# Patient Record
Sex: FEMALE | Race: WHITE | NOT HISPANIC OR LATINO | Employment: OTHER | URBAN - METROPOLITAN AREA
[De-identification: names, ages, dates, MRNs, and addresses within clinical notes are randomized per-mention and may not be internally consistent; named-entity substitution may affect disease eponyms.]

---

## 2017-01-20 ENCOUNTER — TELEPHONE (OUTPATIENT)
Dept: INTERNAL MEDICINE | Facility: CLINIC | Age: 57
End: 2017-01-20

## 2017-01-20 ENCOUNTER — OFFICE VISIT (OUTPATIENT)
Dept: INTERNAL MEDICINE | Facility: CLINIC | Age: 57
End: 2017-01-20
Payer: MEDICARE

## 2017-01-20 VITALS
DIASTOLIC BLOOD PRESSURE: 78 MMHG | OXYGEN SATURATION: 97 % | SYSTOLIC BLOOD PRESSURE: 130 MMHG | WEIGHT: 178.6 LBS | BODY MASS INDEX: 33.72 KG/M2 | HEART RATE: 57 BPM | HEIGHT: 61 IN | TEMPERATURE: 98.2 F

## 2017-01-20 DIAGNOSIS — N18.30 CKD (CHRONIC KIDNEY DISEASE) STAGE 3, GFR 30-59 ML/MIN (H): ICD-10-CM

## 2017-01-20 DIAGNOSIS — E87.5 HYPERKALEMIA: Primary | ICD-10-CM

## 2017-01-20 DIAGNOSIS — Z71.89 ADVANCED DIRECTIVES, COUNSELING/DISCUSSION: ICD-10-CM

## 2017-01-20 DIAGNOSIS — E11.21 TYPE 2 DIABETES MELLITUS WITH DIABETIC NEPHROPATHY, WITH LONG-TERM CURRENT USE OF INSULIN (H): Primary | ICD-10-CM

## 2017-01-20 DIAGNOSIS — Z79.4 TYPE 2 DIABETES MELLITUS WITH DIABETIC NEPHROPATHY, WITH LONG-TERM CURRENT USE OF INSULIN (H): Primary | ICD-10-CM

## 2017-01-20 LAB
ANION GAP SERPL CALCULATED.3IONS-SCNC: 12 MMOL/L (ref 3–14)
BUN SERPL-MCNC: 40 MG/DL (ref 7–30)
CALCIUM SERPL-MCNC: 8.6 MG/DL (ref 8.5–10.1)
CHLORIDE SERPL-SCNC: 116 MMOL/L (ref 94–109)
CO2 SERPL-SCNC: 18 MMOL/L (ref 20–32)
CREAT SERPL-MCNC: 1.68 MG/DL (ref 0.52–1.04)
CREAT UR-MCNC: 116 MG/DL
GFR SERPL CREATININE-BSD FRML MDRD: 32 ML/MIN/1.7M2
GLUCOSE SERPL-MCNC: 228 MG/DL (ref 70–99)
MICROALBUMIN UR-MCNC: 4750 MG/L
MICROALBUMIN/CREAT UR: 4094.83 MG/G CR (ref 0–25)
POTASSIUM SERPL-SCNC: 6.6 MMOL/L (ref 3.4–5.3)
SODIUM SERPL-SCNC: 146 MMOL/L (ref 133–144)
TSH SERPL DL<=0.005 MIU/L-ACNC: 3.33 MU/L (ref 0.4–4)

## 2017-01-20 PROCEDURE — 36415 COLL VENOUS BLD VENIPUNCTURE: CPT | Performed by: INTERNAL MEDICINE

## 2017-01-20 PROCEDURE — 84443 ASSAY THYROID STIM HORMONE: CPT | Performed by: INTERNAL MEDICINE

## 2017-01-20 PROCEDURE — 82043 UR ALBUMIN QUANTITATIVE: CPT | Performed by: INTERNAL MEDICINE

## 2017-01-20 PROCEDURE — 99214 OFFICE O/P EST MOD 30 MIN: CPT | Performed by: INTERNAL MEDICINE

## 2017-01-20 PROCEDURE — 80048 BASIC METABOLIC PNL TOTAL CA: CPT | Performed by: INTERNAL MEDICINE

## 2017-01-20 PROCEDURE — 83036 HEMOGLOBIN GLYCOSYLATED A1C: CPT | Performed by: INTERNAL MEDICINE

## 2017-01-20 RX ORDER — SODIUM BICARBONATE 650 MG/1
650 TABLET ORAL DAILY
Qty: 30 TABLET | Refills: 7 | Status: CANCELLED | OUTPATIENT
Start: 2017-01-20

## 2017-01-20 RX ORDER — SODIUM POLYSTYRENE SULFONATE 15 G/60ML
15 SUSPENSION ORAL; RECTAL ONCE
Qty: 60 ML | Refills: 0 | Status: SHIPPED | OUTPATIENT
Start: 2017-01-20 | End: 2017-08-11

## 2017-01-20 NOTE — NURSING NOTE
"Chief Complaint   Patient presents with     Diabetes       Initial /80 mmHg  Pulse 57  Temp(Src) 98.2  F (36.8  C) (Oral)  Ht 5' 1\" (1.549 m)  Wt 178 lb 9.6 oz (81.012 kg)  BMI 33.76 kg/m2  SpO2 97% Estimated body mass index is 33.76 kg/(m^2) as calculated from the following:    Height as of this encounter: 5' 1\" (1.549 m).    Weight as of this encounter: 178 lb 9.6 oz (81.012 kg).  BP completed using cuff size: regular    "

## 2017-01-20 NOTE — TELEPHONE ENCOUNTER
Attempted to contact patient tonight to discuss her severe hyperkalemia, which has been an intermittent problem in this patient in the past.  She has CKD, is not on ACEi, etc.  Has responded to Kayexalate in the past.  Sent prescription for single dose to her pharmacy, and instructed her to return to clinic tomorrow to have this re-checked.

## 2017-01-20 NOTE — PROGRESS NOTES
"  SUBJECTIVE:                                                    Yissel Wetzel is a 56 year old female who presents to clinic today for the following health issues:      Diabetes Follow-up    Patient is checking blood sugars: 6 times daily.  Results are as follows:         am - 100-115         lunchtime - unsure         suppertime - unsure         bedtime - 200    Diabetic concerns: None     Symptoms of hypoglycemia (low blood sugar): shaky, dizzy, weak, lethargy, confused     Paresthesias (numbness or burning in feet) or sores: No     Date of last diabetic eye exam: about a year ago       Amount of exercise or physical activity: 2-3 days/week for an average of 15-20 mins    Problems taking medications regularly: No    Medication side effects: none    Diet: limits sweeteners, only drinks lactose free milk         Problem list and histories reviewed & adjusted, as indicated.  Additional history:     Patient here for follow-up insulin-dependent type 2 diabetes.  She has made some dramatic changes in her diet in the last few months and has lost 15-20 pounds in the last 3 months.  She continues on her usual dose of Levemir insulin nightly (65 units), but now finds that she does not need the scheduled NovoLog prior to each meal, and only doses this based on sliding scale.  Most blood sugars are still consistently under 200, some a.m. fasting blood sugars are under 100.  Due for glycosylated hemoglobin today.    ROS:  Constitutional, HEENT, cardiovascular, pulmonary, gi and gu systems are negative, except as otherwise noted.    OBJECTIVE:                                                    /80 mmHg  Pulse 57  Temp(Src) 98.2  F (36.8  C) (Oral)  Ht 5' 1\" (1.549 m)  Wt 178 lb 9.6 oz (81.012 kg)  BMI 33.76 kg/m2  SpO2 97%  Body mass index is 33.76 kg/(m^2).  GENERAL: healthy, alert and no distress  NECK: no adenopathy, no asymmetry, masses, or scars and thyroid normal to palpation  RESP: lungs clear to " auscultation - no rales, rhonchi or wheezes  CV: regular rate and rhythm, normal S1 S2, no S3 or S4, no murmur, click or rub, no peripheral edema and peripheral pulses strong  ABDOMEN: soft, nontender, no hepatosplenomegaly, no masses and bowel sounds normal  MS: no gross musculoskeletal defects noted, no edema         ASSESSMENT/PLAN:                                                      1. Type 2 diabetes mellitus with diabetic nephropathy, with long-term current use of insulin (H)  - Hemoglobin A1c  - Microalbumin quantitative random urine  - Basic metabolic panel  - TSH with free T4 reflex    2. CKD (chronic kidney disease) stage 3, GFR 30-59 ml/min    - blood glucose monitoring (ACCU-CHEK CHING) test strip; Use to test blood sugars 4 times daily or as directed.  Dispense: 200 each; Refill: 2  - insulin pen needle 31G X 5 MM; Use 4 times daily  Dispense: 100 each; Refill: 11    3. Advanced directives, counseling/discussion            Gigi Martin MD  [facility]

## 2017-01-21 DIAGNOSIS — E87.5 HYPERKALEMIA: ICD-10-CM

## 2017-01-21 LAB
ANION GAP SERPL CALCULATED.3IONS-SCNC: 9 MMOL/L (ref 3–14)
BUN SERPL-MCNC: 43 MG/DL (ref 7–30)
CALCIUM SERPL-MCNC: 8.5 MG/DL (ref 8.5–10.1)
CHLORIDE SERPL-SCNC: 114 MMOL/L (ref 94–109)
CO2 SERPL-SCNC: 21 MMOL/L (ref 20–32)
CREAT SERPL-MCNC: 1.95 MG/DL (ref 0.52–1.04)
GFR SERPL CREATININE-BSD FRML MDRD: 27 ML/MIN/1.7M2
GLUCOSE SERPL-MCNC: 189 MG/DL (ref 70–99)
HBA1C MFR BLD: 7.7 % (ref 4.3–6)
POTASSIUM SERPL-SCNC: 5 MMOL/L (ref 3.4–5.3)
SODIUM SERPL-SCNC: 144 MMOL/L (ref 133–144)

## 2017-01-21 PROCEDURE — 36415 COLL VENOUS BLD VENIPUNCTURE: CPT | Performed by: INTERNAL MEDICINE

## 2017-01-21 PROCEDURE — 80048 BASIC METABOLIC PNL TOTAL CA: CPT | Performed by: INTERNAL MEDICINE

## 2017-02-16 ENCOUNTER — TRANSFERRED RECORDS (OUTPATIENT)
Dept: HEALTH INFORMATION MANAGEMENT | Facility: CLINIC | Age: 57
End: 2017-02-16

## 2017-02-23 DIAGNOSIS — N18.30 CKD (CHRONIC KIDNEY DISEASE) STAGE 3, GFR 30-59 ML/MIN (H): ICD-10-CM

## 2017-02-23 NOTE — TELEPHONE ENCOUNTER
test strip    PATIENT STATES SHE TEST 5-6X/DAY needs new script  Last Written Prescription Date: 01/20/17  Last Fill Quantity: 200,  # refills: 2   Last Office Visit with [facility], [facility] or [facility] prescribing provider: 01/20/17

## 2017-02-23 NOTE — TELEPHONE ENCOUNTER
sodium bicarbonate 650 MG       Last Written Prescription Date: 05/31/16  Last Fill Quantity: 30, # refills: 7  Last Office Visit with [facility], P or [facility] prescribing provider: 01/20/17        BP Readings from Last 3 Encounters:   01/20/17 130/78   10/14/16 170/80   07/22/16 130/72     Lab Results   Component Value Date    AST 50 07/22/2016     Lab Results   Component Value Date    ALT 57 07/22/2016     Creatinine   Date Value Ref Range Status   01/21/2017 1.95 (H) 0.52 - 1.04 mg/dL Final

## 2017-02-27 RX ORDER — SODIUM BICARBONATE 650 MG/1
650 TABLET ORAL DAILY
Qty: 30 TABLET | Refills: 7 | Status: SHIPPED | OUTPATIENT
Start: 2017-02-27 | End: 2017-12-28

## 2017-03-03 ENCOUNTER — MEDICAL CORRESPONDENCE (OUTPATIENT)
Dept: HEALTH INFORMATION MANAGEMENT | Facility: CLINIC | Age: 57
End: 2017-03-03

## 2017-03-23 DIAGNOSIS — E11.21 TYPE 2 DIABETES MELLITUS WITH DIABETIC NEPHROPATHY (H): ICD-10-CM

## 2017-03-23 DIAGNOSIS — K20.90 ESOPHAGITIS, UNSPECIFIED: ICD-10-CM

## 2017-03-23 DIAGNOSIS — N18.30 CKD (CHRONIC KIDNEY DISEASE) STAGE 3, GFR 30-59 ML/MIN (H): ICD-10-CM

## 2017-03-23 RX ORDER — INSULIN DETEMIR 100 [IU]/ML
66 INJECTION, SOLUTION SUBCUTANEOUS AT BEDTIME
Qty: 21 ML | Refills: 3 | Status: SHIPPED | OUTPATIENT
Start: 2017-03-23 | End: 2017-07-28

## 2017-03-23 NOTE — TELEPHONE ENCOUNTER
Reason for Call:  Medication or medication refill:    Do you use a [facility]?  Name of the pharmacy and phone number for the current request:  [facility] [address] [facility] [phone]    Name of the medication requested: omeprazole, test stripts and levemit    Other request: Pt requesting 2 boxes of test strips at a time.  She tests her blood 6 times a day.  With one box, she runs out.  Levemir--one box isn't enough.  She uses 66 units at night.  She runs out.    Can we leave a detailed message on this number? YES    Phone number patient can be reached at: Cell number on file:    Telephone Information:   Mobile [phone]       Best Time: anytime, but pt works until 7pm    Call taken on 3/23/2017 at 10:56 AM by ELIAS LOPEZ

## 2017-03-23 NOTE — TELEPHONE ENCOUNTER
Medications refilled.  Test strips require change in script.  Chart says 4 times a day, she is wanting it to say 6 times a day.  Please change if appropriate.

## 2017-04-03 ENCOUNTER — MEDICAL CORRESPONDENCE (OUTPATIENT)
Dept: HEALTH INFORMATION MANAGEMENT | Facility: CLINIC | Age: 57
End: 2017-04-03

## 2017-04-06 DIAGNOSIS — J45.30 MILD PERSISTENT ASTHMA, UNCOMPLICATED: ICD-10-CM

## 2017-04-06 DIAGNOSIS — N18.30 CKD (CHRONIC KIDNEY DISEASE) STAGE 3, GFR 30-59 ML/MIN (H): ICD-10-CM

## 2017-04-06 DIAGNOSIS — J45.20 MILD INTERMITTENT ASTHMA, UNCOMPLICATED: ICD-10-CM

## 2017-04-06 RX ORDER — LEVALBUTEROL TARTRATE 45 UG/1
2 AEROSOL, METERED ORAL EVERY 6 HOURS PRN
Qty: 1 INHALER | Refills: 2 | Status: SHIPPED | OUTPATIENT
Start: 2017-04-06 | End: 2017-06-15

## 2017-04-06 NOTE — TELEPHONE ENCOUNTER
fluticasone-salmeterol (ADVAIR) 250-50 MCG/DOSE diskus inhaler       Last Written Prescription Date: 10/14/2015  Last Fill Quantity: 1 Inhaler, # refills: 11    Last Office Visit with [facility], [facility] or [facility] prescribing provider:  1/20/2017   Future Office Visit:       Date of Last Asthma Action Plan Letter:   Asthma Action Plan Q1 Year    Topic Date Due     Asthma Action Plan - yearly  07/22/2017      Asthma Control Test:   ACT Total Scores 7/22/2016   ACT TOTAL SCORE -   ASTHMA ER VISITS -   ASTHMA HOSPITALIZATIONS -   ACT TOTAL SCORE (Goal Greater than or Equal to 20) 24   In the past 12 months, how many times did you visit the emergency room for your asthma without being admitted to the hospital? 0   In the past 12 months, how many times were you hospitalized overnight because of your asthma? 0       Date of Last Spirometry Test:   No results found for this or any previous visit.        levalbuterol (XOPENEX HFA) 45 MCG/ACT inhaler       Last Written Prescription Date: 10/06/2016  Last Fill Quantity: 1 Inhaler, # refills: 2    Last Office Visit with [facility], [facility] or [facility] prescribing provider:  1/20/2017   Future Office Visit:       Date of Last Asthma Action Plan Letter:   Asthma Action Plan Q1 Year    Topic Date Due     Asthma Action Plan - yearly  07/22/2017      Asthma Control Test:   ACT Total Scores 7/22/2016   ACT TOTAL SCORE -   ASTHMA ER VISITS -   ASTHMA HOSPITALIZATIONS -   ACT TOTAL SCORE (Goal Greater than or Equal to 20) 24   In the past 12 months, how many times did you visit the emergency room for your asthma without being admitted to the hospital? 0   In the past 12 months, how many times were you hospitalized overnight because of your asthma? 0       Date of Last Spirometry Test:   No results found for this or any previous visit.

## 2017-04-06 NOTE — TELEPHONE ENCOUNTER
Reason for Call:  Medication or medication refill:    Do you use a [facility]?  Name of the pharmacy and phone number for the current request:  [id number] [facility] [phone] Fax [phone]    Name of the medication requested: Advair  250/50    Other request: refill  but cannot breathe well with allergies. Call her to let her know if you will refill. Check xopenex to see if she needs refills.    Can we leave a detailed message on this number? YES    Phone number patient can be reached at: Home number on file [phone]    Best Time: anytime today    Call taken on 2017 at 8:11 AM by Porsha Lou

## 2017-04-07 ENCOUNTER — OFFICE VISIT (OUTPATIENT)
Dept: URGENT CARE | Facility: URGENT CARE | Age: 57
End: 2017-04-07
Payer: MEDICARE

## 2017-04-07 VITALS
OXYGEN SATURATION: 95 % | TEMPERATURE: 97.7 F | HEIGHT: 61 IN | DIASTOLIC BLOOD PRESSURE: 86 MMHG | HEART RATE: 64 BPM | SYSTOLIC BLOOD PRESSURE: 120 MMHG

## 2017-04-07 DIAGNOSIS — J20.9 ACUTE BRONCHITIS WITH SYMPTOMS > 10 DAYS: Primary | ICD-10-CM

## 2017-04-07 PROCEDURE — 99213 OFFICE O/P EST LOW 20 MIN: CPT | Performed by: PHYSICIAN ASSISTANT

## 2017-04-07 RX ORDER — AZITHROMYCIN 250 MG/1
TABLET, FILM COATED ORAL
Qty: 6 TABLET | Refills: 0 | Status: SHIPPED | OUTPATIENT
Start: 2017-04-07 | End: 2017-04-19

## 2017-04-07 NOTE — NURSING NOTE
"Chief Complaint   Patient presents with     URI     chest tightness 3x days        Initial /86  Pulse 64  Temp 97.7  F (36.5  C) (Oral)  Ht 5' 1\" (1.549 m)  SpO2 95% Estimated body mass index is 33.75 kg/(m^2) as calculated from the following:    Height as of 1/20/17: 5' 1\" (1.549 m).    Weight as of 1/20/17: 178 lb 9.6 oz (81 kg).  Medication Reconciliation: complete    "

## 2017-04-07 NOTE — MR AVS SNAPSHOT
"              After Visit Summary   4/7/2017    Yissel Wetzel    MRN: [id number]           Patient Information     Date Of Birth          1960        Visit Information        Provider Department      4/7/2017 5:45 PM Tameka Levy PA-C [facility]        Today's Diagnoses     Acute bronchitis with symptoms > 10 days    -  1       Follow-ups after your visit        Follow-up notes from your care team     Return if symptoms worsen or fail to improve.      Who to contact     If you have questions or need follow up information about today's clinic visit or your schedule please contact [facility] directly at [phone].  Normal or non-critical lab and imaging results will be communicated to you by MyChart, letter or phone within 4 business days after the clinic has received the results. If you do not hear from us within 7 days, please contact the clinic through [employer] or phone. If you have a critical or abnormal lab result, we will notify you by phone as soon as possible.  Submit refill requests through [employer] or call your pharmacy and they will forward the refill request to us. Please allow 3 business days for your refill to be completed.          Additional Information About Your Visit        MyChart Information     [employer] gives you secure access to your electronic health record. If you see a primary care provider, you can also send messages to your care team and make appointments. If you have questions, please call your primary care clinic.  If you do not have a primary care provider, please call [phone] and they will assist you.        Care EveryWhere ID     This is your Care EveryWhere ID. This could be used by other organizations to access your [facility] medical records  [id number]        Your Vitals Were     Pulse Temperature Height Pulse Oximetry          64 97.7  F (36.5  C) (Oral) 5' 1\" (1.549 m) 95%         Blood Pressure from Last 3 " Encounters:   04/07/17 120/86   01/20/17 130/78   10/14/16 170/80    Weight from Last 3 Encounters:   01/20/17 178 lb 9.6 oz (81 kg)   07/22/16 193 lb (87.5 kg)   03/16/16 195 lb (88.5 kg)              Today, you had the following     No orders found for display         Today's Medication Changes          These changes are accurate as of: 4/7/17 11:59 PM.  If you have any questions, ask your nurse or doctor.               Start taking these medicines.        Dose/Directions    azithromycin 250 MG tablet   Commonly known as:  ZITHROMAX   Used for:  Acute bronchitis with symptoms > 10 days   Started by:  Tameka Levy PA-C        Two tablets first day, then one tablet daily for four days.   Quantity:  6 tablet   Refills:  0            Where to get your medicines      These medications were sent to [place] [address]     Phone:  [phone]     azithromycin 250 MG tablet                Primary Care Provider Office Phone # Fax #    Gigi Martin [phone]       [facility] [address] [address]        Thank you!     Thank you for choosing [facility]  for your care. Our goal is always to provide you with excellent care. Hearing back from our patients is one way we can continue to improve our services. Please take a few minutes to complete the written survey that you may receive in the mail after your visit with us. Thank you!             Your Updated Medication List - Protect others around you: Learn how to safely use, store and throw away your medicines at www.disposemymeds.org.          This list is accurate as of: 4/7/17 11:59 PM.  Always use your most recent med list.                   Brand Name Dispense Instructions for use    * ACE NOT PRESCRIBED (INTENTIONAL)     0 each    1 each continuous prn ACE Inhibitor not prescribed due to Intolerance        azithromycin 250 MG tablet    ZITHROMAX    6 tablet    Two tablets first day, then one tablet daily for four days.       blood glucose monitoring meter device kit     1 kit    Use to test blood sugars 5-7 times daily or as directed.       blood glucose monitoring test strip    ACCU-CHEK CHING    200 each    Use to test blood sugars 4 times daily or as directed.       ferrous sulfate 325 (65 FE) MG tablet    IRON    30 tablet    Take 1 tablet (325 mg) by mouth daily with food       fluticasone 50 MCG/ACT spray    FLONASE    16 g    Spray 2 sprays into both nostrils daily       fluticasone-salmeterol 250-50 MCG/DOSE diskus inhaler    ADVAIR    1 Inhaler    Inhale 1 puff into the lungs 2 times daily       * insulin pen needle 31G X 8 MM     100 each    Use 4 pen needles daily or as directed.       * insulin pen needle 31G X 5 MM     100 each    Use 4 times daily       labetalol 200 MG tablet    NORMODYNE    180 tablet    Take 1 tablet (200 mg) by mouth 2 times daily       levalbuterol 45 MCG/ACT Inhaler    XOPENEX HFA    1 Inhaler    Inhale 2 puffs into the lungs every 6 hours as needed for shortness of breath / dyspnea       LEVEMIR FLEXPEN/FLEXTOUCH 100 UNIT/ML injection   Generic drug:  insulin detemir     21 mL    Inject 66 Units Subcutaneous At Bedtime 66 units at bedtime       methylPREDNISolone 4 MG tablet    MEDROL DOSEPAK    21 tablet    Follow package instructions       * NovoLOG FLEXpen 100 UNITS/ML injection   Generic drug:  insulin aspart     1 Month    Inject  Subcutaneous 3 times daily (before meals). 9-12u units before breakfast, 9-12u  before lunch, 9-12 units before dinner+ correction scale 2u for every 50 over 150.       * insulin aspart 100 UNIT/ML injection    NovoLOG PEN    1 Month    9-12 units before breakfast,lunch and dinner + a corrective scale of 2 units for every 50 over 150       omeprazole 20 MG CR capsule    priLOSEC    30 capsule    Take 1 capsule (20 mg) by mouth every morning  (before breakfast) take 30'-60' before 1st meal daily.       order for DME     1 Device    Equipment being ordered: right flat sole shoe       sodium bicarbonate 650 MG tablet     30 tablet    Take 1 tablet (650 mg) by mouth daily       * STATIN NOT PRESCRIBED (INTENTIONAL)     0 each    continuous prn. Statin not prescribed intentionally due to Other:       * Notice:  This list has 6 medication(s) that are the same as other medications prescribed for you. Read the directions carefully, and ask your doctor or other care provider to review them with you.

## 2017-04-07 NOTE — PROGRESS NOTES
SUBJECTIVE:   Yissel Wetzel is a 56 year old female presenting with a chief complaint of rhinorrhea, cough  and chest congestion.  Onset of symptoms was 2 week(s) ago.  Course of illness is same.    Severity moderate  Current and Associated symptoms: rhinorrhea and cough   Treatment measures tried include None tried.  Predisposing factors include recent illness and seasonal allergies.    Past Medical History:   Diagnosis Date     Allergic rhinitis, cause unspecified      Anemia of chronic disease      CKD (chronic kidney disease) stage 3, GFR 30-59 ml/min      Esophagitis, unspecified      HEMORRHAGIC GASTROPATHY      Iron deficiency anemia, unspecified      Mild persistent asthma      Other and unspecified hyperlipidemia      Pneumonia      Pulmonary hypertension (H)     per 12/2012 echo mod.to severe pulmonary hypertension     Tobacco use disorder dc ed 1999     Type II or unspecified type diabetes mellitus without mention of complication, not stated as uncontrolled      Unspecified essential hypertension      Current Outpatient Prescriptions   Medication Sig Dispense Refill     levalbuterol (XOPENEX HFA) 45 MCG/ACT Inhaler Inhale 2 puffs into the lungs every 6 hours as needed for shortness of breath / dyspnea 1 Inhaler 2     fluticasone-salmeterol (ADVAIR) 250-50 MCG/DOSE diskus inhaler Inhale 1 puff into the lungs 2 times daily 1 Inhaler 2     blood glucose monitoring (ACCU-CHEK CHING) test strip Use to test blood sugars 4 times daily or as directed. 200 each 2     LEVEMIR FLEXPEN/FLEXTOUCH 100 UNIT/ML soln Inject 66 Units Subcutaneous At Bedtime 66 units at bedtime 21 mL 3     omeprazole (PRILOSEC) 20 MG CR capsule Take 1 capsule (20 mg) by mouth every morning (before breakfast) take 30'-60' before 1st meal daily. 30 capsule 9     sodium bicarbonate 650 MG tablet Take 1 tablet (650 mg) by mouth daily 30 tablet 7     insulin pen needle 31G X 5 MM Use 4 times daily 100 each 11     methylPREDNISolone (MEDROL  DOSEPAK) 4 MG tablet Follow package instructions 21 tablet 0     blood glucose monitoring (ACCU-CHEK CHING PLUS) meter device kit Use to test blood sugars 5-7 times daily or as directed. 1 kit 0     ferrous sulfate (IRON) 325 (65 FE) MG tablet Take 1 tablet (325 mg) by mouth daily with food 30 tablet 6     labetalol (NORMODYNE) 200 MG tablet Take 1 tablet (200 mg) by mouth 2 times daily 180 tablet 2     insulin aspart (NOVOLOG PEN) 100 UNIT/ML soln 9-12 units before breakfast,lunch and dinner + a corrective scale of 2 units for every 50 over 150 1 Month 11     insulin pen needle 31G X 8 MM Use 4 pen needles daily or as directed. 100 each 3     fluticasone (FLONASE) 50 MCG/ACT nasal spray Spray 2 sprays into both nostrils daily 16 g 11     order for DME Equipment being ordered: right flat sole shoe 1 Device 0     NOVOLOG FLEXPEN SOLN 100 UNIT/ML Inject  Subcutaneous 3 times daily (before meals). 9-12u units before breakfast, 9-12u  before lunch, 9-12 units before dinner+ correction scale 2u for every 50 over 150. 1 Month 3     ACE NOT PRESCRIBED, INTENTIONAL, 1 each continuous prn ACE Inhibitor not prescribed due to Intolerance 0 each 0     STATIN NOT PRESCRIBED, INTENTIONAL, continuous prn. Statin not prescribed intentionally due to Other: 0 each 0     Social History   Substance Use Topics     Smoking status: Former Smoker     Packs/day: 1.00     Years: 22.00     Types: Cigarettes     Quit date: 10/12/1999     Smokeless tobacco: Never Used     Alcohol use No       ROS:  CONSTITUTIONAL:NEGATIVE for fever, chills, change in weight  INTEGUMENTARY/SKIN: NEGATIVE for worrisome rashes, moles or lesions  EYES: NEGATIVE for vision changes or irritation  ENT/MOUTH: NEGATIVE for ear, mouth and throat problems  RESP:POSITIVE for cough-productive, Hx asthma, SOB/dyspnea and chest tightness  CV: NEGATIVE for chest pain, palpitations or peripheral edema    OBJECTIVE  :/86  Pulse 64  Temp 97.7  F (36.5  C) (Oral)  Ht 5'  "1\" (1.549 m)  SpO2 95%  GENERAL APPEARANCE: healthy, alert and no distress  EYES: EOMI,  PERRL, conjunctiva clear  HENT: ear canals and TM's normal.  Nose and mouth without ulcers, erythema or lesions  NECK: supple, nontender, no lymphadenopathy  RESP: rhonchi bilateral and E to A changes heard in bilateral lower lobes  CV: regular rates and rhythm, normal S1 S2, no murmur noted  ABDOMEN:  soft, nontender, no HSM or masses and bowel sounds normal  NEURO: Normal strength and tone, sensory exam grossly normal,  normal speech and mentation  SKIN: no suspicious lesions or rashes    ASSESSMENT:  Bronchitis    PLAN:  OTC cough suppressant/expectorant, OTC decongestant/antihistamine and Rx azithromycin.  All questions and concerns were addressed today.  Patient is aware of and agrees with the plan.    Tameka Levy PA-C    "

## 2017-04-14 ENCOUNTER — TELEPHONE (OUTPATIENT)
Dept: NURSING | Facility: CLINIC | Age: 57
End: 2017-04-14

## 2017-04-14 NOTE — TELEPHONE ENCOUNTER
"Pt was seen in  on 4/7/17 and dx with bronchitis.  Finished zpak but doesn't feel like it was strong enough.  Still having sob, some wheezing.  Some cough with production.  Feels \"pressure on lungs\".  Using both inhalers.  Willing to come in on Monday if you want. Or wondering if you would prescribe different abx.   "

## 2017-04-19 ENCOUNTER — OFFICE VISIT (OUTPATIENT)
Dept: INTERNAL MEDICINE | Facility: CLINIC | Age: 57
End: 2017-04-19
Payer: MEDICARE

## 2017-04-19 VITALS
BODY MASS INDEX: 33.63 KG/M2 | TEMPERATURE: 97.5 F | DIASTOLIC BLOOD PRESSURE: 70 MMHG | WEIGHT: 178 LBS | OXYGEN SATURATION: 98 % | HEART RATE: 59 BPM | SYSTOLIC BLOOD PRESSURE: 142 MMHG

## 2017-04-19 DIAGNOSIS — J45.30 MILD PERSISTENT ASTHMA WITHOUT COMPLICATION: ICD-10-CM

## 2017-04-19 DIAGNOSIS — I27.20 PULMONARY HYPERTENSION (H): ICD-10-CM

## 2017-04-19 DIAGNOSIS — J06.9 UPPER RESPIRATORY TRACT INFECTION, UNSPECIFIED TYPE: Primary | ICD-10-CM

## 2017-04-19 PROCEDURE — 99214 OFFICE O/P EST MOD 30 MIN: CPT | Performed by: INTERNAL MEDICINE

## 2017-04-19 RX ORDER — LEVOFLOXACIN 500 MG/1
500 TABLET, FILM COATED ORAL DAILY
Qty: 7 TABLET | Refills: 0 | Status: SHIPPED | OUTPATIENT
Start: 2017-04-19 | End: 2017-05-04

## 2017-04-19 NOTE — MR AVS SNAPSHOT
After Visit Summary   4/19/2017    Yissel Wetzel    MRN: [id number]           Patient Information     Date Of Birth          1960        Visit Information        Provider Department      4/19/2017 3:00 PM Gigi Martin MD [facility]        Today's Diagnoses     Upper respiratory tract infection, unspecified type    -  1    Mild persistent asthma without complication        Pulmonary hypertension (H)           Follow-ups after your visit        Who to contact     If you have questions or need follow up information about today's clinic visit or your schedule please contact [facility] directly at [phone].  Normal or non-critical lab and imaging results will be communicated to you by MyChart, letter or phone within 4 business days after the clinic has received the results. If you do not hear from us within 7 days, please contact the clinic through Wibkihart or phone. If you have a critical or abnormal lab result, we will notify you by phone as soon as possible.  Submit refill requests through Cardiola or call your pharmacy and they will forward the refill request to us. Please allow 3 business days for your refill to be completed.          Additional Information About Your Visit        MyChart Information     Cardiola gives you secure access to your electronic health record. If you see a primary care provider, you can also send messages to your care team and make appointments. If you have questions, please call your primary care clinic.  If you do not have a primary care provider, please call [phone] and they will assist you.        Care EveryWhere ID     This is your Care EveryWhere ID. This could be used by other organizations to access your [place] medical records  [id number]        Your Vitals Were     Pulse Temperature Pulse Oximetry BMI (Body Mass Index)          59 97.5  F (36.4  C) (Oral) 98% 33.63 kg/m2         Blood Pressure  from Last 3 Encounters:   04/19/17 142/70   04/07/17 120/86   01/20/17 130/78    Weight from Last 3 Encounters:   04/19/17 178 lb (80.7 kg)   01/20/17 178 lb 9.6 oz (81 kg)   07/22/16 193 lb (87.5 kg)              Today, you had the following     No orders found for display         Today's Medication Changes          These changes are accurate as of: 4/19/17  3:05 PM.  If you have any questions, ask your nurse or doctor.               Start taking these medicines.        Dose/Directions    levofloxacin 500 MG tablet   Commonly known as:  LEVAQUIN   Used for:  Upper respiratory tract infection, unspecified type   Started by:  Gigi Martin MD        Dose:  500 mg   Take 1 tablet (500 mg) by mouth daily   Quantity:  7 tablet   Refills:  0            Where to get your medicines      These medications were sent to [id number] - [place], [place] - [address]  [address] [facility] [address] [address]     Phone:  [phone]     levofloxacin 500 MG tablet                Primary Care Provider Office Phone # Fax #    Gigi Martin [phone]       [facility] [place] [address] [place] [address]        Thank you!     Thank you for choosing [facility]  for your care. Our goal is always to provide you with excellent care. Hearing back from our patients is one way we can continue to improve our services. Please take a few minutes to complete the written survey that you may receive in the mail after your visit with us. Thank you!             Your Updated Medication List - Protect others around you: Learn how to safely use, store and throw away your medicines at www.disposemymeds.org.          This list is accurate as of: 4/19/17  3:05 PM.  Always use your most recent med list.                   Brand Name Dispense Instructions for use    * ACE NOT PRESCRIBED (INTENTIONAL)     0 each    1 each continuous prn ACE Inhibitor not prescribed  due to Intolerance       blood glucose monitoring meter device kit     1 kit    Use to test blood sugars 5-7 times daily or as directed.       blood glucose monitoring test strip    ACCU-CHEK CHING    200 each    Use to test blood sugars 4 times daily or as directed.       ferrous sulfate 325 (65 FE) MG tablet    IRON    30 tablet    Take 1 tablet (325 mg) by mouth daily with food       fluticasone 50 MCG/ACT spray    FLONASE    16 g    Spray 2 sprays into both nostrils daily       fluticasone-salmeterol 250-50 MCG/DOSE diskus inhaler    ADVAIR    1 Inhaler    Inhale 1 puff into the lungs 2 times daily       * insulin pen needle 31G X 8 MM     100 each    Use 4 pen needles daily or as directed.       * insulin pen needle 31G X 5 MM     100 each    Use 4 times daily       labetalol 200 MG tablet    NORMODYNE    180 tablet    Take 1 tablet (200 mg) by mouth 2 times daily       levalbuterol 45 MCG/ACT Inhaler    XOPENEX HFA    1 Inhaler    Inhale 2 puffs into the lungs every 6 hours as needed for shortness of breath / dyspnea       LEVEMIR FLEXPEN/FLEXTOUCH 100 UNIT/ML injection   Generic drug:  insulin detemir     21 mL    Inject 66 Units Subcutaneous At Bedtime 66 units at bedtime       levofloxacin 500 MG tablet    LEVAQUIN    7 tablet    Take 1 tablet (500 mg) by mouth daily       methylPREDNISolone 4 MG tablet    MEDROL DOSEPAK    21 tablet    Follow package instructions       * NovoLOG FLEXpen 100 UNITS/ML injection   Generic drug:  insulin aspart     1 Month    Inject  Subcutaneous 3 times daily (before meals). 9-12u units before breakfast, 9-12u  before lunch, 9-12 units before dinner+ correction scale 2u for every 50 over 150.       * insulin aspart 100 UNIT/ML injection    NovoLOG PEN    1 Month    9-12 units before breakfast,lunch and dinner + a corrective scale of 2 units for every 50 over 150       omeprazole 20 MG CR capsule    priLOSEC    30 capsule    Take 1 capsule (20 mg) by mouth every morning  (before breakfast) take 30'-60' before 1st meal daily.       order for DME     1 Device    Equipment being ordered: right flat sole shoe       sodium bicarbonate 650 MG tablet     30 tablet    Take 1 tablet (650 mg) by mouth daily       * STATIN NOT PRESCRIBED (INTENTIONAL)     0 each    continuous prn. Statin not prescribed intentionally due to Other:       * Notice:  This list has 6 medication(s) that are the same as other medications prescribed for you. Read the directions carefully, and ask your doctor or other care provider to review them with you.

## 2017-04-19 NOTE — NURSING NOTE
"Chief Complaint   Patient presents with     Respiratory Distress       Initial /70 (BP Location: Left arm, Patient Position: Chair, Cuff Size: Adult Large)  Pulse 59  Temp 97.5  F (36.4  C) (Oral)  Wt 178 lb (80.7 kg)  SpO2 98%  BMI 33.63 kg/m2 Estimated body mass index is 33.63 kg/(m^2) as calculated from the following:    Height as of 4/7/17: 5' 1\" (1.549 m).    Weight as of this encounter: 178 lb (80.7 kg).  Medication Reconciliation: complete    "

## 2017-04-19 NOTE — PROGRESS NOTES
"  SUBJECTIVE:                                                    Yissel Wetzel is a 56 year old female who presents to clinic today for the following health issues:      Patient here for some respiratory concerns. Patient states she is experiencing some shortness of breath which began over 2 weeks ago. Patient was seen in urgent care and was treated with Antibiotics for a bronchitis. But patient is still having difficulty breathing         Problem list and histories reviewed & adjusted, as indicated.  Additional history: as documented        Reviewed and updated as needed this visit by clinical staff  Tobacco  Allergies  Med Hx  Surg Hx  Fam Hx  Soc Hx      Reviewed and updated as needed this visit by Provider         ROS:  Constitutional, HEENT, cardiovascular, pulmonary, gi and gu systems are negative, except as otherwise noted.    OBJECTIVE:                                                    /70 (BP Location: Left arm, Patient Position: Chair, Cuff Size: Adult Large)  Pulse 59  Temp 97.5  F (36.4  C) (Oral)  Wt 178 lb (80.7 kg)  SpO2 98%  BMI 33.63 kg/m2  Body mass index is 33.63 kg/(m^2).  GENERAL: healthy, alert and no distress  NECK: no adenopathy, no asymmetry, masses, or scars and thyroid normal to palpation  RESP: Scant wheezing at the periphery, no significant rhonchi  CV: regular rate and rhythm, normal S1 S2, no S3 or S4, no murmur, click or rub, no peripheral edema and peripheral pulses strong  ABDOMEN: soft, nontender, no hepatosplenomegaly, no masses and bowel sounds normal  MS: no gross musculoskeletal defects noted, no edema         ASSESSMENT/PLAN:                                                      1. Upper respiratory tract infection, unspecified type  Patient refuses to consider steroid therapy, as she \"does not like the way they make me feel.\"  Trial of Levaquin given duration of symptoms.  Follow-up as needed  - levofloxacin (LEVAQUIN) 500 MG tablet; Take 1 tablet (500 mg) " by mouth daily  Dispense: 7 tablet; Refill: 0    2. Mild persistent asthma without complication  Continue usually inhaler therapy as previously.    3. Pulmonary hypertension (H)  Stable oxygenation          Gigi Martin MD  [facility]

## 2017-04-21 ENCOUNTER — TELEPHONE (OUTPATIENT)
Dept: INTERNAL MEDICINE | Facility: CLINIC | Age: 57
End: 2017-04-21

## 2017-04-21 NOTE — TELEPHONE ENCOUNTER
Reason for Call:  Other     Detailed comments: Patient requesting a call back from nurse regarding a letter for work. Would not tell me the reason.    Phone Number Patient can be reached at: Home number on file [phone] (home)    Best Time: Anytime    Can we leave a detailed message on this number? YES    Call taken on 4/21/2017 at 8:31 AM by SADE JACKSON

## 2017-04-21 NOTE — TELEPHONE ENCOUNTER
Patient stayed home today due to her breathing issues. And she would like a note stating she wasn't able to work today due to breathing issues. Please advise    Fax completed letter to: [phone]   Attention: Rose

## 2017-05-04 ENCOUNTER — OFFICE VISIT (OUTPATIENT)
Dept: INTERNAL MEDICINE | Facility: CLINIC | Age: 57
End: 2017-05-04
Payer: MEDICARE

## 2017-05-04 VITALS
RESPIRATION RATE: 20 BRPM | SYSTOLIC BLOOD PRESSURE: 166 MMHG | TEMPERATURE: 97.8 F | DIASTOLIC BLOOD PRESSURE: 102 MMHG | WEIGHT: 178 LBS | OXYGEN SATURATION: 98 % | BODY MASS INDEX: 33.63 KG/M2 | HEART RATE: 59 BPM

## 2017-05-04 DIAGNOSIS — J06.9 UPPER RESPIRATORY TRACT INFECTION, UNSPECIFIED TYPE: ICD-10-CM

## 2017-05-04 PROCEDURE — 99214 OFFICE O/P EST MOD 30 MIN: CPT | Performed by: INTERNAL MEDICINE

## 2017-05-04 RX ORDER — LEVOFLOXACIN 500 MG/1
500 TABLET, FILM COATED ORAL DAILY
Qty: 7 TABLET | Refills: 0 | Status: SHIPPED | OUTPATIENT
Start: 2017-05-04 | End: 2017-06-15

## 2017-05-04 RX ORDER — PREDNISONE 10 MG/1
10 TABLET ORAL DAILY
Qty: 5 TABLET | Refills: 0 | Status: SHIPPED | OUTPATIENT
Start: 2017-05-04 | End: 2017-06-15

## 2017-05-04 NOTE — MR AVS SNAPSHOT
After Visit Summary   5/4/2017    Yissel Wetzel    MRN: [id number]           Patient Information     Date Of Birth          1960        Visit Information        Provider Department      5/4/2017 4:30 PM Gigi Martin MD [facility]        Today's Diagnoses     Upper respiratory tract infection, unspecified type           Follow-ups after your visit        Who to contact     If you have questions or need follow up information about today's clinic visit or your schedule please contact [facility] directly at [phone].  Normal or non-critical lab and imaging results will be communicated to you by Visualnesthart, letter or phone within 4 business days after the clinic has received the results. If you do not hear from us within 7 days, please contact the clinic through Visualnesthart or phone. If you have a critical or abnormal lab result, we will notify you by phone as soon as possible.  Submit refill requests through NanoVelos or call your pharmacy and they will forward the refill request to us. Please allow 3 business days for your refill to be completed.          Additional Information About Your Visit        MyChart Information     NanoVelos gives you secure access to your electronic health record. If you see a primary care provider, you can also send messages to your care team and make appointments. If you have questions, please call your primary care clinic.  If you do not have a primary care provider, please call [phone] and they will assist you.        Care EveryWhere ID     This is your Care EveryWhere ID. This could be used by other organizations to access your [place] medical records  [id number]        Your Vitals Were     Pulse Temperature Respirations Pulse Oximetry BMI (Body Mass Index)       59 97.8  F (36.6  C) (Oral) 20 98% 33.63 kg/m2        Blood Pressure from Last 3 Encounters:   05/04/17 (!) 166/102   04/19/17 142/70   04/07/17  120/86    Weight from Last 3 Encounters:   05/04/17 178 lb (80.7 kg)   04/19/17 178 lb (80.7 kg)   01/20/17 178 lb 9.6 oz (81 kg)              Today, you had the following     No orders found for display         Today's Medication Changes          These changes are accurate as of: 5/4/17 11:59 PM.  If you have any questions, ask your nurse or doctor.               Start taking these medicines.        Dose/Directions    predniSONE 10 MG tablet   Commonly known as:  DELTASONE   Used for:  Upper respiratory tract infection, unspecified type   Started by:  Gigi Martin MD        Dose:  10 mg   Take 1 tablet (10 mg) by mouth daily for 5 days   Quantity:  5 tablet   Refills:  0            Where to get your medicines      These medications were sent to [facility] [place] [address]     Phone:  [phone]     levofloxacin 500 MG tablet    predniSONE 10 MG tablet                Primary Care Provider Office Phone # Fax #    Gigi Martin [phone]       [facility] [address] [address]        Thank you!     Thank you for choosing [facility]  for your care. Our goal is always to provide you with excellent care. Hearing back from our patients is one way we can continue to improve our services. Please take a few minutes to complete the written survey that you may receive in the mail after your visit with us. Thank you!             Your Updated Medication List - Protect others around you: Learn how to safely use, store and throw away your medicines at www.disposemymeds.org.          This list is accurate as of: 5/4/17 11:59 PM.  Always use your most recent med list.                   Brand Name Dispense Instructions for use    * ACE NOT PRESCRIBED (INTENTIONAL)     0 each    1 each continuous prn ACE Inhibitor not prescribed due to Intolerance       blood glucose  monitoring meter device kit     1 kit    Use to test blood sugars 5-7 times daily or as directed.       blood glucose monitoring test strip    ACCU-CHEK CHING    200 each    Use to test blood sugars 4 times daily or as directed.       ferrous sulfate 325 (65 FE) MG tablet    IRON    30 tablet    Take 1 tablet (325 mg) by mouth daily with food       fluticasone 50 MCG/ACT spray    FLONASE    16 g    Spray 2 sprays into both nostrils daily       fluticasone-salmeterol 250-50 MCG/DOSE diskus inhaler    ADVAIR    1 Inhaler    Inhale 1 puff into the lungs 2 times daily       * insulin pen needle 31G X 8 MM     100 each    Use 4 pen needles daily or as directed.       * insulin pen needle 31G X 5 MM     100 each    Use 4 times daily       labetalol 200 MG tablet    NORMODYNE    180 tablet    Take 1 tablet (200 mg) by mouth 2 times daily       levalbuterol 45 MCG/ACT Inhaler    XOPENEX HFA    1 Inhaler    Inhale 2 puffs into the lungs every 6 hours as needed for shortness of breath / dyspnea       LEVEMIR FLEXPEN/FLEXTOUCH 100 UNIT/ML injection   Generic drug:  insulin detemir     21 mL    Inject 66 Units Subcutaneous At Bedtime 66 units at bedtime       levofloxacin 500 MG tablet    LEVAQUIN    7 tablet    Take 1 tablet (500 mg) by mouth daily       methylPREDNISolone 4 MG tablet    MEDROL DOSEPAK    21 tablet    Follow package instructions       * NovoLOG FLEXpen 100 UNITS/ML injection   Generic drug:  insulin aspart     1 Month    Inject  Subcutaneous 3 times daily (before meals). 9-12u units before breakfast, 9-12u  before lunch, 9-12 units before dinner+ correction scale 2u for every 50 over 150.       * insulin aspart 100 UNIT/ML injection    NovoLOG PEN    1 Month    9-12 units before breakfast,lunch and dinner + a corrective scale of 2 units for every 50 over 150       omeprazole 20 MG CR capsule    priLOSEC    30 capsule    Take 1 capsule (20 mg) by mouth every morning (before breakfast) take 30'-60' before 1st  meal daily.       order for DME     1 Device    Equipment being ordered: right flat sole shoe       predniSONE 10 MG tablet    DELTASONE    5 tablet    Take 1 tablet (10 mg) by mouth daily for 5 days       sodium bicarbonate 650 MG tablet     30 tablet    Take 1 tablet (650 mg) by mouth daily       * STATIN NOT PRESCRIBED (INTENTIONAL)     0 each    continuous prn. Statin not prescribed intentionally due to Other:       * Notice:  This list has 6 medication(s) that are the same as other medications prescribed for you. Read the directions carefully, and ask your doctor or other care provider to review them with you.

## 2017-05-04 NOTE — PROGRESS NOTES
SUBJECTIVE:                                                    Yissel Wetzel is a 56 year old female who presents to clinic today for the following health issues:      RESPIRATORY SYMPTOMS      Duration: a couple of weeks    Description  cough and wheezing    Severity: severe    Accompanying signs and symptoms: Asthma    History (predisposing factors):  none    Precipitating or alleviating factors: None    Therapies tried and outcome:  rest and fluids       Patient reports the cough keeps getting worse over time.She is now wheezing and having trouble taking in deep breath as well    Problem list and histories reviewed & adjusted, as indicated.  Additional history:         Reviewed and updated as needed this visit by clinical staff       Reviewed and updated as needed this visit by Provider         ROS:  Constitutional, HEENT, cardiovascular, pulmonary, gi and gu systems are negative, except as otherwise noted.    OBJECTIVE:                                                    BP (!) 166/102 (BP Location: Left arm, Patient Position: Chair, Cuff Size: Adult Regular)  Pulse 59  Temp 97.8  F (36.6  C) (Oral)  Resp 20  Wt 178 lb (80.7 kg)  SpO2 98%  BMI 33.63 kg/m2  Body mass index is 33.63 kg/(m^2).  GENERAL: healthy, alert and no distress  NECK: no adenopathy, no asymmetry, masses, or scars and thyroid normal to palpation  RESP: lungs clear to auscultation - no rales, rhonchi or wheezes  CV: regular rate and rhythm, normal S1 S2, no S3 or S4, no murmur, click or rub, no peripheral edema and peripheral pulses strong  ABDOMEN: soft, nontender, no hepatosplenomegaly, no masses and bowel sounds normal  MS: no gross musculoskeletal defects noted, no edema    Diagnostic Test Results:  none      ASSESSMENT/PLAN:                                                      1. Upper respiratory tract infection, unspecified type    - levofloxacin (LEVAQUIN) 500 MG tablet; Take 1 tablet (500 mg) by mouth daily  Dispense: 7  tablet; Refill: 0  - predniSONE (DELTASONE) 10 MG tablet; Take 1 tablet (10 mg) by mouth daily for 5 days  Dispense: 5 tablet; Refill: 0      Gigi Martin MD  [facility]

## 2017-05-04 NOTE — NURSING NOTE
"Chief Complaint   Patient presents with     Cough       Initial BP (!) 166/102 (BP Location: Left arm, Patient Position: Chair, Cuff Size: Adult Regular)  Pulse 59  Temp 97.8  F (36.6  C) (Oral)  Resp 20  Wt 178 lb (80.7 kg)  SpO2 98%  BMI 33.63 kg/m2 Estimated body mass index is 33.63 kg/(m^2) as calculated from the following:    Height as of 4/7/17: 5' 1\" (1.549 m).    Weight as of this encounter: 178 lb (80.7 kg).  Medication Reconciliation: complete    "

## 2017-05-04 NOTE — LETTER
[facility]  [address] [address]  Tel. [phone]  Fax [phone]      May 4, 2017    To whom it may concern:    I am writing this letter on behalf of my patient, Yissel Wetzel (: 1960).  She was seen in my clinic today for evaluation of an upper respiratory infection.  She should be excused from work until 5/10.    Please contact my office if you have questions or concerns.    Sincerely,        CARYN Martin  Department of Internal Medicine  [facility]

## 2017-05-05 ASSESSMENT — ASTHMA QUESTIONNAIRES: ACT_TOTALSCORE: 7

## 2017-06-03 ENCOUNTER — HEALTH MAINTENANCE LETTER (OUTPATIENT)
Age: 57
End: 2017-06-03

## 2017-06-15 ENCOUNTER — OFFICE VISIT (OUTPATIENT)
Dept: INTERNAL MEDICINE | Facility: CLINIC | Age: 57
End: 2017-06-15
Payer: MEDICARE

## 2017-06-15 ENCOUNTER — TELEPHONE (OUTPATIENT)
Dept: INTERNAL MEDICINE | Facility: CLINIC | Age: 57
End: 2017-06-15

## 2017-06-15 VITALS
WEIGHT: 178 LBS | TEMPERATURE: 98.3 F | BODY MASS INDEX: 33.63 KG/M2 | OXYGEN SATURATION: 94 % | SYSTOLIC BLOOD PRESSURE: 146 MMHG | DIASTOLIC BLOOD PRESSURE: 72 MMHG | HEART RATE: 65 BPM

## 2017-06-15 DIAGNOSIS — N18.30 CKD (CHRONIC KIDNEY DISEASE) STAGE 3, GFR 30-59 ML/MIN (H): ICD-10-CM

## 2017-06-15 DIAGNOSIS — J45.31 MILD PERSISTENT ASTHMA WITH ACUTE EXACERBATION: Primary | ICD-10-CM

## 2017-06-15 LAB
ANION GAP SERPL CALCULATED.3IONS-SCNC: 9 MMOL/L (ref 3–14)
BUN SERPL-MCNC: 54 MG/DL (ref 7–30)
CALCIUM SERPL-MCNC: 8.3 MG/DL (ref 8.5–10.1)
CHLORIDE SERPL-SCNC: 117 MMOL/L (ref 94–109)
CO2 SERPL-SCNC: 18 MMOL/L (ref 20–32)
CREAT SERPL-MCNC: 2.28 MG/DL (ref 0.52–1.04)
GFR SERPL CREATININE-BSD FRML MDRD: 22 ML/MIN/1.7M2
GLUCOSE SERPL-MCNC: 159 MG/DL (ref 70–99)
POTASSIUM SERPL-SCNC: 5.9 MMOL/L (ref 3.4–5.3)
SODIUM SERPL-SCNC: 144 MMOL/L (ref 133–144)

## 2017-06-15 PROCEDURE — 36415 COLL VENOUS BLD VENIPUNCTURE: CPT | Performed by: INTERNAL MEDICINE

## 2017-06-15 PROCEDURE — 80048 BASIC METABOLIC PNL TOTAL CA: CPT | Performed by: INTERNAL MEDICINE

## 2017-06-15 PROCEDURE — 99214 OFFICE O/P EST MOD 30 MIN: CPT | Performed by: INTERNAL MEDICINE

## 2017-06-15 RX ORDER — PREDNISONE 10 MG/1
10 TABLET ORAL DAILY
Qty: 5 TABLET | Refills: 0 | Status: SHIPPED | OUTPATIENT
Start: 2017-06-15 | End: 2018-06-07

## 2017-06-15 RX ORDER — MONTELUKAST SODIUM 10 MG/1
10 TABLET ORAL AT BEDTIME
Qty: 30 TABLET | Refills: 3 | Status: SHIPPED | OUTPATIENT
Start: 2017-06-15 | End: 2017-09-15

## 2017-06-15 NOTE — MR AVS SNAPSHOT
After Visit Summary   6/15/2017    Yissel Wetzel    MRN: [id number]           Patient Information     Date Of Birth          1960        Visit Information        Provider Department      6/15/2017 3:15 PM Gigi Martin MD [facility]        Today's Diagnoses     Mild persistent asthma with acute exacerbation    -  1      Care Instructions    - Take the Prednisone as prescribed.  (Prescription has been sent to your pharmacy)     - Start taking Singulair once daily.  (Prescription has been sent to your pharmacy)           Follow-ups after your visit        Who to contact     If you have questions or need follow up information about today's clinic visit or your schedule please contact [facility] directly at [phone].  Normal or non-critical lab and imaging results will be communicated to you by CoachLogixhart, letter or phone within 4 business days after the clinic has received the results. If you do not hear from us within 7 days, please contact the clinic through [employer] or phone. If you have a critical or abnormal lab result, we will notify you by phone as soon as possible.  Submit refill requests through Limk or call your pharmacy and they will forward the refill request to us. Please allow 3 business days for your refill to be completed.          Additional Information About Your Visit        MyChart Information     Limk gives you secure access to your electronic health record. If you see a primary care provider, you can also send messages to your care team and make appointments. If you have questions, please call your primary care clinic.  If you do not have a primary care provider, please call [phone] and they will assist you.        Care EveryWhere ID     This is your Care EveryWhere ID. This could be used by other organizations to access your [place] medical records  [id number]        Your Vitals Were     Pulse Temperature  Pulse Oximetry BMI (Body Mass Index)          65 98.3  F (36.8  C) (Oral) 94% 33.63 kg/m2         Blood Pressure from Last 3 Encounters:   06/15/17 146/72   05/04/17 (!) 166/102   04/19/17 142/70    Weight from Last 3 Encounters:   06/15/17 178 lb (80.7 kg)   05/04/17 178 lb (80.7 kg)   04/19/17 178 lb (80.7 kg)              Today, you had the following     No orders found for display         Today's Medication Changes          These changes are accurate as of: 6/15/17  3:57 PM.  If you have any questions, ask your nurse or doctor.               Start taking these medicines.        Dose/Directions    montelukast 10 MG tablet   Commonly known as:  SINGULAIR   Used for:  Mild persistent asthma with acute exacerbation   Started by:  Gigi Martin MD        Dose:  10 mg   Take 1 tablet (10 mg) by mouth At Bedtime   Quantity:  30 tablet   Refills:  3       predniSONE 10 MG tablet   Commonly known as:  DELTASONE   Used for:  Mild persistent asthma with acute exacerbation   Started by:  Gigi Martin MD        Dose:  10 mg   Take 1 tablet (10 mg) by mouth daily   Quantity:  5 tablet   Refills:  0         Stop taking these medicines if you haven't already. Please contact your care team if you have questions.     levalbuterol 45 MCG/ACT Inhaler   Commonly known as:  XOPENEX HFA   Stopped by:  Gigi Martin MD           levofloxacin 500 MG tablet   Commonly known as:  LEVAQUIN   Stopped by:  Gigi Martin MD                Where to get your medicines      These medications were sent to [id number] - [place], [place] - [address]  [address] [address]     Phone:  [phone]     montelukast 10 MG tablet    predniSONE 10 MG tablet                Primary Care Provider Office Phone # Fax #    Gigi Martin [phone]       [facility] [address]  [facility] [address]        Thank you!     Thank you for choosing  [facility]  for your care. Our goal is always to provide you with excellent care. Hearing back from our patients is one way we can continue to improve our services. Please take a few minutes to complete the written survey that you may receive in the mail after your visit with us. Thank you!             Your Updated Medication List - Protect others around you: Learn how to safely use, store and throw away your medicines at www.disposemymeds.org.          This list is accurate as of: 6/15/17  3:57 PM.  Always use your most recent med list.                   Brand Name Dispense Instructions for use    * ACE NOT PRESCRIBED (INTENTIONAL)     0 each    1 each continuous prn ACE Inhibitor not prescribed due to Intolerance       blood glucose monitoring meter device kit     1 kit    Use to test blood sugars 5-7 times daily or as directed.       blood glucose monitoring test strip    ACCU-CHEK CHING    200 each    Use to test blood sugars 4 times daily or as directed.       ferrous sulfate 325 (65 FE) MG tablet    IRON    30 tablet    Take 1 tablet (325 mg) by mouth daily with food       fluticasone 50 MCG/ACT spray    FLONASE    16 g    Spray 2 sprays into both nostrils daily       fluticasone-salmeterol 250-50 MCG/DOSE diskus inhaler    ADVAIR    1 Inhaler    Inhale 1 puff into the lungs 2 times daily       * insulin pen needle 31G X 8 MM     100 each    Use 4 pen needles daily or as directed.       * insulin pen needle 31G X 5 MM     100 each    Use 4 times daily       labetalol 200 MG tablet    NORMODYNE    180 tablet    Take 1 tablet (200 mg) by mouth 2 times daily       LEVEMIR FLEXPEN/FLEXTOUCH 100 UNIT/ML injection   Generic drug:  insulin detemir     21 mL    Inject 66 Units Subcutaneous At Bedtime 66 units at bedtime       methylPREDNISolone 4 MG tablet    MEDROL DOSEPAK    21 tablet    Follow package instructions       montelukast 10 MG tablet    SINGULAIR    30 tablet    Take 1 tablet (10  mg) by mouth At Bedtime       * NovoLOG FLEXpen 100 UNITS/ML injection   Generic drug:  insulin aspart     1 Month    Inject  Subcutaneous 3 times daily (before meals). 9-12u units before breakfast, 9-12u  before lunch, 9-12 units before dinner+ correction scale 2u for every 50 over 150.       * insulin aspart 100 UNIT/ML injection    NovoLOG PEN    1 Month    9-12 units before breakfast,lunch and dinner + a corrective scale of 2 units for every 50 over 150       omeprazole 20 MG CR capsule    priLOSEC    30 capsule    Take 1 capsule (20 mg) by mouth every morning (before breakfast) take 30'-60' before 1st meal daily.       order for DME     1 Device    Equipment being ordered: right flat sole shoe       predniSONE 10 MG tablet    DELTASONE    5 tablet    Take 1 tablet (10 mg) by mouth daily       sodium bicarbonate 650 MG tablet     30 tablet    Take 1 tablet (650 mg) by mouth daily       * STATIN NOT PRESCRIBED (INTENTIONAL)     0 each    continuous prn. Statin not prescribed intentionally due to Other:       * Notice:  This list has 6 medication(s) that are the same as other medications prescribed for you. Read the directions carefully, and ask your doctor or other care provider to review them with you.

## 2017-06-15 NOTE — NURSING NOTE
"Chief Complaint   Patient presents with     URI       Initial /72 (BP Location: Left arm, Patient Position: Chair, Cuff Size: Adult Regular)  Pulse 65  Temp 98.3  F (36.8  C) (Oral)  Wt 178 lb (80.7 kg)  SpO2 94%  BMI 33.63 kg/m2 Estimated body mass index is 33.63 kg/(m^2) as calculated from the following:    Height as of 4/7/17: 5' 1\" (1.549 m).    Weight as of this encounter: 178 lb (80.7 kg).  Medication Reconciliation: complete    "

## 2017-06-15 NOTE — TELEPHONE ENCOUNTER
Patient does not have her phone with her so if her blood test comes back and you need to reach her, call her at   [phone], lv detailed message ok, anytime today,6/15  After today she will be at her regular numbers.

## 2017-06-15 NOTE — PROGRESS NOTES
SUBJECTIVE:                                                    Yissel Wetzel is a 57 year old female who presents to clinic today for the following health issues:      RESPIRATORY SYMPTOMS      Duration: a couple of weeks    Description  Wheezing, weird sensation in between shoulder blades when walking. Also has burning sensation in the upper middle abdomen while walking. SOB    Severity:moderate    Accompanying signs and symptoms: None    History (predisposing factors):  asthma    Precipitating or alleviating factors: None    Therapies tried and outcome:  rest and fluids asthma medications           Problem list and histories reviewed & adjusted, as indicated.  Additional history:         Reviewed and updated as needed this visit by clinical staff  Tobacco  Allergies  Med Hx  Surg Hx  Fam Hx  Soc Hx      Reviewed and updated as needed this visit by Provider         ROS:  Constitutional, HEENT, cardiovascular, pulmonary, gi and gu systems are negative, except as otherwise noted.    OBJECTIVE:     /72 (BP Location: Left arm, Patient Position: Chair, Cuff Size: Adult Regular)  Pulse 65  Temp 98.3  F (36.8  C) (Oral)  Wt 178 lb (80.7 kg)  SpO2 94%  BMI 33.63 kg/m2  Body mass index is 33.63 kg/(m^2).  GENERAL: healthy, alert and no distress  NECK: no adenopathy, no asymmetry, masses, or scars and thyroid normal to palpation  RESP: lungs clear to auscultation - no rales, rhonchi or wheezes  CV: regular rate and rhythm, normal S1 S2, no S3 or S4, no murmur, click or rub, no peripheral edema and peripheral pulses strong  ABDOMEN: soft, nontender, no hepatosplenomegaly, no masses and bowel sounds normal  MS: no gross musculoskeletal defects noted, no edema    Diagnostic Test Results:  none     ASSESSMENT/PLAN:     1. Mild persistent asthma with acute exacerbation  Another burst of prednisone.  No need for Abx therapy this time.  STart SIngulair  - predniSONE (DELTASONE) 10 MG tablet; Take 1 tablet (10  mg) by mouth daily  Dispense: 5 tablet; Refill: 0  - montelukast (SINGULAIR) 10 MG tablet; Take 1 tablet (10 mg) by mouth At Bedtime  Dispense: 30 tablet; Refill: 3    2. CKD (chronic kidney disease) stage 3, GFR 30-59 ml/min    - Basic metabolic panel  (Ca, Cl, CO2, Creat, Gluc, K, Na, BUN)        Gigi Martin MD  [facility]

## 2017-06-15 NOTE — PATIENT INSTRUCTIONS
- Take the Prednisone as prescribed.  (Prescription has been sent to your pharmacy)     - Start taking Singulair once daily.  (Prescription has been sent to your pharmacy)

## 2017-07-07 DIAGNOSIS — I10 ESSENTIAL HYPERTENSION: ICD-10-CM

## 2017-07-07 RX ORDER — LABETALOL 200 MG/1
TABLET, FILM COATED ORAL
Qty: 180 TABLET | Refills: 3 | Status: SHIPPED | OUTPATIENT
Start: 2017-07-07 | End: 2018-06-07

## 2017-07-28 DIAGNOSIS — E11.21 TYPE 2 DIABETES MELLITUS WITH DIABETIC NEPHROPATHY (H): ICD-10-CM

## 2017-07-28 RX ORDER — INSULIN DETEMIR 100 [IU]/ML
INJECTION, SOLUTION SUBCUTANEOUS
Qty: 21 ML | Refills: 0 | Status: SHIPPED | OUTPATIENT
Start: 2017-07-28 | End: 2017-08-24

## 2017-07-28 NOTE — TELEPHONE ENCOUNTER
Levemir         Last Written Prescription Date: 3/23/17  Last Fill Quantity: 21, # refills: 3  Last Office Visit with G, P or [facility] prescribing provider:  1/20/17        BP Readings from Last 3 Encounters:   06/15/17 146/72   05/04/17 (!) 166/102   04/19/17 142/70     Lab Results   Component Value Date    MICROL 4750 01/20/2017     Lab Results   Component Value Date    UMALCR 4094.83 01/20/2017     Creatinine   Date Value Ref Range Status   06/15/2017 2.28 (H) 0.52 - 1.04 mg/dL Final   ]  GFR Estimate   Date Value Ref Range Status   06/15/2017 22 (L) >60 mL/min/1.7m2 Final     Comment:     Non  GFR Calc   01/21/2017 27 (L) >60 mL/min/1.7m2 Final     Comment:     Non  GFR Calc   01/20/2017 32 (L) >60 mL/min/1.7m2 Final     Comment:     Non  GFR Calc     GFR Estimate If Black   Date Value Ref Range Status   06/15/2017 27 (L) >60 mL/min/1.7m2 Final     Comment:      GFR Calc   01/21/2017 32 (L) >60 mL/min/1.7m2 Final     Comment:      GFR Calc   01/20/2017 38 (L) >60 mL/min/1.7m2 Final     Comment:      GFR Calc     Lab Results   Component Value Date    CHOL 211 07/22/2016     Lab Results   Component Value Date    HDL 49 07/22/2016     Lab Results   Component Value Date     07/22/2016     Lab Results   Component Value Date    TRIG 193 07/22/2016     Lab Results   Component Value Date    CHOLHDLRATIO 3.6 10/28/2015     Lab Results   Component Value Date    AST 50 07/22/2016     Lab Results   Component Value Date    ALT 57 07/22/2016     Lab Results   Component Value Date    A1C 7.7 01/20/2017    A1C 7.5 07/22/2016    A1C 7.1 10/28/2015    A1C 7.6 03/16/2015    A1C 8.3 09/10/2014     Potassium   Date Value Ref Range Status   06/15/2017 5.9 (H) 3.4 - 5.3 mmol/L Final

## 2017-08-09 ENCOUNTER — HOSPITAL ENCOUNTER (EMERGENCY)
Facility: CLINIC | Age: 57
Discharge: HOME | End: 2017-08-09
Attending: EMERGENCY MEDICINE | Admitting: EMERGENCY MEDICINE
Payer: MEDICARE

## 2017-08-09 ENCOUNTER — APPOINTMENT (OUTPATIENT)
Dept: NUCLEAR MEDICINE | Facility: CLINIC | Age: 57
End: 2017-08-09
Attending: EMERGENCY MEDICINE
Payer: MEDICARE

## 2017-08-09 ENCOUNTER — OFFICE VISIT (OUTPATIENT)
Dept: URGENT CARE | Facility: URGENT CARE | Age: 57
End: 2017-08-09
Payer: MEDICARE

## 2017-08-09 ENCOUNTER — RADIANT APPOINTMENT (OUTPATIENT)
Dept: GENERAL RADIOLOGY | Facility: CLINIC | Age: 57
End: 2017-08-09
Attending: FAMILY MEDICINE
Payer: MEDICARE

## 2017-08-09 VITALS
TEMPERATURE: 98.3 F | DIASTOLIC BLOOD PRESSURE: 54 MMHG | HEIGHT: 60 IN | SYSTOLIC BLOOD PRESSURE: 155 MMHG | WEIGHT: 201.8 LBS | OXYGEN SATURATION: 92 % | BODY MASS INDEX: 39.62 KG/M2

## 2017-08-09 VITALS
WEIGHT: 202 LBS | RESPIRATION RATE: 20 BRPM | DIASTOLIC BLOOD PRESSURE: 76 MMHG | HEART RATE: 54 BPM | TEMPERATURE: 97.9 F | SYSTOLIC BLOOD PRESSURE: 185 MMHG | BODY MASS INDEX: 38.17 KG/M2 | OXYGEN SATURATION: 94 %

## 2017-08-09 DIAGNOSIS — E87.5 HYPERKALEMIA: ICD-10-CM

## 2017-08-09 DIAGNOSIS — M54.9 UPPER BACK PAIN ON LEFT SIDE: ICD-10-CM

## 2017-08-09 DIAGNOSIS — R60.0 BILATERAL LOWER EXTREMITY EDEMA: ICD-10-CM

## 2017-08-09 DIAGNOSIS — D64.9 LOW HEMOGLOBIN: ICD-10-CM

## 2017-08-09 DIAGNOSIS — R06.02 SOB (SHORTNESS OF BREATH): ICD-10-CM

## 2017-08-09 DIAGNOSIS — R06.02 SOB (SHORTNESS OF BREATH): Primary | ICD-10-CM

## 2017-08-09 DIAGNOSIS — R14.0 BLOATED ABDOMEN: ICD-10-CM

## 2017-08-09 DIAGNOSIS — N18.30 CKD (CHRONIC KIDNEY DISEASE) STAGE 3, GFR 30-59 ML/MIN (H): ICD-10-CM

## 2017-08-09 DIAGNOSIS — M54.9 UPPER BACK PAIN ON RIGHT SIDE: ICD-10-CM

## 2017-08-09 DIAGNOSIS — J90 PLEURAL EFFUSION: ICD-10-CM

## 2017-08-09 LAB
ALBUMIN SERPL-MCNC: 3.1 G/DL (ref 3.4–5)
ALBUMIN UR-MCNC: >=300 MG/DL
ALP SERPL-CCNC: 80 U/L (ref 40–150)
ALT SERPL W P-5'-P-CCNC: 21 U/L (ref 0–50)
ANION GAP SERPL CALCULATED.3IONS-SCNC: 6 MMOL/L (ref 3–14)
APPEARANCE UR: CLEAR
AST SERPL W P-5'-P-CCNC: 25 U/L (ref 0–45)
BASOPHILS # BLD AUTO: 0 10E9/L (ref 0–0.2)
BASOPHILS NFR BLD AUTO: 0.5 %
BILIRUB SERPL-MCNC: 0.5 MG/DL (ref 0.2–1.3)
BILIRUB UR QL STRIP: NEGATIVE
BUN SERPL-MCNC: 44 MG/DL (ref 7–30)
CALCIUM SERPL-MCNC: 8.8 MG/DL (ref 8.5–10.1)
CHLORIDE SERPL-SCNC: 118 MMOL/L (ref 94–109)
CO2 SERPL-SCNC: 21 MMOL/L (ref 20–32)
COLOR UR AUTO: YELLOW
CREAT SERPL-MCNC: 2.26 MG/DL (ref 0.52–1.04)
D DIMER PPP FEU-MCNC: 2.3 UG/ML FEU (ref 0–0.5)
DIFFERENTIAL METHOD BLD: ABNORMAL
EOSINOPHIL # BLD AUTO: 0.2 10E9/L (ref 0–0.7)
EOSINOPHIL NFR BLD AUTO: 3.6 %
ERYTHROCYTE [DISTWIDTH] IN BLOOD BY AUTOMATED COUNT: 14.9 % (ref 10–15)
GFR SERPL CREATININE-BSD FRML MDRD: 22 ML/MIN/1.7M2
GLUCOSE SERPL-MCNC: 103 MG/DL (ref 70–99)
GLUCOSE UR STRIP-MCNC: NEGATIVE MG/DL
HCT VFR BLD AUTO: 28.7 % (ref 35–47)
HGB BLD-MCNC: 8.9 G/DL (ref 11.7–15.7)
HGB UR QL STRIP: ABNORMAL
INTERPRETATION ECG - MUSE: NORMAL
KETONES UR STRIP-MCNC: NEGATIVE MG/DL
LEUKOCYTE ESTERASE UR QL STRIP: NEGATIVE
LYMPHOCYTES # BLD AUTO: 1.2 10E9/L (ref 0.8–5.3)
LYMPHOCYTES NFR BLD AUTO: 18.7 %
MCH RBC QN AUTO: 30.4 PG (ref 26.5–33)
MCHC RBC AUTO-ENTMCNC: 31 G/DL (ref 31.5–36.5)
MCV RBC AUTO: 98 FL (ref 78–100)
MONOCYTES # BLD AUTO: 0.6 10E9/L (ref 0–1.3)
MONOCYTES NFR BLD AUTO: 9.8 %
NEUTROPHILS # BLD AUTO: 4.3 10E9/L (ref 1.6–8.3)
NEUTROPHILS NFR BLD AUTO: 67.4 %
NITRATE UR QL: NEGATIVE
NT-PROBNP SERPL-MCNC: 5862 PG/ML (ref 0–900)
PH UR STRIP: 6 PH (ref 5–7)
PLATELET # BLD AUTO: 99 10E9/L (ref 150–450)
POTASSIUM SERPL-SCNC: 5.7 MMOL/L (ref 3.4–5.3)
POTASSIUM SERPL-SCNC: 5.9 MMOL/L (ref 3.4–5.3)
PROT SERPL-MCNC: 6 G/DL (ref 6.8–8.8)
RBC # BLD AUTO: 2.93 10E12/L (ref 3.8–5.2)
RBC #/AREA URNS AUTO: ABNORMAL /HPF (ref 0–2)
SODIUM SERPL-SCNC: 145 MMOL/L (ref 133–144)
SP GR UR STRIP: 1.02 (ref 1–1.03)
TROPONIN I SERPL-MCNC: NORMAL UG/L (ref 0–0.04)
URN SPEC COLLECT METH UR: ABNORMAL
UROBILINOGEN UR STRIP-ACNC: 0.2 EU/DL (ref 0.2–1)
WBC # BLD AUTO: 6.4 10E9/L (ref 4–11)
WBC #/AREA URNS AUTO: ABNORMAL /HPF (ref 0–2)

## 2017-08-09 PROCEDURE — 80053 COMPREHEN METABOLIC PANEL: CPT | Performed by: FAMILY MEDICINE

## 2017-08-09 PROCEDURE — 27210210 NM LUNG SCAN VENTILATION AND PERFUSION

## 2017-08-09 PROCEDURE — A9567 TECHNETIUM TC-99M AEROSOL: HCPCS | Performed by: EMERGENCY MEDICINE

## 2017-08-09 PROCEDURE — 85379 FIBRIN DEGRADATION QUANT: CPT | Performed by: FAMILY MEDICINE

## 2017-08-09 PROCEDURE — 81001 URINALYSIS AUTO W/SCOPE: CPT | Performed by: FAMILY MEDICINE

## 2017-08-09 PROCEDURE — 34300033 ZZH RX 343: Performed by: EMERGENCY MEDICINE

## 2017-08-09 PROCEDURE — 99214 OFFICE O/P EST MOD 30 MIN: CPT | Performed by: FAMILY MEDICINE

## 2017-08-09 PROCEDURE — 93005 ELECTROCARDIOGRAM TRACING: CPT

## 2017-08-09 PROCEDURE — A9540 TC99M MAA: HCPCS | Performed by: EMERGENCY MEDICINE

## 2017-08-09 PROCEDURE — 85025 COMPLETE CBC W/AUTO DIFF WBC: CPT | Performed by: FAMILY MEDICINE

## 2017-08-09 PROCEDURE — 83880 ASSAY OF NATRIURETIC PEPTIDE: CPT | Performed by: EMERGENCY MEDICINE

## 2017-08-09 PROCEDURE — 84132 ASSAY OF SERUM POTASSIUM: CPT | Performed by: EMERGENCY MEDICINE

## 2017-08-09 PROCEDURE — 36415 COLL VENOUS BLD VENIPUNCTURE: CPT | Performed by: FAMILY MEDICINE

## 2017-08-09 PROCEDURE — 71020 XR CHEST 2 VW: CPT

## 2017-08-09 PROCEDURE — 84484 ASSAY OF TROPONIN QUANT: CPT | Performed by: EMERGENCY MEDICINE

## 2017-08-09 PROCEDURE — 99285 EMERGENCY DEPT VISIT HI MDM: CPT | Mod: 25

## 2017-08-09 RX ORDER — FUROSEMIDE 20 MG
20 TABLET ORAL DAILY
Qty: 30 TABLET | Refills: 0 | Status: SHIPPED | OUTPATIENT
Start: 2017-08-09 | End: 2017-11-24

## 2017-08-09 RX ADMIN — KIT FOR THE PREPARATION OF TECHNETIUM TC 99M PENTETATE 78.4 MILLICURIE: 20 INJECTION, POWDER, LYOPHILIZED, FOR SOLUTION INTRAVENOUS; RESPIRATORY (INHALATION) at 21:15

## 2017-08-09 RX ADMIN — Medication 3.4 MILLICURIE: at 20:40

## 2017-08-09 NOTE — ED AVS SNAPSHOT
Emergency Department    [address] [facility] [address]    Phone:  [phone]    Fax:  [phone]                                       Yissel Wetzel   MRN: [id number]    Department:   Emergency Department   Date of Visit:  8/9/2017           Patient Information     Date Of Birth          1960        Your diagnoses for this visit were:     SOB (shortness of breath)     Hyperkalemia     Pleural effusion     Bilateral lower extremity edema        You were seen by Rene Alvarez DO.      Follow-up Information     Follow up with Gigi Martin MD In 2 days.    Specialty:  Internal Medicine    Why:  Follow-up ED visit; recheck potassium and kidney function    Contact information:    [address] [place] [address]  [phone]          Follow up with Ene Burroughs DO In 5 days.    Specialty:  Cardiology    Contact information:    [address] [facility]  [facility] [address]  [phone]          Follow up with  Emergency Department.    Specialty:  EMERGENCY MEDICINE    Why:  If symptoms worsen    Contact information:    [address] [facility] [id number] [phone]        Discharge Instructions         Hyperkalemia  Hyperkalemia is too much potassium in the blood. This most often occurs in people who take certain medicines or people with kidney disease.  This condition often has no symptoms until levels of potassium become high. If symptoms do occur, they include muscle weakness and changes in the heartbeat. A blood test is done to diagnose the problem. An ECG (electrocardiogram) may also be done to test the heartbeat.  If hyperkalemia is caused by a medicine, the healthcare provider may lower the dose or switch to a different medicine. A low-potassium diet may also be prescribed.   Home care    Be sure your healthcare provider knows about all of the medicines you take. Follow your healthcare provider's advice about making  changes to your medicines.    If a low-potassium diet has been prescribed, follow this closely. If you need help, ask to be referred to a dietitian for advice on how to follow this diet.  Follow-up care  Follow up with your healthcare provider. You may need a repeat blood test within the next 7 days. Schedule this as advised.  When to seek medical advice  Call your healthcare provider if any of the following occur:    Weakness, dizziness, or lightheadedness    Nausea, vomiting, or diarrhea    Urinating only in small amounts or not urinating    Symptoms don't go away or get worse  Call 911  Call 911 or emergency services right away if any of the following occur:    Fast or irregular heartbeat    Fainting    Severe shortness of breath    Chest, arm, shoulder, neck or upper back pain    Trouble controlling your muscles  Date Last Reviewed: 6/26/2015 2000-2017 [employer]. [address] [place] [address]. All rights reserved. This information is not intended as a substitute for professional medical care. Always follow your healthcare professional's instructions.    Take Lasix as prescribed.      Discharge References/Attachments     SHORTNESS OF BREATH (DYSPNEA) (ENGLISH)      24 Hour Appointment Hotline       To make an appointment at any [place] clinic, call [phone] ([phone]). If you don't have a family doctor or clinic, we will help you find one. [place] clinics are conveniently located to serve the needs of you and your family.          ED Discharge Orders     Echocardiogram       Administration of IV contrast will be tailored to this examination per the appropriate written protocol listed in the Echocardiography department Protocol Book, or by the supervising Cardiologist. This may result in an order change.    Use of contrast is at the discretion of the supervising Cardiologist.                     Review of your medicines      START taking        Dose / Directions Last  dose taken    furosemide 20 MG tablet   Commonly known as:  LASIX   Dose:  20 mg   Quantity:  30 tablet        Take 1 tablet (20 mg) by mouth daily   Refills:  0          Our records show that you are taking the medicines listed below. If these are incorrect, please call your family doctor or clinic.        Dose / Directions Last dose taken    * ACE NOT PRESCRIBED (INTENTIONAL)   Dose:  1 each   Quantity:  0 each        1 each continuous prn ACE Inhibitor not prescribed due to Intolerance   Refills:  0        blood glucose monitoring meter device kit   Quantity:  1 kit        Use to test blood sugars 5-7 times daily or as directed.   Refills:  0        blood glucose monitoring test strip   Commonly known as:  ACCU-CHEK CHING   Quantity:  200 each        Use to test blood sugars 4 times daily or as directed.   Refills:  2        ferrous sulfate 325 (65 FE) MG tablet   Commonly known as:  IRON   Dose:  1 tablet   Quantity:  30 tablet        Take 1 tablet (325 mg) by mouth daily with food   Refills:  6        fluticasone 50 MCG/ACT spray   Commonly known as:  FLONASE   Dose:  2 spray   Quantity:  16 g        Spray 2 sprays into both nostrils daily   Refills:  11        fluticasone-salmeterol 250-50 MCG/DOSE diskus inhaler   Commonly known as:  ADVAIR   Dose:  1 puff   Quantity:  1 Inhaler        Inhale 1 puff into the lungs 2 times daily   Refills:  2        * insulin pen needle 31G X 8 MM   Quantity:  100 each        Use 4 pen needles daily or as directed.   Refills:  3        * insulin pen needle 31G X 5 MM   Quantity:  100 each        Use 4 times daily   Refills:  11        labetalol 200 MG tablet   Commonly known as:  NORMODYNE   Quantity:  180 tablet        TAKE ONE TABLET BY MOUTH TWICE DAILY   Refills:  3        LEVEMIR FLEXTOUCH 100 UNIT/ML injection   Quantity:  21 mL   Generic drug:  insulin detemir        INJECT 66 UNITS SUBCUTANEOUSLY AT BEDTIME   Refills:  0        methylPREDNISolone 4 MG tablet   Commonly  known as:  MEDROL DOSEPAK   Quantity:  21 tablet        Follow package instructions   Refills:  0        montelukast 10 MG tablet   Commonly known as:  SINGULAIR   Dose:  10 mg   Quantity:  30 tablet        Take 1 tablet (10 mg) by mouth At Bedtime   Refills:  3        * NovoLOG FLEXpen 100 UNITS/ML injection   Quantity:  1 Month   Generic drug:  insulin aspart        Inject  Subcutaneous 3 times daily (before meals). 9-12u units before breakfast, 9-12u  before lunch, 9-12 units before dinner+ correction scale 2u for every 50 over 150.   Refills:  3        * insulin aspart 100 UNIT/ML injection   Commonly known as:  NovoLOG PEN   Quantity:  1 Month        9-12 units before breakfast,lunch and dinner + a corrective scale of 2 units for every 50 over 150   Refills:  11        omeprazole 20 MG CR capsule   Commonly known as:  priLOSEC   Dose:  20 mg   Quantity:  30 capsule        Take 1 capsule (20 mg) by mouth every morning (before breakfast) take 30'-60' before 1st meal daily.   Refills:  9        order for DME   Quantity:  1 Device        Equipment being ordered: right flat sole shoe   Refills:  0        predniSONE 10 MG tablet   Commonly known as:  DELTASONE   Dose:  10 mg   Quantity:  5 tablet        Take 1 tablet (10 mg) by mouth daily   Refills:  0        sodium bicarbonate 650 MG tablet   Dose:  650 mg   Quantity:  30 tablet        Take 1 tablet (650 mg) by mouth daily   Refills:  7        * STATIN NOT PRESCRIBED (INTENTIONAL)   Quantity:  0 each        continuous prn. Statin not prescribed intentionally due to Other:   Refills:  0        * Notice:  This list has 6 medication(s) that are the same as other medications prescribed for you. Read the directions carefully, and ask your doctor or other care provider to review them with you.            Prescriptions were sent or printed at these locations (1 Prescription)                   Other Prescriptions                Printed at Department/Unit printer (1 of 1)          furosemide (LASIX) 20 MG tablet                Procedures and tests performed during your visit     BNP    EKG 12 lead    Lung vent and perf (NM)    Potassium    Troponin I      Orders Needing Specimen Collection     None      Pending Results     No orders found from 8/7/2017 to 8/10/2017.            Pending Culture Results     No orders found from 8/7/2017 to 8/10/2017.            Pending Results Instructions     If you had any lab results that were not finalized at the time of your Discharge, you can call the ED Lab Result RN at [phone]. You will be contacted by this team for any positive Lab results or changes in treatment. The nurses are available 7 days a week from 10A to 6:30P.  You can leave a message 24 hours per day and they will return your call.        Test Results From Your Hospital Stay        8/9/2017 10:43 PM      Narrative     NUCLEAR MEDICINE LUNG SCAN VENTILATION AND PERFUSION   8/9/2017 9:44  PM     TECHNIQUE:  3.4 mCi Tc99m MAA via IV at 2040; 78.4 mCi Tc99m DTPA  inhaled at 2115    HISTORY: Elevated dimer; shortness of breath, rule out pulmonary  embolus.      COMPARISON:   Nuclear Study: None.  Other Relevant Studies: Chest x-ray 8/9/2017.    FINDINGS:  No unmatched perfusion defects are identified. There is a  matched defect along the anterior aspect of the left lung on both the  ventilation and perfusion images.        Impression     IMPRESSION: Low probability for pulmonary embolism.         ADDISON CASTILLO MD         8/9/2017  9:02 PM      Component Results     Component Value Ref Range & Units Status    N-Terminal Pro BNP Inpatient 5862 (H) 0 - 900 pg/mL Final    Reference range shown and results flagged as abnormal are suggested inpatient   cut points for confirming diagnosis if CHF in an acute setting. Establishing   a   baseline value for each individual patient is useful for follow-up. An   inpatient or emergency department NT-proPBNP <300 pg/mL effectively rules out   acute  CHF, with 99% negative predictive value.  The outpatient non-acute reference range for ruling out CHF is:   0-125 pg/mL (age 18 to less than 75)   0-450 pg/mL (age 75 yrs and older)           8/9/2017  9:02 PM      Component Results     Component Value Ref Range & Units Status    Troponin I ES  0.000 - 0.045 ug/L Final    <0.015  The 99th percentile for upper reference range is 0.045 ug/L.  Troponin values in   the range of 0.045 - 0.120 ug/L may be associated with risks of adverse   clinical events.           8/9/2017  9:02 PM      Component Results     Component Value Ref Range & Units Status    Potassium 5.7 (H) 3.4 - 5.3 mmol/L Final                Clinical Quality Measure: Blood Pressure Screening     Your blood pressure was checked while you were in the emergency department today. The last reading we obtained was  BP: 155/54 . Please read the guidelines below about what these numbers mean and what you should do about them.  If your systolic blood pressure (the top number) is less than 120 and your diastolic blood pressure (the bottom number) is less than 80, then your blood pressure is normal. There is nothing more that you need to do about it.  If your systolic blood pressure (the top number) is 120-139 or your diastolic blood pressure (the bottom number) is 80-89, your blood pressure may be higher than it should be. You should have your blood pressure rechecked within a year by a primary care provider.  If your systolic blood pressure (the top number) is 140 or greater or your diastolic blood pressure (the bottom number) is 90 or greater, you may have high blood pressure. High blood pressure is treatable, but if left untreated over time it can put you at risk for heart attack, stroke, or kidney failure. You should have your blood pressure rechecked by a primary care provider within the next 4 weeks.  If your provider in the emergency department today gave you specific instructions to follow-up with your  doctor or provider even sooner than that, you should follow that instruction and not wait for up to 4 weeks for your follow-up visit.        Thank you for choosing [facility]       Thank you for choosing [facility] for your care. Our goal is always to provide you with excellent care. Hearing back from our patients is one way we can continue to improve our services. Please take a few minutes to complete the written survey that you may receive in the mail after you visit with us. Thank you!        [employer] Information     [employer] gives you secure access to your electronic health record. If you see a primary care provider, you can also send messages to your care team and make appointments. If you have questions, please call your primary care clinic.  If you do not have a primary care provider, please call [phone] and they will assist you.        Care EveryWhere ID     This is your Care EveryWhere ID. This could be used by other organizations to access your [facility] medical records  [id number]        Equal Access to Services     VANE ANNE : Gabriella Givens, nino myers, mary beth travis, william lewis . So [facility] [phone].    ATENCIÓN: Si habla español, tiene a kidd disposición servicios gratuitos de asistencia lingüística. Llame al [phone].    We comply with applicable federal civil rights laws and [place] laws. We do not discriminate on the basis of race, color, national origin, age, disability sex, sexual orientation or gender identity.            After Visit Summary       This is your record. Keep this with you and show to your community pharmacist(s) and doctor(s) at your next visit.

## 2017-08-09 NOTE — MR AVS SNAPSHOT
After Visit Summary   8/9/2017    Yissel Wetzel    MRN: [id number]           Patient Information     Date Of Birth          1960        Visit Information        Provider Department      8/9/2017 3:35 PM Marcia Fowler MD [facility]        Today's Diagnoses     SOB (shortness of breath)    -  1    Upper back pain on left side        Upper back pain on right side        Bloated abdomen        CKD (chronic kidney disease) stage 3, GFR 30-59 ml/min        Low hemoglobin        Hyperkalemia           Follow-ups after your visit        Future tests that were ordered for you today     Open Future Orders        Priority Expected Expires Ordered    Basic metabolic panel Routine  11/8/2017 8/10/2017    Echocardiogram Routine  8/9/2018 8/9/2017            Who to contact     If you have questions or need follow up information about today's clinic visit or your schedule please contact [facility] directly at [phone].  Normal or non-critical lab and imaging results will be communicated to you by [employer], letter or phone within 4 business days after the clinic has received the results. If you do not hear from us within 7 days, please contact the clinic through [employer] or phone. If you have a critical or abnormal lab result, we will notify you by phone as soon as possible.  Submit refill requests through [employer] or call your pharmacy and they will forward the refill request to us. Please allow 3 business days for your refill to be completed.          Additional Information About Your Visit        MyChart Information     [employer] gives you secure access to your electronic health record. If you see a primary care provider, you can also send messages to your care team and make appointments. If you have questions, please call your primary care clinic.  If you do not have a primary care provider, please call [phone] and they will assist you.         Care EveryWhere ID     This is your Care EveryWhere ID. This could be used by other organizations to access your [facility] medical records  [id number]        Your Vitals Were     Pulse Temperature Respirations Pulse Oximetry BMI (Body Mass Index)       54 97.9  F (36.6  C) (Oral) 20 94% 38.17 kg/m2        Blood Pressure from Last 3 Encounters:   08/09/17 155/54   08/09/17 185/76   06/15/17 146/72    Weight from Last 3 Encounters:   08/09/17 201 lb 12.8 oz (91.5 kg)   08/09/17 202 lb (91.6 kg)   06/15/17 178 lb (80.7 kg)              We Performed the Following     CBC with platelets differential     Comprehensive metabolic panel     D dimer, quantitative     UA with Microscopic     XR Chest 2 Views        Primary Care Provider Office Phone # Fax #    Gigi Martin [phone]       [address] [address]        Equal Access to Services     VANE ANNE : Hadii aad ku hadasho Soomaali, waaxda luqadaha, qaybta kaalmada adeegyada, waxay olgain haymckenzien tico lewis . So [facility] [phone].    ATENCIÓN: Si habla español, tiene a kidd disposición servicios gratuitos de asistencia lingüística. Llame al [phone].    We comply with applicable federal civil rights laws and [place] laws. We do not discriminate on the basis of race, color, national origin, age, disability sex, sexual orientation or gender identity.            Thank you!     Thank you for choosing [facility]  for your care. Our goal is always to provide you with excellent care. Hearing back from our patients is one way we can continue to improve our services. Please take a few minutes to complete the written survey that you may receive in the mail after your visit with us. Thank you!             Your Updated Medication List - Protect others around you: Learn how to safely use, store and throw away your medicines at www.disposemymeds.org.          This list is accurate as of: 8/9/17  11:59 PM.  Always use your most recent med list.                   Brand Name Dispense Instructions for use Diagnosis    * ACE NOT PRESCRIBED (INTENTIONAL)     0 each    1 each continuous prn ACE Inhibitor not prescribed due to Intolerance        blood glucose monitoring meter device kit     1 kit    Use to test blood sugars 5-7 times daily or as directed.    Type 2 diabetes mellitus with diabetic nephropathy (H)       blood glucose monitoring test strip    ACCU-CHEK CHING    200 each    Use to test blood sugars 4 times daily or as directed.    CKD (chronic kidney disease) stage 3, GFR 30-59 ml/min       ferrous sulfate 325 (65 FE) MG tablet    IRON    30 tablet    Take 1 tablet (325 mg) by mouth daily with food    Iron deficiency anemia, unspecified       fluticasone 50 MCG/ACT spray    FLONASE    16 g    Spray 2 sprays into both nostrils daily    Chronic rhinitis       fluticasone-salmeterol 250-50 MCG/DOSE diskus inhaler    ADVAIR    1 Inhaler    Inhale 1 puff into the lungs 2 times daily    Mild persistent asthma, uncomplicated       furosemide 20 MG tablet    LASIX    30 tablet    Take 1 tablet (20 mg) by mouth daily        * insulin pen needle 31G X 8 MM     100 each    Use 4 pen needles daily or as directed.    Type 2 diabetes mellitus with diabetic nephropathy (H)       * insulin pen needle 31G X 5 MM     100 each    Use 4 times daily    CKD (chronic kidney disease) stage 3, GFR 30-59 ml/min       labetalol 200 MG tablet    NORMODYNE    180 tablet    TAKE ONE TABLET BY MOUTH TWICE DAILY    Essential hypertension       LEVEMIR FLEXTOUCH 100 UNIT/ML injection   Generic drug:  insulin detemir     21 mL    INJECT 66 UNITS SUBCUTANEOUSLY AT BEDTIME    Type 2 diabetes mellitus with diabetic nephropathy (H)       methylPREDNISolone 4 MG tablet    MEDROL DOSEPAK    21 tablet    Follow package instructions    Acute bronchitis, unspecified organism       montelukast 10 MG tablet    SINGULAIR    30 tablet    Take 1  tablet (10 mg) by mouth At Bedtime    Mild persistent asthma with acute exacerbation       * NovoLOG FLEXpen 100 UNITS/ML injection   Generic drug:  insulin aspart     1 Month    Inject  Subcutaneous 3 times daily (before meals). 9-12u units before breakfast, 9-12u  before lunch, 9-12 units before dinner+ correction scale 2u for every 50 over 150.    Type 2 diabetes, HbA1c goal < 7% (H)       * insulin aspart 100 UNIT/ML injection    NovoLOG PEN    1 Month    9-12 units before breakfast,lunch and dinner + a corrective scale of 2 units for every 50 over 150    Type 2 diabetes mellitus with diabetic nephropathy (H)       omeprazole 20 MG CR capsule    priLOSEC    30 capsule    Take 1 capsule (20 mg) by mouth every morning (before breakfast) take 30'-60' before 1st meal daily.    Esophagitis, unspecified       order for DME     1 Device    Equipment being ordered: right flat sole shoe    Injury of toe, left, initial encounter, Injury of toe on left foot       predniSONE 10 MG tablet    DELTASONE    5 tablet    Take 1 tablet (10 mg) by mouth daily    Mild persistent asthma with acute exacerbation       sodium bicarbonate 650 MG tablet     30 tablet    Take 1 tablet (650 mg) by mouth daily    CKD (chronic kidney disease) stage 3, GFR 30-59 ml/min       * STATIN NOT PRESCRIBED (INTENTIONAL)     0 each    continuous prn. Statin not prescribed intentionally due to Other:    Type 2 diabetes, HbA1c goal < 7% (H)       * Notice:  This list has 6 medication(s) that are the same as other medications prescribed for you. Read the directions carefully, and ask your doctor or other care provider to review them with you.

## 2017-08-09 NOTE — LETTER
To Whom it may concern:      Yissel Wetzel was seen in our Emergency Department today, 08/09/17.  I expect her condition to improve over the next 2 days.  She may return to work/school when improved.    Sincerely,  Rene Alvarez, DO

## 2017-08-09 NOTE — NURSING NOTE
"Chief Complaint   Patient presents with     Cough     cough,shortness of breath,pain in back,past few wks,feels bloated     Breathing Problem       Initial BP (!) 220/82 (BP Location: Left arm, Patient Position: Chair, Cuff Size: Adult Regular)  Pulse 60  Temp 97.9  F (36.6  C) (Oral)  Resp 20  Wt 202 lb (91.6 kg)  SpO2 94%  BMI 38.17 kg/m2 Estimated body mass index is 38.17 kg/(m^2) as calculated from the following:    Height as of 4/7/17: 5' 1\" (1.549 m).    Weight as of this encounter: 202 lb (91.6 kg).  Medication Reconciliation: complete   Palomo KRISHNAMURTHY    "

## 2017-08-09 NOTE — ED AVS SNAPSHOT
Emergency Department    [address] [facility] [address]    Phone:  [phone]    Fax:  [phone]                                       Yissel Wetzel   MRN: [id number]    Department:   Emergency Department   Date of Visit:  8/9/2017           After Visit Summary Signature Page     I have received my discharge instructions, and my questions have been answered. I have discussed any challenges I see with this plan with the nurse or doctor.    ..........................................................................................................................................  Patient/Patient Representative Signature      ..........................................................................................................................................  Patient Representative Print Name and Relationship to Patient    ..................................................               ................................................  Date                                            Time    ..........................................................................................................................................  Reviewed by Signature/Title    ...................................................              ..............................................  Date                                                            Time

## 2017-08-09 NOTE — PROGRESS NOTES
SUBJECTIVE:  Yissel Wetzel, a 57 year old female with h/o asthma , CKD , anemia , pulm HTN scheduled an appointment to discuss the following issues:     SOB (shortness of breath)  Upper back pain on left side  Upper back pain on right side  Bloated abdomen  CKD (chronic kidney disease) stage 3, GFR 30-59 ml/min  Low hemoglobin     She presents with SOB and wheezing for last 2 days along with upper back pain bilaterally for the same duration  She also has been noticing coughing too and had some chills for a day too  Also has been noticing bloating abdomen but denies any diarrhea or nausea or vomiting   She has no chest pain and denies any chest heaviness   Denies any h/o recent travel Her BP has been noted to be very high   She also has bilateral edema which extends to the lower mid  Leg area.       Past Medical History:   Diagnosis Date     Allergic rhinitis, cause unspecified      Anemia of chronic disease      CKD (chronic kidney disease) stage 3, GFR 30-59 ml/min      Esophagitis, unspecified      HEMORRHAGIC GASTROPATHY      Iron deficiency anemia, unspecified      Mild persistent asthma      Other and unspecified hyperlipidemia      Pneumonia      Pulmonary hypertension (H)     per 12/2012 echo mod.to severe pulmonary hypertension     Tobacco use disorder dc ed 1999     Type II or unspecified type diabetes mellitus without mention of complication, not stated as uncontrolled      Unspecified essential hypertension       Past Surgical History:   Procedure Laterality Date     C NONSPECIFIC PROCEDURE      c-sections x 2     C NONSPECIFIC PROCEDURE      laser rx ou several times     HYSTERECTOMY, PAP NO LONGER INDICATED  approx 1983    hyst/ooph/bleeding        Social History     Social History     Marital status: Single     Spouse name: N/A     Number of children: N/A     Years of education: N/A     Occupational History     Not on file.     Social History Main Topics     Smoking status: Former Smoker      Packs/day: 1.00     Years: 22.00     Types: Cigarettes     Quit date: 10/12/1999     Smokeless tobacco: Never Used     Alcohol use No     Drug use: No     Sexual activity: No     Other Topics Concern     Blood Transfusions Yes     Caffeine Concern No     Sleep Concern No     Stress Concern No     Weight Concern Yes     would like to lose weight     Special Diet No     Exercise Yes     walks daily 2 blocks     Social History Narrative        Current Outpatient Prescriptions   Medication Sig Dispense Refill     LEVEMIR FLEXTOUCH 100 UNIT/ML injection INJECT 66 UNITS SUBCUTANEOUSLY AT BEDTIME 21 mL 0     labetalol (NORMODYNE) 200 MG tablet TAKE ONE TABLET BY MOUTH TWICE DAILY  180 tablet 3     montelukast (SINGULAIR) 10 MG tablet Take 1 tablet (10 mg) by mouth At Bedtime 30 tablet 3     fluticasone-salmeterol (ADVAIR) 250-50 MCG/DOSE diskus inhaler Inhale 1 puff into the lungs 2 times daily 1 Inhaler 2     omeprazole (PRILOSEC) 20 MG CR capsule Take 1 capsule (20 mg) by mouth every morning (before breakfast) take 30'-60' before 1st meal daily. 30 capsule 9     sodium bicarbonate 650 MG tablet Take 1 tablet (650 mg) by mouth daily 30 tablet 7     ferrous sulfate (IRON) 325 (65 FE) MG tablet Take 1 tablet (325 mg) by mouth daily with food 30 tablet 6     insulin aspart (NOVOLOG PEN) 100 UNIT/ML soln 9-12 units before breakfast,lunch and dinner + a corrective scale of 2 units for every 50 over 150 1 Month 11     fluticasone (FLONASE) 50 MCG/ACT nasal spray Spray 2 sprays into both nostrils daily 16 g 11     NOVOLOG FLEXPEN SOLN 100 UNIT/ML Inject  Subcutaneous 3 times daily (before meals). 9-12u units before breakfast, 9-12u  before lunch, 9-12 units before dinner+ correction scale 2u for every 50 over 150. 1 Month 3     predniSONE (DELTASONE) 10 MG tablet Take 1 tablet (10 mg) by mouth daily (Patient not taking: Reported on 8/9/2017) 5 tablet 0     blood glucose monitoring (ACCU-CHEK CHING) test strip Use to test blood  sugars 4 times daily or as directed. 200 each 2     insulin pen needle 31G X 5 MM Use 4 times daily 100 each 11     methylPREDNISolone (MEDROL DOSEPAK) 4 MG tablet Follow package instructions (Patient not taking: Reported on 8/9/2017) 21 tablet 0     blood glucose monitoring (ACCU-CHEK CHING PLUS) meter device kit Use to test blood sugars 5-7 times daily or as directed. 1 kit 0     insulin pen needle 31G X 8 MM Use 4 pen needles daily or as directed. 100 each 3     order for DME Equipment being ordered: right flat sole shoe 1 Device 0     ACE NOT PRESCRIBED, INTENTIONAL, 1 each continuous prn ACE Inhibitor not prescribed due to Intolerance 0 each 0     STATIN NOT PRESCRIBED, INTENTIONAL, continuous prn. Statin not prescribed intentionally due to Other: 0 each 0       Health Maintenance   Topic Date Due     FOOT EXAM Q1 YEAR  [birth year]/1962     PAP SCREENING Q3 YR (SYSTEM ASSIGNED)  04/06/2001     COLONOSCOPY Q10 YR  09/06/2016     EYE EXAM Q1 YEAR  11/13/2016     A1C Q6 MO  07/20/2017     ALT Q1 YR  07/22/2017     LIPID MONITORING Q1 YEAR  07/22/2017     ASTHMA ACTION PLAN Q1 YR  07/22/2017     INFLUENZA VACCINE (SYSTEM ASSIGNED)  09/01/2017     ASTHMA CONTROL TEST Q6 MOS  11/04/2017     MICROALBUMIN Q1 YEAR  01/20/2018     CREATININE Q1 YEAR  06/15/2018     MAMMO SCREEN Q2 YR (SYSTEM ASSIGNED)  09/13/2018     TSH W/ FREE T4 REFLEX Q2 YEAR  01/20/2019     ADVANCE DIRECTIVE PLANNING Q5 YRS  01/20/2022     TETANUS Q10 YR  10/16/2022     PNEUMOVAX 1X HI RISK PATIENT < 65 (NO IB MSG)  Completed     HEPATITIS C SCREENING  Completed        ROS:  CONSTITUTIONAL:no fever, no chills or sweats, no excessive fatigue, no significant change in weight  CV: neg   RESP -sob , coughing   GI:  Neg   NEURO: neg   MSK - neg   Skin - neg   Pyschiatry-neg     OBJECTIVE:  /76  Pulse 54  Temp 97.9  F (36.6  C) (Oral)  Resp 20  Wt 202 lb (91.6 kg)  SpO2 94%  BMI 38.17 kg/m2      EXAM:  GENERAL APPEARANCE: healthy, alert and mild   distress  EYES: EOMI,  PERRL  HENT: ear canals and TM's normal and nose and mouth without ulcers or lesions  RESP: lungs reduced breath sounds - no rales, rhonchi positive for  wheezes  CV: regular rates and rhythm, normal S1 S2, no S3 or S4 and no murmur, click or rub -  ABDOMEN: bloated ,non tender, no HSM or masses and bowel sounds normal  PSYCH: mentation appears normal and affect normal/bright  LYMPHATICS: anterior cervical: no adenopathy  Ext- bilateral pedal edema along extending to the mid leg area   No calf tenderness but calf tightness noted.    Results for orders placed or performed in visit on 08/09/17   XR Chest 2 Views    Narrative    CHEST TWO VIEWS  8/9/2017 4:51 PM      HISTORY: Shortness of breath.    COMPARISON: 9/30/2015.    FINDINGS: The heart is enlarged without pulmonary edema. There is mild  bibasilar probable atelectasis. Curvilinear band of scar in the right  midlung. There is a right pleural effusion. No left pleural effusion.  No pneumothorax.      Impression    IMPRESSION: Right pleural effusion and mild bibasilar atelectasis.   CBC with platelets differential   Result Value Ref Range    WBC 6.4 4.0 - 11.0 10e9/L    RBC Count 2.93 (L) 3.8 - 5.2 10e12/L    Hemoglobin 8.9 (L) 11.7 - 15.7 g/dL    Hematocrit 28.7 (L) 35.0 - 47.0 %    MCV 98 78 - 100 fl    MCH 30.4 26.5 - 33.0 pg    MCHC 31.0 (L) 31.5 - 36.5 g/dL    RDW 14.9 10.0 - 15.0 %    Platelet Count 99 (L) 150 - 450 10e9/L    Diff Method Automated Method     % Neutrophils 67.4 %    % Lymphocytes 18.7 %    % Monocytes 9.8 %    % Eosinophils 3.6 %    % Basophils 0.5 %    Absolute Neutrophil 4.3 1.6 - 8.3 10e9/L    Absolute Lymphocytes 1.2 0.8 - 5.3 10e9/L    Absolute Monocytes 0.6 0.0 - 1.3 10e9/L    Absolute Eosinophils 0.2 0.0 - 0.7 10e9/L    Absolute Basophils 0.0 0.0 - 0.2 10e9/L   Comprehensive metabolic panel   Result Value Ref Range    Sodium 145 (H) 133 - 144 mmol/L    Potassium 5.9 (H) 3.4 - 5.3 mmol/L    Chloride 118 (H) 94 -  109 mmol/L    Carbon Dioxide 21 20 - 32 mmol/L    Anion Gap 6 3 - 14 mmol/L    Glucose 103 (H) 70 - 99 mg/dL    Urea Nitrogen 44 (H) 7 - 30 mg/dL    Creatinine 2.26 (H) 0.52 - 1.04 mg/dL    GFR Estimate 22 (L) >60 mL/min/1.7m2    GFR Estimate If Black 27 (L) >60 mL/min/1.7m2    Calcium 8.8 8.5 - 10.1 mg/dL    Bilirubin Total 0.5 0.2 - 1.3 mg/dL    Albumin 3.1 (L) 3.4 - 5.0 g/dL    Protein Total 6.0 (L) 6.8 - 8.8 g/dL    Alkaline Phosphatase 80 40 - 150 U/L    ALT 21 0 - 50 U/L    AST 25 0 - 45 U/L   D dimer, quantitative   Result Value Ref Range    D Dimer 2.3 (H) 0.0 - 0.50 ug/ml FEU   UA with Microscopic   Result Value Ref Range    Color Urine Yellow     Appearance Urine Clear     Glucose Urine Negative NEG mg/dL    Bilirubin Urine Negative NEG    Ketones Urine Negative NEG mg/dL    Specific Gravity Urine 1.020 1.003 - 1.035    pH Urine 6.0 5.0 - 7.0 pH    Protein Albumin Urine >=300 (A) NEG mg/dL    Urobilinogen Urine 0.2 0.2 - 1.0 EU/dL    Nitrite Urine Negative NEG    Blood Urine Small (A) NEG    Leukocyte Esterase Urine Negative NEG    Source Midstream Urine     WBC Urine O - 2 0 - 2 /HPF    RBC Urine O - 2 0 - 2 /HPF     Chest xray did show pleural effusion along with fluid in the fissure in the right lung field     ASSESSMENT/PLAN:  Yissel was seen today for cough and breathing problem.    Diagnoses and all orders for this visit:    SOB (shortness of breath)  -     XR Chest 2 Views  -     CBC with platelets differential  -     Comprehensive metabolic panel  -     D dimer, quantitative    Upper back pain on left side    Upper back pain on right side    Bloated abdomen  -     UA with Microscopic    CKD (chronic kidney disease) stage 3, GFR 30-59 ml/min    Low hemoglobin        Discussed with pt to follow up in ER   SOB could be related to Pulm embolism or from pleural effusion or anemia   PT REFUSED PARAMEDICS  She would be going to ER  in private vehicle   Called ER and notified about the pt     Follow up if   symptoms fail to improve or worsens   Pt understood and agreed with plan           Marcia Fowler MD

## 2017-08-10 ENCOUNTER — TELEPHONE (OUTPATIENT)
Dept: INTERNAL MEDICINE | Facility: CLINIC | Age: 57
End: 2017-08-10

## 2017-08-10 DIAGNOSIS — E87.5 HYPERKALEMIA: Primary | ICD-10-CM

## 2017-08-10 ASSESSMENT — ENCOUNTER SYMPTOMS
FEVER: 0
COUGH: 0
BLOOD IN STOOL: 0
SHORTNESS OF BREATH: 1
ABDOMINAL PAIN: 0

## 2017-08-10 NOTE — TELEPHONE ENCOUNTER
Reason for Call  Same Day Appointment, Requested Provider:      PCP: Gigi Martin    Reason for visit hospital follow up     Duration of symptoms 2 days    Have you been treated for this in the past? No    Additional comments needs be seen on Friday 8/11/17 where can we fit her in same day please call patient asap about this    Can we leave a detailed message on this number? YES    Phone number patient can be reached at: Cell number on file:    Telephone Information:   Mobile [phone]       Best Time anytime asap please     Call taken on 8/10/2017 at 8:14 AM by Sivan Silva

## 2017-08-10 NOTE — TELEPHONE ENCOUNTER
Can use one of the same-days tomorrow.  Should come in 1-2 hours before the appointment with me to have her follow-up non-fasting labs done, future order placed.

## 2017-08-10 NOTE — ED PROVIDER NOTES
History     Chief Complaint:  shortness of breath     HPI   Yissel Wetzel is a diabetic 57 year old female, with a history of asthma, pulmonary HTN, and Type 2 Diabetes, who presents with shortness of breath. The patient states that she has had increased shortness of breath for the past several weeks, for which she has followed with her primary care provider. For the past few days, she has had increased shortness of breath the past 2 days which prompted her to visit her clinic where they had performed labs and was notable for an elevated D-Dimer and hyperkalemia; was referred to the ED for further evaluation. Also had chest x-ray performed, see below. Here, she endorses bilateral leg swelling, slight wheezing, and chronic renal failure, but denies history of CHF, abdominal pain, and chest pain. SOB described as mild to moderate; worse with exertion.    Cardiac/PE/DVT Risk Factors:  The patient has no history of hypertension, hyperlipidemia, and smoking. The patient denies any personal or familial history of PE, DVT, or clotting disorder. The patient reports no recent travel, surgery, or other immobilizations.      CHEST TWO VIEWS  8/9/2017 4:51 PM    FINDINGS: The heart is enlarged without pulmonary edema. There is mild  bibasilar probable atelectasis. Curvilinear band of scar in the right  midlung. There is a right pleural effusion. No left pleural effusion.  No pneumothorax.         Allergies:  Albuterol  Aspirin  Byetta [Exenatide]  Clonidine  Codeine  Ezetimibe  Hydralazine  Lisinopril  Metformin Hydrochloride  Pravachol [Pravastatin Sodium]  Simvastatin  Troglitazone     Medications:    Levemir  Normodyne  Deltasone  Singular  Advair  Prilosec  Novolog  Flonase     Past Medical History:    Anemia  CKD  Esophagitis  Hemorrhagic gastropathy  Asthma  Pneumonia  Hypertension  Type 2 Diabetes    Past Surgical History:    C-Sections x2  Hysterectomy    Family History:    Arrhythmia  Hypertension  Pancreatic  cancer  Diabetes  Asthma    Social History:  Presents with her boyfriend.  Former smoker - quit 1999  Negative for alcohol use.   Marital Status:  Single [1]     Review of Systems   Constitutional: Negative for fever.   Respiratory: Positive for shortness of breath. Negative for cough.    Cardiovascular: Negative for chest pain.   Gastrointestinal: Negative for abdominal pain and blood in stool.   All other systems reviewed and are negative.      Physical Exam   First Vitals:  BP: 155/54  Heart Rate: 59  Temp: 98.3  F (36.8  C)  Height: 152.4 cm (5')  Weight: 91.5 kg (201 lb 12.8 oz)  SpO2: 92 %      Physical Exam  General: Alert and cooperative with exam. Patient in mild distress. Normal mentation.  Head:  Scalp is NC/AT  Eyes:  No scleral icterus, PERRL  ENT:  The external nose and ears are normal. The oropharynx is normal and without erythema; mucus membranes are somewhat dry. Uvula midline, no evidence of deep space infection. Poor dentition  Neck:  Normal range of motion without rigidity.  CV:  Regular rate and rhythm  Resp:  Breath sounds are clear bilaterally though diminished in right lung base    Non-labored, no retractions or accessory muscle use  GI:  Abdomen is soft, no distension, no tenderness. No peritoneal signs  MS:  +1 bilateral lower extremity pitting edema  Skin:  Warm and dry, No rash or lesions noted.  Neuro: Oriented x 3. No gross motor deficits.    Emergency Department Course   ECG:  Indication: shortness of breath   Time: 2009  Vent. Rate 57 bpm. OK interval 160. QRS duration 90. QT/QTc 464/451. P-R-T axis 79 51 -16. Sinus bradycardia. Read time: 2030     Imaging:  NUCLEAR MEDICINE LUNG SCAN VENTILATION AND PERFUSION   8/9/2017 9:44  PM      IMPRESSION: Low probability for pulmonary embolism. Per radiology    Laboratory:  Labs Ordered and Resulted from Time of ED Arrival Up to the Time of Departure from the ED   NT PROBNP INPATIENT - Abnormal; Notable for the following:        Result Value     N-Terminal Pro BNP Inpatient 5862 (*)     All other components within normal limits   POTASSIUM - Abnormal; Notable for the following:     Potassium 5.7 (*)     All other components within normal limits   TROPONIN I     Interventions:  Medications   technetium pertechnetate with albumin (Tc99m MAA) radioisotope injection 3 millicurie (3.4 millicuries Intravenous Given 8/9/17 2040)   technetium pentetate Tc99m (DTPA) inhaled radioisotope 75 millicurie (78.4 millicuries Inhalation Given 8/9/17 2115)        Emergency Department Course:  Nursing notes and vitals reviewed. I performed an exam of the patient as documented above.     IV inserted. Medicine administered as documented above. Blood drawn. This was sent to the lab for further testing, results above.    The patient was sent for a VQ scan while in the emergency department, findings above.     The patient expresses understanding and agreement with the plan as detailed below.  Discharged to home with written discharge instructions and education relating to the diagnosis.  Education was provided as to possible warning signs related to their condition/diagnosis, return precautions were discussed, and they were informed that they may return to the emergency room at any time should symptoms persist or worsen.    Impression & Plan      Medical Decision Making:  Patient is a 57-year-old female who presents with several week history of shortness of breath and noted elevated d-dimer and potassium on labs obtained earlier today. Patient's medical history and records were reviewed. Initial consideration for, but not limited to, PE, asthma exacerbation, CHF, symptoms secondary to pulmonary hypertension, anemia, infectious process, arrhythmia, atypical ACS/MI, among others. Labs, EKG, and imaging was obtained. Labs earlier today demonstrated a hemoglobin of 8.9; patient denies evidence of GI bleed; anemia likely contributing factor to SOB. EKG demonstrates no evidence of  significant arrhythmia, ischemia, or infarction. No evidence of significant EKG changes secondary to hyperkalemia. Labs notable for continued/improved hyperkalemia (potassium 5.7), normal troponin, and elevated BNP. Due to her chronic renal insufficiency, VQ scan of the chest was obtained and demonstrated low probability of PE. Informal bedside ultrasound demonstrates no evidence of pericardial effusion or RV dilatation. Patient was ambulated in the ED and noted to maintain normal oxygen saturations. Patient has not had a recent echocardiogram and given lower extremity pitting edema, pleural effusion, and cardiomegaly on chest x-ray, concern for possible developing CHF v worsening pulmonary hypertension; outpatient echo order placed. Patient will be started on low-dose of Lasix (20 mg q.d.) for treatment of chronic hyperkalemia as well as hypervolemia. Recommended close follow-up with patient's cardiologist and PCP for recheck of labs and follow-up ED visit. Return precautions were discussed. Patient discharged home.    Diagnosis:     ICD-10-CM    1. SOB (shortness of breath) R06.02 Echocardiogram   2. Hyperkalemia E87.5    3. Pleural effusion J90    4. Bilateral lower extremity edema R60.0        Disposition:  discharged to home    Discharge Medications:  Discharge Medication List as of 8/9/2017 10:44 PM      START taking these medications    Details   furosemide (LASIX) 20 MG tablet Take 1 tablet (20 mg) by mouth daily, Disp-30 tablet, R-0, Local Print           I, Rosalva Cristobal, jamie serving as a scribe on 8/9/2017 at 7:23 PM to personally document services performed by Rene Alvarez DO based on my observations and the provider's statements to me.      Rosalva Cristobal  8/9/2017    EMERGENCY DEPARTMENT       Rene Alvarez DO  08/10/17 [id number]

## 2017-08-10 NOTE — DISCHARGE INSTRUCTIONS
Hyperkalemia  Hyperkalemia is too much potassium in the blood. This most often occurs in people who take certain medicines or people with kidney disease.  This condition often has no symptoms until levels of potassium become high. If symptoms do occur, they include muscle weakness and changes in the heartbeat. A blood test is done to diagnose the problem. An ECG (electrocardiogram) may also be done to test the heartbeat.  If hyperkalemia is caused by a medicine, the healthcare provider may lower the dose or switch to a different medicine. A low-potassium diet may also be prescribed.   Home care    Be sure your healthcare provider knows about all of the medicines you take. Follow your healthcare provider's advice about making changes to your medicines.    If a low-potassium diet has been prescribed, follow this closely. If you need help, ask to be referred to a dietitian for advice on how to follow this diet.  Follow-up care  Follow up with your healthcare provider. You may need a repeat blood test within the next 7 days. Schedule this as advised.  When to seek medical advice  Call your healthcare provider if any of the following occur:    Weakness, dizziness, or lightheadedness    Nausea, vomiting, or diarrhea    Urinating only in small amounts or not urinating    Symptoms don't go away or get worse  Call 911  Call 911 or emergency services right away if any of the following occur:    Fast or irregular heartbeat    Fainting    Severe shortness of breath    Chest, arm, shoulder, neck or upper back pain    Trouble controlling your muscles  Date Last Reviewed: 6/26/2015 2000-2017 [employer]. [address] [place] [address], [place] [address]. All rights reserved. This information is not intended as a substitute for professional medical care. Always follow your healthcare professional's instructions.    Take Lasix as prescribed.

## 2017-08-10 NOTE — ED NOTES
Walked PT. Per RN. With O2 monitor.   PT. Started at 96 %,  half way down the hallway was 94 %,  and dropped down  to 91% by the time we got back to the bed. PT. Now back in bed for approx. 3 min and is now up to 94%

## 2017-08-11 ENCOUNTER — OFFICE VISIT (OUTPATIENT)
Dept: INTERNAL MEDICINE | Facility: CLINIC | Age: 57
End: 2017-08-11
Payer: MEDICARE

## 2017-08-11 VITALS
DIASTOLIC BLOOD PRESSURE: 58 MMHG | BODY MASS INDEX: 38.51 KG/M2 | TEMPERATURE: 97.8 F | SYSTOLIC BLOOD PRESSURE: 136 MMHG | OXYGEN SATURATION: 96 % | WEIGHT: 197.2 LBS | HEART RATE: 59 BPM

## 2017-08-11 DIAGNOSIS — I27.20 PULMONARY HYPERTENSION (H): ICD-10-CM

## 2017-08-11 DIAGNOSIS — I10 ESSENTIAL HYPERTENSION: Primary | ICD-10-CM

## 2017-08-11 DIAGNOSIS — Z79.4 TYPE 2 DIABETES MELLITUS WITH DIABETIC NEPHROPATHY, WITH LONG-TERM CURRENT USE OF INSULIN (H): ICD-10-CM

## 2017-08-11 DIAGNOSIS — E87.5 HYPERKALEMIA: ICD-10-CM

## 2017-08-11 DIAGNOSIS — N18.30 CKD (CHRONIC KIDNEY DISEASE) STAGE 3, GFR 30-59 ML/MIN (H): ICD-10-CM

## 2017-08-11 DIAGNOSIS — E11.21 TYPE 2 DIABETES MELLITUS WITH DIABETIC NEPHROPATHY (H): ICD-10-CM

## 2017-08-11 DIAGNOSIS — E11.21 TYPE 2 DIABETES MELLITUS WITH DIABETIC NEPHROPATHY, WITH LONG-TERM CURRENT USE OF INSULIN (H): ICD-10-CM

## 2017-08-11 LAB
ANION GAP SERPL CALCULATED.3IONS-SCNC: 7 MMOL/L (ref 3–14)
BUN SERPL-MCNC: 48 MG/DL (ref 7–30)
CALCIUM SERPL-MCNC: 8.6 MG/DL (ref 8.5–10.1)
CHLORIDE SERPL-SCNC: 117 MMOL/L (ref 94–109)
CO2 SERPL-SCNC: 21 MMOL/L (ref 20–32)
CREAT SERPL-MCNC: 2.27 MG/DL (ref 0.52–1.04)
GFR SERPL CREATININE-BSD FRML MDRD: 22 ML/MIN/1.7M2
GLUCOSE SERPL-MCNC: 144 MG/DL (ref 70–99)
HBA1C MFR BLD: 6.7 % (ref 4.3–6)
POTASSIUM SERPL-SCNC: 6.2 MMOL/L (ref 3.4–5.3)
SODIUM SERPL-SCNC: 145 MMOL/L (ref 133–144)

## 2017-08-11 PROCEDURE — 36415 COLL VENOUS BLD VENIPUNCTURE: CPT | Performed by: INTERNAL MEDICINE

## 2017-08-11 PROCEDURE — 80048 BASIC METABOLIC PNL TOTAL CA: CPT | Performed by: INTERNAL MEDICINE

## 2017-08-11 PROCEDURE — 83036 HEMOGLOBIN GLYCOSYLATED A1C: CPT | Performed by: INTERNAL MEDICINE

## 2017-08-11 PROCEDURE — 99212 OFFICE O/P EST SF 10 MIN: CPT | Performed by: INTERNAL MEDICINE

## 2017-08-11 RX ORDER — SODIUM POLYSTYRENE SULFONATE 15 G/60ML
15 SUSPENSION ORAL; RECTAL ONCE
Qty: 60 ML | Refills: 0 | Status: SHIPPED | OUTPATIENT
Start: 2017-08-11 | End: 2017-08-11

## 2017-08-11 RX ORDER — SODIUM POLYSTYRENE SULFONATE 15 G/60ML
15 SUSPENSION ORAL; RECTAL ONCE
Qty: 120 ML | Refills: 0 | Status: SHIPPED | OUTPATIENT
Start: 2017-08-11 | End: 2017-08-11

## 2017-08-11 NOTE — LETTER
Yissel Wetzel  [address]   [place] [id number]        August 11, 2017           To Whom It May Concern:    Yissel Wetzel was seen in our clinic. She may return to workv without restrictions as of 8/11/2017.      Sincerely,        Gigi Martin MD

## 2017-08-11 NOTE — NURSING NOTE
Chief Complaint   Patient presents with     Hospital F/U       Initial /58 (BP Location: Left arm, Patient Position: Chair, Cuff Size: Adult Regular)  Pulse 59  Temp 97.8  F (36.6  C) (Oral)  Wt 197 lb 3.2 oz (89.4 kg)  SpO2 96%  BMI 38.51 kg/m2 Estimated body mass index is 38.51 kg/(m^2) as calculated from the following:    Height as of 8/9/17: 5' (1.524 m).    Weight as of this encounter: 197 lb 3.2 oz (89.4 kg).  Medication Reconciliation: complete

## 2017-08-11 NOTE — MR AVS SNAPSHOT
After Visit Summary   8/11/2017    Yissel Wetzel    MRN: [id number]           Patient Information     Date Of Birth          1960        Visit Information        Provider Department      8/11/2017 3:00 PM Gigi Martin MD [facility]        Today's Diagnoses     Essential hypertension    -  1    Type 2 diabetes mellitus with diabetic nephropathy, with long-term current use of insulin (H)        Pulmonary hypertension (H)        CKD (chronic kidney disease) stage 3, GFR 30-59 ml/min        Hyperkalemia           Follow-ups after your visit        Your next 10 appointments already scheduled     Sep 15, 2017  3:45 PM CDT   SHORT with Gigi Martin MD   [facility] ([facility])    [address] [place] [id number] [phone]              Who to contact     If you have questions or need follow up information about today's clinic visit or your schedule please contact [facility] directly at [phone].  Normal or non-critical lab and imaging results will be communicated to you by MyChart, letter or phone within 4 business days after the clinic has received the results. If you do not hear from us within 7 days, please contact the clinic through MyChart or phone. If you have a critical or abnormal lab result, we will notify you by phone as soon as possible.  Submit refill requests through [employer] or call your pharmacy and they will forward the refill request to us. Please allow 3 business days for your refill to be completed.          Additional Information About Your Visit        MyChart Information     [employer] gives you secure access to your electronic health record. If you see a primary care provider, you can also send messages to your care team and make appointments. If you have questions, please call your primary care clinic.  If you do not have a primary care provider,  please call [phone] and they will assist you.        Care EveryWhere ID     This is your Care EveryWhere ID. This could be used by other organizations to access your [place] medical records  [id number]        Your Vitals Were     Pulse Temperature Pulse Oximetry BMI (Body Mass Index)          59 97.8  F (36.6  C) (Oral) 96% 38.51 kg/m2         Blood Pressure from Last 3 Encounters:   08/31/17 170/82   08/11/17 136/58   08/09/17 155/54    Weight from Last 3 Encounters:   08/31/17 195 lb 12.8 oz (88.8 kg)   08/11/17 197 lb 3.2 oz (89.4 kg)   08/09/17 201 lb 12.8 oz (91.5 kg)                 Today's Medication Changes          These changes are accurate as of: 8/11/17 11:59 PM.  If you have any questions, ask your nurse or doctor.               Start taking these medicines.        Dose/Directions    sodium polystyrene 15 GM/60ML suspension   Commonly known as:  KAYEXALATE   Used for:  Hyperkalemia   Started by:  Gigi Martin MD        Dose:  15 g   Take 60 mLs (15 g) by mouth once for 1 dose Today, and repeat 1 dose tomorrow   Quantity:  120 mL   Refills:  0            Where to get your medicines      These medications were sent to [id number] - [facility] [address] [address]     Phone:  [phone]     sodium polystyrene 15 GM/60ML suspension                Primary Care Provider Office Phone # Fax #    Gigi Martin [phone] [address] [address]        Equal Access to Services     [facility] : Hadii letty rico Soleroy, waaxda luqadaha, qaybta kaalmada william travis. So [facility] [phone].    ATENCIÓN: Si habla español, tiene a kidd disposición servicios gratuitos de asistencia lingüística. Llame al [phone].    We comply with applicable federal civil rights laws and [place] laws. We do not discriminate on the basis of race, color, national  origin, age, disability sex, sexual orientation or gender identity.            Thank you!     Thank you for choosing [facility]  for your care. Our goal is always to provide you with excellent care. Hearing back from our patients is one way we can continue to improve our services. Please take a few minutes to complete the written survey that you may receive in the mail after your visit with us. Thank you!             Your Updated Medication List - Protect others around you: Learn how to safely use, store and throw away your medicines at www.disposemymeds.org.          This list is accurate as of: 8/11/17 11:59 PM.  Always use your most recent med list.                   Brand Name Dispense Instructions for use Diagnosis    * ACE NOT PRESCRIBED (INTENTIONAL)     0 each    1 each continuous prn ACE Inhibitor not prescribed due to Intolerance        blood glucose monitoring meter device kit     1 kit    Use to test blood sugars 5-7 times daily or as directed.    Type 2 diabetes mellitus with diabetic nephropathy (H)       blood glucose monitoring test strip    ACCU-CHEK CHING    200 each    Use to test blood sugars 4 times daily or as directed.    CKD (chronic kidney disease) stage 3, GFR 30-59 ml/min       ferrous sulfate 325 (65 FE) MG tablet    IRON    30 tablet    Take 1 tablet (325 mg) by mouth daily with food    Iron deficiency anemia, unspecified       fluticasone 50 MCG/ACT spray    FLONASE    16 g    Spray 2 sprays into both nostrils daily    Chronic rhinitis       fluticasone-salmeterol 250-50 MCG/DOSE diskus inhaler    ADVAIR    1 Inhaler    Inhale 1 puff into the lungs 2 times daily    Mild persistent asthma, uncomplicated       furosemide 20 MG tablet    LASIX    30 tablet    Take 1 tablet (20 mg) by mouth daily        * insulin pen needle 31G X 8 MM     100 each    Use 4 pen needles daily or as directed.    Type 2 diabetes mellitus with diabetic nephropathy (H)       * insulin pen  needle 31G X 5 MM     100 each    Use 4 times daily    CKD (chronic kidney disease) stage 3, GFR 30-59 ml/min       labetalol 200 MG tablet    NORMODYNE    180 tablet    TAKE ONE TABLET BY MOUTH TWICE DAILY    Essential hypertension       methylPREDNISolone 4 MG tablet    MEDROL DOSEPAK    21 tablet    Follow package instructions    Acute bronchitis, unspecified organism       montelukast 10 MG tablet    SINGULAIR    30 tablet    Take 1 tablet (10 mg) by mouth At Bedtime    Mild persistent asthma with acute exacerbation       * NovoLOG FLEXpen 100 UNITS/ML injection   Generic drug:  insulin aspart     1 Month    Inject  Subcutaneous 3 times daily (before meals). 9-12u units before breakfast, 9-12u  before lunch, 9-12 units before dinner+ correction scale 2u for every 50 over 150.    Type 2 diabetes, HbA1c goal < 7% (H)       * insulin aspart 100 UNIT/ML injection    NovoLOG PEN    1 Month    9-12 units before breakfast,lunch and dinner + a corrective scale of 2 units for every 50 over 150    Type 2 diabetes mellitus with diabetic nephropathy (H)       omeprazole 20 MG CR capsule    priLOSEC    30 capsule    Take 1 capsule (20 mg) by mouth every morning (before breakfast) take 30'-60' before 1st meal daily.    Esophagitis, unspecified       order for DME     1 Device    Equipment being ordered: right flat sole shoe    Injury of toe, left, initial encounter, Injury of toe on left foot       predniSONE 10 MG tablet    DELTASONE    5 tablet    Take 1 tablet (10 mg) by mouth daily    Mild persistent asthma with acute exacerbation       sodium bicarbonate 650 MG tablet     30 tablet    Take 1 tablet (650 mg) by mouth daily    CKD (chronic kidney disease) stage 3, GFR 30-59 ml/min       sodium polystyrene 15 GM/60ML suspension    KAYEXALATE    120 mL    Take 60 mLs (15 g) by mouth once for 1 dose Today, and repeat 1 dose tomorrow    Hyperkalemia       * STATIN NOT PRESCRIBED (INTENTIONAL)     0 each    continuous prn.  Statin not prescribed intentionally due to Other:    Type 2 diabetes, HbA1c goal < 7% (H)       * Notice:  This list has 6 medication(s) that are the same as other medications prescribed for you. Read the directions carefully, and ask your doctor or other care provider to review them with you.

## 2017-08-11 NOTE — PROGRESS NOTES
SUBJECTIVE:                                                    Yissel Wetzel is a 57 year old female who presents to clinic today for the following health issues:      ED/UC Followup:    Facility:  [facility]  Date of visit: 08/09/2017  Reason for visit: SOB  Current Status: still having SOB with exertion, but better than when she went into the ER             Problem list and histories reviewed & adjusted, as indicated.  Additional history: as documented    Patient continues on newly-started diuretic at this point, she is somewhat concerned about her Cr.    Breathing is easier, and her weight has gone down.    Reviewed and updated as needed this visit by clinical staff  Tobacco  Allergies  Med Hx  Surg Hx  Fam Hx  Soc Hx      Reviewed and updated as needed this visit by Provider         ROS:  Constitutional, HEENT, cardiovascular, pulmonary, gi and gu systems are negative, except as otherwise noted.      OBJECTIVE:   /58 (BP Location: Left arm, Patient Position: Chair, Cuff Size: Adult Regular)  Pulse 59  Temp 97.8  F (36.6  C) (Oral)  Wt 197 lb 3.2 oz (89.4 kg)  SpO2 96%  BMI 38.51 kg/m2  Body mass index is 38.51 kg/(m^2).  GENERAL: healthy, alert and no distress  NECK: no adenopathy, no asymmetry, masses, or scars and thyroid normal to palpation  RESP: lungs clear to auscultation - no rales, rhonchi or wheezes  CV: regular rate and rhythm, normal S1 S2, no S3 or S4, no murmur, click or rub, no peripheral edema and peripheral pulses strong  ABDOMEN: soft, nontender, no hepatosplenomegaly, no masses and bowel sounds normal  MS: chronic-appearing bilateral lower extremity edema.        ASSESSMENT/PLAN:     1. Essential hypertension  Needs follow-up ECHO  - Echocardiogram Complete; Future    2. Type 2 diabetes mellitus with diabetic nephropathy, with long-term current use of insulin (H)      3. Pulmonary hypertension (H)      4. CKD (chronic kidney disease) stage 3, GFR 30-59 ml/min            Gigi  Kaleb Martin MD  [facility]

## 2017-08-14 DIAGNOSIS — E87.5 HYPERKALEMIA: ICD-10-CM

## 2017-08-14 LAB
ANION GAP SERPL CALCULATED.3IONS-SCNC: 9 MMOL/L (ref 3–14)
BUN SERPL-MCNC: 46 MG/DL (ref 7–30)
CALCIUM SERPL-MCNC: 8.3 MG/DL (ref 8.5–10.1)
CHLORIDE SERPL-SCNC: 116 MMOL/L (ref 94–109)
CO2 SERPL-SCNC: 20 MMOL/L (ref 20–32)
CREAT SERPL-MCNC: 2.21 MG/DL (ref 0.52–1.04)
GFR SERPL CREATININE-BSD FRML MDRD: 23 ML/MIN/1.7M2
GLUCOSE SERPL-MCNC: 124 MG/DL (ref 70–99)
POTASSIUM SERPL-SCNC: 5.2 MMOL/L (ref 3.4–5.3)
SODIUM SERPL-SCNC: 145 MMOL/L (ref 133–144)

## 2017-08-14 PROCEDURE — 80048 BASIC METABOLIC PNL TOTAL CA: CPT | Performed by: INTERNAL MEDICINE

## 2017-08-14 PROCEDURE — 36415 COLL VENOUS BLD VENIPUNCTURE: CPT | Performed by: INTERNAL MEDICINE

## 2017-08-23 ENCOUNTER — TELEPHONE (OUTPATIENT)
Dept: INTERNAL MEDICINE | Facility: CLINIC | Age: 57
End: 2017-08-23

## 2017-08-23 ENCOUNTER — RADIANT APPOINTMENT (OUTPATIENT)
Dept: CARDIOLOGY | Facility: CLINIC | Age: 57
End: 2017-08-23
Attending: INTERNAL MEDICINE
Payer: MEDICARE

## 2017-08-23 DIAGNOSIS — E87.5 HYPERKALEMIA: ICD-10-CM

## 2017-08-23 DIAGNOSIS — I10 ESSENTIAL HYPERTENSION: ICD-10-CM

## 2017-08-23 DIAGNOSIS — E87.5 HYPERKALEMIA: Primary | ICD-10-CM

## 2017-08-23 LAB
ANION GAP SERPL CALCULATED.3IONS-SCNC: 6 MMOL/L (ref 3–14)
BUN SERPL-MCNC: 48 MG/DL (ref 7–30)
CALCIUM SERPL-MCNC: 8.4 MG/DL (ref 8.5–10.1)
CHLORIDE SERPL-SCNC: 116 MMOL/L (ref 94–109)
CO2 SERPL-SCNC: 22 MMOL/L (ref 20–32)
CREAT SERPL-MCNC: 2.53 MG/DL (ref 0.52–1.04)
GFR SERPL CREATININE-BSD FRML MDRD: 20 ML/MIN/1.7M2
GLUCOSE SERPL-MCNC: 143 MG/DL (ref 70–99)
POTASSIUM SERPL-SCNC: 5.8 MMOL/L (ref 3.4–5.3)
SODIUM SERPL-SCNC: 144 MMOL/L (ref 133–144)

## 2017-08-23 PROCEDURE — 80048 BASIC METABOLIC PNL TOTAL CA: CPT | Performed by: INTERNAL MEDICINE

## 2017-08-23 PROCEDURE — 93306 TTE W/DOPPLER COMPLETE: CPT | Mod: TC

## 2017-08-23 PROCEDURE — 93306 TTE W/DOPPLER COMPLETE: CPT | Mod: 26 | Performed by: INTERNAL MEDICINE

## 2017-08-23 PROCEDURE — 36415 COLL VENOUS BLD VENIPUNCTURE: CPT | Performed by: INTERNAL MEDICINE

## 2017-08-24 DIAGNOSIS — E11.21 TYPE 2 DIABETES MELLITUS WITH DIABETIC NEPHROPATHY (H): ICD-10-CM

## 2017-08-25 NOTE — TELEPHONE ENCOUNTER
LEVEMIR FLEXTOUCH 100 UNIT/ML injection         Last Written Prescription Date: 7/28/2017  Last Fill Quantity: 21mL, # refills: 0  Last Office Visit with G, P or [facility] prescribing provider:  8/11/2017        BP Readings from Last 3 Encounters:   08/11/17 136/58   08/09/17 155/54   08/09/17 185/76     Lab Results   Component Value Date    MICROL 4750 01/20/2017     Lab Results   Component Value Date    UMALCR 4094.83 01/20/2017     Creatinine   Date Value Ref Range Status   08/23/2017 2.53 (H) 0.52 - 1.04 mg/dL Final   ]  GFR Estimate   Date Value Ref Range Status   08/23/2017 20 (L) >60 mL/min/1.7m2 Final     Comment:     Non  GFR Calc   08/14/2017 23 (L) >60 mL/min/1.7m2 Final     Comment:     Non  GFR Calc   08/11/2017 22 (L) >60 mL/min/1.7m2 Final     Comment:     Non  GFR Calc     GFR Estimate If Black   Date Value Ref Range Status   08/23/2017 24 (L) >60 mL/min/1.7m2 Final     Comment:      GFR Calc   08/14/2017 28 (L) >60 mL/min/1.7m2 Final     Comment:      GFR Calc   08/11/2017 27 (L) >60 mL/min/1.7m2 Final     Comment:      GFR Calc     Lab Results   Component Value Date    CHOL 211 07/22/2016     Lab Results   Component Value Date    HDL 49 07/22/2016     Lab Results   Component Value Date     07/22/2016     Lab Results   Component Value Date    TRIG 193 07/22/2016     Lab Results   Component Value Date    CHOLHDLRATIO 3.6 10/28/2015     Lab Results   Component Value Date    AST 25 08/09/2017     Lab Results   Component Value Date    ALT 21 08/09/2017     Lab Results   Component Value Date    A1C 6.7 08/11/2017    A1C 7.7 01/20/2017    A1C 7.5 07/22/2016    A1C 7.1 10/28/2015    A1C 7.6 03/16/2015     Potassium   Date Value Ref Range Status   08/23/2017 5.8 (H) 3.4 - 5.3 mmol/L Final

## 2017-08-28 RX ORDER — INSULIN DETEMIR 100 [IU]/ML
INJECTION, SOLUTION SUBCUTANEOUS
Qty: 21 ML | Refills: 5 | Status: SHIPPED | OUTPATIENT
Start: 2017-08-28 | End: 2018-04-03

## 2017-08-31 ENCOUNTER — OFFICE VISIT (OUTPATIENT)
Dept: URGENT CARE | Facility: URGENT CARE | Age: 57
End: 2017-08-31
Payer: MEDICARE

## 2017-08-31 ENCOUNTER — RADIANT APPOINTMENT (OUTPATIENT)
Dept: GENERAL RADIOLOGY | Facility: CLINIC | Age: 57
End: 2017-08-31
Attending: INTERNAL MEDICINE
Payer: MEDICARE

## 2017-08-31 VITALS
RESPIRATION RATE: 20 BRPM | DIASTOLIC BLOOD PRESSURE: 82 MMHG | BODY MASS INDEX: 38.24 KG/M2 | TEMPERATURE: 97.4 F | OXYGEN SATURATION: 95 % | WEIGHT: 195.8 LBS | HEART RATE: 55 BPM | SYSTOLIC BLOOD PRESSURE: 170 MMHG

## 2017-08-31 DIAGNOSIS — R06.02 SHORTNESS OF BREATH: ICD-10-CM

## 2017-08-31 DIAGNOSIS — R60.1 GENERALIZED EDEMA: ICD-10-CM

## 2017-08-31 DIAGNOSIS — I50.32 CHRONIC DIASTOLIC CONGESTIVE HEART FAILURE (H): Primary | ICD-10-CM

## 2017-08-31 DIAGNOSIS — N18.4 CKD (CHRONIC KIDNEY DISEASE) STAGE 4, GFR 15-29 ML/MIN (H): ICD-10-CM

## 2017-08-31 LAB
ALBUMIN SERPL-MCNC: 3 G/DL (ref 3.4–5)
ALP SERPL-CCNC: 79 U/L (ref 40–150)
ALT SERPL W P-5'-P-CCNC: 19 U/L (ref 0–50)
ANION GAP SERPL CALCULATED.3IONS-SCNC: 7 MMOL/L (ref 3–14)
AST SERPL W P-5'-P-CCNC: 24 U/L (ref 0–45)
BILIRUB SERPL-MCNC: 0.5 MG/DL (ref 0.2–1.3)
BUN SERPL-MCNC: 52 MG/DL (ref 7–30)
CALCIUM SERPL-MCNC: 8.6 MG/DL (ref 8.5–10.1)
CHLORIDE SERPL-SCNC: 119 MMOL/L (ref 94–109)
CO2 SERPL-SCNC: 20 MMOL/L (ref 20–32)
CREAT SERPL-MCNC: 2.48 MG/DL (ref 0.52–1.04)
ERYTHROCYTE [DISTWIDTH] IN BLOOD BY AUTOMATED COUNT: 15.3 % (ref 10–15)
GFR SERPL CREATININE-BSD FRML MDRD: 20 ML/MIN/1.7M2
GLUCOSE SERPL-MCNC: 79 MG/DL (ref 70–99)
HCT VFR BLD AUTO: 28.2 % (ref 35–47)
HGB BLD-MCNC: 8.8 G/DL (ref 11.7–15.7)
MCH RBC QN AUTO: 30.7 PG (ref 26.5–33)
MCHC RBC AUTO-ENTMCNC: 31.2 G/DL (ref 31.5–36.5)
MCV RBC AUTO: 98 FL (ref 78–100)
PLATELET # BLD AUTO: 112 10E9/L (ref 150–450)
POTASSIUM SERPL-SCNC: 5.8 MMOL/L (ref 3.4–5.3)
PROT SERPL-MCNC: 6 G/DL (ref 6.8–8.8)
RBC # BLD AUTO: 2.87 10E12/L (ref 3.8–5.2)
SODIUM SERPL-SCNC: 146 MMOL/L (ref 133–144)
WBC # BLD AUTO: 6.4 10E9/L (ref 4–11)

## 2017-08-31 PROCEDURE — 85027 COMPLETE CBC AUTOMATED: CPT | Performed by: INTERNAL MEDICINE

## 2017-08-31 PROCEDURE — 36415 COLL VENOUS BLD VENIPUNCTURE: CPT | Performed by: INTERNAL MEDICINE

## 2017-08-31 PROCEDURE — 80053 COMPREHEN METABOLIC PANEL: CPT | Performed by: INTERNAL MEDICINE

## 2017-08-31 PROCEDURE — 93000 ELECTROCARDIOGRAM COMPLETE: CPT | Performed by: INTERNAL MEDICINE

## 2017-08-31 PROCEDURE — 99214 OFFICE O/P EST MOD 30 MIN: CPT | Performed by: INTERNAL MEDICINE

## 2017-08-31 PROCEDURE — 71020 XR CHEST 2 VW: CPT

## 2017-08-31 NOTE — MR AVS SNAPSHOT
After Visit Summary   8/31/2017    Yissel Wetzel    MRN: [id number]           Patient Information     Date Of Birth          1960        Visit Information        Provider Department      8/31/2017 7:20 PM Jasen Yoon MD [facility]        Today's Diagnoses     Generalized edema    -  1    Shortness of breath          Care Instructions    1. The issue with your breathing is that you have pulmonary edema (water on your lungs) that is related to your heart being under stress.  You heart and kidneys have to work together to unload the water.    2. Take furosemide 40 mg twice daily (2 tablets) for the next several days until you follow-up with Dr. Martin.    3. Weigh yourself daily and keep track of the weight.  We want your weight to go down and your belly to become softer and smaller.  4. See Dr. Martin early this coming week.          Follow-ups after your visit        Who to contact     If you have questions or need follow up information about today's clinic visit or your schedule please contact [facility] directly at [phone].  Normal or non-critical lab and imaging results will be communicated to you by [email or web address], letter or phone within 4 business days after the clinic has received the results. If you do not hear from us within 7 days, please contact the clinic through [employer] or phone. If you have a critical or abnormal lab result, we will notify you by phone as soon as possible.  Submit refill requests through [employer] or call your pharmacy and they will forward the refill request to us. Please allow 3 business days for your refill to be completed.          Additional Information About Your Visit        [email or web address] Information     [employer] gives you secure access to your electronic health record. If you see a primary care provider, you can also send messages to your care team and make appointments. If you have questions, please call  your primary care clinic.  If you do not have a primary care provider, please call [phone] and they will assist you.        Care EveryWhere ID     This is your Care EveryWhere ID. This could be used by other organizations to access your [place] medical records  [id number]        Your Vitals Were     Pulse Temperature Respirations Pulse Oximetry BMI (Body Mass Index)       55 97.4  F (36.3  C) (Oral) 20 95% 38.24 kg/m2        Blood Pressure from Last 3 Encounters:   08/31/17 170/82   08/11/17 136/58   08/09/17 155/54    Weight from Last 3 Encounters:   08/31/17 195 lb 12.8 oz (88.8 kg)   08/11/17 197 lb 3.2 oz (89.4 kg)   08/09/17 201 lb 12.8 oz (91.5 kg)              We Performed the Following     CBC with platelets     Comprehensive metabolic panel (BMP + Alb, Alk Phos, ALT, AST, Total. Bili, TP)     EKG 12-lead complete w/read - Clinics        Primary Care Provider Office Phone # Fax #    Gigi Martin [phone]       [address] [place] [address]        Equal Access to Services     [employer] ANNE : Hadii letty ku hadasho Soomaali, waaxda luqadaha, qaybta kaalmada adeegyada, william braxton. So [facility] [phone].    ATENCIÓN: Si habla español, tiene a kidd disposición servicios gratuitos de asistencia lingüística. Llame al [phone].    We comply with applicable federal civil rights laws and [place] laws. We do not discriminate on the basis of race, color, national origin, age, disability sex, sexual orientation or gender identity.            Thank you!     Thank you for choosing [facility]  for your care. Our goal is always to provide you with excellent care. Hearing back from our patients is one way we can continue to improve our services. Please take a few minutes to complete the written survey that you may receive in the mail after your visit with us. Thank you!             Your Updated Medication List - Protect  others around you: Learn how to safely use, store and throw away your medicines at www.disposemymeds.org.          This list is accurate as of: 8/31/17  9:46 PM.  Always use your most recent med list.                   Brand Name Dispense Instructions for use Diagnosis    * ACE NOT PRESCRIBED (INTENTIONAL)     0 each    1 each continuous prn ACE Inhibitor not prescribed due to Intolerance        blood glucose monitoring meter device kit     1 kit    Use to test blood sugars 5-7 times daily or as directed.    Type 2 diabetes mellitus with diabetic nephropathy (H)       blood glucose monitoring test strip    ACCU-CHEK CHING    200 each    Use to test blood sugars 4 times daily or as directed.    CKD (chronic kidney disease) stage 3, GFR 30-59 ml/min       ferrous sulfate 325 (65 FE) MG tablet    IRON    30 tablet    Take 1 tablet (325 mg) by mouth daily with food    Iron deficiency anemia, unspecified       fluticasone 50 MCG/ACT spray    FLONASE    16 g    Spray 2 sprays into both nostrils daily    Chronic rhinitis       fluticasone-salmeterol 250-50 MCG/DOSE diskus inhaler    ADVAIR    1 Inhaler    Inhale 1 puff into the lungs 2 times daily    Mild persistent asthma, uncomplicated       furosemide 20 MG tablet    LASIX    30 tablet    Take 1 tablet (20 mg) by mouth daily        * insulin pen needle 31G X 8 MM     100 each    Use 4 pen needles daily or as directed.    Type 2 diabetes mellitus with diabetic nephropathy (H)       * insulin pen needle 31G X 5 MM     100 each    Use 4 times daily    CKD (chronic kidney disease) stage 3, GFR 30-59 ml/min       labetalol 200 MG tablet    NORMODYNE    180 tablet    TAKE ONE TABLET BY MOUTH TWICE DAILY    Essential hypertension       LEVEMIR FLEXTOUCH 100 UNIT/ML injection   Generic drug:  insulin detemir     21 mL    INJECT 66 UNITS SUBCUTANEOUSLY AT BEDTIME    Type 2 diabetes mellitus with diabetic nephropathy (H)       methylPREDNISolone 4 MG tablet    MEDROL DOSEPAK     21 tablet    Follow package instructions    Acute bronchitis, unspecified organism       montelukast 10 MG tablet    SINGULAIR    30 tablet    Take 1 tablet (10 mg) by mouth At Bedtime    Mild persistent asthma with acute exacerbation       * NovoLOG FLEXpen 100 UNITS/ML injection   Generic drug:  insulin aspart     1 Month    Inject  Subcutaneous 3 times daily (before meals). 9-12u units before breakfast, 9-12u  before lunch, 9-12 units before dinner+ correction scale 2u for every 50 over 150.    Type 2 diabetes, HbA1c goal < 7% (H)       * insulin aspart 100 UNIT/ML injection    NovoLOG PEN    1 Month    9-12 units before breakfast,lunch and dinner + a corrective scale of 2 units for every 50 over 150    Type 2 diabetes mellitus with diabetic nephropathy (H)       omeprazole 20 MG CR capsule    priLOSEC    30 capsule    Take 1 capsule (20 mg) by mouth every morning (before breakfast) take 30'-60' before 1st meal daily.    Esophagitis, unspecified       order for DME     1 Device    Equipment being ordered: right flat sole shoe    Injury of toe, left, initial encounter, Injury of toe on left foot       predniSONE 10 MG tablet    DELTASONE    5 tablet    Take 1 tablet (10 mg) by mouth daily    Mild persistent asthma with acute exacerbation       sodium bicarbonate 650 MG tablet     30 tablet    Take 1 tablet (650 mg) by mouth daily    CKD (chronic kidney disease) stage 3, GFR 30-59 ml/min       * STATIN NOT PRESCRIBED (INTENTIONAL)     0 each    continuous prn. Statin not prescribed intentionally due to Other:    Type 2 diabetes, HbA1c goal < 7% (H)       * Notice:  This list has 6 medication(s) that are the same as other medications prescribed for you. Read the directions carefully, and ask your doctor or other care provider to review them with you.

## 2017-08-31 NOTE — Clinical Note
Close follow-up required to balance Ms. Wetzel's diuresis.  Thank you!  Please call if any questions - [phone]

## 2017-09-01 NOTE — PATIENT INSTRUCTIONS
1. The issue with your breathing is that you have pulmonary edema (water on your lungs) that is related to your heart being under stress.  You heart and kidneys have to work together to unload the water.    2. Take furosemide 40 mg twice daily (2 tablets) for the next several days until you follow-up with Dr. Martin.    3. Weigh yourself daily and keep track of the weight.  We want your weight to go down and your belly to become softer and smaller.  4. See Dr. Martin early this coming week.

## 2017-09-01 NOTE — PROGRESS NOTES
"SUBJECTIVE:  Yissel Wetzel, a 57 year old female, presents for evaluation of ongoing problems with dyspnea.  This has been going on for the past several months.  Not necessarily acutely worse today; just not getting better.  At the beginning of the month, she was seen for dyspnea and noted to be up 11 kg in weight from mid June.  At that time, CXR demonstrated cardiomegaly, pulmonary vascular congestion and a right pleural effusion.  She reports increasing abdominal girth and leg edema.  Perhaps some orthopnea.  May be getting some exertional chest heaviness though the symptom is vague.  Her PMH is complicated by DM, HTN, asthma and iron deficiency.  Had an echocardiogram on 8/23 with normal LV systolic function and grade 2 diastolic dysfunction. She sees nephrology but hasn't seen them \"in quite a while.\"     ROS:  The following systems have been completely reviewed and are negative except as noted in the HPI: CONSTITUTIONAL, HEAD AND NECK, CARDIOVASCULAR, PULMONARY, GASTROINTESTINAL, RENAL and NEUROLOGIC    PMH: notable for DM, CKD, HTN and diastolic dysfunction. Her last A1C was < 7%.  Her creatinine has been running 2.2-2.5 with GFR in the 20-25 range and she has a tendency to hyperkalemia.  Old records are extensively reviewed in the EMR.     Wt Readings from Last 7 Encounters:   08/31/17 195 lb 12.8 oz (88.8 kg)   08/11/17 197 lb 3.2 oz (89.4 kg)   08/09/17 201 lb 12.8 oz (91.5 kg)   08/09/17 202 lb (91.6 kg)   06/15/17 178 lb (80.7 kg)   05/04/17 178 lb (80.7 kg)   04/19/17 178 lb (80.7 kg)     MEDS: was put on furosemide 20 mg daily by ER physician then this was discontinued earlier this month.  Not on an ACE inhibitor or ARB.  On sodium bicarbonate from nephrology.     OBJECTIVE:  /82  Pulse 55  Temp 97.4  F (36.3  C) (Oral)  Resp 20  Wt 195 lb 12.8 oz (88.8 kg)  SpO2 95%  BMI 38.24 kg/m2  GENERAL: obese female, chronically ill  NECK: JVD is present with JVP estimated at 8-10 cm H2O  RESP: " basilar crackles and diminished breath sounds bilaterally  CV: irregular rhythm with ectopic beats, normal S1/s2, no murmur or rub  ABDOMEN: firm, distended, shifting dullness and bulging flanks, and without tenderness.  There is no hepatosplenomegaly or masses and normal bowel sounds.  EXT: 2+ pitting edema of both lower extremities; absent DP/PT pulses bilaterally; shiny and hyperpigmented skin consistent with peripheral arterial disease  SKIN: atrophic and hyperpigmented skin of the feet bilaterally consistent with arterial insufficiency    EKG, which I have personally reviewed and interpreted, shows sinus rhythm with PACs and sinus arrhythmia.    CXR, which I have personally reviewed and interpreted, shows a moderate right pleural effusion, pulmonary vascular congestion, LVH and enlarged cardiac silhouette.  The fluid in the major fissure on the right is cleared.    LAB:   Recent Labs   Lab Test  08/31/17 2012 08/23/17   1046   NA  146*  144   POTASSIUM  5.8*  5.8*   CHLORIDE  119*  116*   CO2  20  22   BUN  52*  48*   CR  2.48*  2.53*   GLC  79  143*     Recent Labs   Lab Test  08/31/17 2012   AST  24   ALT  19   ALKPHOS  79   ALBUMIN  3.0*     Recent Labs   Lab Test  08/31/17 2012 08/09/17   1623   WBC  6.4  6.4   RBC  2.87*  2.93*   HGB  8.8*  8.9*   HCT  28.2*  28.7*   MCV  98  98   MCH  30.7  30.4   MCHC  31.2*  31.0*   RDW  15.3*  14.9   PLT  112*  99*     ASSESSMENT/PLAN:    ICD-10-CM    1. Chronic diastolic congestive heart failure (H) I50.32 Challenging combination of heart failure related to diastolic dysfunction and significant renal insufficiency.  She is clinically hypervolemic based upon JVD, ascites (likely cardiac ascites), and CXR findings.  Preload likely is pushing her beyond acceptable LVEDP and preload reduction should improve cardiac output.  Will need careful monitoring.  Will use higher dose furosemide given her renal insufficiency to deliver adequate drug to the tubules.  Expect  that will need to titrate back relatively quickly as her weight comes down.  Patient instructed to monitor urine output and weight.  Close f/u with Dr. Martin.  Potassium wasting effect should protect her from excess hyperkalemia as long as urine output remains reasonable.   2. CKD (chronic kidney disease) stage 4, GFR 15-29 ml/min (H) N18.4    3. Generalized edema R60.1 XR Chest 2 Views     Comprehensive metabolic panel (BMP + Alb, Alk Phos, ALT, AST, Total. Bili, TP)     CBC with platelets     EKG 12-lead complete w/read - Clinics   4. Shortness of breath R06.02 XR Chest 2 Views     Comprehensive metabolic panel (BMP + Alb, Alk Phos, ALT, AST, Total. Bili, TP)     CBC with platelets     EKG 12-lead complete w/read - Clinics       Jasen Yoon MD

## 2017-09-01 NOTE — NURSING NOTE
Chief Complaint   Patient presents with     Breathing Problem     SOB when moving around        Initial /82  Pulse 55  Temp 97.4  F (36.3  C) (Oral)  Resp 20  SpO2 95% Estimated body mass index is 38.51 kg/(m^2) as calculated from the following:    Height as of 8/9/17: 5' (1.524 m).    Weight as of 8/11/17: 197 lb 3.2 oz (89.4 kg).  Medication Reconciliation: complete

## 2017-09-08 DIAGNOSIS — E11.21 TYPE 2 DIABETES MELLITUS WITH DIABETIC NEPHROPATHY (H): ICD-10-CM

## 2017-09-11 RX ORDER — INSULIN DETEMIR 100 [IU]/ML
INJECTION, SOLUTION SUBCUTANEOUS
Qty: 21 ML | Refills: 0 | OUTPATIENT
Start: 2017-09-11

## 2017-09-15 ENCOUNTER — OFFICE VISIT (OUTPATIENT)
Dept: INTERNAL MEDICINE | Facility: CLINIC | Age: 57
End: 2017-09-15
Payer: MEDICARE

## 2017-09-15 VITALS
OXYGEN SATURATION: 97 % | HEART RATE: 60 BPM | DIASTOLIC BLOOD PRESSURE: 70 MMHG | BODY MASS INDEX: 36.91 KG/M2 | TEMPERATURE: 98.1 F | SYSTOLIC BLOOD PRESSURE: 134 MMHG | WEIGHT: 189 LBS

## 2017-09-15 DIAGNOSIS — I51.89 DIASTOLIC DYSFUNCTION: ICD-10-CM

## 2017-09-15 DIAGNOSIS — I27.20 PULMONARY HYPERTENSION (H): Primary | ICD-10-CM

## 2017-09-15 DIAGNOSIS — N18.30 CKD (CHRONIC KIDNEY DISEASE) STAGE 3, GFR 30-59 ML/MIN (H): ICD-10-CM

## 2017-09-15 DIAGNOSIS — I10 ESSENTIAL HYPERTENSION: ICD-10-CM

## 2017-09-15 LAB
ANION GAP SERPL CALCULATED.3IONS-SCNC: 7 MMOL/L (ref 3–14)
BUN SERPL-MCNC: 44 MG/DL (ref 7–30)
CALCIUM SERPL-MCNC: 8.6 MG/DL (ref 8.5–10.1)
CHLORIDE SERPL-SCNC: 118 MMOL/L (ref 94–109)
CO2 SERPL-SCNC: 20 MMOL/L (ref 20–32)
CREAT SERPL-MCNC: 2.36 MG/DL (ref 0.52–1.04)
GFR SERPL CREATININE-BSD FRML MDRD: 21 ML/MIN/1.7M2
GLUCOSE SERPL-MCNC: 174 MG/DL (ref 70–99)
POTASSIUM SERPL-SCNC: 5.6 MMOL/L (ref 3.4–5.3)
SODIUM SERPL-SCNC: 145 MMOL/L (ref 133–144)

## 2017-09-15 PROCEDURE — 36415 COLL VENOUS BLD VENIPUNCTURE: CPT | Performed by: INTERNAL MEDICINE

## 2017-09-15 PROCEDURE — 80048 BASIC METABOLIC PNL TOTAL CA: CPT | Performed by: INTERNAL MEDICINE

## 2017-09-15 PROCEDURE — 99214 OFFICE O/P EST MOD 30 MIN: CPT | Performed by: INTERNAL MEDICINE

## 2017-09-15 NOTE — MR AVS SNAPSHOT
After Visit Summary   9/15/2017    Yissel Wetzel    MRN: [id number]           Patient Information     Date Of Birth          1960        Visit Information        Provider Department      9/15/2017 3:45 PM Gigi Martin MD [facility]        Today's Diagnoses     Pulmonary hypertension    -  1    CKD (chronic kidney disease) stage 3, GFR 30-59 ml/min        Essential hypertension        Diastolic dysfunction           Follow-ups after your visit        Who to contact     If you have questions or need follow up information about today's clinic visit or your schedule please contact [facility] directly at [phone].  Normal or non-critical lab and imaging results will be communicated to you by MyChart, letter or phone within 4 business days after the clinic has received the results. If you do not hear from us within 7 days, please contact the clinic through You[employer]olio[employer] or phone. If you have a critical or abnormal lab result, we will notify you by phone as soon as possible.  Submit refill requests through [employer] or call your pharmacy and they will forward the refill request to us. Please allow 3 business days for your refill to be completed.          Additional Information About Your Visit        MyChart Information     [employer] gives you secure access to your electronic health record. If you see a primary care provider, you can also send messages to your care team and make appointments. If you have questions, please call your primary care clinic.  If you do not have a primary care provider, please call [phone] and they will assist you.        Care EveryWhere ID     This is your Care EveryWhere ID. This could be used by other organizations to access your [place] medical records  [id number]        Your Vitals Were     Pulse Temperature Pulse Oximetry BMI (Body Mass Index)          60 98.1  F (36.7  C) (Oral) 97% 36.91 kg/m2         Blood  Pressure from Last 3 Encounters:   09/15/17 134/70   08/31/17 170/82   08/11/17 136/58    Weight from Last 3 Encounters:   09/15/17 189 lb (85.7 kg)   08/31/17 195 lb 12.8 oz (88.8 kg)   08/11/17 197 lb 3.2 oz (89.4 kg)              We Performed the Following     Basic metabolic panel  (Ca, Cl, CO2, Creat, Gluc, K, Na, BUN)          Today's Medication Changes          These changes are accurate as of: 9/15/17 11:59 PM.  If you have any questions, ask your nurse or doctor.               Stop taking these medicines if you haven't already. Please contact your care team if you have questions.     methylPREDNISolone 4 MG tablet   Commonly known as:  MEDROL DOSEPAK   Stopped by:  Gigi Martin MD           montelukast 10 MG tablet   Commonly known as:  SINGULAIR   Stopped by:  Gigi Martin MD                    Primary Care Provider Office Phone # Fax #    Gigi Martin [phone] [address] [address]        Equal Access to Services     [facility]: Hadii aad ku hadasho Soomaali, waaxda luqadaha, qaybta kaalmada adeegyada, william butler haybelen lewis . So [facility] [phone].    ATENCIÓN: Si habla español, tiene a kidd disposición servicios gratuitos de asistencia lingüística. Llame al [phone].    We comply with applicable federal civil rights laws and [place] laws. We do not discriminate on the basis of race, color, national origin, age, disability, sex, sexual orientation, or gender identity.            Thank you!     Thank you for choosing [facility]  for your care. Our goal is always to provide you with excellent care. Hearing back from our patients is one way we can continue to improve our services. Please take a few minutes to complete the written survey that you may receive in the mail after your visit with us. Thank you!             Your Updated Medication List - Protect others around you: Learn how to safely  use, store and throw away your medicines at www.disposemymeds.org.          This list is accurate as of: 9/15/17 11:59 PM.  Always use your most recent med list.                   Brand Name Dispense Instructions for use Diagnosis    * ACE NOT PRESCRIBED (INTENTIONAL)     0 each    1 each continuous prn ACE Inhibitor not prescribed due to Intolerance        blood glucose monitoring meter device kit     1 kit    Use to test blood sugars 5-7 times daily or as directed.    Type 2 diabetes mellitus with diabetic nephropathy (H)       blood glucose monitoring test strip    ACCU-CHEK CHING    200 each    Use to test blood sugars 4 times daily or as directed.    CKD (chronic kidney disease) stage 3, GFR 30-59 ml/min       ferrous sulfate 325 (65 FE) MG tablet    IRON    30 tablet    Take 1 tablet (325 mg) by mouth daily with food    Iron deficiency anemia, unspecified       fluticasone 50 MCG/ACT spray    FLONASE    16 g    Spray 2 sprays into both nostrils daily    Chronic rhinitis       fluticasone-salmeterol 250-50 MCG/DOSE diskus inhaler    ADVAIR    1 Inhaler    Inhale 1 puff into the lungs 2 times daily    Mild persistent asthma, uncomplicated       furosemide 20 MG tablet    LASIX    30 tablet    Take 1 tablet (20 mg) by mouth daily        * insulin pen needle 31G X 8 MM     100 each    Use 4 pen needles daily or as directed.    Type 2 diabetes mellitus with diabetic nephropathy (H)       * insulin pen needle 31G X 5 MM     100 each    Use 4 times daily    CKD (chronic kidney disease) stage 3, GFR 30-59 ml/min       labetalol 200 MG tablet    NORMODYNE    180 tablet    TAKE ONE TABLET BY MOUTH TWICE DAILY    Essential hypertension       LEVEMIR FLEXTOUCH 100 UNIT/ML injection   Generic drug:  insulin detemir     21 mL    INJECT 66 UNITS SUBCUTANEOUSLY AT BEDTIME    Type 2 diabetes mellitus with diabetic nephropathy (H)       * NovoLOG FLEXpen 100 UNITS/ML injection   Generic drug:  insulin aspart     1 Month     Inject  Subcutaneous 3 times daily (before meals). 9-12u units before breakfast, 9-12u  before lunch, 9-12 units before dinner+ correction scale 2u for every 50 over 150.    Type 2 diabetes, HbA1c goal < 7% (H)       * insulin aspart 100 UNIT/ML injection    NovoLOG PEN    1 Month    9-12 units before breakfast,lunch and dinner + a corrective scale of 2 units for every 50 over 150    Type 2 diabetes mellitus with diabetic nephropathy (H)       omeprazole 20 MG CR capsule    priLOSEC    30 capsule    Take 1 capsule (20 mg) by mouth every morning (before breakfast) take 30'-60' before 1st meal daily.    Esophagitis, unspecified       order for DME     1 Device    Equipment being ordered: right flat sole shoe    Injury of toe, left, initial encounter, Injury of toe on left foot       predniSONE 10 MG tablet    DELTASONE    5 tablet    Take 1 tablet (10 mg) by mouth daily    Mild persistent asthma with acute exacerbation       sodium bicarbonate 650 MG tablet     30 tablet    Take 1 tablet (650 mg) by mouth daily    CKD (chronic kidney disease) stage 3, GFR 30-59 ml/min       * STATIN NOT PRESCRIBED (INTENTIONAL)     0 each    continuous prn. Statin not prescribed intentionally due to Other:    Type 2 diabetes, HbA1c goal < 7% (H)       * Notice:  This list has 6 medication(s) that are the same as other medications prescribed for you. Read the directions carefully, and ask your doctor or other care provider to review them with you.

## 2017-09-15 NOTE — PROGRESS NOTES
SUBJECTIVE:   Yissel Wetzel is a 57 year old female who presents to clinic today for the following health issues:      ED/UC Followup:    Facility:  [facility]   Date of visit: 08/31/2017  Reason for visit: Edema  Current Status: Stable     Chief Complaint   Patient presents with     F/U Urgent Care               Problem list and histories reviewed & adjusted, as indicated.  Additional history:     Continues to demonstrate persistent hyperkalemia in context of CKD.  She is off all chronic nephrotoxins.    Reviewed and updated as needed this visit by clinical staff  Tobacco       Reviewed and updated as needed this visit by Provider         ROS:  Constitutional, HEENT, cardiovascular, pulmonary, GI, , musculoskeletal, neuro, skin, endocrine and psych systems are negative, except as otherwise noted.      OBJECTIVE:   /70  Pulse 60  Temp 98.1  F (36.7  C) (Oral)  Wt 189 lb (85.7 kg)  SpO2 97%  BMI 36.91 kg/m2  Body mass index is 36.91 kg/(m^2).  GENERAL: healthy, alert and no distress  NECK: no adenopathy, no asymmetry, masses, or scars and thyroid normal to palpation  RESP: lungs clear to auscultation - no rales, rhonchi or wheezes  CV: regular rate and rhythm, normal S1 S2, no S3 or S4, no murmur, click or rub, no peripheral edema and peripheral pulses strong  ABDOMEN: soft, nontender, no hepatosplenomegaly, no masses and bowel sounds normal  MS: no gross musculoskeletal defects noted, no edema        ASSESSMENT/PLAN:     1. Pulmonary hypertension  Symptoms stable - continue maintenance diuretic    2. CKD (chronic kidney disease) stage 3, GFR 30-59 ml/min  Needs to re-establish with Nephrology.  Appears chronically acidotic secondary to uremia.  - Basic metabolic panel  (Ca, Cl, CO2, Creat, Gluc, K, Na, BUN)    3. Essential hypertension      4. Diastolic dysfunction  COntinue diuretic as above.          Gigi Martin MD  [facility]

## 2017-09-15 NOTE — NURSING NOTE
Chief Complaint   Patient presents with     F/U Urgent Care       Initial /70  Pulse 60  Temp 98.1  F (36.7  C) (Oral)  Wt 189 lb (85.7 kg)  SpO2 97%  BMI 36.91 kg/m2 Estimated body mass index is 36.91 kg/(m^2) as calculated from the following:    Height as of 8/9/17: 5' (1.524 m).    Weight as of this encounter: 189 lb (85.7 kg).  Medication Reconciliation: complete

## 2017-09-23 DIAGNOSIS — J45.20 MILD INTERMITTENT ASTHMA, UNCOMPLICATED: ICD-10-CM

## 2017-09-24 NOTE — TELEPHONE ENCOUNTER
XOPENEX HFA 45 MCG/ACT Inhaler [Pharmacy Med Name: Xopenex HFA Inhalation Aerosol 45 MCG/ACT]       Last Written Prescription Date: 04/06/17  Last Fill Quantity: 1 inhaler, # refills: 2    Last Office Visit with [facility], [facility] or [facility] prescribing provider:  09/15/17   Future Office Visit:       Date of Last Asthma Action Plan Letter:   Asthma Action Plan Q1 Year    Topic Date Due     Asthma Action Plan - yearly  07/22/2017      Asthma Control Test:   ACT Total Scores 5/4/2017   ACT TOTAL SCORE -   ASTHMA ER VISITS -   ASTHMA HOSPITALIZATIONS -   ACT TOTAL SCORE (Goal Greater than or Equal to 20) 7   In the past 12 months, how many times did you visit the emergency room for your asthma without being admitted to the hospital? 0   In the past 12 months, how many times were you hospitalized overnight because of your asthma? 0       Date of Last Spirometry Test:   No results found for this or any previous visit.

## 2017-09-28 RX ORDER — LEVALBUTEROL TARTRATE 45 UG/1
AEROSOL, METERED ORAL
Qty: 15 G | Refills: 1 | Status: SHIPPED | OUTPATIENT
Start: 2017-09-28 | End: 2018-10-03

## 2017-10-08 DIAGNOSIS — N18.30 CKD (CHRONIC KIDNEY DISEASE) STAGE 3, GFR 30-59 ML/MIN (H): ICD-10-CM

## 2017-10-10 RX ORDER — BLOOD SUGAR DIAGNOSTIC
STRIP MISCELLANEOUS
Qty: 200 EACH | Refills: 1 | Status: SHIPPED | OUTPATIENT
Start: 2017-10-10 | End: 2018-07-09

## 2017-10-10 NOTE — TELEPHONE ENCOUNTER
blood glucose monitoring (ACCU-CHEK CHING) test strip      Last Written Prescription Date: 4/06/2017  Last Fill Quantity: 200,  # refills: 2   Last Office Visit with [facility], [facility] or [facility] prescribing provider: 9/15/2017

## 2017-11-21 DIAGNOSIS — J45.30 MILD PERSISTENT ASTHMA, UNCOMPLICATED: ICD-10-CM

## 2017-11-24 ENCOUNTER — RADIANT APPOINTMENT (OUTPATIENT)
Dept: GENERAL RADIOLOGY | Facility: CLINIC | Age: 57
End: 2017-11-24
Attending: PHYSICIAN ASSISTANT
Payer: MEDICARE

## 2017-11-24 ENCOUNTER — OFFICE VISIT (OUTPATIENT)
Dept: URGENT CARE | Facility: URGENT CARE | Age: 57
End: 2017-11-24
Payer: MEDICARE

## 2017-11-24 ENCOUNTER — HOSPITAL ENCOUNTER (EMERGENCY)
Facility: CLINIC | Age: 57
Discharge: HOME | End: 2017-11-24
Attending: EMERGENCY MEDICINE | Admitting: EMERGENCY MEDICINE
Payer: MEDICARE

## 2017-11-24 VITALS
DIASTOLIC BLOOD PRESSURE: 77 MMHG | TEMPERATURE: 97.6 F | SYSTOLIC BLOOD PRESSURE: 200 MMHG | RESPIRATION RATE: 26 BRPM | BODY MASS INDEX: 35.68 KG/M2 | WEIGHT: 189 LBS | OXYGEN SATURATION: 90 % | HEIGHT: 61 IN

## 2017-11-24 VITALS
RESPIRATION RATE: 20 BRPM | HEART RATE: 60 BPM | DIASTOLIC BLOOD PRESSURE: 50 MMHG | TEMPERATURE: 97 F | SYSTOLIC BLOOD PRESSURE: 138 MMHG | OXYGEN SATURATION: 90 %

## 2017-11-24 DIAGNOSIS — R09.89 CHEST CONGESTION: ICD-10-CM

## 2017-11-24 DIAGNOSIS — I50.32 CHRONIC DIASTOLIC CONGESTIVE HEART FAILURE (H): ICD-10-CM

## 2017-11-24 DIAGNOSIS — I50.9 ACUTE ON CHRONIC CONGESTIVE HEART FAILURE, UNSPECIFIED CONGESTIVE HEART FAILURE TYPE: Primary | ICD-10-CM

## 2017-11-24 DIAGNOSIS — R07.89 ATYPICAL CHEST PAIN: ICD-10-CM

## 2017-11-24 DIAGNOSIS — K31.9 FUNCTIONAL DISORDER OF STOMACH: ICD-10-CM

## 2017-11-24 DIAGNOSIS — E11.21 TYPE 2 DIABETES MELLITUS WITH DIABETIC NEPHROPATHY, WITH LONG-TERM CURRENT USE OF INSULIN (H): ICD-10-CM

## 2017-11-24 DIAGNOSIS — E87.5 HYPERKALEMIA: ICD-10-CM

## 2017-11-24 DIAGNOSIS — R09.02 HYPOXIA: ICD-10-CM

## 2017-11-24 DIAGNOSIS — Z79.4 TYPE 2 DIABETES MELLITUS WITH DIABETIC NEPHROPATHY, WITH LONG-TERM CURRENT USE OF INSULIN (H): ICD-10-CM

## 2017-11-24 DIAGNOSIS — N18.30 CKD (CHRONIC KIDNEY DISEASE) STAGE 3, GFR 30-59 ML/MIN (H): ICD-10-CM

## 2017-11-24 LAB
ALBUMIN SERPL-MCNC: 2.9 G/DL (ref 3.4–5)
ALP SERPL-CCNC: 94 U/L (ref 40–150)
ALT SERPL W P-5'-P-CCNC: 16 U/L (ref 0–50)
ANION GAP SERPL CALCULATED.3IONS-SCNC: 9 MMOL/L (ref 3–14)
AST SERPL W P-5'-P-CCNC: 17 U/L (ref 0–45)
BASOPHILS # BLD AUTO: 0 10E9/L (ref 0–0.2)
BASOPHILS NFR BLD AUTO: 0.5 %
BILIRUB SERPL-MCNC: 0.6 MG/DL (ref 0.2–1.3)
BUN SERPL-MCNC: 50 MG/DL (ref 7–30)
CALCIUM SERPL-MCNC: 8.5 MG/DL (ref 8.5–10.1)
CHLORIDE SERPL-SCNC: 115 MMOL/L (ref 94–109)
CO2 SERPL-SCNC: 20 MMOL/L (ref 20–32)
CREAT SERPL-MCNC: 2.49 MG/DL (ref 0.52–1.04)
DIFFERENTIAL METHOD BLD: ABNORMAL
EOSINOPHIL # BLD AUTO: 0.2 10E9/L (ref 0–0.7)
EOSINOPHIL NFR BLD AUTO: 3.9 %
ERYTHROCYTE [DISTWIDTH] IN BLOOD BY AUTOMATED COUNT: 16 % (ref 10–15)
GFR SERPL CREATININE-BSD FRML MDRD: 20 ML/MIN/1.7M2
GLUCOSE SERPL-MCNC: 171 MG/DL (ref 70–99)
HCT VFR BLD AUTO: 27.3 % (ref 35–47)
HGB BLD-MCNC: 8.4 G/DL (ref 11.7–15.7)
LYMPHOCYTES # BLD AUTO: 1.1 10E9/L (ref 0.8–5.3)
LYMPHOCYTES NFR BLD AUTO: 17.4 %
MCH RBC QN AUTO: 29.7 PG (ref 26.5–33)
MCHC RBC AUTO-ENTMCNC: 30.8 G/DL (ref 31.5–36.5)
MCV RBC AUTO: 97 FL (ref 78–100)
MONOCYTES # BLD AUTO: 0.5 10E9/L (ref 0–1.3)
MONOCYTES NFR BLD AUTO: 8.2 %
NEUTROPHILS # BLD AUTO: 4.3 10E9/L (ref 1.6–8.3)
NEUTROPHILS NFR BLD AUTO: 70 %
PLATELET # BLD AUTO: 119 10E9/L (ref 150–450)
POTASSIUM SERPL-SCNC: 5.8 MMOL/L (ref 3.4–5.3)
PROT SERPL-MCNC: 6 G/DL (ref 6.8–8.8)
RBC # BLD AUTO: 2.83 10E12/L (ref 3.8–5.2)
SODIUM SERPL-SCNC: 144 MMOL/L (ref 133–144)
TROPONIN I SERPL-MCNC: <0.015 UG/L (ref 0–0.04)
WBC # BLD AUTO: 6.1 10E9/L (ref 4–11)

## 2017-11-24 PROCEDURE — 99283 EMERGENCY DEPT VISIT LOW MDM: CPT

## 2017-11-24 PROCEDURE — 80053 COMPREHEN METABOLIC PANEL: CPT | Performed by: PHYSICIAN ASSISTANT

## 2017-11-24 PROCEDURE — 85025 COMPLETE CBC W/AUTO DIFF WBC: CPT | Performed by: PHYSICIAN ASSISTANT

## 2017-11-24 PROCEDURE — 36415 COLL VENOUS BLD VENIPUNCTURE: CPT | Performed by: PHYSICIAN ASSISTANT

## 2017-11-24 PROCEDURE — 93005 ELECTROCARDIOGRAM TRACING: CPT

## 2017-11-24 PROCEDURE — 71020 XR CHEST 2 VW: CPT

## 2017-11-24 PROCEDURE — 84484 ASSAY OF TROPONIN QUANT: CPT | Performed by: PHYSICIAN ASSISTANT

## 2017-11-24 PROCEDURE — 99215 OFFICE O/P EST HI 40 MIN: CPT | Performed by: PHYSICIAN ASSISTANT

## 2017-11-24 RX ORDER — FUROSEMIDE 20 MG
20 TABLET ORAL DAILY
Qty: 30 TABLET | Refills: 0 | Status: SHIPPED | OUTPATIENT
Start: 2017-11-24 | End: 2018-06-07

## 2017-11-24 ASSESSMENT — ENCOUNTER SYMPTOMS
SHORTNESS OF BREATH: 1
COUGH: 1
FEVER: 0

## 2017-11-24 NOTE — ED AVS SNAPSHOT
Emergency Department    [address] [facility] [address]    Phone:  [phone]    Fax:  [phone]                                       Yissel Wetzel   MRN: [id number]    Department:   Emergency Department   Date of Visit:  11/24/2017           After Visit Summary Signature Page     I have received my discharge instructions, and my questions have been answered. I have discussed any challenges I see with this plan with the nurse or doctor.    ..........................................................................................................................................  Patient/Patient Representative Signature      ..........................................................................................................................................  Patient Representative Print Name and Relationship to Patient    ..................................................               ................................................  Date                                            Time    ..........................................................................................................................................  Reviewed by Signature/Title    ...................................................              ..............................................  Date                                                            Time

## 2017-11-24 NOTE — ED AVS SNAPSHOT
Emergency Department    [address] [facility] [address]    Phone:  [phone]    Fax:  [phone]                                       Yissel Wetzel   MRN: [id number]    Department:   Emergency Department   Date of Visit:  11/24/2017           Patient Information     Date Of Birth          1960        Your diagnoses for this visit were:     Chronic diastolic congestive heart failure (H)        You were seen by Tameka Ibarra MD.      Follow-up Information     Follow up with Gigi Martin MD.    Specialty:  Internal Medicine    Why:  Monday    Contact information:    [address] [place] [address]  [phone]          Discharge Instructions       Weigh yourself daily.   Call Zoey our care coordinator if you are unable to coordinate care through your clinic by noon on Monday.  Return if you are worse.       Discharge References/Attachments     HEART FAILURE, CONGESTIVE (CHF) (ENGLISH)      24 Hour Appointment Hotline       To make an appointment at any [facility], call [phone] ([phone]). If you don't have a family doctor or clinic, we will help you find one. [facility] clinics are conveniently located to serve the needs of you and your family.             Review of your medicines      Our records show that you are taking the medicines listed below. If these are incorrect, please call your family doctor or clinic.        Dose / Directions Last dose taken    ACCU-CHEK CHING PLUS test strip   Quantity:  200 each   Generic drug:  blood glucose monitoring        USE TO TEST BLOOD SUGAR 4 TIMES DAILY   Refills:  1        * ACE NOT PRESCRIBED (INTENTIONAL)   Dose:  1 each   Quantity:  0 each        1 each continuous prn ACE Inhibitor not prescribed due to Intolerance   Refills:  0        ADVAIR DISKUS 250-50 MCG/DOSE diskus inhaler   Quantity:  1 Inhaler   Generic drug:  fluticasone-salmeterol        INHALE ONE PUFF BY MOUTH TWICE DAILY   Refills:   11        blood glucose monitoring meter device kit   Quantity:  1 kit        Use to test blood sugars 5-7 times daily or as directed.   Refills:  0        ferrous sulfate 325 (65 FE) MG tablet   Commonly known as:  IRON   Dose:  1 tablet   Quantity:  30 tablet        Take 1 tablet (325 mg) by mouth daily with food   Refills:  6        fluticasone 50 MCG/ACT spray   Commonly known as:  FLONASE   Dose:  2 spray   Quantity:  16 g        Spray 2 sprays into both nostrils daily   Refills:  11        furosemide 20 MG tablet   Commonly known as:  LASIX   Dose:  20 mg   Quantity:  30 tablet        Take 1 tablet (20 mg) by mouth daily   Refills:  0        * insulin pen needle 31G X 8 MM   Quantity:  100 each        Use 4 pen needles daily or as directed.   Refills:  3        * insulin pen needle 31G X 5 MM   Quantity:  100 each        Use 4 times daily   Refills:  11        labetalol 200 MG tablet   Commonly known as:  NORMODYNE   Quantity:  180 tablet        TAKE ONE TABLET BY MOUTH TWICE DAILY   Refills:  3        LEVEMIR FLEXTOUCH 100 UNIT/ML injection   Quantity:  21 mL   Generic drug:  insulin detemir        INJECT 66 UNITS SUBCUTANEOUSLY AT BEDTIME   Refills:  5        * NovoLOG FLEXpen 100 UNITS/ML injection   Quantity:  1 Month   Generic drug:  insulin aspart        Inject  Subcutaneous 3 times daily (before meals). 9-12u units before breakfast, 9-12u  before lunch, 9-12 units before dinner+ correction scale 2u for every 50 over 150.   Refills:  3        * insulin aspart 100 UNIT/ML injection   Commonly known as:  NovoLOG PEN   Quantity:  1 Month        9-12 units before breakfast,lunch and dinner + a corrective scale of 2 units for every 50 over 150   Refills:  11        omeprazole 20 MG CR capsule   Commonly known as:  priLOSEC   Dose:  20 mg   Quantity:  30 capsule        Take 1 capsule (20 mg) by mouth every morning (before breakfast) take 30'-60' before 1st meal daily.   Refills:  9        order for DME    Quantity:  1 Device        Equipment being ordered: right flat sole shoe   Refills:  0        predniSONE 10 MG tablet   Commonly known as:  DELTASONE   Dose:  10 mg   Quantity:  5 tablet        Take 1 tablet (10 mg) by mouth daily   Refills:  0        sodium bicarbonate 650 MG tablet   Dose:  650 mg   Quantity:  30 tablet        Take 1 tablet (650 mg) by mouth daily   Refills:  7        * STATIN NOT PRESCRIBED (INTENTIONAL)   Quantity:  0 each        continuous prn. Statin not prescribed intentionally due to Other:   Refills:  0        XOPENEX HFA 45 MCG/ACT Inhaler   Quantity:  15 g   Generic drug:  levalbuterol        INHALE TWO PUFFS BY MOUTH EVERY SIX HOURS AS NEEDED FOR SHORTNESS OF BREATH   Refills:  1        * Notice:  This list has 6 medication(s) that are the same as other medications prescribed for you. Read the directions carefully, and ask your doctor or other care provider to review them with you.            Prescriptions were sent or printed at these locations (1 Prescription)                   Other Prescriptions                Printed at Department/Unit printer (1 of 1)         furosemide (LASIX) 20 MG tablet                Procedures and tests performed during your visit     EKG 12-lead, tracing only      Orders Needing Specimen Collection     None      Pending Results     No orders found from 11/22/2017 to 11/25/2017.            Pending Culture Results     No orders found from 11/22/2017 to 11/25/2017.            Pending Results Instructions     If you had any lab results that were not finalized at the time of your Discharge, you can call the ED Lab Result RN at [phone]. You will be contacted by this team for any positive Lab results or changes in treatment. The nurses are available 7 days a week from 10A to 6:30P.  You can leave a message 24 hours per day and they will return your call.        Test Results From Your Hospital Stay               Clinical Quality Measure: Blood Pressure  Screening     Your blood pressure was checked while you were in the emergency department today. The last reading we obtained was  BP: 200/77 . Please read the guidelines below about what these numbers mean and what you should do about them.  If your systolic blood pressure (the top number) is less than 120 and your diastolic blood pressure (the bottom number) is less than 80, then your blood pressure is normal. There is nothing more that you need to do about it.  If your systolic blood pressure (the top number) is 120-139 or your diastolic blood pressure (the bottom number) is 80-89, your blood pressure may be higher than it should be. You should have your blood pressure rechecked within a year by a primary care provider.  If your systolic blood pressure (the top number) is 140 or greater or your diastolic blood pressure (the bottom number) is 90 or greater, you may have high blood pressure. High blood pressure is treatable, but if left untreated over time it can put you at risk for heart attack, stroke, or kidney failure. You should have your blood pressure rechecked by a primary care provider within the next 4 weeks.  If your provider in the emergency department today gave you specific instructions to follow-up with your doctor or provider even sooner than that, you should follow that instruction and not wait for up to 4 weeks for your follow-up visit.        Thank you for choosing [facility]       Thank you for choosing [facility] for your care. Our goal is always to provide you with excellent care. Hearing back from our patients is one way we can continue to improve our services. Please take a few minutes to complete the written survey that you may receive in the mail after you visit with us. Thank you!        [employer] Information     [employer] gives you secure access to your electronic health record. If you see a primary care provider, you can also send messages to your care team and make appointments. If you have  questions, please call your primary care clinic.  If you do not have a primary care provider, please call [phone] and they will assist you.        Care EveryWhere ID     This is your Care EveryWhere ID. This could be used by other organizations to access your [facility] medical records  [id number]        Equal Access to Services     VANE ANNE : Gabriella Givens, walynn myers, mary beth alexandrealgerry travis, william braxton. So [facility] [phone].    ATENCIÓN: Si habla español, tiene a kidd disposición servicios gratuitos de asistencia lingüística. Llame al [phone].    We comply with applicable federal civil rights laws and [place] laws. We do not discriminate on the basis of race, color, national origin, age, disability, sex, sexual orientation, or gender identity.            After Visit Summary       This is your record. Keep this with you and show to your community pharmacist(s) and doctor(s) at your next visit.

## 2017-11-24 NOTE — MR AVS SNAPSHOT
After Visit Summary   11/24/2017    Yissel Wetzel    MRN: [id number]           Patient Information     Date Of Birth          1960        Visit Information        Provider Department      11/24/2017 5:55 PM Lior Torrez PA-C [facility]        Today's Diagnoses     Acute on chronic congestive heart failure, unspecified congestive heart failure type (H)    -  1    Hypoxia        Hyperkalemia        Atypical chest pain        Chest congestion        Type 2 diabetes mellitus with diabetic nephropathy, with long-term current use of insulin (H)        CKD (chronic kidney disease) stage 3, GFR 30-59 ml/min        Functional disorder of stomach           Follow-ups after your visit        Who to contact     If you have questions or need follow up information about today's clinic visit or your schedule please contact [facility] directly at [phone].  Normal or non-critical lab and imaging results will be communicated to you by [employer], letter or phone within 4 business days after the clinic has received the results. If you do not hear from us within 7 days, please contact the clinic through [employer] or phone. If you have a critical or abnormal lab result, we will notify you by phone as soon as possible.  Submit refill requests through [employer] or call your pharmacy and they will forward the refill request to us. Please allow 3 business days for your refill to be completed.          Additional Information About Your Visit        MyChart Information     [employer] gives you secure access to your electronic health record. If you see a primary care provider, you can also send messages to your care team and make appointments. If you have questions, please call your primary care clinic.  If you do not have a primary care provider, please call [phone] and they will assist you.        Care EveryWhere ID     This is your Care EveryWhere ID. This  could be used by other organizations to access your [facility] medical records  [id number]        Your Vitals Were     Pulse Temperature Respirations Pulse Oximetry          60 97  F (36.1  C) (Oral) 20 90%         Blood Pressure from Last 3 Encounters:   11/24/17 138/50   09/15/17 134/70   08/31/17 170/82    Weight from Last 3 Encounters:   09/15/17 189 lb (85.7 kg)   08/31/17 195 lb 12.8 oz (88.8 kg)   08/11/17 197 lb 3.2 oz (89.4 kg)              We Performed the Following     CBC with platelets differential     Comprehensive metabolic panel     Troponin I        Primary Care Provider Office Phone # Fax #    Gigi Martin [phone]       [address] [place] [phone]        Equal Access to Services     VANE ANNE : Hadii aad ku hadasho Soomaali, waaxda luqadaha, qaybta kaalmada adeegyada, waxjazz butler haymckenzien tico lewis . So [facility] [phone].    ATENCIÓN: Si habla español, tiene a kidd disposición servicios gratuitos de asistencia lingüística. Kalee al [phone].    We comply with applicable federal civil rights laws and [place] laws. We do not discriminate on the basis of race, color, national origin, age, disability, sex, sexual orientation, or gender identity.            Thank you!     Thank you for choosing [facility]  for your care. Our goal is always to provide you with excellent care. Hearing back from our patients is one way we can continue to improve our services. Please take a few minutes to complete the written survey that you may receive in the mail after your visit with us. Thank you!             Your Updated Medication List - Protect others around you: Learn how to safely use, store and throw away your medicines at www.disposemymeds.org.          This list is accurate as of: 11/24/17  7:15 PM.  Always use your most recent med list.                   Brand Name Dispense Instructions for use Diagnosis    ACCU-CHEK CHING  PLUS test strip   Generic drug:  blood glucose monitoring     200 each    USE TO TEST BLOOD SUGAR 4 TIMES DAILY    CKD (chronic kidney disease) stage 3, GFR 30-59 ml/min       * ACE NOT PRESCRIBED (INTENTIONAL)     0 each    1 each continuous prn ACE Inhibitor not prescribed due to Intolerance        ADVAIR DISKUS 250-50 MCG/DOSE diskus inhaler   Generic drug:  fluticasone-salmeterol     1 Inhaler    INHALE ONE PUFF BY MOUTH TWICE DAILY    Mild persistent asthma, uncomplicated       blood glucose monitoring meter device kit     1 kit    Use to test blood sugars 5-7 times daily or as directed.    Type 2 diabetes mellitus with diabetic nephropathy (H)       ferrous sulfate 325 (65 FE) MG tablet    IRON    30 tablet    Take 1 tablet (325 mg) by mouth daily with food    Iron deficiency anemia, unspecified       fluticasone 50 MCG/ACT spray    FLONASE    16 g    Spray 2 sprays into both nostrils daily    Chronic rhinitis       furosemide 20 MG tablet    LASIX    30 tablet    Take 1 tablet (20 mg) by mouth daily        * insulin pen needle 31G X 8 MM     100 each    Use 4 pen needles daily or as directed.    Type 2 diabetes mellitus with diabetic nephropathy (H)       * insulin pen needle 31G X 5 MM     100 each    Use 4 times daily    CKD (chronic kidney disease) stage 3, GFR 30-59 ml/min       labetalol 200 MG tablet    NORMODYNE    180 tablet    TAKE ONE TABLET BY MOUTH TWICE DAILY    Essential hypertension       LEVEMIR FLEXTOUCH 100 UNIT/ML injection   Generic drug:  insulin detemir     21 mL    INJECT 66 UNITS SUBCUTANEOUSLY AT BEDTIME    Type 2 diabetes mellitus with diabetic nephropathy (H)       * NovoLOG FLEXpen 100 UNITS/ML injection   Generic drug:  insulin aspart     1 Month    Inject  Subcutaneous 3 times daily (before meals). 9-12u units before breakfast, 9-12u  before lunch, 9-12 units before dinner+ correction scale 2u for every 50 over 150.    Type 2 diabetes, HbA1c goal < 7% (H)       * insulin aspart  100 UNIT/ML injection    NovoLOG PEN    1 Month    9-12 units before breakfast,lunch and dinner + a corrective scale of 2 units for every 50 over 150    Type 2 diabetes mellitus with diabetic nephropathy (H)       omeprazole 20 MG CR capsule    priLOSEC    30 capsule    Take 1 capsule (20 mg) by mouth every morning (before breakfast) take 30'-60' before 1st meal daily.    Esophagitis, unspecified       order for DME     1 Device    Equipment being ordered: right flat sole shoe    Injury of toe, left, initial encounter, Injury of toe on left foot       predniSONE 10 MG tablet    DELTASONE    5 tablet    Take 1 tablet (10 mg) by mouth daily    Mild persistent asthma with acute exacerbation       sodium bicarbonate 650 MG tablet     30 tablet    Take 1 tablet (650 mg) by mouth daily    CKD (chronic kidney disease) stage 3, GFR 30-59 ml/min       * STATIN NOT PRESCRIBED (INTENTIONAL)     0 each    continuous prn. Statin not prescribed intentionally due to Other:    Type 2 diabetes, HbA1c goal < 7% (H)       XOPENEX HFA 45 MCG/ACT Inhaler   Generic drug:  levalbuterol     15 g    INHALE TWO PUFFS BY MOUTH EVERY SIX HOURS AS NEEDED FOR SHORTNESS OF BREATH    Mild intermittent asthma, uncomplicated       * Notice:  This list has 6 medication(s) that are the same as other medications prescribed for you. Read the directions carefully, and ask your doctor or other care provider to review them with you.

## 2017-11-25 NOTE — NURSING NOTE
Chief Complaint   Patient presents with     Urgent Care     URI     Pt states URI sxs        Initial /50 (BP Location: Left arm, Patient Position: Chair, Cuff Size: Adult Regular)  Pulse 60  Temp 97  F (36.1  C) (Oral)  Resp 20  SpO2 90% Estimated body mass index is 36.91 kg/(m^2) as calculated from the following:    Height as of 8/9/17: 5' (1.524 m).    Weight as of 9/15/17: 189 lb (85.7 kg).  Medication Reconciliation: complete

## 2017-11-25 NOTE — PROGRESS NOTES
SUBJECTIVE:   Yissel Wetzel is a 57 year old female presenting with a chief complaint of coughing, feeling sob, congestion in lungs and fatigue.  Onset of symptoms was this week .  Course of illness is worsening.    Severity moderate to severe  Current and Associated symptoms: coughing, feeling SOB at times, fatigue, discomfort in chest  Treatment measures tried include none.  Predisposing factors include hx of CHF, CKD, ASTHMA, Diabetes, Chronic anemia.    Past Medical History:   Diagnosis Date     Allergic rhinitis, cause unspecified      Anemia of chronic disease      CKD (chronic kidney disease) stage 3, GFR 30-59 ml/min      Esophagitis, unspecified      HEMORRHAGIC GASTROPATHY      Iron deficiency anemia, unspecified      Mild persistent asthma      Other and unspecified hyperlipidemia      Pneumonia      Pulmonary hypertension     per 12/2012 echo mod.to severe pulmonary hypertension     Tobacco use disorder dc ed 1999     Type II or unspecified type diabetes mellitus without mention of complication, not stated as uncontrolled      Unspecified essential hypertension         Allergies   Allergen Reactions     Albuterol      shakiness     Aspirin      gi     Byetta [Exenatide]      gi     Clonidine      constipation     Codeine      vomiting     Ezetimibe      muscle symptoms     Hydralazine      headaches     Lisinopril      hyperkalemia     Metformin Hydrochloride      vomiting     Pravachol [Pravastatin Sodium]      muscle pains     Simvastatin      myalgias     Troglitazone          Social History   Substance Use Topics     Smoking status: Former Smoker     Packs/day: 1.00     Years: 22.00     Types: Cigarettes     Quit date: 10/12/1999     Smokeless tobacco: Never Used     Alcohol use No       ROS:  CONSTITUTIONAL:POSITIVE  for fatigue  INTEGUMENTARY/SKIN: NEGATIVE for worrisome rashes, moles or lesions  ENT/MOUTH: Negative for nasal congestion, sore throat  RESP:POSITIVE for SOB and chest congestion,  problems breathing  CV: NEGATIVE for chest pain, palpitations or peripheral edema  GI: NEGATIVE for nausea, abdominal pain, heartburn, or change in bowel habits  MUSCULOSKELETAL: NEGATIVE for significant arthralgias or myalgia  NEURO: NEGATIVE for weakness, dizziness or paresthesias    OBJECTIVE  :/50 (BP Location: Left arm, Patient Position: Chair, Cuff Size: Adult Regular)  Pulse 60  Temp 97  F (36.1  C) (Oral)  Resp 20  SpO2 90%  GENERAL APPEARANCE: healthy, alert and no distress  EYES: EOMI,  PERRL, conjunctiva clear  HENT: ear canals and TM's normal.  Nose and mouth without ulcers, erythema or lesions  NECK: supple, nontender, no lymphadenopathy  RESP: Positive for decreased breath sounds   CV: regular rates and rhythm, normal S1 S2, no murmur noted  ABDOMEN:  soft, nontender, no HSM or masses and bowel sounds normal  Extremities: no peripheral edema or tenderness, peripheral pulses normal  MS: extremities normal- no gross deformities noted, no erythema, FROM noted in all extremities  NEURO: Normal strength and tone, sensory exam grossly normal,  normal speech and mentation  SKIN: no suspicious lesions or rashes      CHEST TWO VIEWS   11/24/2017 6:49 PM     HISTORY: Chest congestion.     COMPARISON:  8/31/2017         IMPRESSION: Cardiomegaly. Pulmonary vasculature is engorged and  indistinct, predominantly at the lung bases. Small right pleural  effusion with associated edema, atelectasis or infiltrate. Findings  suggest congestive heart failure.    Results for orders placed or performed in visit on 11/24/17   Troponin I   Result Value Ref Range    Troponin I ES <0.015 0.000 - 0.045 ug/L   Comprehensive metabolic panel   Result Value Ref Range    Sodium 144 133 - 144 mmol/L    Potassium 5.8 (H) 3.4 - 5.3 mmol/L    Chloride 115 (H) 94 - 109 mmol/L    Carbon Dioxide 20 20 - 32 mmol/L    Anion Gap 9 3 - 14 mmol/L    Glucose 171 (H) 70 - 99 mg/dL    Urea Nitrogen 50 (H) 7 - 30 mg/dL    Creatinine 2.49  (H) 0.52 - 1.04 mg/dL    GFR Estimate 20 (L) >60 mL/min/1.7m2    GFR Estimate If Black 24 (L) >60 mL/min/1.7m2    Calcium 8.5 8.5 - 10.1 mg/dL    Bilirubin Total 0.6 0.2 - 1.3 mg/dL    Albumin 2.9 (L) 3.4 - 5.0 g/dL    Protein Total 6.0 (L) 6.8 - 8.8 g/dL    Alkaline Phosphatase 94 40 - 150 U/L    ALT 16 0 - 50 U/L    AST 17 0 - 45 U/L   CBC with platelets differential   Result Value Ref Range    WBC 6.1 4.0 - 11.0 10e9/L    RBC Count 2.83 (L) 3.8 - 5.2 10e12/L    Hemoglobin 8.4 (L) 11.7 - 15.7 g/dL    Hematocrit 27.3 (L) 35.0 - 47.0 %    MCV 97 78 - 100 fl    MCH 29.7 26.5 - 33.0 pg    MCHC 30.8 (L) 31.5 - 36.5 g/dL    RDW 16.0 (H) 10.0 - 15.0 %    Platelet Count 119 (L) 150 - 450 10e9/L    Diff Method Automated Method     % Neutrophils 70.0 %    % Lymphocytes 17.4 %    % Monocytes 8.2 %    % Eosinophils 3.9 %    % Basophils 0.5 %    Absolute Neutrophil 4.3 1.6 - 8.3 10e9/L    Absolute Lymphocytes 1.1 0.8 - 5.3 10e9/L    Absolute Monocytes 0.5 0.0 - 1.3 10e9/L    Absolute Eosinophils 0.2 0.0 - 0.7 10e9/L    Absolute Basophils 0.0 0.0 - 0.2 10e9/L       ASSESSMENT/PLAN:      ICD-10-CM    1. Acute on chronic congestive heart failure, unspecified congestive heart failure type (H) I50.9 XR Chest 2 Views   2. Hypoxia R09.02 90% RA    3. Hyperkalemia E87.5 Comprehensive metabolic panel   4. Atypical chest pain R07.89 Troponin I   5. Chest congestion R09.89 XR Chest 2 Views   6. Type 2 diabetes mellitus with diabetic nephropathy, with long-term current use of insulin (H) E11.21 Comprehensive metabolic panel    Z79.4 CBC with platelets differential   7. CKD (chronic kidney disease) stage 3, GFR 30-59 ml/min N18.3 Comprehensive metabolic panel   8. Functional disorder of stomach K31.9 Comprehensive metabolic panel       CMP to look for electrolyte imbalances and to check kidney function testing, anemia  Chest xray shows engorged vessels and effusion in lung  Patient had hyperkalemia and CKD  She is sent to the ED for IV  lasix for CHF exacerbation and electrolyte correction  Unable to manage CHF and CKD and hyperkalemia in urgent care setting  Patient given discharge info and will be seen in the ED

## 2017-11-25 NOTE — DISCHARGE INSTRUCTIONS
Weigh yourself daily.   Call Zoey our care coordinator if you are unable to coordinate care through your clinic by noon on Monday.  Return if you are worse.

## 2017-11-25 NOTE — ED NOTES
Patient was seen at Urgent care, discharged just prior to 6pm. Patient is having exertional dyspnea, states she is short of breath if she walks a long ways.  Patient has clear breath sounds when standing upright with diminished in right base. Patient states she has had some swelling to her lower extremities.

## 2017-11-25 NOTE — ED NOTES
Walked PT. Per MD.  PT, was able to walk as she usually does, and reports no loss of balance or lightheadedness  O2 stats after walk were 87%

## 2017-11-25 NOTE — ED PROVIDER NOTES
History     Chief Complaint:  Shortness of breath     HPI   Yissel Wetzel is a 57 year old female with a history of renal insufficiency, diastolic congestive heart failure, and pulmonary hypertension who presents with shortness of breath. The patient has a history of shortness of breath and was seen in the ED  in August where she was put on Lasix and an echocardiogram was obtained (as noted below). She has been on and off of this prescription since then and has still felt shortness of breath during that time; she is not currently taking the medication. The patient notes some increased shortness of breath on exertion. She presented to urgent care (workup as noted below) for concerns for an upper respiratory infection; she states that she has had a productive cough for a few days with no associated fever or chest pain. Of note, the patient has not followed up with her nephrologist or cardiologist recently for ongoing care.She has not been sure the name of her nephrologist and hasn't been sure what she should be doing with her ongoing care. She does not check her weight. She does not feel much different that most days in the last few months, but came because  told her to.     Urgent Care visit on 11/24  Vitals:  :/50 (BP Location: Left arm, Patient Position: Chair, Cuff Size: Adult Regular)  Pulse 60  Temp 97  F (36.1  C) (Oral)  Resp 20  SpO2 90%    Laboratory:  Troponin: <0.015  CMP:  Sodium 144    Potassium 5.8 (H)   Chloride 115 (H)   Carbon Dioxide 20    Anion Gap 9    Glucose 171 (H)   Urea Nitrogen 50 (H)   Creatinine 2.49 (H)   GFR Estimate 20 (L)   CBC:   WBC 6.1    RBC Count 2.83 (L)   Hemoglobin 8.4 (L)   Hematocrit 27.3 (L)   MCV 97    MCH 29.7    MCHC 30.8 (L)   RDW 16.0 (H)   Platelet Count 119 (L)     Imaging:  X-ray Chest, 2 views:  Cardiomegaly. Pulmonary vasculature is engorged and  indistinct, predominantly at the lung bases. Small right pleural  effusion with associated edema,  atelectasis or infiltrate. Findings  suggest congestive heart failure.    Echo 08/2017:  Interpretation Summary     1. The left ventricle is normal in structure, function and size. There is mild  to moderate concentric left ventricular hypertrophy. The visual ejection  fraction is estimated at 60%.  2. Grade II or moderate diastolic dysfunction.  3. The right ventricle is normal in structure, function and size. Mild  flattending of septum, consistent with RV volume/pressure overload.  4. Pulmonary hypertension The right ventricular systolic pressure is  approximated at 60mmHg plus the right atrial pressure.    Allergies:  Albuterol  Aspirin  Byetta [Exenatide]  Clonidine  Codeine  Ezetimibe  Hydralazine  Lisinopril  Metformin Hydrochloride  Pravachol [Pravastatin Sodium]  Simvastatin  Troglitazone      Medications:    Advair  Lasix  Labetalol  Prednisone  Prilosec  Insulin  Iron supplement     Past Medical History:    Anemia  CKD  Esophagitis  Hemorrhagic gastropathy  Iron deficiency  Asthma  Hyperlipidemia  Hypertension  Diabetes    Past Surgical History:    C sections  Hysterectomy    Family History:    Arrhythmia - mother  Hypertension - mother  Cancer - father  Diabetes - brother  Asthma - sister  Lung disease - sister    Social History:  The patient lives alone and is able to drive and live independently. She is a  at [employer] and works part time.   Smoking status: quit 1999  Alcohol use: no   PCP: Gigi Martin   Patient presents with significant other.   Nephrologist: unsure but possibly [facility], only saw them one time 3 years ago  Cardiologist: [facility], last seen a few years ago  Marital Status:  Single      Review of Systems   Constitutional: Negative for fever.   Respiratory: Positive for cough and shortness of breath.    Cardiovascular: Negative for chest pain.   All other systems reviewed and are negative.      Physical Exam     Patient Vitals for the past 24 hrs:   BP Temp Temp src  "Heart Rate Resp SpO2 Height Weight   11/24/17 2130 - - - 55 27 94 % - -   11/24/17 2115 - - - 56 19 93 % - -   11/24/17 2013 (!) 211/79 97.6  F (36.4  C) Oral 60 18 92 % 1.549 m (5' 1\") 85.7 kg (189 lb)      Physical Exam  Constitutional:  Oriented to person, place, and time.      Appears well-developed and well-nourished.   HENT:   Head:    Normocephalic and atraumatic.   Right Ear:   Tympanic membrane and external ear normal.   Left Ear:   Tympanic membrane and external ear normal.   Mouth/Throat:   Oropharynx is clear and moist.      Mucous membranes are normal.   Eyes:    Conjunctivae normal and EOM are normal.      Pupils are equal, round, and reactive to light.   Neck:    Normal range of motion. Neck supple. Positive JVD  Cardiovascular:  Normal rate, regular rhythm, S1 normal and S2 normal.      No gallop and no friction rub. No murmur heard.  Pulmonary/Chest:  Dullness to percussion to right base. No respiratory distress.      End expiratory wheezes. No rhonchi. No rales.   Abdominal:   Soft. No hepatosplenomegaly. No tenderness.      No rebound and no CVA tenderness.   Musculoskeletal:  Normal range of motion. 2+ edema to extremities lower.  Neurological:   Alert and oriented to person, place, and time. Normal strength.      GCS eye subscore is 4. GCS verbal subscore is 5.      GCS motor subscore is 6.   Skin:    Skin is warm and dry.   Psychiatric:   Normal mood and affect.      Speech is normal and behavior is normal.      Judgment and thought content normal.      Cognition and memory are normal.     Emergency Department Course   ECG (20:26):  Rate 57 bpm. WV interval 156. QT/QTc 4478/465.   Sinus bradycardia. Otherwise normal ECG.  Interpreted by Tameka Ibarra MD.      Emergency Department Course:  Past medical records, nursing notes, and vitals reviewed.  2040: I performed an exam of the patient and obtained history, as documented above. GCS 15.   2107: Satting 87% after ambulation.Says she feels " ok however,   2120: I spoke with the patient and discussed disposition options.  2309: I rechecked the patient. Findings and plan explained to the Patient. Patient discharged home with instructions regarding supportive care, medications, and reasons to return. The importance of close follow-up was reviewed.      Impression & Plan      Medical Decision Making:  This patient was referred over from urgent care due to concern of CHF. The patient is very complex with renal insufficiency, diastolic congestive heart failure, and pulmonary hypertension. She was seen earlier this year for CHF and was started on Lasix. She says that she's been taken off that put on it and take it off again since then; however, her last note from Dr. Martin says to continue the diuretic which she is not currently taking. The patient seems to have difficulty understanding how to coordinate her care and access the right physicians. She has not seen a nephrologist in quite some time and has not seen cardiology in probably 2 years. She has multiple questions and really seems quite confused about how to manage her care going forward. Having said that, she says she feels the same as she does every day. She walked while here in the ED and had a sat 87% but said it's how she usually feels and does not feel is anything different for which she needs to be in the hospital. I did try to contact [facility] physician on-call but did not get a call back. I have talked to the patient tonight as she does not wish to stay later. I put her on Lasix and have her follow up on Monday with Dr. Martin. I've asked her to call Zoey her care coordinator if she has trouble accessing them.  She will need help with understanding whom she needs to call for nephrology and cardiology and getting appointments with them and having an overall clear plan of her care moving forward.    Assessment #1 CHF chronic but worsening #2 renal insufficiency #3 hyperkalemia which she's had  previously-starting lasix should be helpful.     Disposition: Home. Discharge instructions as above    Ana Lilia Dasilva  11/24/2017    EMERGENCY DEPARTMENT  I, Ana Lilia Dasilva, am serving as a scribe at 8:40 PM on 11/24/2017 to document services personally performed by Tameka Ibarra MD based on my observations and the provider's statements to me.        Tameka Ibarra MD  11/25/17 0824

## 2017-11-25 NOTE — ED NOTES
Discharge information was reviewed, patient was given care coordinator number and instructions to follow up with her and with primary care. Patient had no questions and was assisted to exit with wheelchair.

## 2017-11-26 LAB — INTERPRETATION ECG - MUSE: NORMAL

## 2017-11-27 ENCOUNTER — CARE COORDINATION (OUTPATIENT)
Dept: CARE COORDINATION | Facility: CLINIC | Age: 57
End: 2017-11-27

## 2017-11-27 NOTE — LETTER
[facility] CARE COORDINATION    December 4, 2017      Yissel Wetzel  [address]   [place] [address]    Dear Yissel,  I am the Clinic Care Coordinator that works with your primary care provider's clinic. I have been trying to reach you recently to introduce Clinic Care Coordination and to see if there was anything I could assist you with.  Below is a description of what Clinic Care Coordination is and how I can further assist you.     The Clinic Care Coordinator role is a Registered Nurse  who understands the health care system. The goal of Clinic Care Coordination is to help you manage your health and improve access to the [facility] system in the most efficient manner.  The Registered Nurse can assist you in meeting your health care goals by providing education, coordinating services, and strengthening the communication among your providers.   Please feel free to keep this letter and contact information to contact me at [phone] with any further questions or concerns that may arise. We at [facility] are focused on providing you with the highest-quality healthcare experience possible and that all starts with you.       Sincerely,     Flores Ruiz    Enclosed: I have enclosed a copy of a 24 Hour Access Plan. This has helpful phone numbers for you to call when needed. Please keep this in an easy to access place to use as needed.

## 2017-11-27 NOTE — LETTER
Health Care Home - Access Care Plan    About Me  Patient Name:  Yissel Chacon    YOB: 1960  Age:                            57 year old   Dangelo MRN:         [id number] Telephone Information:     Home Phone [phone]   Mobile [phone]       Address:    [address]   [facility] [address] Email address:  [email or web address]      Emergency Contact(s)  Name Relationship Lgl Grd Work Phone Home Phone Mobile Phone   1. MARIN CHACON Daughter   [phone]   2. JILL CHACON Son   [phone]             Health Maintenance: Routine Health maintenance Reviewed: Not assessed    My Access Plan  Medical Emergency 911   Questions or concerns during clinic hours Primary Clinic Line, I will call the clinic directly: Primary Clinic: [facility] [phone]   24 Hour Appointment Line [phone] or  [phone])  (toll free)   24 Hour Nurse Line [phone] (toll free)   Questions or concerns outside clinic hours 24 Hour Appointment Line, I will call the after-hours on-call line:   [facility] [phone] or [phone]) (toll-free)   Preferred Urgent Care Preferred Urgent Care: [facility], [phone]   Preferred Hospital Preferred Hospital: [facility]  [phone]   Preferred Pharmacy [id number] - [place], [place] - [address] [facility]     Behavioral Health Crisis Line The National Suicide Prevention Lifeline at 1-905.311.5177 or 919     My Care Team Members  Patient Care Team       Relationship Specialty Notifications Start End    Gigi Martin MD PCP - General Internal Medicine  5/2/13     Phone: [phone] Pager: [phone] Fax: [phone] [address] [address]        My Medical and Care Information  Problem List   Patient Active Problem List   Diagnosis     Allergic rhinitis     Iron  deficiency anemia     Esophagitis     Functional disorder of stomach     Essential hypertension     Type 2 diabetes mellitus with diabetic nephropathy (H)     Hyperlipidemia LDL goal <100     CKD (chronic kidney disease) stage 3, GFR 30-59 ml/min     Closed fracture of metatarsal bone     Pulmonary hypertension     Mild persistent asthma     Advanced directives, counseling/discussion      Current Medications and Allergies:  See printed Medication Report

## 2017-11-27 NOTE — TREATMENT PLAN
I was asked by the ER provider to follow up with this patient to get her into a PCP and assist with arranging her care.  I noted that the [facility] has already attempted to reach out to this patient to get her into the clinic.  I called this  Patients cell phone and left a VM supporting her to call the clinic back so she can stay healthy and make sure she is recovering from her ER visit for SOB. I left me call back number for her to call me back as needed.

## 2017-11-27 NOTE — PROGRESS NOTES
Clinic Care Coordination Contact  [facility]/Voicemail    Referral Source: ED Follow-Up  Clinical Data: Care Coordinator Outreach  Outreach attempted x 2.  First call the patient said hello and then hung up the phone   Second call CC Left message on voicemail with call back information and requested return call. CC left a message to make an appointment with Dr Martin this week     Plan:  Care Coordinator will try to reach patient again in 1-2 business days.    Flores Ruiz RN / Clinical Care Coordinator     [address] [address]  [email or web address]  Office :  [phone] / Fax :  [phone]

## 2017-11-28 NOTE — PROGRESS NOTES
Clinic Care Coordination Contact  [facility]/Voicemail    Referral Source: ED Follow-Up---11/24/2017  CHF   Clinical Data: Care Coordinator Outreach  Outreach attempted x 2.  Left message on voicemail with call back information and requested return call.  CC left a message stressing the importance of making a follow up appointment with Dr Martin this week    Plan:  Care Coordinator will await a return call     Flores Ruiz RN / Clinical Care Coordinator     [address] [address]  [email or web address]  Office :  [phone] / Fax :  [phone]

## 2017-12-04 NOTE — PROGRESS NOTES
No return call.  [facility] letter mailed today.    Dr Martin informed     Flores Ruiz, RN / Clinical Care Coordinator     [address] [address]  [email or web address]  Office :  [phone] / Fax :  [phone]

## 2017-12-28 DIAGNOSIS — N18.30 CKD (CHRONIC KIDNEY DISEASE) STAGE 3, GFR 30-59 ML/MIN (H): ICD-10-CM

## 2017-12-29 RX ORDER — SODIUM BICARBONATE 650 MG/1
TABLET ORAL
Qty: 30 TABLET | Refills: 6 | Status: SHIPPED | OUTPATIENT
Start: 2017-12-29 | End: 2018-08-10

## 2018-02-28 DIAGNOSIS — Z79.4 TYPE 2 DIABETES MELLITUS WITH DIABETIC NEPHROPATHY, WITH LONG-TERM CURRENT USE OF INSULIN (H): ICD-10-CM

## 2018-02-28 DIAGNOSIS — K20.90 ESOPHAGITIS: ICD-10-CM

## 2018-02-28 DIAGNOSIS — E11.21 TYPE 2 DIABETES MELLITUS WITH DIABETIC NEPHROPATHY, WITH LONG-TERM CURRENT USE OF INSULIN (H): ICD-10-CM

## 2018-02-28 NOTE — TELEPHONE ENCOUNTER
"Requested Prescriptions   Pending Prescriptions Disp Refills     NOVOLOG FLEXPEN 100 UNIT/ML soln [Pharmacy Med Name: NovoLOG FlexPen Subcutaneous Solution Pen-injector 100 UNIT/ML]  Last Written Prescription Date:  4/30/2014  Last Fill Quantity: 1,  # refills: 3   Last office visit: 9/15/2017 with prescribing provider:  Gigi Martin Office Visit:     15 mL 10     Sig: INJECT 9-12 UNITS BEFORE BREAKFAST, LUNCH, AND DINNER + A CORRECTIVE SCALE OF 2 UNITS FOR EVERY 50 OVER 150    Short Acting Insulin Protocol Failed    2/28/2018 12:30 PM       Failed - Blood pressure less than 140/90 in past 6 months    BP Readings from Last 3 Encounters:   11/24/17 200/77   11/24/17 138/50   09/15/17 134/70                Failed - LDL on file in past 12 months    Recent Labs   Lab Test  07/22/16   1056   LDL  123*            Failed - Microalbumin on file in past 12 months    Recent Labs   Lab Test  01/20/17   1526   MICROL  4750   UMALCR  4094.83*            Failed - HgbA1C in past 3 or 6 months    Recent Labs   Lab Test  08/11/17   1335   A1C  6.7*            Passed - Serum creatinine on file in past 12 months    Recent Labs   Lab Test  11/24/17   1816   CR  2.49*            Passed - Patient is age 18 or older       Passed - Recent visit with authorizing provider's specialty in past 6 months    Patient had office visit in the last 6 months or has a visit in the next 30 days with authorizing provider.  See \"Patient Info\" tab in inbasket, or \"Choose Columns\" in Meds & Orders section of the refill encounter.            LEVEMIR FLEXTOUCH 100 UNIT/ML injection [Pharmacy Med Name: Levemir FlexTouch Subcutaneous Solution Pen-injector 100 UNIT/ML]  Last Written Prescription Date:  8/28/2017  Last Fill Quantity: 21ml,  # refills: 5   Last office visit: 9/15/2017 with prescribing provider:  Gigi Martin Office Visit:     15 mL 2     Sig: INJECT 66 UNITS SUBCUTANEOUSLY AT BEDTIME    Long Acting Insulin Protocol Failed    " "2/28/2018 12:30 PM       Failed - Blood pressure less than 140/90 in past 6 months    BP Readings from Last 3 Encounters:   11/24/17 200/77   11/24/17 138/50   09/15/17 134/70                Failed - LDL on file in past 12 months    Recent Labs   Lab Test  07/22/16   1056   LDL  123*            Failed - Microalbumin on file in past 12 months    Recent Labs   Lab Test  01/20/17   1526   MICROL  4750   UMALCR  4094.83*            Failed - HgbA1C in past 3 or 6 months    Recent Labs   Lab Test  08/11/17   1335   A1C  6.7*            Passed - Serum creatinine on file in past 12 months    Recent Labs   Lab Test  11/24/17   1816   CR  2.49*            Passed - Patient is age 18 or older       Passed - Recent visit with authorizing provider's specialty in past 6 months    Patient had office visit in the last 6 months or has a visit in the next 30 days with authorizing provider.  See \"Patient Info\" tab in in[facility], or \"Choose Columns\" in Meds & Orders section of the refill encounter.            omeprazole (PRILOSEC) 20 MG CR capsule [Pharmacy Med Name: Omeprazole Oral Capsule Delayed Release 20 MG]  Last Written Prescription Date:  3/23/2017  Last Fill Quantity: 30,  # refills: 9   Last office visit: 9/15/2017 with prescribing provider:  Gigi Martin   Future Office Visit:     30 capsule 8     Sig: TAKE 1 CAPSULE BY MOUTH EVERY MORNING (BEFORE BREAKFAST) TAKE 30 - 60 MINUTES BEFORE 1ST MEAL DAILY    PPI Protocol Passed    2/28/2018 12:30 PM       Passed - Not on Clopidogrel (unless Pantoprazole ordered)       Passed - No diagnosis of osteoporosis on record       Passed - Recent or future visit with authorizing provider's specialty    Patient had office visit in the last year or has a visit in the next 30 days with authorizing provider.  See \"Patient Info\" tab in in[employer] [facility], or \"Choose Columns\" in Meds & Orders section of the refill encounter.            Passed - Patient is age 18 or older       Passed - No active pregnacy on " record       Passed - No positive pregnancy test in past 12 months

## 2018-02-28 NOTE — LETTER
[facility]  [address] [id number] [phone]            Yissel Wetzel  [address]   [facility] [address]        March 1, 2018    Dear Cassandra,    While refilling your prescription today, we noticed that you are due for an appointment with your provider.  We will refill your prescription for 30 days, but a follow-up appointment must be made before any additional refills can be approved.     Taking care of your health is important to us and we look forward to seeing you in the near future.  Please call us at [phone] or [phone] (or use [employer]) to schedule an appointment.     Please disregard this notice if you have already made an appointment.    Sincerely,        [facility][1]

## 2018-02-28 NOTE — TELEPHONE ENCOUNTER
Routing refill request to provider for review/approval because:   out of range:  Blood pressure  Labs not current:  Microalbumin, lipids, a1c

## 2018-03-01 RX ORDER — INSULIN ASPART 100 [IU]/ML
INJECTION, SOLUTION INTRAVENOUS; SUBCUTANEOUS
Qty: 15 ML | Refills: 0 | Status: ON HOLD | OUTPATIENT
Start: 2018-03-01 | End: 2019-03-19

## 2018-03-01 RX ORDER — INSULIN DETEMIR 100 [IU]/ML
INJECTION, SOLUTION SUBCUTANEOUS
Qty: 15 ML | Refills: 0 | Status: SHIPPED | OUTPATIENT
Start: 2018-03-01 | End: 2018-06-07

## 2018-04-03 DIAGNOSIS — K20.90 ESOPHAGITIS: ICD-10-CM

## 2018-04-03 DIAGNOSIS — Z79.4 TYPE 2 DIABETES MELLITUS WITH DIABETIC NEPHROPATHY, WITH LONG-TERM CURRENT USE OF INSULIN (H): ICD-10-CM

## 2018-04-03 DIAGNOSIS — E11.21 TYPE 2 DIABETES MELLITUS WITH DIABETIC NEPHROPATHY, WITH LONG-TERM CURRENT USE OF INSULIN (H): ICD-10-CM

## 2018-04-03 NOTE — TELEPHONE ENCOUNTER
"Requested Prescriptions   Pending Prescriptions Disp Refills     omeprazole (PRILOSEC) 20 MG CR capsule [Pharmacy Med Name: Omeprazole Oral Capsule Delayed Release 20 MG]  Last Written Prescription Date:  3/01/2018  Last Fill Quantity: 30,  # refills: 0   Last office visit: 9/15/2017 with prescribing provider:  9/15/2017   Future Office Visit:     30 capsule 0     Sig: TAKE 1 CAPSULE BY MOUTH EVERY MORNING** (BEFORE BREAKFAST) TAKE 30 - 60 MINUTES BEFORE 1ST MEAL DAILY**    PPI Protocol Passed    4/3/2018  8:57 AM       Passed - Not on Clopidogrel (unless Pantoprazole ordered)       Passed - No diagnosis of osteoporosis on record       Passed - Recent (12 mo) or future (30 days) visit within the authorizing provider's specialty    Patient had office visit in the last 12 months or has a visit in the next 30 days with authorizing provider or within the authorizing provider's specialty.  See \"Patient Info\" tab in inbasket, or \"Choose Columns\" in Meds & Orders section of the refill encounter.           Passed - Patient is age 18 or older       Passed - No active pregnacy on record       Passed - No positive pregnancy test in past 12 months        LEVEMIR FLEXTOUCH 100 UNIT/ML injection [Pharmacy Med Name: Levemir FlexTouch Subcutaneous Solution Pen-injector 100 UNIT/ML]  Last Written Prescription Date:  3/01/2018  Last Fill Quantity: 15 mL,  # refills: 0   Last office visit: 9/15/2017 with prescribing provider:  9/15/2017   Future Office Visit:     21 mL 4     Sig: inject 66 units subcutaneously at bedtime    Long Acting Insulin Protocol Failed    4/3/2018  8:57 AM       Failed - Blood pressure less than 140/90 in past 6 months    BP Readings from Last 3 Encounters:   11/24/17 200/77   11/24/17 138/50   09/15/17 134/70                Failed - LDL on file in past 12 months    Recent Labs   Lab Test  07/22/16   1056   LDL  123*            Failed - Microalbumin on file in past 12 months    Recent Labs   Lab Test  " "01/20/17   1526   MICROL  4750   UMALCR  4094.83*            Failed - HgbA1C in past 3 or 6 months    Recent Labs   Lab Test  08/11/17   1335   A1C  6.7*            Passed - Serum creatinine on file in past 12 months    Recent Labs   Lab Test  11/24/17   1816   CR  2.49*            Passed - Patient is age 18 or older       Passed - Recent (6 mo) or future (30 days) visit within the authorizing provider's specialty    Patient had office visit in the last 6 months or has a visit in the next 30 days with authorizing provider or within the authorizing provider's specialty.  See \"Patient Info\" tab in inbasket, or \"Choose Columns\" in Meds & Orders section of the refill encounter.              "

## 2018-04-04 RX ORDER — INSULIN DETEMIR 100 [IU]/ML
INJECTION, SOLUTION SUBCUTANEOUS
Qty: 21 ML | Refills: 4 | Status: SHIPPED | OUTPATIENT
Start: 2018-04-04 | End: 2018-06-07

## 2018-04-04 NOTE — TELEPHONE ENCOUNTER
Routing refill request to provider for review/approval because:  Labs out of range:  BP, microalbumin, creatinine

## 2018-04-06 ENCOUNTER — TRANSFERRED RECORDS (OUTPATIENT)
Dept: HEALTH INFORMATION MANAGEMENT | Facility: CLINIC | Age: 58
End: 2018-04-06

## 2018-05-12 ENCOUNTER — OFFICE VISIT (OUTPATIENT)
Dept: URGENT CARE | Facility: URGENT CARE | Age: 58
End: 2018-05-12
Payer: MEDICARE

## 2018-05-12 VITALS
WEIGHT: 180.19 LBS | DIASTOLIC BLOOD PRESSURE: 88 MMHG | SYSTOLIC BLOOD PRESSURE: 224 MMHG | BODY MASS INDEX: 34.05 KG/M2 | OXYGEN SATURATION: 97 % | HEART RATE: 62 BPM | TEMPERATURE: 98.7 F

## 2018-05-12 DIAGNOSIS — H92.02 OTALGIA, LEFT: Primary | ICD-10-CM

## 2018-05-12 PROCEDURE — 99213 OFFICE O/P EST LOW 20 MIN: CPT | Performed by: FAMILY MEDICINE

## 2018-05-12 RX ORDER — FLUTICASONE PROPIONATE 50 MCG
1-2 SPRAY, SUSPENSION (ML) NASAL DAILY
Qty: 1 BOTTLE | Refills: 0 | Status: SHIPPED | OUTPATIENT
Start: 2018-05-12 | End: 2018-06-07

## 2018-05-12 RX ORDER — CETIRIZINE HYDROCHLORIDE 10 MG/1
10 TABLET ORAL EVERY EVENING
Qty: 7 TABLET | Refills: 0 | Status: SHIPPED | OUTPATIENT
Start: 2018-05-12 | End: 2018-05-19

## 2018-05-12 NOTE — MR AVS SNAPSHOT
After Visit Summary   5/12/2018    Yissel Wetzel    MRN: [id number]           Patient Information     Date Of Birth          1960        Visit Information        Provider Department      5/12/2018 6:15 PM Roderick Rebolledo, DO [facility]        Today's Diagnoses     Otalgia, left    -  1       Follow-ups after your visit        Who to contact     If you have questions or need follow up information about today's clinic visit or your schedule please contact [facility] [place] [facility] directly at [phone].  Normal or non-critical lab and imaging results will be communicated to you by OOHLALA Mobilehart, letter or phone within 4 business days after the clinic has received the results. If you do not hear from us within 7 days, please contact the clinic through Xsilont or phone. If you have a critical or abnormal lab result, we will notify you by phone as soon as possible.  Submit refill requests through TraveDoc or call your pharmacy and they will forward the refill request to us. Please allow 3 business days for your refill to be completed.          Additional Information About Your Visit        MyChart Information     TraveDoc gives you secure access to your electronic health record. If you see a primary care provider, you can also send messages to your care team and make appointments. If you have questions, please call your primary care clinic.  If you do not have a primary care provider, please call [phone] and they will assist you.        Care EveryWhere ID     This is your Care EveryWhere ID. This could be used by other organizations to access your [place] medical records  [id number]        Your Vitals Were     Pulse Temperature Pulse Oximetry BMI (Body Mass Index)          62 98.7  F (37.1  C) (Tympanic) 97% 34.05 kg/m2         Blood Pressure from Last 3 Encounters:   05/12/18 (!) 224/88   11/24/17 200/77   11/24/17 138/50    Weight from Last 3  Encounters:   05/12/18 180 lb 3 oz (81.7 kg)   11/24/17 189 lb (85.7 kg)   09/15/17 189 lb (85.7 kg)              Today, you had the following     No orders found for display         Today's Medication Changes          These changes are accurate as of 5/12/18  6:48 PM.  If you have any questions, ask your nurse or doctor.               Start taking these medicines.        Dose/Directions    cetirizine 10 MG tablet   Commonly known as:  zyrTEC   Used for:  Otalgia, left   Started by:  Roderick Rebolledo DO        Dose:  10 mg   Take 1 tablet (10 mg) by mouth every evening for 7 days   Quantity:  7 tablet   Refills:  0         These medicines have changed or have updated prescriptions.        Dose/Directions    * fluticasone 50 MCG/ACT spray   Commonly known as:  FLONASE   This may have changed:  Another medication with the same name was added. Make sure you understand how and when to take each.   Used for:  Chronic rhinitis   Changed by:  Roderick Rebolledo DO        Dose:  2 spray   Spray 2 sprays into both nostrils daily   Quantity:  16 g   Refills:  11       * fluticasone 50 MCG/ACT spray   Commonly known as:  FLONASE   This may have changed:  You were already taking a medication with the same name, and this prescription was added. Make sure you understand how and when to take each.   Used for:  Otalgia, left   Changed by:  Roderick Rebolledo DO        Dose:  1-2 spray   Spray 1-2 sprays into both nostrils daily   Quantity:  1 Bottle   Refills:  0       * Notice:  This list has 2 medication(s) that are the same as other medications prescribed for you. Read the directions carefully, and ask your doctor or other care provider to review them with you.      Stop taking these medicines if you haven't already. Please contact your care team if you have questions.     order for DME   Stopped by:  Roderick Rebolledo DO                Where to get your medicines      These medications were sent to [id number] -  [facility] [address] [facility]  [address] [facility] [place] [address]     Phone:  [phone]     cetirizine 10 MG tablet    fluticasone 50 MCG/ACT spray                Primary Care Provider Office Phone # Fax #    Gigi Martin [phone]       [address] [place] [address]        Equal Access to Services     VANE ANNE : Hadii aad ku hadasho Soomaali, waaxda luqadaha, qaybta kaalmada adeegyada, waxay idiin hayaan adeeg kharash la'belen . So [facility] [phone].    ATENCIÓN: Si habla español, tiene a kidd disposición servicios gratuitos de asistencia lingüística. Llame al [phone].    We comply with applicable federal civil rights laws and [place] laws. We do not discriminate on the basis of race, color, national origin, age, disability, sex, sexual orientation, or gender identity.            Thank you!     Thank you for choosing [facility]  for your care. Our goal is always to provide you with excellent care. Hearing back from our patients is one way we can continue to improve our services. Please take a few minutes to complete the written survey that you may receive in the mail after your visit with us. Thank you!             Your Updated Medication List - Protect others around you: Learn how to safely use, store and throw away your medicines at www.disposemymeds.org.          This list is accurate as of 5/12/18  6:48 PM.  Always use your most recent med list.                   Brand Name Dispense Instructions for use Diagnosis    ACCU-CHEK CHING PLUS test strip   Generic drug:  blood glucose monitoring     200 each    USE TO TEST BLOOD SUGAR 4 TIMES DAILY    CKD (chronic kidney disease) stage 3, GFR 30-59 ml/min       * ACE NOT PRESCRIBED (INTENTIONAL)     0 each    1 each continuous prn ACE Inhibitor not prescribed due to Intolerance        ADVAIR DISKUS 250-50 MCG/DOSE diskus inhaler   Generic drug:  fluticasone-salmeterol      1 Inhaler    INHALE ONE PUFF BY MOUTH TWICE DAILY    Mild persistent asthma, uncomplicated       blood glucose monitoring meter device kit     1 kit    Use to test blood sugars 5-7 times daily or as directed.    Type 2 diabetes mellitus with diabetic nephropathy (H)       cetirizine 10 MG tablet    zyrTEC    7 tablet    Take 1 tablet (10 mg) by mouth every evening for 7 days    Otalgia, left       ferrous sulfate 325 (65 Fe) MG tablet    IRON    30 tablet    Take 1 tablet (325 mg) by mouth daily with food    Iron deficiency anemia, unspecified       * fluticasone 50 MCG/ACT spray    FLONASE    16 g    Spray 2 sprays into both nostrils daily    Chronic rhinitis       * fluticasone 50 MCG/ACT spray    FLONASE    1 Bottle    Spray 1-2 sprays into both nostrils daily    Otalgia, left       furosemide 20 MG tablet    LASIX    30 tablet    Take 1 tablet (20 mg) by mouth daily        * insulin pen needle 31G X 8 MM     100 each    Use 4 pen needles daily or as directed.    Type 2 diabetes mellitus with diabetic nephropathy (H)       * insulin pen needle 31G X 5 MM     100 each    Use 4 times daily    CKD (chronic kidney disease) stage 3, GFR 30-59 ml/min       labetalol 200 MG tablet    NORMODYNE    180 tablet    TAKE ONE TABLET BY MOUTH TWICE DAILY    Essential hypertension       * LEVEMIR FLEXTOUCH 100 UNIT/ML injection   Generic drug:  insulin detemir     15 mL    INJECT 66 UNITS SUBCUTANEOUSLY AT BEDTIME    Type 2 diabetes mellitus with diabetic nephropathy, with long-term current use of insulin (H)       * LEVEMIR FLEXTOUCH 100 UNIT/ML injection   Generic drug:  insulin detemir     21 mL    inject 66 units subcutaneously at bedtime    Type 2 diabetes mellitus with diabetic nephropathy, with long-term current use of insulin (H)       * NovoLOG FLEXpen 100 UNITS/ML injection   Generic drug:  insulin aspart     1 Month    Inject  Subcutaneous 3 times daily (before meals). 9-12u units before breakfast, 9-12u  before  lunch, 9-12 units before dinner+ correction scale 2u for every 50 over 150.    Type 2 diabetes, HbA1c goal < 7% (H)       * NovoLOG FLEXPEN 100 UNIT/ML injection   Generic drug:  insulin aspart     15 mL    INJECT 9-12 UNITS BEFORE BREAKFAST, LUNCH, AND DINNER + A CORRECTIVE SCALE OF 2 UNITS FOR EVERY 50 OVER 150    Type 2 diabetes mellitus with diabetic nephropathy, with long-term current use of insulin (H)       omeprazole 20 MG CR capsule    priLOSEC    30 capsule    TAKE 1 CAPSULE BY MOUTH EVERY MORNING** (BEFORE BREAKFAST) TAKE 30 - 60 MINUTES BEFORE 1ST MEAL DAILY**    Esophagitis       predniSONE 10 MG tablet    DELTASONE    5 tablet    Take 1 tablet (10 mg) by mouth daily    Mild persistent asthma with acute exacerbation       sodium bicarbonate 650 MG tablet     30 tablet    TAKE ONE TABLET BY MOUTH ONE TIME DAILY    CKD (chronic kidney disease) stage 3, GFR 30-59 ml/min       * STATIN NOT PRESCRIBED (INTENTIONAL)     0 each    continuous prn. Statin not prescribed intentionally due to Other:    Type 2 diabetes, HbA1c goal < 7% (H)       TYLENOL PO      Take by mouth as needed for mild pain or fever        XOPENEX HFA 45 MCG/ACT Inhaler   Generic drug:  levalbuterol     15 g    INHALE TWO PUFFS BY MOUTH EVERY SIX HOURS AS NEEDED FOR SHORTNESS OF BREATH    Mild intermittent asthma, uncomplicated       * Notice:  This list has 10 medication(s) that are the same as other medications prescribed for you. Read the directions carefully, and ask your doctor or other care provider to review them with you.

## 2018-05-12 NOTE — PROGRESS NOTES
Declines BP medication in clinic    SUBJECTIVE:Yissel Wetzel is a 58 year old female who presents with left ear painfor day(s).   Severity: mild and moderate   Timing:still present  Additional symptoms include none.    History of recurrent otitis: no    Past Medical History:   Diagnosis Date     Allergic rhinitis, cause unspecified      Anemia of chronic disease      CKD (chronic kidney disease) stage 3, GFR 30-59 ml/min      Esophagitis, unspecified      HEMORRHAGIC GASTROPATHY      Iron deficiency anemia, unspecified      Mild persistent asthma      Other and unspecified hyperlipidemia      Pneumonia      Pulmonary hypertension     per 12/2012 echo mod.to severe pulmonary hypertension     Tobacco use disorder dc ed 1999     Type II or unspecified type diabetes mellitus without mention of complication, not stated as uncontrolled      Unspecified essential hypertension      Allergies   Allergen Reactions     Albuterol      shakiness     Aspirin      gi     Byetta [Exenatide]      gi     Clonidine      constipation     Codeine      vomiting     Ezetimibe      muscle symptoms     Hydralazine      headaches     Lisinopril      hyperkalemia     Metformin Hydrochloride      vomiting     Pravachol [Pravastatin Sodium]      muscle pains     Simvastatin      myalgias     Troglitazone      Social History   Substance Use Topics     Smoking status: Former Smoker     Packs/day: 1.00     Years: 22.00     Types: Cigarettes     Quit date: 10/12/1999     Smokeless tobacco: Never Used     Alcohol use No       ROS: CONSTITUTIONAL:NEGATIVE for fever, chills, change in weight    OBJECTIVE:  BP (!) 224/88  Pulse 62  Temp 98.7  F (37.1  C) (Tympanic)  Wt 180 lb 3 oz (81.7 kg)  SpO2 97%  BMI 34.05 kg/m2   The right TM is normal: no effusions, no erythema, and normal landmarks     The right auditory canal is normal and without drainage, edema or erythema  The left TM is normal: no effusions, no erythema, and normal landmarks  The  left auditory canal is normal and without drainage, edema or erythema  Oropharynx exam is normal: no lesions, erythema, adenopathy or exudate.GENERAL: no acute distress  EYES: EOMI,  PERRL, conjunctiva clear  NECK: supple, non-tender to palpation, no adenopathy noted  SKIN: no suspicious lesions or rashes       ICD-10-CM    1. Otalgia, left H92.02 cetirizine (ZYRTEC) 10 MG tablet     fluticasone (FLONASE) 50 MCG/ACT spray     OTC meds  F/U PCP/IM/FP/UC if worse, not any better

## 2018-06-07 ENCOUNTER — OFFICE VISIT (OUTPATIENT)
Dept: INTERNAL MEDICINE | Facility: CLINIC | Age: 58
End: 2018-06-07
Payer: MEDICARE

## 2018-06-07 VITALS
DIASTOLIC BLOOD PRESSURE: 80 MMHG | HEIGHT: 61 IN | OXYGEN SATURATION: 98 % | SYSTOLIC BLOOD PRESSURE: 180 MMHG | BODY MASS INDEX: 32.65 KG/M2 | HEART RATE: 61 BPM | WEIGHT: 172.9 LBS | RESPIRATION RATE: 16 BRPM | TEMPERATURE: 97.9 F

## 2018-06-07 DIAGNOSIS — N18.30 CKD (CHRONIC KIDNEY DISEASE) STAGE 3, GFR 30-59 ML/MIN (H): ICD-10-CM

## 2018-06-07 DIAGNOSIS — E11.21 TYPE 2 DIABETES MELLITUS WITH DIABETIC NEPHROPATHY, WITH LONG-TERM CURRENT USE OF INSULIN (H): ICD-10-CM

## 2018-06-07 DIAGNOSIS — I10 ESSENTIAL HYPERTENSION: Primary | ICD-10-CM

## 2018-06-07 DIAGNOSIS — Z13.89 SCREENING FOR DIABETIC PERIPHERAL NEUROPATHY: ICD-10-CM

## 2018-06-07 DIAGNOSIS — Z79.4 TYPE 2 DIABETES MELLITUS WITH DIABETIC NEPHROPATHY, WITH LONG-TERM CURRENT USE OF INSULIN (H): ICD-10-CM

## 2018-06-07 LAB
ALBUMIN UR-MCNC: >=300 MG/DL
APPEARANCE UR: CLEAR
BACTERIA #/AREA URNS HPF: ABNORMAL /HPF
BILIRUB UR QL STRIP: NEGATIVE
CASTS #/AREA URNS LPF: ABNORMAL /LPF (ref 0–2)
COLOR UR AUTO: YELLOW
CREAT SERPL-MCNC: 4.44 MG/DL (ref 0.52–1.04)
CREAT UR-MCNC: 67 MG/DL
GFR SERPL CREATININE-BSD FRML MDRD: 10 ML/MIN/1.7M2
GLUCOSE UR STRIP-MCNC: 250 MG/DL
HBA1C MFR BLD: 7.3 % (ref 0–5.6)
HGB UR QL STRIP: ABNORMAL
KETONES UR STRIP-MCNC: NEGATIVE MG/DL
LEUKOCYTE ESTERASE UR QL STRIP: NEGATIVE
MICROALBUMIN UR-MCNC: 4520 MG/L
MICROALBUMIN/CREAT UR: 6716.2 MG/G CR (ref 0–25)
NITRATE UR QL: NEGATIVE
NON-SQ EPI CELLS #/AREA URNS LPF: ABNORMAL /LPF
PH UR STRIP: 6 PH (ref 5–7)
RBC #/AREA URNS AUTO: ABNORMAL /HPF
SOURCE: ABNORMAL
SP GR UR STRIP: 1.01 (ref 1–1.03)
UROBILINOGEN UR STRIP-ACNC: 0.2 EU/DL (ref 0.2–1)
WBC #/AREA URNS AUTO: ABNORMAL /HPF

## 2018-06-07 PROCEDURE — 82565 ASSAY OF CREATININE: CPT | Performed by: INTERNAL MEDICINE

## 2018-06-07 PROCEDURE — 81001 URINALYSIS AUTO W/SCOPE: CPT | Performed by: INTERNAL MEDICINE

## 2018-06-07 PROCEDURE — 83036 HEMOGLOBIN GLYCOSYLATED A1C: CPT | Performed by: INTERNAL MEDICINE

## 2018-06-07 PROCEDURE — 99214 OFFICE O/P EST MOD 30 MIN: CPT | Performed by: INTERNAL MEDICINE

## 2018-06-07 PROCEDURE — 82043 UR ALBUMIN QUANTITATIVE: CPT | Performed by: INTERNAL MEDICINE

## 2018-06-07 PROCEDURE — 36415 COLL VENOUS BLD VENIPUNCTURE: CPT | Performed by: INTERNAL MEDICINE

## 2018-06-07 PROCEDURE — 99207 C FOOT EXAM  NO CHARGE: CPT | Mod: 25 | Performed by: INTERNAL MEDICINE

## 2018-06-07 RX ORDER — INSULIN DETEMIR 100 [IU]/ML
48 INJECTION, SOLUTION SUBCUTANEOUS AT BEDTIME
Qty: 21 ML | Refills: 4 | Status: ON HOLD | COMMUNITY
Start: 2018-06-07 | End: 2019-03-15

## 2018-06-07 RX ORDER — LABETALOL 200 MG/1
400 TABLET, FILM COATED ORAL 2 TIMES DAILY
Qty: 120 TABLET | Refills: 11 | Status: ON HOLD | OUTPATIENT
Start: 2018-06-07 | End: 2019-03-15

## 2018-06-07 NOTE — MR AVS SNAPSHOT
After Visit Summary   6/7/2018    Yissel Wetzel    MRN: [id number]           Patient Information     Date Of Birth          1960        Visit Information        Provider Department      6/7/2018 1:30 PM Gigi Martin MD [facility]        Today's Diagnoses     Essential hypertension    -  1    Screening for diabetic peripheral neuropathy        Type 2 diabetes mellitus with diabetic nephropathy, with long-term current use of insulin (H)        CKD (chronic kidney disease) stage 3, GFR 30-59 ml/min          Care Instructions      Low-Salt Choices  Eating salt (sodium) can make your body retain too much water. Excess water makes your heart work harder. Canned, packaged, and frozen foods are easy to prepare. But they are often high in sodium. Here are some ideas for low-salt foods you can easily make yourself.    For breakfast    Fruit or 100% fruit juice    Whole-wheat bread or an English muffin. Look for sodium content on Nutrition Facts labels.    Low-fat milk or yogurt    Unsalted eggs    Shredded wheat    Corn tortillas    Unsalted steamed rice    Regular (not instant) hot cereal, made without salt  Stay away from:    Sausage, wilder, and ham    Flour tortillas    Packaged muffins, pancakes, and biscuits    Instant hot cereals    Cottage cheese  For lunch and dinner    Fresh fish, chicken, turkey, or meat--baked, broiled, or roasted without salt    Dry beans, cooked without salt    Tofu, stir-fried without salt    Unsalted fresh fruit and vegetables, or frozen or canned fruit and vegetables with no added salt  Stay away from:    Lunch or deli meat that is cured or smoked    Cheese    Tomato juice and ketchup    Canned vegetables, soups, and fish not labeled as no-salt-added or reduced sodium    Packaged gravies and sauces    Olives, pickles, and relish    Bottled salad dressings  For snacks and desserts    Yogurt    Unsalted, air-popped popcorn    Unsalted nuts or  seeds  Stay away from:    Pies and cakes    Packaged dessert mixes    Pizza    Canned and packaged puddings    Pretzels, chips, crackers, and nuts--unless the label says unsalted  Date Last Reviewed: 6/1/2017 2000-2017 [employer]. [address] [place] [address]. All rights reserved. This information is not intended as a substitute for professional medical care. Always follow your healthcare professional's instructions.                Follow-ups after your visit        Your next 10 appointments already scheduled     Jun 21, 2018  1:45 PM CDT   New Visit with Ene Burroughs DO   [facility] ([facility]    [id number]  [facility] [address] [phone] OPT 2              Who to contact     If you have questions or need follow up information about today's clinic visit or your schedule please contact [facility] directly at [phone].  Normal or non-critical lab and imaging results will be communicated to you by Squabblerhart, letter or phone within 4 business days after the clinic has received the results. If you do not hear from us within 7 days, please contact the clinic through Brainloopt or phone. If you have a critical or abnormal lab result, we will notify you by phone as soon as possible.  Submit refill requests through Wirama or call your pharmacy and they will forward the refill request to us. Please allow 3 business days for your refill to be completed.          Additional Information About Your Visit        Squabbler[employer] Information     Wirama gives you secure access to your electronic health record. If you see a primary care provider, you can also send messages to your care team and make appointments. If you have questions, please call your primary care clinic.  If you do not have a primary care provider, please call [phone] and they will assist you.        Care EveryWhere ID      "This is your Care EveryWhere ID. This could be used by other organizations to access your [place] medical records  [id number]        Your Vitals Were     Pulse Temperature Respirations Height Pulse Oximetry BMI (Body Mass Index)    61 97.9  F (36.6  C) (Oral) 16 5' 1\" (1.549 m) 98% 32.67 kg/m2       Blood Pressure from Last 3 Encounters:   05/12/18 (!) 224/88   11/24/17 200/77   11/24/17 138/50    Weight from Last 3 Encounters:   06/07/18 172 lb 14.4 oz (78.4 kg)   05/12/18 180 lb 3 oz (81.7 kg)   11/24/17 189 lb (85.7 kg)              We Performed the Following     Albumin Random Urine Quantitative with Creat Ratio     Creatinine     FOOT EXAM  NO CHARGE [[id number]]     HEMOGLOBIN A1C     Lipid panel reflex to direct LDL Fasting     UA with Microscopic reflex to Culture          Today's Medication Changes          These changes are accurate as of 6/7/18  2:06 PM.  If you have any questions, ask your nurse or doctor.               These medicines have changed or have updated prescriptions.        Dose/Directions    fluticasone 50 MCG/ACT spray   Commonly known as:  FLONASE   This may have changed:  Another medication with the same name was removed. Continue taking this medication, and follow the directions you see here.   Used for:  Chronic rhinitis   Changed by:  Gigi Martin MD        Dose:  2 spray   Spray 2 sprays into both nostrils daily   Quantity:  16 g   Refills:  11       insulin pen needle 31G X 5 MM   This may have changed:  Another medication with the same name was removed. Continue taking this medication, and follow the directions you see here.   Used for:  CKD (chronic kidney disease) stage 3, GFR 30-59 ml/min   Changed by:  Gigi Martin MD        Use 4 times daily   Quantity:  100 each   Refills:  11       labetalol 200 MG tablet   Commonly known as:  NORMODYNE   This may have changed:  See the new instructions.   Used for:  Essential hypertension   Changed by:  Gigi Martin, " MD        Dose:  400 mg   Take 2 tablets (400 mg) by mouth 2 times daily   Quantity:  120 tablet   Refills:  11       LEVEMIR FLEXTOUCH 100 UNIT/ML injection   This may have changed:    - See the new instructions.  - Another medication with the same name was removed. Continue taking this medication, and follow the directions you see here.   Used for:  Type 2 diabetes mellitus with diabetic nephropathy, with long-term current use of insulin (H)   Generic drug:  insulin detemir   Changed by:  Gigi Martin MD        Dose:  48 Units   Inject 48 Units Subcutaneous At Bedtime   Quantity:  21 mL   Refills:  4       NovoLOG FLEXPEN 100 UNIT/ML injection   This may have changed:  Another medication with the same name was removed. Continue taking this medication, and follow the directions you see here.   Used for:  Type 2 diabetes mellitus with diabetic nephropathy, with long-term current use of insulin (H)   Generic drug:  insulin aspart   Changed by:  Gigi Martin MD        INJECT 9-12 UNITS BEFORE BREAKFAST, LUNCH, AND DINNER + A CORRECTIVE SCALE OF 2 UNITS FOR EVERY 50 OVER 150   Quantity:  15 mL   Refills:  0         Stop taking these medicines if you haven't already. Please contact your care team if you have questions.     furosemide 20 MG tablet   Commonly known as:  LASIX   Stopped by:  Gigi Martin MD           predniSONE 10 MG tablet   Commonly known as:  DELTASONE   Stopped by:  Gigi Martin MD                Where to get your medicines      These medications were sent to [id number] - [place], [place] - [address] [facility]  [address] [place] [address]     Phone:  [phone]     labetalol 200 MG tablet                Primary Care Provider Office Phone # Fax #    Gigi Martin [phone]       [address] [place] [address]        Equal Access to Services     VANE ANNE : nino Weir,  mary beth montes catewilliam strickland olgafrench braxton. So [facility] [phone].    ATENCIÓN: Si jair rosas, tiene a kidd disposición servicios gratuitos de asistencia lingüística. Kalee al [phone].    We comply with applicable federal civil rights laws and [place] laws. We do not discriminate on the basis of race, color, national origin, age, disability, sex, sexual orientation, or gender identity.            Thank you!     Thank you for choosing [facility]  for your care. Our goal is always to provide you with excellent care. Hearing back from our patients is one way we can continue to improve our services. Please take a few minutes to complete the written survey that you may receive in the mail after your visit with us. Thank you!             Your Updated Medication List - Protect others around you: Learn how to safely use, store and throw away your medicines at www.disposemymeds.org.          This list is accurate as of 6/7/18  2:06 PM.  Always use your most recent med list.                   Brand Name Dispense Instructions for use Diagnosis    ACCU-CHEK CHING PLUS test strip   Generic drug:  blood glucose monitoring     200 each    USE TO TEST BLOOD SUGAR 4 TIMES DAILY    CKD (chronic kidney disease) stage 3, GFR 30-59 ml/min       * ACE NOT PRESCRIBED (INTENTIONAL)     0 each    1 each continuous prn ACE Inhibitor not prescribed due to Intolerance        ADVAIR DISKUS 250-50 MCG/DOSE diskus inhaler   Generic drug:  fluticasone-salmeterol     1 Inhaler    INHALE ONE PUFF BY MOUTH TWICE DAILY    Mild persistent asthma, uncomplicated       blood glucose monitoring meter device kit     1 kit    Use to test blood sugars 5-7 times daily or as directed.    Type 2 diabetes mellitus with diabetic nephropathy (H)       ferrous sulfate 325 (65 Fe) MG tablet    IRON    30 tablet    Take 1 tablet (325 mg) by mouth daily with food    Iron deficiency anemia, unspecified        fluticasone 50 MCG/ACT spray    FLONASE    16 g    Spray 2 sprays into both nostrils daily    Chronic rhinitis       insulin pen needle 31G X 5 MM     100 each    Use 4 times daily    CKD (chronic kidney disease) stage 3, GFR 30-59 ml/min       labetalol 200 MG tablet    NORMODYNE    120 tablet    Take 2 tablets (400 mg) by mouth 2 times daily    Essential hypertension       LEVEMIR FLEXTOUCH 100 UNIT/ML injection   Generic drug:  insulin detemir     21 mL    Inject 48 Units Subcutaneous At Bedtime    Type 2 diabetes mellitus with diabetic nephropathy, with long-term current use of insulin (H)       NovoLOG FLEXPEN 100 UNIT/ML injection   Generic drug:  insulin aspart     15 mL    INJECT 9-12 UNITS BEFORE BREAKFAST, LUNCH, AND DINNER + A CORRECTIVE SCALE OF 2 UNITS FOR EVERY 50 OVER 150    Type 2 diabetes mellitus with diabetic nephropathy, with long-term current use of insulin (H)       omeprazole 20 MG CR capsule    priLOSEC    30 capsule    TAKE 1 CAPSULE BY MOUTH EVERY MORNING** (BEFORE BREAKFAST) TAKE 30 - 60 MINUTES BEFORE 1ST MEAL DAILY**    Esophagitis       sodium bicarbonate 650 MG tablet     30 tablet    TAKE ONE TABLET BY MOUTH ONE TIME DAILY    CKD (chronic kidney disease) stage 3, GFR 30-59 ml/min       * STATIN NOT PRESCRIBED (INTENTIONAL)     0 each    continuous prn. Statin not prescribed intentionally due to Other:    Type 2 diabetes, HbA1c goal < 7% (H)       TYLENOL PO      Take by mouth as needed for mild pain or fever        XOPENEX HFA 45 MCG/ACT Inhaler   Generic drug:  levalbuterol     15 g    INHALE TWO PUFFS BY MOUTH EVERY SIX HOURS AS NEEDED FOR SHORTNESS OF BREATH    Mild intermittent asthma, uncomplicated       * Notice:  This list has 2 medication(s) that are the same as other medications prescribed for you. Read the directions carefully, and ask your doctor or other care provider to review them with you.

## 2018-06-07 NOTE — PROGRESS NOTES
"  SUBJECTIVE:   Yissel Wetzel is a 58 year old female who presents to clinic today for the following health issues:      Diabetes Follow-up    Patient is checking blood sugars: four times daily.    Blood sugar testing frequency justification: Patient modifying lifestyle changes (diet, exercise) with blood sugars  Results are as follows:         am - 110         lunchtime -          suppertime - 150-160         bedtime - above 160    Diabetic concerns: None     Symptoms of hypoglycemia (low blood sugar): none     Paresthesias (numbness or burning in feet) or sores: No     Date of last diabetic eye exam: 2017    BP Readings from Last 2 Encounters:   06/07/18 180/80   05/12/18 (!) 224/88     Hemoglobin A1C (%)   Date Value   08/11/2017 6.7 (H)   01/20/2017 7.7 (H)     LDL Cholesterol Calculated (mg/dL)   Date Value   07/22/2016 123 (H)   10/28/2015 64     Hypertension Follow-up      Outpatient blood pressures are being checked at home.  Results are (pt wanted to discuss with you.    Low Salt Diet: low salt      Amount of exercise or physical activity: 6-7 days/week for an average of 15-30 minutes    Problems taking medications regularly: No    Medication side effects: none    Diet: regular (no restrictions)            Problem list and histories reviewed & adjusted, as indicated.  Additional history:         Reviewed and updated as needed this visit by clinical staff  Tobacco  Allergies  Meds  Problems       Reviewed and updated as needed this visit by Provider  Allergies  Meds  Problems         ROS:  Constitutional, HEENT, cardiovascular, pulmonary, gi and gu systems are negative, except as otherwise noted.    OBJECTIVE:     /80  Pulse 61  Temp 97.9  F (36.6  C) (Oral)  Resp 16  Ht 5' 1\" (1.549 m)  Wt 172 lb 14.4 oz (78.4 kg)  SpO2 98%  BMI 32.67 kg/m2  Body mass index is 32.67 kg/(m^2).  GENERAL: healthy, alert and no distress  NECK: no adenopathy, no asymmetry, masses, or scars and thyroid " normal to palpation  RESP: lungs clear to auscultation - no rales, rhonchi or wheezes  CV: regular rate and rhythm, normal S1 S2, no S3 or S4, no murmur, click or rub, no peripheral edema and peripheral pulses strong  ABDOMEN: soft, nontender, no hepatosplenomegaly, no masses and bowel sounds normal  MS: no gross musculoskeletal defects noted, no edema  FOOT EXAM:  No ulcers/sores.  DP pulses 2+ bilaterally.  Vibratory sensation, and sensation to monofilament intact.       ASSESSMENT/PLAN:     1. Screening for diabetic peripheral neuropathy    - FOOT EXAM  NO CHARGE [31651.567]    2. Essential hypertension  Controlled, increase labetalol to 400 mg twice daily  - labetalol (NORMODYNE) 200 MG tablet; Take 2 tablets (400 mg) by mouth 2 times daily  Dispense: 120 tablet; Refill: 11    3. Type 2 diabetes mellitus with diabetic nephropathy, with long-term current use of insulin (H)  Recheck surveillance labs today  - HEMOGLOBIN A1C  - Lipid panel reflex to direct LDL Fasting  - Albumin Random Urine Quantitative with Creat Ratio  - Creatinine  - LEVEMIR FLEXTOUCH 100 UNIT/ML injection; Inject 48 Units Subcutaneous At Bedtime  Dispense: 21 mL; Refill: 4    4. CKD (chronic kidney disease) stage 3, GFR 30-59 ml/min    - UA with Microscopic reflex to Culture        Gigi Martin MD  [facility]

## 2018-06-07 NOTE — LETTER
My Asthma Action Plan  Name: Yissel Wetzel   YOB: 1960  Date: 6/7/2018   My doctor: Gigi Martin MD   My clinic: [facility]        My Control Medicine: Advair Diskus  My Rescue Medicine: Advair Diskus 250-50mcg   My Asthma Severity: mild persistent  Avoid your asthma triggers: humidity and cold        The medication may be given at school or day care?: Yes and N/A (Adult Patient)  Child can carry and use inhaler at school with approval of school nurse?: Yes and N/A (Adult Patient)       GREEN ZONE   Good Control    I feel good    No cough or wheeze    Can work, sleep and play without asthma symptoms       Take your asthma control medicine every day.     1. If exercise triggers your asthma, take your rescue medication    15 minutes before exercise or sports, and    During exercise if you have asthma symptoms  2. Spacer to use with inhaler: If you have a spacer, make sure to use it with your inhaler             YELLOW ZONE Getting Worse  I have ANY of these:    I do not feel good    Cough or wheeze    Chest feels tight    Wake up at night   1. Keep taking your Green Zone medications  2. Start taking your rescue medicine:    every 20 minutes for up to 1 hour. Then every 4 hours for 24-48 hours.  3. If you stay in the Yellow Zone for more than 12-24 hours, contact your doctor.  4. If you do not return to the Green Zone in 12-24 hours or you get worse, start taking your oral steroid medicine if prescribed by your provider.           RED ZONE Medical Alert - Get Help  I have ANY of these:    I feel awful    Medicine is not helping    Breathing getting harder    Trouble walking or talking    Nose opens wide to breathe       1. Take your rescue medicine NOW  2. If your provider has prescribed an oral steroid medicine, start taking it NOW  3. Call your doctor NOW  4. If you are still in the Red Zone after 20 minutes and you have not reached your doctor:    Take your rescue  medicine again and    Call 911 or go to the emergency room right away    See your regular doctor within 2 weeks of an Emergency Room or Urgent Care visit for follow-up treatment.          Annual Reminders:  Meet with Asthma Educator,  Flu Shot in the Fall, consider Pneumonia Vaccination for patients with asthma (aged 19 and older).    Pharmacy:    [id number] - [place], [place] - [address] [facility] [place], [place] - [address]                      Asthma Triggers  How To Control Things That Make Your Asthma Worse    Triggers are things that make your asthma worse.  Look at the list below to help you find your triggers and what you can do about them.  You can help prevent asthma flare-ups by staying away from your triggers.      Trigger                                                          What you can do   Cigarette Smoke  Tobacco smoke can make asthma worse. Do not allow smoking in your home, car or around you.  Be sure no one smokes at a child s day care or school.  If you smoke, ask your health care provider for ways to help you quit.  Ask family members to quit too.  Ask your health care provider for a referral to Quit Plan to help you quit smoking, or call 2-919-974-PLAN.     Colds, Flu, Bronchitis  These are common triggers of asthma. Wash your hands often.  Don t touch your eyes, nose or mouth.  Get a flu shot every year.     Dust Mites  These are tiny bugs that live in cloth or carpet. They are too small to see. Wash sheets and blankets in hot water every week.   Encase pillows and mattress in dust mite proof covers.  Avoid having carpet if you can. If you have carpet, vacuum weekly.   Use a dust mask and HEPA vacuum.   Pollen and Outdoor Mold  Some people are allergic to trees, grass, or weed pollen, or molds. Try to keep your windows closed.  Limit time out doors when pollen count is high.   Ask you health care provider about taking medicine during allergy  season.     Animal Dander  Some people are allergic to skin flakes, urine or saliva from pets with fur or feathers. Keep pets with fur or feathers out of your home.    If you can t keep the pet outdoors, then keep the pet out of your bedroom.  Keep the bedroom door closed.  Keep pets off cloth furniture and away from stuffed toys.     Mice, Rats, and Cockroaches  Some people are allergic to the waste from these pests.   Cover food and garbage.  Clean up spills and food crumbs.  Store grease in the refrigerator.   Keep food out of the bedroom.   Indoor Mold  This can be a trigger if your home has high moisture. Fix leaking faucets, pipes, or other sources of water.   Clean moldy surfaces.  Dehumidify basement if it is damp and smelly.   Smoke, Strong Odors, and Sprays  These can reduce air quality. Stay away from strong odors and sprays, such as perfume, powder, hair spray, paints, smoke incense, paint, cleaning products, candles and new carpet.   Exercise or Sports  Some people with asthma have this trigger. Be active!  Ask your doctor about taking medicine before sports or exercise to prevent symptoms.    Warm up for 5-10 minutes before and after sports or exercise.     Other Triggers of Asthma  Cold air:  Cover your nose and mouth with a scarf.  Sometimes laughing or crying can be a trigger.  Some medicines and food can trigger asthma.

## 2018-06-07 NOTE — PATIENT INSTRUCTIONS
Low-Salt Choices  Eating salt (sodium) can make your body retain too much water. Excess water makes your heart work harder. Canned, packaged, and frozen foods are easy to prepare. But they are often high in sodium. Here are some ideas for low-salt foods you can easily make yourself.    For breakfast    Fruit or 100% fruit juice    Whole-wheat bread or an English muffin. Look for sodium content on Nutrition Facts labels.    Low-fat milk or yogurt    Unsalted eggs    Shredded wheat    Corn tortillas    Unsalted steamed rice    Regular (not instant) hot cereal, made without salt  Stay away from:    Sausage, wilder, and ham    Flour tortillas    Packaged muffins, pancakes, and biscuits    Instant hot cereals    Cottage cheese  For lunch and dinner    Fresh fish, chicken, turkey, or meat--baked, broiled, or roasted without salt    Dry beans, cooked without salt    Tofu, stir-fried without salt    Unsalted fresh fruit and vegetables, or frozen or canned fruit and vegetables with no added salt  Stay away from:    Lunch or deli meat that is cured or smoked    Cheese    Tomato juice and ketchup    Canned vegetables, soups, and fish not labeled as no-salt-added or reduced sodium    Packaged gravies and sauces    Olives, pickles, and relish    Bottled salad dressings  For snacks and desserts    Yogurt    Unsalted, air-popped popcorn    Unsalted nuts or seeds  Stay away from:    Pies and cakes    Packaged dessert mixes    Pizza    Canned and packaged puddings    Pretzels, chips, crackers, and nuts--unless the label says unsalted  Date Last Reviewed: 6/1/2017 2000-2017 [employer]. [address] [place] [address], [place] [address]. All rights reserved. This information is not intended as a substitute for professional medical care. Always follow your healthcare professional's instructions.

## 2018-06-07 NOTE — LETTER
My Asthma Action Plan  Name: Yissel Wetzel   YOB: 1960  Date: 6/7/2018   My doctor: Gigi Martin MD   My clinic: [facility]        My Control Medicine: Advair Diskus 250-50mcg  My Rescue Medicine: Advair Diskus 250-50mcg   My Asthma Severity: mild persistent  Avoid your asthma triggers: { :627431}        {Is patient a child or adult?:143371}       GREEN ZONE   Good Control    I feel good    No cough or wheeze    Can work, sleep and play without asthma symptoms       Take your asthma control medicine every day.     1. If exercise triggers your asthma, take your rescue medication    15 minutes before exercise or sports, and    During exercise if you have asthma symptoms  2. Spacer to use with inhaler: If you have a spacer, make sure to use it with your inhaler             YELLOW ZONE Getting Worse  I have ANY of these:    I do not feel good    Cough or wheeze    Chest feels tight    Wake up at night   1. Keep taking your Green Zone medications  2. Start taking your rescue medicine:    every 20 minutes for up to 1 hour. Then every 4 hours for 24-48 hours.  3. If you stay in the Yellow Zone for more than 12-24 hours, contact your doctor.  4. If you do not return to the Green Zone in 12-24 hours or you get worse, start taking your oral steroid medicine if prescribed by your provider.           RED ZONE Medical Alert - Get Help  I have ANY of these:    I feel awful    Medicine is not helping    Breathing getting harder    Trouble walking or talking    Nose opens wide to breathe       1. Take your rescue medicine NOW  2. If your provider has prescribed an oral steroid medicine, start taking it NOW  3. Call your doctor NOW  4. If you are still in the Red Zone after 20 minutes and you have not reached your doctor:    Take your rescue medicine again and    Call 911 or go to the emergency room right away    See your regular doctor within 2 weeks of an Emergency Room or Urgent  Care visit for follow-up treatment.          Annual Reminders:  Meet with Asthma Educator,  Flu Shot in the Fall, consider Pneumonia Vaccination for patients with asthma (aged 19 and older).    Pharmacy:    [id number] [place], [place] - [address] [facility], [place] - [address]                      Asthma Triggers  How To Control Things That Make Your Asthma Worse    Triggers are things that make your asthma worse.  Look at the list below to help you find your triggers and what you can do about them.  You can help prevent asthma flare-ups by staying away from your triggers.      Trigger                                                          What you can do   Cigarette Smoke  Tobacco smoke can make asthma worse. Do not allow smoking in your home, car or around you.  Be sure no one smokes at a child s day care or school.  If you smoke, ask your health care provider for ways to help you quit.  Ask family members to quit too.  Ask your health care provider for a referral to Quit Plan to help you quit smoking, or call 6-049-637-PLAN.     Colds, Flu, Bronchitis  These are common triggers of asthma. Wash your hands often.  Don t touch your eyes, nose or mouth.  Get a flu shot every year.     Dust Mites  These are tiny bugs that live in cloth or carpet. They are too small to see. Wash sheets and blankets in hot water every week.   Encase pillows and mattress in dust mite proof covers.  Avoid having carpet if you can. If you have carpet, vacuum weekly.   Use a dust mask and HEPA vacuum.   Pollen and Outdoor Mold  Some people are allergic to trees, grass, or weed pollen, or molds. Try to keep your windows closed.  Limit time out doors when pollen count is high.   Ask you health care provider about taking medicine during allergy season.     Animal Dander  Some people are allergic to skin flakes, urine or saliva from pets with fur or feathers. Keep pets with fur or  feathers out of your home.    If you can t keep the pet outdoors, then keep the pet out of your bedroom.  Keep the bedroom door closed.  Keep pets off cloth furniture and away from stuffed toys.     Mice, Rats, and Cockroaches  Some people are allergic to the waste from these pests.   Cover food and garbage.  Clean up spills and food crumbs.  Store grease in the refrigerator.   Keep food out of the bedroom.   Indoor Mold  This can be a trigger if your home has high moisture. Fix leaking faucets, pipes, or other sources of water.   Clean moldy surfaces.  Dehumidify basement if it is damp and smelly.   Smoke, Strong Odors, and Sprays  These can reduce air quality. Stay away from strong odors and sprays, such as perfume, powder, hair spray, paints, smoke incense, paint, cleaning products, candles and new carpet.   Exercise or Sports  Some people with asthma have this trigger. Be active!  Ask your doctor about taking medicine before sports or exercise to prevent symptoms.    Warm up for 5-10 minutes before and after sports or exercise.     Other Triggers of Asthma  Cold air:  Cover your nose and mouth with a scarf.  Sometimes laughing or crying can be a trigger.  Some medicines and food can trigger asthma.

## 2018-06-12 ENCOUNTER — TELEPHONE (OUTPATIENT)
Dept: INTERNAL MEDICINE | Facility: CLINIC | Age: 58
End: 2018-06-12

## 2018-06-12 NOTE — TELEPHONE ENCOUNTER
Can pt cut labetalol down in the morning states she is feeling dizziness since doubling the dose . current  Blood pressure is 116/70. Please advise .Dunia Osullivan RN

## 2018-06-12 NOTE — TELEPHONE ENCOUNTER
Pt will cut down to 1 tab in am and 2 tabs in pm of 200 mg Labetalol . Left pt detailed message .Dunia Osullivan RN

## 2018-06-28 ENCOUNTER — TELEPHONE (OUTPATIENT)
Dept: INTERNAL MEDICINE | Facility: CLINIC | Age: 58
End: 2018-06-28

## 2018-07-01 NOTE — TELEPHONE ENCOUNTER
My portion of form complete, [place].  She can , complete the rest of her portion, and turn in.

## 2018-07-02 ENCOUNTER — TELEPHONE (OUTPATIENT)
Dept: INTERNAL MEDICINE | Facility: CLINIC | Age: 58
End: 2018-07-02

## 2018-07-02 NOTE — TELEPHONE ENCOUNTER
[place] [employer] application for disability parking certificate [place] portion completed; pt needs to fill out her portion, copy made for chart, orig for at  2nd flr for

## 2018-07-09 DIAGNOSIS — N18.30 CKD (CHRONIC KIDNEY DISEASE) STAGE 3, GFR 30-59 ML/MIN (H): ICD-10-CM

## 2018-07-10 RX ORDER — BLOOD SUGAR DIAGNOSTIC
STRIP MISCELLANEOUS
Qty: 200 EACH | Refills: 1 | Status: SHIPPED | OUTPATIENT
Start: 2018-07-10 | End: 2019-03-29

## 2018-07-10 NOTE — TELEPHONE ENCOUNTER
"Requested Prescriptions   Pending Prescriptions Disp Refills     ACCU-CHEK CHING PLUS test strip [Pharmacy Med Name: Accu-Chek Ching Plus In Vitro Strip]  Last Written Prescription Date:  10/10/2017  Last Fill Quantity: 200,  # refills: 1   Last office visit: 6/7/2018 with prescribing provider:     Future Office Visit:     200 each 0     Sig: USE TO TEST BLOOD SUGAR 4 TIMES A DAY    Diabetic Supplies Protocol Passed    7/9/2018  8:56 PM       Passed - Patient is 18 years of age or older       Passed - Recent (6 mo) or future (30 days) visit within the authorizing provider's specialty    Patient had office visit in the last 6 months or has a visit in the next 30 days with authorizing provider.  See \"Patient Info\" tab in inbasket, or \"Choose Columns\" in Meds & Orders section of the refill encounter.                "

## 2018-07-13 ENCOUNTER — OFFICE VISIT (OUTPATIENT)
Dept: URGENT CARE | Facility: URGENT CARE | Age: 58
End: 2018-07-13
Payer: MEDICARE

## 2018-07-13 VITALS
HEART RATE: 54 BPM | OXYGEN SATURATION: 95 % | SYSTOLIC BLOOD PRESSURE: 152 MMHG | BODY MASS INDEX: 32.12 KG/M2 | RESPIRATION RATE: 16 BRPM | WEIGHT: 170 LBS | TEMPERATURE: 97.6 F | DIASTOLIC BLOOD PRESSURE: 72 MMHG

## 2018-07-13 DIAGNOSIS — H60.391 INFECTIVE OTITIS EXTERNA, RIGHT: Primary | ICD-10-CM

## 2018-07-13 DIAGNOSIS — B02.22 TRIGEMINAL HERPES ZOSTER: ICD-10-CM

## 2018-07-13 PROCEDURE — 99213 OFFICE O/P EST LOW 20 MIN: CPT | Performed by: FAMILY MEDICINE

## 2018-07-13 RX ORDER — NEOMYCIN SULFATE, POLYMYXIN B SULFATE AND HYDROCORTISONE 10; 3.5; 1 MG/ML; MG/ML; [USP'U]/ML
4 SUSPENSION/ DROPS AURICULAR (OTIC) 4 TIMES DAILY
Qty: 10 ML | Refills: 0 | Status: ON HOLD | OUTPATIENT
Start: 2018-07-13 | End: 2019-03-15

## 2018-07-13 RX ORDER — VALACYCLOVIR HYDROCHLORIDE 1 G/1
1000 TABLET, FILM COATED ORAL DAILY
Qty: 10 TABLET | Refills: 1 | Status: ON HOLD | OUTPATIENT
Start: 2018-07-13 | End: 2019-03-15

## 2018-07-13 NOTE — PATIENT INSTRUCTIONS
External Ear Infection (Adult)    External otitis (also called  swimmer s ear ) is an infection in the ear canal. It is often caused by bacteria or fungus. It can occur a few days after water gets trapped in the ear canal (from swimming or bathing). It can also occur after cleaning too deeply in the ear canal with a cotton swab or other object. Sometimes, hair care products get into the ear canal and cause this problem.  Symptoms can include pain, fever, itching, redness, drainage, or swelling of the ear canal. Temporary hearing loss may also occur.  Home care    Do not try to clean the ear canal. This can push pus and bacteria deeper into the canal.    Use prescribed ear drops as directed. These help reduce swelling and fight the infection. If an ear wick was placed in the ear canal, apply drops right onto the end of the wick. The wick will draw the medicine into the ear canal even if it is swollen closed.    A cotton ball may be loosely placed in the outer ear to absorb any drainage.    You may use acetaminophen or ibuprofen to control pain, unless another medicine was prescribed. Note: If you have chronic liver or kidney disease or ever had a stomach ulcer or GI bleeding, talk to your healthcare provider before taking any of these medicines.    Do not allow water to get into your ear when bathing. Also, don't swim until the infection has cleared.  Prevention    Keep your ears dry. This helps lower the risk of infection. Dry your ears with a towel or hair dryer after getting wet. Also, use ear plugs when swimming.    Do not stick any objects in the ear to remove wax.    If you feel water trapped in your ear, use ear drops right away. You can get these drops over the counter at most drugstores. They work by removing water from the ear canal.  Follow-up care  Follow up with your healthcare provider in 1 week, or as advised.  When to seek medical advice  Call your healthcare provider right away if any of these  occur:    Ear pain becomes worse or doesn t improve after 3 days of treatment    Redness or swelling of the outer ear occurs or gets worse    Headache    Painful or stiff neck    Drowsiness or confusion    Fever of 100.4 F (38 C) or higher, or as directed by your healthcare provider    Seizure  Date Last Reviewed: 10/1/2017    2493-1459 [employer]. [address] [place] [address]. All rights reserved. This information is not intended as a substitute for professional medical care. Always follow your healthcare professional's instructions.        Shingles  Shingles is a viral infection caused by the same virus as chicken pox. Anyone who has had chicken pox may get shingles later in life. The virus stays in the body, but remains dormant (asleep). Shingles often occurs in older persons or persons with lowered immunity. But it can affect anyone at any age.  Shingles starts as a tingling patch of skin on one side of the body. Small, painful blisters may then appear. The rash does not spread to the rest of the body.  Exposure to shingles cannot cause shingles. However, it can cause chicken pox in anyone who has not had chicken pox or has not been vaccinated. The contagious period ends when all blisters have crusted over (generally about 2 weeks after the illness begins).  After the blisters heal, the affected skin may be sensitive or painful for months (neuralgia). This often gradually goes away.  A shingles vaccine is available. This can help prevent shingles or make it less painful. It is generally recommended for adults over the age of 60 who have had chicken pox in the past, but who have never had shingles. Adults over 60 who have had neither chicken pox nor shingles can prevent both diseases with the chicken pox vaccine. Ask your healthcare provider about these vaccines.  Home care    Medicines may be prescribed to help relieve pain. Take these medicines as directed. Ask your healthcare  provider or pharmacist before using over-the-counter medicines for helping treat pain and itching.    In certain cases, antiviral medicines may be prescribed to reduce pain, shorten the illness, and prevent neuralgia. Take these medicines as directed.    Compresses made from a solution of cool water mixed with cornstarch or baking soda may help relieve pain and itching.     Gently wash skin daily with soap and water to help prevent infection.  Be certain to rinse off all of the soap, which can be irritating.    Trim fingernails and try not to scratch. Scratching the sores may leave scars.    Stay home from work or school until all blisters have formed a crust and you are no longer contagious.  Follow-up care  Follow up with your healthcare provider or as directed by our staff.  When to seek medical advice    Fever of 100.4 F (38 C) or higher, or as directed by your healthcare provider    Affected skin is on the face or neck and any of the following occur:  ? Headache  ? Eye pain  ? Changes in vision  ? Sores near the eye  ? Weakness of facial muscles    Pain, redness, or swelling of a joint    Signs of skin infection: colored drainage from the sores, warmth, increasing redness, or increasing pain  Date Last Reviewed: 9/25/2015 2000-2017 [employer]. [address] [place] [address], [place] [address]. All rights reserved. This information is not intended as a substitute for professional medical care. Always follow your healthcare professional's instructions.

## 2018-07-13 NOTE — MR AVS SNAPSHOT
After Visit Summary   7/13/2018    Yissel Wetzel    MRN: [id number]           Patient Information     Date Of Birth          1960        Visit Information        Provider Department      7/13/2018 2:00 PM Rosalva Oreilly MD [facility]        Today's Diagnoses     Infective otitis externa, right    -  1    Trigeminal herpes zoster          Care Instructions      External Ear Infection (Adult)    External otitis (also called  swimmer s ear ) is an infection in the ear canal. It is often caused by bacteria or fungus. It can occur a few days after water gets trapped in the ear canal (from swimming or bathing). It can also occur after cleaning too deeply in the ear canal with a cotton swab or other object. Sometimes, hair care products get into the ear canal and cause this problem.  Symptoms can include pain, fever, itching, redness, drainage, or swelling of the ear canal. Temporary hearing loss may also occur.  Home care    Do not try to clean the ear canal. This can push pus and bacteria deeper into the canal.    Use prescribed ear drops as directed. These help reduce swelling and fight the infection. If an ear wick was placed in the ear canal, apply drops right onto the end of the wick. The wick will draw the medicine into the ear canal even if it is swollen closed.    A cotton ball may be loosely placed in the outer ear to absorb any drainage.    You may use acetaminophen or ibuprofen to control pain, unless another medicine was prescribed. Note: If you have chronic liver or kidney disease or ever had a stomach ulcer or GI bleeding, talk to your healthcare provider before taking any of these medicines.    Do not allow water to get into your ear when bathing. Also, don't swim until the infection has cleared.  Prevention    Keep your ears dry. This helps lower the risk of infection. Dry your ears with a towel or hair dryer after getting wet. Also, use ear plugs when  swimming.    Do not stick any objects in the ear to remove wax.    If you feel water trapped in your ear, use ear drops right away. You can get these drops over the counter at most drugstores. They work by removing water from the ear canal.  Follow-up care  Follow up with your healthcare provider in 1 week, or as advised.  When to seek medical advice  Call your healthcare provider right away if any of these occur:    Ear pain becomes worse or doesn t improve after 3 days of treatment    Redness or swelling of the outer ear occurs or gets worse    Headache    Painful or stiff neck    Drowsiness or confusion    Fever of 100.4 F (38 C) or higher, or as directed by your healthcare provider    Seizure  Date Last Reviewed: 10/1/2017    9404-2015 [employer]. [address] [place] [address], [place] [address]. All rights reserved. This information is not intended as a substitute for professional medical care. Always follow your healthcare professional's instructions.        Shingles  Shingles is a viral infection caused by the same virus as chicken pox. Anyone who has had chicken pox may get shingles later in life. The virus stays in the body, but remains dormant (asleep). Shingles often occurs in older persons or persons with lowered immunity. But it can affect anyone at any age.  Shingles starts as a tingling patch of skin on one side of the body. Small, painful blisters may then appear. The rash does not spread to the rest of the body.  Exposure to shingles cannot cause shingles. However, it can cause chicken pox in anyone who has not had chicken pox or has not been vaccinated. The contagious period ends when all blisters have crusted over (generally about 2 weeks after the illness begins).  After the blisters heal, the affected skin may be sensitive or painful for months (neuralgia). This often gradually goes away.  A shingles vaccine is available. This can help prevent shingles or make it less painful. It  is generally recommended for adults over the age of 60 who have had chicken pox in the past, but who have never had shingles. Adults over 60 who have had neither chicken pox nor shingles can prevent both diseases with the chicken pox vaccine. Ask your healthcare provider about these vaccines.  Home care    Medicines may be prescribed to help relieve pain. Take these medicines as directed. Ask your healthcare provider or pharmacist before using over-the-counter medicines for helping treat pain and itching.    In certain cases, antiviral medicines may be prescribed to reduce pain, shorten the illness, and prevent neuralgia. Take these medicines as directed.    Compresses made from a solution of cool water mixed with cornstarch or baking soda may help relieve pain and itching.     Gently wash skin daily with soap and water to help prevent infection.  Be certain to rinse off all of the soap, which can be irritating.    Trim fingernails and try not to scratch. Scratching the sores may leave scars.    Stay home from work or school until all blisters have formed a crust and you are no longer contagious.  Follow-up care  Follow up with your healthcare provider or as directed by our staff.  When to seek medical advice    Fever of 100.4 F (38 C) or higher, or as directed by your healthcare provider    Affected skin is on the face or neck and any of the following occur:  ? Headache  ? Eye pain  ? Changes in vision  ? Sores near the eye  ? Weakness of facial muscles    Pain, redness, or swelling of a joint    Signs of skin infection: colored drainage from the sores, warmth, increasing redness, or increasing pain  Date Last Reviewed: 9/25/2015 2000-2017 [employer]. [address] [place] [address], [place] [address]. All rights reserved. This information is not intended as a substitute for professional medical care. Always follow your healthcare professional's instructions.                Follow-ups after your visit         Who to contact     If you have questions or need follow up information about today's clinic visit or your schedule please contact [facility] directly at [phone].  Normal or non-critical lab and imaging results will be communicated to you by [employer], letter or phone within 4 business days after the clinic has received the results. If you do not hear from us within 7 days, please contact the clinic through [employer] or phone. If you have a critical or abnormal lab result, we will notify you by phone as soon as possible.  Submit refill requests through [employer] or call your pharmacy and they will forward the refill request to us. Please allow 3 business days for your refill to be completed.          Additional Information About Your Visit        [employer] Information     [employer] gives you secure access to your electronic health record. If you see a primary care provider, you can also send messages to your care team and make appointments. If you have questions, please call your primary care clinic.  If you do not have a primary care provider, please call [phone] and they will assist you.        Care EveryWhere ID     This is your Care EveryWhere ID. This could be used by other organizations to access your [facility] medical records  [id number]        Your Vitals Were     Pulse Temperature Respirations Pulse Oximetry BMI (Body Mass Index)       54 97.6  F (36.4  C) (Oral) 16 95% 32.12 kg/m2        Blood Pressure from Last 3 Encounters:   07/13/18 152/72   06/07/18 180/80   05/12/18 (!) 224/88    Weight from Last 3 Encounters:   07/13/18 170 lb (77.1 kg)   06/07/18 172 lb 14.4 oz (78.4 kg)   05/12/18 180 lb 3 oz (81.7 kg)              Today, you had the following     No orders found for display         Today's Medication Changes          These changes are accurate as of 7/13/18  3:15 PM.  If you have any questions, ask your nurse or doctor.               Start taking these medicines.         Dose/Directions    neomycin-polymyxin-hydrocortisone 3.5-45024-4 otic suspension   Commonly known as:  CORTISPORIN   Used for:  Infective otitis externa, right   Started by:  Rosalva Oreilly MD        Dose:  4 drop   Place 4 drops into the right ear 4 times daily   Quantity:  10 mL   Refills:  0       valACYclovir 1000 mg tablet   Commonly known as:  VALTREX   Used for:  Trigeminal herpes zoster   Started by:  Rosalva Oreilly MD        Dose:  1000 mg   Take 1 tablet (1,000 mg) by mouth daily for 10 days   Quantity:  10 tablet   Refills:  1            Where to get your medicines      These medications were sent to [id number] - [place] [address]  [address] [facility] [address]     Phone:  [phone]     neomycin-polymyxin-hydrocortisone 3.5-67340-6 otic suspension         Some of these will need a paper prescription and others can be bought over the counter.  Ask your nurse if you have questions.     Bring a paper prescription for each of these medications     valACYclovir 1000 mg tablet                Primary Care Provider Office Phone # Fax #    Gigi Martin [phone]       [address] [address]        Equal Access to Services     VANE ANNE AH: Hadii letty suarez hadasho Soomaali, waaxda luqadaha, qaybta kaalmada adeegyada, william braxton. So [facility] [phone].    ATENCIÓN: Si habla español, tiene a kidd disposición servicios gratuitos de asistencia lingüística. Llame al [phone].    We comply with applicable federal civil rights laws and [place] laws. We do not discriminate on the basis of race, color, national origin, age, disability, sex, sexual orientation, or gender identity.            Thank you!     Thank you for choosing [facility]  for your care. Our goal is always to provide you with excellent care. Hearing back from our patients is one way we can  continue to improve our services. Please take a few minutes to complete the written survey that you may receive in the mail after your visit with us. Thank you!             Your Updated Medication List - Protect others around you: Learn how to safely use, store and throw away your medicines at www.disposemymeds.org.          This list is accurate as of 7/13/18  3:15 PM.  Always use your most recent med list.                   Brand Name Dispense Instructions for use Diagnosis    ACCU-CHEK CHING PLUS test strip   Generic drug:  blood glucose monitoring     200 each    USE TO TEST BLOOD SUGAR 4 TIMES A DAY    CKD (chronic kidney disease) stage 3, GFR 30-59 ml/min       * ACE NOT PRESCRIBED (INTENTIONAL)     0 each    1 each continuous prn ACE Inhibitor not prescribed due to Intolerance        ADVAIR DISKUS 250-50 MCG/DOSE diskus inhaler   Generic drug:  fluticasone-salmeterol     1 Inhaler    INHALE ONE PUFF BY MOUTH TWICE DAILY    Mild persistent asthma, uncomplicated       blood glucose monitoring meter device kit     1 kit    Use to test blood sugars 5-7 times daily or as directed.    Type 2 diabetes mellitus with diabetic nephropathy (H)       ferrous sulfate 325 (65 Fe) MG tablet    IRON    30 tablet    Take 1 tablet (325 mg) by mouth daily with food    Iron deficiency anemia, unspecified       fluticasone 50 MCG/ACT spray    FLONASE    16 g    Spray 2 sprays into both nostrils daily    Chronic rhinitis       insulin pen needle 31G X 5 MM     100 each    Use 4 times daily    CKD (chronic kidney disease) stage 3, GFR 30-59 ml/min       labetalol 200 MG tablet    NORMODYNE    120 tablet    Take 2 tablets (400 mg) by mouth 2 times daily    Essential hypertension       LEVEMIR FLEXTOUCH 100 UNIT/ML injection   Generic drug:  insulin detemir     21 mL    Inject 48 Units Subcutaneous At Bedtime    Type 2 diabetes mellitus with diabetic nephropathy, with long-term current use of insulin (H)        neomycin-polymyxin-hydrocortisone 3.5-83147-9 otic suspension    CORTISPORIN    10 mL    Place 4 drops into the right ear 4 times daily    Infective otitis externa, right       NovoLOG FLEXPEN 100 UNIT/ML injection   Generic drug:  insulin aspart     15 mL    INJECT 9-12 UNITS BEFORE BREAKFAST, LUNCH, AND DINNER + A CORRECTIVE SCALE OF 2 UNITS FOR EVERY 50 OVER 150    Type 2 diabetes mellitus with diabetic nephropathy, with long-term current use of insulin (H)       omeprazole 20 MG CR capsule    priLOSEC    30 capsule    TAKE 1 CAPSULE BY MOUTH EVERY MORNING** (BEFORE BREAKFAST) TAKE 30 - 60 MINUTES BEFORE 1ST MEAL DAILY**    Esophagitis       sodium bicarbonate 650 MG tablet     30 tablet    TAKE ONE TABLET BY MOUTH ONE TIME DAILY    CKD (chronic kidney disease) stage 3, GFR 30-59 ml/min       * STATIN NOT PRESCRIBED (INTENTIONAL)     0 each    continuous prn. Statin not prescribed intentionally due to Other:    Type 2 diabetes, HbA1c goal < 7% (H)       TYLENOL PO      Take by mouth as needed for mild pain or fever        valACYclovir 1000 mg tablet    VALTREX    10 tablet    Take 1 tablet (1,000 mg) by mouth daily for 10 days    Trigeminal herpes zoster       XOPENEX HFA 45 MCG/ACT Inhaler   Generic drug:  levalbuterol     15 g    INHALE TWO PUFFS BY MOUTH EVERY SIX HOURS AS NEEDED FOR SHORTNESS OF BREATH    Mild intermittent asthma, uncomplicated       * Notice:  This list has 2 medication(s) that are the same as other medications prescribed for you. Read the directions carefully, and ask your doctor or other care provider to review them with you.

## 2018-07-14 NOTE — PROGRESS NOTES
SUBJECTIVE:  Chief Complaint   Patient presents with     Urgent Care     Pt c/o right ear pain     Yissel Wetzel is a 58 year old female  who presents to the clinic today for a  painful area of skin without rash.  Onset   was 2 day(s) ago and  is gradual onset, still present and worsening.    Location  : right ear, face and scalp.  Quality/symptoms : burning and painful   Symptoms are severe and the affected skin seems to be not changing over the course of time.  Previous history of a similar symptoms? No  Recent exposure history: none known -  Has felt pain in the right ear and has tried to itch in aching canal    Associated symptoms include: nothing.    Past Medical History:   Diagnosis Date     Allergic rhinitis, cause unspecified      Anemia of chronic disease      CKD (chronic kidney disease) stage 3, GFR 30-59 ml/min      Esophagitis, unspecified      HEMORRHAGIC GASTROPATHY      Iron deficiency anemia, unspecified      Mild persistent asthma      Other and unspecified hyperlipidemia      Pneumonia      Pulmonary hypertension     per 12/2012 echo mod.to severe pulmonary hypertension     Tobacco use disorder dc ed 1999     Type II or unspecified type diabetes mellitus without mention of complication, not stated as uncontrolled      Unspecified essential hypertension      Patient Active Problem List   Diagnosis     Allergic rhinitis     Iron deficiency anemia     Esophagitis     Functional disorder of stomach     Essential hypertension     Type 2 diabetes mellitus with diabetic nephropathy (H)     Hyperlipidemia LDL goal <100     CKD (chronic kidney disease) stage 3, GFR 30-59 ml/min     Closed fracture of metatarsal bone     Pulmonary hypertension     Mild persistent asthma     Advanced directives, counseling/discussion       ALLERGIES:  Albuterol; Aspirin; Byetta [exenatide]; Clonidine; Codeine; Ezetimibe; Hydralazine; Lisinopril; Metformin hydrochloride; Pravachol [pravastatin sodium]; Simvastatin; and  Troglitazone      Current Outpatient Prescriptions on File Prior to Visit:  ACCU-CHEK CHING PLUS test strip USE TO TEST BLOOD SUGAR 4 TIMES A DAY   ACE NOT PRESCRIBED, INTENTIONAL, 1 each continuous prn ACE Inhibitor not prescribed due to Intolerance   Acetaminophen (TYLENOL PO) Take by mouth as needed for mild pain or fever   ADVAIR DISKUS 250-50 MCG/DOSE diskus inhaler INHALE ONE PUFF BY MOUTH TWICE DAILY    blood glucose monitoring (ACCU-CHEK CHING PLUS) meter device kit Use to test blood sugars 5-7 times daily or as directed.   ferrous sulfate (IRON) 325 (65 FE) MG tablet Take 1 tablet (325 mg) by mouth daily with food   fluticasone (FLONASE) 50 MCG/ACT nasal spray Spray 2 sprays into both nostrils daily   insulin pen needle 31G X 5 MM Use 4 times daily   labetalol (NORMODYNE) 200 MG tablet Take 2 tablets (400 mg) by mouth 2 times daily   LEVEMIR FLEXTOUCH 100 UNIT/ML injection Inject 48 Units Subcutaneous At Bedtime   NOVOLOG FLEXPEN 100 UNIT/ML soln INJECT 9-12 UNITS BEFORE BREAKFAST, LUNCH, AND DINNER + A CORRECTIVE SCALE OF 2 UNITS FOR EVERY 50 OVER 150   omeprazole (PRILOSEC) 20 MG CR capsule TAKE 1 CAPSULE BY MOUTH EVERY MORNING** (BEFORE BREAKFAST) TAKE 30 - 60 MINUTES BEFORE 1ST MEAL DAILY**   sodium bicarbonate 650 MG tablet TAKE ONE TABLET BY MOUTH ONE TIME DAILY    STATIN NOT PRESCRIBED, INTENTIONAL, continuous prn. Statin not prescribed intentionally due to Other:   XOPENEX HFA 45 MCG/ACT Inhaler INHALE TWO PUFFS BY MOUTH EVERY SIX HOURS AS NEEDED FOR SHORTNESS OF BREATH      No current facility-administered medications on file prior to visit.     Social History   Substance Use Topics     Smoking status: Former Smoker     Packs/day: 1.00     Years: 22.00     Types: Cigarettes     Quit date: 10/12/1999     Smokeless tobacco: Never Used     Alcohol use No       Family History   Problem Relation Age of Onset     Arrhythmia Mother      Hypertension Mother      Pancreatic Cancer Father      Diabetes  Brother      Asthma Sister      LUNG DISEASE Sister          ROS:  CONSTITUTIONAL:NEGATIVE for fever, chills,   INTEGUMENTARY/SKIN: NEGATIVE for worrisome rashes,    EYES: NEGATIVE for vision changes or irritation  RESP:NEGATIVE for significant cough or SOB  GI: NEGATIVE for nausea, abdominal pain, s    EXAM:   /72  Pulse 54  Temp 97.6  F (36.4  C) (Oral)  Resp 16  Wt 170 lb (77.1 kg)  SpO2 95%  BMI 32.12 kg/m2  GENERAL: alert, mild distress.  SKIN: Rash description:  No rash- skin appears normal color, no rash   Right ear with erythema and swelling in the canal, left canal normal,  TM's normal bilateral    EYES: EOMI,  PERRL, conjunctiva clear  NECK: supple, non-tender to palpation, no adenopathy noted  ,RESP: lungs clear to auscultation - no rales, rhonchi or wheezes  CV: regular rates and rhythm, normal S1 S2, no murmur noted  MS:extremities normal- no gross deformities noted,  FROM noted in all extremities  NEURO: Normal strength and tone, sensory exam grossly normal,  normal speech and mentation    ASSESSMENT:  Infective otitis externa, right      - neomycin-polymyxin-hydrocortisone (CORTISPORIN) 3.5-78290-5 otic suspension; Place 4 drops into the right ear 4 times daily    Trigeminal herpes zoster     - valACYclovir (VALTREX) 1000 mg tablet; Take 1 tablet (1,000 mg) by mouth daily for 10 days    Discussed she does not have obvious shingles rash, but the pattern of pain is typical for shingles      We discussed that the antiviral medications can decrease the severity of the zoster attack and reduce the risk of post -herpetic neuralgia when started promptly at the start of zoster symptoms, and that a delay in starting treatment produces less favorable results.  .    She was given printed Rx- she will discuss with primary care- for now she does  Not want to start antiviral empirically-  She will monitor for rash .      Follow-up with primary clinic if not improving     May take acetaminophen /  ibuprofen as an alternative for pain

## 2018-08-10 DIAGNOSIS — N18.30 CKD (CHRONIC KIDNEY DISEASE) STAGE 3, GFR 30-59 ML/MIN (H): ICD-10-CM

## 2018-08-10 RX ORDER — SODIUM BICARBONATE 650 MG/1
TABLET ORAL
Qty: 30 TABLET | Refills: 5 | Status: ON HOLD | OUTPATIENT
Start: 2018-08-10 | End: 2019-03-19

## 2018-08-10 NOTE — TELEPHONE ENCOUNTER
Requested Prescriptions   Pending Prescriptions Disp Refills     sodium bicarbonate 650 MG tablet [Pharmacy Med Name: Sodium Bicarbonate Oral Tablet 650 MG] 30 tablet 5     Sig: TAKE ONE TABLET BY MOUTH ONE TIME DAILY    There is no refill protocol information for this order        Last Written Prescription Date:  12/29/2017  Last Fill Quantity: 30,  # refills: 6   Last office visit: 6/7/2018 with prescribing provider:  6/7/2018   Future Office Visit:

## 2018-08-27 ENCOUNTER — TRANSFERRED RECORDS (OUTPATIENT)
Dept: HEALTH INFORMATION MANAGEMENT | Facility: CLINIC | Age: 58
End: 2018-08-27

## 2018-08-28 ENCOUNTER — MEDICAL CORRESPONDENCE (OUTPATIENT)
Dept: HEALTH INFORMATION MANAGEMENT | Facility: CLINIC | Age: 58
End: 2018-08-28

## 2018-08-28 DIAGNOSIS — N18.9 ANEMIA OF CHRONIC RENAL FAILURE: ICD-10-CM

## 2018-08-28 DIAGNOSIS — D63.1 ANEMIA OF CHRONIC RENAL FAILURE: ICD-10-CM

## 2018-08-28 DIAGNOSIS — E87.5 HYPERKALEMIA: Primary | ICD-10-CM

## 2018-08-28 DIAGNOSIS — N18.5 CHRONIC KIDNEY DISEASE, STAGE V (H): ICD-10-CM

## 2018-08-30 DIAGNOSIS — N18.5 CHRONIC KIDNEY DISEASE, STAGE V (H): ICD-10-CM

## 2018-08-30 DIAGNOSIS — N18.9 ANEMIA OF CHRONIC RENAL FAILURE: ICD-10-CM

## 2018-08-30 DIAGNOSIS — E87.5 HYPERKALEMIA: ICD-10-CM

## 2018-08-30 DIAGNOSIS — D63.1 ANEMIA OF CHRONIC RENAL FAILURE: ICD-10-CM

## 2018-08-30 LAB
ANION GAP SERPL CALCULATED.3IONS-SCNC: 11 MMOL/L (ref 3–14)
BUN SERPL-MCNC: 60 MG/DL (ref 7–30)
CALCIUM SERPL-MCNC: 7.9 MG/DL (ref 8.5–10.1)
CHLORIDE SERPL-SCNC: 115 MMOL/L (ref 94–109)
CO2 SERPL-SCNC: 19 MMOL/L (ref 20–32)
CREAT SERPL-MCNC: 4.28 MG/DL (ref 0.52–1.04)
FERRITIN SERPL-MCNC: 407 NG/ML (ref 8–252)
GFR SERPL CREATININE-BSD FRML MDRD: 11 ML/MIN/1.7M2
GLUCOSE SERPL-MCNC: 165 MG/DL (ref 70–99)
IRON SATN MFR SERPL: 28 % (ref 15–46)
IRON SERPL-MCNC: 54 UG/DL (ref 35–180)
POTASSIUM SERPL-SCNC: 5.5 MMOL/L (ref 3.4–5.3)
SODIUM SERPL-SCNC: 145 MMOL/L (ref 133–144)
TIBC SERPL-MCNC: 196 UG/DL (ref 240–430)

## 2018-08-30 PROCEDURE — 36415 COLL VENOUS BLD VENIPUNCTURE: CPT | Performed by: INTERNAL MEDICINE

## 2018-08-30 PROCEDURE — 82728 ASSAY OF FERRITIN: CPT | Performed by: INTERNAL MEDICINE

## 2018-08-30 PROCEDURE — 83540 ASSAY OF IRON: CPT | Performed by: INTERNAL MEDICINE

## 2018-08-30 PROCEDURE — 83550 IRON BINDING TEST: CPT | Performed by: INTERNAL MEDICINE

## 2018-08-30 PROCEDURE — 80048 BASIC METABOLIC PNL TOTAL CA: CPT | Performed by: INTERNAL MEDICINE

## 2018-09-27 ENCOUNTER — TRANSFERRED RECORDS (OUTPATIENT)
Dept: HEALTH INFORMATION MANAGEMENT | Facility: CLINIC | Age: 58
End: 2018-09-27

## 2018-10-03 DIAGNOSIS — J45.20 MILD INTERMITTENT ASTHMA, UNCOMPLICATED: ICD-10-CM

## 2018-10-03 RX ORDER — LEVALBUTEROL TARTRATE 45 UG/1
AEROSOL, METERED ORAL
Qty: 15 G | Refills: 0 | Status: ON HOLD | OUTPATIENT
Start: 2018-10-03 | End: 2019-03-15

## 2018-10-03 NOTE — TELEPHONE ENCOUNTER
"   Requested Prescriptions   Pending Prescriptions Disp Refills     XOPENEX HFA 45 MCG/ACT Inhaler [Pharmacy Med Name: Xopenex HFA Inhalation Aerosol 45 MCG/ACT] 15 g 0     Sig: INHALE TWO PUFFS BY MOUTH EVERY SIX HOURS AS NEEDED FOR SHORTNESS OF BREATH    Short-Acting Beta Agonist Inhalers Protocol  Failed    10/3/2018  1:46 PM       Failed - Asthma control assessment score within normal limits in last 6 months    Please review ACT score.          Passed - Patient is age 12 or older       Passed - Recent (6 mo) or future (30 days) visit within the authorizing provider's specialty    Patient had office visit in the last 6 months or has a visit in the next 30 days with authorizing provider or within the authorizing provider's specialty.  See \"Patient Info\" tab in inbasket, or \"Choose Columns\" in Meds & Orders section of the refill encounter.            ACT Total Scores 11/25/2015 7/22/2016 5/4/2017   ACT TOTAL SCORE - - -   ASTHMA ER VISITS - - -   ASTHMA HOSPITALIZATIONS - - -   ACT TOTAL SCORE (Goal Greater than or Equal to 20) 10 24 7   In the past 12 months, how many times did you visit the emergency room for your asthma without being admitted to the hospital? 0 0 0   In the past 12 months, how many times were you hospitalized overnight because of your asthma? 0 0 0     "

## 2018-10-03 NOTE — TELEPHONE ENCOUNTER
Last Written Prescription Date:  9/28/2017  Last Fill Quantity: 15,  # refills: 1   Last office visit: 6/7/2018 with prescribing provider:  Gigi Martin     Future Office Visit:      Medication is being filled for 1 time refill only due to:  Patient needs to be seen because Due for asthma follow up and new ACT score.     Letter sent.     Prescription approved per [facility] Refill Protocol.    Amber HERNANDEZ RN, BSN, PHN

## 2018-10-03 NOTE — LETTER
[facility]  [address] [address]  [phone]            Yissel Wetzel  [address]   [facility] [address]        October 3, 2018    Dear Cassandra,    While refilling your prescription today, we noticed that you are due for an appointment with your provider for an Asthma follow up.  We will refill your prescription for 30 days, but a follow-up appointment must be made before any additional refills can be approved.     Taking care of your health is important to us and we look forward to seeing you in the near future.  Please call us at [phone] or [phone] (or use [employer]) to schedule an appointment.     Please disregard this notice if you have already made an appointment.    Sincerely,        [facility]

## 2018-10-18 ENCOUNTER — TRANSFERRED RECORDS (OUTPATIENT)
Dept: HEALTH INFORMATION MANAGEMENT | Facility: CLINIC | Age: 58
End: 2018-10-18

## 2018-10-19 ENCOUNTER — TELEPHONE (OUTPATIENT)
Dept: OTHER | Facility: CLINIC | Age: 58
End: 2018-10-19

## 2018-10-19 DIAGNOSIS — N18.5 CHRONIC KIDNEY DISEASE, STAGE 5 (H): Primary | ICD-10-CM

## 2018-10-19 DIAGNOSIS — E87.5 HYPERPOTASSEMIA: ICD-10-CM

## 2018-10-19 DIAGNOSIS — N18.5 UNSPECIFIED HYPERTENSIVE KIDNEY DISEASE WITH CHRONIC KIDNEY DISEASE STAGE V OR END STAGE RENAL DISEASE(403.91) (H): Primary | ICD-10-CM

## 2018-10-19 DIAGNOSIS — Z01.818 ENCOUNTER FOR OTHER PREPROCEDURAL EXAMINATION: ICD-10-CM

## 2018-10-19 DIAGNOSIS — I12.0 UNSPECIFIED HYPERTENSIVE KIDNEY DISEASE WITH CHRONIC KIDNEY DISEASE STAGE V OR END STAGE RENAL DISEASE(403.91) (H): Primary | ICD-10-CM

## 2018-10-19 NOTE — TELEPHONE ENCOUNTER
Left message on home number for patient to call back to schedule vein mapping and consult appointment with Vascular Surgery.

## 2018-10-19 NOTE — TELEPHONE ENCOUNTER
Pt referred to [facility] by Vero Coronado NP for AVF creation.   Hx of CKD stage 5.     Pt needs to be scheduled for bilateral upper extremity venous mapping and consult with vascular surgery.  Will route to scheduling to coordinate an appointment at next available.    MARY WeaverN, RN

## 2018-10-22 DIAGNOSIS — N18.5 UNSPECIFIED HYPERTENSIVE KIDNEY DISEASE WITH CHRONIC KIDNEY DISEASE STAGE V OR END STAGE RENAL DISEASE(403.91) (H): ICD-10-CM

## 2018-10-22 DIAGNOSIS — E87.5 HYPERPOTASSEMIA: ICD-10-CM

## 2018-10-22 DIAGNOSIS — I12.0 UNSPECIFIED HYPERTENSIVE KIDNEY DISEASE WITH CHRONIC KIDNEY DISEASE STAGE V OR END STAGE RENAL DISEASE(403.91) (H): ICD-10-CM

## 2018-10-22 LAB
ANION GAP SERPL CALCULATED.3IONS-SCNC: 8 MMOL/L (ref 3–14)
BUN SERPL-MCNC: 56 MG/DL (ref 7–30)
CALCIUM SERPL-MCNC: 8.5 MG/DL (ref 8.5–10.1)
CHLORIDE SERPL-SCNC: 114 MMOL/L (ref 94–109)
CO2 SERPL-SCNC: 22 MMOL/L (ref 20–32)
CREAT SERPL-MCNC: 4.88 MG/DL (ref 0.52–1.04)
GFR SERPL CREATININE-BSD FRML MDRD: 9 ML/MIN/1.7M2
GLUCOSE SERPL-MCNC: 185 MG/DL (ref 70–99)
POTASSIUM SERPL-SCNC: 5.5 MMOL/L (ref 3.4–5.3)
SODIUM SERPL-SCNC: 144 MMOL/L (ref 133–144)

## 2018-10-22 PROCEDURE — 80048 BASIC METABOLIC PNL TOTAL CA: CPT | Performed by: NURSE PRACTITIONER

## 2018-10-22 PROCEDURE — 36415 COLL VENOUS BLD VENIPUNCTURE: CPT | Performed by: NURSE PRACTITIONER

## 2018-10-22 NOTE — TELEPHONE ENCOUNTER
Patient said she will call us back when she has her schedule in front of her. She has a lot of appointments at this time.     Dawna Méndez MA

## 2018-10-25 DIAGNOSIS — E87.5 HYPERPOTASSEMIA: ICD-10-CM

## 2018-10-25 DIAGNOSIS — N18.5 UNSPECIFIED HYPERTENSIVE KIDNEY DISEASE WITH CHRONIC KIDNEY DISEASE STAGE V OR END STAGE RENAL DISEASE(403.91) (H): Primary | ICD-10-CM

## 2018-10-25 DIAGNOSIS — I12.0 UNSPECIFIED HYPERTENSIVE KIDNEY DISEASE WITH CHRONIC KIDNEY DISEASE STAGE V OR END STAGE RENAL DISEASE(403.91) (H): Primary | ICD-10-CM

## 2018-10-31 DIAGNOSIS — N18.5 UNSPECIFIED HYPERTENSIVE KIDNEY DISEASE WITH CHRONIC KIDNEY DISEASE STAGE V OR END STAGE RENAL DISEASE(403.91) (H): ICD-10-CM

## 2018-10-31 DIAGNOSIS — I12.0 UNSPECIFIED HYPERTENSIVE KIDNEY DISEASE WITH CHRONIC KIDNEY DISEASE STAGE V OR END STAGE RENAL DISEASE(403.91) (H): ICD-10-CM

## 2018-10-31 DIAGNOSIS — E87.5 HYPERPOTASSEMIA: ICD-10-CM

## 2018-10-31 LAB
ANION GAP SERPL CALCULATED.3IONS-SCNC: 12 MMOL/L (ref 3–14)
BUN SERPL-MCNC: 62 MG/DL (ref 7–30)
CALCIUM SERPL-MCNC: 9.1 MG/DL (ref 8.5–10.1)
CHLORIDE SERPL-SCNC: 113 MMOL/L (ref 94–109)
CO2 SERPL-SCNC: 20 MMOL/L (ref 20–32)
CREAT SERPL-MCNC: 4.58 MG/DL (ref 0.52–1.04)
GFR SERPL CREATININE-BSD FRML MDRD: 10 ML/MIN/1.7M2
GLUCOSE SERPL-MCNC: 141 MG/DL (ref 70–99)
POTASSIUM SERPL-SCNC: 5.3 MMOL/L (ref 3.4–5.3)
SODIUM SERPL-SCNC: 145 MMOL/L (ref 133–144)

## 2018-10-31 PROCEDURE — 36415 COLL VENOUS BLD VENIPUNCTURE: CPT | Performed by: INTERNAL MEDICINE

## 2018-10-31 PROCEDURE — 80048 BASIC METABOLIC PNL TOTAL CA: CPT | Performed by: INTERNAL MEDICINE

## 2018-11-14 ENCOUNTER — OFFICE VISIT (OUTPATIENT)
Dept: OTHER | Facility: CLINIC | Age: 58
End: 2018-11-14
Attending: SURGERY
Payer: MEDICARE

## 2018-11-14 ENCOUNTER — HOSPITAL ENCOUNTER (OUTPATIENT)
Dept: ULTRASOUND IMAGING | Facility: CLINIC | Age: 58
Discharge: HOME | End: 2018-11-14
Attending: SURGERY | Admitting: SURGERY
Payer: MEDICARE

## 2018-11-14 VITALS — DIASTOLIC BLOOD PRESSURE: 71 MMHG | HEART RATE: 58 BPM | SYSTOLIC BLOOD PRESSURE: 224 MMHG

## 2018-11-14 DIAGNOSIS — Z01.818 ENCOUNTER FOR OTHER PREPROCEDURAL EXAMINATION: ICD-10-CM

## 2018-11-14 DIAGNOSIS — N18.5 CHRONIC KIDNEY DISEASE, STAGE 5 (H): ICD-10-CM

## 2018-11-14 DIAGNOSIS — N18.30 CKD (CHRONIC KIDNEY DISEASE) STAGE 3, GFR 30-59 ML/MIN (H): ICD-10-CM

## 2018-11-14 DIAGNOSIS — N18.6 END STAGE RENAL DISEASE (H): Primary | ICD-10-CM

## 2018-11-14 PROCEDURE — 93970 EXTREMITY STUDY: CPT

## 2018-11-14 PROCEDURE — 99204 OFFICE O/P NEW MOD 45 MIN: CPT | Mod: ZP | Performed by: SURGERY

## 2018-11-14 PROCEDURE — G0463 HOSPITAL OUTPT CLINIC VISIT: HCPCS | Mod: 25

## 2018-11-14 NOTE — MR AVS SNAPSHOT
After Visit Summary   11/14/2018    Yissel Wetzel    MRN: [id number]           Patient Information     Date Of Birth          1960        Visit Information        Provider Department      11/14/2018 3:30 PM Terry Vizcaino MD [facility]      Today's Diagnoses     End stage renal disease (H)    -  1    CKD (chronic kidney disease) stage 3, GFR 30-59 ml/min (H)          Care Instructions    Patient to follow up with Primary Care provider regarding elevated blood pressure.            Follow-ups after your visit        Who to contact     If you have questions or need follow up information about today's clinic visit or your schedule please contact [facility] directly at [phone].  Normal or non-critical lab and imaging results will be communicated to you by MyChart, letter or phone within 4 business days after the clinic has received the results. If you do not hear from us within 7 days, please contact the clinic through [employer] or phone. If you have a critical or abnormal lab result, we will notify you by phone as soon as possible.  Submit refill requests through [employer] or call your pharmacy and they will forward the refill request to us. Please allow 3 business days for your refill to be completed.          Additional Information About Your Visit        MyChart Information     [employer] gives you secure access to your electronic health record. If you see a primary care provider, you can also send messages to your care team and make appointments. If you have questions, please call your primary care clinic.  If you do not have a primary care provider, please call [phone] and they will assist you.        Care EveryWhere ID     This is your Care EveryWhere ID. This could be used by other organizations to access your [facility] medical records  [id number]        Your Vitals Were     Pulse  Breastfeeding?                58 No           Blood Pressure from Last 3 Encounters:   11/14/18 (!) 224/71   07/13/18 152/72   06/07/18 180/80    Weight from Last 3 Encounters:   07/13/18 170 lb (77.1 kg)   06/07/18 172 lb 14.4 oz (78.4 kg)   05/12/18 180 lb 3 oz (81.7 kg)              Today, you had the following     No orders found for display       Primary Care Provider Office Phone # Fax #    Gigi Martin [phone] [address] [address]        Equal Access to Services     [facility] : Hadii letty Givens, waaxda lupatriciaadaha, qaybta kaalmada thaddeus, william lewis . So [facility] [phone].    ATENCIÓN: Si habla español, tiene a kidd disposición servicios gratuitos de asistencia lingüística. [facility] [phone].    We comply with applicable federal civil rights laws and [place] laws. We do not discriminate on the basis of race, color, national origin, age, disability, sex, sexual orientation, or gender identity.            Thank you!     Thank you for choosing [facility] [place]  for your care. Our goal is always to provide you with excellent care. Hearing back from our patients is one way we can continue to improve our services. Please take a few minutes to complete the written survey that you may receive in the mail after your visit with us. Thank you!             Your Updated Medication List - Protect others around you: Learn how to safely use, store and throw away your medicines at www.disposemymeds.org.          This list is accurate as of 11/14/18  3:36 PM.  Always use your most recent med list.                   Brand Name Dispense Instructions for use Diagnosis    ACCU-CHEK CHING PLUS test strip   Generic drug:  blood glucose monitoring     200 each    USE TO TEST BLOOD SUGAR 4 TIMES A DAY    CKD (chronic kidney disease) stage 3, GFR 30-59 ml/min (H)       * ACE NOT PRESCRIBED (INTENTIONAL)     0 each     1 each continuous prn ACE Inhibitor not prescribed due to Intolerance        ADVAIR DISKUS 250-50 MCG/DOSE diskus inhaler   Generic drug:  fluticasone-salmeterol     1 Inhaler    INHALE ONE PUFF BY MOUTH TWICE DAILY    Mild persistent asthma, uncomplicated       blood glucose monitoring meter device kit     1 kit    Use to test blood sugars 5-7 times daily or as directed.    Type 2 diabetes mellitus with diabetic nephropathy (H)       ferrous sulfate 325 (65 Fe) MG tablet    IRON    30 tablet    Take 1 tablet (325 mg) by mouth daily with food    Iron deficiency anemia, unspecified       fluticasone 50 MCG/ACT spray    FLONASE    16 g    Spray 2 sprays into both nostrils daily    Chronic rhinitis       insulin pen needle 31G X 5 MM     100 each    Use 4 times daily    CKD (chronic kidney disease) stage 3, GFR 30-59 ml/min (H)       labetalol 200 MG tablet    NORMODYNE    120 tablet    Take 2 tablets (400 mg) by mouth 2 times daily    Essential hypertension       LEVEMIR FLEXTOUCH 100 UNIT/ML injection   Generic drug:  insulin detemir     21 mL    Inject 48 Units Subcutaneous At Bedtime    Type 2 diabetes mellitus with diabetic nephropathy, with long-term current use of insulin (H)       neomycin-polymyxin-hydrocortisone 3.5-19010-1 otic suspension    CORTISPORIN    10 mL    Place 4 drops into the right ear 4 times daily    Infective otitis externa, right       NovoLOG FLEXPEN 100 UNIT/ML injection   Generic drug:  insulin aspart     15 mL    INJECT 9-12 UNITS BEFORE BREAKFAST, LUNCH, AND DINNER + A CORRECTIVE SCALE OF 2 UNITS FOR EVERY 50 OVER 150    Type 2 diabetes mellitus with diabetic nephropathy, with long-term current use of insulin (H)       omeprazole 20 MG CR capsule    priLOSEC    30 capsule    TAKE 1 CAPSULE BY MOUTH EVERY MORNING** (BEFORE BREAKFAST) TAKE 30 - 60 MINUTES BEFORE 1ST MEAL DAILY**    Esophagitis       sodium bicarbonate 650 MG tablet     30 tablet    TAKE ONE TABLET BY MOUTH ONE TIME DAILY     CKD (chronic kidney disease) stage 3, GFR 30-59 ml/min (H)       * STATIN NOT PRESCRIBED (INTENTIONAL)     0 each    continuous prn. Statin not prescribed intentionally due to Other:    Type 2 diabetes, HbA1c goal < 7% (H)       TYLENOL PO      Take by mouth as needed for mild pain or fever        valACYclovir 1000 mg tablet    VALTREX    10 tablet    Take 1 tablet (1,000 mg) by mouth daily for 10 days    Trigeminal herpes zoster       XOPENEX HFA 45 MCG/ACT Inhaler   Generic drug:  levalbuterol     15 g    INHALE TWO PUFFS BY MOUTH EVERY SIX HOURS AS NEEDED FOR SHORTNESS OF BREATH    Mild intermittent asthma, uncomplicated       * Notice:  This list has 2 medication(s) that are the same as other medications prescribed for you. Read the directions carefully, and ask your doctor or other care provider to review them with you.

## 2018-11-14 NOTE — NURSING NOTE
"Yissel Wetzel is a 58 year old female who presents for:  Chief Complaint   Patient presents with     Consult     Isma UE Vein mapping (2:30 VHC; 3:30 HLS) Eval for possible AVF creation; new pt to establish with vascular surgery        Vitals:    Vitals:    11/14/18 1451   BP: 132/60   BP Location: Left arm   Patient Position: Sitting   Cuff Size: Adult Large   Pulse: 77       BMI:  Estimated body mass index is 32.12 kg/(m^2) as calculated from the following:    Height as of 6/7/18: 5' 1\" (1.549 m).    Weight as of 7/13/18: 170 lb (77.1 kg).    Pain Score:  Data Unavailable        Dawna Méndez"

## 2018-11-14 NOTE — LETTER
[facility]  [address]  [place] [address]  Phone: [phone]  Fax: [phone]      2018    RE: Yissel Wetzel, : 1960      57 y/o female with ESRD here for evaluation for  AVF.  Good radial  Brachial  pulses bilaterlly.  Vein mapping shows an adequate right  Basilic the remainder of her veins both upper extremities are marginal.  Best option  Right  brachial  Basilic AVF.  Discussed all in detail  With patient and family.  She will  Call  back to schedule.    Sincerely,    Terry Vizcaino MD          [facility]  [address]  [place] [address]  Phone: [phone]  Fax: [phone]

## 2018-11-14 NOTE — PROGRESS NOTES
59 y/o female with ESRD here for evaluation for  AVF.  Good radial  Brachial  pulses bilaterlly.  Vein mapping shows an adequate right  Basilic the remainder of her veins both upper extremities are marginal.  Best option  Right  brachial  Basilic AVF.  Discussed all in detail  With patient and family.  She will  Call  back to schedule. Face  To face time 45 minutes greater than 50% in consultation.

## 2018-11-20 ENCOUNTER — TELEPHONE (OUTPATIENT)
Dept: OTHER | Facility: CLINIC | Age: 58
End: 2018-11-20

## 2018-11-20 NOTE — TELEPHONE ENCOUNTER
Pt left message on 11/16/18 late in the afternoon.  I LM for patient 11/19/18 asking that she call me back to schedule.  I asked that if she receives my voicemail when she calls back, to let me know if she does dialysis and if so which days and the timeframe she is looking to schedule.  Debrene LM today without any of the information.  I called her back and LM again asking the above information. Gi Hernandez,

## 2018-11-23 DIAGNOSIS — Z79.4 TYPE 2 DIABETES MELLITUS WITH DIABETIC NEPHROPATHY, WITH LONG-TERM CURRENT USE OF INSULIN (H): ICD-10-CM

## 2018-11-23 DIAGNOSIS — E11.21 TYPE 2 DIABETES MELLITUS WITH DIABETIC NEPHROPATHY, WITH LONG-TERM CURRENT USE OF INSULIN (H): ICD-10-CM

## 2018-11-23 RX ORDER — INSULIN DETEMIR 100 [IU]/ML
INJECTION, SOLUTION SUBCUTANEOUS
Qty: 21 ML | Refills: 3 | Status: ON HOLD | OUTPATIENT
Start: 2018-11-23 | End: 2019-03-19

## 2018-11-23 NOTE — TELEPHONE ENCOUNTER
Routing refill request to provider for review/approval because:  Labs not current:  LDL  BP not at goal    Amber HERNANDEZ RN, BSN, PHN

## 2018-11-23 NOTE — TELEPHONE ENCOUNTER
"Requested Prescriptions   Pending Prescriptions Disp Refills     LEVEMIR FLEXTOUCH 100 UNIT/ML injection [Pharmacy Med Name: Levemir FlexTouch Subcutaneous Solution Pen-injector 100 UNIT/ML] 21 mL 3     Sig: inject 66 units subcutaneously at bedtime    Long Acting Insulin Protocol Failed    11/23/2018  9:45 AM       Failed - Blood pressure less than 140/90 in past 6 months    BP Readings from Last 3 Encounters:   11/14/18 (!) 224/71   07/13/18 152/72   06/07/18 180/80                Failed - LDL on file in past 12 months    Recent Labs   Lab Test  07/22/16   1056   LDL  123*            Passed - Microalbumin on file in past 12 months    Recent Labs   Lab Test  06/07/18   1455   MICROL  4520   UMALCR  6716.20*            Passed - Serum creatinine on file in past 12 months    Recent Labs   Lab Test  10/31/18   1328   CR  4.58*            Passed - HgbA1C in past 3 or 6 months    If HgbA1C is 8 or greater, it needs to be on file within the past 3 months.  If less than 8, must be on file within the past 6 months.     Recent Labs   Lab Test  06/07/18   1444   A1C  7.3*            Passed - Patient is age 18 or older       Passed - Recent (6 mo) or future (30 days) visit within the authorizing provider's specialty    Patient had office visit in the last 6 months or has a visit in the next 30 days with authorizing provider or within the authorizing provider's specialty.  See \"Patient Info\" tab in inbasket, or \"Choose Columns\" in Meds & Orders section of the refill encounter.          Last Written Prescription Date:  6/7/2018  Last Fill Quantity: 21ml,  # refills: 4   Last office visit: 6/7/2018 with prescribing provider:  6/7/18   Future Office Visit:          "

## 2018-11-29 ENCOUNTER — TRANSFERRED RECORDS (OUTPATIENT)
Dept: HEALTH INFORMATION MANAGEMENT | Facility: CLINIC | Age: 58
End: 2018-11-29

## 2018-11-30 NOTE — TELEPHONE ENCOUNTER
Yissel was scheduled for 12/26/18.  She was hoping she could have surgery sooner so today I told her something opened up and I could get her scheduled for 12/07/18.  Yissel said she could not come that day and to leave her surgery on for 12/26/18. Gi Hernandez,

## 2018-12-10 NOTE — TELEPHONE ENCOUNTER
Type of surgery: RIGHT BRACHIAL BASILIC ARTERIAL VENOUS FISTULA  Location of surgery: [facility] OR  Date and time of surgery: 12/26/18 @ 9:00AM  Surgeon: DR. KIERA BUITRAGO  Pre-Op Appt Date: PT TO SCHEDULE AT [facility] OR WITH DR. FUENTES AT [facility]  Post-Op Appt Date: PT TO SCHEDULE   Packet sent out: MAILED 11/30/18  Pre-cert/Authorization completed:  Yes  Date: 12/10/18

## 2018-12-26 ENCOUNTER — ANESTHESIA (OUTPATIENT)
Dept: SURGERY | Facility: CLINIC | Age: 58
End: 2018-12-26
Payer: MEDICARE

## 2018-12-26 ENCOUNTER — ANESTHESIA EVENT (OUTPATIENT)
Dept: SURGERY | Facility: CLINIC | Age: 58
End: 2018-12-26
Payer: MEDICARE

## 2018-12-26 ENCOUNTER — HOSPITAL ENCOUNTER (OUTPATIENT)
Facility: CLINIC | Age: 58
Discharge: HOME | End: 2018-12-26
Attending: SURGERY | Admitting: SURGERY
Payer: MEDICARE

## 2018-12-26 ENCOUNTER — OFFICE VISIT (OUTPATIENT)
Dept: SURGERY | Facility: PHYSICIAN GROUP | Age: 58
End: 2018-12-26
Payer: MEDICARE

## 2018-12-26 VITALS
OXYGEN SATURATION: 95 % | DIASTOLIC BLOOD PRESSURE: 69 MMHG | HEART RATE: 54 BPM | SYSTOLIC BLOOD PRESSURE: 178 MMHG | TEMPERATURE: 96.9 F | HEIGHT: 61 IN | BODY MASS INDEX: 33.08 KG/M2 | WEIGHT: 175.2 LBS | RESPIRATION RATE: 16 BRPM

## 2018-12-26 DIAGNOSIS — N18.30 CKD (CHRONIC KIDNEY DISEASE) STAGE 3, GFR 30-59 ML/MIN (H): Primary | ICD-10-CM

## 2018-12-26 LAB
ANION GAP SERPL CALCULATED.3IONS-SCNC: 9 MMOL/L (ref 3–14)
BUN SERPL-MCNC: 60 MG/DL (ref 7–30)
CALCIUM SERPL-MCNC: 7.9 MG/DL (ref 8.5–10.1)
CHLORIDE SERPL-SCNC: 111 MMOL/L (ref 94–109)
CO2 SERPL-SCNC: 25 MMOL/L (ref 20–32)
CREAT SERPL-MCNC: 5.27 MG/DL (ref 0.52–1.04)
GFR SERPL CREATININE-BSD FRML MDRD: 8 ML/MIN/{1.73_M2}
GLUCOSE BLDC GLUCOMTR-MCNC: 157 MG/DL (ref 70–99)
GLUCOSE BLDC GLUCOMTR-MCNC: 179 MG/DL (ref 70–99)
GLUCOSE SERPL-MCNC: 128 MG/DL (ref 70–99)
HBA1C MFR BLD: 8 % (ref 0–5.6)
POTASSIUM SERPL-SCNC: 4.3 MMOL/L (ref 3.4–5.3)
SODIUM SERPL-SCNC: 145 MMOL/L (ref 133–144)

## 2018-12-26 PROCEDURE — 37000009 ZZH ANESTHESIA TECHNICAL FEE, EACH ADDTL 15 MIN: Performed by: SURGERY

## 2018-12-26 PROCEDURE — 71000027 ZZH RECOVERY PHASE 2 EACH 15 MINS: Performed by: SURGERY

## 2018-12-26 PROCEDURE — 25000125 ZZHC RX 250: Performed by: SURGERY

## 2018-12-26 PROCEDURE — 36415 COLL VENOUS BLD VENIPUNCTURE: CPT | Performed by: SURGERY

## 2018-12-26 PROCEDURE — 40000065 ZZH STATISTIC EKG NON-CHARGEABLE

## 2018-12-26 PROCEDURE — 25000128 H RX IP 250 OP 636: Performed by: SURGERY

## 2018-12-26 PROCEDURE — 83036 HEMOGLOBIN GLYCOSYLATED A1C: CPT | Performed by: SURGERY

## 2018-12-26 PROCEDURE — 25000125 ZZHC RX 250: Performed by: NURSE ANESTHETIST, CERTIFIED REGISTERED

## 2018-12-26 PROCEDURE — 93005 ELECTROCARDIOGRAM TRACING: CPT

## 2018-12-26 PROCEDURE — 36819 AV FUSE UPPR ARM BASILIC: CPT | Mod: RT | Performed by: SURGERY

## 2018-12-26 PROCEDURE — 25000128 H RX IP 250 OP 636: Performed by: ANESTHESIOLOGY

## 2018-12-26 PROCEDURE — 71000012 ZZH RECOVERY PHASE 1 LEVEL 1 FIRST HR: Performed by: SURGERY

## 2018-12-26 PROCEDURE — 25000128 H RX IP 250 OP 636: Performed by: NURSE ANESTHETIST, CERTIFIED REGISTERED

## 2018-12-26 PROCEDURE — 40000171 ZZH STATISTIC PRE-PROCEDURE ASSESSMENT III: Performed by: SURGERY

## 2018-12-26 PROCEDURE — 80048 BASIC METABOLIC PNL TOTAL CA: CPT | Performed by: SURGERY

## 2018-12-26 PROCEDURE — 27210794 ZZH OR GENERAL SUPPLY STERILE: Performed by: SURGERY

## 2018-12-26 PROCEDURE — 37000008 ZZH ANESTHESIA TECHNICAL FEE, 1ST 30 MIN: Performed by: SURGERY

## 2018-12-26 PROCEDURE — 82962 GLUCOSE BLOOD TEST: CPT | Mod: 91

## 2018-12-26 PROCEDURE — 36000058 ZZH SURGERY LEVEL 3 EA 15 ADDTL MIN: Performed by: SURGERY

## 2018-12-26 PROCEDURE — 93010 ELECTROCARDIOGRAM REPORT: CPT | Performed by: INTERNAL MEDICINE

## 2018-12-26 PROCEDURE — 36000056 ZZH SURGERY LEVEL 3 1ST 30 MIN: Performed by: SURGERY

## 2018-12-26 PROCEDURE — 25000125 ZZHC RX 250: Performed by: ANESTHESIOLOGY

## 2018-12-26 RX ORDER — HEPARIN SODIUM 1000 [USP'U]/ML
INJECTION, SOLUTION INTRAVENOUS; SUBCUTANEOUS
Status: DISCONTINUED
Start: 2018-12-26 | End: 2018-12-26 | Stop reason: HOSPADM

## 2018-12-26 RX ORDER — SODIUM CHLORIDE 9 MG/ML
INJECTION, SOLUTION INTRAVENOUS CONTINUOUS PRN
Status: DISCONTINUED | OUTPATIENT
Start: 2018-12-26 | End: 2018-12-26

## 2018-12-26 RX ORDER — LIDOCAINE HYDROCHLORIDE 10 MG/ML
INJECTION, SOLUTION INFILTRATION; PERINEURAL
Status: DISCONTINUED
Start: 2018-12-26 | End: 2018-12-26 | Stop reason: HOSPADM

## 2018-12-26 RX ORDER — SODIUM CHLORIDE, SODIUM LACTATE, POTASSIUM CHLORIDE, CALCIUM CHLORIDE 600; 310; 30; 20 MG/100ML; MG/100ML; MG/100ML; MG/100ML
INJECTION, SOLUTION INTRAVENOUS CONTINUOUS
Status: DISCONTINUED | OUTPATIENT
Start: 2018-12-26 | End: 2018-12-26 | Stop reason: HOSPADM

## 2018-12-26 RX ORDER — ALBUTEROL SULFATE 0.83 MG/ML
2.5 SOLUTION RESPIRATORY (INHALATION)
Status: DISCONTINUED | OUTPATIENT
Start: 2018-12-26 | End: 2018-12-26 | Stop reason: HOSPADM

## 2018-12-26 RX ORDER — PROPOFOL 10 MG/ML
INJECTION, EMULSION INTRAVENOUS CONTINUOUS PRN
Status: DISCONTINUED | OUTPATIENT
Start: 2018-12-26 | End: 2018-12-26

## 2018-12-26 RX ORDER — DEXAMETHASONE SODIUM PHOSPHATE 4 MG/ML
4 INJECTION, SOLUTION INTRA-ARTICULAR; INTRALESIONAL; INTRAMUSCULAR; INTRAVENOUS; SOFT TISSUE EVERY 10 MIN PRN
Status: DISCONTINUED | OUTPATIENT
Start: 2018-12-26 | End: 2018-12-26 | Stop reason: HOSPADM

## 2018-12-26 RX ORDER — FENTANYL CITRATE 50 UG/ML
25-50 INJECTION, SOLUTION INTRAMUSCULAR; INTRAVENOUS EVERY 5 MIN PRN
Status: DISCONTINUED | OUTPATIENT
Start: 2018-12-26 | End: 2018-12-26 | Stop reason: HOSPADM

## 2018-12-26 RX ORDER — MAGNESIUM HYDROXIDE 1200 MG/15ML
LIQUID ORAL PRN
Status: DISCONTINUED | OUTPATIENT
Start: 2018-12-26 | End: 2018-12-26 | Stop reason: HOSPADM

## 2018-12-26 RX ORDER — ONDANSETRON 4 MG/1
4 TABLET, ORALLY DISINTEGRATING ORAL EVERY 30 MIN PRN
Status: DISCONTINUED | OUTPATIENT
Start: 2018-12-26 | End: 2018-12-26 | Stop reason: HOSPADM

## 2018-12-26 RX ORDER — NALOXONE HYDROCHLORIDE 1 MG/ML
INJECTION INTRAMUSCULAR; INTRAVENOUS; SUBCUTANEOUS PRN
Status: DISCONTINUED | OUTPATIENT
Start: 2018-12-26 | End: 2018-12-26

## 2018-12-26 RX ORDER — ONDANSETRON 2 MG/ML
4 INJECTION INTRAMUSCULAR; INTRAVENOUS EVERY 30 MIN PRN
Status: DISCONTINUED | OUTPATIENT
Start: 2018-12-26 | End: 2018-12-26 | Stop reason: HOSPADM

## 2018-12-26 RX ORDER — FENTANYL CITRATE 50 UG/ML
25 INJECTION, SOLUTION INTRAMUSCULAR; INTRAVENOUS
Status: DISCONTINUED | OUTPATIENT
Start: 2018-12-26 | End: 2018-12-26 | Stop reason: HOSPADM

## 2018-12-26 RX ORDER — NALOXONE HYDROCHLORIDE 0.4 MG/ML
.1-.4 INJECTION, SOLUTION INTRAMUSCULAR; INTRAVENOUS; SUBCUTANEOUS
Status: DISCONTINUED | OUTPATIENT
Start: 2018-12-26 | End: 2018-12-26 | Stop reason: HOSPADM

## 2018-12-26 RX ORDER — GINSENG 100 MG
CAPSULE ORAL
Status: DISCONTINUED
Start: 2018-12-26 | End: 2018-12-26 | Stop reason: WASHOUT

## 2018-12-26 RX ORDER — OXYCODONE HYDROCHLORIDE 5 MG/1
5 TABLET ORAL EVERY 6 HOURS PRN
Qty: 12 TABLET | Refills: 0 | Status: ON HOLD | OUTPATIENT
Start: 2018-12-26 | End: 2019-03-15

## 2018-12-26 RX ORDER — ONDANSETRON 2 MG/ML
INJECTION INTRAMUSCULAR; INTRAVENOUS PRN
Status: DISCONTINUED | OUTPATIENT
Start: 2018-12-26 | End: 2018-12-26

## 2018-12-26 RX ORDER — CEFAZOLIN SODIUM 2 G/100ML
2 INJECTION, SOLUTION INTRAVENOUS
Status: COMPLETED | OUTPATIENT
Start: 2018-12-26 | End: 2018-12-26

## 2018-12-26 RX ORDER — LIDOCAINE 40 MG/G
CREAM TOPICAL
Status: DISCONTINUED | OUTPATIENT
Start: 2018-12-26 | End: 2018-12-26 | Stop reason: HOSPADM

## 2018-12-26 RX ADMIN — PROPOFOL 50 MCG/KG/MIN: 10 INJECTION, EMULSION INTRAVENOUS at 09:11

## 2018-12-26 RX ADMIN — DEXMEDETOMIDINE HYDROCHLORIDE 8 MCG: 100 INJECTION, SOLUTION INTRAVENOUS at 09:30

## 2018-12-26 RX ADMIN — SODIUM CHLORIDE: 9 INJECTION, SOLUTION INTRAVENOUS at 08:07

## 2018-12-26 RX ADMIN — DEXMEDETOMIDINE HYDROCHLORIDE 4 MCG: 100 INJECTION, SOLUTION INTRAVENOUS at 11:36

## 2018-12-26 RX ADMIN — CEFAZOLIN SODIUM 2 G: 2 INJECTION, SOLUTION INTRAVENOUS at 09:20

## 2018-12-26 RX ADMIN — BUPIVACAINE HYDROCHLORIDE 15 ML GIVEN: 5 INJECTION, SOLUTION EPIDURAL; INTRACAUDAL; PERINEURAL at 08:39

## 2018-12-26 RX ADMIN — DEXMEDETOMIDINE HYDROCHLORIDE 8 MCG: 100 INJECTION, SOLUTION INTRAVENOUS at 11:15

## 2018-12-26 RX ADMIN — FENTANYL CITRATE 25 MCG: 50 INJECTION, SOLUTION INTRAMUSCULAR; INTRAVENOUS at 08:32

## 2018-12-26 RX ADMIN — ONDANSETRON 4 MG: 2 INJECTION INTRAMUSCULAR; INTRAVENOUS at 08:55

## 2018-12-26 RX ADMIN — NALOXONE HYDROCHLORIDE 0.04 MG: 1 INJECTION PARENTERAL at 09:05

## 2018-12-26 ASSESSMENT — ENCOUNTER SYMPTOMS
DYSRHYTHMIAS: 0
SEIZURES: 0

## 2018-12-26 ASSESSMENT — LIFESTYLE VARIABLES: TOBACCO_USE: 1

## 2018-12-26 ASSESSMENT — MIFFLIN-ST. JEOR: SCORE: 1317.08

## 2018-12-26 ASSESSMENT — COPD QUESTIONNAIRES: COPD: 0

## 2018-12-26 NOTE — ANESTHESIA PROCEDURE NOTES
Peripheral nerve/Neuraxial procedure note : Supraclavicular  Pre-Procedure  Performed by Rahul Haynes MD  Location: pre-op      Pre-Anesthestic Checklist: patient identified, IV checked, site marked, risks and benefits discussed, informed consent, pre-op evaluation, at physician/surgeon's request and post-op pain management    Timeout  Correct Patient: Yes   Correct Procedure: Yes   Correct Site: Yes   Correct Laterality: Yes   Correct Position: Yes   Site Marked: Yes   .   Procedure Documentation    .    Procedure:  right  Supraclavicular.  Local skin infiltrated with mL of 1% lidocaine.     Ultrasound used to identify targeted nerve, plexus, or vascular marker and placed a needle adjacent to it., Ultrasound was used to visualize the spread of the anesthetic in close proximity to the above stated nerve. A permanent image is entered into the patient's record.  Patient Prep;chlorhexidine gluconate and isopropyl alcohol.  .  Needle: insulated Needle Gauge: 21.    Needle Length (Inches) 2  Insertion Method: Single Shot.       Assessment/Narrative  Paresthesias: No.  .  The placement was negative for: blood aspirated.  Bolus given via needle..   Secured via.   Complications: none. Comments:  15 ml 0.5% bupivacaine incrementally injected.  Aspiration every 5 ml negative for heme.  Patient reported no paresthesiae during procedure.  Patient tolerated procedure well.

## 2018-12-26 NOTE — ANESTHESIA POSTPROCEDURE EVALUATION
Patient: Yissel Wetzel    Procedure(s):  RIGHT BRACHIAL TO BASILIC ARTERIOVENOUS FISTULA CREATION    Diagnosis:endstage renal disease   Diagnosis Additional Information: No value filed.    Anesthesia Type:  Peripheral Nerve Block    Note:  Anesthesia Post Evaluation    Patient location during evaluation: Bedside  Patient participation: Able to participate in evaluation but full recovery from regional anesthesia has not yet ocurrred but is anticipated to occur within 48 hours  Level of consciousness: awake and alert  Pain management: adequate  Airway patency: patent  Cardiovascular status: acceptable  Respiratory status: acceptable  Hydration status: acceptable  PONV: none             Last vitals:  Vitals:    12/26/18 1300 12/26/18 1315 12/26/18 1355   BP: 160/69  178/69   Pulse: 54     Resp: 18 17 16   Temp:      SpO2: 91% 92% 95%         Electronically Signed By: Rahul Haynes MD  December 26, 2018  2:49 PM

## 2018-12-26 NOTE — ANESTHESIA CARE TRANSFER NOTE
Patient: Yissel Wetzel    Procedure(s):  RIGHT BRACHIAL TO BASILIC ARTERIOVENOUS FISTULA CREATION    Diagnosis: endstage renal disease   Diagnosis Additional Information: No value filed.    Anesthesia Type:   Peripheral Nerve Block     Note:  Airway :Room Air  Patient transferred to:PACU  Comments: At end of procedure, spontaneous respirations, patient alert to voice, able to follow commands. Patient breathing room air at room air to PACU. Oxygen tubing connected to wall O2 in PACU, SpO2, NiBP, and EKG monitors and alarms on and functioning, Alexys Hugger warmer connected to patient gown, report on patient's clinical status given to PACU RN, RN questions answered.Handoff Report: Identifed the Patient, Identified the Reponsible Provider, Reviewed the pertinent medical history, Discussed the surgical course, Reviewed Intra-OP anesthesia mangement and issues during anesthesia, Set expectations for post-procedure period and Allowed opportunity for questions and acknowledgement of understanding      Vitals: (Last set prior to Anesthesia Care Transfer)    CRNA VITALS  12/26/2018 1150 - 12/26/2018 1224      12/26/2018             Resp Rate (set):  10                Electronically Signed By: DAVID Holloway CRNA  December 26, 2018  12:24 PM

## 2018-12-26 NOTE — ANESTHESIA PREPROCEDURE EVALUATION
Anesthesia Pre-Procedure Evaluation    Patient: Yissel Wetzel   MRN: [id number] : 1960          Preoperative Diagnosis: endstage renal disease     Procedure(s):  RIGHT BRACHIAL TO BASILIC ARTERIOVENOUS FISTULA CREATION    Past Medical History:   Diagnosis Date     Allergic rhinitis, cause unspecified      Anemia of chronic disease      CKD (chronic kidney disease) stage 3, GFR 30-59 ml/min (H)      Esophagitis, unspecified      HEMORRHAGIC GASTROPATHY      Iron deficiency anemia, unspecified      Mild persistent asthma      Other and unspecified hyperlipidemia      Pneumonia      Pulmonary hypertension (H)     per 2012 echo mod.to severe pulmonary hypertension     Tobacco use disorder dc ed      Type II or unspecified type diabetes mellitus without mention of complication, not stated as uncontrolled      Unspecified essential hypertension      Past Surgical History:   Procedure Laterality Date     C NONSPECIFIC PROCEDURE      c-sections x 2     C NONSPECIFIC PROCEDURE      laser rx ou several times     HYSTERECTOMY, PAP NO LONGER INDICATED  approx     hyst/ooph/bleeding       Anesthesia Evaluation     . Pt has had prior anesthetic. Type: General    History of anesthetic complications   - PONV  Slow to wake      ROS/MED HX    ENT/Pulmonary:     (+)allergic rhinitis, tobacco use, Past use Mild Persistent asthma , . .   (-) COPD   Neurologic:      (-) seizures, CVA and TIA   Cardiovascular:     (+) hypertension----. : . CHF . . :. . pulmonary hypertension, Previous cardiac testing Echodate:2017results:Interpretation Summary     1. The left ventricle is normal in structure, function and size. There is mild  to moderate concentric left ventricular hypertrophy. The visual ejection  fraction is estimated at 60%.  2. Grade II or moderate diastolic dysfunction.  3. The right ventricle is normal in structure, function and size. Mild  flattending of septum, consistent with RV volume/pressure overload.  4.  Pulmonary hypertension The right ventricular systolic pressure is  approximated at 60mmHg plus the right atrial pressure.     Echo from 12/2012 showed EF 60%, mild TR, RVSP 50mmHg; overall grossly no  changes.  _____________________________________________________________________________  __        Left Ventricle  The left ventricle is normal in structure, function and size. There is mild to  moderate concentric left ventricular hypertrophy. The visual ejection fraction  is estimated at 60%. Grade II or moderate diastolic dysfunction. Normal left  ventricular wall motion.     Right Ventricle  The right ventricle is normal in structure, function and size. Mild  flattending of septum, consistent with RV volume/pressure overload.     Atria  The left atrium is moderately dilated. The right atrium is mild to moderately  dilated. There is no atrial shunt seen.     Mitral Valve  There is mild to moderate (1-2+) mitral regurgitation.        Tricuspid Valve  There is mild (1+) tricuspid regurgitation. Pulmonary hypertension. The right  ventricular systolic pressure is approximated at 60mmHg plus the right atrial  pressure.     Aortic Valve  There is mild trileaflet aortic sclerosis. There is mild (1+) aortic  regurgitation. No aortic stenosis is present.     Pulmonic Valve  The pulmonic valve is normal in structure and function.     Vessels  Normal ascending, transverse (arch), and descending aorta.     Pericardium  There is no pericardial effusion.date: results:ECG reviewed date:today results:SB, PACs date: results:         (-) arrhythmias   METS/Exercise Tolerance:     Hematologic:     (+) Anemia, -      Musculoskeletal:         GI/Hepatic:     (+) GERD Asymptomatic on medication,      (-) liver disease   Renal/Genitourinary:     (+) chronic renal disease, type: ESRD, Pt requires dialysis, type: Hemodialysis,       Endo:     (+) type II DM Using insulin Diabetic complications: nephropathy, .      Psychiatric:        "  Infectious Disease:         Malignancy:         Other:                          Physical Exam      Airway   Mallampati: II  TM distance: >3 FB  Neck ROM: full    Dental   (+) missing and loose  Comment: Poor dentition, lower tooth very loose    Cardiovascular   Rhythm and rate: regular      Pulmonary    breath sounds clear to auscultation            Lab Results   Component Value Date    WBC 6.1 11/24/2017    HGB 8.4 (L) 11/24/2017    HCT 27.3 (L) 11/24/2017     (L) 11/24/2017    SED 44 (H) 08/29/2006     (H) 10/31/2018    POTASSIUM 5.3 10/31/2018    CHLORIDE 113 (H) 10/31/2018    CO2 20 10/31/2018    BUN 62 (H) 10/31/2018    CR 4.58 (H) 10/31/2018     (H) 10/31/2018    KANWAL 9.1 10/31/2018    PHOS 5.2 (H) 07/25/2012    MAG 2.5 (H) 11/29/2012    ALBUMIN 2.9 (L) 11/24/2017    PROTTOTAL 6.0 (L) 11/24/2017    ALT 16 11/24/2017    AST 17 11/24/2017    ALKPHOS 94 11/24/2017    BILITOTAL 0.6 11/24/2017    PTT 30 08/29/2006    INR 1.16 (H) 08/29/2006    TSH 3.33 01/20/2017       Preop Vitals  BP Readings from Last 3 Encounters:   11/14/18 (!) 224/71   07/13/18 152/72   06/07/18 180/80    Pulse Readings from Last 3 Encounters:   11/14/18 58   07/13/18 54   06/07/18 61      Resp Readings from Last 3 Encounters:   07/13/18 16   06/07/18 16   11/24/17 26    SpO2 Readings from Last 3 Encounters:   07/13/18 95%   06/07/18 98%   05/12/18 97%      Temp Readings from Last 1 Encounters:   07/13/18 36.4  C (97.6  F) (Oral)    Ht Readings from Last 1 Encounters:   06/07/18 1.549 m (5' 1\")      Wt Readings from Last 1 Encounters:   07/13/18 77.1 kg (170 lb)    Estimated body mass index is 32.12 kg/m  as calculated from the following:    Height as of 6/7/18: 1.549 m (5' 1\").    Weight as of 7/13/18: 77.1 kg (170 lb).       Anesthesia Plan      History & Physical Review  History and physical reviewed and following examination; no interval change.    ASA Status:  4 .    NPO Status:  > 8 hours    Plan for Peripheral " Nerve Block   PONV prophylaxis:  Ondansetron (or other 5HT-3)  MAC + block      Postoperative Care  Postoperative pain management:  Peripheral nerve block (Single Shot).      Consents  Anesthetic plan, risks, benefits and alternatives discussed with:  Patient..                 Rahul Haynes MD

## 2018-12-26 NOTE — OR NURSING
Dr Haynes aware of elevated b/p. Okay for discharge if pt goes home and takes missed anti hypertensive.

## 2018-12-26 NOTE — DISCHARGE INSTRUCTIONS
Same Day Surgery Discharge Instructions for  Sedation and General Anesthesia       It's not unusual to feel dizzy, light-headed or faint for up to 24 hours after surgery or while taking pain medication.  If you have these symptoms: sit for a few minutes before standing and have someone assist you when you get up to walk or use the bathroom.      You should rest and relax for the next 24 hours. We recommend you make arrangements to have an adult stay with you for at least 24 hours after your discharge.  Avoid hazardous and strenuous activity.      DO NOT DRIVE any vehicle or operate mechanical equipment for 24 hours following the end of your surgery.  Even though you may feel normal, your reactions may be affected by the medication you have received.      Do not drink alcoholic beverages for 24 hours following surgery.       Slowly progress to your regular diet as you feel able. It's not unusual to feel nauseated and/or vomit after receiving anesthesia.  If you develop these symptoms, drink clear liquids (apple juice, ginger ale, broth, 7-up, etc. ) until you feel better.  If your nausea and vomiting persists for 24 hours, please notify your surgeon.        All narcotic pain medications, along with inactivity and anesthesia, can cause constipation. Drinking plenty of liquids and increasing fiber intake will help.      For any questions of a medical nature, call your surgeon.      Do not make important decisions for 24 hours.      If you had general anesthesia, you may have a sore throat for a couple of days related to the breathing tube used during surgery.  You may use Cepacol lozenges to help with this discomfort.  If it worsens or if you develop a fever, contact your surgeon.       If you feel your pain is not well managed with the pain medications prescribed by your surgeon, please contact your surgeon's office to let them know so they can address your concerns.           [facility]      DISCHARGE  "INSTRUCTIONS FOLLOWING   REGIONAL BLOCK ANESTHESIA      Numbness or lack of feeling in the arm that was operated may last up to 24 hours.  The average time is usually 10-15 hours.  You may not be able to lift or move the arm  where the operation was by itself during that time.  Long-acting local anesthetic medicines were used to give you long-lasting pain relief.    Wear a sling until your arm is completely \"awake\"    Avoid bumping your arm while it is numb    Avoid extremes of hot or cold while it is numb    Remain quiet and restful the day of surgery.  Resume normal activities gradually over the next day or so as advise by your surgeon.    Do not drive or operate  Any machinery until your extremity is full  \"awake\"        You will have a tingling and prickly sensation in your arm as the feeling begins to return; you can also expect some discomfort. The amount of discomfort is unpredictable, but if you have more pain that can be controlled with the pain medication you received, you should contact your surgeon.  Start to take your pain pills as soon as you start to feel any discomfort or pain.  We strongly recommend starting your pain medication at bedtime if you haven't already done so.  This is in the event that the block wears off while you are asleep.                      ARTERIAL VENOUS FISTULA  Discharge Instructions       You may be able to feel the blood flowing through your fistula, it feels similar to a purring cat. This is called a \"thrill\"  Remove outer dressing in 48 hours, leave steri strips (small white pieces of tape) on.  Let them fall off on their own or gently remove them in 7 days.  Okay to shower once outer dressing is removed.  No soaking for 4 weeks.    Call your Surgeon If You Have Any of the Following:  Fever of 100.4 F or higher  Signs of infection at the incision site, such as increased redness or swelling, warmth, worsening pain, bleeding, or foul-smelling drainage  Lack of a \"thrill\" " (you can t feel it)  Pain or numbness in your fingers, hand, or arm  Bleeding, redness, or warmth around your fistula  Sudden bulging of the fistula (more than usual; a slight bulge is normal)   Follow-Up  The doctor will check your fistula within 1 to 2 weeks after the procedure.     It will likely take about 6 to 8 weeks for the fistula to enlarge enough to start dialysis. After that, make sure the fistula is checked each time you have dialysis.           **If you have questions or concerns about your procedure,   call Dr. Vizcaino at [phone]**

## 2018-12-26 NOTE — OR NURSING
Patient has large, hard hematoma on left LE ( attempted IV site).  Dr Haynes updated.  Pressure dressing applied.  Patient tolerated block. Family at bed side.

## 2018-12-27 NOTE — OP NOTE
Procedure Date: 12/26/2018      PREOPERATIVE DIAGNOSIS:  End-stage renal disease.      POSTOPERATIVE DIAGNOSIS:  End-stage renal disease.      PROCEDURE:  Right brachial basilic arteriovenous fistula with translocation.      :  Hank Meraz MD       DESCRIPTION OF PROCEDURE:  With the patient awake and in the supine position, the right upper extremity was prepped and draped in routine sterile fashion.  The patient was given IV sedation and had an axillary nerve block of the right upper extremity.  After performing appropriate timeout, a short transverse incision was made just above the antecubital space.  Dissection sharply carried down to the brachial artery and the basilic vein.  The brachial artery was dissected out over the course of approximately 4 cm, encircled proximally and distally with vessel loops.  The basilic vein was then freed up from this level up to the axilla through serial longitudinal incisions directly over the vein.  Venous side branches were clamped, divided and ligated with interrupted ties of 3-0 silk.  Once the vein was freed up, it was ligated distally with interrupted ties of 2-0 silk and transected.  The vein was then mobilized up through the excision at the level of her axilla.  A new tunnel was fashioned anterior and more superficial to this so that the vein will be translocated and could be used for dialysis.  The vein was then passed through this newly formed tunnel down to the brachial artery.  The vein was then flushed with dilute heparinized saline solution.  The distal end of the vein was dilated up to a 3 DeBakey dilator.  The brachial artery was then occluded proximally and distally and opened in a longitudinal arteriotomy.  The distal end of the vein was spatulated and anastomosed to the arteriotomy in an end-to-side orientation of running suture of 7-0 Prolene.  Flow was then allowed to pass from the artery into the basilic vein.  There was a venous side  branch that needed to be ligated just distal to the anastomosis with a single suture ligature of 7-0 Prolene.  There was another side branch near the axilla which was oversewn with interrupted suture of 7-0 Prolene.  After assuring good hemostasis, the incisions were closed in layers with a running suture of 3-0 Vicryl in subcutaneous tissue and 4-0 subcuticular Vicryl stitches in the skin.  Sponge and needle counts were correct at the termination of the procedure.  The patient had a good thrill in the vein distal to our arteriovenous anastomosis and had a palpable right radial pulse present.  The patient tolerated the procedure well and was taken to the post-anesthetic recovery room in stable condition.         KADEEM BUITRAGO III, MD             D: 2018   T: 2018   MT: GRZEGORZ      Name:     ELIECER CHACON   MRN:      [id number]        Account:        [id number]   :      1960           Procedure Date: 2018      Document: [id number]

## 2018-12-28 LAB — INTERPRETATION ECG - MUSE: NORMAL

## 2018-12-31 ENCOUNTER — TELEPHONE (OUTPATIENT)
Dept: OTHER | Facility: CLINIC | Age: 58
End: 2018-12-31

## 2018-12-31 NOTE — TELEPHONE ENCOUNTER
"Yissel underwent RIGHT BRACHIAL TO BASILIC ARTERIOVENOUS FISTULA CREATION on 12/26/18.  She called today reporting right arm swelling and numbness to her right pointer and middle finger. Denies bleeding, fever, drainage, redness.  When asked if she could feel a thrill Yissel responded \"I think I can I feel something, like blood flow.\"   I advised Yissel to elevate her arm and apply ice throughout the day and to rest not using that arm.  I encouraged Yissel to call back later today with further concerns or if swelling does not go down.  I advised her to present to ER if tonight or tomorrow (due to New Years Day holiday) if she starts experiencing new or worsening symptoms and she verbalized understanding.    Jansi HERNANDEZN, RN        "

## 2019-01-02 ENCOUNTER — OFFICE VISIT (OUTPATIENT)
Dept: OTHER | Facility: CLINIC | Age: 59
End: 2019-01-02
Attending: SURGERY
Payer: MEDICARE

## 2019-01-02 DIAGNOSIS — Z09 FOLLOW-UP EXAMINATION FOLLOWING SURGERY: Primary | ICD-10-CM

## 2019-01-02 PROCEDURE — 99024 POSTOP FOLLOW-UP VISIT: CPT | Mod: ZP | Performed by: SURGERY

## 2019-01-02 PROCEDURE — G0463 HOSPITAL OUTPT CLINIC VISIT: HCPCS

## 2019-01-02 NOTE — PROGRESS NOTES
Wounds healing nicely, some numbness right  Hand and decreased  strength after axillary block--should resolve over time,  Right  Radial  Pulse 4+ good thrill in Brachial basilic AVF.  Can not work at  This point will reevaluate in  Two  Weeks.

## 2019-01-02 NOTE — LETTER
January 2, 2019      Yissel Wetzel  [address]   [facility] [place] [address]        To Whom It May Concern:    Yissel Wetzel has been under my professional care for an arteriovenous fistula creation.  Yissel has the inability of grasp firmly with her hand and will be out of work for two weeks.  She will be re-evaluated by myself in 2 weeks.      Sincerely,        Terry Vizcaino MD

## 2019-01-16 ENCOUNTER — HOSPITAL ENCOUNTER (OUTPATIENT)
Dept: LAB | Facility: CLINIC | Age: 59
Discharge: HOME | End: 2019-01-16
Attending: SURGERY | Admitting: SURGERY
Payer: MEDICARE

## 2019-01-16 ENCOUNTER — OFFICE VISIT (OUTPATIENT)
Dept: OTHER | Facility: CLINIC | Age: 59
End: 2019-01-16
Attending: SURGERY
Payer: MEDICARE

## 2019-01-16 DIAGNOSIS — T82.898A OTHER SPECIFIED COMPLICATION OF VASCULAR PROSTHETIC DEVICES, IMPLANTS AND GRAFTS, INITIAL ENCOUNTER (H): ICD-10-CM

## 2019-01-16 DIAGNOSIS — N18.6 END STAGE RENAL DISEASE (H): Primary | ICD-10-CM

## 2019-01-16 DIAGNOSIS — N18.5 CKD (CHRONIC KIDNEY DISEASE), STAGE V (H): ICD-10-CM

## 2019-01-16 DIAGNOSIS — I77.0 AVF (ARTERIOVENOUS FISTULA) (H): ICD-10-CM

## 2019-01-16 DIAGNOSIS — Z09 FOLLOW-UP EXAMINATION FOLLOWING SURGERY: ICD-10-CM

## 2019-01-16 DIAGNOSIS — D63.1 ANEMIA IN CKD (CHRONIC KIDNEY DISEASE): ICD-10-CM

## 2019-01-16 DIAGNOSIS — N18.9 ANEMIA IN CKD (CHRONIC KIDNEY DISEASE): ICD-10-CM

## 2019-01-16 LAB
ALBUMIN SERPL-MCNC: 2.4 G/DL (ref 3.4–5)
ANION GAP SERPL CALCULATED.3IONS-SCNC: 8 MMOL/L (ref 3–14)
BUN SERPL-MCNC: 47 MG/DL (ref 7–30)
CALCIUM SERPL-MCNC: 8.3 MG/DL (ref 8.5–10.1)
CHLORIDE SERPL-SCNC: 114 MMOL/L (ref 94–109)
CO2 SERPL-SCNC: 25 MMOL/L (ref 20–32)
CREAT SERPL-MCNC: 4.91 MG/DL (ref 0.52–1.04)
GFR SERPL CREATININE-BSD FRML MDRD: 9 ML/MIN/{1.73_M2}
GLUCOSE SERPL-MCNC: 130 MG/DL (ref 70–99)
HGB BLD-MCNC: 7.9 G/DL (ref 11.7–15.7)
PHOSPHATE SERPL-MCNC: 4.7 MG/DL (ref 2.5–4.5)
POTASSIUM SERPL-SCNC: 4.9 MMOL/L (ref 3.4–5.3)
SODIUM SERPL-SCNC: 147 MMOL/L (ref 133–144)

## 2019-01-16 PROCEDURE — 36415 COLL VENOUS BLD VENIPUNCTURE: CPT | Performed by: INTERNAL MEDICINE

## 2019-01-16 PROCEDURE — 85018 HEMOGLOBIN: CPT | Performed by: INTERNAL MEDICINE

## 2019-01-16 PROCEDURE — G0463 HOSPITAL OUTPT CLINIC VISIT: HCPCS

## 2019-01-16 PROCEDURE — 99024 POSTOP FOLLOW-UP VISIT: CPT | Mod: ZP | Performed by: SURGERY

## 2019-01-16 PROCEDURE — 80069 RENAL FUNCTION PANEL: CPT | Performed by: INTERNAL MEDICINE

## 2019-01-16 NOTE — LETTER
[facility]  [address]  [place] [address]  Phone: [phone]  Fax: [phone]      2019    RE: Yissel Wetzel, : 1960      Right AVF patent with good thrill RUE swelling much  Improved.  Neurologic function right hand almost back to normal--should continue to  Improve with  Time.   Wounds-OK.  Check F/U ultrasound of AVF 3-4 weeks seems to be maturing well at present    Sincerely,     Terry Vizcaino MD            [facility]  [address]  [place] [address]  Phone: [phone]  Fax: [phone]

## 2019-01-16 NOTE — LETTER
January 16, 2019      Yissel Wetzel  [address]   [facility] [address]        To Whom It May Concern:    Yissel Wetzel was seen in our clinic. She may return to work without restrictions.      Sincerely,        Terry Vizcaino MD

## 2019-01-16 NOTE — PROGRESS NOTES
Right AVF patent with good thrill RUE swelling much  Improved.  Neurologic function right hand almost back to normal--should continue to  Improve with  Time.   Wounds-OK.  Check F/U ultrasound of AVF 3-4 weeks seems to be maturing well at present.

## 2019-01-16 NOTE — NURSING NOTE
"Yissel Wetzel is a 58 year old female who presents for:  Chief Complaint   Patient presents with     RECHECK     2 week f/u 1/2/19 appt; s/p right brachial to basilic AVF creation on 12/26/18        Vitals:    Vitals:       BMI:  Estimated body mass index is 33.1 kg/m  as calculated from the following:    Height as of 12/26/18: 5' 1\" (1.549 m).    Weight as of 12/26/18: 175 lb 3.2 oz (79.5 kg).    Pain Score:  Data Unavailable        Yen Hong MA    "

## 2019-01-17 ENCOUNTER — TELEPHONE (OUTPATIENT)
Dept: OTHER | Facility: CLINIC | Age: 59
End: 2019-01-17

## 2019-01-17 NOTE — TELEPHONE ENCOUNTER
Left message on home number for patient to call back to schedule US & 3 week follow up appointment with Dr. Vizcaino.

## 2019-01-24 DIAGNOSIS — J45.20 MILD INTERMITTENT ASTHMA, UNCOMPLICATED: ICD-10-CM

## 2019-01-24 DIAGNOSIS — J45.30 MILD PERSISTENT ASTHMA, UNCOMPLICATED: ICD-10-CM

## 2019-01-24 RX ORDER — LEVALBUTEROL TARTRATE 45 UG/1
AEROSOL, METERED ORAL
Qty: 15 G | Refills: 0 | Status: SHIPPED | OUTPATIENT
Start: 2019-01-24 | End: 2019-11-01

## 2019-01-24 NOTE — LETTER
[facility]  [address] [id number] [phone]            Bentonkrishna BESSY Wetzel  [address]   [facility] [address]        January 24, 2019    Dear Cassandra,    While refilling your prescription today, we noticed that you are due for an appointment with your provider.  We will refill your prescription for 30 days, but a follow-up appointment must be made before any additional refills can be approved.     Taking care of your health is important to us and we look forward to seeing you in the near future.  Please call us at [phone] or [phone] (or use [employer]) to schedule an appointment.     Please disregard this notice if you have already made an appointment.    Sincerely,        [facility]

## 2019-01-24 NOTE — TELEPHONE ENCOUNTER
"Requested Prescriptions   Pending Prescriptions Disp Refills     ADVAIR DISKUS 250-50 MCG/DOSE inhaler [Pharmacy Med Name: Advair Diskus Inhalation Aerosol Powder Breath Activated 250-50 MCG/DOSE]  10     Sig: INHALE ONE PUFF BY MOUTH TWICE DAILY    Inhaled Steroids Protocol Failed - 1/24/2019 10:30 AM       Failed - Asthma control assessment score within normal limits in last 6 months    Please review ACT score.          Failed - Recent (6 mo) or future (30 days) visit within the authorizing provider's specialty    Patient had office visit in the last 6 months or has a visit in the next 30 days with authorizing provider or within the authorizing provider's specialty.  See \"Patient Info\" tab in inbasket, or \"Choose Columns\" in Meds & Orders section of the refill encounter.           Passed - Patient is age 12 or older       Passed - Medication is active on med list        XOPENEX HFA 45 MCG/ACT inhaler [Pharmacy Med Name: Xopenex HFA Inhalation Aerosol 45 MCG/ACT] 15 g 0    Requested Prescriptions   Pending Prescriptions Disp Refills     ADVAIR DISKUS 250-50 MCG/DOSE inhaler [Pharmacy Med Name: Advair Diskus Inhalation Aerosol Powder Breath Activated 250-50 MCG/DOSE]  10     Sig: INHALE ONE PUFF BY MOUTH TWICE DAILY    Inhaled Steroids Protocol Failed - 1/24/2019 10:30 AM       Failed - Asthma control assessment score within normal limits in last 6 months    Please review ACT score.          Failed - Recent (6 mo) or future (30 days) visit within the authorizing provider's specialty    Patient had office visit in the last 6 months or has a visit in the next 30 days with authorizing provider or within the authorizing provider's specialty.  See \"Patient Info\" tab in inbasket, or \"Choose Columns\" in Meds & Orders section of the refill encounter.           Passed - Patient is age 12 or older       Passed - Medication is active on med list        XOPENEX HFA 45 MCG/ACT inhaler [Pharmacy Med Name: Xopenex HFA Inhalation " "Aerosol 45 MCG/ACT] 15 g 0     Sig: INHALE TWO PUFFS BY MOUTH EVERY SIX HOURS AS NEEDED FOR SHORTNESS OF BREATH    Short-Acting Beta Agonist Inhalers Protocol  Failed - 1/24/2019 10:30 AM       Failed - Asthma control assessment score within normal limits in last 6 months    Please review ACT score.          Failed - Recent (6 mo) or future (30 days) visit within the authorizing provider's specialty    Patient had office visit in the last 6 months or has a visit in the next 30 days with authorizing provider or within the authorizing provider's specialty.  See \"Patient Info\" tab in inbasket, or \"Choose Columns\" in Meds & Orders section of the refill encounter.           Passed - Patient is age 12 or older       Passed - Medication is active on med list        Last Written Prescription Date:  10/3/18  Last Fill Quantity: 15,  # refills: 0   Last office visit: 6/7/2018 with prescribing provider:  6/7/18   Future Office Visit:     Sig: INHALE TWO PUFFS BY MOUTH EVERY SIX HOURS AS NEEDED FOR SHORTNESS OF BREATH    Short-Acting Beta Agonist Inhalers Protocol  Failed - 1/24/2019 10:30 AM       Failed - Asthma control assessment score within normal limits in last 6 months    Please review ACT score.          Failed - Recent (6 mo) or future (30 days) visit within the authorizing provider's specialty    Patient had office visit in the last 6 months or has a visit in the next 30 days with authorizing provider or within the authorizing provider's specialty.  See \"Patient Info\" tab in inbasket, or \"Choose Columns\" in Meds & Orders section of the refill encounter.           Passed - Patient is age 12 or older       Passed - Medication is active on med list        Last Written Prescription Date:  11/22/17  Last Fill Quantity: 1,  # refills: 11   Last office visit: 6/7/2018 with prescribing provider:  6/7/18   Future Office Visit:      "

## 2019-01-24 NOTE — TELEPHONE ENCOUNTER
Medication is being filled for 1 time refill only due to:  due for an appointment.    Letter sent

## 2019-01-25 ENCOUNTER — TELEPHONE (OUTPATIENT)
Dept: INTERNAL MEDICINE | Facility: CLINIC | Age: 59
End: 2019-01-25

## 2019-01-25 NOTE — LETTER
[facility]  [address] [address]  [phone]  January 25, 2019    Yissel Wetzel  [address]   [facility] [address]    Dear Cassandra,    We care about your health and based on a review of your medical records, recommend the the following, to better manage your health:      You are in particular need of attention regarding:  -Breast Cancer Screening  -Colon Cancer Screening    I am recommending that you:     -schedule a MAMMOGRAM which is due.    1 in 8 women will develop invasive breast cancer during her lifetime and it is the most common non-skin cancer in American women.  EARLY detection, new treatments, and a better understanding of the disease have increased survival rates - the 5 year survival rate in the 1960s was 63% and today it is close to 90%.    If you are under/uninsured, we recommend you contact the [facility]. They offer mammograms at no charge or on a sliding fee charge. You can schedule with them at [phone]. Please have them send us the results.      Please disregard this reminder if you have had this exam elsewhere within the last year.  It would be helpful for us to have a copy of your mammogram report in your file so that we can best coordinate your care - please contact us with when your test was done so we can update your record.             -schedule a COLONOSCOPY to look for colon cancer (due every 10 years or 5 years in higher risk situations.)        Colon cancer is now the second leading cause of cancer-related deaths in the [place] for both men and women and there are over 130,000 new cases and 50,000 deaths per year from colon cancer.  Colonoscopies can prevent 90-95% of these deaths.  Problem lesions can be removed before they ever become cancer.  This test is not only looking for cancer, but also getting rid of precancerious lesions.    If you are under/uninsured, we recommend you  contact the [employer] program. [employer] is a free colorectal cancer screening program that provides colonoscopies for eligible under/uninsured [place] men and women. If you are interested in receiving a free colonoscopy, please call [employer] at [phone] (mention code [employer]) to see if you re eligible.      If you do not wish to do a colonoscopy or cannot afford to do one, at this time, there is another option. It is called a FIT test or Fecal Immunochemical Occult Blood Test (take home stool sample kit).  It does not replace the colonoscopy for colorectal cancer screening, but it can detect hidden bleeding in the lower colon.  It does need to be repeated every year and if a positive result is obtained, you would be referred for a colonoscopy.          If you have completed either one of these tests at another facility, please call with the details of when and where the tests were done and if they were normal or not. Or have the records sent to our clinic so that we can best coordinate your care.      Here is a list of Health Maintenance topics that are due now or due soon:  Health Maintenance Due   Topic Date Due     Heart Failure Action Plan Reviewed Every 3 Years  1960     HIV SCREEN (SYSTEM ASSIGNED)  05/17/1979     Pap Smear - every 3 years  04/06/2001     Zoster (Chicken Pox) Vaccine (1 of 2) 05/17/2011     Colonoscopy - ever 10 years  09/06/2016     Cholesterol Lab - yearly  07/22/2017     Asthma Control Test - every 6 months  11/04/2017     Flu Vaccine (1) 09/01/2018     Mammogram - every 2 years  09/13/2018     Liver Monitoring Lab - yearly  11/24/2018     Complete Blood Count Every Year  11/24/2018     Thyroid Function Lab (TSH) - every 2 years  01/20/2019       Please call us at [phone] or [phone] (or use [employer]) to address the above recommendations.     Thank you for trusting [facility].  We appreciate the opportunity to serve you and look forward to supporting  your healthcare needs in the future.    If you have (or plan to have) any of these tests done at a facility other than a [facility] or a [facility], please have the results from these tests sent to your primary physician at [facility].    Healthy Regards,    Kaleb Martin MD/Simi King CMA

## 2019-01-25 NOTE — TELEPHONE ENCOUNTER
Panel Management Review  [facility]      Patient Active Problem List   Diagnosis     Allergic rhinitis     Iron deficiency anemia     Esophagitis     Functional disorder of stomach     Essential hypertension     Type 2 diabetes mellitus with diabetic nephropathy (H)     Hyperlipidemia LDL goal <100     CKD (chronic kidney disease) stage 3, GFR 30-59 ml/min (H)     Closed fracture of metatarsal bone     Pulmonary hypertension (H)     Mild persistent asthma     Advanced directives, counseling/discussion       Patient is due/failing the following:   PREVENTION AND SCREENING       MAMMOGRAM       COLONOSCOPY  CARDIOVASCULAR and RENAL  DIABETES  DEPRESSION  ASTHMA  MIGRAINE  COPD  HEART FAILURE  CKD    Action needed:   Please call to schedule a Mammogram and Colonoscopy.    Type of outreach:    Sent letter.    Simi King

## 2019-02-28 ENCOUNTER — TRANSFERRED RECORDS (OUTPATIENT)
Dept: HEALTH INFORMATION MANAGEMENT | Facility: CLINIC | Age: 59
End: 2019-02-28

## 2019-03-13 ENCOUNTER — OFFICE VISIT (OUTPATIENT)
Dept: OTHER | Facility: CLINIC | Age: 59
DRG: 682 | End: 2019-03-13
Attending: SURGERY
Payer: MEDICARE

## 2019-03-13 ENCOUNTER — HOSPITAL ENCOUNTER (OUTPATIENT)
Dept: GENERAL RADIOLOGY | Facility: CLINIC | Age: 59
DRG: 682 | End: 2019-03-13
Attending: INTERNAL MEDICINE
Payer: MEDICARE

## 2019-03-13 ENCOUNTER — HOSPITAL ENCOUNTER (OUTPATIENT)
Dept: ULTRASOUND IMAGING | Facility: CLINIC | Age: 59
Discharge: HOME | DRG: 682 | End: 2019-03-13
Attending: SURGERY | Admitting: SURGERY
Payer: MEDICARE

## 2019-03-13 VITALS — SYSTOLIC BLOOD PRESSURE: 175 MMHG | DIASTOLIC BLOOD PRESSURE: 60 MMHG | HEART RATE: 60 BPM

## 2019-03-13 DIAGNOSIS — T82.898A OTHER SPECIFIED COMPLICATION OF VASCULAR PROSTHETIC DEVICES, IMPLANTS AND GRAFTS, INITIAL ENCOUNTER (H): ICD-10-CM

## 2019-03-13 DIAGNOSIS — Z09 FOLLOW-UP EXAMINATION FOLLOWING SURGERY: Primary | ICD-10-CM

## 2019-03-13 DIAGNOSIS — N18.6 END STAGE RENAL DISEASE (H): ICD-10-CM

## 2019-03-13 DIAGNOSIS — Z11.1 SCREENING FOR TUBERCULOSIS: ICD-10-CM

## 2019-03-13 DIAGNOSIS — I77.0 AVF (ARTERIOVENOUS FISTULA) (H): ICD-10-CM

## 2019-03-13 PROCEDURE — G0463 HOSPITAL OUTPT CLINIC VISIT: HCPCS | Mod: 25

## 2019-03-13 PROCEDURE — 71046 X-RAY EXAM CHEST 2 VIEWS: CPT

## 2019-03-13 PROCEDURE — 93990 DOPPLER FLOW TESTING: CPT

## 2019-03-13 NOTE — PROGRESS NOTES
Wounds right arm healing nicely.  Right hand is still  Numb but much improved since last check.  Good  pulse  And thrill  In venous limb ov AVF.  On ultrasound there appears to  Be a stenosis in  The vein cephalad to the A-V anastomosis.  Venous limb  Feels accessible for   Dialysis.  Would  Try  A run--if any  Problems proceed with  Fistulagram  And angioplasty of short stenotic segment.  Discussed  In detail.

## 2019-03-13 NOTE — NURSING NOTE
"Yissel Wetzel is a 58 year old female who presents for:  Chief Complaint   Patient presents with     RECHECK     AVF (1:00 VHC; 2:00 HLS) History of right brachial to basilic AVF creation on 12/26/18; 3 week follow up to 1/16/19 appointment with Dr. Vizcaino        Vitals:    Vitals:    03/13/19 1336 03/13/19 1337   BP: 144/57 175/60   BP Location: Left arm Right arm   Patient Position: Chair Chair   Cuff Size: Adult Regular Adult Regular   Pulse: 71 60       BMI:  Estimated body mass index is 33.1 kg/m  as calculated from the following:    Height as of 12/26/18: 5' 1\" (1.549 m).    Weight as of 12/26/18: 175 lb 3.2 oz (79.5 kg).    Pain Score:  Data Unavailable        Yen Hong MA    "

## 2019-03-15 ENCOUNTER — APPOINTMENT (OUTPATIENT)
Dept: ULTRASOUND IMAGING | Facility: CLINIC | Age: 59
DRG: 682 | End: 2019-03-15
Attending: INTERNAL MEDICINE
Payer: MEDICARE

## 2019-03-15 ENCOUNTER — HOSPITAL ENCOUNTER (INPATIENT)
Facility: CLINIC | Age: 59
LOS: 4 days | Discharge: HOME | DRG: 682 | End: 2019-03-19
Attending: INTERNAL MEDICINE | Admitting: INTERNAL MEDICINE
Payer: MEDICARE

## 2019-03-15 ENCOUNTER — DOCUMENTATION ONLY (OUTPATIENT)
Dept: OTHER | Facility: CLINIC | Age: 59
End: 2019-03-15

## 2019-03-15 ENCOUNTER — APPOINTMENT (OUTPATIENT)
Dept: GENERAL RADIOLOGY | Facility: CLINIC | Age: 59
DRG: 682 | End: 2019-03-15
Attending: RADIOLOGY
Payer: MEDICARE

## 2019-03-15 DIAGNOSIS — Z79.4 TYPE 2 DIABETES MELLITUS WITH DIABETIC NEPHROPATHY, WITH LONG-TERM CURRENT USE OF INSULIN (H): ICD-10-CM

## 2019-03-15 DIAGNOSIS — E11.21 TYPE 2 DIABETES MELLITUS WITH DIABETIC NEPHROPATHY, WITH LONG-TERM CURRENT USE OF INSULIN (H): ICD-10-CM

## 2019-03-15 PROBLEM — N18.6 ESRD (END STAGE RENAL DISEASE) (H): Status: ACTIVE | Noted: 2019-03-15

## 2019-03-15 LAB
ANION GAP SERPL CALCULATED.3IONS-SCNC: 8 MMOL/L (ref 3–14)
APPEARANCE FLD: NORMAL
BASOPHILS # BLD AUTO: 0 10E9/L (ref 0–0.2)
BASOPHILS NFR BLD AUTO: 0.5 %
BUN SERPL-MCNC: 79 MG/DL (ref 7–30)
CALCIUM SERPL-MCNC: 8.3 MG/DL (ref 8.5–10.1)
CHLORIDE SERPL-SCNC: 115 MMOL/L (ref 94–109)
CO2 SERPL-SCNC: 22 MMOL/L (ref 20–32)
COLOR FLD: YELLOW
CREAT SERPL-MCNC: 4.93 MG/DL (ref 0.52–1.04)
DIFFERENTIAL METHOD BLD: ABNORMAL
EOSINOPHIL # BLD AUTO: 0.2 10E9/L (ref 0–0.7)
EOSINOPHIL NFR BLD AUTO: 3.8 %
EOSINOPHIL NFR FLD MANUAL: 4 %
ERYTHROCYTE [DISTWIDTH] IN BLOOD BY AUTOMATED COUNT: 16 % (ref 10–15)
GFR SERPL CREATININE-BSD FRML MDRD: 9 ML/MIN/{1.73_M2}
GLUCOSE BLDC GLUCOMTR-MCNC: 158 MG/DL (ref 70–99)
GLUCOSE BLDC GLUCOMTR-MCNC: 187 MG/DL (ref 70–99)
GLUCOSE BLDC GLUCOMTR-MCNC: 249 MG/DL (ref 70–99)
GLUCOSE SERPL-MCNC: 203 MG/DL (ref 70–99)
GRAM STN SPEC: NORMAL
HCT VFR BLD AUTO: 24.5 % (ref 35–47)
HGB BLD-MCNC: 8 G/DL (ref 11.7–15.7)
IMM GRANULOCYTES # BLD: 0 10E9/L (ref 0–0.4)
IMM GRANULOCYTES NFR BLD: 0.3 %
INR PPP: 1.4 (ref 0.86–1.14)
LDH FLD L TO P-CCNC: 84 U/L
LDH SERPL L TO P-CCNC: 227 U/L (ref 81–234)
LYMPHOCYTES # BLD AUTO: 0.8 10E9/L (ref 0.8–5.3)
LYMPHOCYTES NFR BLD AUTO: 13.7 %
LYMPHOCYTES NFR FLD MANUAL: 31 %
MCH RBC QN AUTO: 29.4 PG (ref 26.5–33)
MCHC RBC AUTO-ENTMCNC: 32.7 G/DL (ref 31.5–36.5)
MCV RBC AUTO: 90 FL (ref 78–100)
MONOCYTES # BLD AUTO: 0.5 10E9/L (ref 0–1.3)
MONOCYTES NFR BLD AUTO: 8.4 %
MONOS+MACROS NFR FLD MANUAL: 42 %
NEUTROPHILS # BLD AUTO: 4.4 10E9/L (ref 1.6–8.3)
NEUTROPHILS NFR BLD AUTO: 73.3 %
NEUTS BAND NFR FLD MANUAL: 19 %
NRBC # BLD AUTO: 0 10*3/UL
NRBC BLD AUTO-RTO: 0 /100
OTHER CELLS FLD MANUAL: 4 %
PLATELET # BLD AUTO: 133 10E9/L (ref 150–450)
POTASSIUM SERPL-SCNC: 5.1 MMOL/L (ref 3.4–5.3)
PROT FLD-MCNC: 2 G/DL
PROT SERPL-MCNC: 5.7 G/DL (ref 6.8–8.8)
RBC # BLD AUTO: 2.72 10E12/L (ref 3.8–5.2)
SODIUM SERPL-SCNC: 145 MMOL/L (ref 133–144)
SPECIMEN SOURCE FLD: NORMAL
SPECIMEN SOURCE: NORMAL
WBC # BLD AUTO: 6.1 10E9/L (ref 4–11)
WBC # FLD AUTO: 57 /UL

## 2019-03-15 PROCEDURE — 84155 ASSAY OF PROTEIN SERUM: CPT | Performed by: INTERNAL MEDICINE

## 2019-03-15 PROCEDURE — 85610 PROTHROMBIN TIME: CPT | Performed by: INTERNAL MEDICINE

## 2019-03-15 PROCEDURE — 00000146 ZZHCL STATISTIC GLUCOSE BY METER IP

## 2019-03-15 PROCEDURE — 40000986 XR CHEST 1 VW

## 2019-03-15 PROCEDURE — 40000275 ZZH STATISTIC RCP TIME EA 10 MIN

## 2019-03-15 PROCEDURE — 87205 SMEAR GRAM STAIN: CPT | Performed by: INTERNAL MEDICINE

## 2019-03-15 PROCEDURE — 25000125 ZZHC RX 250: Performed by: INTERNAL MEDICINE

## 2019-03-15 PROCEDURE — A9270 NON-COVERED ITEM OR SERVICE: HCPCS | Mod: GY | Performed by: INTERNAL MEDICINE

## 2019-03-15 PROCEDURE — 25000132 ZZH RX MED GY IP 250 OP 250 PS 637: Mod: GY | Performed by: INTERNAL MEDICINE

## 2019-03-15 PROCEDURE — 84157 ASSAY OF PROTEIN OTHER: CPT | Performed by: INTERNAL MEDICINE

## 2019-03-15 PROCEDURE — 87206 SMEAR FLUORESCENT/ACID STAI: CPT | Performed by: INTERNAL MEDICINE

## 2019-03-15 PROCEDURE — 25000128 H RX IP 250 OP 636: Performed by: INTERNAL MEDICINE

## 2019-03-15 PROCEDURE — G0499 HEPB SCREEN HIGH RISK INDIV: HCPCS | Performed by: INTERNAL MEDICINE

## 2019-03-15 PROCEDURE — 0W993ZZ DRAINAGE OF RIGHT PLEURAL CAVITY, PERCUTANEOUS APPROACH: ICD-10-PCS | Performed by: RADIOLOGY

## 2019-03-15 PROCEDURE — 99223 1ST HOSP IP/OBS HIGH 75: CPT | Mod: AI | Performed by: INTERNAL MEDICINE

## 2019-03-15 PROCEDURE — 12000000 ZZH R&B MED SURG/OB

## 2019-03-15 PROCEDURE — 94640 AIRWAY INHALATION TREATMENT: CPT

## 2019-03-15 PROCEDURE — 83615 LACTATE (LD) (LDH) ENZYME: CPT | Performed by: INTERNAL MEDICINE

## 2019-03-15 PROCEDURE — 86706 HEP B SURFACE ANTIBODY: CPT | Performed by: INTERNAL MEDICINE

## 2019-03-15 PROCEDURE — 25000131 ZZH RX MED GY IP 250 OP 636 PS 637: Mod: GY | Performed by: INTERNAL MEDICINE

## 2019-03-15 PROCEDURE — 80048 BASIC METABOLIC PNL TOTAL CA: CPT | Performed by: INTERNAL MEDICINE

## 2019-03-15 PROCEDURE — 89051 BODY FLUID CELL COUNT: CPT | Performed by: INTERNAL MEDICINE

## 2019-03-15 PROCEDURE — 32555 ASPIRATE PLEURA W/ IMAGING: CPT

## 2019-03-15 PROCEDURE — 90937 HEMODIALYSIS REPEATED EVAL: CPT

## 2019-03-15 PROCEDURE — 87015 SPECIMEN INFECT AGNT CONCNTJ: CPT | Performed by: INTERNAL MEDICINE

## 2019-03-15 PROCEDURE — 87070 CULTURE OTHR SPECIMN AEROBIC: CPT | Performed by: INTERNAL MEDICINE

## 2019-03-15 PROCEDURE — 40000863 ZZH STATISTIC RADIOLOGY XRAY, US, CT, MAR, NM

## 2019-03-15 PROCEDURE — 87116 MYCOBACTERIA CULTURE: CPT | Performed by: INTERNAL MEDICINE

## 2019-03-15 PROCEDURE — 36415 COLL VENOUS BLD VENIPUNCTURE: CPT | Performed by: INTERNAL MEDICINE

## 2019-03-15 PROCEDURE — 85025 COMPLETE CBC W/AUTO DIFF WBC: CPT | Performed by: INTERNAL MEDICINE

## 2019-03-15 RX ORDER — LEVALBUTEROL 1.25 MG/.5ML
1.25 SOLUTION, CONCENTRATE RESPIRATORY (INHALATION) EVERY 8 HOURS PRN
Status: DISCONTINUED | OUTPATIENT
Start: 2019-03-15 | End: 2019-03-19 | Stop reason: HOSPADM

## 2019-03-15 RX ORDER — POLYETHYLENE GLYCOL 3350 17 G/17G
17 POWDER, FOR SOLUTION ORAL DAILY PRN
Status: DISCONTINUED | OUTPATIENT
Start: 2019-03-15 | End: 2019-03-19 | Stop reason: HOSPADM

## 2019-03-15 RX ORDER — ONDANSETRON 2 MG/ML
4 INJECTION INTRAMUSCULAR; INTRAVENOUS EVERY 6 HOURS PRN
Status: DISCONTINUED | OUTPATIENT
Start: 2019-03-15 | End: 2019-03-19 | Stop reason: HOSPADM

## 2019-03-15 RX ORDER — ACETAMINOPHEN 500 MG
500-1000 TABLET ORAL EVERY 6 HOURS PRN
Status: ON HOLD | COMMUNITY

## 2019-03-15 RX ORDER — ONDANSETRON 4 MG/1
4 TABLET, ORALLY DISINTEGRATING ORAL EVERY 6 HOURS PRN
Status: DISCONTINUED | OUTPATIENT
Start: 2019-03-15 | End: 2019-03-19 | Stop reason: HOSPADM

## 2019-03-15 RX ORDER — LABETALOL 20 MG/4 ML (5 MG/ML) INTRAVENOUS SYRINGE
10 EVERY 6 HOURS PRN
Status: DISCONTINUED | OUTPATIENT
Start: 2019-03-15 | End: 2019-03-19 | Stop reason: HOSPADM

## 2019-03-15 RX ORDER — NALOXONE HYDROCHLORIDE 0.4 MG/ML
.1-.4 INJECTION, SOLUTION INTRAMUSCULAR; INTRAVENOUS; SUBCUTANEOUS
Status: DISCONTINUED | OUTPATIENT
Start: 2019-03-15 | End: 2019-03-19 | Stop reason: HOSPADM

## 2019-03-15 RX ORDER — LABETALOL 300 MG/1
300 TABLET, FILM COATED ORAL 2 TIMES DAILY
Status: ON HOLD | COMMUNITY
End: 2019-12-31

## 2019-03-15 RX ORDER — NICOTINE POLACRILEX 4 MG
15-30 LOZENGE BUCCAL
Status: DISCONTINUED | OUTPATIENT
Start: 2019-03-15 | End: 2019-03-19 | Stop reason: HOSPADM

## 2019-03-15 RX ORDER — FUROSEMIDE 20 MG
20 TABLET ORAL 2 TIMES DAILY
COMMUNITY
End: 2019-11-01

## 2019-03-15 RX ORDER — FUROSEMIDE 20 MG
20 TABLET ORAL 2 TIMES DAILY
Status: DISCONTINUED | OUTPATIENT
Start: 2019-03-15 | End: 2019-03-19 | Stop reason: HOSPADM

## 2019-03-15 RX ORDER — PROCHLORPERAZINE 25 MG
25 SUPPOSITORY, RECTAL RECTAL EVERY 12 HOURS PRN
Status: DISCONTINUED | OUTPATIENT
Start: 2019-03-15 | End: 2019-03-19 | Stop reason: HOSPADM

## 2019-03-15 RX ORDER — NICOTINE POLACRILEX 4 MG
15-30 LOZENGE BUCCAL
Status: CANCELLED | OUTPATIENT
Start: 2019-03-15

## 2019-03-15 RX ORDER — FUROSEMIDE 40 MG
40 TABLET ORAL ONCE
Status: COMPLETED | OUTPATIENT
Start: 2019-03-15 | End: 2019-03-15

## 2019-03-15 RX ORDER — LORAZEPAM 2 MG/ML
0.25 INJECTION INTRAMUSCULAR EVERY 8 HOURS PRN
Status: DISCONTINUED | OUTPATIENT
Start: 2019-03-15 | End: 2019-03-19 | Stop reason: HOSPADM

## 2019-03-15 RX ORDER — LIDOCAINE HYDROCHLORIDE 10 MG/ML
10 INJECTION, SOLUTION EPIDURAL; INFILTRATION; INTRACAUDAL; PERINEURAL ONCE
Status: COMPLETED | OUTPATIENT
Start: 2019-03-15 | End: 2019-03-15

## 2019-03-15 RX ORDER — DEXTROSE MONOHYDRATE 25 G/50ML
25-50 INJECTION, SOLUTION INTRAVENOUS
Status: CANCELLED | OUTPATIENT
Start: 2019-03-15

## 2019-03-15 RX ORDER — ACETAMINOPHEN 325 MG/1
650 TABLET ORAL EVERY 4 HOURS PRN
Status: DISCONTINUED | OUTPATIENT
Start: 2019-03-15 | End: 2019-03-19 | Stop reason: HOSPADM

## 2019-03-15 RX ORDER — DEXTROSE MONOHYDRATE 25 G/50ML
25-50 INJECTION, SOLUTION INTRAVENOUS
Status: DISCONTINUED | OUTPATIENT
Start: 2019-03-15 | End: 2019-03-19 | Stop reason: HOSPADM

## 2019-03-15 RX ORDER — PROCHLORPERAZINE MALEATE 5 MG
10 TABLET ORAL EVERY 6 HOURS PRN
Status: DISCONTINUED | OUTPATIENT
Start: 2019-03-15 | End: 2019-03-19 | Stop reason: HOSPADM

## 2019-03-15 RX ORDER — LABETALOL 100 MG/1
300 TABLET, FILM COATED ORAL 2 TIMES DAILY
Status: DISCONTINUED | OUTPATIENT
Start: 2019-03-15 | End: 2019-03-19 | Stop reason: HOSPADM

## 2019-03-15 RX ADMIN — ACETAMINOPHEN 650 MG: 325 TABLET, FILM COATED ORAL at 17:03

## 2019-03-15 RX ADMIN — Medication 1 CAPSULE: at 21:07

## 2019-03-15 RX ADMIN — FUROSEMIDE 20 MG: 20 TABLET ORAL at 21:07

## 2019-03-15 RX ADMIN — FUROSEMIDE 40 MG: 40 TABLET ORAL at 15:07

## 2019-03-15 RX ADMIN — LIDOCAINE HYDROCHLORIDE 10 ML: 10 INJECTION, SOLUTION EPIDURAL; INFILTRATION; INTRACAUDAL; PERINEURAL at 15:53

## 2019-03-15 RX ADMIN — SODIUM CHLORIDE 250 ML: 9 INJECTION, SOLUTION INTRAVENOUS at 19:02

## 2019-03-15 RX ADMIN — LABETALOL HYDROCHLORIDE 300 MG: 100 TABLET, FILM COATED ORAL at 21:15

## 2019-03-15 RX ADMIN — INSULIN ASPART 1 UNITS: 100 INJECTION, SOLUTION INTRAVENOUS; SUBCUTANEOUS at 21:08

## 2019-03-15 RX ADMIN — SODIUM CHLORIDE 300 ML: 9 INJECTION, SOLUTION INTRAVENOUS at 19:03

## 2019-03-15 RX ADMIN — LEVALBUTEROL HYDROCHLORIDE 1.25 MG: 1.25 SOLUTION, CONCENTRATE RESPIRATORY (INHALATION) at 15:08

## 2019-03-15 ASSESSMENT — ACTIVITIES OF DAILY LIVING (ADL)
DRESS: 0-->INDEPENDENT
RETIRED_EATING: 0-->INDEPENDENT
AMBULATION: 0-->INDEPENDENT
ADLS_ACUITY_SCORE: 13
RETIRED_EATING: 0-->INDEPENDENT
TRANSFERRING: 0-->INDEPENDENT
BATHING: 0-->INDEPENDENT
COGNITION: 0 - NO COGNITION ISSUES REPORTED
TOILETING: 0-->INDEPENDENT
FALL_HISTORY_WITHIN_LAST_SIX_MONTHS: NO
DRESS: 0-->INDEPENDENT
RETIRED_COMMUNICATION: 0-->UNDERSTANDS/COMMUNICATES WITHOUT DIFFICULTY
TRANSFERRING: 0-->INDEPENDENT
SWALLOWING: 0-->SWALLOWS FOODS/LIQUIDS WITHOUT DIFFICULTY
COGNITION: 0 - NO COGNITION ISSUES REPORTED
TOILETING: 0-->INDEPENDENT
BATHING: 2-->ASSISTIVE PERSON
AMBULATION: 0-->INDEPENDENT
RETIRED_COMMUNICATION: 0-->UNDERSTANDS/COMMUNICATES WITHOUT DIFFICULTY
SWALLOWING: 0-->SWALLOWS FOODS/LIQUIDS WITHOUT DIFFICULTY
FALL_HISTORY_WITHIN_LAST_SIX_MONTHS: NO

## 2019-03-15 ASSESSMENT — MIFFLIN-ST. JEOR: SCORE: 1301.5

## 2019-03-15 NOTE — H&P
"INTERNAL MEDICINE HISTORY AND PHYSICAL  [facility] Hospitalist Service      Yissel Wetzel [MR#: [id number]  : 1960  58 year old female]  Date of Admission:  3/15/2019  Primary Care Provider:  Gigi Martin      Chief Complaint:   End stage renal disease, right pleural effusion    History of Present Illness:   Ms. Yissel Wetzel is a 57 yo female with history including DM2, HTN, asthma, pHTN and advanced CKD, who is being directly admitted to initiate hemodialysis. She had an AV fistula placed on the right in late 2018. However, inpatient initiation of HD (rather than outpatient) recommended given uncertainty regarding the readiness of the AV fistula as there was a stenosis noted on recent ultrasound. Pt also had a recent chest x-ray 3/13 which showed a moderate to large right pleural effusion which may need to be tapped. I discussed the case with Dr. Nguyen, pt's primary Nephrologist on 3/14 who contacted me about direct admission of Ms. Wetzel for today 3/15.    I saw Ms. Wetzel on the unit.  She notes feeling more short of breath since a few months ago; this has been associated with inability to lie flat and worsened dyspnea with certain lying positions; she notes that these symptoms have been relatively stable, ie not acutely worsened, since they started. Denies chest pain. Notes edema in abdomen and in lower extremities. No fevers/chills. No N/V.     Also of note, pt says she stopped taking long acting insulin (Levemir) a few months ago given low glucose levels in the morning. Still using correction scale with meals. Notes glucose levels have been \"good\" in the morning but are higher in the evenings up to 180's to low 200's at times.    Past Medical History:   1. DM2. Hgb A1C 8.0 2018.  2. HTN.  3. Asthma.  4. HLD.  5. CKD stage V. Right upper extremity AV fistula placed 2018. Follows with Dr. Nguyen.  6. Pulmonary hypertension. Noted by prior echocardiograms.  7. Diastolic dysfunction. Echo " 2017 showed grade II diastolic dysfunction.  8. Chronic anemia. Previous history noted of iron deficiency anemia. Iron studies in 2018 suggested anemia of chronic disease.   9. GERD.  10. Allergic rhinitis.  11. H/o esophagitis and hemorrhagic gastropathy.  12. H/o retinal detachment and vitreal detachment. Has had multiple laser eye procedures.    Past Medical History:   Diagnosis Date     Allergic rhinitis, cause unspecified      Anemia of chronic disease      CKD (chronic kidney disease) stage 3, GFR 30-59 ml/min (H)      Esophagitis, unspecified      HEMORRHAGIC GASTROPATHY      Iron deficiency anemia, unspecified      Mild persistent asthma      Other and unspecified hyperlipidemia      Pneumonia      Pulmonary hypertension (H)     per 2012 echo mod.to severe pulmonary hypertension     Tobacco use disorder dc ed      Type II or unspecified type diabetes mellitus without mention of complication, not stated as uncontrolled      Unspecified essential hypertension        Past Surgical History:   1. S/p  x 2.  2. S/p hysterectomy and oopherectomy.  3. S/p laser eye surgeries several times.  4. S/p right AV fistula placement 2018.    Past Surgical History:   Procedure Laterality Date     C NONSPECIFIC PROCEDURE      c-sections x 2     C NONSPECIFIC PROCEDURE      laser rx ou several times     CREATE FISTULA ARTERIOVENOUS UPPER EXTREMITY Right 2018    Procedure: RIGHT BRACHIAL TO BASILIC ARTERIOVENOUS FISTULA CREATION;  Surgeon: Terry Vizcaino MD;  Location:  [facility]     HYSTERECTOMY, PAP NO LONGER INDICATED  approx     hyst/ooph/bleeding        Allergies:     Allergies   Allergen Reactions     Albuterol      shakiness     Aspirin      gi     Byetta [Exenatide]      gi     Clonidine      constipation     Codeine      vomiting     Ezetimibe      muscle symptoms     Hydralazine      headaches     Lisinopril      hyperkalemia     Metformin Hydrochloride      vomiting     Pravachol  [Pravastatin Sodium]      muscle pains     Simvastatin      myalgias     Troglitazone         Medications:     Prior to Admission medications    Medication Sig Last Dose Taking? Auth Provider   acetaminophen (TYLENOL) 500 MG tablet Take 500 mg by mouth every 8 hours as needed for mild pain prn Yes Unknown, Entered By History   ADVAIR DISKUS 250-50 MCG/DOSE inhaler INHALE ONE PUFF BY MOUTH TWICE DAILY   Patient taking differently: INHALE ONE PUFF BY MOUTH DAILY AS NEEDED prn Yes Gigi Martin MD   furosemide (LASIX) 20 MG tablet Take 20 mg by mouth 2 times daily 3/14/2019 at x1 Yes Unknown, Entered By History   hypromellose-dextran (ARTIFICAL TEARS) 0.1-0.3 % ophthalmic solution Place 1 drop into both eyes daily as needed for dry eyes prn Yes Unknown, Entered By History   labetalol (NORMODYNE) 300 MG tablet Take 300 mg by mouth 2 times daily Prescription is for 600mg twice daily, but the patient is taking 300mg twice daily 3/14/2019 at Unknown time Yes Unknown, Entered By History   XOPENEX HFA 45 MCG/ACT inhaler INHALE TWO PUFFS BY MOUTH EVERY SIX HOURS AS NEEDED FOR SHORTNESS OF BREATH  Patient taking differently: INHALE TWO PUFFS BY MOUTH TWICE DAILY FOR SHORTNESS OF BREATH  Yes Gigi Martin MD   ACCU-CHEK CHING PLUS test strip USE TO TEST BLOOD SUGAR 4 TIMES A DAY   Gigi Martin MD   blood glucose monitoring (ACCU-CHEK CHING PLUS) meter device kit Use to test blood sugars 5-7 times daily or as directed.   Gigi Martin MD   insulin pen needle 31G X 5 MM Use 4 times daily   Gigi Martin MD   LEVEMIR FLEXTOUCH 100 UNIT/ML pen inject 66 units subcutaneously at bedtime  2 months ago  Gigi Martin MD   NOVOLOG FLEXPEN 100 UNIT/ML soln INJECT 9-12 UNITS BEFORE BREAKFAST, LUNCH, AND DINNER + A CORRECTIVE SCALE OF 2 UNITS FOR EVERY 50 OVER 150 2 months ago  Gigi Martin MD   omeprazole (PRILOSEC) 20 MG CR capsule TAKE 1 CAPSULE BY MOUTH EVERY MORNING** (BEFORE  BREAKFAST) TAKE 30 - 60 MINUTES BEFORE 1ST MEAL DAILY**   Gigi Martin MD   sodium bicarbonate 650 MG tablet TAKE ONE TABLET BY MOUTH ONE TIME DAILY  More than a month at Unknown time  Gigi Martin MD       Social History:   Single. 2 children. Smoked 1 ppd x 22 years; quit . No EtOH. Works as a  at [employer].    Social History     Tobacco Use     Smoking status: Former Smoker     Packs/day: 1.00     Years: 22.00     Pack years: 22.00     Types: Cigarettes     Last attempt to quit: 10/12/1999     Years since quittin.4     Smokeless tobacco: Never Used   Substance Use Topics     Alcohol use: No     Alcohol/week: 0.0 oz       Family History:     Family History   Problem Relation Age of Onset     Arrhythmia Mother      Hypertension Mother      Pancreatic Cancer Father      Diabetes Brother      Asthma Sister      LUNG DISEASE Sister        Review of Systems:   As noted in the HPI; otherwise 10 point review of systems was negative.     Physical Exam:   VITALS:  Blood pressure 161/63, pulse 64, temperature 97.6  F (36.4  C), resp. rate 18, height 1.524 m (5'), SpO2 91 %, not currently breastfeeding.  General:  Alert, oriented, pleasant, fatigued.  HEENT:  PERRL, no scleral icterus/conjunctival injection.  Neck:  No bruits or adenopathy.  Heart/Chest:  RRR, no m/r/g.  Lungs:  Decreased breath sounds lower 2/3 lung field on R, few crackles L base, no wheezes.  Abdomen:  Distended, nt, +BS, some pitting edema.  Extremities/Musculoskeletal:  2-3+ firm pitting in legs and into thighs.  Neurologic:  No gross focal motor/sensory deficits.     Labs, Imaging & Other Data:     Results for orders placed or performed during the hospital encounter of 19   XR Chest 2 Views    Narrative    CHEST TWO VIEWS  3/13/2019 2:49 PM     HISTORY: Screening for tuberculosis    COMPARISON: 2017      Impression    IMPRESSION: There is currently a moderate to large right pleural  effusion and some  cardiomegaly. Pulmonary vasculature is also  redistributed. Tuberculosis cannot be excluded as it can present with  a large pleural effusion. Thoracentesis might be helpful in further  evaluation if clinically indicated.    RICHA GALLO MD       ASSESSMENT & PLAN:   Ms. Yissel Wetzel is a 59 yo female with history including DM2, HTN, asthma, pHTN and advanced CKD, who is being directly admitted 3/15/2019 to initiate hemodialysis.     1. ESRD.  Follows with Dr. Nguyen. She had an AV fistula placed on the right in late 12/2018. However, inpatient initiation HD (rather than outpatient) recommended given uncertainty regarding the AV fistula as possible stenosis noted by recent US.  - Nephrology consult.  - Plan is to initiate dialysis via R AVF but if not working, then may need Vascular evaluation for possible fistulogram/intervention.  - Give 40 mg IV Lasix x 1 per Nephrology rec's.    2. Symptomatic R pleural effusion, suspect related to above.  CXR 3/13 showed moderate-large right pleural effusion.  - Plan therapeutic and diagnositc thoracentesis.    3. DM2.  Hgb A1C 8.0 12/2018. Tells me she was on Levemir 38-40U at bedtime. However, pt says she stopped taking long acting insulin (Levemir) a few months ago given low glucose levels in the morning; was still using short acting correction scale with meals.  - Order ISS.  - Hold on re-ordering Levemir for now; if so, likely will need a much reduced dose.    4. Hypertension (benign essential).      Diastolic dysfunction.  [PTA: furosemide 20 mg BID, labetalol 300 mg BID.]  Echo 5/2017 showed grade II diastolic dysfunction.  - Continue labetalol, Lasix.  - One dose of IV Lasix as above.  - Monitor i/o's, daily wts.    5. Asthma.  - Continue Advair, PRN inh/nebs.    6. Chronic anemia, due to ACD/CKD.  Hemoglobin mostly in 8 range over the past 2 years.  - Erythropoiesis stimulating agent per Nephrology.  - Monitor CBC periodically as needed.    7. Pulmonary  hypertension.  H/o severe pHTN noted by prior echocardiogram. Unclear of the etiology, question from left heart dysfunction (h/o grade II diastolic dysfunction) or from pulmonary disease.  - Monitor clinically.    8. GERD.  - Continue omeprazole.    Prophylaxis.  - PCD's, ambulation.    Dispo.  - 2-3 days pending above.    CODE STATUS: FULL      Barry Ballesteros Jr., MD  [phone] (p)  Text Page

## 2019-03-15 NOTE — PROGRESS NOTES
Per Dr. Vizcaino request, recent note faxed to [facility].  Also contacted staff at [facility] with update.  Confirmed via RightFax at 1217.  Shantel Lopez, MARYN, RN

## 2019-03-15 NOTE — LETTER
Dialysis Intake Checklist      Your Name: Luzma Narayanan Contact Phone Number: [phone] or call [address] and ask for Care Coordinator [phone] (on Monday 3/18)     Fax Number and Email: [phone]  Email: [email or web address]/Practice: [email or web address]     Patient Name: Yissel Wetzel   Referring Nephrologist: Dr. Nguyen     Requested Facility(s) or Zip Code:  [facility] (formerly [facility]     Patient Started Date of Dialysis: 3/15/2019      Estimated Outpatient Start Date of Dialysis: 3/18/2019   Treatment Duration & Frequency: M-W-F     Preferred Schedule: Monday, Wednesday  and Friday PM     Is the patient employed? Yes   Is there a working shift we can accommodate?  Yes   Is patient flexible? No Shift: 2nd shift       Modality:   In-Center Hemo   Diagnosis:   End Stage Renal Disease (ESRD - Stage 5 Chronic Kidney Disease)     Access Type(s):   Fistula    If only a CVC, is there an active AV Access Plan (e.g., Vessel Mapping, Surgeon Consult, etc.)?:         Where will this patient be discharged to? Home     Is this patient trach or vent dependent? No     Does this patient have an L-VAD or Life Vest? No     Does this patient have outpatient dialysis history? No     Does this patient receive continuous medication via infusion pumps? No     Daily Care - Activity Management/Activity assistance needed?   Activity Management: sitting, edge of bed   Activity Assistance Provided: assistance, stand-by   Assistive Device Utilized: walker, gait belt      Can this patient sit in a standard chair to dialyze? Yes     Require treatment in a bed?  No     Is the patient HEP B positive? No     Can this patient sign their own legal consents? Yes     Other special needs? NA       Allergies:   Allergies   Allergen Reactions     Albuterol      shakiness     Aspirin      gi     Byetta [Exenatide]      gi     Clonidine      constipation     Codeine      vomiting     Ezetimibe      muscle symptoms      Hydralazine      headaches     Lisinopril      hyperkalemia     Metformin Hydrochloride      vomiting     Pravachol [Pravastatin Sodium]      muscle pains     Simvastatin      myalgias     Troglitazone         Medication List:   Current Facility-Administered Medications   Medication     - MEDICATION INSTRUCTIONS for Dialysis Patients -     acetaminophen (TYLENOL) tablet 650 mg     calcium carbonate (TUMS) chewable tablet 1,000 mg     glucose gel 15-30 g    Or     dextrose 50 % injection 25-50 mL    Or     glucagon injection 1 mg     fluticasone-vilanterol (BREO ELLIPTA) 200-25 MCG/INH inhaler 1 puff     furosemide (LASIX) tablet 20 mg     insulin aspart (NovoLOG) inj (RAPID ACTING)     insulin aspart (NovoLOG) inj (RAPID ACTING)     insulin aspart (NovoLOG) inj (RAPID ACTING)     insulin detemir (LEVEMIR PEN) injection 12 Units     labetalol (NORMODYNE) tablet 300 mg     labetalol (NORMODYNE/TRANDATE) syringe 10 mg     levalbuterol (XOPENEX CONC) neb solution 1.25 mg     LORazepam (ATIVAN) injection 0.25 mg     melatonin tablet 1 mg     multivitamin RENAL (NEPHROCAPS/TRIPHROCAPS) capsule 1 capsule     naloxone (NARCAN) injection 0.1-0.4 mg     ondansetron (ZOFRAN-ODT) ODT tab 4 mg    Or     ondansetron (ZOFRAN) injection 4 mg     polyethylene glycol (MIRALAX/GLYCOLAX) Packet 17 g     prochlorperazine (COMPAZINE) injection 10 mg    Or     prochlorperazine (COMPAZINE) tablet 10 mg    Or     prochlorperazine (COMPAZINE) Suppository 25 mg          HEP Panel:  Hep B Antigen (HBsAG): No results found for: HEPBANG  Hep B Surface Antibody (HBsAb): No results found for: AUSAB  Hep B Total Core Antibody: No results found for: HBCAB     Chest X-Ray:   Results for orders placed during the hospital encounter of 03/15/19   XR CHEST 1 VW    Narrative CHEST ONE VIEW   3/15/2019 4:37 PM     HISTORY: Right thoracentesis    COMPARISON: 3/13/2019      Impression IMPRESSION: Right pleural effusion has decreased in size, now small.  No  pneumothorax is identified. Lung volumes are unchanged. Cardiac  silhouette is enlarged, unchanged.    EDWIN CABEZAS MD        PPD:  M TUBERCULOSIS RESULT: No results found for: TBRSLT  M TUBERCULOSIS ANTIGEN VALUE: No results found for: TBAGN

## 2019-03-15 NOTE — PROGRESS NOTES
RADIOLOGY PROCEDURE NOTE  Patient name: Yissel Wetzel  MRN: [id number]  : 1960    Pre-procedure diagnosis: Right pleural effusion  Post-procedure diagnosis: Same    Procedure Date/Time: March 15, 2019  3:52 PM  Procedure: Thoracentesis  Estimated blood loss: None  Specimen(s) collected with description: Pleural fluid.  The patient tolerated the procedure well with no immediate complications.    See imaging dictation for procedural details and findings.    Provider name: Mike Soto  Assistant(s):None

## 2019-03-15 NOTE — CONSULTS
Nephrology Initial Consult  March 15, 2019      Yissel Wetzel MRN:[id number] YOB: 1960  Date of Admission:3/15/2019  Primary care provider: Gigi Martin  Requesting physician: Barry Ballesteros MD    ASSESSMENT AND RECOMMENDATIONS:   1 ESRD  -   Will plan to initiate dialysis today .   Will attempt to access AVF with 17 gz needle with 200 ml/min BFR X 2 hrs, UF goal of 1-2 L .   If unable to use AVF , then will need to proceed with Fistulogram given significant stenosis on US.   Plan to repeat HD tomorrow  - 3 hrs, 250 ml BFR, 17 gz  Needle, 2-3 L UF.     2 Pleural effusion -   Planned for thoracocentesis today .     3. Volume overload   UF as above  If unable to dialyse today , given Lasix 40 mg IV and resume PTA diuretics    4 ACKD -   Will follow labs from today and decide on JULIETTE, Phos binders etc.   Aranesp was held in last clinic visit d/t high BP.   Iron stores were low and PTH was 242. Hgb was 7.9    Recheck Iron store and plan for IV iron and JULIETTE accordingly .         Recommendations were communicated to primary team in person     Tamar Chopra MD  [facility] - Nephrology   [phone]        REASON FOR CONSULT: CKDV / ESRD    HISTORY OF PRESENT ILLNESS:  Yissel Wetzel is a 58 year old with hx of advanced CKD V 2/2 diabetic nephropathy and hypertension , who usually follows with Dr Nguyen for her renal care and was being prepared for initiation of HD . She had a rt UE AVF placed by Dr Price on 12/26/2018.   US of AVG o 3/13 showed stenosis of basilic outflow vein with a diameter of 1.9 mm and velocity of 730 cm/s and narrowing at anasatomosis with rakesh of 2.1 mm. Clinically it was felt to be strong enough to attempt access and run dialysis before proceeding with fistulogram.     Yissel reports feeling more short of breath in recent days. She has atleast moderate sized pleural effusion on rt . She reports orthopnea and has persistent leg edema despite oral  diuretics. Reports fatigue but denies nausea, vomiting, dysgeusia. Reports fair appetite. No itching.         PAST MEDICAL HISTORY:  Reviewed with patient on 03/15/2019     Past Medical History:   Diagnosis Date     Allergic rhinitis, cause unspecified      Anemia of chronic disease      CKD (chronic kidney disease) stage 3, GFR 30-59 ml/min (H)      Esophagitis, unspecified      HEMORRHAGIC GASTROPATHY      Iron deficiency anemia, unspecified      Mild persistent asthma      Other and unspecified hyperlipidemia      Pneumonia      Pulmonary hypertension (H)     per 12/2012 echo mod.to severe pulmonary hypertension     Tobacco use disorder dc ed 1999     Type II or unspecified type diabetes mellitus without mention of complication, not stated as uncontrolled      Unspecified essential hypertension          MEDICATIONS:  PTA Meds  Prior to Admission medications    Medication Sig Last Dose Taking? Auth Provider   ACCU-CHEK CHING PLUS test strip USE TO TEST BLOOD SUGAR 4 TIMES A DAY   Gigi Martin MD   Acetaminophen (TYLENOL PO) Take by mouth as needed for mild pain or fever   Reported, Patient   ADVAIR DISKUS 250-50 MCG/DOSE inhaler INHALE ONE PUFF BY MOUTH TWICE DAILY    Gigi Martin MD   blood glucose monitoring (ACCU-CHEK CHING PLUS) meter device kit Use to test blood sugars 5-7 times daily or as directed.   Gigi Martin MD   ferrous sulfate (IRON) 325 (65 FE) MG tablet Take 1 tablet (325 mg) by mouth daily with food   Gigi Martin MD   fluticasone (FLONASE) 50 MCG/ACT nasal spray Spray 2 sprays into both nostrils daily   Gigi Martin MD   insulin pen needle 31G X 5 MM Use 4 times daily   Gigi Martin MD   labetalol (NORMODYNE) 200 MG tablet Take 2 tablets (400 mg) by mouth 2 times daily   Gigi Martin MD   LEVEMIR FLEXTOUCH 100 UNIT/ML injection Inject 48 Units Subcutaneous At Bedtime   Gigi Martin MD   LEVEMIR FLEXTOUCH 100 UNIT/ML pen inject 66  units subcutaneously at bedtime    Gigi Martin MD   neomycin-polymyxin-hydrocortisone (CORTISPORIN) 3.5-48724-6 otic suspension Place 4 drops into the right ear 4 times daily   Rosalva Oreilly MD   NOVOLOG FLEXPEN 100 UNIT/ML soln INJECT 9-12 UNITS BEFORE BREAKFAST, LUNCH, AND DINNER + A CORRECTIVE SCALE OF 2 UNITS FOR EVERY 50 OVER 150   Gigi Martin MD   omeprazole (PRILOSEC) 20 MG CR capsule TAKE 1 CAPSULE BY MOUTH EVERY MORNING** (BEFORE BREAKFAST) TAKE 30 - 60 MINUTES BEFORE 1ST MEAL DAILY**   Gigi Martin MD   sodium bicarbonate 650 MG tablet TAKE ONE TABLET BY MOUTH ONE TIME DAILY    Gigi Martin MD   valACYclovir (VALTREX) 1000 mg tablet Take 1 tablet (1,000 mg) by mouth daily for 10 days   Rosalva Oreilly MD   XOPENEX HFA 45 MCG/ACT inhaler INHALE TWO PUFFS BY MOUTH EVERY SIX HOURS AS NEEDED FOR SHORTNESS OF BREATH   Gigi Martin MD   XOPENEX HFA 45 MCG/ACT Inhaler INHALE TWO PUFFS BY MOUTH EVERY SIX HOURS AS NEEDED FOR SHORTNESS OF BREATH   Gigi Martin MD          ALLERGIES:    Allergies   Allergen Reactions     Albuterol      shakiness     Aspirin      gi     Byetta [Exenatide]      gi     Clonidine      constipation     Codeine      vomiting     Ezetimibe      muscle symptoms     Hydralazine      headaches     Lisinopril      hyperkalemia     Metformin Hydrochloride      vomiting     Pravachol [Pravastatin Sodium]      muscle pains     Simvastatin      myalgias     Troglitazone        REVIEW OF SYSTEMS:  A comprehensive of systems was negative except as noted above.    SOCIAL HISTORY:   Social History     Socioeconomic History     Marital status: Single     Spouse name: Not on file     Number of children: Not on file     Years of education: Not on file     Highest education level: Not on file   Occupational History     Not on file   Social Needs     Financial resource strain: Not on file     Food insecurity:     Worry: Not on file     Inability:  Not on file     Transportation needs:     Medical: Not on file     Non-medical: Not on file   Tobacco Use     Smoking status: Former Smoker     Packs/day: 1.00     Years: 22.00     Pack years: 22.00     Types: Cigarettes     Last attempt to quit: 10/12/1999     Years since quittin.4     Smokeless tobacco: Never Used   Substance and Sexual Activity     Alcohol use: No     Alcohol/week: 0.0 oz     Drug use: No     Sexual activity: No   Lifestyle     Physical activity:     Days per week: Not on file     Minutes per session: Not on file     Stress: Not on file   Relationships     Social connections:     Talks on phone: Not on file     Gets together: Not on file     Attends Mandaeism service: Not on file     Active member of club or organization: Not on file     Attends meetings of clubs or organizations: Not on file     Relationship status: Not on file     Intimate partner violence:     Fear of current or ex partner: Not on file     Emotionally abused: Not on file     Physically abused: Not on file     Forced sexual activity: Not on file   Other Topics Concern      Service Not Asked     Blood Transfusions Yes     Caffeine Concern No     Occupational Exposure Not Asked     Hobby Hazards Not Asked     Sleep Concern No     Stress Concern No     Weight Concern Yes     Comment: would like to lose weight     Special Diet No     Back Care Not Asked     Exercise Yes     Comment: walks daily 2 blocks     Bike Helmet Not Asked     Seat Belt Not Asked     Self-Exams Not Asked     Parent/sibling w/ CABG, MI or angioplasty before 65F 55M? Not Asked   Social History Narrative     Not on file     Reviewed with patient on 3/15/19    FAMILY MEDICAL HISTORY:   Family History   Problem Relation Age of Onset     Arrhythmia Mother      Hypertension Mother      Pancreatic Cancer Father      Diabetes Brother      Asthma Sister      LUNG DISEASE Sister      Reviewed with patient on March 15, 2019      PHYSICAL EXAM:   Temp  Avg:  97.6  F (36.4  C)  Min: 97.6  F (36.4  C)  Max: 97.6  F (36.4  C)      Pulse  Av  Min: 64  Max: 64 Resp  Av  Min: 18  Max: 18  SpO2  Av %  Min: 89 %  Max: 89 %       Pulse 64   Temp 97.6  F (36.4  C)   Resp 18   SpO2 (!) 89%       Admit       GENERAL APPEARANCE: no distress,  awake  EYES: no scleral icterus, pupils equal  HENT: NC/AT,  mouth  without ulcers or lesions  Lymphatics: no cervical or supraclavicular LAD  Endo: no moon facies, no goiter  Pulmonary: absent breath sounds on rt base  CV: regular rhythm, normal rate, no rub   - JVP +   - Edema++  GI: soft, nontender,   MS: no evidence of inflammation in joints, no muscle tenderness  : no lei  SKIN: no rash, warm, dry, no cyanosis  NEURO: face symmetric, no asterixis   Access - Rt UE AVF , with good bruit and thrill. Slightly high pitched over proximal segment     LABS:   Pending.   Outpt labs from Nephrology clinic reviewed.     URINE STUDIES  Recent Labs   Lab Test 18  1455 17  1640 13  1342   COLOR Yellow Yellow Yellow   APPEARANCE Clear Clear Clear   URINEGLC 250* Negative Negative   URINEBILI Negative Negative Negative   URINEKETONE Negative Negative Negative   SG 1.015 1.020 1.015   UBLD Small* Small* Small*   URINEPH 6.0 6.0 5.0   PROTEIN >=300* >=300* 30*   UROBILINOGEN 0.2 0.2 0.2   NITRITE Negative Negative Negative   LEUKEST Negative Negative Negative   RBCU 2-5* O - 2 2-5*   WBCU 0 - 5 O - 2 O - 2     No lab results found.  PTH  Recent Labs   Lab Test 12  1627   PTHI 15     IRON STUDIES  Recent Labs   Lab Test 18  1318 09/10/14  1408 12  1627 12  1357   IRON 54 85 36 37   * 230* 247 226*   IRONSAT 28 37 15 16   LATRICE 407*  --  262 233       IMAGING:  CxR personall reviewed. Findings as listed in HPI    Tamar Chopra MD

## 2019-03-15 NOTE — LETTER
"** Checklist already sent~attaching additional attachment..    Dialysis Intake Checklist      Your Name: Luzma Narayanan Contact Phone Number: *[phone]     Fax Number and Email: *** Hospital/Practice: [facility]     Patient Name: Yissel Wetzel   Referring Nephrologist: ***     Requested Facility(s) or Zip Code: ***     Patient Started Date of Dialysis: ***      Estimated Outpatient Start Date of Dialysis: ***   Treatment Duration & Frequency: ***     Preferred Schedule: {Dialysis Preferred Schedule MWF/TTS:374247} {AM/PM:188896}     Is the patient employed? {Yes/No:062082::\"Yes\"}   Is there a working shift we can accommodate?  {Yes/No:168346::\"Yes\"}   Is patient flexible? {Yes/No:657130::\"Yes\"} {Dialysis Shift/Facility:400674}       Modality:   {Dialysis Modality:215624}   Diagnosis:   {Dialysis Diagnosis:692480}     Access Type(s):   {Dialysis Access Type:533580}    If only a CVC, is there an active AV Access Plan (e.g., Vessel Mapping, Surgeon Consult, etc.)?:   ***         Where will this patient be discharged to? {Dialysis Discharge:497415}     Is this patient trach or vent dependent? {NO/YES:308370}     Does this patient have an L-VAD or Life Vest? {NO/YES:593824}     Does this patient have outpatient dialysis history? {NO/YES:381637}     Does this patient receive continuous medication via infusion pumps? {NO/YES:355038}     Daily Care - Activity Management/Activity assistance needed?   Activity Management: up in chair, ambulated in allred(took a walk)   Activity Assistance Provided: assistance, 1 person   Assistive Device Utilized: walker, gait belt      Can this patient sit in a standard chair to dialyze? {NO/YES:393677}     Require treatment in a bed?  {NO/YES:483523}     Is the patient HEP B positive? {NO/YES:239716}     Can this patient sign their own legal consents? {NO/YES:687717}     Other special needs? ***       Allergies:   Allergies   Allergen Reactions     Albuterol      " shakiness     Aspirin      gi     Byetta [Exenatide]      gi     Clonidine      constipation     Codeine      vomiting     Ezetimibe      muscle symptoms     Hydralazine      headaches     Lisinopril      hyperkalemia     Metformin Hydrochloride      vomiting     Pravachol [Pravastatin Sodium]      muscle pains     Simvastatin      myalgias     Troglitazone         Medication List:   Current Facility-Administered Medications   Medication     - MEDICATION INSTRUCTIONS for Dialysis Patients -     acetaminophen (TYLENOL) tablet 650 mg     calcium carbonate (TUMS) chewable tablet 1,000 mg     glucose gel 15-30 g    Or     dextrose 50 % injection 25-50 mL    Or     glucagon injection 1 mg     fluticasone-vilanterol (BREO ELLIPTA) 200-25 MCG/INH inhaler 1 puff     furosemide (LASIX) tablet 20 mg     insulin aspart (NovoLOG) inj (RAPID ACTING)     insulin aspart (NovoLOG) inj (RAPID ACTING)     insulin aspart (NovoLOG) inj (RAPID ACTING)     insulin detemir (LEVEMIR PEN) injection 12 Units     labetalol (NORMODYNE) tablet 300 mg     labetalol (NORMODYNE/TRANDATE) syringe 10 mg     levalbuterol (XOPENEX CONC) neb solution 1.25 mg     LORazepam (ATIVAN) injection 0.25 mg     melatonin tablet 1 mg     multivitamin RENAL (NEPHROCAPS/TRIPHROCAPS) capsule 1 capsule     naloxone (NARCAN) injection 0.1-0.4 mg     ondansetron (ZOFRAN-ODT) ODT tab 4 mg    Or     ondansetron (ZOFRAN) injection 4 mg     polyethylene glycol (MIRALAX/GLYCOLAX) Packet 17 g     prochlorperazine (COMPAZINE) injection 10 mg    Or     prochlorperazine (COMPAZINE) tablet 10 mg    Or     prochlorperazine (COMPAZINE) Suppository 25 mg          HEP Panel:  Hep B Antigen (HBsAG): No results found for: HEPBANG  Hep B Surface Antibody (HBsAb): No results found for: AUSAB  Hep B Total Core Antibody: No results found for: HBCAB     Chest X-Ray:   Results for orders placed during the hospital encounter of 03/15/19   XR CHEST 1 VW    Narrative CHEST ONE VIEW    3/15/2019 4:37 PM     HISTORY: Right thoracentesis    COMPARISON: 3/13/2019      Impression IMPRESSION: Right pleural effusion has decreased in size, now small.  No pneumothorax is identified. Lung volumes are unchanged. Cardiac  silhouette is enlarged, unchanged.    EDWIN CABEZAS MD        PPD:  M TUBERCULOSIS RESULT: No results found for: TBRSLT  M TUBERCULOSIS ANTIGEN VALUE: No results found for: TBAGN

## 2019-03-15 NOTE — PROGRESS NOTES
Thoracentesis done:  1750cc aspirated.  There was still some fluid left, but patient c/o pain 8/10, so we stopped.  (stopped/restarted several times over about 35 minutes, going very slowly)    To CXR post procedure.

## 2019-03-15 NOTE — PHARMACY-ADMISSION MEDICATION HISTORY
Admission medication history interview status for the 3/15/2019  admission is complete. See EPIC admission navigator for prior to admission medications     Medication history source reliability:Good, pt interview, [facility]     Actions taken by pharmacist (provider contacted, etc):removed Iron, Flonase, Cortisporin, Valtrex, Oxycodone.  Added Lasix, Natural tears     Additional medication history information not noted on PTA med list : Patient has not used insulin in 2 months and is probably noncompliant with meds    Medication reconciliation/reorder completed by provider prior to medication history? No    Time spent in this activity: 30 minutes    Prior to Admission medications    Medication Sig Last Dose Taking? Auth Provider   acetaminophen (TYLENOL) 500 MG tablet Take 500 mg by mouth every 8 hours as needed for mild pain prn Yes Unknown, Entered By History   ADVAIR DISKUS 250-50 MCG/DOSE inhaler INHALE ONE PUFF BY MOUTH TWICE DAILY   Patient taking differently: INHALE ONE PUFF BY MOUTH DAILY AS NEEDED prn Yes Gigi Martin MD   furosemide (LASIX) 20 MG tablet Take 20 mg by mouth 2 times daily 3/14/2019 at x1 Yes Unknown, Entered By History   hypromellose-dextran (ARTIFICAL TEARS) 0.1-0.3 % ophthalmic solution Place 1 drop into both eyes daily as needed for dry eyes prn Yes Unknown, Entered By History   labetalol (NORMODYNE) 300 MG tablet Take 300 mg by mouth 2 times daily Prescription is for 600mg twice daily, but the patient is taking 300mg twice daily 3/14/2019 at Unknown time Yes Unknown, Entered By History   XOPENEX HFA 45 MCG/ACT inhaler INHALE TWO PUFFS BY MOUTH EVERY SIX HOURS AS NEEDED FOR SHORTNESS OF BREATH  Patient taking differently: INHALE TWO PUFFS BY MOUTH TWICE DAILY FOR SHORTNESS OF BREATH  Yes Gigi Martin MD                        LEVEMIR FLEXTOUCH 100 UNIT/ML pen inject 66 units subcutaneously at bedtime  2 months ago  Gigi Martin MD   NOVOLOG FLEXPEN 100 UNIT/ML  soln INJECT 9-12 UNITS BEFORE BREAKFAST, LUNCH, AND DINNER + A CORRECTIVE SCALE OF 2 UNITS FOR EVERY 50 OVER 150 2 months ago  Gigi Martin MD   omeprazole (PRILOSEC) 20 MG CR capsule TAKE 1 CAPSULE BY MOUTH EVERY MORNING** (BEFORE BREAKFAST) TAKE 30 - 60 MINUTES BEFORE 1ST MEAL DAILY** Not taking  Gigi Martin MD   sodium bicarbonate 650 MG tablet TAKE ONE TABLET BY MOUTH ONE TIME DAILY  More than a month at Unknown time  Ggii Martin MD

## 2019-03-16 LAB
ANION GAP SERPL CALCULATED.3IONS-SCNC: 6 MMOL/L (ref 3–14)
BASOPHILS # BLD AUTO: 0 10E9/L (ref 0–0.2)
BASOPHILS NFR BLD AUTO: 0.2 %
BUN SERPL-MCNC: 58 MG/DL (ref 7–30)
CALCIUM SERPL-MCNC: 7.7 MG/DL (ref 8.5–10.1)
CHLORIDE SERPL-SCNC: 111 MMOL/L (ref 94–109)
CO2 SERPL-SCNC: 25 MMOL/L (ref 20–32)
CREAT SERPL-MCNC: 4.12 MG/DL (ref 0.52–1.04)
DIFFERENTIAL METHOD BLD: ABNORMAL
EOSINOPHIL # BLD AUTO: 0.2 10E9/L (ref 0–0.7)
EOSINOPHIL NFR BLD AUTO: 3.6 %
ERYTHROCYTE [DISTWIDTH] IN BLOOD BY AUTOMATED COUNT: 16 % (ref 10–15)
GFR SERPL CREATININE-BSD FRML MDRD: 11 ML/MIN/{1.73_M2}
GLUCOSE BLDC GLUCOMTR-MCNC: 141 MG/DL (ref 70–99)
GLUCOSE BLDC GLUCOMTR-MCNC: 163 MG/DL (ref 70–99)
GLUCOSE BLDC GLUCOMTR-MCNC: 195 MG/DL (ref 70–99)
GLUCOSE BLDC GLUCOMTR-MCNC: 218 MG/DL (ref 70–99)
GLUCOSE BLDC GLUCOMTR-MCNC: 222 MG/DL (ref 70–99)
GLUCOSE BLDC GLUCOMTR-MCNC: 258 MG/DL (ref 70–99)
GLUCOSE SERPL-MCNC: 168 MG/DL (ref 70–99)
HCT VFR BLD AUTO: 23.9 % (ref 35–47)
HGB BLD-MCNC: 7.6 G/DL (ref 11.7–15.7)
IMM GRANULOCYTES # BLD: 0 10E9/L (ref 0–0.4)
IMM GRANULOCYTES NFR BLD: 0.2 %
LYMPHOCYTES # BLD AUTO: 0.8 10E9/L (ref 0.8–5.3)
LYMPHOCYTES NFR BLD AUTO: 12.6 %
MCH RBC QN AUTO: 29.1 PG (ref 26.5–33)
MCHC RBC AUTO-ENTMCNC: 31.8 G/DL (ref 31.5–36.5)
MCV RBC AUTO: 92 FL (ref 78–100)
MONOCYTES # BLD AUTO: 0.7 10E9/L (ref 0–1.3)
MONOCYTES NFR BLD AUTO: 11.5 %
NEUTROPHILS # BLD AUTO: 4.6 10E9/L (ref 1.6–8.3)
NEUTROPHILS NFR BLD AUTO: 71.9 %
NRBC # BLD AUTO: 0 10*3/UL
NRBC BLD AUTO-RTO: 0 /100
PHOSPHATE SERPL-MCNC: 4.9 MG/DL (ref 2.5–4.5)
PLATELET # BLD AUTO: 141 10E9/L (ref 150–450)
POTASSIUM SERPL-SCNC: 4.5 MMOL/L (ref 3.4–5.3)
RBC # BLD AUTO: 2.61 10E12/L (ref 3.8–5.2)
SODIUM SERPL-SCNC: 142 MMOL/L (ref 133–144)
WBC # BLD AUTO: 6.4 10E9/L (ref 4–11)

## 2019-03-16 PROCEDURE — 12000000 ZZH R&B MED SURG/OB

## 2019-03-16 PROCEDURE — 85025 COMPLETE CBC W/AUTO DIFF WBC: CPT | Performed by: INTERNAL MEDICINE

## 2019-03-16 PROCEDURE — A9270 NON-COVERED ITEM OR SERVICE: HCPCS | Mod: GY | Performed by: INTERNAL MEDICINE

## 2019-03-16 PROCEDURE — 25000131 ZZH RX MED GY IP 250 OP 636 PS 637: Mod: GY | Performed by: INTERNAL MEDICINE

## 2019-03-16 PROCEDURE — 99233 SBSQ HOSP IP/OBS HIGH 50: CPT | Performed by: INTERNAL MEDICINE

## 2019-03-16 PROCEDURE — 25000132 ZZH RX MED GY IP 250 OP 250 PS 637: Mod: GY | Performed by: INTERNAL MEDICINE

## 2019-03-16 PROCEDURE — 00000146 ZZHCL STATISTIC GLUCOSE BY METER IP

## 2019-03-16 PROCEDURE — 80048 BASIC METABOLIC PNL TOTAL CA: CPT | Performed by: INTERNAL MEDICINE

## 2019-03-16 PROCEDURE — 25000128 H RX IP 250 OP 636: Performed by: INTERNAL MEDICINE

## 2019-03-16 PROCEDURE — 36415 COLL VENOUS BLD VENIPUNCTURE: CPT | Performed by: INTERNAL MEDICINE

## 2019-03-16 PROCEDURE — 90937 HEMODIALYSIS REPEATED EVAL: CPT

## 2019-03-16 PROCEDURE — 86481 TB AG RESPONSE T-CELL SUSP: CPT | Performed by: INTERNAL MEDICINE

## 2019-03-16 PROCEDURE — 63400005 ZZH RX 634: Performed by: INTERNAL MEDICINE

## 2019-03-16 PROCEDURE — 84100 ASSAY OF PHOSPHORUS: CPT | Performed by: INTERNAL MEDICINE

## 2019-03-16 RX ORDER — HEPARIN SODIUM 1000 [USP'U]/ML
500 INJECTION, SOLUTION INTRAVENOUS; SUBCUTANEOUS
Status: DISCONTINUED | OUTPATIENT
Start: 2019-03-16 | End: 2019-03-16

## 2019-03-16 RX ORDER — HEPARIN SODIUM 1000 [USP'U]/ML
500 INJECTION, SOLUTION INTRAVENOUS; SUBCUTANEOUS CONTINUOUS
Status: DISCONTINUED | OUTPATIENT
Start: 2019-03-16 | End: 2019-03-16

## 2019-03-16 RX ADMIN — SODIUM CHLORIDE 250 ML: 9 INJECTION, SOLUTION INTRAVENOUS at 14:50

## 2019-03-16 RX ADMIN — FUROSEMIDE 20 MG: 20 TABLET ORAL at 21:35

## 2019-03-16 RX ADMIN — INSULIN ASPART 2 UNITS: 100 INJECTION, SOLUTION INTRAVENOUS; SUBCUTANEOUS at 18:31

## 2019-03-16 RX ADMIN — FUROSEMIDE 20 MG: 20 TABLET ORAL at 08:21

## 2019-03-16 RX ADMIN — INSULIN DETEMIR 10 UNITS: 100 INJECTION, SOLUTION SUBCUTANEOUS at 21:36

## 2019-03-16 RX ADMIN — Medication 1 CAPSULE: at 08:21

## 2019-03-16 RX ADMIN — INSULIN ASPART 1 UNITS: 100 INJECTION, SOLUTION INTRAVENOUS; SUBCUTANEOUS at 08:21

## 2019-03-16 RX ADMIN — EPOETIN ALFA 5000 UNITS: 3000 SOLUTION INTRAVENOUS; SUBCUTANEOUS at 13:56

## 2019-03-16 RX ADMIN — LABETALOL HYDROCHLORIDE 200 MG: 100 TABLET, FILM COATED ORAL at 21:43

## 2019-03-16 RX ADMIN — FLUTICASONE FUROATE AND VILANTEROL TRIFENATATE 1 PUFF: 200; 25 POWDER RESPIRATORY (INHALATION) at 08:21

## 2019-03-16 RX ADMIN — SODIUM CHLORIDE 300 ML: 9 INJECTION, SOLUTION INTRAVENOUS at 12:00

## 2019-03-16 RX ADMIN — INSULIN ASPART 2 UNITS: 100 INJECTION, SOLUTION INTRAVENOUS; SUBCUTANEOUS at 18:32

## 2019-03-16 ASSESSMENT — ACTIVITIES OF DAILY LIVING (ADL)
ADLS_ACUITY_SCORE: 13

## 2019-03-16 NOTE — PROVIDER NOTIFICATION
MD Notification    Notified Person: MD     Notified Person Name: Elicia    Notification Date/Time: paged at 0057 3/16/2019, spoke with physician [phone]      Spoke with Dr. Rubi regarding concern about TB testing and if pt needs airborne precautions. Pt had Thoracentesis today and fluids obtained for AFB stain, Nephrology ordered Gold Quantiferon to be drawn with morning labs this am 3/16. Pt is currently asymptomatic and low risk, MD stated no concern appeared in other MD notes. Will not place pt in Airborne at this time per MD, Dr Rubi.

## 2019-03-16 NOTE — PROGRESS NOTES
Lab Results   Component Value Date    POTASSIUM 4.5 03/16/2019     Lab Results   Component Value Date    HGB 7.6 03/16/2019          Hemodialysis Note:      All machine safety checks completed and passed.  Total chlorine checks less than 0.1ppm   All connections secured, saline line double clamped.  Venous and arterial parameters set  Report rec'd from Deisy Lopez RN    This is the pt's 2nd dialysis Rx.  Rec'd pt per w/c acc'd by transport team. Consent obtained for dialysis Rx this hospitalization.  Hepatitis B labs verified on machine log from previous patient.  Good circulation to fistula arm. Time out taken.    It took 3 attempts to successfully cannulate her arterial fistula with 17 gauge needles.  No difficulty with venous needle.  Dr Herrera aware.  Fistula swollen and ecchymotic.  Tourniquet used.      Pt ran 2.5 hours on a K 3 bath, with 1L removed. Blood flow rate of 250 ml/min was obtained.   PRE-WEIGHT:79.5kgs,    TARGET WEIGHT TBD,     POST-WT 78.5kgs.      Complications: None.   Education regarding fluid restrictions and dialysis diet.  Protocol explained to staff RN regarding possibility of incapacitated dialysis RN.  Vascular Access: Aseptic prep done for both on/off  Total Heparin given: 0 units    Meds given: Epogen.      Dr. Herrera visited pt during run.   Pods checked Q 15 minutes, remain clear throughout Rx.  Machine water alarms in place and functioning.  Total blood volume processed:35.2L  Hemostasis of needle sites achieved after 10 minutes. Patient will starte dialysis at [place] Q TBD.  POST ASSESSMENTS: Skin warm and dry, alert, denies discomfort, Lungs diminished, Resp rate regular, apical rate regular. No change of edema.  See flowsheet in EPIC for further details.  Report given to Deisy Lopez RN.   Juana James RN  [facility]

## 2019-03-16 NOTE — PROGRESS NOTES
[facility]    Medicine Progress Note - Hospitalist Service       Date of Admission:  3/15/2019  Assessment & Plan   Ms. Yissel Wetzel is a 57 yo female with history including diabetes mellitus type 2, hypertension, asthma, pulmonary hypertension and ESRD who is being directly admitted 3/15/2019 to initiate hemodialysis.      ESRD  Follows with Dr. Nguyen. She had an AV fistula placed on the right in late 12/2018. However, inpatient initiation HD (rather than outpatient) recommended given uncertainty regarding the AV fistula as possible stenosis noted by recent US.  - Nephrology consulted, appreciate assistance  - HD 3/15/19 with plan again for 3/16/19, appears AVF is functioning. If issues, will need vascular surgery evaluation for possible fistulogram/intervention.     Symptomatic right pleural effusion, suspect related to above.  CXR 3/13 showed moderate-large right pleural effusion.   - Thoracentesis 3/15/19 with 1750 ml removed, fluid analysis consistent with transudate. Suspected due to hypervolemia secondary to ESRD     Diabetes mellitus, type 2  HgbA1c 8.0% 12/2018. Previously was on detemir 38-40U at bedtime,however she stopped taking several months prior to admission due to low morning glucose levels. Continued to use correction scale with meals.   - Blood sugars mildly elevated  - Start detemir 10 units at bedtime, aspart 1 unit per 15 grams of carbohydrate  - Continue low sliding scale insulin   - Continue to adjust regimen as blood sugar trend dictates     Hypertension (benign essential)  Diastolic dysfunction  [PTA: furosemide 20 mg BID, labetalol 300 mg BID.]  Echo 5/2017 showed grade II diastolic dysfunction.  - Continue labetalol, furosemide  - Volume management otherwise per nephrology/HD.     Asthma  - Continue Advair (or formulary equivalent), PRN inh/nebs.     Chronic anemia, due to ACD/CKD  Hemoglobin mostly in 8 range over the past 2 years.  - Erythropoiesis stimulating agent  per Nephrology.  - Monitor CBC periodically as needed.     Pulmonary hypertension  History of severe pHTN noted by prior echocardiogram. Unclear of the etiology, question from left heart dysfunction (h/o grade II diastolic dysfunction) or from pulmonary disease.  - Monitor clinically.     GERD.  Continue omeprazole.    Diet: Dialysis Diet    DVT Prophylaxis: Pneumatic Compression Devices  Weller Catheter: not present  Code Status: Full Code      Disposition Plan   Expected discharge: 1-2 days, recommended to prior living arrangement once cleared by nephrology and outpatient HD arranged.  Entered: Rolan Monge MD 03/16/2019, 10:06 AM     The patient's care was discussed with the Bedside Nurse and Patient.    Rolan Moneg MD  Hospitalist Service  [facility]    ______________________________________________________________________    Interval History   No acute events overnight. Tolerated HD. Had some LUQ pain during HD which self-resolved. Overall feels excess fluid has been going down, feels it in legs and abdomen. Breathing feels better after thoracentesis, had some chest pain during procedure which resolved.     Data reviewed today: I reviewed all medications, new labs and imaging results over the last 24 hours. I personally reviewed no images or EKG's today.    Physical Exam   Vital Signs: Temp: 98.1  F (36.7  C) Temp src: Oral BP: 134/50 Pulse: 60 Heart Rate: 53 Resp: 16 SpO2: 100 % O2 Device: Nasal cannula Oxygen Delivery: 1 LPM  Weight: 175 lbs 4.25 oz    Constitutional: Well-appearing, NAD  Respiratory: Diminished at right base with scattered inspiratory crackle above, no wheezes, normal effort at rest.  Cardiovascular: RRR. 2+ edema to above the knee  GI: Abdomen non-tender, no rebound tenderness or guarding.    Skin/Integumen: Warm, dry  Other:     Data   Recent Labs   Lab 03/16/19  0811 03/15/19  1440   WBC 6.4 6.1   HGB 7.6* 8.0*   MCV 92 90   * 133*   INR  --  1.40*     145*   POTASSIUM 4.5 5.1   CHLORIDE 111* 115*   CO2 25 22   BUN 58* 79*   CR 4.12* 4.93*   ANIONGAP 6 8   KANWAL 7.7* 8.3*   * 203*   PROTTOTAL  --  5.7*       Recent Results (from the past 24 hour(s))   XR Chest 1 View    Narrative    CHEST ONE VIEW   3/15/2019 4:37 PM     HISTORY: Right thoracentesis    COMPARISON: 3/13/2019      Impression    IMPRESSION: Right pleural effusion has decreased in size, now small.  No pneumothorax is identified. Lung volumes are unchanged. Cardiac  silhouette is enlarged, unchanged.    EDWIN CABEZAS MD     Medications     heparin (porcine)       - MEDICATION INSTRUCTIONS -         - MEDICATION INSTRUCTIONS for Dialysis Patients -   Does not apply See Admin Instructions     sodium chloride 0.9%  250 mL Intravenous Once in dialysis     sodium chloride 0.9%  300 mL Hemodialysis Machine Once     fluticasone-vilanterol  1 puff Inhalation Daily     furosemide  20 mg Oral BID     heparin (porcine)  500 Units Hemodialysis Machine Once in dialysis     insulin aspart  1-3 Units Subcutaneous TID AC     insulin aspart  1-3 Units Subcutaneous At Bedtime     labetalol  300 mg Oral BID     multivitamin RENAL  1 capsule Oral Daily

## 2019-03-16 NOTE — PLAN OF CARE
Pt A&Ox4. VSS on 2L of O2 at start of shift. Pt SOB on exertion. Was able to wean patient to 1L post thoracentesis and dialysis. 1750mL removed from thoracentesis and 2L removed from dialysis. Later in shift, pt stated her SOB has improved significantly. Pt desat to 87% on RA. Pt has some cramping post dialysis, however denied wanting any tylenol. Pt had 2-3+ pitting edema in lower extremities. Pt voiding large amount, therefore did not bladder scan. R PIV Sl'd and L AVF bruit and thrill present. Pt moves Ax1 with Rw. Will continue to monitor.

## 2019-03-16 NOTE — PLAN OF CARE
Pt A/O x 4, VSS on RA. Pt denies pain and N/V. Some SOB noted during ambulation. LS diminished, infrequent, nonproductive cough. Thoracentesis site CDI, no drainage noted. +3 BLE edema. Dialysis today. /141, renal diet, tolerating. Discharge pending. Nephrology following. Will continue to monitor.

## 2019-03-16 NOTE — PROGRESS NOTES
Potassium   Date Value Ref Range Status   03/15/2019 5.1 3.4 - 5.3 mmol/L Final     Lab Results   Component Value Date    HGB 8.0 03/15/2019     Weight: 79.5 kg (175 lb 4.3 oz)  POST WT 77.5 kg  DIALYSIS PROCEDURE NOTE  Hepatitis status of previous patient on machine log was checked and verified ok to use with this patients hepatitis status.  Patients first dialysis run. dialyzed for 2 hrs. on a 2 K bath with a net fluid removal of  2L.  A BFR of 200 ml/min was obtained via a RAVF  using 17 gauge needles.    The patient was seen by Dr. Chopra prior to treatment.  Total heparin received during the treatment: 0 units. Sites held x 10 min then  pressure drsgs applied.    Meds  Given:None  Complications: Pt c/o cramping in LUQ after dialysis was completed    dialysis explained and questions were answered .   See flowsheet in EPIC for further details and post assessment.  Machine water alarm in place and functioning. Transducer pods intact and checked every 15min.  Pt returned via bed  Vascular Access: Aseptic prep done for both on/off.  Report received from: Lily Olivarez RN     HEPATITIS B SURFACE ANTIGEN Negative DATE 02/28/2019    HEPATITIS B SURFACE ANTIBODY Unknown. HbAg and HbAb drawn during dialysis.   Chlorine/Chloramine water system checked every 4 hours.  Pt plans to dialyze at [facility]

## 2019-03-16 NOTE — PROGRESS NOTES
Inpatient Dialysis Progress Note            Assessment and Plan:   1.  ESKD. Run #2 today.  Plan next run Monday.  Needs placement in unit of her choice. She prefers [facility].  I do not think these arrangements are finalized.  I have sent quantiferon gold as her CXR was not definitive.  Hep B core Nadia also sent.      2.  Anemia. HGB 7.6.  EPO 5000 units ordered.      3.  HTN. Better.    4.  FEN. K 4.5.      Plan:    Next run Monday  Needs placement in [place] unit.  Quantiferon gold ordered.    Check iron stores.  Check Hep B Core nadia.        Interval History:     Feels better.  Less SOB after thoracentesis.  Needle placement a little more challenging today.        Dialysis Parameters:     Wt Readings from Last 4 Encounters:   03/15/19 79.5 kg (175 lb 4.3 oz)   12/26/18 79.5 kg (175 lb 3.2 oz)   07/13/18 77.1 kg (170 lb)   06/07/18 78.4 kg (172 lb 14.4 oz)     I/O last 3 completed shifts:  In: 0   Out: 2525 [Urine:25; Other:2500]  BP Readings from Last 3 Encounters:   03/16/19 133/60   03/13/19 175/60   12/26/18 178/69       Routine, ONE TIME, Starting today For 1 Occurrences  Weight Loss (kg): 1  Dialysis Temp: 36.5  C  Access Device: AVF  Access Site: R arm  Dialyzer: Revaclear  Dialysis Bath: K 3  Blood Flow Rate (mL/min): 250  Total Treatment Time (hrs): 2.5  Heparin: low dose         Medications and Allergies:   Reviewed in EPIC      - MEDICATION INSTRUCTIONS for Dialysis Patients -   Does not apply See Admin Instructions     sodium chloride 0.9%  250 mL Intravenous Once in dialysis     sodium chloride 0.9%  300 mL Hemodialysis Machine Once     fluticasone-vilanterol  1 puff Inhalation Daily     furosemide  20 mg Oral BID     heparin (porcine)  500 Units Hemodialysis Machine Once in dialysis     insulin aspart   Subcutaneous TID AC     insulin aspart  1-3 Units Subcutaneous TID AC     insulin aspart  1-3 Units Subcutaneous At Bedtime     insulin detemir  10 Units Subcutaneous At Bedtime      labetalol  300 mg Oral BID     multivitamin RENAL  1 capsule Oral Daily     sodium chloride 0.9%, acetaminophen, glucose **OR** dextrose **OR** glucagon, labetalol, levalbuterol, lidocaine (buffered or not buffered), lidocaine (buffered or not buffered), LORazepam, melatonin, naloxone, ondansetron **OR** ondansetron, polyethylene glycol, prochlorperazine **OR** prochlorperazine **OR** prochlorperazine, - MEDICATION INSTRUCTIONS -     Allergies   Allergen Reactions     Albuterol      shakiness     Aspirin      gi     Byetta [Exenatide]      gi     Clonidine      constipation     Codeine      vomiting     Ezetimibe      muscle symptoms     Hydralazine      headaches     Lisinopril      hyperkalemia     Metformin Hydrochloride      vomiting     Pravachol [Pravastatin Sodium]      muscle pains     Simvastatin      myalgias     Troglitazone               Labs:     BMP  Recent Labs   Lab 03/16/19  0811 03/15/19  1440    145*   POTASSIUM 4.5 5.1   CHLORIDE 111* 115*   KANWAL 7.7* 8.3*   CO2 25 22   BUN 58* 79*   CR 4.12* 4.93*   * 203*     CBC  Recent Labs   Lab 03/16/19  0811 03/15/19  1440   WBC 6.4 6.1   HGB 7.6* 8.0*   HCT 23.9* 24.5*   MCV 92 90   * 133*     Lab Results   Component Value Date    AST 17 11/24/2017    ALT 16 11/24/2017    ALKPHOS 94 11/24/2017    BILITOTAL 0.6 11/24/2017    BILICONJ 0.0 02/10/2007            Physical Exam:   Vitals were reviewed in [facility]    Wt Readings from Last 3 Encounters:   03/15/19 79.5 kg (175 lb 4.3 oz)   12/26/18 79.5 kg (175 lb 3.2 oz)   07/13/18 77.1 kg (170 lb)       Intake/Output Summary (Last 24 hours) at 3/16/2019 1321  Last data filed at 3/16/2019 0412  Gross per 24 hour   Intake 0 ml   Output 2525 ml   Net -2525 ml       GENERAL APPEARANCE: pleasant, no distress, a & o  HEENT:  Eyes/ears/nose grossly normal, neck supple  RESP: lungs clear to auscultation with good efforts, no crackles, rhonchi or wheezes  CV: regular rate and rhythm, normal S1 S2, no  murmur, click or rub   ABDOMEN: soft, nontender, bowel sounds normal  EXTREMITIES/SKIN: 1-2+ BLE edema, no rashes or lesions     Pt seen on dialysis.  Stable run.  Good BFR.      Attestation:  I have reviewed today's vital signs, notes, medications, labs and imaging.     Robert Herrera MD  [facility] - Nephrology  [phone]

## 2019-03-16 NOTE — PLAN OF CARE
Pt A&O x4, VSS on 1L. LS diminished, crackles in LLL, MANE. BG 0200, 218. Dialysis diet. Baseline numbness in R thumb, middle, and fore finger r/t R AVF. Bruits and thrills present. Dialysis today, 3/16. Thoracentesis incision dressing CDI without drainage, no pain at site. Pitting edema 2+/3+ BLE. Plan to discharge when VSS.

## 2019-03-17 LAB
ANION GAP SERPL CALCULATED.3IONS-SCNC: 5 MMOL/L (ref 3–14)
BUN SERPL-MCNC: 45 MG/DL (ref 7–30)
CALCIUM SERPL-MCNC: 7.3 MG/DL (ref 8.5–10.1)
CHLORIDE SERPL-SCNC: 108 MMOL/L (ref 94–109)
CO2 SERPL-SCNC: 27 MMOL/L (ref 20–32)
CREAT SERPL-MCNC: 3.25 MG/DL (ref 0.52–1.04)
FERRITIN SERPL-MCNC: 210 NG/ML (ref 8–252)
GFR SERPL CREATININE-BSD FRML MDRD: 15 ML/MIN/{1.73_M2}
GLUCOSE BLDC GLUCOMTR-MCNC: 151 MG/DL (ref 70–99)
GLUCOSE BLDC GLUCOMTR-MCNC: 173 MG/DL (ref 70–99)
GLUCOSE BLDC GLUCOMTR-MCNC: 173 MG/DL (ref 70–99)
GLUCOSE BLDC GLUCOMTR-MCNC: 194 MG/DL (ref 70–99)
GLUCOSE BLDC GLUCOMTR-MCNC: 247 MG/DL (ref 70–99)
GLUCOSE SERPL-MCNC: 161 MG/DL (ref 70–99)
IRON SATN MFR SERPL: 15 % (ref 15–46)
IRON SERPL-MCNC: 27 UG/DL (ref 35–180)
POTASSIUM SERPL-SCNC: 4 MMOL/L (ref 3.4–5.3)
SODIUM SERPL-SCNC: 140 MMOL/L (ref 133–144)
TIBC SERPL-MCNC: 178 UG/DL (ref 240–430)

## 2019-03-17 PROCEDURE — 99232 SBSQ HOSP IP/OBS MODERATE 35: CPT | Performed by: INTERNAL MEDICINE

## 2019-03-17 PROCEDURE — 25000132 ZZH RX MED GY IP 250 OP 250 PS 637: Mod: GY | Performed by: INTERNAL MEDICINE

## 2019-03-17 PROCEDURE — 94640 AIRWAY INHALATION TREATMENT: CPT | Mod: 76

## 2019-03-17 PROCEDURE — 82728 ASSAY OF FERRITIN: CPT | Performed by: INTERNAL MEDICINE

## 2019-03-17 PROCEDURE — 83540 ASSAY OF IRON: CPT | Performed by: INTERNAL MEDICINE

## 2019-03-17 PROCEDURE — 40000275 ZZH STATISTIC RCP TIME EA 10 MIN

## 2019-03-17 PROCEDURE — 80048 BASIC METABOLIC PNL TOTAL CA: CPT | Performed by: INTERNAL MEDICINE

## 2019-03-17 PROCEDURE — 00000146 ZZHCL STATISTIC GLUCOSE BY METER IP

## 2019-03-17 PROCEDURE — A9270 NON-COVERED ITEM OR SERVICE: HCPCS | Mod: GY | Performed by: INTERNAL MEDICINE

## 2019-03-17 PROCEDURE — 86705 HEP B CORE ANTIBODY IGM: CPT | Performed by: INTERNAL MEDICINE

## 2019-03-17 PROCEDURE — 94640 AIRWAY INHALATION TREATMENT: CPT

## 2019-03-17 PROCEDURE — 36415 COLL VENOUS BLD VENIPUNCTURE: CPT | Performed by: INTERNAL MEDICINE

## 2019-03-17 PROCEDURE — 25000125 ZZHC RX 250: Performed by: INTERNAL MEDICINE

## 2019-03-17 PROCEDURE — 12000000 ZZH R&B MED SURG/OB

## 2019-03-17 PROCEDURE — 83550 IRON BINDING TEST: CPT | Performed by: INTERNAL MEDICINE

## 2019-03-17 PROCEDURE — 25000131 ZZH RX MED GY IP 250 OP 636 PS 637: Mod: GY | Performed by: INTERNAL MEDICINE

## 2019-03-17 RX ORDER — CALCIUM CARBONATE 500 MG/1
1000 TABLET, CHEWABLE ORAL
Status: DISCONTINUED | OUTPATIENT
Start: 2019-03-17 | End: 2019-03-19 | Stop reason: HOSPADM

## 2019-03-17 RX ADMIN — CALCIUM CARBONATE (ANTACID) CHEW TAB 500 MG 500 MG: 500 CHEW TAB at 00:49

## 2019-03-17 RX ADMIN — CALCIUM CARBONATE (ANTACID) CHEW TAB 500 MG 500 MG: 500 CHEW TAB at 00:48

## 2019-03-17 RX ADMIN — INSULIN ASPART 6 UNITS: 100 INJECTION, SOLUTION INTRAVENOUS; SUBCUTANEOUS at 17:49

## 2019-03-17 RX ADMIN — ACETAMINOPHEN 325 MG: 325 TABLET, FILM COATED ORAL at 05:16

## 2019-03-17 RX ADMIN — LEVALBUTEROL HYDROCHLORIDE 1.25 MG: 1.25 SOLUTION, CONCENTRATE RESPIRATORY (INHALATION) at 16:37

## 2019-03-17 RX ADMIN — FUROSEMIDE 20 MG: 20 TABLET ORAL at 20:03

## 2019-03-17 RX ADMIN — LABETALOL HYDROCHLORIDE 200 MG: 100 TABLET, FILM COATED ORAL at 20:03

## 2019-03-17 RX ADMIN — INSULIN ASPART 2 UNITS: 100 INJECTION, SOLUTION INTRAVENOUS; SUBCUTANEOUS at 17:49

## 2019-03-17 RX ADMIN — FUROSEMIDE 20 MG: 20 TABLET ORAL at 09:07

## 2019-03-17 RX ADMIN — INSULIN ASPART 4 UNITS: 100 INJECTION, SOLUTION INTRAVENOUS; SUBCUTANEOUS at 12:51

## 2019-03-17 RX ADMIN — INSULIN ASPART 1 UNITS: 100 INJECTION, SOLUTION INTRAVENOUS; SUBCUTANEOUS at 12:51

## 2019-03-17 RX ADMIN — INSULIN DETEMIR 12 UNITS: 100 INJECTION, SOLUTION SUBCUTANEOUS at 21:05

## 2019-03-17 RX ADMIN — Medication 1 CAPSULE: at 09:07

## 2019-03-17 ASSESSMENT — ACTIVITIES OF DAILY LIVING (ADL)
ADLS_ACUITY_SCORE: 13

## 2019-03-17 ASSESSMENT — MIFFLIN-ST. JEOR: SCORE: 1333.5

## 2019-03-17 NOTE — PROGRESS NOTES
[facility]    Medicine Progress Note - Hospitalist Service       Date of Admission:  3/15/2019  Assessment & Plan   Ms. Yissel Wetzel is a 58 year-old Female with history including diabetes mellitus type 2, hypertension, asthma, pulmonary hypertension and ESRD who is being directly admitted 3/15/19 to initiate hemodialysis.    ESRD  Follows with Dr. Nguyen from nephrology. She had an AV fistula placed on the right in late 12/2018. Inpatient initiation HD (rather than outpatient) recommended given uncertainty regarding the AV fistula as possible stenosis noted by recent US.  - Nephrology consulted, appreciate assistance  - HD 3/15 and 3/16. Plan for next HD 3/18.  - Care coordinator consulted for finalization of outpatient HD  - Appears AVF is functioning. If issues, will need vascular surgery evaluation for possible fistulogram/intervention.    Symptomatic right pleural effusion, suspect related to above.  CXR 3/13 showed moderate-large right pleural effusion. Thoracentesis 3/15 with 1750 ml removed, fluid analysis consistent with transudate. Suspected due to hypervolemia secondary to ESRD  - Note radiology comments on CXR read regarding possible tuberculous effusion; given transudative effusion with low cell count (57) and absence of lymphocytic predominance very unlikely effusion due to TB. AFB cultures in process. Quant gold sent for outpatient HD planning per nephrology.     Diabetes mellitus, type 2  HgbA1c 8.0% 12/2018. Previously was on detemir 38-40U at bedtime,however she stopped taking several months prior to admission due to low morning glucose levels. Continued to use correction scale with meals.   - Blood sugars mildly elevated  - Increase detemir to 12 units at bedtime.  - Continue aspart 1 unit per 15 grams of carbohydrate, low sliding scale insulin   - Continue to adjust regimen as blood sugar trend dictates     Hypertension (benign essential)  Diastolic dysfunction  [PTA:  furosemide 20 mg BID, labetalol 300 mg BID.]  Echo 5/2017 showed grade II diastolic dysfunction.  - Continue labetalol, furosemide  - Volume management otherwise per nephrology/HD.     Asthma  Continue Advair (or formulary equivalent), PRN inh/nebs.     Chronic anemia, due to ACD/CKD  Hemoglobin mostly in 8 range over the past 2 years.  - Erythropoiesis stimulating agent per Nephrology.  - Monitor CBC periodically as needed.     Pulmonary hypertension  History of severe pHTN noted by prior echocardiogram. Unclear of the etiology, question from left heart dysfunction (h/o grade II diastolic dysfunction) or from pulmonary disease.  - Assess need for home oxygen as below     Hypoxia  Noted to have intermittent desaturations at night. No known history of GIANNA.   - Consider outpatient sleep study once euvolemic   - Assess need for home oxygen during daytime / activity     GERD.  Continue omeprazole.    Diet: Dialysis Diet    DVT Prophylaxis: Pneumatic Compression Devices  Weller Catheter: not present  Code Status: Full Code      Disposition Plan   Expected discharge: Tomorrow, recommended to prior living arrangement once outpatient HD arranged.  Entered: Rolan Monge MD 03/17/2019, 11:40 AM     The patient's care was discussed with the Bedside Nurse and Patient.    Rolan Monge MD  Hospitalist Service  [facility]    ______________________________________________________________________    Interval History   No acute events overnight. Reports primarily feeling bored. Breathing feels improved since thoracentesis. Intermittent low oxygen saturations noted with sleep per nursing. She denies history of GIANNA. Denies chest pain.     Data reviewed today: I reviewed all medications, new labs and imaging results over the last 24 hours. I personally reviewed no images or EKG's today.    Physical Exam   Vital Signs: Temp: 97.6  F (36.4  C) Temp src: Oral BP: 133/59 Pulse: 64 Heart Rate: 77 Resp: 16 SpO2: 92 %  O2 Device: Nasal cannula Oxygen Delivery: 1/2 LPM  Weight: 182 lbs 5.13 oz    Constitutional: Well-appearing, NAD  Respiratory: Diminished at right base with scattered inspiratory crackle above, no wheezes, normal effort at rest.  Cardiovascular: RRR. 2+ edema to above the knee  GI: Abdomen non-tender, no rebound tenderness or guarding.    Skin/Integumen: Warm, dry  Other:     Data   Recent Labs   Lab 03/17/19  0805 03/16/19  0811 03/15/19  1440   WBC  --  6.4 6.1   HGB  --  7.6* 8.0*   MCV  --  92 90   PLT  --  141* 133*   INR  --   --  1.40*    142 145*   POTASSIUM 4.0 4.5 5.1   CHLORIDE 108 111* 115*   CO2 27 25 22   BUN 45* 58* 79*   CR 3.25* 4.12* 4.93*   ANIONGAP 5 6 8   KANWAL 7.3* 7.7* 8.3*   * 168* 203*   PROTTOTAL  --   --  5.7*       No results found for this or any previous visit (from the past 24 hour(s)).  Medications       - MEDICATION INSTRUCTIONS for Dialysis Patients -   Does not apply See Admin Instructions     fluticasone-vilanterol  1 puff Inhalation Daily     furosemide  20 mg Oral BID     insulin aspart   Subcutaneous TID AC     insulin aspart  1-3 Units Subcutaneous TID AC     insulin aspart  1-3 Units Subcutaneous At Bedtime     insulin detemir  12 Units Subcutaneous At Bedtime     labetalol  300 mg Oral BID     multivitamin RENAL  1 capsule Oral Daily

## 2019-03-17 NOTE — CONSULTS
"NUTRITION ASSESSMENT & EDUCATION NOTE      REASON FOR ASSESSMENT  Yissel Wetzel is a 58 year old female seen by Registered Dietitian for Admission Nutrition Risk Screen - Reduced oral intake over the last month     Nutrition History  Nutrition History:  - Information obtained from patient.  She states that her appetite and intake have been good (denies any reduced intake prior to admit).  She has been limiting K, Na, and protein at home but add that she is not as familiar with which foods are high in Phosphorus.  She has requested written information for home regarding dialysis diet as she in new to dialysis this hospitalization.  Patient noted that she \"loves my meat\" and added that she really likes to eat in general.     CURRENT DIET ORDER  Diet:  Dialysis    Intake/Tolerance:  Patient reports that her appetite is good but voiced frustration with her diet restrictions when trying to order.  Breakfast this morning was an omelet, banana bread, grapes, and wilbur food cake.     Anthropometrics  Height: 5'0\"  Weight: 79.5 kg (175#)(3/15)   BMI Calculated:  34.23 kg/m^2  IBW:  45.5 kg  Weight Status:  Obesity Grade I BMI 30-34.9  % IBW:  175%  Weight History:   Wt Readings from Last 10 Encounters:   03/17/19 82.7 kg (182 lb 5.1 oz)   12/26/18 79.5 kg (175 lb 3.2 oz)   07/13/18 77.1 kg (170 lb)   06/07/18 78.4 kg (172 lb 14.4 oz)   05/12/18 81.7 kg (180 lb 3 oz)   11/24/17 85.7 kg (189 lb)   09/15/17 85.7 kg (189 lb)   08/31/17 88.8 kg (195 lb 12.8 oz)   08/11/17 89.4 kg (197 lb 3.2 oz)   08/09/17 91.5 kg (201 lb 12.8 oz)   Weight fluctuations are fluid related -- states weight has been stable        LABS   Noted    MEDICATIONS   Noted    ASSESSED NUTRITION NEEDS PER APPROVED PRACTICE GUIDELINES:  Dosing Weight: 54 kg (adjusted)    Estimated Energy Needs 4241-4323 kcals/day (25-30 Kcal/Kg  Justification:  obese      Estimated Protein Needs 65-80 grams protein/day (1.2-1.5 g pro/Kg  Justification:  dialysis     "     Estimated Fluid Needs 6804-0011 mL/day (1 mL/Kcal   Justification:  maintenance      MALNUTRITION:  Patient does not meet two of the following criteria necessary for diagnosing malnutrition: significant weight loss, reduced intake, subcutaneous fat loss, muscle loss or fluid retention    NUTRITION DIAGNOSIS    Food- and nutrition- related knowledge deficit R/t no previous dialysis diet education AEB request for education       INTERVENTIONS    Nutrition Prescription:  Dialysis diet at discharge      Implementation    Assessed learning needs, learning preferences, and willingness to learn.    Nutrition Education (Content):  a) Provided handouts on Low Sodium, Low K, Low Phos, and Dialysis and Protein Intake   b) Discussed above     Nutrition Education (Application):  a) Discussed eating habits and recommended alternative food choices    Anticipate good compliance    Diet Education - refer to Education Flowsheet    Goals    Patient verbalizes understanding of diet by asking appropriate questions      All the above goals met during education session    Evaluation/Monitoring     Provided RD contact information for future questions    Recommend Out-Patient Nutrition Referral if further diet instructions are needed    Will re-evaluate in 7-10 days, on sooner, if nutrition status changes    Edna Solano RD, LD, CNSC   Clinical Dietitian - [facility]

## 2019-03-17 NOTE — PLAN OF CARE
Patient has been assessed for Home Oxygen needs. Oxygen readings:    *Pulse oximetry (SpO2) = 92% on room air at rest while awake.    *SpO2 improved to 92 % on 0 liters/minute at rest.    *SpO2 = 86% on room air during activity/with exercise.    *SpO2 improved to 90% on 1 liters/minute during activity/with exercise.       3520-1624: Pt A/O x 4, VSS on RA. Pt denies pain and N/V. Hypoxic w/ ambulation. LS diminished, infrequent, nonproductive cough. Thoracentesis site CDI, no drainage noted, bandaid applied. +3 BLE edema. Dialysis tomorrow /194, renal diet, tolerating. Discharge pending, referral started for outpatient Dialysis tx. Nephrology following. Will continue to monitor.

## 2019-03-17 NOTE — PLAN OF CARE
Pt A&O x4. VSS on RA when awake and upright. O2 sats dropped to mid-low 80s when laying in bed sleeping, 1L O2 initiated, sats up to 96%. Lung sounds clear, except LLL coarse crackles. Thoracentesis dressing CDI. Reports incisional site pn, internal pn no longer present. Pt reported headache 3/10, tylenol x1 given. Pt showed nonverbal signs of relief. . Dialysis diet. SBA with walker. Reports brief episodes of dizziness upon standing. Nephrology following. Next dialysis Monday.

## 2019-03-17 NOTE — CONSULTS
Care Transition Initial Assessment - RN        Met with: Patient regarding plan for outpatient dialysis upon discharge.  Patient requesting that dialysis be set up at [facility] for [facility] and she prefers the 2nd shift.      Dialysis intake checklist completed thru EPIC  and voice message left for [facility] regarding information sent via EPIC and contact numbers for Care Coordinator today and for 3/18.    2:47 PM  Additional chart documents and results faxed to [facility].   Per , Hep Panel was drawn in clinic.   Results can be obtained from his office on Monday and faxed to [facility].    DATA   Active Problems:    ESRD (end stage renal disease) (H)       Cognitive Status: awake, alert and oriented.        Contact information and PCP information verified: Yes  Lives With: alone   Living Arrangements: apartment                 Insurance concerns: No Insurance issues identified  ASSESSMENT  Patient currently receives the following services:  none        Identified issues/concerns regarding health management: NA  PLAN  Financial costs for the patient include TBD .  Patient given options and choices for discharge: yes .  Patient/family is agreeable to the plan?  Yes: discharge home with outpt dialysis  Patient anticipates discharging to home .        Patient anticipates needs for home equipment: Does not know  Plan/Disposition: Home   Appointments: TBD      Care  (CTS) will continue to follow as needed.

## 2019-03-17 NOTE — PLAN OF CARE
0081-5200: Pt A/O x 4, VSS on RA. Pt denies pain and N/V. SOB w/ ambulation. LS diminished, infrequent, nonproductive cough. Thoracentesis site CDI, no drainage noted, bandaid applied. +3 BLE edema. Dialysis today, 1L removed. /195, renal diet, tolerating. Discharge pending outpatient dialysis location. Nephrology following. Will continue to monitor.

## 2019-03-18 LAB
ALBUMIN SERPL-MCNC: 1.9 G/DL (ref 3.4–5)
ANION GAP SERPL CALCULATED.3IONS-SCNC: 8 MMOL/L (ref 3–14)
BUN SERPL-MCNC: 46 MG/DL (ref 7–30)
CALCIUM SERPL-MCNC: 7.5 MG/DL (ref 8.5–10.1)
CHLORIDE SERPL-SCNC: 107 MMOL/L (ref 94–109)
CO2 SERPL-SCNC: 27 MMOL/L (ref 20–32)
CREAT SERPL-MCNC: 3.43 MG/DL (ref 0.52–1.04)
GAMMA INTERFERON BACKGROUND BLD IA-ACNC: 0.05 IU/ML
GFR SERPL CREATININE-BSD FRML MDRD: 14 ML/MIN/{1.73_M2}
GLUCOSE BLDC GLUCOMTR-MCNC: 106 MG/DL (ref 70–99)
GLUCOSE BLDC GLUCOMTR-MCNC: 131 MG/DL (ref 70–99)
GLUCOSE BLDC GLUCOMTR-MCNC: 142 MG/DL (ref 70–99)
GLUCOSE BLDC GLUCOMTR-MCNC: 160 MG/DL (ref 70–99)
GLUCOSE BLDC GLUCOMTR-MCNC: 175 MG/DL (ref 70–99)
GLUCOSE BLDC GLUCOMTR-MCNC: 176 MG/DL (ref 70–99)
GLUCOSE BLDC GLUCOMTR-MCNC: 183 MG/DL (ref 70–99)
GLUCOSE SERPL-MCNC: 184 MG/DL (ref 70–99)
HBV CORE IGM SERPL QL IA: NONREACTIVE
HBV SURFACE AB SERPL IA-ACNC: 0.08 M[IU]/ML
HBV SURFACE AG SERPL QL IA: NONREACTIVE
HGB BLD-MCNC: 7.1 G/DL (ref 11.7–15.7)
M TB IFN-G BLD-IMP: NEGATIVE
M TB IFN-G CD4+ BCKGRND COR BLD-ACNC: 7.56 IU/ML
MITOGEN IGNF BCKGRD COR BLD-ACNC: 0 IU/ML
MITOGEN IGNF BCKGRD COR BLD-ACNC: 0 IU/ML
PHOSPHATE SERPL-MCNC: 3.6 MG/DL (ref 2.5–4.5)
POTASSIUM SERPL-SCNC: 3.9 MMOL/L (ref 3.4–5.3)
SODIUM SERPL-SCNC: 142 MMOL/L (ref 133–144)

## 2019-03-18 PROCEDURE — 85018 HEMOGLOBIN: CPT | Performed by: INTERNAL MEDICINE

## 2019-03-18 PROCEDURE — 00000146 ZZHCL STATISTIC GLUCOSE BY METER IP

## 2019-03-18 PROCEDURE — 25000132 ZZH RX MED GY IP 250 OP 250 PS 637: Mod: GY | Performed by: INTERNAL MEDICINE

## 2019-03-18 PROCEDURE — 25000128 H RX IP 250 OP 636: Performed by: INTERNAL MEDICINE

## 2019-03-18 PROCEDURE — 25000131 ZZH RX MED GY IP 250 OP 636 PS 637: Mod: GY | Performed by: INTERNAL MEDICINE

## 2019-03-18 PROCEDURE — 99232 SBSQ HOSP IP/OBS MODERATE 35: CPT | Performed by: INTERNAL MEDICINE

## 2019-03-18 PROCEDURE — 12000000 ZZH R&B MED SURG/OB

## 2019-03-18 PROCEDURE — 80069 RENAL FUNCTION PANEL: CPT | Performed by: INTERNAL MEDICINE

## 2019-03-18 PROCEDURE — 90937 HEMODIALYSIS REPEATED EVAL: CPT

## 2019-03-18 PROCEDURE — A9270 NON-COVERED ITEM OR SERVICE: HCPCS | Mod: GY | Performed by: INTERNAL MEDICINE

## 2019-03-18 PROCEDURE — 5A1D70Z PERFORMANCE OF URINARY FILTRATION, INTERMITTENT, LESS THAN 6 HOURS PER DAY: ICD-10-PCS | Performed by: INTERNAL MEDICINE

## 2019-03-18 PROCEDURE — 63400005 ZZH RX 634: Performed by: INTERNAL MEDICINE

## 2019-03-18 RX ADMIN — ACETAMINOPHEN 650 MG: 325 TABLET, FILM COATED ORAL at 22:08

## 2019-03-18 RX ADMIN — SODIUM CHLORIDE 250 ML: 9 INJECTION, SOLUTION INTRAVENOUS at 09:17

## 2019-03-18 RX ADMIN — INSULIN ASPART 6 UNITS: 100 INJECTION, SOLUTION INTRAVENOUS; SUBCUTANEOUS at 18:32

## 2019-03-18 RX ADMIN — INSULIN ASPART 1 UNITS: 100 INJECTION, SOLUTION INTRAVENOUS; SUBCUTANEOUS at 17:39

## 2019-03-18 RX ADMIN — Medication 1 CAPSULE: at 12:33

## 2019-03-18 RX ADMIN — INSULIN DETEMIR 12 UNITS: 100 INJECTION, SOLUTION SUBCUTANEOUS at 21:00

## 2019-03-18 RX ADMIN — SODIUM CHLORIDE 300 ML: 9 INJECTION, SOLUTION INTRAVENOUS at 09:17

## 2019-03-18 RX ADMIN — INSULIN ASPART 2 UNITS: 100 INJECTION, SOLUTION INTRAVENOUS; SUBCUTANEOUS at 14:30

## 2019-03-18 RX ADMIN — LABETALOL HYDROCHLORIDE 300 MG: 100 TABLET, FILM COATED ORAL at 12:32

## 2019-03-18 RX ADMIN — LABETALOL HYDROCHLORIDE 200 MG: 100 TABLET, FILM COATED ORAL at 21:03

## 2019-03-18 RX ADMIN — INSULIN ASPART 3 UNITS: 100 INJECTION, SOLUTION INTRAVENOUS; SUBCUTANEOUS at 08:15

## 2019-03-18 RX ADMIN — FUROSEMIDE 20 MG: 20 TABLET ORAL at 21:00

## 2019-03-18 RX ADMIN — EPOETIN ALFA 5000 UNITS: 3000 SOLUTION INTRAVENOUS; SUBCUTANEOUS at 09:17

## 2019-03-18 RX ADMIN — FUROSEMIDE 20 MG: 20 TABLET ORAL at 12:33

## 2019-03-18 ASSESSMENT — ACTIVITIES OF DAILY LIVING (ADL)
ADLS_ACUITY_SCORE: 13

## 2019-03-18 ASSESSMENT — MIFFLIN-ST. JEOR: SCORE: 1308.5

## 2019-03-18 NOTE — PLAN OF CARE
Pt is A/Ox4. VSS on 1 L NC. Lungs wheezy with exertion. 3+BLE edema. Fistula in R arm +bruit/thrill. Thoracentesis site, CDI. . Renal diet. Up with SBA with GB and walker. Plan for dialysis today. Possible discharge. Nursing will continue to monitor.

## 2019-03-18 NOTE — PLAN OF CARE
Pt denies any discomfort. Lungs clear, diminished .2-3+ edema warren LE's. VSS since return from dialysis. Pt desats with exertion on RA to 85%. Will monitor O2 sat on RA at rest, may only need O2 with walking and exertion. Blood sugars stable. Up with SBA.

## 2019-03-18 NOTE — PROGRESS NOTES
Assessment and Plan:   ESRD: seen on dialysis. Using RUAF with 17 ga needles and 250 BFR for 3 h. 1 kg UF, 35 HCO3 bath.  working on adm to [facility]. I talked to the Unit who can start her on Thursday and she will run [facility]. She should be there at 10 am this Thursday.            Interval History:   Anemia: on EPO 5000 U per run.    DM  HT              Review of Systems:   No sx on dialysis. Denies cramps or nausea or dizziness.           Medications:       - MEDICATION INSTRUCTIONS for Dialysis Patients -   Does not apply See Admin Instructions     sodium chloride 0.9%  250 mL Intravenous Once in dialysis     sodium chloride 0.9%  300 mL Hemodialysis Machine Once     epoetin fabiana (EPOGEN,PROCRIT) inj ESRD  5,000 Units Intravenous Once     fluticasone-vilanterol  1 puff Inhalation Daily     furosemide  20 mg Oral BID     insulin aspart   Subcutaneous TID AC     insulin aspart  1-3 Units Subcutaneous TID AC     insulin aspart  1-3 Units Subcutaneous At Bedtime     insulin detemir  12 Units Subcutaneous At Bedtime     labetalol  300 mg Oral BID     multivitamin RENAL  1 capsule Oral Daily     - MEDICATION INSTRUCTIONS -   Does not apply Once       - MEDICATION INSTRUCTIONS -       Current active medications and PTA medications reviewed, see medication list for details.            Physical Exam:   Vitals were reviewed  Patient Vitals for the past 24 hrs:   BP Temp Temp src Pulse Heart Rate Resp SpO2 Weight   03/18/19 0900 133/50 -- -- 66 -- -- -- --   03/18/19 0845 153/57 -- -- 67 -- -- -- --   03/18/19 0835 150/58 -- -- 66 -- -- -- --   03/18/19 0825 153/57 97.5  F (36.4  C) Oral 67 -- 16 96 % --   03/18/19 0700 -- -- -- -- -- -- -- 80.2 kg (176 lb 12.9 oz)   03/18/19 0115 160/63 97.2  F (36.2  C) Oral 72 72 16 95 % --   03/17/19 2003 162/66 -- -- 66 -- -- -- --   03/17/19 1637 -- -- -- -- -- -- 94 % --   03/17/19 1526 121/56 97.5  F (36.4  C) Oral -- 62 16 94 % --       Temp:   [97.2  F (36.2  C)-97.5  F (36.4  C)] 97.5  F (36.4  C)  Pulse:  [66-72] 66  Heart Rate:  [62-72] 72  Resp:  [16] 16  BP: (121-162)/(50-66) 133/50  SpO2:  [94 %-96 %] 96 %    Temperatures:  Current - Temp: 97.5  F (36.4  C); Max - Temp  Av.4  F (36.3  C)  Min: 97.2  F (36.2  C)  Max: 97.5  F (36.4  C)  Respiration range: Resp  Av  Min: 16  Max: 16  Pulse range: Pulse  Av.3  Min: 66  Max: 72  Blood pressure range: Systolic (24hrs), Av , Min:121 , Max:162   ; Diastolic (24hrs), Av, Min:50, Max:66    Pulse oximetry range: SpO2  Av.8 %  Min: 94 %  Max: 96 %    I/O last 3 completed shifts:  In: 200 [P.O.:200]  Out: -     No intake or output data in the 24 hours ending 19 0911    Alert and responsive  RUAF with needles in place  LE erythema, stasis dermatitis, non tender  Lungs with clear ant BS  Cor RRR nl S1 S2 no M       Wt Readings from Last 4 Encounters:   19 80.2 kg (176 lb 12.9 oz)   18 79.5 kg (175 lb 3.2 oz)   18 77.1 kg (170 lb)   18 78.4 kg (172 lb 14.4 oz)          Data:          Lab Results   Component Value Date     2019     2019     03/15/2019    Lab Results   Component Value Date    CHLORIDE 108 2019    CHLORIDE 111 2019    CHLORIDE 115 03/15/2019      Lab Results   Component Value Date    BUN 45 2019    BUN 58 2019    BUN 79 03/15/2019      Lab Results   Component Value Date    POTASSIUM 4.0 2019    POTASSIUM 4.5 2019    POTASSIUM 5.1 03/15/2019    Lab Results   Component Value Date    CO2 27 2019    CO2 25 2019    CO2 22 03/15/2019    Lab Results   Component Value Date    CR 3.25 2019    CR 4.12 2019    CR 4.93 03/15/2019        Recent Labs   Lab Test 19  0811 03/15/19  1440 19  1422 17  1816   WBC 6.4 6.1  --  6.1   HGB 7.6* 8.0* 7.9* 8.4*   HCT 23.9* 24.5*  --  27.3*   MCV 92 90  --  97   * 133*  --  119*     Recent Labs   Lab Test  11/24/17  1816 08/31/17 2012 08/09/17  1623   AST 17 24 25   ALT 16 19 21   ALKPHOS 94 79 80   BILITOTAL 0.6 0.5 0.5       Recent Labs   Lab Test 11/29/12  1603 07/25/12  1627   MAG 2.5* 2.6*     Recent Labs   Lab Test 03/16/19  0811 01/16/19  1422 07/25/12  1627   PHOS 4.9* 4.7* 5.2*     Recent Labs   Lab Test 03/17/19  0805 03/16/19  0811 03/15/19  1440   KANWAL 7.3* 7.7* 8.3*     Lab Results   Component Value Date    KANWAL 7.3 (L) 03/17/2019     Lab Results   Component Value Date    WBC 6.4 03/16/2019    HGB 7.6 (L) 03/16/2019    HCT 23.9 (L) 03/16/2019    MCV 92 03/16/2019     (L) 03/16/2019     Lab Results   Component Value Date     03/17/2019    POTASSIUM 4.0 03/17/2019    CHLORIDE 108 03/17/2019    CO2 27 03/17/2019     (H) 03/17/2019     Lab Results   Component Value Date    BUN 45 (H) 03/17/2019    CR 3.25 (H) 03/17/2019     Lab Results   Component Value Date    MAG 2.5 (H) 11/29/2012     Lab Results   Component Value Date    PHOS 4.9 (H) 03/16/2019       Creatinine   Date Value Ref Range Status   03/17/2019 3.25 (H) 0.52 - 1.04 mg/dL Final   03/16/2019 4.12 (H) 0.52 - 1.04 mg/dL Final   03/15/2019 4.93 (H) 0.52 - 1.04 mg/dL Final   01/16/2019 4.91 (H) 0.52 - 1.04 mg/dL Final   12/26/2018 5.27 (H) 0.52 - 1.04 mg/dL Final   10/31/2018 4.58 (H) 0.52 - 1.04 mg/dL Final       Attestation:  I have reviewed today's vital signs, notes, medications, labs and imaging.  Seen on dialysis.      Rahul Hernandez MD

## 2019-03-18 NOTE — PROGRESS NOTES
Potassium   Date Value Ref Range Status   03/17/2019 4.0 3.4 - 5.3 mmol/L Final     Lab Results   Component Value Date    HGB 7.6 03/16/2019     Weight: 80.2 kg (176 lb 12.9 oz)    POST WT 79.2 kg    DIALYSIS PROCEDURE NOTE  Hepatitis status of previous patient on machine log was checked and verified ok to use with this patients hepatitis status.    Patient dialyzed for 3 hrs. on a 3 K bath with a net fluid removal of  1L.  A BFR of 250 ml/min was obtained via a RUE AVF using 17 gauge needles.    The patient was seen by Dr. Hernandez during the treatment.      Sites held x 5 min then  pressure drsgs applied.      Total heparin received during the treatment: 0 units.  Meds  Given: Epogen, see MAR     Complications: none.      Procedure and ESRD teaching done and questions answered. See flowsheet in EPIC for further details and post assessment.    Machine water alarm in place and functioning. Chlorine/Chloramine water system checked every 4 hours.    Pt returned via bed, A&Ox4, calm, cooperative, denies pain, VSS    Vascular Access: Aseptic prep done for both on/off.    Educated patient on access care via verbal instruction. Patient verbalized understanding.     Report received from: SITA Duque RN  Report given to: SITA Duque RN    HEPATITIS B SURFACE ANTIGEN negative DATE 2/28/19 HEPATITIS B SURFACE ANTIBODY unknown DATE 3/15/19    Outpatient Dialysis at [facility] @10am      Divine Eugene, Dialysis RN

## 2019-03-18 NOTE — PROGRESS NOTES
[facility]    Medicine Progress Note - Hospitalist Service       Date of Admission:  3/15/2019  Assessment & Plan   Yissel Wetzel is a 58 year-old Female with history including diabetes mellitus type 2, hypertension, asthma, pulmonary hypertension and ESRD who is directly admitted 3/15/19 to initiate hemodialysis as inpatient.     ESRD  Follows with Dr. Nguyen from nephrology. She had an AV fistula placed on the right in late 12/2018. Inpatient initiation HD (rather than outpatient) recommended given uncertainty regarding the AV fistula as possible stenosis noted by recent US.  - Nephrology consulted, appreciate assistance  - HD 3/15, 3/16, 3/18. Plan for HD 3/19 to transition to [facility] outpatient schedule  - Care coordinator following for finalization of outpatient HD  - Appears AVF is functioning. If issues, will need vascular surgery evaluation for possible fistulogram/intervention.    Symptomatic right pleural effusion, suspect related to above.  CXR 3/13 showed moderate-large right pleural effusion. Thoracentesis 3/15 with 1750 ml removed, fluid analysis consistent with transudate. Suspected due to hypervolemia secondary to ESRD, results not consistent with tuberculous effusion  - Quant gold negative (sent for outpatient HD planning)     Diabetes mellitus, type 2  HgbA1c 8.0% 12/2018. Previously was on detemir 38-40U at bedtime,however she stopped taking several months prior to admission due to low morning glucose levels. Continued to use correction scale with meals.   - Blood sugars adequately controlled. Continue detemir 12 units at bedtime, aspart 1 unit per 15 grams of carbohydrate, low sliding scale insulin   - Continue to adjust regimen as blood sugar trend dictates     Hypertension (benign essential)  Diastolic dysfunction  [PTA: furosemide 20 mg BID, labetalol 300 mg BID.]  Echo 5/2017 showed grade II diastolic dysfunction.  - Continue labetalol, furosemide  - Volume management  otherwise per nephrology/HD.     Asthma  Continue Advair (or formulary equivalent), PRN inh/nebs.  - Requesting to continue nebulizer therapy at home, as she feels her inhaler is not as effective. She reports some times she will attempt to use her Advair and little or none will come out. RCAT placed to assess her ability to use inhalers correctly and educate as needed     Chronic anemia, due to ACD/CKD  Hemoglobin mostly in 8 g/dl range over the past 2 years.  - Erythropoiesis stimulating agent per Nephrology.  - Monitor CBC periodically as needed.     Pulmonary hypertension  History of severe pHTN noted by prior echocardiogram. Unclear of the etiology, question from left heart dysfunction (h/o grade II diastolic dysfunction) or from pulmonary disease.  - Assess need for home oxygen as below     Hypoxia  Noted to have intermittent desaturations at night, as well as activity. No known history of GIANNA.   - Consider outpatient sleep study once euvolemic   - Assess need for home oxygen during daytime / activity after HD 3/19/19     GERD.  Continue omeprazole.    Diet: Dialysis Diet    DVT Prophylaxis: Pneumatic Compression Devices  Weller Catheter: not present  Code Status: Full Code      Disposition Plan   Expected discharge: Tomorrow, recommended to prior living arrangement after outpatient HD. May need home oxygen.     Entered: Rolan Monge MD 03/18/2019, 4:15 PM     The patient's care was discussed with the Bedside Nurse, Care Coordinator/ and Patient.    Rolan Monge MD  Hospitalist Service  [facility]    ______________________________________________________________________    Interval History   No acute events overnight. Denies shortness of breath at rest, having some dyspnea on exertion. Denies any chest pain. Swelling in legs and abdomen feels to be improving slowly since being started on HD.     Data reviewed today: I reviewed all medications, new labs and imaging results  over the last 24 hours. I personally reviewed no images or EKG's today.    Physical Exam   Vital Signs: Temp: 97.7  F (36.5  C) Temp src: Oral BP: 142/53 Pulse: 66 Heart Rate: 69 Resp: 18 SpO2: 97 % O2 Device: None (Room air) Oxygen Delivery: 1 LPM  Weight: 176 lbs 12.94 oz    Constitutional: Well-appearing, NAD  Respiratory: Diminished at right base, no wheezes, normal effort at rest.  Cardiovascular: RRR. 2+ edema to above the knee  GI: Abdomen non-tender, no rebound tenderness or guarding.    Skin/Integumen: Warm, dry  Other:     Data   Recent Labs   Lab 03/18/19  0935 03/17/19  0805 03/16/19  0811 03/15/19  1440   WBC  --   --  6.4 6.1   HGB 7.1*  --  7.6* 8.0*   MCV  --   --  92 90   PLT  --   --  141* 133*   INR  --   --   --  1.40*    140 142 145*   POTASSIUM 3.9 4.0 4.5 5.1   CHLORIDE 107 108 111* 115*   CO2 27 27 25 22   BUN 46* 45* 58* 79*   CR 3.43* 3.25* 4.12* 4.93*   ANIONGAP 8 5 6 8   KANWAL 7.5* 7.3* 7.7* 8.3*   * 161* 168* 203*   ALBUMIN 1.9*  --   --   --    PROTTOTAL  --   --   --  5.7*       No results found for this or any previous visit (from the past 24 hour(s)).  Medications       - MEDICATION INSTRUCTIONS for Dialysis Patients -   Does not apply See Admin Instructions     fluticasone-vilanterol  1 puff Inhalation Daily     furosemide  20 mg Oral BID     insulin aspart   Subcutaneous TID AC     insulin aspart  1-3 Units Subcutaneous TID AC     insulin aspart  1-3 Units Subcutaneous At Bedtime     insulin detemir  12 Units Subcutaneous At Bedtime     labetalol  300 mg Oral BID     multivitamin RENAL  1 capsule Oral Daily

## 2019-03-18 NOTE — PLAN OF CARE
Care Plan Summary Note:  ORIENTATION/BEHAVIOR: orientedx4, pleasant and cooperative  ABNL VS/O2: HTN managed per scheduled labetalol, only wanted 2/3 BP meds this evening for /66. MANE, requiring oxygen when moving or immediately after ambulation. De-sats to mid 80's. On 1L 02 for night time.  MOBILITY/FALL RISK:up SBA, with GB, cramping in back making it harder to breath per pt.  PAIN MANAGMENT: hot pack to back, further interventions declined  DIET: renal diet, tolerating well. Snacking on icechips  BOWEL/BLADDER: on oral lasix. Voiding adequately.   ABNL LAB/BG:urea nitrogen 45, crt 3.25 (dialysis in AM (fistula in right arm + bruit and thrill), x-ray showing decreased pleural effusion and awaiting TB gold test results, but per MD do not need respiratory isolation. BGM: 247, 173  DRAIN/DEVICES: IV is SL, fistula to right arm  SKIN: WDL, edema +3-4 in legs and +2 in arms. Ruslan coloring to BLLE  TESTS/PROCEDURES: N/A  AGGRESSION TOOL COLOR:green  D/C DAY/GOALS/PLACE:possibly tomorrow when can set up OP dialysis clinic, will have dialysis here tomorrow.

## 2019-03-19 ENCOUNTER — DOCUMENTATION ONLY (OUTPATIENT)
Dept: MEDSURG UNIT | Facility: CLINIC | Age: 59
End: 2019-03-19

## 2019-03-19 VITALS
HEART RATE: 60 BPM | WEIGHT: 184.53 LBS | BODY MASS INDEX: 36.23 KG/M2 | SYSTOLIC BLOOD PRESSURE: 136 MMHG | RESPIRATION RATE: 16 BRPM | HEIGHT: 60 IN | DIASTOLIC BLOOD PRESSURE: 57 MMHG | TEMPERATURE: 98.2 F | OXYGEN SATURATION: 91 %

## 2019-03-19 LAB
ACID FAST STN SPEC QL: NORMAL
GLUCOSE BLDC GLUCOMTR-MCNC: 106 MG/DL (ref 70–99)
GLUCOSE BLDC GLUCOMTR-MCNC: 141 MG/DL (ref 70–99)
GLUCOSE BLDC GLUCOMTR-MCNC: 163 MG/DL (ref 70–99)
GLUCOSE BLDC GLUCOMTR-MCNC: 172 MG/DL (ref 70–99)
SPECIMEN SOURCE: NORMAL

## 2019-03-19 PROCEDURE — 00000146 ZZHCL STATISTIC GLUCOSE BY METER IP

## 2019-03-19 PROCEDURE — 99239 HOSP IP/OBS DSCHRG MGMT >30: CPT | Performed by: INTERNAL MEDICINE

## 2019-03-19 PROCEDURE — 25000128 H RX IP 250 OP 636: Performed by: INTERNAL MEDICINE

## 2019-03-19 PROCEDURE — A9270 NON-COVERED ITEM OR SERVICE: HCPCS | Mod: GY | Performed by: INTERNAL MEDICINE

## 2019-03-19 PROCEDURE — 90937 HEMODIALYSIS REPEATED EVAL: CPT

## 2019-03-19 PROCEDURE — 63400005 ZZH RX 634: Performed by: INTERNAL MEDICINE

## 2019-03-19 PROCEDURE — 25000132 ZZH RX MED GY IP 250 OP 250 PS 637: Mod: GY | Performed by: INTERNAL MEDICINE

## 2019-03-19 PROCEDURE — 40000275 ZZH STATISTIC RCP TIME EA 10 MIN

## 2019-03-19 RX ORDER — ALBUMIN (HUMAN) 12.5 G/50ML
50 SOLUTION INTRAVENOUS
Status: DISCONTINUED | OUTPATIENT
Start: 2019-03-19 | End: 2019-03-19

## 2019-03-19 RX ORDER — INSULIN DETEMIR 100 [IU]/ML
12 INJECTION, SOLUTION SUBCUTANEOUS AT BEDTIME
Qty: 6 ML | Refills: 0 | Status: SHIPPED | OUTPATIENT
Start: 2019-03-19 | End: 2019-11-01

## 2019-03-19 RX ORDER — HEPARIN SODIUM 1000 [USP'U]/ML
500 INJECTION, SOLUTION INTRAVENOUS; SUBCUTANEOUS
Status: COMPLETED | OUTPATIENT
Start: 2019-03-19 | End: 2019-03-19

## 2019-03-19 RX ORDER — HEPARIN SODIUM 1000 [USP'U]/ML
500 INJECTION, SOLUTION INTRAVENOUS; SUBCUTANEOUS CONTINUOUS
Status: DISCONTINUED | OUTPATIENT
Start: 2019-03-19 | End: 2019-03-19

## 2019-03-19 RX ADMIN — SODIUM CHLORIDE 300 ML: 9 INJECTION, SOLUTION INTRAVENOUS at 08:32

## 2019-03-19 RX ADMIN — HEPARIN SODIUM 500 UNITS: 1000 INJECTION INTRAVENOUS; SUBCUTANEOUS at 08:31

## 2019-03-19 RX ADMIN — INSULIN ASPART 1 UNITS: 100 INJECTION, SOLUTION INTRAVENOUS; SUBCUTANEOUS at 13:47

## 2019-03-19 RX ADMIN — HEPARIN SODIUM 500 UNITS/HR: 1000 INJECTION INTRAVENOUS; SUBCUTANEOUS at 08:32

## 2019-03-19 RX ADMIN — Medication 1 CAPSULE: at 12:37

## 2019-03-19 RX ADMIN — LABETALOL HYDROCHLORIDE 300 MG: 100 TABLET, FILM COATED ORAL at 12:34

## 2019-03-19 RX ADMIN — FUROSEMIDE 20 MG: 20 TABLET ORAL at 12:35

## 2019-03-19 RX ADMIN — INSULIN ASPART 4 UNITS: 100 INJECTION, SOLUTION INTRAVENOUS; SUBCUTANEOUS at 13:45

## 2019-03-19 RX ADMIN — EPOETIN ALFA 5000 UNITS: 3000 SOLUTION INTRAVENOUS; SUBCUTANEOUS at 08:31

## 2019-03-19 RX ADMIN — IRON SUCROSE 100 MG: 20 INJECTION, SOLUTION INTRAVENOUS at 08:32

## 2019-03-19 RX ADMIN — SODIUM CHLORIDE 250 ML: 9 INJECTION, SOLUTION INTRAVENOUS at 08:32

## 2019-03-19 ASSESSMENT — ACTIVITIES OF DAILY LIVING (ADL)
ADLS_ACUITY_SCORE: 13
ADLS_ACUITY_SCORE: 14

## 2019-03-19 ASSESSMENT — MIFFLIN-ST. JEOR: SCORE: 1343.5

## 2019-03-19 NOTE — PROGRESS NOTES
Received intake call for home oxygen at 2:57PM. Reviewed patient's chart; Patient qualifies under Medicare guidelines and all documentation is in the chart including a good order.   3:24PM- Called to offer choice and patient is okay with [facility] setting them up. Discussed equipment with patient and informed them that we would be to bedside with oxygen in the next 2 hours.   3:06PM- Spoke with Debra confirmed we received the order, and provided them with ETA of oxygen.

## 2019-03-19 NOTE — PLAN OF CARE
Pt denies any discomfort. Had dialysis this AM, is hoping to be discharged this afternoon. VSS. Edema in LE's looks slightly improved. Receiving coverage and carb coverage for blood sugars. O2 sat stable on RA at rest, does continue to desat however with exertion or walking any distance, but only requires 1L NC then to keep sat in low 90's range.

## 2019-03-19 NOTE — PROGRESS NOTES
Discharge    Patient discharged to home via wheelchair with friend  Care plan note:Pt denies any discomfort. Had dialysis this AM, is hoping to be discharged this afternoon. VSS. Edema in LE's looks slightly improved. Receiving coverage and carb coverage for blood sugars. O2 sat stable on RA at rest, does continue to desat however with exertion or walking any distance, but only requires 1L NC then to keep sat in low 90's range.        Listed belongings gathered and returned to patient. Yes  Care Plan and Patient education resolved: Yes  Prescriptions if needed, hard copies sent with patient  NA  Home and hospital acquired medications returned to patient: NA  Medication Bin checked and emptied on discharge Yes  Follow up appointment made for patient: Yes

## 2019-03-19 NOTE — PLAN OF CARE
Care Plan Summary Note:  ORIENTATION/BEHAVIOR: orientedx4, pleasant and cooperative  ABNL VS/O2: HTN managed per scheduled labetalol, only wanted 2/3 BP meds this evening. MANE, infrequent cough requiring oxygen when moving or immediately after ambulation. De-sats to mid upper 80's. On 1L 02 for night time.  MOBILITY/FALL RISK:up SBA, with GB, cramping in back, tylenol given  PAIN MANAGMENT: tylenol  DIET: renal diet, tolerating well. Snacking on icechips  BOWEL/BLADDER: on oral lasix. Voiding adequately.   ABNL LAB/BG:urea nitrogen BGM: bedtime is 176  DRAIN/DEVICES: IV is SL, fistula to right arm  SKIN: WDL, edema +2-3 in legs and +1-2 in arms. Ruslan coloring to BLLE  TESTS/PROCEDURES: N/A  AGGRESSION TOOL COLOR:green  D/C DAY/GOALS/PLACE: most likely tomorrow after dialysis, OP dialysis will be tu, th, sat. hgb 7.1, per MD most likely related to ESRD.

## 2019-03-19 NOTE — PROGRESS NOTES
Patient has been assessed for inhaler use. Patient states she doesn't feel like she gets her full dose of medication due to hand strength. I gave her a spacer to use and went over how to use it.  Orin Epperson, RRT

## 2019-03-19 NOTE — PROGRESS NOTES
Patient has been assessed for Home Oxygen needs. Oxygen readings:    *Pulse oximetry (SpO2) = 92% on room air at rest while awake.    *SpO2 improved to 94% on 1liters/minute at rest.    *SpO2 = 86% on room air during activity/with exercise.    *SpO2 improved to 91% on 1liters/minute during activity/with exercise.

## 2019-03-19 NOTE — PROGRESS NOTES
Potassium   Date Value Ref Range Status   03/18/2019 3.9 3.4 - 5.3 mmol/L Final     Lab Results   Component Value Date    HGB 7.1 03/18/2019     Weight: 83.7 kg (184 lb 8.4 oz)    POST WT 82.2 kg    DIALYSIS PROCEDURE NOTE  Hepatitis status of previous patient on machine log was checked and verified ok to use with this patients hepatitis status.    Patient dialyzed for 3 hrs. on a 3 K bath with a net fluid removal of  1.5L.  A BFR of 300 ml/min was obtained via a RUE AVF using 16 gauge needles.    The patient was seen by Dr. Hernandez during the treatment.      Sites held x 10 min then  pressure drsgs applied.      Total heparin received during the treatment: 0 units.  Meds  Given: Epogen & Venofer, see MAR     Complications: none.      Procedure and ESRD teaching done and questions answered. See flowsheet in EPIC for further details and post assessment.    Machine water alarm in place and functioning. Chlorine/Chloramine water system checked every 4 hours.    Pt returned via wheelchair, A&Ox4, calm, cooperative, denies pain, VSS    Vascular Access: Aseptic prep done for both on/off.    Educated patient on procedure and diet via verbal instruction. Patient verbalized understanding but more education required.     Report received from: SITA Duque RN  Report given to: SITA Duque RN    HEPATITIS B SURFACE ANTIGEN negative DATE 3/15/19 HEPATITIS B SURFACE ANTIBODY susceptible DATE 3/15/19    Outpatient Dialysis at [facility] T[facility]    Divine Eugene, Dialysis RN

## 2019-03-19 NOTE — DISCHARGE INSTRUCTIONS
Marilia Gibbs Sat at 10:30 am  Please arrive at 10 am on the first day.  Address [address] [place] [address]   # [phone]    Oxygen Provider:  Arranged through [facility], contact number [phone]. Home visit arranged for 03/19/2019. If you have any questions or concerns please call the oxygen company directly.

## 2019-03-19 NOTE — DISCHARGE SUMMARY
[facility]  Hospitalist Discharge Summary       Date of Admission:  3/15/2019  Date of Discharge:  3/19/2019  Discharging Provider: Rolan Monge MD    Discharge Diagnoses   ESRD  Symptomatic right pleural effusion, suspect hypervolemic from ESRD  Diabetes mellitus, type 2  Hypertension (benign essential)  Diastolic dysfunction  Asthma  Chronic anemia, due to anemia of chronic disease   Pulmonary hypertension  Acute on possibly chronic hypoxic respiratory failure   GERD    Follow-ups Needed After Discharge   Follow-up Appointments     Follow-up and recommended labs and tests       Follow up with primary care provider, Gigi Martin, within 7 days   for hospital follow- up.  No follow up labs or test are needed. Follow up   at dialysis unit on Thursday as directed. Follow-up with nephrology as   directed.             Unresulted Labs Ordered in the Past 30 Days of this Admission     Date and Time Order Name Status Description    3/15/2019 1619 AFB Culture Non Blood Preliminary     3/15/2019 1619 Fluid Culture Aerobic Bacterial Preliminary       These results will be followed up by hospitalist    Hospital Course   Yissel Wetzel is a 58 year-old Female with history including diabetes mellitus type 2, hypertension, asthma, pulmonary hypertension and ESRD who is directly admitted 3/15/19 to initiate hemodialysis as inpatient.     ESRD  Follows with Dr. Nguyen from nephrology. She had an AV fistula placed on the right in late 12/2018. Inpatient initiation HD (rather than outpatient) recommended given uncertainty regarding the AV fistula as possible stenosis noted by recent US.  - Nephrology consulted during admission  - HD 3/15, 3/16, 3/18, 3/19, tolerating well. Arranged for outpatient dialysis with transition to [facility] outpatient schedule    Symptomatic right pleural effusion, suspect related to above.  CXR 3/13 showed moderate-large right pleural effusion. Thoracentesis 3/15 with 1750 ml  removed, fluid analysis consistent with transudate. Suspected due to hypervolemia secondary to ESRD, results not consistent with tuberculous effusion.  - Quant gold negative (sent for outpatient HD planning)     Diabetes mellitus, type 2  HgbA1c 8.0% 12/2018. Previously was on detemir 38-40U at bedtime, however she stopped taking several months prior to admission due to low morning glucose levels. Continued to use correction scale with meals.   - Resumed on insulin and titrated dose as appropriate. Continue detemir 12 units at bedtime, aspart 1 unit per 15 grams of carbohydrate. Refilled insulin supplies as she reports she did not have any at home  - Continue to monitor blood sugars and follow-up as outpatient for ongoing adjustments     Hypertension (benign essential)  Diastolic dysfunction  [PTA: furosemide 20 mg BID, labetalol 300 mg BID.]  Echo 5/2017 showed grade II diastolic dysfunction.  - Continue labetalol, furosemide  - Volume management otherwise per nephrology/HD.     Asthma  Continue Advair, PRN inhaler. Respiratory therapy assessed as she reported some difficulty with inhalers at home, she was not able to be observed using her specific home inhaler. Encouraged her to follow-up with primary care for consideration of transition to nebulizer treatments if she continues to have issues. Otherwise no signs of exacerbation during admission.      Chronic anemia, due to anemia of chronic disease   Hemoglobin mostly in 8 g/dl range over the past 2 years.  - Erythropoiesis stimulating agent per Nephrology.  - Monitor hemoglobin and treat as needed with dialysis      Pulmonary hypertension  History of severe pHTN noted by prior echocardiogram. Unclear of the etiology, question from left heart dysfunction (h/o grade II diastolic dysfunction) or from pulmonary disease (obesity hypoventilation, undiagnosed GIANNA, asthma).  - Discharged with home oxygen as below     Acute on possibly chronic hypoxic respiratory failure    Short of breath on admission in context of pleural effusion (see above). Noted to have intermittent desaturations at night, as well as activity. No known history of GIANNA. Occurring in context of severe pulmonary hypertension.  - Discharged with home oxygen at night and with activity   - Consider outpatient sleep study once euvolemic   - Continue to assess oxygen needs as her volume status improves with dialysis      GERD  Continue omeprazole.    Consultations This Hospital Stay   NEPHROLOGY IP CONSULT  CARE COORDINATOR IP CONSULT  CARE TRANSITION RN/SW IP CONSULT    Code Status   Full Code    Time Spent on this Encounter   I, Rolan Monge, personally saw the patient today and spent greater than 30 minutes discharging this patient.       Rolan Monge MD  [facility]  ______________________________________________________________________    Physical Exam   Vital Signs: Temp: 97.6  F (36.4  C) Temp src: Oral BP: 142/45 Pulse: 64 Heart Rate: 70 Resp: 16 SpO2: 96 % O2 Device: Nasal cannula Oxygen Delivery: 2 LPM  Weight: 184 lbs 8.4 oz      Constitutional: NAD  Respiratory:     Diminished at right base, no wheezes, normal effort at rest.  Cardiovascular: RRR. 2+ edema to above the knee  GI: Abdominal wall with some edema, non-tender, no rebound tenderness or guarding.    Skin/Integumen: Warm, dry  Other:        Primary Care Physician   Gigi Martin    Discharge Disposition   Discharged to home  Condition at discharge: Stable      Discharge Orders      MED THERAPY MANAGE REFERRAL      Reason for your hospital stay    You were hospitalized for initiation of dialysis     Follow-up and recommended labs and tests     Follow up with primary care provider, Gigi Martin, within 7 days for hospital follow- up.  No follow up labs or test are needed. Follow up at dialysis unit on Thursday as directed. Follow-up with nephrology as directed.     Activity    Your activity upon discharge: activity as  tolerated     Full Code    Per previous documentation.     Oxygen Adult    [place] Oxygen Order 1 liter(s) by nasal cannula with activity and while sleeping. Expected treatment length is indefinite (99 months).. Test on conserving device as applicable.    Patients who qualify for home O2 coverage under the CMS guidelines require ABG tests or O2 sat readings obtained closest to, but no earlier than 2 days prior to the discharge, as evidence of the need for home oxygen therapy. Testing must be performed while patient is in the chronic stable state. See notes for O2 sats.    I certify that this patient, Yissel Wetzel has been under my care and that I, or a nurse practitioner or physician's assistant working with me, had a face-to-face encounter that meets the face-to-face encounter requirements with this patient on 3/19/2019. The patient, Yissel Wetzel was evaluated or treated in whole, or in part, for the following medical condition, which necessitates the use of the ordered oxygen. Treatment Diagnosis: Severe pulmonary hypertension     Attending Provider: Rolan Monge  Physician signature: See electronic signature associated with these discharge orders  Date of Order: March 19, 2019     Diet    Follow this diet upon discharge: Dialysis diet     Discharge Medications   Current Discharge Medication List      CONTINUE these medications which have CHANGED    Details   insulin aspart (NOVOLOG FLEXPEN) 100 UNIT/ML pen Take 1 unit per 15 grams of carbohydrates three times daily with meals  Qty: 6 mL, Refills: 0    Comments: Pharmacy: Please dispense with necessary supplies (needles, etc.). Future refills by PCP Dr. Gigi Martin with phone number [phone].  Associated Diagnoses: Type 2 diabetes mellitus with diabetic nephropathy, with long-term current use of insulin (H)      LEVEMIR FLEXTOUCH 100 UNIT/ML pen Inject 12 Units Subcutaneous At Bedtime  Qty: 6 mL, Refills: 0    Comments: Pharmacy: Please  dispense with necessary supplies (needles, etc.). Future refills by PCP Dr. Gigi Martin with phone number [phone].  Associated Diagnoses: Type 2 diabetes mellitus with diabetic nephropathy, with long-term current use of insulin (H)         CONTINUE these medications which have NOT CHANGED    Details   acetaminophen (TYLENOL) 500 MG tablet Take 500 mg by mouth every 8 hours as needed for mild pain      ADVAIR DISKUS 250-50 MCG/DOSE inhaler INHALE ONE PUFF BY MOUTH TWICE DAILY   Qty: 1 Inhaler, Refills: 0    Comments: Medication is being filled for 1 time refill only due to:  Patient needs to be seen .  Associated Diagnoses: Mild persistent asthma, uncomplicated      furosemide (LASIX) 20 MG tablet Take 20 mg by mouth 2 times daily      hypromellose-dextran (ARTIFICAL TEARS) 0.1-0.3 % ophthalmic solution Place 1 drop into both eyes daily as needed for dry eyes      labetalol (NORMODYNE) 300 MG tablet Take 300 mg by mouth 2 times daily Prescription is for 600mg twice daily, but the patient is taking 300mg twice daily      XOPENEX HFA 45 MCG/ACT inhaler INHALE TWO PUFFS BY MOUTH EVERY SIX HOURS AS NEEDED FOR SHORTNESS OF BREATH  Qty: 15 g, Refills: 0    Comments: Medication is being filled for 1 time refill only due to:  Patient needs to be seen .  Associated Diagnoses: Mild intermittent asthma, uncomplicated      ACCU-CHEK CHING PLUS test strip USE TO TEST BLOOD SUGAR 4 TIMES A DAY  Qty: 200 each, Refills: 1    Associated Diagnoses: CKD (chronic kidney disease) stage 3, GFR 30-59 ml/min (H)      blood glucose monitoring (ACCU-CHEK CHING PLUS) meter device kit Use to test blood sugars 5-7 times daily or as directed.  Qty: 1 kit, Refills: 0    Associated Diagnoses: Type 2 diabetes mellitus with diabetic nephropathy (H)      insulin pen needle 31G X 5 MM Use 4 times daily  Qty: 100 each, Refills: 11    Associated Diagnoses: CKD (chronic kidney disease) stage 3, GFR 30-59 ml/min (H)         STOP taking these  medications       sodium bicarbonate 650 MG tablet Comments:   Reason for Stopping:               Significant Results and Procedures   Most Recent 3 CBC's:  Recent Labs   Lab Test 03/18/19  0935 03/16/19  0811 03/15/19  1440  11/24/17  1816   WBC  --  6.4 6.1  --  6.1   HGB 7.1* 7.6* 8.0*   < > 8.4*   MCV  --  92 90  --  97   PLT  --  141* 133*  --  119*    < > = values in this interval not displayed.     Most Recent 3 BMP's:  Recent Labs   Lab Test 03/18/19  0935 03/17/19  0805 03/16/19  0811    140 142   POTASSIUM 3.9 4.0 4.5   CHLORIDE 107 108 111*   CO2 27 27 25   BUN 46* 45* 58*   CR 3.43* 3.25* 4.12*   ANIONGAP 8 5 6   KANWAL 7.5* 7.3* 7.7*   * 161* 168*     Most Recent 2 LFT's:  Recent Labs   Lab Test 11/24/17 1816 08/31/17 2012   AST 17 24   ALT 16 19   ALKPHOS 94 79   BILITOTAL 0.6 0.5     Most Recent 3 INR's:  Recent Labs   Lab Test 03/15/19  1440   INR 1.40*     Most Recent 3 Troponin's:  Recent Labs   Lab Test 11/24/17  1816 08/09/17  2030 09/30/15  1533   TROPI <0.015 <0.015  The 99th percentile for upper reference range is 0.045 ug/L.  Troponin values in   the range of 0.045 - 0.120 ug/L may be associated with risks of adverse   clinical events.   <0.015  The 99th percentile for upper reference range is 0.045 ug/L.  Troponin values in   the range of 0.045 - 0.120 ug/L may be associated with risks of adverse   clinical events.       Most Recent 3 BNP's:  Recent Labs   Lab Test 08/09/17 2030 11/29/12  1603   NTBNPI 5,862*  --    NTBNP  --  4210*     Most Recent Cholesterol Panel:  Recent Labs   Lab Test 07/22/16  1056   CHOL 211*   *   HDL 49*   TRIG 193*     Most Recent 6 Bacteria Isolates From Any Culture (See EPIC Reports for Culture Details):  Recent Labs   Lab Test 03/15/19  1600 03/22/13  1428   CULT Culture received and in progress.  Positive AFB results are called as soon as detected.    Final report to follow in 7 to 8 weeks.    Culture negative monitoring continues No  growth     Most Recent TSH and T4:  Recent Labs   Lab Test 01/20/17  1511   TSH 3.33     Most Recent Hemoglobin A1c:  Recent Labs   Lab Test 12/26/18  0748   A1C 8.0*     Most Recent Urinalysis:  Recent Labs   Lab Test 06/07/18  1455   COLOR Yellow   APPEARANCE Clear   URINEGLC 250*   URINEBILI Negative   URINEKETONE Negative   SG 1.015   UBLD Small*   URINEPH 6.0   PROTEIN >=300*   UROBILINOGEN 0.2   NITRITE Negative   LEUKEST Negative   RBCU 2-5*   WBCU 0 - 5     Most Recent ABG:No lab results found.  Most Recent ESR & CRP:No lab results found.,   Results for orders placed or performed during the hospital encounter of 03/15/19   US Thoracentesis    Narrative    ULTRASOUND GUIDED THORACENTESIS March 15, 2019 4:33 PM     HISTORY: Symptomatic right pleural effusion, evaluate for  thoracentesis.    FINDINGS: Ultrasound was used to evaluate for the presence and best  approach for drainage of a pleural effusion. Written and oral informed  consent was obtained. A pause for the cause procedure to verify the  correct patient and correct procedure. The skin overlying the right  chest posteriorly was prepped and draped in the usual sterile fashion.  The subcutaneous tissues were anesthetized with 10mL 1% Lidocaine . A  catheter was advanced into the pleural space and 1750 mL of  genie  colored fluid was drained. Patient was monitored by nurse under my  direct supervision throughout the exam. Ultrasound images were  permanently stored. There were no immediate complications. Patient  left the ultrasound suite in satisfactory condition.      Impression    IMPRESSION: Technically successful thoracentesis without immediate  complications.    JESSE DASILVA MD   XR Chest 1 View    Narrative    CHEST ONE VIEW   3/15/2019 4:37 PM     HISTORY: Right thoracentesis    COMPARISON: 3/13/2019      Impression    IMPRESSION: Right pleural effusion has decreased in size, now small.  No pneumothorax is identified. Lung volumes are unchanged.  Cardiac  silhouette is enlarged, unchanged.    EDWIN CABEZAS MD       Allergies   Allergies   Allergen Reactions     Albuterol      shakiness     Aspirin      gi     Byetta [Exenatide]      gi     Clonidine      constipation     Codeine      vomiting     Ezetimibe      muscle symptoms     Hydralazine      headaches     Lisinopril      hyperkalemia     Metformin Hydrochloride      vomiting     Pravachol [Pravastatin Sodium]      muscle pains     Simvastatin      myalgias     Troglitazone

## 2019-03-19 NOTE — PLAN OF CARE
A&O x4, SBA.  Dialysis diet.  VSS on 2L nc; desats to 86% on room air.  MANE.  LS clear.  Pt having sudden R ear pain; R ear assessed; no redness or drainage noted.  .  PIV saline locked.  IV abx.  HD AVF R arm; bruit/thrill present. BLE edema 2-3+.  Legs elevated.  Continue to monitor.

## 2019-03-19 NOTE — PROGRESS NOTES
Full note pending.     Patient requires oxygen given hypoxia noted during admission with activity and sleep, with contributing factors including severe pulmonary hypertension.     Rolan Monge MD   Hospitalist  [phone]

## 2019-03-19 NOTE — PROGRESS NOTES
Assessment and Plan:   ESRD: HD today using CVC on R. Set for discharge with follow up at the [facility] unit on Thurs 10 am. I will call them with orders . Tolerating UF, CVC working well.             Interval History:   DM  HT  R pleural effusion                 Review of Systems:   No sx on dialysis. Denies cramps or dizziness.           Medications:       - MEDICATION INSTRUCTIONS for Dialysis Patients -   Does not apply See Admin Instructions     fluticasone-vilanterol  1 puff Inhalation Daily     furosemide  20 mg Oral BID     insulin aspart   Subcutaneous TID AC     insulin aspart  1-3 Units Subcutaneous TID AC     insulin aspart  1-3 Units Subcutaneous At Bedtime     insulin detemir  12 Units Subcutaneous At Bedtime     labetalol  300 mg Oral BID     multivitamin RENAL  1 capsule Oral Daily       heparin (porcine) 500 Units/hr (03/19/19 0832)     - MEDICATION INSTRUCTIONS -       Current active medications and PTA medications reviewed, see medication list for details.            Physical Exam:   Vitals were reviewed  Patient Vitals for the past 24 hrs:   BP Temp Temp src Pulse Heart Rate Resp SpO2 Weight   03/19/19 0830 116/62 -- -- 60 -- -- -- --   03/19/19 0820 138/52 -- -- 56 -- -- -- --   03/19/19 0740 123/55 97.4  F (36.3  C) Oral 58 -- 18 96 % --   03/19/19 0557 -- -- -- -- -- -- -- 83.7 kg (184 lb 8.4 oz)   03/18/19 2356 133/53 98  F (36.7  C) Oral -- 70 18 92 % --   03/18/19 2345 -- -- -- -- -- -- (!) 86 % --   03/18/19 2103 148/52 -- -- 66 -- -- -- --   03/18/19 1532 123/50 98.1  F (36.7  C) Oral -- 61 18 95 % --   03/18/19 1253 142/53 97.7  F (36.5  C) Oral -- 69 18 97 % --   03/18/19 1155 167/58 97.6  F (36.4  C) Oral 66 -- 16 97 % --   03/18/19 1137 143/53 -- -- 65 -- -- -- --   03/18/19 1130 149/62 -- -- 63 -- -- -- --   03/18/19 1115 140/62 -- -- 63 -- -- -- --   03/18/19 1100 159/44 -- -- 64 -- -- -- --   03/18/19 1045 132/55 -- -- 63 -- -- -- --   03/18/19 1030 133/54 -- --  67 -- -- -- --   19 1015 141/54 -- -- 64 -- -- -- --   19 1000 136/59 -- -- 64 -- -- -- --   19 0945 140/52 -- -- 67 -- -- -- --   19 0930 142/55 -- -- 65 -- -- -- --   19 0915 141/56 -- -- 68 -- -- -- --   19 0900 133/50 -- -- 66 -- -- -- --   19 0845 153/57 -- -- 67 -- -- -- --       Temp:  [97.4  F (36.3  C)-98.1  F (36.7  C)] 97.4  F (36.3  C)  Pulse:  [56-68] 60  Heart Rate:  [61-70] 70  Resp:  [16-18] 18  BP: (116-167)/(44-62) 116/62  SpO2:  [86 %-97 %] 96 %    Temperatures:  Current - Temp: 97.4  F (36.3  C); Max - Temp  Av.8  F (36.6  C)  Min: 97.4  F (36.3  C)  Max: 98.1  F (36.7  C)  Respiration range: Resp  Av.6  Min: 16  Max: 18  Pulse range: Pulse  Av  Min: 56  Max: 68  Blood pressure range: Systolic (24hrs), Av , Min:116 , Max:167   ; Diastolic (24hrs), Av, Min:44, Max:62    Pulse oximetry range: SpO2  Av.8 %  Min: 86 %  Max: 97 %    I/O last 3 completed shifts:  In: 300 [P.O.:300]  Out: 1000 [Other:1000]      Intake/Output Summary (Last 24 hours) at 3/19/2019 0835  Last data filed at 3/18/2019 1800  Gross per 24 hour   Intake 300 ml   Output 1000 ml   Net -700 ml       Alert and responsive  Lungs with clear ant BS  CVC on R with no redness or tenderness  Cor RRR nl S1 S2 no M  LE 1+ edema, + redness and stasis changes       Wt Readings from Last 4 Encounters:   19 83.7 kg (184 lb 8.4 oz)   18 79.5 kg (175 lb 3.2 oz)   18 77.1 kg (170 lb)   18 78.4 kg (172 lb 14.4 oz)          Data:          Lab Results   Component Value Date     2019     2019     2019    Lab Results   Component Value Date    CHLORIDE 107 2019    CHLORIDE 108 2019    CHLORIDE 111 2019    Lab Results   Component Value Date    BUN 46 2019    BUN 45 2019    BUN 58 2019      Lab Results   Component Value Date    POTASSIUM 3.9 2019    POTASSIUM 4.0 2019     POTASSIUM 4.5 03/16/2019    Lab Results   Component Value Date    CO2 27 03/18/2019    CO2 27 03/17/2019    CO2 25 03/16/2019    Lab Results   Component Value Date    CR 3.43 03/18/2019    CR 3.25 03/17/2019    CR 4.12 03/16/2019        Recent Labs   Lab Test 03/18/19  0935 03/16/19  0811 03/15/19  1440  11/24/17  1816   WBC  --  6.4 6.1  --  6.1   HGB 7.1* 7.6* 8.0*   < > 8.4*   HCT  --  23.9* 24.5*  --  27.3*   MCV  --  92 90  --  97   PLT  --  141* 133*  --  119*    < > = values in this interval not displayed.     Recent Labs   Lab Test 11/24/17  1816 08/31/17 2012 08/09/17  1623   AST 17 24 25   ALT 16 19 21   ALKPHOS 94 79 80   BILITOTAL 0.6 0.5 0.5       Recent Labs   Lab Test 11/29/12  1603 07/25/12  1627   MAG 2.5* 2.6*     Recent Labs   Lab Test 03/18/19  0935 03/16/19  0811 01/16/19  1422   PHOS 3.6 4.9* 4.7*     Recent Labs   Lab Test 03/18/19  0935 03/17/19  0805 03/16/19  0811   KANWAL 7.5* 7.3* 7.7*       Lab Results   Component Value Date    KANWAL 7.5 (L) 03/18/2019     Lab Results   Component Value Date    WBC 6.4 03/16/2019    HGB 7.1 (L) 03/18/2019    HCT 23.9 (L) 03/16/2019    MCV 92 03/16/2019     (L) 03/16/2019     Lab Results   Component Value Date     03/18/2019    POTASSIUM 3.9 03/18/2019    CHLORIDE 107 03/18/2019    CO2 27 03/18/2019     (H) 03/18/2019     Lab Results   Component Value Date    BUN 46 (H) 03/18/2019    CR 3.43 (H) 03/18/2019     Lab Results   Component Value Date    MAG 2.5 (H) 11/29/2012     Lab Results   Component Value Date    PHOS 3.6 03/18/2019       Creatinine   Date Value Ref Range Status   03/18/2019 3.43 (H) 0.52 - 1.04 mg/dL Final   03/17/2019 3.25 (H) 0.52 - 1.04 mg/dL Final   03/16/2019 4.12 (H) 0.52 - 1.04 mg/dL Final   03/15/2019 4.93 (H) 0.52 - 1.04 mg/dL Final   01/16/2019 4.91 (H) 0.52 - 1.04 mg/dL Final   12/26/2018 5.27 (H) 0.52 - 1.04 mg/dL Final       Attestation:  I have reviewed today's vital signs, notes, medications, labs and  imaging.  Seen on dialysis.      Rahul Hernandez MD

## 2019-03-20 ENCOUNTER — TELEPHONE (OUTPATIENT)
Dept: INTERNAL MEDICINE | Facility: CLINIC | Age: 59
End: 2019-03-20

## 2019-03-20 LAB
BACTERIA SPEC CULT: NO GROWTH
SPECIMEN SOURCE: NORMAL

## 2019-03-20 NOTE — TELEPHONE ENCOUNTER
MTM referral from: [facility] visit (referral by provider)    MTM referral outreach attempt #2 on March 20, 2019 at 3:40 PM      Outcome: Patient not reachable after several attempts, will route to MTM Pharmacist/Provider as an FYI. Thank you for the referral.    Ivon Solomon, MTM Coordinator

## 2019-03-20 NOTE — CONSULTS
Care Transition  - RN   Met with patient t discuss discharge plans. Patient stating she lives alone however has family support if needed. Per [facility] intake chair time is Tue Thurs and Saturday 1030 am.. Patient in agreement. Patient stating she will continue to work..States she is feeling a lot better she does not need transportation as she will drive herself.  Patient requiring oxygen with activity. Information sent to  Home o2.

## 2019-03-20 NOTE — TELEPHONE ENCOUNTER
"Hospital/TCU/ED for chronic condition Discharge Protocol    \"Hi, my name is Vandana Rodney, a registered nurse, and I am calling from [facility].  I am calling to follow up and see how things are going for you after your recent emergency visit/hospital/TCU stay.\"    Tell me how you are doing now that you are home?\" I am doing well thanks.      Discharge Instructions    \"Let's review your discharge instructions.  What is/are the follow-up recommendations?  Pt. Response: I am supposed to follow up with Dr. Martin within 7 days and with nephrology.    \"Has an appointment with your primary care provider been scheduled?\"   No (schedule appointment)--scheduled for 3/21.    \"When you see the provider, I would recommend that you bring your medications with you.\"    Medications    \"Tell me what changed about your medicines when you discharged?\"    Changes to chronic meds?    2 or more - Epic MTM referral needed----already placed while in hospital.    \"What questions do you have about your medications?\"    None     New diagnoses of heart failure, COPD, diabetes, or MI?    No     On insulin: \"Did you start on insulin in the hospital or did you have your insulin dose changed?\"  Yes - Diabetes Education Referral needed (unless MTM Referral already ordered) --MTM already placed in hospital.       Medication reconciliation completed? Yes  Was MTM referral placed (*Make sure to put transitions as reason for referral)?   No--already placed while in hospital.    Call Summary    \"What questions or concerns do you have about your recent visit and your follow-up care?\"     none    \"If you have questions or things don't continue to improve, we encourage you contact us through the main clinic number (give number).  Even if the clinic is not open, triage nurses are available 24/7 to help you.     We would like you to know that our clinic has extended hours (provide information).  We also have urgent care (provide details on closest " "location and hours/contact info)\"      \"Thank you for your time and take care!\"           "

## 2019-03-21 ENCOUNTER — OFFICE VISIT (OUTPATIENT)
Dept: INTERNAL MEDICINE | Facility: CLINIC | Age: 59
End: 2019-03-21
Payer: MEDICARE

## 2019-03-21 VITALS
OXYGEN SATURATION: 92 % | RESPIRATION RATE: 16 BRPM | DIASTOLIC BLOOD PRESSURE: 78 MMHG | SYSTOLIC BLOOD PRESSURE: 126 MMHG | TEMPERATURE: 97.4 F | HEART RATE: 66 BPM

## 2019-03-21 DIAGNOSIS — I10 ESSENTIAL HYPERTENSION: ICD-10-CM

## 2019-03-21 DIAGNOSIS — N18.6 ESRD (END STAGE RENAL DISEASE) (H): Primary | ICD-10-CM

## 2019-03-21 PROCEDURE — 99495 TRANSJ CARE MGMT MOD F2F 14D: CPT | Performed by: INTERNAL MEDICINE

## 2019-03-21 ASSESSMENT — ASTHMA QUESTIONNAIRES
QUESTION_4 LAST FOUR WEEKS HOW OFTEN HAVE YOU USED YOUR RESCUE INHALER OR NEBULIZER MEDICATION (SUCH AS ALBUTEROL): TWO OR THREE TIMES PER WEEK
QUESTION_3 LAST FOUR WEEKS HOW OFTEN DID YOUR ASTHMA SYMPTOMS (WHEEZING, COUGHING, SHORTNESS OF BREATH, CHEST TIGHTNESS OR PAIN) WAKE YOU UP AT NIGHT OR EARLIER THAN USUAL IN THE MORNING: ONCE OR TWICE
QUESTION_1 LAST FOUR WEEKS HOW MUCH OF THE TIME DID YOUR ASTHMA KEEP YOU FROM GETTING AS MUCH DONE AT WORK, SCHOOL OR AT HOME: A LITTLE OF THE TIME
ACUTE_EXACERBATION_TODAY: NO
ACT_TOTALSCORE: 18
QUESTION_2 LAST FOUR WEEKS HOW OFTEN HAVE YOU HAD SHORTNESS OF BREATH: ONCE OR TWICE A WEEK
QUESTION_5 LAST FOUR WEEKS HOW WOULD YOU RATE YOUR ASTHMA CONTROL: SOMEWHAT CONTROLLED

## 2019-03-21 NOTE — PROGRESS NOTES
SUBJECTIVE:   Yissel Wetzel is a 58 year old female who presents to clinic today for the following health issues:          Hospital Follow-up Visit:    Hospital/Nursing Home/IP Rehab Facility: [facility]  Date of Admission: 03/15/2019  Date of Discharge: 03/19/2019  Reason(s) for Admission: Diabetes          Problems taking medications regularly:  None       Medication changes since discharge: None       Problems adhering to non-medication therapy:  None    Summary of hospitalization:  [facility] discharge summary reviewed  Diagnostic Tests/Treatments reviewed.  Follow up needed: none  Other Healthcare Providers Involved in Patient s Care:         Specialist appointment - Nephrology  Update since discharge: improved.     Post Discharge Medication Reconciliation: discharge medications reconciled, continue medications without change.  Plan of care communicated with patient     Coding guidelines for this visit:  Type of Medical   Decision Making Face-to-Face Visit       within 7 Days of discharge Face-to-Face Visit        within 14 days of discharge   Moderate Complexity [id number]   High Complexity 26833 [id number]                  Problem list and histories reviewed & adjusted, as indicated.  Additional history:     Patient has started on hemodialysis and seems to be tolerating well    Diabetes control regimen is simplified, she is on a much lower dose of long-acting insulin, along with sliding scale regular insulin with carb counting.    Reviewed and updated as needed this visit by clinical staff  Tobacco  Allergies  Meds       Reviewed and updated as needed this visit by Provider         ROS:  Constitutional, HEENT, cardiovascular, pulmonary, GI, , musculoskeletal, neuro, skin, endocrine and psych systems are negative, except as otherwise noted.    OBJECTIVE:     /78   Pulse 66   Temp 97.4  F (36.3  C) (Oral)   Resp 16   SpO2 92%   There is no height or weight on file to  calculate BMI.  GENERAL: healthy, alert and no distress  NECK: no adenopathy, no asymmetry, masses, or scars and thyroid normal to palpation  RESP: lungs clear to auscultation - no rales, rhonchi or wheezes  CV: regular rate and rhythm, normal S1 S2, no S3 or S4, no murmur, click or rub, no peripheral edema and peripheral pulses strong  ABDOMEN: soft, nontender, no hepatosplenomegaly, no masses and bowel sounds normal  MS: no gross musculoskeletal defects noted, no edema        ASSESSMENT/PLAN:             1. ESRD (end stage renal disease) (H)  Started hemodialysis    2. Essential hypertension  Stable reasonably well controlled      See Patient Instructions    Gigi Martin MD  [facility]

## 2019-03-22 ASSESSMENT — ASTHMA QUESTIONNAIRES: ACT_TOTALSCORE: 18

## 2019-03-28 ENCOUNTER — OFFICE VISIT (OUTPATIENT)
Dept: URGENT CARE | Facility: URGENT CARE | Age: 59
End: 2019-03-28
Payer: MEDICARE

## 2019-03-28 ENCOUNTER — ANCILLARY PROCEDURE (OUTPATIENT)
Dept: GENERAL RADIOLOGY | Facility: CLINIC | Age: 59
End: 2019-03-28
Attending: PHYSICIAN ASSISTANT
Payer: MEDICARE

## 2019-03-28 VITALS
DIASTOLIC BLOOD PRESSURE: 44 MMHG | HEART RATE: 56 BPM | SYSTOLIC BLOOD PRESSURE: 120 MMHG | RESPIRATION RATE: 22 BRPM | TEMPERATURE: 97 F | OXYGEN SATURATION: 93 %

## 2019-03-28 DIAGNOSIS — I27.20 PULMONARY HYPERTENSION (H): ICD-10-CM

## 2019-03-28 DIAGNOSIS — R05.8 PRODUCTIVE COUGH: ICD-10-CM

## 2019-03-28 DIAGNOSIS — R09.89 CHEST CONGESTION: ICD-10-CM

## 2019-03-28 DIAGNOSIS — N18.6 ESRD (END STAGE RENAL DISEASE) (H): Primary | ICD-10-CM

## 2019-03-28 DIAGNOSIS — N18.30 CKD (CHRONIC KIDNEY DISEASE) STAGE 3, GFR 30-59 ML/MIN (H): ICD-10-CM

## 2019-03-28 DIAGNOSIS — J45.31 MILD PERSISTENT ASTHMA WITH ACUTE EXACERBATION: ICD-10-CM

## 2019-03-28 LAB
BASOPHILS # BLD AUTO: 0 10E9/L (ref 0–0.2)
BASOPHILS NFR BLD AUTO: 0.6 %
DIFFERENTIAL METHOD BLD: ABNORMAL
EOSINOPHIL # BLD AUTO: 0.2 10E9/L (ref 0–0.7)
EOSINOPHIL NFR BLD AUTO: 3.8 %
ERYTHROCYTE [DISTWIDTH] IN BLOOD BY AUTOMATED COUNT: 15.9 % (ref 10–15)
HCT VFR BLD AUTO: 24.2 % (ref 35–47)
HGB BLD-MCNC: 7.2 G/DL (ref 11.7–15.7)
LYMPHOCYTES # BLD AUTO: 0.9 10E9/L (ref 0.8–5.3)
LYMPHOCYTES NFR BLD AUTO: 18.3 %
MCH RBC QN AUTO: 28.6 PG (ref 26.5–33)
MCHC RBC AUTO-ENTMCNC: 29.8 G/DL (ref 31.5–36.5)
MCV RBC AUTO: 96 FL (ref 78–100)
MONOCYTES # BLD AUTO: 0.7 10E9/L (ref 0–1.3)
MONOCYTES NFR BLD AUTO: 14.1 %
NEUTROPHILS # BLD AUTO: 3 10E9/L (ref 1.6–8.3)
NEUTROPHILS NFR BLD AUTO: 63.2 %
PLATELET # BLD AUTO: 143 10E9/L (ref 150–450)
RBC # BLD AUTO: 2.52 10E12/L (ref 3.8–5.2)
WBC # BLD AUTO: 4.8 10E9/L (ref 4–11)

## 2019-03-28 PROCEDURE — 85025 COMPLETE CBC W/AUTO DIFF WBC: CPT | Performed by: PHYSICIAN ASSISTANT

## 2019-03-28 PROCEDURE — 36415 COLL VENOUS BLD VENIPUNCTURE: CPT | Performed by: PHYSICIAN ASSISTANT

## 2019-03-28 PROCEDURE — 99215 OFFICE O/P EST HI 40 MIN: CPT | Performed by: PHYSICIAN ASSISTANT

## 2019-03-28 PROCEDURE — 71046 X-RAY EXAM CHEST 2 VIEWS: CPT

## 2019-03-28 RX ORDER — AZITHROMYCIN 250 MG/1
TABLET, FILM COATED ORAL
Qty: 5 TABLET | Refills: 0 | Status: SHIPPED | OUTPATIENT
Start: 2019-03-28 | End: 2019-03-28

## 2019-03-28 RX ORDER — AMOXICILLIN 500 MG/1
500 CAPSULE ORAL 2 TIMES DAILY
Qty: 20 CAPSULE | Refills: 0 | Status: SHIPPED | OUTPATIENT
Start: 2019-03-28 | End: 2019-11-01

## 2019-03-28 NOTE — PROGRESS NOTES
SUBJECTIVE:   Yissel Wetzel is a 58 year old female presenting with a chief complaint of chest congestion, productive cough, mild wheezing.  Onset of symptoms was 1 week(s) ago.  Course of illness is worsening.    Severity moderate  Current and Associated symptoms: chest congestion, wheezing  Treatment measures tried include none.  Predisposing factors include hx of pleural effusion, hx of pneumonia, HTN, diabetes and pulmonary htn, CKD.    Past Medical History:   Diagnosis Date     Allergic rhinitis, cause unspecified      Anemia of chronic disease      CKD (chronic kidney disease) stage 3, GFR 30-59 ml/min (H)      Esophagitis, unspecified      HEMORRHAGIC GASTROPATHY      Iron deficiency anemia, unspecified      Mild persistent asthma      Other and unspecified hyperlipidemia      Pneumonia      Pulmonary hypertension (H)     per 2012 echo mod.to severe pulmonary hypertension     Tobacco use disorder dc ed      Type II or unspecified type diabetes mellitus without mention of complication, not stated as uncontrolled      Unspecified essential hypertension         Allergies   Allergen Reactions     Albuterol      shakiness     Aspirin      gi     Byetta [Exenatide]      gi     Clonidine      constipation     Codeine      vomiting     Ezetimibe      muscle symptoms     Hydralazine      headaches     Lisinopril      hyperkalemia     Metformin Hydrochloride      vomiting     Pravachol [Pravastatin Sodium]      muscle pains     Simvastatin      myalgias     Troglitazone      Family History   Problem Relation Age of Onset     Arrhythmia Mother      Hypertension Mother      Pancreatic Cancer Father      Diabetes Brother      Asthma Sister      LUNG DISEASE Sister          Social History     Tobacco Use     Smoking status: Former Smoker     Packs/day: 1.00     Years: 22.00     Pack years: 22.00     Types: Cigarettes     Last attempt to quit: 10/12/1999     Years since quittin.4     Smokeless tobacco: Never  Used   Substance Use Topics     Alcohol use: No     Alcohol/week: 0.0 oz       ROS:  CONSTITUTIONAL:NEGATIVE for fever, chills, change in weight  INTEGUMENTARY/SKIN: NEGATIVE for worrisome rashes, moles or lesions  ENT/MOUTH: NEGATIVE for ear, mouth and throat problems  RESP:POSITIVE for chest congestion, coughing, wheezin  CV: NEGATIVE for chest pain, palpitations or peripheral edema  GI: NEGATIVE for nausea, abdominal pain, heartburn, or change in bowel habits  : normal menstrual cycles  MUSCULOSKELETAL: NEGATIVE for significant arthralgias or myalgia  NEURO: NEGATIVE for weakness, dizziness or paresthesias    OBJECTIVE  :/44   Pulse 56   Temp 97  F (36.1  C) (Oral)   Resp 22   SpO2 93%   GENERAL APPEARANCE: healthy, alert and no distress  EYES: EOMI,  PERRL, conjunctiva clear  HENT: ear canals and TM's normal.  Nose and mouth without ulcers, erythema or lesions  NECK: supple, nontender, no lymphadenopathy  RESP: Positive for mild wheezing and bronchospasms  CV: regular rates and rhythm, normal S1 S2, no murmur noted  ABDOMEN:  soft, nontender, no HSM or masses and bowel sounds normal  NEURO: Normal strength and tone, sensory exam grossly normal,  normal speech and mentation  SKIN: no suspicious lesions or rashes    Chest xray Positive for slightly larger effusion right side, read by Lior Torrez, [facility] PA-C      Results for orders placed or performed in visit on 03/28/19   CBC with platelets differential   Result Value Ref Range    WBC 4.8 4.0 - 11.0 10e9/L    RBC Count 2.52 (L) 3.8 - 5.2 10e12/L    Hemoglobin 7.2 (LL) 11.7 - 15.7 g/dL    Hematocrit 24.2 (L) 35.0 - 47.0 %    MCV 96 78 - 100 fl    MCH 28.6 26.5 - 33.0 pg    MCHC 29.8 (L) 31.5 - 36.5 g/dL    RDW 15.9 (H) 10.0 - 15.0 %    Platelet Count 143 (L) 150 - 450 10e9/L    % Neutrophils 63.2 %    % Lymphocytes 18.3 %    % Monocytes 14.1 %    % Eosinophils 3.8 %    % Basophils 0.6 %    Absolute Neutrophil 3.0 1.6 - 8.3 10e9/L    Absolute  Lymphocytes 0.9 0.8 - 5.3 10e9/L    Absolute Monocytes 0.7 0.0 - 1.3 10e9/L    Absolute Eosinophils 0.2 0.0 - 0.7 10e9/L    Absolute Basophils 0.0 0.0 - 0.2 10e9/L    Diff Method Automated Method        ASSESSMENT/PLAN      ICD-10-CM    1. ESRD (end stage renal disease) (H) N18.6    2. Pulmonary hypertension (H) I27.20    3. CKD (chronic kidney disease) stage 3, GFR 30-59 ml/min (H) N18.3    4. Mild persistent asthma with acute exacerbation J45.31 XR Chest 2 Views   5. Chest congestion R09.89 XR Chest 2 Views     CBC with platelets differential     amoxicillin (AMOXIL) 500 MG capsule        6. Productive cough R05 XR Chest 2 Views     CBC with platelets differential     amoxicillin (AMOXIL) 500 MG capsule       Orders Placed This Encounter     XR Chest 2 Views     CBC with platelets differential         amoxicillin (AMOXIL) 500 MG capsule     Patient started on amox  Inform dialysis about effusion  Patient needs to have close follow up with PCP in 24-48 hrs  Go to the ED if symptoms worsen    See orders in Epic

## 2019-03-29 ENCOUNTER — HOSPITAL ENCOUNTER (EMERGENCY)
Facility: CLINIC | Age: 59
Discharge: HOME | End: 2019-03-29
Attending: EMERGENCY MEDICINE | Admitting: EMERGENCY MEDICINE
Payer: MEDICARE

## 2019-03-29 ENCOUNTER — APPOINTMENT (OUTPATIENT)
Dept: ULTRASOUND IMAGING | Facility: CLINIC | Age: 59
End: 2019-03-29
Attending: EMERGENCY MEDICINE
Payer: MEDICARE

## 2019-03-29 VITALS
DIASTOLIC BLOOD PRESSURE: 41 MMHG | SYSTOLIC BLOOD PRESSURE: 147 MMHG | TEMPERATURE: 98.3 F | RESPIRATION RATE: 20 BRPM | BODY MASS INDEX: 36.04 KG/M2 | HEART RATE: 62 BPM | HEIGHT: 60 IN | OXYGEN SATURATION: 100 %

## 2019-03-29 DIAGNOSIS — J90 PLEURAL EFFUSION: ICD-10-CM

## 2019-03-29 PROCEDURE — 40000863 ZZH STATISTIC RADIOLOGY XRAY, US, CT, MAR, NM

## 2019-03-29 PROCEDURE — 25000125 ZZHC RX 250: Performed by: RADIOLOGY

## 2019-03-29 PROCEDURE — 99285 EMERGENCY DEPT VISIT HI MDM: CPT | Mod: 25

## 2019-03-29 PROCEDURE — 32555 ASPIRATE PLEURA W/ IMAGING: CPT

## 2019-03-29 RX ORDER — NICOTINE POLACRILEX 4 MG
15-30 LOZENGE BUCCAL
Status: DISCONTINUED | OUTPATIENT
Start: 2019-03-29 | End: 2019-03-29 | Stop reason: HOSPADM

## 2019-03-29 RX ORDER — DEXTROSE MONOHYDRATE 25 G/50ML
25-50 INJECTION, SOLUTION INTRAVENOUS
Status: DISCONTINUED | OUTPATIENT
Start: 2019-03-29 | End: 2019-03-29 | Stop reason: HOSPADM

## 2019-03-29 RX ORDER — LIDOCAINE HYDROCHLORIDE 10 MG/ML
10 INJECTION, SOLUTION EPIDURAL; INFILTRATION; INTRACAUDAL; PERINEURAL ONCE
Status: COMPLETED | OUTPATIENT
Start: 2019-03-29 | End: 2019-03-29

## 2019-03-29 RX ADMIN — LIDOCAINE HYDROCHLORIDE 10 ML: 10 INJECTION, SOLUTION EPIDURAL; INFILTRATION; INTRACAUDAL; PERINEURAL at 17:04

## 2019-03-29 ASSESSMENT — ENCOUNTER SYMPTOMS
FEVER: 0
NAUSEA: 0
ABDOMINAL PAIN: 0
DIARRHEA: 0
SHORTNESS OF BREATH: 1
VOMITING: 0
COUGH: 1

## 2019-03-29 NOTE — ED AVS SNAPSHOT
Emergency Department  [address] [place] [address]  Phone:  [phone]  Fax:  [phone]                                    Yissel Wetzel   MRN: [id number]    Department:   Emergency Department   Date of Visit:  3/29/2019           After Visit Summary Signature Page    I have received my discharge instructions, and my questions have been answered. I have discussed any challenges I see with this plan with the nurse or doctor.    ..........................................................................................................................................  Patient/Patient Representative Signature      ..........................................................................................................................................  Patient Representative Print Name and Relationship to Patient    ..................................................               ................................................  Date                                   Time    ..........................................................................................................................................  Reviewed by Signature/Title    ...................................................              ..............................................  Date                                               Time          22EPIC Rev 08/18

## 2019-03-29 NOTE — PROCEDURES
RADIOLOGY POST PROCEDURE NOTE    Patient name: Yissel Wetzel  MRN: [id number]  : 1960    Pre-procedure diagnosis: pleural effusion  Post-procedure diagnosis: Same    Procedure Date/Time: 2019  4:51 PM  Procedure: US guided right thoracentesis  Estimated blood loss: None  Specimen(s) collected with description: none    The patient tolerated the procedure well with no immediate complications.  Significant findings:none    See imaging dictation for procedural details.    Provider name: Roderick Rao  Assistant(s):None

## 2019-03-29 NOTE — ED PROVIDER NOTES
History     Chief Complaint:  shortness of breath        HPI   Yissel Wetzel is a 58 year old female with a complex medical history including ESRD (MWF dialysis) who presents with shortness of breath. The patient states that in the last few days she has had increasing shortness of breath on exertion. She had a chest x-ray yesterday which showed a moderate pleural effusion, slightly increased in the last 2 weeks since her last x-ray. The patient denies any nausea, vomiting, diarrhea, chest pain, abdominal pain, or fever. She does have cough which she is on amoxicillin for. The patient states that her last thoracentesis took 1.75 L of fluid off. She had her dialysis appointment as usual today.       Allergies:  Albuterol  Aspirin  Byetta [Exenatide]  Clonidine  Codeine  Ezetimibe  Hydralazine  Lisinopril  Metformin Hydrochloride  Pravachol [Pravastatin Sodium]  Simvastatin  Troglitazone     Medications:    Tylenol  Advair  Lasix  Novolog  Normodyne  Levemir  Xopenex   Amoxicillin    Past Medical History:    Allergic rhinitis  Anemia  ESRD  Esophagitis  Hemorrhagic gastropathy  Asthma  Hyperlipidemia  Pneumonia  Pulmonary HTN  Type 2 diabetes    Past Surgical History:     x2  Create AV fistula  Hysterectomy    Family History:    Arrhythmia  HTN  Pancreatic cancer  Diabetes  Asthma    Social History:  Patient presents   Former smoker  Negative for alcohol use.  Marital Status:  Single      Review of Systems   Constitutional: Negative for fever.   Respiratory: Positive for cough and shortness of breath.    Cardiovascular: Negative for chest pain.   Gastrointestinal: Negative for abdominal pain, diarrhea, nausea and vomiting.   All other systems reviewed and are negative.      Physical Exam   First Vitals:  BP: 132/40  Pulse: 73  Temp: 98.3  F (36.8  C)  Resp: 22  Height: 152.4 cm (5')  SpO2: 92 %      Physical Exam  Vitals: reviewed by me  General: Pt seen on [facility], pleasant, cooperative, and alert  to conversation, chronically ill-appearing however.  Eyes: Tracking well, clear conjunctiva BL  ENT: MMM, midline trachea.   Lungs: Mild respiratory distress, minimal tachypnea, speaking in full sentences.  CV: Rate as above, regular rhythm.    MSK: no peripheral edema or joint effusion.  No evidence of trauma  Skin: No rash, normal turgor and temperature  Neuro: Clear speech and no facial droop.  Psych: Not RIS, no e/o AH/VH      Emergency Department Course   Interventions:  1704 - lidocaine 1% 10 mL subcuteanous    Emergency Department Course:  Nursing notes and vitals reviewed.  1544: I performed an exam of the patient as documented above.     1601: I discussed the case with Dr. Rao interventional radiology regarding the patient to check on availability for a thoracentesis.  The patient will go to IR for a thoracentesis around 1715.    1803: I rechecked the patient. Findings and plan explained to the Patient. Patient discharged home with instructions regarding supportive care, medications, and reasons to return. The importance of close follow-up was reviewed.       Impression & Plan    Medical Decision Making:  Yissel Wetzel is a 58 year old female who presents to the emergency room with shortness of breath after dialysis and sent here for thoracentesis after x-ray showed worsening effusion. I do think this is reasonable and IR was generous enough to do her thoracentesis here in the ER. No complications, normal skin exam after thoracentesis is done. Patient has no other concerns. Her thoracentesis was a transudate, it is likely from her hypervolemia and renal related. Patient states she feels well enough to go home, knows her return to ED precautions that she was given when she left the hospital. Also has oxygen and good family support. Will discharge as above.       Diagnosis:    ICD-10-CM    1. Pleural effusion J90        Disposition:  discharged to home    Yaya ZAPATA, am serving as a scribe on  3/29/2019 at 3:44 PM to personally document services performed by Skip Covington MD based on my observations and the provider's statements to me.       Yaya Redd  3/29/2019    EMERGENCY DEPARTMENT       Skip Covington MD  03/29/19 3949

## 2019-05-11 LAB
MYCOBACTERIUM SPEC CULT: NORMAL
MYCOBACTERIUM SPEC CULT: NORMAL
SPECIMEN SOURCE: NORMAL

## 2019-05-17 DIAGNOSIS — Z79.4 TYPE 2 DIABETES MELLITUS WITH DIABETIC NEPHROPATHY, WITH LONG-TERM CURRENT USE OF INSULIN (H): Primary | ICD-10-CM

## 2019-05-17 DIAGNOSIS — E11.21 TYPE 2 DIABETES MELLITUS WITH DIABETIC NEPHROPATHY, WITH LONG-TERM CURRENT USE OF INSULIN (H): Primary | ICD-10-CM

## 2019-05-17 NOTE — TELEPHONE ENCOUNTER
"Requested Prescriptions   Pending Prescriptions Disp Refills     ACCU-CHEK CHING PLUS test strip [Pharmacy Med Name: Accu-Chek Ching Plus In Vitro Strip] 200 each 0     Sig: USE TO TEST BLOOD SUGAR 4 TIMES PER DAY       Diabetic Supplies Protocol Failed - 5/17/2019  1:30 PM        Failed - Medication is active on med list        Passed - Patient is 18 years of age or older        Passed - Recent (6 mo) or future (30 days) visit within the authorizing provider's specialty     Patient had office visit in the last 6 months or has a visit in the next 30 days with authorizing provider.  See \"Patient Info\" tab in inbasket, or \"Choose Columns\" in Meds & Orders section of the refill encounter.            Last Written Prescription Date:  3/29/2019  Last Fill Quantity: 200,  # refills: 1   Last office visit: 3/21/2019 with prescribing provider:  3/21/2019   Future Office Visit:      "

## 2019-05-17 NOTE — TELEPHONE ENCOUNTER
Routing refill request to provider for review/approval because:  Drug not active on patient's medication list    Please advise how often you would like patient to test blood sugars. Looks like this was discontinued as of 3/29/2019.      Amber HERNANDEZ RN, BSN, PHN

## 2019-05-19 RX ORDER — BLOOD SUGAR DIAGNOSTIC
STRIP MISCELLANEOUS
Qty: 200 EACH | Refills: 0 | Status: SHIPPED | OUTPATIENT
Start: 2019-05-19 | End: 2019-08-16

## 2019-05-23 DIAGNOSIS — Z79.4 TYPE 2 DIABETES MELLITUS WITH DIABETIC NEPHROPATHY, WITH LONG-TERM CURRENT USE OF INSULIN (H): ICD-10-CM

## 2019-05-23 DIAGNOSIS — E11.21 TYPE 2 DIABETES MELLITUS WITH DIABETIC NEPHROPATHY, WITH LONG-TERM CURRENT USE OF INSULIN (H): ICD-10-CM

## 2019-05-23 NOTE — TELEPHONE ENCOUNTER
"Requested Prescriptions   Pending Prescriptions Disp Refills     NOVOLOG FLEXPEN 100 UNIT/ML soln [Pharmacy Med Name: NovoLOG FlexPen Subcutaneous Solution Pen-injector 100 UNIT/ML] 15 mL 0     Sig: INJECT 9-12 UNITS BEFORE BREAKFAST, LUNCH, AND DINNER + A CORRECTIVE SCALE OF 2 UNITS FOR EVERY 50 OVER 150       Short Acting Insulin Protocol Failed - 5/23/2019  2:02 PM        Failed - Blood pressure less than 140/90 in past 6 months     BP Readings from Last 3 Encounters:   03/29/19 147/41   03/28/19 120/44   03/21/19 126/78                 Failed - LDL on file in past 12 months     Recent Labs   Lab Test 07/22/16  1056   *             Failed - HgbA1C in past 3 or 6 months     If HgbA1C is 8 or greater, it needs to be on file within the past 3 months.  If less than 8, must be on file within the past 6 months.     Recent Labs   Lab Test 12/26/18  0748   A1C 8.0*             Passed - Microalbumin on file in past 12 months     Recent Labs   Lab Test 06/07/18  1455   MICROL 4,520   UMALCR 6,716.20*             Passed - Serum creatinine on file in past 12 months     Recent Labs   Lab Test 03/18/19  0935   CR 3.43*             Passed - Medication is active on med list        Passed - Patient is age 18 or older        Passed - Recent (6 mo) or future (30 days) visit within the authorizing provider's specialty     Patient had office visit in the last 6 months or has a visit in the next 30 days with authorizing provider or within the authorizing provider's specialty.  See \"Patient Info\" tab in inbasket, or \"Choose Columns\" in Meds & Orders section of the refill encounter.            Routing refill request to provider for review/approval because:  Labs out of range:  Creat, blood pressure          "

## 2019-05-24 RX ORDER — INSULIN ASPART 100 [IU]/ML
INJECTION, SOLUTION INTRAVENOUS; SUBCUTANEOUS
Qty: 15 ML | Refills: 0 | Status: SHIPPED | OUTPATIENT
Start: 2019-05-24 | End: 2019-08-16

## 2019-06-02 NOTE — TELEPHONE ENCOUNTER
GENERAL SURGERY  Maira Leonardo  1971  1/2 Day Post-Op    HPI:  Patient had gallbladder 2 days ago, then ERCP yesterday.  She was set up to go home with scripts called in.  By the time she got ready to leave, her pharmacy was closed so she requested to stay.  Thereafter, she decided she did not want any narcotics.  Ready to go now, with pharmacy opening in 3 hours.    Vitals:    06/01/19 1414 06/01/19 1754 06/01/19 2132 06/02/19 0545   BP:  136/83 130/80 141/81   BP Location:  Right arm Right arm Right arm   Patient Position:  Sitting Lying Lying   Pulse:  68 79 71   Resp:  20 20 18   Temp: 97.1 °F (36.2 °C) 98.4 °F (36.9 °C) 97.7 °F (36.5 °C) 97 °F (36.1 °C)   TempSrc: Oral Oral Oral Oral   SpO2:  95% 93% 92%   Weight:       Height:         Intake & Output (last day)       06/01 0701 - 06/02 0700 06/02 0701 - 06/03 0700    P.O.      I.V. (mL/kg) 1246 (10.6)     IV Piggyback 200     Total Intake(mL/kg) 1446 (12.3)     Urine (mL/kg/hr) 1000 (0.4)     Total Output 1000     Net +446                 Physical Exam    Pertinent labs/imaging:  Results from last 7 days   Lab Units 06/01/19  0431   WBC 10*3/mm3 9.09   HEMOGLOBIN g/dL 11.5*   HEMATOCRIT % 36.8   PLATELETS 10*3/mm3 126*     Results from last 7 days   Lab Units 06/01/19  0431 05/30/19  2101   SODIUM mmol/L 139 140   POTASSIUM mmol/L 4.3 3.6   CHLORIDE mmol/L 104 101   CO2 mmol/L 23.8 27.0   BUN mg/dL 9 9   CREATININE mg/dL 0.61 0.69   CALCIUM mg/dL 8.4* 9.1   BILIRUBIN mg/dL 0.4 0.5   ALK PHOS U/L 47 52   ALT (SGPT) U/L 25 14   AST (SGOT) U/L 34* 17   GLUCOSE mg/dL 129* 114*       Assessment:   · S/P lap chol and ERCP    Plan  · home    Kiana Pardo MD  06/02/19     Pt says she checks her blood sugar 6 times a day.  Script only says 4.  Pt would like new script for test strips sent to pharmacy with change in directions.    Routing refill request to provider for review/approval because:  Direction change

## 2019-06-18 ENCOUNTER — TRANSFERRED RECORDS (OUTPATIENT)
Dept: HEALTH INFORMATION MANAGEMENT | Facility: CLINIC | Age: 59
End: 2019-06-18

## 2019-06-25 ENCOUNTER — TRANSFERRED RECORDS (OUTPATIENT)
Dept: HEALTH INFORMATION MANAGEMENT | Facility: CLINIC | Age: 59
End: 2019-06-25

## 2019-07-01 ENCOUNTER — REFERRAL (OUTPATIENT)
Dept: TRANSPLANT | Facility: CLINIC | Age: 59
End: 2019-07-01

## 2019-07-01 DIAGNOSIS — E11.9 DIABETES MELLITUS, TYPE 2 (H): Primary | ICD-10-CM

## 2019-07-01 DIAGNOSIS — E78.5 HYPERLIPIDEMIA: ICD-10-CM

## 2019-07-01 DIAGNOSIS — I10 ESSENTIAL HYPERTENSION: ICD-10-CM

## 2019-07-01 DIAGNOSIS — Z76.82 ORGAN TRANSPLANT CANDIDATE: ICD-10-CM

## 2019-07-01 DIAGNOSIS — N18.6 END STAGE RENAL DISEASE (H): ICD-10-CM

## 2019-07-01 DIAGNOSIS — Z01.818 PRE-TRANSPLANT EVALUATION FOR KIDNEY TRANSPLANT: ICD-10-CM

## 2019-07-01 DIAGNOSIS — Z01.818 ENCOUNTER FOR PRE-TRANSPLANT EVALUATION FOR KIDNEY AND PANCREAS TRANSPLANT: ICD-10-CM

## 2019-07-01 NOTE — LETTER
July 3, 2019    Yissel Wetzel  [address]  [place] [address]        Dear Yissel,    Thank you for your interest in the Transplant Center at [facility]. We look forward to being a part of your care team and assisting you through the transplant process.    As we discussed, your transplant coordinator is Yaima Salgado (Kidney).  You may call your coordinator at any time with questions or concerns.  Your first scheduled call will be on 7/26/19.  If this needs to change, call [phone].    Please complete the following.    1. Fill out and return the enclosed forms    Authorization for Electronic Communication    Authorization to Discuss Protected Health Information    Authorization for Release of Protected Health Information    2. Sign up for:    [employer], access to your electronic medical record (see enclosed pamphlet)    [email or web address], a transplant education website    You can use these tools to learn more about your transplant, communicate with your care team, and track your medical details    Sincerely,    Solid Organ Transplant  [facility]    cc: Gigi Martin MD (PCP), [facility] (ESRD)

## 2019-07-01 NOTE — LETTER
Request for Records from Referring Providers Office for Patients Referred to [facility] Solid Organ Transplant Program    July 3, 2019    Re: Yissel Wetzel   [address]  [place] [address]   1960    Requested Records     Task Due Responsible    Abdominal CT          Abdominal MRI          Abdominal Ultrasound       No attached procedure          Bilateral iliac artery ultrasound          Carotid duplex          Chest CT       No attached procedure          Chest XR       No attached procedure          Colonoscopy          Dental          EKG       No attached procedure          Echo stress test          Echocardiogram       No attached procedure          Gastric emptying study          Heart Cath          PFT       No attached procedure          Peripheral duplex          Renal Ultrasound          Vaccinations up-to-date          Women: Mammogram          Women: Pap                In addition to the above records, please include all lab results from the last 12 months.        Requested imaging may be electronically sent to the [facility] imaging system via [id number] DICOM connection. When unable to send imaging electronically, an exported DICOM CD may be sent. Please indicate when imaging has been sent electronically on your return cover sheet.    Requested pathology slides should be accompanied by the appropriate report from your institution.    When the patient is hand carrying requested records or the requested records are not at your facility, please indicate this information on a return cover sheet.    Please fax requested paper records to [phone].     Please send all scans/slides to:   [facility]  Solid Organ Transplant Office  [address] [place] [address], [address] [address]    Please call our office at [phone] if you have any questions or concerns.

## 2019-07-02 VITALS — BODY MASS INDEX: 30.43 KG/M2 | WEIGHT: 155 LBS | HEIGHT: 60 IN

## 2019-07-02 ASSESSMENT — MIFFLIN-ST. JEOR: SCORE: 1204.58

## 2019-07-03 NOTE — TELEPHONE ENCOUNTER
PCP: Gigi Martin   Referring Provider: Rahul Hernandez  Referring Diagnosis: not stated on referral note  Patient has Type 2 diabetes per patient, on insulin    Insurance information:  Medicare  Policy quach:  self  Subscriber/policy/ID number:  [id number]  Group Number:      Insurance information:  Medicaid  Policy quach:  self  Subscriber/policy/ID number:  [id number]  Group Number:        Is patient in a group home/assisted living? no  Does patient have a guardian? No    Intake was completed while patient was at dialysis. Unsure if patient was a poor historian or poor attitude as she was not easy to converse with as she only would answer yes and no to questions. I offered to call her back another time to complete intake and she declined stating it was fine to talk while dialyzing. After coordinator call was scheduled, she stated she was not sure but she might have to work that day; her  schedule at [employer] only comes out 2 weeks ahead. I encouraged her to try to take that afternoon off so she could speak with her coordinator if she wanted to move forward with the transplant process.  I also told her to call back if this did not work so we could reschedule her.     Referral intake process completed.  Patient is aware that after financial approval is received, medical records will be requested.   Patient confirmed for a callback from transplant coordinator on 7/26/19. (within 2 weeks)  Tentative evaluation date unable to schedule, pt was at dialysis during intake. (within 4 weeks)    Confirmed coordinator will discuss evaluation process in more detail at the time of their call.   Patient is aware of the need to arrange age appropriate cancer screening, vaccinations, and dental care.  Reminded patient to complete questionnaire, complete medical records release, and review packet prior to evaluation visit .  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

## 2019-07-18 NOTE — PROGRESS NOTES
Notified by RN that patient noted to have episodes of junctional tachycardia on telemetry while resting in bed, HR up to 120s. Patient asymtomatic at the time of the episodes. Patient seen and assessed at bedside, NAD, alert and awake. He denied headache, dizziness, CP, SOB, abdominal  pain, or N/V/D.      Vital Signs Last 24 Hrs  T(C): 36.8 (18 Jul 2019 05:12), Max: 37.1 (17 Jul 2019 20:14)  T(F): 98.2 (18 Jul 2019 05:12), Max: 98.8 (17 Jul 2019 20:14)  HR: 84 (18 Jul 2019 05:12) (84 - 98)  BP: 151/84 (18 Jul 2019 05:12) (131/77 - 151/84)  RR: 18 (18 Jul 2019 05:12) (18 - 18)  SpO2: 97% (18 Jul 2019 05:12) (92% - 97%)      Physical Exam:  General: WN/WD NAD, AOx3, nontoxic appearing  Head:  NC/AT  CV: RRR, S1S2   Respiratory: CTA B/L, nonlabored  Abdominal: (+) bowel sounds x4. Soft, NT, ND, no palpable mass, no guarding, or rebound tenderness  Genitourinary: ? Carreon   MSK: No BLLE edema, + peripheral pulses, FROM all 4 extremity  Skin: (+) warm, dry   Psych: Appropriate affect       Labs:                      9.2    4.99  )-----------( 334      ( 17 Jul 2019 09:23 )             29.0     07-17    137  |  97  |  22  ----------------------------<  139<H>  3.5   |  26  |  2.16<H>    Ca    8.9      17 Jul 2019 05:23  Mg     1.6     07-16          Assessment & Plan:  61M, hx DM, HTN presents from [facility] via EMS due to resp distress. Patient at [facility] due to right diabetic foot ulcer. Patient reported to be hypoxic on room air to 60s. Improved on 15L face mask top 100% O2 sat. Patient awake, alert, oriented. States he has been coughing for 2-3 weeks (dry cough) as well as weakness and shortness of breath for few days. NO fevers or chills, nausea or vomiting, headache, chest pain, abdominal pain, diarrhea or constipation. Patient reportedly received blood transfusion last night, unknown as to why. Reports BL leg swelling - chronic, no calf pain. No hx DVT or PE.      Patient denies hx of COPD/Emphysema. Recently hospitalized, 5/20-6/14, (11 Jul 2019 15:54)  Patient now presenting acutely for episode of junctional tachycardia x18 sec with HR up to 120s while resting in bed. Patient was asymptomatic at the time of the event.       #Junctional tachycardia  -Vital signs hemodynamically stable, patient asymptomatic  -EKG: SR, HR 84BPM. No acute ST segment changes noted when compared with prior EKG from 7/11.   -Telemetry baseline rhythm sinus 80-90s. Continue to monitor on telemetry.  -STAT BMP, Mg, Phos ordered  -Consider initiation of BB if indicated  -Cardiology is following patient, appreciate recommendations   -Will continue to closely monitor patient/vitals  -Primary Team to follow up in AM, attending to follow       Van Brenner PA-C  Dept of Medicine  [id number] Thoracentesis: Pt tolerated fair.  VSS. 900 cc genie fluid removed without difficulty. Pt reported that she was having pressure in her throat while draining fluid & that we needed to pause. Draining stopped. After several minutes, attempted to slowly drain more fluid, but pt refused d/t tightness in her throat - so procedure stopped at that point & catheter removed. Bandaid applied to right back puncture site - CDI. No CXR per Dr Rao.    1715 Pt back to ED rm 14 per cart & transport. Detailed report called to Marixa Petty RN.         Notified by RN that patient noted to have episodes of junctional tachycardia on telemetry while resting in bed, HR up to 120s. Patient asymptomatic at the time of the episodes. Patient seen and assessed at bedside, NAD, alert and awake. He denied headache, dizziness, CP, SOB, palpitations, abdominal  pain, or N/V/D.      Vital Signs Last 24 Hrs  T(C): 36.8 (18 Jul 2019 05:12), Max: 37.1 (17 Jul 2019 20:14)  T(F): 98.2 (18 Jul 2019 05:12), Max: 98.8 (17 Jul 2019 20:14)  HR: 84 (18 Jul 2019 05:12) (84 - 98)  BP: 151/84 (18 Jul 2019 05:12) (131/77 - 151/84)  RR: 18 (18 Jul 2019 05:12) (18 - 18)  SpO2: 97% (18 Jul 2019 05:12) (92% - 97%)      Physical Exam:  General: WN/WD, NAD, AOx3, nontoxic appearing  Head:  NC/AT  CV: RRR, S1S2   Respiratory: CTA B/L, nonlabored  Skin: (+) warm, dry   Psych: Appropriate affect       Labs:                      9.2    4.99  )-----------( 334      ( 17 Jul 2019 09:23 )             29.0     07-17    137  |  97  |  22  ----------------------------<  139<H>  3.5   |  26  |  2.16<H>    Ca    8.9      17 Jul 2019 05:23  Mg     1.6     07-16          Assessment & Plan:  61M, hx DM, HTN presents from [facility] via EMS due to resp distress. Patient at [facility] due to right diabetic foot ulcer. Patient reported to be hypoxic on room air to 60s. Improved on 15L face mask top 100% O2 sat. Patient awake, alert, oriented. States he has been coughing for 2-3 weeks (dry cough) as well as weakness and shortness of breath for few days. NO fevers or chills, nausea or vomiting, headache, chest pain, abdominal pain, diarrhea or constipation. Patient reportedly received blood transfusion last night, unknown as to why. Reports BL leg swelling - chronic, no calf pain. No hx DVT or PE.      Patient denies hx of COPD/Emphysema. Recently hospitalized, 5/20-6/14, (11 Jul 2019 15:54)  Patient now presenting acutely for episode of junctional tachycardia x18 sec with HR up to 120s while resting in bed. Patient was asymptomatic at the time of the event.       #Junctional tachycardia  -Vital signs hemodynamically stable, patient asymptomatic  -EKG: SR, HR 84BPM. No acute ST segment changes noted when compared with prior EKG from 7/11.   -Telemetry baseline rhythm sinus 80-90s. Continue to monitor on telemetry.  -STAT BMP, Mg, Phos ordered  -Consider initiation of BB if indicated  -Cardiology is following patient, appreciate recommendations   -Will continue to closely monitor patient/vitals  -Primary Team to follow up in AM, attending to follow       Van Brenner PA-C  Dept of Medicine  [id number]

## 2019-07-25 NOTE — TELEPHONE ENCOUNTER
" I(Tameka)contacted the patient and introduced myself as an RN Transplant Coordinator working along side her Transplant Coordinator(Yaima Salgado)today.I  introduced the role of the Transplant Coordinator in the transplant process.  Explained the purpose of this call including reviewing next steps and answering questions.    Confirmed Referring Provider, Dialysis Center, and Primary Care Physician. Notified patient of the importance of continued communication with referring providers and primary care physicians.    Reviewed the patient's medical and surgical history with her.  Reviewed components of transplant evaluation and multidisciplinary review process  including necessary appointments, tests, and procedures.    Answered questions for patient regarding evaluation, provided  Yaima Salgado's  contact information and requested she call with any additional questions.    Determined that patient would like additional information regarding transplant by:      Mail,Phone Call.Patient stated she does not have Internet access.  Notified patient that she will hear from a Transplant  to schedule evaluation.Pt.dialysis T,Thurs,Sat.      Patient stated she has \"not thought that much ahead about any potential LD\"  Patient stated she lives alone but is independent with ALS.Patient (pt)stated she will be bringing her son and daughter to evaluation day with her.Patient did verbalize that she only takes a few medications and she states she heard\" there will be medications she will take for life.\" I did very briefly state anti-rejection medications twice a day and frequent labs.Patient agreeable to moving forward with the evaluation process.Pt.verbalized understanding of content presented today.      "

## 2019-07-25 NOTE — TELEPHONE ENCOUNTER
Contacted patient and introduced myself as their Transplant Coordinator, also introduced the role of the Transplant Coordinator in the transplant process.  Explained the purpose of this call including reviewing next steps and answering questions.    Confirmed Referring Provider, Dialysis Center, and Primary Care Physician. Notified patient of the importance of continued communication with referring providers and primary care physicians.    Reviewed components of transplant evaluation process including necessary appointments, tests, and procedures.    Answered questions for patient regarding evaluation, provided my name and contact information and requested they call with any additional questions.    Determined that patient would like additional information regarding transplant by:     Drop Down choices: Mail, Email, MyChart, Phone Call   Encourage MyChart   Notified patients that they will hear from a Transplant  to schedule evaluation.

## 2019-07-26 NOTE — TELEPHONE ENCOUNTER
Called patient to schedule for Kidney eval (but still missing some orders), she confirmed forf Wed 8/14 and mailing to home soon

## 2019-08-09 NOTE — TELEPHONE ENCOUNTER
"Returned a call to patient and spoke with her at length. Pt explaining she cannot attend her appts on 08/14/2019 because her children cannot come on that day. I offered her Wed September 25th and pt will check with her children and get back to me. I reviewed pt's medical history with her. Pt states she has been on dialysis at [facility] since March of this year. Explained she was admitted to [facility] at the time, was found to have fluid in both lungs that was \" emptied out \" and was started on dialysis. Pt stating dialysis has gone well, she attends on T, Th, Sat at [facility]. Pt stating she often goes straight from dialysis to work a shift at the nearby [employer] as a . Pt stating she has had diabetes type 2 for many years, doesn't remember when she was first diagnosed, has been on insulin x last 10 years with current dose of Levemir 44 units at bedtime and Novolog sliding scale during the day. Pt states her last hgb A1C is 8.2 and she is working to lower this now. Pt stating she has no difficulties with oral intake or taking her medications. I reviewed the option of being considered for pancreas transplant as well as kidney, patient very agreeable to this. Pt stating she does not have any heart history, was last seen by cardiologist 2015. Pt stating she quit smoking 20 years ago and doesn't have any lung problems that she is aware of from this. Pt is unsure if she has asthma or not, has previously used inhalers but doesn't anymore. I asked patient about her pulmonary hypertension noted on the last echocardiograms available in Epic - last one 08/2017.  Pt stating she does not know anything about pulmonary hypertension, I explained that the Transplant Doctors will be likely asking her more about this at time of evaluation. Pt confirmed she is 5 foot tall, weight 155 pounds so BMI is about 30.  Pt confirmed she will call me back to let me know if Sept 25th will work for [employer] and " understands we will cancel her appts on 08/14 per her request.

## 2019-08-12 NOTE — TELEPHONE ENCOUNTER
Pt called me back and confirmed she wants to schedule pre KP eval on Sept 25th as her children can accompany her then. Told pt I will let  know and a  will be mailed to her house. Pt expressed very good understanding of all and was in good agreement with the plan.     Spoke with Alina Benson  and explained above - Alina will change  per patient's request.

## 2019-08-16 DIAGNOSIS — E11.21 TYPE 2 DIABETES MELLITUS WITH DIABETIC NEPHROPATHY, WITH LONG-TERM CURRENT USE OF INSULIN (H): ICD-10-CM

## 2019-08-16 DIAGNOSIS — Z79.4 TYPE 2 DIABETES MELLITUS WITH DIABETIC NEPHROPATHY, WITH LONG-TERM CURRENT USE OF INSULIN (H): ICD-10-CM

## 2019-08-16 NOTE — TELEPHONE ENCOUNTER
"Requested Prescriptions   Pending Prescriptions Disp Refills     NOVOLOG FLEXPEN 100 UNIT/ML soln [Pharmacy Med Name: NovoLOG FlexPen Subcutaneous Solution Pen-injector 100 UNIT/ML] 15 mL 0     Sig: INJECT 9-12 UNITS BEFORE BREAKFAST, LUNCH, AND DINNER + A CORRECTIVE SCALE OF 2 UNITS FOR EVERY 50 OVER 150   Last Written Prescription Date:  5/24/2019  Last Fill Quantity: 15mL,  # refills: 0   Last Office Visit: 3/21/2019   Future Office Visit:         Short Acting Insulin Protocol Failed - 8/16/2019  6:01 PM        Failed - Blood pressure less than 140/90 in past 6 months     BP Readings from Last 3 Encounters:   03/29/19 147/41   03/28/19 120/44   03/21/19 126/78                 Failed - LDL on file in past 12 months     Recent Labs   Lab Test 07/22/16  1056   *             Failed - HgbA1C in past 3 or 6 months     If HgbA1C is 8 or greater, it needs to be on file within the past 3 months.  If less than 8, must be on file within the past 6 months.     Recent Labs   Lab Test 12/26/18  0748   A1C 8.0*             Passed - Microalbumin on file in past 12 months     Recent Labs   Lab Test 06/07/18  1455   MICROL 4,520   UMALCR 6,716.20*             Passed - Serum creatinine on file in past 12 months     Recent Labs   Lab Test 03/18/19  0935   CR 3.43*             Passed - Medication is active on med list        Passed - Patient is age 18 or older        Passed - Recent (6 mo) or future (30 days) visit within the authorizing provider's specialty     Patient had office visit in the last 6 months or has a visit in the next 30 days with authorizing provider or within the authorizing provider's specialty.  See \"Patient Info\" tab in inbasket, or \"Choose Columns\" in Meds & Orders section of the refill encounter.            ACCU-CHEK CHING PLUS test strip [Pharmacy Med Name: Accu-Chek Ching Plus In Vitro Strip] 200 each 0     Sig: USE TO TEST BLOOD SUGAR 4 TIMES PER DAY   Last Written Prescription Date:  5/19/2019  Last " "Fill Quantity: 200,  # refills: 0   Last Office Visit: 3/21/2019   Future Office Visit:         Diabetic Supplies Protocol Passed - 8/16/2019  6:01 PM        Passed - Medication is active on med list        Passed - Patient is 18 years of age or older        Passed - Recent (6 mo) or future (30 days) visit within the authorizing provider's specialty     Patient had office visit in the last 6 months or has a visit in the next 30 days with authorizing provider.  See \"Patient Info\" tab in inbasket, or \"Choose Columns\" in Meds & Orders section of the refill encounter.              "

## 2019-08-19 RX ORDER — BLOOD SUGAR DIAGNOSTIC
STRIP MISCELLANEOUS
Qty: 200 EACH | Refills: 0 | Status: SHIPPED | OUTPATIENT
Start: 2019-08-19 | End: 2019-11-08

## 2019-08-19 RX ORDER — INSULIN ASPART 100 [IU]/ML
INJECTION, SOLUTION INTRAVENOUS; SUBCUTANEOUS
Qty: 15 ML | Refills: 0 | Status: SHIPPED | OUTPATIENT
Start: 2019-08-19 | End: 2019-08-29

## 2019-08-19 NOTE — TELEPHONE ENCOUNTER
Novolog Flexpen  Medication is being filled for 1 time refill only due to:  Patient needs labs fasting. Patient needs to be seen because due for 6 month follow up.    Test Strips   Prescription approved per [facility] Refill Protocol.

## 2019-08-29 DIAGNOSIS — E11.21 TYPE 2 DIABETES MELLITUS WITH DIABETIC NEPHROPATHY, WITH LONG-TERM CURRENT USE OF INSULIN (H): ICD-10-CM

## 2019-08-29 DIAGNOSIS — Z79.4 TYPE 2 DIABETES MELLITUS WITH DIABETIC NEPHROPATHY, WITH LONG-TERM CURRENT USE OF INSULIN (H): ICD-10-CM

## 2019-09-25 ENCOUNTER — ALLIED HEALTH/NURSE VISIT (OUTPATIENT)
Dept: TRANSPLANT | Facility: CLINIC | Age: 59
End: 2019-09-25
Attending: INTERNAL MEDICINE
Payer: MEDICARE

## 2019-09-25 ENCOUNTER — ALLIED HEALTH/NURSE VISIT (OUTPATIENT)
Dept: TRANSPLANT | Facility: CLINIC | Age: 59
End: 2019-09-25
Attending: PHYSICIAN ASSISTANT
Payer: MEDICARE

## 2019-09-25 ENCOUNTER — ANCILLARY PROCEDURE (OUTPATIENT)
Dept: CARDIOLOGY | Facility: CLINIC | Age: 59
End: 2019-09-25
Attending: PHYSICIAN ASSISTANT
Payer: MEDICARE

## 2019-09-25 ENCOUNTER — DOCUMENTATION ONLY (OUTPATIENT)
Dept: TRANSPLANT | Facility: CLINIC | Age: 59
End: 2019-09-25

## 2019-09-25 ENCOUNTER — ANCILLARY PROCEDURE (OUTPATIENT)
Dept: GENERAL RADIOLOGY | Facility: CLINIC | Age: 59
End: 2019-09-25
Attending: PHYSICIAN ASSISTANT
Payer: MEDICARE

## 2019-09-25 VITALS
HEIGHT: 61 IN | DIASTOLIC BLOOD PRESSURE: 68 MMHG | BODY MASS INDEX: 32.98 KG/M2 | HEART RATE: 71 BPM | WEIGHT: 174.7 LBS | TEMPERATURE: 98 F | SYSTOLIC BLOOD PRESSURE: 167 MMHG

## 2019-09-25 DIAGNOSIS — N18.6 END STAGE RENAL DISEASE (H): ICD-10-CM

## 2019-09-25 DIAGNOSIS — Z76.82 ORGAN TRANSPLANT CANDIDATE: ICD-10-CM

## 2019-09-25 DIAGNOSIS — E11.9 DIABETES MELLITUS, TYPE 2 (H): ICD-10-CM

## 2019-09-25 DIAGNOSIS — Z01.818 ENCOUNTER FOR PRE-TRANSPLANT EVALUATION FOR KIDNEY AND PANCREAS TRANSPLANT: ICD-10-CM

## 2019-09-25 DIAGNOSIS — Z01.818 PRE-TRANSPLANT EVALUATION FOR KIDNEY TRANSPLANT: ICD-10-CM

## 2019-09-25 DIAGNOSIS — I10 ESSENTIAL HYPERTENSION: ICD-10-CM

## 2019-09-25 DIAGNOSIS — N18.6 ANEMIA IN CHRONIC KIDNEY DISEASE, ON CHRONIC DIALYSIS (H): ICD-10-CM

## 2019-09-25 DIAGNOSIS — N18.6 END STAGE RENAL DISEASE (H): Primary | ICD-10-CM

## 2019-09-25 DIAGNOSIS — Z01.818 PRE-TRANSPLANT EVALUATION FOR KIDNEY AND PANCREAS TRANSPLANT: Primary | ICD-10-CM

## 2019-09-25 DIAGNOSIS — D63.1 ANEMIA IN CHRONIC KIDNEY DISEASE, ON CHRONIC DIALYSIS (H): ICD-10-CM

## 2019-09-25 DIAGNOSIS — Z87.891 FORMER SMOKER: ICD-10-CM

## 2019-09-25 DIAGNOSIS — E11.21 TYPE 2 DIABETES MELLITUS WITH DIABETIC NEPHROPATHY, UNSPECIFIED WHETHER LONG TERM INSULIN USE (H): ICD-10-CM

## 2019-09-25 DIAGNOSIS — Z99.2 ANEMIA IN CHRONIC KIDNEY DISEASE, ON CHRONIC DIALYSIS (H): ICD-10-CM

## 2019-09-25 DIAGNOSIS — Z87.891 FORMER TOBACCO USE: ICD-10-CM

## 2019-09-25 DIAGNOSIS — Z76.82 ORGAN TRANSPLANT CANDIDATE: Primary | ICD-10-CM

## 2019-09-25 DIAGNOSIS — E78.5 HYPERLIPIDEMIA: ICD-10-CM

## 2019-09-25 LAB
ABO + RH BLD: NORMAL
ALBUMIN SERPL-MCNC: 3.2 G/DL (ref 3.4–5)
ALBUMIN UR-MCNC: 100 MG/DL
ALP SERPL-CCNC: 98 U/L (ref 40–150)
ALT SERPL W P-5'-P-CCNC: 21 U/L (ref 0–50)
ANION GAP SERPL CALCULATED.3IONS-SCNC: 7 MMOL/L (ref 3–14)
APPEARANCE UR: CLEAR
APTT PPP: 31 SEC (ref 22–37)
AST SERPL W P-5'-P-CCNC: 12 U/L (ref 0–45)
BASOPHILS # BLD AUTO: 0 10E9/L (ref 0–0.2)
BASOPHILS NFR BLD AUTO: 0.6 %
BILIRUB SERPL-MCNC: 0.6 MG/DL (ref 0.2–1.3)
BILIRUB UR QL STRIP: NEGATIVE
BLD GP AB SCN SERPL QL: NORMAL
BLOOD BANK CMNT PATIENT-IMP: NORMAL
BUN SERPL-MCNC: 37 MG/DL (ref 7–30)
CALCIUM SERPL-MCNC: 8.4 MG/DL (ref 8.5–10.1)
CHLORIDE SERPL-SCNC: 102 MMOL/L (ref 94–109)
CO2 SERPL-SCNC: 25 MMOL/L (ref 20–32)
COLOR UR AUTO: YELLOW
CREAT SERPL-MCNC: 4.53 MG/DL (ref 0.52–1.04)
DIFFERENTIAL METHOD BLD: ABNORMAL
EOSINOPHIL # BLD AUTO: 0.3 10E9/L (ref 0–0.7)
EOSINOPHIL NFR BLD AUTO: 4.7 %
ERYTHROCYTE [DISTWIDTH] IN BLOOD BY AUTOMATED COUNT: 13.6 % (ref 10–15)
GFR SERPL CREATININE-BSD FRML MDRD: 10 ML/MIN/{1.73_M2}
GLUCOSE SERPL-MCNC: 300 MG/DL (ref 70–99)
GLUCOSE UR STRIP-MCNC: >499 MG/DL
HBA1C MFR BLD: 8.7 % (ref 0–5.6)
HCT VFR BLD AUTO: 31.1 % (ref 35–47)
HGB BLD-MCNC: 10.2 G/DL (ref 11.7–15.7)
HGB UR QL STRIP: NEGATIVE
IMM GRANULOCYTES # BLD: 0 10E9/L (ref 0–0.4)
IMM GRANULOCYTES NFR BLD: 0.5 %
INR PPP: 1.13 (ref 0.86–1.14)
KETONES UR STRIP-MCNC: NEGATIVE MG/DL
LEUKOCYTE ESTERASE UR QL STRIP: NEGATIVE
LYMPHOCYTES # BLD AUTO: 1.2 10E9/L (ref 0.8–5.3)
LYMPHOCYTES NFR BLD AUTO: 18.2 %
MCH RBC QN AUTO: 32.7 PG (ref 26.5–33)
MCHC RBC AUTO-ENTMCNC: 32.8 G/DL (ref 31.5–36.5)
MCV RBC AUTO: 100 FL (ref 78–100)
MONOCYTES # BLD AUTO: 0.7 10E9/L (ref 0–1.3)
MONOCYTES NFR BLD AUTO: 10.4 %
NEUTROPHILS # BLD AUTO: 4.3 10E9/L (ref 1.6–8.3)
NEUTROPHILS NFR BLD AUTO: 65.6 %
NITRATE UR QL: NEGATIVE
NRBC # BLD AUTO: 0 10*3/UL
NRBC BLD AUTO-RTO: 0 /100
PH UR STRIP: 7 PH (ref 5–7)
PHOSPHATE SERPL-MCNC: 5.8 MG/DL (ref 2.5–4.5)
PLATELET # BLD AUTO: 151 10E9/L (ref 150–450)
POTASSIUM SERPL-SCNC: 4.3 MMOL/L (ref 3.4–5.3)
PROT SERPL-MCNC: 6.8 G/DL (ref 6.8–8.8)
RBC # BLD AUTO: 3.12 10E12/L (ref 3.8–5.2)
RBC #/AREA URNS AUTO: <1 /HPF (ref 0–2)
SODIUM SERPL-SCNC: 133 MMOL/L (ref 133–144)
SOURCE: ABNORMAL
SP GR UR STRIP: 1 (ref 1–1.03)
SPECIMEN EXP DATE BLD: NORMAL
SPECIMEN EXP DATE BLD: NORMAL
SQUAMOUS #/AREA URNS AUTO: <1 /HPF (ref 0–1)
UROBILINOGEN UR STRIP-MCNC: 0 MG/DL (ref 0–2)
WBC # BLD AUTO: 6.5 10E9/L (ref 4–11)
WBC #/AREA URNS AUTO: 1 /HPF (ref 0–5)

## 2019-09-25 PROCEDURE — 85613 RUSSELL VIPER VENOM DILUTED: CPT | Performed by: NURSE PRACTITIONER

## 2019-09-25 PROCEDURE — 85670 THROMBIN TIME PLASMA: CPT | Performed by: NURSE PRACTITIONER

## 2019-09-25 PROCEDURE — 80053 COMPREHEN METABOLIC PANEL: CPT | Performed by: NURSE PRACTITIONER

## 2019-09-25 PROCEDURE — 86147 CARDIOLIPIN ANTIBODY EA IG: CPT | Performed by: NURSE PRACTITIONER

## 2019-09-25 PROCEDURE — G0499 HEPB SCREEN HIGH RISK INDIV: HCPCS | Performed by: NURSE PRACTITIONER

## 2019-09-25 PROCEDURE — 36415 COLL VENOUS BLD VENIPUNCTURE: CPT | Performed by: NURSE PRACTITIONER

## 2019-09-25 PROCEDURE — 86481 TB AG RESPONSE T-CELL SUSP: CPT | Performed by: NURSE PRACTITIONER

## 2019-09-25 PROCEDURE — 86886 COOMBS TEST INDIRECT TITER: CPT | Performed by: NURSE PRACTITIONER

## 2019-09-25 PROCEDURE — 84681 ASSAY OF C-PEPTIDE: CPT | Performed by: NURSE PRACTITIONER

## 2019-09-25 PROCEDURE — 81001 URINALYSIS AUTO W/SCOPE: CPT | Performed by: NURSE PRACTITIONER

## 2019-09-25 PROCEDURE — 86787 VARICELLA-ZOSTER ANTIBODY: CPT | Performed by: NURSE PRACTITIONER

## 2019-09-25 PROCEDURE — 85610 PROTHROMBIN TIME: CPT | Performed by: NURSE PRACTITIONER

## 2019-09-25 PROCEDURE — 86901 BLOOD TYPING SEROLOGIC RH(D): CPT | Performed by: NURSE PRACTITIONER

## 2019-09-25 PROCEDURE — 86706 HEP B SURFACE ANTIBODY: CPT | Performed by: NURSE PRACTITIONER

## 2019-09-25 PROCEDURE — 85730 THROMBOPLASTIN TIME PARTIAL: CPT | Mod: 91 | Performed by: NURSE PRACTITIONER

## 2019-09-25 PROCEDURE — 86900 BLOOD TYPING SEROLOGIC ABO: CPT | Mod: 91 | Performed by: NURSE PRACTITIONER

## 2019-09-25 PROCEDURE — 86704 HEP B CORE ANTIBODY TOTAL: CPT | Performed by: NURSE PRACTITIONER

## 2019-09-25 PROCEDURE — G0472 HEP C SCREEN HIGH RISK/OTHER: HCPCS | Performed by: NURSE PRACTITIONER

## 2019-09-25 PROCEDURE — 40000866 ZZHCL STATISTIC HIV 1/2 ANTIGEN/ANTIBODY PRETRANSPLANT ONLY: Performed by: NURSE PRACTITIONER

## 2019-09-25 PROCEDURE — 85025 COMPLETE CBC W/AUTO DIFF WBC: CPT | Performed by: NURSE PRACTITIONER

## 2019-09-25 PROCEDURE — 86665 EPSTEIN-BARR CAPSID VCA: CPT | Performed by: NURSE PRACTITIONER

## 2019-09-25 PROCEDURE — 86780 TREPONEMA PALLIDUM: CPT | Performed by: NURSE PRACTITIONER

## 2019-09-25 PROCEDURE — 84100 ASSAY OF PHOSPHORUS: CPT | Performed by: NURSE PRACTITIONER

## 2019-09-25 PROCEDURE — 83036 HEMOGLOBIN GLYCOSYLATED A1C: CPT | Performed by: NURSE PRACTITIONER

## 2019-09-25 PROCEDURE — 86644 CMV ANTIBODY: CPT | Performed by: NURSE PRACTITIONER

## 2019-09-25 PROCEDURE — 86905 BLOOD TYPING RBC ANTIGENS: CPT | Performed by: NURSE PRACTITIONER

## 2019-09-25 PROCEDURE — 85730 THROMBOPLASTIN TIME PARTIAL: CPT | Performed by: NURSE PRACTITIONER

## 2019-09-25 PROCEDURE — 86850 RBC ANTIBODY SCREEN: CPT | Performed by: NURSE PRACTITIONER

## 2019-09-25 ASSESSMENT — MIFFLIN-ST. JEOR: SCORE: 1309.81

## 2019-09-25 NOTE — PROGRESS NOTES
"Chief Complaint   Patient presents with     Transplant Evaluation     Blood pressure (!) 167/68, pulse 71, temperature 98  F (36.7  C), height 1.549 m (5' 1\"), weight 79.2 kg (174 lb 11.2 oz), not currently breastfeeding.    Cindy Adair, EVY     "

## 2019-09-25 NOTE — PROGRESS NOTES
Transplant Surgery Consult Note    Medical record number: [id number]  YOB: 1960,   Consult requested for evaluation of kidney and pancreas transplant candidacy.    Assessment and Recommendations: Ms. Wetzel is a fair candidate for  transplantation and has a good understanding of the risks and benefits of this approach to management of renal failure and diabetes. The following issues should be addressed prior to transplant:     1.  Transplant order:  Favor listing for kidney and pancreas.  If does get kidney offer prior to kidney would proceed with kidney alone.  2.  Weigh loss:  BMI is 33, but central obesity.  Need 10-15 lb weight loss for kidney. 20-30 lb or more for pancreas.  Will likely need BMI << 32 for pancreas.  3.  Vascular targets.  Iliac US at least.  Likely CT without contrast.    The majority of our visit was spent in counselling, discussing the medical and surgical risks of living or  donor kidney and pancreas transplantation, either in a simultaneous or sequential fashion. I contrasted approximate wait time for SPK vs living vs  donor kidneys from normal (0-85%) or higher (%) kidney donor profile index (KDPI) donors and their associated outcomes. I would recommend this individual to consider kidneys from high KDPI donors. Access to transplant will be impacted by living donor availability and overall candidacy for SPK, as well as the influence of blood type and degree of sensitization. We discussed advantages of preemptive transplant as well as living donor kidney transplant, and graft and patient survival outcomes associated with these options. Potential surgical complications of kidney and pancreas transplantation include bleeding, clotting, infection, wound complications, anastomotic failure and other issues such as cardiac complications, pneumonia, deep venous thrombosis, pulmonary embolism, post transplant diabetes and death. We discussed the need for protocol  "biopsy of the kidney and the possible need for a ureteral stent (and subsequent removal). We discussed benefits and risks associated with different approaches to exocrine drainage of pancreatic secretions. We also discussed differences in the average length of stay, recovery process, and posttransplant lab and monitoring protocol. We discussed the risk of graft rejection and recurrent diabetic nephropathy in the setting of poor glycemic control. I emphasized the need for strict immunosuppression adherence and the potential for complications of immunosuppression such as skin cancer or lymphoma, as well as a very low but not zero risk of donor-derived disease transmission risks (infection, cancer). Ms. Wetzel asked good questions and the patient's candidacy will be reviewed at our Multidisciplinary Selection Committee. Thank you for the opportunity to participate in Ms. Wetzel's care.]      I would recommend this individual to consider kidneys from high KDPI donors. The reason for this decision is best summarized as: decreased dialysis related morbidity/mortality, accepting lower kidney graft survival rates.    Total time: 45 minutes  Counselling time: 40 minutes    .      ---------------------------------------------------------------------------------------------------    HPI: Ms. Wetzel has End stage renal failure due to diabetes mellitus type 2.   The patient has had diabetes for 25 years.  The diabetes is moderately controlled.     Daily insulin requirement is approximately 60-70 units. She does not really know what her blood sugars are... \"sometimes high, sometimes low.\"  Hypoglyemic unawareness is not an issue.  Complications of diabetes include:    Retinopathy:  Yes   Neuropathy: No  Gastroparesis:  No    The patient is on dialysis.  Has kidney donors:  No.  On dialysis since April 2019.    Lab Results   Component Value Date    A1C 8.0 12/26/2018    A1C 7.3 06/07/2018    A1C 6.7 08/11/2017    A1C 7.7 01/20/2017 "    A1C 7.5 2016       Past Medical History:   Diagnosis Date     Allergic rhinitis, cause unspecified      Anemia of chronic disease      End stage renal failure on dialysis (H)      Esophagitis, unspecified      HEMORRHAGIC GASTROPATHY      History of blood transfusion     [place]     Iron deficiency anemia, unspecified      Mild persistent asthma      Other and unspecified hyperlipidemia      Pneumonia      Pulmonary hypertension (H)     per 2012 echo mod.to severe pulmonary hypertension     Tobacco use disorder dc ed      Type 2 diabetes mellitus (H)     on Insulin- managed by PCP     Unspecified essential hypertension      Past Surgical History:   Procedure Laterality Date     ABDOMINOPLASTY  pt unsure when    abdominoplasty-      SECTION  ,     x 2     COLONOSCOPY  2006    [place]     CREATE FISTULA ARTERIOVENOUS UPPER EXTREMITY Right 2018    Procedure: RIGHT BRACHIAL TO BASILIC ARTERIOVENOUS FISTULA CREATION;  Surgeon: Terry Vizcaino MD;  Location: [facility]     ENT SURGERY  as a child    tonsillectomy     EYE SURGERY Left     injections- eye     HYSTERECTOMY  approx     partial hysterectomy-reason bleeding      Family History   Problem Relation Age of Onset     Arrhythmia Mother      Hypertension Mother      Pancreatic Cancer Father      Diabetes Brother      Asthma Sister      LUNG DISEASE Sister      Social History     Socioeconomic History     Marital status: Single     Spouse name: Not on file     Number of children: Not on file     Years of education: Not on file     Highest education level: Not on file   Occupational History     Not on file   Social Needs     Financial resource strain: Not on file     Food insecurity:     Worry: Not on file     Inability: Not on file     Transportation needs:     Medical: Not on file     Non-medical: Not on file   Tobacco Use     Smoking status: Former Smoker     Packs/day: 1.00     Years: 22.00     Pack  years: 22.00     Types: Cigarettes     Last attempt to quit: 10/12/1999     Years since quittin.9     Smokeless tobacco: Never Used   Substance and Sexual Activity     Alcohol use: No     Alcohol/week: 0.0 standard drinks     Drug use: No     Sexual activity: Never   Lifestyle     Physical activity:     Days per week: Not on file     Minutes per session: Not on file     Stress: Not on file   Relationships     Social connections:     Talks on phone: Not on file     Gets together: Not on file     Attends Rastafarian service: Not on file     Active member of club or organization: Not on file     Attends meetings of clubs or organizations: Not on file     Relationship status: Not on file     Intimate partner violence:     Fear of current or ex partner: Not on file     Emotionally abused: Not on file     Physically abused: Not on file     Forced sexual activity: Not on file   Other Topics Concern      Service Not Asked     Blood Transfusions Yes     Caffeine Concern No     Occupational Exposure Not Asked     Hobby Hazards Not Asked     Sleep Concern No     Stress Concern No     Weight Concern Yes     Comment: would like to lose weight     Special Diet No     Back Care Not Asked     Exercise Yes     Comment: walks daily 2 blocks     Bike Helmet Not Asked     Seat Belt Not Asked     Self-Exams Not Asked     Parent/sibling w/ CABG, MI or angioplasty before 65F 55M? Not Asked   Social History Narrative     Not on file       ROS:   CONSTITUTIONAL:  No fevers or chills  EYES: negative for icterus  ENT:  negative for hearing loss, tinnitus and sore throat  RESPIRATORY:  negative for cough, sputum, dyspnea  CARDIOVASCULAR:  negative for chest pain   GASTROINTESTINAL:  negative for nausea, vomiting, diarrhea or constipation  GENITOURINARY:  negative for incontinence, dysuria, bladder emptying problems  HEME:  No easy bruising  INTEGUMENT:  negative for rash and pruritus  NEURO:  Negative for headache, seizure  disorder    Allergies:   Allergies   Allergen Reactions     Albuterol      shakiness     Aspirin      gi     Byetta [Exenatide]      gi     Clonidine      constipation     Codeine      vomiting     Ezetimibe      muscle symptoms     Hydralazine      headaches     Lisinopril      hyperkalemia     Metformin Hydrochloride      vomiting     Pravachol [Pravastatin Sodium]      muscle pains     Simvastatin      myalgias     Troglitazone        Medications:  Prescription Medications as of 9/25/2019       Rx Number Disp Refills Start End Last Dispensed Date Next Fill Date Owning Pharmacy    ACCUFitzealK CHING PLUS test strip  200 each 0 8/19/2019    [facility] [address] [place] [address]    Sig: USE TO TEST BLOOD SUGAR 4 TIMES PER DAY     Class: E-Prescribe    acetaminophen (TYLENOL) 500 MG tablet            Sig: Take 500 mg by mouth every 8 hours as needed for mild pain    Class: Historical    Route: Oral    ADVAIR DISKUS 250-50 MCG/DOSE inhaler  1 Inhaler 0 1/24/2019    [facility] [address] [place] [address]    Sig: INHALE ONE PUFF BY MOUTH TWICE DAILY     Class: E-Prescribe    Notes to Pharmacy: Medication is being filled for 1 time refill only due to:  Patient needs to be seen .    amoxicillin (AMOXIL) 500 MG capsule  20 capsule 0 3/28/2019    [address]    Sig: Take 1 capsule (500 mg) by mouth 2 times daily    Class: Local Print    Route: Oral    furosemide (LASIX) 20 MG tablet            Sig: Take 20 mg by mouth 2 times daily    Class: Historical    Route: Oral    hypromellose-dextran (ARTIFICAL TEARS) 0.1-0.3 % ophthalmic solution            Sig: Place 1 drop into both eyes daily as needed for dry eyes    Class: Historical    Route: Both Eyes    insulin aspart (NOVOLOG FLEXPEN) 100 UNIT/ML pen  15 mL 0 8/29/2019    [facility] [address] [place] [address] [address] [facility]    Sig: INJECT 9-12 UNITS BEFORE BREAKFAST,  "LUNCH, AND DINNER + A CORRECTIVE SCALE OF 2 UNITS FOR EVERY 50 OVER 150    Class: E-Prescribe    insulin aspart (NOVOLOG FLEXPEN) 100 UNIT/ML pen  6 mL 0 3/19/2019    [place] [address]    Sig: Take 1 unit per 15 grams of carbohydrates three times daily with meals    Class: E-Prescribe    Notes to Pharmacy: Pharmacy: Please dispense with necessary supplies (needles, etc.). Future refills by PCP Dr. Gigi Martin with phone number [phone].    Renewals     Renewal provider:  Gigi Martin MD          labetalol (NORMODYNE) 300 MG tablet            Sig: Take 300 mg by mouth 2 times daily Prescription is for 600mg twice daily, but the patient is taking 300mg twice daily    Class: Historical    Route: Oral    LEVEMIR FLEXTOUCH 100 UNIT/ML pen  6 mL 0 3/19/2019    [place] [address]    Sig: Inject 12 Units Subcutaneous At Bedtime    Class: E-Prescribe    Notes to Pharmacy: Pharmacy: Please dispense with necessary supplies (needles, etc.). Future refills by PCP Dr. Gigi Martin with phone number [phone].    Route: Subcutaneous    Renewals     Renewal provider:  Gigi Martin MD          XOPENEX HFA 45 MCG/ACT inhaler  15 g 0 1/24/2019    [facility] [id number] [facility] [address] [facility]    Sig: INHALE TWO PUFFS BY MOUTH EVERY SIX HOURS AS NEEDED FOR SHORTNESS OF BREATH    Class: E-Prescribe    Notes to Pharmacy: Medication is being filled for 1 time refill only due to:  Patient needs to be seen .          Exam:   Vitals:   [unfilled]  Estimated body mass index is 33.01 kg/m  as calculated from the following:    Height as of an earlier encounter on 9/25/19: 1.549 m (5' 1\").    Weight as of an earlier encounter on 9/25/19: 79.2 kg (174 lb 11.2 oz).  Appearance: in no apparent distress.   Skin: normal  Head and Neck: Normal, no rashes or jaundice  Respiratory: easy respirations, no audible " wheezing.  Abdomen: obese, No distention       Diagnostics:   No results found for this or any previous visit (from the past 672 hour(s)).

## 2019-09-25 NOTE — PROGRESS NOTES
Assessment and Plan:  # Kidney/Pancreas Transplant Evaluation: Patient is a good candidate overall. Benefits of a living donor transplant were discussed.  # ESKD from secondary to presumed type 2 diabetes: with no previous biopsy. She has had progressive disease since 2012.  Although doing well on hemodialysis since 4/2019, she would likely benefit from a kidney transplant.    # Type 2 diabetes: borderline control with the most recent hemoglobin A1c of 8.0% while using 55-70 units of insulin per day. Given evidence of end-organ damage, she may benefit from a pancreas transplant.     # Cardiac Risk: history of pulmonary hypertension with her last ECHO in 8/2017 noting a RVSP of 60 mmHg, EF of 60% and moderate diastolic dysfunction.  Her last stress test in 2015 demonstrated a small fixed apical defect, probably due to soft tissue artifact.  Non-transmural infarction cannot be excluded.  Will need to complete updated ECHO, risk assessment and updated testing for CAD.     #  BMI of 33: with central obesity. Will need 10-15 lbs of weight loss for kidney alone, per surgery recommendation. Patient deferred weight loss management clinic at this time.      # PAD risk: will need iliac/femoral dopplers for kidney and CT imaging or kidney/pancreas per transplant surgery recommendation.     # Former smoker: with a 22-pack-year smoking history and quit in 1999. Will need PFTs.     # H/o esophagitis (path consistent with reflux, no Gibson's) and hemorrhagic gastropathy (2006): will need updated EGD.     # Health Maintenance: Colonoscopy: Not up to date, Mammogram: Not up to date, PAP: Not up to date and Dental: Not up to date    Discussed the risks and benefits of a transplant, including the risk of surgery and immunosuppression medications.  Patient's overall evaluation will be discussed in the Transplant Program's regular meeting with a final recommendation on the patients suitability for transplant to be made at that  time.  Patient was seen in conjunction with Dr. Driss Keen as part of a shared visit.    Evaluation:  Yissel Wetzel was seen in consultation at the request of Dr. Truong Tejada for evaluation as a potential kidney/pancreas transplant recipient.    Reason for Visit:  Yissel Wetzel is a 59 year old female with ESKD from diabetes mellitus type 2, who presents for kidney/pancreas transplant evaluation.    History of Present Illness:  Yissel Wetzel is a 59-year-old female that presents with ESKD secondary to presumed type 2 diabetes, with no previous biopsy. She has had progressive disease and first started following with Dr. BRIA White in 2012 with a creatinine of 1.25 but then was lost to follow-up until 8/2018 with a creatinine of 4.3 and EGFR of 11. She ultimately started hemodialysis in 3/2019. Overall, dialysis is going well.  She denies nausea, vomiting and poor appetite.  She has no donors at this time.    She has a history of PACs and pulmonary hypertension with her last ECHO in 8/2017 noting a RVSP of 60 mmHg, EF of 60% and moderate diastolic dysfunction.  Her last stress test in 2015 demonstrated a small fixed apical defect, probably due to soft tissue artifact.  Non-transmural infarction could not be excluded. She stays active walking her dogs close to 2 miles once per week, but at least 2 blocks every day. She is also able to walk up 3 flights of stairs with groceries without exertional or claudication symptoms. She is a former smoker with a 22-pack-year smoking history and quit in 1999. Additional PMH includes esophagitis (path consistent with reflux, no Gibson's) and hemorrhagic gastropathy on 2006 EGD, not currently taking any medication for GERD. She is also obese with a BMI of 33.               Kidney Disease Hx:        Kidney Disease Dx: Diabetes mellitus type 2       Biopsy Proven: No         On Dialysis: Yes, Date initiated: 4/2019 and Dialysis Type: [facility];       Primary Nephrologist:   [facility]         H/o Kidney Stones: No       H/o Recurrent/Frequent UTI: No         Diabetic Hx:        Diagnosis Date: 23 years ago.        Medication History: first with oral medications, and switched to insulin 9 years ago, currently using 55-70 units of insulin per day.         Diabetic Control: Borderline control (HbA1c 7-9%)   Last HbA1c: 8.0% (12/2018)        Hypoglycemic Unawareness: No       End-Organ Damage due to DM: Nephropathy and Peripheral neuropathy         Cardiac/Vascular Disease Risk Factors:        Cardiac Risk Factors: Diabetes, Hypertension, Smoking and Age (Male > 55, Female > 65)       Known CAD: No       Known PAD/Caludication Symptoms: No       Known Heart Failure: No       Arrhythmia: Yes; PACs       Pulmonary Hypertension: Yes; RSVP 60 in 2017        Valvular Disease: No       Other: None          Fatigue/Decreased Energy: [x] No [] Yes    Chest Pain or SOB with Exertion: [x] No [] Yes    Significant Weight Change: [x] No [] Yes    Nausea, Vomiting or Diarrhea: [x] No [] Yes    Fever, Sweats or Chills:  [x] No [] Yes    Leg Swelling [x] No [] Yes        History of Cancer: None    Review of Systems:  A comprehensive review of systems was obtained and negative, except as noted in the HPI or PMH.    Past Medical History:   Medical record was reviewed and PMH was discussed with patient and noted below.  Past Medical History:   Diagnosis Date     Allergic rhinitis, cause unspecified      Anemia of chronic disease      End stage renal failure on dialysis (H)      Esophagitis, unspecified      HEMORRHAGIC GASTROPATHY      History of blood transfusion     [place]     Iron deficiency anemia, unspecified      Mild persistent asthma      Other and unspecified hyperlipidemia      Pneumonia      Pulmonary hypertension (H)     per 12/2012 echo mod.to severe pulmonary hypertension     Tobacco use disorder dc ed 1999     Type 2 diabetes mellitus (H)     on Insulin- managed by PCP     Unspecified  essential hypertension        Past Social History:   Past Surgical History:   Procedure Laterality Date     ABDOMINOPLASTY  pt unsure when    abdominoplasty-      SECTION  ,     x 2     COLONOSCOPY  2006    [facility]     CREATE FISTULA ARTERIOVENOUS UPPER EXTREMITY Right 2018    Procedure: RIGHT BRACHIAL TO BASILIC ARTERIOVENOUS FISTULA CREATION;  Surgeon: Terry Vizcaino MD;  Location: [facility]     ENT SURGERY  as a child    tonsillectomy     EYE SURGERY Left     injections- eye     HYSTERECTOMY  approx     partial hysterectomy-reason bleeding      Personal history of bleeding or anesthesia problems: No    Family History:  Family History   Problem Relation Age of Onset     Arrhythmia Mother      Hypertension Mother      Pancreatic Cancer Father      Diabetes Brother      Asthma Sister      LUNG DISEASE Sister      Diabetes Daughter      Cirrhosis Sister        Personal History:   Social History     Socioeconomic History     Marital status: Single     Spouse name: Not on file     Number of children: Not on file     Years of education: Not on file     Highest education level: Not on file   Occupational History     Not on file   Social Needs     Financial resource strain: Not on file     Food insecurity:     Worry: Not on file     Inability: Not on file     Transportation needs:     Medical: Not on file     Non-medical: Not on file   Tobacco Use     Smoking status: Former Smoker     Packs/day: 1.00     Years: 22.00     Pack years: 22.00     Types: Cigarettes     Last attempt to quit: 10/12/1999     Years since quittin.9     Smokeless tobacco: Never Used   Substance and Sexual Activity     Alcohol use: No     Alcohol/week: 0.0 standard drinks     Drug use: No     Sexual activity: Never   Lifestyle     Physical activity:     Days per week: Not on file     Minutes per session: Not on file     Stress: Not on file   Relationships     Social connections:     Talks on  phone: Not on file     Gets together: Not on file     Attends Confucianism service: Not on file     Active member of club or organization: Not on file     Attends meetings of clubs or organizations: Not on file     Relationship status: Not on file     Intimate partner violence:     Fear of current or ex partner: Not on file     Emotionally abused: Not on file     Physically abused: Not on file     Forced sexual activity: Not on file   Other Topics Concern      Service Not Asked     Blood Transfusions Yes     Caffeine Concern No     Occupational Exposure Not Asked     Hobby Hazards Not Asked     Sleep Concern No     Stress Concern No     Weight Concern Yes     Comment: would like to lose weight     Special Diet No     Back Care Not Asked     Exercise Yes     Comment: walks daily 2 blocks     Bike Helmet Not Asked     Seat Belt Not Asked     Self-Exams Not Asked     Parent/sibling w/ CABG, MI or angioplasty before 65F 55M? Not Asked   Social History Narrative     Not on file       Allergies:  Allergies   Allergen Reactions     Albuterol      shakiness     Aspirin      gi     Byetta [Exenatide]      gi     Clonidine      constipation     Codeine      vomiting     Ezetimibe      muscle symptoms     Hydralazine      headaches     Lisinopril      hyperkalemia     Metformin Hydrochloride      vomiting     Pravachol [Pravastatin Sodium]      muscle pains     Simvastatin      myalgias     Troglitazone        Medications:  Current Outpatient Medications   Medication Sig     ACCU-CHEK CHING PLUS test strip USE TO TEST BLOOD SUGAR 4 TIMES PER DAY      acetaminophen (TYLENOL) 500 MG tablet Take 500 mg by mouth every 8 hours as needed for mild pain     hypromellose-dextran (ARTIFICAL TEARS) 0.1-0.3 % ophthalmic solution Place 1 drop into both eyes daily as needed for dry eyes     insulin aspart (NOVOLOG FLEXPEN) 100 UNIT/ML pen Take 1 unit per 15 grams of carbohydrates three times daily with meals     labetalol (NORMODYNE)  "300 MG tablet Take 300 mg by mouth 2 times daily Prescription is for 600mg twice daily, but the patient is taking 300mg twice daily     LEVEMIR FLEXTOUCH 100 UNIT/ML pen Inject 12 Units Subcutaneous At Bedtime     ADVAIR DISKUS 250-50 MCG/DOSE inhaler INHALE ONE PUFF BY MOUTH TWICE DAILY  (Patient not taking: Reported on 9/25/2019)     amoxicillin (AMOXIL) 500 MG capsule Take 1 capsule (500 mg) by mouth 2 times daily (Patient not taking: Reported on 9/25/2019)     furosemide (LASIX) 20 MG tablet Take 20 mg by mouth 2 times daily     insulin aspart (NOVOLOG FLEXPEN) 100 UNIT/ML pen INJECT 9-12 UNITS BEFORE BREAKFAST, LUNCH, AND DINNER + A CORRECTIVE SCALE OF 2 UNITS FOR EVERY 50 OVER 150     XOPENEX HFA 45 MCG/ACT inhaler INHALE TWO PUFFS BY MOUTH EVERY SIX HOURS AS NEEDED FOR SHORTNESS OF BREATH (Patient not taking: Reported on 9/25/2019)     No current facility-administered medications for this visit.        Vitals:  BP (!) 167/68   Pulse 71   Temp 98  F (36.7  C)   Ht 1.549 m (5' 1\")   Wt 79.2 kg (174 lb 11.2 oz)   BMI 33.01 kg/m      Exam:  GENERAL APPEARANCE: alert and no distress  HENT: mouth without ulcers or lesions  LYMPHATICS: no cervical or supraclavicular nodes  RESP: lungs clear to auscultation - no rales, rhonchi or wheezes  CV: regular rhythm, normal rate, no rub, no murmur  FEMORAL PULSES: 2+ equal bilaterally.   EDEMA: no LE edema bilaterally  ABDOMEN: soft, nondistended, nontender, bowel sounds normal  MS: extremities normal - no gross deformities noted, no evidence of inflammation in joints, no muscle tenderness  SKIN: no rash    Results:   No results found for this or any previous visit (from the past 336 hour(s)).      "

## 2019-09-25 NOTE — PROGRESS NOTES
Outpatient MNT: Kidney Pancreas Transplant Evaluation    Current BMI: 33 (HT 61 in,  lbs/79 kg)  BMI is within criteria of <35 for KP transplant  Ideal BMI Goal for pancreas transplant <30 or <158 lbs (16 lb loss)  **Per Dr Tejada, pt should lose 10-15 lbs for kidney transplant, despite her BMI being <35 currently      Time Spent: 15 minutes  Visit Type: Initial  Referring Physician: Finger  Pt accompanied by: her son and daughter    Medical dx associated with RD referral  - DM II  - ESRD    History of previous txp: none   Frequency of BG checks: 4x/day   Dialysis: yes    Dialysis Modality: HD  Days/Times: TTS 1030 am   Dialysis Start: 4/2019   Pt receives protein supplement with dialysis: Y, protein drink     Nutrition Assessment  Pt cooks for self. She does not add salt to her food and instead uses turmeric, pepper, onion powder, and Mrs Dash. Upon talking with pt, her diet is mostly carbohydrates. She reports that her blood sugars are becoming more difficult to control, yet she isn't eating that many carbs. She reports she gets bored of food often (did not care for several suggestions offered to balance out diet).     Appetite: baseline     Vitamins, Supplements, Pertinent Meds: phos binders; pt reports taking majority of the time but these give her diarrhea, hence not full compliance with binders; she reports less diarrhea if taken in the middle of food; has informed dialysis unit of this side effect   Herbal Medicines/Supplements: none     Diet Recall  Pt's eating is non routine. During the day she often eats peanut butter cracker sandwiches or has jeanette crackers with cookie butter. She may have supper (4 oz baked chicken with green beans and buttered noodles) or just have more crackers.     Kallie: water, decaf coffee, 3 Boost/week (on non-HD days), homemade lemonade with sugar (occasional)  No alcohol  Dining out 1x/week- fast food     Physical Activity  Walks dogs daily for 2 blocks  6 weeks ago [place]8 miles total) 1x/week  Often does 3 flights of stairs at a time      Anthropometrics  Height:   61 in   BMI:    33    Weight Status:Obesity Grade I BMI 30-34.9   Weight:  174 lbs            IBW (lb): 105  % IBW: 166 Wt Hx: Pt reports no edema. Weight down 25 lbs of fluid after starting dialysis. Current weight stable.     Adj/dosing BW: 122 lbs/56 kg      Labs  Lab Results   Component Value Date    A1C 8.0 12/26/2018    A1C 7.3 06/07/2018    A1C 6.7 08/11/2017    A1C 7.7 01/20/2017    A1C 7.5 07/22/2016     Potassium   Date Value Ref Range Status   03/18/2019 3.9 3.4 - 5.3 mmol/L Final     PHOSPHORUS: high per pt report     Malnutrition  % Intake: No decreased intake noted  % Weight Loss: None noted  Subcutaneous Fat Loss: None  Muscle Loss: None  Fluid Accumulation/Edema: None noted  Malnutrition Diagnosis: Patient does not meet two of the above criteria necessary for diagnosing malnutrition    Estimated Nutrition Needs  Energy  3112-6892     (20-25 kcal/kg dosing BW for desired wt loss)     Protein  67-78    (1.2-1.4 g/kg for HD/PD needs)         Fluid  1 ml/kcal or per MD   Micronutrient   Na+: <2000 mg/day  K+: 4990-1919 mg/day  Phos: 800-1000 mg/day            Nutrition Diagnosis  Food and nutrition related knowledge deficit r/t pre transplant eval AEB pt verbalized not hearing pre/post transplant diet guidelines.    Nutrition Intervention  Nutrition education provided:  Discussed strategies for weight loss. Encouraged pt to include more protein and veggies in her diet to balance out the amount of carbohydrates she is consuming, both for weight loss purposes and for dialysis needs (high protein requirements). Provided several examples to help with this (meal prep 1 meal/week to cook in crockpot--protein; consider frozen Lean Cuisine/Smart Ones/Healthy Choice Steamers as a back up that is balanced; provided ideas of high protein snacks, etc).     Discussed sodium intake (low sodium  foods and drinks, seasoning food without salt and tips for low sodium diet).    Reviewed post txp diet guidelines in brief (will review in further detail post txp):  (1) Review of proper food safety measures d/t immunosuppressant therapy post-op and increased risk for food-borne illness    (2) Avoid the following post txp d/t risk for rejection, unknown effects on the organs, and/or potential interactions with immunosuppressants:  - Herbal, Chinese, holistic, chiropractic, natural, alternative medicines and supplements  - Detoxes and cleanses  - Weight loss pills  - Protein powders or other products with extracts or herbs (ie green tea extract)    (3) Med regimen and possible side effects    Patient Understanding: Pt verbalized understanding of education provided.  Expected Compliance: Good  Follow-Up Plans: PRN     Nutrition Goals  1. Increase protein in diet, ideal minimum intake of 67 g/day  2. Pt to verbalize understanding of 3 aspects of post txp education provided  3. Weight loss of 10-15 lbs (goal for 160-165 lbs) for kidney transplant despite current BMI <35; weight for pancreas txp TBD      Provided pt with contact info.   Zayda Mosher RD, LD  [facility] [phone]

## 2019-09-25 NOTE — PROGRESS NOTES
Kidney, Pancreas Transplant Referral - 7/1/2019  Yissel Wetzel attended the pre-transplant patient education class today with her son and daughter. The [facility] website pre-transplant modules were viewed; class participants were educated on using the site. Patient and family were attentive and engaged. Daughter asking good questions which were answered to the best of my ability.                                Content reviewed:    Living Donation and how to access that program    Paired exchange    Kidney Donor Profile Index (KDPI)    Waiting list issues (right to decline without penalty, high PHS risk donors, what to expect when called with an offer)    Hospital experience,  length of stay , need to stay locally post-discharge (2-4 weeks)    Surgical options (with pictures)                             Post-surgery lifting and driving restrictions    Post-transplant routines, frequency of lab work and clinic visits    Need to stay locally post-discharge (2-4 weeks)    Role of Transplant Coordinator    Participants were informed of the benefits of transplant as well as potential risks such as infection, cancer, and death.  The need for total adherence with immunosuppression medications and following transplant regimens was stressed.  The overall evaluation/approval/listing process was reviewed.        The patient was provided with the following documents:  What You Need to Know About a Kidney Transplant  Adult Kidney Transplant - A Guide for Patients  SRTR Data Sheet - Kidney  Brochure - Kidney Allocation  What You Need to Know About a Pancreas Transplant  Adult Pancreas Transplant-A Guide for Patients  SRTR Data Sheet - Pancreas  Brochure - Pancreas  SRTR Data Sheet - Kidney/Pancreas  Brochure - SPK  Brochure - Multiple Listing and Waiting Time Transfer  What Every Patient Needs to Know (UNOS)  UNOS Facts and Figures  Finding a Donor  [facility] - Quick Start Guide  KDPI Consent  Receipt of  "Information form    Yissel Wetzel signed the  Receipt of Information for Organ Transplant Recipient.\" She was provided Yaima Salgado's business card and instructed to call with additional questions.      Summary    Team s concerns/comments:  1.BMI  2.Hx pulmonary hypertension  3. Vascular  4.HX of esophagitis and gastropathy  5. Former smoker  6 Health maintenance  Candidacy category:Yellow    Action/Plan:  1.Needs 10-15 lb. wt. loss for kidney. 20-30 lbs. for pancreas.  2.Cardiology  3.Isma aortic iliac US and CT  4.EGD  5. PFTs  6.Needs mammogram, PAP, colonoscopy, dental    Expected Selection Meeting Discussion:10/02/2019      "

## 2019-09-25 NOTE — LETTER
2019       RE: Yissel Wetzel  [address]  [place] [address]     Dear Colleague,    Thank you for referring your patient, Yissel Wetzel, to the [facility] SOLID ORGAN TRANSPLANT at [facility]. Please see a copy of my visit note below.    Transplant Surgery Consult Note    Medical record number: [id number]  YOB: 1960,   Consult requested for evaluation of kidney and pancreas transplant candidacy.    Assessment and Recommendations: Ms. Wetzel is a fair candidate for  transplantation and has a good understanding of the risks and benefits of this approach to management of renal failure and diabetes. The following issues should be addressed prior to transplant:     1.  Transplant order:  Favor listing for kidney and pancreas.  If does get kidney offer prior to kidney would proceed with kidney alone.  2.  Weigh loss:  BMI is 33, but central obesity.  Need 10-15 lb weight loss for kidney. 20-30 lb or more for pancreas.  Will likely need BMI << 32 for pancreas.  3.  Vascular targets.  Iliac US at least.  Likely CT without contrast.    The majority of our visit was spent in counselling, discussing the medical and surgical risks of living or  donor kidney and pancreas transplantation, either in a simultaneous or sequential fashion. I contrasted approximate wait time for SPK vs living vs  donor kidneys from normal (0-85%) or higher (%) kidney donor profile index (KDPI) donors and their associated outcomes. I would recommend this individual to consider kidneys from high KDPI donors. Access to transplant will be impacted by living donor availability and overall candidacy for SPK, as well as the influence of blood type and degree of sensitization. We discussed advantages of preemptive transplant as well as living donor kidney transplant, and graft and patient survival outcomes associated with these options. Potential surgical  "complications of kidney and pancreas transplantation include bleeding, clotting, infection, wound complications, anastomotic failure and other issues such as cardiac complications, pneumonia, deep venous thrombosis, pulmonary embolism, post transplant diabetes and death. We discussed the need for protocol biopsy of the kidney and the possible need for a ureteral stent (and subsequent removal). We discussed benefits and risks associated with different approaches to exocrine drainage of pancreatic secretions. We also discussed differences in the average length of stay, recovery process, and posttransplant lab and monitoring protocol. We discussed the risk of graft rejection and recurrent diabetic nephropathy in the setting of poor glycemic control. I emphasized the need for strict immunosuppression adherence and the potential for complications of immunosuppression such as skin cancer or lymphoma, as well as a very low but not zero risk of donor-derived disease transmission risks (infection, cancer). Ms. Wetzel asked good questions and the patient's candidacy will be reviewed at our Multidisciplinary Selection Committee. Thank you for the opportunity to participate in Ms. Wetzel's care.]      I would recommend this individual to consider kidneys from high KDPI donors. The reason for this decision is best summarized as: decreased dialysis related morbidity/mortality, accepting lower kidney graft survival rates.    Total time: 45 minutes  Counselling time: 40 minutes    .      ---------------------------------------------------------------------------------------------------    HPI: Ms. Wetzel has End stage renal failure due to diabetes mellitus type 2.   The patient has had diabetes for 25 years.  The diabetes is moderately controlled.     Daily insulin requirement is approximately 60-70 units. She does not really know what her blood sugars are... \"sometimes high, sometimes low.\"  Hypoglyemic unawareness is not an issue.  " Complications of diabetes include:    Retinopathy:  Yes   Neuropathy: No  Gastroparesis:  No    The patient is on dialysis.  Has kidney donors:  No.  On dialysis since 2019.    Lab Results   Component Value Date    A1C 8.0 2018    A1C 7.3 2018    A1C 6.7 2017    A1C 7.7 2017    A1C 7.5 2016       Past Medical History:   Diagnosis Date     Allergic rhinitis, cause unspecified      Anemia of chronic disease      End stage renal failure on dialysis (H)      Esophagitis, unspecified      HEMORRHAGIC GASTROPATHY      History of blood transfusion     [place]     Iron deficiency anemia, unspecified      Mild persistent asthma      Other and unspecified hyperlipidemia      Pneumonia      Pulmonary hypertension (H)     per 2012 echo mod.to severe pulmonary hypertension     Tobacco use disorder dc ed      Type 2 diabetes mellitus (H)     on Insulin- managed by PCP     Unspecified essential hypertension      Past Surgical History:   Procedure Laterality Date     ABDOMINOPLASTY  pt unsure when    abdominoplasty-      SECTION  ,     x 2     COLONOSCOPY      [place]     CREATE FISTULA ARTERIOVENOUS UPPER EXTREMITY Right 2018    Procedure: RIGHT BRACHIAL TO BASILIC ARTERIOVENOUS FISTULA CREATION;  Surgeon: Terry Vizcaino MD;  Location: [facility]     ENT SURGERY  as a child    tonsillectomy     EYE SURGERY Left     injections- eye     HYSTERECTOMY  approx     partial hysterectomy-reason bleeding      Family History   Problem Relation Age of Onset     Arrhythmia Mother      Hypertension Mother      Pancreatic Cancer Father      Diabetes Brother      Asthma Sister      LUNG DISEASE Sister      Social History     Socioeconomic History     Marital status: Single     Spouse name: Not on file     Number of children: Not on file     Years of education: Not on file     Highest education level: Not on file   Occupational History     Not on file    Social Needs     Financial resource strain: Not on file     Food insecurity:     Worry: Not on file     Inability: Not on file     Transportation needs:     Medical: Not on file     Non-medical: Not on file   Tobacco Use     Smoking status: Former Smoker     Packs/day: 1.00     Years: 22.00     Pack years: 22.00     Types: Cigarettes     Last attempt to quit: 10/12/1999     Years since quittin.9     Smokeless tobacco: Never Used   Substance and Sexual Activity     Alcohol use: No     Alcohol/week: 0.0 standard drinks     Drug use: No     Sexual activity: Never   Lifestyle     Physical activity:     Days per week: Not on file     Minutes per session: Not on file     Stress: Not on file   Relationships     Social connections:     Talks on phone: Not on file     Gets together: Not on file     Attends [employer] service: Not on file     Active member of club or organization: Not on file     Attends meetings of clubs or organizations: Not on file     Relationship status: Not on file     Intimate partner violence:     Fear of current or ex partner: Not on file     Emotionally abused: Not on file     Physically abused: Not on file     Forced sexual activity: Not on file   Other Topics Concern      Service Not Asked     Blood Transfusions Yes     Caffeine Concern No     Occupational Exposure Not Asked     Hobby Hazards Not Asked     Sleep Concern No     Stress Concern No     Weight Concern Yes     Comment: would like to lose weight     Special Diet No     Back Care Not Asked     Exercise Yes     Comment: walks daily 2 blocks     Bike Helmet Not Asked     Seat Belt Not Asked     Self-Exams Not Asked     Parent/sibling w/ CABG, MI or angioplasty before 65F 55M? Not Asked   Social History Narrative     Not on file       ROS:   CONSTITUTIONAL:  No fevers or chills  EYES: negative for icterus  ENT:  negative for hearing loss, tinnitus and sore throat  RESPIRATORY:  negative for cough, sputum,  dyspnea  CARDIOVASCULAR:  negative for chest pain   GASTROINTESTINAL:  negative for nausea, vomiting, diarrhea or constipation  GENITOURINARY:  negative for incontinence, dysuria, bladder emptying problems  HEME:  No easy bruising  INTEGUMENT:  negative for rash and pruritus  NEURO:  Negative for headache, seizure disorder    Allergies:   Allergies   Allergen Reactions     Albuterol      shakiness     Aspirin      gi     Byetta [Exenatide]      gi     Clonidine      constipation     Codeine      vomiting     Ezetimibe      muscle symptoms     Hydralazine      headaches     Lisinopril      hyperkalemia     Metformin Hydrochloride      vomiting     Pravachol [Pravastatin Sodium]      muscle pains     Simvastatin      myalgias     Troglitazone        Medications:  Prescription Medications as of 9/25/2019       Rx Number Disp Refills Start End Last Dispensed Date Next Fill Date Owning Pharmacy    Lumi Mobile CHING PLUS test strip  200 each 0 8/19/2019    [facility] [address] [place], [place] [id number] [facility]    Sig: USE TO TEST BLOOD SUGAR 4 TIMES PER DAY     Class: E-Prescribe    acetaminophen (TYLENOL) 500 MG tablet            Sig: Take 500 mg by mouth every 8 hours as needed for mild pain    Class: Historical    Route: Oral    ADVAIR DISKUS 250-50 MCG/DOSE inhaler  1 Inhaler 0 1/24/2019    [facility] [id number] [place] [id number] [facility]    Sig: INHALE ONE PUFF BY MOUTH TWICE DAILY     Class: E-Prescribe    Notes to Pharmacy: Medication is being filled for 1 time refill only due to:  Patient needs to be seen .    amoxicillin (AMOXIL) 500 MG capsule  20 capsule 0 3/28/2019    [address]    Sig: Take 1 capsule (500 mg) by mouth 2 times daily    Class: Local Print    Route: Oral    furosemide (LASIX) 20 MG tablet            Sig: Take 20 mg by mouth 2 times daily    Class: Historical    Route: Oral    hypromellose-dextran (ARTIFICAL TEARS) 0.1-0.3 %  ophthalmic solution            Sig: Place 1 drop into both eyes daily as needed for dry eyes    Class: Historical    Route: Both Eyes    insulin aspart (NOVOLOG FLEXPEN) 100 UNIT/ML pen  15 mL 0 8/29/2019    [facility] [address] [place], [place] [id number] [facility]    Sig: INJECT 9-12 UNITS BEFORE BREAKFAST, LUNCH, AND DINNER + A CORRECTIVE SCALE OF 2 UNITS FOR EVERY 50 OVER 150    Class: E-Prescribe    insulin aspart (NOVOLOG FLEXPEN) 100 UNIT/ML pen  6 mL 0 3/19/2019    [place] [address]    Sig: Take 1 unit per 15 grams of carbohydrates three times daily with meals    Class: E-Prescribe    Notes to Pharmacy: Pharmacy: Please dispense with necessary supplies (needles, etc.). Future refills by PCP Dr. Gigi Martin with phone number [phone].    Renewals     Renewal provider:  Gigi Martin MD          labetalol (NORMODYNE) 300 MG tablet            Sig: Take 300 mg by mouth 2 times daily Prescription is for 600mg twice daily, but the patient is taking 300mg twice daily    Class: Historical    Route: Oral    LEVEMIR FLEXTOUCH 100 UNIT/ML pen  6 mL 0 3/19/2019    [place] [address]    Sig: Inject 12 Units Subcutaneous At Bedtime    Class: E-Prescribe    Notes to Pharmacy: Pharmacy: Please dispense with necessary supplies (needles, etc.). Future refills by PCP Dr. Gigi Martin with phone number [phone].    Route: Subcutaneous    Renewals     Renewal provider:  Gigi Martin MD          XOPENEX HFA 45 MCG/ACT inhaler  15 g 0 1/24/2019    [facility] [address] [place], [place] [id number] [facility]    Sig: INHALE TWO PUFFS BY MOUTH EVERY SIX HOURS AS NEEDED FOR SHORTNESS OF BREATH    Class: E-Prescribe    Notes to Pharmacy: Medication is being filled for 1 time refill only due to:  Patient needs to be seen .          Exam:   Vitals:   [unfilled]  Estimated body mass index is 33.01 kg/m  as  "calculated from the following:    Height as of an earlier encounter on 9/25/19: 1.549 m (5' 1\").    Weight as of an earlier encounter on 9/25/19: 79.2 kg (174 lb 11.2 oz).  Appearance: in no apparent distress.   Skin: normal  Head and Neck: Normal, no rashes or jaundice  Respiratory: easy respirations, no audible wheezing.  Abdomen: obese, No distention       Diagnostics:   No results found for this or any previous visit (from the past 672 hour(s)).      Again, thank you for allowing me to participate in the care of your patient.      Sincerely,    RAYNE      "

## 2019-09-25 NOTE — PROGRESS NOTES
Patient was seen by myself, Dr. Driss Keen, in conjunction with Angelia Chong, as part of a shared visit.    I personally reviewed past medical and surgical history, vital signs, medications and labs.  Present and past medical history, along with significant physical exam findings were all reviewed with DEREK.    My key findings:  Yissel Wetzel is a 59 year old year old female with ESKD from diabetes mellitus type 2, who presents for Kidney/Pancreas transplant evaluation.    Patient was diagnosed with CKD since 2012. Started HD 04/2019. No biopsy.     DMII: diagnosed 20 derrek years ago. On 50-70 units / day. Last a1c was 8 g%. + nephropathy, retinopathy, no neuropathy.     CAD: echo 2018 with RVSP of 60 mmHg - repeat ECHO today now that she is approaching euvolemia.stress test in 2018 with fixed apical defect. Walks 2 miles without exertional symptoms.     Ca screens: needs mammo, pap, colonoscopy and dental.     No cp, sob, no n/v/d,n o f/s/c/.     Prior cigs with 22 pack year exposure    Hx of esophagitis and gastropathy in the past - due for EGD    Key management decisions made by me and discussed with DEREK:  1. Kidney/Pancreas transplant evaluation - patient is a good candidate overall. Benefits of a living donor transplant were discussed.  As the patient is already on hemodialysis she will complete her work-up as below before being listed and she will accrue all of her weight time back to when she was initiated on hemodialysis.  She will also consider potential living donors.  We did discuss the pancreas transplant as a way to decrease her weight time for a kidney transplant as well as improvement in blood sugar control.  These benefits will be weighed against the potential risk of an increased recovery.  An increased surgical risk discussed today.  2. ESKD from diabetes mellitus type 2: Continue hemodialysis.    3. DMII: The patient is currently using 50 to 70 units/day of insulin.  She will continue to  follow with her providers for glucose control.  We discussed the improvement in insulin requirements as she is able to lose weight.  4. Obesity: The patient will lose weight as determined by surgery.  If she is unable to medically lose the weight she can be referred to our medical weight loss clinic here.  5. CAD risk: The patient had elevated RVSP on her most recent echo when she is volume overloaded.  She will repeat her echo today.  The last stress test in 2015 demonstrated a small fixed apical defect and given multiple risk factors may benefit from coronary catheterization.  She will be seen by cardiology for evaluation.    6. Ca screens: The patient will keep her cancer screens up-to-date with mammo-Pap and  Colon cancer screening  7. Pulmonary HTN: We will repeat the echo and if the RVSP continues to be elevated she will be evaluated for pulmonary hypertension with a right heart cath when the patient is euvolemic.  8. Hx of tobacco use: Given her history of tobacco abuse she will get PFTs today

## 2019-09-25 NOTE — PROGRESS NOTES
Psychosocial Assessment  Patient Name/ Age: Yissel Wetzel 59 year old   Medical Record #: [id number]  Duration of Interview:     40   min  Process:   Face-to-Face Interview                (counseling < 50%)   Present at Appointment: Cassandra and her son Mike and daughter Christine        :LORA Brink, LICSW Date:  September 25, 2019        Type of transplant: Kidney/Pancreas    Donor type:   Cassandra indicated she does not know of any potential donors at this time.   Cadaver   Prior Transplants:    No Status of Transplant:       Current Living Situation    Location:   [facility] [address]  [facility] [address]  With Whom: Alone       Family/ Social Support:    Christine (40) lives in [place], [place] and Mike (36) [place].  Cassandra has two grandchildren.  Her mother lives in [place], [place] and she has one brother ([place], [place]).   Available, helpful   Committed relationship:  Cassandra indicated she is single and currently not in any relationship.   Single   Other supports:   Friends Available, helpful       Activities/ Functional Ability    Current level:  Cassandra wears corrective lenses. Ambulatory, visually impaired and independent with ADL's     Transportation Drives self       Vocational/Employment/Financial     Employment  [employer]   Part time and disabled   Job Description        Income  Cassandra indicated she has been eligible for SSDI for 15 years.   Salary/wages and SSDI   Insurance  Medicare, are [place] including [place] D.    At this time, patient can afford medication costs:  Yes  Medicare and [place]       Medical Status    Current mode of treatment for ESRD Dialysis   Complications - Diabetes controlled with insulin. None       Behavioral    Tobacco Use No Chemical Dependency No   Cassandra indicated she quit smoking 20 years ago. Cassandra indicated she quit drinking at the age of 37, no treatment required.     Psychiatric Impairment No    Reading ability Good  Education Level:  GED Recent Legal History No    Coping Style/Strategies: Cassandra indicated when under stress she will play with her dogs.   Ability to Adhere to Complex Medical Regime: Yes     Adherence History:  Cassandra indicated she will usually follow her physician's recommendations.        Education  _X_ Medicare  _X_ Rehabilitation  _X_ Donor issues  _X_ Community resources  _X_ Post discharge housing  _X_ Financial resources  _X_ Medical insurance options  _X_ Psych adjustment  _X_ Family adjustment  _X_ Health Care Directive - Provided Education and Declined Completing at this time.  Cassandra indicated she is in agreement with her children making her medical decisions for her if she is unable. Psychosocial Risks of Transplant Reviewed and Discussed:  _X_ Increased stress related to emotional,            family, social, employment or financial           situation  _X_ Affect on work and/or disability benefits  _X_ Affect on future life insurance  _X_ Transplant outcome expectations may           not be met  _X_ Mental Health Risks: anxiety,           depression, PTSD, guilt, grief and           chronic fatigue     Notable Items:   None noted.       Final Evaluation/Assessment   Patient seemed to process information well. Appeared well informed, motivated and able to follow post transplant requirements. Behavior was appropriate during interview. Has adequate income and insurance coverage. Adequate social support. No major contraindications noted for transplant.  At this time patient appears to understand the risks and benefits of transplant.      Recommendation  Acceptable    Selection Criteria Met:  Plan for support Yes   Chemical Dependence Yes   Smoking Yes   Mental Health Yes   Adequate Finances Yes    Signature: LORA Brink, [facility]   Title: Clinical

## 2019-09-25 NOTE — LETTER
"9/25/2019       RE: Yissel Wetzel  [address]  [facility] [address]     Dear Colleague,    Thank you for referring your patient, Yissel Wetzel, to the [facility] at [facility]. Please see a copy of my visit note below.    Chief Complaint   Patient presents with     Transplant Evaluation     Blood pressure (!) 167/68, pulse 71, temperature 98  F (36.7  C), height 1.549 m (5' 1\"), weight 79.2 kg (174 lb 11.2 oz), not currently breastfeeding.    Cindy Adair CMA       Assessment and Plan:  # Kidney/Pancreas Transplant Evaluation: Patient is a good candidate overall. Benefits of a living donor transplant were discussed.  # ESKD from secondary to presumed type 2 diabetes: with no previous biopsy. She has had progressive disease since 2012.  Although doing well on hemodialysis since 4/2019, she would likely benefit from a kidney transplant.    # Type 2 diabetes: borderline control with the most recent hemoglobin A1c of 8.0% while using 55-70 units of insulin per day. Given evidence of end-organ damage, she may benefit from a pancreas transplant.     # Cardiac Risk: history of pulmonary hypertension with her last ECHO in 8/2017 noting a RVSP of 60 mmHg, EF of 60% and moderate diastolic dysfunction.  Her last stress test in 2015 demonstrated a small fixed apical defect, probably due to soft tissue artifact.  Non-transmural infarction cannot be excluded.  Will need to complete updated ECHO, risk assessment and updated testing for CAD.     #  BMI of 33: with central obesity. Will need 10-15 lbs of weight loss for kidney alone, per surgery recommendation. Patient deferred weight loss management clinic at this time.      # PAD risk: will need iliac/femoral dopplers for kidney and CT imaging or kidney/pancreas per transplant surgery recommendation.     # Former smoker: with a 22-pack-year smoking history and quit in 1999. Will need PFTs.     # H/o " esophagitis (path consistent with reflux, no Gibson's) and hemorrhagic gastropathy (2006): will need updated EGD.     # Health Maintenance: Colonoscopy: Not up to date, Mammogram: Not up to date, PAP: Not up to date and Dental: Not up to date    Discussed the risks and benefits of a transplant, including the risk of surgery and immunosuppression medications.  Patient's overall evaluation will be discussed in the Transplant Program's regular meeting with a final recommendation on the patients suitability for transplant to be made at that time.  Patient was seen in conjunction with Dr. Driss Keen as part of a shared visit.    Evaluation:  Yissel Wetzel was seen in consultation at the request of Dr. Truong Tejada for evaluation as a potential kidney/pancreas transplant recipient.    Reason for Visit:  Yissel Wetzel is a 59 year old female with ESKD from diabetes mellitus type 2, who presents for kidney/pancreas transplant evaluation.    History of Present Illness:  Yissel Wetzel is a 59-year-old female that presents with ESKD secondary to presumed type 2 diabetes, with no previous biopsy. She has had progressive disease and first started following with Dr. BRIA White in 2012 with a creatinine of 1.25 but then was lost to follow-up until 8/2018 with a creatinine of 4.3 and EGFR of 11. She ultimately started hemodialysis in 3/2019. Overall, dialysis is going well.  She denies nausea, vomiting and poor appetite.  She has no donors at this time.    She has a history of PACs and pulmonary hypertension with her last ECHO in 8/2017 noting a RVSP of 60 mmHg, EF of 60% and moderate diastolic dysfunction.  Her last stress test in 2015 demonstrated a small fixed apical defect, probably due to soft tissue artifact.  Non-transmural infarction could not be excluded. She stays active walking her dogs close to 2 miles once per week, but at least 2 blocks every day. She is also able to walk up 3 flights of stairs with groceries  without exertional or claudication symptoms. She is a former smoker with a 22-pack-year smoking history and quit in 1999. Additional PMH includes esophagitis (path consistent with reflux, no Gibson's) and hemorrhagic gastropathy on 2006 EGD, not currently taking any medication for GERD. She is also obese with a BMI of 33.               Kidney Disease Hx:        Kidney Disease Dx: Diabetes mellitus type 2       Biopsy Proven: No         On Dialysis: Yes, Date initiated: 4/2019 and Dialysis Type: [facility];       Primary Nephrologist: Dr. Jacobo         H/o Kidney Stones: No       H/o Recurrent/Frequent UTI: No         Diabetic Hx:        Diagnosis Date: 23 years ago.        Medication History: first with oral medications, and switched to insulin 9 years ago, currently using 55-70 units of insulin per day.         Diabetic Control: Borderline control (HbA1c 7-9%)   Last HbA1c: 8.0% (12/2018)        Hypoglycemic Unawareness: No       End-Organ Damage due to DM: Nephropathy and Peripheral neuropathy         Cardiac/Vascular Disease Risk Factors:        Cardiac Risk Factors: Diabetes, Hypertension, Smoking and Age (Male > 55, Female > 65)       Known CAD: No       Known PAD/Caludication Symptoms: No       Known Heart Failure: No       Arrhythmia: Yes; PACs       Pulmonary Hypertension: Yes; RSVP 60 in 2017        Valvular Disease: No       Other: None          Fatigue/Decreased Energy: [x] No [] Yes    Chest Pain or SOB with Exertion: [x] No [] Yes    Significant Weight Change: [x] No [] Yes    Nausea, Vomiting or Diarrhea: [x] No [] Yes    Fever, Sweats or Chills:  [x] No [] Yes    Leg Swelling [x] No [] Yes        History of Cancer: None    Review of Systems:  A comprehensive review of systems was obtained and negative, except as noted in the HPI or PMH.    Past Medical History:   Medical record was reviewed and PMH was discussed with patient and noted below.  Past Medical History:   Diagnosis Date     Allergic  rhinitis, cause unspecified      Anemia of chronic disease      End stage renal failure on dialysis (H)      Esophagitis, unspecified      HEMORRHAGIC GASTROPATHY      History of blood transfusion     [facility]     Iron deficiency anemia, unspecified      Mild persistent asthma      Other and unspecified hyperlipidemia      Pneumonia      Pulmonary hypertension (H)     per 2012 echo mod.to severe pulmonary hypertension     Tobacco use disorder dc ed      Type 2 diabetes mellitus (H)     on Insulin- managed by PCP     Unspecified essential hypertension        Past Social History:   Past Surgical History:   Procedure Laterality Date     ABDOMINOPLASTY  pt unsure when    abdominoplasty-      SECTION  ,     x 2     COLONOSCOPY  2006    [facility] [place]     CREATE FISTULA ARTERIOVENOUS UPPER EXTREMITY Right 2018    Procedure: RIGHT BRACHIAL TO BASILIC ARTERIOVENOUS FISTULA CREATION;  Surgeon: Terry Vizcaino MD;  Location: [facility]     ENT SURGERY  as a child    tonsillectomy     EYE SURGERY Left     injections- eye     HYSTERECTOMY  approx     partial hysterectomy-reason bleeding      Personal history of bleeding or anesthesia problems: No    Family History:  Family History   Problem Relation Age of Onset     Arrhythmia Mother      Hypertension Mother      Pancreatic Cancer Father      Diabetes Brother      Asthma Sister      LUNG DISEASE Sister      Diabetes Daughter      Cirrhosis Sister        Personal History:   Social History     Socioeconomic History     Marital status: Single     Spouse name: Not on file     Number of children: Not on file     Years of education: Not on file     Highest education level: Not on file   Occupational History     Not on file   Social Needs     Financial resource strain: Not on file     Food insecurity:     Worry: Not on file     Inability: Not on file     Transportation needs:     Medical: Not on file     Non-medical: Not on file   Tobacco  Use     Smoking status: Former Smoker     Packs/day: 1.00     Years: 22.00     Pack years: 22.00     Types: Cigarettes     Last attempt to quit: 10/12/1999     Years since quittin.9     Smokeless tobacco: Never Used   Substance and Sexual Activity     Alcohol use: No     Alcohol/week: 0.0 standard drinks     Drug use: No     Sexual activity: Never   Lifestyle     Physical activity:     Days per week: Not on file     Minutes per session: Not on file     Stress: Not on file   Relationships     Social connections:     Talks on phone: Not on file     Gets together: Not on file     Attends Taoism service: Not on file     Active member of club or organization: Not on file     Attends meetings of clubs or organizations: Not on file     Relationship status: Not on file     Intimate partner violence:     Fear of current or ex partner: Not on file     Emotionally abused: Not on file     Physically abused: Not on file     Forced sexual activity: Not on file   Other Topics Concern      Service Not Asked     Blood Transfusions Yes     Caffeine Concern No     Occupational Exposure Not Asked     Hobby Hazards Not Asked     Sleep Concern No     Stress Concern No     Weight Concern Yes     Comment: would like to lose weight     Special Diet No     Back Care Not Asked     Exercise Yes     Comment: walks daily 2 blocks     Bike Helmet Not Asked     Seat Belt Not Asked     Self-Exams Not Asked     Parent/sibling w/ CABG, MI or angioplasty before 65F 55M? Not Asked   Social History Narrative     Not on file       Allergies:  Allergies   Allergen Reactions     Albuterol      shakiness     Aspirin      gi     Byetta [Exenatide]      gi     Clonidine      constipation     Codeine      vomiting     Ezetimibe      muscle symptoms     Hydralazine      headaches     Lisinopril      hyperkalemia     Metformin Hydrochloride      vomiting     Pravachol [Pravastatin Sodium]      muscle pains     Simvastatin      myalgias      "Troglitazone        Medications:  Current Outpatient Medications   Medication Sig     ACCU-CHEK CHING PLUS test strip USE TO TEST BLOOD SUGAR 4 TIMES PER DAY      acetaminophen (TYLENOL) 500 MG tablet Take 500 mg by mouth every 8 hours as needed for mild pain     hypromellose-dextran (ARTIFICAL TEARS) 0.1-0.3 % ophthalmic solution Place 1 drop into both eyes daily as needed for dry eyes     insulin aspart (NOVOLOG FLEXPEN) 100 UNIT/ML pen Take 1 unit per 15 grams of carbohydrates three times daily with meals     labetalol (NORMODYNE) 300 MG tablet Take 300 mg by mouth 2 times daily Prescription is for 600mg twice daily, but the patient is taking 300mg twice daily     LEVEMIR FLEXTOUCH 100 UNIT/ML pen Inject 12 Units Subcutaneous At Bedtime     ADVAIR DISKUS 250-50 MCG/DOSE inhaler INHALE ONE PUFF BY MOUTH TWICE DAILY  (Patient not taking: Reported on 9/25/2019)     amoxicillin (AMOXIL) 500 MG capsule Take 1 capsule (500 mg) by mouth 2 times daily (Patient not taking: Reported on 9/25/2019)     furosemide (LASIX) 20 MG tablet Take 20 mg by mouth 2 times daily     insulin aspart (NOVOLOG FLEXPEN) 100 UNIT/ML pen INJECT 9-12 UNITS BEFORE BREAKFAST, LUNCH, AND DINNER + A CORRECTIVE SCALE OF 2 UNITS FOR EVERY 50 OVER 150     XOPENEX HFA 45 MCG/ACT inhaler INHALE TWO PUFFS BY MOUTH EVERY SIX HOURS AS NEEDED FOR SHORTNESS OF BREATH (Patient not taking: Reported on 9/25/2019)     No current facility-administered medications for this visit.        Vitals:  BP (!) 167/68   Pulse 71   Temp 98  F (36.7  C)   Ht 1.549 m (5' 1\")   Wt 79.2 kg (174 lb 11.2 oz)   BMI 33.01 kg/m       Exam:  GENERAL APPEARANCE: alert and no distress  HENT: mouth without ulcers or lesions  LYMPHATICS: no cervical or supraclavicular nodes  RESP: lungs clear to auscultation - no rales, rhonchi or wheezes  CV: regular rhythm, normal rate, no rub, no murmur  FEMORAL PULSES: 2+ equal bilaterally.   EDEMA: no LE edema bilaterally  ABDOMEN: soft, " nondistended, nontender, bowel sounds normal  MS: extremities normal - no gross deformities noted, no evidence of inflammation in joints, no muscle tenderness  SKIN: no rash    Results:   No results found for this or any previous visit (from the past 336 hour(s)).        Patient was seen by myself, Dr. Driss Keen, in conjunction with Angleia Chong, as part of a shared visit.    I personally reviewed past medical and surgical history, vital signs, medications and labs.  Present and past medical history, along with significant physical exam findings were all reviewed with DEREK.    My key findings:  Yissel Wetzel is a 59 year old year old female with ESKD from diabetes mellitus type 2, who presents for Kidney/Pancreas transplant evaluation.    Patient was diagnosed with CKD since 2012. Started HD 04/2019. No biopsy.     DMII: diagnosed 20 derrek years ago. On 50-70 units / day. Last a1c was 8 g%. + nephropathy, retinopathy, no neuropathy.     CAD: echo 2018 with RVSP of 60 mmHg - repeat ECHO today now that she is approaching euvolemia.stress test in 2018 with fixed apical defect. Walks 2 miles without exertional symptoms.     Ca screens: needs mammo, pap, colonoscopy and dental.     No cp, sob, no n/v/d,n o f/s/c/.     Prior cigs with 22 pack year exposure    Hx of esophagitis and gastropathy in the past - due for EGD    Key management decisions made by me and discussed with DEREK:  1. Kidney/Pancreas transplant evaluation - patient is a good candidate overall. Benefits of a living donor transplant were discussed.  As the patient is already on hemodialysis she will complete her work-up as below before being listed and she will accrue all of her weight time back to when she was initiated on hemodialysis.  She will also consider potential living donors.  We did discuss the pancreas transplant as a way to decrease her weight time for a kidney transplant as well as improvement in blood sugar control.  These benefits  will be weighed against the potential risk of an increased recovery.  An increased surgical risk discussed today.  2. ESKD from diabetes mellitus type 2: Continue hemodialysis.    3. DMII: The patient is currently using 50 to 70 units/day of insulin.  She will continue to follow with her providers for glucose control.  We discussed the improvement in insulin requirements as she is able to lose weight.  4. Obesity: The patient will lose weight as determined by surgery.  If she is unable to medically lose the weight she can be referred to our medical weight loss clinic here.  5. CAD risk: The patient had elevated RVSP on her most recent echo when she is volume overloaded.  She will repeat her echo today.  The last stress test in 2015 demonstrated a small fixed apical defect and given multiple risk factors may benefit from coronary catheterization.  She will be seen by cardiology for evaluation.    6. Ca screens: The patient will keep her cancer screens up-to-date with mammo-Pap and  Colon cancer screening  7. Pulmonary HTN: We will repeat the echo and if the RVSP continues to be elevated she will be evaluated for pulmonary hypertension with a right heart cath when the patient is euvolemic.  8. Hx of tobacco use: Given her history of tobacco abuse she will get PFTs today        Again, thank you for allowing me to participate in the care of your patient.      Sincerely,    RAYNE

## 2019-09-26 LAB
C PEPTIDE SERPL-MCNC: 13.2 NG/ML (ref 0.9–6.9)
CMV IGG SERPL QL IA: >8 AI (ref 0–0.8)
EBV VCA IGG SER QL IA: >8 AI (ref 0–0.8)
HBV CORE AB SERPL QL IA: NONREACTIVE
HBV SURFACE AB SERPL IA-ACNC: 1.34 M[IU]/ML
HBV SURFACE AG SERPL QL IA: NONREACTIVE
HCV AB SERPL QL IA: NONREACTIVE
HIV 1+2 AB+HIV1 P24 AG SERPL QL IA: NONREACTIVE
T PALLIDUM AB SER QL: NONREACTIVE
THROMBIN TIME: 17.1 SEC (ref 13–19)
VZV IGG SER QL IA: 4 AI (ref 0–0.8)

## 2019-09-26 PROCEDURE — 81240 F2 GENE: CPT | Performed by: INTERNAL MEDICINE

## 2019-09-26 PROCEDURE — 81241 F5 GENE: CPT | Performed by: INTERNAL MEDICINE

## 2019-09-27 LAB
A* LOCUS: NORMAL
A*: NORMAL
ABTEST METHOD: NORMAL
B* LOCUS: NORMAL
B*: NORMAL
BLD GP AB SCN TITR SERPL: NORMAL {TITER}
BW-1: NORMAL
C* LOCUS: NORMAL
C*: NORMAL
CARDIOLIPIN ANTIBODY IGG: <1.6 GPL-U/ML (ref 0–19.9)
CARDIOLIPIN ANTIBODY IGM: 0.4 MPL-U/ML (ref 0–19.9)
DPA1* LOCUS NMDP: NORMAL
DPA1* NMDP: NORMAL
DPA1*: NORMAL
DPA1*LOCUS: NORMAL
DPB1* LOCUS NMDP: NORMAL
DPB1* NMDP: NORMAL
DPB1*: NORMAL
DPB1*LOCUS: NORMAL
DQA1*: NORMAL
DQA1*LOCUS: NORMAL
DQB1* LOCUS: NORMAL
DQB1*: NORMAL
DRB1* LOCUS: NORMAL
DRB1*: NORMAL
DRB4* LOCUS: NORMAL
DRSSO TEST METHOD: NORMAL
GAMMA INTERFERON BACKGROUND BLD IA-ACNC: 0.06 IU/ML
INTERPRETATION ECG - MUSE: NORMAL
LA PPP-IMP: NEGATIVE
M TB IFN-G BLD-IMP: NEGATIVE
M TB IFN-G CD4+ BCKGRND COR BLD-ACNC: >10 IU/ML
MITOGEN IGNF BCKGRD COR BLD-ACNC: 0 IU/ML
MITOGEN IGNF BCKGRD COR BLD-ACNC: 0.01 IU/ML
PROTOCOL CUTOFF: NORMAL
SA1 CELL: NORMAL
SA1 COMMENTS: NORMAL
SA1 HI RISK ABY: NORMAL
SA1 MOD RISK ABY: NORMAL
SA1 TEST METHOD: NORMAL
SA2 CELL: NORMAL
SA2 COMMENTS: NORMAL
SA2 HI RISK ABY UA: NORMAL
SA2 MOD RISK ABY: NORMAL
SA2 TEST METHOD: NORMAL
UNACCEPTABLE ANTIGEN: NORMAL
UNOS CPRA: 0

## 2019-09-29 ENCOUNTER — HEALTH MAINTENANCE LETTER (OUTPATIENT)
Age: 59
End: 2019-09-29

## 2019-09-30 LAB — COPATH REPORT: NORMAL

## 2019-10-02 ENCOUNTER — COMMITTEE REVIEW (OUTPATIENT)
Dept: TRANSPLANT | Facility: CLINIC | Age: 59
End: 2019-10-02

## 2019-10-02 NOTE — LETTER
10/02/19        Yissel Wetzel  [address]  [facility] [facility] [address]        Dear Yissel,    It was a pleasure to see you recently for consideration of kidney and pancreas transplantation. Your pre-transplant evaluation results were reviewed at our Multidisciplinary Selection Committee on 10/02/2019. Determination was made to approve you for kidney transplant upon successful completion of evaluation. You were not approved for pancreas transplant due to high insulin requirements and BMI.  The committee is requesting the following items are completed before determining your candidacy:     Weight loss of 10-15 lbs.for kidney transplant.  Please call me ( Yaima )  ([phone]) when you are nearing this goal at which point I will arrange for    the remainder of your evaluation appointments.       Remainder of items to be scheduled when you are nearing your weight loss goal:    1. Cardiologist appointment to evaluate your heart status and to make a                        pre-operative recommendation to proceed with kidney transplant surgery.    2. EGD due to recent history of esophagitis and hemorrhagic gastropathy on 2006         EGD.    3. Bilateral iliac artery ultrasound to look for any blockages or narrowed areas.    4. CT abdomen/pelvis without contrast to visualize any arterial calcifications.    5. Pulmonary Function Test due to smoking history.    6. Health maintence needs to remain up to date: mammogram, PAP, Colonoscopy,     dental work.    Upon successful completion of the above items, your results will be reviewed at the Multidisciplinary Selection Committee for approval. You have not been added onto the kidney transplant wait list at this time because you are already on dialysis. Once added onto the wait list , your wait time will begin with the start date of your chronic dialysis which is 03/15/2019.   For any questions, please contact the Transplant Office at [phone].      Sincerely,  Yaima Salgado RN BSN Transplant Coordinator    Solid Organ Transplant  [facility]    Enclosure: UNOS Letter    CC: Gigi Martin MD; Rahul Hernandez MD; [facility]

## 2019-10-02 NOTE — COMMITTEE REVIEW
Abdominal Committee Review Note     Evaluation Date: 9/25/2019  Committee Review Date: 10/2/2019    Organ being evaluated for: Kidney/Pancreas    Transplant Phase: Evaluation  Transplant Status: Active    Transplant Coordinator: Yaima Salgado  Transplant Surgeon:       Referring Physician:     Primary Diagnosis: Diabetes Mellitus - Type II (Pancreas)  Secondary Diagnosis:     Committee Review Members:  Nephrology Driss Keen MD, Ahsan Chong, APRN CNP, Deni Tovar MD   Nutrition Zayda Mosher,    Pharmacy Rene Mckeon, [facility]    - Clinical Ene Peace Jeffers, [facility]   Transplant Tg Mcfadden PA-C, Joellen Robb, ANGELICA, Alina Ardon, NILDA, Yaima Salgado RN, Anabell Miguel, ANGELICA, Zara Carlson, ANGELICA, Salomón Parker MD, Diane Guerrero MD, MD   Transplant Surgery Diane Guerrero MD, MD       Transplant Eligibility: Insulin-dependent diabetes mellitus, Irreversible chronic kidney disease treated w/dialysis or expected need for dialysis    Committee Review Decision: Candidate for transplant but not listed.    Relative Contraindications:  BMI >35    Absolute Contraindications: None    Committee Chair Diane Guerrero MD verbally attested to the committee's decision.    Committee Discussion Details: Reviewed medical status and evaluation status to date. BMI 33.01 with central obesity. Pt . will need 10-15 lbs.weight loss for kidney alone, per surgeon recommendation.More wt. Loss for K/P. Pt.needs Cardiology, EGD,Iliac US, CT abd/pelvis w/o contrast, PFTs, Mammogram, PAP, Colonoscopy, Dental. Pt. will not be listed at this time until 10-15 lb weight loss.   Determination made pt. is approved for kidney transplant only upon successful completion of evaluation. Pt.not approved for pancreas due to high insulin requirements and obesity. Pt.will not be listed at this time as she is already on dialysis.

## 2019-10-07 ENCOUNTER — TELEPHONE (OUTPATIENT)
Dept: TRANSPLANT | Facility: CLINIC | Age: 59
End: 2019-10-07

## 2019-10-07 NOTE — TELEPHONE ENCOUNTER
I contacted the patient this AM to let her know I was working along side her Transplant Coordinator( Yaima) to inform her of the outcome from the Selection Committee on 10/02/2019. Patient (pt) was notified that she would be  approved for kidney transplant only upon successful completion of evaluation. Pt.was also notified she was not approved for pancreas transplant due to high insulin requirements and BMI requirement. Pt.was in agreement with this plan. I discussed with the pt that she needs to loss 10-15 lbs for kidney transplant and she is to contact Yaima when she is nearing this goal. Pt.also advised to contact Yaima if she is having difficulty getting to this weight loss goal. I briefly reviewed that once the pt. gets to her weight loss goal Yaima will help with the scheduling of the following items:  1. Cardiologist  2.EGD  3.Bilateral iliac artery ultrasound  4.CT abdomen/pelvis w/o contrast  5.PFTs    Pt states she is aware she needs to keep her health maintence up to date. Pt stated she will go ahead and get a mammogram, PAP, colonoscopy, and dental work done scheduled.    Pt.is not added to the waitlist at this time because she is already on diaylsis and her wait time will begin with the start date of her chronic dialysis date of 03/15/2019. I confirmed the pt's providers, dialysis, and the pt's address. Pt.informed that a written letter with the above information will be sent out to her and her providers. Pt. verbalized understanding of content presented today and is aware she is to call Yaima with any questions or concerns. Pt. confirmed that she has Yaima's contact information.

## 2019-10-26 ENCOUNTER — TRANSFERRED RECORDS (OUTPATIENT)
Dept: HEALTH INFORMATION MANAGEMENT | Facility: CLINIC | Age: 59
End: 2019-10-26

## 2019-11-01 ENCOUNTER — ANCILLARY PROCEDURE (OUTPATIENT)
Dept: MAMMOGRAPHY | Facility: CLINIC | Age: 59
End: 2019-11-01
Payer: MEDICARE

## 2019-11-01 ENCOUNTER — TELEPHONE (OUTPATIENT)
Dept: INTERNAL MEDICINE | Facility: CLINIC | Age: 59
End: 2019-11-01

## 2019-11-01 ENCOUNTER — OFFICE VISIT (OUTPATIENT)
Dept: INTERNAL MEDICINE | Facility: CLINIC | Age: 59
End: 2019-11-01
Payer: MEDICARE

## 2019-11-01 ENCOUNTER — TELEPHONE (OUTPATIENT)
Dept: TRANSPLANT | Facility: CLINIC | Age: 59
End: 2019-11-01

## 2019-11-01 VITALS
TEMPERATURE: 97.7 F | DIASTOLIC BLOOD PRESSURE: 70 MMHG | BODY MASS INDEX: 33.16 KG/M2 | RESPIRATION RATE: 16 BRPM | SYSTOLIC BLOOD PRESSURE: 140 MMHG | HEART RATE: 72 BPM | OXYGEN SATURATION: 91 % | WEIGHT: 175.5 LBS

## 2019-11-01 DIAGNOSIS — Z12.31 ENCOUNTER FOR SCREENING MAMMOGRAM FOR BREAST CANCER: Primary | ICD-10-CM

## 2019-11-01 DIAGNOSIS — Z12.11 SPECIAL SCREENING FOR MALIGNANT NEOPLASMS, COLON: ICD-10-CM

## 2019-11-01 DIAGNOSIS — Z79.4 TYPE 2 DIABETES MELLITUS WITH DIABETIC NEPHROPATHY, WITH LONG-TERM CURRENT USE OF INSULIN (H): Primary | ICD-10-CM

## 2019-11-01 DIAGNOSIS — Z12.31 VISIT FOR SCREENING MAMMOGRAM: ICD-10-CM

## 2019-11-01 DIAGNOSIS — K21.9 GASTROESOPHAGEAL REFLUX DISEASE, ESOPHAGITIS PRESENCE NOT SPECIFIED: ICD-10-CM

## 2019-11-01 DIAGNOSIS — E11.21 TYPE 2 DIABETES MELLITUS WITH DIABETIC NEPHROPATHY, WITH LONG-TERM CURRENT USE OF INSULIN (H): Primary | ICD-10-CM

## 2019-11-01 DIAGNOSIS — N18.6 ESRD (END STAGE RENAL DISEASE) (H): ICD-10-CM

## 2019-11-01 PROCEDURE — 77067 SCR MAMMO BI INCL CAD: CPT | Mod: TC

## 2019-11-01 PROCEDURE — 99214 OFFICE O/P EST MOD 30 MIN: CPT | Performed by: INTERNAL MEDICINE

## 2019-11-01 RX ORDER — INSULIN DETEMIR 100 [IU]/ML
60 INJECTION, SOLUTION SUBCUTANEOUS AT BEDTIME
Qty: 6 ML | Refills: 0 | COMMUNITY
Start: 2019-11-01 | End: 2019-11-01

## 2019-11-01 RX ORDER — INSULIN DETEMIR 100 [IU]/ML
60 INJECTION, SOLUTION SUBCUTANEOUS AT BEDTIME
Qty: 12 ML | Refills: 11 | Status: SHIPPED | OUTPATIENT
Start: 2019-11-01 | End: 2019-11-11

## 2019-11-01 RX ORDER — SEVELAMER CARBONATE 800 MG/1
800 TABLET, FILM COATED ORAL
COMMUNITY
Start: 2019-11-01 | End: 2021-07-14

## 2019-11-01 RX ORDER — FAMOTIDINE 10 MG
10 TABLET ORAL DAILY
Qty: 90 TABLET | Refills: 3 | Status: SHIPPED | OUTPATIENT
Start: 2019-11-01 | End: 2023-09-07

## 2019-11-01 ASSESSMENT — ASTHMA QUESTIONNAIRES
QUESTION_3 LAST FOUR WEEKS HOW OFTEN DID YOUR ASTHMA SYMPTOMS (WHEEZING, COUGHING, SHORTNESS OF BREATH, CHEST TIGHTNESS OR PAIN) WAKE YOU UP AT NIGHT OR EARLIER THAN USUAL IN THE MORNING: NOT AT ALL
ACT_TOTALSCORE: 25
QUESTION_4 LAST FOUR WEEKS HOW OFTEN HAVE YOU USED YOUR RESCUE INHALER OR NEBULIZER MEDICATION (SUCH AS ALBUTEROL): NOT AT ALL
QUESTION_1 LAST FOUR WEEKS HOW MUCH OF THE TIME DID YOUR ASTHMA KEEP YOU FROM GETTING AS MUCH DONE AT WORK, SCHOOL OR AT HOME: NONE OF THE TIME
QUESTION_2 LAST FOUR WEEKS HOW OFTEN HAVE YOU HAD SHORTNESS OF BREATH: NOT AT ALL
QUESTION_5 LAST FOUR WEEKS HOW WOULD YOU RATE YOUR ASTHMA CONTROL: COMPLETELY CONTROLLED

## 2019-11-01 NOTE — TELEPHONE ENCOUNTER
Called transplant coordinator and notified her of MD advisement and left detailed message explaining details for the patient .Dunia Osullivan RN

## 2019-11-01 NOTE — TELEPHONE ENCOUNTER
To be clear, I never said transplant office was going to schedule HM tasks, I told patient that the transplant office WAS going to schedule the specific pre-transplant tasks like EGD, Cardiology evaluation, PFTs, CT Abd, etc., because that's what their documentation says (see telephone encounter from 10/7).    She just had mammogram today.  She had indicated to me that she was going to schedule with one of our gynecologists for Pap smear.  I will also place order for colonoscopy to be done at [facility].

## 2019-11-01 NOTE — TELEPHONE ENCOUNTER
Spoke with pt who called today to get clarification as to who should order patient's PAP, mammogram, colonoscopy, upper endoscopy and dental appts.  I reviewed she should work with her PCP for this. Reviewed my letter 10- did not clearly state this and I apologized. Called Dr. Martin's clinic today and spoke with nurse, explained this. Nurse will pass message onto Dr. Martin.

## 2019-11-01 NOTE — TELEPHONE ENCOUNTER
Judie transplant coordinator from [facility] kidney transplant program calling. Patient saw Dr. Martin today and said they reviewed that transplant office was going to schedule her health maintenance- mammogram, pap smear, and colonoscopy. She apologizes because there was a letter that they sent out that does say that but patient actually still does need her health maintenance scheduled by us. They do not arrange any of this. Notes they are specialized field and do not arrange health maintenance or endocrinology appts. Still needs primary care and primary nephrology involved to really manage their healthcare, they do not take on management. Kidney transplant is still specialized field. May notice other things about her healthcare but does not manage it. Says specifically for this patient, she is asking we schedule her primary health maintenance items which she thinks would be mammogram, pap smear, and colonoscopy.    Is there anything PCP is wanting us to schedule patient for right now or any referrals needed?

## 2019-11-01 NOTE — PROGRESS NOTES
Subjective     Yissel Wetzel is a 59 year old female who presents to clinic today for the following health issues:    HPI   Diabetes Follow-up     How often are you checking your blood sugar? Three times daily  Blood sugar testing frequency justification:  Uncontrolled diabetes  What time of day are you checking your blood sugars (select all that apply)?  Before meals  Have you had any blood sugars above 200?  Yes   Have you had any blood sugars below 70?  No    What symptoms do you notice when your blood sugar is low?  None    What concerns do you have today about your diabetes? None     Do you have any of these symptoms? (Select all that apply)  No numbness or tingling in feet.  No redness, sores or blisters on feet.  No complaints of excessive thirst.  No reports of blurry vision.  No significant changes to weight.     Have you had a diabetic eye exam in the last 12 months? No    Diabetes Management Resources    Hyperlipidemia Follow-Up      Are you having any of the following symptoms? (Select all that apply)  No complaints of shortness of breath, chest pain or pressure.  No increased sweating or nausea with activity.  No left-sided neck or arm pain.  No complaints of pain in calves when walking 1-2 blocks.    Are you regularly taking any medication or supplement to lower your cholesterol?   No    Are you having muscle aches or other side effects that you think could be caused by your cholesterol lowering medication?  No    Hypertension Follow-up      Do you check your blood pressure regularly outside of the clinic? No     Are you following a low salt diet? Yes    Are your blood pressures ever more than 140 on the top number (systolic) OR more   than 90 on the bottom number (diastolic), for example 140/90? No    BP Readings from Last 2 Encounters:   11/01/19 (!) 140/70   09/25/19 (!) 167/68     Hemoglobin A1C (%)   Date Value   09/25/2019 8.7 (H)   12/26/2018 8.0 (H)     LDL Cholesterol Calculated (mg/dL)    Date Value   07/22/2016 123 (H)   10/28/2015 64         How many servings of fruits and vegetables do you eat daily?  0-1    On average, how many sweetened beverages do you drink each day (soda, juice, sweet tea, etc)?   0  How many days per week do you miss taking your medication? 1    What makes it hard for you to take your medications?  remembering to take      Patient is now on transplant list for kidney transplant, but pancreas transplant will not be pursued.  She continues on hemodialysis which has improved symptoms of volume overload since started.  She is not anuric.    Diabetes is relatively well controlled, last A1c checked by her nephrologist within the last few days was 7.6.  She continues on Levemir at 60 units nightly, with NovoLog sliding scale based on carb counting.        Reviewed and updated as needed this visit by Provider  Tobacco  Allergies  Meds  Problems  Med Hx  Surg Hx  Fam Hx         Review of Systems   ROS COMP: Constitutional, HEENT, cardiovascular, pulmonary, GI, , musculoskeletal, neuro, skin, endocrine and psych systems are negative, except as otherwise noted.      Objective    BP (!) 140/70 (BP Location: Left arm, Patient Position: Chair, Cuff Size: Adult Large)   Pulse 72   Temp 97.7  F (36.5  C) (Oral)   Resp 16   Wt 79.6 kg (175 lb 8 oz)   SpO2 91%   BMI 33.16 kg/m    Body mass index is 33.16 kg/m .  Physical Exam   RESP: lungs clear to auscultation - no rales, rhonchi or wheezes  CV: regular rate and rhythm, normal S1 S2, no S3 or S4, no murmur, click or rub, no peripheral edema and peripheral pulses strong  ABDOMEN: soft, nontender, no hepatosplenomegaly, no masses and bowel sounds normal  MS: no gross musculoskeletal defects noted, no edema            Assessment & Plan     1. Type 2 diabetes mellitus with diabetic nephropathy, with long-term current use of insulin (H)  Continue current regimen, reasonably well controlled.  - insulin aspart (NOVOLOG FLEXPEN) 100  "UNIT/ML pen; Take 3 unit per 15 grams of carbohydrates three times daily with meals  Dispense: 6 mL; Refill: 0  - LEVEMIR FLEXTOUCH 100 UNIT/ML pen; Inject 60 Units Subcutaneous At Bedtime  Dispense: 12 mL; Refill: 11    2. ESRD (end stage renal disease) (H)  Is being considered for kidney transplant to follow stipulation of losing adequate weight before, and her transplant team will be scheduling upper endoscopy, iliac ultrasound, cardiology evaluation, PFTs, etc., in anticipation of transplant.    3. Gastroesophageal reflux disease, esophagitis presence not specified    - famotidine (PEPCID) 10 MG tablet; Take 1 tablet (10 mg) by mouth daily  Dispense: 90 tablet; Refill: 3     BMI:   Estimated body mass index is 33.16 kg/m  as calculated from the following:    Height as of 9/25/19: 1.549 m (5' 1\").    Weight as of this encounter: 79.6 kg (175 lb 8 oz).   Weight management plan: Discussed healthy diet and exercise guidelines            Return in about 6 months (around 5/1/2020) for Diabetes Check.    Gigi Martin MD  [facility]        "

## 2019-11-02 ASSESSMENT — ASTHMA QUESTIONNAIRES: ACT_TOTALSCORE: 25

## 2019-11-08 DIAGNOSIS — Z79.4 TYPE 2 DIABETES MELLITUS WITH DIABETIC NEPHROPATHY, WITH LONG-TERM CURRENT USE OF INSULIN (H): ICD-10-CM

## 2019-11-08 DIAGNOSIS — E11.21 TYPE 2 DIABETES MELLITUS WITH DIABETIC NEPHROPATHY, WITH LONG-TERM CURRENT USE OF INSULIN (H): ICD-10-CM

## 2019-11-09 NOTE — TELEPHONE ENCOUNTER
"Requested Prescriptions   Pending Prescriptions Disp Refills     ACCU-CHEK CHING PLUS test strip [Pharmacy Med Name: Accu-Chek Ching Plus In Vitro Strip]  Last Written Prescription Date:  08/19/2019  Last Fill Quantity: 200,  # refills: 0   Last Office Visit: 11/1/2019   Future Office Visit:      200 each 0     Sig: USE TO TEST BLOOD SUGAR 4 TIMES PER DAY       Diabetic Supplies Protocol Passed - 11/8/2019  8:50 PM        Passed - Medication is active on med list        Passed - Patient is 18 years of age or older        Passed - Recent (6 mo) or future (30 days) visit within the authorizing provider's specialty     Patient had office visit in the last 6 months or has a visit in the next 30 days with authorizing provider.  See \"Patient Info\" tab in inbasket, or \"Choose Columns\" in Meds & Orders section of the refill encounter.              "

## 2019-11-11 DIAGNOSIS — Z79.4 TYPE 2 DIABETES MELLITUS WITH DIABETIC NEPHROPATHY, WITH LONG-TERM CURRENT USE OF INSULIN (H): ICD-10-CM

## 2019-11-11 DIAGNOSIS — E11.21 TYPE 2 DIABETES MELLITUS WITH DIABETIC NEPHROPATHY, WITH LONG-TERM CURRENT USE OF INSULIN (H): ICD-10-CM

## 2019-11-11 RX ORDER — INSULIN DETEMIR 100 [IU]/ML
60 INJECTION, SOLUTION SUBCUTANEOUS AT BEDTIME
Qty: 18 ML | Refills: 11 | Status: SHIPPED | OUTPATIENT
Start: 2019-11-11 | End: 2020-10-14

## 2019-11-11 RX ORDER — BLOOD SUGAR DIAGNOSTIC
STRIP MISCELLANEOUS
Qty: 200 EACH | Refills: 3 | Status: SHIPPED | OUTPATIENT
Start: 2019-11-11 | End: 2020-07-23

## 2019-11-13 DIAGNOSIS — Z79.4 TYPE 2 DIABETES MELLITUS WITH DIABETIC NEPHROPATHY, WITH LONG-TERM CURRENT USE OF INSULIN (H): ICD-10-CM

## 2019-11-13 DIAGNOSIS — E11.21 TYPE 2 DIABETES MELLITUS WITH DIABETIC NEPHROPATHY, WITH LONG-TERM CURRENT USE OF INSULIN (H): ICD-10-CM

## 2019-11-13 NOTE — TELEPHONE ENCOUNTER
"Requested Prescriptions   Pending Prescriptions Disp Refills     insulin aspart (NOVOLOG FLEXPEN) 100 UNIT/ML pen [Pharmacy Med Name: NovoLOG FlexPen Subcutaneous Solution Pen-injector 100 UNIT/ML] 15 mL 0     Sig: INJECT 9-12 UNITS BEFORE BREAKFAST, LUNCH, AND DINNER + A CORRECTIVE SCALE OF 2 UNITS FOR EVERY 50 OVER 150       Short Acting Insulin Protocol Failed - 11/13/2019  3:19 PM        Failed - Blood pressure less than 140/90 in past 6 months     BP Readings from Last 3 Encounters:   11/01/19 (!) 140/70   09/25/19 (!) 167/68   03/29/19 147/41                 Failed - LDL on file in past 12 months     Recent Labs   Lab Test 07/22/16  1056   *             Failed - Microalbumin on file in past 12 months     Recent Labs   Lab Test 06/07/18  1455   MICROL 4,520   UMALCR 6,716.20*             Passed - Serum creatinine on file in past 12 months     Recent Labs   Lab Test 09/25/19  1453   CR 4.53*             Passed - HgbA1C in past 3 or 6 months     If HgbA1C is 8 or greater, it needs to be on file within the past 3 months.  If less than 8, must be on file within the past 6 months.     Recent Labs   Lab Test 09/25/19  1453   A1C 8.7*             Passed - Medication is active on med list        Passed - Patient is age 18 or older        Passed - Recent (6 mo) or future (30 days) visit within the authorizing provider's specialty     Patient had office visit in the last 6 months or has a visit in the next 30 days with authorizing provider or within the authorizing provider's specialty.  See \"Patient Info\" tab in inbasket, or \"Choose Columns\" in Meds & Orders section of the refill encounter.            Routing refill request to provider for review/approval because:  Medication is reported/historical  BP not at goal      "

## 2019-12-05 ENCOUNTER — HOSPITAL ENCOUNTER (OUTPATIENT)
Facility: CLINIC | Age: 59
Setting detail: OBSERVATION
Discharge: HOME | End: 2019-12-07
Attending: EMERGENCY MEDICINE | Admitting: RADIOLOGY
Payer: MEDICARE

## 2019-12-05 DIAGNOSIS — Z99.2 ESRD (END STAGE RENAL DISEASE) ON DIALYSIS (H): ICD-10-CM

## 2019-12-05 DIAGNOSIS — N18.6 ESRD (END STAGE RENAL DISEASE) ON DIALYSIS (H): ICD-10-CM

## 2019-12-05 DIAGNOSIS — T82.838A HEMORRHAGE OF ARTERIOVENOUS FISTULA, INITIAL ENCOUNTER (H): ICD-10-CM

## 2019-12-05 LAB
ABO + RH BLD: NORMAL
ABO + RH BLD: NORMAL
ANION GAP SERPL CALCULATED.3IONS-SCNC: 12 MMOL/L (ref 3–14)
BASOPHILS # BLD AUTO: 0 10E9/L (ref 0–0.2)
BASOPHILS NFR BLD AUTO: 0.6 %
BLD GP AB SCN SERPL QL: NORMAL
BLOOD BANK CMNT PATIENT-IMP: NORMAL
BUN SERPL-MCNC: 39 MG/DL (ref 7–30)
CALCIUM SERPL-MCNC: 8.2 MG/DL (ref 8.5–10.1)
CHLORIDE SERPL-SCNC: 103 MMOL/L (ref 94–109)
CO2 SERPL-SCNC: 24 MMOL/L (ref 20–32)
CREAT SERPL-MCNC: 5.19 MG/DL (ref 0.52–1.04)
DIFFERENTIAL METHOD BLD: ABNORMAL
EOSINOPHIL # BLD AUTO: 0.4 10E9/L (ref 0–0.7)
EOSINOPHIL NFR BLD AUTO: 5 %
ERYTHROCYTE [DISTWIDTH] IN BLOOD BY AUTOMATED COUNT: 13.7 % (ref 10–15)
GFR SERPL CREATININE-BSD FRML MDRD: 8 ML/MIN/{1.73_M2}
GLUCOSE SERPL-MCNC: 249 MG/DL (ref 70–99)
HCT VFR BLD AUTO: 29.6 % (ref 35–47)
HGB BLD-MCNC: 9.8 G/DL (ref 11.7–15.7)
IMM GRANULOCYTES # BLD: 0 10E9/L (ref 0–0.4)
IMM GRANULOCYTES NFR BLD: 0.1 %
LYMPHOCYTES # BLD AUTO: 1.4 10E9/L (ref 0.8–5.3)
LYMPHOCYTES NFR BLD AUTO: 20.4 %
MCH RBC QN AUTO: 31.5 PG (ref 26.5–33)
MCHC RBC AUTO-ENTMCNC: 33.1 G/DL (ref 31.5–36.5)
MCV RBC AUTO: 95 FL (ref 78–100)
MONOCYTES # BLD AUTO: 0.7 10E9/L (ref 0–1.3)
MONOCYTES NFR BLD AUTO: 9.8 %
NEUTROPHILS # BLD AUTO: 4.5 10E9/L (ref 1.6–8.3)
NEUTROPHILS NFR BLD AUTO: 64.1 %
NRBC # BLD AUTO: 0 10*3/UL
NRBC BLD AUTO-RTO: 0 /100
PLATELET # BLD AUTO: 164 10E9/L (ref 150–450)
POTASSIUM SERPL-SCNC: 3.7 MMOL/L (ref 3.4–5.3)
RBC # BLD AUTO: 3.11 10E12/L (ref 3.8–5.2)
SODIUM SERPL-SCNC: 139 MMOL/L (ref 133–144)
SPECIMEN EXP DATE BLD: NORMAL
WBC # BLD AUTO: 7 10E9/L (ref 4–11)

## 2019-12-05 PROCEDURE — 96361 HYDRATE IV INFUSION ADD-ON: CPT

## 2019-12-05 PROCEDURE — 25800030 ZZH RX IP 258 OP 636: Performed by: EMERGENCY MEDICINE

## 2019-12-05 PROCEDURE — 80048 BASIC METABOLIC PNL TOTAL CA: CPT | Performed by: EMERGENCY MEDICINE

## 2019-12-05 PROCEDURE — 96374 THER/PROPH/DIAG INJ IV PUSH: CPT

## 2019-12-05 PROCEDURE — 86901 BLOOD TYPING SEROLOGIC RH(D): CPT | Performed by: EMERGENCY MEDICINE

## 2019-12-05 PROCEDURE — 99291 CRITICAL CARE FIRST HOUR: CPT | Mod: 25

## 2019-12-05 PROCEDURE — 85025 COMPLETE CBC W/AUTO DIFF WBC: CPT | Performed by: EMERGENCY MEDICINE

## 2019-12-05 PROCEDURE — 86850 RBC ANTIBODY SCREEN: CPT | Performed by: EMERGENCY MEDICINE

## 2019-12-05 PROCEDURE — 86900 BLOOD TYPING SEROLOGIC ABO: CPT | Performed by: EMERGENCY MEDICINE

## 2019-12-05 PROCEDURE — 25000128 H RX IP 250 OP 636

## 2019-12-05 RX ORDER — ONDANSETRON 2 MG/ML
4 INJECTION INTRAMUSCULAR; INTRAVENOUS ONCE
Status: COMPLETED | OUTPATIENT
Start: 2019-12-05 | End: 2019-12-05

## 2019-12-05 RX ORDER — ONDANSETRON 2 MG/ML
INJECTION INTRAMUSCULAR; INTRAVENOUS
Status: COMPLETED
Start: 2019-12-05 | End: 2019-12-05

## 2019-12-05 RX ORDER — SODIUM CHLORIDE 9 MG/ML
INJECTION, SOLUTION INTRAVENOUS ONCE
Status: COMPLETED | OUTPATIENT
Start: 2019-12-05 | End: 2019-12-06

## 2019-12-05 RX ADMIN — ONDANSETRON 4 MG: 2 INJECTION INTRAMUSCULAR; INTRAVENOUS at 22:14

## 2019-12-05 RX ADMIN — SODIUM CHLORIDE 500 ML: 9 INJECTION, SOLUTION INTRAVENOUS at 21:44

## 2019-12-05 ASSESSMENT — ENCOUNTER SYMPTOMS
VOMITING: 1
DIZZINESS: 1

## 2019-12-05 ASSESSMENT — MIFFLIN-ST. JEOR: SCORE: 1307.5

## 2019-12-06 ENCOUNTER — APPOINTMENT (OUTPATIENT)
Dept: INTERVENTIONAL RADIOLOGY/VASCULAR | Facility: CLINIC | Age: 59
End: 2019-12-06
Attending: INTERNAL MEDICINE
Payer: MEDICARE

## 2019-12-06 ENCOUNTER — APPOINTMENT (OUTPATIENT)
Dept: ULTRASOUND IMAGING | Facility: CLINIC | Age: 59
End: 2019-12-06
Attending: NURSE PRACTITIONER
Payer: MEDICARE

## 2019-12-06 LAB
ERYTHROCYTE [DISTWIDTH] IN BLOOD BY AUTOMATED COUNT: 13.9 % (ref 10–15)
GLUCOSE BLDC GLUCOMTR-MCNC: 217 MG/DL (ref 70–99)
GLUCOSE BLDC GLUCOMTR-MCNC: 238 MG/DL (ref 70–99)
GLUCOSE BLDC GLUCOMTR-MCNC: 254 MG/DL (ref 70–99)
GLUCOSE BLDC GLUCOMTR-MCNC: 259 MG/DL (ref 70–99)
GLUCOSE BLDC GLUCOMTR-MCNC: 304 MG/DL (ref 70–99)
GLUCOSE BLDC GLUCOMTR-MCNC: 331 MG/DL (ref 70–99)
GLUCOSE BLDC GLUCOMTR-MCNC: 335 MG/DL (ref 70–99)
HBV SURFACE AG SERPL QL IA: NONREACTIVE
HCT VFR BLD AUTO: 26.6 % (ref 35–47)
HGB BLD-MCNC: 7.8 G/DL (ref 11.7–15.7)
HGB BLD-MCNC: 8.7 G/DL (ref 11.7–15.7)
MCH RBC QN AUTO: 31.1 PG (ref 26.5–33)
MCHC RBC AUTO-ENTMCNC: 32.7 G/DL (ref 31.5–36.5)
MCV RBC AUTO: 95 FL (ref 78–100)
PLATELET # BLD AUTO: 166 10E9/L (ref 150–450)
RBC # BLD AUTO: 2.8 10E12/L (ref 3.8–5.2)
WBC # BLD AUTO: 7.4 10E9/L (ref 4–11)

## 2019-12-06 PROCEDURE — 25800030 ZZH RX IP 258 OP 636: Performed by: RADIOLOGY

## 2019-12-06 PROCEDURE — 85027 COMPLETE CBC AUTOMATED: CPT | Performed by: INTERNAL MEDICINE

## 2019-12-06 PROCEDURE — 00000146 ZZHCL STATISTIC GLUCOSE BY METER IP

## 2019-12-06 PROCEDURE — 25000125 ZZHC RX 250

## 2019-12-06 PROCEDURE — 25000131 ZZH RX MED GY IP 250 OP 636 PS 637: Mod: GY | Performed by: INTERNAL MEDICINE

## 2019-12-06 PROCEDURE — 27210886 ZZH ACCESSORY CR5

## 2019-12-06 PROCEDURE — G0499 HEPB SCREEN HIGH RISK INDIV: HCPCS | Performed by: INTERNAL MEDICINE

## 2019-12-06 PROCEDURE — 93990 DOPPLER FLOW TESTING: CPT

## 2019-12-06 PROCEDURE — 36415 COLL VENOUS BLD VENIPUNCTURE: CPT | Performed by: INTERNAL MEDICINE

## 2019-12-06 PROCEDURE — 25000132 ZZH RX MED GY IP 250 OP 250 PS 637: Mod: GY | Performed by: INTERNAL MEDICINE

## 2019-12-06 PROCEDURE — 86706 HEP B SURFACE ANTIBODY: CPT | Performed by: INTERNAL MEDICINE

## 2019-12-06 PROCEDURE — 85018 HEMOGLOBIN: CPT | Mod: 91 | Performed by: NURSE PRACTITIONER

## 2019-12-06 PROCEDURE — G0378 HOSPITAL OBSERVATION PER HR: HCPCS

## 2019-12-06 PROCEDURE — 25000128 H RX IP 250 OP 636

## 2019-12-06 PROCEDURE — 27211193 ZZ H WOUND GLUE CR1

## 2019-12-06 PROCEDURE — 25800030 ZZH RX IP 258 OP 636: Performed by: EMERGENCY MEDICINE

## 2019-12-06 PROCEDURE — 36415 COLL VENOUS BLD VENIPUNCTURE: CPT | Performed by: NURSE PRACTITIONER

## 2019-12-06 PROCEDURE — C1769 GUIDE WIRE: HCPCS

## 2019-12-06 PROCEDURE — 99220 ZZC INITIAL OBSERVATION CARE,LEVL III: CPT | Performed by: INTERNAL MEDICINE

## 2019-12-06 PROCEDURE — C1750 CATH, HEMODIALYSIS,LONG-TERM: HCPCS

## 2019-12-06 PROCEDURE — 36558 INSERT TUNNELED CV CATH: CPT

## 2019-12-06 PROCEDURE — 96372 THER/PROPH/DIAG INJ SC/IM: CPT

## 2019-12-06 PROCEDURE — 25000128 H RX IP 250 OP 636: Performed by: RADIOLOGY

## 2019-12-06 RX ORDER — AMOXICILLIN 250 MG
2 CAPSULE ORAL 2 TIMES DAILY PRN
Status: DISCONTINUED | OUTPATIENT
Start: 2019-12-06 | End: 2019-12-07 | Stop reason: HOSPADM

## 2019-12-06 RX ORDER — PROCHLORPERAZINE 25 MG
25 SUPPOSITORY, RECTAL RECTAL EVERY 12 HOURS PRN
Status: DISCONTINUED | OUTPATIENT
Start: 2019-12-06 | End: 2019-12-07 | Stop reason: HOSPADM

## 2019-12-06 RX ORDER — ONDANSETRON 2 MG/ML
4 INJECTION INTRAMUSCULAR; INTRAVENOUS EVERY 6 HOURS PRN
Status: DISCONTINUED | OUTPATIENT
Start: 2019-12-06 | End: 2019-12-07 | Stop reason: HOSPADM

## 2019-12-06 RX ORDER — HEPARIN SODIUM 1000 [USP'U]/ML
3 INJECTION, SOLUTION INTRAVENOUS; SUBCUTANEOUS ONCE
Status: DISCONTINUED | OUTPATIENT
Start: 2019-12-06 | End: 2019-12-06

## 2019-12-06 RX ORDER — CEFAZOLIN SODIUM 2 G/100ML
2 INJECTION, SOLUTION INTRAVENOUS ONCE
Status: COMPLETED | OUTPATIENT
Start: 2019-12-06 | End: 2019-12-06

## 2019-12-06 RX ORDER — PROCHLORPERAZINE MALEATE 5 MG
10 TABLET ORAL EVERY 6 HOURS PRN
Status: DISCONTINUED | OUTPATIENT
Start: 2019-12-06 | End: 2019-12-07 | Stop reason: HOSPADM

## 2019-12-06 RX ORDER — LIDOCAINE 40 MG/G
CREAM TOPICAL
Status: DISCONTINUED | OUTPATIENT
Start: 2019-12-06 | End: 2019-12-07 | Stop reason: HOSPADM

## 2019-12-06 RX ORDER — SEVELAMER CARBONATE 800 MG/1
800 TABLET, FILM COATED ORAL
Status: DISCONTINUED | OUTPATIENT
Start: 2019-12-06 | End: 2019-12-07 | Stop reason: HOSPADM

## 2019-12-06 RX ORDER — NALOXONE HYDROCHLORIDE 0.4 MG/ML
.1-.4 INJECTION, SOLUTION INTRAMUSCULAR; INTRAVENOUS; SUBCUTANEOUS
Status: DISCONTINUED | OUTPATIENT
Start: 2019-12-06 | End: 2019-12-07 | Stop reason: HOSPADM

## 2019-12-06 RX ORDER — LABETALOL HYDROCHLORIDE 5 MG/ML
10 INJECTION, SOLUTION INTRAVENOUS
Status: DISCONTINUED | OUTPATIENT
Start: 2019-12-06 | End: 2019-12-07 | Stop reason: HOSPADM

## 2019-12-06 RX ORDER — POLYETHYLENE GLYCOL 3350 17 G/17G
17 POWDER, FOR SOLUTION ORAL DAILY PRN
Status: DISCONTINUED | OUTPATIENT
Start: 2019-12-06 | End: 2019-12-07 | Stop reason: HOSPADM

## 2019-12-06 RX ORDER — FAMOTIDINE 10 MG
10 TABLET ORAL DAILY
Status: DISCONTINUED | OUTPATIENT
Start: 2019-12-06 | End: 2019-12-07 | Stop reason: HOSPADM

## 2019-12-06 RX ORDER — ACETAMINOPHEN 650 MG/1
650 SUPPOSITORY RECTAL EVERY 4 HOURS PRN
Status: DISCONTINUED | OUTPATIENT
Start: 2019-12-06 | End: 2019-12-07 | Stop reason: HOSPADM

## 2019-12-06 RX ORDER — ONDANSETRON 4 MG/1
4 TABLET, ORALLY DISINTEGRATING ORAL EVERY 6 HOURS PRN
Status: DISCONTINUED | OUTPATIENT
Start: 2019-12-06 | End: 2019-12-07 | Stop reason: HOSPADM

## 2019-12-06 RX ORDER — ACETAMINOPHEN 325 MG/1
650 TABLET ORAL EVERY 4 HOURS PRN
Status: DISCONTINUED | OUTPATIENT
Start: 2019-12-06 | End: 2019-12-07 | Stop reason: HOSPADM

## 2019-12-06 RX ORDER — LANOLIN ALCOHOL/MO/W.PET/CERES
3 CREAM (GRAM) TOPICAL
Status: DISCONTINUED | OUTPATIENT
Start: 2019-12-06 | End: 2019-12-07 | Stop reason: HOSPADM

## 2019-12-06 RX ORDER — HEPARIN SODIUM 1000 [USP'U]/ML
500-6000 INJECTION, SOLUTION INTRAVENOUS; SUBCUTANEOUS
Status: COMPLETED | OUTPATIENT
Start: 2019-12-06 | End: 2019-12-06

## 2019-12-06 RX ORDER — DEXTROSE MONOHYDRATE 25 G/50ML
25-50 INJECTION, SOLUTION INTRAVENOUS
Status: DISCONTINUED | OUTPATIENT
Start: 2019-12-06 | End: 2019-12-07 | Stop reason: HOSPADM

## 2019-12-06 RX ORDER — NALOXONE HYDROCHLORIDE 0.4 MG/ML
.1-.4 INJECTION, SOLUTION INTRAMUSCULAR; INTRAVENOUS; SUBCUTANEOUS
Status: DISCONTINUED | OUTPATIENT
Start: 2019-12-06 | End: 2019-12-06 | Stop reason: HOSPADM

## 2019-12-06 RX ORDER — LIDOCAINE HYDROCHLORIDE 10 MG/ML
INJECTION, SOLUTION INFILTRATION; PERINEURAL
Status: DISCONTINUED
Start: 2019-12-06 | End: 2019-12-06 | Stop reason: HOSPADM

## 2019-12-06 RX ORDER — NICOTINE POLACRILEX 4 MG
15-30 LOZENGE BUCCAL
Status: DISCONTINUED | OUTPATIENT
Start: 2019-12-06 | End: 2019-12-07 | Stop reason: HOSPADM

## 2019-12-06 RX ORDER — AMOXICILLIN 250 MG
1 CAPSULE ORAL 2 TIMES DAILY PRN
Status: DISCONTINUED | OUTPATIENT
Start: 2019-12-06 | End: 2019-12-07 | Stop reason: HOSPADM

## 2019-12-06 RX ORDER — HEPARIN SODIUM 1000 [USP'U]/ML
INJECTION, SOLUTION INTRAVENOUS; SUBCUTANEOUS
Status: DISCONTINUED
Start: 2019-12-06 | End: 2019-12-06 | Stop reason: HOSPADM

## 2019-12-06 RX ORDER — CEFAZOLIN SODIUM 2 G/100ML
INJECTION, SOLUTION INTRAVENOUS
Status: DISCONTINUED
Start: 2019-12-06 | End: 2019-12-06 | Stop reason: HOSPADM

## 2019-12-06 RX ORDER — FENTANYL CITRATE 50 UG/ML
25-50 INJECTION, SOLUTION INTRAMUSCULAR; INTRAVENOUS EVERY 5 MIN PRN
Status: DISCONTINUED | OUTPATIENT
Start: 2019-12-06 | End: 2019-12-06 | Stop reason: HOSPADM

## 2019-12-06 RX ORDER — BISACODYL 10 MG
10 SUPPOSITORY, RECTAL RECTAL DAILY PRN
Status: DISCONTINUED | OUTPATIENT
Start: 2019-12-06 | End: 2019-12-07 | Stop reason: HOSPADM

## 2019-12-06 RX ADMIN — INSULIN ASPART 2 UNITS: 100 INJECTION, SOLUTION INTRAVENOUS; SUBCUTANEOUS at 18:20

## 2019-12-06 RX ADMIN — HEPARIN SODIUM 6000 UNITS: 1000 INJECTION, SOLUTION INTRAVENOUS; SUBCUTANEOUS at 16:13

## 2019-12-06 RX ADMIN — SEVELAMER CARBONATE 800 MG: 800 TABLET, FILM COATED ORAL at 19:02

## 2019-12-06 RX ADMIN — INSULIN DETEMIR 40 UNITS: 100 INJECTION, SOLUTION SUBCUTANEOUS at 21:42

## 2019-12-06 RX ADMIN — SODIUM CHLORIDE: 9 INJECTION, SOLUTION INTRAVENOUS at 00:59

## 2019-12-06 RX ADMIN — INSULIN ASPART 4 UNITS: 100 INJECTION, SOLUTION INTRAVENOUS; SUBCUTANEOUS at 13:26

## 2019-12-06 RX ADMIN — INSULIN ASPART 4 UNITS: 100 INJECTION, SOLUTION INTRAVENOUS; SUBCUTANEOUS at 07:51

## 2019-12-06 RX ADMIN — HEPARIN SODIUM 10000 UNITS: 10000 INJECTION INTRAVENOUS; SUBCUTANEOUS at 15:57

## 2019-12-06 RX ADMIN — CEFAZOLIN SODIUM 2 G: 2 INJECTION, SOLUTION INTRAVENOUS at 15:55

## 2019-12-06 RX ADMIN — FAMOTIDINE 10 MG: 10 TABLET, FILM COATED ORAL at 07:50

## 2019-12-06 RX ADMIN — HEPARIN SODIUM 6000 UNITS: 1000 INJECTION INTRAVENOUS; SUBCUTANEOUS at 16:13

## 2019-12-06 RX ADMIN — LIDOCAINE HYDROCHLORIDE 10 ML: 10 INJECTION, SOLUTION INFILTRATION; PERINEURAL at 16:13

## 2019-12-06 NOTE — PHARMACY-ADMISSION MEDICATION HISTORY
Pharmacy Medication History  Admission medication history interview status for the 12/5/2019  admission is complete. See EPIC admission navigator for prior to admission medications     Medication history sources: Patient  Medication history source reliability: Good  Adherence assessment: Good per pt report    Significant changes made to the medication list:  none      Additional medication history information:   none    Medication reconciliation completed by provider prior to medication history? Yes    Time spent in this activity: 15 minutes      Prior to Admission medications    Medication Sig Last Dose Taking? Auth Provider   acetaminophen (TYLENOL) 500 MG tablet Take 500 mg by mouth every 8 hours as needed for mild pain PRN Yes Unknown, Entered By History   famotidine (PEPCID) 10 MG tablet Take 1 tablet (10 mg) by mouth daily  Yes Gigi Martin MD   hypromellose-dextran (ARTIFICAL TEARS) 0.1-0.3 % ophthalmic solution Place 1 drop into both eyes daily as needed for dry eyes PRN Yes Unknown, Entered By History   insulin aspart (NOVOLOG FLEXPEN) 100 UNIT/ML pen Take 3 unit per 15 grams of carbohydrates three times daily with meals  Yes Gigi Martin MD   insulin aspart (NOVOLOG PEN) 100 UNIT/ML pen Corrective scale = 2 units for every BG 50 over 150  In addition to carb counting insulin  Yes Unknown, Entered By History   labetalol (NORMODYNE) 300 MG tablet Take 300 mg by mouth 2 times daily Prescription is for 600mg twice daily, but the patient is taking 300mg twice daily  Yes Unknown, Entered By History   LEVEMIR FLEXTOUCH 100 UNIT/ML pen Inject 60 Units Subcutaneous At Bedtime  Yes Gigi Martin MD   sevelamer (RENVELA) 800 MG tablet Take 1 tablet (800 mg) by mouth 3 times daily (with meals)  Yes Gigi Martin MD   ACCU-CHEK CHING PLUS test strip USE TO TEST BLOOD SUGAR 4 TIMES PER DAY    Gigi Martin MD

## 2019-12-06 NOTE — PROGRESS NOTES
Interventional radiology:  Order for fistulagram.  Reviewed with Dr. Lou.  Patient can be done as an outpatient.  Dr. Lou still has procedure on the schedule today.  Is not emergency, can wait until Monday.  Scheduled patient for Monday:  Check in at the welcome desk at 11:30am.  NPO for 4 hours.  Needs to have a .  Communicated with Jaclyn Mckeon NP.  Nallely Puckett RN  IR nurse clinician  [phone]

## 2019-12-06 NOTE — PRE-PROCEDURE
GENERAL PRE-PROCEDURE:   Procedure:  Tunneled dialysis catheter placement with possible intra venous pain medications  Date/Time:  12/6/2019 3:54 PM    Written consent obtained?: Yes    Risks and benefits: Risks, benefits and alternatives were discussed    Consent given by:  Patient  Patient states understanding of procedure being performed: Yes    Patient's understanding of procedure matches consent: Yes    Procedure consent matches procedure scheduled: Yes    Appropriately NPO:  Yes

## 2019-12-06 NOTE — H&P
[facility]    History and Physical - Hospitalist Service       Date of Admission:  12/5/2019    Assessment & Plan   Yissel Wetzel is a 59 year old female admitted on 12/5/2019. She presents to the emergency department after removing a dressing over her right brachiobasilic dialysis fistula from Wednesday dialysis and having profuse bleeding from a subcentimeter scab which was removed.    Acute dialysis fistula hemorrhage, right brachiobasilic, initial encounter: Occurred after removing dressings from fistula access from Wednesday; it appears that a small scab was removed as tape was overlying access point.  Patient describes arterial bleeding for approximately 10 minutes prior to arrival of EMS requiring tourniquet placement.  Acute blood loss anemia: Patient with a baseline hemoglobin in the 9-10 range in the setting of chronic kidney disease on dialysis.  Patient is vitally stable currently, though during event of blood loss, patient reports feeling as though she was about to pass out and potentially losing consciousness for less than 1 second.  She also reports initially feeling lightheaded with standing on arrival to the emergency department, though this resolved following fluid bolus.  Symptoms suggest more significant blood loss than her current hemoglobin would represent (drawn ~1.5 hr after onset of bleeding).   -Repeat hemoglobin in a.m.  -Patient has been consented for blood products by myself in the emergency department.  Would transfuse if hemoglobin less than 7.  -Vascular surgery has been consulted as patient required tourniquet via EMS.  Fistula currently without thrill, though distal pulses strong and intact.  Defer to vascular surgery regarding fistulogram versus trial of dialysis prior to fistula assessment.  -Mild pressure under Coban with folded 4 x 4's will remain on overnight as dressed and recommended by Dr. Hogan in the emergency department given significant bleeding with  attempt at hemostasis.  In the emergency department hemostasis gel as well as folded 4 x 4 pressure resulted in cessation of bleeding.  -Orthostatic blood pressure and pulse to ensure resolution of symptomatic volume depletion.    Type 2 diabetes: Last hemoglobin A1c of 8.7 9/25/2019  -Levemir 40 units at bedtime.  Dose reduction from prior to admission 60 units pending blood glucose trend  -Prandial insulin at 1 unit per 15 g carbohydrate.  It appears that her baseline dose is 3 units per 15 g carbohydrate, awaiting pharmacy reconciliation  -Medium dose sliding scale insulin is available if needed  -10 units NovoLog x1 now given patient currently eating in the emergency department and typically on pre-meal insulin dosing    End-stage renal disease on dialysis: Suspected secondary to diabetic nephropathy and hypertension.  Patient reports that she is currently being evaluated for kidney transplant. Typically dialyzes Monday Wednesday Friday at 10 AM with Mercedes Freeman.  I believe her primary nephrologist is Dr. Hernandez.  Initiated dialysis in April primarily for volume issues, though still produces urine.  -Nephrology consulted for dialysis.    -Continue prior to admission sevelamer 800 3 times daily with meals  -Continue Pepcid 10 mg daily  -venofer, Epogen as per nephrology     Hypertension:   -Await reconciliation of home labetalol prior to resumption.  IV labetalol is available as needed for systolic blood pressure greater than 180.       Diet: Renal diet  DVT Prophylaxis: Ambulate every shift  Weller Catheter: not present  Code Status: Full code.  Confirmed with patient on admission    Disposition Plan   Expected discharge: Today versus early tomorrow, recommended to prior living arrangement once Dialysis completed, possible fistulogram.  Entered: Lior Mcdaniel MD 12/05/2019, 11:42 PM     The patient's care was discussed with the Patient, Dr. Hogan in the emergency department.    Lior Mcdaniel,  MD  [facility]    ______________________________________________________________________    Chief Complaint   Bleeding from dialysis site    History is obtained from patient, chart review, review of outside records including nephrology follow-up notes    History of Present Illness   Yissel Wetzel is a 59 year old female who presents to the emergency department with acute bleeding from her right brachiobasilic dialysis fistula after removing dressings from her Wednesday dialysis session.  Patient reports that there was some tape inappropriately placed so that it was overlying her dialysis access, and when she removed an outer piece of tape, this pulled on inner tape and gauze resulting in removal of scab and bleeding.  Patient reports approximately 10 minutes of arterial bleeding that she attempted to apply pressure with before contacting EMS.  She was in the bathroom at that time, and with significant blood loss, reports feeling lightheaded, and as though she might pass out.  She thinks it is possible she may have lost consciousness for 1 second or very briefly, though is not certain of this.  She did feel woozy and out of sorts with her blood loss, and not secondary to visualizing bleeding.    During episode of lightheadedness/presyncope, patient reports that she had some diarrhea.  She had already been on the toilet as she was anticipating going to the bathroom when she removed her dialysis dressing.  Prior to this event had had somewhat loose stools, though nothing worrisome to her.  Has not had diarrhea since.    As above, EMS was contacted, on arrival a tourniquet was placed over right upper extremity to stop bleeding.  Patient was seen in the emergency department with labs drawn approximately 1-1/2 hours after onset of bleeding.  Hemoglobin of 9.8.  Last hemoglobin of 10.2 9/25/2019.  Blood glucose elevated in the 200 range, creatinine in the 5 range with mild elevation of BUN in the 30s,  though otherwise BMP within normal limits.  Dr. Hogan undressed tourniquet and assess wound.  Hemostasis gel as well as folded 4 x 4 under Coban applied for mild pressure with no further bleeding upon time of admission request.  In my discussion with Dr. Hogan, significant bleeding, and recommendation was to leave gentle pressure dressing in place overnight.  Patient describes approximately 0.3 cm scab that was removed resulting in bleeding.  Observation admission requested given anticipated drop in hemoglobin following bleeding episode.  This certainly seems reasonable, especially as patient may require fistulogram in the setting of tourniquet placement prior to arrival.  Currently without thrill or bruit over dialysis access.    Patient currently with blood pressure in the 120 range.  Asymptomatic.    Review of Systems    The 10 point Review of Systems is negative other than noted in the HPI or here.   No fevers, no chills  Currently no lightheadedness sitting up in bed.  Loose stools/diarrhea with onset of presyncopal episode with blood loss    Past Medical History    I have reviewed this patient's medical history and updated it with pertinent information if needed.   Past Medical History:   Diagnosis Date     Allergic rhinitis, cause unspecified      Anemia in chronic kidney disease      Anemia of chronic disease      End stage renal failure on dialysis (H)      Esophagitis, unspecified      Former tobacco use      HEMORRHAGIC GASTROPATHY      History of blood transfusion     [place]     Iron deficiency anemia, unspecified      Mild persistent asthma      Obesity      Other and unspecified hyperlipidemia      Pneumonia      Pulmonary hypertension (H)     per 12/2012 echo mod.to severe pulmonary hypertension     Tobacco use disorder dc ed 1999     Type 2 diabetes mellitus (H)     on Insulin- managed by PCP     Unspecified essential hypertension        Past Surgical History   I have reviewed this patient's  surgical history and updated it with pertinent information if needed.  Past Surgical History:   Procedure Laterality Date     ABDOMINOPLASTY  pt unsure when    abdominoplasty-      SECTION  ,     x 2     COLONOSCOPY  2006    [facility]     CREATE FISTULA ARTERIOVENOUS UPPER EXTREMITY Right 2018    Procedure: RIGHT BRACHIAL TO BASILIC ARTERIOVENOUS FISTULA CREATION;  Surgeon: Terry Vizcaino MD;  Location: [facility]     ENT SURGERY  as a child    tonsillectomy     EYE SURGERY Left     injections- eye     HYSTERECTOMY  approx     partial hysterectomy-reason bleeding        Social History   I have reviewed this patient's social history and updated it with pertinent information if needed.  Social History     Tobacco Use     Smoking status: Former Smoker     Packs/day: 1.00     Years: 22.00     Pack years: 22.00     Types: Cigarettes     Last attempt to quit: 10/12/1999     Years since quittin.1     Smokeless tobacco: Never Used   Substance Use Topics     Alcohol use: No     Alcohol/week: 0.0 standard drinks     Drug use: No       Family History   I have reviewed this patient's family history and updated it with pertinent information if needed.   Family History   Problem Relation Age of Onset     Arrhythmia Mother      Hypertension Mother      Pancreatic Cancer Father      Diabetes Brother      Asthma Sister      LUNG DISEASE Sister      Diabetes Daughter      Cirrhosis Sister        Prior to Admission Medications   Prior to Admission Medications   Prescriptions Last Dose Informant Patient Reported? Taking?   ACCU-CHEK CHING PLUS test strip   No No   Sig: USE TO TEST BLOOD SUGAR 4 TIMES PER DAY    LEVEMIR FLEXTOUCH 100 UNIT/ML pen   No No   Sig: Inject 60 Units Subcutaneous At Bedtime   acetaminophen (TYLENOL) 500 MG tablet   Yes No   Sig: Take 500 mg by mouth every 8 hours as needed for mild pain   famotidine (PEPCID) 10 MG tablet   No No   Sig: Take 1 tablet (10 mg) by  mouth daily   hypromellose-dextran (ARTIFICAL TEARS) 0.1-0.3 % ophthalmic solution   Yes No   Sig: Place 1 drop into both eyes daily as needed for dry eyes   insulin aspart (NOVOLOG FLEXPEN) 100 UNIT/ML pen   Yes No   Sig: Take 3 unit per 15 grams of carbohydrates three times daily with meals   insulin aspart (NOVOLOG FLEXPEN) 100 UNIT/ML pen   No No   Sig: INJECT 9-12 UNITS BEFORE BREAKFAST, LUNCH, AND DINNER + A CORRECTIVE SCALE OF 2 UNITS FOR EVERY 50 OVER 150   labetalol (NORMODYNE) 300 MG tablet  Pharmacy Yes No   Sig: Take 300 mg by mouth 2 times daily Prescription is for 600mg twice daily, but the patient is taking 300mg twice daily   sevelamer (RENVELA) 800 MG tablet   Yes No   Sig: Take 1 tablet (800 mg) by mouth 3 times daily (with meals)      Facility-Administered Medications: None     Allergies   Allergies   Allergen Reactions     Albuterol      shakiness     Aspirin      gi     Byetta [Exenatide]      gi     Clonidine      constipation     Codeine      vomiting     Ezetimibe      muscle symptoms     Hydralazine      headaches     Lisinopril      hyperkalemia     Metformin Hydrochloride      vomiting     Pravachol [Pravastatin Sodium]      muscle pains     Simvastatin      myalgias     Troglitazone        Physical Exam   Vital Signs: Temp: 98.1  F (36.7  C) Temp src: Oral BP: 136/78 Pulse: 54 Heart Rate: 59 Resp: 20 SpO2: 98 % O2 Device: Nasal cannula Oxygen Delivery: 1 LPM  Weight: 177 lbs 11.05 oz    General Appearance: Comfortable appearing obese 59-year-old female.  Appears older than stated age.  Eyes: No scleral icterus or injection.  HEENT: Alopecia/thinning of hair prominent  Respiratory: Breath sounds are clear bilaterally without wheezes or crackles.  Cardiovascular: Heart rate in the 60 range.  Systolic murmur appreciated 2/6 throughout precordium, though heart sounds are distant.  GI: Abdomen obese, soft, nontender to palpation.  Lymph/Hematologic: No lower extremity edema  Genitourinary: Not  examined  Skin: No rashes, though somewhat bronzed skin tone.  Musculoskeletal: Muscular tone intact in all extremities with osteoarthritic changes of hands present bilaterally  Neurologic: Alert, conversant, appropriate in conversation.  Mental status grossly intact.  Patient is aware of her insulin regimen and medications.  Psychiatric: Normal affect, very pleasant    Data   Data reviewed today: I reviewed all medications, new labs and imaging results over the last 24 hours. I personally reviewed no images or EKG's today.    Results for orders placed or performed during the hospital encounter of 12/05/19 (from the past 24 hour(s))   CBC with platelets differential   Result Value Ref Range    WBC 7.0 4.0 - 11.0 10e9/L    RBC Count 3.11 (L) 3.8 - 5.2 10e12/L    Hemoglobin 9.8 (L) 11.7 - 15.7 g/dL    Hematocrit 29.6 (L) 35.0 - 47.0 %    MCV 95 78 - 100 fl    MCH 31.5 26.5 - 33.0 pg    MCHC 33.1 31.5 - 36.5 g/dL    RDW 13.7 10.0 - 15.0 %    Platelet Count 164 150 - 450 10e9/L    Diff Method Automated Method     % Neutrophils 64.1 %    % Lymphocytes 20.4 %    % Monocytes 9.8 %    % Eosinophils 5.0 %    % Basophils 0.6 %    % Immature Granulocytes 0.1 %    Nucleated RBCs 0 0 /100    Absolute Neutrophil 4.5 1.6 - 8.3 10e9/L    Absolute Lymphocytes 1.4 0.8 - 5.3 10e9/L    Absolute Monocytes 0.7 0.0 - 1.3 10e9/L    Absolute Eosinophils 0.4 0.0 - 0.7 10e9/L    Absolute Basophils 0.0 0.0 - 0.2 10e9/L    Abs Immature Granulocytes 0.0 0 - 0.4 10e9/L    Absolute Nucleated RBC 0.0    Basic metabolic panel   Result Value Ref Range    Sodium 139 133 - 144 mmol/L    Potassium 3.7 3.4 - 5.3 mmol/L    Chloride 103 94 - 109 mmol/L    Carbon Dioxide 24 20 - 32 mmol/L    Anion Gap 12 3 - 14 mmol/L    Glucose 249 (H) 70 - 99 mg/dL    Urea Nitrogen 39 (H) 7 - 30 mg/dL    Creatinine 5.19 (H) 0.52 - 1.04 mg/dL    GFR Estimate 8 (L) >60 mL/min/[1.73_m2]    GFR Estimate If Black 10 (L) >60 mL/min/[1.73_m2]    Calcium 8.2 (L) 8.5 - 10.1 mg/dL    ABO/Rh type and screen   Result Value Ref Range    ABO O     RH(D) Pos     Antibody Screen Neg     Test Valid Only At [facility]        Specimen Expires 12/08/2019    Glucose by meter   Result Value Ref Range    Glucose 259 (H) 70 - 99 mg/dL

## 2019-12-06 NOTE — PROGRESS NOTES
RECEIVING UNIT ED HANDOFF REVIEW    ED Nurse Handoff Report was reviewed by: Jolene Orellana RN on December 6, 2019 at 12:53 AM

## 2019-12-06 NOTE — PLAN OF CARE
"Pt here for observation after fistula started bleeding. A&Ox4. VSS, on RA. Orthostatic blood pressures done. Pt complaining of feeling dizzy when laying flat and feeling \"off\" when standing up. Up with assist of 1 and gait belt. Regular diet with thin liquids. Takes pills whole with water. Pt denied pain. Pt scoring green on aggression stop light tool. Plan: monitor through night, possible discharge today pending fistula bleeding.   "

## 2019-12-06 NOTE — PROCEDURES
[facility]    Procedure: Tunneled dialysis catheter placement.   Date/Time: 12/6/2019 4:20 PM  Performed by: Shayy Lou DO  Authorized by: Shayy Lou DO     UNIVERSAL PROTOCOL   Site Marked: Yes  Prior Images Obtained and Reviewed:  Yes  Required items: Required blood products, implants, devices and special equipment available    Patient identity confirmed:  Verbally with patient  NA - No sedation, light sedation, or local anesthesia  Confirmation Checklist:  Patient's identity using two indicators, relevant allergies, procedure was appropriate and matched the consent or emergent situation and correct equipment/implants were available  Time out: Immediately prior to the procedure a time out was called    Preparation: Patient was prepped and draped in usual sterile fashion       ANESTHESIA    Anesthesia: Local infiltration      SEDATION    Patient Sedated: No    See dictated procedure note for full details.  Findings: Tunneled dialysis catheter placement.     Specimens: none    Complications: None    Condition: Stable    Plan: Catheter ok to use immediately.     PROCEDURE   Patient Tolerance:  Patient tolerated the procedure well with no immediate complications    Length of time physician/provider present for 1:1 monitoring during sedation: 0

## 2019-12-06 NOTE — ED NOTES
"[facility]  ED Nurse Handoff Report    ED Chief complaint: bleeding fistula      ED Diagnosis:   Final diagnoses:   None       Code Status: Full Code    Allergies:   Allergies   Allergen Reactions     Albuterol      shakiness     Aspirin      gi     Byetta [Exenatide]      gi     Clonidine      constipation     Codeine      vomiting     Ezetimibe      muscle symptoms     Hydralazine      headaches     Lisinopril      hyperkalemia     Metformin Hydrochloride      vomiting     Pravachol [Pravastatin Sodium]      muscle pains     Simvastatin      myalgias     Troglitazone        Activity level - Baseline/Home:  Independent  Activity Level - Current:   Stand with Assist    Patient's Preferred language: english   Needed?: No    Isolation: No  Infection: Not Applicable  Bariatric?: No    Vital Signs:   Vitals:    12/05/19 2138 12/05/19 2151 12/05/19 2204 12/05/19 2211   BP: (!) 154/73 (!) 159/78 (!) 149/90 136/78   Pulse:  61  54   Resp: 20  20    Temp: 98.1  F (36.7  C)      TempSrc: Oral      SpO2: 92% 98% 99% 98%   Weight: 80.6 kg (177 lb 11.1 oz)      Height: 1.524 m (5')          Cardiac Rhythm: ,        Pain level:      Is this patient confused?: No   Does this patient have a guardian?  No         If yes, is there guardianship documents in the Epic \"Code/ACP\" activity?  N/A         Guardian Notified?  N/A  [place] - Suicide Severity Rating Scale Completed?  Yes  If yes, what color did the patient score?  White    Patient Report: Initial Complaint: Pt took the bandage off her fistula site on the right arm and it started bleeding. Pt lives at home alone. Pt alert and oriented. Pt ambulates independently. Pt has dialysis yesterday and is scheduled to get dialysis tomorrow.    Focused Assessment: bleeding from fistula  Tests Performed: lab  Abnormal Results:   Results for orders placed or performed during the hospital encounter of 12/05/19   CBC with platelets differential     Status: " Abnormal   Result Value Ref Range    WBC 7.0 4.0 - 11.0 10e9/L    RBC Count 3.11 (L) 3.8 - 5.2 10e12/L    Hemoglobin 9.8 (L) 11.7 - 15.7 g/dL    Hematocrit 29.6 (L) 35.0 - 47.0 %    MCV 95 78 - 100 fl    MCH 31.5 26.5 - 33.0 pg    MCHC 33.1 31.5 - 36.5 g/dL    RDW 13.7 10.0 - 15.0 %    Platelet Count 164 150 - 450 10e9/L    Diff Method Automated Method     % Neutrophils 64.1 %    % Lymphocytes 20.4 %    % Monocytes 9.8 %    % Eosinophils 5.0 %    % Basophils 0.6 %    % Immature Granulocytes 0.1 %    Nucleated RBCs 0 0 /100    Absolute Neutrophil 4.5 1.6 - 8.3 10e9/L    Absolute Lymphocytes 1.4 0.8 - 5.3 10e9/L    Absolute Monocytes 0.7 0.0 - 1.3 10e9/L    Absolute Eosinophils 0.4 0.0 - 0.7 10e9/L    Absolute Basophils 0.0 0.0 - 0.2 10e9/L    Abs Immature Granulocytes 0.0 0 - 0.4 10e9/L    Absolute Nucleated RBC 0.0    Basic metabolic panel     Status: Abnormal   Result Value Ref Range    Sodium 139 133 - 144 mmol/L    Potassium 3.7 3.4 - 5.3 mmol/L    Chloride 103 94 - 109 mmol/L    Carbon Dioxide 24 20 - 32 mmol/L    Anion Gap 12 3 - 14 mmol/L    Glucose 249 (H) 70 - 99 mg/dL    Urea Nitrogen 39 (H) 7 - 30 mg/dL    Creatinine 5.19 (H) 0.52 - 1.04 mg/dL    GFR Estimate 8 (L) >60 mL/min/[1.73_m2]    GFR Estimate If Black 10 (L) >60 mL/min/[1.73_m2]    Calcium 8.2 (L) 8.5 - 10.1 mg/dL   ABO/Rh type and screen     Status: None   Result Value Ref Range    ABO O     RH(D) Pos     Antibody Screen Neg     Test Valid Only At [facility]        Specimen Expires 12/08/2019      Treatments provided: zofran 4mg for nausea    Family Comments:     OBS brochure/video discussed/provided to patient/family: Yes              Name of person given brochure if not patient:               Relationship to patient:     ED Medications:   Medications   0.9% sodium chloride BOLUS (0 mLs Intravenous Stopped 12/5/19 2157)   ondansetron (ZOFRAN) injection 4 mg (4 mg Intravenous Given 12/5/19 2214)       Drips infusing?:  No    For  the majority of the shift this patient was Green.   Interventions performed were .    Severe Sepsis OR Septic Shock Diagnosis Present: No    To be done/followed up on inpatient unit:      ED NURSE PHONE NUMBER: [phone]

## 2019-12-06 NOTE — ED PROVIDER NOTES
History     Chief Complaint:  Bleeding Fistula     HPI   Yissel Wetzel is a 59 year old female with a history of hypertension, pulmonary hypertension, type II diabetes, end stage renal failure on dialysis, who presents with a bleeding fistula via EMS. The patient reports that she had dialysis last yesterday and is due for a run tomorrow. Tonight, she was going to the bathroom when she took off the bandage covering the fistula in her right upper arm and it immediately started to squirt blood. The patient waited 10 minutes after bleeding to call 911 and when Fire arrived, they noted it to be squirting with a significant amount of blood in the bathroom and on the patient. They applied a tourniquet at 2052. When EMS arrived, they reported her to be normotensive at 118/63 but bradycardic at 56, compared to her baseline in the 90's. Her blood glucose was 257. She had 1 episode of emesis en route and she was given 4 mg Zofran. Here, the patient reports that she is feeling dizzy and she appeared pallor to EMS. She denies pain or use of blood thinners.     Allergies:  Albuterol   Aspirin  Byetta  Clonidine  Codeine  Ezetimibe   Hydralazine  Lisinopril  Metformin  Pravachol  Simvastatin  Troglitazone      Medications:    Pepcid   Novolog Flexpen  Normodyne  Levemir Flextouch  renvela     Past Medical History:    Allergic rhinitis  Anemia in chronic kidney disease   End stage renal failure on dialysis   Esophagitis  Former tobacco use   Hemorrhagic gastropathy   History of blood transfusion   Iron deficiency anemia, unspecified   Mild persistent asthma   Obesity   hyperlipidemia   Pneumonia   Pulmonary hypertension   Tobacco use disorder   Type 2 diabetes mellitus   Hypertension     Past Surgical History:    Abdominoplasty  c section x2  Colonoscopy  Create fistula arteriovenous upper extremity   Tonsillectomy  Eye injections  Partial hysterectomy     Family History:    Mother: arrhythmia, hypertension   Father:  pancreatitic cancer  Sister: asthma, cirrhosis   Brother: diabetes  Daughter; diabetes     Social History:  The patient was accompanied to the ED by EMS.  Smoking Status: Former Smoker  Smokeless Tobacco: Never Used  Alcohol Use: Negative   Drug Use: Negative  PCP: Gigi Martin   Marital Status:  Single      Review of Systems   Constitutional:        No pain   Gastrointestinal: Positive for vomiting.   Skin: Positive for pallor.        Bleeding from fistula sight   Neurological: Positive for dizziness.   All other systems reviewed and are negative.    Physical Exam     Patient Vitals for the past 24 hrs:   BP Temp Temp src Pulse Heart Rate Resp SpO2 Height Weight   12/05/19 2211 136/78 -- -- 54 -- -- 98 % -- --   12/05/19 2204 (!) 149/90 -- -- -- -- 20 99 % -- --   12/05/19 2151 (!) 159/78 -- -- 61 -- -- 98 % -- --   12/05/19 2138 (!) 154/73 98.1  F (36.7  C) Oral -- 59 20 92 % 1.524 m (5') 80.6 kg (177 lb 11.1 oz)      Physical Exam     Nursing note and vitals reviewed.    Constitutional:  Appears well-developed and well-nourished. Blood splattered over entire body.   HENT:     Nose normal.  No discharge.      Oropharynx is clear and moist.  Eyes:    Conjunctivae are normal without injection.  Lymph:  No enlarged or tender cervical or submandibular lymph nodes.   Cardiovascular:  Normal rate, regular rhythm with normal S1 and S2.      Normal heart sounds and peripheral pulses 2+ and equal.       No murmur or yamilex.  Pulmonary:  Effort normal and breath sounds clear to auscultation bilaterally.     No respiratory distress.  No stridor.     No wheezes. No rales.     GI:    Soft. No distension and no mass. No tenderness.   Musculoskeletal:  Fistula in right upper arm. Right hand is warm, capillary normal, strong radial pulses.     Blood pumping from a wound on the AV fistula in the right upper arm stops with direct pressure.     Normal range of motion. No extremity deformity.  Neurological:   Alert and  oriented. No focal weakness.     Exhibits good muscle tone.  Skin:    Slightly pale. Skin is warm and dry. No rash noted. No diaphoresis.      No erythema.  Small hole over the fistula in the right arm with active vigorous bleeding  Psychiatric:   Behavior is normal. Appropriate mood and affect.     Judgment and thought content normal.      Emergency Department Course     Laboratory:  Laboratory findings were communicated with the patient who voiced understanding of the findings.    CBC: WBC 7.0, HGB 9.8 (L),   BMP: Glucose 249 (H), BUN 39 (H), GFR 8 (L), Calcium 8.2 (L), o/w WNL (Creatinine 5.19 (H))    ABO/Rh type and screen: O positive, antibody negative     Interventions:  2144  mL IV  2214 Zofran 4 mg IV     Emergency Department Course:    2119 Nursing notes and vitals reviewed. I performed an exam of the patient as documented above.     2125 IV was inserted and blood was drawn for laboratory testing, results above.     2126 Blood drawn. This was sent to the lab for further testing, results above.     2250 Patient rechecked and updated.    2345 I spoke with Dr. Mcdaniel of the hospitalist service from [facility] regarding patient's presentation, findings, and plan of care.     2355 Prior to admission, I personally reviewed the results with the patient and all related questions were answered. The patient verbalized understanding and is amenable to plan.     Impression & Plan      Medical Decision Making:  Yissel Wetzel is a 59 year old female who presents to the emergency department today for evaluation of a bleeding AV fistula that was used for dialysis yesterday.  She was brought to the stabilization room because of the amount of hemorrhage that was occurring from her AV fistula.  The patient states that the tape that they used to put a dressing on her arm was right on the scab over the hole that has been getting bigger and when she pulled the tape off today, it started bleeding.  She waited  10 minutes before calling EMS and apparently there was a lot of blood at the scene.  She felt lightheaded.  Her vital signs have been normal, the dressing that EMS had put on was saturated so I took it off and she had a fairly good sized hole over the fistula and it was bleeding briskly and squirting out.  I applied direct pressure and put a hemostatic agent over it and then turn 4 x 4's into 2 x 2's applying some elevation over the top of it and then wrapped her upper arm with coban.  She was watched for the next 1 to 2 hours and there was no bleeding through the local pressure dressing.  She had good CMS after applied and she said it did not feel too tight.  The patient still felt a little lightheaded, her hemoglobin came back at 9.8.  Her electrolytes are normal, her GFR is 8.  She will need to be seen by vascular tomorrow and determination if she needs dialysis tomorrow or if it could be postponed for a day.  I have talked with Dr. Mcdaniel who will be admitting the patient.    Critical Care time was 50 minutes for this patient excluding procedures.     Diagnosis:    ICD-10-CM    1. Hemorrhage of arteriovenous fistula, initial encounter (H) T82.838A    2. ESRD (end stage renal disease) on dialysis (H) N18.6     Z99.2      Disposition:   Admit to Dr. Mcdaniel, repeat hemoglobin, vascular surgery consultation in the morning.    Scribe Disclosure:  I, Orla Severson, am serving as a scribe at 9:20 PM on 12/5/2019 to document services personally performed by Joan Hogan MD based on my observations and the provider's statements to me.     EMERGENCY DEPARTMENT       Joan Hogan MD  12/06/19 0005

## 2019-12-06 NOTE — CONSULTS
Nephrology Initial Consult  December 6, 2019      Yissel Wetzel MRN:[id number] YOB: 1960  Date of Admission:12/5/2019  Primary care provider: Gigi Martin  Requesting physician: Lior Mcdaniel MD    ASSESSMENT AND RECOMMENDATIONS:   1 end-stage renal disease-  2 bleeding from AV fistula controlled with sutures.  Ultrasound with focal stenosis in outflow vein.  3 acute blood loss anemia.    Discussion-  Discussed with Dr. Arango and vascular surgery team.  Would like to give her fistula rest.  Fistulogram on Monday and further course of action to be decided based on that.    Will get tunneled dialysis catheter in the meantime and pursue dialysis tomorrow through it.  From renal standpoint patient can be discharged after dialysis tomorrow with a plan for follow-up with IR for fistulogram and vascular surgery as outpatient.  -We will give her outpatient dose of Epogen to 4000 units with dialysis.    Thank you for the consult. Will continue to follow along with you .      Tamar Chopra MD  [facility] - Nephrology   [phone]        REASON FOR CONSULT: ESRD, bleeding from AV fistula    HISTORY OF PRESENT ILLNESS:  Yissel Wetzel is a 59 year old female with a past medical history of end-stage renal disease on hemodialysis type diabetes mellitus, hypertension who presented with profuse bleeding from her brachiocephalic fistula.  She usually gets dialysis at [facility] dialysis unit  -Dr. VALDEZ INS  -Monday Wednesday Friday, target weight of 78.5 kg  -AV fistula placed in December 2018 by Dr. Vizcaino  -Low-dose heparin with dialysis.  2K, 2.5 calcium    She reports he had a chronic scab over the fistula which has been accessed multiple times.  When she removed her dressing the scab came off and she had profuse bleeding.  She presented to the emergency department where initially hemostasis was obtained with Gelfoam and pressure dressing.  On removal of dressing by a vascular  surgery she had near half a liter of bleed from small open area in the fistula which was controlled with 3 sutures.  Hemoglobin is down to 7.8.  Ultrasound showed 2 focal stenosis in the outflow vein just past the AV anastomosis.  She is scheduled to undergo fistulogram as an outpatient on Monday.  Her last dialysis was this Wednesday.    She denies any chest pain shortness of breath orthopnea PND.  Otherwise feels well.    PAST MEDICAL HISTORY:  Reviewed with patient on 12/06/2019  and is as listed in HPI.       MEDICATIONS:  PTA Meds  Prior to Admission medications    Medication Sig Last Dose Taking? Auth Provider   acetaminophen (TYLENOL) 500 MG tablet Take 500 mg by mouth every 8 hours as needed for mild pain PRN Yes Unknown, Entered By History   famotidine (PEPCID) 10 MG tablet Take 1 tablet (10 mg) by mouth daily  Yes Gigi Martin MD   hypromellose-dextran (ARTIFICAL TEARS) 0.1-0.3 % ophthalmic solution Place 1 drop into both eyes daily as needed for dry eyes PRN Yes Unknown, Entered By History   insulin aspart (NOVOLOG FLEXPEN) 100 UNIT/ML pen Take 3 unit per 15 grams of carbohydrates three times daily with meals  Yes Gigi Martin MD   insulin aspart (NOVOLOG PEN) 100 UNIT/ML pen Corrective scale = 2 units for every BG 50 over 150  In addition to carb counting insulin  Yes Unknown, Entered By History   labetalol (NORMODYNE) 300 MG tablet Take 300 mg by mouth 2 times daily Prescription is for 600mg twice daily, but the patient is taking 300mg twice daily  Yes Unknown, Entered By History   LEVEMIR FLEXTOUCH 100 UNIT/ML pen Inject 60 Units Subcutaneous At Bedtime  Yes Gigi Martin MD   sevelamer (RENVELA) 800 MG tablet Take 1 tablet (800 mg) by mouth 3 times daily (with meals)  Yes Gigi Martin MD   ACCU-CHEK CHING PLUS test strip USE TO TEST BLOOD SUGAR 4 TIMES PER DAY    Gigi Martin MD      Current Meds    - MEDICATION INSTRUCTIONS for Dialysis Patients -   Does not  apply See Admin Instructions     famotidine  10 mg Oral Daily     insulin aspart   Subcutaneous TID w/meals     insulin aspart  1-7 Units Subcutaneous TID AC     insulin aspart  1-5 Units Subcutaneous At Bedtime     insulin detemir  40 Units Subcutaneous At Bedtime     sevelamer  800 mg Oral TID w/meals     sodium chloride (PF)  3 mL Intracatheter Q8H     Infusion Meds    sodium chloride 0.9 %       heparin 10,000 units in 1000 mL NS         ALLERGIES:    Allergies   Allergen Reactions     Albuterol      shakiness     Aspirin      gi     Byetta [Exenatide]      gi     Clonidine      constipation     Codeine      vomiting     Ezetimibe      muscle symptoms     Hydralazine      headaches     Lisinopril      hyperkalemia     Metformin Hydrochloride      vomiting     Pravachol [Pravastatin Sodium]      muscle pains     Simvastatin      myalgias     Troglitazone        REVIEW OF SYSTEMS:  A comprehensive of systems was negative except as noted above.    SOCIAL HISTORY:   Reviewed with patient on 2019     FAMILY MEDICAL HISTORY:   Family History   Problem Relation Age of Onset     Arrhythmia Mother      Hypertension Mother      Pancreatic Cancer Father      Diabetes Brother      Asthma Sister      LUNG DISEASE Sister      Diabetes Daughter      Cirrhosis Sister      Reviewed with patient on 2019     PHYSICAL EXAM:   Temp  Av  F (36.7  C)  Min: 97.7  F (36.5  C)  Max: 98.1  F (36.7  C)      Pulse  Av.4  Min: 54  Max: 61 Resp  Av.8  Min: 18  Max: 20  SpO2  Av.8 %  Min: 92 %  Max: 99 %       /60 (BP Location: Right arm)   Pulse 60   Temp 98  F (36.7  C) (Oral)   Resp 18   Ht 1.524 m (5')   Wt 80.6 kg (177 lb 11.1 oz)   SpO2 97%   BMI 34.70 kg/m        Admit Weight: 80.6 kg (177 lb 11.1 oz)     GENERAL APPEARANCE: no distress,  awake  EYES: no scleral icterus, pupils equal  HENT: NC/AT,  mouth  without ulcers or lesions  Lymphatics: no cervical or supraclavicular LAD  Endo: no moon  facies, no goiter  Pulmonary: lungs clear to auscultation with equal breath sounds bilaterally, no clubbing  CV: regular rhythm, normal rate, no rub   - JVP -   - Edema-  GI: soft, nontender,   MS: no evidence of inflammation in joints  : no lei  SKIN: no rash, warm, dry, no cyanosis  NEURO: face symmetric, no asterixis   Access - Rt Arm AVF , dressing in place. Bruit +    LABS:   CMP  Recent Labs   Lab 12/05/19  2125      POTASSIUM 3.7   CHLORIDE 103   CO2 24   ANIONGAP 12   *   BUN 39*   CR 5.19*   GFRESTIMATED 8*   GFRESTBLACK 10*   KANWAL 8.2*     CBC  Recent Labs   Lab 12/06/19  1442 12/06/19  0716 12/05/19  2125   HGB 7.8* 8.7* 9.8*   WBC  --  7.4 7.0   RBC  --  2.80* 3.11*   HCT  --  26.6* 29.6*   MCV  --  95 95   MCH  --  31.1 31.5   MCHC  --  32.7 33.1   RDW  --  13.9 13.7   PLT  --  166 164       URINE STUDIES  Recent Labs   Lab Test 09/25/19  1455 06/07/18  1455 08/09/17  1640 03/22/13  1342   COLOR Yellow Yellow Yellow Yellow   APPEARANCE Clear Clear Clear Clear   URINEGLC >499* 250* Negative Negative   URINEBILI Negative Negative Negative Negative   URINEKETONE Negative Negative Negative Negative   SG 1.005 1.015 1.020 1.015   UBLD Negative Small* Small* Small*   URINEPH 7.0 6.0 6.0 5.0   PROTEIN 100* >=300* >=300* 30*   UROBILINOGEN  --  0.2 0.2 0.2   NITRITE Negative Negative Negative Negative   LEUKEST Negative Negative Negative Negative   RBCU <1 2-5* O - 2 2-5*   WBCU 1 0 - 5 O - 2 O - 2     No lab results found.  PTH  Recent Labs   Lab Test 07/25/12  1627   PTHI 15     IRON STUDIES  Recent Labs   Lab Test 03/17/19  0805 08/30/18  1318 09/10/14  1408 07/25/12  1627 03/06/12  1357   IRON 27* 54 85 36 37   * 196* 230* 247 226*   IRONSAT 15 28 37 15 16   LATRICE 210 407*  --  262 233         Tamar Chopra MD

## 2019-12-06 NOTE — PLAN OF CARE
Pt denies dizziness, vss, bgm elevated, pt walking in halls with SBA. Pt at IR to get tunnel cath now, dialysis tomorrow, plan for fistolgram on Monday.

## 2019-12-06 NOTE — ED NOTES
Pt reports she thinks she had a BM in her pants before she came to the ER. Pt refuses to be cleaned up at this time

## 2019-12-06 NOTE — IR NOTE
Patient into IR suite 1 via cart. Ancef given via left PIV. New 14.5Fr right internal jugular double lumen tunneled dialysis catheter. Each lumen flushed with 3000 units heparin. Dressing to site is clean, dry and intact. Patient back to floor via cart.     Leny Vargas RN

## 2019-12-06 NOTE — CONSULTS
V[facility]GERY HOSP[facility] PATIENT CONSULTATION NOTE  Consulted by: Lior Mcdaniel MD  Reason for consultation: tourniquet applied above right brachiobasilic fistula.    HPI:  Yissel Wetzel is a 59 year old year old female who has a PMH significant for ESRD on dialysis, type 2 DM, HTN, who presented to the hospital after profuse bleeding from her brachiobasiclic fistula after removing her dressing.  EMS applied a tourniquet above the fistula to control the bleeding and there is concern over the patency as she was noted to no longer have a thrill.  Patient reports she has a chronic scab that she had been managing with antibiotic ointment.  The techs have been able to access her fistula above this area without issue, however, the tape ripped the scab off causing the uncontrolled bleeding.  Hemostasis was achieved in the ER with some gelfoam and surgicel and a pressure bandage.  Patient also notes that during her dialysis runs, she has noted a pulsatile bulge that the tech told her might be a pseudoaneurysm.  She has some residual weakness in her right thumb, index, and middle finger that have been improving since her fistula placement a year ago.  She denies that the weakness got worse following this episode of bleeding.  CMS is intact in her right hand.  Fistula was placed on 12/26/2018 with Dr. Vizcaino.     Review Of Systems:   General: Denies F/C  Respiratory: Denies SOB  Cardio: Denies CP  Gastrointestinal: Denies N/V  Genitourinary: Denies recent change in urination  Musculoskeletal: See HPI  Neurologic: Denies HA  Psychiatric: Denies confusion  Hematology/immunology: no unexpected bruising    PAST MEDICAL HISTORY:  Past Medical History:   Diagnosis Date     Allergic rhinitis, cause unspecified      Anemia in chronic kidney disease      Anemia of chronic disease      Bleeding pseudoaneurysm of right brachiocephalic arteriovenous fistula, initial encounter (H) 12/6/2019     End stage renal failure on dialysis (H)       Esophagitis, unspecified      Former tobacco use      HEMORRHAGIC GASTROPATHY      History of blood transfusion     [place]     Iron deficiency anemia, unspecified      Mild persistent asthma      Obesity      Other and unspecified hyperlipidemia      Pneumonia      Pulmonary hypertension (H)     per 2012 echo mod.to severe pulmonary hypertension     Tobacco use disorder dc ed      Type 2 diabetes mellitus (H)     on Insulin- managed by PCP     Unspecified essential hypertension        PAST SURGICAL HISTORY:  Past Surgical History:   Procedure Laterality Date     ABDOMINOPLASTY  pt unsure when    abdominoplasty-      SECTION  ,     x 2     COLONOSCOPY      [place]     CREATE FISTULA ARTERIOVENOUS UPPER EXTREMITY Right 2018    Procedure: RIGHT BRACHIAL TO BASILIC ARTERIOVENOUS FISTULA CREATION;  Surgeon: Terry Vizcaino MD;  Location: [facility]     ENT SURGERY  as a child    tonsillectomy     EYE SURGERY Left     injections- eye     HYSTERECTOMY  approx     partial hysterectomy-reason bleeding        FAMILY HISTORY:  Family History   Problem Relation Age of Onset     Arrhythmia Mother      Hypertension Mother      Pancreatic Cancer Father      Diabetes Brother      Asthma Sister      LUNG DISEASE Sister      Diabetes Daughter      Cirrhosis Sister        SOCIAL HISTORY:   Social History     Tobacco Use     Smoking status: Former Smoker     Packs/day: 1.00     Years: 22.00     Pack years: 22.00     Types: Cigarettes     Last attempt to quit: 10/12/1999     Years since quittin.1     Smokeless tobacco: Never Used   Substance Use Topics     Alcohol use: No     Alcohol/week: 0.0 standard drinks       TOBACCO USE:  Former smoker    FUNCTIONAL CAPACITY:      HOME MEDICATIONS:  Prior to Admission medications    Medication Sig Start Date End Date Taking? Authorizing Provider   acetaminophen (TYLENOL) 500 MG tablet Take 500 mg by mouth every 8 hours as needed for  mild pain   Yes Unknown, Entered By History   famotidine (PEPCID) 10 MG tablet Take 1 tablet (10 mg) by mouth daily 11/1/19  Yes Gigi Martin MD   hypromellose-dextran (ARTIFICAL TEARS) 0.1-0.3 % ophthalmic solution Place 1 drop into both eyes daily as needed for dry eyes   Yes Unknown, Entered By History   insulin aspart (NOVOLOG FLEXPEN) 100 UNIT/ML pen Take 3 unit per 15 grams of carbohydrates three times daily with meals 11/1/19  Yes Gigi Martin MD   insulin aspart (NOVOLOG PEN) 100 UNIT/ML pen Corrective scale = 2 units for every BG 50 over 150  In addition to carb counting insulin   Yes Unknown, Entered By History   labetalol (NORMODYNE) 300 MG tablet Take 300 mg by mouth 2 times daily Prescription is for 600mg twice daily, but the patient is taking 300mg twice daily   Yes Unknown, Entered By History   LEVEMIR FLEXTOUCH 100 UNIT/ML pen Inject 60 Units Subcutaneous At Bedtime 11/11/19  Yes Gigi Martin MD   sevelamer (RENVELA) 800 MG tablet Take 1 tablet (800 mg) by mouth 3 times daily (with meals) 11/1/19  Yes Gigi Martin MD   ACCU-CHEK CHING PLUS test strip USE TO TEST BLOOD SUGAR 4 TIMES PER DAY  11/11/19   Gigi Martin MD       VITAL SIGNS:  /60 (BP Location: Right arm)   Pulse 60   Temp 98  F (36.7  C) (Oral)   Resp 18   Ht 1.524 m (5')   Wt 80.6 kg (177 lb 11.1 oz)   SpO2 97%   BMI 34.70 kg/m      Intake/Output Summary (Last 24 hours) at 12/6/2019 1010  Last data filed at 12/6/2019 0630  Gross per 24 hour   Intake 780 ml   Output --   Net 780 ml       Labs:  ROUTINE IP LABS (Last four results)  BMP  Recent Labs   Lab 12/05/19 2125      POTASSIUM 3.7   CHLORIDE 103   KANWAL 8.2*   CO2 24   BUN 39*   CR 5.19*   *     CBC  Recent Labs   Lab 12/06/19  0716 12/05/19 2125   WBC 7.4 7.0   RBC 2.80* 3.11*   HGB 8.7* 9.8*   HCT 26.6* 29.6*   MCV 95 95   MCH 31.1 31.5   MCHC 32.7 33.1   RDW 13.9 13.7    164     INRNo lab results found in last  7 days.    PHYSICAL EXAM:  Constitutional: healthy, alert, no acute distress and cooperative   Cardiovascular: RRR  Respiratory: CTAB anteriorly, breathing unlabored without secondary muscle use  Psychiatric: mentation appears normal and affect normal/bright  Neck: no asymmetry  MSK: able to move all extremities without weakness or ataxia  Extremities: Right upper arm brachiobasilic fistula with open area at distal portion of fistula with arterial bleeding.  Distal portion of fistula with palpable thrill, pulsation present in the remaining parts of the fistula.  Right radial and ulnar pulses palpable.  Hematology: no bruising on visible skin    IMAGING:  US EXTREMITY ARTERIAL VENOUS DIALYSIS ACCESS GRAFT  12/6/2019 1:15 PM      HISTORY:  59-year-old female status post a right brachial artery to  basilic vein fistula with translocation.     COMPARISON: Ultrasound dated 3/13/2019.     FINDINGS:  Color Doppler and spectral waveform analysis performed. The  fistula is patent. The outflow volume is measured to be 1014 mL/m.     A short portion of the inflow artery at the distal humerus level is  not adequately visualized due to an overlying bandage.     The outflow vein is patent. Ultrasound suggests a focal stenosis in  the outflow vein just past the arteriovenous anastomosis where the  luminal diameter measures 2.5 mm and the peak systolic velocity is  elevated at 638 cm/s. Ultrasound suggests a stenosis in the outflow  vein at the proximal humerus level where the luminal diameter of the  outflow vein is 2.4 mm and the peak systolic velocity is elevated at  780 cm/s. Diameters of the remaining outflow vein range between 10.9  to 8.3 mm.                                                                      IMPRESSION: Patent right brachial artery to basilic vein fistula.  Ultrasound suggests two focal stenoses in the outflow vein just past  the arteriovenous anastomosis and at the proximal humerus level.  Further  evaluation and intervention with angiography could be  performed.    Patient Active Problem List   Diagnosis     Iron deficiency anemia     Esophagitis     Essential hypertension     Type 2 diabetes mellitus with diabetic nephropathy (H)     Hyperlipidemia LDL goal <100     Pulmonary hypertension (H)     Advanced directives, counseling/discussion     ESRD (end stage renal disease) (H)     Anemia in chronic kidney disease     Obesity     Former tobacco use     Bleeding pseudoaneurysm of right brachiocephalic arteriovenous fistula, initial encounter (H)       ASSESSMENT:  60 yo female with PMH significant for ESRD on dialysis, DMII, and HTN with bleeding from brachiobasilic dialysis fistula. Ultrasound suggests two focal stenoses in the outflow vein just past the arteriovenous anastomosis and at the proximal humerus level      During examination with the fellow, when dressing was removed, patient had arterial bleeding from small open area in fistula of approximately 500ml.  3 sutures were placed and hemostasis was achieved.  Distal pulses and CMS remain intact.  Light compression dressing was applied.      PLAN:  -Will obtain fistulagram  -Do not use fistula until further notice.  -Consult to nephrology for alternative dialysis access options.  -Stat Hgb  -Patient should remain NPO for IR procedure.  -Vascular surgery will continue to follow.      Jaclyn Mckeon, DNP, APRN, AGNP-C  Division of Vascular Surgery   [facility]   Pager: [phone]

## 2019-12-06 NOTE — ED NOTES
Bed: ST03  Expected date:   Expected time:   Means of arrival:   Comments:  418 dyalisis fistula bleed tourniquet applied eta 6 min

## 2019-12-07 VITALS
OXYGEN SATURATION: 98 % | RESPIRATION RATE: 18 BRPM | TEMPERATURE: 98 F | DIASTOLIC BLOOD PRESSURE: 51 MMHG | BODY MASS INDEX: 34.89 KG/M2 | HEART RATE: 75 BPM | SYSTOLIC BLOOD PRESSURE: 149 MMHG | HEIGHT: 60 IN | WEIGHT: 177.69 LBS

## 2019-12-07 LAB
GLUCOSE BLDC GLUCOMTR-MCNC: 212 MG/DL (ref 70–99)
GLUCOSE BLDC GLUCOMTR-MCNC: 224 MG/DL (ref 70–99)
GLUCOSE BLDC GLUCOMTR-MCNC: 243 MG/DL (ref 70–99)
GLUCOSE BLDC GLUCOMTR-MCNC: 274 MG/DL (ref 70–99)
HBV SURFACE AB SERPL IA-ACNC: 11.09 M[IU]/ML

## 2019-12-07 PROCEDURE — 96372 THER/PROPH/DIAG INJ SC/IM: CPT | Mod: XS

## 2019-12-07 PROCEDURE — G0257 UNSCHED DIALYSIS ESRD PT HOS: HCPCS

## 2019-12-07 PROCEDURE — 99217 ZZC OBSERVATION CARE DISCHARGE: CPT | Performed by: INTERNAL MEDICINE

## 2019-12-07 PROCEDURE — 25000132 ZZH RX MED GY IP 250 OP 250 PS 637: Mod: GY | Performed by: INTERNAL MEDICINE

## 2019-12-07 PROCEDURE — 25800030 ZZH RX IP 258 OP 636: Performed by: INTERNAL MEDICINE

## 2019-12-07 PROCEDURE — 00000146 ZZHCL STATISTIC GLUCOSE BY METER IP

## 2019-12-07 PROCEDURE — 90937 HEMODIALYSIS REPEATED EVAL: CPT

## 2019-12-07 PROCEDURE — G0378 HOSPITAL OBSERVATION PER HR: HCPCS

## 2019-12-07 RX ADMIN — SODIUM CHLORIDE 300 ML: 9 INJECTION, SOLUTION INTRAVENOUS at 09:23

## 2019-12-07 RX ADMIN — SODIUM CHLORIDE 250 ML: 9 INJECTION, SOLUTION INTRAVENOUS at 09:23

## 2019-12-07 RX ADMIN — SEVELAMER CARBONATE 800 MG: 800 TABLET, FILM COATED ORAL at 13:25

## 2019-12-07 RX ADMIN — FAMOTIDINE 10 MG: 10 TABLET, FILM COATED ORAL at 08:25

## 2019-12-07 RX ADMIN — INSULIN ASPART 2 UNITS: 100 INJECTION, SOLUTION INTRAVENOUS; SUBCUTANEOUS at 13:52

## 2019-12-07 RX ADMIN — ACETAMINOPHEN 325 MG: 325 TABLET, FILM COATED ORAL at 00:32

## 2019-12-07 RX ADMIN — INSULIN ASPART 2 UNITS: 100 INJECTION, SOLUTION INTRAVENOUS; SUBCUTANEOUS at 08:28

## 2019-12-07 RX ADMIN — SEVELAMER CARBONATE 800 MG: 800 TABLET, FILM COATED ORAL at 08:26

## 2019-12-07 NOTE — PROGRESS NOTES
Potassium   Date Value Ref Range Status   12/05/2019 3.7 3.4 - 5.3 mmol/L Final     Hemoglobin   Date Value Ref Range Status   12/06/2019 7.8 (L) 11.7 - 15.7 g/dL Final     Creatinine   Date Value Ref Range Status   12/05/2019 5.19 (H) 0.52 - 1.04 mg/dL Final     Urea Nitrogen   Date Value Ref Range Status   12/05/2019 39 (H) 7 - 30 mg/dL Final     Sodium   Date Value Ref Range Status   12/05/2019 139 133 - 144 mmol/L Final     INR   Date Value Ref Range Status   09/25/2019 1.13 0.86 - 1.14 Final       DIALYSIS PROCEDURE NOTE  Hepatitis status of previous patient on machine log was checked and verified ok to use with this patients hepatitis status.  Patient dialyzed for 3 hrs. on a 3 K bath with a net fluid removal of  0.5 L.  A BFR of 400 ml/min was obtained via a RIJ.    The treatment plan was dicussed with Dr. Chopra during the treatment.  Total heparin received during the treatment: 0 units.     Line flushed, clamped and capped with heparin 1:1000 1.9 mL (1900 units) per lumen  Meds  given: none Complications: pt's goal had to be reduced to accommodate cramping; patient taken off 10 minutes early due to cramping.      Access education provided verbally and patient verbalized understanding.    ICEBOAT? Timeout performed pre-treatment  I: Patient was identified using 2 identifiers  C: Consent obtained/verified current before treatment  E: Equipment preventative maintenance is current and dialysis delivery system OK to use  B: Hepatitis B Surface Antigen: Negative; Draw Date: 12/6/2019      Hepatitis B Surface Antibody: Susceptible; Draw Date: 12/6/2019  O: Dialysis orders present and complete prior to treatment  A: Vascular access verified and assessed prior to treatment  T: Treatment was performed at a clinically appropriate time  ?: Patient was allowed to ask questions and address concerns prior to treatment  See flowsheet in EPIC for further details and post assessment.  Machine water alarm in place and  functioning. Transducer pods intact and checked every 15min.  Pt returned via wheelchair.  Report received from: ABE Mares RN  Report given to: ABE Mares RN    Chlorine/Chloramine water system checked every 4 hours.  Outpatient Dialysis at [facility].

## 2019-12-07 NOTE — PROGRESS NOTES
This patient was seen and examined while on dialysis. Professional oversight of the patient's dialysis care, access care and dialysis related co-morbidities were addressed as necessary with the patient and / or staff.     Access - TDC - placed yesterday. Working well.     UF Goal 1 L     Exam  /50   Pulse 71   Temp 97.7  F (36.5  C) (Oral)   Resp 18   Ht 1.524 m (5')   Wt 80.6 kg (177 lb 11.1 oz)   SpO2 96%   BMI 34.70 kg/m      CTA   No  edema    A/P  1 end-stage renal disease-  2 bleeding from AV fistula controlled with sutures.  Ultrasound with focal stenosis in outflow vein.  3 acute blood loss anemia.     Discussion-  Hd today . Can discharge post HD . Plan for fistulogram on Monday .   Discussed with Dr. Arango and vascular surgery team.  Would like to give her fistula rest.  Fistulogram on Monday and further course of action to be decided based on that.    Now has  tunneled dialysis catheter in the meantime and pursue dialysis through it.   From renal standpoint patient can be discharged after dialysis  Today with a plan for follow-up with IR for fistulogram and vascular surgery as outpatient.  -We will give her outpatient dose of Epogen to 4000 units with dialysis      Tamar Chopra MD   December 7, 2019

## 2019-12-07 NOTE — PLAN OF CARE
Pt here with RUE fistula bleeding. CVC, tunnel cath patent, placed yesterday, dressing intact, received Dialysis today through site, tolerated well. RUE fistula dressing, AC wrap CDI. Pt A&O x4. VSS, on RA. LS clear. CMS intact. Denies pain. Up SBA w/ GB. +BS, passing flatus, BM yesterday. Reg diet takes pills whole with water. Reg diet, good appetite. , 212 sliding scale insulin and carb count insulin given. Pt scoring green on the Aggression Stop Light Tool. Discharging to home today with plans for OP fistula gram on Monday, family providing transportation. Discharge follow-up care and med teaching given.

## 2019-12-07 NOTE — PLAN OF CARE
A&0X4, VSS on room air. Denies pain. Tunneled cath placed today in IR with no complications in preparation for dialysis tomorrow. Tolerating diet, fair appetite. Plan is for dialysis tomorrow and then discharge to home if patient is stable Pt will have a fistulogram on Monday outpatient.

## 2019-12-07 NOTE — DISCHARGE SUMMARY
[facility]    Discharge Summary  Hospitalist    Date of Admission:  12/5/2019  Date of Discharge:  12/7/2019  Discharging Provider: Nam Carmona MD    Discharge Diagnoses   Per Dr. Mcdaniel's H&P:  Yissel Wetzel is a 59 year old female who presents to the emergency department with acute bleeding from her right brachiobasilic dialysis fistula after removing dressings from her Wednesday dialysis session.  Patient reports that there was some tape inappropriately placed so that it was overlying her dialysis access, and when she removed an outer piece of tape, this pulled on inner tape and gauze resulting in removal of scab and bleeding.  Patient reports approximately 10 minutes of arterial bleeding that she attempted to apply pressure with before contacting EMS.  She was in the bathroom at that time, and with significant blood loss, reports feeling lightheaded, and as though she might pass out.  She thinks it is possible she may have lost consciousness for 1 second or very briefly, though is not certain of this.  She did feel woozy and out of sorts with her blood loss, and not secondary to visualizing bleeding.     During episode of lightheadedness/presyncope, patient reports that she had some diarrhea.  She had already been on the toilet as she was anticipating going to the bathroom when she removed her dialysis dressing.  Prior to this event had had somewhat loose stools, though nothing worrisome to her.  Has not had diarrhea since.     As above, EMS was contacted, on arrival a tourniquet was placed over right upper extremity to stop bleeding.  Patient was seen in the emergency department with labs drawn approximately 1-1/2 hours after onset of bleeding.  Hemoglobin of 9.8.  Last hemoglobin of 10.2 9/25/2019.  Blood glucose elevated in the 200 range, creatinine in the 5 range with mild elevation of BUN in the 30s, though otherwise BMP within normal limits.  Dr. Hogan undressed tourniquet and assess  wound.  Hemostasis gel as well as folded 4 x 4 under [place]an applied for mild pressure with no further bleeding upon time of admission request.  In my discussion with Dr. Hogan, significant bleeding, and recommendation was to leave gentle pressure dressing in place overnight.  Patient describes approximately 0.3 cm scab that was removed resulting in bleeding.  Observation admission requested given anticipated drop in hemoglobin following bleeding episode.  This certainly seems reasonable, especially as patient may require fistulogram in the setting of tourniquet placement prior to arrival.  Currently without thrill or bruit over dialysis access.    History of Present Illness       Hospital Course   Yissel Wetzel is a 59 year old female admitted on 12/5/2019. She presents to the emergency department after removing a dressing over her right brachiobasilic dialysis fistula from Wednesday dialysis and having profuse bleeding from a subcentimeter scab which was removed.     Acute dialysis fistula hemorrhage, right brachiobasilic, initial encounter: Occurred after removing dressings from fistula access from Wednesday; it appears that a small scab was removed as tape was overlying access point.  Patient describes arterial bleeding for approximately 10 minutes prior to arrival of EMS requiring tourniquet placement.  Acute blood loss anemia: Patient with a baseline hemoglobin in the 9-10 range in the setting of chronic kidney disease on dialysis.  Patient is vitally stable currently, though during event of blood loss, patient reports feeling as though she was about to pass out and potentially losing consciousness for less than 1 second.  She also reports initially feeling lightheaded with standing on arrival to the emergency department, though this resolved following fluid bolus.  Symptoms suggest more significant blood loss than her current hemoglobin would represent (drawn ~1.5 hr after onset of bleeding).   -Repeat hgb  7.8  -Vascular surgery has been consulted as patient required tourniquet via EMS.  Sutures placed by vascular with good  hemostasis.     Type 2 diabetes: Resume PTA regimen at discharge     End-stage renal disease on dialysis:   Seen by nephrology. Had temporary dialysis catheter placed in IR with the intention of giving the fistula a rest. Underwent one routine dialysis run.    Discharged home with plan for outpatient fistulogram on 12/9, follow up with vascular surgery.     Hypertension:   -Labetalol  Nam Carmona MD, MD    Significant Results and Procedures   Temporary dialysis catheter placement.      Unresulted Labs Ordered in the Past 30 Days of this Admission     Date and Time Order Name Status Description    12/6/2019 1005 Hepatitis B Surface Antibody In process           Code Status   Full Code       Primary Care Physician   Gigi Martin    Physical Exam   Temp: 98  F (36.7  C) Temp src: Oral BP: (!) 149/51 Pulse: 75 Heart Rate: 74 Resp: 18 SpO2: 98 % O2 Device: None (Room air)    Vitals:    12/05/19 2138   Weight: 80.6 kg (177 lb 11.1 oz)     Vital Signs with Ranges  Temp:  [97.7  F (36.5  C)-98.5  F (36.9  C)] 98  F (36.7  C)  Pulse:  [70-85] 75  Heart Rate:  [64-80] 74  Resp:  [16-18] 18  BP: (120-161)/(45-98) 149/51  SpO2:  [95 %-98 %] 98 %  I/O last 3 completed shifts:  In: 300 [P.O.:300]  Out: -     Constitutional: Awake, alert, cooperative, no apparent distress.  Eyes: Conjunctiva and pupils examined and normal.  HEENT: Moist mucous membranes, normal dentition.  Respiratory: Clear to auscultation bilaterally, no crackles or wheezing.  Cardiovascular: Regular rate and rhythm, normal S1 and S2, and no murmur noted.  GI: Soft, non-distended, non-tender, normal bowel sounds.  Lymph/Hematologic: No anterior cervical or supraclavicular adenopathy.  Skin: No rashes, no cyanosis, no edema.      Discharge Disposition   Discharged to home  Condition at discharge: Stable    Consultations This Hospital  Stay   VASCULAR SURGERY IP CONSULT  NEPHROLOGY IP CONSULT    Time Spent on this Encounter   I, Nam Carmona MD, personally saw the patient today and spent less than or equal to 30 minutes discharging this patient.    Discharge Orders      Follow-up and recommended labs and tests     Please call [phone] to setup a followup appointment with Dr. Quiles (Vascular Surgery) in 1-2 weeks, unless previously scheduled.    [facility] [place] [address]  T: [phone]     Follow-up and recommended labs and tests     Fistulogram on Monday, Dec. 9th:  Check in at the welcome desk on 1st floor at [facility] at 11:30am.  NPO (nothing by mouth) for 4 hours prior to procedure.  Needs to have a .     Follow-up and recommended labs and tests     Resume regular dialysis schedule     Full Code     Diet    Follow this diet upon discharge: Mod CHO diet     Discharge Medications   Current Discharge Medication List      CONTINUE these medications which have NOT CHANGED    Details   acetaminophen (TYLENOL) 500 MG tablet Take 500 mg by mouth every 8 hours as needed for mild pain      famotidine (PEPCID) 10 MG tablet Take 1 tablet (10 mg) by mouth daily  Qty: 90 tablet, Refills: 3    Associated Diagnoses: Gastroesophageal reflux disease, esophagitis presence not specified      hypromellose-dextran (ARTIFICAL TEARS) 0.1-0.3 % ophthalmic solution Place 1 drop into both eyes daily as needed for dry eyes      !! insulin aspart (NOVOLOG FLEXPEN) 100 UNIT/ML pen Take 3 unit per 15 grams of carbohydrates three times daily with meals  Qty: 6 mL, Refills: 0    Associated Diagnoses: Type 2 diabetes mellitus with diabetic nephropathy, with long-term current use of insulin (H)      !! insulin aspart (NOVOLOG PEN) 100 UNIT/ML pen Corrective scale = 2 units for every BG 50 over 150  In addition to carb counting insulin      labetalol (NORMODYNE) 300 MG tablet Take 300 mg by  mouth 2 times daily Prescription is for 600mg twice daily, but the patient is taking 300mg twice daily      LEVEMIR FLEXTOUCH 100 UNIT/ML pen Inject 60 Units Subcutaneous At Bedtime  Qty: 18 mL, Refills: 11    Associated Diagnoses: Type 2 diabetes mellitus with diabetic nephropathy, with long-term current use of insulin (H)      sevelamer (RENVELA) 800 MG tablet Take 1 tablet (800 mg) by mouth 3 times daily (with meals)      ACCU-CHEK CHING PLUS test strip USE TO TEST BLOOD SUGAR 4 TIMES PER DAY   Qty: 200 each, Refills: 3    Associated Diagnoses: Type 2 diabetes mellitus with diabetic nephropathy, with long-term current use of insulin (H)       !! - Potential duplicate medications found. Please discuss with provider.        Allergies   Allergies   Allergen Reactions     Albuterol      shakiness     Aspirin      gi     Byetta [Exenatide]      gi     Clonidine      constipation     Codeine      vomiting     Ezetimibe      muscle symptoms     Hydralazine      headaches     Lisinopril      hyperkalemia     Metformin Hydrochloride      vomiting     Pravachol [Pravastatin Sodium]      muscle pains     Simvastatin      myalgias     Troglitazone      Data

## 2019-12-07 NOTE — PLAN OF CARE
Pt here for observation after fistula started bleeding. A&Ox4. VSS on RA. Lung sounds clear. Bowel sounds active. Regular diet, thin liquids. Takes pills whole with water. Up with A1 GB. Ambulated in halls. Tylenol given for pain. Slept on and off between cares. Pt reports itching on hands-denies all interventions offered. Pt scoring green on the Aggression Stop Light Tool. Plan for dialysis this AM. Discharge home today after dialysis and OP fistulagram.

## 2019-12-09 ENCOUNTER — HOSPITAL ENCOUNTER (OUTPATIENT)
Facility: CLINIC | Age: 59
Discharge: HOME | End: 2019-12-09
Attending: RADIOLOGY | Admitting: RADIOLOGY
Payer: MEDICARE

## 2019-12-09 ENCOUNTER — HOSPITAL ENCOUNTER (OUTPATIENT)
Dept: INTERVENTIONAL RADIOLOGY/VASCULAR | Facility: CLINIC | Age: 59
End: 2019-12-09
Attending: RADIOLOGY | Admitting: RADIOLOGY
Payer: MEDICARE

## 2019-12-09 ENCOUNTER — TELEPHONE (OUTPATIENT)
Dept: INTERNAL MEDICINE | Facility: CLINIC | Age: 59
End: 2019-12-09

## 2019-12-09 VITALS
RESPIRATION RATE: 16 BRPM | DIASTOLIC BLOOD PRESSURE: 79 MMHG | HEART RATE: 90 BPM | OXYGEN SATURATION: 96 % | SYSTOLIC BLOOD PRESSURE: 177 MMHG

## 2019-12-09 VITALS
BODY MASS INDEX: 33.38 KG/M2 | WEIGHT: 170 LBS | TEMPERATURE: 98.4 F | OXYGEN SATURATION: 97 % | HEIGHT: 60 IN | DIASTOLIC BLOOD PRESSURE: 62 MMHG | SYSTOLIC BLOOD PRESSURE: 165 MMHG | RESPIRATION RATE: 16 BRPM | HEART RATE: 94 BPM

## 2019-12-09 DIAGNOSIS — Z94.89 TRANSPLANT RECIPIENT: Primary | ICD-10-CM

## 2019-12-09 PROCEDURE — 40000854 ZZH STATISTIC SIMPLE TUBE INSERTION/CHARGE, PORT, CATH, FISTULOGRAM

## 2019-12-09 PROCEDURE — 27210740 ZZH ACCESSORY CR3

## 2019-12-09 PROCEDURE — C1725 CATH, TRANSLUMIN NON-LASER: HCPCS

## 2019-12-09 PROCEDURE — 25500064 ZZH RX 255 OP 636: Performed by: RADIOLOGY

## 2019-12-09 PROCEDURE — C1769 GUIDE WIRE: HCPCS

## 2019-12-09 PROCEDURE — 25000128 H RX IP 250 OP 636: Performed by: RADIOLOGY

## 2019-12-09 PROCEDURE — 36902 INTRO CATH DIALYSIS CIRCUIT: CPT

## 2019-12-09 PROCEDURE — 27211134 ZZH SHEATH CR2

## 2019-12-09 PROCEDURE — 27210742 ZZH CATH CR1

## 2019-12-09 PROCEDURE — 27210845 ZZH DEVICE INFLATION CR5

## 2019-12-09 PROCEDURE — 25800030 ZZH RX IP 258 OP 636: Performed by: RADIOLOGY

## 2019-12-09 PROCEDURE — 25000125 ZZHC RX 250

## 2019-12-09 PROCEDURE — 27210886 ZZH ACCESSORY CR5

## 2019-12-09 RX ORDER — FLUMAZENIL 0.1 MG/ML
0.2 INJECTION, SOLUTION INTRAVENOUS
Status: DISCONTINUED | OUTPATIENT
Start: 2019-12-09 | End: 2019-12-09 | Stop reason: HOSPADM

## 2019-12-09 RX ORDER — LIDOCAINE HYDROCHLORIDE 10 MG/ML
INJECTION, SOLUTION INFILTRATION; PERINEURAL
Status: COMPLETED
Start: 2019-12-09 | End: 2019-12-09

## 2019-12-09 RX ORDER — LABETALOL HYDROCHLORIDE 5 MG/ML
10 INJECTION, SOLUTION INTRAVENOUS ONCE
Status: COMPLETED | OUTPATIENT
Start: 2019-12-09 | End: 2019-12-09

## 2019-12-09 RX ORDER — LABETALOL HYDROCHLORIDE 5 MG/ML
INJECTION, SOLUTION INTRAVENOUS
Status: DISCONTINUED
Start: 2019-12-09 | End: 2019-12-09 | Stop reason: HOSPADM

## 2019-12-09 RX ORDER — LIDOCAINE HYDROCHLORIDE 10 MG/ML
1-30 INJECTION, SOLUTION INFILTRATION; PERINEURAL
Status: COMPLETED | OUTPATIENT
Start: 2019-12-09 | End: 2019-12-09

## 2019-12-09 RX ORDER — NALOXONE HYDROCHLORIDE 0.4 MG/ML
.1-.4 INJECTION, SOLUTION INTRAMUSCULAR; INTRAVENOUS; SUBCUTANEOUS
Status: DISCONTINUED | OUTPATIENT
Start: 2019-12-09 | End: 2019-12-09 | Stop reason: HOSPADM

## 2019-12-09 RX ORDER — IOPAMIDOL 612 MG/ML
50 INJECTION, SOLUTION INTRAVASCULAR ONCE
Status: COMPLETED | OUTPATIENT
Start: 2019-12-09 | End: 2019-12-09

## 2019-12-09 RX ORDER — FENTANYL CITRATE 50 UG/ML
25-50 INJECTION, SOLUTION INTRAMUSCULAR; INTRAVENOUS EVERY 5 MIN PRN
Status: DISCONTINUED | OUTPATIENT
Start: 2019-12-09 | End: 2019-12-09 | Stop reason: HOSPADM

## 2019-12-09 RX ADMIN — IOPAMIDOL 30 ML: 612 INJECTION, SOLUTION INTRAVENOUS at 15:01

## 2019-12-09 RX ADMIN — LIDOCAINE HYDROCHLORIDE 1 ML: 10 INJECTION, SOLUTION INFILTRATION; PERINEURAL at 14:38

## 2019-12-09 RX ADMIN — HEPARIN SODIUM 10000 UNITS: 10000 INJECTION INTRAVENOUS; SUBCUTANEOUS at 14:29

## 2019-12-09 RX ADMIN — LABETALOL HYDROCHLORIDE 10 MG: 5 INJECTION INTRAVENOUS at 14:58

## 2019-12-09 ASSESSMENT — MIFFLIN-ST. JEOR: SCORE: 1272.61

## 2019-12-09 NOTE — PRE-PROCEDURE
GENERAL PRE-PROCEDURE:   Procedure:  Right upper arm fistula fistulagram with possible angioplasty and/or stent placement with possible intra venous moderate sedation   Date/Time:  12/9/2019 12:10 PM    Written consent obtained?: Yes    Risks and benefits: Risks, benefits and alternatives were discussed    Consent given by:  Patient  Patient states understanding of procedure being performed: Yes    Patient's understanding of procedure matches consent: Yes    Procedure consent matches procedure scheduled: Yes    Expected level of sedation:  Moderate  Appropriately NPO:  Yes  ASA Class:  Class 3- Severe systemic disease, definite functional limitations  Mallampati  :  Grade 2- soft palate, base of uvula, tonsillar pillars, and portion of posterior pharyngeal wall visible  Lungs:  Lungs clear with good breath sounds bilaterally and other (comment)  Lung exam comment:  Distant in bases bilaterally  Heart:  Normal heart sounds and rate  History & Physical reviewed:  History and physical reviewed and no updates needed  Statement of review:  I have reviewed the lab findings, diagnostic data, medications, and the plan for sedation

## 2019-12-09 NOTE — PROCEDURES
[facility]    Procedure: Right upper extremity fistulogram.   Date/Time: 12/9/2019 3:05 PM  Performed by: Shayy Lou DO  Authorized by: Shayy Lou DO     UNIVERSAL PROTOCOL   Site Marked: Yes  Prior Images Obtained and Reviewed:  Yes  Required items: Required blood products, implants, devices and special equipment available    Patient identity confirmed:  Verbally with patient  Patient was reevaluated immediately before administering moderate or deep sedation or anesthesia  Confirmation Checklist:  Patient's identity using two indicators, relevant allergies, procedure was appropriate and matched the consent or emergent situation and correct equipment/implants were available  Time out: Immediately prior to the procedure a time out was called    Preparation: Patient was prepped and draped in usual sterile fashion       ANESTHESIA    Anesthesia: Local infiltration      SEDATION    Patient Sedated: No    Vital signs: Vital signs monitored during sedation    See dictated procedure note for full details.  Findings: Right upper extremity fistulgram with angioplasty of the mid outflow vein     Specimens: none    Complications: None    Condition: Stable    Plan: Bed rest then discharge home.     PROCEDURE   Patient Tolerance:  Patient tolerated the procedure well with no immediate complications    Length of time physician/provider present for 1:1 monitoring during sedation: 0

## 2019-12-09 NOTE — PROGRESS NOTES
Care Suites Admission Nursing Note    Reason for admission: Fistula gram  CS arrival time: 1115  Accompanied by: Pilar   Name/phone of DC : Pilar   Medications held: NA  Consent signed: Yes  Abnormal assessment/labs: NA  If abnormal, provider notified: NA  Education/questions answered: Reviewed procedure. Pt is very sensitive to sedation and would prefer to no have any sedation. Discussed making sure she let the staff know if she is having pain with procedure.  Plan: IR delay, proceed with procedure when ready.

## 2019-12-09 NOTE — TELEPHONE ENCOUNTER
"Dr. Martin- called patient for hospital f.u.    \"Tell me how you are doing now that you are home?\" going ok. Wants to pass onto Dr. Martin that BPs are doing a lot better and also that she needs colonoscopy and endoscopy and they called her to schedule in [facility] but she wants to go to [facility].  Is having fistulogram today. Is supposed to have dialysis m/w/f but is doubting she will have today. She thinks maybe she will have it tomorrow but then wants to go again on Wednesday and get back on her normal schedule. She is hoping MD doing fistulogram today will be able to sort everything out for her today. Also says she was given sutures and does not know how many sutures she got or when/if they come out, she is also going to check with MD doing procedure regarding that. Advised to call if still further questions after procedure\"    Confusion as to what plan is for dialysis and sutures. Unsure if they will use fistula after today? Patient hoping to get these questions answered after procedure today. Is there any further advisement or anyone she should follow up with besides vascular? Patient thinks vascular is doing procedure today. Advised she f/u with them in 1-2 weeks as well per discharge AVS, she was not aware of this. Declines f.u with PCP at this point.    Also please advise regarding patient's request to have colonoscopy and endoscopy to be done at [facility]. Says it was ordered by PCP, but do not see orders for both of these things?  "

## 2019-12-09 NOTE — DISCHARGE SUMMARY
Care Suites Discharge Nursing Note    Education/questions answered: yes  Patient DC location: home  Accompanied by: Mom  CS discharge time: 9630

## 2019-12-09 NOTE — IR NOTE
Interventional Radiology Intra-procedural Nursing Note    Patient Name: Yissel Wetzel  Medical Record Number: [id number]  Today's Date: December 9, 2019    Start Time: 1429  End of procedure time: 1500  Procedure: Right Fistulagram  Report given to: Terri DOMINGUEZ care suites  Time pt departs:  1515    Other Notes: Patient into IR suite 2 via cart. Awake and alert to all spheres. Patient to table in supine position, right arm prepped and draped with 2% chlorhexidine. VSS, hypertensive 's. Dr. Lou in room, timeout and procedure started. 6Fr sheath placed to fistula. Angioplasty done to narrowing of basilic in fistula. No sedation given. Debrief with Dr. Lou, no complications. Sheath out and pressure held by Mari Osborn RTR. Patient back to care suites via cart. Dressing to right fistula clean, dry and intact. Sutures also taken out of fistula by Dr. Lou.    Leny Vargas RN

## 2019-12-09 NOTE — DISCHARGE INSTRUCTIONS
Fistulagram Discharge Instructions     After you go home:      You may resume your normal diet    Have an adult stay with you for 6 hours if you received sedation       For 24 hours - due to the sedation you received:    Relax and take it easy    Do NOT make any important or legal decisions    Do NOT drive or operate machines at home or at work    Do NOT drink alcohol    Care of Puncture Site:      For the first 48 hrs, check your puncture site every couple hours while you are awake     You may remove/change the bandage tomorrow    You may shower tomorrow    No tub baths, whirlpools or swimming until your puncture site has fully healed    If puncture site starts to bleed - apply light pressure to site for 5 minutes or until bleeding stops     Activity       You should try to elevate your arm for the remainder of today to prevent increased swelling    You may go back to your normal activity in 24 hours     Wait 48 hours before lifting, straining, exercise or other strenuous activity    Medicines:      You may resume all your medications    For minor pain, you may take Acetaminophen (Tylenol) or Ibuprofen (Advil)                 Call the provider who ordered this procedure if:      Increased pain or a large or growing hard lump around the site    Blood or fluid is draining from the site    The site is red, swollen, hot or tender    Chills or a fever greater than 101 F (38 C)    Pain that is getting worse    Any questions or concerns      Call  911 or go to the Emergency Room if:    Severe pain or trouble breathing    Bleeding that you cannot control      If you have questions call:          [facility] Radiology Dept @ [phone]        The provider who performed your procedure was Dr. Lou.

## 2019-12-09 NOTE — TELEPHONE ENCOUNTER
"Hospital/TCU/ED for chronic condition Discharge Protocol    \"Hi, my name is Nick Espinosa RN, a registered nurse, and I am calling from [facility].  I am calling to follow up and see how things are going for you after your recent emergency visit/hospital/TCU stay.\"    Tell me how you are doing now that you are home?\" going ok. Wants to pass onto Dr. Martin that BPs are doing a lot better and also that she needs colonoscopy and endoscopy and was they called her to schedule in [facility] but she wants to go to [facility].  Is having fistulogram today. Is supposed to have dialysis m/w/f but is doubting she will have today. She thinks maybe she will have it tomorrow but then wants to go again on Wednesday and get back on her normal schedule. She is hoping MD doing fistulogram today will be able to sort everything out for her today. Also says she was given sutures and does not know how many sutures she got or when/if they come out, she is also going to check with MD doing procedure regarding that. Advised to call if still further questions after procedure    Discharge Instructions    \"Let's review your discharge instructions.  What is/are the follow-up recommendations?  Pt. Response: advised f.u with vascular per discharge summary. Advised f.u with PCP but she declines at this point.    \"Has an appointment with your primary care provider been scheduled?\"   no, pt declines at this point    \"When you see the provider, I would recommend that you bring your medications with you.\"    Medications    \"Tell me what changed about your medicines when you discharged?\"    Changes to chronic meds?    0-1    \"What questions do you have about your medications?\"    None     New diagnoses of heart failure, COPD, diabetes, or MI?    No     On insulin: \"Did you start on insulin in the hospital or did you have your insulin dose changed?\"  No         Post Discharge Medication Reconciliation Status: discharge medications reconciled, continue " "medications without change.    Was MTM referral placed (*Make sure to put transitions as reason for referral)?   No    Call Summary    \"What questions or concerns do you have about your recent visit and your follow-up care?\"     none    \"If you have questions or things don't continue to improve, we encourage you contact us through the main clinic number (give number).  Even if the clinic is not open, triage nurses are available 24/7 to help you.     We would like you to know that our clinic has extended hours (provide information).  We also have urgent care (provide details on closest location and hours/contact info)\"      \"Thank you for your time and take care!\"             "

## 2019-12-10 ENCOUNTER — TELEPHONE (OUTPATIENT)
Dept: OTHER | Facility: CLINIC | Age: 59
End: 2019-12-10

## 2019-12-10 DIAGNOSIS — Z99.2 ESRD (END STAGE RENAL DISEASE) ON DIALYSIS (H): Primary | ICD-10-CM

## 2019-12-10 DIAGNOSIS — N18.6 ESRD (END STAGE RENAL DISEASE) ON DIALYSIS (H): Primary | ICD-10-CM

## 2019-12-10 DIAGNOSIS — T82.898A OTHER SPECIFIED COMPLICATION OF VASCULAR PROSTHETIC DEVICES, IMPLANTS AND GRAFTS, INITIAL ENCOUNTER (H): ICD-10-CM

## 2019-12-10 NOTE — TELEPHONE ENCOUNTER
I had placed referrals for both colonoscopy and EGD to [facility] back in Nov.  Go ahead and give her that contact number.  Otherwise follow-up as scheduled

## 2019-12-10 NOTE — TELEPHONE ENCOUNTER
VM from Vero Corondao (#[phone]) in regards to getting Cassandra scheduled with a different vascular surgeon for AVF evaluation. I returned Darius call for further information and awaiting a call back to discuss further.    Janis HERNANDEZN, RN    [facility]  Vascular [facility]  Office: [phone]  Fax: [phone]

## 2019-12-11 ENCOUNTER — PATIENT OUTREACH (OUTPATIENT)
Dept: CARE COORDINATION | Facility: CLINIC | Age: 59
End: 2019-12-11

## 2019-12-11 ENCOUNTER — TELEPHONE (OUTPATIENT)
Dept: OTHER | Facility: CLINIC | Age: 59
End: 2019-12-11

## 2019-12-11 DIAGNOSIS — T82.838A HEMORRHAGE OF ARTERIOVENOUS FISTULA, INITIAL ENCOUNTER (H): ICD-10-CM

## 2019-12-11 NOTE — TELEPHONE ENCOUNTER
I attempted to call Vero a second time and she did not answer. A second VM was left to please call back [facility] nurse triage line and discuss her message from yesterday in regards to Cassandra.    Janis NOGUERA, RN    [facility]  Vascular [facility]  Office: [phone]  Fax: [phone]

## 2019-12-11 NOTE — LETTER
Health Care Home - Access Care Plan    About Me:    Patient Name:  Yissel Chacon    YOB: 1960  Age:                      59 year old   [facility] MRN:     [id number] Telephone Information:   Home Phone [phone]   Mobile [phone]       Address:  [address]  [place] [address] Email address:  [email or web address]      Emergency Contact(s)   Name Relationship Lgl Grd Work Phone Home Phone Mobile Phone   1. MARIN CHACON * Daughter   [phone]   2. JILL CHACON Son   [phone]             Health Maintenance: Routine Health maintenance Reviewed: Due/Overdue   Health Maintenance Due   Topic Date Due     HF ACTION PLAN  1960     HIV SCREENING  05/17/1976     MEDICARE ANNUAL WELLNESS VISIT  05/17/1979     HPV  05/17/1982     PAP  04/06/2003     ZOSTER IMMUNIZATION (1 of 2) 05/17/2011     COLONOSCOPY  09/06/2016     LIPID  07/22/2017     TSH W/FREE T4 REFLEX  01/20/2019     EYE EXAM  04/06/2019     MICROALBUMIN  06/07/2019     DIABETIC FOOT EXAM  06/07/2019     ASTHMA ACTION PLAN  06/07/2019     INFLUENZA VACCINE (1) 09/01/2019     My Access Plan  Medical Emergency 911   Questions or concerns during clinic hours Primary Clinic Line, I will call the clinic directly: [facility] [phone]   24 Hour Appointment Line [phone] or  [phone]) (toll free)   24 Hour Nurse Line [phone] (toll free)   Questions or concerns outside clinic hours 24 Hour Appointment Line, I will call the after-hours on-call line:   [facility] [phone] or [phone] ([phone]) (toll-free)   Preferred Urgent Care [facility], [phone]   Preferred Hospital [facility]  [phone]   Preferred Pharmacy [id number] - [place], [place] - [address] [facility]     Behavioral Health Crisis Line The National Suicide Prevention Lifeline at  3-303-994-6457 or 911       My Care Team Members  Patient Care Team       Relationship Specialty Notifications Start End    Gigi Martin MD PCP - General Internal Medicine  5/2/13     Phone: [phone] Pager: [phone] Fax: [phone] [address] [place] [address]    Gigi Martin MD Assigned PCP   5/12/13     Phone: [phone] Pager: [phone] Fax: [phone] [address] [place] [address]    Rahul Hernandez MD MD Nephrology  7/3/19     Phone: [phone] Pager: [phone] Fax: [phone]        [facility] [address] [place] [address]           My Medical and Care Information  Problem List   Patient Active Problem List   Diagnosis     Iron deficiency anemia     Esophagitis     Essential hypertension     Type 2 diabetes mellitus with diabetic nephropathy (H)     Hyperlipidemia LDL goal <100     Pulmonary hypertension (H)     Advanced directives, counseling/discussion     ESRD (end stage renal disease) (H)     Anemia in chronic kidney disease     Obesity     Former tobacco use     Bleeding pseudoaneurysm of right brachiocephalic arteriovenous fistula, initial encounter (H)      Current Medications:  Current Outpatient Medications   Medication     ACCU-CHEK CHING PLUS test strip     acetaminophen (TYLENOL) 500 MG tablet     famotidine (PEPCID) 10 MG tablet     hypromellose-dextran (ARTIFICAL TEARS) 0.1-0.3 % ophthalmic solution     insulin aspart (NOVOLOG FLEXPEN) 100 UNIT/ML pen     insulin aspart (NOVOLOG PEN) 100 UNIT/ML pen     labetalol (NORMODYNE) 300 MG tablet     LEVEMIR FLEXTOUCH 100 UNIT/ML pen     sevelamer (RENVELA) 800 MG tablet     No current facility-administered medications for this visit.

## 2019-12-11 NOTE — TELEPHONE ENCOUNTER
"December 11, 2019    [facility] attempted to call patient x2 but was unable to leave voice mail due to phone being \"temporarily disconnected\".     Left message on daughter's (Zaira) mobile contact number requesting that she or Cassandra call [facility] in regards to getting scheduled with a new Vascular Surgeon.     Per correspondence, scheduling is drafting appointment with Dr. Quiles.         Kayce Herrera  Appointment Scheduling   [facility]  Office: [phone]  Fax: [phone]  Direct: [phone]    "

## 2019-12-11 NOTE — TELEPHONE ENCOUNTER
Pt is s/p right brachial to basilic AVF creation on 12/26/18.    Vero called reporting pt was admitted 12/5/19 for profuse bleeding from fistula site, fistulogram completed 12/9/19 with angioplasty of the mid outflow vein.    Vero reports pt is dialyzing via CVC catheter and wants vascular surgery input if cannulation can resume at fistula site.  Pt will need to establish with new vascular surgeon.    Shantel Lopez, BSN, RN  [facility]

## 2019-12-11 NOTE — PROGRESS NOTES
Clinic Care Coordination Contact  [facility]/Voicemail    Referral Source: IP Report  Hospitalized 12/5/19 - 12/7/19 with bleeding from right AV fistula.  The patient underwent a fistulogram on 12/9/19 without complication.    Clinical Data: Care Coordinator Outreach  Outreach attempted x 1.  Left message on patient's voicemail with call back information and requested return call.  Plan: Care Coordinator will try to reach patient again in 1-2 business days.    Ayo Frausto RN  Clinic Care Coordinator  [facility]  Ph: [phone]

## 2019-12-11 NOTE — TELEPHONE ENCOUNTER
"I attempted to call Cassandra but her phone is \"temporarily disconnected\".   Left message on Zaira's (daughter) mobile contact number and requested that either she or Cassandra call [facility] to pursue scheduling with Dr. Quiles.     Kayce Herrera  Appointment Scheduling   [facility]  Office: [phone]  Fax: [phone]  Direct: [phone]    "

## 2019-12-11 NOTE — TELEPHONE ENCOUNTER
Called patient and she states they want her to go to [place] . [facility] . Left message to call back and see why patient cannot go to [facility] .Dunia Osullivan RN

## 2019-12-11 NOTE — LETTER
[place] CARE COORDINATION  [facility]  [address] , [place] [address]    December 13, 2019    Yissel Wetzel  [address]   [place] [facility] [place] [address]      Dear Yissel,    I am a clinic care coordinator who works with Gigi Martin MD at [facility]. I recently tried to call and was unable to reach you. I wanted to introduce myself and provide you with my contact information so that you can call me with questions or concerns about your health care. Below is a description of clinic care coordination and how I can further assist you.     The clinic care coordinator is a registered nurse and/or  who understand the health care system. The goal of clinic care coordination is to help you manage your health and improve access to the [facility] system in the most efficient manner. The registered nurse can assist you in meeting your health care goals by providing education, coordinating services, and strengthening the communication among your providers. The  can assist you with financial, behavioral, psychosocial, chemical dependency, counseling, and/or psychiatric resources.    Please feel free to contact me at [phone] with any questions or concerns. We at [facility] are focused on providing you with the highest-quality healthcare experience possible and that all starts with you.     Sincerely,     yAo Frausto RN  Clinic Care Coordinator  [facility]  Ph: [phone]    Enclosed: I have enclosed a copy of a 24 Hour Access Plan. This has helpful phone numbers for you to call when needed. Please keep this in an easy to access place to use as needed.

## 2019-12-12 NOTE — TELEPHONE ENCOUNTER
Please place an order for procedure only Endoscopy  For [facility] pt prefer to go here and there in an order only for colonoscopy at [facility] . The other order for both is for [facility] . Pt again prefers [facility].Dunia Osullivan RN

## 2019-12-13 NOTE — TELEPHONE ENCOUNTER
Patient is scheduled for her Consult Appointment with Dr Quiles on 12/23/19. Patient will still need to be scheduled for her Ultrasound and can be reached at [phone] - patient's phone will be turned back on 12/14/19.

## 2019-12-13 NOTE — PROGRESS NOTES
Clinic Care Coordination Contact  [facility]/Voicemail    Referral Source: IP Report  Hospitalized 12/5/19 - 12/7/19 with bleeding from right AV fistula.  The patient underwent a fistulogram on 12/9/19 without complication.    Clinical Data: Care Coordinator Outreach  Outreach attempted x 2.  Left message on patient's voicemail with call back information and requested return call.  Plan: Care Coordinator will send care coordination introduction letter with care coordinator contact information and explanation of care coordination services via mail. Care Coordinator will do no further outreaches at this time.    Ayo Frausto RN  Clinic Care Coordinator  [facility]  Ph: [phone]

## 2019-12-18 NOTE — TELEPHONE ENCOUNTER
December 18, 2019        [facility] scheduled new consult appointment on 12/23/2019 but patient needed US AVF prior to OV.     Offered patient multiple dates and explained the US order and importance of it being completed prior to the OV.   Patient was adamant that she wanted all appointments in one single day because it costs her extra expense coming to [facility] multiple times.   Patient is also due back to work on 12/30/2019 and was desiring an appointment during the week of 12/23/2019.   Due to US and provider scheduling conflicts, patient agreed to and is scheduled for US & OV with Dr. Pressley on 12/31/2019.   Due to uncertain work schedule, patient may have to cancel appointment but requested that we put her on the schedule for now.       Kayce Herrera  Appointment Scheduling   [facility]  Office: [phone]  Fax: [phone]  Direct: [phone]

## 2019-12-30 ENCOUNTER — OFFICE VISIT (OUTPATIENT)
Dept: URGENT CARE | Facility: URGENT CARE | Age: 59
End: 2019-12-30
Payer: MEDICARE

## 2019-12-30 ENCOUNTER — HOSPITAL ENCOUNTER (OUTPATIENT)
Facility: CLINIC | Age: 59
Setting detail: OBSERVATION
Discharge: HOME | End: 2019-12-31
Attending: EMERGENCY MEDICINE | Admitting: HOSPITALIST
Payer: MEDICARE

## 2019-12-30 ENCOUNTER — ANCILLARY PROCEDURE (OUTPATIENT)
Dept: GENERAL RADIOLOGY | Facility: CLINIC | Age: 59
End: 2019-12-30
Attending: FAMILY MEDICINE
Payer: MEDICARE

## 2019-12-30 VITALS
HEART RATE: 99 BPM | OXYGEN SATURATION: 91 % | SYSTOLIC BLOOD PRESSURE: 152 MMHG | RESPIRATION RATE: 18 BRPM | TEMPERATURE: 99 F | DIASTOLIC BLOOD PRESSURE: 72 MMHG

## 2019-12-30 DIAGNOSIS — J90 PLEURAL EFFUSION: ICD-10-CM

## 2019-12-30 DIAGNOSIS — R09.02 HYPOXEMIA: ICD-10-CM

## 2019-12-30 DIAGNOSIS — R05.9 COUGH: ICD-10-CM

## 2019-12-30 DIAGNOSIS — M54.9 UPPER BACK PAIN ON RIGHT SIDE: ICD-10-CM

## 2019-12-30 DIAGNOSIS — N18.6 ESRD (END STAGE RENAL DISEASE) (H): ICD-10-CM

## 2019-12-30 DIAGNOSIS — I10 ESSENTIAL HYPERTENSION: Primary | ICD-10-CM

## 2019-12-30 DIAGNOSIS — R91.8 X-RAY OF LUNG, ABNORMAL: Primary | ICD-10-CM

## 2019-12-30 PROCEDURE — 99285 EMERGENCY DEPT VISIT HI MDM: CPT | Mod: 25

## 2019-12-30 PROCEDURE — 96372 THER/PROPH/DIAG INJ SC/IM: CPT

## 2019-12-30 PROCEDURE — 99214 OFFICE O/P EST MOD 30 MIN: CPT | Performed by: FAMILY MEDICINE

## 2019-12-30 PROCEDURE — 71046 X-RAY EXAM CHEST 2 VIEWS: CPT

## 2019-12-31 ENCOUNTER — APPOINTMENT (OUTPATIENT)
Dept: ULTRASOUND IMAGING | Facility: CLINIC | Age: 59
End: 2019-12-31
Attending: HOSPITALIST
Payer: MEDICARE

## 2019-12-31 ENCOUNTER — OFFICE VISIT (OUTPATIENT)
Dept: OTHER | Facility: CLINIC | Age: 59
End: 2019-12-31
Attending: SURGERY
Payer: MEDICARE

## 2019-12-31 VITALS
BODY MASS INDEX: 34.36 KG/M2 | HEIGHT: 60 IN | HEART RATE: 62 BPM | SYSTOLIC BLOOD PRESSURE: 148 MMHG | WEIGHT: 175 LBS | RESPIRATION RATE: 14 BRPM | DIASTOLIC BLOOD PRESSURE: 64 MMHG

## 2019-12-31 VITALS
OXYGEN SATURATION: 92 % | HEIGHT: 60 IN | DIASTOLIC BLOOD PRESSURE: 49 MMHG | WEIGHT: 175.7 LBS | RESPIRATION RATE: 16 BRPM | SYSTOLIC BLOOD PRESSURE: 174 MMHG | HEART RATE: 85 BPM | TEMPERATURE: 98.5 F | BODY MASS INDEX: 34.5 KG/M2

## 2019-12-31 DIAGNOSIS — T82.9XXA COMPLICATION OF AV DIALYSIS FISTULA, INITIAL ENCOUNTER: Primary | ICD-10-CM

## 2019-12-31 LAB
ALBUMIN SERPL-MCNC: 3.1 G/DL (ref 3.4–5)
ALP SERPL-CCNC: 88 U/L (ref 40–150)
ALT SERPL W P-5'-P-CCNC: 14 U/L (ref 0–50)
ANION GAP SERPL CALCULATED.3IONS-SCNC: 8 MMOL/L (ref 3–14)
APPEARANCE FLD: CLEAR
AST SERPL W P-5'-P-CCNC: 16 U/L (ref 0–45)
BASOPHILS # BLD AUTO: 0 10E9/L (ref 0–0.2)
BASOPHILS NFR BLD AUTO: 0.3 %
BILIRUB SERPL-MCNC: 0.6 MG/DL (ref 0.2–1.3)
BUN SERPL-MCNC: 30 MG/DL (ref 7–30)
CALCIUM SERPL-MCNC: 8.1 MG/DL (ref 8.5–10.1)
CHLORIDE SERPL-SCNC: 98 MMOL/L (ref 94–109)
CO2 SERPL-SCNC: 28 MMOL/L (ref 20–32)
COLOR FLD: YELLOW
CREAT SERPL-MCNC: 3.24 MG/DL (ref 0.52–1.04)
DIFFERENTIAL METHOD BLD: ABNORMAL
EOSINOPHIL # BLD AUTO: 0.3 10E9/L (ref 0–0.7)
EOSINOPHIL NFR BLD AUTO: 3.7 %
ERYTHROCYTE [DISTWIDTH] IN BLOOD BY AUTOMATED COUNT: 14.8 % (ref 10–15)
GFR SERPL CREATININE-BSD FRML MDRD: 15 ML/MIN/{1.73_M2}
GLUCOSE BLDC GLUCOMTR-MCNC: 195 MG/DL (ref 70–99)
GLUCOSE BLDC GLUCOMTR-MCNC: 247 MG/DL (ref 70–99)
GLUCOSE BLDC GLUCOMTR-MCNC: 326 MG/DL (ref 70–99)
GLUCOSE FLD-MCNC: 262 MG/DL
GLUCOSE SERPL-MCNC: 298 MG/DL (ref 70–99)
GRAM STN SPEC: NORMAL
HCT VFR BLD AUTO: 25.7 % (ref 35–47)
HGB BLD-MCNC: 7.9 G/DL (ref 11.7–15.7)
IMM GRANULOCYTES # BLD: 0 10E9/L (ref 0–0.4)
IMM GRANULOCYTES NFR BLD: 0.1 %
INR PPP: 1.27 (ref 0.86–1.14)
LDH FLD L TO P-CCNC: 80 U/L
LYMPHOCYTES # BLD AUTO: 1.1 10E9/L (ref 0.8–5.3)
LYMPHOCYTES NFR BLD AUTO: 14.2 %
LYMPHOCYTES NFR FLD MANUAL: 7 %
MCH RBC QN AUTO: 30.2 PG (ref 26.5–33)
MCHC RBC AUTO-ENTMCNC: 30.7 G/DL (ref 31.5–36.5)
MCV RBC AUTO: 98 FL (ref 78–100)
MONOCYTES # BLD AUTO: 0.8 10E9/L (ref 0–1.3)
MONOCYTES NFR BLD AUTO: 10.1 %
MONOS+MACROS NFR FLD MANUAL: 14 %
NEUTROPHILS # BLD AUTO: 5.6 10E9/L (ref 1.6–8.3)
NEUTROPHILS NFR BLD AUTO: 71.6 %
NEUTS BAND NFR FLD MANUAL: 79 %
PLATELET # BLD AUTO: 198 10E9/L (ref 150–450)
POTASSIUM SERPL-SCNC: 3.6 MMOL/L (ref 3.4–5.3)
PROT FLD-MCNC: 2.3 G/DL
PROT SERPL-MCNC: 6.5 G/DL (ref 6.8–8.8)
RBC # BLD AUTO: 2.62 10E12/L (ref 3.8–5.2)
SODIUM SERPL-SCNC: 134 MMOL/L (ref 133–144)
SPECIMEN SOURCE FLD: NORMAL
SPECIMEN SOURCE: NORMAL
WBC # BLD AUTO: 7.8 10E9/L (ref 4–11)
WBC # FLD AUTO: 272 /UL

## 2019-12-31 PROCEDURE — 93990 DOPPLER FLOW TESTING: CPT

## 2019-12-31 PROCEDURE — G0378 HOSPITAL OBSERVATION PER HR: HCPCS

## 2019-12-31 PROCEDURE — 89051 BODY FLUID CELL COUNT: CPT | Performed by: HOSPITALIST

## 2019-12-31 PROCEDURE — 25000132 ZZH RX MED GY IP 250 OP 250 PS 637: Mod: GY | Performed by: HOSPITALIST

## 2019-12-31 PROCEDURE — 84157 ASSAY OF PROTEIN OTHER: CPT | Performed by: HOSPITALIST

## 2019-12-31 PROCEDURE — G0463 HOSPITAL OUTPT CLINIC VISIT: HCPCS

## 2019-12-31 PROCEDURE — 87070 CULTURE OTHR SPECIMN AEROBIC: CPT | Performed by: HOSPITALIST

## 2019-12-31 PROCEDURE — 85025 COMPLETE CBC W/AUTO DIFF WBC: CPT | Performed by: EMERGENCY MEDICINE

## 2019-12-31 PROCEDURE — 80053 COMPREHEN METABOLIC PANEL: CPT | Performed by: EMERGENCY MEDICINE

## 2019-12-31 PROCEDURE — 00000146 ZZHCL STATISTIC GLUCOSE BY METER IP

## 2019-12-31 PROCEDURE — 85610 PROTHROMBIN TIME: CPT | Performed by: PHYSICIAN ASSISTANT

## 2019-12-31 PROCEDURE — 96372 THER/PROPH/DIAG INJ SC/IM: CPT | Mod: 59

## 2019-12-31 PROCEDURE — 82945 GLUCOSE OTHER FLUID: CPT | Performed by: HOSPITALIST

## 2019-12-31 PROCEDURE — 87015 SPECIMEN INFECT AGNT CONCNTJ: CPT | Performed by: HOSPITALIST

## 2019-12-31 PROCEDURE — 25000131 ZZH RX MED GY IP 250 OP 636 PS 637: Mod: GY | Performed by: EMERGENCY MEDICINE

## 2019-12-31 PROCEDURE — 99207 ZZC APP CREDIT; MD BILLING SHARED VISIT: CPT | Performed by: INTERNAL MEDICINE

## 2019-12-31 PROCEDURE — 36415 COLL VENOUS BLD VENIPUNCTURE: CPT | Performed by: PHYSICIAN ASSISTANT

## 2019-12-31 PROCEDURE — 25000131 ZZH RX MED GY IP 250 OP 636 PS 637: Mod: GY | Performed by: HOSPITALIST

## 2019-12-31 PROCEDURE — 27210190 US THORACENTESIS

## 2019-12-31 PROCEDURE — 87205 SMEAR GRAM STAIN: CPT | Performed by: HOSPITALIST

## 2019-12-31 PROCEDURE — 25000125 ZZHC RX 250: Performed by: RADIOLOGY

## 2019-12-31 PROCEDURE — 83615 LACTATE (LD) (LDH) ENZYME: CPT | Performed by: HOSPITALIST

## 2019-12-31 PROCEDURE — 40000863 ZZH STATISTIC RADIOLOGY XRAY, US, CT, MAR, NM

## 2019-12-31 PROCEDURE — 25000132 ZZH RX MED GY IP 250 OP 250 PS 637: Mod: GY | Performed by: EMERGENCY MEDICINE

## 2019-12-31 PROCEDURE — 99235 HOSP IP/OBS SAME DATE MOD 70: CPT | Performed by: HOSPITALIST

## 2019-12-31 PROCEDURE — 99203 OFFICE O/P NEW LOW 30 MIN: CPT | Mod: ZP | Performed by: SURGERY

## 2019-12-31 RX ORDER — ACETAMINOPHEN 325 MG/1
650 TABLET ORAL EVERY 4 HOURS PRN
Status: DISCONTINUED | OUTPATIENT
Start: 2019-12-31 | End: 2019-12-31 | Stop reason: HOSPADM

## 2019-12-31 RX ORDER — GUAIFENESIN 600 MG/1
1200 TABLET, EXTENDED RELEASE ORAL 2 TIMES DAILY
Status: DISCONTINUED | OUTPATIENT
Start: 2019-12-31 | End: 2019-12-31 | Stop reason: HOSPADM

## 2019-12-31 RX ORDER — AMOXICILLIN 250 MG
2 CAPSULE ORAL 2 TIMES DAILY PRN
Status: DISCONTINUED | OUTPATIENT
Start: 2019-12-31 | End: 2019-12-31 | Stop reason: HOSPADM

## 2019-12-31 RX ORDER — LABETALOL 100 MG/1
300 TABLET, FILM COATED ORAL 2 TIMES DAILY
Status: DISCONTINUED | OUTPATIENT
Start: 2019-12-31 | End: 2019-12-31

## 2019-12-31 RX ORDER — LABETALOL 100 MG/1
100 TABLET, FILM COATED ORAL 2 TIMES DAILY
Status: DISCONTINUED | OUTPATIENT
Start: 2019-12-31 | End: 2019-12-31 | Stop reason: HOSPADM

## 2019-12-31 RX ORDER — AMOXICILLIN 250 MG
1 CAPSULE ORAL 2 TIMES DAILY PRN
Status: DISCONTINUED | OUTPATIENT
Start: 2019-12-31 | End: 2019-12-31 | Stop reason: HOSPADM

## 2019-12-31 RX ORDER — NICOTINE POLACRILEX 4 MG
15-30 LOZENGE BUCCAL
Status: DISCONTINUED | OUTPATIENT
Start: 2019-12-31 | End: 2019-12-31 | Stop reason: HOSPADM

## 2019-12-31 RX ORDER — LIDOCAINE HYDROCHLORIDE 10 MG/ML
10 INJECTION, SOLUTION EPIDURAL; INFILTRATION; INTRACAUDAL; PERINEURAL ONCE
Status: COMPLETED | OUTPATIENT
Start: 2019-12-31 | End: 2019-12-31

## 2019-12-31 RX ORDER — ONDANSETRON 4 MG/1
4 TABLET, ORALLY DISINTEGRATING ORAL EVERY 6 HOURS PRN
Status: DISCONTINUED | OUTPATIENT
Start: 2019-12-31 | End: 2019-12-31 | Stop reason: HOSPADM

## 2019-12-31 RX ORDER — LABETALOL 100 MG/1
100 TABLET, FILM COATED ORAL 2 TIMES DAILY
Status: ON HOLD
Start: 2019-12-31 | End: 2021-07-16

## 2019-12-31 RX ORDER — QUININE SULFATE 324 MG/1
324 CAPSULE ORAL
Status: DISCONTINUED | OUTPATIENT
Start: 2019-12-31 | End: 2019-12-31 | Stop reason: HOSPADM

## 2019-12-31 RX ORDER — LABETALOL 200 MG/1
100 TABLET, FILM COATED ORAL DAILY
COMMUNITY
End: 2020-01-20

## 2019-12-31 RX ORDER — ONDANSETRON 2 MG/ML
4 INJECTION INTRAMUSCULAR; INTRAVENOUS EVERY 6 HOURS PRN
Status: DISCONTINUED | OUTPATIENT
Start: 2019-12-31 | End: 2019-12-31 | Stop reason: HOSPADM

## 2019-12-31 RX ORDER — DEXTROSE MONOHYDRATE 25 G/50ML
25-50 INJECTION, SOLUTION INTRAVENOUS
Status: DISCONTINUED | OUTPATIENT
Start: 2019-12-31 | End: 2019-12-31 | Stop reason: HOSPADM

## 2019-12-31 RX ORDER — ACETAMINOPHEN 650 MG/1
650 SUPPOSITORY RECTAL EVERY 4 HOURS PRN
Status: DISCONTINUED | OUTPATIENT
Start: 2019-12-31 | End: 2019-12-31 | Stop reason: HOSPADM

## 2019-12-31 RX ORDER — NALOXONE HYDROCHLORIDE 0.4 MG/ML
.1-.4 INJECTION, SOLUTION INTRAMUSCULAR; INTRAVENOUS; SUBCUTANEOUS
Status: DISCONTINUED | OUTPATIENT
Start: 2019-12-31 | End: 2019-12-31 | Stop reason: HOSPADM

## 2019-12-31 RX ORDER — GUAIFENESIN 600 MG/1
1200 TABLET, EXTENDED RELEASE ORAL 2 TIMES DAILY
Status: DISCONTINUED | OUTPATIENT
Start: 2019-12-31 | End: 2019-12-31

## 2019-12-31 RX ADMIN — INSULIN ASPART 2 UNITS: 100 INJECTION, SOLUTION INTRAVENOUS; SUBCUTANEOUS at 09:20

## 2019-12-31 RX ADMIN — INSULIN ASPART 3 UNITS: 100 INJECTION, SOLUTION INTRAVENOUS; SUBCUTANEOUS at 09:21

## 2019-12-31 RX ADMIN — LABETALOL HYDROCHLORIDE 100 MG: 100 TABLET, FILM COATED ORAL at 08:41

## 2019-12-31 RX ADMIN — INSULIN DETEMIR 60 UNITS: 100 INJECTION, SOLUTION SUBCUTANEOUS at 04:20

## 2019-12-31 RX ADMIN — LIDOCAINE HYDROCHLORIDE 10 ML: 10 INJECTION, SOLUTION EPIDURAL; INFILTRATION; INTRACAUDAL; PERINEURAL at 10:03

## 2019-12-31 ASSESSMENT — MIFFLIN-ST. JEOR
SCORE: 1298.47
SCORE: 1295.29

## 2019-12-31 ASSESSMENT — ENCOUNTER SYMPTOMS
FEVER: 0
SHORTNESS OF BREATH: 1
COUGH: 1
CHILLS: 0
BACK PAIN: 1
CONSTIPATION: 1

## 2019-12-31 NOTE — PHARMACY-ADMISSION MEDICATION HISTORY
Pharmacy Medication History  Admission medication history interview status for the 12/30/2019  admission is complete. See EPIC admission navigator for prior to admission medications     Medication history sources: Patient  Medication history source reliability: Good  Adherence assessment: Good    Significant changes made to the medication list:  1) Patient states that she has been on higher doses of labetalol in the past but these have made her blood pressure drop too much too quickly. She states that she is now only taking half of a 200mg tablet (100mg) once daily and per discussion with primary provider may need to go up to 100mg twice daily.       Additional medication history information:   1) Patient states she also receives Epogen and Iron infusions with dialysis. She states her hemoglobin has been very low (<7)    Medication reconciliation completed by provider prior to medication history? Yes    Time spent in this activity: 20 minutes      Prior to Admission medications    Medication Sig Last Dose Taking? Auth Provider   acetaminophen (TYLENOL) 500 MG tablet Take 500 mg by mouth every 8 hours as needed for mild pain prn med Yes Unknown, Entered By History   famotidine (PEPCID) 10 MG tablet Take 1 tablet (10 mg) by mouth daily  Patient taking differently: Take 10 mg by mouth daily as needed  prn med Yes Gigi Martin MD   hypromellose-dextran (ARTIFICAL TEARS) 0.1-0.3 % ophthalmic solution Place 1 drop into both eyes daily as needed for dry eyes prn med Yes Unknown, Entered By History   insulin aspart (NOVOLOG FLEXPEN) 100 UNIT/ML pen Take 3 unit per 15 grams of carbohydrates three times daily with meals 12/30/2019 at Unknown time Yes Gigi Martin MD   insulin aspart (NOVOLOG PEN) 100 UNIT/ML pen Corrective scale = 2 units for every BG 50 over 150  In addition to carb counting insulin 12/30/2019 at Unknown time Yes Unknown, Entered By History   labetalol (NORMODYNE) 200 MG tablet Take 100 mg by  mouth daily 12/30/2019 at Unknown time Yes Unknown, Entered By History   LEVEMIR FLEXTOUCH 100 UNIT/ML pen Inject 60 Units Subcutaneous At Bedtime 12/31/2019 at 0400 Yes Gigi Martin MD   sevelamer (RENVELA) 800 MG tablet Take 1 tablet (800 mg) by mouth 3 times daily (with meals) 12/30/2019 at Unknown time Yes Gigi Martin MD   ACCU-CHEK CHING PLUS test strip USE TO TEST BLOOD SUGAR 4 TIMES PER DAY    Gigi Matrin MD

## 2019-12-31 NOTE — ED NOTES
Bed: ED12  Expected date: 12/30/19  Expected time: 11:39 PM  Means of arrival:   Comments:  Triage

## 2019-12-31 NOTE — ED PROVIDER NOTES
"  History     Chief Complaint:  Shortness of Breath    The history is provided by the patient.      Yissel Wetzel is a 59 year old female with a history of end stage renal failure on dialysis, diabetes, hyperlipidemia, and hypertension, who presents to the emergency department with her significant other for evaluation of shortness of breath. For the last several months, longer than 8 months, the patient has had intermittent episodes of shortness of breath with a cough and occasional fluid found in her lungs. She states that her shortness of breath worsens with exertion. Since April, she has been receiving dialysis three times a week. She notes that after dialysis her symptoms improve and can feel fluid drain from her lungs. For the last couple of days, the patient has been constipated as she is \"cramping constantly.\" She tries to drink tonic water and normal water but is seeing no improvement with that.    Today, the patient's shortness of breath worsened and developed back pain with deep breathing, that hurts more on her right side. This prompted her to been seen in the clinic, where they did an x-ray, see results below. They found fluid in her lungs and was sent over to the ED to be drained.    Patient denies any chest pain, leg swelling, fevers, or chills.     XR Chest, G/E 2 views:   There is a central venous port, tip projects in the region of the right atrium inferior vena cava junction. Moderate size right pleural effusion is present similar to that seen on the prior exam. Cardiomegaly is also noted. Left lung is clear. As per radiology.    Allergies:  Albuterol  Aspirin  Byetta   Clonidine  Codeine  Ezetimibe  Hydralazine  Lisinopril  Metformin Hydrochloride  Pravachol   Simvastatin  Troglitazone     Medications:    Pepcid  Novolog  Normodyne  Renvela     Past Medical History:    Bleeding pseudoaneurysm of right brachiocephalic arteriovenous fistula  Anemia in CKD  ESRD  Hypertension   Esophagitis  Type 2 " diabetes  Hyperlipidemia    End stage renal failure on dialysis  Pneumonia   Asthma    Past Surgical History:    Abdominoplasty   x2  Right brachial to basilic arteriovenous fistula   Tonsillectomy  Eye injections  Partial hysterectomy  CVC tunnel placement   Dialysis fistulogram right     Family History:    Arrhythmia   Hypertension  Pancreatic cancer  Diabetes  Asthma  Cirrhosis     Social History:  Former tobacco user. Quit date:   Negative for alcohol use.  Negative for drug use.  Marital Status:  Single     Review of Systems   Constitutional: Negative for chills and fever.   Respiratory: Positive for cough and shortness of breath.    Cardiovascular: Negative for chest pain and leg swelling.   Gastrointestinal: Positive for constipation.   Musculoskeletal: Positive for back pain.   All other systems reviewed and are negative.        Physical Exam     Patient Vitals for the past 24 hrs:   BP Temp Temp src Pulse Resp SpO2 Weight   19 2109 (!) 194/55 99.2  F (37.3  C) Temporal 89 16 92 % 78.9 kg (174 lb)     Physical Exam  Constitutional:  Appears well-developed and well-nourished. Cooperative.   HENT:   Head:    Atraumatic.   Mouth/Throat:   Oropharynx is without erythema or exudate and mucous     membranes are moist.   Eyes:    Conjunctivae normal and EOM are normal.      Pupils are equal, round, and reactive to light. Cloudy opacity in right pupil.   Neck:    Normal range of motion. Neck supple.   Cardiovascular:  Normal rate, regular rhythm, normal heart sounds and radial and    dorsalis pedis pulses are 2+ and symmetric.    Pulmonary/Chest:  Effort johnathon. Diminished breath sounds in bilateral lungs - left more than right.   Abdominal:   Soft. Bowel sounds are normal.      No splenomegaly or hepatomegaly. No tenderness. No rebound.   Musculoskeletal:  Normal range of motion. No edema and no tenderness.   Neurological:  Alert. Normal strength. No cranial nerve deficit.   Skin:    Skin is warm  and dry.   Psychiatric:   Normal mood and affect.     Emergency Department Course   Laboratory:  CBC: WBC: 7.8, HGB: 7.9 (L), PLT: 198  CMP: Glucose 298 (H), Creatinine: 3.24 (H), GFR Estimate: 15 (L), Calcium: 8.1 (L), Albumin: 3.1 (L), Protein Total: 6.5 (L), o/w WNL     Emergency Department Course:  Nursing notes and vitals reviewed. 0015 I performed an exam of the patient as documented above.     Blood drawn. This was sent to the lab for further testing, results above.    0042 I rechecked the patient and discussed the results of her workup thus far.     0230  I consulted with Dr. Huddleston of the hospitalist services. They are in agreement to accept the patient for admission.    Findings and plan explained to the Patient and significant other who consents to admission. Discussed the patient with Dr. Huddleston, who will admit the patient to an observation bed for further monitoring, evaluation, and treatment.    Impression & Plan    Medical Decision Making:  Yissel Wetzel is a 59 year old female who presents today after noticing right sided chest pain and increasing shortness of breath for the last few days. She went to an outside clinic and was noted to have right sided pleural effusion. She said she had one in April that had to be drained.    Here, her laboratory testing returned looking fairly unremarkable, aside from expected changes in her kidney function and a stable anemia. I reviewed the chest x-ray from urgent care and effusion is present. She has no signs or symptoms suggesting pneumonia. We had the patient get up and ambulate and she was dyspneic and dropped her saturations into the upper 80's. Because of this, we will bring the patient in under an observation status with a plan to tap the effusion in the morning.    The patient goes to dialysis and had a normal run yesterday. She states they are taking off a little less fluid than usual on recent days because of muscle cramps. This may be due to why she has  accumulated the effusion.    I discussed test results, plan for admission, and treatment plan with the patient and her significant other, and both voice understanding. Dr Huddleston of the hospitalist service accepted the patient for admission.    Diagnosis:    ICD-10-CM    1. Hypoxemia R09.02 Comprehensive metabolic panel   2. Pleural effusion J90        Disposition:  Admitted to Dr. Huddleston    Scribe Disclosure:  Ana Lilia ZAPATA, am serving as a scribe on 12/31/2019 at 12:20 AM to personally document services performed by Yoni Lance MD based on my observations and the provider's statements to me.     Ana Lilia Fonseca  12/30/2019    EMERGENCY DEPARTMENT       Yoni Lance MD  12/31/19 0757

## 2019-12-31 NOTE — ED TRIAGE NOTES
MANE for the last few days. Seen by PCP today, CXR shows fluid collection in lungs. Dialysis patient, had a normal run today.

## 2019-12-31 NOTE — PROGRESS NOTES
SUBJECTIVE: Yissel Wetzel is a 59 year old female presenting with a chief complaint of rt upper back pain/SOB/cough.  Onset of symptoms was day(s) ago.  Course of illness is worsening.    Severity moderate  Current and Associated symptoms: cough - non-productive  Treatment measures tried include None tried.  Predisposing factors include see hx.    Past Medical History:   Diagnosis Date     Allergic rhinitis, cause unspecified      Anemia in chronic kidney disease      Anemia of chronic disease      Bleeding pseudoaneurysm of right brachiocephalic arteriovenous fistula, initial encounter (H) 2019     End stage renal failure on dialysis (H)      Esophagitis, unspecified      Former tobacco use      HEMORRHAGIC GASTROPATHY      History of blood transfusion     [place]     Iron deficiency anemia, unspecified      Mild persistent asthma      Obesity      Other and unspecified hyperlipidemia      Pneumonia      Pulmonary hypertension (H)     per 2012 echo mod.to severe pulmonary hypertension     Tobacco use disorder dc ed      Type 2 diabetes mellitus (H)     on Insulin- managed by PCP     Unspecified essential hypertension      Allergies   Allergen Reactions     Albuterol      shakiness     Aspirin      gi     Byetta [Exenatide]      gi     Clonidine      constipation     Codeine      vomiting     Ezetimibe      muscle symptoms     Hydralazine      headaches     Lisinopril      hyperkalemia     Metformin Hydrochloride      vomiting     Pravachol [Pravastatin Sodium]      muscle pains     Simvastatin      myalgias     Troglitazone      Social History     Tobacco Use     Smoking status: Former Smoker     Packs/day: 1.00     Years: 22.00     Pack years: 22.00     Types: Cigarettes     Last attempt to quit: 10/12/1999     Years since quittin.2     Smokeless tobacco: Never Used   Substance Use Topics     Alcohol use: No     Alcohol/week: 0.0 standard drinks       ROS:  SKIN: no rash  GI: no  vomiting    OBJECTIVE:  BP (!) 152/72   Pulse 99   Temp 99  F (37.2  C) (Tympanic)   Resp 18   SpO2 91% GENERAL APPEARANCE: healthy, alert and no distress  EYES: EOMI,  PERRL, conjunctiva clear  HENT: ear canals and TM's normal.  Nose and mouth without ulcers, erythema or lesions  NECK: supple, nontender, no lymphadenopathy  RESP: decreased breath sounds Rt sided   CV: regular rates and rhythm, normal S1 S2, no murmur noted  ABDOMEN:  soft, nontender, no HSM or masses and bowel sounds normal  NEURO: Normal strength and tone, sensory exam grossly normal,  normal speech and mentation  SKIN: no suspicious lesions or rashes    CXR with large effusion rt sided      ICD-10-CM    1. X-ray of lung, abnormal R91.8    2. Cough R05 XR Chest 2 Views   3. Upper back pain on right side M54.9 XR Chest 2 Views   4. ESRD (end stage renal disease) (H) N18.6      Pt will go through ED for cont w/u

## 2019-12-31 NOTE — H&P
[facility]    History and Physical  Hospitalist       Date of Admission:  12/30/2019    Assessment & Plan   Yissel Wetzel is a 59 year old female who presents with shortness of breath and pleuritic back pain, found to have right pleural effusion (recurrent) and sent to ED for hopeful thoracentesis    Right sided pleural effusion  Reported to be hypoxic per ED physician, none documented, but noted she is on 2LPM with O2 sat 96%. R pleural effusion has been drained previously, last in April 2019.   - Observation admit  - Thoracentesis in AM on right  - mucinex PRN for congestion  - O2 as needed, monitor oxygen sats  - check ambulatory pulse ox post thoracentesis.    ESRD on MWF dialysis  Completed run of dialysis on 12/30 with less fluid off than anticipated due to issues with cramping  - Had appt for US extremity arterial venous dialysis graft on 12/31, ordered to be completed while she is here in case she is not an early IR case  - Takes quinine to help with cramping--ordered PRN while inpatient    Diabetes Mellitus  PTA 60mg levemir at bedtime with aspart 3 units per 15 grams of carbs with meals and then a sliding scale.  - Received evening dose of levemir in ED and continued insulin aspart carb counting  - moderate resistance diet  - medium sliding scale    Hypertension  PTA labetalol 300mg BID, continue    DVT Prophylaxis: Low Risk/Ambulatory with no VTE prophylaxis indicated  Code Status: Full Code  Expected discharge: Today, recommended to prior living arrangement once thoracentesis completed    Marlys Huddleston DO    Primary Care Physician   Gigi Martin    Chief Complaint   Shortness of breath    History is obtained from the patient    History of Present Illness   Yissel Wetzel is a 59 year old female who presents with 8 month history of shortness of breath, usually intermittent, but becoming more frequent over past few weeks. Reports that after right pleural effusion was tapped  previously, she had improvement of her dyspnea on exertion. Lately she has been getting cramping during dialysis sessions and not as much fluid has been able to be removed. Along with this her MANE has gotten worse. She was noted to be hypoxic with ambulation by ED staff, but not documented. She is comfortable at rest in a wheelchair, she has not shortness of breath with conversation. Reports congestion which improves with mucinex and some back pain that has been on the right side (worse with deep breaths). She also reports cramping pain since dialysis session (usually improves with quinine and tonic water). No nausea, vomiting, fevers, chills, bowel habit changes.    Past Medical History    I have reviewed this patient's medical history and updated it with pertinent information if needed.   Past Medical History:   Diagnosis Date     Allergic rhinitis, cause unspecified      Anemia in chronic kidney disease      Anemia of chronic disease      Bleeding pseudoaneurysm of right brachiocephalic arteriovenous fistula, initial encounter (H) 2019     End stage renal failure on dialysis (H)      Esophagitis, unspecified      Former tobacco use      HEMORRHAGIC GASTROPATHY      History of blood transfusion     [place]     Iron deficiency anemia, unspecified      Mild persistent asthma      Obesity      Other and unspecified hyperlipidemia      Pneumonia      Pulmonary hypertension (H)     per 2012 echo mod.to severe pulmonary hypertension     Tobacco use disorder dc ed      Type 2 diabetes mellitus (H)     on Insulin- managed by PCP     Unspecified essential hypertension        Past Surgical History   I have reviewed this patient's surgical history and updated it with pertinent information if needed.  Past Surgical History:   Procedure Laterality Date     ABDOMINOPLASTY  pt unsure when    abdominoplasty-      SECTION  ,     x 2     COLONOSCOPY      [facility]  FISTULA ARTERIOVENOUS UPPER EXTREMITY Right 12/26/2018    Procedure: RIGHT BRACHIAL TO BASILIC ARTERIOVENOUS FISTULA CREATION;  Surgeon: Terry Vizcaino MD;  Location: [facility]     ENT SURGERY  as a child    tonsillectomy     EYE SURGERY Left     injections- eye     HYSTERECTOMY  approx 1983    partial hysterectomy-reason bleeding      IR CVC TUNNEL PLACEMENT > 5 YRS OF AGE  12/6/2019     IR DIALYSIS FISTULOGRAM RIGHT  12/9/2019       Prior to Admission Medications   Prior to Admission Medications   Prescriptions Last Dose Informant Patient Reported? Taking?   ACCU-CHEK CHING PLUS test strip  Self No No   Sig: USE TO TEST BLOOD SUGAR 4 TIMES PER DAY    LEVEMIR FLEXTOUCH 100 UNIT/ML pen  Self No No   Sig: Inject 60 Units Subcutaneous At Bedtime   acetaminophen (TYLENOL) 500 MG tablet  Self Yes No   Sig: Take 500 mg by mouth every 8 hours as needed for mild pain   famotidine (PEPCID) 10 MG tablet  Self No No   Sig: Take 1 tablet (10 mg) by mouth daily   hypromellose-dextran (ARTIFICAL TEARS) 0.1-0.3 % ophthalmic solution  Self Yes No   Sig: Place 1 drop into both eyes daily as needed for dry eyes   insulin aspart (NOVOLOG FLEXPEN) 100 UNIT/ML pen  Self Yes No   Sig: Take 3 unit per 15 grams of carbohydrates three times daily with meals   insulin aspart (NOVOLOG PEN) 100 UNIT/ML pen  Self Yes No   Sig: Corrective scale = 2 units for every BG 50 over 150  In addition to carb counting insulin   labetalol (NORMODYNE) 300 MG tablet  Self Yes No   Sig: Take 300 mg by mouth 2 times daily Prescription is for 600mg twice daily, but the patient is taking 300mg twice daily   sevelamer (RENVELA) 800 MG tablet  Self Yes No   Sig: Take 1 tablet (800 mg) by mouth 3 times daily (with meals)      Facility-Administered Medications: None     Allergies   Allergies   Allergen Reactions     Albuterol      shakiness     Aspirin      gi     Byetta [Exenatide]      gi     Clonidine      constipation     Codeine      vomiting     Ezetimibe       muscle symptoms     Hydralazine      headaches     Lisinopril      hyperkalemia     Metformin Hydrochloride      vomiting     Pravachol [Pravastatin Sodium]      muscle pains     Simvastatin      myalgias     Troglitazone        Social History   I have reviewed this patient's social history and updated it with pertinent information if needed. Yissel Wetzel  reports that she quit smoking about 20 years ago. Her smoking use included cigarettes. She has a 22.00 pack-year smoking history. She has never used smokeless tobacco. She reports that she does not drink alcohol or use drugs.    Family History   I have reviewed this patient's family history and updated it with pertinent information if needed.   Family History   Problem Relation Age of Onset     Arrhythmia Mother      Hypertension Mother      Pancreatic Cancer Father      Diabetes Brother      Asthma Sister      LUNG DISEASE Sister      Diabetes Daughter      Cirrhosis Sister        Review of Systems   The 10 point Review of Systems is negative other than noted in the HPI    Physical Exam   Temp: 99.2  F (37.3  C) Temp src: Temporal BP: (!) 194/55 Pulse: 89   Resp: 20 SpO2: 96 % O2 Device: Nasal cannula Oxygen Delivery: 2 LPM  Vital Signs with Ranges  Temp:  [99  F (37.2  C)-99.2  F (37.3  C)] 99.2  F (37.3  C)  Pulse:  [89-99] 89  Resp:  [16-20] 20  BP: (152-194)/(55-72) 194/55  SpO2:  [91 %-96 %] 96 %  174 lbs 0 oz    Constitutional: Awake, alert, cooperative, no apparent distress.  Eyes: Conjunctiva and pupils examined and normal. Cloudy right eye.  HEENT: Moist mucous membranes, normal dentition.  Respiratory: Decreased breath sounds mid lung on right, clear on left, no wheezing  Cardiovascular: Regular rate and rhythm, normal S1 and S2, and no murmur noted.  GI: Soft, non-distended, non-tender, normal bowel sounds.  Lymph/Hematologic: No anterior cervical or supraclavicular adenopathy.  Skin: No rashes, no cyanosis, no edema.  Musculoskeletal: No joint  swelling, erythema or tenderness.  Neurologic: Cranial nerves 2-12 intact, normal strength and sensation.  Psychiatric: Alert, oriented to person, place and time, no obvious anxiety or depression.    Data   Data reviewed today:  I personally reviewed CXR with right sided pleural effusion as previously seen.  Recent Labs   Lab 12/31/19  0041   WBC 7.8   HGB 7.9*   MCV 98         POTASSIUM 3.6   CHLORIDE 98   CO2 28   BUN 30   CR 3.24*   ANIONGAP 8   KANWAL 8.1*   *   ALBUMIN 3.1*   PROTTOTAL 6.5*   BILITOTAL 0.6   ALKPHOS 88   ALT 14   AST 16       Recent Results (from the past 24 hour(s))   XR Chest 2 Views    Narrative    CHEST TWO VIEWS  12/30/2019 7:09 PM     HISTORY: Cough. Upper back pain on right side.    COMPARISON: 9/25/2019.      Impression    IMPRESSION: There is a central venous port, tip projects in the region  of the right atrium inferior vena cava junction. Moderate size right  pleural effusion is present similar to that seen on the prior exam.  Cardiomegaly is also noted. Left lung is clear.     RICHA GALLO MD

## 2019-12-31 NOTE — ED NOTES
"[facility]  ED Nurse Handoff Report    ED Chief complaint: Shortness of Breath      ED Diagnosis:   Final diagnoses:   Hypoxemia   Pleural effusion       Code Status: Full Code    Allergies:   Allergies   Allergen Reactions     Albuterol      shakiness     Aspirin      gi     Byetta [Exenatide]      gi     Clonidine      constipation     Codeine      vomiting     Ezetimibe      muscle symptoms     Hydralazine      headaches     Lisinopril      hyperkalemia     Metformin Hydrochloride      vomiting     Pravachol [Pravastatin Sodium]      muscle pains     Simvastatin      myalgias     Troglitazone        Activity level - Baseline/Home:  Independent  Activity Level - Current:   Stand with Assist    Patient's Preferred language: english   Needed?: No    Isolation: No  Infection: Not Applicable  Bariatric?: No    Vital Signs:   Vitals:    12/30/19 2109   BP: (!) 194/55   Pulse: 89   Resp: 16   Temp: 99.2  F (37.3  C)   TempSrc: Temporal   SpO2: 92%   Weight: 78.9 kg (174 lb)       Cardiac Rhythm: ,        Pain level:      Is this patient confused?: No   Does this patient have a guardian?  No         If yes, is there guardianship documents in the Epic \"Code/ACP\" activity?  N/A         Guardian Notified?  N/A  [place] - Suicide Severity Rating Scale Completed?  Yes  If yes, what color did the patient score?  White    Patient Report: Initial Complaint: pt with hx of ESRD, gets dialysis M,W,F (last one today) - pt has been having shortness of breath and fluid on lungs intermittently for past couple months, but usually some relief with dialysis. Today, pt having sob with back pain and was getting progressively worse and not relieved by dialysis, so pt went to PMD - CXR shows fluid on lungs and sent to ED for eval  Focused Assessment: crackles noted upon auscultation, shortness of breath noted . Pt's O2 sat decreases to 80s upon ambulating.   Tests Performed: labs  Abnormal Results: right pleural " effusion   Treatments provided: n/a    Family Comments: n/a    OBS brochure/video discussed/provided to patient/family: Yes              Name of person given brochure if not patient: n/a              Relationship to patient: n/a    ED Medications: Medications - No data to display    Drips infusing?:  No    For the majority of the shift this patient was Green.   Interventions performed were reassurance and information.    Severe Sepsis OR Septic Shock Diagnosis Present: No    To be done/followed up on inpatient unit:  n/a    ED NURSE PHONE NUMBER: *[phone]

## 2019-12-31 NOTE — PROGRESS NOTES
RECEIVING UNIT ED HANDOFF REVIEW    ED Nurse Handoff Report was reviewed by: Lionel Amaya RN on December 31, 2019 at 7:24 AM

## 2019-12-31 NOTE — PLAN OF CARE
Patient stable and cleared for discharge. Thoracentesis completed this morning with around 750-1000 ml removed. Patient reports improvement in shortness of breath. Thoracentesis site covered- bandage is CDI. Patient left unit with friend providing transportation at 1130. All personal belongings sent with patient.

## 2019-12-31 NOTE — PROGRESS NOTES
Yissel Wetzel is a 59 year old female who presents for:  Chief Complaint   Patient presents with     Nurse Visit     AVF (SHUS 1:50; TJG 3:00) Former Dr Vizcaino Pt - Pt is s/p right brachial to basilic AVF. creation on 12/26/18.  Pt will need to establish with new vascular surgeon. *R/S from 12/23/19 *tav        Vitals:    Vitals:    12/31/19 1451   BP: (!) 148/64   BP Location: Right arm   Patient Position: Chair   Cuff Size: Adult Regular   Pulse: 62   Resp: 14   Weight: 175 lb (79.4 kg)   Height: 5' (1.524 m)       BMI:  Estimated body mass index is 34.18 kg/m  as calculated from the following:    Height as of this encounter: 5' (1.524 m).    Weight as of this encounter: 175 lb (79.4 kg).    Pain Score:  Data Unavailable        Ignacio Chanel, [facility]

## 2019-12-31 NOTE — DISCHARGE SUMMARY
[facility]  Discharge Summary        Yissel Wetzel MRN# [id number]   YOB: 1960 Age: 59 year old     Date of Admission:  12/30/2019  Date of Discharge:  12/31/2019  Admitting Physician:  Marlys Huddleston DO  Discharge Physician: Alverto Isaacs MD  Discharging Service: Hospitalist     Primary Provider:  Gigi Martin  Primary Care Physician Phone Number: [phone]         Discharge Diagnoses/Problem Oriented Hospital Course (Providers):    Yissel Wetzel was admitted on 12/30/2019 by Marlys Huddleston DO and I would refer you to their history and physical.  The following problems were addressed during her hospitalization:    Yissel Wetzel is a 59 year old female who presents with shortness of breath and pleuritic back pain, found to have right pleural effusion (recurrent) and sent to ED for hopeful thoracentesis     Right sided pleural effusion  Reported to be hypoxic per ED physician, none documented, but noted she is on 2LPM with O2 sat 96%. R pleural effusion has been drained previously, last in April 2019.   - Thoracentesis today and had 750 ml drained.  - mucinex PRN for congestion  - ok for discharge      ESRD on MWF dialysis  Completed run of dialysis on 12/30 with less fluid off than anticipated due to issues with cramping  - Had appt for US extremity arterial venous dialysis graft on 12/31, ordered to be completed while she is here in case she is not an early IR case.  Follow up with Dr Pressley after US.  - Takes quinine to help with cramping--ordered PRN while inpatient     Diabetes Mellitus  PTA 60mg levemir at bedtime with aspart 3 units per 15 grams of carbs with meals and then a sliding scale.  - moderate resistance diet  - medium sliding scale     Hypertension  PTA labetalol 100mg BID, continue          Code Status:      Full Code         Important Results:      See below         Pending Results:        Unresulted Labs Ordered in the  Past 30 Days of this Admission     No orders found for last 31 day(s).               Discharge Instructions and Follow-Up:      Follow-up Appointments     Follow-up and recommended labs and tests       Follow up with Dr. Huan howard today after arterial US                  Discharge Disposition:      Discharged to home after being seen by Dr Pressley later today         Discharge Medications:        Current Discharge Medication List      CONTINUE these medications which have CHANGED    Details   !! labetalol (NORMODYNE) 100 MG tablet Take 1 tablet (100 mg) by mouth 2 times daily    Associated Diagnoses: Essential hypertension       !! - Potential duplicate medications found. Please discuss with provider.      CONTINUE these medications which have NOT CHANGED    Details   acetaminophen (TYLENOL) 500 MG tablet Take 500 mg by mouth every 8 hours as needed for mild pain      famotidine (PEPCID) 10 MG tablet Take 1 tablet (10 mg) by mouth daily  Qty: 90 tablet, Refills: 3    Associated Diagnoses: Gastroesophageal reflux disease, esophagitis presence not specified      hypromellose-dextran (ARTIFICAL TEARS) 0.1-0.3 % ophthalmic solution Place 1 drop into both eyes daily as needed for dry eyes      !! insulin aspart (NOVOLOG FLEXPEN) 100 UNIT/ML pen Take 3 unit per 15 grams of carbohydrates three times daily with meals  Qty: 6 mL, Refills: 0    Associated Diagnoses: Type 2 diabetes mellitus with diabetic nephropathy, with long-term current use of insulin (H)      !! insulin aspart (NOVOLOG PEN) 100 UNIT/ML pen Corrective scale = 2 units for every BG 50 over 150  In addition to carb counting insulin      !! labetalol (NORMODYNE) 200 MG tablet Take 100 mg by mouth daily      LEVEMIR FLEXTOUCH 100 UNIT/ML pen Inject 60 Units Subcutaneous At Bedtime  Qty: 18 mL, Refills: 11    Associated Diagnoses: Type 2 diabetes mellitus with diabetic nephropathy, with long-term current use of insulin (H)      sevelamer (RENVELA) 800 MG tablet  Take 1 tablet (800 mg) by mouth 3 times daily (with meals)      ACCU-CHEK CHING PLUS test strip USE TO TEST BLOOD SUGAR 4 TIMES PER DAY   Qty: 200 each, Refills: 3    Associated Diagnoses: Type 2 diabetes mellitus with diabetic nephropathy, with long-term current use of insulin (H)       !! - Potential duplicate medications found. Please discuss with provider.               Allergies:         Allergies   Allergen Reactions     Albuterol      shakiness     Aspirin      gi     Byetta [Exenatide]      gi     Clonidine      constipation     Codeine      vomiting     Ezetimibe      muscle symptoms     Hydralazine      headaches     Lisinopril      hyperkalemia     Metformin Hydrochloride      vomiting     Pravachol [Pravastatin Sodium]      muscle pains     Simvastatin      myalgias     Troglitazone             Consultations This Hospital Stay:      No consultations were requested during this admission          Discharge Orders for Skilled Facility (from Discharge Orders):        After Care Instructions     Activity      Your activity upon discharge: activity as tolerated         Diet      Follow this diet upon discharge: Orders Placed This Encounter      Moderate Consistent CHO Diet                      Discharge Time:      Less than 30 minutes.        Image Results From This Hospital Stay (For Non-EPIC Providers):        Results for orders placed or performed in visit on 12/30/19   XR Chest 2 Views    Narrative    CHEST TWO VIEWS  12/30/2019 7:09 PM     HISTORY: Cough. Upper back pain on right side.    COMPARISON: 9/25/2019.      Impression    IMPRESSION: There is a central venous port, tip projects in the region  of the right atrium inferior vena cava junction. Moderate size right  pleural effusion is present similar to that seen on the prior exam.  Cardiomegaly is also noted. Left lung is clear.     RICHA GALLO MD           Most Recent Lab Results In EPIC (For Non-EPIC Providers):    Most Recent 3 CBC's:  Recent  Labs   Lab Test 12/31/19  0041 12/06/19  1442 12/06/19  0716 12/05/19  2125   WBC 7.8  --  7.4 7.0   HGB 7.9* 7.8* 8.7* 9.8*   MCV 98  --  95 95     --  166 164      Most Recent 3 BMP's:  Recent Labs   Lab Test 12/31/19  0041 12/05/19  2125 09/25/19  1453    139 133   POTASSIUM 3.6 3.7 4.3   CHLORIDE 98 103 102   CO2 28 24 25   BUN 30 39* 37*   CR 3.24* 5.19* 4.53*   ANIONGAP 8 12 7   KANWAL 8.1* 8.2* 8.4*   * 249* 300*     Most Recent 3 Troponin's:  Recent Labs   Lab Test 11/24/17  1816 08/09/17  2030 09/30/15  1533   TROPI <0.015 <0.015  The 99th percentile for upper reference range is 0.045 ug/L.  Troponin values in   the range of 0.045 - 0.120 ug/L may be associated with risks of adverse   clinical events.   <0.015  The 99th percentile for upper reference range is 0.045 ug/L.  Troponin values in   the range of 0.045 - 0.120 ug/L may be associated with risks of adverse   clinical events.       Most Recent 3 INR's:  Recent Labs   Lab Test 12/31/19  0831 09/25/19  1453 03/15/19  1440   INR 1.27* 1.13 1.40*     Most Recent 2 LFT's:  Recent Labs   Lab Test 12/31/19  0041 09/25/19  1453   AST 16 12   ALT 14 21   ALKPHOS 88 98   BILITOTAL 0.6 0.6     Most Recent Cholesterol Panel:  Recent Labs   Lab Test 07/22/16  1056   CHOL 211*   *   HDL 49*   TRIG 193*     Most Recent 6 Bacteria Isolates From Any Culture (See EPIC Reports for Culture Details):  Recent Labs   Lab Test 03/15/19  1600 03/22/13  1428   CULT Culture negative for acid fast bacilli  Assayed at [facility] [address] [place] [id number] [phone]  No growth No growth     Most Recent TSH, T4 and HgbA1c:  Recent Labs   Lab Test 09/25/19  1453  01/20/17  1511   TSH  --   --  3.33   A1C 8.7*   < > 7.7*    < > = values in this interval not displayed.

## 2020-01-02 ENCOUNTER — PATIENT OUTREACH (OUTPATIENT)
Dept: CARE COORDINATION | Facility: CLINIC | Age: 60
End: 2020-01-02

## 2020-01-02 DIAGNOSIS — J90 PLEURAL EFFUSION: Primary | ICD-10-CM

## 2020-01-02 ASSESSMENT — ENCOUNTER SYMPTOMS
CONSTITUTIONAL NEGATIVE: 1
GASTROINTESTINAL NEGATIVE: 1
MUSCULOSKELETAL NEGATIVE: 1
ROS SKIN COMMENTS: SEE HPI
ENDOCRINE NEGATIVE: 1
NEUROLOGICAL NEGATIVE: 1
PSYCHIATRIC NEGATIVE: 1
CARDIOVASCULAR NEGATIVE: 1
EYES NEGATIVE: 1
ALLERGIC/IMMUNOLOGIC NEGATIVE: 1
SHORTNESS OF BREATH: 1

## 2020-01-02 NOTE — PROGRESS NOTES
[facility] INITIAL VASCULAR SURGERY CONSULT    Impression:   Status post placement of right brachiobasilic transposition AV fistula in December 2018.  Admitted to [facility] in December 2019 due to significant bleeding from a proximal AV fistula access site.  This was controlled with placement of 3 sutures and application of a compression dressing.  She subsequently underwent a right arm AV fistulogram with angioplasty of the outflow vein at the mid humeral level using an 8 mm balloon.  She has been dialyzing via a right IJ tunneled catheter.  Her surgeon is retiring and she presents to me today to establish herself with a new vascular surgeon.    Slight area of erythema at her right IJ tunneled catheter exit site in the upper chest.  No purulence.    Plan:   I had a nice discussion with Cassandra reviewing all the above.  AV fistula ultrasound today shows good flow at 1179 mL/min.  The area of recent venoplasty is widely patent.  There may be a newly developing area of stenosis at the juxta anastomotic position.  There is venous tortuosity at this level but calculated vein diameter is only 2.2 mm.  She may ultimately require a repeat right upper arm fistulogram with intervention at that level.  There is still a residual 1 cm eschar in the proximal portion of the fistula.    She will continue to dialyze via the tunneled catheter for 1 more week to allow the eschar to heal.  At that point.  The dialysis center may attempt access of her right upper arm AV fistula.  If she requires prolonged runs due to the possible juxta anastomotic stenosis, she will require a repeat fistulogram.  I would not remove the tunneled right IJ catheter until the above issue has been settled.      HPI:   Yissel Wetzel is a 59-year-old female who is status post placement of a right brachial basilic transposition AV fistula performed by Dr. Vizcaino in December 2018.  She presented to the [place]  [facility] on 12/5/2019 with complaints of significant bleeding from her right upper arm AV fistula access site.  This was controlled with placement of a tourniquet and ultimately placement of 3 nylon sutures.  She underwent a right arm AV fistulogram with venoplasty of the outflow vein at the mid humeral level using an 8 mm balloon.  Per Dr. Arango's request, a tunneled catheter was placed to allow the eschar overlying her right upper arm access to better heal.  She has been dialyzing with the tunneled catheter ever since.    Dr. Arango has moved and she presents to me today to reestablish herself with a vascular surgeon.  At today's visit she is without complaints relative to her fistula.    CURRENT MEDICATIONS  ACCU-CHEK CHING PLUS test strip, USE TO TEST BLOOD SUGAR 4 TIMES PER DAY   acetaminophen (TYLENOL) 500 MG tablet, Take 500 mg by mouth every 8 hours as needed for mild pain  famotidine (PEPCID) 10 MG tablet, Take 1 tablet (10 mg) by mouth daily (Patient taking differently: Take 10 mg by mouth daily as needed )  hypromellose-dextran (ARTIFICAL TEARS) 0.1-0.3 % ophthalmic solution, Place 1 drop into both eyes daily as needed for dry eyes  insulin aspart (NOVOLOG FLEXPEN) 100 UNIT/ML pen, Take 3 unit per 15 grams of carbohydrates three times daily with meals  insulin aspart (NOVOLOG PEN) 100 UNIT/ML pen, Corrective scale = 2 units for every BG 50 over 150  In addition to carb counting insulin  labetalol (NORMODYNE) 100 MG tablet, Take 1 tablet (100 mg) by mouth 2 times daily  labetalol (NORMODYNE) 200 MG tablet, Take 100 mg by mouth daily  LEVEMIR FLEXTOUCH 100 UNIT/ML pen, Inject 60 Units Subcutaneous At Bedtime  sevelamer (RENVELA) 800 MG tablet, Take 1 tablet (800 mg) by mouth 3 times daily (with meals)    No current facility-administered medications on file prior to visit.         PAST MEDICAL HISTORY  Past Medical History:   Diagnosis Date     Allergic rhinitis, cause unspecified      Anemia in chronic  kidney disease      Anemia of chronic disease      Bleeding pseudoaneurysm of right brachiocephalic arteriovenous fistula, initial encounter (H) 2019     End stage renal failure on dialysis (H)      Esophagitis, unspecified      Former tobacco use      HEMORRHAGIC GASTROPATHY      History of blood transfusion     [place]     Iron deficiency anemia, unspecified      Mild persistent asthma      Obesity      Other and unspecified hyperlipidemia      Pneumonia      Pulmonary hypertension (H)     per 2012 echo mod.to severe pulmonary hypertension     Tobacco use disorder dc ed      Type 2 diabetes mellitus (H)     on Insulin- managed by PCP     Unspecified essential hypertension          PAST SURGICAL HISTORY:  Past Surgical History:   Procedure Laterality Date     ABDOMINOPLASTY  pt unsure when    abdominoplasty-      SECTION  ,     x 2     COLONOSCOPY  2006    [place]     CREATE FISTULA ARTERIOVENOUS UPPER EXTREMITY Right 2018    Procedure: RIGHT BRACHIAL TO BASILIC ARTERIOVENOUS FISTULA CREATION;  Surgeon: Terry Vizcaino MD;  Location: [facility]     ENT SURGERY  as a child    tonsillectomy     EYE SURGERY Left     injections- eye     HYSTERECTOMY  approx     partial hysterectomy-reason bleeding      IR CVC TUNNEL PLACEMENT > 5 YRS OF AGE  2019     IR DIALYSIS FISTULOGRAM RIGHT  2019       ALLERGIES     Allergies   Allergen Reactions     Albuterol      shakiness     Aspirin      gi     Byetta [Exenatide]      gi     Clonidine      constipation     Codeine      vomiting     Ezetimibe      muscle symptoms     Hydralazine      headaches     Lisinopril      hyperkalemia     Metformin Hydrochloride      vomiting     Pravachol [Pravastatin Sodium]      muscle pains     Simvastatin      myalgias     Troglitazone        FAMILY HISTORY  Family History   Problem Relation Age of Onset     Arrhythmia Mother      Hypertension Mother      Pancreatic Cancer  Father      Diabetes Brother      Asthma Sister      LUNG DISEASE Sister      Diabetes Daughter      Cirrhosis Sister        SOCIAL HISTORY  Social History     Tobacco Use     Smoking status: Former Smoker     Packs/day: 1.00     Years: 22.00     Pack years: 22.00     Types: Cigarettes     Last attempt to quit: 10/12/1999     Years since quittin.2     Smokeless tobacco: Never Used   Substance Use Topics     Alcohol use: No     Alcohol/week: 0.0 standard drinks     Drug use: No       ROS:   Review of Systems   Constitution: Negative.   HENT: Negative.    Eyes: Negative.    Cardiovascular: Negative.    Respiratory: Positive for shortness of breath.    Endocrine: Negative.    Hematologic/Lymphatic: Positive for bleeding problem.   Skin:        See HPI   Musculoskeletal: Negative.    Gastrointestinal: Negative.    Genitourinary:        Dialysis dependent   Neurological: Negative.    Psychiatric/Behavioral: Negative.    Allergic/Immunologic: Negative.          EXAM:  BP (!) 148/64 (BP Location: Right arm, Patient Position: Chair, Cuff Size: Adult Regular)   Pulse 62   Resp 14   Ht 5' (1.524 m)   Wt 175 lb (79.4 kg)   BMI 34.18 kg/m    Physical Exam  Vitals signs and nursing note reviewed. Exam conducted with a chaperone present.   Constitutional:       Appearance: She is obese.   HENT:      Head: Normocephalic and atraumatic.   Neck:      Comments: Right IJ tunnel catheter.  There is a 1 cm area of surrounding erythema at the catheter exit site in the upper chest.  No associated purulence.  This area does not birdie with palpation.  Cardiovascular:      Pulses:           Radial pulses are 2+ on the right side and 2+ on the left side.      Comments: Excellent thrill in the right brachial basilic AV fistula.  There is a 1 cm area of eschar in the proximal vein near the AV anastomosis.  No surrounding erythema at this level.  This clearly corresponds to the area of prior suture placement for  bleeding.  Neurological:      General: No focal deficit present.      Mental Status: She is alert and oriented to person, place, and time.   Psychiatric:         Mood and Affect: Mood normal.         Behavior: Behavior normal.         Thought Content: Thought content normal.         Judgment: Judgment normal.       Labs:  LIPID RESULTS:  Lab Results   Component Value Date    CHOL 211 (H) 07/22/2016    HDL 49 (L) 07/22/2016     (H) 07/22/2016    TRIG 193 (H) 07/22/2016    CHOLHDLRATIO 3.6 10/28/2015       CBC RESULTS:  Lab Results   Component Value Date    WBC 7.8 12/31/2019    RBC 2.62 (L) 12/31/2019    HGB 7.9 (L) 12/31/2019    HCT 25.7 (L) 12/31/2019    MCV 98 12/31/2019    MCH 30.2 12/31/2019    MCHC 30.7 (L) 12/31/2019    RDW 14.8 12/31/2019     12/31/2019       BMP RESULTS:  Lab Results   Component Value Date     12/31/2019    POTASSIUM 3.6 12/31/2019    CHLORIDE 98 12/31/2019    CO2 28 12/31/2019    ANIONGAP 8 12/31/2019     (H) 12/31/2019    BUN 30 12/31/2019    CR 3.24 (H) 12/31/2019    GFRESTIMATED 15 (L) 12/31/2019    GFRESTBLACK 17 (L) 12/31/2019    KANWAL 8.1 (L) 12/31/2019        A1C RESULTS:  Lab Results   Component Value Date    A1C 8.7 (H) 09/25/2019         Imaging:    ULTRASOUND EXTREMITY ARTERIOVENOUS DIALYSIS ACCESS GRAFT  12/31/2019  11:01 AM     CLINICAL HISTORY/INDICATION: Graft placed early December, had  follow-up imaging scheduled today as outpatient.     COMPARISON: 12/6/2019     TECHNIQUE:  Grayscale, color-flow, and spectral waveform analysis with  velocities were performed of the dialysis access circuit.     FINDINGS:  The arterial inflow is patent with diameters ranging  between 4.6 and 5.2 millimeters. The arterial anastomosis is patent.  The fistula is patent. Stenosis at the juxta-anastomotic peripheral  venous outflow with a diameter of 2.2 mm with a peak systolic velocity  of 806 cm/s.     Total outflow volume is 1179 mL/min.                                                                       IMPRESSION: Stenosis of the juxta-anastomotic peripheral venous  outflow. Consider fistulogram with possible intervention.     DO Salomón GRANADOS MD

## 2020-01-02 NOTE — PROGRESS NOTES
Clinic Care Coordination Contact    Clinic Care Coordination Contact  OUTREACH    Referral Information:  Referral Source: IP Report  Primary Diagnosis: Respiratory Disorders - other  Chief Complaint   Patient presents with     Clinic Care Coordination - Post Hospital     pleural effusion     Clinical Concerns:  CCC RN outreach to patient following hospitalization for pleural effusion.  She underwent thoracentesis and had 750 ml of fluid drained.    Today the patient reports to be feeling quite a bit better.  She stated her breathing is improved since having fluid taken off her lung.  She continues receiving Epogen injections every other day (MWF) for low hemoglobin.    The patient is working closely with her Transplant team; she is awaiting kidney transplant.  She does need to complete a colonoscopy, endoscopy, pap smear, and cardiology appointment in preparation for transplant.  The patient confirmed she has the contact information to schedule these and denied need for assistance.    CCC RN discussed the patient seeing PCP for hospital follow-up, which the patient was agreeable to but would like to wait to schedule until she is able to set up her other needed appointments.  She confirmed she has the clinic scheduling phone number.    Medication Management:  The patient confirmed her medications per hospital discharge AVS.  She is taking labetalol 100 mg BID as prescribed.    Living Situation:  Current living arrangement: I live alone  Type of residence: Apartment    Diet/Exercise/Sleep:  Diet: Diabetic diet  Inadequate nutrition (GOAL): No    Transportation:  Transportation concerns (GOAL): No     Resources and Interventions:  Supplies used at home: Diabetic Supplies  Equipment Currently Used at Home: glucometer    Patient/Caregiver understanding: Yes    Plan: The patient will continue working closely with her Transplant team in preparation for kidney transplant.  She will monitor her symptoms and report any  new/worsening symptoms to clinic.  The patient will schedule colonoscopy, endoscopy, pap smear, and cardiology appointment as instructed by Transplant.  She will schedule a PCP appointment once she knows when her other appointments will be.  The patient agreed to contact CCC RN with any additional questions or concerns.  CCC RN will make no further scheduled patient outreaches at this time but will remain available should any patient needs arise.    Ayo Frausto RN  Clinic Care Coordinator  [facility]  Ph: [phone]

## 2020-01-03 ENCOUNTER — TELEPHONE (OUTPATIENT)
Dept: OTHER | Facility: CLINIC | Age: 60
End: 2020-01-03

## 2020-01-03 NOTE — TELEPHONE ENCOUNTER
1/2/20 office notes from Dr. Pressley faxed to [facility] at [phone].  Confirmed via RightFax at [phone].  MARY IgnacioN, RN  [facility]

## 2020-01-05 LAB
BACTERIA SPEC CULT: NO GROWTH
SPECIMEN SOURCE: NORMAL

## 2020-01-12 ENCOUNTER — HOSPITAL ENCOUNTER (OUTPATIENT)
Facility: CLINIC | Age: 60
Setting detail: OBSERVATION
Discharge: HOME | End: 2020-01-13
Attending: EMERGENCY MEDICINE | Admitting: HOSPITALIST
Payer: MEDICARE

## 2020-01-12 ENCOUNTER — OFFICE VISIT (OUTPATIENT)
Dept: URGENT CARE | Facility: URGENT CARE | Age: 60
End: 2020-01-12
Payer: MEDICARE

## 2020-01-12 ENCOUNTER — ANCILLARY PROCEDURE (OUTPATIENT)
Dept: GENERAL RADIOLOGY | Facility: CLINIC | Age: 60
End: 2020-01-12
Attending: FAMILY MEDICINE
Payer: MEDICARE

## 2020-01-12 VITALS
DIASTOLIC BLOOD PRESSURE: 60 MMHG | RESPIRATION RATE: 22 BRPM | HEART RATE: 72 BPM | OXYGEN SATURATION: 94 % | SYSTOLIC BLOOD PRESSURE: 162 MMHG | TEMPERATURE: 98.2 F

## 2020-01-12 DIAGNOSIS — J90 RECURRENT RIGHT PLEURAL EFFUSION: ICD-10-CM

## 2020-01-12 DIAGNOSIS — R05.9 COUGH: ICD-10-CM

## 2020-01-12 DIAGNOSIS — J18.9 PNEUMONIA OF RIGHT LOWER LOBE DUE TO INFECTIOUS ORGANISM: Primary | ICD-10-CM

## 2020-01-12 DIAGNOSIS — J90 PLEURAL EFFUSION: ICD-10-CM

## 2020-01-12 DIAGNOSIS — R07.89 CHEST TIGHTNESS: Primary | ICD-10-CM

## 2020-01-12 DIAGNOSIS — N18.9 CHRONIC RENAL FAILURE, UNSPECIFIED CKD STAGE: ICD-10-CM

## 2020-01-12 DIAGNOSIS — Z87.09 HISTORY OF ASTHMA: ICD-10-CM

## 2020-01-12 LAB
ANION GAP SERPL CALCULATED.3IONS-SCNC: 6 MMOL/L (ref 3–14)
BASOPHILS # BLD AUTO: 0 10E9/L (ref 0–0.2)
BASOPHILS NFR BLD AUTO: 0.2 %
BUN SERPL-MCNC: 38 MG/DL (ref 7–30)
CALCIUM SERPL-MCNC: 8.4 MG/DL (ref 8.5–10.1)
CHLORIDE SERPL-SCNC: 105 MMOL/L (ref 94–109)
CO2 SERPL-SCNC: 27 MMOL/L (ref 20–32)
CREAT SERPL-MCNC: 5.55 MG/DL (ref 0.52–1.04)
DIFFERENTIAL METHOD BLD: ABNORMAL
EOSINOPHIL # BLD AUTO: 0.5 10E9/L (ref 0–0.7)
EOSINOPHIL NFR BLD AUTO: 5.6 %
ERYTHROCYTE [DISTWIDTH] IN BLOOD BY AUTOMATED COUNT: 15.3 % (ref 10–15)
GFR SERPL CREATININE-BSD FRML MDRD: 8 ML/MIN/{1.73_M2}
GLUCOSE BLDC GLUCOMTR-MCNC: 183 MG/DL (ref 70–99)
GLUCOSE BLDC GLUCOMTR-MCNC: 277 MG/DL (ref 70–99)
GLUCOSE SERPL-MCNC: 176 MG/DL (ref 70–99)
HCT VFR BLD AUTO: 26.5 % (ref 35–47)
HGB BLD-MCNC: 8.5 G/DL (ref 11.7–15.7)
IMM GRANULOCYTES # BLD: 0 10E9/L (ref 0–0.4)
IMM GRANULOCYTES NFR BLD: 0.2 %
INR PPP: 1.23 (ref 0.86–1.14)
INTERPRETATION ECG - MUSE: NORMAL
LYMPHOCYTES # BLD AUTO: 1.1 10E9/L (ref 0.8–5.3)
LYMPHOCYTES NFR BLD AUTO: 13.2 %
MCH RBC QN AUTO: 30.6 PG (ref 26.5–33)
MCHC RBC AUTO-ENTMCNC: 32.1 G/DL (ref 31.5–36.5)
MCV RBC AUTO: 95 FL (ref 78–100)
MONOCYTES # BLD AUTO: 0.8 10E9/L (ref 0–1.3)
MONOCYTES NFR BLD AUTO: 9.8 %
NEUTROPHILS # BLD AUTO: 6 10E9/L (ref 1.6–8.3)
NEUTROPHILS NFR BLD AUTO: 71 %
NRBC # BLD AUTO: 0 10*3/UL
NRBC BLD AUTO-RTO: 0 /100
PLATELET # BLD AUTO: 204 10E9/L (ref 150–450)
POTASSIUM SERPL-SCNC: 4.1 MMOL/L (ref 3.4–5.3)
RBC # BLD AUTO: 2.78 10E12/L (ref 3.8–5.2)
SODIUM SERPL-SCNC: 138 MMOL/L (ref 133–144)
WBC # BLD AUTO: 8.4 10E9/L (ref 4–11)

## 2020-01-12 PROCEDURE — 25000132 ZZH RX MED GY IP 250 OP 250 PS 637: Mod: GY | Performed by: EMERGENCY MEDICINE

## 2020-01-12 PROCEDURE — 80048 BASIC METABOLIC PNL TOTAL CA: CPT | Performed by: EMERGENCY MEDICINE

## 2020-01-12 PROCEDURE — 93005 ELECTROCARDIOGRAM TRACING: CPT

## 2020-01-12 PROCEDURE — 96372 THER/PROPH/DIAG INJ SC/IM: CPT

## 2020-01-12 PROCEDURE — G0378 HOSPITAL OBSERVATION PER HR: HCPCS

## 2020-01-12 PROCEDURE — 00000146 ZZHCL STATISTIC GLUCOSE BY METER IP

## 2020-01-12 PROCEDURE — 71046 X-RAY EXAM CHEST 2 VIEWS: CPT

## 2020-01-12 PROCEDURE — 25000131 ZZH RX MED GY IP 250 OP 636 PS 637: Mod: GY | Performed by: PHYSICIAN ASSISTANT

## 2020-01-12 PROCEDURE — 85610 PROTHROMBIN TIME: CPT | Performed by: EMERGENCY MEDICINE

## 2020-01-12 PROCEDURE — 99220 ZZC INITIAL OBSERVATION CARE,LEVL III: CPT | Performed by: PHYSICIAN ASSISTANT

## 2020-01-12 PROCEDURE — 85025 COMPLETE CBC W/AUTO DIFF WBC: CPT | Performed by: EMERGENCY MEDICINE

## 2020-01-12 PROCEDURE — 99214 OFFICE O/P EST MOD 30 MIN: CPT | Performed by: FAMILY MEDICINE

## 2020-01-12 PROCEDURE — 99285 EMERGENCY DEPT VISIT HI MDM: CPT | Mod: 25

## 2020-01-12 RX ORDER — BISACODYL 5 MG
15 TABLET, DELAYED RELEASE (ENTERIC COATED) ORAL DAILY PRN
Status: DISCONTINUED | OUTPATIENT
Start: 2020-01-12 | End: 2020-01-13 | Stop reason: HOSPADM

## 2020-01-12 RX ORDER — DEXTROSE MONOHYDRATE 25 G/50ML
25-50 INJECTION, SOLUTION INTRAVENOUS
Status: DISCONTINUED | OUTPATIENT
Start: 2020-01-12 | End: 2020-01-13 | Stop reason: HOSPADM

## 2020-01-12 RX ORDER — LABETALOL 100 MG/1
100 TABLET, FILM COATED ORAL ONCE
Status: COMPLETED | OUTPATIENT
Start: 2020-01-12 | End: 2020-01-12

## 2020-01-12 RX ORDER — BISACODYL 5 MG
10 TABLET, DELAYED RELEASE (ENTERIC COATED) ORAL DAILY PRN
Status: DISCONTINUED | OUTPATIENT
Start: 2020-01-12 | End: 2020-01-13 | Stop reason: HOSPADM

## 2020-01-12 RX ORDER — BISACODYL 5 MG
5 TABLET, DELAYED RELEASE (ENTERIC COATED) ORAL DAILY PRN
Status: DISCONTINUED | OUTPATIENT
Start: 2020-01-12 | End: 2020-01-13 | Stop reason: HOSPADM

## 2020-01-12 RX ORDER — ACETAMINOPHEN 325 MG/1
650 TABLET ORAL EVERY 4 HOURS PRN
Status: DISCONTINUED | OUTPATIENT
Start: 2020-01-12 | End: 2020-01-13 | Stop reason: HOSPADM

## 2020-01-12 RX ORDER — BENZONATATE 100 MG/1
100 CAPSULE ORAL 3 TIMES DAILY PRN
Status: DISCONTINUED | OUTPATIENT
Start: 2020-01-12 | End: 2020-01-13 | Stop reason: HOSPADM

## 2020-01-12 RX ORDER — NICOTINE POLACRILEX 4 MG
15-30 LOZENGE BUCCAL
Status: DISCONTINUED | OUTPATIENT
Start: 2020-01-12 | End: 2020-01-13 | Stop reason: HOSPADM

## 2020-01-12 RX ORDER — ONDANSETRON 4 MG/1
4 TABLET, ORALLY DISINTEGRATING ORAL EVERY 6 HOURS PRN
Status: DISCONTINUED | OUTPATIENT
Start: 2020-01-12 | End: 2020-01-13 | Stop reason: HOSPADM

## 2020-01-12 RX ORDER — ONDANSETRON 2 MG/ML
4 INJECTION INTRAMUSCULAR; INTRAVENOUS EVERY 6 HOURS PRN
Status: DISCONTINUED | OUTPATIENT
Start: 2020-01-12 | End: 2020-01-13 | Stop reason: HOSPADM

## 2020-01-12 RX ORDER — DOCUSATE SODIUM 100 MG/1
100 CAPSULE, LIQUID FILLED ORAL 2 TIMES DAILY
Status: DISCONTINUED | OUTPATIENT
Start: 2020-01-12 | End: 2020-01-13 | Stop reason: HOSPADM

## 2020-01-12 RX ORDER — NALOXONE HYDROCHLORIDE 0.4 MG/ML
.1-.4 INJECTION, SOLUTION INTRAMUSCULAR; INTRAVENOUS; SUBCUTANEOUS
Status: DISCONTINUED | OUTPATIENT
Start: 2020-01-12 | End: 2020-01-13 | Stop reason: HOSPADM

## 2020-01-12 RX ORDER — LORATADINE 10 MG/1
10 TABLET ORAL DAILY
Status: DISCONTINUED | OUTPATIENT
Start: 2020-01-12 | End: 2020-01-13 | Stop reason: HOSPADM

## 2020-01-12 RX ORDER — LEVALBUTEROL 1.25 MG/.5ML
1.25 SOLUTION, CONCENTRATE RESPIRATORY (INHALATION) EVERY 6 HOURS PRN
Status: DISCONTINUED | OUTPATIENT
Start: 2020-01-12 | End: 2020-01-13 | Stop reason: HOSPADM

## 2020-01-12 RX ORDER — BISACODYL 10 MG
10 SUPPOSITORY, RECTAL RECTAL DAILY PRN
Status: DISCONTINUED | OUTPATIENT
Start: 2020-01-12 | End: 2020-01-13 | Stop reason: HOSPADM

## 2020-01-12 RX ORDER — HYDROCODONE BITARTRATE AND ACETAMINOPHEN 5; 325 MG/1; MG/1
1-2 TABLET ORAL EVERY 4 HOURS PRN
Status: DISCONTINUED | OUTPATIENT
Start: 2020-01-12 | End: 2020-01-13 | Stop reason: HOSPADM

## 2020-01-12 RX ORDER — POLYETHYLENE GLYCOL 3350 17 G/17G
17 POWDER, FOR SOLUTION ORAL DAILY PRN
Status: DISCONTINUED | OUTPATIENT
Start: 2020-01-12 | End: 2020-01-13 | Stop reason: HOSPADM

## 2020-01-12 RX ORDER — PROCHLORPERAZINE MALEATE 10 MG
10 TABLET ORAL EVERY 6 HOURS PRN
Status: DISCONTINUED | OUTPATIENT
Start: 2020-01-12 | End: 2020-01-13 | Stop reason: HOSPADM

## 2020-01-12 RX ORDER — PROCHLORPERAZINE 25 MG
25 SUPPOSITORY, RECTAL RECTAL EVERY 12 HOURS PRN
Status: DISCONTINUED | OUTPATIENT
Start: 2020-01-12 | End: 2020-01-13 | Stop reason: HOSPADM

## 2020-01-12 RX ORDER — LIDOCAINE 40 MG/G
CREAM TOPICAL
Status: DISCONTINUED | OUTPATIENT
Start: 2020-01-12 | End: 2020-01-13 | Stop reason: HOSPADM

## 2020-01-12 RX ORDER — HYDROMORPHONE HYDROCHLORIDE 1 MG/ML
0.2 INJECTION, SOLUTION INTRAMUSCULAR; INTRAVENOUS; SUBCUTANEOUS
Status: DISCONTINUED | OUTPATIENT
Start: 2020-01-12 | End: 2020-01-13 | Stop reason: HOSPADM

## 2020-01-12 RX ORDER — ACETAMINOPHEN 650 MG/1
650 SUPPOSITORY RECTAL EVERY 4 HOURS PRN
Status: DISCONTINUED | OUTPATIENT
Start: 2020-01-12 | End: 2020-01-13 | Stop reason: HOSPADM

## 2020-01-12 RX ADMIN — INSULIN ASPART 1 UNITS: 100 INJECTION, SOLUTION INTRAVENOUS; SUBCUTANEOUS at 19:02

## 2020-01-12 RX ADMIN — INSULIN DETEMIR 40 UNITS: 100 INJECTION, SOLUTION SUBCUTANEOUS at 22:03

## 2020-01-12 RX ADMIN — LABETALOL HYDROCHLORIDE 100 MG: 100 TABLET, FILM COATED ORAL at 16:26

## 2020-01-12 ASSESSMENT — ENCOUNTER SYMPTOMS
FEVER: 0
SHORTNESS OF BREATH: 1
COUGH: 1
CHILLS: 0

## 2020-01-12 ASSESSMENT — MIFFLIN-ST. JEOR
SCORE: 1306.64
SCORE: 1312.08

## 2020-01-12 NOTE — ED NOTES
"[facility]  ED Nurse Handoff Report    ED Chief complaint: Cough (Sent over from  for \"fluid on my lungs\"  Cough, and shortness of breath.)      ED Diagnosis:   Final diagnoses:   None       Code Status: Full Code    Allergies:   Allergies   Allergen Reactions     Albuterol      shakiness     Aspirin      gi     Byetta [Exenatide]      gi     Clonidine      constipation     Codeine      vomiting     Ezetimibe      muscle symptoms     Hydralazine      headaches     Lisinopril      hyperkalemia     Metformin Hydrochloride      vomiting     Pravachol [Pravastatin Sodium]      muscle pains     Simvastatin      myalgias     Troglitazone        Patient Story: Increased SOB, CXR done outside of ED shows R sided pleural effusion. Had a thoracentesis 12/31.  Focused Assessment:  RA sats 90-94% Given 2 L NC.  Pt reported didn't take labetalol.  Given in ED.  Plan for thoracentesis tomorrow.  Will need dialysis tomorrow.     Treatments and/or interventions provided: O2, labetolol   Patient's response to treatments and/or interventions:     To be done/followed up on inpatient unit:  na    Does this patient have any cognitive concerns?: No    Activity level - Baseline/Home:  Independent  Activity Level - Current:   Independent    Patient's Preferred language: English   Needed?: No    Isolation: None  Infection: Not Applicable  Bariatric?: No    Vital Signs:   Vitals:    01/12/20 1517 01/12/20 1553 01/12/20 1600   BP: (!) 214/54 (!) 189/72 (!) 202/94   Pulse:  80 79   Resp: 22 18    Temp: 98.1  F (36.7  C)     TempSrc: Oral     SpO2: 90% 90% 94%   Weight: 78.9 kg (174 lb)     Height: 1.549 m (5' 1\")         Cardiac Rhythm:     Was the PSS-3 completed:   Yes  What interventions are required if any?               Family Comments: na  OBS brochure/video discussed/provided to patient/family: Yes              Name of person given brochure if not patient:               Relationship to patient:     For the " majority of the shift this patient's behavior was Green.   Behavioral interventions performed were .    ED NURSE PHONE NUMBER: *[phone]

## 2020-01-12 NOTE — PROGRESS NOTES
SUBJECTIVE: Yissel Wetzel is a 59 year old female presenting with a chief complaint of Chest tightness with cough.  Onset of symptoms was couple weeks ago.  Course of illness is same.    Severity moderate  Current and Associated symptoms: none  Treatment measures tried include None tried.  Predisposing factors include hx pleural effusuin with fluid removed 19.    Past Medical History:   Diagnosis Date     Allergic rhinitis, cause unspecified      Anemia in chronic kidney disease      Anemia of chronic disease      Bleeding pseudoaneurysm of right brachiocephalic arteriovenous fistula, initial encounter (H) 2019     End stage renal failure on dialysis (H)      Esophagitis, unspecified      Former tobacco use      HEMORRHAGIC GASTROPATHY      History of blood transfusion     [place]     Iron deficiency anemia, unspecified      Mild persistent asthma      Obesity      Other and unspecified hyperlipidemia      Pneumonia      Pulmonary hypertension (H)     per 2012 echo mod.to severe pulmonary hypertension     Tobacco use disorder dc ed      Type 2 diabetes mellitus (H)     on Insulin- managed by PCP     Unspecified essential hypertension      Allergies   Allergen Reactions     Albuterol      shakiness     Aspirin      gi     Byetta [Exenatide]      gi     Clonidine      constipation     Codeine      vomiting     Ezetimibe      muscle symptoms     Hydralazine      headaches     Lisinopril      hyperkalemia     Metformin Hydrochloride      vomiting     Pravachol [Pravastatin Sodium]      muscle pains     Simvastatin      myalgias     Troglitazone      Social History     Tobacco Use     Smoking status: Former Smoker     Packs/day: 1.00     Years: 22.00     Pack years: 22.00     Types: Cigarettes     Last attempt to quit: 10/12/1999     Years since quittin.2     Smokeless tobacco: Never Used   Substance Use Topics     Alcohol use: No     Alcohol/week: 0.0 standard drinks       ROS:  SKIN: no  rash  GI: no vomiting    OBJECTIVE:  BP (!) 162/60   Pulse 72   Temp 98.2  F (36.8  C) (Oral)   Resp 22   SpO2 94% GENERAL APPEARANCE: healthy, alert and no distress  EYES: EOMI,  PERRL, conjunctiva clear  HENT: ear canals and TM's normal.  Nose and mouth without ulcers, erythema or lesions  NECK: supple, nontender, no lymphadenopathy  RESP: decreased breath sounds Rt sided  CV: regular rates and rhythm, normal S1 S2, no murmur noted  SKIN: no suspicious lesions or rashes    CXR with Pleural effusion      ICD-10-CM    1. Chest tightness R07.89 XR Chest 2 Views     CANCELED: EKG 12-lead complete w/read - Clinics   2. Pleural effusion J90    3. Cough R05 XR Chest 2 Views   4. History of asthma Z87.09      Pt declines EKG  Pt will be referred to ED for cont w/u Pleural effusion

## 2020-01-12 NOTE — H&P
[facility]    History and Physical - Hospitalist Service       Date of Admission:  1/12/2020    Assessment & Plan   Yissel Wetzel is a 59 year old female with PMH significant for ESRD on HD M/W/F, recurrent pleural effusions s/p thoracentesis, HTN, and IDDM2 who presented to  followed by ED with c/o shortness of breath and dry cough worsening x1 week, found to have recurrent Rt pleural effusion and registered to observation for thoracentesis and HD.     Right sided pleural effusion  Returned with SOB and dry cough worsening x1 wk. Hypoxic requring 2L NC on admit. No home O2. CXR revealed recurrent Rt pleural effusion has been drained previously, last on 12/31/19 with 750ml removed. She has had ~3 thoracenteses in the past which are becoming more frequently. She also feels she may have URI. No fevers or chills. No N/V. Able to take PO. Well appearing on admit. Hypertensive but did not take antihypertensives PTA. No chest pain. No concern for pneumonia on admission given no leukocytosis, no fevers or chills, and obvious recurrent pleural effusion noted.   - Registered to observation  - IR consultation for thoracentesis, last thoracentesis 12/31 with 750 ml drained  - NPO after midnight for possible thoracentesis  - O2 PRN, monitor oxygen saturations  - Check ambulatory pulse post thoracentesis  - Due for HD tomorrow, prefer thoracentesis followed by HD then discharge tomorrow if able**    Upper Respiratory Symptoms  Dry cough noted. Likely simple viral URI. No exudates or sputum. No fevers or chills. No evidence of pneumonia.   - No need for antimicrobial therapy  - Cough suppressant/expectorant, PRN levalbuterol as she doesn't tolerate albuterol very well     ESRD on HD, MWF  S/p AVF revision 12/2018 due to bleeding from site. Following with Dr. Pressley from vascular surgery.  Completed run of dialysis on 1/10 which she reports was a normal run.  - Resume PTA sevelamer 800mg TID with meals when  verified by pharmacy     Insulin Dependent Type 2 Diabetes Mellitus  PTA Regimen: Levemir 60 units Q HS + 3 units Novolog per 15 gm CHO  - Hypoglycemia protocol, preprandial and HS fingerstick checks  - Sliding scale insulin Medium dose  - Long acting regimen: Reduce PTA Levemir to 40 units Q HS given NPO after midnight for thoracentesis    Recent Labs   Lab 01/12/20  1612   *     Hypertension  Missed dose of labetalol AM of admit, quite hypertensive on presentation.  - Continue PTA labetalol 100mg BID  - IV Labetalol PRN for sBP >180     Diet: Mod CHO/2gm Na+  DVT Prophylaxis: Pneumatic Compression Devices and Ambulate every shift  Weller Catheter: not present  Code Status: Full    Disposition Plan   Expected discharge: Tomorrow, recommended to prior living arrangement once once thoracentesis and HD completed.  Entered: Nila Celis PA-C 01/12/2020, 5:50 PM     The patient's care was discussed with the Attending Physician, Dr. Santillan, Patient and Patient's Family.    Nila Celis PA-C  [facility]    ______________________________________________________________________    Chief Complaint   Chest tightness, cough    History is obtained from the patient, electronic health record and emergency department physician    History of Present Illness   Yissel Wetzel is a 59 year old female with PMH significant for ESRD on HD M/W/F, recurrent pleural effusions s/p thoracentesis, HTN and IDDM2 who presented to  today with c/o cough x2 weeks, moderate in severity. Symptoms worsening gradually over the past week, since thoracentesis on 12/31/19 she had a few days she felt very well afterward. CXR in  revealed pleural effusion therefore she was referred to the ED for further evaluation. Of note she was recently registered to observation at [facility] 12/30/19-12/31/19 for similar when she had 750mL fluid removed from Rt lung via thoracentesis. Pleural effusion thought to be caused by inability  to remove adequate fluid during HD. She has had at least 3 thoracenteses now over the past year which are becoming more frequent. Also endorses dry cough and feeling like she had URI or general cold like symptoms. No fevers or chills. No N/V. Able to take PO. No changes to bowel or bladder. No chest pain, lightheadedness or dizziness. No home O2.     In the ED, the patient was hemodynamically stable, afebrile, though quite hypertensive with BPs ranging 189-214/54-94 as she had not yet taken her antihypertensive today. +Hypoxia requiring 2L NC. Basic labs were at the patient's usual baseline. Gluc 176. CXR revealed Rt basilar airspace opacity with pleural effusion. The patient was given her PTA labetalol PO 100mg x1. She was registered to observation for thoracentesis tomorrow as well as HD. She likely will discharge afterward.    Review of Systems    The 10 point Review of Systems is negative other than noted in the HPI or here.     Past Medical History    I have reviewed this patient's medical history and updated it with pertinent information if needed.   Past Medical History:   Diagnosis Date     Allergic rhinitis, cause unspecified      Anemia in chronic kidney disease      Anemia of chronic disease      Bleeding pseudoaneurysm of right brachiocephalic arteriovenous fistula, initial encounter (H) 12/6/2019     End stage renal failure on dialysis (H)      Esophagitis, unspecified      Former tobacco use      HEMORRHAGIC GASTROPATHY      History of blood transfusion     [place]     Iron deficiency anemia, unspecified      Mild persistent asthma      Obesity      Other and unspecified hyperlipidemia      Pneumonia      Pulmonary hypertension (H)     per 12/2012 echo mod.to severe pulmonary hypertension     Tobacco use disorder dc ed 1999     Type 2 diabetes mellitus (H)     on Insulin- managed by PCP     Unspecified essential hypertension        Past Surgical History   I have reviewed this patient's surgical  history and updated it with pertinent information if needed.  Past Surgical History:   Procedure Laterality Date     ABDOMINOPLASTY  pt unsure when    abdominoplasty-      SECTION  ,     x 2     COLONOSCOPY  2006    [place]     CREATE FISTULA ARTERIOVENOUS UPPER EXTREMITY Right 2018    Procedure: RIGHT BRACHIAL TO BASILIC ARTERIOVENOUS FISTULA CREATION;  Surgeon: Terry Vizcaino MD;  Location: [facility]     ENT SURGERY  as a child    tonsillectomy     EYE SURGERY Left     injections- eye     HYSTERECTOMY  approx     partial hysterectomy-reason bleeding      IR CVC TUNNEL PLACEMENT > 5 YRS OF AGE  2019     IR DIALYSIS FISTULOGRAM RIGHT  2019       Social History   I have reviewed this patient's social history and updated it with pertinent information if needed.  Social History     Tobacco Use     Smoking status: Former Smoker     Packs/day: 1.00     Years: 22.00     Pack years: 22.00     Types: Cigarettes     Last attempt to quit: 10/12/1999     Years since quittin.2     Smokeless tobacco: Never Used   Substance Use Topics     Alcohol use: No     Alcohol/week: 0.0 standard drinks     Drug use: No       Family History   I have reviewed this patient's family history and updated it with pertinent information if needed.   Family History   Problem Relation Age of Onset     Arrhythmia Mother      Hypertension Mother      Pancreatic Cancer Father      Diabetes Brother      Asthma Sister      LUNG DISEASE Sister      Diabetes Daughter      Cirrhosis Sister        Prior to Admission Medications   Prior to Admission Medications   Prescriptions Last Dose Informant Patient Reported? Taking?   ACCU-CHEK CHING PLUS test strip  Self No No   Sig: USE TO TEST BLOOD SUGAR 4 TIMES PER DAY    LEVEMIR FLEXTOUCH 100 UNIT/ML pen  Self No No   Sig: Inject 60 Units Subcutaneous At Bedtime   acetaminophen (TYLENOL) 500 MG tablet  Self Yes No   Sig: Take 500 mg by mouth every 8  hours as needed for mild pain   famotidine (PEPCID) 10 MG tablet  Self No No   Sig: Take 1 tablet (10 mg) by mouth daily   Patient taking differently: Take 10 mg by mouth daily as needed    hypromellose-dextran (ARTIFICAL TEARS) 0.1-0.3 % ophthalmic solution  Self Yes No   Sig: Place 1 drop into both eyes daily as needed for dry eyes   insulin aspart (NOVOLOG FLEXPEN) 100 UNIT/ML pen  Self Yes No   Sig: Take 3 unit per 15 grams of carbohydrates three times daily with meals   insulin aspart (NOVOLOG PEN) 100 UNIT/ML pen  Self Yes No   Sig: Corrective scale = 2 units for every BG 50 over 150  In addition to carb counting insulin   labetalol (NORMODYNE) 100 MG tablet   No No   Sig: Take 1 tablet (100 mg) by mouth 2 times daily   labetalol (NORMODYNE) 200 MG tablet  Self Yes No   Sig: Take 100 mg by mouth daily   sevelamer (RENVELA) 800 MG tablet  Self Yes No   Sig: Take 1 tablet (800 mg) by mouth 3 times daily (with meals)      Facility-Administered Medications: None     Allergies   Allergies   Allergen Reactions     Albuterol      shakiness     Aspirin      gi     Byetta [Exenatide]      gi     Clonidine      constipation     Codeine      vomiting     Ezetimibe      muscle symptoms     Hydralazine      headaches     Lisinopril      hyperkalemia     Metformin Hydrochloride      vomiting     Pravachol [Pravastatin Sodium]      muscle pains     Simvastatin      myalgias     Troglitazone        Physical Exam   Vital Signs: Temp: 97.7  F (36.5  C) Temp src: Oral BP: (!) 174/57 Pulse: 79 Heart Rate: 80 Resp: 20 SpO2: 91 % O2 Device: None (Room air) Oxygen Delivery: 2 LPM  Weight: 175 lbs 3.2 oz    General: Awake, alert, middle aged woman who appears stated age. Looks comfortable sitting at edge of bed. No acute distress.  HEENT: Normocephalic, atraumatic. Extraocular movements intact. Oropharynx clear without exudates.   Respiratory: Upper fields CTAB. RLL with fine rales.  Cardiovascular: Regular rate and rhythm, +S1 and  S2, no murmur auscultated. Trace edema to RLE, trace to 1+ to LLE.    Gastrointestinal: Soft, non-tender, non-distended. Bowel sounds present.  Skin: Warm, dry. No obvious rashes or lesions on exposed skin. Dorsalis pedis pulses palpable bilaterally.  Musculoskeletal: No joint swelling, erythema or tenderness. Moves all extremities equally.  Neurologic: AAO x3. Cranial nerves 2-12 grossly intact, normal strength and sensation.  Psychiatric: Appropriate mood and affect. No obvious anxiety or depression.    Data   Data reviewed today: I reviewed all medications, new labs and imaging results over the last 24 hours. I personally reviewed no images or EKG's today.    Recent Labs   Lab 01/12/20  1612   WBC 8.4   HGB 8.5*   MCV 95      INR 1.23*      POTASSIUM 4.1   CHLORIDE 105   CO2 27   BUN 38*   CR 5.55*   ANIONGAP 6   KANWAL 8.4*   *     Recent Results (from the past 24 hour(s))   XR Chest 2 Views    Narrative    CHEST TWO VIEWS 1/12/2020 2:14 PM     HISTORY: Chest tightness. Cough.    COMPARISON: 12/30/2019    FINDINGS: The heart is enlarged. There is a moderate-sized right  pleural effusion with right basilar airspace opacity. No pleural  effusion on the left. The left lung is clear. No pneumothorax.  Right-sided dialysis catheter noted.       Impression    IMPRESSION: Right basilar airspace opacity with pleural effusion. This  could represent pneumonia or represent an effusion with compressive  atelectasis. Appearance of the chest not significantly changed from  12/30/2019.     MELANIE HARTMAN MD

## 2020-01-12 NOTE — PROGRESS NOTES
RECEIVING UNIT ED HANDOFF REVIEW    ED Nurse Handoff Report was reviewed by: Pura Magana RN on January 12, 2020 at 5:00 PM

## 2020-01-12 NOTE — ED PROVIDER NOTES
History     Chief Complaint:  Cough     HPI   Yissel Wetzel is a 59 year old female with a history of ESRD on dialysis, type 2 diabetes, and pleural effusion who presents with cough. The patient reports that she was seen in the ED on 12/31 for evaluation of shortness of breath and cough. She was found to have a right-sided pleural effusion and was admitted for thoracentesis. Review of her chart shows that 750 mL of genie-colored fluid was drained from her right lung, and she was discharged in stable condition. However, the patient reports that her cough and shortness of breath returned about a week ago and has not improved since initial onset. This prompted her to go to Urgent Care for evaluation. Chest x-ray there showed evidence of persistent pleural effusion, report as noted below. Urgent Care staff recommended that she come to the ED for evaluation. Here, the patient notes that she was told her pleural effusion may have been caused by inability to completely dialysis her as she cramps frequently during her runs. The patient's last underwent dialysis two days ago. She denies any chest or rib pain as well as fever or chills. The patient reports compliance with her medications although she has not had her dose of Labetalol today.     CXR, 2 views, performed at Urgent Care: IMPRESSION: Right basilar airspace opacity with pleural effusion. This could represent pneumonia or represent an effusion with compressive atelectasis. Appearance of the chest not significantly changed from 12/30/2019. Report per radiology.       Allergies:  Albuterol  Aspirin  Byetta  Clonidine  Codeine   Ezetimibe   Hydralazine  Lisinopril   Metformin  Pravachol  Simvastatin  Troglitazone     Medications:    Tylenol   Pepcid  Artifical Tears   Nolovog  Labetalol  Levemir  Renvela     Past Medical History:    Allergic rhinitis  Anemia of chronic kidney disease  Bleeding pseudoaneurysm of right brachiocephalic arteriovenous fistula  End stage  "renal failure, on dialysis   Esophagitis  Former tobacco use  Hemorrhagic gastropathy  Asthma  Obesity   Diabetes, type 2  Hyperlipidemia   Pneumonia  Pulmonary hypertension   Hypertension     Past Surgical History:    Abdominoplasty   C section x2  Colonoscopy   Create fistula, upper extremity   Tonsillectomy   Eye injections  Partial hysterectomy   IR CVC Tunnel Placement  Dialysis fistulogram, right     Family History:    The patient reports a maternal history of arrhythmia and hypertension. The patient reports a paternal history of pancreatic cancer. The patient reports a fraternal history of diabetes, asthma, cirrhosis, and lung disease. The patient denies any other relevant family history.     Social History:  The patient is single. She reports former tobacco use, quit date 10/12/1996. She denies alcohol and drug use.     Review of Systems   Constitutional: Negative for chills and fever.   Respiratory: Positive for cough and shortness of breath.    Cardiovascular: Negative for chest pain.   All other systems reviewed and are negative.    Physical Exam   First Vitals:  BP: (!) 214/54  Heart Rate: 83  Temp: 98.1  F (36.7  C)  Resp: 22  Height: 154.9 cm (5' 1\")  Weight: 78.9 kg (174 lb)  SpO2: 90 %    Physical Exam  Nursing note and vitals reviewed.  Constitutional:  Appears well-developed and well-nourished.   HENT:   Head:    Atraumatic.   Mouth/Throat:   Oropharynx is clear and moist. No oropharyngeal exudate.   Eyes:    Pupils are equal, round, and reactive to light.   Neck:    Normal range of motion. Neck supple.      No tracheal deviation present. No thyromegaly present.   Cardiovascular:  Normal rate, regular rhythm, no murmur   Pulmonary/Chest: Diminished breath sounds to the right lung base. Breath sounds otherwise clear and equal without wheezes or crackles.  Abdominal:   Soft. Bowel sounds are normal. Exhibits no distension and      no mass. There is no tenderness.      There is no rebound and no " guarding.   Musculoskeletal:  Exhibits no edema.   Lymphadenopathy:  No cervical adenopathy.   Neurological:   Alert and oriented to person, place, and time.   Skin:    Skin is warm and dry. No rash noted. No pallor.      Emergency Department Course   ECG:  Indication: SOB   Findings: Normal sinus rhythm. ST & T wave abnormality, consider inferior ischemia. Abnormal ECG. ECG read at 16:11 by Dr. Winkler.  Ventricular rate: 78 bpm  TX Interval: 152 ms  QRS duration: 88 ms  QT/QTc: 420/478 ms   R-wave axis: 33     Laboratory:  CBC: HGB 8.5 (low) o/w WNL. (WBC 8.4, )   BMP: Glucose 176 (high), bun 38 (high), Creatinine 5.55 (low), GFR 8 (low), Calcium 8.4 (low) o/w WNL    INR: 1.23    Interventions:  Labetalol 100 mg tablet PO    Emergency Department Course:  Nursing notes and vitals reviewed. I performed an exam of the patient as documented above.   IV inserted and blood drawn. The patient was placed on continuous cardiac monitoring and pulse oximetry.   4:07 PM Discussed the case with Dr. Mujica, on call for interventional radiology.    4:10 PM Recheck. Reviewed radiology recommendations and plans for admission.   4:21 PM Discussed the case with Dr. Santillan, on call for hospitalist services.   4:26 PM The patient was given Labetalol PO, dosage as noted above.    5:15 PM Recheck.   Findings and plan explained to the Patient who consents to admission. Discussed the patient with Dr. Santillan, who will admit the patient for further monitoring, evaluation, and treatment.     Impression & Plan      Medical Decision Making:  Yissel Wetzel is a 59 year old female who presents for recurrent right pleural effusion likely related to her chronic renal failure requiring dialysis. I discussed the case with Dr. Mujica for interventional radiology and ordered thoracentesis procedure, which is anticipated to be performed tomorrow morning unless her condition changes. She was admitted to the observation unit under the  care of Dr. Santillan. I did not feel that there was any concern for pulmonary embolism, pneumonia, sepsis, or bleeding. There was no sign of pneumothorax.       Diagnosis:    ICD-10-CM    1. Recurrent right pleural effusion J90    2. Chronic renal failure, unspecified CKD stage N18.9        Disposition:  Admitted to Dr. Santillan.       I, Narcisa Loaiza, am serving as a scribe at 3:55 PM on 1/12/2020 to document services personally performed by Esperanza Winkler MD, based on my observations and the provider's statements to me.       Esperanza Winkler MD  01/13/20 0037

## 2020-01-13 ENCOUNTER — APPOINTMENT (OUTPATIENT)
Dept: ULTRASOUND IMAGING | Facility: CLINIC | Age: 60
End: 2020-01-13
Attending: PHYSICIAN ASSISTANT
Payer: MEDICARE

## 2020-01-13 VITALS
WEIGHT: 176.3 LBS | RESPIRATION RATE: 18 BRPM | SYSTOLIC BLOOD PRESSURE: 165 MMHG | BODY MASS INDEX: 33.29 KG/M2 | DIASTOLIC BLOOD PRESSURE: 53 MMHG | HEIGHT: 61 IN | HEART RATE: 74 BPM | TEMPERATURE: 99.2 F | OXYGEN SATURATION: 92 %

## 2020-01-13 LAB
ANION GAP SERPL CALCULATED.3IONS-SCNC: 8 MMOL/L (ref 3–14)
BUN SERPL-MCNC: 44 MG/DL (ref 7–30)
CALCIUM SERPL-MCNC: 8.2 MG/DL (ref 8.5–10.1)
CHLORIDE SERPL-SCNC: 107 MMOL/L (ref 94–109)
CO2 SERPL-SCNC: 24 MMOL/L (ref 20–32)
CREAT SERPL-MCNC: 5.97 MG/DL (ref 0.52–1.04)
ERYTHROCYTE [DISTWIDTH] IN BLOOD BY AUTOMATED COUNT: 15.5 % (ref 10–15)
GFR SERPL CREATININE-BSD FRML MDRD: 7 ML/MIN/{1.73_M2}
GLUCOSE BLDC GLUCOMTR-MCNC: 163 MG/DL (ref 70–99)
GLUCOSE BLDC GLUCOMTR-MCNC: 163 MG/DL (ref 70–99)
GLUCOSE BLDC GLUCOMTR-MCNC: 172 MG/DL (ref 70–99)
GLUCOSE SERPL-MCNC: 175 MG/DL (ref 70–99)
HCT VFR BLD AUTO: 26 % (ref 35–47)
HGB BLD-MCNC: 8.4 G/DL (ref 11.7–15.7)
MCH RBC QN AUTO: 30.9 PG (ref 26.5–33)
MCHC RBC AUTO-ENTMCNC: 32.3 G/DL (ref 31.5–36.5)
MCV RBC AUTO: 96 FL (ref 78–100)
PLATELET # BLD AUTO: 185 10E9/L (ref 150–450)
POTASSIUM SERPL-SCNC: 4.5 MMOL/L (ref 3.4–5.3)
RBC # BLD AUTO: 2.72 10E12/L (ref 3.8–5.2)
SODIUM SERPL-SCNC: 139 MMOL/L (ref 133–144)
WBC # BLD AUTO: 7.7 10E9/L (ref 4–11)

## 2020-01-13 PROCEDURE — 85027 COMPLETE CBC AUTOMATED: CPT | Performed by: PHYSICIAN ASSISTANT

## 2020-01-13 PROCEDURE — 63400005 ZZH RX 634: Performed by: INTERNAL MEDICINE

## 2020-01-13 PROCEDURE — 99217 ZZC OBSERVATION CARE DISCHARGE: CPT | Performed by: PHYSICIAN ASSISTANT

## 2020-01-13 PROCEDURE — 00000146 ZZHCL STATISTIC GLUCOSE BY METER IP

## 2020-01-13 PROCEDURE — 96372 THER/PROPH/DIAG INJ SC/IM: CPT

## 2020-01-13 PROCEDURE — 25000132 ZZH RX MED GY IP 250 OP 250 PS 637: Mod: GY | Performed by: PHYSICIAN ASSISTANT

## 2020-01-13 PROCEDURE — 80048 BASIC METABOLIC PNL TOTAL CA: CPT | Performed by: PHYSICIAN ASSISTANT

## 2020-01-13 PROCEDURE — G0257 UNSCHED DIALYSIS ESRD PT HOS: HCPCS

## 2020-01-13 PROCEDURE — 25000125 ZZHC RX 250: Performed by: RADIOLOGY

## 2020-01-13 PROCEDURE — 40000863 ZZH STATISTIC RADIOLOGY XRAY, US, CT, MAR, NM

## 2020-01-13 PROCEDURE — 90937 HEMODIALYSIS REPEATED EVAL: CPT

## 2020-01-13 PROCEDURE — 27210190 US THORACENTESIS

## 2020-01-13 PROCEDURE — 99207 ZZC CDG-CODE CATEGORY CHANGED: CPT | Performed by: PHYSICIAN ASSISTANT

## 2020-01-13 PROCEDURE — 36415 COLL VENOUS BLD VENIPUNCTURE: CPT | Performed by: PHYSICIAN ASSISTANT

## 2020-01-13 PROCEDURE — 25800030 ZZH RX IP 258 OP 636: Performed by: INTERNAL MEDICINE

## 2020-01-13 PROCEDURE — G0378 HOSPITAL OBSERVATION PER HR: HCPCS

## 2020-01-13 PROCEDURE — 25000132 ZZH RX MED GY IP 250 OP 250 PS 637: Mod: GY | Performed by: INTERNAL MEDICINE

## 2020-01-13 PROCEDURE — 25000128 H RX IP 250 OP 636: Performed by: INTERNAL MEDICINE

## 2020-01-13 RX ORDER — AZITHROMYCIN 250 MG/1
500 TABLET, FILM COATED ORAL DAILY
Status: DISCONTINUED | OUTPATIENT
Start: 2020-01-13 | End: 2020-01-13 | Stop reason: HOSPADM

## 2020-01-13 RX ORDER — HEPARIN SODIUM 1000 [USP'U]/ML
500 INJECTION, SOLUTION INTRAVENOUS; SUBCUTANEOUS CONTINUOUS
Status: DISCONTINUED | OUTPATIENT
Start: 2020-01-13 | End: 2020-01-13

## 2020-01-13 RX ORDER — FAMOTIDINE 20 MG/1
20 TABLET, FILM COATED ORAL EVERY 24 HOURS
Status: DISCONTINUED | OUTPATIENT
Start: 2020-01-13 | End: 2020-01-13 | Stop reason: HOSPADM

## 2020-01-13 RX ORDER — LIDOCAINE HYDROCHLORIDE 10 MG/ML
10 INJECTION, SOLUTION EPIDURAL; INFILTRATION; INTRACAUDAL; PERINEURAL ONCE
Status: COMPLETED | OUTPATIENT
Start: 2020-01-13 | End: 2020-01-13

## 2020-01-13 RX ORDER — CEFUROXIME AXETIL 500 MG/1
500 TABLET ORAL 2 TIMES DAILY
Qty: 10 TABLET | Refills: 0 | Status: SHIPPED | OUTPATIENT
Start: 2020-01-13 | End: 2020-01-18

## 2020-01-13 RX ORDER — AZITHROMYCIN 250 MG/1
250 TABLET, FILM COATED ORAL DAILY
Qty: 4 TABLET | Refills: 0 | Status: SHIPPED | OUTPATIENT
Start: 2020-01-14 | End: 2020-01-18

## 2020-01-13 RX ORDER — HEPARIN SODIUM 1000 [USP'U]/ML
500 INJECTION, SOLUTION INTRAVENOUS; SUBCUTANEOUS
Status: DISCONTINUED | OUTPATIENT
Start: 2020-01-13 | End: 2020-01-13

## 2020-01-13 RX ORDER — LABETALOL 100 MG/1
100 TABLET, FILM COATED ORAL DAILY
Status: DISCONTINUED | OUTPATIENT
Start: 2020-01-13 | End: 2020-01-13 | Stop reason: HOSPADM

## 2020-01-13 RX ORDER — SEVELAMER CARBONATE 800 MG/1
800 TABLET, FILM COATED ORAL
Status: DISCONTINUED | OUTPATIENT
Start: 2020-01-13 | End: 2020-01-13 | Stop reason: HOSPADM

## 2020-01-13 RX ADMIN — HEPARIN SODIUM 2100 UNITS: 1000 INJECTION, SOLUTION INTRAVENOUS; SUBCUTANEOUS at 17:48

## 2020-01-13 RX ADMIN — SODIUM CHLORIDE 300 ML: 9 INJECTION, SOLUTION INTRAVENOUS at 15:48

## 2020-01-13 RX ADMIN — LABETALOL HYDROCHLORIDE 100 MG: 100 TABLET, FILM COATED ORAL at 09:50

## 2020-01-13 RX ADMIN — EPOETIN ALFA 8000 UNITS: 4000 SOLUTION INTRAVENOUS; SUBCUTANEOUS at 15:49

## 2020-01-13 RX ADMIN — INSULIN ASPART 1 UNITS: 100 INJECTION, SOLUTION INTRAVENOUS; SUBCUTANEOUS at 09:50

## 2020-01-13 RX ADMIN — SODIUM CHLORIDE 250 ML: 9 INJECTION, SOLUTION INTRAVENOUS at 15:48

## 2020-01-13 RX ADMIN — SEVELAMER CARBONATE 800 MG: 800 TABLET, FILM COATED ORAL at 13:36

## 2020-01-13 RX ADMIN — AZITHROMYCIN MONOHYDRATE 500 MG: 250 TABLET ORAL at 18:35

## 2020-01-13 RX ADMIN — LIDOCAINE HYDROCHLORIDE 10 ML: 10 INJECTION, SOLUTION EPIDURAL; INFILTRATION; INTRACAUDAL; PERINEURAL at 08:48

## 2020-01-13 RX ADMIN — FAMOTIDINE 20 MG: 20 TABLET, FILM COATED ORAL at 00:44

## 2020-01-13 RX ADMIN — INSULIN ASPART 1 UNITS: 100 INJECTION, SOLUTION INTRAVENOUS; SUBCUTANEOUS at 13:37

## 2020-01-13 ASSESSMENT — MIFFLIN-ST. JEOR: SCORE: 1317.07

## 2020-01-13 NOTE — PLAN OF CARE
Observation goals        -diagnostic tests and consults completed and resulted: Not met   -vital signs normal or at patient baseline: Not met   -tolerating oral intake to maintain hydration: Not met   -dyspnea improved and O2 sats greater than 88% on room air or prior home oxygen levels: Met  -returns to baseline functional status: Not met     Nurse to notify provider when observation goals have been met and patient is ready for discharge.

## 2020-01-13 NOTE — PROVIDER NOTIFICATION
MD Notification    Notified Person: MD    Notified Person Name:  hollins    Notification Date/Time: 1/13/20 0005    Notification Interaction: amcom page    Purpose of Notification: Obs 14-1 D.M.     pt requesting Pepcid for heartburn.    *[id number] ANGELICA Cerna    Orders Received:    Comments:

## 2020-01-13 NOTE — PROGRESS NOTES
Gave report to ANGELICA Moseley from dialysis. Everything reviewed what was done today - US thoracenteses removed 950 ml genie fluid, BP,  on carb count, insulin given. Dialysis is ready to take pt. Will transfer in wheelchair and come back to observation unit.

## 2020-01-13 NOTE — PLAN OF CARE
Observation goals        -diagnostic tests and consults completed and resulted: partially met, US in process, waiting for nephrology  -vital signs normal or at patient baseline: Not met, BP elevated order for labetalol  -tolerating oral intake to maintain hydration: Met   -dyspnea improved and O2 sats greater than 88% on room air or prior home oxygen levels: Met  -returns to baseline functional status: partially met     Nurse to notify provider when observation goals have been met and patient is ready for discharge.

## 2020-01-13 NOTE — PROGRESS NOTES
Potassium   Date Value Ref Range Status   01/13/2020 4.5 3.4 - 5.3 mmol/L Final     Hemoglobin   Date Value Ref Range Status   01/13/2020 8.4 (L) 11.7 - 15.7 g/dL Final     Creatinine   Date Value Ref Range Status   01/13/2020 5.97 (H) 0.52 - 1.04 mg/dL Final     Urea Nitrogen   Date Value Ref Range Status   01/13/2020 44 (H) 7 - 30 mg/dL Final     Sodium   Date Value Ref Range Status   01/13/2020 139 133 - 144 mmol/L Final     INR   Date Value Ref Range Status   01/12/2020 1.23 (H) 0.86 - 1.14 Final       DIALYSIS PROCEDURE NOTE  Hepatitis status of previous patient on machine log was checked and verified ok to use with this patients hepatitis status.  Patient dialyzed for 3 hrs. on a 3 K bath with a net fluid removal of  0L.  A BFR of 350 ml/min was obtained via a RCVC.    The treatment plan was dicussed with Dr. Herrera during the treatment.  Total heparin received during the treatment: 0 units.   Line flushed, clamped and capped with heparin 1:1000 2.1 mL (2100 units) per lumen  Meds  given: epogen Complications: none    Procedure/ESRD/access care/potassium/fluid restriction education provided orally to patient, patient verbalized/demonstrated understanding  ICEBOAT? Timeout performed pre-treatment  I: Patient was identified using 2 identifiers  C: Consent obtained/verified current before treatment  E: Equipment preventative maintenance is current and dialysis delivery system OK to use  B: Hepatitis B Surface Antigen: negative; Draw Date: 12/6/19      Hepatitis B Surface Antibody: immune; Draw Date: 12/6/19  O: Dialysis orders present and complete prior to treatment  A: Vascular access verified and assessed prior to treatment  T: Treatment was performed at a clinically appropriate time  ?: Patient was allowed to ask questions and address concerns prior to treatment  See flowsheet in EPIC for further details and post assessment.  Machine water alarm in place and functioning. Transducer pods intact and checked  every 15min.  Pt returned via w/c  Report received from: Gail Deal RN   Report given to: Gail Deal RN  Chlorine/Chloramine water system checked every 4 hours.  Outpatient Dialysis at [facility]

## 2020-01-13 NOTE — PLAN OF CARE
Observation goals        -diagnostic tests and consults completed and resulted: Not met   -vital signs normal or at patient baseline: Not met   -tolerating oral intake to maintain hydration: Not met   -dyspnea improved and O2 sats greater than 88% on room air or prior home oxygen levels: Met  -returns to baseline functional status: Not met     Nurse to notify provider when observation goals have been met and patient is ready for discharge.     A&Ox4 and tolerating mod carb diet. VSS on RA with exception of elevated BP (trending down). Up independently in room and IV SL. Denies pain, numbness, tingling, nausea, and dizziness. NPO at midnight. US Thoracentesis tomorrow.  and 277. 15 units given for carb counting scale. Nephrology consult. Continue to monitor.

## 2020-01-13 NOTE — PLAN OF CARE
Vital signs stable on RA ex HTN.. A&Ox 4. Lung sounds diminished. NPO since midnight. Adequate urinary output, up to bathroom. CMS intact. Up independently in the room. Denies pain. PIV SL. Plan for thoracentesis today. Nephrology consult. Continue to monitor.

## 2020-01-13 NOTE — PLAN OF CARE
Observation goals        -diagnostic tests and consults completed and resulted: partially met, US done - 950 ml drained from right lobe, waiting for nephrology and dialysis  -vital signs normal or at patient baseline: partially met,    -tolerating oral intake to maintain hydration: Met   -dyspnea improved and O2 sats greater than 88% on room air or prior home oxygen levels: Met  -returns to baseline functional status: partially met     Nurse to notify provider when observation goals have been met and patient is ready for discharge.

## 2020-01-13 NOTE — PROGRESS NOTES
Care Suites Procedure Nursing Note    Thoracentesis:   Patient arrived to Ultrasound department via cart. Ultrasound tech performed priliminary scan to find pockets of fluid. MD arrived to explain procedure to patient. Consent obtained. 0850- Time out done just prior to proceeding with procedure. Patient had BP and O2 sats monitored throughout procedure. 950 cc genie fluid removed without difficulty. Pt tolerated well, c/o pain right side back and throat. Explained re-expansion of lung to pt, need to wait it out for pain to subside. Vital signs were stable throughout. Bandaid applied to site after cleaning.    Plan: return to OBS 14 via cart with transport aid in medically stable condition.

## 2020-01-13 NOTE — PROGRESS NOTES
Talked to dialysis unit ANGELICA Moseley. Pt is not on the list yet, need to wait till Nephrologist saw pt.   Will keep PA and pt updated.

## 2020-01-13 NOTE — CONSULTS
[facility]    Nephrology Consultation     Date of Admission:  1/12/2020    Assessment & Plan     Yissel Wetzel is a 59 year old female with ESKD who was admitted on 1/12/2020 with SOB.      1) ESKD due to DM/HTN.  Runs MWF in [place].  Little or no net UF as she gets cramps and she does still make urine.  Takes tonic water daily for cramps.  R  mL/min, 3 H.    2) Pleural effusion:  S/P R thoracentesis - 950 mL.      3) Anemia:  HGB 8.4.  She is on EPO 8000 units three times weekly    4) HTN:  BP is borderline in labetalol.  Adjustments to be made as outpt.    Plan:    HD today  No net UF  Can discharge post run.        Robert Herrera MD  [facility] - Nephrology  [phone]    Reason for Consult     I was asked to see the patient for ESRD and SOB.      Primary Care Physician     Gigi Martin    Chief Complaint     SOB    History is obtained from the patient and chart review.      History of Present Illness     Yissel Wetzel is a 59 year old female with ESKD who presents with SOB.    She presented with cough, SOB and recurrent R pleural effusion.  She has had thoracentesis several times including 12/30.    She had repeat thoracentesis earlier today.    She feels better and would like to go home.    She does not allow UF in HD as she gets severe cramps.      Past Medical History   I have reviewed this patient's medical history and updated it with pertinent information if needed.   Past Medical History:   Diagnosis Date     Allergic rhinitis, cause unspecified      Anemia in chronic kidney disease      Anemia of chronic disease      Bleeding pseudoaneurysm of right brachiocephalic arteriovenous fistula, initial encounter (H) 12/6/2019     End stage renal failure on dialysis (H)      Esophagitis, unspecified      Former tobacco use      HEMORRHAGIC GASTROPATHY      History of blood transfusion     [place]     Iron deficiency anemia, unspecified      Mild persistent  asthma      Obesity      Other and unspecified hyperlipidemia      Pneumonia      Pulmonary hypertension (H)     per 2012 echo mod.to severe pulmonary hypertension     Tobacco use disorder dc ed      Type 2 diabetes mellitus (H)     on Insulin- managed by PCP     Unspecified essential hypertension        Past Surgical History   I have reviewed this patient's surgical history and updated it with pertinent information if needed.  Past Surgical History:   Procedure Laterality Date     ABDOMINOPLASTY  pt unsure when    abdominoplasty-      SECTION  ,     x 2     COLONOSCOPY  2006    [place]     CREATE FISTULA ARTERIOVENOUS UPPER EXTREMITY Right 2018    Procedure: RIGHT BRACHIAL TO BASILIC ARTERIOVENOUS FISTULA CREATION;  Surgeon: Terry Vizcaino MD;  Location: [facility]     ENT SURGERY  as a child    tonsillectomy     EYE SURGERY Left     injections- eye     HYSTERECTOMY  approx     partial hysterectomy-reason bleeding      IR CVC TUNNEL PLACEMENT > 5 YRS OF AGE  2019     IR DIALYSIS FISTULOGRAM RIGHT  2019       Prior to Admission Medications   Prior to Admission Medications   Prescriptions Last Dose Informant Patient Reported? Taking?   ACCU-CHEK CHING PLUS test strip  Self No No   Sig: USE TO TEST BLOOD SUGAR 4 TIMES PER DAY    LEVEMIR FLEXTOUCH 100 UNIT/ML pen 2020 at Unknown time Self No Yes   Sig: Inject 60 Units Subcutaneous At Bedtime   acetaminophen (TYLENOL) 500 MG tablet  Self Yes No   Sig: Take 500 mg by mouth every 8 hours as needed for mild pain   famotidine (PEPCID) 10 MG tablet prn Self No No   Sig: Take 1 tablet (10 mg) by mouth daily   Patient taking differently: Take 10 mg by mouth daily as needed    hypromellose-dextran (ARTIFICAL TEARS) 0.1-0.3 % ophthalmic solution prn Self Yes No   Sig: Place 1 drop into both eyes daily as needed for dry eyes   insulin aspart (NOVOLOG FLEXPEN) 100 UNIT/ML pen  Self Yes Yes   Sig: Take 3 unit per  15 grams of carbohydrates three times daily with meals   insulin aspart (NOVOLOG PEN) 100 UNIT/ML pen  Self Yes Yes   Sig: Corrective scale = 2 units for every BG 50 over 150  In addition to carb counting insulin   labetalol (NORMODYNE) 100 MG tablet   No No   Sig: Take 1 tablet (100 mg) by mouth 2 times daily   labetalol (NORMODYNE) 200 MG tablet 1/11/2020 at Unknown time Self Yes Yes   Sig: Take 100 mg by mouth daily   sevelamer (RENVELA) 800 MG tablet  Self Yes No   Sig: Take 1 tablet (800 mg) by mouth 3 times daily (with meals)      Facility-Administered Medications: None     Allergies   Allergies   Allergen Reactions     Albuterol      shakiness     Aspirin      gi     Byetta [Exenatide]      gi     Clonidine      constipation     Codeine      vomiting     Ezetimibe      muscle symptoms     Hydralazine      headaches     Lisinopril      hyperkalemia     Metformin Hydrochloride      vomiting     Pravachol [Pravastatin Sodium]      muscle pains     Simvastatin      myalgias     Troglitazone        Social History   I have reviewed this patient's social history and updated it with pertinent information if needed. Yissel Wetzel  reports that she quit smoking about 20 years ago. Her smoking use included cigarettes. She has a 22.00 pack-year smoking history. She has never used smokeless tobacco. She reports that she does not drink alcohol or use drugs.    Family History   I have reviewed this patient's family history and updated it with pertinent information if needed.   Family History   Problem Relation Age of Onset     Arrhythmia Mother      Hypertension Mother      Pancreatic Cancer Father      Diabetes Brother      Asthma Sister      LUNG DISEASE Sister      Diabetes Daughter      Cirrhosis Sister        Review of Systems   The 10 point Review of Systems is negative other than noted in the HPI.     Physical Exam   Temp: 98.2  F (36.8  C) Temp src: Oral BP: (!) 150/53   Heart Rate: 68 Resp: 18 SpO2: 95 % O2  Device: None (Room air)    Vital Signs with Ranges  Temp:  [96.6  F (35.9  C)-98.2  F (36.8  C)] 98.2  F (36.8  C)  Heart Rate:  [64-81] 68  Resp:  [18-20] 18  BP: (143-199)/(44-76) 150/53  SpO2:  [91 %-96 %] 95 %  176 lbs 4.8 oz    GENERAL: healthy, alert, NAD  HEENT:  Normocephalic. No gross abnormalities.  Pupils equal.  MMM.  Dentition is ok.  CV: RRR, no murmurs, no clicks, gallops, or rubs, no carotid bruits  RESP: clear  GI: Abdomen soft/nt/nd, BS normal. No masses, organomegaly  MUSCULOSKELETAL: trace BLE edema  SKIN: no suspicious lesions or rashes, dry to touch  NEURO:  Strength normal and symmetric.   PSYCH: mood good, affect appropriate  LYMPH: No palpable ant/post cervical and supraclavicular adenopathy    Data   BMP  Recent Labs   Lab 01/13/20  0642 01/12/20  1612    138   POTASSIUM 4.5 4.1   CHLORIDE 107 105   KANWAL 8.2* 8.4*   CO2 24 27   BUN 44* 38*   CR 5.97* 5.55*   * 176*     Phos@LABRCNTIPR(phos:4)  CBC)  Recent Labs   Lab 01/13/20  0642 01/12/20  1612   WBC 7.7 8.4   HGB 8.4* 8.5*   HCT 26.0* 26.5*   MCV 96 95    204     No lab results found in last 7 days.    Invalid input(s): BILIRUBININDIRECT  Recent Labs   Lab 01/12/20  1612   INR 1.23*     No results found for: D2VIT, D3VIT, DTOT  Recent Labs   Lab 01/13/20  0642   HGB 8.4*   HCT 26.0*   MCV 96     No results for input(s): PTHI in the last 168 hours.

## 2020-01-13 NOTE — DISCHARGE SUMMARY
[facility]    Discharge Summary  Hospitalist    Date of Admission:  1/12/2020  Date of Discharge:  1/13/2020  Discharging Provider: Brenna Gayle PA-C    Discharge Diagnoses   Recurrent pleural effusion s/p thoracentesis   Possible community acquired pneumonia  ESRD on HD  Type 2 diabetes mellitus  Hypertension     History of Present Illness   Yissel Wetzel is an 59 year old female with PMH significant for ESRD on HD M/W/F, recurrent pleural effusions s/p thoracentesis, HTN, and IDDM2 who presented to  followed by ED with c/o shortness of breath and dry cough worsening x1 week, found to have recurrent Rt pleural effusion and registered to observation for thoracentesis and HD. The patient was seen in the Urgent Care day of admission for evaluation of cough x2 weeks, worsening dyspnea, fatigue. CXR showed pleural effusion and she was referred to the ED and ultimately admitted to observation for thoracentesis. Please see admission H&P by Nila Celis for additional information.     On day of discharge patient is doing well. She reports improvement in dyspnea. Denies chest pain. She continues to report intermittent congested cough, occasionally productive. Denies fevers, chills. Ambulating without difficulty.     Hospital Course   Yissel Wetzel was admitted on 1/12/2020.  The following problems were addressed during her hospitalization:    Right sided pleural effusion, recurrent   Returned with SOB and dry cough worsening x1 wk. Hypoxic requring 2L NC on admit, now on room air. CXR revealed recurrent Rt pleural effusion has been drained previously, last on 12/31/19 with 750ml removed. She has had ~3 thoracenteses in the past which are becoming more frequently. Analysis has suggested transudative etiology previously. She underwent US guided thoracentesis with removal of 950cc fluid 1/13/20.   - Registered to observation  - IR consultation for thoracentesis, last thoracentesis 12/31 with 750  ml drained  - NPO after midnight for possible thoracentesis  - O2 PRN, monitor oxygen saturations  - Check ambulatory pulse post thoracentesis  - Due for HD tomorrow, prefer thoracentesis followed by HD then discharge tomorrow if able**     Possible CAP  Patient reports 2 weeks of congested cough with intermittent sputum production. WBC normal. CXR shows right basilar opacities.   - will treat with 5 day course azithromycin and Ceftin     ESRD on HD, MWF  S/p AVF revision 12/2018 due to bleeding from site. Following with Dr. Pressley from vascular surgery.  Completed run of dialysis on 1/10 which she reports was a normal run. Dialyzed 1/13 prior to discharge.   - continue PTA sevelamer 800mg TID with meals   --continue outpatient dialysis per regimen      Insulin Dependent Type 2 Diabetes Mellitus  PTA Regimen: Levemir 60 units Q HS + 3 units Novolog per 15 gm CHO  - continue home regimen at discharge      Hypertension  Missed dose of labetalol AM of admit, quite hypertensive on presentation.  - Continue PTA labetalol 100mg   - follow up with Nephrology in outpatient setting     This patient was seen and examined with Dr. Duval who agrees with the above plan    Brenna Gayle PA-C, [facility]    Significant Results and Procedures   See below       Code Status   Full Code       Primary Care Physician   Gigi Martin    Physical Exam   Temp: 98.2  F (36.8  C) Temp src: Oral BP: (!) 153/53   Heart Rate: 68 Resp: 18 SpO2: 95 % O2 Device: None (Room air)    Vitals:    01/12/20 1517 01/12/20 1729 01/13/20 0600   Weight: 78.9 kg (174 lb) 79.5 kg (175 lb 3.2 oz) 80 kg (176 lb 4.8 oz)     Vital Signs with Ranges  Temp:  [96.6  F (35.9  C)-98.2  F (36.8  C)] 98.2  F (36.8  C)  Heart Rate:  [64-81] 68  Resp:  [18-20] 18  BP: (143-199)/(44-66) 153/53  SpO2:  [91 %-96 %] 95 %  I/O last 3 completed shifts:  In: 620 [P.O.:620]  Out: -     Constitutional: Alert and oriented, sitting up in bed. Appears comfortable and is pleasantly  conversant   ENT: normal cephalic, moist mucous membranes  Eyes:  Sclera anicteric, EOMI  Respiratory: occasional congested cough,   Cardiovascular: Regular rate and rhythm, no murmur, no edema, distal pulses +2/4. RLL with fine crackles. No increased work of breathing   GI:  active bowel sounds, abdomen soft, non-tender  MSK:  Moves all four extremities. Normal tone  Neuro:  Speech is clear. Face symmetric. CN 2-12 grossly intact.     Extremities:  No significant LE edema     Discharge Disposition   Discharged to home  Condition at discharge: Stable    Consultations This Hospital Stay   NEPHROLOGY IP CONSULT    Time Spent on this Encounter   I, Brenna Gayle PA-C, personally saw the patient today and spent greater than 30 minutes discharging this patient.    Discharge Orders      Reason for your hospital stay    You were here for evaluation of shortness of breath due to recurrent fluid build up around your lung.     Follow-up and recommended labs and tests     Follow up with regular dialysis treatments as scheduled.     Follow up with primary care provider within 7 days for hospital follow up.     Activity    Your activity upon discharge: activity as tolerated     Full Code     Diet    Follow this diet upon discharge: Orders Placed This Encounter      Moderate Consistent CHO Diet     Discharge Medications   Current Discharge Medication List      START taking these medications    Details   azithromycin (ZITHROMAX) 250 MG tablet Take 1 tablet (250 mg) by mouth daily for 4 days  Qty: 4 tablet, Refills: 0    Associated Diagnoses: Pneumonia of right lower lobe due to infectious organism (H)      cefuroxime (CEFTIN) 500 MG tablet Take 1 tablet (500 mg) by mouth 2 times daily for 5 days  Qty: 10 tablet, Refills: 0    Associated Diagnoses: Pneumonia of right lower lobe due to infectious organism (H)         CONTINUE these medications which have NOT CHANGED    Details   !! insulin aspart (NOVOLOG FLEXPEN) 100 UNIT/ML  pen Take 3 unit per 15 grams of carbohydrates three times daily with meals  Qty: 6 mL, Refills: 0    Associated Diagnoses: Type 2 diabetes mellitus with diabetic nephropathy, with long-term current use of insulin (H)      !! insulin aspart (NOVOLOG PEN) 100 UNIT/ML pen Corrective scale = 2 units for every BG 50 over 150  In addition to carb counting insulin      !! labetalol (NORMODYNE) 200 MG tablet Take 100 mg by mouth daily      LEVEMIR FLEXTOUCH 100 UNIT/ML pen Inject 60 Units Subcutaneous At Bedtime  Qty: 18 mL, Refills: 11    Associated Diagnoses: Type 2 diabetes mellitus with diabetic nephropathy, with long-term current use of insulin (H)      ACCU-CHEK CHING PLUS test strip USE TO TEST BLOOD SUGAR 4 TIMES PER DAY   Qty: 200 each, Refills: 3    Associated Diagnoses: Type 2 diabetes mellitus with diabetic nephropathy, with long-term current use of insulin (H)      acetaminophen (TYLENOL) 500 MG tablet Take 500 mg by mouth every 8 hours as needed for mild pain      famotidine (PEPCID) 10 MG tablet Take 1 tablet (10 mg) by mouth daily  Qty: 90 tablet, Refills: 3    Associated Diagnoses: Gastroesophageal reflux disease, esophagitis presence not specified      hypromellose-dextran (ARTIFICAL TEARS) 0.1-0.3 % ophthalmic solution Place 1 drop into both eyes daily as needed for dry eyes      !! labetalol (NORMODYNE) 100 MG tablet Take 1 tablet (100 mg) by mouth 2 times daily    Associated Diagnoses: Essential hypertension      sevelamer (RENVELA) 800 MG tablet Take 1 tablet (800 mg) by mouth 3 times daily (with meals)       !! - Potential duplicate medications found. Please discuss with provider.        Allergies   Allergies   Allergen Reactions     Albuterol      shakiness     Aspirin      gi     Byetta [Exenatide]      gi     Clonidine      constipation     Codeine      vomiting     Ezetimibe      muscle symptoms     Hydralazine      headaches     Lisinopril      hyperkalemia     Metformin Hydrochloride       vomiting     Pravachol [Pravastatin Sodium]      muscle pains     Simvastatin      myalgias     Troglitazone      Data   Most Recent 3 CBC's:  Recent Labs   Lab Test 01/13/20  0642 01/12/20  1612 12/31/19  0041   WBC 7.7 8.4 7.8   HGB 8.4* 8.5* 7.9*   MCV 96 95 98    204 198      Most Recent 3 BMP's:  Recent Labs   Lab Test 01/13/20  0642 01/12/20  1612 12/31/19  0041    138 134   POTASSIUM 4.5 4.1 3.6   CHLORIDE 107 105 98   CO2 24 27 28   BUN 44* 38* 30   CR 5.97* 5.55* 3.24*   ANIONGAP 8 6 8   KANWAL 8.2* 8.4* 8.1*   * 176* 298*     Most Recent 2 LFT's:  Recent Labs   Lab Test 12/31/19  0041 09/25/19  1453   AST 16 12   ALT 14 21   ALKPHOS 88 98   BILITOTAL 0.6 0.6     Most Recent INR's and Anticoagulation Dosing History:  Anticoagulation Dose History     Recent Dosing and Labs Latest Ref Rng & Units 8/29/2006 3/15/2019 9/25/2019 12/31/2019 1/12/2020    INR 0.86 - 1.14 1.16(H) 1.40(H) 1.13 1.27(H) 1.23(H)        Most Recent 3 Troponin's:  Recent Labs   Lab Test 11/24/17  1816 08/09/17  2030 09/30/15  1533   TROPI <0.015 <0.015  The 99th percentile for upper reference range is 0.045 ug/L.  Troponin values in   the range of 0.045 - 0.120 ug/L may be associated with risks of adverse   clinical events.   <0.015  The 99th percentile for upper reference range is 0.045 ug/L.  Troponin values in   the range of 0.045 - 0.120 ug/L may be associated with risks of adverse   clinical events.       Most Recent Cholesterol Panel:  Recent Labs   Lab Test 07/22/16  1056   CHOL 211*   *   HDL 49*   TRIG 193*     Most Recent 6 Bacteria Isolates From Any Culture (See EPIC Reports for Culture Details):  Recent Labs   Lab Test 12/31/19  1007 03/15/19  1600 03/22/13  1428   CULT No growth Culture negative for acid fast bacilli  Assayed at [facility] [address] [place] [address] [phone]  No growth No growth     Most Recent TSH, T4 and A1c Labs:  Recent Labs   Lab Test 09/25/19  1453   01/20/17  1511   TSH  --   --  3.33   A1C 8.7*   < > 7.7*    < > = values in this interval not displayed.     Results for orders placed or performed during the hospital encounter of 01/12/20   US Thoracentesis    Narrative    ULTRASOUND GUIDED THORACENTESIS  1/13/2020 9:04 AM     HISTORY: Recurrent right pleural effusion.    FINDINGS: Limited ultrasound was performed to evaluate for the  presence and best approach for drainage of a pleural effusion. An  image is archived. Written and oral informed consent was obtained. A  pause for the cause procedure to verify the correct patient and  correct procedure.     The skin overlying the right chest posteriorly was prepped and draped  in the usual sterile fashion. The subcutaneous tissues were  anesthetized with 10 mL 1% lidocaine. Under direct ultrasound guidance  a catheter was advanced into the pleural space and 950 mL of  genie  colored fluid was drained. The catheter was removed and a sterile  dressing was applied.     Patient was monitored by nurse under my direct supervision throughout  the exam. Ultrasound images were permanently stored.  There were no  immediate complications. Patient left the ultrasound suite in  satisfactory condition.      Impression    IMPRESSION: Technically successful thoracentesis without immediate  complications.    JOSE SALTER DO

## 2020-01-13 NOTE — PROCEDURES
[facility]    Procedure: Right thoracentesis  Date/Time: 1/13/2020 8:52 AM  Performed by: Shayy Lou DO  Authorized by: Shayy Lou DO     UNIVERSAL PROTOCOL   Site Marked: Yes  Prior Images Obtained and Reviewed:  Yes  Required items: Required blood products, implants, devices and special equipment available    Patient identity confirmed:  Verbally with patient, arm band, provided demographic data and hospital-assigned identification number  Patient was reevaluated immediately before administering moderate or deep sedation or anesthesia  Confirmation Checklist:  Patient's identity using two indicators, relevant allergies, procedure was appropriate and matched the consent or emergent situation and correct equipment/implants were available  Time out: Immediately prior to the procedure a time out was called    Universal Protocol: the Joint Commission Universal Protocol was followed    Preparation: Patient was prepped and draped in usual sterile fashion           ANESTHESIA    Anesthesia: Local infiltration  Local Anesthetic:  Lidocaine 1% without epinephrine      SEDATION    Patient Sedated: No    Sedation Type:  Moderate (conscious) sedation  Sedation:  Fentanyl and midazolam  See dictated procedure note for full details.  Findings: Right thoracentesis, tolerated well    Specimens: none    Complications: None    Condition: Stable    Plan: Repeat as needed.     PROCEDURE   Patient Tolerance:  Patient tolerated the procedure well with no immediate complications    Length of time physician/provider present for 1:1 monitoring during sedation: 0

## 2020-01-14 ENCOUNTER — TELEPHONE (OUTPATIENT)
Dept: INTERNAL MEDICINE | Facility: CLINIC | Age: 60
End: 2020-01-14

## 2020-01-15 ENCOUNTER — PATIENT OUTREACH (OUTPATIENT)
Dept: CARE COORDINATION | Facility: CLINIC | Age: 60
End: 2020-01-15

## 2020-01-15 DIAGNOSIS — J90 PLEURAL EFFUSION: Primary | ICD-10-CM

## 2020-01-15 NOTE — PROGRESS NOTES
Clinic Care Coordination Contact    Clinic Care Coordination Contact  OUTREACH    Referral Information:  Referral Source: IP Report  Primary Diagnosis: Respiratory Disorders - other  Chief Complaint   Patient presents with     Clinic Care Coordination - Post Hospital     pleural effusion     Clinical Concerns:  CCC RN outreach following patient's hospitalization for pleural effusion s/p thoracentesis.    Today the patient reports to be feeling improved from a respiratory standpoint.  It was difficult to engage the patient in conversation, but she did report to be doing okay and denied concerns at this time.    The patient confirmed her upcoming follow-up appointment with PCP.  She continues dialysis on MWF.    Education Provided to patient: Informed of Care Coordination role within clinic     At this time, the patient denied need for additional assistance/support from Care Coordination.  She agreed to contact CCC RN should any questions or concerns arise.    Medication Management:  Post-discharge medication reconciliation status: Discharge medications reviewed and reconciled.  Continue medications without change.   Discharged with cefuroxime and azithromycin; patient confirmed she has these medications and is taking them as prescribed; no questions or concerns reported by patient.    Living Situation:  Current living arrangement: I live alone  Type of residence: Apartment    Diet/Exercise/Sleep:  Diet: Diabetic diet  Inadequate nutrition (GOAL): No    Transportation:  Transportation concerns (GOAL): No     Resources and Interventions:  Community Resources: OP Dialysis  Supplies used at home: Diabetic Supplies  Equipment Currently Used at Home: glucometer    Patient/Caregiver understanding: Yes     Future Appointments              In 5 days Gigi Martin MD [facility]        Plan: The patient will continue taking her medications daily as prescribed.  She will attend her upcoming  PCP appointment as scheduled.  The patient agreed to contact CCC RN with additional questions or concerns.  CCC RN will make no further scheduled patient outreaches at this time but will remain available should any patient needs arise.    Ayo Frausto RN  Clinic Care Coordinator  [facility]  Ph: [phone]

## 2020-01-15 NOTE — LETTER
[facility] CARE COORDINATION  [facility]  [address] , [place] [address]    January 15, 2020    Yissel Wetzel  [address]   [facility] [address]      Dear Yissel,    I am a clinic care coordinator who works with Gigi Martin MD at [facility]. I wanted to thank you for spending the time to talk with me and provide you with my contact information so that you can call me with questions or concerns about your health care. Below is a description of clinic care coordination and how I can further assist you.     The clinic care coordinator is a registered nurse and/or  who understand the health care system. The goal of clinic care coordination is to help you manage your health and improve access to the [facility] system in the most efficient manner. The registered nurse can assist you in meeting your health care goals by providing education, coordinating services, and strengthening the communication among your providers. The  can assist you with financial, behavioral, psychosocial, chemical dependency, counseling, and/or psychiatric resources.    Please feel free to contact me at [phone] with any questions or concerns. We at [facility] are focused on providing you with the highest-quality healthcare experience possible and that all starts with you.     Sincerely,     Ayo Frausto RN  Clinic Care Coordinator  [facility]  Ph: [phone]

## 2020-01-20 ENCOUNTER — ANCILLARY PROCEDURE (OUTPATIENT)
Dept: GENERAL RADIOLOGY | Facility: CLINIC | Age: 60
End: 2020-01-20
Attending: INTERNAL MEDICINE
Payer: MEDICARE

## 2020-01-20 ENCOUNTER — TRANSFERRED RECORDS (OUTPATIENT)
Dept: HEALTH INFORMATION MANAGEMENT | Facility: CLINIC | Age: 60
End: 2020-01-20

## 2020-01-20 ENCOUNTER — OFFICE VISIT (OUTPATIENT)
Dept: INTERNAL MEDICINE | Facility: CLINIC | Age: 60
End: 2020-01-20
Payer: MEDICARE

## 2020-01-20 VITALS
TEMPERATURE: 98 F | DIASTOLIC BLOOD PRESSURE: 50 MMHG | OXYGEN SATURATION: 96 % | SYSTOLIC BLOOD PRESSURE: 160 MMHG | BODY MASS INDEX: 32.99 KG/M2 | RESPIRATION RATE: 16 BRPM | WEIGHT: 174.6 LBS | HEART RATE: 91 BPM

## 2020-01-20 DIAGNOSIS — J18.9 PNEUMONIA DUE TO INFECTIOUS ORGANISM, UNSPECIFIED LATERALITY, UNSPECIFIED PART OF LUNG: Primary | ICD-10-CM

## 2020-01-20 DIAGNOSIS — J18.9 PNEUMONIA DUE TO INFECTIOUS ORGANISM, UNSPECIFIED LATERALITY, UNSPECIFIED PART OF LUNG: ICD-10-CM

## 2020-01-20 PROCEDURE — 99495 TRANSJ CARE MGMT MOD F2F 14D: CPT | Performed by: INTERNAL MEDICINE

## 2020-01-20 PROCEDURE — 71046 X-RAY EXAM CHEST 2 VIEWS: CPT

## 2020-01-20 RX ORDER — LEVALBUTEROL TARTRATE 45 UG/1
2 AEROSOL, METERED ORAL EVERY 6 HOURS PRN
Qty: 15 G | Refills: 11 | Status: SHIPPED | OUTPATIENT
Start: 2020-01-20 | End: 2021-03-09

## 2020-01-20 NOTE — PROGRESS NOTES
Subjective     Yissel Wetzel is a 59 year old female who presents to clinic today for the following health issues:    HPI       Hospital Follow-up Visit:    Hospital/Nursing Home/IP Rehab Facility: [facility]  Date of Admission: 2020  Date of Discharge: 2020  Reason(s) for Admission: Pneumonia right lower lobe            Problems taking medications regularly:  None       Medication changes since discharge: Antibiotics which are        Problems adhering to non-medication therapy:  None    Summary of hospitalization:  [facility] discharge summary reviewed  Diagnostic Tests/Treatments reviewed.  Follow up needed: none  Other Healthcare Providers Involved in Patient s Care:         Specialist appointment - HD  Update since discharge: improved.     Post Discharge Medication Reconciliation: discharge medications reconciled, continue medications without change.  Plan of care communicated with patient     Coding guidelines for this visit:  Type of Medical   Decision Making Face-to-Face Visit       within 7 Days of discharge Face-to-Face Visit        within 14 days of discharge   Moderate Complexity 13633 [id number]   High Complexity 55910 17022              Brief discharge summary as follows:    Yissel Wetzel was admitted on 2020.  The following problems were addressed during her hospitalization:     Right sided pleural effusion, recurrent   Returned with SOB and dry cough worsening x1 wk. Hypoxic requring 2L NC on admit, now on room air. CXR revealed recurrent Rt pleural effusion has been drained previously, last on 19 with 750ml removed. She has had ~3 thoracenteses in the past which are becoming more frequently. Analysis has suggested transudative etiology previously. She underwent US guided thoracentesis with removal of 950cc fluid 20.   - Registered to observation  - IR consultation for thoracentesis, last thoracentesis  with 750 ml drained  - NPO after  midnight for possible thoracentesis  - O2 PRN, monitor oxygen saturations  - Check ambulatory pulse post thoracentesis  - Due for HD tomorrow, prefer thoracentesis followed by HD then discharge tomorrow if able**     Possible CAP  Patient reports 2 weeks of congested cough with intermittent sputum production. WBC normal. CXR shows right basilar opacities.   - will treat with 5 day course azithromycin and Ceftin     ESRD on HD, MWF  S/p AVF revision 12/2018 due to bleeding from site. Following with Dr. Pressley from vascular surgery.  Completed run of dialysis on 1/10 which she reports was a normal run. Dialyzed 1/13 prior to discharge.   - continue PTA sevelamer 800mg TID with meals   --continue outpatient dialysis per regimen      Insulin Dependent Type 2 Diabetes Mellitus  PTA Regimen: Levemir 60 units Q HS + 3 units Novolog per 15 gm CHO  - continue home regimen at discharge      Hypertension  Missed dose of labetalol AM of admit, quite hypertensive on presentation.  - Continue PTA labetalol 100mg   - follow up with Nephrology in outpatient setting         Reviewed and updated as needed this visit by Provider  Tobacco  Allergies  Meds  Problems  Med Hx  Surg Hx  Fam Hx         Review of Systems   ROS COMP: Constitutional, HEENT, cardiovascular, pulmonary, GI, , musculoskeletal, neuro, skin, endocrine and psych systems are negative, except as otherwise noted.      Objective    BP (!) 160/50 (BP Location: Left arm, Patient Position: Chair, Cuff Size: Adult Large)   Pulse 91   Temp 98  F (36.7  C) (Oral)   Resp 16   Wt 79.2 kg (174 lb 9.6 oz)   SpO2 96%   BMI 32.99 kg/m    Body mass index is 32.99 kg/m .  Physical Exam   GENERAL: healthy, alert and no distress  NECK: no adenopathy, no asymmetry, masses, or scars and thyroid normal to palpation  RESP: lungs clear to auscultation - no rales, rhonchi or wheezes  CV: regular rate and rhythm, normal S1 S2, no S3 or S4, no murmur, click or rub, no peripheral  edema and peripheral pulses strong  ABDOMEN: soft, nontender, no hepatosplenomegaly, no masses and bowel sounds normal  MS: no gross musculoskeletal defects noted, no edema            Assessment & Plan     1. Pneumonia due to infectious organism, unspecified laterality, unspecified part of lung  Re-check CXR today  - levalbuterol (XOPENEX HFA) 45 MCG/ACT inhaler; Inhale 2 puffs into the lungs every 6 hours as needed for shortness of breath / dyspnea or wheezing  Dispense: 15 g; Refill: 11  - XR Chest 2 Views; Future           No follow-ups on file.    Gigi Martin MD  [facility]

## 2020-01-22 ENCOUNTER — NURSE TRIAGE (OUTPATIENT)
Dept: INTERNAL MEDICINE | Facility: CLINIC | Age: 60
End: 2020-01-22

## 2020-01-22 DIAGNOSIS — J40 BRONCHITIS: Primary | ICD-10-CM

## 2020-01-22 NOTE — TELEPHONE ENCOUNTER
"Saw MD Friday . Having trouble breathing tightness in chest still  .States inhaler is not helping . Requesting Amoxicillin.Dunia Osullivan RN  [phone].Dunia Osullivan RN    Answer Assessment - Initial Assessment Questions  1. RESPIRATORY STATUS: \"Describe your breathing?\" (e.g., wheezing, shortness of breath, unable to speak, severe coughing)       Short of breath  2. ONSET: \"When did this breathing problem begin?\"       Saw MD last Friday hospital possible pneumonia prior  3. PATTERN \"Does the difficult breathing come and go, or has it been constant since it started?\"     constant  4. SEVERITY: \"How bad is your breathing?\" (e.g., mild, moderate, severe)     - MILD: No SOB at rest, mild SOB with walking, speaks normally in sentences, can lay down, no retractions, pulse < 100.     - MODERATE: SOB at rest, SOB with minimal exertion and prefers to sit, cannot lie down flat, speaks in phrases, mild retractions, audible wheezing, pulse 100-120.     - SEVERE: Very SOB at rest, speaks in single words, struggling to breathe, sitting hunched forward, retractions, pulse > 120       moderate  5. RECURRENT SYMPTOM: \"Have you had difficulty breathing before?\" If so, ask: \"When was the last time?\" and \"What happened that time?\"       Yes recently   6. CARDIAC HISTORY: \"Do you have any history of heart disease?\" (e.g., heart attack, angina, bypass surgery, angioplasty)       no  7. LUNG HISTORY: \"Do you have any history of lung disease?\"  (e.g., pulmonary embolus, asthma, emphysema)      no  8. CAUSE: \"What do you think is causing the breathing problem?\"       URI ongoing  9. OTHER SYMPTOMS: \"Do you have any other symptoms? (e.g., dizziness, runny nose, cough, chest pain, fever)      Chest tightness  10. PREGNANCY: \"Is there any chance you are pregnant?\" \"When was your last menstrual period?\"        no  11. TRAVEL: \"Have you traveled out of the country in the last month?\" (e.g., travel history, exposures)        " no    Protocols used: BREATHING DIFFICULTY-A-OH

## 2020-01-24 RX ORDER — AZITHROMYCIN 250 MG/1
TABLET, FILM COATED ORAL
Qty: 6 TABLET | Refills: 0 | Status: SHIPPED | OUTPATIENT
Start: 2020-01-24 | End: 2020-08-05

## 2020-01-24 RX ORDER — PREDNISONE 10 MG/1
TABLET ORAL
Qty: 12 TABLET | Refills: 0 | Status: SHIPPED | OUTPATIENT
Start: 2020-01-24 | End: 2020-02-01

## 2020-01-24 NOTE — TELEPHONE ENCOUNTER
PCP please advise patient requesting abx,       Coughing spells. Having a hard time taking in a deep breath- levabuterol inhaler, helps a little bit. No fever but chills. Can hear herself wheezing at night. Having hard time laying flat.           Amber HERNANDEZN, RN, PHN

## 2020-01-24 NOTE — TELEPHONE ENCOUNTER
Zithromax prescribed again, but also prescribed taping 8-day course of Prednisone to hopefully help with wheezing/tightness.  Both sent to preferred pharmacy.

## 2020-01-24 NOTE — TELEPHONE ENCOUNTER
Left message on VM.     Informed PCP sent in Azithromycin and Prednisone. Not Amoxicillin. If she has any concerns she can call back to discuss.     Amber HERNANDEZN, RN, PHN

## 2020-01-31 ENCOUNTER — TELEPHONE (OUTPATIENT)
Dept: INTERNAL MEDICINE | Facility: CLINIC | Age: 60
End: 2020-01-31

## 2020-01-31 DIAGNOSIS — J40 BRONCHITIS: ICD-10-CM

## 2020-01-31 RX ORDER — PREDNISONE 10 MG/1
TABLET ORAL
Qty: 12 TABLET | Refills: 0 | Status: CANCELLED | OUTPATIENT
Start: 2020-01-31 | End: 2020-02-08

## 2020-01-31 NOTE — TELEPHONE ENCOUNTER
Advised pt that it is not a refillable med but she wanted to try. She says the 2 pills at a time work great but 1 pill does not work so she has been taking 2, not 1. She only has 6 left and wants more.  She said she does not know what kind of med this is and what it is supposed to do.  Pt ph. [phone], ok to lv detailed message

## 2020-02-03 ENCOUNTER — TRANSFERRED RECORDS (OUTPATIENT)
Dept: HEALTH INFORMATION MANAGEMENT | Facility: CLINIC | Age: 60
End: 2020-02-03

## 2020-02-03 RX ORDER — AMOXICILLIN 500 MG/1
500 CAPSULE ORAL DAILY
Qty: 10 CAPSULE | Refills: 0 | Status: SHIPPED | OUTPATIENT
Start: 2020-02-03 | End: 2020-08-05

## 2020-02-03 NOTE — TELEPHONE ENCOUNTER
Prescription for amoxicillin sent to preferred pharmacy.  Dialysis dosing for this is 500 mg once daily, and she should make sure that she takes it after dialysis on dialysis days.

## 2020-02-03 NOTE — TELEPHONE ENCOUNTER
Patient to follow up in clinic if still having respiratory issues.     Was last seen in clinic on 1/20/2020. Patient was given short supply of abx and prednisone.

## 2020-02-03 NOTE — TELEPHONE ENCOUNTER
Spoke with patient.     Doing so/so. When I take the prednisone it helps a lot but when it wears off my chest still feels a little tight. Only has 2 left.     Still using the inhaler. Finished the abx. No fever. No new lethargy. Patient report she is coughing more frequently and is productive. Using Mucinex every 4 hours daily. She still thinks she has an infection. No shortness of breat but when feels the tightness in chest does feel she has to take in a deeper breath. Sitting up at night.    Please advise. Continue prednisone taper for few more days? Or follow up in clinic?    Patient currently at Dialysis. Patient wondering can she get the Amoxicillin? As she feels the Zpak does not work for her.    Amber HERNANDEZN, RN, PHN

## 2020-02-06 ENCOUNTER — TELEPHONE (OUTPATIENT)
Dept: INTERNAL MEDICINE | Facility: CLINIC | Age: 60
End: 2020-02-06

## 2020-02-06 NOTE — TELEPHONE ENCOUNTER
Pt calling with update for Dr. Martin.  She has been taking ABX amoxicillin for a few days, and is doing better.  Monday--had labs done at dialysis. And wanted to let you know the lab results: (she will get results faxed over as well):  A1c: 7.0  Potassium: 3.8  Hgb: 9.2  Protein was ok  Phosphorus was slightly elevated.

## 2020-02-10 DIAGNOSIS — N18.6 END STAGE RENAL DISEASE (H): Primary | ICD-10-CM

## 2020-02-11 ENCOUNTER — TELEPHONE (OUTPATIENT)
Dept: INTERVENTIONAL RADIOLOGY/VASCULAR | Facility: CLINIC | Age: 60
End: 2020-02-11

## 2020-02-11 NOTE — TELEPHONE ENCOUNTER
IR NOTE:    Received order to remove right tunneled central venous catheter. Attempted to call patient. No answer, left message to call interventional department back.    Leny Vargas RN

## 2020-02-12 ENCOUNTER — MEDICAL CORRESPONDENCE (OUTPATIENT)
Dept: HEALTH INFORMATION MANAGEMENT | Facility: CLINIC | Age: 60
End: 2020-02-12

## 2020-03-10 ENCOUNTER — HOSPITAL ENCOUNTER (OUTPATIENT)
Facility: CLINIC | Age: 60
Discharge: HOME | End: 2020-03-10
Attending: RADIOLOGY | Admitting: RADIOLOGY
Payer: MEDICARE

## 2020-03-10 VITALS
DIASTOLIC BLOOD PRESSURE: 71 MMHG | TEMPERATURE: 96.9 F | SYSTOLIC BLOOD PRESSURE: 176 MMHG | RESPIRATION RATE: 18 BRPM | HEART RATE: 63 BPM | OXYGEN SATURATION: 96 %

## 2020-03-10 DIAGNOSIS — N18.6 END STAGE RENAL DISEASE (H): ICD-10-CM

## 2020-03-10 PROCEDURE — 40000854 ZZH STATISTIC SIMPLE TUBE INSERTION/CHARGE, PORT, CATH, FISTULOGRAM

## 2020-03-10 RX ORDER — LIDOCAINE HYDROCHLORIDE 10 MG/ML
INJECTION, SOLUTION INFILTRATION; PERINEURAL
Status: DISCONTINUED
Start: 2020-03-10 | End: 2020-03-10 | Stop reason: HOSPADM

## 2020-03-10 NOTE — PROGRESS NOTES
Care Suites Discharge Summary    Discharge Criteria:   Discharge Criteria met per MD orders: Yes.   Vital signs stable.     Pt demonstrates ability to ambulate safely: Yes.  (See discharge questionnaire for additional information)    Discharge instructions & education:   Discharge instructions reviewed with patient. Patient verbalizes understanding.   Additional patient education provided:  Procedure not done per pt request    Medications:   Patient will be discharging on new medications- No. Patient verbalizes reason for use, start date, and side effects NA.    Items returned to patient:   Home and hospital acquired medications returned to patient NA   Listed belongings gathered and returned to patient: No    Patient discharged to home with Pilar.     Lior Montalvo, ANGELICA

## 2020-03-10 NOTE — PROGRESS NOTES
Care Suites Admission Nursing Note    Patient Information  Name: Yissel Wetzel  Age: 59 year old  Reason for admission: Tunnel cath removal  Care Suites arrival time: 0930    Patient Admission/Assessment   Pre-procedure assessment complete: Yes  If abnormal assessment/labs, provider notified: N/A  NPO: Yes  Medications held per instructions/orders: N/A  Consent: obtained  If applicable, pregnancy test status: n/a  Patient oriented to room: Yes  Education/questions answered: Yes  Plan/other: proceed    Discharge Planning  Accompanied by: pilar-Mother  Overnight post sedation caregiver: Pilar  Discharge location: home    Lior Montalvo RN

## 2020-03-10 NOTE — DISCHARGE INSTRUCTIONS
Tunnel Cath Removal Discharge Instructions     After you go home:      You may resume your normal diet    Care of Puncture Site:      Keep the dressing clean & dry for 3 days    You may use a bandaid on the site after 3 days until the wound has healed    No tub baths, whirlpools or swimming until your puncture site has fully healed     Activity       You may go back to normal activity in 24 hours     Avoid heavy lifting (greater than 10 pounds), strenuous activity or the overuse of your shoulder for 3 days    Bleeding:      If you start bleeding from the incision site in your chest or have swelling in your neck, sit down and press on the site for 5-10 minutes.     If bleeding has not stopped after 10 minutes, call your provider.        Call 911 right away if you have heavy bleeding or bleeding that does not stop.      Medicines:      You may resume all your medicines today    For minor pain, you may take Acetaminophen (Tylenol) or Ibuprofen (Advil)                 Call the provider who ordered this procedure if:      The site is red, swollen, hot or tender or there is drainage at the site    You have chills or a fever greater than 100 F (37.8 C)    Swelling in your neck    Any questions or concerns      If you have questions call:          [facility] Radiology Dept @ [phone]        The provider who performed your procedure was _________________.

## 2020-03-15 ENCOUNTER — HEALTH MAINTENANCE LETTER (OUTPATIENT)
Age: 60
End: 2020-03-15

## 2020-04-05 DIAGNOSIS — E11.21 TYPE 2 DIABETES MELLITUS WITH DIABETIC NEPHROPATHY, WITH LONG-TERM CURRENT USE OF INSULIN (H): ICD-10-CM

## 2020-04-05 DIAGNOSIS — Z79.4 TYPE 2 DIABETES MELLITUS WITH DIABETIC NEPHROPATHY, WITH LONG-TERM CURRENT USE OF INSULIN (H): ICD-10-CM

## 2020-04-06 RX ORDER — INSULIN ASPART 100 [IU]/ML
INJECTION, SOLUTION INTRAVENOUS; SUBCUTANEOUS
Qty: 15 ML | Refills: 3 | Status: SHIPPED | OUTPATIENT
Start: 2020-04-06 | End: 2021-04-28

## 2020-04-07 ENCOUNTER — TELEPHONE (OUTPATIENT)
Dept: INTERNAL MEDICINE | Facility: CLINIC | Age: 60
End: 2020-04-07

## 2020-04-07 NOTE — TELEPHONE ENCOUNTER
Central Prior Authorization Team  Phone: [phone]    PA Initiation    Medication: levalbuterol (XOPENEX HFA) 45 MCG/ACT inhaler   Insurance Company: [employer] - Phone [phone] Fax [phone]  Pharmacy Filling the Rx: [id number] - [place], [place] - [address] [facility]  Filling Pharmacy Phone: [phone]  Filling Pharmacy Fax:    Start Date: 4/7/2020

## 2020-04-07 NOTE — TELEPHONE ENCOUNTER
Prior Authorization Retail Medication Request    Medication/Dose: levalbuterol (XOPENEX HFA) 45 MCG/ACT inhaler   ICD code (if different than what is on RX):  J18.9  Previously Tried and Failed:    Rationale:      Insurance Name:  Medicare  Insurance ID:  [id number]      Pharmacy Information (if different than what is on RX)  Name: [facility]  Phone:  [phone]

## 2020-04-08 NOTE — TELEPHONE ENCOUNTER
Prior Authorization Approval    Authorization Effective Date: 4/5/2020  Authorization Expiration Date:  Until further notice  Medication: Xoponex ANAID- APPROVED   Approved Dose/Quantity:   Reference #:     Insurance Company: [employer] - Phone [phone] Fax [phone]  Expected CoPay:       CoPay Card Available:      Foundation Assistance Needed:    Which Pharmacy is filling the prescription (Not needed for infusion/clinic administered): [id number] - [place], [place] - [address] [facility]  Pharmacy Notified: Yes- left message   Patient Notified: Comment:  **Instructed pharmacy to notify patient when script is ready to /ship.**

## 2020-04-22 ENCOUNTER — VIRTUAL VISIT (OUTPATIENT)
Dept: INTERNAL MEDICINE | Facility: CLINIC | Age: 60
End: 2020-04-22
Payer: MEDICARE

## 2020-04-22 VITALS — HEIGHT: 60 IN | BODY MASS INDEX: 33.38 KG/M2 | WEIGHT: 170 LBS

## 2020-04-22 DIAGNOSIS — H66.93 BILATERAL OTITIS MEDIA, UNSPECIFIED OTITIS MEDIA TYPE: Primary | ICD-10-CM

## 2020-04-22 PROCEDURE — 99441 ZZC PHYSICIAN TELEPHONE EVALUATION 5-10 MIN: CPT | Performed by: INTERNAL MEDICINE

## 2020-04-22 ASSESSMENT — MIFFLIN-ST. JEOR: SCORE: 1272.61

## 2020-04-22 NOTE — PROGRESS NOTES
"Yissel Wetzel is a 59 year old female who is being evaluated via a billable telephone visit.      The patient has been notified of following:     \"This telephone visit will be conducted via a call between you and your physician/provider. We have found that certain health care needs can be provided without the need for a physical exam.  This service lets us provide the care you need with a short phone conversation.  If a prescription is necessary we can send it directly to your pharmacy.  If lab work is needed we can place an order for that and you can then stop by our lab to have the test done at a later time.    Telephone visits are billed at different rates depending on your insurance coverage. During this emergency period, for some insurers they may be billed the same as an in-person visit.  Please reach out to your insurance provider with any questions.    If during the course of the call the physician/provider feels a telephone visit is not appropriate, you will not be charged for this service.\"    Patient has given verbal consent for Telephone visit?  Yes    How would you like to obtain your AVS? Mail a copy    Subjective     Yissel Wetzel is a 59 year old female who presents to clinic today for the following health issues:    HPI  Ear Problem      Duration: 5-6 weeks    Description (location/character/radiation): ears bilateral    Intensity:  Moderate to severe    Accompanying signs and symptoms: fullness in ears bilateral and headache on both side of head hurts, BP's are high     History (similar episodes/previous evaluation): None    Precipitating or alleviating factors: None    Therapies tried and outcome: tylenol, ibuprofen, aleve not helping                   Reviewed and updated as needed this visit by Provider  Tobacco  Allergies  Meds  Problems  Med Hx  Surg Hx  Fam Hx         Review of Systems   ROS COMP: Constitutional, HEENT, cardiovascular, pulmonary, GI, , musculoskeletal, neuro, " skin, endocrine and psych systems are negative, except as otherwise noted.       Objective   Reported vitals:  Ht 1.524 m (5')   Wt 77.1 kg (170 lb)   BMI 33.20 kg/m     healthy, alert and no distress  PSYCH: Alert and oriented times 3; coherent speech, normal   rate and volume, able to articulate logical thoughts, able   to abstract reason, no tangential thoughts, no hallucinations   or delusions  Her affect is normal  RESP: No cough, no audible wheezing, able to talk in full sentences  Remainder of exam unable to be completed due to telephone visits    Diagnostic Test Results:  Labs reviewed in Epic        Assessment/Plan:  1. Bilateral otitis media, unspecified otitis media type  Has had history of this - will treat with Augmentin based on symptoms  - amoxicillin-clavulanate (AUGMENTIN) 875-125 MG tablet; Take 1 tablet by mouth 2 times daily  Dispense: 20 tablet; Refill: 0    Return in about 2 weeks (around 5/6/2020) for recheck, if symptoms fail to improve.      Phone call duration:  6 minutes    Gigi Martin MD

## 2020-04-28 ENCOUNTER — NURSE TRIAGE (OUTPATIENT)
Dept: INTERNAL MEDICINE | Facility: CLINIC | Age: 60
End: 2020-04-28

## 2020-04-28 DIAGNOSIS — B37.31 YEAST VAGINITIS: Primary | ICD-10-CM

## 2020-04-28 RX ORDER — FLUCONAZOLE 150 MG/1
150 TABLET ORAL ONCE
Qty: 1 TABLET | Refills: 0 | Status: SHIPPED | OUTPATIENT
Start: 2020-04-28 | End: 2020-08-05

## 2020-04-28 NOTE — TELEPHONE ENCOUNTER
Reason for call:  Patient reporting a symptom  Symptom or request: YEAST INFECTION  Duration (how long have symptoms been present): COUPLE DAYS AGO  Have you been treated for this before? No  Additional comments: PLEASE CALL PATIENT  Phone Number patient can be reached at:  Home number on file [phone] (home)  Best Time:  ANY  Can we leave a detailed message on this number:  YES  Call taken on 4/28/2020 at 11:24 AM by RENARD PAN

## 2020-04-28 NOTE — TELEPHONE ENCOUNTER
"RN called back to pt.    Pt states she is currently on amoxicillin for an ear infection.  No fever, no pain, no urinary sx.  States she gets yeast infections with abx occasionally.  Main complaint itchiness. Wanted to catch it early.    Requests yeast treatment (in pill form) to be sent to [facility] at [address].  Please advise on request.       Additional Information    Negative: Pain or burning with passing urine (urination) is main symptom    Negative: Pregnant with vaginal discharge    Negative: SEVERE abdominal pain (e.g., excruciating)    Negative: Patient sounds very sick or weak to the triager    Negative: Yellow or green vaginal discharge and has a fever    Negative: Constant abdominal pain lasting > 2 hours    Negative: Mild lower abdominal pain comes and goes (cramps) that lasts > 24 hours    Negative: Genital area looks infected (e.g., draining sore, spreading redness)    Negative: Rash is tiny water blisters (3 or more)    Negative: Patient wants to be seen    Negative: Rash (e.g., redness, tiny bumps, sore) of genital area present > 24 hours    Negative: Bad smelling vaginal discharge    Negative: Abnormal color vaginal discharge (i.e., yellow, green, gray)    Negative: Symptoms of a yeast infection' (i.e., itchy, white discharge, not bad smelling) and not improved > 3 days following Care Advice    Negative: 4 or more episodes of vaginal infection in past year    Negative: Patient is worried about a sexually transmitted disease (STD)    Negative: Diabetes mellitus or weak immune system (e.g., HIV positive, cancer chemo, splenectomy, organ transplant, chronic steroids)    Negative: Pain with sexual intercourse (dyspareunia) (Exception: vaginal yeast infection suspected)    Negative: Normal vaginal discharge    Symptoms of a vaginal yeast infection (i.e., white, thick, cottage-cheese-like, itchy, not bad smelling discharge)    Answer Assessment - Initial Assessment Questions  1. DISCHARGE: \"Describe the " "discharge.\" (e.g., white, yellow, green, gray, foamy, cottage cheese-like)      No discharge    2. ODOR: \"Is there a bad odor?\"      No    3. ONSET: \"When did the discharge begin?\"      Few days    4. RASH: \"Is there a rash in that area?\" If so, ask: \"Describe it.\" (e.g., redness, blisters, sores, bumps)      No rash    5. ABDOMINAL PAIN: \"Are you having any abdominal pain?\" If yes: \"What does it feel like? \" (e.g., crampy, dull, intermittent, constant)       No pain    6. ABDOMINAL PAIN SEVERITY: If present, ask: \"How bad is it?\"  (e.g., mild, moderate, severe)   - MILD - doesn't interfere with normal activities    - MODERATE - interferes with normal activities or awakens from sleep    - SEVERE - patient doesn't want to move (R/O peritonitis)       None    7. CAUSE: \"What do you think is causing the discharge?\" \"Have you had the same problem before? What happened then?\"      Yeast    8. OTHER SYMPTOMS: \"Do you have any other symptoms?\" (e.g., fever, itching, vaginal bleeding, pain with urination, injury to genital area, vaginal foreign body)      Itchiness    9. PREGNANCY: \"Is there any chance you are pregnant?\" \"When was your last menstrual period?\"      N/a    Protocols used: VAGINAL [id number]    "

## 2020-05-04 ENCOUNTER — TRANSFERRED RECORDS (OUTPATIENT)
Dept: HEALTH INFORMATION MANAGEMENT | Facility: CLINIC | Age: 60
End: 2020-05-04

## 2020-05-05 ENCOUNTER — TRANSFERRED RECORDS (OUTPATIENT)
Dept: HEALTH INFORMATION MANAGEMENT | Facility: CLINIC | Age: 60
End: 2020-05-05

## 2020-05-06 ENCOUNTER — TELEPHONE (OUTPATIENT)
Dept: INTERVENTIONAL RADIOLOGY/VASCULAR | Facility: CLINIC | Age: 60
End: 2020-05-06

## 2020-05-06 ENCOUNTER — TELEPHONE (OUTPATIENT)
Dept: MEDSURG UNIT | Facility: CLINIC | Age: 60
End: 2020-05-06

## 2020-05-07 ENCOUNTER — APPOINTMENT (OUTPATIENT)
Dept: INTERVENTIONAL RADIOLOGY/VASCULAR | Facility: CLINIC | Age: 60
End: 2020-05-07
Attending: NURSE PRACTITIONER
Payer: MEDICARE

## 2020-05-07 ENCOUNTER — HOSPITAL ENCOUNTER (OUTPATIENT)
Facility: CLINIC | Age: 60
Discharge: HOME | End: 2020-05-07
Attending: RADIOLOGY | Admitting: RADIOLOGY
Payer: MEDICARE

## 2020-05-07 VITALS
SYSTOLIC BLOOD PRESSURE: 186 MMHG | HEIGHT: 60 IN | TEMPERATURE: 97.6 F | DIASTOLIC BLOOD PRESSURE: 75 MMHG | RESPIRATION RATE: 16 BRPM | WEIGHT: 170 LBS | OXYGEN SATURATION: 91 % | BODY MASS INDEX: 33.38 KG/M2 | HEART RATE: 68 BPM

## 2020-05-07 DIAGNOSIS — N18.6 END STAGE RENAL DISEASE (H): ICD-10-CM

## 2020-05-07 PROCEDURE — 40000863 ZZH STATISTIC RADIOLOGY XRAY, US, CT, MAR, NM

## 2020-05-07 PROCEDURE — 36589 REMOVAL TUNNELED CV CATH: CPT | Mod: RT

## 2020-05-07 ASSESSMENT — MIFFLIN-ST. JEOR: SCORE: 1272.61

## 2020-05-07 NOTE — PROGRESS NOTES
Care Suites Admission Nursing Note    Patient Information  Name: Yissel Wetzel  Age: 59 year old  Reason for admission: tunnel cath removal  Care Suites arrival time: 0950    Patient Admission/Assessment   Pre-procedure assessment complete: Yes  If abnormal assessment/labs, provider notified: N/A  NPO: n/a  Medications held per instructions/orders: N/A  Consent: n/a  If applicable, pregnancy test status: n/a  Patient oriented to room: Yes  Education/questions answered: Yes  Plan/other: proceed    Discharge Planning  Accompanied by: self  Discharge name/phone number: Mother  Overnight post sedation caregiver: n/a no sedation used  Discharge location: home  PATIENT WELLNESS SCREENING    Step 1: Answer all screening questions 1-3.    1. In the last month, have you been in contact with someone who was confirmed or suspected to have Coronavirus/COVID-19? No    2. Do you have the following symptoms?   Fever? No   Cough? No   Shortness of breath? No   Skin rash? No    3. Have you traveled internationally in the last month? No   If so, where? n/a    Step 2: Refer to logic grid below for actions    NO SYMPTOM(S)    ACTIONS:  1. Standard rooming process  2. Provider to assess per normal protocol    POSITIVE SYMPTOM(S)  If positive for ANY of the following symptoms: fever, cough, shortness of breath, rash    ACTIONS  1. Mask patient  2. Room patient as soon as possible  3. Don appropriate PPE when entering room  4. Provider evaluation    Lior Montalvo RN

## 2020-05-07 NOTE — PROGRESS NOTES
Care Suites Discharge Nursing Note    Patient Information  Name: Yissel Wetzel  Age: 59 year old    Discharge Education:  Discharge instructions reviewed: yes per Lior  Additional education/resources provided: no  Patient/patient representative verbalizes understanding: yes  Patient discharging on new medications: no      Discharge Plans:   Discharge location: home  Discharge ride contacted: yes  Approximate discharge time: 1145    Discharge Criteria:  Discharge criteria met and vital signs stable: yes    Patient Belongs:  Patient belongings returned to patient: n/a    Shayy Anna RN

## 2020-05-07 NOTE — PROGRESS NOTES
RADIOLOGY PROCEDURE NOTE  Patient name: Yissel Wetzel  MRN: [id number]  : 1960    Pre-procedure diagnosis: right internal jugular tunneled catheter ready for removal, working fistula  Post-procedure diagnosis: Same    Procedure Date/Time: May 7, 2020  11:31 PM  Procedure: removal of right internal jugular tunneled dialysis catheter  Estimated blood loss: None  Specimen(s) collected with description: none  The patient tolerated the procedure well with no immediate complications.  Significant findings:none    See imaging dictation for procedural details.    Provider name: Jessica Zamora PA-C  Assistant(s):None

## 2020-05-07 NOTE — DISCHARGE INSTRUCTIONS
Tunnel Catheter Removal Discharge Instructions     After you go home:      You may resume your normal diet    Care of Insertion Site:      For the first 48 hrs, check your puncture site every couple hours while you are awake     You may change the dressing daily, but more often if dressing becomes wet or dirty. It is not unusual to have drainage from the opening until the site is completely healed.     You may remove the dressing when the site is completely healed     You may shower tomorrow    No tub baths, whirlpools or swimming until your puncture site has fully healed    Activity:      You may go back to normal activity in 24 hours    Wait 48 hours before lifting, straining, exercise or other strenuous activity    Medicines:      You may resume all medications    For minor pain, you may take Acetaminophen (Tylenol) or Ibuprofen (Advil)               Call the provider who ordered this procedure if:      The site is red, swollen, hot or tender    There is foul-smelling drainage from the tube site    You have pain that is getting worse or that does not improve with pain medication    You have chills or a fever greater than 101 F (38 C)    If the leaking continues for more than 3 days    Any questions or concerns    Call  911 or go to the Emergency Room if you have:      Severe pain or trouble breathing    Bleeding that you cannot control      If you have questions call:          [facility] @ [phone]

## 2020-07-23 DIAGNOSIS — Z79.4 TYPE 2 DIABETES MELLITUS WITH DIABETIC NEPHROPATHY, WITH LONG-TERM CURRENT USE OF INSULIN (H): ICD-10-CM

## 2020-07-23 DIAGNOSIS — E11.21 TYPE 2 DIABETES MELLITUS WITH DIABETIC NEPHROPATHY, WITH LONG-TERM CURRENT USE OF INSULIN (H): ICD-10-CM

## 2020-07-23 RX ORDER — BLOOD SUGAR DIAGNOSTIC
STRIP MISCELLANEOUS
Qty: 200 EACH | Refills: 0 | Status: SHIPPED | OUTPATIENT
Start: 2020-07-23 | End: 2021-03-02

## 2020-07-31 ENCOUNTER — TELEPHONE (OUTPATIENT)
Dept: TRANSPLANT | Facility: CLINIC | Age: 60
End: 2020-07-31

## 2020-07-31 NOTE — TELEPHONE ENCOUNTER
Pt has updates to inform Yaima- she has lost 12 #  Due to COVID she is not working and and reluctant to leave her house  She talks with her PCP over the phone  She has not done any tests or gone to the hospital due to COVID

## 2020-08-05 ENCOUNTER — VIRTUAL VISIT (OUTPATIENT)
Dept: INTERNAL MEDICINE | Facility: CLINIC | Age: 60
End: 2020-08-05
Payer: MEDICARE

## 2020-08-05 VITALS — BODY MASS INDEX: 32.22 KG/M2 | WEIGHT: 165 LBS

## 2020-08-05 DIAGNOSIS — H92.03 ACUTE EAR PAIN, BILATERAL: Primary | ICD-10-CM

## 2020-08-05 PROCEDURE — 99441 ZZC PHYSICIAN TELEPHONE EVALUATION 5-10 MIN: CPT | Performed by: INTERNAL MEDICINE

## 2020-08-05 RX ORDER — CIPROFLOXACIN AND DEXAMETHASONE 3; 1 MG/ML; MG/ML
4 SUSPENSION/ DROPS AURICULAR (OTIC) 2 TIMES DAILY
Qty: 7.5 ML | Refills: 0 | Status: SHIPPED | OUTPATIENT
Start: 2020-08-05 | End: 2021-07-22

## 2020-08-05 NOTE — PROGRESS NOTES
"Yissel Wetzel is a 60 year old female who is being evaluated via a billable telephone visit.      The patient has been notified of following:     \"This telephone visit will be conducted via a call between you and your physician/provider. We have found that certain health care needs can be provided without the need for a physical exam.  This service lets us provide the care you need with a short phone conversation.  If a prescription is necessary we can send it directly to your pharmacy.  If lab work is needed we can place an order for that and you can then stop by our lab to have the test done at a later time.    Telephone visits are billed at different rates depending on your insurance coverage. During this emergency period, for some insurers they may be billed the same as an in-person visit.  Please reach out to your insurance provider with any questions.    If during the course of the call the physician/provider feels a telephone visit is not appropriate, you will not be charged for this service.\"    Patient has given verbal consent for Telephone visit?  Yes    What phone number would you like to be contacted at? [phone]    How would you like to obtain your AVS? MyChart     TELEPHONE VISIT                                                      SUBJECTIVE:                                                      HPI: Yissel Wetzel is a pleasant 60 year old female who requested a telephone visit to discuss ear symptoms:    Ongoing for the last week. Patient describes ears as feeling full and painful, right more than left. Describes pain as involving the external auditory canals (neither pinna nor posterior to tympanic membranes).  Pain is worse when pulling on pinna/earlobe.  Associated, decreased hearing right side. Associated, mild, and brief dizziness with standing.    No sinus congestion, runny nose, postnasal drip. No sinus pressure or pain. No sore throat. No fevers or chills.    ASSESSMENT/PLAN:           "                                            (H92.03) Acute ear pain, bilateral  (primary encounter diagnosis)  Comment: differential includes otitis externa, otitis media, and eustachian tube dysfunction - somewhat challenging to distinguish between these via telephone visit, though my best guess is otitis externa.  Plan: Trial of Ciprodex eardrops; if symptoms worsen, change, or do not improve, patient to contact MD.    Total time of call between patient and provider was 5 minutes.     (Chart documentation was completed, in part, with scroll kit voice-recognition software. Even though reviewed, some grammatical, spelling, and word errors may remain.)    Theresa Becker MD   [address] [address]  T: [phone], F: [phone]

## 2020-09-10 ENCOUNTER — TELEPHONE (OUTPATIENT)
Dept: TRANSPLANT | Facility: CLINIC | Age: 60
End: 2020-09-10

## 2020-09-10 NOTE — TELEPHONE ENCOUNTER
Called patient today and LM on her VM asking her to return my call. Need to know when she wants to complete pre kidney transplant evaluation appts.

## 2020-10-01 ENCOUNTER — TRANSFERRED RECORDS (OUTPATIENT)
Dept: HEALTH INFORMATION MANAGEMENT | Facility: CLINIC | Age: 60
End: 2020-10-01

## 2020-10-01 LAB — RETINOPATHY: POSITIVE

## 2020-10-05 ENCOUNTER — TRANSFERRED RECORDS (OUTPATIENT)
Dept: HEALTH INFORMATION MANAGEMENT | Facility: CLINIC | Age: 60
End: 2020-10-05

## 2020-10-05 LAB — POTASSIUM SERPL-SCNC: 4.1 MEQ/L (ref 3.5–5.5)

## 2020-10-07 ENCOUNTER — TRANSFERRED RECORDS (OUTPATIENT)
Dept: HEALTH INFORMATION MANAGEMENT | Facility: CLINIC | Age: 60
End: 2020-10-07

## 2020-10-14 ENCOUNTER — OFFICE VISIT (OUTPATIENT)
Dept: INTERNAL MEDICINE | Facility: CLINIC | Age: 60
End: 2020-10-14
Payer: MEDICARE

## 2020-10-14 VITALS
HEART RATE: 69 BPM | WEIGHT: 161.6 LBS | OXYGEN SATURATION: 91 % | SYSTOLIC BLOOD PRESSURE: 154 MMHG | BODY MASS INDEX: 31.56 KG/M2 | DIASTOLIC BLOOD PRESSURE: 70 MMHG | RESPIRATION RATE: 16 BRPM | TEMPERATURE: 97.6 F

## 2020-10-14 DIAGNOSIS — I27.20 PULMONARY HYPERTENSION (H): ICD-10-CM

## 2020-10-14 DIAGNOSIS — E11.21 TYPE 2 DIABETES MELLITUS WITH DIABETIC NEPHROPATHY, WITH LONG-TERM CURRENT USE OF INSULIN (H): ICD-10-CM

## 2020-10-14 DIAGNOSIS — J30.2 SEASONAL ALLERGIC RHINITIS, UNSPECIFIED TRIGGER: Primary | ICD-10-CM

## 2020-10-14 DIAGNOSIS — N18.6 ESRD (END STAGE RENAL DISEASE) (H): ICD-10-CM

## 2020-10-14 DIAGNOSIS — Z79.4 TYPE 2 DIABETES MELLITUS WITH DIABETIC NEPHROPATHY, WITH LONG-TERM CURRENT USE OF INSULIN (H): ICD-10-CM

## 2020-10-14 PROCEDURE — 99214 OFFICE O/P EST MOD 30 MIN: CPT | Performed by: INTERNAL MEDICINE

## 2020-10-14 RX ORDER — INSULIN DETEMIR 100 [IU]/ML
50 INJECTION, SOLUTION SUBCUTANEOUS AT BEDTIME
Qty: 18 ML | Refills: 11 | COMMUNITY
Start: 2020-10-14 | End: 2020-11-11

## 2020-10-14 RX ORDER — INSULIN ASPART 100 [IU]/ML
INJECTION, SOLUTION INTRAVENOUS; SUBCUTANEOUS
Qty: 6 ML | Refills: 0 | COMMUNITY
Start: 2020-10-14 | End: 2021-07-22

## 2020-10-14 RX ORDER — FLUTICASONE PROPIONATE 50 MCG
1 SPRAY, SUSPENSION (ML) NASAL DAILY
Qty: 16 G | Refills: 11 | Status: SHIPPED | OUTPATIENT
Start: 2020-10-14 | End: 2023-09-07

## 2020-10-14 NOTE — PROGRESS NOTES
Subjective     Yissel Wetzel is a 60 year old female who presents to clinic today for the following health issues:    HPI         Diabetes Follow-up    How often are you checking your blood sugar? Four or more times daily  Blood sugar testing frequency justification:  Uncontrolled diabetes  What time of day are you checking your blood sugars (select all that apply)?  Before meals and At bedtime  Have you had any blood sugars above 200?  Yes   Have you had any blood sugars below 70?  Yes     What symptoms do you notice when your blood sugar is low?  Shaky, Blurred vision and Confusion    What concerns do you have today about your diabetes? None and Other: high and low numbers     Do you have any of these symptoms? (Select all that apply)  No numbness or tingling in feet.  No redness, sores or blisters on feet.  No complaints of excessive thirst.  No reports of blurry vision.  No significant changes to weight.      BP Readings from Last 2 Encounters:   10/14/20 (!) 154/70   05/07/20 (!) 186/75     Hemoglobin A1C (%)   Date Value   09/25/2019 8.7 (H)   12/26/2018 8.0 (H)     LDL Cholesterol Calculated (mg/dL)   Date Value   07/22/2016 123 (H)   10/28/2015 64           How many servings of fruits and vegetables do you eat daily?  0-1    On average, how many sweetened beverages do you drink each day (Examples: soda, juice, sweet tea, etc.  Do NOT count diet or artificially sweetened beverages)?   0    How many days per week do you exercise enough to make your heart beat faster? 7    How many minutes a day do you exercise enough to make your heart beat faster? 20 - 29    How many days per week do you miss taking your medication? 0    PT would like ears checked out, flonase, and discuss oxygen.       Has had some noticeably lower blood sugars the last 4 to 5 days, as she has made a concerted effort to greatly reduce carbohydrate intake.  She reduced her nightly Levemir insulin to 50 units nightly, continues on sliding  scale NovoLog in addition.    She continues on hemodialysis, and is being evaluated for kidney transplant.  She continues on occasional supplemental O2 at home, given her history of severe pulmonary hypertension.      Review of Systems   Constitutional, HEENT, cardiovascular, pulmonary, GI, , musculoskeletal, neuro, skin, endocrine and psych systems are negative, except as otherwise noted.      Objective    BP (!) 154/70 (BP Location: Left arm, Patient Position: Chair, Cuff Size: Adult Regular)   Pulse 69   Temp 97.6  F (36.4  C) (Temporal)   Resp 16   Wt 73.3 kg (161 lb 9.6 oz)   SpO2 91%   BMI 31.56 kg/m    Body mass index is 31.56 kg/m .     SaO2 on Room air: 89-90%  SaO2 on room air after walking 150 feet: 85%    Physical Exam   HENT: External canals are clear with no cerumen impaction  NECK: no adenopathy, no asymmetry, masses, or scars and thyroid normal to palpation  RESP: lungs clear to auscultation - no rales, rhonchi or wheezes  CV: regular rate and rhythm, normal S1 S2, no S3 or S4, no murmur, click or rub, no peripheral edema and peripheral pulses strong  ABDOMEN: soft, nontender, no hepatosplenomegaly, no masses and bowel sounds normal  MS: no gross musculoskeletal defects noted, no edema            Assessment & Plan     Type 2 diabetes mellitus with diabetic nephropathy, with long-term current use of insulin (H)  Continue to reduce Levemir by 2 units every 3 days, as long as morning fasting blood sugars are consistently less than 120.  - LEVEMIR FLEXTOUCH 100 UNIT/ML pen; Inject 50 Units Subcutaneous At Bedtime  - insulin aspart (NOVOLOG FLEXPEN) 100 UNIT/ML pen; Take 2 unit per 15 grams of carbohydrates three times daily with meals    Seasonal allergic rhinitis, unspecified trigger    - fluticasone (FLONASE) 50 MCG/ACT nasal spray; Spray 1 spray into both nostrils daily    ESRD (end stage renal disease) (H)      Pulmonary hypertension (H)  Still needs supplemental O2, should continue at home  with activity.       BMI:   Estimated body mass index is 31.56 kg/m  as calculated from the following:    Height as of 5/7/20: 1.524 m (5').    Weight as of this encounter: 73.3 kg (161 lb 9.6 oz).   Weight management plan: Discussed healthy diet and exercise guidelines         See Patient Instructions    No follow-ups on file.    Gigi Martin MD  [facility]

## 2020-10-14 NOTE — PATIENT INSTRUCTIONS
- Decrease the Levemir to 48 units nightly.    - Continue to decrease the Levemir by 2 units every 3 days, as long as your morning fasting blood sugars are still consistently less than 120.

## 2020-11-10 DIAGNOSIS — E11.21 TYPE 2 DIABETES MELLITUS WITH DIABETIC NEPHROPATHY, WITH LONG-TERM CURRENT USE OF INSULIN (H): ICD-10-CM

## 2020-11-10 DIAGNOSIS — Z79.4 TYPE 2 DIABETES MELLITUS WITH DIABETIC NEPHROPATHY, WITH LONG-TERM CURRENT USE OF INSULIN (H): ICD-10-CM

## 2020-11-11 RX ORDER — INSULIN DETEMIR 100 [IU]/ML
INJECTION, SOLUTION SUBCUTANEOUS
Qty: 18 ML | Refills: 0 | Status: SHIPPED | OUTPATIENT
Start: 2020-11-11 | End: 2021-01-05

## 2021-01-01 DIAGNOSIS — Z79.4 TYPE 2 DIABETES MELLITUS WITH DIABETIC NEPHROPATHY, WITH LONG-TERM CURRENT USE OF INSULIN (H): ICD-10-CM

## 2021-01-01 DIAGNOSIS — E11.21 TYPE 2 DIABETES MELLITUS WITH DIABETIC NEPHROPATHY, WITH LONG-TERM CURRENT USE OF INSULIN (H): ICD-10-CM

## 2021-01-03 NOTE — TELEPHONE ENCOUNTER
"Routing refill request to provider for review/approval because:  Labs not current:  a1c    Requested Prescriptions   Pending Prescriptions Disp Refills     LEVEMIR FLEXTOUCH 100 UNIT/ML pen [Pharmacy Med Name: Levemir FlexTouch Subcutaneous Solution Pen-injector 100 UNIT/ML] 15 mL 0     Sig: Inject 60 Units Subcutaneously At Bedtime       Long Acting Insulin Protocol Failed - 1/1/2021 11:49 AM        Failed - HgbA1C in past 3 or 6 months     If HgbA1C is 8 or greater, it needs to be on file within the past 3 months.  If less than 8, must be on file within the past 6 months.     Recent Labs   Lab Test 09/25/19  1453   A1C 8.7*             Passed - Serum creatinine on file in past 12 months     Recent Labs   Lab Test 01/13/20  0642   CR 5.97*       Ok to refill medication if creatinine is low          Passed - Medication is active on med list        Passed - Patient is age 18 or older        Passed - Recent (6 mo) or future (30 days) visit within the authorizing provider's specialty     Patient had office visit in the last 6 months or has a visit in the next 30 days with authorizing provider or within the authorizing provider's specialty.  See \"Patient Info\" tab in inbasket, or \"Choose Columns\" in Meds & Orders section of the refill encounter.                       "

## 2021-01-05 RX ORDER — INSULIN DETEMIR 100 [IU]/ML
INJECTION, SOLUTION SUBCUTANEOUS
Qty: 15 ML | Refills: 0 | Status: SHIPPED | OUTPATIENT
Start: 2021-01-05 | End: 2021-01-06

## 2021-01-06 ENCOUNTER — TELEPHONE (OUTPATIENT)
Dept: TRANSPLANT | Facility: CLINIC | Age: 61
End: 2021-01-06

## 2021-01-06 DIAGNOSIS — Z79.4 TYPE 2 DIABETES MELLITUS WITH DIABETIC NEPHROPATHY, WITH LONG-TERM CURRENT USE OF INSULIN (H): ICD-10-CM

## 2021-01-06 DIAGNOSIS — E11.21 TYPE 2 DIABETES MELLITUS WITH DIABETIC NEPHROPATHY, WITH LONG-TERM CURRENT USE OF INSULIN (H): ICD-10-CM

## 2021-01-06 RX ORDER — INSULIN DETEMIR 100 [IU]/ML
INJECTION, SOLUTION SUBCUTANEOUS
Qty: 15 ML | Refills: 0
Start: 2021-01-06 | End: 2021-03-03

## 2021-01-06 NOTE — TELEPHONE ENCOUNTER
Reviewed pt's chart for pre renal transplant.  Yaima left her a message on 9/10/2020 asking her to call back to let us know when she would like to schedule her eval workup. So far there has been no return phone call.   Left a brief message on pt's phone asking her to call the transplant office and provided her with the phone number for the Pre KP Pod.   I then called her dialysis unit and spoke with the dietitian who stated that Cassandra is there and she will give Cassandra the message and the phone number to call.

## 2021-01-07 ENCOUNTER — TELEPHONE (OUTPATIENT)
Dept: TRANSPLANT | Facility: CLINIC | Age: 61
End: 2021-01-07

## 2021-01-07 NOTE — TELEPHONE ENCOUNTER
Patient Call: Returning Yaima's call from yesterday 01/06/2021      Call back needed? Yes    Return Call Needed  Same as documented in contacts section  When to return call?: Same day: Route High Priority

## 2021-01-08 NOTE — TELEPHONE ENCOUNTER
Reviewed chart records - weight at 74.6 kg per dialysis records 10/2020. For tx surgeon recommended 10 -15 pound weight loss - was at 79 kg 09/2019 with eval appts.  Weight loss has thus been 4.4 kg or 9.68 pounds. Noted in Dr. Martin's note from Oct 2020 patient uis using supplemental oxygen.  Called patient today and LM asking her to return my call. Still needs PFT, ekg, echo, cardiology eval, RTC with tx surgeon, abd pelvic CT, aorto iliac US, upper endoscopy, mammogram, PAP, colonoscopy and dental work.     Spoke with pt when she returned my call and spoke to her. Pt stating she is currently being treated with antibiotics for a lung and sinus infection which started about a month or so ago - thinks she is feeling better now. Confirmed she uses supplemental oxygen a couple times a week like when she is sitting watching TV.  Pt explained she has lost 20 pounds since her pre K eval. I reviewed all items above still need to be completed for eval, pt confirmed she is aware of this and understands she is not approved for transplant at this time and is not listed. I explained to patient that she should call me when she has been off of her antibiotics for 2 weeks and is symptom free at that point we will determine next steps in her evaluation. Pt stating she does not want to schedule any eval appts until she has the COVID vaccination - told her this is fine and is understandable. Explained I still would like her to keep in touch with me and to still call me once she has been off of antibiotics for 2 weeks and is symptom free. Pt expressed very good understanding of all and was in good agreement with the plan.

## 2021-01-15 ENCOUNTER — HEALTH MAINTENANCE LETTER (OUTPATIENT)
Age: 61
End: 2021-01-15

## 2021-03-01 DIAGNOSIS — E11.21 TYPE 2 DIABETES MELLITUS WITH DIABETIC NEPHROPATHY, WITH LONG-TERM CURRENT USE OF INSULIN (H): ICD-10-CM

## 2021-03-01 DIAGNOSIS — Z79.4 TYPE 2 DIABETES MELLITUS WITH DIABETIC NEPHROPATHY, WITH LONG-TERM CURRENT USE OF INSULIN (H): ICD-10-CM

## 2021-03-02 RX ORDER — BLOOD SUGAR DIAGNOSTIC
STRIP MISCELLANEOUS
Qty: 200 STRIP | Refills: 0 | Status: SHIPPED | OUTPATIENT
Start: 2021-03-02 | End: 2021-06-23

## 2021-03-02 NOTE — TELEPHONE ENCOUNTER
Accu-chek Saira plus test strip  Prescription approved per [facility] Refill Protocol.

## 2021-03-02 NOTE — TELEPHONE ENCOUNTER
Levemir Flextouch  Routing refill request to provider for review/approval because:  Labs not current:  Creatinine, HgbA1c

## 2021-03-03 RX ORDER — INSULIN DETEMIR 100 [IU]/ML
INJECTION, SOLUTION SUBCUTANEOUS
Qty: 15 ML | Refills: 0 | Status: SHIPPED | OUTPATIENT
Start: 2021-03-03 | End: 2021-04-28

## 2021-03-04 ENCOUNTER — OFFICE VISIT (OUTPATIENT)
Dept: INTERNAL MEDICINE | Facility: CLINIC | Age: 61
End: 2021-03-04
Payer: MEDICARE

## 2021-03-04 ENCOUNTER — ANCILLARY PROCEDURE (OUTPATIENT)
Dept: GENERAL RADIOLOGY | Facility: CLINIC | Age: 61
End: 2021-03-04
Attending: INTERNAL MEDICINE
Payer: MEDICARE

## 2021-03-04 VITALS
WEIGHT: 160.6 LBS | DIASTOLIC BLOOD PRESSURE: 70 MMHG | SYSTOLIC BLOOD PRESSURE: 138 MMHG | OXYGEN SATURATION: 84 % | TEMPERATURE: 97.8 F | HEART RATE: 68 BPM | BODY MASS INDEX: 31.37 KG/M2

## 2021-03-04 DIAGNOSIS — E11.21 TYPE 2 DIABETES MELLITUS WITH DIABETIC NEPHROPATHY, WITH LONG-TERM CURRENT USE OF INSULIN (H): Primary | ICD-10-CM

## 2021-03-04 DIAGNOSIS — J40 BRONCHITIS: ICD-10-CM

## 2021-03-04 DIAGNOSIS — Z79.4 TYPE 2 DIABETES MELLITUS WITH DIABETIC NEPHROPATHY, WITH LONG-TERM CURRENT USE OF INSULIN (H): Primary | ICD-10-CM

## 2021-03-04 DIAGNOSIS — N18.6 ESRD (END STAGE RENAL DISEASE) (H): ICD-10-CM

## 2021-03-04 DIAGNOSIS — J18.9 PNEUMONIA DUE TO INFECTIOUS ORGANISM, UNSPECIFIED LATERALITY, UNSPECIFIED PART OF LUNG: ICD-10-CM

## 2021-03-04 DIAGNOSIS — R09.02 HYPOXEMIA: ICD-10-CM

## 2021-03-04 LAB — HBA1C MFR BLD: 7.7 % (ref 0–5.6)

## 2021-03-04 PROCEDURE — 71046 X-RAY EXAM CHEST 2 VIEWS: CPT | Performed by: RADIOLOGY

## 2021-03-04 PROCEDURE — 99214 OFFICE O/P EST MOD 30 MIN: CPT | Performed by: INTERNAL MEDICINE

## 2021-03-04 PROCEDURE — 36415 COLL VENOUS BLD VENIPUNCTURE: CPT | Performed by: INTERNAL MEDICINE

## 2021-03-04 PROCEDURE — 83036 HEMOGLOBIN GLYCOSYLATED A1C: CPT | Performed by: INTERNAL MEDICINE

## 2021-03-04 ASSESSMENT — ASTHMA QUESTIONNAIRES
QUESTION_3 LAST FOUR WEEKS HOW OFTEN DID YOUR ASTHMA SYMPTOMS (WHEEZING, COUGHING, SHORTNESS OF BREATH, CHEST TIGHTNESS OR PAIN) WAKE YOU UP AT NIGHT OR EARLIER THAN USUAL IN THE MORNING: NOT AT ALL
QUESTION_5 LAST FOUR WEEKS HOW WOULD YOU RATE YOUR ASTHMA CONTROL: SOMEWHAT CONTROLLED
ACT_TOTALSCORE: 16
QUESTION_1 LAST FOUR WEEKS HOW MUCH OF THE TIME DID YOUR ASTHMA KEEP YOU FROM GETTING AS MUCH DONE AT WORK, SCHOOL OR AT HOME: SOME OF THE TIME
QUESTION_2 LAST FOUR WEEKS HOW OFTEN HAVE YOU HAD SHORTNESS OF BREATH: ONCE A DAY
QUESTION_4 LAST FOUR WEEKS HOW OFTEN HAVE YOU USED YOUR RESCUE INHALER OR NEBULIZER MEDICATION (SUCH AS ALBUTEROL): TWO OR THREE TIMES PER WEEK

## 2021-03-04 NOTE — PROGRESS NOTES
Assessment & Plan     Type 2 diabetes mellitus with diabetic nephropathy, with long-term current use of insulin (H)  Recheck glycosylated hemoglobin  - Hemoglobin A1c    ESRD (end stage renal disease) (H)  Upper and lower GI endoscopies ordered, for evaluation for potential renal transplant  - GASTROENTEROLOGY ADULT REF PROCEDURE ONLY; Future    Bronchitis  Check chest x-ray as ordered,  - XR Chest 2 Views; Future  - Spirometry, Breath Capacity:  Future Order, RT Performed; Future    Hypoxemia  Will attempt to arrange portable compressor for O2 supplementation with activity  - Home Oxygen Order for DME - ONLY FOR DME              See Patient Instructions    Return in about 3 months (around 6/4/2021) for Med Check.    Gigi Martin MD  [facility] Center[facility]RIA Trujillo is a 60 year old who presents for the following health issues     HPI       Diabetes Follow-up    How often are you checking your blood sugar? Four or more times daily  Blood sugar testing frequency justification:  Uncontrolled diabetes  What time of day are you checking your blood sugars (select all that apply)?  Before meals, After meals and At bedtime  Have you had any blood sugars above 200?  No  Have you had any blood sugars below 70?  No    What symptoms do you notice when your blood sugar is low?  None aand start feeling sick, vision changes, sweating , dizzy     What concerns do you have today about your diabetes? None and Other: low blood sugars      Do you have any of these symptoms? (Select all that apply)  Blurry vision      BP Readings from Last 2 Encounters:   03/04/21 138/70   10/14/20 (!) 154/70     Hemoglobin A1C (%)   Date Value   09/25/2019 8.7 (H)   12/26/2018 8.0 (H)     LDL Cholesterol Calculated (mg/dL)   Date Value   07/22/2016 123 (H)   10/28/2015 64           How many servings of fruits and vegetables do you eat daily?  2-3    On average, how many sweetened beverages do you  drink each day (Examples: soda, juice, sweet tea, etc.  Do NOT count diet or artificially sweetened beverages)?   0    How many days per week do you exercise enough to make your heart beat faster? 4    How many minutes a day do you exercise enough to make your heart beat faster? 20 - 29    How many days per week do you miss taking your medication? 0      Continues on stable Levemir and NovoLog regimen for type 2 diabetes, due for repeat hemoglobin A1c.    Continues on dialysis for end-stage renal disease, now ready to pursue upper and lower GI endoscopies necessary for evaluation for potential renal transplant.    Has dealt with hypoxemia with activity for some time now.  Has had intermittent pleural effusions in the past, was a previous smoker.  Using oxygen at home, would like portable compressor to improve mobility.      Review of Systems   Constitutional, HEENT, cardiovascular, pulmonary, GI, , musculoskeletal, neuro, skin, endocrine and psych systems are negative, except as otherwise noted.      Objective    /70   Pulse 68   Temp 97.8  F (36.6  C) (Oral)   Wt 72.8 kg (160 lb 9.6 oz)   SpO2 (!) 84%   BMI 31.37 kg/m    Body mass index is 31.37 kg/m .     SaO2 on room air, at rest: 90-91%  SaO2 on room air, after ambulating 100 feet: 84-86%  SaO2 on 2L o2 per NC, at rest and with ambulation: 95%+    Physical Exam   GENERAL: healthy, alert and no distress  NECK: no adenopathy, no asymmetry, masses, or scars and thyroid normal to palpation  RESP: Clear, reduced air movement throughout, no wheezes  CV: regular rate and rhythm, normal S1 S2, no S3 or S4, no murmur, click or rub, no peripheral edema and peripheral pulses strong  ABDOMEN: soft, nontender, no hepatosplenomegaly, no masses and bowel sounds normal  MS: no gross musculoskeletal defects noted, no edema

## 2021-03-05 ASSESSMENT — ASTHMA QUESTIONNAIRES: ACT_TOTALSCORE: 16

## 2021-03-09 ENCOUNTER — TELEPHONE (OUTPATIENT)
Dept: TRANSPLANT | Facility: CLINIC | Age: 61
End: 2021-03-09

## 2021-03-09 RX ORDER — LEVALBUTEROL TARTRATE 45 UG/1
2 AEROSOL, METERED ORAL EVERY 6 HOURS PRN
Qty: 15 G | Refills: 11 | Status: SHIPPED | OUTPATIENT
Start: 2021-03-09

## 2021-03-09 NOTE — TELEPHONE ENCOUNTER
Routing refill request to provider for review/approval because:  Asthma control assessment score within normal limits in last 6 months

## 2021-03-09 NOTE — TELEPHONE ENCOUNTER
Patient Call: General  Route to LPN    Reason for call: pt has updates and questions to review     Call back needed? Yes    Return Call Needed  Same as documented in contacts section  When to return call?: Greater than one day: Route standard priority

## 2021-03-15 NOTE — TELEPHONE ENCOUNTER
Patient Call: Voicemail  Date/Time: 3/15/21 @ 9:04am  Reason for call: patient left voicemail she has been trying to get in touch with Yaima, and has some updates.

## 2021-03-16 ENCOUNTER — TELEPHONE (OUTPATIENT)
Dept: TRANSPLANT | Facility: CLINIC | Age: 61
End: 2021-03-16

## 2021-03-16 NOTE — TELEPHONE ENCOUNTER
Returned a call today and spoke with her at length. Patient explained she has now received both COVID vaccinations and she is working on scheduling colonoscopy and upper endoscopy. Patient also mentioning that she is using her oxygen a lot now, mostly with activity and sometimes at night. Patient explained her doctor wants her to see a pulmonologist about this and she is trying to get that appointment scheduled too. I explained that the pulmonologist appointment is very important for her transplant evaluation and that we want her to see them as well. Instructed patient to call me as soon as pulmonologist appts is completed Patient expressed very good understanding of all and was in good agreement with the plan.

## 2021-04-26 DIAGNOSIS — Z79.4 TYPE 2 DIABETES MELLITUS WITH DIABETIC NEPHROPATHY, WITH LONG-TERM CURRENT USE OF INSULIN (H): ICD-10-CM

## 2021-04-26 DIAGNOSIS — E11.21 TYPE 2 DIABETES MELLITUS WITH DIABETIC NEPHROPATHY, WITH LONG-TERM CURRENT USE OF INSULIN (H): ICD-10-CM

## 2021-04-28 RX ORDER — INSULIN ASPART 100 [IU]/ML
INJECTION, SOLUTION INTRAVENOUS; SUBCUTANEOUS
Qty: 15 ML | Refills: 0 | Status: SHIPPED | OUTPATIENT
Start: 2021-04-28 | End: 2021-06-25

## 2021-04-28 RX ORDER — INSULIN DETEMIR 100 [IU]/ML
INJECTION, SOLUTION SUBCUTANEOUS
Qty: 15 ML | Refills: 0 | Status: SHIPPED | OUTPATIENT
Start: 2021-04-28 | End: 2021-06-25

## 2021-04-28 NOTE — TELEPHONE ENCOUNTER
"Failed protocol.  please route to  team if patient needs an appointment     Lina NAIDURN BSN  [facility]  [phone]    Requested Prescriptions   Pending Prescriptions Disp Refills     LEVEMIR FLEXTOUCH 100 UNIT/ML pen [Pharmacy Med Name: Levemir FlexTouch Subcutaneous Solution Pen-injector 100 UNIT/ML] 15 mL 0     Sig: Inject 60 Units Subcutaneously At Bedtime       Long Acting Insulin Protocol Failed - 4/26/2021 11:15 AM        Failed - Serum creatinine on file in past 12 months     Recent Labs   Lab Test 01/13/20  0642   CR 5.97*       Ok to refill medication if creatinine is low          Passed - HgbA1C in past 3 or 6 months     If HgbA1C is 8 or greater, it needs to be on file within the past 3 months.  If less than 8, must be on file within the past 6 months.     Recent Labs   Lab Test 03/04/21  1600   A1C 7.7*             Passed - Medication is active on med list        Passed - Patient is age 18 or older        Passed - Recent (6 mo) or future (30 days) visit within the authorizing provider's specialty     Patient had office visit in the last 6 months or has a visit in the next 30 days with authorizing provider or within the authorizing provider's specialty.  See \"Patient Info\" tab in inbasket, or \"Choose Columns\" in Meds & Orders section of the refill encounter.               insulin aspart (NOVOLOG FLEXPEN) 100 UNIT/ML pen [Pharmacy Med Name: NovoLOG FlexPen Subcutaneous Solution Pen-injector 100 UNIT/ML] 15 mL 0     Sig: INJECT 9-12 UNITS BEFORE BREAKFAST, LUNCH, AND DINNER PLUS A CORRECTIVE SCALE OF 2 UNITS FOR EVERY 50 OVER 150       Short Acting Insulin Protocol Failed - 4/26/2021 11:15 AM        Failed - Serum creatinine on file in past 12 months     Recent Labs   Lab Test 01/13/20  0642   CR 5.97*       Ok to refill medication if creatinine is low          Passed - HgbA1C in past 3 or 6 months     If HgbA1C is 8 or greater, it needs to be on file within the past 3 " "months.  If less than 8, must be on file within the past 6 months.     Recent Labs   Lab Test 03/04/21  1600   A1C 7.7*             Passed - Medication is active on med list        Passed - Patient is age 18 or older        Passed - Recent (6 mo) or future (30 days) visit within the authorizing provider's specialty     Patient had office visit in the last 6 months or has a visit in the next 30 days with authorizing provider or within the authorizing provider's specialty.  See \"Patient Info\" tab in inbasket, or \"Choose Columns\" in Meds & Orders section of the refill encounter.                 "

## 2021-04-29 ENCOUNTER — TRANSFERRED RECORDS (OUTPATIENT)
Dept: HEALTH INFORMATION MANAGEMENT | Facility: CLINIC | Age: 61
End: 2021-04-29

## 2021-04-29 DIAGNOSIS — E11.21 TYPE 2 DIABETES MELLITUS WITH DIABETIC NEPHROPATHY, WITH LONG-TERM CURRENT USE OF INSULIN (H): Primary | ICD-10-CM

## 2021-04-29 DIAGNOSIS — Z79.4 TYPE 2 DIABETES MELLITUS WITH DIABETIC NEPHROPATHY, WITH LONG-TERM CURRENT USE OF INSULIN (H): Primary | ICD-10-CM

## 2021-04-29 LAB — RETINOPATHY: POSITIVE

## 2021-04-29 NOTE — TELEPHONE ENCOUNTER
Per pharmacy    Requesting rx insulin pen needles 31g x 8mm    [facility] [id number]  [phone]    [phone] fx

## 2021-05-09 ENCOUNTER — HEALTH MAINTENANCE LETTER (OUTPATIENT)
Age: 61
End: 2021-05-09

## 2021-05-12 DIAGNOSIS — Z11.59 ENCOUNTER FOR SCREENING FOR OTHER VIRAL DISEASES: ICD-10-CM

## 2021-05-22 ENCOUNTER — TRANSFERRED RECORDS (OUTPATIENT)
Dept: HEALTH INFORMATION MANAGEMENT | Facility: CLINIC | Age: 61
End: 2021-05-22

## 2021-05-25 DIAGNOSIS — Z11.59 ENCOUNTER FOR SCREENING FOR OTHER VIRAL DISEASES: ICD-10-CM

## 2021-05-25 LAB
LABORATORY COMMENT REPORT: NORMAL
SARS-COV-2 RNA RESP QL NAA+PROBE: NEGATIVE
SARS-COV-2 RNA RESP QL NAA+PROBE: NORMAL
SPECIMEN SOURCE: NORMAL
SPECIMEN SOURCE: NORMAL

## 2021-05-25 PROCEDURE — U0003 INFECTIOUS AGENT DETECTION BY NUCLEIC ACID (DNA OR RNA); SEVERE ACUTE RESPIRATORY SYNDROME CORONAVIRUS 2 (SARS-COV-2) (CORONAVIRUS DISEASE [COVID-19]), AMPLIFIED PROBE TECHNIQUE, MAKING USE OF HIGH THROUGHPUT TECHNOLOGIES AS DESCRIBED BY CMS-2020-01-R: HCPCS | Performed by: INTERNAL MEDICINE

## 2021-05-25 PROCEDURE — U0005 INFEC AGEN DETEC AMPLI PROBE: HCPCS | Performed by: INTERNAL MEDICINE

## 2021-05-27 ENCOUNTER — HOSPITAL ENCOUNTER (OUTPATIENT)
Facility: CLINIC | Age: 61
Discharge: HOME | End: 2021-05-27
Attending: INTERNAL MEDICINE | Admitting: INTERNAL MEDICINE
Payer: MEDICARE

## 2021-05-27 VITALS
RESPIRATION RATE: 20 BRPM | SYSTOLIC BLOOD PRESSURE: 186 MMHG | DIASTOLIC BLOOD PRESSURE: 86 MMHG | HEIGHT: 60 IN | BODY MASS INDEX: 31.02 KG/M2 | HEART RATE: 80 BPM | WEIGHT: 158 LBS | OXYGEN SATURATION: 95 %

## 2021-05-27 LAB
COLONOSCOPY: NORMAL
GLUCOSE BLDC GLUCOMTR-MCNC: 151 MG/DL (ref 70–99)
UPPER GI ENDOSCOPY: NORMAL

## 2021-05-27 PROCEDURE — 43239 EGD BIOPSY SINGLE/MULTIPLE: CPT | Performed by: INTERNAL MEDICINE

## 2021-05-27 PROCEDURE — 88305 TISSUE EXAM BY PATHOLOGIST: CPT | Mod: 26 | Performed by: PATHOLOGY

## 2021-05-27 PROCEDURE — 250N000009 HC RX 250: Performed by: INTERNAL MEDICINE

## 2021-05-27 PROCEDURE — 45380 COLONOSCOPY AND BIOPSY: CPT | Performed by: INTERNAL MEDICINE

## 2021-05-27 PROCEDURE — 82962 GLUCOSE BLOOD TEST: CPT

## 2021-05-27 PROCEDURE — G0500 MOD SEDAT ENDO SERVICE >5YRS: HCPCS | Performed by: INTERNAL MEDICINE

## 2021-05-27 PROCEDURE — 88305 TISSUE EXAM BY PATHOLOGIST: CPT | Mod: TC | Performed by: INTERNAL MEDICINE

## 2021-05-27 PROCEDURE — 250N000011 HC RX IP 250 OP 636: Performed by: INTERNAL MEDICINE

## 2021-05-27 RX ORDER — FENTANYL CITRATE 50 UG/ML
INJECTION, SOLUTION INTRAMUSCULAR; INTRAVENOUS PRN
Status: COMPLETED | OUTPATIENT
Start: 2021-05-27 | End: 2021-05-27

## 2021-05-27 RX ADMIN — TOPICAL ANESTHETIC 1 SPRAY: 200 SPRAY DENTAL; PERIODONTAL at 09:22

## 2021-05-27 RX ADMIN — FENTANYL CITRATE 50 MCG: 50 INJECTION, SOLUTION INTRAMUSCULAR; INTRAVENOUS at 09:22

## 2021-05-27 RX ADMIN — MIDAZOLAM 1 MG: 1 INJECTION INTRAMUSCULAR; INTRAVENOUS at 09:23

## 2021-05-27 ASSESSMENT — MIFFLIN-ST. JEOR: SCORE: 1208.18

## 2021-05-28 LAB — COPATH REPORT: NORMAL

## 2021-06-02 ENCOUNTER — TELEPHONE (OUTPATIENT)
Dept: TRANSPLANT | Facility: CLINIC | Age: 61
End: 2021-06-02

## 2021-06-02 ENCOUNTER — TRANSFERRED RECORDS (OUTPATIENT)
Dept: HEALTH INFORMATION MANAGEMENT | Facility: CLINIC | Age: 61
End: 2021-06-02

## 2021-06-03 ENCOUNTER — TELEPHONE (OUTPATIENT)
Dept: TRANSPLANT | Facility: CLINIC | Age: 61
End: 2021-06-03

## 2021-06-03 NOTE — TELEPHONE ENCOUNTER
Patient Call:Cassandra has many questions to ask care team       Call back needed? Yes    Return Call Needed  Same as documented in contacts section  When to return call?: Same day: Route High Priority

## 2021-06-03 NOTE — TELEPHONE ENCOUNTER
Returned a call today to patient and spoke to her. Pt stating she just saw a lung doctor yesterday and had a breathing test - she said he ordered an inhaler based on the test results and recommended she RTC in 2 months. She added that she was given a prescription for oxygen when she last discharged from the hospital but didn't really start using it until about 5 months ago, has gradually increased use of oxygen to now at 85% of the time. Saw lung doctor at a private practice office and she instructed them to fax records to our Office. Still needs to make appt for cardiologist and will do this. Has had her upper endoscopy/colonoscopy recently - results in Epic. Explained we are very interested in her lung status and will plan to review this with the K tx team when records are received here. Pt expressed very good understanding of all and was in good agreement with the plan.

## 2021-06-22 ENCOUNTER — MEDICAL CORRESPONDENCE (OUTPATIENT)
Dept: HEALTH INFORMATION MANAGEMENT | Facility: CLINIC | Age: 61
End: 2021-06-22

## 2021-06-23 DIAGNOSIS — E11.21 TYPE 2 DIABETES MELLITUS WITH DIABETIC NEPHROPATHY, WITH LONG-TERM CURRENT USE OF INSULIN (H): ICD-10-CM

## 2021-06-23 DIAGNOSIS — Z79.4 TYPE 2 DIABETES MELLITUS WITH DIABETIC NEPHROPATHY, WITH LONG-TERM CURRENT USE OF INSULIN (H): ICD-10-CM

## 2021-06-23 RX ORDER — BLOOD SUGAR DIAGNOSTIC
STRIP MISCELLANEOUS
Qty: 200 STRIP | Refills: 1 | Status: SHIPPED | OUTPATIENT
Start: 2021-06-23 | End: 2021-10-28

## 2021-06-23 NOTE — TELEPHONE ENCOUNTER
Accu-chek test strip   Prescription approved per [facility] Refill Protocol.

## 2021-06-23 NOTE — TELEPHONE ENCOUNTER
Levemir Flextouch   Routing refill request to provider for review/approval because:  Labs not current:  CR      Novolog flexpen   Routing refill request to provider for review/approval because:  Labs not current:  CR    Creatinine   Date Value Ref Range Status   01/13/2020 5.97 (H) 0.52 - 1.04 mg/dL Final

## 2021-06-25 RX ORDER — INSULIN ASPART 100 [IU]/ML
INJECTION, SOLUTION INTRAVENOUS; SUBCUTANEOUS
Qty: 15 ML | Refills: 0 | Status: SHIPPED | OUTPATIENT
Start: 2021-06-25 | End: 2023-01-23

## 2021-06-25 RX ORDER — INSULIN DETEMIR 100 [IU]/ML
INJECTION, SOLUTION SUBCUTANEOUS
Qty: 15 ML | Refills: 0 | Status: ON HOLD | OUTPATIENT
Start: 2021-06-25 | End: 2021-07-13

## 2021-07-13 ENCOUNTER — TELEPHONE (OUTPATIENT)
Dept: OTHER | Facility: CLINIC | Age: 61
End: 2021-07-13

## 2021-07-13 DIAGNOSIS — Z79.4 TYPE 2 DIABETES MELLITUS WITH DIABETIC NEPHROPATHY, WITH LONG-TERM CURRENT USE OF INSULIN (H): ICD-10-CM

## 2021-07-13 DIAGNOSIS — E11.21 TYPE 2 DIABETES MELLITUS WITH DIABETIC NEPHROPATHY, WITH LONG-TERM CURRENT USE OF INSULIN (H): ICD-10-CM

## 2021-07-13 NOTE — TELEPHONE ENCOUNTER
Patient called to schedule a follow up appointment. She has previously seen Dr. Vizacino for her AVF. She is being evaluated for a kidney transplant and was told by her coordinator that she needs a check up with Vascular. Patient was unclear of what she needs the visit for but would like to schedule. She states that we should contact her transplant coordinator Yaima Salgado and the [facility] to find out what they need her to be seen for.

## 2021-07-14 ENCOUNTER — APPOINTMENT (OUTPATIENT)
Dept: GENERAL RADIOLOGY | Facility: CLINIC | Age: 61
End: 2021-07-14
Attending: EMERGENCY MEDICINE
Payer: MEDICARE

## 2021-07-14 ENCOUNTER — TELEPHONE (OUTPATIENT)
Dept: TRANSPLANT | Facility: CLINIC | Age: 61
End: 2021-07-14

## 2021-07-14 ENCOUNTER — HOSPITAL ENCOUNTER (OUTPATIENT)
Facility: CLINIC | Age: 61
Setting detail: OBSERVATION
Discharge: HOME | End: 2021-07-16
Attending: EMERGENCY MEDICINE | Admitting: HOSPITALIST
Payer: MEDICARE

## 2021-07-14 DIAGNOSIS — R79.89 ELEVATED TROPONIN: ICD-10-CM

## 2021-07-14 DIAGNOSIS — I27.20 PULMONARY HYPERTENSION (H): ICD-10-CM

## 2021-07-14 DIAGNOSIS — I47.10 SVT (SUPRAVENTRICULAR TACHYCARDIA) (H): ICD-10-CM

## 2021-07-14 DIAGNOSIS — E11.21 TYPE 2 DIABETES MELLITUS WITH DIABETIC NEPHROPATHY, WITH LONG-TERM CURRENT USE OF INSULIN (H): ICD-10-CM

## 2021-07-14 DIAGNOSIS — R00.0 TACHYCARDIA: Primary | ICD-10-CM

## 2021-07-14 DIAGNOSIS — Z79.4 TYPE 2 DIABETES MELLITUS WITH DIABETIC NEPHROPATHY, WITH LONG-TERM CURRENT USE OF INSULIN (H): ICD-10-CM

## 2021-07-14 LAB
ANION GAP SERPL CALCULATED.3IONS-SCNC: 6 MMOL/L (ref 3–14)
ATRIAL RATE - MUSE: 77 BPM
BASOPHILS # BLD AUTO: 0 10E3/UL (ref 0–0.2)
BASOPHILS NFR BLD AUTO: 1 %
BUN SERPL-MCNC: 40 MG/DL (ref 7–30)
CALCIUM SERPL-MCNC: 8.7 MG/DL (ref 8.5–10.1)
CHLORIDE BLD-SCNC: 101 MMOL/L (ref 94–109)
CO2 SERPL-SCNC: 27 MMOL/L (ref 20–32)
CREAT SERPL-MCNC: 5.19 MG/DL (ref 0.52–1.04)
DIASTOLIC BLOOD PRESSURE - MUSE: NORMAL MMHG
EOSINOPHIL # BLD AUTO: 0.1 10E3/UL (ref 0–0.7)
EOSINOPHIL NFR BLD AUTO: 2 %
ERYTHROCYTE [DISTWIDTH] IN BLOOD BY AUTOMATED COUNT: 16 % (ref 10–15)
GFR SERPL CREATININE-BSD FRML MDRD: 8 ML/MIN/1.73M2
GLUCOSE BLD-MCNC: 192 MG/DL (ref 70–99)
GLUCOSE BLDC GLUCOMTR-MCNC: 172 MG/DL (ref 70–99)
HBA1C MFR BLD: 7.4 % (ref 0–5.6)
HCT VFR BLD AUTO: 31.7 % (ref 35–47)
HGB BLD-MCNC: 10.1 G/DL (ref 11.7–15.7)
IMM GRANULOCYTES # BLD: 0 10E3/UL
IMM GRANULOCYTES NFR BLD: 1 %
INTERPRETATION ECG - MUSE: NORMAL
LYMPHOCYTES # BLD AUTO: 0.8 10E3/UL (ref 0.8–5.3)
LYMPHOCYTES NFR BLD AUTO: 12 %
MCH RBC QN AUTO: 30.9 PG (ref 26.5–33)
MCHC RBC AUTO-ENTMCNC: 31.9 G/DL (ref 31.5–36.5)
MCV RBC AUTO: 97 FL (ref 78–100)
MONOCYTES # BLD AUTO: 0.6 10E3/UL (ref 0–1.3)
MONOCYTES NFR BLD AUTO: 10 %
NEUTROPHILS # BLD AUTO: 4.8 10E3/UL (ref 1.6–8.3)
NEUTROPHILS NFR BLD AUTO: 74 %
NRBC # BLD AUTO: 0 10E3/UL
NRBC BLD AUTO-RTO: 0 /100
P AXIS - MUSE: 76 DEGREES
PLATELET # BLD AUTO: 102 10E3/UL (ref 150–450)
POTASSIUM BLD-SCNC: 4.7 MMOL/L (ref 3.4–5.3)
PR INTERVAL - MUSE: 160 MS
QRS DURATION - MUSE: 84 MS
QT - MUSE: 398 MS
QTC - MUSE: 450 MS
R AXIS - MUSE: 75 DEGREES
RBC # BLD AUTO: 3.27 10E6/UL (ref 3.8–5.2)
SARS-COV-2 RNA RESP QL NAA+PROBE: NEGATIVE
SODIUM SERPL-SCNC: 134 MMOL/L (ref 133–144)
SYSTOLIC BLOOD PRESSURE - MUSE: NORMAL MMHG
T AXIS - MUSE: -60 DEGREES
TROPONIN I SERPL-MCNC: 0.14 UG/L (ref 0–0.04)
TROPONIN I SERPL-MCNC: 0.45 UG/L (ref 0–0.04)
TROPONIN I SERPL-MCNC: 0.46 UG/L (ref 0–0.04)
TSH SERPL DL<=0.005 MIU/L-ACNC: 3.86 MU/L (ref 0.4–4)
VENTRICULAR RATE- MUSE: 77 BPM
WBC # BLD AUTO: 6.4 10E3/UL (ref 4–11)

## 2021-07-14 PROCEDURE — 99223 1ST HOSP IP/OBS HIGH 75: CPT | Mod: AI | Performed by: STUDENT IN AN ORGANIZED HEALTH CARE EDUCATION/TRAINING PROGRAM

## 2021-07-14 PROCEDURE — 93005 ELECTROCARDIOGRAM TRACING: CPT

## 2021-07-14 PROCEDURE — 36415 COLL VENOUS BLD VENIPUNCTURE: CPT | Performed by: STUDENT IN AN ORGANIZED HEALTH CARE EDUCATION/TRAINING PROGRAM

## 2021-07-14 PROCEDURE — 87635 SARS-COV-2 COVID-19 AMP PRB: CPT | Performed by: EMERGENCY MEDICINE

## 2021-07-14 PROCEDURE — 82962 GLUCOSE BLOOD TEST: CPT

## 2021-07-14 PROCEDURE — 210N000002 HC R&B HEART CARE

## 2021-07-14 PROCEDURE — 80048 BASIC METABOLIC PNL TOTAL CA: CPT | Performed by: EMERGENCY MEDICINE

## 2021-07-14 PROCEDURE — 96372 THER/PROPH/DIAG INJ SC/IM: CPT | Mod: XS | Performed by: STUDENT IN AN ORGANIZED HEALTH CARE EDUCATION/TRAINING PROGRAM

## 2021-07-14 PROCEDURE — 250N000013 HC RX MED GY IP 250 OP 250 PS 637: Performed by: STUDENT IN AN ORGANIZED HEALTH CARE EDUCATION/TRAINING PROGRAM

## 2021-07-14 PROCEDURE — 71045 X-RAY EXAM CHEST 1 VIEW: CPT

## 2021-07-14 PROCEDURE — 99285 EMERGENCY DEPT VISIT HI MDM: CPT | Mod: 25

## 2021-07-14 PROCEDURE — G0257 UNSCHED DIALYSIS ESRD PT HOS: HCPCS

## 2021-07-14 PROCEDURE — 250N000012 HC RX MED GY IP 250 OP 636 PS 637: Performed by: STUDENT IN AN ORGANIZED HEALTH CARE EDUCATION/TRAINING PROGRAM

## 2021-07-14 PROCEDURE — 90937 HEMODIALYSIS REPEATED EVAL: CPT

## 2021-07-14 PROCEDURE — 93005 ELECTROCARDIOGRAM TRACING: CPT | Mod: 59

## 2021-07-14 PROCEDURE — 36415 COLL VENOUS BLD VENIPUNCTURE: CPT | Performed by: EMERGENCY MEDICINE

## 2021-07-14 PROCEDURE — 87340 HEPATITIS B SURFACE AG IA: CPT | Mod: GZ | Performed by: INTERNAL MEDICINE

## 2021-07-14 PROCEDURE — 258N000003 HC RX IP 258 OP 636: Performed by: INTERNAL MEDICINE

## 2021-07-14 PROCEDURE — 634N000001 HC RX 634: Performed by: INTERNAL MEDICINE

## 2021-07-14 PROCEDURE — 84484 ASSAY OF TROPONIN QUANT: CPT | Performed by: EMERGENCY MEDICINE

## 2021-07-14 PROCEDURE — 36592 COLLECT BLOOD FROM PICC: CPT | Performed by: EMERGENCY MEDICINE

## 2021-07-14 PROCEDURE — 90935 HEMODIALYSIS ONE EVALUATION: CPT | Performed by: INTERNAL MEDICINE

## 2021-07-14 PROCEDURE — 84443 ASSAY THYROID STIM HORMONE: CPT | Performed by: EMERGENCY MEDICINE

## 2021-07-14 PROCEDURE — C9803 HOPD COVID-19 SPEC COLLECT: HCPCS

## 2021-07-14 PROCEDURE — 250N000011 HC RX IP 250 OP 636: Performed by: INTERNAL MEDICINE

## 2021-07-14 PROCEDURE — 85025 COMPLETE CBC W/AUTO DIFF WBC: CPT | Performed by: EMERGENCY MEDICINE

## 2021-07-14 PROCEDURE — 86706 HEP B SURFACE ANTIBODY: CPT | Performed by: INTERNAL MEDICINE

## 2021-07-14 PROCEDURE — 84484 ASSAY OF TROPONIN QUANT: CPT | Mod: 91 | Performed by: STUDENT IN AN ORGANIZED HEALTH CARE EDUCATION/TRAINING PROGRAM

## 2021-07-14 PROCEDURE — 83036 HEMOGLOBIN GLYCOSYLATED A1C: CPT | Performed by: STUDENT IN AN ORGANIZED HEALTH CARE EDUCATION/TRAINING PROGRAM

## 2021-07-14 RX ORDER — ASPIRIN 81 MG/1
324 TABLET, CHEWABLE ORAL ONCE
Status: DISCONTINUED | OUTPATIENT
Start: 2021-07-14 | End: 2021-07-14

## 2021-07-14 RX ORDER — LABETALOL 100 MG/1
100 TABLET, FILM COATED ORAL 2 TIMES DAILY
Status: DISCONTINUED | OUTPATIENT
Start: 2021-07-14 | End: 2021-07-15

## 2021-07-14 RX ORDER — HEPARIN SODIUM 1000 [USP'U]/ML
500 INJECTION, SOLUTION INTRAVENOUS; SUBCUTANEOUS
Status: DISCONTINUED | OUTPATIENT
Start: 2021-07-14 | End: 2021-07-14

## 2021-07-14 RX ORDER — FLUTICASONE PROPIONATE 50 MCG
1 SPRAY, SUSPENSION (ML) NASAL DAILY PRN
Status: DISCONTINUED | OUTPATIENT
Start: 2021-07-14 | End: 2021-07-16 | Stop reason: HOSPADM

## 2021-07-14 RX ORDER — LANTHANUM CARBONATE 500 MG/1
500 TABLET, CHEWABLE ORAL
Status: DISCONTINUED | OUTPATIENT
Start: 2021-07-14 | End: 2021-07-16 | Stop reason: HOSPADM

## 2021-07-14 RX ORDER — DEXTROSE MONOHYDRATE 25 G/50ML
25-50 INJECTION, SOLUTION INTRAVENOUS
Status: DISCONTINUED | OUTPATIENT
Start: 2021-07-14 | End: 2021-07-16 | Stop reason: HOSPADM

## 2021-07-14 RX ORDER — NICOTINE POLACRILEX 4 MG
15-30 LOZENGE BUCCAL
Status: DISCONTINUED | OUTPATIENT
Start: 2021-07-14 | End: 2021-07-16 | Stop reason: HOSPADM

## 2021-07-14 RX ORDER — ACETAMINOPHEN 500 MG
500 TABLET ORAL EVERY 8 HOURS PRN
Status: DISCONTINUED | OUTPATIENT
Start: 2021-07-14 | End: 2021-07-16 | Stop reason: HOSPADM

## 2021-07-14 RX ORDER — FAMOTIDINE 10 MG
10 TABLET ORAL DAILY
Status: DISCONTINUED | OUTPATIENT
Start: 2021-07-14 | End: 2021-07-16 | Stop reason: HOSPADM

## 2021-07-14 RX ORDER — HEPARIN SODIUM 1000 [USP'U]/ML
500 INJECTION, SOLUTION INTRAVENOUS; SUBCUTANEOUS CONTINUOUS
Status: DISCONTINUED | OUTPATIENT
Start: 2021-07-14 | End: 2021-07-14

## 2021-07-14 RX ORDER — ALBUMIN (HUMAN) 12.5 G/50ML
50 SOLUTION INTRAVENOUS
Status: DISCONTINUED | OUTPATIENT
Start: 2021-07-14 | End: 2021-07-14

## 2021-07-14 RX ORDER — LANTHANUM CARBONATE 500 MG/1
500 TABLET, CHEWABLE ORAL
COMMUNITY
End: 2022-06-15

## 2021-07-14 RX ORDER — LEVALBUTEROL TARTRATE 45 UG/1
2 AEROSOL, METERED ORAL EVERY 6 HOURS PRN
Status: DISCONTINUED | OUTPATIENT
Start: 2021-07-14 | End: 2021-07-16 | Stop reason: HOSPADM

## 2021-07-14 RX ORDER — LIDOCAINE 40 MG/G
CREAM TOPICAL
Status: DISCONTINUED | OUTPATIENT
Start: 2021-07-14 | End: 2021-07-16 | Stop reason: HOSPADM

## 2021-07-14 RX ORDER — DOXERCALCIFEROL 4 UG/2ML
4 INJECTION INTRAVENOUS
Status: COMPLETED | OUTPATIENT
Start: 2021-07-14 | End: 2021-07-14

## 2021-07-14 RX ADMIN — EPOETIN ALFA-EPBX 4000 UNITS: 4000 INJECTION, SOLUTION INTRAVENOUS; SUBCUTANEOUS at 19:03

## 2021-07-14 RX ADMIN — LABETALOL HCL 100 MG: 100 TABLET, FILM COATED ORAL at 22:27

## 2021-07-14 RX ADMIN — DOXERCALCIFEROL 4 MCG: 4 INJECTION, SOLUTION INTRAVENOUS at 19:03

## 2021-07-14 RX ADMIN — SODIUM CHLORIDE 300 ML: 9 INJECTION, SOLUTION INTRAVENOUS at 19:03

## 2021-07-14 RX ADMIN — INSULIN DETEMIR 35 UNITS: 100 INJECTION, SOLUTION SUBCUTANEOUS at 22:28

## 2021-07-14 RX ADMIN — SODIUM CHLORIDE 250 ML: 9 INJECTION, SOLUTION INTRAVENOUS at 19:03

## 2021-07-14 RX ADMIN — HEPARIN SODIUM 500 UNITS/HR: 1000 INJECTION INTRAVENOUS; SUBCUTANEOUS at 17:15

## 2021-07-14 ASSESSMENT — ENCOUNTER SYMPTOMS
PALPITATIONS: 1
SHORTNESS OF BREATH: 0

## 2021-07-14 ASSESSMENT — ACTIVITIES OF DAILY LIVING (ADL)
ADLS_ACUITY_SCORE: 14
DIFFICULTY_EATING/SWALLOWING: NO
DOING_ERRANDS_INDEPENDENTLY_DIFFICULTY: NO
CONCENTRATING,_REMEMBERING_OR_MAKING_DECISIONS_DIFFICULTY: NO
ADLS_ACUITY_SCORE: 14
DRESSING/BATHING_DIFFICULTY: NO
TOILETING_ISSUES: NO
WEAR_GLASSES_OR_BLIND: YES
VISION_MANAGEMENT: GLASSES
DIFFICULTY_COMMUNICATING: NO
WALKING_OR_CLIMBING_STAIRS_DIFFICULTY: NO
FALL_HISTORY_WITHIN_LAST_SIX_MONTHS: NO
HEARING_DIFFICULTY_OR_DEAF: NO

## 2021-07-14 NOTE — PHARMACY-ADMISSION MEDICATION HISTORY
Pharmacy Medication History  Admission medication history interview status for the 7/14/2021  admission is complete. See EPIC admission navigator for prior to admission medications     Location of Interview: Patient room  Medication history sources: Patient and Surescripts    Significant changes made to the medication list:  Flagged for removal by MD: Cirodex Opth, Novolog duplicate sliding scale    Added: Bevespi Inhaler-has not started yet, Lanthanum    Changed to prn: pepcid, flonase    Removed: Sevelamer        In the past week, patient estimated taking medication this percent of the time: 50-90% due to other-states she takes her meds when she remembers to    Additional medication history information:   none    Medication reconciliation completed by provider prior to medication history? No    Time spent in this activity: 30 min    Prior to Admission medications    Medication Sig Last Dose Taking? Auth Provider   acetaminophen (TYLENOL) 500 MG tablet Take 500 mg by mouth every 8 hours as needed for mild pain prn at prn Yes Unknown, Entered By History   famotidine (PEPCID) 10 MG tablet Take 1 tablet (10 mg) by mouth daily  Patient taking differently: Take 10 mg by mouth daily as needed  prn at prn Yes Gigi Martin MD   fluticasone (FLONASE) 50 MCG/ACT nasal spray Spray 1 spray into both nostrils daily  Patient taking differently: Spray 1 spray into both nostrils daily as needed  prn at prn Yes Gigi Martin MD   hypromellose-dextran (ARTIFICAL TEARS) 0.1-0.3 % ophthalmic solution Place 1 drop into both eyes daily as needed for dry eyes prn at prn Yes Unknown, Entered By History   insulin aspart (NOVOLOG FLEXPEN) 100 UNIT/ML pen Take 3 unit per 15 grams of carbohydrates three times daily with meals 7/13/2021 at Unknown time Yes Gigi Martin MD   insulin aspart (NOVOLOG PEN) 100 UNIT/ML pen Corrective scale = 2 units for every BG 50 over 150  In addition to carb counting insulin 7/13/2021 at  Unknown time Yes Unknown, Entered By History   labetalol (NORMODYNE) 100 MG tablet Take 1 tablet (100 mg) by mouth 2 times daily  Patient taking differently: Take 100 mg by mouth 2 times daily Patient has been decreasing dose and does not currently know what dose she is taking 7/13/2021 at Unknown time Yes Alverto Isaacs MD   lanthanum (FOSRENOL) 500 MG chewable tablet Take 500 mg by mouth 3 times daily (with meals) unknown Yes Unknown, Entered By History   levalbuterol (XOPENEX HFA) 45 MCG/ACT inhaler Inhale 2 puffs into the lungs every 6 hours as needed for shortness of breath / dyspnea or wheezing prn at prn Yes Gigi Martin MD   LEVEMIR FLEXTOUCH 100 UNIT/ML pen Inject 60 Units Subcutaneously At Bedtime  Patient taking differently: 35 Units  7/13/2021 at Unknown time Yes Gigi Martin MD   ACCU-CHEK CHING PLUS test strip USE TO TEST BLOOD SUGAR 4 TIMES PER DAY   Gigi Martin MD   ciprofloxacin-dexamethasone (CIPRODEX) 0.3-0.1 % otic suspension Place 4 drops into both ears 2 times daily  Patient not taking: Reported on 3/4/2021   Theresa Becker MD   Glycopyrrolate-Formoterol (BEVESPI AEROSPHERE) 9-4.8 MCG/ACT oral inhaler Inhale 2 puffs into the lungs 2 times daily has not started yet  Unknown, Entered By History   insulin pen needle (31G X 8 MM) 31G X 8 MM miscellaneous Use 4 pen needles daily or as directed.   Gigi Martin MD   NOVOLOG FLEXPEN 100 UNIT/ML soln INJECT 9-12 UNITS BEFORE BREAKFAST, LUNCH, AND DINNER PLUS A CORRECTIVE SCALE OF 2 UNITS FOR EVERY 50 OVER 150   Gigi Martin MD       The information provided in this note is only as accurate as the sources available at the time of update(s)

## 2021-07-14 NOTE — CONSULTS
Assessment and Plan:   ESRD: will run today for UF and clearance. Pt refuses to UF more than  1 L. Has FANG.   Plan 3 h, FANG 400 BFR, low dose heparin, EPO and hectorol on the run. 1L UF, 2K 33 HCO3 138 Na. Today is her usual day and she runs at the [facility].             Interval History:   Admitted with tachycardia. Now in NSR. Cards to evaluate.   Hypertension  DM  Anemia        Hx of R pleural effusion.           Review of Systems:   Minimal SOB at this point. No CP abd pain N or V.          Medications:   (Not in a hospital admission)    Current active medications and PTA medications reviewed, see medication list for details.            Physical Exam:   Vitals were reviewed  Patient Vitals for the past 24 hrs:   BP Pulse Resp SpO2   21 1430 (!) 138/94 70 16 95 %   21 1429 (!) 138/94 73 16 96 %   21 1300 (!) 156/76 81 21 97 %   21 1230 (!) 154/87 80 9 99 %   21 1200 (!) 153/80 84 20 96 %       Pulse:  [70-84] 70  Resp:  [9-21] 16  BP: (138-156)/(76-94) 138/94  SpO2:  [95 %-99 %] 95 %    Temperatures:  Current -  ; Max - No data recorded  Respiration range: Resp  Av.4  Min: 9  Max: 21  Pulse range: Pulse  Av.6  Min: 70  Max: 84  Blood pressure range: Systolic (24hrs), Av , Min:138 , Max:156   ; Diastolic (24hrs), Av, Min:76, Max:94    Pulse oximetry range: SpO2  Av.6 %  Min: 95 %  Max: 99 %    No intake/output data recorded.    No intake or output data in the 24 hours ending 21 1508    Alert and responsive, on NC O2  Lungs with bronchial BS and egophony in R base, L clear  Cor RRR with PACs , nl S1 S2 no M or rub  LE no edema  RUE: fistula with nice vein and + bruit       Wt Readings from Last 4 Encounters:   21 71.7 kg (158 lb)   21 72.8 kg (160 lb 9.6 oz)   10/14/20 73.3 kg (161 lb 9.6 oz)   20 74.8 kg (165 lb)          Data:          Lab Results   Component Value Date     2021     2020      01/12/2020     12/31/2019    Lab Results   Component Value Date    CHLORIDE 101 07/14/2021    CHLORIDE 107 01/13/2020    CHLORIDE 105 01/12/2020    CHLORIDE 98 12/31/2019    Lab Results   Component Value Date    BUN 40 07/14/2021    BUN 44 01/13/2020    BUN 38 01/12/2020    BUN 30 12/31/2019      Lab Results   Component Value Date    POTASSIUM 4.7 07/14/2021    POTASSIUM 4.1 10/05/2020    POTASSIUM 4.5 01/13/2020    POTASSIUM 4.1 01/12/2020    Lab Results   Component Value Date    CO2 27 07/14/2021    CO2 24 01/13/2020    CO2 27 01/12/2020    CO2 28 12/31/2019    Lab Results   Component Value Date    CR 5.19 07/14/2021    CR 5.97 01/13/2020    CR 5.55 01/12/2020    CR 3.24 12/31/2019        Recent Labs   Lab Test 07/14/21  1209 01/13/20  0642 01/12/20  1612   WBC 6.4 7.7 8.4   HGB 10.1* 8.4* 8.5*   HCT 31.7* 26.0* 26.5*   MCV 97 96 95   * 185 204     Recent Labs   Lab Test 12/31/19  0041 09/25/19  1453 11/24/17  1816   AST 16 12 17   ALT 14 21 16   ALKPHOS 88 98 94   BILITOTAL 0.6 0.6 0.6       No results for input(s): MAG in the last 47970 hours.  Recent Labs   Lab Test 09/25/19  1453 03/18/19  0935 03/16/19  0811   PHOS 5.8* 3.6 4.9*     Recent Labs   Lab Test 07/14/21  1209 01/13/20  0642 01/12/20  1612   KANWAL 8.7 8.2* 8.4*       Lab Results   Component Value Date    KANWAL 8.7 07/14/2021     Lab Results   Component Value Date    WBC 6.4 07/14/2021    HGB 10.1 (L) 07/14/2021    HCT 31.7 (L) 07/14/2021    MCV 97 07/14/2021     (L) 07/14/2021     Lab Results   Component Value Date     07/14/2021    POTASSIUM 4.7 07/14/2021    CHLORIDE 101 07/14/2021    CO2 27 07/14/2021     (H) 07/14/2021     Lab Results   Component Value Date    BUN 40 (H) 07/14/2021    CR 5.19 (H) 07/14/2021     Lab Results   Component Value Date    MAG 2.5 (H) 11/29/2012     Lab Results   Component Value Date    PHOS 5.8 (H) 09/25/2019       Creatinine   Date Value Ref Range Status   07/14/2021 5.19 (H) 0.52 -  1.04 mg/dL Final   01/13/2020 5.97 (H) 0.52 - 1.04 mg/dL Final   01/12/2020 5.55 (H) 0.52 - 1.04 mg/dL Final   12/31/2019 3.24 (H) 0.52 - 1.04 mg/dL Final   12/05/2019 5.19 (H) 0.52 - 1.04 mg/dL Final   09/25/2019 4.53 (H) 0.52 - 1.04 mg/dL Final   03/18/2019 3.43 (H) 0.52 - 1.04 mg/dL Final       Attestation:  I have reviewed today's vital signs, notes, medications, labs and imaging.  Seen during dialysis.      Rahul Hernandez MD

## 2021-07-14 NOTE — ED PROVIDER NOTES
History   Chief Complaint:  Palpitations    The history is provided by the patient.      Yissel Wetzel is a 61 year old female with history of ESRD on MWF dialysis, asthma on 2L oxygen at home, hypertension, hyperlipidemia, type II diabetes and iron deficiency anemia who presents from dialysis for evaluation of a racing heart palpitations and a heart rate of 190. The patient states that the palpitations started last night in the middle of the night when she got up to go to the bathroom and have been constant since. The patient notes she has had similar episodes over the past few days which were relieved after bowel movements. She describes past feeling as to having a lot of stomach gas. On EMS arrival they noted her heart rate to be 190, so she was given 12mg of adenosine and then another 12mg adenosine and the patient recovered to sinus rhythm. The paramedics were unable to capture the specific arrhythmia as they had run out of EKG paper. Her blood sugar was noted to be 132 by EMS. She notes no recent chest pain or shortness of breath. The patient denies any alcohol or drug use. Of note, the patient did not receive her dialysis today. She denies fever/chills, N/V/D, leg pain/swelling.    Her nephrologist is Dr. Robin.       Cardiac Risk Factors:  SEX:              female  Tobacco:              Negative, quit 2-3 years ago  Obesity:   Positive  Hypertension:       Positive  Diabetes:             Positive  Lipids:                Positive  Personal history:  Negative  Family History:       Negative        Review of Systems   Respiratory: Negative for shortness of breath.    Cardiovascular: Positive for palpitations.   All other systems reviewed and are negative.    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 Quit smoking 20 years ago.      Physical Exam     Patient Vitals for the past 24 hrs:   BP Pulse Resp SpO2   21 1300 (!) 156/76 81 21 97 %   21 1230 (!) 154/87 80 9 99 %   21 1200 (!) 153/80 84 20 96 %       Physical Exam  Nursing note and vitals reviewed.  Constitutional:  Appears well-developed and well-nourished. Dialysis fistula in right arm.   HENT:   Head:    Atraumatic.   Mouth/Throat:   Oropharynx is clear and moist. No oropharyngeal exudate.   Eyes:    Pupils are equal, round, and reactive to light.   Neck:    Normal range of motion. Neck supple.      No tracheal deviation present. No thyromegaly present.   Cardiovascular:  Normal rate, regular rhythm, no murmur   Pulmonary/Chest: Breath sounds are clear and equal without wheezes or crackles.  Abdominal:   Soft. Bowel sounds are normal. Exhibits no distension and      no mass. There is no tenderness.      There is no rebound and no guarding.   Musculoskeletal:  Exhibits no edema.   Lymphadenopathy:  No cervical adenopathy.   Neurological:   Alert and oriented to person, place, and time.   Skin:    Skin is warm and dry. No rash noted. No pallor.     Emergency Department Course   ECG  ECG taken at 1223, ECG  read at 1226  Normal sinus rhythm with sinus arrhythmia.  ST & T wave abnormality, consider inferior ischemia  ST & T wave abnormality, consider anterolateral ischemia.  Abnormal ECG.   Rate 77 bpm. MN interval 160 ms. QRS duration 84 ms. QT/QTc 398/450 ms. P-R-T axis 76 75 -60.      Imaging:  XR Chest Port 1 View:  Small effusion and associated compressive atelectasis and/or infiltrate in the right unchanged from previous. Left lung clear.  As per radiology.     Laboratory:   CBC: WBC: 6.4, HBG: 10.1 (low), PLT: 102 (low)    BMP: Urea Nitrogen: 40 (high), Glucose: 192 (high), Creatinine: 5.19 (high), GFR: 8 (low), o/w WNL     Troponin (1209): 0.144 (critically high)    TSH with Free Reflex: 3.86    Asymptomatic COVID-19 Virus (Coronavirus) by PCR Nasopharyngeal: Pending    Emergency Department Course:    Reviewed:  I reviewed nursing notes, vitals, past medical history and care everywhere    Assessments:  1207 I obtained history and examined the patient as noted above.   1310 I rechecked the patient and explained findings.     Consults:   1321 I consulted with Dr. Harden, hospitalist, regarding the patient's history and presentation here in the emergency department who accepted the patient for admission.  1327 I consulted with Dr. Celis, nephrologist, regarding the patient's history and presentation here in the emergency department who will dialyze the patient.     Disposition:  The patient was admitted to the hospital under the care of Dr. Harden.     Impression & Plan     Medical Decision Making:  I found this patient to have a tachy arrhythmia with a heart rate of 190 which is likely due to SVT. I discussed the possibility of atrial fibrillation in the differential diagnosis also. The paramedics were unable to capture the specific tachy arrhythmia.  She has an elevated troponin which is likely due to cardiac strain as well as her chronic renal failure. She is not having any chest pain and I felt that acute  myocardial infarction was unlikely. After discussion with the hospitalist, Dr. Harden, we will trend her troponins and heparin was not found to be indicated at this time. She is allergic to aspirin so this was not administered here. She was admitted to the [facility] for further cardiac workup and I consulted nephrology, Dr. Celis, who will perform dialysis on the patient. She does not appear in acute congestive heart failure at this time and I did not feel that there was concern for pulmonary embolism.    Covid-19  Yissel Wetzel was evaluated during a global COVID-19 pandemic, which necessitated consideration that the patient might be at risk for infection with the SARS-CoV-2 virus that causes COVID-19.   Applicable protocols for evaluation were followed during the patient's care.   COVID-19 was considered as part of the patient's evaluation. The plan for testing is:  a test was obtained during this visit.    Diagnosis:    ICD-10-CM    1. SVT (supraventricular tachycardia) (H)  I47.1    2. Elevated troponin  R77.8        Scribe Disclosure:  I, Gera Borges, am serving as a scribe at 12:07 PM on 7/14/2021 to document services personally performed by Esperanza Winkler MD based on my observations and the provider's statements to me.          Esperanza Winkler MD  07/14/21 2014

## 2021-07-14 NOTE — PROGRESS NOTES
Potassium   Date Value Ref Range Status   07/14/2021 4.7 3.4 - 5.3 mmol/L Final   10/05/2020 4.1 3.5 - 5.5 mEq/L Final     Hemoglobin   Date Value Ref Range Status   07/14/2021 10.1 (L) 11.7 - 15.7 g/dL Final   01/13/2020 8.4 (L) 11.7 - 15.7 g/dL Final     Creatinine   Date Value Ref Range Status   07/14/2021 5.19 (H) 0.52 - 1.04 mg/dL Final   01/13/2020 5.97 (H) 0.52 - 1.04 mg/dL Final     Urea Nitrogen   Date Value Ref Range Status   07/14/2021 40 (H) 7 - 30 mg/dL Final   01/13/2020 44 (H) 7 - 30 mg/dL Final     Sodium   Date Value Ref Range Status   07/14/2021 134 133 - 144 mmol/L Final   01/13/2020 139 133 - 144 mmol/L Final     INR   Date Value Ref Range Status   01/12/2020 1.23 (H) 0.86 - 1.14 Final       DIALYSIS PROCEDURE NOTE  Hepatitis status of previous patient on machine log was checked and verified ok to use with this patients hepatitis status.  Patient dialyzed for 3 hrs. on a K2 bath with a net fluid removal of  1L.  A BFR of 450 ml/min was obtained via a RAVF using 15gauge needles.      The treatment plan was discussed with Dr. Hernandez during the treatment.    Total heparin received during the treatment: 1500 units.   Needle cannulation sites held x 12 min.     Meds  given: epogen, hectorol, and heparin - see MAR   Complications: none      Person educated: patient. Knowledge base substantial. Barriers to learning: none. Educated on diet via verbal mode. Patient/family verbalized understanding. Pt prefers verbal education style.     ICEBOAT? Timeout performed pre-treatment  I: Patient was identified using 2 identifiers  C:  Consent Signed Yes  E: Equipment preventative maintenance is current and dialysis delivery system OK to use  B: Hepatitis B Surface Antigen: NEGATIVE; Draw Date: 5/3/2021      Hepatitis B Surface Antibody: IMMUNE; Draw Date: 5/3/2021  O: Dialysis orders present and complete prior to treatment  A: Vascular access verified and assessed prior to treatment  T: Treatment was performed at  a clinically appropriate time  ?: Patient was allowed to ask questions and address concerns prior to treatment  See flowsheet in EPIC for further details and post assessment.  Machine water alarm in place and functioning. Transducer pods intact and checked every 15min.   Pt returned via bed.  Chlorine/Chloramine water system checked every 4 hours.  Outpatient Dialysis at [facility] on MWF    Please remove patient dressing on AVF and AVG needle sites 24 hours after dialysis. If leaking occurs please apply a Band-Aid.     Zuleima Wood RN

## 2021-07-14 NOTE — ED TRIAGE NOTES
Went for routine dialysis today and found to have a heart rate of 190.  Patient then reports fluttering in heart chest over the past couple of days usually relieved with having a bowel movement.  Medics found a heart rate of 190, unable to obtain a paper EKG, gave Adenosine 12mg x 2 and converted to SR 80s.  Patient did start dialysis.  Patient endorses no complaints or chest symptoms at this time.

## 2021-07-14 NOTE — TELEPHONE ENCOUNTER
Reviewed [facility] note 06/02/2021 scanned into Epic which states patient is on 1.5 to 2L oxygen for the last 4 -5 months. Called [facility] nurse line and asked if oxygen is short term treatment or long term. Added in my message that continuous long term oxygen is a contraindication for kidney transplant.

## 2021-07-14 NOTE — TELEPHONE ENCOUNTER
Per transplant coordinator at [facility], they do not need patient to see vascular.    Contacted [facility].  Spoke with Marcelo RN, he stated patient's fistula is working well, no issues.  He stated he will speak with pt today and update her that she does not need to schedule an appt with [facility].      No further action needed.    Shantel Lopez, MARYN, RN-[facility] [place]

## 2021-07-14 NOTE — ED NOTES
DATE:  7/14/2021   TIME OF RECEIPT FROM LAB:  1320  LAB TEST:  troponin  LAB VALUE:  0.144  RESULTS GIVEN WITH READ-BACK TO (PROVIDER):  Esperanza Winkler MD  TIME LAB VALUE REPORTED TO PROVIDER:   1711

## 2021-07-14 NOTE — TELEPHONE ENCOUNTER
Failed protocol.  please route to  team if patient needs an appointment     Lina NAIDURN BSN  [facility]  [phone]

## 2021-07-14 NOTE — H&P
[facility]    History and Physical - Hospitalist Service       Date of Admission:  7/14/2021    Assessment & Plan      Yissel Wetzel is a 61 year old female with past medical history significant for end-stage renal disease on hemodialysis, chronic hypoxic respiratory failure, asthma, hypertension, hyperlipidemia, chronic anemia, chronic right-sided pleural effusion.  Admitted on 7/14/2021 with tachycardia.      Tachycardia, likely SVT  Troponin elevation, likely demand ischemia   The patient presents to the emergency department today from dialysis with a heart rate documented as high as 190.  Fortunately the rhythm was not captured on EKG or telemetry strip.  At the time she arrived in the emergency department, was in normal sinus rhythm with a heart rate of 81.  Does have some new T wave inversions on the inferior and lateral leads.  Troponin is elevated at 0.144.  This is likely demand ischemia in the setting of significant tachycardia and end-stage renal disease.  The patient is currently chest pain-free.   *Of note pt does take labetalol - it is unclear how much she is taking. She reports that she has 300mg tablets at home, but her current prescription is 100mg BID. She reports that she is taking about 1/4 of a tablet daily because it makes her blood pressure too low (unclear if that is 1/4 of 300mg tablet or 100mg tablet)   -Cardiac monitoring  -Trend troponin, serial EKGs  -TTE tomorrow  -Consult cardiology  -Continue Labetalol 100mg BID, monitor BP     ADDENDUM: her repeat troponin did increase to 0.460. She remains CP free. Her Repeat EKG is stable. She then had another trop draw about an hour later for unclear reasons which is 0.446. Discussed starting anticoagulation for possible NSTEMI. The patient expressed concern about bleeding and has had some GI bleeding in the past. We discussed that if then next troponin is still increasing then we would start heparin.     End-stage  renal disease on hemodialysis  Patient dialyzes Monday, Wednesday, Friday via right upper extremity AV fistula. The patient was unable to complete her dialysis run today due to tachycardia.  She has no emergent need for dialysis at this time.  -Consult nephrology  -Continue PTA Lanthanum     Chronic hypoxic respiratory failure  Asthma/COPD  Right-sided pleural effusion, chronic, transudative  Former tobacco use  Pt uses 2L of oxygen at baseline. Her pleural effusion is chronic and appears stable from prior imaging. No signs or symptoms of infection. Respiratory status stable.  - Continue supplemental O2 and PTA inhalers     Hypertension  Hyperlipidemia  PTA medications include Labetolol (again, it is unclear what dose she is taking). BPs are currently stable. Pt has intolerance to statins.     Type 2 diabetes   PTA insulin regimen includes Levemir 35 units at bedtime, Aspart 3units/15g of carbohydrates with correction scale of 2 units per 50/150.   * of note pt is prescribed 60 units of Levemir, but she has reduced the dose to 35 on her own due to low morning blood sugars.   - Continue 35 units of Levemir with MDSSI     Chronic normocytic anemia  Hgb is stable at 10.1     Thrombocytopenia: Plts = 102 10e3/uL (Ref range: 150 - 450 10e3/uL) on admission, will monitor for bleeding    Diet: Consistent carb diet   DVT Prophylaxis: Pneumatic Compression Devices  Weller Catheter: Not present  Central Lines: None  Code Status:Full code     Disposition Plan   Expected discharge: 1-2 days recommended to prior living arrangement once cardiac evaluation complete .     The patient's care was discussed with the Patient.    Mildred Harden MD  [facility]  Securely message with the Vocera Web Console (learn more here)  Text page via [employer] Paging/Directory      ______________________________________________________________________    Chief Complaint   Tachycardia  History is obtained from the  patient    History of Present Illness   Yissel Wetzel is a 61 year old female with past medical history significant for ESRD on hemodialysis M [facility], asthma on chronic supplemental oxygen, hypertension, hyperlipidemia, type 2 diabetes who presents to the emergency department from dialysis with elevated heart rates into the 190s.  The patient notes that she has been having intermittent episodes of palpitations over the last couple of days. She has actually noticed this quite awhile ago, but they have been very brief and infrequent. Over the last two days it has occurred more frequently at has lasted longer than usual. Her symptoms have been resolved at times after having bowel movements. While at dialysis her heart rates were measured in the 190s. EMS was called she was given 12 mg of adenosine x2.  Unfortunately, the paramedics were unable to capture the initial tachycardic rhythm. By the time she arrived to the emergency department she was in normal sinus rhythm.  She did not complete her dialysis run today.    She currently feels well. She is not having any palpitation or chest pain currently. She is not more short of breath than usual.     Review of Systems    The 10 point Review of Systems is negative other than noted in the HPI or here.    Past Medical History    I have reviewed this patient's medical history and updated it with pertinent information if needed.   Past Medical History:   Diagnosis Date     Allergic rhinitis, cause unspecified      Anemia in chronic kidney disease      Anemia of chronic disease      Bleeding pseudoaneurysm of right brachiocephalic arteriovenous fistula, initial encounter (H) 12/6/2019     End stage renal failure on dialysis (H)      Esophagitis, unspecified      Former tobacco use      HEMORRHAGIC GASTROPATHY      History of blood transfusion     Purmela     Iron deficiency anemia, unspecified      Mild persistent asthma      Obesity      Other and unspecified hyperlipidemia       Pneumonia      Pulmonary hypertension (H)     per 2012 echo mod.to severe pulmonary hypertension     Tobacco use disorder dc ed      Type 2 diabetes mellitus (H)     on Insulin- managed by PCP     Unspecified essential hypertension        Past Surgical History   I have reviewed this patient's surgical history and updated it with pertinent information if needed.  Past Surgical History:   Procedure Laterality Date     ABDOMINOPLASTY  pt unsure when    abdominoplasty-      SECTION  ,     x 2     COLONOSCOPY      [facility]     COLONOSCOPY N/A 2021    Procedure: COLONOSCOPY, WITH POLYPECTOMY AND BIOPSY;  Surgeon: Lincoln Farrar MD;  Location:  GI     CREATE FISTULA ARTERIOVENOUS UPPER EXTREMITY Right 2018    Procedure: RIGHT BRACHIAL TO BASILIC ARTERIOVENOUS FISTULA CREATION;  Surgeon: Terry Vizcaino MD;  Location: [facility]     ENT SURGERY  as a child    tonsillectomy     ESOPHAGOSCOPY, GASTROSCOPY, DUODENOSCOPY (EGD), COMBINED N/A 2021    Procedure: ESOPHAGOGASTRODUODENOSCOPY, WITH BIOPSY;  Surgeon: Lincoln Farrar MD;  Location:  GI     EYE SURGERY Left     injections- eye     HYSTERECTOMY  approx     partial hysterectomy-reason bleeding      IR CVC TUNNEL PLACEMENT > 5 YRS OF AGE  2019     IR CVC TUNNEL REMOVAL RIGHT  2020     IR DIALYSIS FISTULOGRAM RIGHT  2019       Social History   I have reviewed this patient's social history and updated it with pertinent information if needed.  Social History     Tobacco Use     Smoking status: Former Smoker     Packs/day: 1.00     Years: 22.00     Pack years: 22.00     Types: Cigarettes     Quit date: 10/12/1999     Years since quittin.7     Smokeless tobacco: Never Used   Substance Use Topics     Alcohol use: No     Alcohol/week: 0.0 standard drinks     Drug use: No       Family History   I have reviewed this patient's family history and updated it with pertinent  information if needed.  Family History   Problem Relation Age of Onset     Arrhythmia Mother      Hypertension Mother      Pancreatic Cancer Father      Diabetes Brother      Asthma Sister      LUNG DISEASE Sister      Diabetes Daughter      Cirrhosis Sister        Prior to Admission Medications   Prior to Admission Medications   Prescriptions Last Dose Informant Patient Reported? Taking?   ACCU-CHEK CHING PLUS test strip   No No   Sig: USE TO TEST BLOOD SUGAR 4 TIMES PER DAY   LEVEMIR FLEXTOUCH 100 UNIT/ML pen   No No   Sig: Inject 60 Units Subcutaneously At Bedtime   NOVOLOG FLEXPEN 100 UNIT/ML soln   No No   Sig: INJECT 9-12 UNITS BEFORE BREAKFAST, LUNCH, AND DINNER PLUS A CORRECTIVE SCALE OF 2 UNITS FOR EVERY 50 OVER 150   acetaminophen (TYLENOL) 500 MG tablet  Self Yes No   Sig: Take 500 mg by mouth every 8 hours as needed for mild pain   ciprofloxacin-dexamethasone (CIPRODEX) 0.3-0.1 % otic suspension   No No   Sig: Place 4 drops into both ears 2 times daily   Patient not taking: Reported on 3/4/2021   famotidine (PEPCID) 10 MG tablet  Self No No   Sig: Take 1 tablet (10 mg) by mouth daily   Patient not taking: Reported on 3/4/2021   fluticasone (FLONASE) 50 MCG/ACT nasal spray   No No   Sig: Spray 1 spray into both nostrils daily   hypromellose-dextran (ARTIFICAL TEARS) 0.1-0.3 % ophthalmic solution  Self Yes No   Sig: Place 1 drop into both eyes daily as needed for dry eyes   insulin aspart (NOVOLOG FLEXPEN) 100 UNIT/ML pen   Yes No   Sig: Take 2 unit per 15 grams of carbohydrates three times daily with meals   insulin aspart (NOVOLOG PEN) 100 UNIT/ML pen  Self Yes No   Sig: Corrective scale = 2 units for every BG 50 over 150  In addition to carb counting insulin   insulin pen needle (31G X 8 MM) 31G X 8 MM miscellaneous   No No   Sig: Use 4 pen needles daily or as directed.   labetalol (NORMODYNE) 100 MG tablet   No No   Sig: Take 1 tablet (100 mg) by mouth 2 times daily   levalbuterol (XOPENEX HFA) 45  MCG/ACT inhaler   No No   Sig: Inhale 2 puffs into the lungs every 6 hours as needed for shortness of breath / dyspnea or wheezing   sevelamer (RENVELA) 800 MG tablet  Self Yes No   Sig: Take 1 tablet (800 mg) by mouth 3 times daily (with meals)      Facility-Administered Medications: None     Allergies   Allergies   Allergen Reactions     Albuterol      shakiness     Aspirin      gi     Byetta [Exenatide]      gi     Clonidine      constipation     Codeine      vomiting     Ezetimibe      muscle symptoms     Hydralazine      headaches     Lisinopril      hyperkalemia     Metformin Hydrochloride      vomiting     Pravachol [Pravastatin Sodium]      muscle pains     Simvastatin      myalgias     Troglitazone        Physical Exam   Vital Signs:     BP: (!) 156/76 Pulse: 81   Resp: 21 SpO2: 97 % O2 Device: Nasal cannula Oxygen Delivery: 2 LPM  Weight: 0 lbs 0 oz    Constitutional: Awake, alert, cooperative, no apparent distress.  Eyes: Conjunctiva and pupils examined and normal.  HEENT: Moist mucous membranes, normal dentition.  Respiratory: Clear to auscultation bilaterally, no crackles or wheezing.  Cardiovascular: Regular rate and rhythm, normal S1 and S2, and no murmur noted. RUE AVF   Skin: No rashes, no cyanosis, no edema.  Musculoskeletal: No joint swelling, erythema or tenderness.  Neurologic: Cranial nerves 2-12 intact, normal strength and sensation.  Psychiatric: Alert, oriented to person, place and time, no obvious anxiety or depression.      Data   Data reviewed today: I reviewed all medications, new labs and imaging results over the last 24 hours. I personally reviewed the EKG tracing showing NSR with T wave inversions.    Recent Labs   Lab 07/14/21  1209   WBC 6.4   HGB 10.1*   MCV 97   *      POTASSIUM 4.7   CHLORIDE 101   CO2 27   BUN 40*   CR 5.19*   ANIONGAP 6   KANWAL 8.7   *   TROPONIN 0.144*     Recent Results (from the past 24 hour(s))   XR Chest Port 1 View    Narrative    CHEST  ONE VIEW July 14, 2021 1:20 PM     HISTORY: Shortness of breath.    COMPARISON: March 4, 2021.      Impression    IMPRESSION: Small effusion and associated compressive atelectasis  and/or infiltrate in the right unchanged from previous. Left lung  clear.

## 2021-07-14 NOTE — ED NOTES
"[facility]  ED Nurse Handoff Report    ED Chief complaint: Tachycardia (at dialysis, noted to have a heart rate of 190, patient reported \"racing heart\".  patient endorses similar episodes recently relieved by having a bowel movemnet)      ED Diagnosis:   Final diagnoses:   SVT (supraventricular tachycardia) (H)   Elevated troponin       Code Status: Hospitalist to address    Allergies:   Allergies   Allergen Reactions     Albuterol      shakiness     Aspirin      gi     Byetta [Exenatide]      gi     Clonidine      constipation     Codeine      vomiting     Ezetimibe      muscle symptoms     Hydralazine      headaches     Lisinopril      hyperkalemia     Metformin Hydrochloride      vomiting     Pravachol [Pravastatin Sodium]      muscle pains     Simvastatin      myalgias     Troglitazone        Patient Story: Went for routine dialysis today and found to have a heart rate of 190.  Patient then reports fluttering in heart chest over the past couple of days usually relieved with having a bowel movement.  Medics found a heart rate of 190, unable to obtain a paper EKG, gave Adenosine 12mg x 2 and converted to SR 80s.  Patient did start dialysis.  Patient endorses no complaints or chest symptoms at this time.       Focused Assessment:  A&Ox4, vital signs stable. On 2L O2 via NC. Skin intact. Saline lock left AC started by EMS. Denies pain. Up to bedside commode. NSR    Treatments and/or interventions provided: Labs. Continuous cardiac monitoring  Patient's response to treatments and/or interventions: stable    To be done/followed up on inpatient unit:  admission orders    Does this patient have any cognitive concerns?: none    Activity level - Baseline/Home:  none  Activity Level - Current:   none    Patient's Preferred language: English   Needed?: No    Isolation: None  Infection: Not Applicable  Patient tested for COVID 19 prior to admission: YES  Bariatric?: No    Vital Signs:   Vitals:    " 07/14/21 1200 07/14/21 1230 07/14/21 1300   BP: (!) 153/80 (!) 154/87 (!) 156/76   Pulse: 84 80 81   Resp: 20 9 21   SpO2: 96% 99% 97%       Cardiac Rhythm:Cardiac Rhythm: Normal sinus rhythm    Was the PSS-3 completed:   Yes  What interventions are required if any?               Family Comments: none  OBS brochure/video discussed/provided to patient/family: N/A              Name of person given brochure if not patient: n/a              Relationship to patient: n/2    For the majority of the shift this patient's behavior was Green.   Behavioral interventions performed were none.    ED NURSE PHONE NUMBER: *[phone]

## 2021-07-14 NOTE — ED NOTES
Bed: ED08  Expected date:   Expected time:   Means of arrival:   Comments:  [facility] - 513 - 61 F SVT eta 1141

## 2021-07-15 ENCOUNTER — APPOINTMENT (OUTPATIENT)
Dept: CARDIOLOGY | Facility: CLINIC | Age: 61
End: 2021-07-15
Attending: STUDENT IN AN ORGANIZED HEALTH CARE EDUCATION/TRAINING PROGRAM
Payer: MEDICARE

## 2021-07-15 ENCOUNTER — APPOINTMENT (OUTPATIENT)
Dept: CT IMAGING | Facility: CLINIC | Age: 61
End: 2021-07-15
Attending: INTERNAL MEDICINE
Payer: MEDICARE

## 2021-07-15 ENCOUNTER — APPOINTMENT (OUTPATIENT)
Dept: ULTRASOUND IMAGING | Facility: CLINIC | Age: 61
End: 2021-07-15
Attending: HOSPITALIST
Payer: MEDICARE

## 2021-07-15 LAB
ANION GAP SERPL CALCULATED.3IONS-SCNC: 5 MMOL/L (ref 3–14)
BUN SERPL-MCNC: 23 MG/DL (ref 7–30)
CALCIUM SERPL-MCNC: 8.5 MG/DL (ref 8.5–10.1)
CHLORIDE BLD-SCNC: 100 MMOL/L (ref 94–109)
CO2 SERPL-SCNC: 29 MMOL/L (ref 20–32)
CREAT SERPL-MCNC: 3.55 MG/DL (ref 0.52–1.04)
FOLATE SERPL-MCNC: 9.4 NG/ML
GFR SERPL CREATININE-BSD FRML MDRD: 13 ML/MIN/1.73M2
GLUCOSE BLD-MCNC: 139 MG/DL (ref 70–99)
GLUCOSE BLDC GLUCOMTR-MCNC: 117 MG/DL (ref 70–99)
GLUCOSE BLDC GLUCOMTR-MCNC: 120 MG/DL (ref 70–99)
GLUCOSE BLDC GLUCOMTR-MCNC: 135 MG/DL (ref 70–99)
GLUCOSE BLDC GLUCOMTR-MCNC: 156 MG/DL (ref 70–99)
GLUCOSE BLDC GLUCOMTR-MCNC: 169 MG/DL (ref 70–99)
HBV SURFACE AB SERPL IA-ACNC: 15.71 M[IU]/ML
HBV SURFACE AG SERPL QL IA: NONREACTIVE
LVEF ECHO: NORMAL
POTASSIUM BLD-SCNC: 4.1 MMOL/L (ref 3.4–5.3)
SODIUM SERPL-SCNC: 134 MMOL/L (ref 133–144)
TROPONIN I SERPL-MCNC: 0.44 UG/L (ref 0–0.04)
VIT B12 SERPL-MCNC: 821 PG/ML (ref 193–986)

## 2021-07-15 PROCEDURE — 93306 TTE W/DOPPLER COMPLETE: CPT | Mod: 26 | Performed by: INTERNAL MEDICINE

## 2021-07-15 PROCEDURE — 71275 CT ANGIOGRAPHY CHEST: CPT | Mod: ME

## 2021-07-15 PROCEDURE — 99226 PR SUBSEQUENT OBSERVATION CARE,LEVEL III: CPT | Performed by: HOSPITALIST

## 2021-07-15 PROCEDURE — 80048 BASIC METABOLIC PNL TOTAL CA: CPT | Performed by: STUDENT IN AN ORGANIZED HEALTH CARE EDUCATION/TRAINING PROGRAM

## 2021-07-15 PROCEDURE — 250N000012 HC RX MED GY IP 250 OP 636 PS 637: Performed by: STUDENT IN AN ORGANIZED HEALTH CARE EDUCATION/TRAINING PROGRAM

## 2021-07-15 PROCEDURE — 99207 PR CDG-CODE CATEGORY CHANGED: CPT | Performed by: HOSPITALIST

## 2021-07-15 PROCEDURE — 250N000011 HC RX IP 250 OP 636: Performed by: STUDENT IN AN ORGANIZED HEALTH CARE EDUCATION/TRAINING PROGRAM

## 2021-07-15 PROCEDURE — 82746 ASSAY OF FOLIC ACID SERUM: CPT | Performed by: HOSPITALIST

## 2021-07-15 PROCEDURE — 99232 SBSQ HOSP IP/OBS MODERATE 35: CPT | Performed by: INTERNAL MEDICINE

## 2021-07-15 PROCEDURE — 250N000009 HC RX 250: Performed by: STUDENT IN AN ORGANIZED HEALTH CARE EDUCATION/TRAINING PROGRAM

## 2021-07-15 PROCEDURE — 93306 TTE W/DOPPLER COMPLETE: CPT

## 2021-07-15 PROCEDURE — 250N000013 HC RX MED GY IP 250 OP 250 PS 637: Performed by: INTERNAL MEDICINE

## 2021-07-15 PROCEDURE — 96372 THER/PROPH/DIAG INJ SC/IM: CPT | Performed by: STUDENT IN AN ORGANIZED HEALTH CARE EDUCATION/TRAINING PROGRAM

## 2021-07-15 PROCEDURE — 93970 EXTREMITY STUDY: CPT

## 2021-07-15 PROCEDURE — 82607 VITAMIN B-12: CPT | Performed by: HOSPITALIST

## 2021-07-15 PROCEDURE — 36415 COLL VENOUS BLD VENIPUNCTURE: CPT | Performed by: STUDENT IN AN ORGANIZED HEALTH CARE EDUCATION/TRAINING PROGRAM

## 2021-07-15 PROCEDURE — 250N000013 HC RX MED GY IP 250 OP 250 PS 637: Performed by: STUDENT IN AN ORGANIZED HEALTH CARE EDUCATION/TRAINING PROGRAM

## 2021-07-15 PROCEDURE — G0378 HOSPITAL OBSERVATION PER HR: HCPCS

## 2021-07-15 PROCEDURE — 99204 OFFICE O/P NEW MOD 45 MIN: CPT | Performed by: INTERNAL MEDICINE

## 2021-07-15 RX ORDER — CARVEDILOL 12.5 MG/1
25 TABLET ORAL 2 TIMES DAILY WITH MEALS
Status: DISCONTINUED | OUTPATIENT
Start: 2021-07-15 | End: 2021-07-15

## 2021-07-15 RX ORDER — CARVEDILOL 12.5 MG/1
12.5 TABLET ORAL 2 TIMES DAILY WITH MEALS
Status: DISCONTINUED | OUTPATIENT
Start: 2021-07-15 | End: 2021-07-16

## 2021-07-15 RX ORDER — IOPAMIDOL 755 MG/ML
63 INJECTION, SOLUTION INTRAVASCULAR ONCE
Status: COMPLETED | OUTPATIENT
Start: 2021-07-15 | End: 2021-07-15

## 2021-07-15 RX ADMIN — SODIUM CHLORIDE 89 ML: 9 INJECTION, SOLUTION INTRAVENOUS at 13:26

## 2021-07-15 RX ADMIN — LABETALOL HCL 100 MG: 100 TABLET, FILM COATED ORAL at 10:57

## 2021-07-15 RX ADMIN — INSULIN DETEMIR 30 UNITS: 100 INJECTION, SOLUTION SUBCUTANEOUS at 21:54

## 2021-07-15 RX ADMIN — INSULIN DETEMIR 30 UNITS: 100 INJECTION, SOLUTION SUBCUTANEOUS at 00:20

## 2021-07-15 RX ADMIN — ACETAMINOPHEN 500 MG: 500 TABLET, FILM COATED ORAL at 00:24

## 2021-07-15 RX ADMIN — CARVEDILOL 12.5 MG: 12.5 TABLET, FILM COATED ORAL at 17:08

## 2021-07-15 RX ADMIN — INSULIN ASPART 9 UNITS: 100 INJECTION, SOLUTION INTRAVENOUS; SUBCUTANEOUS at 17:16

## 2021-07-15 RX ADMIN — LANTHANUM CARBONATE 500 MG: 500 TABLET, CHEWABLE ORAL at 15:40

## 2021-07-15 RX ADMIN — IOPAMIDOL 63 ML: 755 INJECTION, SOLUTION INTRAVENOUS at 13:26

## 2021-07-15 RX ADMIN — FAMOTIDINE 10 MG: 10 TABLET ORAL at 10:56

## 2021-07-15 RX ADMIN — INSULIN ASPART 1 UNITS: 100 INJECTION, SOLUTION INTRAVENOUS; SUBCUTANEOUS at 17:10

## 2021-07-15 RX ADMIN — LANTHANUM CARBONATE 500 MG: 500 TABLET, CHEWABLE ORAL at 17:09

## 2021-07-15 ASSESSMENT — ACTIVITIES OF DAILY LIVING (ADL)
ADLS_ACUITY_SCORE: 13
ADLS_ACUITY_SCORE: 14
ADLS_ACUITY_SCORE: 14
ADLS_ACUITY_SCORE: 13

## 2021-07-15 NOTE — PLAN OF CARE
[facility]    Patient Information  Name: Yissel Wetzel  Age: 61 year old    Assessment  Orientation/Neuro: A&O x 4  Cardiac/Tele: SR - echo RV shows severe dilation and pressure.   Resp: Dyspnea on exertion, wears 2L O2 at baseline  GI/: GI - BM today WDL,  - hemodialysis, but still makes small amount of urine.   Mobility: SBA  Pain: Denies pain and discomfort   Diet: Orders Placed This Encounter      Consistent Carb 60 grams CHO per Meal Diet  Procedures/Imaging: US of BLE completed today - neg for DVT, Chest CT completed - neg for PE    Vital Signs  B/P: 129/62, T: 97.9, P: 63, R: 18, 95% on 2L     Plan  Concerns/abnormal assessment: Concerned for PE due to RV changes. US neg for DVT, CT neg for PE    Pt declined taking many pills today due to not eating initially in the morning. Pt educated on the importance of taking pills consistently, but she needs reinforcement. Pt declined inhaler. Pt declined bed alarm as well. Pt is steady on feet and A&O.     Plan/Other:  Pt to discharge with 14-day Zio patch, right heart cath likely in the coming days    Dawna Thomas RN

## 2021-07-15 NOTE — PROGRESS NOTES
[facility]  Medicine Progress Note - Hospitalist Service       Date of Admission:  7/14/2021    Assessment & Plan              Yissel Wetzel is a 61 year old female with past medical history significant for end-stage renal disease on hemodialysis, chronic hypoxic respiratory failure, asthma, hypertension, hyperlipidemia, chronic anemia, chronic right-sided pleural effusion. Admitted on 7/14/2021 with tachycardia.      Tachycardia, likely SVT  Troponin elevation, likely demand ischemia   The patient presented to the ER from dialysis with a HR as high as 190. Unfortunately the rhythm was not captured on EKG or telemetry strip.  At the time she arrived in the ER, was in normal sinus rhythm with a heart rate of 81. Noted some new T wave inversions on the inferior and lateral leads.  Troponin is elevated at 0.144, likely demand ischemia in the setting of significant tachycardia and ESRD.  The patient remains chest pain-free.   *Of note pt does take labetalol - it is unclear how much she is taking. She reports that she has 300mg tablets at home, but her current prescription is 100mg BID. She reports that she is taking about 1/4 of a tablet daily because it makes her blood pressure too low (unclear if that is 1/4 of 300mg tablet or 100mg tablet)   -Cardiac monitoring  -Troponin remains mildly elevated   -TTE showed normal LVEF 60-65%, severely dilated RV, decreased RV systolic function, moderate to severe TR, elevated RV SP with severe pulmonary HTN. Concern for PE, will get Vasc US in leg to start with. If US positive, will treat as PE as management won't change with PE study. if negative, then PE study (ok for contrast if needed, discussed with nephro).  -Cardiology consult pending  -Continue Labetalol 100mg BID, monitor BP      ESRD on HD, MWF  Anemia of chronic disease  Rt  upper extremity AV fistula in place. The patient was unable to complete her dialysis run today due to tachycardia.  She  has no emergent need for dialysis at this time.  -Nephrology consulted and following  -Continue PTA Lanthanum   -Hectorol and Retacrit with HD  -Chronically anemic, hemoglobin at baseline.    Chronic hypoxic respiratory failure  Asthma/COPD  Right-sided pleural effusion, chronic, transudative  Former tobacco use  Pt uses 2L of oxygen at baseline. Her pleural effusion is chronic and appears stable from prior imaging. No signs or symptoms of infection. Respiratory status stable.  - Continue supplemental O2 and PTA inhalers   - will consider thoracentesis prior to starting AC if PE positive    Hypertension  Hyperlipidemia  PTA medications include Labetolol (again, it is unclear what dose she is taking). BPs are currently stable. Pt has intolerance to statins.     Type 2 diabetes   PTA insulin regimen includes Levemir 35 units at bedtime, Aspart 3units/15g of carbohydrates with correction scale of 2 units per 50/150.   * of note pt is prescribed 60 units of Levemir, but she has reduced the dose to 35 on her own due to low morning blood sugars.   - Continue 35 units of Levemir with MDSSI     Chronic normocytic anemia  Hgb is stable at 10.1     Thrombocytopenia: Plts = 102 10e3/uL (Ref range: 150 - 450 10e3/uL) on admission, will monitor for bleeding    Diet: Consistent carb diet   DVT Prophylaxis: Pneumatic Compression Devices  Weller Catheter: Not present  Central Lines: None  Code Status:Full code      Diet: Consistent Carb 60 grams CHO per Meal Diet    DVT Prophylaxis: Pneumatic Compression Devices  Weller Catheter: Not present  Central Lines: None  Code Status: Full Code      Disposition Plan   Expected discharge: 07/17/2021 recommended to prior living arrangement once cardiac/pulm work up complete.     The patient's care was discussed with the Bedside Nurse and Patient.    Patricia Amaya MD  Hospitalist Service  [facility]  Securely message with the Vocera Web Console (learn more  here)  Text page via [facility] Paging/Directory      Risk Factors Present on Admission              # Thrombocytopenia: Plts = 102 10e3/uL (Ref range: 150 - 450 10e3/uL) on admission, will monitor for bleeding      ______________________________________________________________________    Interval History   Patient denies chest pain or worsening dyspnea.  Baseline 2 LPM oxygen use and is a stable.  Remains in sinus.  No calf pain or swelling.  Reported palpitation when she was tachycardic yesterday but has resolved.    Data reviewed today: I reviewed all medications, new labs and imaging results over the last 24 hours. I personally reviewed the limitation currently shows sinus rhythm, normal rate.  US leg has been ordered, will follow    Physical Exam   Vital Signs: Temp: 98.2  F (36.8  C) Temp src: Oral BP: 126/61 Pulse: 65   Resp: 18 SpO2: 99 % O2 Device: Nasal cannula Oxygen Delivery: 2 LPM  Weight: 163 lbs 12.8 oz    General: AAOx3, appears comfortable.  HEENT: PERRLA EOMI. Mucosa moist.   Lungs: Bilateral equal air entry. Clear to auscultation anteriorly diminished breath sounds with crackles right lung base, normal work of breathing.   CVS: S1S2 regular, no tachycardia or murmur.   Abdomen: Soft, NT, ND. BS heard.  MSK: Trace edema, no deformities.  Neuro: AAOX3. CN 2-12 normal. Strength symmetrical.  Skin: No rash.       Data   Recent Labs   Lab 07/15/21  0754 07/15/21  0552 07/15/21  0224 07/14/21  2357 07/14/21  2132 07/14/21  2024 07/14/21  1209   WBC  --   --   --   --   --   --  6.4   HGB  --   --   --   --   --   --  10.1*   MCV  --   --   --   --   --   --  97   PLT  --   --   --   --   --   --  102*   NA  --  134  --   --   --   --  134   POTASSIUM  --  4.1  --   --   --   --  4.7   CHLORIDE  --  100  --   --   --   --  101   CO2  --  29  --   --   --   --  27   BUN  --  23  --   --   --   --  40*   CR  --  3.55*  --   --   --   --  5.19*   ANIONGAP  --  5  --   --   --   --  6   KANWAL  --  8.5  --   --   --    --  8.7   * 139* 156*  --   --   --  192*   TROPONIN  --   --   --  0.437* 0.446* 0.460* 0.144*     Recent Results (from the past 24 hour(s))   XR Chest Port 1 View    Narrative    CHEST ONE VIEW 2021 1:20 PM     HISTORY: Shortness of breath.    COMPARISON: 2021.      Impression    IMPRESSION: Small effusion and associated compressive atelectasis  and/or infiltrate in the right unchanged from previous. Left lung  clear.    JESSE DASILVA MD         SYSTEM ID:  [id number]   Echocardiogram Complete   Result Value    LVEF  60-65%    Narrative    [id number]  [id number]  [id number]  [id number]^JACI^KERWIN^BESSY     [facility]  Echocardiography Laboratory  [address] [place] [address]     Name: ELIECER CHACON  MRN: [id number]  : 1960  Study Date: 07/15/2021 08:59 AM  Age: 61 yrs  Gender: Female  Patient Location: [facility]  Reason For Study: Tachycardia  Ordering Physician: KERWIN BEATTY  Referring Physician: Gigi Martin  Performed By: Raj Grey     BSA: 1.7 m2  Height: 60 in  Weight: 163 lb  HR: 59  BP: 159/73 mmHg  ______________________________________________________________________________  Procedure  Complete Portable Echo Adult.  ______________________________________________________________________________  Interpretation Summary     Left ventricular systolic function is normal.  The visual ejection fraction is 60-65%.  The right ventricle is severely dilated.  Moderately decreased right ventricular systolic function  There is moderate to mod-severe (2-3+) tricuspid regurgitation.  The right ventricular systolic pressure is approximated at 82mmHg plus the  right atrial pressure.  Right ventricular systolic pressure is elevated, consistent with severe  pulmonary hypertension.  IVC diameter >2.1 cm collapsing <50% with sniff suggests a high RA pressure  estimated at 15 mmHg or greater.  Flattened septum is consistent with RV pressure/volume  overload.  Compared to 9/19, there is new RV enlargemnt with RV dysfunction. RVSP has  increased compared to 2017 when it was 60 plus RAP.  Findings discussed with Dawna, nursing team for the patient. The study was  technically difficult.  ______________________________________________________________________________  Left Ventricle  The left ventricle is normal in size. There is concentric remodeling present.  Left ventricular systolic function is normal. The visual ejection fraction is  60-65%. Diastolic Doppler findings (E/E' ratio and/or other parameters)  suggest left ventricular filling pressures are increased. Grade III or  advanced diastolic dysfunction. Flattened septum is consistent with RV  pressure/volume overload.     Right Ventricle  The right ventricle is severely dilated. Moderately decreased right  ventricular systolic function.     Atria  The left atrium is severely dilated. The right atrium is severely dilated.     Mitral Valve  The mitral valve leaflets are mildly thickened. There is mild mitral annular  calcification. There is mild to moderate (1-2+) mitral regurgitation.     Tricuspid Valve  There is moderate to mod-severe (2-3+) tricuspid regurgitation. IVC diameter  >2.1 cm collapsing <50% with sniff suggests a high RA pressure estimated at 15  mmHg or greater. Right ventricular systolic pressure is elevated, consistent  with severe pulmonary hypertension. The right ventricular systolic pressure is  approximated at 82mmHg plus the right atrial pressure.     Aortic Valve  There is mild trileaflet aortic sclerosis. There is trace aortic  regurgitation. No aortic stenosis is present.     Pulmonic Valve  The pulmonic valve is not well visualized.     Pericardium  Trivial pericardial effusion. Small right pleural effusion.     Rhythm  Sinus rhythm was noted.     ______________________________________________________________________________  MMode/2D Measurements & Calculations  IVSd: 0.98  cm  LVIDd: 3.8 cm  LVIDs: 3.6 cm  LVPWd: 1.2 cm  FS: 4.2 %  LV mass(C)d: 128.1 grams  LV mass(C)dI: 74.9 grams/m2     Ao root diam: 2.7 cm  LA dimension: 3.3 cm  asc Aorta Diam: 3.0 cm  LA/Ao: 1.2  LVOT diam: 2.0 cm  LVOT area: 3.1 cm2  LA Volume (BP): 95.2 ml  LA Volume Index (BP): 55.7 ml/m2  RWT: 0.62     Doppler Measurements & Calculations  MV E max artur: 130.0 cm/sec  MV A max artur: 44.1 cm/sec  MV E/A: 2.9  MV max P.7 mmHg  MV mean P.9 mmHg  MV V2 VTI: 34.6 cm     MV dec slope: 583.8 cm/sec2  PA acc time: 0.06 sec  TR max artur: 452.5 cm/sec  TR max P.9 mmHg  E/E' av.9  Lateral E/e': 19.0  Medial E/e': 16.8     ______________________________________________________________________________  Report approved by: Efrain Sanchez 07/15/2021 10:03 AM         US Lower Extremity Venous Duplex Bilateral    Narrative    VENOUS ULTRASOUND BILATERAL LOWER EXTREMITIES  7/15/2021 11:50 AM     HISTORY: Rule out deep vein thrombosis, concern for pulmonary embolus,  looking for deep vein thrombosis prior to obtaining CT.    COMPARISON: None.    TECHNIQUE: Color Doppler and spectral waveform analysis performed  throughout the deep veins of both lower extremities.    FINDINGS: Both common femoral, proximal greater saphenous, femoral,  and popliteal veins demonstrate normal blood flow, compression, and  augmentation. Both posterior tibial and peroneal veins are  compressible.      Impression    IMPRESSION: Negative for deep venous thrombosis throughout both lower  extremities.     Medications       - MEDICATION INSTRUCTIONS for Dialysis Patients -   Does not apply See Admin Instructions     carvedilol  12.5 mg Oral BID w/meals     famotidine  10 mg Oral Daily     Glycopyrrolate-Formoterol  2 puff Inhalation BID     insulin aspart  1-7 Units Subcutaneous TID AC     insulin aspart  1-5 Units Subcutaneous At Bedtime     insulin aspart   Subcutaneous TID AC     insulin detemir  30 Units Subcutaneous At Bedtime      lanthanum  500 mg Oral TID w/meals     sodium chloride (PF)  3 mL Intracatheter Q8H

## 2021-07-15 NOTE — PROVIDER NOTIFICATION
MD Notification    Notified Person: MD    Notified Person Name: Monique     Notification Date/Time: 7/15/21 1005    Notification Interaction: in person    Purpose of Notification: Dr. Finley called with echo results showing severe dilation of RV and RV pressures consistent with severe pulm HTM.     Orders Received: Placing orders for US of BLE    Comments:

## 2021-07-15 NOTE — PROGRESS NOTES
Assessment and Plan:   ESRD: orders placed for HD in am.             Interval History:   R heart strain noted on echo: LV fnx nl, dilated RV with decreased fnx. Severe pulm HT.  Going for LE US to R/O DVT. Considering CTA.     HT: will switch from labetalol to carvedilol.              Review of Systems:   Had LE cramps on dialysis. Some SOB persists.           Medications:       - MEDICATION INSTRUCTIONS for Dialysis Patients -   Does not apply See Admin Instructions     famotidine  10 mg Oral Daily     Glycopyrrolate-Formoterol  2 puff Inhalation BID     insulin aspart  1-7 Units Subcutaneous TID AC     insulin aspart  1-5 Units Subcutaneous At Bedtime     insulin aspart   Subcutaneous TID AC     insulin detemir  30 Units Subcutaneous At Bedtime     labetalol  100 mg Oral BID     lanthanum  500 mg Oral TID w/meals     sodium chloride (PF)  3 mL Intracatheter Q8H         Current active medications and PTA medications reviewed, see medication list for details.            Physical Exam:   Vitals were reviewed  Patient Vitals for the past 24 hrs:   BP Temp Temp src Pulse Resp SpO2 Weight   07/15/21 0757 126/61 98.2  F (36.8  C) Oral 65 18 99 % --   07/15/21 0320 129/66 -- -- 66 -- -- --   07/15/21 0252 -- -- -- -- -- -- 74.3 kg (163 lb 12.8 oz)   07/15/21 0051 126/65 -- -- -- -- -- --   07/14/21 2130 (!) 166/72 98.2  F (36.8  C) Oral 83 20 98 % --   07/14/21 2030 (!) 163/78 98.4  F (36.9  C) Oral 79 20 97 % --   07/14/21 2015 (!) 181/81 -- -- 78 -- -- --   07/14/21 2000 (!) 170/80 -- -- 79 -- -- --   07/14/21 1945 (!) 168/71 -- -- 74 -- -- --   07/14/21 1930 (!) 175/75 -- -- 78 -- -- --   07/14/21 1915 (!) 170/78 -- -- 77 -- -- --   07/14/21 1900 (!) 176/78 -- -- 77 -- -- --   07/14/21 1845 (!) 176/84 -- -- 79 -- -- --   07/14/21 1830 (!) 170/74 -- -- 79 -- -- --   07/14/21 1815 (!) 170/77 -- -- 80 -- -- --   07/14/21 1800 (!) 171/75 -- -- 80 -- -- --   07/14/21 1745 (!) 161/77 -- -- 79 -- -- --   07/14/21  1730 (!) 174/88 -- -- 80 -- -- --   21 1715 (!) 163/76 -- -- 78 -- -- --   21 1710 (!) 165/74 98  F (36.7  C) Oral 79 20 98 % --   21 1600 (!) 159/73 98.2  F (36.8  C) Oral 76 -- 97 % --   21 1515 -- -- -- 75 25 97 % --   21 1500 -- -- -- 73 11 92 % --   21 1445 -- -- -- 71 22 95 % --   21 1430 (!) 138/94 -- -- 70 16 95 % --   21 1429 (!) 138/94 -- -- 73 16 96 % --   21 1300 (!) 156/76 -- -- 81 21 97 % --   21 1230 (!) 154/87 -- -- 80 9 99 % --   21 1200 (!) 153/80 -- -- 84 20 96 % --       Temp:  [98  F (36.7  C)-98.4  F (36.9  C)] 98.2  F (36.8  C)  Pulse:  [65-84] 65  Resp:  [9-25] 18  BP: (126-181)/(61-94) 126/61  SpO2:  [92 %-99 %] 99 %    Temperatures:  Current - Temp: 98.2  F (36.8  C); Max - Temp  Av.2  F (36.8  C)  Min: 98  F (36.7  C)  Max: 98.4  F (36.9  C)  Respiration range: Resp  Av.2  Min: 9  Max: 25  Pulse range: Pulse  Av.8  Min: 65  Max: 84  Blood pressure range: Systolic (24hrs), Avg:160 , Min:126 , Max:181   ; Diastolic (24hrs), Av, Min:61, Max:94    Pulse oximetry range: SpO2  Av.6 %  Min: 92 %  Max: 99 %    I/O last 3 completed shifts:  In: 0   Out: 1000 [Other:1000]      Intake/Output Summary (Last 24 hours) at 7/15/2021 1113  Last data filed at 2021  Gross per 24 hour   Intake 0 ml   Output 1000 ml   Net -1000 ml       Alert and responsive  On O2 per NC  LE stasis changes, no edema  FANG + bruit       Wt Readings from Last 4 Encounters:   07/15/21 74.3 kg (163 lb 12.8 oz)   21 71.7 kg (158 lb)   21 72.8 kg (160 lb 9.6 oz)   10/14/20 73.3 kg (161 lb 9.6 oz)          Data:          Lab Results   Component Value Date     07/15/2021     2021     2020     2020     2019    Lab Results   Component Value Date    CHLORIDE 100 07/15/2021    CHLORIDE 101 2021    CHLORIDE 107 2020    CHLORIDE 105 2020    CHLORIDE 98  12/31/2019    Lab Results   Component Value Date    BUN 23 07/15/2021    BUN 40 07/14/2021    BUN 44 01/13/2020    BUN 38 01/12/2020    BUN 30 12/31/2019      Lab Results   Component Value Date    POTASSIUM 4.1 07/15/2021    POTASSIUM 4.7 07/14/2021    POTASSIUM 4.1 10/05/2020    POTASSIUM 4.5 01/13/2020    POTASSIUM 4.1 01/12/2020    Lab Results   Component Value Date    CO2 29 07/15/2021    CO2 27 07/14/2021    CO2 24 01/13/2020    CO2 27 01/12/2020    CO2 28 12/31/2019    Lab Results   Component Value Date    CR 3.55 07/15/2021    CR 5.19 07/14/2021    CR 5.97 01/13/2020    CR 5.55 01/12/2020    CR 3.24 12/31/2019        Recent Labs   Lab Test 07/14/21  1209 01/13/20  0642 01/12/20  1612   WBC 6.4 7.7 8.4   HGB 10.1* 8.4* 8.5*   HCT 31.7* 26.0* 26.5*   MCV 97 96 95   * 185 204     Recent Labs   Lab Test 12/31/19  0041 09/25/19  1453 11/24/17  1816   AST 16 12 17   ALT 14 21 16   ALKPHOS 88 98 94   BILITOTAL 0.6 0.6 0.6       No results for input(s): MAG in the last 58033 hours.  Recent Labs   Lab Test 09/25/19  1453 03/18/19  0935 03/16/19  0811   PHOS 5.8* 3.6 4.9*     Recent Labs   Lab Test 07/15/21  0552 07/14/21  1209 01/13/20  0642   KANWAL 8.5 8.7 8.2*       Lab Results   Component Value Date    KANWAL 8.5 07/15/2021     Lab Results   Component Value Date    WBC 6.4 07/14/2021    HGB 10.1 (L) 07/14/2021    HCT 31.7 (L) 07/14/2021    MCV 97 07/14/2021     (L) 07/14/2021     Lab Results   Component Value Date     07/15/2021    POTASSIUM 4.1 07/15/2021    CHLORIDE 100 07/15/2021    CO2 29 07/15/2021     (H) 07/15/2021     Lab Results   Component Value Date    BUN 23 07/15/2021    CR 3.55 (H) 07/15/2021     Lab Results   Component Value Date    MAG 2.5 (H) 11/29/2012     Lab Results   Component Value Date    PHOS 5.8 (H) 09/25/2019       Creatinine   Date Value Ref Range Status   07/15/2021 3.55 (H) 0.52 - 1.04 mg/dL Final   07/14/2021 5.19 (H) 0.52 - 1.04 mg/dL Final   01/13/2020 5.97 (H)  0.52 - 1.04 mg/dL Final   01/12/2020 5.55 (H) 0.52 - 1.04 mg/dL Final   12/31/2019 3.24 (H) 0.52 - 1.04 mg/dL Final   12/05/2019 5.19 (H) 0.52 - 1.04 mg/dL Final   09/25/2019 4.53 (H) 0.52 - 1.04 mg/dL Final   03/18/2019 3.43 (H) 0.52 - 1.04 mg/dL Final       Attestation:  I have reviewed today's vital signs, notes, medications, labs and imaging.     Rahul Hernandez MD

## 2021-07-15 NOTE — PLAN OF CARE
Cassandra is A&O. Denies pain. Tele shows SR with PVCs. Pt to HD about 5 PM. Uses walker when up. Plan is for echo tomorrow and possibly cardiology consult.

## 2021-07-15 NOTE — UTILIZATION REVIEW
"Admission Status; Secondary Review Determination     Admission Date: 7/14/2021 11:49 AM       Under the authority of the Utilization Management Committee, the utilization review process indicated a secondary review on the above patient.  The review outcome is based on review of the medical records, discussions with staff, and applying clinical experience noted on the date of the review.          (x) Observation Status Appropriate - This patient does not meet hospital inpatient criteria and is placed in observation status. If this patient's primary payer is Medicare and was admitted as an inpatient, Condition Code 44 should be used and patient status changed to \"observation\".       RATIONALE FOR DETERMINATION      Brief clinical presentation, information copied from the chart, abbreviated and edited for relevant content:     Discussed with attending, Dr. Amaya. Agreed to change from IP to OBS. Other than LOS, not meeting IP criteria.       Yissel Wetzel is a 61 year old female admitted 7/14/21 for tachycardia. The patient presented to the emergency department from dialysis with a heart rate documented as high as 190.  Unfortunately the rhythm was not captured on EKG or telemetry strip.  At the time she arrived in the emergency department, was in normal sinus rhythm with a heart rate of 81.  Did have some new T wave inversions on the inferior and lateral leads.  Troponin is elevated at 0.460. Given the tachycardia and elevated troponin, diagnosed with NSTEMI, likely type II MI in the setting of tachycardia with ESRD.   Echo today revealed Newly discovered severe RV dilation and moderate RV dysfunction, Moderate to severe tricuspid regurg, Severe pulmonary hypertension. PMH for  ESRD, Dyslipidemia,  Hypertension, Former tobacco abuse.  Cardiology consult suspicious for brief episodes of ectopic atrial tachycardia. Still on telemetry and plan at discharge for a Zio patch for further monitoring.  Need to rule out PE " and plan to check a CTA of the chest to rule out PE based on high suspicion.  If this is negative, plan for a right heart cath in the future to evaluate her pulmonary hypertension.  Cath sooner if increased symptoms.          The severity of illness, intensity of cares provided, risk for adverse outcome, and expected LOS make the care appropriate for observation.       The information on this document is developed by the utilization review team in order for the business office to ensure compliance.  This only denotes the appropriateness of proper admission status and does not reflect the quality of care rendered.         The definitions of Inpatient Status and Observation Status used in making the determination above are those provided in the CMS Coverage Manual, Chapter 1 and Chapter 6, section 70.4.      Sincerely,     Amy Alba MD   Utilization Review/ Case Management  [facility].

## 2021-07-15 NOTE — PROGRESS NOTES
[id number]: Pt here with SVT. A&O. Diminished LS. VSS on 2L. Tele SR/SB. 60 g CHO diet. Up with SBA. Denies pain. Covered carbs with 9 units of insulin rather than the ordered 12=(59/15)x3 per pt request. Pt scoring green on the Aggression Stop Light Tool. Plan for dialysis tomorrow &   Likely R heart cath in coming days.

## 2021-07-15 NOTE — CONSULTS
[facility]    Cardiology Consultation     Date of Admission:  7/14/2021    Assessment & Plan     1.  Paroxysmal tachycardia, as yet undiagnosed  2.  NSTEMI, likely type II MI in the setting of tachycardia with ESRD   3.  Newly discovered severe RV dilation and moderate RV dysfunction  4.  Severe pulmonary hypertension by echo (RVSP estimated at 97 mmHg)  5.  Moderate to severe tricuspid regurgitation    6.  ESRD  7.  Dyslipidemia  8.  Hypertension  9.  Former tobacco abuse      It was a pleasure to meet with Cassandra today.  I explained that at this point we do not know the etiology of her tachycardia, but we will monitor her on telemetry while she is here and hopefully will capture a definitive episode.  From what we have seen so far, the tracing quality has been too poor to make a definitive diagnosis, but at this point we have seen brief episodes of irregular, narrow complex tachycardia with no definite P waves.  These could potentially be brief episodes of paroxysmal atrial fibrillation, though based on their length I am more suspicious for brief episodes of ectopic atrial tachycardia.  Hopefully will get a more definitive answer soon.  If we do not get a good enough quality tracing from telemetry, we will plan to discharge her with a Zio patch for further monitoring.    Regarding her RV dysfunction, tricuspid regurgitation, and pulmonary hypertension, this could be progression of her prior pulmonary hypertension, but could also represent acute/subacute PE.  Thankfully we do not see any evidence of lower extremity DVT on her lower extremity duplex, but we will check a CTA of the chest to rule out PE based on high suspicion.  If this is negative, we will plan for a right heart cath in the future to evaluate her pulmonary hypertension.  Whether this is an acute or chronic process, this certainly could provide a milieu for her tachycardia.      -Continue to monitor on telemetry  -Discharge  with 14-day Zio patch  -CT PE protocol.  Discussed with nephrology and will do dialysis on regular schedule.  -Likely right heart cath in the coming days if CT PE protocol negative  -BP and volume control per nephrology  -Outpatient ischemic evaluation unless symptoms develop during her stay        Guido Sanchez MD  Interventional Cardiology  July 15, 2021      Code Status    Full Code    Reason for Consult   Tachycardia    Primary Care Physician   Gigi Martin    Chief Complaint   Palpitations    History is obtained from the patient    History of Present Illness   Yissel Wetzel is an extremely pleasant 61-year-old woman with a past medical history significant for ESRD (still makes a small amount of urine), dyslipidemia, former tobacco abuse, AOCKD, hypertension, and DM2.  She was brought to the emergency department from dialysis for evaluation of tachycardia with a heart rate reportedly up to 190 bpm.  She tells me that she has been having palpitations on and off for the past week or 2, and more severely and more frequently over the past 2 days.  She had several episodes the previous night when getting up to use the bathroom.  She denies any known cardiac history.  Her nephew did have a blood clot.  No heart problems that she knows of in her first-degree relatives.  She has not had any issues with chest pain or syncope or lower extremity swelling.  She occasionally does have some shortness of breath which varies day-to-day, but this has been relatively stable for a long time.    Unfortunately, none of the tachycardic episodes were captured on ECG or telemetry.  Her presenting ECG showed sinus rhythm with either sinus arrhythmia or intermittent PACs, and new anterolateral T wave inversions.  Repeat ECG showed similar findings with a PVC.  Echocardiogram showed normal LV systolic function but a severely dilated right ventricle with moderately decreased RV systolic function, moderate-severe TR, and  estimated RVSP of 97 mmHg.  The RV findings in particular are significantly changed compared to 2019.  Bilateral lower extremity duplex was done today and was negative for DVT.    On review of telemetry, she has had several very short episodes of tachycardia.  Quality of the tracing has been poor to this point, but these have been narrow complex tachycardia, with some beat to beat variation.  Theoretically they could be short runs of atrial fibrillation, but I suspect more likely they are short runs of ectopic atrial tachycardia.      Past Medical History   I have reviewed this patient's medical history and updated it with pertinent information if needed.   Past Medical History:   Diagnosis Date     Allergic rhinitis, cause unspecified      Anemia in chronic kidney disease      Anemia of chronic disease      Bleeding pseudoaneurysm of right brachiocephalic arteriovenous fistula, initial encounter (H) 2019     End stage renal failure on dialysis (H)      Esophagitis, unspecified      Former tobacco use      HEMORRHAGIC GASTROPATHY      History of blood transfusion     [place]     Iron deficiency anemia, unspecified      Mild persistent asthma      Obesity      Other and unspecified hyperlipidemia      Pneumonia      Pulmonary hypertension (H)     per 2012 echo mod.to severe pulmonary hypertension     Tobacco use disorder dc ed      Type 2 diabetes mellitus (H)     on Insulin- managed by PCP     Unspecified essential hypertension        Past Surgical History   I have reviewed this patient's surgical history and updated it with pertinent information if needed.  Past Surgical History:   Procedure Laterality Date     ABDOMINOPLASTY  pt unsure when    abdominoplasty-      SECTION  ,     x 2     COLONOSCOPY      [facility]     COLONOSCOPY N/A 2021    Procedure: COLONOSCOPY, WITH POLYPECTOMY AND BIOPSY;  Surgeon: Lincoln Farrar MD;  Location: [facility]     CREATE  FISTULA ARTERIOVENOUS UPPER EXTREMITY Right 12/26/2018    Procedure: RIGHT BRACHIAL TO BASILIC ARTERIOVENOUS FISTULA CREATION;  Surgeon: Terry Vizcaino MD;  Location: [facility]     ENT SURGERY  as a child    tonsillectomy     ESOPHAGOSCOPY, GASTROSCOPY, DUODENOSCOPY (EGD), COMBINED N/A 5/27/2021    Procedure: ESOPHAGOGASTRODUODENOSCOPY, WITH BIOPSY;  Surgeon: Lincoln Farrar MD;  Location:  GI     EYE SURGERY Left     injections- eye     HYSTERECTOMY  approx 1983    partial hysterectomy-reason bleeding      IR CVC TUNNEL PLACEMENT > 5 YRS OF AGE  12/6/2019     IR CVC TUNNEL REMOVAL RIGHT  5/7/2020     IR DIALYSIS FISTULOGRAM RIGHT  12/9/2019       Prior to Admission Medications   Prior to Admission Medications   Prescriptions Last Dose Informant Patient Reported? Taking?   ACCU-CHEK CHING PLUS test strip  Self No No   Sig: USE TO TEST BLOOD SUGAR 4 TIMES PER DAY   Glycopyrrolate-Formoterol (BEVESPI AEROSPHERE) 9-4.8 MCG/ACT oral inhaler has not started yet  Yes No   Sig: Inhale 2 puffs into the lungs 2 times daily   NOVOLOG FLEXPEN 100 UNIT/ML soln  Self No No   Sig: INJECT 9-12 UNITS BEFORE BREAKFAST, LUNCH, AND DINNER PLUS A CORRECTIVE SCALE OF 2 UNITS FOR EVERY 50 OVER 150   acetaminophen (TYLENOL) 500 MG tablet prn at prn Self Yes Yes   Sig: Take 500 mg by mouth every 8 hours as needed for mild pain   ciprofloxacin-dexamethasone (CIPRODEX) 0.3-0.1 % otic suspension  Self No No   Sig: Place 4 drops into both ears 2 times daily   Patient not taking: Reported on 3/4/2021   famotidine (PEPCID) 10 MG tablet prn at prn Self No Yes   Sig: Take 1 tablet (10 mg) by mouth daily   Patient taking differently: Take 10 mg by mouth daily as needed    fluticasone (FLONASE) 50 MCG/ACT nasal spray prn at prn Self No Yes   Sig: Spray 1 spray into both nostrils daily   Patient taking differently: Spray 1 spray into both nostrils daily as needed    hypromellose-dextran (ARTIFICAL TEARS) 0.1-0.3 % ophthalmic solution prn  at prn Self Yes Yes   Sig: Place 1 drop into both eyes daily as needed for dry eyes   insulin aspart (NOVOLOG FLEXPEN) 100 UNIT/ML pen 7/13/2021 at Unknown time Self Yes Yes   Sig: Take 3 unit per 15 grams of carbohydrates three times daily with meals   insulin aspart (NOVOLOG PEN) 100 UNIT/ML pen 7/13/2021 at Unknown time Self Yes Yes   Sig: Corrective scale = 2 units for every BG 50 over 150  In addition to carb counting insulin   insulin detemir (LEVEMIR FLEXTOUCH) 100 UNIT/ML pen   No No   Sig: Inject 60 Units Subcutaneously At Bedtime   insulin pen needle (31G X 8 MM) 31G X 8 MM miscellaneous  Self No No   Sig: Use 4 pen needles daily or as directed.   labetalol (NORMODYNE) 100 MG tablet 7/13/2021 at Unknown time Self No Yes   Sig: Take 1 tablet (100 mg) by mouth 2 times daily   Patient taking differently: Take 100 mg by mouth 2 times daily Patient has been decreasing dose and does not currently know what dose she is taking   lanthanum (FOSRENOL) 500 MG chewable tablet unknown Self Yes Yes   Sig: Take 500 mg by mouth 3 times daily (with meals)   levalbuterol (XOPENEX HFA) 45 MCG/ACT inhaler prn at prn Self No Yes   Sig: Inhale 2 puffs into the lungs every 6 hours as needed for shortness of breath / dyspnea or wheezing      Facility-Administered Medications: None     Allergies   Allergies   Allergen Reactions     Albuterol      shakiness     Aspirin      gi     Byetta [Exenatide]      gi     Clonidine      constipation     Codeine      vomiting     Ezetimibe      muscle symptoms     Hydralazine      headaches     Lisinopril      hyperkalemia     Metformin Hydrochloride      vomiting     Pravachol [Pravastatin Sodium]      muscle pains     Simvastatin      myalgias     Troglitazone        Social History   I have reviewed this patient's social history and updated it with pertinent information if needed. Yissel Wetzel  reports that she quit smoking about 21 years ago. Her smoking use included cigarettes. She has  a 22.00 pack-year smoking history. She has never used smokeless tobacco. She reports that she does not drink alcohol and does not use drugs.    Family History   I have reviewed this patient's family history and updated it with pertinent information if needed.   Family History   Problem Relation Age of Onset     Arrhythmia Mother      Hypertension Mother      Pancreatic Cancer Father      Diabetes Brother      Asthma Sister      LUNG DISEASE Sister      Diabetes Daughter      Cirrhosis Sister        Review of Systems   A 10 point Review of Systems was completed and was negative other than noted in the HPI       Physical Exam   Temp: 97.9  F (36.6  C) Temp src: Oral BP: 129/62 Pulse: 63   Resp: 18 SpO2: 98 % O2 Device: Nasal cannula Oxygen Delivery: 2 LPM  Vital Signs with Ranges  Temp:  [97.9  F (36.6  C)-98.4  F (36.9  C)] 97.9  F (36.6  C)  Pulse:  [63-83] 63  Resp:  [9-25] 18  BP: (126-181)/(61-94) 129/62  SpO2:  [92 %-99 %] 98 %  163 lbs 12.8 oz    Constitutional:  Awake, alert, no acute distress  Eyes: EOMI, sclera non-icteric  ENT: Trachea midline  Respiratory: Normal respiratory effort, crackles at the right base  Cardiovascular: RRR, 2/6 systolic murmur.  JVP significantly elevated.  There is trace bilateral LE edema.  Normal carotid upstrokes, no carotid bruits.  GI:  Nondistended, nontender.  Skin: Dry, no significant rash on exposed skin  Musculoskeletal:  Strength 5/5 in upper and lower extremities  Psychiatric: Appropriate affect      Data   Labs  I have personally reviewed the pertinent cardiac labs.    Most Recent 3 CBC's:  Recent Labs   Lab Test 07/14/21  1209 01/13/20  0642 01/12/20  1612   WBC 6.4 7.7 8.4   HGB 10.1* 8.4* 8.5*   MCV 97 96 95   * 185 204       Most Recent 3 BMP's:  Recent Labs   Lab Test 07/15/21  0754 07/15/21  0552 07/15/21  0224 07/14/21  1209 10/05/20  0000 01/13/20  0642 01/13/20  0642   NA  --  134  --  134  --   --  139   POTASSIUM  --  4.1  --  4.7 4.1  --  4.5    CHLORIDE  --  100  --  101  --   --  107   CO2  --  29  --  27  --   --  24   BUN  --  23  --  40*  --   --  44*   CR  --  3.55*  --  5.19*  --   --  5.97*   ANIONGAP  --  5  --  6  --   --  8   KANWAL  --  8.5  --  8.7  --   --  8.2*   * 139* 156* 192*  --    < > 175*    < > = values in this interval not displayed.       Most Recent 2 LFT's:  Recent Labs   Lab Test 12/31/19  0041 09/25/19  1453   AST 16 12   ALT 14 21   ALKPHOS 88 98   BILITOTAL 0.6 0.6       Most Recent 3 INR's:  Recent Labs   Lab Test 01/12/20  1612 12/31/19  0831 09/25/19  1453   INR 1.23* 1.27* 1.13       Most Recent 3 Troponin's:  Recent Labs   Lab Test 07/14/21  2357 07/14/21  2132 07/14/21 2024 11/24/17  1816 08/09/17  2030 09/30/15  1533   TROPI  --   --   --  <0.015 <0.015  The 99th percentile for upper reference range is 0.045 ug/L.  Troponin values in   the range of 0.045 - 0.120 ug/L may be associated with risks of adverse   clinical events.   <0.015  The 99th percentile for upper reference range is 0.045 ug/L.  Troponin values in   the range of 0.045 - 0.120 ug/L may be associated with risks of adverse   clinical events.     TROPONIN 0.437* 0.446* 0.460*  --   --   --        Most Recent Cholesterol Panel:  Recent Labs   Lab Test 07/22/16  1056   CHOL 211*   *   HDL 49*   TRIG 193*       Most Recent Hemoglobin A1c:  Recent Labs   Lab Test 07/14/21  1209   A1C 7.4*           Guido Sanchez MD  Interventional Cardiology  July 15, 2021

## 2021-07-15 NOTE — PLAN OF CARE
A&O x 4. VSS, on 2L O2 NC. Tele: SR. Denies chest pain. Assist x 1 w/walker. RUE fistula, +bruit +thrill. Tylenol x 1 for leg pain. Blood glucose 172/156. NPO at midnight. Cardiology consulted. Possible TTE/Echo Wednesday. Troponin 0.460, 0.446, 0.437. No Heparin gtt started per Dr. Harden. Will continue w/plan of care.

## 2021-07-16 ENCOUNTER — TELEPHONE (OUTPATIENT)
Dept: TRANSPLANT | Facility: CLINIC | Age: 61
End: 2021-07-16

## 2021-07-16 ENCOUNTER — HOSPITAL ENCOUNTER (OUTPATIENT)
Dept: CARDIOLOGY | Facility: CLINIC | Age: 61
Setting detail: OBSERVATION
Discharge: HOME | End: 2021-07-16
Attending: NURSE PRACTITIONER | Admitting: NURSE PRACTITIONER
Payer: MEDICARE

## 2021-07-16 VITALS
RESPIRATION RATE: 18 BRPM | BODY MASS INDEX: 34.2 KG/M2 | DIASTOLIC BLOOD PRESSURE: 70 MMHG | WEIGHT: 175.1 LBS | SYSTOLIC BLOOD PRESSURE: 151 MMHG | TEMPERATURE: 98.2 F | HEART RATE: 61 BPM | OXYGEN SATURATION: 96 %

## 2021-07-16 DIAGNOSIS — R00.0 TACHYCARDIA: ICD-10-CM

## 2021-07-16 LAB
ANION GAP SERPL CALCULATED.3IONS-SCNC: 6 MMOL/L (ref 3–14)
ATRIAL RATE - MUSE: 86 BPM
BUN SERPL-MCNC: 41 MG/DL (ref 7–30)
CALCIUM SERPL-MCNC: 8.4 MG/DL (ref 8.5–10.1)
CHLORIDE BLD-SCNC: 99 MMOL/L (ref 94–109)
CO2 SERPL-SCNC: 27 MMOL/L (ref 20–32)
CREAT SERPL-MCNC: 4.95 MG/DL (ref 0.52–1.04)
DIASTOLIC BLOOD PRESSURE - MUSE: NORMAL MMHG
ERYTHROCYTE [DISTWIDTH] IN BLOOD BY AUTOMATED COUNT: 15.9 % (ref 10–15)
GFR SERPL CREATININE-BSD FRML MDRD: 9 ML/MIN/1.73M2
GLUCOSE BLD-MCNC: 145 MG/DL (ref 70–99)
GLUCOSE BLDC GLUCOMTR-MCNC: 108 MG/DL (ref 70–99)
GLUCOSE BLDC GLUCOMTR-MCNC: 113 MG/DL (ref 70–99)
GLUCOSE BLDC GLUCOMTR-MCNC: 118 MG/DL (ref 70–99)
GLUCOSE BLDC GLUCOMTR-MCNC: 163 MG/DL (ref 70–99)
HCT VFR BLD AUTO: 28.9 % (ref 35–47)
HGB BLD-MCNC: 9.1 G/DL (ref 11.7–15.7)
INTERPRETATION ECG - MUSE: NORMAL
MCH RBC QN AUTO: 30.8 PG (ref 26.5–33)
MCHC RBC AUTO-ENTMCNC: 31.5 G/DL (ref 31.5–36.5)
MCV RBC AUTO: 98 FL (ref 78–100)
P AXIS - MUSE: 57 DEGREES
PLATELET # BLD AUTO: 99 10E3/UL (ref 150–450)
POTASSIUM BLD-SCNC: 4.6 MMOL/L (ref 3.4–5.3)
PR INTERVAL - MUSE: 164 MS
QRS DURATION - MUSE: 92 MS
QT - MUSE: 410 MS
QTC - MUSE: 490 MS
R AXIS - MUSE: 61 DEGREES
RBC # BLD AUTO: 2.95 10E6/UL (ref 3.8–5.2)
SODIUM SERPL-SCNC: 132 MMOL/L (ref 133–144)
SYSTOLIC BLOOD PRESSURE - MUSE: NORMAL MMHG
T AXIS - MUSE: 222 DEGREES
VENTRICULAR RATE- MUSE: 86 BPM
WBC # BLD AUTO: 5.1 10E3/UL (ref 4–11)

## 2021-07-16 PROCEDURE — 99217 PR OBSERVATION CARE DISCHARGE: CPT | Performed by: HOSPITALIST

## 2021-07-16 PROCEDURE — 258N000003 HC RX IP 258 OP 636: Performed by: INTERNAL MEDICINE

## 2021-07-16 PROCEDURE — 634N000001 HC RX 634: Performed by: INTERNAL MEDICINE

## 2021-07-16 PROCEDURE — 36415 COLL VENOUS BLD VENIPUNCTURE: CPT | Performed by: HOSPITALIST

## 2021-07-16 PROCEDURE — 93246 EXT ECG>7D<15D RECORDING: CPT

## 2021-07-16 PROCEDURE — 80048 BASIC METABOLIC PNL TOTAL CA: CPT | Performed by: HOSPITALIST

## 2021-07-16 PROCEDURE — 93248 EXT ECG>7D<15D REV&INTERPJ: CPT | Performed by: INTERNAL MEDICINE

## 2021-07-16 PROCEDURE — 99215 OFFICE O/P EST HI 40 MIN: CPT | Performed by: INTERNAL MEDICINE

## 2021-07-16 PROCEDURE — 250N000011 HC RX IP 250 OP 636: Performed by: INTERNAL MEDICINE

## 2021-07-16 PROCEDURE — 90935 HEMODIALYSIS ONE EVALUATION: CPT | Performed by: INTERNAL MEDICINE

## 2021-07-16 PROCEDURE — 96372 THER/PROPH/DIAG INJ SC/IM: CPT

## 2021-07-16 PROCEDURE — 85027 COMPLETE CBC AUTOMATED: CPT | Performed by: HOSPITALIST

## 2021-07-16 PROCEDURE — 250N000013 HC RX MED GY IP 250 OP 250 PS 637: Performed by: INTERNAL MEDICINE

## 2021-07-16 PROCEDURE — G0378 HOSPITAL OBSERVATION PER HR: HCPCS

## 2021-07-16 PROCEDURE — 250N000013 HC RX MED GY IP 250 OP 250 PS 637: Performed by: STUDENT IN AN ORGANIZED HEALTH CARE EDUCATION/TRAINING PROGRAM

## 2021-07-16 RX ORDER — HEPARIN SODIUM 1000 [USP'U]/ML
500 INJECTION, SOLUTION INTRAVENOUS; SUBCUTANEOUS
Status: DISCONTINUED | OUTPATIENT
Start: 2021-07-16 | End: 2021-07-16 | Stop reason: HOSPADM

## 2021-07-16 RX ORDER — SODIUM CHLORIDE 9 MG/ML
INJECTION, SOLUTION INTRAVENOUS CONTINUOUS
Status: CANCELLED | OUTPATIENT
Start: 2021-07-16

## 2021-07-16 RX ORDER — LORAZEPAM 2 MG/ML
0.5 INJECTION INTRAMUSCULAR
Status: CANCELLED | OUTPATIENT
Start: 2021-07-16

## 2021-07-16 RX ORDER — CARVEDILOL 12.5 MG/1
25 TABLET ORAL 2 TIMES DAILY WITH MEALS
Status: DISCONTINUED | OUTPATIENT
Start: 2021-07-16 | End: 2021-07-16 | Stop reason: HOSPADM

## 2021-07-16 RX ORDER — CALCIUM CARBONATE 500 MG/1
500 TABLET, CHEWABLE ORAL DAILY PRN
Status: DISCONTINUED | OUTPATIENT
Start: 2021-07-16 | End: 2021-07-16 | Stop reason: HOSPADM

## 2021-07-16 RX ORDER — LORAZEPAM 0.5 MG/1
0.5 TABLET ORAL
Status: CANCELLED | OUTPATIENT
Start: 2021-07-16

## 2021-07-16 RX ORDER — POTASSIUM CHLORIDE 1500 MG/1
20 TABLET, EXTENDED RELEASE ORAL
Status: CANCELLED | OUTPATIENT
Start: 2021-07-16

## 2021-07-16 RX ORDER — CARVEDILOL 25 MG/1
25 TABLET ORAL 2 TIMES DAILY WITH MEALS
Qty: 60 TABLET | Refills: 0 | Status: ON HOLD | OUTPATIENT
Start: 2021-07-16 | End: 2021-07-25

## 2021-07-16 RX ORDER — ALBUMIN (HUMAN) 12.5 G/50ML
50 SOLUTION INTRAVENOUS
Status: DISCONTINUED | OUTPATIENT
Start: 2021-07-16 | End: 2021-07-16 | Stop reason: HOSPADM

## 2021-07-16 RX ORDER — LIDOCAINE 40 MG/G
CREAM TOPICAL
Status: CANCELLED | OUTPATIENT
Start: 2021-07-16

## 2021-07-16 RX ORDER — HEPARIN SODIUM 1000 [USP'U]/ML
500 INJECTION, SOLUTION INTRAVENOUS; SUBCUTANEOUS CONTINUOUS
Status: DISCONTINUED | OUTPATIENT
Start: 2021-07-16 | End: 2021-07-16 | Stop reason: HOSPADM

## 2021-07-16 RX ORDER — DOXERCALCIFEROL 4 UG/2ML
4 INJECTION INTRAVENOUS
Status: COMPLETED | OUTPATIENT
Start: 2021-07-16 | End: 2021-07-16

## 2021-07-16 RX ADMIN — INSULIN ASPART 6 UNITS: 100 INJECTION, SOLUTION INTRAVENOUS; SUBCUTANEOUS at 18:16

## 2021-07-16 RX ADMIN — FAMOTIDINE 10 MG: 10 TABLET ORAL at 03:37

## 2021-07-16 RX ADMIN — HEPARIN SODIUM 500 UNITS/HR: 1000 INJECTION INTRAVENOUS; SUBCUTANEOUS at 13:57

## 2021-07-16 RX ADMIN — ACETAMINOPHEN 500 MG: 500 TABLET, FILM COATED ORAL at 03:24

## 2021-07-16 RX ADMIN — LANTHANUM CARBONATE 500 MG: 500 TABLET, CHEWABLE ORAL at 17:51

## 2021-07-16 RX ADMIN — SODIUM CHLORIDE 300 ML: 9 INJECTION, SOLUTION INTRAVENOUS at 13:57

## 2021-07-16 RX ADMIN — INSULIN ASPART 9 UNITS: 100 INJECTION, SOLUTION INTRAVENOUS; SUBCUTANEOUS at 08:58

## 2021-07-16 RX ADMIN — ACETAMINOPHEN 500 MG: 500 TABLET, FILM COATED ORAL at 13:12

## 2021-07-16 RX ADMIN — CARVEDILOL 25 MG: 12.5 TABLET, FILM COATED ORAL at 17:51

## 2021-07-16 RX ADMIN — DOXERCALCIFEROL 4 MCG: 4 INJECTION, SOLUTION INTRAVENOUS at 15:40

## 2021-07-16 RX ADMIN — EPOETIN ALFA-EPBX 4000 UNITS: 4000 INJECTION, SOLUTION INTRAVENOUS; SUBCUTANEOUS at 15:40

## 2021-07-16 RX ADMIN — LANTHANUM CARBONATE 500 MG: 500 TABLET, CHEWABLE ORAL at 08:43

## 2021-07-16 NOTE — PROGRESS NOTES
Assessment and Plan:   ESRD: HD today for UF and clearance. FANG with 15 ga needles, 450 BFR. 0.5 - 1.0 L UF. 2K 33 HCO3 138 Na. Hectorol and EPO on the run. Low dose heparin.     She can be discharged per IM. F/U at her dialysis unit on Monday. ([facility]).             Interval History:   HT: on coreg.         R heart failure/Pulm HT: CTA neg. LE US neg for DVT. Cards managing.   SVT: per Cards.            Review of Systems:   No sx on dialysis.           Medications:       - MEDICATION INSTRUCTIONS for Dialysis Patients -   Does not apply See Admin Instructions     sodium chloride 0.9%  250 mL Intravenous Once in dialysis/CRRT     sodium chloride 0.9%  300 mL Hemodialysis Machine Once     carvedilol  25 mg Oral BID w/meals     doxercalciferol  4 mcg Intravenous Once in dialysis/CRRT     epoetin fabiana-epbx (RETACRIT) inj ESRD  4,000 Units Intravenous Once in dialysis/CRRT     famotidine  10 mg Oral Daily     heparin (porcine)  500 Units Hemodialysis Machine OR IV Push Once in dialysis/CRRT     insulin aspart  1-7 Units Subcutaneous TID AC     insulin aspart  1-5 Units Subcutaneous At Bedtime     insulin aspart   Subcutaneous TID AC     insulin detemir  30 Units Subcutaneous At Bedtime     lanthanum  500 mg Oral TID w/meals     sodium chloride (PF)  3 mL Intracatheter Q8H     umeclidinium-vilanterol  1 puff Inhalation Daily       heparin (porcine)       - MEDICATION INSTRUCTIONS -       Current active medications and PTA medications reviewed, see medication list for details.            Physical Exam:   Vitals were reviewed  Patient Vitals for the past 24 hrs:   BP Temp Temp src Pulse Resp SpO2 Weight   07/16/21 1415 (!) 150/72 -- -- 64 -- -- --   07/16/21 1400 (!) 147/68 -- -- 65 -- -- --   07/16/21 1357 (!) 150/82 -- -- 62 -- -- --   07/16/21 1345 136/78 98.1  F (36.7  C) Oral 60 18 96 % --   07/16/21 1312 -- -- -- -- 16 -- --   07/16/21 1124 129/58 97  F (36.1  C) Axillary 58 18 -- --   07/16/21  0732 128/55 98.8  F (37.1  C) Axillary 58 16 97 % --   21 0707 -- -- -- -- -- -- 79.4 kg (175 lb 1.6 oz)   21 0200 138/61 98.7  F (37.1  C) Oral 65 16 96 % --   07/15/21 2033 -- 98.7  F (37.1  C) Oral -- -- -- --   07/15/21 2000 125/64 -- -- 64 16 91 % --   07/15/21 1520 121/64 98.4  F (36.9  C) Oral 67 18 95 % --       Temp:  [97  F (36.1  C)-98.8  F (37.1  C)] 98.1  F (36.7  C)  Pulse:  [58-67] 64  Resp:  [16-18] 18  BP: (121-150)/(55-82) 150/72  SpO2:  [91 %-97 %] 96 %    Temperatures:  Current - Temp: 98.1  F (36.7  C); Max - Temp  Av.3  F (36.8  C)  Min: 97  F (36.1  C)  Max: 98.8  F (37.1  C)  Respiration range: Resp  Av.9  Min: 16  Max: 18  Pulse range: Pulse  Av.6  Min: 58  Max: 67  Blood pressure range: Systolic (24hrs), Av , Min:121 , Max:150   ; Diastolic (24hrs), Av, Min:55, Max:82    Pulse oximetry range: SpO2  Av %  Min: 91 %  Max: 97 %    I/O last 3 completed shifts:  In: 220 [P.O.:220]  Out: 50 [Urine:50]      Intake/Output Summary (Last 24 hours) at 2021 1421  Last data filed at 2021 1315  Gross per 24 hour   Intake 220 ml   Output 250 ml   Net -30 ml       Alert, on NC O2  FANG with needles in place  Cor RRR nl S1 S2 no M + JVD  LE no edema, stasis changes  Lungs clear ant BS       Wt Readings from Last 4 Encounters:   21 79.4 kg (175 lb 1.6 oz)   21 71.7 kg (158 lb)   21 72.8 kg (160 lb 9.6 oz)   10/14/20 73.3 kg (161 lb 9.6 oz)          Data:          Lab Results   Component Value Date     2021     07/15/2021     2021     2020     2020     2019    Lab Results   Component Value Date    CHLORIDE 99 2021    CHLORIDE 100 07/15/2021    CHLORIDE 101 2021    CHLORIDE 107 2020    CHLORIDE 105 2020    CHLORIDE 98 2019    Lab Results   Component Value Date    BUN 41 2021    BUN 23 07/15/2021    BUN 40 2021    BUN 44 2020    BUN  38 01/12/2020    BUN 30 12/31/2019      Lab Results   Component Value Date    POTASSIUM 4.6 07/16/2021    POTASSIUM 4.1 07/15/2021    POTASSIUM 4.7 07/14/2021    POTASSIUM 4.1 10/05/2020    POTASSIUM 4.5 01/13/2020    POTASSIUM 4.1 01/12/2020    Lab Results   Component Value Date    CO2 27 07/16/2021    CO2 29 07/15/2021    CO2 27 07/14/2021    CO2 24 01/13/2020    CO2 27 01/12/2020    CO2 28 12/31/2019    Lab Results   Component Value Date    CR 4.95 07/16/2021    CR 3.55 07/15/2021    CR 5.19 07/14/2021    CR 5.97 01/13/2020    CR 5.55 01/12/2020    CR 3.24 12/31/2019        Recent Labs   Lab Test 07/16/21  0600 07/14/21  1209 01/13/20  0642   WBC 5.1 6.4 7.7   HGB 9.1* 10.1* 8.4*   HCT 28.9* 31.7* 26.0*   MCV 98 97 96   PLT 99* 102* 185     Recent Labs   Lab Test 12/31/19  0041 09/25/19  1453 11/24/17  1816   AST 16 12 17   ALT 14 21 16   ALKPHOS 88 98 94   BILITOTAL 0.6 0.6 0.6       No results for input(s): MAG in the last [id number] hours.  Recent Labs   Lab Test 09/25/19  1453 03/18/19  0935 03/16/19  0811   PHOS 5.8* 3.6 4.9*     Recent Labs   Lab Test 07/16/21  0600 07/15/21  0552 07/14/21  1209   KANWAL 8.4* 8.5 8.7       Lab Results   Component Value Date    KANWAL 8.4 (L) 07/16/2021     Lab Results   Component Value Date    WBC 5.1 07/16/2021    HGB 9.1 (L) 07/16/2021    HCT 28.9 (L) 07/16/2021    MCV 98 07/16/2021    PLT 99 (L) 07/16/2021     Lab Results   Component Value Date     (L) 07/16/2021    POTASSIUM 4.6 07/16/2021    CHLORIDE 99 07/16/2021    CO2 27 07/16/2021     (H) 07/16/2021     Lab Results   Component Value Date    BUN 41 (H) 07/16/2021    CR 4.95 (H) 07/16/2021     Lab Results   Component Value Date    MAG 2.5 (H) 11/29/2012     Lab Results   Component Value Date    PHOS 5.8 (H) 09/25/2019       Creatinine   Date Value Ref Range Status   07/16/2021 4.95 (H) 0.52 - 1.04 mg/dL Final   07/15/2021 3.55 (H) 0.52 - 1.04 mg/dL Final   07/14/2021 5.19 (H) 0.52 - 1.04 mg/dL Final   01/13/2020  5.97 (H) 0.52 - 1.04 mg/dL Final   01/12/2020 5.55 (H) 0.52 - 1.04 mg/dL Final   12/31/2019 3.24 (H) 0.52 - 1.04 mg/dL Final   12/05/2019 5.19 (H) 0.52 - 1.04 mg/dL Final   09/25/2019 4.53 (H) 0.52 - 1.04 mg/dL Final   03/18/2019 3.43 (H) 0.52 - 1.04 mg/dL Final       Attestation:  I have reviewed today's vital signs, notes, medications, labs and imaging.  Seen on dialysis.      Rahul Hernandez MD

## 2021-07-16 NOTE — PLAN OF CARE
VSS. Able to express needs. Pain/discomfort managed with PRN Tylenol. SBA to the bathroom. Pt refused  carb count lunch time insulin, BG was 113 prior to lunch. Patient has remained in sinus rhythm. Patient is at Dialysis at this time. Will continue to monitor.    /58 (BP Location: Left arm)   Pulse 58   Temp 97  F (36.1  C) (Axillary)   Resp 16   Wt 79.4 kg (175 lb 1.6 oz)   SpO2 97%   BMI 34.20 kg/m      Problem: Adult Inpatient Plan of Care  Goal: Optimal Comfort and Wellbeing  Outcome: Improving     Problem: Dysrhythmia  Goal: Normalized Cardiac Rhythm  Outcome: Improving

## 2021-07-16 NOTE — TELEPHONE ENCOUNTER
Received a VM from Dr. Gigi Rodriguez from [facility] stating he does not think patient's current oxygen requirements are due to pulmonary status but likely more related to renal failure/fluid overload. States he plans to see her again on August 3rd and will assess her status again then.

## 2021-07-16 NOTE — PROGRESS NOTES
[facility]  Cardiology Progress Note  Date of Service: 07/16/2021  Staff Cardiologist : Dr. Sanchez    Assessment & Plan    Yissel Wetzel is a 61 year old female admitted on 7/14/2021 with tachycardia.     Assessment:  1.  Paroxysmal tachycardia, as yet undiagnosed  2.  NSTEMI, likely type II MI in the setting of tachycardia with ESRD   3.  Newly discovered severe RV dilation and moderate RV dysfunction  4.  Severe pulmonary hypertension by echo (RVSP estimated at 97 mmHg)  5.  Moderate to severe tricuspid regurgitation    6.  ESRD  7.  Dyslipidemia  8.  Hypertension  9.  Former tobacco abuse    No acute events overnight. Regarding her tachycardia no recurrent episodes noted on telemetry. Recommend discharging with 14 day ZIO monitor.     Due to her newly diagnosed RV dilation she underwent a CTA to rule out PE yesterday which was negative. Lower extremity duplex was negative for DVT. CT also did note pericardial calcifications which is not surprising, it is reasonable to pursue and ischemic evaluation but given no acute symptoms this can be pursued outpatient. Recommend proceeding with right and left heart catheterization outpatient.     Risks and benefits of coronary angiogram/right heart catehterization discussed today including, bleeding, bruising, infection, allergic reaction, kidney damage (including need for dialysis), stroke, heart attack, vascular damage, emergency open heart surgery, up to and including death. Patient indicates understanding and is agreeable to proceed.      Plan:   1. INCREASE carvedilol 25mg BID  2. Recommend outpatient right and left heart catheterization along with outpatient cardiology follow up.   3. 14 day zio monitor at discharge  4. Stable for discharge from cardiology perspective.         DAVID Mora [facility]  Pager: [phone]      Interval History   No acute events overnight. Denies chest pain. Denies changes in dyspnea, reports  chronic exertional dyspnea.     Physical Exam   Temp: 98.8  F (37.1  C) Temp src: Axillary BP: 128/55 Pulse: 58   Resp: 16 SpO2: 97 % O2 Device: Nasal cannula Oxygen Delivery: 2 LPM  Vitals:    07/15/21 0252 07/16/21 0707   Weight: 74.3 kg (163 lb 12.8 oz) 79.4 kg (175 lb 1.6 oz)   GEN: well nourished, in no acute distress.  HEENT:  Pupils equal, round. Sclerae nonicteric.   NECK: Supple, no masses appreciated.   C/V:  Regular rate and rhythm, soft systolic murmur  RESP: Respirations are unlabored. Diminished in bases.  GI: Abdomen soft, nontender.  EXTREM: No LE edema.  NEURO: Alert and oriented, cooperative.  SKIN: Warm and dry.     Medications       - MEDICATION INSTRUCTIONS for Dialysis Patients -   Does not apply See Admin Instructions     carvedilol  12.5 mg Oral BID w/meals     famotidine  10 mg Oral Daily     insulin aspart  1-7 Units Subcutaneous TID AC     insulin aspart  1-5 Units Subcutaneous At Bedtime     insulin aspart   Subcutaneous TID AC     insulin detemir  30 Units Subcutaneous At Bedtime     lanthanum  500 mg Oral TID w/meals     sodium chloride (PF)  3 mL Intracatheter Q8H     umeclidinium-vilanterol  1 puff Inhalation Daily       Data   Last 24 hours labs reviewed     Imaging: CT chest pulmonary 7/14/21  1.  No evidence of pulmonary embolus.  2.  Chronic moderate-sized partially loculated right pleural effusion  with rounded atelectasis in the right lower lobe.  3.  Pericardial calcifications.    Tele: sinus rhythm    Echo: 7/14/21  Left ventricular systolic function is normal.  The visual ejection fraction is 60-65%.  The right ventricle is severely dilated.  Moderately decreased right ventricular systolic function  There is moderate to mod-severe (2-3+) tricuspid regurgitation.  The right ventricular systolic pressure is approximated at 82mmHg plus the  right atrial pressure.  Right ventricular systolic pressure is elevated, consistent with severe pulmonary hypertension.  IVC diameter >2.1  cm collapsing <50% with sniff suggests a high RA pressure estimated at 15 mmHg or greater.  Flattened septum is consistent with RV pressure/volume overload.  Compared to 9/19, there is new RV enlargemnt with RV dysfunction. RVSP has increased compared to 2017 when it was 60 plus RAP.  Findings discussed with Dawna, nursing team for the patient. The study was technically difficult.

## 2021-07-16 NOTE — PROVIDER NOTIFICATION
MD Notification    Notified Person: MD    Notified Person Name: Dr. Isaacs    Notification Date/Time: 7/16/21 0316    Notification Interaction: Amcom    Purpose of Notification:   DM..260-2  Pt complaining of heartburn. Can we please get TUMS or PRN for this? Thanks Karishma DOMINGUEZ *[phone]    Orders Received: PRN TUMS ordered    Comments:

## 2021-07-16 NOTE — PROGRESS NOTES
Potassium   Date Value Ref Range Status   07/16/2021 4.6 3.4 - 5.3 mmol/L Final   10/05/2020 4.1 3.5 - 5.5 mEq/L Final     Hemoglobin   Date Value Ref Range Status   07/16/2021 9.1 (L) 11.7 - 15.7 g/dL Final   01/13/2020 8.4 (L) 11.7 - 15.7 g/dL Final     Creatinine   Date Value Ref Range Status   07/16/2021 4.95 (H) 0.52 - 1.04 mg/dL Final   01/13/2020 5.97 (H) 0.52 - 1.04 mg/dL Final     Urea Nitrogen   Date Value Ref Range Status   07/16/2021 41 (H) 7 - 30 mg/dL Final   01/13/2020 44 (H) 7 - 30 mg/dL Final     Sodium   Date Value Ref Range Status   07/16/2021 132 (L) 133 - 144 mmol/L Final   01/13/2020 139 133 - 144 mmol/L Final     INR   Date Value Ref Range Status   01/12/2020 1.23 (H) 0.86 - 1.14 Final       DIALYSIS PROCEDURE NOTE  Hepatitis status of previous patient on machine log was checked and verified ok to use with this patients hepatitis status.  Patient dialyzed for 3 hrs. on a K2 bath with a net fluid removal of  0.5L.  A BFR of 450 ml/min was obtained via a R AVF using 15 gauge needles.      The treatment plan was discussed with Dr. Jacobo during the treatment.    Total heparin received during the treatment: 1500 units.   Needle cannulation sites held x 14 min.     Meds  given: Epo, Hectorol   Complications: None      Person educated: Patient. Knowledge base good. Barriers to learning: none. Educated on procedure via verbal mode. Patient verbalized understanding.   ICEBOAT? Timeout performed pre-treatment  I: Patient was identified using 2 identifiers  C:  Consent Signed Yes  E: Equipment preventative maintenance is current and dialysis delivery system OK to use  B: Hepatitis B Surface Antigen: neg ; Draw Date: 7/14/21       Hepatitis B Surface Antibody: reactive; Draw Date: 7/14/21  O: Dialysis orders present and complete prior to treatment  A: Vascular access verified and assessed prior to treatment  T: Treatment was performed at a clinically appropriate time  ?: Patient was allowed to ask  questions and address concerns prior to treatment  See flowsheet in EPIC for further details and post assessment.  Machine water alarm in place and functioning. Transducer pods intact and checked every 15min.   Pt returned via Bed.  Chlorine/Chloramine water system checked every 4 hours.    Please remove patient dressing on AVF and AVG needle sites 24 hours after dialysis. If leaking occurs please apply a Band-Aid.     Bertram Resendez RN

## 2021-07-16 NOTE — DISCHARGE SUMMARY
[facility]  Hospitalist Discharge Summary      Date of Admission:  7/14/2021  Date of Discharge:  7/16/2021  Discharging Provider: Patricia Amaya MD      Discharge Diagnoses     Tachycardia, likely SVT    Troponin elevation, likely demand ischemia     Follow-ups Needed After Discharge   Follow-up Appointments     Follow-up and recommended labs and tests       Follow up with primary care provider, Gigi Martin, within 7 days   to evaluate medication change and for hospital follow- up.               Unresulted Labs Ordered in the Past 30 Days of this Admission     No orders found from 6/14/2021 to 7/15/2021.      These results will be followed up by none    Discharge Disposition   Discharged to home  Condition at discharge: Stable     Hospital Course            Yissel Wetzel is a 61 year old female with past medical history significant for end-stage renal disease on hemodialysis, chronic hypoxic respiratory failure, asthma, hypertension, hyperlipidemia, chronic anemia, chronic right-sided pleural effusion. Admitted on 7/14/2021 with tachycardia.      Tachycardia, likely SVT  Troponin elevation, likely demand ischemia   The patient presented to the ER from dialysis with a HR as high as 190. Unfortunately the rhythm was not captured on EKG or telemetry strip.  At the time she arrived in the ER, was in normal sinus rhythm with a heart rate of 81. Noted some new T wave inversions on the inferior and lateral leads.  Troponin is elevated at 0.144, likely demand ischemia in the setting of significant tachycardia and ESRD.  The patient remains chest pain-free.   *Of note pt does take labetalol - it is unclear how much she is taking. She reports that she has 300mg tablets at home, but her current prescription is 100mg BID. She reports that she is taking about 1/4 of a tablet daily because it makes her blood pressure too low (unclear if that is 1/4 of 300mg tablet or 100mg tablet)    -Cardiology consulted, monitored on telemetry and no recurrence of SVT, Troponin remains mildly elevated and TTE showed  normal LVEF 60-65%, severely dilated RV, decreased RV systolic function, moderate to severe TR, elevated RV SP with severe pulmonary HTN. Concern for PE, but CT PE study negative, US legs as well negative for DVT  -cardiology recommending 14 days of cardiac monitor at discharge and follow up for Rt and Lt heart cath.   -PTA BB changed to coreg and dose titrated to 25 mg BID at discharge.      ESRD on HD, MWF  Anemia of chronic disease  Rt  upper extremity AV fistula in place. The patient was unable to complete her dialysis run today due to tachycardia.  She has no emergent need for dialysis at this time.  -Nephrology consulted for HD.  -Continued PTA Lanthanum   -Hectorol and Retacrit with HD  -Chronically anemic, hemoglobin at baseline.    Chronic hypoxic respiratory failure  Asthma/COPD  Right-sided pleural effusion, chronic, transudative  Former tobacco use  Pt uses 2L of oxygen at baseline. Her pleural effusion is chronic and appears stable from prior imaging. No signs or symptoms of infection. Respiratory status stable.  - Continue supplemental O2 and PTA inhalers     Hypertension  Hyperlipidemia  PTA medications include Labetolol (again, it is unclear what dose she is taking). BPs are currently stable. Pt has intolerance to statins.   -BB changed as above.    Type 2 diabetes   PTA insulin regimen includes Levemir 35 units at bedtime, Aspart 3units/15g of carbohydrates with correction scale of 2 units per 50/150.   * of note pt is prescribed 60 units of Levemir, but she has reduced the dose to 35 on her own due to low morning blood sugars.   - Continue 30 units of Levemir with MDSSI     Chronic normocytic anemia  Hgb is stable at 10.1     Thrombocytopenia: Plts = 102  Stable, recommend outpatient follow and hem consult to eval for MDS       Consultations This Hospital Stay   NEPHROLOGY IP  CONSULT  NEPHROLOGY IP CONSULT  CARDIOLOGY IP CONSULT    Code Status   Full Code    Time Spent on this Encounter   I, Patricia Amaya MD, personally saw the patient today and spent greater than 30 minutes discharging this patient.       Patricia Amaya MD  [facility]  [address] [place] [address] AVE, [address]  [place] [address]  Phone: [phone]  ______________________________________________________________________    Physical Exam   Vital Signs: Temp: 98.1  F (36.7  C) Temp src: Oral BP: (!) 150/67 Pulse: 63   Resp: 18 SpO2: 96 % O2 Device: Nasal cannula Oxygen Delivery: 2 LPM  Weight: 175 lbs 1.6 oz    General: AAOx3, appears comfortable.  HEENT: PERRLA EOMI. Mucosa moist.   Lungs: Bilateral equal air entry. Clear to auscultation anteriorly diminished breath sounds with crackles right lung base, normal work of breathing.   CVS: S1S2 regular, no tachycardia or murmur.   Abdomen: Soft, NT, ND. BS heard.  MSK: Trace edema, no deformities.  Neuro: AAOX3. CN 2-12 normal. Strength symmetrical.  Skin: No rash.        Primary Care Physician   Gigi Martin    Discharge Orders      Follow-Up with Cardiologist      Reason for your hospital stay    SVT     Follow-up and recommended labs and tests     Follow up with primary care provider, Gigi Martin, within 7 days to evaluate medication change and for hospital follow- up.     Activity    Your activity upon discharge: activity as tolerated     Leadless EKG Monitor 3 to 14 Days     Cardiac Event Monitor Adult Pediatric     Diet    Follow this diet upon discharge: Orders Placed This Encounter      Consistent Carb 60 grams CHO per Meal Diet     Case Request Cath Lab: Heart Catheterization with Possible Intervention     Case Request Cath Lab: Right Heart Cath       Significant Results and Procedures   Most Recent 3 CBC's:Recent Labs   Lab Test 07/16/21  0600 07/14/21  1209 01/13/20  0642   WBC 5.1 6.4 7.7   HGB 9.1* 10.1* 8.4*   MCV 98  97 96   PLT 99* 102* 185     Most Recent 3 BMP's:Recent Labs   Lab Test 07/16/21  1130 07/16/21  0756 07/16/21  0600 07/15/21  0552 07/14/21  1209   NA  --   --  132* 134 134   POTASSIUM  --   --  4.6 4.1 4.7   CHLORIDE  --   --  99 100 101   CO2  --   --  27 29 27   BUN  --   --  41* 23 40*   CR  --   --  4.95* 3.55* 5.19*   ANIONGAP  --   --  6 5 6   KANWAL  --   --  8.4* 8.5 8.7   * 118* 145* 139* 192*     Most Recent 2 LFT's:Recent Labs   Lab Test 12/31/19  0041 09/25/19  1453   AST 16 12   ALT 14 21   ALKPHOS 88 98   BILITOTAL 0.6 0.6     Most Recent 3 Troponin's:Recent Labs   Lab Test 07/14/21  2357 07/14/21  2132 07/14/21  2024 11/24/17  1816 08/09/17  2030 09/30/15  1533   TROPI  --   --   --  <0.015 <0.015  The 99th percentile for upper reference range is 0.045 ug/L.  Troponin values in   the range of 0.045 - 0.120 ug/L may be associated with risks of adverse   clinical events.   <0.015  The 99th percentile for upper reference range is 0.045 ug/L.  Troponin values in   the range of 0.045 - 0.120 ug/L may be associated with risks of adverse   clinical events.     TROPONIN 0.437* 0.446* 0.460*  --   --   --      Most Recent 3 BNP's:Recent Labs   Lab Test 08/09/17  2030   NTBNPI 5,862*     Most Recent D-dimer:Recent Labs   Lab Test 08/09/17  1623   DD 2.3*     Most Recent 6 Bacteria Isolates From Any Culture (See EPIC Reports for Culture Details):Recent Labs   Lab Test 12/31/19  1007 03/15/19  1600   CULT No growth Culture negative for acid fast bacilli  Assayed at [facility] [address] [place] [id number] [phone]  No growth     Most Recent TSH and T4:Recent Labs   Lab Test 07/14/21  1209   TSH 3.86     Most Recent 6 glucoses:Recent Labs   Lab Test 07/16/21  1130 07/16/21  0756 07/16/21  0600 07/16/21  0228 07/15/21  2033 07/15/21  1658   * 118* 145* 163* 120* 169*     Most Recent Urinalysis:Recent Labs   Lab Test 09/25/19  1455 06/07/18  1455   COLOR Yellow Yellow   APPEARANCE  Clear Clear   URINEGLC >499* 250*   URINEBILI Negative Negative   URINEKETONE Negative Negative   SG 1.005 1.015   UBLD Negative Small*   URINEPH 7.0 6.0   PROTEIN 100* >=300*   UROBILINOGEN  --  0.2   NITRITE Negative Negative   LEUKEST Negative Negative   RBCU <1 2-5*   WBCU 1 0 - 5     Most Recent Anemia Panel:Recent Labs   Lab Test 07/16/21  0600 07/15/21  0552 03/18/19  0935 03/17/19  0805   WBC 5.1  --    < >  --    HGB 9.1*  --    < >  --    HCT 28.9*  --    < >  --    MCV 98  --    < >  --    PLT 99*  --    < >  --    IRON  --   --   --  27*   IRONSAT  --   --   --  15   FEB  --   --   --  178*   LATRICE  --   --   --  210   B12  --  821  --   --    FOLIC  --  9.4  --   --     < > = values in this interval not displayed.   ,   Results for orders placed or performed during the hospital encounter of 07/14/21   XR Chest Port 1 View    Narrative    CHEST ONE VIEW July 14, 2021 1:20 PM     HISTORY: Shortness of breath.    COMPARISON: March 4, 2021.      Impression    IMPRESSION: Small effusion and associated compressive atelectasis  and/or infiltrate in the right unchanged from previous. Left lung  clear.    JESSE DASILVA MD         SYSTEM ID:  [id number]    Lower Extremity Venous Duplex Bilateral    Narrative    VENOUS ULTRASOUND BILATERAL LOWER EXTREMITIES  7/15/2021 11:50 AM     HISTORY: Rule out deep vein thrombosis, concern for pulmonary embolus,  looking for deep vein thrombosis prior to obtaining CT.    COMPARISON: None.    TECHNIQUE: Color Doppler and spectral waveform analysis performed  throughout the deep veins of both lower extremities.    FINDINGS: Both common femoral, proximal greater saphenous, femoral,  and popliteal veins demonstrate normal blood flow, compression, and  augmentation. Both posterior tibial and peroneal veins are  compressible.      Impression    IMPRESSION: Negative for deep venous thrombosis throughout both lower  extremities.    DAJUAN BRAMBILA MD         SYSTEM ID:  [id number]   CT  Chest Pulmonary Embolism w Contrast    Narrative    CT CHEST PULMONARY EMBOLISM WITH CONTRAST 7/15/2021 1:44 PM    CLINICAL HISTORY: Pulmonary embolism suspected, high probability.  Tachycardia, pulmonary hypertension and right ventricular dysfunction.    TECHNIQUE: CT angiogram chest during arterial phase injection IV  contrast. 2D and 3D MIP reconstructions were performed by the CT  technologist. Dose reduction techniques were used.     CONTRAST: 63mL [id number]-370    COMPARISON: None.    FINDINGS:  ANGIOGRAM CHEST: Decreased perfusion to the chronic rounded  atelectasis in the right lower lobe. No evidence of pulmonary embolus.    LUNGS AND PLEURA: Moderate partially loculated right pleural effusion.  Trace left pleural effusion. Chronic rounded atelectasis in the right  lower lobe.    MEDIASTINUM/AXILLAE: Pericardial calcifications. Cardiomegaly with  particular enlargement of the right heart. No lymphadenopathy.  Moderate coronary artery calcification.    UPPER ABDOMEN: Cirrhotic appearance of the liver. Small ascites.    MUSCULOSKELETAL: Normal.      Impression    IMPRESSION:  1.  No evidence of pulmonary embolus.  2.  Chronic moderate-sized partially loculated right pleural effusion  with rounded atelectasis in the right lower lobe.  3.  Pericardial calcifications.    CHETNA LU MD         SYSTEM ID:  [id number]   Echocardiogram Complete     Value    LVEF  60-65%    Narrative    [id number]  [id number]  [id number]  [id number]^JACI^KERWIN^BESSY     [facility]  Echocardiography Laboratory  [address] [place] [address]     Name: ELIECER CHACON  MRN: [id number]  : 1960  Study Date: 07/15/2021 08:59 AM  Age: 61 yrs  Gender: Female  Patient Location: [facility]  Reason For Study: Tachycardia  Ordering Physician: KERWIN BEATTY  Referring Physician: Gigi Martin  Performed By: Raj Grey     BSA: 1.7 m2  Height: 60 in  Weight: 163 lb  HR: 59  BP: 159/73  18 mmHg  ______________________________________________________________________________  Procedure  Complete Portable Echo Adult.  ______________________________________________________________________________  Interpretation Summary     Left ventricular systolic function is normal.  The visual ejection fraction is 60-65%.  The right ventricle is severely dilated.  Moderately decreased right ventricular systolic function  There is moderate to mod-severe (2-3+) tricuspid regurgitation.  The right ventricular systolic pressure is approximated at 82mmHg plus the  right atrial pressure.  Right ventricular systolic pressure is elevated, consistent with severe  pulmonary hypertension.  IVC diameter >2.1 cm collapsing <50% with sniff suggests a high RA pressure  estimated at 15 mmHg or greater.  Flattened septum is consistent with RV pressure/volume overload.  Compared to 9/19, there is new RV enlargemnt with RV dysfunction. RVSP has  increased compared to 2017 when it was 60 plus RAP.  Findings discussed with Dawna, nursing team for the patient. The study was  technically difficult.  ______________________________________________________________________________  Left Ventricle  The left ventricle is normal in size. There is concentric remodeling present.  Left ventricular systolic function is normal. The visual ejection fraction is  60-65%. Diastolic Doppler findings (E/E' ratio and/or other parameters)  suggest left ventricular filling pressures are increased. Grade III or  advanced diastolic dysfunction. Flattened septum is consistent with RV  pressure/volume overload.     Right Ventricle  The right ventricle is severely dilated. Moderately decreased right  ventricular systolic function.     Atria  The left atrium is severely dilated. The right atrium is severely dilated.     Mitral Valve  The mitral valve leaflets are mildly thickened. There is mild mitral annular  calcification. There is mild to moderate (1-2+) mitral  regurgitation.     Tricuspid Valve  There is moderate to mod-severe (2-3+) tricuspid regurgitation. IVC diameter  >2.1 cm collapsing <50% with sniff suggests a high RA pressure estimated at 15  mmHg or greater. Right ventricular systolic pressure is elevated, consistent  with severe pulmonary hypertension. The right ventricular systolic pressure is  approximated at 82mmHg plus the right atrial pressure.     Aortic Valve  There is mild trileaflet aortic sclerosis. There is trace aortic  regurgitation. No aortic stenosis is present.     Pulmonic Valve  The pulmonic valve is not well visualized.     Pericardium  Trivial pericardial effusion. Small right pleural effusion.     Rhythm  Sinus rhythm was noted.     ______________________________________________________________________________  MMode/2D Measurements & Calculations  IVSd: 0.98 cm  LVIDd: 3.8 cm  LVIDs: 3.6 cm  LVPWd: 1.2 cm  FS: 4.2 %  LV mass(C)d: 128.1 grams  LV mass(C)dI: 74.9 grams/m2     Ao root diam: 2.7 cm  LA dimension: 3.3 cm  asc Aorta Diam: 3.0 cm  LA/Ao: 1.2  LVOT diam: 2.0 cm  LVOT area: 3.1 cm2  LA Volume (BP): 95.2 ml  LA Volume Index (BP): 55.7 ml/m2  RWT: 0.62     Doppler Measurements & Calculations  MV E max artur: 130.0 cm/sec  MV A max artur: 44.1 cm/sec  MV E/A: 2.9  MV max P.7 mmHg  MV mean P.9 mmHg  MV V2 VTI: 34.6 cm     MV dec slope: 583.8 cm/sec2  PA acc time: 0.06 sec  TR max artur: 452.5 cm/sec  TR max P.9 mmHg  E/E' av.9  Lateral E/e': 19.0  Medial E/e': 16.8     ______________________________________________________________________________  Report approved by: Efrain Sanchez 07/15/2021 10:03 AM               Discharge Medications   Current Discharge Medication List      START taking these medications    Details   carvedilol (COREG) 25 MG tablet Take 1 tablet (25 mg) by mouth 2 times daily (with meals)  Qty: 60 tablet, Refills: 0    Comments: Future refills by PCP Dr. Gigi Martin with phone number  [phone].  Associated Diagnoses: SVT (supraventricular tachycardia) (H)         CONTINUE these medications which have CHANGED    Details   !! insulin detemir (LEVEMIR FLEXTOUCH) 100 UNIT/ML pen Inject 30 Units Subcutaneous At Bedtime    Associated Diagnoses: Type 2 diabetes mellitus with diabetic nephropathy, with long-term current use of insulin (H)       !! - Potential duplicate medications found. Please discuss with provider.      CONTINUE these medications which have NOT CHANGED    Details   acetaminophen (TYLENOL) 500 MG tablet Take 500 mg by mouth every 8 hours as needed for mild pain      famotidine (PEPCID) 10 MG tablet Take 1 tablet (10 mg) by mouth daily  Qty: 90 tablet, Refills: 3    Associated Diagnoses: Gastroesophageal reflux disease, esophagitis presence not specified      fluticasone (FLONASE) 50 MCG/ACT nasal spray Spray 1 spray into both nostrils daily  Qty: 16 g, Refills: 11    Associated Diagnoses: Seasonal allergic rhinitis, unspecified trigger      hypromellose-dextran (ARTIFICAL TEARS) 0.1-0.3 % ophthalmic solution Place 1 drop into both eyes daily as needed for dry eyes      !! insulin aspart (NOVOLOG FLEXPEN) 100 UNIT/ML pen Take 3 unit per 15 grams of carbohydrates three times daily with meals  Qty: 6 mL, Refills: 0    Associated Diagnoses: Type 2 diabetes mellitus with diabetic nephropathy, with long-term current use of insulin (H)      !! insulin aspart (NOVOLOG PEN) 100 UNIT/ML pen Corrective scale = 2 units for every BG 50 over 150  In addition to carb counting insulin      lanthanum (FOSRENOL) 500 MG chewable tablet Take 500 mg by mouth 3 times daily (with meals)      levalbuterol (XOPENEX HFA) 45 MCG/ACT inhaler Inhale 2 puffs into the lungs every 6 hours as needed for shortness of breath / dyspnea or wheezing  Qty: 15 g, Refills: 11    Associated Diagnoses: Pneumonia due to infectious organism, unspecified laterality, unspecified part of lung      ACCU-CHEK CHING PLUS test strip  USE TO TEST BLOOD SUGAR 4 TIMES PER DAY  Qty: 200 strip, Refills: 1    Associated Diagnoses: Type 2 diabetes mellitus with diabetic nephropathy, with long-term current use of insulin (H)      ciprofloxacin-dexamethasone (CIPRODEX) 0.3-0.1 % otic suspension Place 4 drops into both ears 2 times daily  Qty: 7.5 mL, Refills: 0    Associated Diagnoses: Acute ear pain, bilateral      Glycopyrrolate-Formoterol (BEVESPI AEROSPHERE) 9-4.8 MCG/ACT oral inhaler Inhale 2 puffs into the lungs 2 times daily      !! insulin detemir (LEVEMIR FLEXTOUCH) 100 UNIT/ML pen Inject 60 Units Subcutaneously At Bedtime  Qty: 15 mL, Refills: 0    Associated Diagnoses: Type 2 diabetes mellitus with diabetic nephropathy, with long-term current use of insulin (H)      insulin pen needle (31G X 8 MM) 31G X 8 MM miscellaneous Use 4 pen needles daily or as directed.  Qty: 100 each, Refills: 11    Associated Diagnoses: Type 2 diabetes mellitus with diabetic nephropathy, with long-term current use of insulin (H)      !! NOVOLOG FLEXPEN 100 UNIT/ML soln INJECT 9-12 UNITS BEFORE BREAKFAST, LUNCH, AND DINNER PLUS A CORRECTIVE SCALE OF 2 UNITS FOR EVERY 50 OVER 150  Qty: 15 mL, Refills: 0    Associated Diagnoses: Type 2 diabetes mellitus with diabetic nephropathy, with long-term current use of insulin (H)       !! - Potential duplicate medications found. Please discuss with provider.      STOP taking these medications       labetalol (NORMODYNE) 100 MG tablet Comments:   Reason for Stopping:             Allergies   Allergies   Allergen Reactions     Albuterol      shakiness     Aspirin      gi     Byetta [Exenatide]      gi     Clonidine      constipation     Codeine      vomiting     Ezetimibe      muscle symptoms     Hydralazine      headaches     Lisinopril      hyperkalemia     Metformin Hydrochloride      vomiting     Pravachol [Pravastatin Sodium]      muscle pains     Simvastatin      myalgias     Troglitazone

## 2021-07-16 NOTE — DISCHARGE INSTRUCTIONS
Continue to use your 2L Home oxygen provided by [facility] [phone]

## 2021-07-16 NOTE — PROGRESS NOTES
Per bedside RN pt is currently in dialysis but will discharge this evening. CM-RN contacted [facility] to let them know that pt will discharge from hospital today and I will fax available relevant run information to them so they can resume care.  I also included the unit information on pt's AVS.    [facility]  Address: [address], [place], [place], [address]  Telephone: [phone]  Fax: [phone]    Bedside RN also noted pt will discharge with a ziopatch.  This would be ordered by a provider.  Nursing to call EKG when pt ready for discharge [id number] to apply this.      Bedside RN states pt will need cardiology followup.  It appears this was scheduled:     Sep 09, 2021 12:45 PM   New Visit with Eamon Brewster MD   [facility] ([phone])  [address] [place] [id number]    Per orders pt also needs PCP followup.  Dr. Martin was not available; thus a primary clinic followup was scheduled with Dr. Ferreira:      Jul 27, 2021  4:20 PM   Office Visit with Mike Ferreira MD   [facility] [phone]  [address] [place] [id number]    Per CM RN review of AVS all appointment scheduling tasks have been completed.     Alina Stewart RN, BSN, PHN  [facility]  Inpatient Care Management - FLOAT  Mobile: [phone] 07/16/21 until 4pm  (after today's date, please call the patient's unit)

## 2021-07-19 ENCOUNTER — PATIENT OUTREACH (OUTPATIENT)
Dept: CARE COORDINATION | Facility: CLINIC | Age: 61
End: 2021-07-19

## 2021-07-19 DIAGNOSIS — Z71.89 OTHER SPECIFIED COUNSELING: ICD-10-CM

## 2021-07-19 NOTE — PROGRESS NOTES
Clinic Care Coordination Contact    Background: Care Coordination referral placed from \[facility]\"" discharge report for reason of patient meeting criteria for a TCM outreach call by [facility] [place] team.    Assessment: Upon chart review, [facility] Team member will cancel/close the referral for TCM outreach due to reason below:     Patient is followed by Transplant team.       Plan: Care Coordination referral for TCM outreach canceled.    Lashonda Cardoso MA  [facility]

## 2021-07-22 ENCOUNTER — HOSPITAL ENCOUNTER (INPATIENT)
Facility: CLINIC | Age: 61
LOS: 3 days | Discharge: HOME | DRG: 286 | End: 2021-07-25
Attending: EMERGENCY MEDICINE | Admitting: INTERNAL MEDICINE
Payer: MEDICARE

## 2021-07-22 DIAGNOSIS — I50.31 ACUTE DIASTOLIC HEART FAILURE (H): ICD-10-CM

## 2021-07-22 DIAGNOSIS — I27.20 PULMONARY HYPERTENSION (H): ICD-10-CM

## 2021-07-22 DIAGNOSIS — E11.21 TYPE 2 DIABETES MELLITUS WITH DIABETIC NEPHROPATHY, WITH LONG-TERM CURRENT USE OF INSULIN (H): ICD-10-CM

## 2021-07-22 DIAGNOSIS — I47.10 SVT (SUPRAVENTRICULAR TACHYCARDIA) (H): ICD-10-CM

## 2021-07-22 DIAGNOSIS — I47.10 SVT (SUPRAVENTRICULAR TACHYCARDIA) (H): Primary | ICD-10-CM

## 2021-07-22 DIAGNOSIS — I10 ESSENTIAL HYPERTENSION: ICD-10-CM

## 2021-07-22 DIAGNOSIS — Z79.4 TYPE 2 DIABETES MELLITUS WITH DIABETIC NEPHROPATHY, WITH LONG-TERM CURRENT USE OF INSULIN (H): ICD-10-CM

## 2021-07-22 PROBLEM — R79.89 ELEVATED TROPONIN: Status: RESOLVED | Noted: 2021-07-14 | Resolved: 2021-07-22

## 2021-07-22 PROBLEM — Z99.2 ESRD ON DIALYSIS (H): Status: ACTIVE | Noted: 2019-03-15

## 2021-07-22 PROBLEM — J90 PLEURAL EFFUSION: Status: RESOLVED | Noted: 2019-12-31 | Resolved: 2021-07-22

## 2021-07-22 PROBLEM — D69.6 THROMBOCYTOPENIA (H): Status: ACTIVE | Noted: 2021-07-22

## 2021-07-22 PROBLEM — R00.1 SYMPTOMATIC SINUS BRADYCARDIA: Status: ACTIVE | Noted: 2021-07-22

## 2021-07-22 LAB
ALBUMIN SERPL-MCNC: 3.4 G/DL (ref 3.4–5)
ALP SERPL-CCNC: 93 U/L (ref 40–150)
ALT SERPL W P-5'-P-CCNC: 18 U/L (ref 0–50)
ANION GAP SERPL CALCULATED.3IONS-SCNC: 9 MMOL/L (ref 3–14)
AST SERPL W P-5'-P-CCNC: 22 U/L (ref 0–45)
ATRIAL RATE - MUSE: 131 BPM
BASOPHILS # BLD AUTO: 0 10E3/UL (ref 0–0.2)
BASOPHILS NFR BLD AUTO: 1 %
BILIRUB SERPL-MCNC: 0.9 MG/DL (ref 0.2–1.3)
BUN SERPL-MCNC: 28 MG/DL (ref 7–30)
CALCIUM SERPL-MCNC: 8.7 MG/DL (ref 8.5–10.1)
CHLORIDE BLD-SCNC: 100 MMOL/L (ref 94–109)
CO2 SERPL-SCNC: 26 MMOL/L (ref 20–32)
CREAT SERPL-MCNC: 3.9 MG/DL (ref 0.52–1.04)
DIASTOLIC BLOOD PRESSURE - MUSE: NORMAL MMHG
EOSINOPHIL # BLD AUTO: 0.1 10E3/UL (ref 0–0.7)
EOSINOPHIL NFR BLD AUTO: 3 %
ERYTHROCYTE [DISTWIDTH] IN BLOOD BY AUTOMATED COUNT: 15.7 % (ref 10–15)
GFR SERPL CREATININE-BSD FRML MDRD: 12 ML/MIN/1.73M2
GLUCOSE BLD-MCNC: 59 MG/DL (ref 70–99)
GLUCOSE BLDC GLUCOMTR-MCNC: 126 MG/DL (ref 70–99)
GLUCOSE BLDC GLUCOMTR-MCNC: 138 MG/DL (ref 70–99)
GLUCOSE BLDC GLUCOMTR-MCNC: 169 MG/DL (ref 70–99)
GLUCOSE BLDC GLUCOMTR-MCNC: 225 MG/DL (ref 70–99)
GLUCOSE BLDC GLUCOMTR-MCNC: 55 MG/DL (ref 70–99)
GLUCOSE BLDC GLUCOMTR-MCNC: 93 MG/DL (ref 70–99)
HCO3 BLDV-SCNC: 27 MMOL/L (ref 21–28)
HCT VFR BLD AUTO: 30.6 % (ref 35–47)
HGB BLD-MCNC: 10.1 G/DL (ref 11.7–15.7)
HOLD SPECIMEN: NORMAL
IMM GRANULOCYTES # BLD: 0 10E3/UL
IMM GRANULOCYTES NFR BLD: 0 %
INTERPRETATION ECG - MUSE: NORMAL
LACTATE BLD-SCNC: 0.4 MMOL/L
LACTATE BLD-SCNC: 0.4 MMOL/L
LACTATE BLD-SCNC: 0.5 MMOL/L
LYMPHOCYTES # BLD AUTO: 0.7 10E3/UL (ref 0.8–5.3)
LYMPHOCYTES NFR BLD AUTO: 13 %
MCH RBC QN AUTO: 31.9 PG (ref 26.5–33)
MCHC RBC AUTO-ENTMCNC: 33 G/DL (ref 31.5–36.5)
MCV RBC AUTO: 97 FL (ref 78–100)
MONOCYTES # BLD AUTO: 0.8 10E3/UL (ref 0–1.3)
MONOCYTES NFR BLD AUTO: 15 %
NEUTROPHILS # BLD AUTO: 3.9 10E3/UL (ref 1.6–8.3)
NEUTROPHILS NFR BLD AUTO: 68 %
NRBC # BLD AUTO: 0 10E3/UL
NRBC BLD AUTO-RTO: 0 /100
P AXIS - MUSE: NORMAL DEGREES
PCO2 BLDV: 40 MM HG (ref 40–50)
PCO2 BLDV: 55 MM HG (ref 40–50)
PCO2 BLDV: 62 MM HG (ref 40–50)
PH BLDV: 7.24 [PH] (ref 7.32–7.43)
PH BLDV: 7.3 [PH] (ref 7.32–7.43)
PH BLDV: 7.44 [PH] (ref 7.32–7.43)
PLATELET # BLD AUTO: 97 10E3/UL (ref 150–450)
PO2 BLDV: 147 MM HG (ref 25–47)
PO2 BLDV: 35 MM HG (ref 25–47)
PO2 BLDV: 45 MM HG (ref 25–47)
POTASSIUM BLD-SCNC: 4.1 MMOL/L (ref 3.4–5.3)
PR INTERVAL - MUSE: NORMAL MS
PROT SERPL-MCNC: 6.8 G/DL (ref 6.8–8.8)
QRS DURATION - MUSE: 88 MS
QT - MUSE: 288 MS
QTC - MUSE: 480 MS
R AXIS - MUSE: 77 DEGREES
RBC # BLD AUTO: 3.17 10E6/UL (ref 3.8–5.2)
SAO2 % BLDV: 60 % (ref 94–100)
SAO2 % BLDV: 72 % (ref 94–100)
SAO2 % BLDV: 99 % (ref 94–100)
SARS-COV-2 RNA RESP QL NAA+PROBE: NEGATIVE
SODIUM SERPL-SCNC: 135 MMOL/L (ref 133–144)
SYSTOLIC BLOOD PRESSURE - MUSE: NORMAL MMHG
T AXIS - MUSE: 211 DEGREES
TROPONIN I SERPL-MCNC: 0.02 UG/L (ref 0–0.04)
VENTRICULAR RATE- MUSE: 167 BPM
WBC # BLD AUTO: 5.6 10E3/UL (ref 4–11)

## 2021-07-22 PROCEDURE — 96365 THER/PROPH/DIAG IV INF INIT: CPT

## 2021-07-22 PROCEDURE — 93453 R&L HRT CATH W/VENTRICLGRPHY: CPT | Performed by: INTERNAL MEDICINE

## 2021-07-22 PROCEDURE — 93463 DRUG ADMIN & HEMODYNMIC MEAS: CPT

## 2021-07-22 PROCEDURE — 93460 R&L HRT ART/VENTRICLE ANGIO: CPT | Performed by: INTERNAL MEDICINE

## 2021-07-22 PROCEDURE — 87635 SARS-COV-2 COVID-19 AMP PRB: CPT | Performed by: EMERGENCY MEDICINE

## 2021-07-22 PROCEDURE — 84155 ASSAY OF PROTEIN SERUM: CPT | Performed by: EMERGENCY MEDICINE

## 2021-07-22 PROCEDURE — C1894 INTRO/SHEATH, NON-LASER: HCPCS | Performed by: INTERNAL MEDICINE

## 2021-07-22 PROCEDURE — 36415 COLL VENOUS BLD VENIPUNCTURE: CPT | Performed by: EMERGENCY MEDICINE

## 2021-07-22 PROCEDURE — 99223 1ST HOSP IP/OBS HIGH 75: CPT | Mod: 25 | Performed by: INTERNAL MEDICINE

## 2021-07-22 PROCEDURE — 210N000002 HC R&B HEART CARE

## 2021-07-22 PROCEDURE — 250N000011 HC RX IP 250 OP 636

## 2021-07-22 PROCEDURE — 4A023N8 MEASUREMENT OF CARDIAC SAMPLING AND PRESSURE, BILATERAL, PERCUTANEOUS APPROACH: ICD-10-PCS | Performed by: INTERNAL MEDICINE

## 2021-07-22 PROCEDURE — 99292 CRITICAL CARE ADDL 30 MIN: CPT

## 2021-07-22 PROCEDURE — 258N000003 HC RX IP 258 OP 636: Performed by: EMERGENCY MEDICINE

## 2021-07-22 PROCEDURE — 82374 ASSAY BLOOD CARBON DIOXIDE: CPT | Performed by: EMERGENCY MEDICINE

## 2021-07-22 PROCEDURE — G0378 HOSPITAL OBSERVATION PER HR: HCPCS

## 2021-07-22 PROCEDURE — 250N000013 HC RX MED GY IP 250 OP 250 PS 637: Performed by: PHYSICIAN ASSISTANT

## 2021-07-22 PROCEDURE — 93005 ELECTROCARDIOGRAM TRACING: CPT

## 2021-07-22 PROCEDURE — 250N000011 HC RX IP 250 OP 636: Performed by: INTERNAL MEDICINE

## 2021-07-22 PROCEDURE — 250N000012 HC RX MED GY IP 250 OP 636 PS 637: Performed by: INTERNAL MEDICINE

## 2021-07-22 PROCEDURE — 99152 MOD SED SAME PHYS/QHP 5/>YRS: CPT | Performed by: INTERNAL MEDICINE

## 2021-07-22 PROCEDURE — 250N000011 HC RX IP 250 OP 636: Performed by: EMERGENCY MEDICINE

## 2021-07-22 PROCEDURE — 93460 R&L HRT ART/VENTRICLE ANGIO: CPT | Mod: 26 | Performed by: INTERNAL MEDICINE

## 2021-07-22 PROCEDURE — 99207 PR NO CHARGE LOS: CPT | Performed by: INTERNAL MEDICINE

## 2021-07-22 PROCEDURE — 99207 PR CDG-CODE CATEGORY CHANGED: CPT | Performed by: INTERNAL MEDICINE

## 2021-07-22 PROCEDURE — 999N000157 HC STATISTIC RCP TIME EA 10 MIN

## 2021-07-22 PROCEDURE — 94762 N-INVAS EAR/PLS OXIMTRY CONT: CPT

## 2021-07-22 PROCEDURE — 99223 1ST HOSP IP/OBS HIGH 75: CPT | Performed by: INTERNAL MEDICINE

## 2021-07-22 PROCEDURE — 99153 MOD SED SAME PHYS/QHP EA: CPT | Performed by: INTERNAL MEDICINE

## 2021-07-22 PROCEDURE — 99223 1ST HOSP IP/OBS HIGH 75: CPT | Mod: AI | Performed by: INTERNAL MEDICINE

## 2021-07-22 PROCEDURE — 272N000001 HC OR GENERAL SUPPLY STERILE: Performed by: INTERNAL MEDICINE

## 2021-07-22 PROCEDURE — 94761 N-INVAS EAR/PLS OXIMETRY MLT: CPT

## 2021-07-22 PROCEDURE — 250N000009 HC RX 250: Performed by: INTERNAL MEDICINE

## 2021-07-22 PROCEDURE — C9803 HOPD COVID-19 SPEC COLLECT: HCPCS

## 2021-07-22 PROCEDURE — 99291 CRITICAL CARE FIRST HOUR: CPT | Mod: 25

## 2021-07-22 PROCEDURE — 96375 TX/PRO/DX INJ NEW DRUG ADDON: CPT

## 2021-07-22 PROCEDURE — 250N000009 HC RX 250: Performed by: EMERGENCY MEDICINE

## 2021-07-22 PROCEDURE — 96361 HYDRATE IV INFUSION ADD-ON: CPT

## 2021-07-22 PROCEDURE — 250N000013 HC RX MED GY IP 250 OP 250 PS 637: Performed by: INTERNAL MEDICINE

## 2021-07-22 PROCEDURE — 82803 BLOOD GASES ANY COMBINATION: CPT

## 2021-07-22 PROCEDURE — 85025 COMPLETE CBC W/AUTO DIFF WBC: CPT | Performed by: EMERGENCY MEDICINE

## 2021-07-22 PROCEDURE — B2111ZZ FLUOROSCOPY OF MULTIPLE CORONARY ARTERIES USING LOW OSMOLAR CONTRAST: ICD-10-PCS | Performed by: INTERNAL MEDICINE

## 2021-07-22 PROCEDURE — 84484 ASSAY OF TROPONIN QUANT: CPT | Performed by: EMERGENCY MEDICINE

## 2021-07-22 PROCEDURE — 93463 DRUG ADMIN & HEMODYNMIC MEAS: CPT | Performed by: INTERNAL MEDICINE

## 2021-07-22 PROCEDURE — C1769 GUIDE WIRE: HCPCS | Performed by: INTERNAL MEDICINE

## 2021-07-22 RX ORDER — LIDOCAINE 40 MG/G
CREAM TOPICAL
Status: DISCONTINUED | OUTPATIENT
Start: 2021-07-22 | End: 2021-07-22

## 2021-07-22 RX ORDER — IOPAMIDOL 755 MG/ML
INJECTION, SOLUTION INTRAVASCULAR
Status: DISCONTINUED | OUTPATIENT
Start: 2021-07-22 | End: 2021-07-22 | Stop reason: HOSPADM

## 2021-07-22 RX ORDER — NITROGLYCERIN 5 MG/ML
VIAL (ML) INTRAVENOUS
Status: DISCONTINUED | OUTPATIENT
Start: 2021-07-22 | End: 2021-07-22 | Stop reason: HOSPADM

## 2021-07-22 RX ORDER — LANTHANUM CARBONATE 500 MG/1
500 TABLET, CHEWABLE ORAL
Status: DISCONTINUED | OUTPATIENT
Start: 2021-07-22 | End: 2021-07-25 | Stop reason: HOSPADM

## 2021-07-22 RX ORDER — NALOXONE HYDROCHLORIDE 0.4 MG/ML
0.2 INJECTION, SOLUTION INTRAMUSCULAR; INTRAVENOUS; SUBCUTANEOUS
Status: ACTIVE | OUTPATIENT
Start: 2021-07-22 | End: 2021-07-22

## 2021-07-22 RX ORDER — ADENOSINE 3 MG/ML
INJECTION, SOLUTION INTRAVENOUS
Status: COMPLETED
Start: 2021-07-22 | End: 2021-07-22

## 2021-07-22 RX ORDER — CARVEDILOL 12.5 MG/1
25 TABLET ORAL 2 TIMES DAILY WITH MEALS
Status: DISCONTINUED | OUTPATIENT
Start: 2021-07-22 | End: 2021-07-23

## 2021-07-22 RX ORDER — LORAZEPAM 0.5 MG/1
0.5 TABLET ORAL
Status: DISCONTINUED | OUTPATIENT
Start: 2021-07-22 | End: 2021-07-22 | Stop reason: HOSPADM

## 2021-07-22 RX ORDER — FENTANYL CITRATE 50 UG/ML
25 INJECTION, SOLUTION INTRAMUSCULAR; INTRAVENOUS
Status: DISCONTINUED | OUTPATIENT
Start: 2021-07-22 | End: 2021-07-25 | Stop reason: HOSPADM

## 2021-07-22 RX ORDER — ASPIRIN 81 MG/1
81 TABLET ORAL DAILY
Status: DISCONTINUED | OUTPATIENT
Start: 2021-07-23 | End: 2021-07-25 | Stop reason: HOSPADM

## 2021-07-22 RX ORDER — HEPARIN SODIUM 1000 [USP'U]/ML
INJECTION, SOLUTION INTRAVENOUS; SUBCUTANEOUS
Status: DISCONTINUED | OUTPATIENT
Start: 2021-07-22 | End: 2021-07-22 | Stop reason: HOSPADM

## 2021-07-22 RX ORDER — HYDRALAZINE HYDROCHLORIDE 25 MG/1
25 TABLET, FILM COATED ORAL 3 TIMES DAILY PRN
Status: DISCONTINUED | OUTPATIENT
Start: 2021-07-22 | End: 2021-07-25 | Stop reason: HOSPADM

## 2021-07-22 RX ORDER — ASPIRIN 81 MG/1
81 TABLET, CHEWABLE ORAL DAILY
Status: DISCONTINUED | OUTPATIENT
Start: 2021-07-22 | End: 2021-07-22

## 2021-07-22 RX ORDER — DILTIAZEM HYDROCHLORIDE 5 MG/ML
10 INJECTION INTRAVENOUS ONCE
Status: COMPLETED | OUTPATIENT
Start: 2021-07-22 | End: 2021-07-22

## 2021-07-22 RX ORDER — NICOTINE POLACRILEX 4 MG
15-30 LOZENGE BUCCAL
Status: DISCONTINUED | OUTPATIENT
Start: 2021-07-22 | End: 2021-07-25 | Stop reason: HOSPADM

## 2021-07-22 RX ORDER — LEVALBUTEROL TARTRATE 45 UG/1
2 AEROSOL, METERED ORAL EVERY 6 HOURS PRN
Status: DISCONTINUED | OUTPATIENT
Start: 2021-07-22 | End: 2021-07-25 | Stop reason: HOSPADM

## 2021-07-22 RX ORDER — ONDANSETRON 2 MG/ML
4 INJECTION INTRAMUSCULAR; INTRAVENOUS EVERY 6 HOURS PRN
Status: DISCONTINUED | OUTPATIENT
Start: 2021-07-22 | End: 2021-07-25 | Stop reason: HOSPADM

## 2021-07-22 RX ORDER — FLUTICASONE PROPIONATE 50 MCG
1 SPRAY, SUSPENSION (ML) NASAL DAILY PRN
Status: DISCONTINUED | OUTPATIENT
Start: 2021-07-23 | End: 2021-07-25 | Stop reason: HOSPADM

## 2021-07-22 RX ORDER — PROCHLORPERAZINE MALEATE 5 MG
10 TABLET ORAL EVERY 6 HOURS PRN
Status: DISCONTINUED | OUTPATIENT
Start: 2021-07-22 | End: 2021-07-25 | Stop reason: HOSPADM

## 2021-07-22 RX ORDER — VERAPAMIL HYDROCHLORIDE 2.5 MG/ML
INJECTION, SOLUTION INTRAVENOUS
Status: DISCONTINUED | OUTPATIENT
Start: 2021-07-22 | End: 2021-07-22 | Stop reason: HOSPADM

## 2021-07-22 RX ORDER — LANOLIN ALCOHOL/MO/W.PET/CERES
3 CREAM (GRAM) TOPICAL
Status: DISCONTINUED | OUTPATIENT
Start: 2021-07-22 | End: 2021-07-25 | Stop reason: HOSPADM

## 2021-07-22 RX ORDER — ACETAMINOPHEN 650 MG/1
650 SUPPOSITORY RECTAL EVERY 6 HOURS PRN
Status: DISCONTINUED | OUTPATIENT
Start: 2021-07-22 | End: 2021-07-25 | Stop reason: HOSPADM

## 2021-07-22 RX ORDER — ADENOSINE 3 MG/ML
INJECTION, SOLUTION INTRAVENOUS
Status: DISCONTINUED | OUTPATIENT
Start: 2021-07-22 | End: 2021-07-22 | Stop reason: HOSPADM

## 2021-07-22 RX ORDER — ASPIRIN 81 MG/1
243 TABLET, CHEWABLE ORAL ONCE
Status: DISCONTINUED | OUTPATIENT
Start: 2021-07-22 | End: 2021-07-22 | Stop reason: CLARIF

## 2021-07-22 RX ORDER — NALOXONE HYDROCHLORIDE 0.4 MG/ML
0.4 INJECTION, SOLUTION INTRAMUSCULAR; INTRAVENOUS; SUBCUTANEOUS
Status: ACTIVE | OUTPATIENT
Start: 2021-07-22 | End: 2021-07-22

## 2021-07-22 RX ORDER — LORAZEPAM 2 MG/ML
0.5 INJECTION INTRAMUSCULAR
Status: DISCONTINUED | OUTPATIENT
Start: 2021-07-22 | End: 2021-07-22 | Stop reason: HOSPADM

## 2021-07-22 RX ORDER — AMLODIPINE BESYLATE 5 MG/1
5 TABLET ORAL DAILY
Status: DISCONTINUED | OUTPATIENT
Start: 2021-07-22 | End: 2021-07-23

## 2021-07-22 RX ORDER — NITROGLYCERIN 0.4 MG/1
0.4 TABLET SUBLINGUAL EVERY 5 MIN PRN
Status: DISCONTINUED | OUTPATIENT
Start: 2021-07-22 | End: 2021-07-25 | Stop reason: HOSPADM

## 2021-07-22 RX ORDER — CALCIUM GLUCONATE 94 MG/ML
0.5 INJECTION, SOLUTION INTRAVENOUS ONCE
Status: COMPLETED | OUTPATIENT
Start: 2021-07-22 | End: 2021-07-22

## 2021-07-22 RX ORDER — FAMOTIDINE 10 MG
10 TABLET ORAL DAILY
Status: DISCONTINUED | OUTPATIENT
Start: 2021-07-23 | End: 2021-07-25 | Stop reason: HOSPADM

## 2021-07-22 RX ORDER — ASPIRIN 325 MG
325 TABLET ORAL ONCE
Status: DISCONTINUED | OUTPATIENT
Start: 2021-07-22 | End: 2021-07-22

## 2021-07-22 RX ORDER — AMOXICILLIN 250 MG
1 CAPSULE ORAL 2 TIMES DAILY PRN
Status: DISCONTINUED | OUTPATIENT
Start: 2021-07-22 | End: 2021-07-25 | Stop reason: HOSPADM

## 2021-07-22 RX ORDER — CLONIDINE HYDROCHLORIDE 0.1 MG/1
0.1 TABLET ORAL EVERY 6 HOURS PRN
Status: DISCONTINUED | OUTPATIENT
Start: 2021-07-22 | End: 2021-07-25 | Stop reason: HOSPADM

## 2021-07-22 RX ORDER — FENTANYL CITRATE 50 UG/ML
INJECTION, SOLUTION INTRAMUSCULAR; INTRAVENOUS
Status: DISCONTINUED | OUTPATIENT
Start: 2021-07-22 | End: 2021-07-22 | Stop reason: HOSPADM

## 2021-07-22 RX ORDER — ONDANSETRON 4 MG/1
4 TABLET, ORALLY DISINTEGRATING ORAL EVERY 6 HOURS PRN
Status: DISCONTINUED | OUTPATIENT
Start: 2021-07-22 | End: 2021-07-25 | Stop reason: HOSPADM

## 2021-07-22 RX ORDER — LIDOCAINE 40 MG/G
CREAM TOPICAL
Status: DISCONTINUED | OUTPATIENT
Start: 2021-07-22 | End: 2021-07-25 | Stop reason: HOSPADM

## 2021-07-22 RX ORDER — ACETAMINOPHEN 325 MG/1
650 TABLET ORAL EVERY 6 HOURS PRN
Status: DISCONTINUED | OUTPATIENT
Start: 2021-07-22 | End: 2021-07-22

## 2021-07-22 RX ORDER — DEXTROSE MONOHYDRATE 25 G/50ML
25-50 INJECTION, SOLUTION INTRAVENOUS
Status: DISCONTINUED | OUTPATIENT
Start: 2021-07-22 | End: 2021-07-25 | Stop reason: HOSPADM

## 2021-07-22 RX ORDER — SODIUM CHLORIDE 9 MG/ML
75 INJECTION, SOLUTION INTRAVENOUS CONTINUOUS
Status: ACTIVE | OUTPATIENT
Start: 2021-07-22 | End: 2021-07-22

## 2021-07-22 RX ORDER — ATROPINE SULFATE 0.1 MG/ML
0.5 INJECTION INTRAVENOUS
Status: ACTIVE | OUTPATIENT
Start: 2021-07-22 | End: 2021-07-22

## 2021-07-22 RX ORDER — ONDANSETRON 2 MG/ML
INJECTION INTRAMUSCULAR; INTRAVENOUS
Status: DISCONTINUED | OUTPATIENT
Start: 2021-07-22 | End: 2021-07-22 | Stop reason: HOSPADM

## 2021-07-22 RX ORDER — ACETAMINOPHEN 325 MG/1
650 TABLET ORAL EVERY 4 HOURS PRN
Status: DISCONTINUED | OUTPATIENT
Start: 2021-07-22 | End: 2021-07-25 | Stop reason: HOSPADM

## 2021-07-22 RX ORDER — PROCHLORPERAZINE 25 MG
25 SUPPOSITORY, RECTAL RECTAL EVERY 12 HOURS PRN
Status: DISCONTINUED | OUTPATIENT
Start: 2021-07-22 | End: 2021-07-25 | Stop reason: HOSPADM

## 2021-07-22 RX ORDER — AMOXICILLIN 250 MG
2 CAPSULE ORAL 2 TIMES DAILY PRN
Status: DISCONTINUED | OUTPATIENT
Start: 2021-07-22 | End: 2021-07-25 | Stop reason: HOSPADM

## 2021-07-22 RX ORDER — POTASSIUM CHLORIDE 1500 MG/1
20 TABLET, EXTENDED RELEASE ORAL
Status: DISCONTINUED | OUTPATIENT
Start: 2021-07-22 | End: 2021-07-22 | Stop reason: HOSPADM

## 2021-07-22 RX ORDER — HYDRALAZINE HYDROCHLORIDE 20 MG/ML
10 INJECTION INTRAMUSCULAR; INTRAVENOUS EVERY 4 HOURS PRN
Status: DISCONTINUED | OUTPATIENT
Start: 2021-07-22 | End: 2021-07-25 | Stop reason: HOSPADM

## 2021-07-22 RX ORDER — FLUMAZENIL 0.1 MG/ML
0.2 INJECTION, SOLUTION INTRAVENOUS
Status: ACTIVE | OUTPATIENT
Start: 2021-07-22 | End: 2021-07-22

## 2021-07-22 RX ADMIN — DILTIAZEM HYDROCHLORIDE 5 MG/HR: 5 INJECTION INTRAVENOUS at 02:01

## 2021-07-22 RX ADMIN — HYDRALAZINE HYDROCHLORIDE 25 MG: 25 TABLET, FILM COATED ORAL at 21:25

## 2021-07-22 RX ADMIN — AMLODIPINE BESYLATE 5 MG: 5 TABLET ORAL at 20:40

## 2021-07-22 RX ADMIN — ASPIRIN 81 MG CHEWABLE TABLET 81 MG: 81 TABLET CHEWABLE at 12:35

## 2021-07-22 RX ADMIN — ADENOSINE 6 MG: 3 INJECTION, SOLUTION INTRAVENOUS at 01:27

## 2021-07-22 RX ADMIN — ADENOSINE 18 MG: 3 INJECTION, SOLUTION INTRAVENOUS at 01:38

## 2021-07-22 RX ADMIN — ACETAMINOPHEN 650 MG: 325 TABLET, FILM COATED ORAL at 23:25

## 2021-07-22 RX ADMIN — SODIUM CHLORIDE 1000 ML: 9 INJECTION, SOLUTION INTRAVENOUS at 02:30

## 2021-07-22 RX ADMIN — CALCIUM GLUCONATE 0.5 G: 98 INJECTION, SOLUTION INTRAVENOUS at 03:12

## 2021-07-22 RX ADMIN — INSULIN DETEMIR 15 UNITS: 100 INJECTION, SOLUTION SUBCUTANEOUS at 21:28

## 2021-07-22 RX ADMIN — ADENOSINE 12 MG: 3 INJECTION, SOLUTION INTRAVENOUS at 01:32

## 2021-07-22 RX ADMIN — DILTIAZEM HYDROCHLORIDE 10 MG: 5 INJECTION, SOLUTION INTRAVENOUS at 01:44

## 2021-07-22 ASSESSMENT — ACTIVITIES OF DAILY LIVING (ADL)
ADLS_ACUITY_SCORE: 13
ADLS_ACUITY_SCORE: 14

## 2021-07-22 ASSESSMENT — ENCOUNTER SYMPTOMS
HEADACHES: 0
PALPITATIONS: 1

## 2021-07-22 NOTE — H&P
[facility]    History and Physical - Hospitalist Service       Date of Admission:  7/22/2021    Assessment & Plan      Yissel Wetzel is a 61 year old female admitted on 7/22/2021. She presents to the emergency department with rapid palpitations several times at dialysis Wednesday, repeated episodes in the afternoon/evening, episode last night lasting greater than 40 minutes and resulting in diaphoresis.  Found to have supraventricular tachycardia which did not convert with multiple rounds of adenosine, though developed symptomatic bradycardia after IV diltiazem bolus.    Symptomatic bradycardia: Within minutes after receiving 10 mg IV diltiazem bolus, patient went from rapid SVT in the 170 range, had approximately 2-second pause, converted to sinus bradycardia in the mid 40 range.  Blood pressures following this went from 150s systolic to mid 80s systolic.  Patient describes dizziness, nausea, urge to defecate, acutely aware of onset of symptoms associated with change in heart rate.  -Patient receiving calcium administration in the emergency department  -If no improvement, will require glucose/insulin drip for calcium channel blocker toxicity.  If no improvement following that might require transcutaneous pacing.  Still appears to be perfusing adequately.  -Addendum: updated by ER. following administration of IV calcium, patient feels improved.  Heart rate remains in the mid 40s, though blood pressure improved to the 110 range.    Symptomatic prolonged SVT: Patient with Zio patch in place given suspicion for SVT at recent hospitalization as there was reported heart rate of 190 at dialysis though not captured on EKG at last admission.  Heart rates in the 170s on presentation currently consistent with SVT on telemetry/EKG.  Did not convert with adenosine 6, 12, or 18 doses, though converted with symptomatic bradycardia shortly after 10 mg IV diltiazem bolus.  Severe pulmonary hypertension  on recent echocardiogram  -Recently on labetalol, this was converted to carvedilol 25 mg twice daily during recent admission; currently carvedilol is on hold given symptomatic bradycardia after diltiazem administration.  At last discharge, heart rate in the 60s following uptitration of carvedilol, so likely limited ability to uptitrate beta-blockade.  -Cardiology consulted as above; given heart rate in the 60s after conversion to carvedilol, question if patient may require EP study for SVT ablation; it appears there had been a plan for outpatient right heart catheterization, though I do not believe patient has undergone this as of yet  -Anticipate continued ziopatch monitoring (to complete study) to evaluate HR and SVT burden    End-stage renal disease on dialysis: Followed for transplant evaluation at the [facility].  Monday Wednesday Friday dialysis at [facility].  Reports that she was able to complete her dialysis run Wednesday without difficulty.  Right brachiocephalic AV fistula access.  -Nephrology consulted for Friday if patient remains hospitalized  -Resume prior to admission Fosrenol with meals when reconciled by pharmacy    Hypoglycemia: Blood glucose of 55 on presentation.  Recent down titration of long-acting insulin doses with recent hospitalization for reported hypoglycemia as well.  Type 2 diabetes with nephropathy: Well controlled.  Last hemoglobin A1c 7.4 7/14/2021.  -Prior to admission 30 units Levemir reduced to 15 units; patient is currently n.p.o. while awaiting improvement in acute bradycardia after diltiazem administration; was actually hypoglycemic to the 50 range on arrival in the emergency department and at prior admission had a dose reduction in her Levemir with ongoing episodes of hypoglycemia.  Anticipate discharge on a significantly lowered long-acting insulin dose with slow up titration as an outpatient  -Medium dose sliding scale insulin available as  "needed  -Hypoglycemia protocol in place    Severe pulmonary hypertension: Likely contributing to SVT.  Note plan for right heart cath as an outpatient, though has not been completed.  Follows with [facility].  On 2 L nasal cannula oxygen at home, hopefully temporary as chronic O2 therapy appears to be a contraindication to renal transplant  -Cardiology consulted as above    Hypertension:  -Prior to admission carvedilol on hold given bradycardia as above         Diet:    N.p.o. currently with symptomatic bradycardia, advance diet as tolerated to dialysis diet when this has resolved  DVT Prophylaxis: Pneumatic Compression Devices  Weller Catheter: Not present  Central Lines: None  Code Status:  Full code.  Confirmed with patient admission    Risk Factors Present on Admission              # Thrombocytopenia: Plts = 97 10e3/uL (Ref range: 150 - 450 10e3/uL) on admission, will monitor for bleeding      Disposition Plan   Expected discharge: Likely 7/23/2021 recommended to prior living arrangement once Heart rate normalized, rate control strategy established.  Uncertain if patient will require EP study during hospitalization and if right heart cath would be pursued at that time as well.     The patient's care was discussed with the Patient and Dr. Covington in the emergency department.    Lior Mcdaniel MD  [facility]  Securely message with the Vocera Web Console (learn more here)  Text page via [employer] Paging/Directory      ______________________________________________________________________    Chief Complaint   \"I feel awful\" reports lightheadedness, nausea, urge to defecate in the setting of bradycardia  Symptomatic rapid palpitations with diaphoresis prior to conversion to sinus bradycardia    History is obtained from the patient, chart review, discussion with ER provider    History of Present Illness   Yissel Wetezl is a 61 year old female who presents the emergency department after " several episodes of palpitations over 7/21/2021.  Patient was recently hospitalized after she was noted to have a rapid heart rate while at dialysis into the 190 range thought to be SVT.  Was brought to the emergency department, though was in sinus rhythm by the time that she was evaluated.  Prior to admission labetalol at that time was converted to carvedilol during her hospitalization and uptitrated to 25 twice daily.  Heart rate at discharge was 63 bpm.  Patient was discharged with Zio patch, plan for right heart catheterization for pulmonary hypertension noted on echocardiogram.    Patient has Zio patch in place.  She was at dialysis today when she had several brief episodes of rapid palpitations.  This evening she had a more prolonged episode lasting approximately 10 minutes, and then later at night had a 40-minute episode prompting her to call 911.  She describes rapid palpitations as well as diaphoresis, no chest pain, no shortness of breath, no lightheadedness.    In the emergency department, was found to be in supraventricular tachycardia.  She received adenosine 6, 12, and 18 mg dose without response.  She was given 10 mg of IV diltiazem following this, and initiated on a diltiazem drip.  A few minutes after diltiazem bolus, patient went from SVT with a heart rate in the 170 range to bradycardia in the mid 40s range.  Her blood pressure dropped from 150 systolic to mid 80s systolic, and she became symptomatic with her bradycardia and hypotension.    Is called for admission to observation for cardiology evaluation.  At time of my evaluation, patient is still symptomatic with bradycardia.  Her blood pressure has improved to the  systolic range, though heart rate is still 45.  She tells me that she feels lightheaded, nauseous, has an urge to defecate at the same time.  Calcium has been ordered for reversal, though patient has not yet received; she tells me to hurry as she feels quite unwell.  She is  able to described the abrupt onset of lightheadedness associated with diltiazem administration.  Still appears to be perfusing well with warm extremities and pulses.  With symptomatic bradycardia, discussed again with ER provider regarding need for immediate treatments, possible escalation of care/hospitalization.  I do anticipate that her symptomatic bradycardia will resolve in the next several minutes, though requires further stabilization in the emergency department.    Following calcium administration, I am updated that patient does indeed feel improved.  Still has bradycardia in the mid 40s, though blood pressure is improving.    Given limited additional rate control options, patient may require electrophysiology study.  Cardiology has been consulted for evaluation.    Patient has no fevers, no shortness of breath.  She has significant pitting edema in her abdomen.   patient has had no diarrhea, though feels the urge to defecate as well as vomit    Review of Systems    The 10 point Review of Systems is negative other than noted in the HPI or here.  No fevers or chills  No cough  Tells me that she was able to tolerate her hemodialysis run 7/21 without difficulty other than a few brief episodes of SVT during her run.    Past Medical History    I have reviewed this patient's medical history and updated it with pertinent information if needed.   Past Medical History:   Diagnosis Date     Allergic rhinitis, cause unspecified      Anemia in chronic kidney disease      Anemia of chronic disease      Bleeding pseudoaneurysm of right brachiocephalic arteriovenous fistula, initial encounter (H) 12/6/2019     End stage renal failure on dialysis (H)      Esophagitis, unspecified      Former tobacco use      HEMORRHAGIC GASTROPATHY      History of blood transfusion     [employer] [place]     Iron deficiency anemia, unspecified      Mild persistent asthma      Obesity      Other and unspecified hyperlipidemia      Pneumonia       Pulmonary hypertension (H)     per 2012 echo mod.to severe pulmonary hypertension     Tobacco use disorder dc ed      Type 2 diabetes mellitus (H)     on Insulin- managed by PCP     Unspecified essential hypertension        Past Surgical History   I have reviewed this patient's surgical history and updated it with pertinent information if needed.  Past Surgical History:   Procedure Laterality Date     ABDOMINOPLASTY  pt unsure when    abdominoplasty-      SECTION  ,     x 2     COLONOSCOPY      [facility]     COLONOSCOPY N/A 2021    Procedure: COLONOSCOPY, WITH POLYPECTOMY AND BIOPSY;  Surgeon: Lincoln Farrar MD;  Location:  GI     CREATE FISTULA ARTERIOVENOUS UPPER EXTREMITY Right 2018    Procedure: RIGHT BRACHIAL TO BASILIC ARTERIOVENOUS FISTULA CREATION;  Surgeon: Terry Vizcaino MD;  Location: [facility]     ENT SURGERY  as a child    tonsillectomy     ESOPHAGOSCOPY, GASTROSCOPY, DUODENOSCOPY (EGD), COMBINED N/A 2021    Procedure: ESOPHAGOGASTRODUODENOSCOPY, WITH BIOPSY;  Surgeon: Lincoln Farrar MD;  Location:  GI     EYE SURGERY Left     injections- eye     HYSTERECTOMY  approx     partial hysterectomy-reason bleeding      IR CVC TUNNEL PLACEMENT > 5 YRS OF AGE  2019     IR CVC TUNNEL REMOVAL RIGHT  2020     IR DIALYSIS FISTULOGRAM RIGHT  2019       Social History   I have reviewed this patient's social history and updated it with pertinent information if needed.  Social History     Tobacco Use     Smoking status: Former Smoker     Packs/day: 1.00     Years: 22.00     Pack years: 22.00     Types: Cigarettes     Quit date: 10/12/1999     Years since quittin.7     Smokeless tobacco: Never Used   Substance Use Topics     Alcohol use: No     Alcohol/week: 0.0 standard drinks     Drug use: No       Family History   I have reviewed this patient's family history and updated it with pertinent information if  needed.  Family History   Problem Relation Age of Onset     Arrhythmia Mother      Hypertension Mother      Pancreatic Cancer Father      Diabetes Brother      Asthma Sister      LUNG DISEASE Sister      Diabetes Daughter      Cirrhosis Sister        Prior to Admission Medications   Prior to Admission Medications   Prescriptions Last Dose Informant Patient Reported? Taking?   ACCU-CHEK CHING PLUS test strip  Self No No   Sig: USE TO TEST BLOOD SUGAR 4 TIMES PER DAY   Glycopyrrolate-Formoterol (BEVESPI AEROSPHERE) 9-4.8 MCG/ACT oral inhaler   Yes No   Sig: Inhale 2 puffs into the lungs 2 times daily   NOVOLOG FLEXPEN 100 UNIT/ML soln  Self No No   Sig: INJECT 9-12 UNITS BEFORE BREAKFAST, LUNCH, AND DINNER PLUS A CORRECTIVE SCALE OF 2 UNITS FOR EVERY 50 OVER 150   acetaminophen (TYLENOL) 500 MG tablet  Self Yes No   Sig: Take 500 mg by mouth every 8 hours as needed for mild pain   carvedilol (COREG) 25 MG tablet   No No   Sig: Take 1 tablet (25 mg) by mouth 2 times daily (with meals)   ciprofloxacin-dexamethasone (CIPRODEX) 0.3-0.1 % otic suspension  Self No No   Sig: Place 4 drops into both ears 2 times daily   Patient not taking: Reported on 3/4/2021   famotidine (PEPCID) 10 MG tablet  Self No No   Sig: Take 1 tablet (10 mg) by mouth daily   Patient taking differently: Take 10 mg by mouth daily as needed    fluticasone (FLONASE) 50 MCG/ACT nasal spray  Self No No   Sig: Spray 1 spray into both nostrils daily   Patient taking differently: Spray 1 spray into both nostrils daily as needed    hypromellose-dextran (ARTIFICAL TEARS) 0.1-0.3 % ophthalmic solution  Self Yes No   Sig: Place 1 drop into both eyes daily as needed for dry eyes   insulin aspart (NOVOLOG FLEXPEN) 100 UNIT/ML pen  Self Yes No   Sig: Take 3 unit per 15 grams of carbohydrates three times daily with meals   insulin aspart (NOVOLOG PEN) 100 UNIT/ML pen  Self Yes No   Sig: Corrective scale = 2 units for every BG 50 over 150  In addition to carb  counting insulin   insulin detemir (LEVEMIR FLEXTOUCH) 100 UNIT/ML pen   No No   Sig: Inject 30 Units Subcutaneous At Bedtime   insulin pen needle (31G X 8 MM) 31G X 8 MM miscellaneous  Self No No   Sig: Use 4 pen needles daily or as directed.   lanthanum (FOSRENOL) 500 MG chewable tablet  Self Yes No   Sig: Take 500 mg by mouth 3 times daily (with meals)   levalbuterol (XOPENEX HFA) 45 MCG/ACT inhaler  Self No No   Sig: Inhale 2 puffs into the lungs every 6 hours as needed for shortness of breath / dyspnea or wheezing      Facility-Administered Medications: None     Allergies   Allergies   Allergen Reactions     Albuterol      shakiness     Aspirin      gi     Byetta [Exenatide]      gi     Clonidine      constipation     Codeine      vomiting     Ezetimibe      muscle symptoms     Hydralazine      headaches     Lisinopril      hyperkalemia     Metformin Hydrochloride      vomiting     Pravachol [Pravastatin Sodium]      muscle pains     Simvastatin      myalgias     Troglitazone        Physical Exam   Vital Signs: Temp: 98.3  F (36.8  C) Temp src: Oral BP: (!) 89/49 Pulse: (!) 46   Resp: 11 SpO2: 94 % O2 Device: Nasal cannula Oxygen Delivery: 2 LPM  Weight: 164 lbs 0 oz    General Appearance: Chronically ill-appearing 61-year-old female laying in bed.  She does not appear toxic, though does appear quite unwell.  Grayish in color, fatigued appearing.  Appears significantly older than stated age  Eyes: No scleral icterus or injection  HEENT: Normocephalic, atraumatic  Respiratory: Breath sounds with bibasilar crackles, more notable in right base than left.  No wheezes, good effort and air movement  Cardiovascular: Bradycardia with heart rate in the mid 40s range.  Heart sounds are somewhat distant with no appreciable murmur despite known AV fistula of right upper extremity  GI: Abdomen obese, soft, nontender to palpation.  Pitting edema of the abdomen is notable, and discordant from minimal edema of lower  extremities.  Lymph/Hematologic: Pitting edema throughout abdominal wall, minimal edema of lower extremities  Genitourinary: Not examined  Skin: Grayish, seborrheic keratoses present.  No jaundice  Neurologic: Alert, conversant, appropriate conversation.  Mental status grossly intact.  Appears quite fatigued and unwell  Psychiatric: Blunted affect in the setting of acute medical illness, pleasant    Data   Data reviewed today: I reviewed all medications, new labs and imaging results over the last 24 hours. I personally reviewed the Cardiac telemetry image(s) showing Supraventricular tachycardia with rates in the 170s range, subsequent pause with resultant sinus bradycardia after diltiazem administration on print outs.    Recent Labs   Lab 07/22/21  0130 07/22/21  0119 07/16/21  1722 07/16/21  0600 07/15/21  0552   WBC 5.6  --   --  5.1  --    HGB 10.1*  --   --  9.1*  --    MCV 97  --   --  98  --    PLT 97*  --   --  99*  --      --   --  132* 134   POTASSIUM 4.1  --   --  4.6 4.1   CHLORIDE 100  --   --  99 100   CO2 26  --   --  27 29   BUN 28  --   --  41* 23   CR 3.90*  --   --  4.95* 3.55*   ANIONGAP 9  --   --  6 5   KANWAL 8.7  --   --  8.4* 8.5   GLC 59* 55* 108* 145* 139*   ALBUMIN 3.4  --   --   --   --    PROTTOTAL 6.8  --   --   --   --    BILITOTAL 0.9  --   --   --   --    ALKPHOS 93  --   --   --   --    ALT 18  --   --   --   --    AST 22  --   --   --   --    TROPONIN 0.022  --   --   --   --

## 2021-07-22 NOTE — PROVIDER NOTIFICATION
Paged Dr. Georges 07/22/21 6:03 PM: Cards note mentions amlodipine. Do you want this ordered? Orders received:     Amlodipine 5 mg daily ordered, and will increase as dose as needed for BP control    Guido Georges MD .

## 2021-07-22 NOTE — PROGRESS NOTES
[facility]    Hospitalist Progress Note    Assessment & Plan   61 year old female who was admitted on 7/22/2021 with rapid Heart Rate and SOB:    Impression:   Principal Problem:    Paroxysmal SVT   -- Cardiology consulted to consider Ablation for recurrent SVT   -- Will keep NPO in case able to do today      Symptomatic sinus bradycardia   -- occurs when SVT treated     Active Problems:    Essential hypertension      DM 2 w ESRD      ESRD on hemodialysis on M, W, F      Anemia in chronic kidney disease      Thrombocytopenia (H)      Severe Pulmonary hypertension         Plan:  As above, will consult renal if going to be here tomorrow for dialysis.     DVT Prophylaxis: Pneumatic Compression Devices  Code Status: Full Code    Disposition: Expected discharge in 2-3 days    Guido Georges  Pager [phone]  Cell Phone [phone]  Text Page (7am to 6pm)    Interval History   Having episodic SVT, had it a week ago, then again yesterday during dialysis and it resolved, then occurred last evening and persisted so came to ER.  Treated with Adenosine repeatedly, then Diltiazem which helped, but then symptomatic bradycardia with HR in 40's.      Physical Exam   Temp: 98.3  F (36.8  C) Temp src: Oral BP: (!) 142/75 Pulse: 61   Resp: (P) 15 SpO2: 97 % O2 Device: (P) Nasal cannula Oxygen Delivery: (P) 2 LPM  Vitals:    07/22/21 0113   Weight: 74.4 kg (164 lb)     Vital Signs with Ranges  Temp:  [98.3  F (36.8  C)] 98.3  F (36.8  C)  Pulse:  [] 61  Resp:  [10-30] (P) 15  BP: ()/() 142/75  SpO2:  [90 %-99 %] 97 %  I/O last 3 completed shifts:  In: 1000 [IV Piggyback:1000]  Out: -     # Pain Assessment:  Current Pain Score 7/22/2021   Patient currently in pain? denies   Pain score (0-10) -   Debrene s pain level was assessed and she currently denies pain.        Constitutional: Awake, alert, cooperative, no apparent distress  Respiratory: Clear to auscultation bilaterally, no  crackles or wheezing  Cardiovascular: Regular rate and rhythm, normal S1 and S2, and no murmur noted  GI: Normal bowel sounds, soft, non-distended, non-tender  Extrem: No calf tenderness, 1+ bilateral ankle edema  Neuro: Ox3, no focal motor or sensory deficits    Medications       - MEDICATION INSTRUCTIONS for Dialysis Patients -   Does not apply See Admin Instructions     [Held by provider] carvedilol  25 mg Oral BID w/meals     insulin aspart  1-7 Units Subcutaneous TID AC     insulin aspart  1-5 Units Subcutaneous At Bedtime     insulin detemir  15 Units Subcutaneous At Bedtime     sodium chloride (PF)  3 mL Intracatheter Q8H     sodium chloride (PF)  3 mL Intracatheter Q8H       Data   Recent Labs   Lab 07/22/21  0850 07/22/21  0352 07/22/21  0130 07/16/21  0600   WBC  --   --  5.6 5.1   HGB  --   --  10.1* 9.1*   MCV  --   --  97 98   PLT  --   --  97* 99*   NA  --   --  135 132*   POTASSIUM  --   --  4.1 4.6   CHLORIDE  --   --  100 99   CO2  --   --  26 27   BUN  --   --  28 41*   CR  --   --  3.90* 4.95*   ANIONGAP  --   --  9 6   KANWAL  --   --  8.7 8.4*   * 93 59* 145*   ALBUMIN  --   --  3.4  --    PROTTOTAL  --   --  6.8  --    BILITOTAL  --   --  0.9  --    ALKPHOS  --   --  93  --    ALT  --   --  18  --    AST  --   --  22  --    TROPONIN  --   --  0.022  --        Imaging:   No results found for this or any previous visit (from the past 24 hour(s)).

## 2021-07-22 NOTE — PHARMACY-ADMISSION MEDICATION HISTORY
Pharmacy Medication History  Admission medication history interview status for the 7/22/2021  admission is complete. See EPIC admission navigator for prior to admission medications     Location of Interview: Patient room  Medication history sources: Patient and Surescripts    Significant changes made to the medication list:  Removed Ciprodex - therapy complete    In the past week, patient estimated taking medication this percent of the time: 50-90% due to other    Additional medication history information:   Pt was prescribed Bevespi but refuses to take it.  She is prescribed carvedilol 25mg BID but has been taking 12.5mg daily.  She also is not consistent about taking her lanthanum.  She states he makes her bloated with diarrhea and plans on talking to her physician about it.      Medication reconciliation completed by provider prior to medication history? No    Time spent in this activity: 20 min    Prior to Admission medications    Medication Sig Last Dose Taking? Auth Provider   acetaminophen (TYLENOL) 500 MG tablet Take 500 mg by mouth every 8 hours as needed for mild pain  at prn Yes Unknown, Entered By History   carvedilol (COREG) 25 MG tablet Take 1 tablet (25 mg) by mouth 2 times daily (with meals)  Patient taking differently: Take 12.5 mg by mouth every morning  7/21/2021 at am Yes Patricia Amaya MD   famotidine (PEPCID) 10 MG tablet Take 1 tablet (10 mg) by mouth daily  Patient taking differently: Take 10 mg by mouth daily as needed   at prn Yes Gigi Martin MD   fluticasone (FLONASE) 50 MCG/ACT nasal spray Spray 1 spray into both nostrils daily  Patient taking differently: Spray 1 spray into both nostrils daily as needed   at prn Yes Gigi Martin MD   hypromellose-dextran (ARTIFICAL TEARS) 0.1-0.3 % ophthalmic solution Place 1 drop into both eyes daily as needed for dry eyes  at prn Yes Unknown, Entered By History   insulin detemir (LEVEMIR FLEXTOUCH) 100 UNIT/ML pen Inject 30 Units  Subcutaneous At Bedtime 7/21/2021 at hs Yes Patricia Amaya MD   lanthanum (FOSRENOL) 500 MG chewable tablet Take 500 mg by mouth 3 times daily (with meals) 7/20/2021 Yes Unknown, Entered By History   levalbuterol (XOPENEX HFA) 45 MCG/ACT inhaler Inhale 2 puffs into the lungs every 6 hours as needed for shortness of breath / dyspnea or wheezing  at prn Yes Gigi Martin MD   NOVOLOG FLEXPEN 100 UNIT/ML soln INJECT 9-12 UNITS BEFORE BREAKFAST, LUNCH, AND DINNER PLUS A CORRECTIVE SCALE OF 2 UNITS FOR EVERY 50 OVER 150  Patient taking differently: INJECT 3 UNITS/15gm carbs BEFORE BREAKFAST, LUNCH, AND DINNER PLUS A CORRECTIVE SCALE OF 2 UNITS FOR EVERY 50 OVER 150 7/21/2021 at Unknown time Yes iGgi Martin MD   ACCU-CHEK CHING PLUS test strip USE TO TEST BLOOD SUGAR 4 TIMES PER DAY   Gigi Martin MD   Glycopyrrolate-Formoterol (BEVESPI AEROSPHERE) 9-4.8 MCG/ACT oral inhaler Inhale 2 puffs into the lungs 2 times daily  Patient not taking: Reported on 7/22/2021 Not Taking at Unknown time  Unknown, Entered By History   insulin pen needle (31G X 8 MM) 31G X 8 MM miscellaneous Use 4 pen needles daily or as directed.   Gigi Martin MD       The information provided in this note is only as accurate as the sources available at the time of update(s)

## 2021-07-22 NOTE — CONSULTS
[facility]    Cardiology Consultation     Date of Admission:  7/22/2021    Assessment & Plan   Yissel Wetzel is a 61 year old female who was admitted on 7/22/2021 for symptomatic recurrent SVT..    1. End-stage renal disease on dialysis  2. Chronic severe pulmonary hypertension last echocardiography in July 2021 showing PA pressures of 80+ right atrial pressure.  3. Recurrent symptomatic narrow complex tachycardia concerning for reentrant SVT terminated with AV radha blocking agents/adenosine  4. Non-ST elevation MI recently possibly type II but chronic shortness of breath that is gradually worsening concerning for anginal equivalent  5. Diabetes  6. Anemia of chronic diseas and thrombocytopenia    Plan and recommendations  1. Patient has 2 major issues going on one is her pulmonary hypertension and shortness of breath and second is her SVT which is recurrent and symptomatic.  2. She has been scheduled for an outpatient coronary angiography and right heart catheterization by my partner Dr. Sanchez last week.  Given that she is in the hospital again and complaining of further symptoms, I recommend proceeding with coronary angiography and right heart catheterization with possible vasodilator study during this admission.  Risk and benefits of the procedure explained to the patient and she is willing to proceed.  3. In regards to her SVT, some of these episodes are spontaneously terminated.  I recommend EP consultation.  Continue beta-blockers for now.  4. Cardiology will continue to follow along after the findings of her coronary angiography right heart cath and EP consultation.  5. Overall this is a relatively very ill patient with very severe comorbidities especially now with findings of critical pulmonary hypertension.    Please keep the patient n.p.o.    Bailey Dong MD    Primary Care Physician   Gigi Martin    Reason for Consult   Reason for consult: I was asked by medicine to  evaluate this patient for SVT and SOB.    History of Present Illness   Yissel Wetzel is a 61 year old female who presents with SVT.  This a pleasant 61-year-old female who has complex medical comorbidities including oxygen dependent asthma, chronic right pleural effusion, longstanding decade long severe pulmonary hypertension, type 2 diabetes, and end-stage renal disease for which he is on hemodialysis Monday Wednesday Friday.  She is anemia of chronic kidney disease.  She has been experiencing episodes of shortness of breath with exertion atypical chest discomfort and recurrent palpitations over the last few months.  Of the same she was admitted to the hospital about a week ago and was seen by the cardiology rounding service.  She had troponin elevation consistent with non-ST elevation myocardial infarction and echocardiography showed critical pulmonary hypertension.  She was admitted for palpitations and SVT however this was not captured during the hospital stay and she was discharged on outpatient cardiac monitoring.  In any case she presents from dialysis again with episodes of palpitations and elevated heart rate.  Indeed in the emergency department she was noted to be narrow complex tachycardia that broke with high doses of adenosine and Cardizem.  The patient has been also complaining of shortness of breath but no typical symptoms of angina.  ECG did show some ST changes during the episodes of tachycardia.  When she breaks she is in normal sinus rhythm/sinus bradycardia.  Patient is very symptomatic with these episodes of SVT.  Given these findings cardiology was consulted.    The patient tells me that she lives in [place] in her own apartment.  She is still able to drive cook and do activities of daily living.  While I was at her bedside she did go back into the narrow complex tachycardia consistent with SVT.  I tried carotid massage and that failed to terminated however the patient eventually  spontaneously broke out of that rhythm.    Patient Active Problem List   Diagnosis     Iron deficiency anemia     Esophagitis     Essential hypertension     Type 2 diabetes mellitus with diabetic nephropathy (H)     Hyperlipidemia LDL goal <100     Severe Pulmonary hypertension (H)     ESRD on hemodialysis on M, W, F     Anemia in chronic kidney disease     Paroxysmal SVT     Thrombocytopenia (H)     Symptomatic sinus bradycardia       Past Medical History   I have reviewed this patient's medical history and updated it with pertinent information if needed.   Past Medical History:   Diagnosis Date     Allergic rhinitis, cause unspecified      Anemia in chronic kidney disease      Anemia of chronic disease      Bleeding pseudoaneurysm of right brachiocephalic arteriovenous fistula, initial encounter (H) 2019     End stage renal failure on dialysis (H)      Esophagitis, unspecified      Former tobacco use      HEMORRHAGIC GASTROPATHY      History of blood transfusion     [place]     Iron deficiency anemia, unspecified      Mild persistent asthma      Obesity      Other and unspecified hyperlipidemia      Pneumonia      Pulmonary hypertension (H)     per 2012 echo mod.to severe pulmonary hypertension     Tobacco use disorder dc ed      Type 2 diabetes mellitus (H)     on Insulin- managed by PCP     Unspecified essential hypertension        Past Surgical History   I have reviewed this patient's surgical history and updated it with pertinent information if needed.  Past Surgical History:   Procedure Laterality Date     ABDOMINOPLASTY  pt unsure when    abdominoplasty-      SECTION  ,     x 2     COLONOSCOPY      [facility]     COLONOSCOPY N/A 2021    Procedure: COLONOSCOPY, WITH POLYPECTOMY AND BIOPSY;  Surgeon: Lincoln Farrar MD;  Location: [facility]     CREATE FISTULA ARTERIOVENOUS UPPER EXTREMITY Right 2018    Procedure: RIGHT BRACHIAL TO BASILIC  ARTERIOVENOUS FISTULA CREATION;  Surgeon: Terry Vizcaino MD;  Location: [facility]     ENT SURGERY  as a child    tonsillectomy     ESOPHAGOSCOPY, GASTROSCOPY, DUODENOSCOPY (EGD), COMBINED N/A 5/27/2021    Procedure: ESOPHAGOGASTRODUODENOSCOPY, WITH BIOPSY;  Surgeon: Lincoln Farrar MD;  Location:  GI     EYE SURGERY Left     injections- eye     HYSTERECTOMY  approx 1983    partial hysterectomy-reason bleeding      IR CVC TUNNEL PLACEMENT > 5 YRS OF AGE  12/6/2019     IR CVC TUNNEL REMOVAL RIGHT  5/7/2020     IR DIALYSIS FISTULOGRAM RIGHT  12/9/2019       Prior to Admission Medications   Prior to Admission Medications   Prescriptions Last Dose Informant Patient Reported? Taking?   ACCU-CHEK CHING PLUS test strip  Self No No   Sig: USE TO TEST BLOOD SUGAR 4 TIMES PER DAY   Glycopyrrolate-Formoterol (BEVESPI AEROSPHERE) 9-4.8 MCG/ACT oral inhaler Not Taking at Unknown time  Yes No   Sig: Inhale 2 puffs into the lungs 2 times daily   Patient not taking: Reported on 7/22/2021   NOVOLOG FLEXPEN 100 UNIT/ML soln 7/21/2021 at Unknown time Self No Yes   Sig: INJECT 9-12 UNITS BEFORE BREAKFAST, LUNCH, AND DINNER PLUS A CORRECTIVE SCALE OF 2 UNITS FOR EVERY 50 OVER 150   Patient taking differently: INJECT 3 UNITS/15gm carbs BEFORE BREAKFAST, LUNCH, AND DINNER PLUS A CORRECTIVE SCALE OF 2 UNITS FOR EVERY 50 OVER 150   acetaminophen (TYLENOL) 500 MG tablet  at prn Self Yes Yes   Sig: Take 500 mg by mouth every 8 hours as needed for mild pain   carvedilol (COREG) 25 MG tablet 7/21/2021 at am  No Yes   Sig: Take 1 tablet (25 mg) by mouth 2 times daily (with meals)   Patient taking differently: Take 12.5 mg by mouth every morning    famotidine (PEPCID) 10 MG tablet  at prn Self No Yes   Sig: Take 1 tablet (10 mg) by mouth daily   Patient taking differently: Take 10 mg by mouth daily as needed    fluticasone (FLONASE) 50 MCG/ACT nasal spray  at prn Self No Yes   Sig: Spray 1 spray into both nostrils daily   Patient  taking differently: Spray 1 spray into both nostrils daily as needed    hypromellose-dextran (ARTIFICAL TEARS) 0.1-0.3 % ophthalmic solution  at prn Self Yes Yes   Sig: Place 1 drop into both eyes daily as needed for dry eyes   insulin detemir (LEVEMIR FLEXTOUCH) 100 UNIT/ML pen 7/21/2021 at hs  No Yes   Sig: Inject 30 Units Subcutaneous At Bedtime   insulin pen needle (31G X 8 MM) 31G X 8 MM miscellaneous  Self No No   Sig: Use 4 pen needles daily or as directed.   lanthanum (FOSRENOL) 500 MG chewable tablet 7/20/2021 Self Yes Yes   Sig: Take 500 mg by mouth 3 times daily (with meals)   levalbuterol (XOPENEX HFA) 45 MCG/ACT inhaler  at prn Self No Yes   Sig: Inhale 2 puffs into the lungs every 6 hours as needed for shortness of breath / dyspnea or wheezing      Facility-Administered Medications: None     Current Facility-Administered Medications   Medication Dose Route Frequency     - MEDICATION INSTRUCTIONS for Dialysis Patients -   Does not apply See Admin Instructions     aspirin  325 mg Oral Once     [Held by provider] carvedilol  25 mg Oral BID w/meals     insulin aspart  1-7 Units Subcutaneous TID AC     insulin aspart  1-5 Units Subcutaneous At Bedtime     insulin detemir  15 Units Subcutaneous At Bedtime     sodium chloride (PF)  3 mL Intracatheter Q8H     sodium chloride (PF)  3 mL Intracatheter Q8H     Current Facility-Administered Medications   Medication Last Rate     Allergies   Allergies   Allergen Reactions     Albuterol      shakiness     Aspirin      gi     Byetta [Exenatide]      gi     Clonidine      constipation     Codeine      vomiting     Ezetimibe      muscle symptoms     Hydralazine      headaches     Lisinopril      hyperkalemia     Metformin Hydrochloride      vomiting     Pravachol [Pravastatin Sodium]      muscle pains     Simvastatin      myalgias     Troglitazone        Social History    reports that she quit smoking about 21 years ago. Her smoking use included cigarettes. She has a  22.00 pack-year smoking history. She has never used smokeless tobacco. She reports that she does not drink alcohol and does not use drugs.    Family History   Family History   Problem Relation Age of Onset     Arrhythmia Mother      Hypertension Mother      Pancreatic Cancer Father      Diabetes Brother      Asthma Sister      LUNG DISEASE Sister      Diabetes Daughter      Cirrhosis Sister        Review of Systems   The comprehensive 10 point Review of Systems is negative other than noted in the HPI or here.     Physical Exam   Vital Signs with Ranges  Temp:  [98.3  F (36.8  C)] 98.3  F (36.8  C)  Pulse:  [] 61  Resp:  [10-30] (P) 15  BP: ()/() 142/75  SpO2:  [90 %-99 %] 97 %  Wt Readings from Last 4 Encounters:   07/22/21 74.4 kg (164 lb)   07/16/21 79.4 kg (175 lb 1.6 oz)   05/27/21 71.7 kg (158 lb)   03/04/21 72.8 kg (160 lb 9.6 oz)     I/O last 3 completed shifts:  In: 1000 [IV Piggyback:1000]  Out: -       Vitals: BP (!) 142/75   Pulse 61   Temp 98.3  F (36.8  C) (Oral)   Resp (P) 15   Wt 74.4 kg (164 lb)   SpO2 97%   BMI 32.03 kg/m      General alert oriented x3 no acute distress.  Neck is supple JVP is about 10 cm.  Lungs have reduced breath sounds in the right sites and some crackles on the left base.  Extremities are warm without edema.  Psych appropriate affect HEENT she is somewhat pale but no icterus.  Cardiac sounds are regular with a soft systolic murmur best heard in the right parasternal border.    No lab results found in last 7 days.    Invalid input(s): TROPONINIES    Recent Labs   Lab 07/22/21  0850 07/22/21  0352 07/22/21  0130 07/16/21  0600   WBC  --   --  5.6 5.1   HGB  --   --  10.1* 9.1*   MCV  --   --  97 98   PLT  --   --  97* 99*   NA  --   --  135 132*   POTASSIUM  --   --  4.1 4.6   CHLORIDE  --   --  100 99   CO2  --   --  26 27   BUN  --   --  28 41*   CR  --   --  3.90* 4.95*   GFRESTIMATED  --   --  12* 9*   ANIONGAP  --   --  9 6   KANWAL  --   --  8.7 8.4*    * 93 59* 145*   ALBUMIN  --   --  3.4  --    PROTTOTAL  --   --  6.8  --    BILITOTAL  --   --  0.9  --    ALKPHOS  --   --  93  --    ALT  --   --  18  --    AST  --   --  22  --      Recent Labs   Lab Test 07/22/16  1056 10/28/15  1219   CHOL 211* 136   HDL 49* 38*   * 64   TRIG 193* 168*   CHOLHDLRATIO  --  3.6     Recent Labs   Lab 07/22/21  0130 07/16/21  0600   WBC 5.6 5.1   HGB 10.1* 9.1*   HCT 30.6* 28.9*   MCV 97 98   PLT 97* 99*     No results for input(s): PH, PHV, PO2, PO2V, SAT, PCO2, PCO2V, HCO3, HCO3V in the last 168 hours.  No results for input(s): NTBNPI, NTBNP in the last 168 hours.  No results for input(s): DD in the last 168 hours.  No results for input(s): SED, CRP in the last 168 hours.  Recent Labs   Lab 07/22/21  0130 07/16/21  0600   PLT 97* 99*     No results for input(s): TSH in the last 168 hours.  No results for input(s): COLOR, APPEARANCE, URINEGLC, URINEBILI, URINEKETONE, SG, UBLD, URINEPH, PROTEIN, UROBILINOGEN, NITRITE, LEUKEST, RBCU, WBCU in the last 168 hours.    Imaging:  No results found for this or any previous visit (from the past 48 hour(s)).    Echo:  No results found for this or any previous visit (from the past 4320 hour(s)).

## 2021-07-22 NOTE — ED TRIAGE NOTES
BIBA from home for HR of 170 when she checked her BP. Denies chest pain, HA. Hx. Of SVT    On continuous oxygen 2L. BG 69

## 2021-07-22 NOTE — CONSULTS
CARDIAC ELECTROPHYSIOLOGY CONSULTATION  July 22, 2021    REQUESTING PROVIDER:  Dr. CARYN Dong    REASON FOR CONSULTATION:  SVT.      HISTORY OF PRESENT ILLNESS:    Thank you for asking the EP service to evaluate Ms. Cassandra Wetzel for SVT.  She is a very pleasant 61-year-old woman with end-stage renal disease due to diabetic nephropathy (has been on dialysis for approximately 2 years), DM type II, anemia, non-STEMI last month and recurrent palpitation.  She was recently diagnosed with severe pulmonary hypertension.    Ms. Wetzel has experienced heart racing off and on for the past year.  The diagnosis of the tachycardia was not evident until early this morning.  In the ER she was in a regular narrow QRS tachycardia in the 160s.  The tachycardia did not terminate after 3 IV doses of adenosine (6 mg, 12 mg and 18 mg).  It finally stopped after a 10 mg IV diltiazem bolus.  After conversion she was hypotensive and bradycardic.  She received iv fluid and calcium gluconate.  Bradycardia around 45 to 50 bpm has persisted, but the patient is overall much improved.    The patient informed me that similar episode tachycardia have occurred off and on for the past several weeks.  She has been on variable doses of beta-blockers, either labetalol and more recently carvedilol.  Unfortunately has had several side-effects from different medications and has experienced episodes tachycardia despite beta-blocker therapy.    The patient lives alone.  She is a former smoke.  She is a nondrinker.      DIAGNOSTIC STUDIES:  Labs: Sodium 135 potassium 4.1 creatinine 3.9, troponin 0.02, hematocrit 30.6% platelets 97,000.  12-lead ECG: ECG during SVT shows no apparent P wave activity.  Echocardiogram: LVEF 60-65%, severe RV dilatation, severe biatrial dilatation, evidence for severe pulmonary hypertension with PASP estimated 82 mmHg plus right atrial pressure.      IMPRESSION:  1. Symptomatic SVT despite treatment with a BB.  The SVT mechanism is  unclear.  It would be unusual for a reentrant tachycardia utilizing the AV node to not respond to as much as 18 mg of adenosine.  Therefore, it is more likely this is an atrial tachycardia.  Arrhythmia has recurred despite beta-blocker rx.  Treatment options include antiarrhythmic drug therapy (amiodarone is the only relatively safe medication with regard to proarrhythmia risk given severe underlying structural heart disease) OR catheter ablation.  The success of an EP procedure is lower than for a typical SVT case given her severe biatrial enlargement and severe PAH.  Risk of a procedure-related complication is increased as well.  Overall, SVT management is challenging in this patient.  2. End-stage renal disease due to diabetic nephropathy.  3. Severe pulmonary hypertension.    RECOMMENDATIONS:    1. Awaiting results of right and left cardiac catheterization.  The procedure is scheduled for today.  2. We will visit again with the patient tomorrow and re-discuss options.  An attempt at ablation may be the most reasonable next step, though likelihood of SVT cure is less than ideal.    I appreciate the opportunity to be part of this patient's care.  Please feel free to call me with any questions (pager #[phone]).      France Barbosa MD, [facility]        PHYSICAL EXAM:  Vitals: BP (!) 142/75   Pulse 61   Temp 98.3  F (36.8  C) (Oral)   Resp (P) 15   Wt 74.4 kg (164 lb)   SpO2 97%   BMI 32.03 kg/m      Intake/Output Summary (Last 24 hours) at 7/22/2021 1054  Last data filed at 7/22/2021 0324  Gross per 24 hour   Intake 1000 ml   Output --   Net 1000 ml     Vitals:    07/22/21 0113   Weight: 74.4 kg (164 lb)     Constitutional: Pleasant woman who appears older than stated age.  Alert and oriented x3.  She has no CP or respiratory distress.  Head: Normocephalic and atraumatic.  Thinning hair.  Skin: Ashen pale color.  Eyes:  no jaundice, EOMI.  ENT:  Supple, likely elevated JVP, at least 12 cm.  No  lymphadenopathy or apparent thyroid enlargement.  Chest/Lungs:  Clear bilaterally, no rales or wheezing.  Cardiac:  Regular rhythm, normal S1 and S2.  1/6 systolic ejection murmur, no rub or gallop.    Abdomen:  Normal bowel sounds. Abdomen is soft, non-tender  Extremities:  Radial pulses 1+ bilaterally. No lower extremity edema is present.   Neurological:  CN 2-12 grossly intact.  Strength in UE is normal & symmetric.    Back:  No CVA tenderness.       REVIEW OF SYSTEMS:  Review of system completed and negative with the exception of what was described in the HPI section.     CURRENT MEDICATIONS:    - MEDICATION INSTRUCTIONS for Dialysis Patients -   Does not apply See Admin Instructions     aspirin  325 mg Oral Once     [Held by provider] carvedilol  25 mg Oral BID w/meals     insulin aspart  1-7 Units Subcutaneous TID AC     insulin aspart  1-5 Units Subcutaneous At Bedtime     insulin detemir  15 Units Subcutaneous At Bedtime     sodium chloride (PF)  3 mL Intracatheter Q8H       ALLERGIES     Allergies   Allergen Reactions     Albuterol      shakiness     Aspirin      gi     Byetta [Exenatide]      gi     Clonidine      constipation     Codeine      vomiting     Ezetimibe      muscle symptoms     Hydralazine      headaches     Lisinopril      hyperkalemia     Metformin Hydrochloride      vomiting     Pravachol [Pravastatin Sodium]      muscle pains     Simvastatin      myalgias     Troglitazone        PAST MEDICAL HISTORY:  Past Medical History:   Diagnosis Date     Allergic rhinitis, cause unspecified      Anemia in chronic kidney disease      Anemia of chronic disease      Bleeding pseudoaneurysm of right brachiocephalic arteriovenous fistula, initial encounter (H) 12/6/2019     End stage renal failure on dialysis (H)      Esophagitis, unspecified      Former tobacco use      HEMORRHAGIC GASTROPATHY      History of blood transfusion     [place]     Iron deficiency anemia, unspecified      Mild persistent  asthma      Obesity      Other and unspecified hyperlipidemia      Pneumonia      Pulmonary hypertension (H)     per 2012 echo mod.to severe pulmonary hypertension     Tobacco use disorder dc ed      Type 2 diabetes mellitus (H)     on Insulin- managed by PCP     Unspecified essential hypertension        PAST SURGICAL HISTORY:  Past Surgical History:   Procedure Laterality Date     ABDOMINOPLASTY  pt unsure when    abdominoplasty-      SECTION  ,     x 2     COLONOSCOPY      [facility]     COLONOSCOPY N/A 2021    Procedure: COLONOSCOPY, WITH POLYPECTOMY AND BIOPSY;  Surgeon: Lincoln Farrar MD;  Location:  GI     CREATE FISTULA ARTERIOVENOUS UPPER EXTREMITY Right 2018    Procedure: RIGHT BRACHIAL TO BASILIC ARTERIOVENOUS FISTULA CREATION;  Surgeon: Terry Vizcaino MD;  Location: [facility]     ENT SURGERY  as a child    tonsillectomy     ESOPHAGOSCOPY, GASTROSCOPY, DUODENOSCOPY (EGD), COMBINED N/A 2021    Procedure: ESOPHAGOGASTRODUODENOSCOPY, WITH BIOPSY;  Surgeon: Lincoln Farrar MD;  Location:  GI     EYE SURGERY Left     injections- eye     HYSTERECTOMY  approx     partial hysterectomy-reason bleeding      IR CVC TUNNEL PLACEMENT > 5 YRS OF AGE  2019     IR CVC TUNNEL REMOVAL RIGHT  2020     IR DIALYSIS FISTULOGRAM RIGHT  2019       FAMILY HISTORY:  Family History   Problem Relation Age of Onset     Arrhythmia Mother      Hypertension Mother      Pancreatic Cancer Father      Diabetes Brother      Asthma Sister      LUNG DISEASE Sister      Diabetes Daughter      Cirrhosis Sister        SOCIAL HISTORY:  Social History     Socioeconomic History     Marital status: Single     Spouse name: Not on file     Number of children: Not on file     Years of education: Not on file     Highest education level: Not on file   Occupational History     Not on file   Tobacco Use     Smoking status: Former Smoker     Packs/day: 1.00      Years: 22.00     Pack years: 22.00     Types: Cigarettes     Quit date: 10/12/1999     Years since quittin.7     Smokeless tobacco: Never Used   Substance and Sexual Activity     Alcohol use: No     Alcohol/week: 0.0 standard drinks     Drug use: No     Sexual activity: Never   Other Topics Concern      Service Not Asked     Blood Transfusions Yes     Caffeine Concern No     Occupational Exposure Not Asked     Hobby Hazards Not Asked     Sleep Concern No     Stress Concern No     Weight Concern Yes     Comment: would like to lose weight     Special Diet No     Back Care Not Asked     Exercise Yes     Comment: walks daily 2 blocks     Bike Helmet Not Asked     Seat Belt Not Asked     Self-Exams Not Asked     Parent/sibling w/ CABG, MI or angioplasty before 65F 55M? Not Asked   Social History Narrative     Not on file     Social Determinants of Health     Financial Resource Strain:      Difficulty of Paying Living Expenses:    Food Insecurity:      Worried About Running Out of Food in the Last Year:      Ran Out of Food in the Last Year:    Transportation Needs:      Lack of Transportation (Medical):      Lack of Transportation (Non-Medical):    Physical Activity:      Days of Exercise per Week:      Minutes of Exercise per Session:    Stress:      Feeling of Stress :    Social Connections:      Frequency of Communication with Friends and Family:      Frequency of Social Gatherings with Friends and Family:      Attends Islam Services:      Active Member of Clubs or Organizations:      Attends Club or Organization Meetings:      Marital Status:    Intimate Partner Violence:      Fear of Current or Ex-Partner:      Emotionally Abused:      Physically Abused:      Sexually Abused:          Recent Lab Results:  Recent Labs   Lab 21  0850 21  0352 21  0130 21  0600   WBC  --   --  5.6 5.1   HGB  --   --  10.1* 9.1*   MCV  --   --  97 98   PLT  --   --  97* 99*   NA  --   --  135  132*   POTASSIUM  --   --  4.1 4.6   CHLORIDE  --   --  100 99   CO2  --   --  26 27   BUN  --   --  28 41*   CR  --   --  3.90* 4.95*   ANIONGAP  --   --  9 6   KANWAL  --   --  8.7 8.4*   * 93 59* 145*   ALBUMIN  --   --  3.4  --    PROTTOTAL  --   --  6.8  --    BILITOTAL  --   --  0.9  --    ALKPHOS  --   --  93  --    ALT  --   --  18  --    AST  --   --  22  --    TROPONIN  --   --  0.022  --

## 2021-07-22 NOTE — ED NOTES
Bed: ED29  Expected date:   Expected time:   Means of arrival:   Comments:  HEMS 436 61f heart palpitations eta 9

## 2021-07-22 NOTE — ED PROVIDER NOTES
History   Chief Complaint:  Palpitations     The history is provided by the patient.      Yissel Wetzel is a 61 year old female with history of SVT who presents with palpitations. The patient reports that this afternoon she had a few bouts of palpitations and that again she had a 10 minute episode and 40 minute episode of palpitations. She does note that she has had this in the past. She denies chest pain or headache.     Was admitted 1 week ago for tachycardia, that seems to have resolved prior to a formal diagnosis being made.  She has a Holter monitor in place from this hospital visit.      Review of Systems   Cardiovascular: Positive for palpitations. Negative for chest pain.   Neurological: Negative for headaches.   All other systems reviewed and are negative.        Allergies:  Albuterol  Aspirin  Byetta [Exenatide]  Clonidine  Codeine  Ezetimibe  Hydralazine  Lisinopril  Metformin Hydrochloride  Pravachol [Pravastatin Sodium]  Simvastatin  Troglitazone    Medications:  Coreg   Insulin    Lanthanum   Levalbuterol   Pepcid    Past Medical History:    Anemia of CKD  CKD  ERSD  Esophagitis   Asthma   Hemorraghic gastropathy   Obesity  Type 2 diabetes  Hypertension   Pulmonary hypertension   SVT  Elevated troponin   Pleural effusion   Obesity     Past Surgical History:    Abdominoplasty  Colonoscopy x2   section x2  Tonsillectomy   Create fistula arteriovenous upper extremity right   EGD  Hysterectomy     Family History:    Arrhythmia, mother   Hypertension, mother   Pancreatic cancer, father   Diabetes, brother   Asthma, sister  Cirrhosis, sister  Diabetes, daughter    Social History:  Smoking status: former smoker  Alcohol use: no  Drug use: no  PCP: Gigi Martin  Presents to the ED alone    Physical Exam     Patient Vitals for the past 24 hrs:   BP Temp Temp src Pulse Resp SpO2 Weight   21 0445 98/63 -- -- 56 20 95 % --   21 0430 97/54 -- -- (!) 48 18 94 % --   21 0400 110/54  -- -- (!) 46 10 99 % --   07/22/21 0345 107/52 -- -- (!) 46 13 98 % --   07/22/21 0330 102/55 -- -- (!) 46 20 99 % --   07/22/21 0315 102/51 -- -- (!) 44 13 99 % --   07/22/21 0300 102/51 -- -- (!) 44 12 98 % --   07/22/21 0230 (!) 85/46 -- -- (!) 45 14 94 % --   07/22/21 0215 (!) 89/49 -- -- (!) 46 11 94 % --   07/22/21 0210 -- -- -- (!) 49 18 95 % --   07/22/21 0208 -- -- -- (!) 47 18 96 % --   07/22/21 0207 -- -- -- (!) 170 21 92 % --   07/22/21 0205 -- -- -- (!) 170 20 91 % --   07/22/21 0200 124/79 -- -- (!) 149 21 92 % --   07/22/21 0150 -- -- -- 120 19 93 % --   07/22/21 0145 (!) 140/87 -- -- (!) 170 22 90 % --   07/22/21 0140 -- -- -- (!) 170 20 -- --   07/22/21 0135 (!) 170/112 -- -- (!) 170 17 94 % --   07/22/21 0130 -- -- -- (!) 172 20 93 % --   07/22/21 0125 -- -- -- (!) 168 19 95 % --   07/22/21 0120 -- -- -- (!) 168 30 93 % --   07/22/21 0115 (!) 147/104 -- -- -- -- -- --   07/22/21 0113 -- 98.3  F (36.8  C) Oral (!) 165 28 93 % 74.4 kg (164 lb)       Physical Exam  Vitals: reviewed by me  General: Pt seen on [facility], pleasant, cooperative, and alert to conversation.  Frail appearing.  Eyes: Tracking well, clear conjunctiva BL  ENT: MMM, midline trachea.   Lungs: No tachypnea, no accessory muscle use. No respiratory distress.   CV: Rate as above, tachycardic to 168, regular.  Abd: Soft, non tender, no guarding, no rebound. Non distended  MSK: no joint effusion.  No evidence of trauma  Skin: No rash  Neuro: Clear speech and no facial droop.  Psych: Not RIS, no e/o AH/VH        Emergency Department Course     ECG:  ECG taken at 0113, ECG read at 0115  Supraventricular tachycardia   ST & T wave abnormality, consider inferior ischemia   Abnormal ECG   Rate 167 bpm. OR interval * ms. QRS duration 88 ms. QT/QTc 288/480 ms. P-R-T axes * 77 211.    Laboratory:  CBC: WBC 5.6, HGB 10.1(L), PLT 97(L)   CMP: Glucose 59(L), GFR 12(L), o/w WNL (Creatinine: 3.90(H)    Glucose by meter: 55(L)  Troponin  (Collected 0130): 0.022  Glucose by meter: 55(L)   Asymptomatic COVID-19 PCR: negative     Emergency Department Course:  Reviewed:  I reviewed the patient's nursing notes, vitals, past medical records, Care Everywhere.     Assessments:  0120 I performed an assessment and examination of the patient as noted above.      0124 5 mg of adenosine was given but that patient did not convert back to sinus.     0127 12 mg of adenosine was give and patient did not convert to sinus.     0132 18 mg of adenosine was given and she continued in SVT.    0255 Findings and plan explained to the Patient who consents to admission. Discussed the patient with Dr. Mcdaniel, who will admit the patient to a observation bed for further monitoring, evaluation, and treatment.     Consults:  0230  I spoke to Dr. Mcdaniel of the hospitalist service who accepts the patient for admission.     Interventions:  0123 Adenosine, 6 mg, IV  0127 Adenosine, 12 mg, IV  0132 Adenosine, 18 mg, IV  0144 Diltiazem bolus, 10 mg, IV  0230 NS 1L IV Bolus   0312 Calcium gluconate, 0.5 g mg, IV    Disposition:  The patient was admitted to the hospital under the care of Dr. Mcdaniel.       Impression & Plan   Medical Decision Making:  Yissel Wetzel is a 61 year old female who presents to the ED with palpitations, and found to be in SVT. She had an exquisitely regular rate of 160 on the monitor, and I did believe it to be consistent with SVT. I gave her 6 of adenosine followed by 18, all of which keep momentarily relief, and then she popped back into SVT. At this point, I did started to give a diltiazem bolus and drip but after the bolus was given she converted back to normal sinus rhythm. She also for a period of time, hypotensive and bradycardic, although she mentated fine throughout and improved with IV fluids and calcium gluconate. Given the recent cardiac history of being admitted here for tachycardia one week ago and a possible plan for right heart cath, I do not think  she is stable to go home. She looks to be quite frail, and while she is improving, again I do think she needs to be admitted and seen by cardiology. Will plan for [facility] tele under observation designation, Dr. Mcdaniel of hospitalist has kindly agreed to accept care of the patient.     Critical care time 35 minutes.    Covid-19  Yissel Wetzel was evaluated during a global COVID-19 pandemic, which necessitated consideration that the patient might be at risk for infection with the SARS-CoV-2 virus that causes COVID-19.   Applicable protocols for evaluation were followed during the patient's care.   COVID-19 was considered as part of the patient's evaluation. The plan for testing is:  a test was obtained during this visit.    Critical Care Time for this patient, exclusive of procedures, is 30 minutes.      Diagnosis:    ICD-10-CM    1. SVT (supraventricular tachycardia) (H)  I47.1        Scribe Disclosure:  Coby ZAPATA, am serving as a scribe at 1:20 AM on 7/22/2021 to document services personally performed by Erik Covington MD based on my observations and the provider's statements to me.          Skip Covington MD  07/22/21 [id number]

## 2021-07-22 NOTE — ED NOTES
[facility]  ED Nurse Handoff Report    ED Chief complaint: Palpitations      ED Diagnosis:   Final diagnoses:   SVT (supraventricular tachycardia) (H)       Code Status: as per hospitalist    Allergies:   Allergies   Allergen Reactions     Albuterol      shakiness     Aspirin      gi     Byetta [Exenatide]      gi     Clonidine      constipation     Codeine      vomiting     Ezetimibe      muscle symptoms     Hydralazine      headaches     Lisinopril      hyperkalemia     Metformin Hydrochloride      vomiting     Pravachol [Pravastatin Sodium]      muscle pains     Simvastatin      myalgias     Troglitazone        Patient Story: from home, started feeling her heart raising, check her pulse and it was 170. Has hx. Of SVT, ESRD -dialysis M/W/F  Focused Assessment:  Ambulates independently, A&Ox4, respirations even and unlabored, skin dry, warm, pale. Hypoglycemic, on SVT at arrival.   Results for orders placed or performed during the hospital encounter of 07/22/21   Comprehensive metabolic panel     Status: Abnormal   Result Value Ref Range    Sodium 135 133 - 144 mmol/L    Potassium 4.1 3.4 - 5.3 mmol/L    Chloride 100 94 - 109 mmol/L    Carbon Dioxide (CO2) 26 20 - 32 mmol/L    Anion Gap 9 3 - 14 mmol/L    Urea Nitrogen 28 7 - 30 mg/dL    Creatinine 3.90 (H) 0.52 - 1.04 mg/dL    Calcium 8.7 8.5 - 10.1 mg/dL    Glucose 59 (L) 70 - 99 mg/dL    Alkaline Phosphatase 93 40 - 150 U/L    AST 22 0 - 45 U/L    ALT 18 0 - 50 U/L    Protein Total 6.8 6.8 - 8.8 g/dL    Albumin 3.4 3.4 - 5.0 g/dL    Bilirubin Total 0.9 0.2 - 1.3 mg/dL    GFR Estimate 12 (L) >60 mL/min/1.73m2   CBC with platelets and differential     Status: Abnormal   Result Value Ref Range    WBC Count 5.6 4.0 - 11.0 10e3/uL    RBC Count 3.17 (L) 3.80 - 5.20 10e6/uL    Hemoglobin 10.1 (L) 11.7 - 15.7 g/dL    Hematocrit 30.6 (L) 35.0 - 47.0 %    MCV 97 78 - 100 fL    MCH 31.9 26.5 - 33.0 pg    MCHC 33.0 31.5 - 36.5 g/dL    RDW 15.7 (H) 10.0 -  15.0 %    Platelet Count 97 (L) 150 - 450 10e3/uL    % Neutrophils 68 %    % Lymphocytes 13 %    % Monocytes 15 %    % Eosinophils 3 %    % Basophils 1 %    % Immature Granulocytes 0 %    NRBCs per 100 WBC 0 <1 /100    Absolute Neutrophils 3.9 1.6 - 8.3 10e3/uL    Absolute Lymphocytes 0.7 (L) 0.8 - 5.3 10e3/uL    Absolute Monocytes 0.8 0.0 - 1.3 10e3/uL    Absolute Eosinophils 0.1 0.0 - 0.7 10e3/uL    Absolute Basophils 0.0 0.0 - 0.2 10e3/uL    Absolute Immature Granulocytes 0.0 <=0.0 10e3/uL    Absolute NRBCs 0.0 10e3/uL   Troponin I     Status: Normal   Result Value Ref Range    Troponin I 0.022 0.000 - 0.045 ug/L   Glucose by meter     Status: Abnormal   Result Value Ref Range    GLUCOSE BY METER POCT 55 (L) 70 - 99 mg/dL   Extra Blue Top Tube     Status: None   Result Value Ref Range    Hold Specimen [facility]    SARS-COV2 (COVID-19) Virus RT-PCR     Status: Normal    Specimen: Nasopharyngeal; Swab   Result Value Ref Range    SARS CoV2 PCR Negative Negative    Narrative    Testing was performed using the raman  SARS-CoV-2 & Influenza A/B Assay on the raman  Lynn  System.  This test should be ordered for the detection of SARS-COV-2 in individuals who meet SARS-CoV-2 clinical and/or epidemiological criteria. Test performance is unknown in asymptomatic patients.  This test is for in vitro diagnostic use under the FDA EUA for laboratories certified under CLIA to perform moderate and/or high complexity testing. This test has not been FDA cleared or approved.  A negative test does not rule out the presence of PCR inhibitors in the specimen or target RNA in concentration below the limit of detection for the assay. The possibility of a false negative should be considered if the patient's recent exposure or clinical presentation suggests COVID-19.  [facility] are certified under the Clinical Laboratory Improvement Amendments of 1988 (CLIA-88) as qualified to perform moderate and/or high complexity  laboratory testing.   EKG 12-lead, tracing only     Status: None   Result Value Ref Range    Systolic Blood Pressure  mmHg    Diastolic Blood Pressure  mmHg    Ventricular Rate 167 BPM    Atrial Rate 131 BPM    NJ Interval  ms    QRS Duration 88 ms     ms    QTc 480 ms    P Axis  degrees    R AXIS 77 degrees    T Axis 211 degrees    Interpretation ECG       Supraventricular tachycardia  ST & T wave abnormality, consider inferior ischemia  Abnormal ECG  When compared with ECG of 14-JUL-2021 21:57,  Aberrant conduction is no longer Present  Vent. rate has increased BY  81 BPM  T wave inversion no longer evident in Anterior leads  Confirmed by GENERATED REPORT, COMPUTER ([id number]),  Nimco Kraus ([id number]) on 7/22/2021 1:25:30 AM     Asymptomatic COVID-19 Virus (Coronavirus) by PCR Nasopharyngeal     Status: Normal    Specimen: Nasopharyngeal; Swab    Narrative    The following orders were created for panel order Asymptomatic COVID-19 Virus (Coronavirus) by PCR Nasopharyngeal.  Procedure                               Abnormality         Status                     ---------                               -----------         ------                     SARS-COV2 (COVID-19) Vir...[[id number]]  Normal              Final result                 Please view results for these tests on the individual orders.   CBC with platelets differential     Status: Abnormal    Narrative    The following orders were created for panel order CBC with platelets differential.  Procedure                               Abnormality         Status                     ---------                               -----------         ------                     CBC with platelets and d...[[id number]]  Abnormal            Final result                 Please view results for these tests on the individual orders.   [place] Draw     Status: None    Narrative    The following orders were created for panel order [place] Draw.  Procedure                                Abnormality         Status                     ---------                               -----------         ------                     Extra Blue Top Tube[[id number]]                              Final result                 Please view results for these tests on the individual orders.       Treatments and/or interventions provided: cardiac monitoring. Adenosine x3 (6-12-18mg) without success. 10mg of cardizem, then pt 's HR improved from 170's to 40-50's, BP dropped to 80's/40's. IVF and Ca.gluconate given.      Patient's response to treatments and/or interventions: comfortably resting in bed, still in close monitoring.    To be done/followed up on inpatient unit:  Cardiology consult?    Does this patient have any cognitive concerns?: n/a    Activity level - Baseline/Home:  Independent  Activity Level - Current:   Stand with Assist    Patient's Preferred language: English   Needed?: No    Isolation: None  Infection: Not Applicable  Patient tested for COVID 19 prior to admission: YES  Bariatric?: No    Vital Signs:   Vitals:    07/22/21 0210 07/22/21 0215 07/22/21 0230 07/22/21 0300   BP:  (!) 89/49 (!) 85/46 102/51   Pulse: (!) 49 (!) 46 (!) 45 (!) 44   Resp: 18 11 14 12   Temp:       TempSrc:       SpO2: 95% 94% 94% 98%   Weight:           Cardiac Rhythm:     Was the PSS-3 completed:   Yes  What interventions are required if any?               Family Comments: pt by herself in ED  OBS brochure/video discussed/provided to patient/family: N/A           For the majority of the shift this patient's behavior was Green.   Behavioral interventions performed were n/a.    ED NURSE PHONE NUMBER: [phone]

## 2021-07-22 NOTE — PROGRESS NOTES
PATIENT HAS CRITICAL PH AND MARKED VOLUME OVERLOAD. NO CORONARY DISEASE. SHE NEEDS SIGNIFICANT FLUID REMOVAL. SHE ALSO NEEDS HTN CONTROL. DISCUSS WITH RENAL. START AMLODIPINE 10 MG DAILY. RECOMMEND DISCUSSION WITH [facility] OR [facility] TO TRANSFER PATIENT FOR INPATIENT EVALUATION OR CRITICAL PH. Poor prognosis.     Discussed with Dr. Georges.

## 2021-07-22 NOTE — PROGRESS NOTES
RECEIVING UNIT ED HANDOFF REVIEW    ED Nurse Handoff Report was reviewed by: Leny Gonsalez RN on July 22, 2021 at 8:02 AM

## 2021-07-22 NOTE — ED PROVIDER NOTES
SVT, refractory to conversion requiring multiple doses of adenosine without effect, then dilt 10mg for conversion. Coming in to obs.    Mike Bowens MD  [employer]  6:22 AM 07/22/21        Mike Bowens MD  07/22/21 1604

## 2021-07-23 LAB
ALBUMIN SERPL-MCNC: 3.1 G/DL (ref 3.4–5)
ALP SERPL-CCNC: 74 U/L (ref 40–150)
ALT SERPL W P-5'-P-CCNC: 17 U/L (ref 0–50)
ANION GAP SERPL CALCULATED.3IONS-SCNC: 3 MMOL/L (ref 3–14)
AST SERPL W P-5'-P-CCNC: 19 U/L (ref 0–45)
BILIRUB SERPL-MCNC: 1.3 MG/DL (ref 0.2–1.3)
BUN SERPL-MCNC: 40 MG/DL (ref 7–30)
CALCIUM SERPL-MCNC: 8.4 MG/DL (ref 8.5–10.1)
CHLORIDE BLD-SCNC: 100 MMOL/L (ref 94–109)
CO2 SERPL-SCNC: 29 MMOL/L (ref 20–32)
CREAT SERPL-MCNC: 5.07 MG/DL (ref 0.52–1.04)
ERYTHROCYTE [DISTWIDTH] IN BLOOD BY AUTOMATED COUNT: 15.7 % (ref 10–15)
GFR SERPL CREATININE-BSD FRML MDRD: 9 ML/MIN/1.73M2
GLUCOSE BLD-MCNC: 147 MG/DL (ref 70–99)
GLUCOSE BLDC GLUCOMTR-MCNC: 108 MG/DL (ref 70–99)
GLUCOSE BLDC GLUCOMTR-MCNC: 153 MG/DL (ref 70–99)
GLUCOSE BLDC GLUCOMTR-MCNC: 180 MG/DL (ref 70–99)
GLUCOSE BLDC GLUCOMTR-MCNC: 191 MG/DL (ref 70–99)
GLUCOSE BLDC GLUCOMTR-MCNC: 278 MG/DL (ref 70–99)
HCT VFR BLD AUTO: 29.4 % (ref 35–47)
HGB BLD-MCNC: 9.2 G/DL (ref 11.7–15.7)
MCH RBC QN AUTO: 30.7 PG (ref 26.5–33)
MCHC RBC AUTO-ENTMCNC: 31.3 G/DL (ref 31.5–36.5)
MCV RBC AUTO: 98 FL (ref 78–100)
PLATELET # BLD AUTO: 90 10E3/UL (ref 150–450)
POTASSIUM BLD-SCNC: 5.1 MMOL/L (ref 3.4–5.3)
PROT SERPL-MCNC: 6 G/DL (ref 6.8–8.8)
RBC # BLD AUTO: 3 10E6/UL (ref 3.8–5.2)
SODIUM SERPL-SCNC: 132 MMOL/L (ref 133–144)
WBC # BLD AUTO: 5.9 10E3/UL (ref 4–11)

## 2021-07-23 PROCEDURE — 250N000013 HC RX MED GY IP 250 OP 250 PS 637: Performed by: INTERNAL MEDICINE

## 2021-07-23 PROCEDURE — 210N000002 HC R&B HEART CARE

## 2021-07-23 PROCEDURE — 36415 COLL VENOUS BLD VENIPUNCTURE: CPT | Performed by: INTERNAL MEDICINE

## 2021-07-23 PROCEDURE — 258N000003 HC RX IP 258 OP 636: Performed by: INTERNAL MEDICINE

## 2021-07-23 PROCEDURE — 99233 SBSQ HOSP IP/OBS HIGH 50: CPT | Mod: 25 | Performed by: INTERNAL MEDICINE

## 2021-07-23 PROCEDURE — 99232 SBSQ HOSP IP/OBS MODERATE 35: CPT | Performed by: INTERNAL MEDICINE

## 2021-07-23 PROCEDURE — 250N000011 HC RX IP 250 OP 636: Performed by: INTERNAL MEDICINE

## 2021-07-23 PROCEDURE — 250N000012 HC RX MED GY IP 250 OP 636 PS 637: Performed by: INTERNAL MEDICINE

## 2021-07-23 PROCEDURE — 90937 HEMODIALYSIS REPEATED EVAL: CPT

## 2021-07-23 PROCEDURE — 99223 1ST HOSP IP/OBS HIGH 75: CPT | Performed by: INTERNAL MEDICINE

## 2021-07-23 PROCEDURE — 80053 COMPREHEN METABOLIC PANEL: CPT | Performed by: INTERNAL MEDICINE

## 2021-07-23 PROCEDURE — 99233 SBSQ HOSP IP/OBS HIGH 50: CPT | Performed by: INTERNAL MEDICINE

## 2021-07-23 PROCEDURE — 5A1D70Z PERFORMANCE OF URINARY FILTRATION, INTERMITTENT, LESS THAN 6 HOURS PER DAY: ICD-10-PCS | Performed by: INTERNAL MEDICINE

## 2021-07-23 PROCEDURE — 85027 COMPLETE CBC AUTOMATED: CPT | Performed by: INTERNAL MEDICINE

## 2021-07-23 RX ORDER — AMIODARONE HYDROCHLORIDE 200 MG/1
200 TABLET ORAL 2 TIMES DAILY
Status: DISCONTINUED | OUTPATIENT
Start: 2021-07-23 | End: 2021-07-25 | Stop reason: HOSPADM

## 2021-07-23 RX ORDER — AMLODIPINE BESYLATE 10 MG/1
10 TABLET ORAL DAILY
Status: DISCONTINUED | OUTPATIENT
Start: 2021-07-23 | End: 2021-07-25 | Stop reason: HOSPADM

## 2021-07-23 RX ORDER — CARVEDILOL 6.25 MG/1
6.25 TABLET ORAL 2 TIMES DAILY WITH MEALS
Status: DISCONTINUED | OUTPATIENT
Start: 2021-07-23 | End: 2021-07-25 | Stop reason: HOSPADM

## 2021-07-23 RX ORDER — HEPARIN SODIUM 1000 [USP'U]/ML
500 INJECTION, SOLUTION INTRAVENOUS; SUBCUTANEOUS
Status: DISCONTINUED | OUTPATIENT
Start: 2021-07-23 | End: 2021-07-23

## 2021-07-23 RX ORDER — ALBUMIN, HUMAN INJ 5% 5 %
50-250 SOLUTION INTRAVENOUS
Status: DISCONTINUED | OUTPATIENT
Start: 2021-07-23 | End: 2021-07-23

## 2021-07-23 RX ORDER — ALBUMIN (HUMAN) 12.5 G/50ML
50 SOLUTION INTRAVENOUS
Status: DISCONTINUED | OUTPATIENT
Start: 2021-07-23 | End: 2021-07-23

## 2021-07-23 RX ORDER — AMIODARONE HYDROCHLORIDE 200 MG/1
200 TABLET ORAL DAILY
Status: DISCONTINUED | OUTPATIENT
Start: 2021-08-02 | End: 2021-07-25 | Stop reason: HOSPADM

## 2021-07-23 RX ORDER — HEPARIN SODIUM 1000 [USP'U]/ML
500 INJECTION, SOLUTION INTRAVENOUS; SUBCUTANEOUS CONTINUOUS
Status: DISCONTINUED | OUTPATIENT
Start: 2021-07-23 | End: 2021-07-23

## 2021-07-23 RX ADMIN — SODIUM CHLORIDE 250 ML: 9 INJECTION, SOLUTION INTRAVENOUS at 18:32

## 2021-07-23 RX ADMIN — LANTHANUM CARBONATE 500 MG: 500 TABLET, CHEWABLE ORAL at 22:17

## 2021-07-23 RX ADMIN — ACETAMINOPHEN 650 MG: 325 TABLET, FILM COATED ORAL at 06:02

## 2021-07-23 RX ADMIN — HEPARIN SODIUM 500 UNITS/HR: 1000 INJECTION INTRAVENOUS; SUBCUTANEOUS at 17:21

## 2021-07-23 RX ADMIN — AMIODARONE HYDROCHLORIDE 200 MG: 200 TABLET ORAL at 22:26

## 2021-07-23 RX ADMIN — SODIUM CHLORIDE 300 ML: 9 INJECTION, SOLUTION INTRAVENOUS at 18:31

## 2021-07-23 RX ADMIN — LANTHANUM CARBONATE 500 MG: 500 TABLET, CHEWABLE ORAL at 11:25

## 2021-07-23 RX ADMIN — LANTHANUM CARBONATE 500 MG: 500 TABLET, CHEWABLE ORAL at 15:55

## 2021-07-23 RX ADMIN — AMLODIPINE BESYLATE 10 MG: 10 TABLET ORAL at 09:25

## 2021-07-23 RX ADMIN — AMIODARONE HYDROCHLORIDE 200 MG: 200 TABLET ORAL at 11:41

## 2021-07-23 RX ADMIN — INSULIN DETEMIR 15 UNITS: 100 INJECTION, SOLUTION SUBCUTANEOUS at 22:18

## 2021-07-23 ASSESSMENT — ACTIVITIES OF DAILY LIVING (ADL)
ADLS_ACUITY_SCORE: 13
ADLS_ACUITY_SCORE: 12
ADLS_ACUITY_SCORE: 13
ADLS_ACUITY_SCORE: 12

## 2021-07-23 NOTE — PROVIDER NOTIFICATION
MD Notification        Notified Person Name:  Dr. Vasquez   Notification Date/Time: 07/22/21 11:37 PM  Notification Interaction: AmCom     Purpose of Notification: MD Notification Pt hyertensive post angio 170s. No PRNs. Can we treat this please?        Orders Placed:

## 2021-07-23 NOTE — PROGRESS NOTES
Potassium   Date Value Ref Range Status   07/23/2021 5.1 3.4 - 5.3 mmol/L Final   10/05/2020 4.1 3.5 - 5.5 mEq/L Final     Hemoglobin   Date Value Ref Range Status   07/23/2021 9.2 (L) 11.7 - 15.7 g/dL Final   01/13/2020 8.4 (L) 11.7 - 15.7 g/dL Final     Creatinine   Date Value Ref Range Status   07/23/2021 5.07 (H) 0.52 - 1.04 mg/dL Final   01/13/2020 5.97 (H) 0.52 - 1.04 mg/dL Final     Urea Nitrogen   Date Value Ref Range Status   07/23/2021 40 (H) 7 - 30 mg/dL Final   01/13/2020 44 (H) 7 - 30 mg/dL Final     Sodium   Date Value Ref Range Status   07/23/2021 132 (L) 133 - 144 mmol/L Final   01/13/2020 139 133 - 144 mmol/L Final     INR   Date Value Ref Range Status   01/12/2020 1.23 (H) 0.86 - 1.14 Final       DIALYSIS PROCEDURE NOTE  Hepatitis status of previous patient on machine log was checked and verified ok to use with this patients hepatitis status.  Patient dialyzed for 3 hrs. on a K2 bath with a net fluid removal of  0.5L.  A BFR of 350 ml/min was obtained via a RAVF using 16gauge needles.      The treatment plan was discussed with Dr. Alonso during the treatment.    Total heparin received during the treatment: 2000 units.   Needle cannulation sites held x 10 min.     Meds  given: heparin - see MAR.   Complications: Pt started cramping one hour into tx and requested to stop pulling fluid.  Pt would not let this RN resume pulling fluid thereafter.  In addition, pt insisted on running for 3 hours only.  MD aware.       Person educated: patient. Knowledge base substantial. Barriers to learning: none. Educated on diet via verbal mode.   Patient/family verbalized understanding. Pt prefers verbal education style.     ICEBOAT? Timeout performed pre-treatment  I: Patient was identified using 2 identifiers  C:  Consent Signed Yes  E: Equipment preventative maintenance is current and dialysis delivery system OK to use  B: Hepatitis B Surface Antigen: NEGATIVE; Draw Date: 7/14/2021      Hepatitis B Surface Antibody:  IMMUNE; Draw Date: 7/14/2021  O: Dialysis orders present and complete prior to treatment  A: Vascular access verified and assessed prior to treatment  T: Treatment was performed at a clinically appropriate time  ?: Patient was allowed to ask questions and address concerns prior to treatment  See flowsheet in EPIC for further details and post assessment.  Machine water alarm in place and functioning. Transducer pods intact and checked every 15min.   Pt returned via bed.  Chlorine/Chloramine water system checked every 4 hours.  Outpatient Dialysis at [facility] on MWF    Please remove patient dressing on AVF and AVG needle sites 24 hours after dialysis. If leaking occurs please apply a Band-Aid.     Zuleima Wood RN

## 2021-07-23 NOTE — CONSULTS
RENAL CONSULTATION NOTE    REFERRING MD:  Lior Mcdaniel MD    REASON FOR CONSULTATION:   dialysis    HPI:  61 y.o woman with ESRD on MWF IHD and  severe pulmonary hypertension, who was admitted for symptomatic SVT. She was recently hospitalized for SVT, which did not recur during her hospitalization. Cardiology recommended outpatient cardiac monitor and RHC. Patient came back yesterday with recurrent SVT. IN the emergency department she was noted to have narrow complex tachycardia that broke with high doses of adenosine and Cardizem. General cardiology and EP are evaluating the patient.     ROS:  A complete review of systems was performed and is negative except as noted above.    PMH:    Past Medical History:   Diagnosis Date     Allergic rhinitis, cause unspecified      Anemia in chronic kidney disease      Anemia of chronic disease      Bleeding pseudoaneurysm of right brachiocephalic arteriovenous fistula, initial encounter (H) 2019     End stage renal failure on dialysis (H)      Esophagitis, unspecified      Former tobacco use      HEMORRHAGIC GASTROPATHY      History of blood transfusion     [place]     Iron deficiency anemia, unspecified      Mild persistent asthma      Obesity      Other and unspecified hyperlipidemia      Pneumonia      Pulmonary hypertension (H)     per 2012 echo mod.to severe pulmonary hypertension     Tobacco use disorder dc ed      Type 2 diabetes mellitus (H)     on Insulin- managed by PCP     Unspecified essential hypertension        PSH:    Past Surgical History:   Procedure Laterality Date     ABDOMINOPLASTY  pt unsure when    abdominoplasty-      SECTION  ,     x 2     COLONOSCOPY      [facility]     COLONOSCOPY N/A 2021    Procedure: COLONOSCOPY, WITH POLYPECTOMY AND BIOPSY;  Surgeon: Lincoln Farrar MD;  Location: [facility]     CREATE FISTULA ARTERIOVENOUS UPPER EXTREMITY Right 2018     Procedure: RIGHT BRACHIAL TO BASILIC ARTERIOVENOUS FISTULA CREATION;  Surgeon: Terry Vizcaino MD;  Location: [facility]     ENT SURGERY  as a child    tonsillectomy     ESOPHAGOSCOPY, GASTROSCOPY, DUODENOSCOPY (EGD), COMBINED N/A 5/27/2021    Procedure: ESOPHAGOGASTRODUODENOSCOPY, WITH BIOPSY;  Surgeon: Lincoln Farrar MD;  Location:  GI     EYE SURGERY Left     injections- eye     HYSTERECTOMY  approx 1983    partial hysterectomy-reason bleeding      IR CVC TUNNEL PLACEMENT > 5 YRS OF AGE  12/6/2019     IR CVC TUNNEL REMOVAL RIGHT  5/7/2020     IR DIALYSIS FISTULOGRAM RIGHT  12/9/2019       MEDICATIONS:      - MEDICATION INSTRUCTIONS for Dialysis Patients -   Does not apply See Admin Instructions     amiodarone  200 mg Oral BID     [START ON 8/2/2021] amiodarone  200 mg Oral Daily     amLODIPine  10 mg Oral Daily     aspirin  81 mg Oral Daily     [Held by provider] carvedilol  25 mg Oral BID w/meals     famotidine  10 mg Oral Daily     insulin aspart  1-7 Units Subcutaneous TID AC     insulin aspart  1-5 Units Subcutaneous At Bedtime     insulin detemir  15 Units Subcutaneous At Bedtime     lanthanum  500 mg Oral TID w/meals     sodium chloride (PF)  3 mL Intracatheter Q8H     umeclidinium-vilanterol  1 puff Inhalation Daily       ALLERGIES:    Allergies as of 07/22/2021 - Reviewed 07/22/2021   Allergen Reaction Noted     Albuterol  11/29/2012     Aspirin  02/02/2005     Byetta [exenatide]  07/15/2008     Clonidine  03/13/2012     Codeine  07/16/2004     Ezetimibe  07/25/2012     Hydralazine  07/25/2012     Lisinopril  10/16/2012     Metformin hydrochloride  07/16/2004     Pravachol [pravastatin sodium]  05/19/2011     Simvastatin  08/09/2011     Troglitazone  07/16/2004       FH:    Family History   Problem Relation Age of Onset     Arrhythmia Mother      Hypertension Mother      Pancreatic Cancer Father      Diabetes Brother      Asthma Sister      LUNG DISEASE Sister      Diabetes Daughter       Cirrhosis Sister        SH:    Social History     Socioeconomic History     Marital status: Single     Spouse name: Not on file     Number of children: Not on file     Years of education: Not on file     Highest education level: Not on file   Occupational History     Not on file   Tobacco Use     Smoking status: Former Smoker     Packs/day: 1.00     Years: 22.00     Pack years: 22.00     Types: Cigarettes     Quit date: 10/12/1999     Years since quittin.7     Smokeless tobacco: Never Used   Substance and Sexual Activity     Alcohol use: No     Alcohol/week: 0.0 standard drinks     Drug use: No     Sexual activity: Never   Other Topics Concern      Service Not Asked     Blood Transfusions Yes     Caffeine Concern No     Occupational Exposure Not Asked     Hobby Hazards Not Asked     Sleep Concern No     Stress Concern No     Weight Concern Yes     Comment: would like to lose weight     Special Diet No     Back Care Not Asked     Exercise Yes     Comment: walks daily 2 blocks     Bike Helmet Not Asked     Seat Belt Not Asked     Self-Exams Not Asked     Parent/sibling w/ CABG, MI or angioplasty before 65F 55M? Not Asked   Social History Narrative     Not on file     Social Determinants of Health     Financial Resource Strain:      Difficulty of Paying Living Expenses:    Food Insecurity:      Worried About Running Out of Food in the Last Year:      Ran Out of Food in the Last Year:    Transportation Needs:      Lack of Transportation (Medical):      Lack of Transportation (Non-Medical):    Physical Activity:      Days of Exercise per Week:      Minutes of Exercise per Session:    Stress:      Feeling of Stress :    Social Connections:      Frequency of Communication with Friends and Family:      Frequency of Social Gatherings with Friends and Family:      Attends [employer] Services:      Active Member of Clubs or Organizations:      Attends Club or Organization Meetings:      Marital Status:    Intimate  Partner Violence:      Fear of Current or Ex-Partner:      Emotionally Abused:      Physically Abused:      Sexually Abused:        PHYSICAL EXAM:    BP (!) 156/75 (BP Location: Left leg)   Pulse 54   Temp 97.6  F (36.4  C) (Oral)   Resp 18   Wt 77.7 kg (171 lb 6.4 oz)   SpO2 94%   BMI 33.47 kg/m    GENERAL: pleasant, alert, NAD. She looks much older than her age.   HEENT:  Normocephalic. No gross abnormalities.  Pupils equal.  MMM.   CV: RRR, + murmurs, no clicks, gallops, or rubs.   RESP: Poor airflow. No wheezes.   GI: Abdomen distended, soft, NT  MUSCULOSKELETAL: extremities nl - no gross deformities noted. Trace edema only.   SKIN: no suspicious lesions or rashes, dry to touch  NEURO:  Strength normal and symmetric. A/o x3.   PSYCH: mood good, affect appropriate  LYMPH: No palpable ant/post cervical   R upper arm AVF + pulse.     LABS:      CBC RESULTS:     Recent Labs   Lab 07/23/21  0531 07/22/21  0130   WBC 5.9 5.6   RBC 3.00* 3.17*   HGB 9.2* 10.1*   HCT 29.4* 30.6*   PLT 90* 97*       BMP RESULTS:  Recent Labs   Lab 07/23/21  1033 07/23/21  0531 07/23/21  0201 07/22/21  2119 07/22/21  1731 07/22/21  1219 07/22/21  0130   NA  --  132*  --   --   --   --  135   POTASSIUM  --  5.1  --   --   --   --  4.1   CHLORIDE  --  100  --   --   --   --  100   CO2  --  29  --   --   --   --  26   BUN  --  40*  --   --   --   --  28   CR  --  5.07*  --   --   --   --  3.90*   * 147* 153* 225* 126* 138* 59*   KANWAL  --  8.4*  --   --   --   --  8.7       INRNo lab results found in last 7 days.     DIAGNOSTICS:  Reviewed    A/P:  61 y.o woman with ESRD and severe pulmonary hypertensin, admitted for symptomatic SVT.     # ESRD:   -[facility] with Dr. Hernandez   -3 hrs, ED 73 kg, UF ~ 1 liter,. + low dose heparin   -UF is limited by cramps  # Anemia: 3400 units, 50 mg Venofer qMonday  # CKD-MD: 1.5 mcg Hectorol  # Severe pulmonary hypertension.   # HFpEF, III DD. Moderate RV dysfunction. Mod to severe TR.   #  HTN  # Anemia  # COPD/2liters O2.     Plan:  # Cardiology would like to challenge her dry weight, which is reasonable. However, it may be limited her by cramps. Will try for 2-3 liters UF with HD today and again tomorrow.     Theron Alonso MD  [facility] - Nephrology  Office Phone: [phone]  Pager: [phone]

## 2021-07-23 NOTE — PROGRESS NOTES
[facility]  Cardiology Progress Note  Date of Service: 07/23/2021  Primary Cardiologist: will need PH clinic    Assessment & Plan    Yissel Wetzel is a 61 year old female with past medical history significant for ESRD, DMII, anemia, recent dx svt, pulm htn not yet worked up admitted on 7/22/2021 with recurrent palpitations and found to have SVT again as well as severe PH    Assessment:  1.  Severe pulmonary hypertension likely group 1, but also hypervolemic on RHC    - /22, RA, 35, /52 mean 81 mmHg, PVRI 25, PCWP confirmed with sat 35 mmHg, LVEDP 35-40- consistent with severely elevated R and L sided filling pressures as well as systemic htn   -with NO to 80 ppm the mean PA decreased to 60 mmHg, PCWP remained unchanged at 35 mmHg, PVRI decreased to 8.7 waterman*m2.  Her systemic blood pressure was 160-170 mmHg   - unfortunately RVSP was 50 mmhg on echo in 2012 - suggesting longstanding untreated PH   - prognosis is poor, was reviewed with [facility] by primary team who decline admission given limited tmt options.      2.  ESRD on dialysis, will make PH management difficult with drastic flutuation in pulm pressures   - per pt it's rare they can pull off her goal of 2 kg due to cramping    3.  SVT- converted with 18 mg adenosine then perfecto in the ER.    - appreciate EP- given severe PH not a candidate for ablation, started on amio    4.  HTN, remains markedly elevated- started on amlodipine 5 yesterday, increased to 10 this am   - intolerant to many meds, coreg on hold given bradycardia post conversion in ED with HR 44    Plan:   1. Restart coreg at 6.25mg bid- perfecto was noted post adenosine  2. If pressures still high would consider imdur.  3. Please draw extra fluid in dialysis if possible  4. Although prognosis is extremely poor, pt has tolerated high pulmonary pressures for years- will send to  as outpt for a consult at the minimum.   5. Will arrange f/u    Arlene Lau PA-C  [facility]  Heart  Pager: [phone]     Interval History   Feels okay today.  She is happy she is on amiodarone does not procedure.  She notes that she frequently has a hard time tolerating full dialysis due to cramps.  She notes that her abdomen is bloated but she does not have any pedal edema.    Physical Exam   Temp: 97.6  F (36.4  C) Temp src: Oral BP: (!) 156/75 Pulse: 54   Resp: 18 SpO2: 94 % O2 Device: Nasal cannula Oxygen Delivery: 2 LPM  Vitals:    07/22/21 0113 07/23/21 0609   Weight: 74.4 kg (164 lb) 77.7 kg (171 lb 6.4 oz)     Elderly frail-appearing woman who appears older than her stated age by at least 20 years.  She normocephalic and atraumatic.  Her heart is regular in rate and rhythm with a 2 out of 6 systolic murmur heard best at the right sternal border.  Lungs are diminished in bilateral lower lobes but without rales or rhonchi.  Trace peripheral edema.  Skin is pale but dry.    Medications       - MEDICATION INSTRUCTIONS for Dialysis Patients -   Does not apply See Admin Instructions     amiodarone  200 mg Oral BID     [START ON 8/2/2021] amiodarone  200 mg Oral Daily     amLODIPine  10 mg Oral Daily     aspirin  81 mg Oral Daily     [Held by provider] carvedilol  25 mg Oral BID w/meals     famotidine  10 mg Oral Daily     insulin aspart  1-7 Units Subcutaneous TID AC     insulin aspart  1-5 Units Subcutaneous At Bedtime     insulin detemir  15 Units Subcutaneous At Bedtime     lanthanum  500 mg Oral TID w/meals     sodium chloride (PF)  3 mL Intracatheter Q8H     umeclidinium-vilanterol  1 puff Inhalation Daily       Data   Last 24 hours labs reviewed   EKG: (reviewed personally) sinus

## 2021-07-23 NOTE — PROGRESS NOTES
EP CARDIOLOGY PROGRESS NOTE  Laura Barbosa MD    61-year-old female with ESRD due to diabetic nephropathy (on dialysis), DM type II, very severe pulmonary hypertension and recurrent SVT.    Right heart catheterization yesterday showed extremely high PA pressures (132/52 with mean of 81 mmHg!!).  Coronary angiography showed no significant CAD.  -----------------------------------------------------------------    ASSESSMENT:  1. Extremely severe pulmonary hypertension.  2. Recurrent SVT, symptomatic.  3. End-stage renal disease, on dialysis.    PLAN:  1. Given her extremely severe PAH, I would advise against invasive EP procedures.  2. Start oral amiodarone for SVT suppression.  Discussed with Dr. Dong.  3. EP is signing off.  Please call with new questions.    Total Time: 25 minutes;  15 minutes spent in direct communication with patient / family and care coordination.             Interval History:     Had a good night's rest, feels okay at the moment.  In sinus rhythm.          Review of Systems:     CONSTITUTIONAL: NEGATIVE for fever, chills, change in weight  ENT/MOUTH: NEGATIVE for ear, mouth and throat problems  RESP:  +SOB  CV:  +palpitation            Physical Exam:      Blood pressure (!) 156/75, pulse 54, temperature 97.6  F (36.4  C), temperature source Oral, resp. rate 18, weight 77.7 kg (171 lb 6.4 oz), SpO2 94 %, not currently breastfeeding.  Vitals:    07/22/21 0113 07/23/21 0609   Weight: 74.4 kg (164 lb) 77.7 kg (171 lb 6.4 oz)     Vital Signs with Ranges  Temp:  [97.6  F (36.4  C)-98.3  F (36.8  C)] 97.6  F (36.4  C)  Pulse:  [48-67] 54  Resp:  [14-20] 18  BP: (133-200)/(57-95) 156/75  SpO2:  [91 %-100 %] 94 %  I/O's Last 24 hours  I/O last 3 completed shifts:  In: 325 [P.O.:325]  Out: -     GENERAL: In no acute distress  HEENT:  normocephalic, atraumatic.  NECK:  elevated JVP.  LUNGS:  decreased bilaterally  CARDIOVASCULAR:  RRR  ABDOMEN: soft  EXTREMITIES:  edema  VASCULAR:  1+ radial pulses            Medications:          - MEDICATION INSTRUCTIONS for Dialysis Patients -   Does not apply See Admin Instructions     amiodarone  200 mg Oral BID     [START ON 8/2/2021] amiodarone  200 mg Oral Daily     amLODIPine  10 mg Oral Daily     aspirin  81 mg Oral Daily     [Held by provider] carvedilol  25 mg Oral BID w/meals     famotidine  10 mg Oral Daily     insulin aspart  1-7 Units Subcutaneous TID AC     insulin aspart  1-5 Units Subcutaneous At Bedtime     insulin detemir  15 Units Subcutaneous At Bedtime     lanthanum  500 mg Oral TID w/meals     sodium chloride (PF)  3 mL Intracatheter Q8H     umeclidinium-vilanterol  1 puff Inhalation Daily            Data:      All new lab and imaging data was reviewed.   Recent Labs   Lab Test 07/23/21  0531 07/22/21  0130 07/16/21  0600 01/13/20  0642 01/12/20  1612 12/31/19  0831 09/25/19  1453   WBC 5.9 5.6 5.1   < > 8.4  --  6.5   HGB 9.2* 10.1* 9.1*   < > 8.5*  --  10.2*   MCV 98 97 98   < > 95  --  100   PLT 90* 97* 99*   < > 204  --  151   INR  --   --   --   --  1.23* 1.27* 1.13    < > = values in this interval not displayed.      Recent Labs   Lab Test 07/23/21  1033 07/23/21  0531 07/23/21  0201 07/22/21  0130 07/16/21  0600   NA  --  132*  --  135 132*   POTASSIUM  --  5.1  --  4.1 4.6   CHLORIDE  --  100  --  100 99   CO2  --  29  --  26 27   BUN  --  40*  --  28 41*   CR  --  5.07*  --  3.90* 4.95*   ANIONGAP  --  3  --  9 6   KANWAL  --  8.4*  --  8.7 8.4*   * 147* 153* 59* 145*     Recent Labs   Lab Test 07/22/21  0130 07/14/21  2357 07/14/21  2132 11/24/17  1816 08/09/17  2030 09/30/15  1533   TROPI  --   --   --  <0.015 <0.015  The 99th percentile for upper reference range is 0.045 ug/L.  Troponin values in   the range of 0.045 - 0.120 ug/L may be associated with risks of adverse   clinical events.   <0.015  The 99th percentile for upper reference range is 0.045 ug/L.  Troponin values in   the range of 0.045 - 0.120 ug/L may be associated with risks of  adverse   clinical events.     TROPONIN 0.022 0.437* 0.446*  --   --   --          EKG results:  Reviewed    Echo Results:  No results found for this or any previous visit (from the past 4320 hour(s)).    Imaging:   Recent Results (from the past 24 hour(s))   Cardiac Catheterization    Narrative    1) Severe pulmonary hypertension primarily secondary to intrinsic   pulmonary vascular disease (mean PA 81 mmHg, PVRI 25, PCWP confirmed with   sat 35 mmHg)  2) Severely elevated right-sided filling pressures (mean RA 35 mmHg)  3) Severely elevated left-sided filling pressures (LVEDP 35-40 mmHg)  4) No hemodynamically significant coronary artery disease  5) With NO to 80 ppm the mean PA decreased to 60 mmHg, PCWP remained   unchanged at 35 mmHg, PVRI decreased to 8.7 waterman*m2.  Her systemic blood   pressure was 160-170 mmHg  6) Episode of SVT terminated with 6mg adenosine

## 2021-07-23 NOTE — PROVIDER NOTIFICATION
MD paged: pt says she does carb count at home. not ordered here. Pt also asking is she is getting dialysis today

## 2021-07-23 NOTE — PLAN OF CARE
A&O x4, VSS on 2 L O2. Tele: SB. Pain controlled with prn tylenol. Pt had overnight oximetry test. R fistula WDL. L radial TR band site WDL, no bleeding/hematoma, some bruising noted. R internal jugular site WDL with bruising. CMS intact.  LS: clear/dim . BS: audible. -void, +flatus, -BM. Up A-2 GB/W.

## 2021-07-23 NOTE — PLAN OF CARE
"A&O x4. Pt denied pain, but reports cramping that is \"normal\" on dialysis days. VSS, on RA, HR 50's, 's. Up w/ 1+ GB+ walker. Tele: SB. BG elevated, corrected w/ sliding scale and Carb count added. Dialysis today. Alarms on. Plan for dialysis tomorrow, then probable discharge. Continue to monitor.    "

## 2021-07-23 NOTE — PLAN OF CARE
End of Shift Summary:     BEHAVIORAL: A/O, felt generally unwell at times but improved since eating  VITALS: VSS ex tele SB 50s, BP more elevated in evening 150s-170s/90s. O2sats 92-97% on 2 L O2. Denies pain.  CARDIAC: one self-limiting run of atrial tach/SVT this AM, only symptom palpitations, cardiology aware  RESPIRATORY: LS clear, mild MANE  OTHER: Fistula site WDL. L radial site WDL, air almost removed from TR band. No hematoma, CMS intact.   R internal jugular site edematous/mild hematoma, manual pressure held once out of caution but mostly unchanged, bruising now more apparent. CMS intact.   MOBILITY: SBA c GB, up in chair  PROCEDURES: R/L heart cath completed  PLAN: Plan for initiating amlodipine tonight, overnight oximetry study if able.

## 2021-07-23 NOTE — PROGRESS NOTES
BP (!) 141/69   Pulse 67   Temp 98.3  F (36.8  C) (Oral)   Resp 15   Wt 74.4 kg (164 lb)   SpO2 96%   BMI 32.03 kg/m    Pt placed on Overnight oximetry on 2L NC baseline, sats 93-95%. RT following.  GIOVANA WOLFF, RT

## 2021-07-23 NOTE — PROGRESS NOTES
[facility]    Hospitalist Progress Note    Assessment & Plan   61 year old female who was admitted on 7/22/2021 with rapid Heart Rate and SOB:    Impression:   Principal Problem:    Paroxysmal SVT   -- EP treating her SVT with Amiodarone, no plans for EP study given her severe pulm htn.        Symptomatic sinus bradycardia -- currently stable    -- occurs when SVT treated, will stop Coreg       Extremely Severe Pulmonary hypertension     Active Problems:    Essential hypertension   -- Amlodipine 10 mg daily in place of Coreg      DM 2 w ESRD   -- will add insulin with carb counting      ESRD on hemodialysis on M, W, F   -- will try to remove as much fluid as possible to help her pulm htn       Anemia in chronic kidney disease      Thrombocytopenia         Plan:  Discussed with Cardiology -- requested possible transfer to [facility] to address pulm htn.  The [facility] reviewed the charge, feel her severe pulm htn is secondary to fluid retention and agree with attempted fluid removal with dialysis, and do not feel she is a good candidate for IV medications for primary pulm htn.  Dr. Alonso consulted, awaiting dialysis today.  Discussed with EP -- plan for amiodarone.     DVT Prophylaxis: Pneumatic Compression Devices  Code Status: Full Code    Disposition: Expected discharge home tomorrow if remains stable    Guido Georges  Pager [phone]  Cell Phone [phone]  Text Page (7am to 6pm)  (35 min total)  Interval History   No further episodes of SVT, but found to have severe Pulm Htn on Right heart cath yesterday.  She is on chronic O2 at 2 LPM.  Her baseline dry weight is 160 lbs, current weight 171.      Physical Exam   Temp: 97.8  F (36.6  C) Temp src: Oral BP: (!) 144/68 Pulse: 57   Resp: 16 SpO2: 93 % O2 Device: Nasal cannula Oxygen Delivery: 2 LPM  Vitals:    07/22/21 0113 07/23/21 0609   Weight: 74.4 kg (164 lb) 77.7 kg (171 lb 6.4 oz)     Vital Signs with Ranges  Temp:  [97.8  F  (36.6  C)-98.3  F (36.8  C)] 97.8  F (36.6  C)  Pulse:  [] 57  Resp:  [11-23] 16  BP: (120-200)/(55-95) 144/68  SpO2:  [91 %-100 %] 93 %  I/O last 3 completed shifts:  In: 1325 [P.O.:325; IV Piggyback:1000]  Out: -     # Pain Assessment:  Current Pain Score 7/23/2021   Patient currently in pain? denies   Pain score (0-10) -   Yissel zayas pain level was assessed and she currently denies pain.        Constitutional: Awake, alert, cooperative, no apparent distress  Respiratory: Clear to auscultation bilaterally, no crackles or wheezing  Cardiovascular: Regular rate and rhythm, normal S1 and S2, and no murmur noted  GI: Normal bowel sounds, soft, non-distended, non-tender  Extrem: No calf tenderness, 1+ bilateral ankle edema  Neuro: Ox3, no focal motor or sensory deficits    Medications       - MEDICATION INSTRUCTIONS for Dialysis Patients -   Does not apply See Admin Instructions     amLODIPine  5 mg Oral Daily     aspirin  81 mg Oral Daily     [Held by provider] carvedilol  25 mg Oral BID w/meals     famotidine  10 mg Oral Daily     insulin aspart  1-7 Units Subcutaneous TID AC     insulin aspart  1-5 Units Subcutaneous At Bedtime     insulin detemir  15 Units Subcutaneous At Bedtime     lanthanum  500 mg Oral TID w/meals     sodium chloride (PF)  3 mL Intracatheter Q8H     umeclidinium-vilanterol  1 puff Inhalation Daily       Data   Recent Labs   Lab 07/23/21  0531 07/23/21  0201 07/22/21  2119 07/22/21  0130   WBC 5.9  --   --  5.6   HGB 9.2*  --   --  10.1*   MCV 98  --   --  97   PLT 90*  --   --  97*   *  --   --  135   POTASSIUM 5.1  --   --  4.1   CHLORIDE 100  --   --  100   CO2 29  --   --  26   BUN 40*  --   --  28   CR 5.07*  --   --  3.90*   ANIONGAP 3  --   --  9   KANWAL 8.4*  --   --  8.7   * 153* 225* 59*   ALBUMIN 3.1*  --   --  3.4   PROTTOTAL 6.0*  --   --  6.8   BILITOTAL 1.3  --   --  0.9   ALKPHOS 74  --   --  93   ALT 17  --   --  18   AST 19  --   --  22   TROPONIN  --   --   --   0.022       Imaging:   Recent Results (from the past 24 hour(s))   Cardiac Catheterization    Narrative    1) Severe pulmonary hypertension primarily secondary to intrinsic   pulmonary vascular disease (mean PA 81 mmHg, PVRI 25, PCWP confirmed with   sat 35 mmHg)  2) Severely elevated right-sided filling pressures (mean RA 35 mmHg)  3) Severely elevated left-sided filling pressures (LVEDP 35-40 mmHg)  4) No hemodynamically significant coronary artery disease  5) With NO to 80 ppm the mean PA decreased to 60 mmHg, PCWP remained   unchanged at 35 mmHg, PVRI decreased to 8.7 waterman*m2.  Her systemic blood   pressure was 160-170 mmHg  6) Episode of SVT terminated with 6mg adenosine

## 2021-07-24 LAB
GLUCOSE BLDC GLUCOMTR-MCNC: 123 MG/DL (ref 70–99)
GLUCOSE BLDC GLUCOMTR-MCNC: 127 MG/DL (ref 70–99)
GLUCOSE BLDC GLUCOMTR-MCNC: 154 MG/DL (ref 70–99)
GLUCOSE BLDC GLUCOMTR-MCNC: 157 MG/DL (ref 70–99)
GLUCOSE BLDC GLUCOMTR-MCNC: 158 MG/DL (ref 70–99)

## 2021-07-24 PROCEDURE — 210N000002 HC R&B HEART CARE

## 2021-07-24 PROCEDURE — 250N000011 HC RX IP 250 OP 636: Performed by: INTERNAL MEDICINE

## 2021-07-24 PROCEDURE — 258N000003 HC RX IP 258 OP 636: Performed by: INTERNAL MEDICINE

## 2021-07-24 PROCEDURE — 90937 HEMODIALYSIS REPEATED EVAL: CPT

## 2021-07-24 PROCEDURE — 99233 SBSQ HOSP IP/OBS HIGH 50: CPT | Performed by: INTERNAL MEDICINE

## 2021-07-24 PROCEDURE — 250N000013 HC RX MED GY IP 250 OP 250 PS 637: Performed by: INTERNAL MEDICINE

## 2021-07-24 PROCEDURE — 99232 SBSQ HOSP IP/OBS MODERATE 35: CPT | Performed by: INTERNAL MEDICINE

## 2021-07-24 PROCEDURE — 250N000012 HC RX MED GY IP 250 OP 636 PS 637: Performed by: INTERNAL MEDICINE

## 2021-07-24 PROCEDURE — 250N000013 HC RX MED GY IP 250 OP 250 PS 637: Performed by: PHYSICIAN ASSISTANT

## 2021-07-24 RX ORDER — ALBUMIN (HUMAN) 12.5 G/50ML
50 SOLUTION INTRAVENOUS
Status: DISCONTINUED | OUTPATIENT
Start: 2021-07-24 | End: 2021-07-24

## 2021-07-24 RX ORDER — HEPARIN SODIUM 1000 [USP'U]/ML
500 INJECTION, SOLUTION INTRAVENOUS; SUBCUTANEOUS
Status: DISCONTINUED | OUTPATIENT
Start: 2021-07-24 | End: 2021-07-25 | Stop reason: HOSPADM

## 2021-07-24 RX ORDER — ALBUMIN, HUMAN INJ 5% 5 %
50-250 SOLUTION INTRAVENOUS
Status: DISCONTINUED | OUTPATIENT
Start: 2021-07-24 | End: 2021-07-24

## 2021-07-24 RX ORDER — HEPARIN SODIUM 1000 [USP'U]/ML
500 INJECTION, SOLUTION INTRAVENOUS; SUBCUTANEOUS CONTINUOUS
Status: DISCONTINUED | OUTPATIENT
Start: 2021-07-24 | End: 2021-07-24

## 2021-07-24 RX ADMIN — SODIUM CHLORIDE 250 ML: 9 INJECTION, SOLUTION INTRAVENOUS at 09:21

## 2021-07-24 RX ADMIN — LANTHANUM CARBONATE 500 MG: 500 TABLET, CHEWABLE ORAL at 18:21

## 2021-07-24 RX ADMIN — SODIUM CHLORIDE 300 ML: 9 INJECTION, SOLUTION INTRAVENOUS at 09:21

## 2021-07-24 RX ADMIN — HEPARIN SODIUM 500 UNITS/HR: 1000 INJECTION INTRAVENOUS; SUBCUTANEOUS at 09:22

## 2021-07-24 RX ADMIN — INSULIN DETEMIR 15 UNITS: 100 INJECTION, SOLUTION SUBCUTANEOUS at 22:09

## 2021-07-24 RX ADMIN — AMIODARONE HYDROCHLORIDE 200 MG: 200 TABLET ORAL at 10:50

## 2021-07-24 RX ADMIN — CARVEDILOL 6.25 MG: 6.25 TABLET, FILM COATED ORAL at 18:21

## 2021-07-24 RX ADMIN — AMIODARONE HYDROCHLORIDE 200 MG: 200 TABLET ORAL at 20:42

## 2021-07-24 RX ADMIN — LANTHANUM CARBONATE 500 MG: 500 TABLET, CHEWABLE ORAL at 13:21

## 2021-07-24 ASSESSMENT — ACTIVITIES OF DAILY LIVING (ADL)
ADLS_ACUITY_SCORE: 14
ADLS_ACUITY_SCORE: 14
ADLS_ACUITY_SCORE: 15
ADLS_ACUITY_SCORE: 14

## 2021-07-24 NOTE — PROGRESS NOTES
Potassium   Date Value Ref Range Status   07/23/2021 5.1 3.4 - 5.3 mmol/L Final   10/05/2020 4.1 3.5 - 5.5 mEq/L Final     Hemoglobin   Date Value Ref Range Status   07/23/2021 9.2 (L) 11.7 - 15.7 g/dL Final   01/13/2020 8.4 (L) 11.7 - 15.7 g/dL Final     Creatinine   Date Value Ref Range Status   07/23/2021 5.07 (H) 0.52 - 1.04 mg/dL Final   01/13/2020 5.97 (H) 0.52 - 1.04 mg/dL Final     Urea Nitrogen   Date Value Ref Range Status   07/23/2021 40 (H) 7 - 30 mg/dL Final   01/13/2020 44 (H) 7 - 30 mg/dL Final     Sodium   Date Value Ref Range Status   07/23/2021 132 (L) 133 - 144 mmol/L Final   01/13/2020 139 133 - 144 mmol/L Final     INR   Date Value Ref Range Status   01/12/2020 1.23 (H) 0.86 - 1.14 Final       DIALYSIS PROCEDURE NOTE  Hepatitis status of previous patient on machine log was checked and verified ok to use with this patients hepatitis status.  Patient dialyzed for 2.9hrs. on a K2 bath UF profile 7 with a net fluid removal of  2.4L.  A BFR of 350/200 ml/min per Dr Alonso to decrease BFR to 200ml/min during the run was obtained via a RUAF using 16gauge needles.      The treatment plan was discussed with Dr. Alnoso during the treatment.    Total heparin received during the treatment: 1500 units.   Needle cannulation sites held x 10 min.     Meds  given: NONE   Complications: Pt developed cramping in Lt ankle warm pack and massage done but pt wanted to come off 5 min early, Dr Alonso aware.      Person educated: pt. Knowledge base Substantial. Barriers to learning: none. Educated on access via verbal mode. Patient verbalized understanding. Pt prefers verbal education style.     ICEBOAT? Timeout performed pre-treatment  I: Patient was identified using 2 identifiers  C:  Consent Signed Yes  E: Equipment preventative maintenance is current and dialysis delivery system OK to use  B: Hepatitis B Surface Antigen: neg; Draw Date: 7/14/2021      Hepatitis B Surface Antibody: Immune; Draw Date: 7/14/2021  O: Dialysis  orders present and complete prior to treatment  A: Vascular access verified and assessed prior to treatment  T: Treatment was performed at a clinically appropriate time  ?: Patient was allowed to ask questions and address concerns prior to treatment  See flowsheet in EPIC for further details and post assessment.  Machine water alarm in place and functioning. Transducer pods intact and checked every 15min.   Pt returned via bed.  Chlorine/Chloramine water system checked every 4 hours.  Outpatient Dialysis at [facility]    **Please remove patient dressing on AVF and AVG needle sites 24 hours after dialysis. If leaking occurs please apply a Band-Aid.

## 2021-07-24 NOTE — PLAN OF CARE
Neuro- A&Ox4, forgetful  Most Recent Vitals- Temp: 98.2  F (36.8  C) Temp src: Oral BP: 129/51 Pulse: 64   Resp: 16 SpO2: 98 % O2 Device: Nasal cannula   Tele/Cardiac- SR with rare PAC's, denies CP  Resp- Continues on 2L via NC, LS diminished, gets MANE  Activity- Ax1 with GB  Pain- denies  Drips- none, SL  Drains/Tubes- PIV, Fistula R. Arm,  Skin- left radial/lower forearm bruised, +1-2 BLE edema  GI/- pt on hemodialysis, No BM in few days, declines intervention at this time  Aggression Color- Green  COVID status- Negative  Plan- Continue to monitor  Misc- No pre meal insulin needed, carb count covered with 4 units novalog     Audrey Castillo, RN

## 2021-07-24 NOTE — PROGRESS NOTES
[facility]    Cardiology Progress Note     Assessment & Plan       Yissel Wetzel is a 61 year old female with past medical history significant for ESRD, DMII, anemia, recent dx svt, pulm htn not yet worked up admitted on 7/22/2021 with recurrent palpitations and found to have SVT again as well as severe PH     Assessment:  1.  Severe pulmonary hypertension likely group 1, but also hypervolemic on RHC               - /22, RA, 35, /52 mean 81 mmHg, PVRI 25, PCWP confirmed with sat 35 mmHg, LVEDP 35-40- consistent with severely elevated R and L sided filling pressures as well as systemic htn              -with NO to 80 ppm the mean PA decreased to 60 mmHg, PCWP remained unchanged at 35 mmHg, PVRI decreased to 8.7 waterman*m2.  Her systemic blood pressure was 160-170 mmHg              - unfortunately RVSP was 50 mmhg on echo in 2012 - suggesting longstanding untreated PH              - prognosis is poor, was reviewed with [facility] by primary team who decline admission given limited tmt options.       2.  ESRD on dialysis, will make PH management difficult with drastic flutuation in pulm pressures              - per pt it's rare they can pull off her goal of 2 kg due to cramping     3.  SVT- converted with 18 mg adenosine then perfecto in the ER.               - appreciate EP- given severe PH not a candidate for ablation, started on amio     4.  HTN, remains markedly elevated- started on amlodipine 5 yesterday, increased to 10 this am              - intolerant to many meds, coreg on hold given bradycardia post conversion in ED with HR 44     Plan:   This patient has a extremely poor prognosis with multi-organ failure and severe PH.  She did not want to speak about this today.  The patient is chronically ill appearing and frail.     We should continue to try and educate the patient and family about her prognosis.    Continue amiodarone, coreg    Patient thinks she is allergic to amlodipine (leg  edema) but was not sure, she has been tolerating this so far.  If there are concerns for allergy this can be stopped and we can try something else.  A CCB may be somewhat helpful for the PH so if she is tolerating amlodipine this might be good to keep.    Continue to try and pull fluid during dialysis (extra if possible)    The patient has critical pulmonary hypertension.  This is although mixed there is a strong component of precapillary disease as well.  In any case patient is a poor candidate for advanced therapy for pH.  Per hospitalist attending they discussed with [facility] about this as well.  At this time treatment options are limited short of pulling of volume as much as tolerated and blood pressure control.    Outpatient PH clinic follow up    Fabian Harper MD    Interval History   No acute events overnight.  Dialysis this am.    Physical Exam   Temp: 98.7  F (37.1  C) Temp src: Oral BP: 106/51 Pulse: 60   Resp: 16 SpO2: 98 % O2 Device: Nasal cannula Oxygen Delivery: 2 LPM  Vitals:    07/22/21 0113 07/23/21 0609 07/24/21 0536   Weight: 74.4 kg (164 lb) 77.7 kg (171 lb 6.4 oz) 77.8 kg (171 lb 8.3 oz)     Vital Signs with Ranges  Temp:  [96  F (35.6  C)-98.7  F (37.1  C)] 98.7  F (37.1  C)  Pulse:  [51-60] 60  Resp:  [12-18] 16  BP: (106-179)/(49-85) 106/51  SpO2:  [94 %-99 %] 98 %  I/O last 3 completed shifts:  In: 0   Out: 700 [Urine:200; Other:500]    GENERAL APPEARANCE: Appears older than stated age.  Alert and oriented x3. No acute distress.  SKIN: Inspection of the skin reveals no rashes, ulcerations, warm, dry  NECK: Supple and symmetric.  elevated JVP  LUNGS: diminished in the bases   CARDIOVASCULAR: S1, S2, regular rate and rhythm 2/6 systolic murmur at right sternal border.   ABDOMEN: Soft, non-tender, non-distended with normal bowel sounds.  No ascites noted.  EXTREMITIES: 2+ edema.  NEUROLOGIC:  Normal mood and affect.  Sensation to touch was normal.    Medications       -  MEDICATION INSTRUCTIONS for Dialysis Patients -   Does not apply See Admin Instructions     amiodarone  200 mg Oral BID     [START ON 8/2/2021] amiodarone  200 mg Oral Daily     amLODIPine  10 mg Oral Daily     aspirin  81 mg Oral Daily     carvedilol  6.25 mg Oral BID w/meals     famotidine  10 mg Oral Daily     heparin (porcine)  500 Units Hemodialysis Machine OR IV Push Once in dialysis/CRRT     insulin aspart   Subcutaneous TID AC     insulin aspart  1-7 Units Subcutaneous TID AC     insulin aspart  1-5 Units Subcutaneous At Bedtime     insulin detemir  15 Units Subcutaneous At Bedtime     lanthanum  500 mg Oral TID w/meals     sodium chloride (PF)  3 mL Intracatheter Q8H     umeclidinium-vilanterol  1 puff Inhalation Daily       Data   Results for orders placed or performed during the hospital encounter of 07/22/21 (from the past 24 hour(s))   Glucose by meter   Result Value Ref Range    GLUCOSE BY METER POCT 278 (H) 70 - 99 mg/dL   Glucose by meter   Result Value Ref Range    GLUCOSE BY METER POCT 108 (H) 70 - 99 mg/dL   Glucose by meter   Result Value Ref Range    GLUCOSE BY METER POCT 180 (H) 70 - 99 mg/dL   Glucose by meter   Result Value Ref Range    GLUCOSE BY METER POCT 157 (H) 70 - 99 mg/dL   Glucose by meter   Result Value Ref Range    GLUCOSE BY METER POCT 127 (H) 70 - 99 mg/dL   Glucose by meter   Result Value Ref Range    GLUCOSE BY METER POCT 158 (H) 70 - 99 mg/dL

## 2021-07-24 NOTE — PLAN OF CARE
Summary:     Patient Name: Yissel Wetzel Room#: 0243/0243-01   Admit Date: 7/23/2021  Primary Diagnosis: Paroxysmal SVT (primary encounter diagnosis)  SVT (supraventricular tachycardia) (H)  Severe Pulm HTN -- PAP post  R Ht Cath 7/23/21  Inpatient Status: HC_Inpatinet    Needed? NO  Procedures This Admit: R groin angio  Drips: NA  Drains & Devices:dialysis maryam on R upper arm  Rhythm: SB  Activity Level: SBA   Oxygen needs: 2L N/c  Important Labs Since Admit: BS,   Significant Echo results:   Anticipated D/C Date: TBD    Acuity Rating: {HC_Acuity level:2  Requiring Extra Time? transferring    Other Concerns: A&O SOB increased with activity, LS diminish with occasional cough denies pain  up with [facility] pl\n to go home pending nursing will continue to monitor.  Nurse: Alannah Faustin RN

## 2021-07-24 NOTE — PLAN OF CARE
Pt here for SVT and severe PH.  Angiogram on 7/22. Returned from hemodialysis about 1200.  A&O x 4.  VSS with soft BP, O2 at 2L, afebrile. Diminished LS. Fistula WNL- thrill and bruit present.  Denies pain.

## 2021-07-24 NOTE — PROGRESS NOTES
[facility]    Hospitalist Progress Note    Assessment & Plan   61 year old female who was admitted on 7/22/2021 with rapid Heart Rate and SOB:    Impression:   Principal Problem:    Paroxysmal SVT   -- EP treating her SVT with Amiodarone, no plans for EP study given her severe pulm htn.        Symptomatic sinus bradycardia -- currently stable    -- occurs when SVT treated, will stop Coreg       Extremely Severe Pulmonary hypertension     Active Problems:    Essential hypertension   -- Amlodipine 10 mg daily in place of Coreg      DM 2 w ESRD   -- will add insulin with carb counting      ESRD on hemodialysis on M, W, F   -- will try to remove as much fluid as possible to help her pulm htn    -- repeat dialysis today, difficult to pull fluid      Anemia in chronic kidney disease      Thrombocytopenia         Plan:  Discussed with patient and family -- she does not feel comfortable going home today, will plan tomorrow.  If still uncomfortable with going home discussed TCU -- but she does not want to do that.  Discussed with Dr. Alonso, renal, and he has safely removed as much fluid as possible and does not plan to dialyze tomorrow.     DVT Prophylaxis: Pneumatic Compression Devices  Code Status: Full Code    Disposition: Expected discharge home tomorrow if remains stable    Guido Soriasandramak  Pager [phone]  Cell Phone [phone]  Text Page (7am to 6pm)    Interval History   Has had a couple brief episodes of palpitations, but they did not continue.     Physical Exam   Temp: 98.7  F (37.1  C) Temp src: Oral BP: 106/51 Pulse: 60   Resp: 16 SpO2: 98 % O2 Device: Nasal cannula Oxygen Delivery: 2 LPM  Vitals:    07/22/21 0113 07/23/21 0609 07/24/21 0536   Weight: 74.4 kg (164 lb) 77.7 kg (171 lb 6.4 oz) 77.8 kg (171 lb 8.3 oz)     Vital Signs with Ranges  Temp:  [96  F (35.6  C)-98.7  F (37.1  C)] 98.7  F (37.1  C)  Pulse:  [51-60] 60  Resp:  [12-18] 16  BP: (106-179)/(49-85)  106/51  SpO2:  [94 %-99 %] 98 %  I/O last 3 completed shifts:  In: 0   Out: 700 [Urine:200; Other:500]    # Pain Assessment:  Current Pain Score 7/24/2021   Patient currently in pain? denies   Pain score (0-10) -   Yissel zayas pain level was assessed and she currently denies pain.        Constitutional: Awake, alert, cooperative, no apparent distress  Respiratory: Clear to auscultation bilaterally, no crackles or wheezing  Cardiovascular: Regular rate and rhythm, normal S1 and S2, and no murmur noted  GI: Normal bowel sounds, soft, non-distended, non-tender  Extrem: No calf tenderness, trace bilateral ankle edema  Neuro: Ox3, no focal motor or sensory deficits    Medications       - MEDICATION INSTRUCTIONS for Dialysis Patients -   Does not apply See Admin Instructions     amiodarone  200 mg Oral BID     [START ON 8/2/2021] amiodarone  200 mg Oral Daily     amLODIPine  10 mg Oral Daily     aspirin  81 mg Oral Daily     carvedilol  6.25 mg Oral BID w/meals     famotidine  10 mg Oral Daily     heparin (porcine)  500 Units Hemodialysis Machine OR IV Push Once in dialysis/CRRT     insulin aspart   Subcutaneous TID AC     insulin aspart  1-7 Units Subcutaneous TID AC     insulin aspart  1-5 Units Subcutaneous At Bedtime     insulin detemir  15 Units Subcutaneous At Bedtime     lanthanum  500 mg Oral TID w/meals     sodium chloride (PF)  3 mL Intracatheter Q8H     umeclidinium-vilanterol  1 puff Inhalation Daily       Data   Recent Labs   Lab 07/24/21  1310 07/24/21  0738 07/24/21  0227 07/23/21  0531 07/22/21  0130   WBC  --   --   --  5.9 5.6   HGB  --   --   --  9.2* 10.1*   MCV  --   --   --  98 97   PLT  --   --   --  90* 97*   NA  --   --   --  132* 135   POTASSIUM  --   --   --  5.1 4.1   CHLORIDE  --   --   --  100 100   CO2  --   --   --  29 26   BUN  --   --   --  40* 28   CR  --   --   --  5.07* 3.90*   ANIONGAP  --   --   --  3 9   KANWAL  --   --   --  8.4* 8.7   * 127* 157* 147* 59*   ALBUMIN  --   --   --   3.1* 3.4   PROTTOTAL  --   --   --  6.0* 6.8   BILITOTAL  --   --   --  1.3 0.9   ALKPHOS  --   --   --  74 93   ALT  --   --   --  17 18   AST  --   --   --  19 22   TROPONIN  --   --   --   --  0.022       Imaging:   No results found for this or any previous visit (from the past 24 hour(s)).

## 2021-07-25 VITALS
DIASTOLIC BLOOD PRESSURE: 52 MMHG | TEMPERATURE: 97.6 F | SYSTOLIC BLOOD PRESSURE: 114 MMHG | OXYGEN SATURATION: 97 % | RESPIRATION RATE: 16 BRPM | HEART RATE: 52 BPM | BODY MASS INDEX: 33.38 KG/M2 | WEIGHT: 170.9 LBS

## 2021-07-25 LAB
GLUCOSE BLDC GLUCOMTR-MCNC: 129 MG/DL (ref 70–99)
GLUCOSE BLDC GLUCOMTR-MCNC: 152 MG/DL (ref 70–99)
GLUCOSE BLDC GLUCOMTR-MCNC: 174 MG/DL (ref 70–99)

## 2021-07-25 PROCEDURE — 250N000013 HC RX MED GY IP 250 OP 250 PS 637: Performed by: PHYSICIAN ASSISTANT

## 2021-07-25 PROCEDURE — 250N000013 HC RX MED GY IP 250 OP 250 PS 637: Performed by: INTERNAL MEDICINE

## 2021-07-25 PROCEDURE — 99239 HOSP IP/OBS DSCHRG MGMT >30: CPT | Performed by: INTERNAL MEDICINE

## 2021-07-25 RX ORDER — AMLODIPINE BESYLATE 10 MG/1
10 TABLET ORAL DAILY
Qty: 30 TABLET | Refills: 3 | Status: SHIPPED | OUTPATIENT
Start: 2021-07-26 | End: 2021-07-25

## 2021-07-25 RX ORDER — AMIODARONE HYDROCHLORIDE 100 MG/1
TABLET ORAL
Qty: 50 TABLET | Refills: 1 | Status: SHIPPED | OUTPATIENT
Start: 2021-07-25 | End: 2021-10-11 | Stop reason: DRUGHIGH

## 2021-07-25 RX ORDER — CARVEDILOL 6.25 MG/1
6.25 TABLET ORAL 2 TIMES DAILY WITH MEALS
Qty: 60 TABLET | Refills: 1 | Status: SHIPPED | OUTPATIENT
Start: 2021-07-25 | End: 2021-10-11 | Stop reason: DRUGHIGH

## 2021-07-25 RX ORDER — AMLODIPINE BESYLATE 10 MG/1
10 TABLET ORAL DAILY
Qty: 30 TABLET | Refills: 3 | Status: SHIPPED | OUTPATIENT
Start: 2021-07-26 | End: 2022-06-15

## 2021-07-25 RX ORDER — AMIODARONE HYDROCHLORIDE 100 MG/1
TABLET ORAL
Qty: 50 TABLET | Refills: 3 | Status: SHIPPED | OUTPATIENT
Start: 2021-07-25 | End: 2021-07-25

## 2021-07-25 RX ADMIN — AMIODARONE HYDROCHLORIDE 200 MG: 200 TABLET ORAL at 08:15

## 2021-07-25 RX ADMIN — LANTHANUM CARBONATE 500 MG: 500 TABLET, CHEWABLE ORAL at 08:15

## 2021-07-25 RX ADMIN — FAMOTIDINE 10 MG: 10 TABLET ORAL at 08:15

## 2021-07-25 RX ADMIN — AMLODIPINE BESYLATE 10 MG: 10 TABLET ORAL at 08:15

## 2021-07-25 RX ADMIN — ASPIRIN 81 MG: 81 TABLET, COATED ORAL at 08:15

## 2021-07-25 RX ADMIN — CARVEDILOL 6.25 MG: 6.25 TABLET, FILM COATED ORAL at 08:14

## 2021-07-25 ASSESSMENT — ACTIVITIES OF DAILY LIVING (ADL)
ADLS_ACUITY_SCORE: 14
ADLS_ACUITY_SCORE: 12

## 2021-07-25 NOTE — PROGRESS NOTES
Brief cardiology note:  Patient possibly discharging today or tomorrow.  Fluid management will be via dialysis.  Cardiology to sign off.    I ordered f/u with Pulmonary hypertension clinic.     Cardiology will sign off.

## 2021-07-25 NOTE — PROGRESS NOTES
[facility] DISCHARGE NOTE    Patient discharged to home at 3:12 PM via wheel chair. Accompanied by son and staff. Discharge instructions reviewed with son and patient, opportunity offered to ask questions. Prescriptions filled and sent with patient upon discharge. All belongings sent with patient.    Pramod Beauchamp RN

## 2021-07-25 NOTE — PLAN OF CARE
Pt. Alert and oriented x4. Vital signs stable on 2L NC. Assist of 1 stand by. Tolerating renal diet. Lung sounds dim. Bowel sounds audible, + flatus. No BM this shift, no urine output pt on hemodialysis. Fistula with dressing CDI, bruit/thrill present. Bruising to L arm and R jugular site. Denies pain. Denies nausea. Tele SR.

## 2021-07-25 NOTE — DISCHARGE SUMMARY
[facility]    Discharge Summary  Hospitalist    Date of Admission:  7/22/2021  Date of Discharge:  7/25/2021  Discharging Provider: Guido Georges MD    Discharge Diagnoses   Principal Problem:    Paroxysmal SVT      Severe Pulm HTN -- /52 w mean 81 on R Ht Cath 7/22/21      Symptomatic sinus bradycardia    Active Problems:    Essential hypertension      DM 2 w ESRD, Hgb A1C 7.4 on 7/14/21      ESRD on hemodialysis on M, W, F      Anemia in chronic kidney disease      Thrombocytopenia (H)      History of Present Illness   61 year old female who presents the emergency department after several episodes of palpitations over past week, and was recently hospitalized after she was noted to have a rapid heart rate while at dialysis into the 190 range thought to be SVT, placed on Coreg and increased to 25 twice daily.  Heart rate at discharge was 63 bpm.  Patient was discharged with Zio patch, plan for right heart catheterization for pulmonary hypertension noted on echocardiogram.  Now with 40 min episode of palpitations, called 911.    In the emergency department, was found to be in supraventricular tachycardia.  She received adenosine 6, 12, and 18 mg dose without response.  She was given 10 mg of IV diltiazem following this, and initiated on a diltiazem drip.  A few minutes after diltiazem bolus, patient went from SVT with a heart rate in the 170 range to bradycardia in the mid 40s range.  Her blood pressure dropped from 150 systolic to mid 80s systolic, and she became symptomatic with her bradycardia and hypotension.    Hospital Course   Admitted to cardiac telemetry, started on Amiodarone, and would have brief premature beats but no further runs of SVT.  Coreg was stopped because of symptomatic bradycardia, but then resumed at lower dose of 6.25 mg bid and tolerated this.      Did have left and right heart cath, and coronary angiogram showed minimal CAD, but right heart cath  showed severe pulm hypertension with PAP pressure of 132/52, and pulm wedge pressure of 35.     Attempts were made to remove fluid with dialysis with her initial weight 171 lbs, but at discharge weight was still 170, as it was difficult to remove fluid without causing hypotension.  Amlodipine 10 mg daily given for HTN.    EP was reluctant to pursue ablation therapy given her severe pulm HTN, which is causing severe enlargement of her right and left atrium and probably contributing to her SVT.  Did discuss with [facility] option of transferring to their hospital for treatment, but by phone they felt her severe pulm HTN was secondary and did not recommend IV therapy as would do with primary pulmonary hypertension, but she will see them as outpatient follow-up.     Continue dialysis MWF, and continue on amiodarone, and follow-up with cardiology as scheduled.     Guido Georges MD  Pager: [phone]  Cell Phone:  [phone]       Significant Results and Procedures   As above    Pending Results   These results will be followed up by Dr. Georges  Unresulted Labs Ordered in the Past 30 Days of this Admission     No orders found from 6/22/2021 to 7/23/2021.          Code Status   Full Code       Primary Care Physician   Gigi Martin    Physical Exam                      Vitals:    07/23/21 0609 07/24/21 0536 07/25/21 0550   Weight: 77.7 kg (171 lb 6.4 oz) 77.8 kg (171 lb 8.3 oz) 77.5 kg (170 lb 14.4 oz)     Vital Signs with Ranges     I/O last 3 completed shifts:  In: 530 [P.O.:530]  Out: -     Exam on discharge:   Lungs clear  CV with reg S1S2  Ankles with trace edema    Discharge Disposition   Discharged to home  Condition at discharge: Fair    Consultations This Hospital Stay   CARDIOLOGY IP CONSULT  PHARMACY IP CONSULT  PHARMACY IP CONSULT  NEPHROLOGY IP CONSULT  SMOKING CESSATION PROGRAM IP CONSULT    Time Spent on this Encounter   I spent a total of 35 minutes discharging this patient.      Discharge Orders     Discharge Medications   Discharge Medication List as of 7/25/2021  2:22 PM      START taking these medications    Details   aspirin (ASA) 81 MG EC tablet Take 1 tablet (81 mg) by mouth daily, R-0, No Print OutStop if causes stomach irritation.      amiodarone (PACERONE) 100 MG TABS tablet 2 pills twice a day for 5 days, then 1 pill daily for SVT, Disp-50 tablet, R-3, Local Print      amLODIPine (NORVASC) 10 MG tablet Take 1 tablet (10 mg) by mouth daily, Disp-30 tablet, R-3, Local Print         CONTINUE these medications which have CHANGED    Details   carvedilol (COREG) 6.25 MG tablet Take 1 tablet (6.25 mg) by mouth 2 times daily (with meals), Disp-60 tablet, R-1, E-Prescribe         CONTINUE these medications which have NOT CHANGED    Details   acetaminophen (TYLENOL) 500 MG tablet Take 500 mg by mouth every 8 hours as needed for mild pain, Historical      famotidine (PEPCID) 10 MG tablet Take 1 tablet (10 mg) by mouth daily, Disp-90 tablet, R-3, E-Prescribe      fluticasone (FLONASE) 50 MCG/ACT nasal spray Spray 1 spray into both nostrils daily, Disp-16 g, R-11, E-Prescribe      hypromellose-dextran (ARTIFICAL TEARS) 0.1-0.3 % ophthalmic solution Place 1 drop into both eyes daily as needed for dry eyes, Historical      insulin detemir (LEVEMIR FLEXTOUCH) 100 UNIT/ML pen Inject 30 Units Subcutaneous At Bedtime, No Print Out      lanthanum (FOSRENOL) 500 MG chewable tablet Take 500 mg by mouth 3 times daily (with meals), Historical      levalbuterol (XOPENEX HFA) 45 MCG/ACT inhaler Inhale 2 puffs into the lungs every 6 hours as needed for shortness of breath / dyspnea or wheezing, Disp-15 g, R-11, E-Prescribe      NOVOLOG FLEXPEN 100 UNIT/ML soln INJECT 9-12 UNITS BEFORE BREAKFAST, LUNCH, AND DINNER PLUS A CORRECTIVE SCALE OF 2 UNITS FOR EVERY 50 OVER 150, Disp-15 mL, R-0, E-Prescribe      ACCU-CHEK CHING PLUS test strip USE TO TEST BLOOD SUGAR 4 TIMES PER DAY, Disp-200 strip, R-1,  E-Prescribe      Glycopyrrolate-Formoterol (BEVESPI AEROSPHERE) 9-4.8 MCG/ACT oral inhaler Inhale 2 puffs into the lungs 2 times daily, Historical      insulin pen needle (31G X 8 MM) 31G X 8 MM miscellaneous Use 4 pen needles daily or as directed.Disp-100 each, [id number]           Allergies   Allergies   Allergen Reactions     Albuterol      shakiness     Aspirin      gi     Byetta [Exenatide]      gi     Clonidine      constipation     Codeine      vomiting     Ezetimibe      muscle symptoms     Hydralazine      headaches     Lisinopril      hyperkalemia     Metformin Hydrochloride      vomiting     Pravachol [Pravastatin Sodium]      muscle pains     Simvastatin      myalgias     Troglitazone      Data   Most Recent 3 CBC's:Recent Labs   Lab Test 07/23/21  0531 07/22/21  0130 07/16/21  0600   WBC 5.9 5.6 5.1   HGB 9.2* 10.1* 9.1*   MCV 98 97 98   PLT 90* 97* 99*      Most Recent 3 BMP's:  Recent Labs   Lab Test 07/25/21  1218 07/25/21  0740 07/25/21  0201 07/23/21  0531 07/22/21  0130 07/16/21  0600   NA  --   --   --  132* 135 132*   POTASSIUM  --   --   --  5.1 4.1 4.6   CHLORIDE  --   --   --  100 100 99   CO2  --   --   --  29 26 27   BUN  --   --   --  40* 28 41*   CR  --   --   --  5.07* 3.90* 4.95*   ANIONGAP  --   --   --  3 9 6   KANWAL  --   --   --  8.4* 8.7 8.4*   * 129* 152* 147* 59* 145*     Most Recent 2 LFT's:  Recent Labs   Lab Test 07/23/21  0531 07/22/21  0130   AST 19 22   ALT 17 18   ALKPHOS 74 93   BILITOTAL 1.3 0.9     Most Recent INR's and Anticoagulation Dosing History:  Anticoagulation Dose History     Recent Dosing and Labs Latest Ref Rng & Units 8/29/2006 3/15/2019 9/25/2019 12/31/2019 1/12/2020    INR 0.86 - 1.14 1.16(H) 1.40(H) 1.13 1.27(H) 1.23(H)        Most Recent 3 Troponin's:  Recent Labs   Lab Test 07/22/21  0130 07/14/21  2357 07/14/21  2132 11/24/17  1816 08/09/17  2030 09/30/15  1533   TROPI  --   --   --  <0.015 <0.015  The 99th percentile for upper reference  range is 0.045 ug/L.  Troponin values in   the range of 0.045 - 0.120 ug/L may be associated with risks of adverse   clinical events.   <0.015  The 99th percentile for upper reference range is 0.045 ug/L.  Troponin values in   the range of 0.045 - 0.120 ug/L may be associated with risks of adverse   clinical events.     TROPONIN 0.022 0.437* 0.446*  --   --   --      Most Recent Cholesterol Panel:  Recent Labs   Lab Test 07/22/16  1056   CHOL 211*   *   HDL 49*   TRIG 193*     Most Recent 6 Bacteria Isolates From Any Culture (See EPIC Reports for Culture Details):  Recent Labs   Lab Test 12/31/19  1007 03/15/19  1600   CULT No growth Culture negative for acid fast bacilli  Assayed at [facility] [address] [place] [id number] [phone]  No growth     Most Recent TSH, T4 and A1c Labs:  Recent Labs   Lab Test 07/14/21  1209   TSH 3.86   A1C 7.4*

## 2021-07-25 NOTE — PROGRESS NOTES
Patient alert and oriented x4. Vital signs stable, on 2L NC. Assist x1 w/ GB to bathroom. Diminished lung sounds, dyspnea on exertion. Anuric. Fistula +bruit/thrill, CDI. Denies pain and nausea. Patient had a 6 second run of SVT in 160s and converted back to SB/SR.

## 2021-07-26 ENCOUNTER — TELEPHONE (OUTPATIENT)
Dept: CARDIOLOGY | Facility: CLINIC | Age: 61
End: 2021-07-26

## 2021-07-26 NOTE — TELEPHONE ENCOUNTER
Patient was evaluated by cardiology while inpatient for increased SOB, palpitations, chest pain with exertion. Found to be in SVT-broke with IV Adenocard in ED. PMH: oxygen dependence (not a smoker), chronic right pleural effusion, severe pulmonary hypertension, type 2 diabetes, and end-stage renal disease for which she is on hemodialysis Monday Wednesday Friday, anemia of chronic kidney disease. 7/22/21: RHC via RIJ showed severe pHTN with severe elevated right and left filling pressures. Prognosis is poor, was reviewed with [facility] by primary team who decline admission given limited treatment options. Goal is to manage hypertension and to draw extra fluid in dialysis if possible. Continued to have runs of SVT during hospitalization. Started on ASA, Amiodarone and Norvasc at time of discharge. Called patient to discuss any post hospital d/c questions she may have, review medication changes, and confirm f/u appts. Patient denied any questions regarding new medications or changes with their PTA medications. Patient denied any SOB, chest pain, fever or light headedness. States she had HD today and BP was 105/56 and states she does not like it that low as it depletes her energy level. Encouraged pt to discuss concerns with HD team and reiterated that this BP was in good range. Encouraged pt to make position changes slowly and to rest when she needs to-increase activity as tolerated. RIJ cardiac cath site is without bleeding, swelling, redness or tenderness. RN confirmed with patient that she has an OV scheduled on 9/9/21 at 1245 with Dr. Brewster at our [place] office. Follow up with pHTN clinic at [facility]. Patient advised to call clinic with any cardiac related questions or concerns prior to this hai't. Patient verbalized understanding and agreed with plan. CARMELA Figueroa RN.

## 2021-08-10 ENCOUNTER — TELEPHONE (OUTPATIENT)
Dept: CARDIOLOGY | Facility: CLINIC | Age: 61
End: 2021-08-10

## 2021-08-10 DIAGNOSIS — I45.5 SINUS PAUSE: Primary | ICD-10-CM

## 2021-08-10 NOTE — TELEPHONE ENCOUNTER
Discussed with Dr. Fuller. Pt should stop coreg, and keep an eye on her BP at home because she may need to start something else for BP, like losartan. (Pt is allergic to clonidine, hydralazine, and lisinopril.) Pt should wear an event monitor (so we can get quicker feedback than a ZP) for 2 weeks. She should stay on amiodarone. She should also have an evaluation for sleep apnea.     Entered orders for event monitor and sleep evaluation.     Called pt, no answer, left VM that we have test results and a medication change to discuss, asked pt to call back to EP RN number as soon as she gets the message.     When pt calls back:  1. Ask if she has ever had syncope  2. Explain Zio Patch results and tell pt to stop coreg  3. Explain orders for event monitor (to be done before OV with Dr. Brewster on 9/9) and sleep evaluation      ADDENDUM 2:15PM. Chart reviewed, pt is already scheduled for a 14 day event monitor starting 8/19/2021, it was ordered when she was discharged from a previous hospitalization on 7/16/2021. Ideally appointment should be linked to the order from Dr. Fuller instead so results come directly to the heart clinic.

## 2021-08-10 NOTE — TELEPHONE ENCOUNTER
Received a call from [email or web address] with abnormal Zio Patch results. Pt had a 7.3 second pause on 7/22/2021 at 2:08am.     10 day Zio Patch also showed 105 episodes of SVT, longest was 19.7 seconds.     Chart reviewed. Pt is on coreg 6.25mg BID and amiodarone 100 mg daily. She was recently hospitalized for SVT and was seen by Dr. Dong and Dr. Barbosa. She was found to have severe pulmonary HTN with PA pressures 132/52. SVT ablation was not recommended due to patient's extremely severe pulmonary HTN, so she was started on amiodarone. Pt is currently scheduled for cardiology f/u with Dr. Brewster on 9/9/2021.     Dr. Barbosa is out of the office today. Will review with Dr. Fuller in Dr. Barbosa's absence.

## 2021-08-12 NOTE — TELEPHONE ENCOUNTER
Second attempt to reach pt, no answer, left brief message that we have test results and an important medication change, asked pt to call back to EP RN number. Awaiting return call.

## 2021-08-13 NOTE — TELEPHONE ENCOUNTER
Patient returned call. Spoke with patient reviewed the followin. Zio Patch results. Asked patient to stop coreg. Asked patient to monitor blood pressures closely.   2. Continue amiodarone.   3. Reason for second event monitor, patient scheduled to have placed on 2021.   4. Recommendation for evaluation of sleep apnea.   5. Follow up scheduled with Arlene Lau on 9/15/2021.     Patient denies any episodes of syncope. Patient stated that since her blood pressures have been in the 140s she has not been regularly taking coreg. Still instructed patient to stop coreg. Patient also noted that she is not taking Norvasc because she felt it was causing excess swelling.     Patient voiced concerns that once coreg is stopped she will not be on blood pressure medications since she will not take the Norvasc.     Will send update to Dr. Fuller in Dr. Barbosa's absence regarding patients concerns.

## 2021-08-16 ENCOUNTER — TELEPHONE (OUTPATIENT)
Dept: CARDIOLOGY | Facility: CLINIC | Age: 61
End: 2021-08-16

## 2021-08-16 NOTE — TELEPHONE ENCOUNTER
Pt calling - she has numerous questions.  She was told to discontinue Carvedilol but she had stopped amlodipine with ankle swelling so didn't want to also stop coreg.  She states she needs to see a doctor - she feels she hasn't been evaluated since her hospitalization.  She is concerned with her BP  She has not taken it yet today - last week was 140/80.  Hx of SVT - hospitalized in July 2021- started on amiodarone.  Will schedule follow up with Dr. Barbosa who saw her in hospital and started amiodarone.    Call prn  Will alert Dr. Barbosa Nursing team.

## 2021-08-17 ENCOUNTER — OFFICE VISIT (OUTPATIENT)
Dept: CARDIOLOGY | Facility: CLINIC | Age: 61
End: 2021-08-17
Payer: MEDICARE

## 2021-08-17 VITALS
HEIGHT: 60 IN | BODY MASS INDEX: 33.38 KG/M2 | HEART RATE: 74 BPM | SYSTOLIC BLOOD PRESSURE: 148 MMHG | DIASTOLIC BLOOD PRESSURE: 76 MMHG

## 2021-08-17 DIAGNOSIS — R00.2 PALPITATIONS: ICD-10-CM

## 2021-08-17 DIAGNOSIS — I47.10 SVT (SUPRAVENTRICULAR TACHYCARDIA) (H): Primary | ICD-10-CM

## 2021-08-17 DIAGNOSIS — I27.20 PULMONARY HYPERTENSION (H): ICD-10-CM

## 2021-08-17 DIAGNOSIS — N18.6 END STAGE RENAL DISEASE (H): ICD-10-CM

## 2021-08-17 PROCEDURE — 93000 ELECTROCARDIOGRAM COMPLETE: CPT | Performed by: INTERNAL MEDICINE

## 2021-08-17 PROCEDURE — 99215 OFFICE O/P EST HI 40 MIN: CPT | Performed by: INTERNAL MEDICINE

## 2021-08-17 NOTE — LETTER
8/17/2021    Gigi Martin MD  [address] [place] [id number]    RE: Yissel Wetzel       Dear Colleague,    I had the pleasure of seeing Yissel Wetzel in the [facility].    HPI and Plan:   See dictation    Orders Placed This Encounter   Procedures     EKG 12-lead complete w/read - Clinics (future- to be scheduled)       No orders of the defined types were placed in this encounter.      There are no discontinued medications.      Encounter Diagnoses   Name Primary?     SVT (supraventricular tachycardia) (H) Yes     Palpitations      Severe Pulm HTN -- /52 w mean 81 on R Ht Cath 7/22/21      End stage renal disease (H)        CURRENT MEDICATIONS:  Current Outpatient Medications   Medication Sig Dispense Refill     ACCU-CHEK CHING PLUS test strip USE TO TEST BLOOD SUGAR 4 TIMES PER  strip 1     acetaminophen (TYLENOL) 500 MG tablet Take 500 mg by mouth every 8 hours as needed for mild pain       amiodarone (PACERONE) 100 MG TABS tablet 200 mg bid for 5 days, then 100 mg daily for SVT 50 tablet 1     aspirin (ASA) 81 MG EC tablet Take 1 tablet (81 mg) by mouth daily  0     carvedilol (COREG) 6.25 MG tablet Take 1 tablet (6.25 mg) by mouth 2 times daily (with meals) (Patient taking differently: Take 3.125 mg by mouth 2 times daily (with meals) Only takes 1/2 as needed) 60 tablet 1     famotidine (PEPCID) 10 MG tablet Take 1 tablet (10 mg) by mouth daily (Patient taking differently: Take 10 mg by mouth daily as needed ) 90 tablet 3     fluticasone (FLONASE) 50 MCG/ACT nasal spray Spray 1 spray into both nostrils daily (Patient taking differently: Spray 1 spray into both nostrils daily as needed ) 16 g 11     Glycopyrrolate-Formoterol (BEVESPI AEROSPHERE) 9-4.8 MCG/ACT oral inhaler Inhale 2 puffs into the lungs 2 times daily        hypromellose-dextran (ARTIFICAL TEARS) 0.1-0.3 % ophthalmic solution Place 1 drop into both eyes daily  as needed for dry eyes       insulin detemir (LEVEMIR FLEXTOUCH) 100 UNIT/ML pen Inject 30 Units Subcutaneous At Bedtime       insulin pen needle (31G X 8 MM) 31G X 8 MM miscellaneous Use 4 pen needles daily or as directed. 100 each 11     lanthanum (FOSRENOL) 500 MG chewable tablet Take 500 mg by mouth 3 times daily (with meals)       levalbuterol (XOPENEX HFA) 45 MCG/ACT inhaler Inhale 2 puffs into the lungs every 6 hours as needed for shortness of breath / dyspnea or wheezing 15 g 11     NOVOLOG FLEXPEN 100 UNIT/ML soln INJECT 9-12 UNITS BEFORE BREAKFAST, LUNCH, AND DINNER PLUS A CORRECTIVE SCALE OF 2 UNITS FOR EVERY 50 OVER 150 (Patient taking differently: INJECT 3 UNITS/15gm carbs BEFORE BREAKFAST, LUNCH, AND DINNER PLUS A CORRECTIVE SCALE OF 2 UNITS FOR EVERY 50 OVER 150) 15 mL 0     amLODIPine (NORVASC) 10 MG tablet Take 1 tablet (10 mg) by mouth daily (Patient not taking: Reported on 8/17/2021) 30 tablet 3       ALLERGIES     Allergies   Allergen Reactions     Albuterol      shakiness     Aspirin      gi     Byetta [Exenatide]      gi     Clonidine      constipation     Codeine      vomiting     Ezetimibe      muscle symptoms     Hydralazine      headaches     Lisinopril      hyperkalemia     Metformin Hydrochloride      vomiting     Pravachol [Pravastatin Sodium]      muscle pains     Simvastatin      myalgias     Troglitazone        PAST MEDICAL HISTORY:  Past Medical History:   Diagnosis Date     Allergic rhinitis, cause unspecified      Anemia in chronic kidney disease      Anemia of chronic disease      Bleeding pseudoaneurysm of right brachiocephalic arteriovenous fistula, initial encounter (H) 12/6/2019     End stage renal failure on dialysis (H)      Esophagitis, unspecified      Former tobacco use      HEMORRHAGIC GASTROPATHY      History of blood transfusion     Juana Diaz     Iron deficiency anemia, unspecified      Mild persistent asthma      Obesity      Other and unspecified hyperlipidemia       Pneumonia      Pulmonary hypertension (H)     per 2012 echo mod.to severe pulmonary hypertension     Tobacco use disorder dc ed      Type 2 diabetes mellitus (H)     on Insulin- managed by PCP     Unspecified essential hypertension        PAST SURGICAL HISTORY:  Past Surgical History:   Procedure Laterality Date     ABDOMINOPLASTY  pt unsure when    abdominoplasty-      SECTION  ,     x 2     COLONOSCOPY      [facility]     COLONOSCOPY N/A 2021    Procedure: COLONOSCOPY, WITH POLYPECTOMY AND BIOPSY;  Surgeon: Lincoln Farrar MD;  Location:  GI     CREATE FISTULA ARTERIOVENOUS UPPER EXTREMITY Right 2018    Procedure: RIGHT BRACHIAL TO BASILIC ARTERIOVENOUS FISTULA CREATION;  Surgeon: Terry Vizcaino MD;  Location: [facility]     CV HEART CATHETERIZATION WITH POSSIBLE INTERVENTION N/A 2021    Procedure: Heart Catheterization with Possible Intervention;  Surgeon: Joseluis Dean MD;  Location:  HEART CARDIAC CATH LAB     ENT SURGERY  as a child    tonsillectomy     ESOPHAGOSCOPY, GASTROSCOPY, DUODENOSCOPY (EGD), COMBINED N/A 2021    Procedure: ESOPHAGOGASTRODUODENOSCOPY, WITH BIOPSY;  Surgeon: Lincoln Farrar MD;  Location:  GI     EYE SURGERY Left     injections- eye     HYSTERECTOMY  approx     partial hysterectomy-reason bleeding      IR CVC TUNNEL PLACEMENT > 5 YRS OF AGE  2019     IR CVC TUNNEL REMOVAL RIGHT  2020     IR DIALYSIS FISTULOGRAM RIGHT  2019       FAMILY HISTORY:  Family History   Problem Relation Age of Onset     Arrhythmia Mother      Hypertension Mother      Pancreatic Cancer Father      Diabetes Brother      Asthma Sister      LUNG DISEASE Sister      Diabetes Daughter      Cirrhosis Sister        SOCIAL HISTORY:  Social History     Socioeconomic History     Marital status: Single     Spouse name: None     Number of children: None     Years of education: None     Highest education level:  None   Occupational History     None   Tobacco Use     Smoking status: Former Smoker     Packs/day: 1.00     Years: 22.00     Pack years: 22.00     Types: Cigarettes     Quit date: 10/12/1999     Years since quittin.8     Smokeless tobacco: Never Used   Substance and Sexual Activity     Alcohol use: No     Alcohol/week: 0.0 standard drinks     Drug use: No     Sexual activity: Never   Other Topics Concern      Service Not Asked     Blood Transfusions Yes     Caffeine Concern No     Occupational Exposure Not Asked     Hobby Hazards Not Asked     Sleep Concern No     Stress Concern No     Weight Concern Yes     Comment: would like to lose weight     Special Diet No     Back Care Not Asked     Exercise Yes     Comment: walks daily 2 blocks     Bike Helmet Not Asked     Seat Belt Not Asked     Self-Exams Not Asked     Parent/sibling w/ CABG, MI or angioplasty before 65F 55M? Not Asked   Social History Narrative     None     Social Determinants of Health     Financial Resource Strain:      Difficulty of Paying Living Expenses:    Food Insecurity:      Worried About Running Out of Food in the Last Year:      Ran Out of Food in the Last Year:    Transportation Needs:      Lack of Transportation (Medical):      Lack of Transportation (Non-Medical):    Physical Activity:      Days of Exercise per Week:      Minutes of Exercise per Session:    Stress:      Feeling of Stress :    Social Connections:      Frequency of Communication with Friends and Family:      Frequency of Social Gatherings with Friends and Family:      Attends Temple Services:      Active Member of Clubs or Organizations:      Attends Club or Organization Meetings:      Marital Status:    Intimate Partner Violence:      Fear of Current or Ex-Partner:      Emotionally Abused:      Physically Abused:      Sexually Abused:        Review of Systems:  Skin:  Positive for bruising     Eyes:  Positive for glasses    ENT:  Positive for nasal  congestion;sinus trouble allergies/seasonal/bad with humidity  Respiratory:  Positive for dyspnea on exertion asthma, cough from post nasal drainage, recent URI, completed abx - same   Cardiovascular:  cyanosis;lightheadedness;dizziness;syncope or near-syncope;edema Positive for;palpitations better  Gastroenterology: Positive for diarrhea;heartburn;reflux    Genitourinary:  Negative      Musculoskeletal:  Negative      Neurologic:  Negative      Psychiatric:  Negative      Heme/Lymph/Imm:  Positive for allergies;anemia;easy bruising    Endocrine:  Positive for diabetes                      Thank you for allowing me to participate in the care of your patient.      Sincerely,     France Barbosa MD     [facility]  cc:   No referring provider defined for this encounter.

## 2021-08-17 NOTE — PROGRESS NOTES
HPI and Plan:   See dictation    Orders Placed This Encounter   Procedures     EKG 12-lead complete w/read - Clinics (future- to be scheduled)       No orders of the defined types were placed in this encounter.      There are no discontinued medications.      Encounter Diagnoses   Name Primary?     SVT (supraventricular tachycardia) (H) Yes     Palpitations      Severe Pulm HTN -- /52 w mean 81 on R Ht Cath 7/22/21      End stage renal disease (H)        CURRENT MEDICATIONS:  Current Outpatient Medications   Medication Sig Dispense Refill     ACCU-CHEK CHING PLUS test strip USE TO TEST BLOOD SUGAR 4 TIMES PER  strip 1     acetaminophen (TYLENOL) 500 MG tablet Take 500 mg by mouth every 8 hours as needed for mild pain       amiodarone (PACERONE) 100 MG TABS tablet 200 mg bid for 5 days, then 100 mg daily for SVT 50 tablet 1     aspirin (ASA) 81 MG EC tablet Take 1 tablet (81 mg) by mouth daily  0     carvedilol (COREG) 6.25 MG tablet Take 1 tablet (6.25 mg) by mouth 2 times daily (with meals) (Patient taking differently: Take 3.125 mg by mouth 2 times daily (with meals) Only takes 1/2 as needed) 60 tablet 1     famotidine (PEPCID) 10 MG tablet Take 1 tablet (10 mg) by mouth daily (Patient taking differently: Take 10 mg by mouth daily as needed ) 90 tablet 3     fluticasone (FLONASE) 50 MCG/ACT nasal spray Spray 1 spray into both nostrils daily (Patient taking differently: Spray 1 spray into both nostrils daily as needed ) 16 g 11     Glycopyrrolate-Formoterol (BEVESPI AEROSPHERE) 9-4.8 MCG/ACT oral inhaler Inhale 2 puffs into the lungs 2 times daily        hypromellose-dextran (ARTIFICAL TEARS) 0.1-0.3 % ophthalmic solution Place 1 drop into both eyes daily as needed for dry eyes       insulin detemir (LEVEMIR FLEXTOUCH) 100 UNIT/ML pen Inject 30 Units Subcutaneous At Bedtime       insulin pen needle (31G X 8 MM) 31G X 8 MM miscellaneous Use 4 pen needles daily or as directed. 100 each 11     lanthanum  (FOSRENOL) 500 MG chewable tablet Take 500 mg by mouth 3 times daily (with meals)       levalbuterol (XOPENEX HFA) 45 MCG/ACT inhaler Inhale 2 puffs into the lungs every 6 hours as needed for shortness of breath / dyspnea or wheezing 15 g 11     NOVOLOG FLEXPEN 100 UNIT/ML soln INJECT 9-12 UNITS BEFORE BREAKFAST, LUNCH, AND DINNER PLUS A CORRECTIVE SCALE OF 2 UNITS FOR EVERY 50 OVER 150 (Patient taking differently: INJECT 3 UNITS/15gm carbs BEFORE BREAKFAST, LUNCH, AND DINNER PLUS A CORRECTIVE SCALE OF 2 UNITS FOR EVERY 50 OVER 150) 15 mL 0     amLODIPine (NORVASC) 10 MG tablet Take 1 tablet (10 mg) by mouth daily (Patient not taking: Reported on 8/17/2021) 30 tablet 3       ALLERGIES     Allergies   Allergen Reactions     Albuterol      shakiness     Aspirin      gi     Byetta [Exenatide]      gi     Clonidine      constipation     Codeine      vomiting     Ezetimibe      muscle symptoms     Hydralazine      headaches     Lisinopril      hyperkalemia     Metformin Hydrochloride      vomiting     Pravachol [Pravastatin Sodium]      muscle pains     Simvastatin      myalgias     Troglitazone        PAST MEDICAL HISTORY:  Past Medical History:   Diagnosis Date     Allergic rhinitis, cause unspecified      Anemia in chronic kidney disease      Anemia of chronic disease      Bleeding pseudoaneurysm of right brachiocephalic arteriovenous fistula, initial encounter (H) 12/6/2019     End stage renal failure on dialysis (H)      Esophagitis, unspecified      Former tobacco use      HEMORRHAGIC GASTROPATHY      History of blood transfusion     Fiatt     Iron deficiency anemia, unspecified      Mild persistent asthma      Obesity      Other and unspecified hyperlipidemia      Pneumonia      Pulmonary hypertension (H)     per 12/2012 echo mod.to severe pulmonary hypertension     Tobacco use disorder dc ed 1999     Type 2 diabetes mellitus (H)     on Insulin- managed by PCP     Unspecified essential hypertension        PAST  SURGICAL HISTORY:  Past Surgical History:   Procedure Laterality Date     ABDOMINOPLASTY  pt unsure when    abdominoplasty-      SECTION  ,     x 2     COLONOSCOPY      [facility]     COLONOSCOPY N/A 2021    Procedure: COLONOSCOPY, WITH POLYPECTOMY AND BIOPSY;  Surgeon: Lincoln Farrar MD;  Location:  GI     CREATE FISTULA ARTERIOVENOUS UPPER EXTREMITY Right 2018    Procedure: RIGHT BRACHIAL TO BASILIC ARTERIOVENOUS FISTULA CREATION;  Surgeon: Terry Vizcaino MD;  Location:  [facility]     CV HEART CATHETERIZATION WITH POSSIBLE INTERVENTION N/A 2021    Procedure: Heart Catheterization with Possible Intervention;  Surgeon: Joseluis Dean MD;  Location:  HEART CARDIAC CATH LAB     ENT SURGERY  as a child    tonsillectomy     ESOPHAGOSCOPY, GASTROSCOPY, DUODENOSCOPY (EGD), COMBINED N/A 2021    Procedure: ESOPHAGOGASTRODUODENOSCOPY, WITH BIOPSY;  Surgeon: Lincoln Farrar MD;  Location:  GI     EYE SURGERY Left     injections- eye     HYSTERECTOMY  approx     partial hysterectomy-reason bleeding      IR CVC TUNNEL PLACEMENT > 5 YRS OF AGE  2019     IR CVC TUNNEL REMOVAL RIGHT  2020     IR DIALYSIS FISTULOGRAM RIGHT  2019       FAMILY HISTORY:  Family History   Problem Relation Age of Onset     Arrhythmia Mother      Hypertension Mother      Pancreatic Cancer Father      Diabetes Brother      Asthma Sister      LUNG DISEASE Sister      Diabetes Daughter      Cirrhosis Sister        SOCIAL HISTORY:  Social History     Socioeconomic History     Marital status: Single     Spouse name: None     Number of children: None     Years of education: None     Highest education level: None   Occupational History     None   Tobacco Use     Smoking status: Former Smoker     Packs/day: 1.00     Years: 22.00     Pack years: 22.00     Types: Cigarettes     Quit date: 10/12/1999     Years since quittin.8     Smokeless tobacco: Never  Used   Substance and Sexual Activity     Alcohol use: No     Alcohol/week: 0.0 standard drinks     Drug use: No     Sexual activity: Never   Other Topics Concern      Service Not Asked     Blood Transfusions Yes     Caffeine Concern No     Occupational Exposure Not Asked     Hobby Hazards Not Asked     Sleep Concern No     Stress Concern No     Weight Concern Yes     Comment: would like to lose weight     Special Diet No     Back Care Not Asked     Exercise Yes     Comment: walks daily 2 blocks     Bike Helmet Not Asked     Seat Belt Not Asked     Self-Exams Not Asked     Parent/sibling w/ CABG, MI or angioplasty before 65F 55M? Not Asked   Social History Narrative     None     Social Determinants of Health     Financial Resource Strain:      Difficulty of Paying Living Expenses:    Food Insecurity:      Worried About Running Out of Food in the Last Year:      Ran Out of Food in the Last Year:    Transportation Needs:      Lack of Transportation (Medical):      Lack of Transportation (Non-Medical):    Physical Activity:      Days of Exercise per Week:      Minutes of Exercise per Session:    Stress:      Feeling of Stress :    Social Connections:      Frequency of Communication with Friends and Family:      Frequency of Social Gatherings with Friends and Family:      Attends Voodoo Services:      Active Member of Clubs or Organizations:      Attends Club or Organization Meetings:      Marital Status:    Intimate Partner Violence:      Fear of Current or Ex-Partner:      Emotionally Abused:      Physically Abused:      Sexually Abused:        Review of Systems:  Skin:  Positive for bruising     Eyes:  Positive for glasses    ENT:  Positive for nasal congestion;sinus trouble allergies/seasonal/bad with humidity  Respiratory:  Positive for dyspnea on exertion asthma, cough from post nasal drainage, recent URI, completed abx - same   Cardiovascular:  cyanosis;lightheadedness;dizziness;syncope or  near-syncope;edema Positive for;palpitations better  Gastroenterology: Positive for diarrhea;heartburn;reflux    Genitourinary:  Negative      Musculoskeletal:  Negative      Neurologic:  Negative      Psychiatric:  Negative      Heme/Lymph/Imm:  Positive for allergies;anemia;easy bruising    Endocrine:  Positive for diabetes

## 2021-08-17 NOTE — PROGRESS NOTES
Service Date: 08/17/2021    HISTORY OF PRESENT ILLNESS:    I had the pleasure of seeing Ms. Cassandra Wetzel in follow-up of recent hospitalization with SVT.  This appointment was made by our nurse manager after the patient called earlier expressing concerns about high blood pressure.    Ms. Wetzel has the following chronic medical issues:  A.  End-stage renal disease due to diabetic nephropathy.  On dialysis 3 days per week for the past 2-3 years.  B.  Diabetes mellitus type 2.  C.  Severe pulmonary hypertension, PA pressure 132/52 mmHg with mean of 81 during recent right heart catheterization.  She also had severely elevated LVEDP of 35-40 mmHg at the time.  D.  SVT, see details below.  E.  Hypertension.  F.  Severe RV dilatation and severe biatrial enlargement by echocardiography.  LVEF 60%-65%.    I first met the patient during her hospital stay in late July.  Ms. Wetzel had experienced heart racing off and on for the past year, but a diagnosis of SVT was not made until the early morning of 07/22/2021.  She presented to the ER with a regular narrow QRS tachycardia in the 160s.  It was stated that the tachycardia did not terminate after 3 IV doses of adenosine (6 mg, 12 mg and 18 mg).  It stopped after a 10 mg IV diltiazem bolus.  After conversion, she became severely hypotensive and bradycardic.  She received IV fluid and calcium gluconate.  She was admitted at [facility].     During the hospital stay, Ms. Wetzel was seen in consultation by Dr. Dong.  She was found to have critical pulmonary hypertension.  Right heart catheterization was performed with results as above.  The patient developed SVT during right heart cath, which terminated with adenosine 6 mg.    Given her severe medical comorbidities, I recommended amiodarone 200 mg daily.  However, the patient was sent home on amiodarone 100 mg daily.  I note that the patient was wearing a ZioPatch during her hospital stay and this was returned after discharge.  I had not  seen the results of her monitor until today.    In coming in today, the patient states that she is very concerned about her blood pressure.  Sometimes SBP is in the 160s and she becomes quite alarmed with such BP level.  However, after dialysis it can be low and she becomes dizzy.  Apparently, she was on amlodipine which was stopped because she did not tolerate it.  She was prescribed carvedilol 3.125 mg b.i.d., which she takes only as needed if her systolic BP is 150 mmHg or higher.  She brought in a detailed log of BP measurements and it actually looked acceptable overall with only 10% of measurements being over 150.  Highest SBP recorded was 169 mmHg.    The patient has only had 1 brief episode of tachycardia after hospital discharge, 3 weeks ago.        PHYSICAL EXAMINATION:    VITAL SIGNS:  Blood pressure 148/76 (second measurement), heart rate 74, height 152 cm.  She is a chronically ill-appearing but pleasant woman who is sitting in a wheelchair.  She is accompanied by a friend who drove her to the clinic.  NECK:  Supple.  LUNGS:  Clear.  CARDIOVASCULAR:  Regular rhythm without gallop, murmur or rub.  EXTREMITIES:  She has a right arm fistula with a thrill.      DIAGNOSTIC STUDIES:    -  Recent laboratory tests:  Sodium 132, potassium 5.1, creatinine 5.07.  - 12-lead ECG today showed sinus rhythm at 64.  Diffuse nonspecific T-wave flattening.  -  reviewed her Zio Patch monitor that was in place from 07/16/2021 to 07/30/2021.  It showed several episodes of SVT.  There were periods of bradycardia as well as a 7-second pause upon SVT termination.  The episode of severe bradycardia on 07/22/2021, 1:30 a.m. was recorded in the Emergency Room after she was given adenosine and diltiazem.      IMPRESSION:     1.  Paroxysmal supraventricular tachycardia, adenosine-responsive.  The ER description that 6, 12 and 18 mg of adenosine did not terminate SVT is incorrect.  Her ZioPatch shows that the patient had SVT  "termination with adenosine but developed almost immediate SVT recurrence after each dose;  this is different than having \"unsuccessful\" conversion.  To corroborate the adenosine sensitivity of this arrhythmia, she received adenosine 6 mg in the Cath Lab (during her RHC) which promptly terminated an SVT event.  The conclusion is that the patient's tachycardia is adenosine-sensitive, likely AVNRT.       She is currently on 100 mg of amiodarone, a low dose, which has controlled the tachycardia with the exception of a single occasion 3 weeks ago when the patient was not loaded with the medication.  It is unclear whether she is still fully loaded on her 100 mg daily dose, but since she is clinically doing well, I did not increase amiodarone today.        This patient has multiple severe medical comorbidities, thus I believe continuation of low-dose amiodarone is reasonable.  I favor this over EPS/ablation to \"cure\" her SVT.  She is at risk for significant complication related to sedation and/or cardiac instrumentation.    2.  Critical pulmonary hypertension.  It does not appear that she has established follow-up with any of my general cardiology colleagues.  The patient does not want to see multiple cardiologists or PAH specialists.  She is in denial that pulmonary hypertension is a major issue.  Given the severity of her PAH, it is surprising she tolerates dialysis as well as she does.    3.  Hypertension.  While seemingly unconcerned with her severe PAH, she is very concerned about sporadic & modestly high BP measurements.  I attempted to reassure her that her current blood pressure is acceptable.  She is facing a situation where her BP is often very low after dialysis, but occasionally moderately high on her nondialysis days.          After a lengthy conversation, we agreed on the following:  on her dialysis days, accept BP as high as 160 mmHg before dialysis and do not treat it.  However, if her BP remains elevated " after dialysis, she can take carvedilol 3.125 mg p.r.n. SBP over 150 mmHg.  On her nondialysis days, she should take carvedilol 3.125 mg bid if her systolic BP exceeds 150 mmHg.      RECOMMENDATIONS:    A.  The patient is already scheduled to have an outpatient 30-day cardiac monitor this coming Thursday.  It is not clear who ordered this, but it is reasonable to go ahead with it to objectively determine how her SVT and bradycardia have responded to amiodarone.  B.  For HTN treatment plan, see above.  C.  Continue amiodarone 100 mg daily.   D.  We will make follow-up arrangements in the EP Clinic after I review her cardiac monitor results.  E.  She does not want to meet with a pulmonary hypertension specialist.  Her level of PH is extreme and the patient is at very high risk for repeat hospitalization, morbidity or mortality related to this serious and advanced condition.    Very lengthy appointment and discussion.  Total time spent today exceeded 60 minutes.       France Barbosa MD, FACC        cc:  CARYN Martin MD  [address] [address]      D: 2021   T: 2021   MT: suman    Name:     ELIECER CHACON  MRN:      [id number]        Account:      [id number]   :      1960           Service Date: 2021       Document: [id number]

## 2021-08-19 ENCOUNTER — HOSPITAL ENCOUNTER (OUTPATIENT)
Dept: CARDIOLOGY | Facility: CLINIC | Age: 61
Discharge: HOME | End: 2021-08-19
Attending: HOSPITALIST | Admitting: HOSPITALIST
Payer: MEDICARE

## 2021-08-19 DIAGNOSIS — I47.10 SVT (SUPRAVENTRICULAR TACHYCARDIA) (H): ICD-10-CM

## 2021-08-19 PROCEDURE — 93270 REMOTE 30 DAY ECG REV/REPORT: CPT

## 2021-08-19 PROCEDURE — 93272 ECG/REVIEW INTERPRET ONLY: CPT | Performed by: INTERNAL MEDICINE

## 2021-09-21 ENCOUNTER — TRANSFERRED RECORDS (OUTPATIENT)
Dept: HEALTH INFORMATION MANAGEMENT | Facility: CLINIC | Age: 61
End: 2021-09-21

## 2021-09-29 ENCOUNTER — TELEPHONE (OUTPATIENT)
Dept: TRANSPLANT | Facility: CLINIC | Age: 61
End: 2021-09-29

## 2021-10-05 ENCOUNTER — TELEPHONE (OUTPATIENT)
Dept: CARDIOLOGY | Facility: CLINIC | Age: 61
End: 2021-10-05

## 2021-10-05 DIAGNOSIS — I48.0 PAROXYSMAL ATRIAL FIBRILLATION (H): Primary | ICD-10-CM

## 2021-10-05 DIAGNOSIS — I47.10 SVT (SUPRAVENTRICULAR TACHYCARDIA) (H): ICD-10-CM

## 2021-10-05 NOTE — TELEPHONE ENCOUNTER
Spoke to pt who has a racing heart. Pt has a history of SVT and currently taking Amiodarone 100 mg daily and takes a 1/2 tab of Coreg when BP's are greater then 150 systolic.  Pt had not checked this morning.  Pt checked BP and HR with this writer. First was 168/106 , rechecked 162/113 . While talking to pt she noticed that she could feel the rate decreasing. Pt then checked vitals and BP was 148/74 and HR 75.  Pt mentioned an Event monitor that she wore and that no one called to set her up for a f/u OV.  This writer will message Dr Barbosa about the episode that last 1/2 hour, which pt states is longer then normal for her. States that an episode usually lasts only about 10 minutes.  AmeenaRNATHANIEL

## 2021-10-07 NOTE — TELEPHONE ENCOUNTER
Noted.    Stay the course.  Would not increase amio- unless she has further frequent SVT recurrences.    F/up with DEREK in 2-3 months.    DI

## 2021-10-07 NOTE — TELEPHONE ENCOUNTER
10/7/21 Msg recd from Dr Barbosa  Noted.     Stay the course.  Would not increase amio- unless she has further frequent SVT recurrences.     F/up with DEREK in 2-3 months.  Spoke w pt and explained recommendations from DR Barbosa. Order placed for DEREK follow up in 2-3 m. Pt states she will have to call back to schedule.  Doreen 217 pm

## 2021-10-11 RX ORDER — AMIODARONE HYDROCHLORIDE 200 MG/1
200 TABLET ORAL DAILY
Qty: 90 TABLET | Refills: 1 | Status: SHIPPED | OUTPATIENT
Start: 2021-10-11 | End: 2022-03-31

## 2021-10-11 RX ORDER — CARVEDILOL 3.12 MG/1
TABLET ORAL
Qty: 180 TABLET | Refills: 1 | Status: SHIPPED | OUTPATIENT
Start: 2021-10-11 | End: 2021-10-12

## 2021-10-11 NOTE — TELEPHONE ENCOUNTER
10/11/21 Msg recd from Dr Barbosa       Please increase amiodarone from 100 mg to 200 mg daily.        Pt voiced understanding and agreement w plan. Pt requested a refill be sent to [facility] in [place]. She also requests refill of Carvedilol  Refill sent  Doreen 132 pm

## 2021-10-11 NOTE — TELEPHONE ENCOUNTER
"10/11/21 Pt called in to report she has had 2 more episodes of racing heart beats in the past week. . One occurred Friday 10/8 at 8:26 am  which lasted approx 10 minutes with -183. The other episodes happened 10/10 starting at 9:54 am and lasted 90 minutes with -181. Pt denies symptoms such as lightheadedness, SOB or pain. She states she is good about keeping her meds on scheduled and cannot identify any triggers. She takes her Amiodarone daily at 4 pm She just feels \" scared\" and is not sure what to do when episodes happen.  Encouraged pt to relax and hydrate when HR is high. If sustained > 2 hours or if she become symptomatic she should proceed to ED.  Will update Dr Barbosa and call pt back w  commendations.  Pt voiced understanding and agreement with plan.   Doreen 931 am  "

## 2021-10-12 RX ORDER — CARVEDILOL 3.12 MG/1
TABLET ORAL
Qty: 180 TABLET | Refills: 1 | Status: SHIPPED | OUTPATIENT
Start: 2021-10-12 | End: 2022-06-15

## 2021-10-12 NOTE — TELEPHONE ENCOUNTER
10/12/21 Pharmacy calling requesting clarifications on Carvedilol instructions. New rx sent  Doreen 315 pm

## 2021-10-21 ENCOUNTER — TELEPHONE (OUTPATIENT)
Dept: TRANSPLANT | Facility: CLINIC | Age: 61
End: 2021-10-21

## 2021-10-21 NOTE — TELEPHONE ENCOUNTER
Called patient today and spoke to her at length. I reviewed her cardiac and pulm status over the last few month, COPD on oxygen most of the time, SOB with exertion, severe pulmonary HTN.  Explained I will review her heart and lung status with the transplant team next week and get back to her with the outcome. I did explain that oxygen use is usually a problem for proceeding with a kidney transplant surgery because it is an indicator of lung function. Reviewed patient's need to have adequate heart and lung status to meet criteria to proceed with a kidney transplant surgery. Patient expressed very good understanding of all and was in good agreement with the plan.

## 2021-10-24 ENCOUNTER — HEALTH MAINTENANCE LETTER (OUTPATIENT)
Age: 61
End: 2021-10-24

## 2021-10-27 DIAGNOSIS — Z79.4 TYPE 2 DIABETES MELLITUS WITH DIABETIC NEPHROPATHY, WITH LONG-TERM CURRENT USE OF INSULIN (H): ICD-10-CM

## 2021-10-27 DIAGNOSIS — E11.21 TYPE 2 DIABETES MELLITUS WITH DIABETIC NEPHROPATHY, WITH LONG-TERM CURRENT USE OF INSULIN (H): ICD-10-CM

## 2021-10-27 NOTE — TELEPHONE ENCOUNTER
Routing refill request to provider for review/approval because:  Patient needs to be seen because it has been more than 6 months since last office visit.  Janis Hogue RN

## 2021-10-28 RX ORDER — INSULIN DETEMIR 100 [IU]/ML
INJECTION, SOLUTION SUBCUTANEOUS
Qty: 15 ML | Refills: 0 | Status: ON HOLD | OUTPATIENT
Start: 2021-10-28 | End: 2022-06-21

## 2021-10-28 RX ORDER — BLOOD SUGAR DIAGNOSTIC
STRIP MISCELLANEOUS
Qty: 100 STRIP | Refills: 3 | Status: ON HOLD | OUTPATIENT
Start: 2021-10-28 | End: 2024-09-30

## 2021-10-29 ENCOUNTER — COMMITTEE REVIEW (OUTPATIENT)
Dept: TRANSPLANT | Facility: CLINIC | Age: 61
End: 2021-10-29
Payer: COMMERCIAL

## 2021-10-29 NOTE — COMMITTEE REVIEW
Abdominal Committee Review Note     Evaluation Date: 9/25/2019  Committee Review Date: 10/29/2021    Organ being evaluated for: Kidney    Transplant Phase: Evaluation  Transplant Status: Active    Transplant Coordinator: Yaima Salgado  Transplant Surgeon: Dr. Truong Tejada     Referring Physician: Dr. Rahul Hernandez     Primary Diagnosis: Diabetes Mellitus - Type II  Secondary Diagnosis: ESRD on dialysis     Committee Review Members:  Miguelangel, Lee Mosher RD   Nephrology Crys Dasilva MD, Deni Tovar MD   Pharmacy Keith Butler, [facility]   Transplant Angelica Landis, ANGELICA, Yaima Salgado, RN, Nettie Cedeño RN   Transplant Surgery Casey Condon MD       Transplant Eligibility: Irreversible chronic kidney disease treated w/dialysis or expected need for dialysis    Committee Review Decision: Declined    Relative Contraindications: None    Absolute Contraindications: None    Committee Chair Casey Condon MD verbally attested to the committee's decision.    Committee Discussion Details: Reviewed medical history and evaluation results to date. In particular reviewed clinic note 09/21/2021 from [facility] which states patient is on continuous oxygen at 1 1/2 to 2 L/minute most of the time, has COPD, heart disease, severe pulm htn, cor pulmonale and that she is currently on maximum treatment. Reviewed coronary angiogram done 07/22/2021 conclusion of severe pulm htn, severely elevated right sided pressures with mean of 35, severely elevated left sided filling pressures LVEDP at 35-40, PCWP at 35. Determined that heart and lung status do not meet criteria for proceeding with kidney transplant as they present too high of risk. Decision made that patient is declined as a candidate for kidney transplant at our Center.

## 2021-11-01 DIAGNOSIS — E11.21 TYPE 2 DIABETES MELLITUS WITH DIABETIC NEPHROPATHY, WITH LONG-TERM CURRENT USE OF INSULIN (H): Primary | ICD-10-CM

## 2021-11-01 DIAGNOSIS — Z79.4 TYPE 2 DIABETES MELLITUS WITH DIABETIC NEPHROPATHY, WITH LONG-TERM CURRENT USE OF INSULIN (H): Primary | ICD-10-CM

## 2021-11-01 NOTE — TELEPHONE ENCOUNTER
Patient called in requesting a new glucose monitor and lancets. Reports her machine malfunctions some times and is really old.

## 2021-11-02 NOTE — TELEPHONE ENCOUNTER
Pharmacy called they are needing new orders sent specifying brand for glucose monitor, lancets and strips , orders resent

## 2021-11-08 ENCOUNTER — TELEPHONE (OUTPATIENT)
Dept: TRANSPLANT | Facility: CLINIC | Age: 61
End: 2021-11-08
Payer: COMMERCIAL

## 2021-11-08 NOTE — LETTER
November 8, 2021    Yissel Wetzel  [address]  [facility] [address]      Dear Ms. Wetzel,   The purpose of this letter is to let you know that on 10/29/2021 the [facility] Multi-Disciplinary Selection Team reviewed the results of your transplant evaluation.  Based on the results of your evaluation and the selection criteria used by our program , the decision was made to not list you on the kidney transplant list.  This is because of your current heart and lung status's making the risk of proceeding with kidney transplant too high.   Important things you should know:    If you would like to discuss the decision, or if your medical status changes you may schedule a return visits with your doctor by calling [phone] and asking to speak to your transplant coordinator.    We recommend that you continue to follow up with your primary care doctor in order to manage your health concerns.  Enclosed is a letter from [employer] which describes the services offered to patients by [facility] and the Organ Procurement and Transplantation Network.  Thank you for allowing us to participate in your care.  We wish you well.  Sincerely,      Solid Organ Transplant  [facility]    Enclosures: OS Letter   cc: Dr. Gigi Martin, Dr. Rahul Hernandez, Dr. France Barbosa, [facility]             The Organ Procurement and Transplantation Network  Toll-free patient services line:     Your resource for organ transplant information    If you have a question regarding your own medical care, you always should call your transplant hospital first. However, for general organ transplant-related information, you can call the Organ Procurement and Transplantation Network (OPTN) toll-free patient services line at 3-592-297- [phone]. Anyone, including potential transplant candidates, candidates, recipients, family members,  friends, living donors, and donor family members, can call this number to:          Talk about organ donation, living donation, the transplant process, the donation process, and transplant policies.    Get a free patient information kit with helpful booklets, waiting list and transplant information, and a list of all transplant hospitals.    Ask questions about the OPTN website (https://optn.transplant.hrsa.gov/), the [employer] for Organ Sharing s (UNOS) website (https://unos.org/), or the [employer]OS website for living donors and transplant recipients. (https://www.transplantliving.org/).    Learn how the OPTN can help you.    Talk about any concerns that you may have with a transplant hospital.    The [facility] transplant system, the OPTN, is managed under federal contract by the [employer] Organ Sharing (UNOS), which is a non-profit charitable organization. The OPTN helps create and define organ sharing policies that make the best use of donated organs. This process continuously evaluating new advances and discoveries so policies can be adapted to best serve patients waiting for transplants. To do so, the OPTN works closely with transplant professionals, transplant patients, transplant candidates, donor families, living donors, and the public. All transplant programs and organ procurement organizations throughout the country are OPTN members and are obligated to follow the policies the OPTN creates for allocating organs.    The OPTN also is responsible for:      Providing educational material for patients, the public, and professionals.    Raising awareness of the need for donated organs and tissue.    Coordinating organ procurement, matching, and placement.    Collecting information about every organ transplant and donation that occurs in the [place].    Remember, you should contact your transplant hospital directly if you have questions or concerns about your own medical care including medical  records, work-up progress, and test results.    We are not your transplant hospital, and our staff will not be able to answer questions about your case, so please keep your transplant hospital s phone number handy.    However, while you research your transplant needs and learn as much as you can about transplantation and donation, we welcome your call to our toll-free patient services line at [phone].          Updated 4/1/2019

## 2021-11-08 NOTE — TELEPHONE ENCOUNTER
Contacted patient to review outcome of selection committee meeting (See selection committee encounter).   Explained to patient that she is not approved as a candidate for kidney transplant at our Center due to her current heart and lung status which we feel will make the risk of transplant too high.   Reviewed next steps based on outcomes: pt to follow closely with regular providers to manage her conditions. Explained I will call her dialysis unit too and let them know of this decision.   Patient will not be listed because they are not a candidate-will receive:     -A letter indicating why they did not meet criteria for transplant at [facility].  Confirmed that patient has contact information for additional questions or concerns. Patient expressed very good understanding of all and was in good agreement with the plan.   Called dialysis unit and spoke with nurse Andrade, informed him of our decision to decline patient and that I did speak with pt today about this.   Staff message today to Dr. Rahul Hernandez to inform him that patient is declined.   Generated declined as a candidate letter in Epic - electronically routed to patient and providers.

## 2021-11-12 ENCOUNTER — TRANSFERRED RECORDS (OUTPATIENT)
Dept: HEALTH INFORMATION MANAGEMENT | Facility: CLINIC | Age: 61
End: 2021-11-12
Payer: COMMERCIAL

## 2021-11-12 ENCOUNTER — TELEPHONE (OUTPATIENT)
Dept: CARDIOLOGY | Facility: CLINIC | Age: 61
End: 2021-11-12
Payer: COMMERCIAL

## 2021-11-12 LAB — RETINOPATHY: POSITIVE

## 2021-11-12 NOTE — TELEPHONE ENCOUNTER
Pt called and states that she had her BP taken at her eye appointment. States that it ws 127/ and that is all she could remember. States that she was told the middle number was low.  Pt takes not medications for her BP and continues on Amiodarone, which was increased back to 200 mg, d/t rapid rates on 10/11. Pt states that she needs to have her BP greater then 130's to feel good.  Pt will be going to Dialysis today at 1030 and will be checked multiple times during this time.  I have asked that pt reach out to her PCP after her run and discuss her BP's. Pt was told that she could contact heart clinic also, but reminded that Dr Barbosa was out of the office today.  Pt states understanding. Bea

## 2021-12-16 ENCOUNTER — OFFICE VISIT (OUTPATIENT)
Dept: CARDIOLOGY | Facility: CLINIC | Age: 61
End: 2021-12-16
Payer: MEDICARE

## 2021-12-16 VITALS — SYSTOLIC BLOOD PRESSURE: 178 MMHG | OXYGEN SATURATION: 95 % | DIASTOLIC BLOOD PRESSURE: 64 MMHG | HEART RATE: 75 BPM

## 2021-12-16 DIAGNOSIS — I47.10 SVT (SUPRAVENTRICULAR TACHYCARDIA) (H): Primary | ICD-10-CM

## 2021-12-16 PROCEDURE — 93000 ELECTROCARDIOGRAM COMPLETE: CPT | Performed by: INTERNAL MEDICINE

## 2021-12-16 PROCEDURE — 99214 OFFICE O/P EST MOD 30 MIN: CPT | Performed by: INTERNAL MEDICINE

## 2021-12-16 NOTE — LETTER
12/16/2021    Gigi Martin MD  [address] [place] [id number]    RE: Yissel BESSY Wetzel       Dear Colleague,     I had the pleasure of seeing Yissel Wetzel in the [facility].  PHYSICIAN NOTE:  This visit was completed in person at the [facility].      I had the pleasure evaluating Ms. Cassandra Wetzel today.      Ms. Wetzel has the following chronic medical issues:  A.  End-stage renal disease due to diabetic nephropathy.  On dialysis 3 days per week for the past 3 years.  Given the severity of her PAH, I'm surprised with how well she tolerates dialysis.  B.  Diabetes mellitus type 2.  C.  Severe pulmonary hypertension, PA pressure 132/52 mmHg with mean of 81 during right heart catheterization in 07/2021.  She also had severely elevated LVEDP of 35-40 mmHg at the time.  D.  SVT, adenosine-responsive.  Treated with po amiodarone as other agents (such as daily beta-blockers or calcium blockers) previously caused hypotension.  E.  Hypertension.  F.  Severe RV dilatation and severe biatrial enlargement by echocardiography.  LVEF 60%-65%.  G.  Chronic lung disease, O2-dependent.    Cassandra came to the clinic today accompanied by her son Mike.  She has felt quite well recently.  When I last saw her in August 2021 she seemed fixated on her blood pressure.  Her BP tended to be low immediately post dialysis and relatively high on the day after dialysis.  I had asked her to check her BP less frequently and only take as needed antihypertensive medication (carvedilol) for really high SBP measurements.    Lately, Cassandra has taken a more relaxed approach in checking her blood pressure.  She tells me that she no longer checks it at home.  She only has it checked in dialysis.  She has also discovered that her hypertension is extremely salt sensitive.  When she went on a very low salt diet (she has between 500 and 900 mg/day) her BP was quite low when she felt unwell.  On a mild sodium  restriction (2 g/day) she has felt better.     She had called us back in the fall complaining she was having breakthrough SVT events despite amiodarone 100 mg daily.  The amiodarone dose was increased to 200 mg daily and since then she has had no issues.    PHYSICAL EXAMINATION:  General: Chronically ill-appearing pleasant woman in no apparent distress.    Neck:  no apparent thyromegaly or lymphadenopathy.  Chest/Lungs: Mild decreased breath sounds especially at the right base.    Cardiovascular:  ?JVP, rate is regular.  1/6 systolic ejection murmur, no rub.    Extremities:  no edema  Skin: Pale..  Neurologic:  alert & oriented x 3.  Normal arm motion bilateral, no tremors.    Vascular:  1+ radials.        DIAGNOSTIC STUDIES:  ECG: Sinus rhythm, T abnormality.      IMPRESSION:  1. Adenosine-responsive SVT.  Under usual circumstances, her rhythm problem could be definitively addressed with catheter ablation, but in this frail lady with PASP exceeding 100 mmHg this may be a high-risk procedure.  Given the inability to use beta-blockers or diltiazem (given her tendency toward hypotension at times) she has been successfully treated with amiodarone.  2. Severe pulmonary hypertension.  She absolutely refuses to see a PAH specialist.  3. Labile hypertension.  I'm glad that she has recently been less obsessed with BP checks.  This issue should be managed by her nephrologist.    RECOMMENDATIONS:  1. Thyroid and liver function tests in January or early February.  2. Follow-up with DEREK in 6 months    It was my pleasure seeing this delightful patient.      France Barbosa MD, [facility]        Orders Placed This Encounter   Procedures     TSH with free T4 reflex     Hepatic panel     EKG 12-lead complete w/read - Clinics (performed today)       No orders of the defined types were placed in this encounter.      There are no discontinued medications.      Encounter Diagnosis   Name Primary?     SVT (supraventricular tachycardia) (H)  Yes       CURRENT MEDICATIONS:  Current Outpatient Medications   Medication Sig Dispense Refill     acetaminophen (TYLENOL) 500 MG tablet Take 500 mg by mouth every 8 hours as needed for mild pain       amiodarone (PACERONE) 200 MG tablet Take 1 tablet (200 mg) by mouth daily 90 tablet 1     aspirin (ASA) 81 MG EC tablet Take 1 tablet (81 mg) by mouth daily  0     carvedilol (COREG) 3.125 MG tablet On non dialysis days take 1 tablet twice daily if SBP > 150. On dialysis days, prior to dialysis  accept BP as high as 160 mmHg and do not treat.  If  BP remains high after dialysis( systolic BP>150 , take 1 tablet 3.125 mg as needed) . 180 tablet 1     famotidine (PEPCID) 10 MG tablet Take 1 tablet (10 mg) by mouth daily (Patient taking differently: Take 10 mg by mouth daily as needed ) 90 tablet 3     fluticasone (FLONASE) 50 MCG/ACT nasal spray Spray 1 spray into both nostrils daily (Patient taking differently: Spray 1 spray into both nostrils daily as needed ) 16 g 11     Glycopyrrolate-Formoterol (BEVESPI AEROSPHERE) 9-4.8 MCG/ACT oral inhaler Inhale 2 puffs into the lungs 2 times daily        hypromellose-dextran (ARTIFICAL TEARS) 0.1-0.3 % ophthalmic solution Place 1 drop into both eyes daily as needed for dry eyes       levalbuterol (XOPENEX HFA) 45 MCG/ACT inhaler Inhale 2 puffs into the lungs every 6 hours as needed for shortness of breath / dyspnea or wheezing 15 g 11     LEVEMIR FLEXTOUCH 100 UNIT/ML pen Inject 60 Units Subcutaneously At Bedtime 15 mL 0     NOVOLOG FLEXPEN 100 UNIT/ML soln INJECT 9-12 UNITS BEFORE BREAKFAST, LUNCH, AND DINNER PLUS A CORRECTIVE SCALE OF 2 UNITS FOR EVERY 50 OVER 150 (Patient taking differently: INJECT 3 UNITS/15gm carbs BEFORE BREAKFAST, LUNCH, AND DINNER PLUS A CORRECTIVE SCALE OF 2 UNITS FOR EVERY 50 OVER 150) 15 mL 0     ACCU-CHEK CHING PLUS test strip USE TO TEST BLOOD SUGAR 4 TIMES PER  strip 3     amLODIPine (NORVASC) 10 MG tablet Take 1 tablet (10 mg) by mouth  daily (Patient not taking: Reported on 8/17/2021) 30 tablet 3     blood glucose (NO BRAND SPECIFIED) lancets standard Use to test blood sugar 4 times daily or as directed. 400 lancet 3     blood glucose (NO BRAND SPECIFIED) lancets standard Use to test blood sugar three times daily or as directed. 400 lancet 11     blood glucose (NO BRAND SPECIFIED) test strip Use to test blood sugar 4 times daily or as directed. 400 strip 3     blood glucose monitoring (NO BRAND SPECIFIED) meter device kit Use to test blood sugar 4 times daily or as directed. 1 kit 0     blood glucose monitoring (NO BRAND SPECIFIED) meter device kit Use to test blood sugar three times daily or as directed. 1 kit 0     insulin pen needle (31G X 8 MM) 31G X 8 MM miscellaneous Use 4 pen needles daily or as directed. 100 each 11     lanthanum (FOSRENOL) 500 MG chewable tablet Take 500 mg by mouth 3 times daily (with meals)         ALLERGIES     Allergies   Allergen Reactions     Albuterol      shakiness     Aspirin      gi     Byetta [Exenatide]      gi     Clonidine      constipation     Codeine      vomiting     Ezetimibe      muscle symptoms     Hydralazine      headaches     Lisinopril      hyperkalemia     Metformin Hydrochloride      vomiting     Pravachol [Pravastatin Sodium]      muscle pains     Simvastatin      myalgias     Troglitazone        PAST MEDICAL HISTORY:  Past Medical History:   Diagnosis Date     Allergic rhinitis, cause unspecified      Anemia in chronic kidney disease      Anemia of chronic disease      Bleeding pseudoaneurysm of right brachiocephalic arteriovenous fistula, initial encounter (H) 12/6/2019     End stage renal failure on dialysis (H)      Esophagitis, unspecified      Former tobacco use      HEMORRHAGIC GASTROPATHY      History of blood transfusion     [place]     Iron deficiency anemia, unspecified      Mild persistent asthma      Obesity      Other and unspecified hyperlipidemia      Pneumonia      Pulmonary  hypertension (H)     per 2012 echo mod.to severe pulmonary hypertension     Tobacco use disorder dc ed      Type 2 diabetes mellitus (H)     on Insulin- managed by PCP     Unspecified essential hypertension        PAST SURGICAL HISTORY:  Past Surgical History:   Procedure Laterality Date     ABDOMINOPLASTY  pt unsure when    abdominoplasty-      SECTION  ,     x 2     COLONOSCOPY      [facility]     COLONOSCOPY N/A 2021    Procedure: COLONOSCOPY, WITH POLYPECTOMY AND BIOPSY;  Surgeon: Lincoln Farrar MD;  Location:  GI     CREATE FISTULA ARTERIOVENOUS UPPER EXTREMITY Right 2018    Procedure: RIGHT BRACHIAL TO BASILIC ARTERIOVENOUS FISTULA CREATION;  Surgeon: Terry Vizcaino MD;  Location: [facility]     CV HEART CATHETERIZATION WITH POSSIBLE INTERVENTION N/A 2021    Procedure: Heart Catheterization with Possible Intervention;  Surgeon: Joseluis Dean MD;  Location:  HEART CARDIAC CATH LAB     ENT SURGERY  as a child    tonsillectomy     ESOPHAGOSCOPY, GASTROSCOPY, DUODENOSCOPY (EGD), COMBINED N/A 2021    Procedure: ESOPHAGOGASTRODUODENOSCOPY, WITH BIOPSY;  Surgeon: Lincoln Farrar MD;  Location:  GI     EYE SURGERY Left     injections- eye     HYSTERECTOMY  approx     partial hysterectomy-reason bleeding      IR CVC TUNNEL PLACEMENT > 5 YRS OF AGE  2019     IR CVC TUNNEL REMOVAL RIGHT  2020     IR DIALYSIS FISTULOGRAM RIGHT  2019       FAMILY HISTORY:  Family History   Problem Relation Age of Onset     Arrhythmia Mother      Hypertension Mother      Pancreatic Cancer Father      Diabetes Brother      Asthma Sister      LUNG DISEASE Sister      Diabetes Daughter      Cirrhosis Sister        SOCIAL HISTORY:  Social History     Socioeconomic History     Marital status: Single     Spouse name: None     Number of children: None     Years of education: None     Highest education level: None   Occupational  History     None   Tobacco Use     Smoking status: Former Smoker     Packs/day: 1.00     Years: 22.00     Pack years: 22.00     Types: Cigarettes     Quit date: 10/12/1999     Years since quittin.1     Smokeless tobacco: Never Used   Substance and Sexual Activity     Alcohol use: No     Alcohol/week: 0.0 standard drinks     Drug use: No     Sexual activity: Never   Other Topics Concern      Service Not Asked     Blood Transfusions Yes     Caffeine Concern No     Occupational Exposure Not Asked     Hobby Hazards Not Asked     Sleep Concern No     Stress Concern No     Weight Concern Yes     Comment: would like to lose weight     Special Diet No     Back Care Not Asked     Exercise Yes     Comment: walks daily 2 blocks     Bike Helmet Not Asked     Seat Belt Not Asked     Self-Exams Not Asked     Parent/sibling w/ CABG, MI or angioplasty before 65F 55M? Not Asked   Social History Narrative     None     Social Determinants of Health     Financial Resource Strain: Not on file   Food Insecurity: Not on file   Transportation Needs: Not on file   Physical Activity: Not on file   Stress: Not on file   Social Connections: Not on file   Intimate Partner Violence: Not on file   Housing Stability: Not on file       Review of Systems:  Skin:  Positive for bruising     Eyes:  Positive for glasses    ENT:  Positive for nasal congestion;sinus trouble allergies/seasonal/bad with humidity  Respiratory:  Positive for dyspnea on exertion asthma, cough from post nasal drainage, recent URI, completed abx - same   Cardiovascular:  cyanosis;lightheadedness;dizziness;syncope or near-syncope;edema;Negative for;palpitations;chest pain Positive for;palpitations better  Gastroenterology: Positive for diarrhea;heartburn;reflux    Genitourinary:  Negative      Musculoskeletal:  Negative      Neurologic:  Negative      Psychiatric:  Negative      Heme/Lymph/Imm:  Positive for allergies;anemia;easy bruising    Endocrine:  Positive for  diabetes      Physical Exam:  Vitals: BP (!) 178/64   Pulse 75   SpO2 95%     Constitutional:           Skin:             Head:           Eyes:           Lymph:      ENT:           Neck:           Respiratory:            Cardiac:                                                           GI:           Extremities and Muscular Skeletal:                 Neurological:           Psych:           CC  No referring provider defined for this encounter.    Thank you for allowing me to participate in the care of your patient.      Sincerely,     France Barbosa MD     [facility]  cc:   No referring provider defined for this encounter.

## 2021-12-16 NOTE — PROGRESS NOTES
PHYSICIAN NOTE:  This visit was completed in person at the [facility].      I had the pleasure evaluating Ms. Cassandra Wetzel today.      Ms. Wetzel has the following chronic medical issues:  A.  End-stage renal disease due to diabetic nephropathy.  On dialysis 3 days per week for the past 3 years.  Given the severity of her PAH, I'm surprised with how well she tolerates dialysis.  B.  Diabetes mellitus type 2.  C.  Severe pulmonary hypertension, PA pressure 132/52 mmHg with mean of 81 during right heart catheterization in 07/2021.  She also had severely elevated LVEDP of 35-40 mmHg at the time.  D.  SVT, adenosine-responsive.  Treated with po amiodarone as other agents (such as daily beta-blockers or calcium blockers) previously caused hypotension.  E.  Hypertension.  F.  Severe RV dilatation and severe biatrial enlargement by echocardiography.  LVEF 60%-65%.  G.  Chronic lung disease, O2-dependent.    Cassandra came to the clinic today accompanied by her son Mike.  She has felt quite well recently.  When I last saw her in August 2021 she seemed fixated on her blood pressure.  Her BP tended to be low immediately post dialysis and relatively high on the day after dialysis.  I had asked her to check her BP less frequently and only take as needed antihypertensive medication (carvedilol) for really high SBP measurements.    Lately, Cassandra has taken a more relaxed approach in checking her blood pressure.  She tells me that she no longer checks it at home.  She only has it checked in dialysis.  She has also discovered that her hypertension is extremely salt sensitive.  When she went on a very low salt diet (she has between 500 and 900 mg/day) her BP was quite low when she felt unwell.  On a mild sodium restriction (2 g/day) she has felt better.     She had called us back in the fall complaining she was having breakthrough SVT events despite amiodarone 100 mg daily.  The amiodarone dose was increased to 200 mg  daily and since then she has had no issues.    PHYSICAL EXAMINATION:  General: Chronically ill-appearing pleasant woman in no apparent distress.    Neck:  no apparent thyromegaly or lymphadenopathy.  Chest/Lungs: Mild decreased breath sounds especially at the right base.    Cardiovascular:  ?JVP, rate is regular.  1/6 systolic ejection murmur, no rub.    Extremities:  no edema  Skin: Pale..  Neurologic:  alert & oriented x 3.  Normal arm motion bilateral, no tremors.    Vascular:  1+ radials.        DIAGNOSTIC STUDIES:  ECG: Sinus rhythm, T abnormality.      IMPRESSION:  1. Adenosine-responsive SVT.  Under usual circumstances, her rhythm problem could be definitively addressed with catheter ablation, but in this frail lady with PASP exceeding 100 mmHg this may be a high-risk procedure.  Given the inability to use beta-blockers or diltiazem (given her tendency toward hypotension at times) she has been successfully treated with amiodarone.  2. Severe pulmonary hypertension.  She absolutely refuses to see a PAH specialist.  3. Labile hypertension.  I'm glad that she has recently been less obsessed with BP checks.  This issue should be managed by her nephrologist.    RECOMMENDATIONS:  1. Thyroid and liver function tests in January or early February.  2. Follow-up with DEREK in 6 months    It was my pleasure seeing this delightful patient.      France Barbosa MD, [facility]        Orders Placed This Encounter   Procedures     TSH with free T4 reflex     Hepatic panel     EKG 12-lead complete w/read - Clinics (performed today)       No orders of the defined types were placed in this encounter.      There are no discontinued medications.      Encounter Diagnosis   Name Primary?     SVT (supraventricular tachycardia) (H) Yes       CURRENT MEDICATIONS:  Current Outpatient Medications   Medication Sig Dispense Refill     acetaminophen (TYLENOL) 500 MG tablet Take 500 mg by mouth every 8 hours as needed for mild pain        amiodarone (PACERONE) 200 MG tablet Take 1 tablet (200 mg) by mouth daily 90 tablet 1     aspirin (ASA) 81 MG EC tablet Take 1 tablet (81 mg) by mouth daily  0     carvedilol (COREG) 3.125 MG tablet On non dialysis days take 1 tablet twice daily if SBP > 150. On dialysis days, prior to dialysis  accept BP as high as 160 mmHg and do not treat.  If  BP remains high after dialysis( systolic BP>150 , take 1 tablet 3.125 mg as needed) . 180 tablet 1     famotidine (PEPCID) 10 MG tablet Take 1 tablet (10 mg) by mouth daily (Patient taking differently: Take 10 mg by mouth daily as needed ) 90 tablet 3     fluticasone (FLONASE) 50 MCG/ACT nasal spray Spray 1 spray into both nostrils daily (Patient taking differently: Spray 1 spray into both nostrils daily as needed ) 16 g 11     Glycopyrrolate-Formoterol (BEVESPI AEROSPHERE) 9-4.8 MCG/ACT oral inhaler Inhale 2 puffs into the lungs 2 times daily        hypromellose-dextran (ARTIFICAL TEARS) 0.1-0.3 % ophthalmic solution Place 1 drop into both eyes daily as needed for dry eyes       levalbuterol (XOPENEX HFA) 45 MCG/ACT inhaler Inhale 2 puffs into the lungs every 6 hours as needed for shortness of breath / dyspnea or wheezing 15 g 11     LEVEMIR FLEXTOUCH 100 UNIT/ML pen Inject 60 Units Subcutaneously At Bedtime 15 mL 0     NOVOLOG FLEXPEN 100 UNIT/ML soln INJECT 9-12 UNITS BEFORE BREAKFAST, LUNCH, AND DINNER PLUS A CORRECTIVE SCALE OF 2 UNITS FOR EVERY 50 OVER 150 (Patient taking differently: INJECT 3 UNITS/15gm carbs BEFORE BREAKFAST, LUNCH, AND DINNER PLUS A CORRECTIVE SCALE OF 2 UNITS FOR EVERY 50 OVER 150) 15 mL 0     ACCU-CHEK CHING PLUS test strip USE TO TEST BLOOD SUGAR 4 TIMES PER  strip 3     amLODIPine (NORVASC) 10 MG tablet Take 1 tablet (10 mg) by mouth daily (Patient not taking: Reported on 8/17/2021) 30 tablet 3     blood glucose (NO BRAND SPECIFIED) lancets standard Use to test blood sugar 4 times daily or as directed. 400 lancet 3     blood glucose (NO  BRAND SPECIFIED) lancets standard Use to test blood sugar three times daily or as directed. 400 lancet 11     blood glucose (NO BRAND SPECIFIED) test strip Use to test blood sugar 4 times daily or as directed. 400 strip 3     blood glucose monitoring (NO BRAND SPECIFIED) meter device kit Use to test blood sugar 4 times daily or as directed. 1 kit 0     blood glucose monitoring (NO BRAND SPECIFIED) meter device kit Use to test blood sugar three times daily or as directed. 1 kit 0     insulin pen needle (31G X 8 MM) 31G X 8 MM miscellaneous Use 4 pen needles daily or as directed. 100 each 11     lanthanum (FOSRENOL) 500 MG chewable tablet Take 500 mg by mouth 3 times daily (with meals)         ALLERGIES     Allergies   Allergen Reactions     Albuterol      shakiness     Aspirin      gi     Byetta [Exenatide]      gi     Clonidine      constipation     Codeine      vomiting     Ezetimibe      muscle symptoms     Hydralazine      headaches     Lisinopril      hyperkalemia     Metformin Hydrochloride      vomiting     Pravachol [Pravastatin Sodium]      muscle pains     Simvastatin      myalgias     Troglitazone        PAST MEDICAL HISTORY:  Past Medical History:   Diagnosis Date     Allergic rhinitis, cause unspecified      Anemia in chronic kidney disease      Anemia of chronic disease      Bleeding pseudoaneurysm of right brachiocephalic arteriovenous fistula, initial encounter (H) 12/6/2019     End stage renal failure on dialysis (H)      Esophagitis, unspecified      Former tobacco use      HEMORRHAGIC GASTROPATHY      History of blood transfusion     [place]     Iron deficiency anemia, unspecified      Mild persistent asthma      Obesity      Other and unspecified hyperlipidemia      Pneumonia      Pulmonary hypertension (H)     per 12/2012 echo mod.to severe pulmonary hypertension     Tobacco use disorder dc ed 1999     Type 2 diabetes mellitus (H)     on Insulin- managed by PCP     Unspecified essential  hypertension        PAST SURGICAL HISTORY:  Past Surgical History:   Procedure Laterality Date     ABDOMINOPLASTY  pt unsure when    abdominoplasty-      SECTION  ,     x 2     COLONOSCOPY      [facility]     COLONOSCOPY N/A 2021    Procedure: COLONOSCOPY, WITH POLYPECTOMY AND BIOPSY;  Surgeon: Lincoln Farrar MD;  Location:  GI     CREATE FISTULA ARTERIOVENOUS UPPER EXTREMITY Right 2018    Procedure: RIGHT BRACHIAL TO BASILIC ARTERIOVENOUS FISTULA CREATION;  Surgeon: Terry Vizcaino MD;  Location:  SD     CV HEART CATHETERIZATION WITH POSSIBLE INTERVENTION N/A 2021    Procedure: Heart Catheterization with Possible Intervention;  Surgeon: Joseluis Dean MD;  Location:  HEART CARDIAC CATH LAB     ENT SURGERY  as a child    tonsillectomy     ESOPHAGOSCOPY, GASTROSCOPY, DUODENOSCOPY (EGD), COMBINED N/A 2021    Procedure: ESOPHAGOGASTRODUODENOSCOPY, WITH BIOPSY;  Surgeon: Lincoln Farrar MD;  Location:  GI     EYE SURGERY Left     injections- eye     HYSTERECTOMY  approx     partial hysterectomy-reason bleeding      IR CVC TUNNEL PLACEMENT > 5 YRS OF AGE  2019     IR CVC TUNNEL REMOVAL RIGHT  2020     IR DIALYSIS FISTULOGRAM RIGHT  2019       FAMILY HISTORY:  Family History   Problem Relation Age of Onset     Arrhythmia Mother      Hypertension Mother      Pancreatic Cancer Father      Diabetes Brother      Asthma Sister      LUNG DISEASE Sister      Diabetes Daughter      Cirrhosis Sister        SOCIAL HISTORY:  Social History     Socioeconomic History     Marital status: Single     Spouse name: None     Number of children: None     Years of education: None     Highest education level: None   Occupational History     None   Tobacco Use     Smoking status: Former Smoker     Packs/day: 1.00     Years: 22.00     Pack years: 22.00     Types: Cigarettes     Quit date: 10/12/1999     Years since quittin.1      Smokeless tobacco: Never Used   Substance and Sexual Activity     Alcohol use: No     Alcohol/week: 0.0 standard drinks     Drug use: No     Sexual activity: Never   Other Topics Concern      Service Not Asked     Blood Transfusions Yes     Caffeine Concern No     Occupational Exposure Not Asked     Hobby Hazards Not Asked     Sleep Concern No     Stress Concern No     Weight Concern Yes     Comment: would like to lose weight     Special Diet No     Back Care Not Asked     Exercise Yes     Comment: walks daily 2 blocks     Bike Helmet Not Asked     Seat Belt Not Asked     Self-Exams Not Asked     Parent/sibling w/ CABG, MI or angioplasty before 65F 55M? Not Asked   Social History Narrative     None     Social Determinants of Health     Financial Resource Strain: Not on file   Food Insecurity: Not on file   Transportation Needs: Not on file   Physical Activity: Not on file   Stress: Not on file   Social Connections: Not on file   Intimate Partner Violence: Not on file   Housing Stability: Not on file       Review of Systems:  Skin:  Positive for bruising     Eyes:  Positive for glasses    ENT:  Positive for nasal congestion;sinus trouble allergies/seasonal/bad with humidity  Respiratory:  Positive for dyspnea on exertion asthma, cough from post nasal drainage, recent URI, completed abx - same   Cardiovascular:  cyanosis;lightheadedness;dizziness;syncope or near-syncope;edema;Negative for;palpitations;chest pain Positive for;palpitations better  Gastroenterology: Positive for diarrhea;heartburn;reflux    Genitourinary:  Negative      Musculoskeletal:  Negative      Neurologic:  Negative      Psychiatric:  Negative      Heme/Lymph/Imm:  Positive for allergies;anemia;easy bruising    Endocrine:  Positive for diabetes      Physical Exam:  Vitals: BP (!) 178/64   Pulse 75   SpO2 95%     Constitutional:           Skin:             Head:           Eyes:           Lymph:      ENT:           Neck:            Respiratory:            Cardiac:                                                           GI:           Extremities and Muscular Skeletal:                 Neurological:           Psych:           CC  No referring provider defined for this encounter.

## 2021-12-21 ENCOUNTER — TRANSFERRED RECORDS (OUTPATIENT)
Dept: HEALTH INFORMATION MANAGEMENT | Facility: CLINIC | Age: 61
End: 2021-12-21
Payer: COMMERCIAL

## 2021-12-28 ENCOUNTER — NURSE TRIAGE (OUTPATIENT)
Dept: INTERNAL MEDICINE | Facility: CLINIC | Age: 61
End: 2021-12-28
Payer: COMMERCIAL

## 2021-12-28 DIAGNOSIS — H66.90 SUBACUTE OTITIS MEDIA, UNSPECIFIED OTITIS MEDIA TYPE: ICD-10-CM

## 2021-12-28 NOTE — TELEPHONE ENCOUNTER
"Nurse Triage SBAR    Is this a 2nd Level Triage? YES, LICENSED PRACTITIONER REVIEW IS REQUIRED    Situation : About 3 days ago patient developed severe ear pain that she insists is an infection. She states it has gotten worse and is requesting Amoxacillin.     Background : She states she has had this before, last was a couple years ago.      Assessment : see below    (See information below for more triage details.)    Recommendation : Routing to provider for recommendation on protocol states go to office now.    Protocol Recommended Disposition: Immediate office evaluation      Reason for Disposition    Severe earache pain    Additional Information    Negative: Sounds like a life-threatening emergency to the triager    Negative: Moving the earlobe or touching the ear clearly increases the pain    Negative: Pink or red swelling behind the ear    Negative: Stiff neck (can't touch chin to chest)    Negative: Patient sounds very sick or weak to the triager    Answer Assessment - Initial Assessment Questions  1. LOCATION: \"Which ear is involved?\"        Left ear.     2. ONSET: \"When did the ear start hurting\"         Started about 3 days ago.     3. SEVERITY: \"How bad is the pain?\"  (Scale 1-10; mild, moderate or severe)    - MILD (1-3): doesn't interfere with normal activities     - MODERATE (4-7): interferes with normal activities or awakens from sleep     - SEVERE (8-10): excruciating pain, unable to do any normal activities         9/10, severe.     4. URI SYMPTOMS: \"Do you have a runny nose or cough?\"        No.     5. FEVER: \"Do you have a fever?\" If so, ask: \"What is your temperature, how was it measured, and when did it start?\"        No.     6. CAUSE: \"Have you been swimming recently?\", \"How often do you use Q-TIPS?\", \"Have you had any recent air travel or scuba diving?\"        None, she does not know why this began, stated it just happened.     7. OTHER SYMPTOMS: \"Do you have any other symptoms?\" (e.g., " "headache, stiff neck, dizziness, vomiting, runny nose, decreased hearing)        Little bit of dizziness, hearing decreased a little bit as well.     8. PREGNANCY: \"Is there any chance you are pregnant?\" \"When was your last menstrual period?\"        NA    Protocols used: EARACHE-A-OH    GO TO OFFICE NOW:  * You need to be examined. Come into the office right now.   * IF NO AVAILABLE APPOINTMENTS: You need to be seen in the Urgent Care Center. Leave now. A nearby Urgent Care Center is often a good source of care. Another choice is to go to the ER.      CALL BACK IF:  * Ear congestion, pressure, or pain lasts over 48 hours  * Fever or severe ear pain occurs  * You become worse        Patient/Caregiver understands and will follow care advice? Other, see documentation     Olivia RINALDI RN  [facility]     "

## 2021-12-28 NOTE — TELEPHONE ENCOUNTER
Pt calling again, about her symptoms: Left sided ear pain x 2-3 days. Feels like its getting worse.   Says she doesn't drive. Unable to go to Urgent Care, d/t not driving.   VV scheduled for Friday - if appt is needed for treatment (no other openings).   Pharmacy also pending, if no appt needed.    FYI--she says November hemoglobin A1c was 7.1. This was done at [facility].

## 2021-12-29 NOTE — TELEPHONE ENCOUNTER
Called pt and notified of new prescription abx. Appt for ear pain cancelled for tomorrow per pt request.     Janis Hogue RN

## 2022-01-03 ENCOUNTER — TRANSFERRED RECORDS (OUTPATIENT)
Dept: HEALTH INFORMATION MANAGEMENT | Facility: CLINIC | Age: 62
End: 2022-01-03

## 2022-02-07 ENCOUNTER — TRANSFERRED RECORDS (OUTPATIENT)
Dept: HEALTH INFORMATION MANAGEMENT | Facility: CLINIC | Age: 62
End: 2022-02-07
Payer: MEDICARE

## 2022-02-13 ENCOUNTER — HEALTH MAINTENANCE LETTER (OUTPATIENT)
Age: 62
End: 2022-02-13

## 2022-02-17 PROBLEM — Z71.89 ADVANCED DIRECTIVES, COUNSELING/DISCUSSION: Status: RESOLVED | Noted: 2017-01-20 | Resolved: 2021-07-22

## 2022-03-28 ENCOUNTER — MEDICAL CORRESPONDENCE (OUTPATIENT)
Dept: HEALTH INFORMATION MANAGEMENT | Facility: CLINIC | Age: 62
End: 2022-03-28
Payer: MEDICARE

## 2022-03-31 DIAGNOSIS — I47.10 SVT (SUPRAVENTRICULAR TACHYCARDIA) (H): ICD-10-CM

## 2022-03-31 RX ORDER — AMIODARONE HYDROCHLORIDE 200 MG/1
200 TABLET ORAL DAILY
Qty: 90 TABLET | Refills: 1 | Status: SHIPPED | OUTPATIENT
Start: 2022-03-31 | End: 2022-08-30

## 2022-04-05 ENCOUNTER — OFFICE VISIT (OUTPATIENT)
Dept: URGENT CARE | Facility: URGENT CARE | Age: 62
End: 2022-04-05
Payer: MEDICARE

## 2022-04-05 VITALS
DIASTOLIC BLOOD PRESSURE: 55 MMHG | OXYGEN SATURATION: 90 % | RESPIRATION RATE: 20 BRPM | BODY MASS INDEX: 30.27 KG/M2 | TEMPERATURE: 97.5 F | HEART RATE: 64 BPM | WEIGHT: 155 LBS | SYSTOLIC BLOOD PRESSURE: 157 MMHG

## 2022-04-05 DIAGNOSIS — I87.2 VENOUS INSUFFICIENCY (CHRONIC) (PERIPHERAL): ICD-10-CM

## 2022-04-05 DIAGNOSIS — E11.21 TYPE 2 DIABETES MELLITUS WITH DIABETIC NEPHROPATHY, WITH LONG-TERM CURRENT USE OF INSULIN (H): Primary | ICD-10-CM

## 2022-04-05 DIAGNOSIS — L03.119 CELLULITIS AND ABSCESS OF LEG: ICD-10-CM

## 2022-04-05 DIAGNOSIS — Z79.4 TYPE 2 DIABETES MELLITUS WITH DIABETIC NEPHROPATHY, WITH LONG-TERM CURRENT USE OF INSULIN (H): Primary | ICD-10-CM

## 2022-04-05 DIAGNOSIS — L85.3 DRY SKIN: ICD-10-CM

## 2022-04-05 DIAGNOSIS — L02.419 CELLULITIS AND ABSCESS OF LEG: ICD-10-CM

## 2022-04-05 PROCEDURE — 96372 THER/PROPH/DIAG INJ SC/IM: CPT | Performed by: PHYSICIAN ASSISTANT

## 2022-04-05 PROCEDURE — 99214 OFFICE O/P EST MOD 30 MIN: CPT | Mod: 25 | Performed by: PHYSICIAN ASSISTANT

## 2022-04-05 RX ORDER — CEFUROXIME AXETIL 500 MG/1
500 TABLET ORAL 2 TIMES DAILY
Qty: 14 TABLET | Refills: 0 | Status: SHIPPED | OUTPATIENT
Start: 2022-04-05 | End: 2022-04-12

## 2022-04-05 RX ORDER — CEFTRIAXONE SODIUM 1 G
1 VIAL (EA) INJECTION ONCE
Status: COMPLETED | OUTPATIENT
Start: 2022-04-05 | End: 2022-04-05

## 2022-04-05 RX ADMIN — Medication 1 G: at 16:00

## 2022-04-05 NOTE — PROGRESS NOTES
Clinic Administered Medication Documentation    Administrations This Visit     cefTRIAXone (ROCEPHIN) injection 1 g     Admin Date  04/05/2022 Action  Given Dose  1 g Route  Intramuscular Site  Right Ventrogluteal Administered By  Zan Aragon CMA    Ordering Provider: Lior Torrez PA-C    Patient Supplied?: No

## 2022-04-05 NOTE — PROGRESS NOTES
Assessment & Plan     Type 2 diabetes mellitus with diabetic nephropathy, with long-term current use of insulin (H)  Patient at higher risk of infection   Monitor blood sugars  Advised to continue to follow up with PCP regarding insulin as she has further questions about taking insulin  - INJECTION INTRAMUSCULAR OR SUB-Q    Venous insufficiency (chronic) (peripheral)  Patient has chronic venous insufficiency  She has brown scaly skin with cracks and areas that are easily infected  May use cerave cream for scaling on legs  - INJECTION INTRAMUSCULAR OR SUB-Q    Cellulitis and abscess of leg  Cellulitis is an infection of the deep layers of skin. A break in the skin, such as a cut or scratch, can let bacteria under the skin. If the bacteria get to deep layers of the skin, it can be serious. If not treated, cellulitis can get into the bloodstream and lymph nodes. The infection can then spread throughout the body. This causes serious illness.   Cellulitis causes the affected skin to become red, swollen, warm, and sore. The reddened areas have a visible border. An open sore may leak fluid (pus). You may have a fever, chills, and pain.   Cellulitis is treated with antibiotics taken for 7 to 10 days. An open sore may be cleaned and covered with cool wet gauze. Symptoms should get better 1 to 2 days after treatment is started. Make sure to take all the antibiotics for the full number of days until they are gone. Keep taking the medicine even if your symptoms go away.     Patient given rocephin 1 gram IM and placed on cefzil  She has had this treatment before and has done well  She is on dialysis and needs to continue dialysis as scheduled  - cefTRIAXone (ROCEPHIN) injection 1 g  - cefuroxime (CEFTIN) 500 MG tablet; Take 1 tablet (500 mg) by mouth 2 times daily for 7 days  - INJECTION INTRAMUSCULAR OR SUB-Q    Dry skin  Due to chronic venous insufficiency  May use cerave for dry and cracked skin  Keep areas moisturized  -  Emollient (CERAVE) CREA; Externally apply topically 2 times daily       BMI:   Estimated body mass index is 30.27 kg/m  as calculated from the following:    Height as of 8/17/21: 1.524 m (5').    Weight as of this encounter: 70.3 kg (155 lb).     Follow up with PCP for recheck and further discussion of diabetes medications    At today's visit with Yissel Wetzel , we discussed results, diagnosis, medications and formulated a plan.  We also discussed red flags for immediate return to clinic/ER, as well as indications for follow up if no improvement. Patient understood and agreed to plan. Yissel Wetzel was discharged with stable vitals and has no further questions.     Lior Torrez PA-C  [facility] MIRIAN Trujillo is a 61 year old who presents for the following health issues     HPI     Yissel Wetzel, 61 year old, female presents to the urgent care today with:   Derm Problem (rash on both legs, Left is lower. Itchy and painful intermittently for couple days)  left lower leg is red and tender with erythema and mild drainage    Review of Systems   Constitutional, HEENT, cardiovascular, pulmonary, gi and gu systems are negative, except as otherwise noted.      Objective    BP (!) 157/55   Pulse 64   Temp 97.5  F (36.4  C)   Resp 20   Wt 70.3 kg (155 lb)   SpO2 90%   BMI 30.27 kg/m    Body mass index is 30.27 kg/m .  Physical Exam   GENERAL: healthy, alert and no distress  MS: Positive for tenderness left lower leg, mild medial and lateral lower leg  SKIN: Positive for mild blistering of skin with erythema, swelling and mild tenderness  VASC: Pedal pulses 2+ bilaterally  NEURO: normal sensation of legs and feet  NEURO: Normal strength and tone, mentation intact and speech normal  PSYCH: mentation appears normal, affect normal/bright  LYMPH: negative for signs of lymphangitis

## 2022-04-05 NOTE — PATIENT INSTRUCTIONS
Patient Education     Cellulitis  Cellulitis is an infection of the deep layers of skin. A break in the skin, such as a cut or scratch, can let bacteria under the skin. If the bacteria get to deep layers of the skin, it can be serious. If not treated, cellulitis can get into the bloodstream and lymph nodes. The infection can then spread throughout the body. This causes serious illness.   Cellulitis causes the affected skin to become red, swollen, warm, and sore. The reddened areas have a visible border. An open sore may leak fluid (pus). You may have a fever, chills, and pain.   Cellulitis is treated with antibiotics taken for 7 to 10 days. An open sore may be cleaned and covered with cool wet gauze. Symptoms should get better 1 to 2 days after treatment is started. Make sure to take all the antibiotics for the full number of days until they are gone. Keep taking the medicine even if your symptoms go away.   Home care  Follow these tips:    Limit the use of the part of your body with cellulitis.     If the infection is on your leg, keep your leg raised while sitting. This helps reduce swelling.    Take all of the antibiotic medicine exactly as directed until it is gone. Don't miss any doses, especially during the first 7 days. Don t stop taking the medicine when your symptoms get better.    Keep the affected area clean and dry.    Wash your hands with soap and clean, running water before and after touching your skin. Anyone else who touches your skin should also wash his or her hands. Don't share towels.  Follow-up care  Follow up with your healthcare provider, or as advised. If your infection doesn't go away on the first antibiotic, your healthcare provider will prescribe a different one.   When to seek medical advice  Call your healthcare provider right away if any of these occur:    Red areas that spread    Swelling or pain that gets worse    Fluid leaking from the skin (pus)    Fever higher of 100.4  F (38.0   C) or higher after 2 days on antibiotics  Alondra last reviewed this educational content on 8/1/2019 2000-2021 [employer]. All rights reserved. This information is not intended as a substitute for professional medical care. Always follow your healthcare professional's instructions.

## 2022-04-13 NOTE — TELEPHONE ENCOUNTER
Reason for call:  Other   Patient called regarding (reason for call): patient called to get a new handi cap sticker, as hers ends in july  Additional comments: Please call patient with any questions or concerns    Phone number to reach patient:  Cell number on file:    Telephone Information:   Mobile [phone]       Best Time:  anytime    Can we leave a detailed message on this number?  YES   PROVIDER:[TOKEN:[[id number]]],PROVIDER:[TOKEN:[[id number]]]

## 2022-05-17 ENCOUNTER — TELEPHONE (OUTPATIENT)
Dept: INTERNAL MEDICINE | Facility: CLINIC | Age: 62
End: 2022-05-17
Payer: MEDICARE

## 2022-05-17 ENCOUNTER — TELEPHONE (OUTPATIENT)
Dept: CARDIOLOGY | Facility: CLINIC | Age: 62
End: 2022-05-17
Payer: MEDICARE

## 2022-05-17 NOTE — TELEPHONE ENCOUNTER
Pt calling to inform provider that her A1C today was 7.2. It was done at [facility]. Any questions, pt can be reached at [phone]. Thank you.  Opal Field,

## 2022-05-17 NOTE — TELEPHONE ENCOUNTER
Spoke to pt who states that her Bp's have been low during dialysis and that she needs to set up a follow up OV per Dr Barbosa.  Pt states that she is not taking her Amlodipine or her Coreg, thus pt is taking nothing for her BP.  Pt states that she was told that her BP was under 100 systolic and she states that normally when that low she is symptomatic, but was not.  Pt states that they BP is checked by the Dialysis machine.  Told pt that her BP could be checked manually on her arm to see if correct.  Pt states that they do give her fluids back when she is done and BP is then up to 120 systolic.  Pt will start checking her BP at home and again and also may discuss checking on her arm at dialysis. Pt is also now set up to see Arielle Ferreira on 6/16 at 1430. Bea

## 2022-05-26 ENCOUNTER — TELEPHONE (OUTPATIENT)
Dept: CARDIOLOGY | Facility: CLINIC | Age: 62
End: 2022-05-26
Payer: MEDICARE

## 2022-05-26 NOTE — TELEPHONE ENCOUNTER
Pt called and states that she continues to have low BP's during dialysis and pt was asked to see if she would be ok to take a dose of Midodrine 2.5 mg about 20 minutes before or into the run. States the Midodrine will dialyze out.  The hope is that they can pull more fluid off then they are doing now. At this time they only are able to take 1/2 the fluid off and at time have to give fluid back because BP is so low. BP's yesterday was 135 systolic when they started and during the run, down to 93 and 87 systolic. Pt continues only on Amiodarone 200 g daily from heart clinic. Does not take the amlodipine of coreg. Will discuss with Dr Barbosa if ok for a dose of Midodrine can be used during dialysis. JNelsonANGELICA

## 2022-05-26 NOTE — TELEPHONE ENCOUNTER
Made pt aware that Dr Barbosa said that pt may use the Midodrine during dialysis. Pt states understanding. JNelsonANGELICA

## 2022-06-05 ENCOUNTER — HEALTH MAINTENANCE LETTER (OUTPATIENT)
Age: 62
End: 2022-06-05

## 2022-06-09 ENCOUNTER — TRANSFERRED RECORDS (OUTPATIENT)
Dept: HEALTH INFORMATION MANAGEMENT | Facility: CLINIC | Age: 62
End: 2022-06-09
Payer: MEDICARE

## 2022-06-10 DIAGNOSIS — R18.8 OTHER ASCITES: Primary | ICD-10-CM

## 2022-06-10 DIAGNOSIS — N18.6 END STAGE RENAL FAILURE ON DIALYSIS (H): ICD-10-CM

## 2022-06-10 DIAGNOSIS — Z99.2 END STAGE RENAL FAILURE ON DIALYSIS (H): ICD-10-CM

## 2022-06-15 ENCOUNTER — HOSPITAL ENCOUNTER (INPATIENT)
Facility: CLINIC | Age: 62
LOS: 6 days | Discharge: HOME | DRG: 314 | End: 2022-06-21
Attending: EMERGENCY MEDICINE | Admitting: INTERNAL MEDICINE
Payer: MEDICARE

## 2022-06-15 ENCOUNTER — APPOINTMENT (OUTPATIENT)
Dept: CT IMAGING | Facility: CLINIC | Age: 62
DRG: 314 | End: 2022-06-15
Attending: EMERGENCY MEDICINE
Payer: MEDICARE

## 2022-06-15 ENCOUNTER — APPOINTMENT (OUTPATIENT)
Dept: ULTRASOUND IMAGING | Facility: CLINIC | Age: 62
DRG: 314 | End: 2022-06-15
Attending: EMERGENCY MEDICINE
Payer: MEDICARE

## 2022-06-15 ENCOUNTER — TRANSFERRED RECORDS (OUTPATIENT)
Dept: ONCOLOGY | Facility: CLINIC | Age: 62
End: 2022-06-15

## 2022-06-15 DIAGNOSIS — I95.1 ORTHOSTATIC HYPOTENSION: Primary | ICD-10-CM

## 2022-06-15 DIAGNOSIS — J90 PLEURAL EFFUSION ON RIGHT: ICD-10-CM

## 2022-06-15 DIAGNOSIS — N18.6 ESRD (END STAGE RENAL DISEASE) ON DIALYSIS (H): ICD-10-CM

## 2022-06-15 DIAGNOSIS — R18.8 OTHER ASCITES: ICD-10-CM

## 2022-06-15 DIAGNOSIS — K65.2 SBP (SPONTANEOUS BACTERIAL PERITONITIS) (H): ICD-10-CM

## 2022-06-15 DIAGNOSIS — Z79.4 TYPE 2 DIABETES MELLITUS WITH DIABETIC NEPHROPATHY, WITH LONG-TERM CURRENT USE OF INSULIN (H): ICD-10-CM

## 2022-06-15 DIAGNOSIS — Z99.2 ESRD (END STAGE RENAL DISEASE) ON DIALYSIS (H): ICD-10-CM

## 2022-06-15 DIAGNOSIS — R06.02 SHORTNESS OF BREATH ON EXERTION: ICD-10-CM

## 2022-06-15 DIAGNOSIS — E11.21 TYPE 2 DIABETES MELLITUS WITH DIABETIC NEPHROPATHY, WITH LONG-TERM CURRENT USE OF INSULIN (H): ICD-10-CM

## 2022-06-15 LAB
ALBUMIN BODY FLUID SOURCE: NORMAL
ALBUMIN FLD-MCNC: 1.9 G/DL
ALBUMIN SERPL-MCNC: 2.4 G/DL (ref 3.4–5)
ALBUMIN SERPL-MCNC: 2.5 G/DL (ref 3.4–5)
ALP SERPL-CCNC: 129 U/L (ref 40–150)
ALT SERPL W P-5'-P-CCNC: 42 U/L (ref 0–50)
ANION GAP SERPL CALCULATED.3IONS-SCNC: 14 MMOL/L (ref 3–14)
APPEARANCE FLD: ABNORMAL
AST SERPL W P-5'-P-CCNC: 34 U/L (ref 0–45)
ATRIAL RATE - MUSE: 68 BPM
BASOPHILS # BLD AUTO: 0 10E3/UL (ref 0–0.2)
BASOPHILS NFR BLD AUTO: 1 %
BILIRUB DIRECT SERPL-MCNC: 0.3 MG/DL (ref 0–0.2)
BILIRUB SERPL-MCNC: 0.7 MG/DL (ref 0.2–1.3)
BUN SERPL-MCNC: 61 MG/DL (ref 7–30)
CALCIUM SERPL-MCNC: 8.8 MG/DL (ref 8.5–10.1)
CELL COUNT BODY FLUID SOURCE: ABNORMAL
CHLORIDE BLD-SCNC: 97 MMOL/L (ref 94–109)
CO2 SERPL-SCNC: 25 MMOL/L (ref 20–32)
COLOR FLD: ABNORMAL
CREAT SERPL-MCNC: 7.66 MG/DL (ref 0.52–1.04)
DIASTOLIC BLOOD PRESSURE - MUSE: NORMAL MMHG
EOSINOPHIL # BLD AUTO: 0 10E3/UL (ref 0–0.7)
EOSINOPHIL NFR BLD AUTO: 0 %
EOSINOPHIL NFR FLD MANUAL: 1 %
ERYTHROCYTE [DISTWIDTH] IN BLOOD BY AUTOMATED COUNT: 15.4 % (ref 10–15)
FLUAV RNA SPEC QL NAA+PROBE: NEGATIVE
FLUBV RNA RESP QL NAA+PROBE: NEGATIVE
GFR SERPL CREATININE-BSD FRML MDRD: 6 ML/MIN/1.73M2
GLUCOSE BLD-MCNC: 201 MG/DL (ref 70–99)
GLUCOSE BODY FLUID SOURCE: NORMAL
GLUCOSE FLD-MCNC: 160 MG/DL
HBA1C MFR BLD: 7.7 % (ref 0–5.6)
HCT VFR BLD AUTO: 34.1 % (ref 35–47)
HGB BLD-MCNC: 11 G/DL (ref 11.7–15.7)
IMM GRANULOCYTES # BLD: 0 10E3/UL
IMM GRANULOCYTES NFR BLD: 0 %
INR PPP: 1.15 (ref 0.85–1.15)
INTERPRETATION ECG - MUSE: NORMAL
LD BODY BODY FLUID SOURCE: NORMAL
LDH FLD L TO P-CCNC: 125 U/L
LYMPHOCYTES # BLD AUTO: 0.7 10E3/UL (ref 0.8–5.3)
LYMPHOCYTES NFR BLD AUTO: 9 %
LYMPHOCYTES NFR FLD MANUAL: 4 %
MCH RBC QN AUTO: 31.3 PG (ref 26.5–33)
MCHC RBC AUTO-ENTMCNC: 32.3 G/DL (ref 31.5–36.5)
MCV RBC AUTO: 97 FL (ref 78–100)
MONOCYTES # BLD AUTO: 0.9 10E3/UL (ref 0–1.3)
MONOCYTES NFR BLD AUTO: 11 %
MONOS+MACROS NFR FLD MANUAL: 23 %
NEUTROPHILS # BLD AUTO: 6.4 10E3/UL (ref 1.6–8.3)
NEUTROPHILS NFR BLD AUTO: 79 %
NEUTS BAND NFR FLD MANUAL: 72 %
NRBC # BLD AUTO: 0 10E3/UL
NRBC BLD AUTO-RTO: 0 /100
P AXIS - MUSE: 62 DEGREES
PLATELET # BLD AUTO: 153 10E3/UL (ref 150–450)
POTASSIUM BLD-SCNC: 4.9 MMOL/L (ref 3.4–5.3)
PR INTERVAL - MUSE: 216 MS
PROT FLD-MCNC: 4 G/DL
PROT SERPL-MCNC: 6.2 G/DL (ref 6.8–8.8)
PROTEIN BODY FLUID SOURCE: NORMAL
QRS DURATION - MUSE: 102 MS
QT - MUSE: 502 MS
QTC - MUSE: 533 MS
R AXIS - MUSE: 61 DEGREES
RBC # BLD AUTO: 3.51 10E6/UL (ref 3.8–5.2)
RSV RNA SPEC NAA+PROBE: NEGATIVE
SARS-COV-2 RNA RESP QL NAA+PROBE: NEGATIVE
SODIUM SERPL-SCNC: 136 MMOL/L (ref 133–144)
SYSTOLIC BLOOD PRESSURE - MUSE: NORMAL MMHG
T AXIS - MUSE: 66 DEGREES
VENTRICULAR RATE- MUSE: 68 BPM
WBC # BLD AUTO: 8 10E3/UL (ref 4–11)
WBC # FLD AUTO: 403 /UL

## 2022-06-15 PROCEDURE — 36415 COLL VENOUS BLD VENIPUNCTURE: CPT | Performed by: EMERGENCY MEDICINE

## 2022-06-15 PROCEDURE — 82042 OTHER SOURCE ALBUMIN QUAN EA: CPT | Performed by: INTERNAL MEDICINE

## 2022-06-15 PROCEDURE — 99285 EMERGENCY DEPT VISIT HI MDM: CPT | Mod: 25

## 2022-06-15 PROCEDURE — 71275 CT ANGIOGRAPHY CHEST: CPT

## 2022-06-15 PROCEDURE — 250N000009 HC RX 250: Performed by: RADIOLOGY

## 2022-06-15 PROCEDURE — 87637 SARSCOV2&INF A&B&RSV AMP PRB: CPT | Performed by: EMERGENCY MEDICINE

## 2022-06-15 PROCEDURE — 82945 GLUCOSE OTHER FLUID: CPT | Performed by: INTERNAL MEDICINE

## 2022-06-15 PROCEDURE — 272N000706 US PARACENTESIS WITHOUT ALBUMIN

## 2022-06-15 PROCEDURE — 82374 ASSAY BLOOD CARBON DIOXIDE: CPT | Performed by: EMERGENCY MEDICINE

## 2022-06-15 PROCEDURE — 83036 HEMOGLOBIN GLYCOSYLATED A1C: CPT | Performed by: INTERNAL MEDICINE

## 2022-06-15 PROCEDURE — 96365 THER/PROPH/DIAG IV INF INIT: CPT | Mod: 59

## 2022-06-15 PROCEDURE — 99221 1ST HOSP IP/OBS SF/LOW 40: CPT | Performed by: INTERNAL MEDICINE

## 2022-06-15 PROCEDURE — 250N000011 HC RX IP 250 OP 636: Performed by: EMERGENCY MEDICINE

## 2022-06-15 PROCEDURE — 82248 BILIRUBIN DIRECT: CPT | Performed by: EMERGENCY MEDICINE

## 2022-06-15 PROCEDURE — 83615 LACTATE (LD) (LDH) ENZYME: CPT | Performed by: INTERNAL MEDICINE

## 2022-06-15 PROCEDURE — 120N000001 HC R&B MED SURG/OB

## 2022-06-15 PROCEDURE — 99223 1ST HOSP IP/OBS HIGH 75: CPT | Mod: AI | Performed by: INTERNAL MEDICINE

## 2022-06-15 PROCEDURE — 250N000011 HC RX IP 250 OP 636: Performed by: INTERNAL MEDICINE

## 2022-06-15 PROCEDURE — 93005 ELECTROCARDIOGRAM TRACING: CPT | Mod: 59

## 2022-06-15 PROCEDURE — 82310 ASSAY OF CALCIUM: CPT | Performed by: EMERGENCY MEDICINE

## 2022-06-15 PROCEDURE — 84157 ASSAY OF PROTEIN OTHER: CPT | Performed by: INTERNAL MEDICINE

## 2022-06-15 PROCEDURE — 85610 PROTHROMBIN TIME: CPT | Performed by: EMERGENCY MEDICINE

## 2022-06-15 PROCEDURE — P9047 ALBUMIN (HUMAN), 25%, 50ML: HCPCS | Performed by: INTERNAL MEDICINE

## 2022-06-15 PROCEDURE — 0W9G3ZZ DRAINAGE OF PERITONEAL CAVITY, PERCUTANEOUS APPROACH: ICD-10-PCS | Performed by: RADIOLOGY

## 2022-06-15 PROCEDURE — 87070 CULTURE OTHR SPECIMN AEROBIC: CPT | Performed by: EMERGENCY MEDICINE

## 2022-06-15 PROCEDURE — 250N000009 HC RX 250: Performed by: EMERGENCY MEDICINE

## 2022-06-15 PROCEDURE — 85025 COMPLETE CBC W/AUTO DIFF WBC: CPT | Performed by: EMERGENCY MEDICINE

## 2022-06-15 PROCEDURE — 89051 BODY FLUID CELL COUNT: CPT | Performed by: EMERGENCY MEDICINE

## 2022-06-15 PROCEDURE — C9803 HOPD COVID-19 SPEC COLLECT: HCPCS

## 2022-06-15 RX ORDER — ASPIRIN 81 MG/1
81 TABLET ORAL DAILY
Status: DISCONTINUED | OUTPATIENT
Start: 2022-06-16 | End: 2022-06-21 | Stop reason: HOSPADM

## 2022-06-15 RX ORDER — IOPAMIDOL 755 MG/ML
61 INJECTION, SOLUTION INTRAVASCULAR ONCE
Status: COMPLETED | OUTPATIENT
Start: 2022-06-15 | End: 2022-06-15

## 2022-06-15 RX ORDER — DEXTROSE MONOHYDRATE 25 G/50ML
25-50 INJECTION, SOLUTION INTRAVENOUS
Status: DISCONTINUED | OUTPATIENT
Start: 2022-06-15 | End: 2022-06-21 | Stop reason: HOSPADM

## 2022-06-15 RX ORDER — AMIODARONE HYDROCHLORIDE 200 MG/1
200 TABLET ORAL DAILY
Status: DISCONTINUED | OUTPATIENT
Start: 2022-06-16 | End: 2022-06-21 | Stop reason: HOSPADM

## 2022-06-15 RX ORDER — ALBUMIN (HUMAN) 12.5 G/50ML
100 SOLUTION INTRAVENOUS ONCE
Status: COMPLETED | OUTPATIENT
Start: 2022-06-15 | End: 2022-06-16

## 2022-06-15 RX ORDER — CEFTRIAXONE 2 G/1
2 INJECTION, POWDER, FOR SOLUTION INTRAMUSCULAR; INTRAVENOUS ONCE
Status: COMPLETED | OUTPATIENT
Start: 2022-06-15 | End: 2022-06-15

## 2022-06-15 RX ORDER — ALBUMIN (HUMAN) 12.5 G/50ML
50 SOLUTION INTRAVENOUS ONCE
Status: CANCELLED | OUTPATIENT
Start: 2022-06-15 | End: 2022-06-15

## 2022-06-15 RX ORDER — LIDOCAINE HYDROCHLORIDE 10 MG/ML
10 INJECTION, SOLUTION EPIDURAL; INFILTRATION; INTRACAUDAL; PERINEURAL ONCE
Status: COMPLETED | OUTPATIENT
Start: 2022-06-15 | End: 2022-06-15

## 2022-06-15 RX ORDER — CEFTRIAXONE 2 G/1
2 INJECTION, POWDER, FOR SOLUTION INTRAMUSCULAR; INTRAVENOUS EVERY 24 HOURS
Status: DISCONTINUED | OUTPATIENT
Start: 2022-06-16 | End: 2022-06-21 | Stop reason: HOSPADM

## 2022-06-15 RX ORDER — NICOTINE POLACRILEX 4 MG
15-30 LOZENGE BUCCAL
Status: DISCONTINUED | OUTPATIENT
Start: 2022-06-15 | End: 2022-06-21 | Stop reason: HOSPADM

## 2022-06-15 RX ADMIN — IOPAMIDOL 61 ML: 755 INJECTION, SOLUTION INTRAVENOUS at 16:51

## 2022-06-15 RX ADMIN — SODIUM CHLORIDE 87 ML: 900 INJECTION INTRAVENOUS at 16:52

## 2022-06-15 RX ADMIN — LIDOCAINE HYDROCHLORIDE 10 ML: 10 INJECTION, SOLUTION EPIDURAL; INFILTRATION; INTRACAUDAL; PERINEURAL at 14:35

## 2022-06-15 RX ADMIN — CEFTRIAXONE SODIUM 2 G: 2 INJECTION, POWDER, FOR SOLUTION INTRAMUSCULAR; INTRAVENOUS at 17:30

## 2022-06-15 RX ADMIN — ALBUMIN HUMAN 100 G: 0.25 SOLUTION INTRAVENOUS at 21:44

## 2022-06-15 ASSESSMENT — ACTIVITIES OF DAILY LIVING (ADL)
ADLS_ACUITY_SCORE: 35

## 2022-06-15 NOTE — ED NOTES
[facility]  ED Nurse Handoff Report    ED Chief complaint: Abdominal Pain      ED Diagnosis:   Final diagnoses:   ESRD (end stage renal disease) on dialysis (H)   Shortness of breath on exertion   Pleural effusion on right   Other ascites   SBP (spontaneous bacterial peritonitis) (H)       Code Status: Full Code    Allergies:   Allergies   Allergen Reactions     Albuterol      shakiness     Aspirin      gi     Byetta [Exenatide]      gi     Clonidine      constipation     Codeine      vomiting     Ezetimibe      muscle symptoms     Hydralazine      headaches     Lisinopril      hyperkalemia     Metformin Hydrochloride      vomiting     Pravachol [Pravastatin Sodium]      muscle pains     Simvastatin      myalgias     Troglitazone        Patient Story: 62 year old female who presents with increasing abdominal distention.  Patient reports she has had increased abdominal distention for several weeks now.  Supposed to have a paracentesis next week at least.  Today went to dialysis which was a routine day and they were unable to run her for more than 10 minutes due to to shortness of breath.  She was able to do dialysis like normal on Monday.  She denies any fevers or cough symptoms.  Feels more short of breath when laying flat.  Lives at home independently by herself.  Gets a ride to dialysis.  Went for paracentesis today and had 12 L removed  Focused Assessment:    Neuro: Alert and oriented. Independent with bed mobility   Cards: SR, BPs 110s/40s.   Pulm: 4L NC, Patient is oxygen dependent at home.     Treatments and/or interventions provided: paracntesis  Patient's response to treatments and/or interventions: improved slightly      To be done/followed up on inpatient unit:        Does this patient have any cognitive concerns?: none    Activity level - Baseline/Home:  Independent  Activity Level - Current:   Stand with Assist    Patient's Preferred language: English   Needed?:  No    Isolation: None  Infection: Not Applicable  Patient tested for COVID 19 prior to admission: YES  Bariatric?: No    Vital Signs:   Vitals:    06/16/22 0400 06/16/22 0500 06/16/22 0600 06/16/22 0700   BP: 111/41 (!) 111/39 113/45 113/43   Pulse: 69 68 72 65   Resp:       Temp:       TempSrc:       SpO2: 94% 100%  95%   Height:           Cardiac Rhythm:     Was the PSS-3 completed:   Yes  What interventions are required if any?               Family Comments:     OBS brochure/video discussed/provided to patient/family: N/A              Name of person given brochure if not patient:                 Relationship to patient:       For the majority of the shift this patient's behavior was Green.   Behavioral interventions performed were   .    ED NURSE PHONE NUMBER: *[phone] [id number]

## 2022-06-15 NOTE — H&P
"[facility]    History and Physical - Hospitalist Service       Date of Admission:  6/15/2022    Assessment & Plan      Yissel Wetzel is a 62 year old female with history of type 2 diabetes mellitus, diabetic nephropathy, ES Kd on hemodialysis, chronic venous insufficiency, SVT, severe pulmonary hypertension admitted on 6/15/2022 with shortness of breath.     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  - start Cefotaxime 2 gm Q8hr  - Will give Albumin 1.5gm./kg now and than 1 gm /kg on D3  - D/C Non selective BB  - f/u on culture /sensitivities.  Additional studies sent to differentiate between SBP versus secondary peritonitis.  - close volume Mx as on HD    #Severe pulmonary hypertension \" PA pressure 132/52 mmHg with mean of 81 during right heart catheterization in 07/2021.  She also had severely elevated LVEDP of 35-40 mmHg at the time.\"  #Severe RV dilatation and severe biatrial enlargement by echocardiography.  LVEF 60%-65%.  -Likely contributing to right heart failure causing acetic fluid.  Normal AST, ALT, alkaline phosphatase and bilirubin of 0.7.  -We will get cardiology consult , to review and discuss options for medications (sildenafil, bosentan, inhaled NO, other..) for pulmonary hypertension (although if predominantly type III pulmonary hypertension- options may be limited)    # h/o SVT- ct PTA Amiodarone. (Will request cardiology to review, as chronic therapy may contribute to pulmonary side effects    #COPD.  O2 dependent at home  # Cor Pulmonale  -Continue PTA inhalers.  She follows with [facility].      # ESKD on HD: Nephrology consult. Will need cautious additional fluid removal during dialysis    # DM type II  Recent Labs   Lab 06/15/22  1233   *     - ct PTA  Levemir " "(apparently takes 15 units at bedtime)  -Continue carb coverage (decrease to 2 units per 15 g 3 times daily with meals)  - sliding scale      Diet:   Dialysis  DVT Prophylaxis: Pneumatic Compression Devices  Weller Catheter: Not present  Central Lines: None  Cardiac Monitoring: None  Code Status:   Full     Clinically Significant Risk Factors Present on Admission             # Hypoalbuminemia: Albumin = 2.4 g/dL (Ref range: 3.4 - 5.0 g/dL) on admission, will monitor as appropriate    # Platelet Defect: home medication list includes an antiplatelet medication       Disposition Plan   Expected Discharge:  2-3 days   Anticipated discharge location:  Awaiting care coordination huddle  Delays:         The patient's care was discussed with the Bedside Nurse, Patient, Patient's Family and ED Team.    Reginald Garcia MD, MD  Hospitalist Service  [facility]  Securely message with the Vocera Web Console (learn more here)  Text page via [email or web address] Paging/Directory     \"This dictation was performed with voice recognition software and may contain errors,  omissions and inadvertent word substitution.\"   ______________________________________________________________________    Chief Complaint   SOB    History is obtained from the patient, review of EMR     History of Present Illness   Yissel Wetzel is a 62 year old female with history of type 2 diabetes mellitus, diabetic nephropathy, ES Kd on hemodialysis, chronic venous insufficiency, SVT, severe pulmonary hypertension admitted on 6/15/2022 with shortness of breath.     As per patient, she has been having gradually worsening abdominal swelling with minimal improvement with dialysis sessions.  Today it became significant enough that she was feeling good short of breath so dialysis was short and only for 10 minutes and she was sent to emergency department.    She denies any recent chest pain/palpitations  Has been feeling short of breath on sitting with " abdominal distention  Denies any recent fever/chills/abdominal pain/bowel problems    Review of Systems    The 10 point Review of Systems is negative other than noted in the HPI or here.     Past Medical History    I have reviewed this patient's medical history and updated it with pertinent information if needed.   Past Medical History:   Diagnosis Date     Allergic rhinitis, cause unspecified      Anemia in chronic kidney disease      Anemia of chronic disease      Bleeding pseudoaneurysm of right brachiocephalic arteriovenous fistula, initial encounter (H) 2019     End stage renal failure on dialysis (H)      Esophagitis, unspecified      Former tobacco use      HEMORRHAGIC GASTROPATHY      History of blood transfusion     [place]     Iron deficiency anemia, unspecified      Mild persistent asthma      Obesity      Other and unspecified hyperlipidemia      Pneumonia      Pulmonary hypertension (H)     per 2012 echo mod.to severe pulmonary hypertension     Tobacco use disorder dc ed      Type 2 diabetes mellitus (H)     on Insulin- managed by PCP     Unspecified essential hypertension        Past Surgical History   I have reviewed this patient's surgical history and updated it with pertinent information if needed.  Past Surgical History:   Procedure Laterality Date     ABDOMINOPLASTY  pt unsure when    abdominoplasty-      SECTION  ,     x 2     COLONOSCOPY      [facility]     COLONOSCOPY N/A 2021    Procedure: COLONOSCOPY, WITH POLYPECTOMY AND BIOPSY;  Surgeon: Lincoln Farrar MD;  Location:  GI     CREATE FISTULA ARTERIOVENOUS UPPER EXTREMITY Right 2018    Procedure: RIGHT BRACHIAL TO BASILIC ARTERIOVENOUS FISTULA CREATION;  Surgeon: Terry Vizcaino MD;  Location:  SD     CV HEART CATHETERIZATION WITH POSSIBLE INTERVENTION N/A 2021    Procedure: Heart Catheterization with Possible Intervention;  Surgeon: Joseluis Dean MD;   Location:  HEART CARDIAC CATH LAB     ENT SURGERY  as a child    tonsillectomy     ESOPHAGOSCOPY, GASTROSCOPY, DUODENOSCOPY (EGD), COMBINED N/A 2021    Procedure: ESOPHAGOGASTRODUODENOSCOPY, WITH BIOPSY;  Surgeon: Lincoln Farrar MD;  Location:  GI     EYE SURGERY Left     injections- eye     HYSTERECTOMY  approx     partial hysterectomy-reason bleeding      IR CVC TUNNEL PLACEMENT > 5 YRS OF AGE  2019     IR CVC TUNNEL REMOVAL RIGHT  2020     IR DIALYSIS FISTULOGRAM RIGHT  2019       Social History   I have reviewed this patient's social history and updated it with pertinent information if needed.  Social History     Tobacco Use     Smoking status: Former Smoker     Packs/day: 1.00     Years: 22.00     Pack years: 22.00     Types: Cigarettes     Quit date: 10/12/1999     Years since quittin.6     Smokeless tobacco: Never Used   Substance Use Topics     Alcohol use: No     Alcohol/week: 0.0 standard drinks     Drug use: No       Family History   I have reviewed this patient's family history and updated it with pertinent information if needed.  Family History   Problem Relation Age of Onset     Arrhythmia Mother      Hypertension Mother      Pancreatic Cancer Father      Diabetes Brother      Asthma Sister      LUNG DISEASE Sister      Diabetes Daughter      Cirrhosis Sister        Prior to Admission Medications   Prior to Admission Medications   Prescriptions Last Dose Informant Patient Reported? Taking?   ACCU-CHEK CHING PLUS test strip  Self No No   Sig: USE TO TEST BLOOD SUGAR 4 TIMES PER DAY   Emollient (CERAVE) CREA Unknown at Unknown time Self No Yes   Sig: Externally apply topically 2 times daily   Glycopyrrolate-Formoterol (BEVESPI AEROSPHERE) 9-4.8 MCG/ACT oral inhaler 6/15/2022 at am Self Yes Yes   Sig: Inhale 2 puffs into the lungs 2 times daily    LEVEMIR FLEXTOUCH 100 UNIT/ML pen Past Week at Unknown time Self No Yes   Sig: Inject 60 Units Subcutaneously At Bedtime    Patient taking differently: Inject 15 Units Subcutaneous At Bedtime   NOVOLOG FLEXPEN 100 UNIT/ML soln Unknown at Unknown time Self No Yes   Sig: INJECT 9-12 UNITS BEFORE BREAKFAST, LUNCH, AND DINNER PLUS A CORRECTIVE SCALE OF 2 UNITS FOR EVERY 50 OVER 150   Patient taking differently: INJECT 3 UNITS/15gm carbs BEFORE BREAKFAST, LUNCH, AND DINNER PLUS A CORRECTIVE SCALE OF 2 UNITS FOR EVERY 50 OVER 150   acetaminophen (TYLENOL) 500 MG tablet as needed Self Yes Yes   Sig: Take 500 mg by mouth every 8 hours as needed for mild pain   amiodarone (PACERONE) 200 MG tablet Past Week at Unknown time Self No Yes   Sig: Take 1 tablet (200 mg) by mouth daily   aspirin (ASA) 81 MG EC tablet Past Week at Unknown time Self No Yes   Sig: Take 1 tablet (81 mg) by mouth daily   blood glucose (NO BRAND SPECIFIED) lancets standard  Self No No   Sig: Use to test blood sugar three times daily or as directed.   blood glucose (NO BRAND SPECIFIED) lancets standard  Self No No   Sig: Use to test blood sugar 4 times daily or as directed.   blood glucose (NO BRAND SPECIFIED) test strip  Self No No   Sig: Use to test blood sugar 4 times daily or as directed.   blood glucose monitoring (NO BRAND SPECIFIED) meter device kit  Self No No   Sig: Use to test blood sugar 4 times daily or as directed.   famotidine (PEPCID) 10 MG tablet as needed Self No Yes   Sig: Take 1 tablet (10 mg) by mouth daily   Patient taking differently: Take 10 mg by mouth daily as needed   fluticasone (FLONASE) 50 MCG/ACT nasal spray as needed Self No Yes   Sig: Spray 1 spray into both nostrils daily   Patient taking differently: Spray 1 spray into both nostrils daily as needed   hypromellose-dextran (ARTIFICAL TEARS) 0.1-0.3 % ophthalmic solution as needed Self Yes Yes   Sig: Place 1 drop into both eyes daily as needed for dry eyes   insulin pen needle (31G X 8 MM) 31G X 8 MM miscellaneous  Self No No   Sig: Use 4 pen needles daily or as directed.   levalbuterol (XOPENEX  HFA) 45 MCG/ACT inhaler  Self No Yes   Sig: Inhale 2 puffs into the lungs every 6 hours as needed for shortness of breath / dyspnea or wheezing      Facility-Administered Medications: None     Allergies   Allergies   Allergen Reactions     Albuterol      shakiness     Aspirin      gi     Byetta [Exenatide]      gi     Clonidine      constipation     Codeine      vomiting     Ezetimibe      muscle symptoms     Hydralazine      headaches     Lisinopril      hyperkalemia     Metformin Hydrochloride      vomiting     Pravachol [Pravastatin Sodium]      muscle pains     Simvastatin      myalgias     Troglitazone        Physical Exam   BP (!) 114/38   Pulse 67   Temp 97.5  F (36.4  C) (Oral)   Resp 19   Ht 1.524 m (5')   SpO2 99%   BMI 30.27 kg/m    Gen- pleasant lying in bed  HEENT- NAD, ISABEL  Neck- supple, no JVD elevation, no thyromegaly  CVS- I+II+ no m/r/g  RS- decreased at base. Breath sounds Rt <<Lt   Abdo- soft, mild diffuse tenderness. No g/r/r  Ext- mild edema and chronic venous changes  CNS- oriented to time/ place and person     Data   Data reviewed today: I reviewed all medications, new labs and imaging results over the last 24 hours. I personally reviewed the chest CT image(s) showing as below.    BMPRecent Labs   Lab 06/15/22  1233      POTASSIUM 4.9   CHLORIDE 97   KANWAL 8.8   CO2 25   BUN 61*   CR 7.66*   *     CBC  Recent Labs   Lab 06/15/22  1233   WBC 8.0   RBC 3.51*   HGB 11.0*   HCT 34.1*   MCV 97   MCH 31.3   MCHC 32.3   RDW 15.4*        INR  Recent Labs   Lab 06/15/22  1233   INR 1.15     LFTs  Recent Labs   Lab 06/15/22  1233   ALKPHOS 129   AST 34   ALT 42   BILITOTAL 0.7   PROTTOTAL 6.2*   ALBUMIN 2.5*  2.4*      PANCNo lab results found in last 7 days.    Recent Results (from the past 24 hour(s))   US Paracentesis    Narrative    US PARACENTESIS WITHOUT ALBUMIN 6/15/2022 3:32 PM     HISTORY: High volume paracentesis with or without diagnostic fluid  analysis with labs  to be drawn if ordered. Total paracentesis volume  as much as possible.    FINDINGS: Ultrasound was used to evaluate for the presence and best  approach for paracentesis. Written and oral informed consent was  obtained. A pause for the cause procedure to verify the correct  patient and correct procedure. The skin overlying the right lower  quadrant was prepped and draped in the usual sterile fashion. The  subcutaneous tissues were anesthetized with 10 mL 1% lidocaine. A  catheter was advanced into the peritoneal space and 11.75 L of  straw  colored fluid was drained. There were no immediate complications.  Ultrasound images were permanently stored.  Patient left the  ultrasound suite in satisfactory condition.      Impression    IMPRESSION: Technically successful paracentesis without immediate  complications.    JESSE DASIVLA MD         SYSTEM ID:  [id number]   CT Chest Pulmonary Embolism w Contrast    Narrative    EXAM: CT CHEST PULMONARY EMBOLISM W CONTRAST  LOCATION: [facility]  DATE/TIME: 6/15/2022 4:56 PM    INDICATION: PE suspected, high prob. Increased shortness of breath.  COMPARISON: 07/15/2021.  TECHNIQUE: CT chest pulmonary angiogram during arterial phase injection of IV contrast. Multiplanar reformats and MIP reconstructions were performed. Dose reduction techniques were used.   CONTRAST: 61 mL Isovue 370.    FINDINGS:  ANGIOGRAM CHEST: Pulmonary arteries are normal caliber and negative for pulmonary emboli. Normal caliber thoracic aorta without dissection. Moderate aortic calcification.    LUNGS AND PLEURA: Moderate right-sided pleural fluid collection. Mosaic attenuation of the pulmonary parenchyma diffusely which can be seen with air trapping, edema or viral etiologies. Significant volume loss/atelectasis right lower lobe with moderate   volume loss and atelectasis right middle lobe inferiorly. No left-sided pleural fluid. Mild intralobular septal  thickening.    MEDIASTINUM/AXILLAE: Cardiac enlargement without pericardial fluid. Diffuse pericardial calcifications. No lymphadenopathy. Normal caliber esophagus.    CORONARY ARTERY CALCIFICATION: Moderate.    UPPER ABDOMEN: Partially visualized moderate ascites.    MUSCULOSKELETAL: Minimal degenerative changes in the spine.      Impression    IMPRESSION:  1.  No evidence for pulmonary embolism.    2.  Moderate right-sided pleural fluid collection with atelectasis right lower lobe and right middle lobe as detailed above. Underlying infiltrates in these areas of atelectasis cannot be excluded.    3.  Mosaic attenuation of the pulmonary parenchyma with interlobular septal thickening most likely related to edema. Other considerations would include air trapping related to reactive airways disease or viral etiologies.    4.  Cardiac enlargement with diffuse pericardial calcifications.    5.  Moderate coronary artery calcification.

## 2022-06-15 NOTE — PHARMACY-ADMISSION MEDICATION HISTORY
Pharmacy Medication History  Admission medication history interview status for the 6/15/2022  admission is complete. See EPIC admission navigator for prior to admission medications     Location of Interview: Patient room  Medication history sources: Patient and Surescripts    Significant changes made to the medication list:  Removed Amlodipine, Augmentin, Coreg, Lanthanum    In the past week, patient estimated taking medication this percent of the time: Unable to assess. Patient currently a poor historian.      Medication reconciliation completed by provider prior to medication history? No    Time spent in this activity: 30 minutes    Prior to Admission medications    Medication Sig Last Dose Taking? Auth Provider Long Term End Date   acetaminophen (TYLENOL) 500 MG tablet Take 500 mg by mouth every 8 hours as needed for mild pain as needed Yes Unknown, Entered By History     amiodarone (PACERONE) 200 MG tablet Take 1 tablet (200 mg) by mouth daily Past Week at Unknown time Yes France Barbosa MD Yes    aspirin (ASA) 81 MG EC tablet Take 1 tablet (81 mg) by mouth daily Past Week at Unknown time Yes Guido Patiño MD     Emollient (CERAVE) CREA Externally apply topically 2 times daily Unknown at Unknown time Yes Lior Torrez PA-C     famotidine (PEPCID) 10 MG tablet Take 1 tablet (10 mg) by mouth daily  Patient taking differently: Take 10 mg by mouth daily as needed as needed Yes Gigi Martin MD     fluticasone (FLONASE) 50 MCG/ACT nasal spray Spray 1 spray into both nostrils daily  Patient taking differently: Spray 1 spray into both nostrils daily as needed as needed Yes Gigi Martin MD     Glycopyrrolate-Formoterol (BEVESPI AEROSPHERE) 9-4.8 MCG/ACT oral inhaler Inhale 2 puffs into the lungs 2 times daily  6/15/2022 at am Yes Unknown, Entered By History     hypromellose-dextran (ARTIFICAL TEARS) 0.1-0.3 % ophthalmic solution Place 1 drop into both eyes daily as needed for  dry eyes as needed Yes Unknown, Entered By History     levalbuterol (XOPENEX HFA) 45 MCG/ACT inhaler Inhale 2 puffs into the lungs every 6 hours as needed for shortness of breath / dyspnea or wheezing  Yes Gigi Martin MD Yes    LEVEMIR FLEXTOUCH 100 UNIT/ML pen Inject 60 Units Subcutaneously At Bedtime  Patient taking differently: Inject 15 Units Subcutaneous At Bedtime Past Week at Unknown time Yes Gigi Martin MD Yes    NOVOLOG FLEXPEN 100 UNIT/ML soln INJECT 9-12 UNITS BEFORE BREAKFAST, LUNCH, AND DINNER PLUS A CORRECTIVE SCALE OF 2 UNITS FOR EVERY 50 OVER 150  Patient taking differently: INJECT 3 UNITS/15gm carbs BEFORE BREAKFAST, LUNCH, AND DINNER PLUS A CORRECTIVE SCALE OF 2 UNITS FOR EVERY 50 OVER 150 Unknown at Unknown time Yes Gigi Martin MD Yes    ACCU-CHEK CHING PLUS test strip USE TO TEST BLOOD SUGAR 4 TIMES PER DAY   Gigi Martin MD No    blood glucose (NO BRAND SPECIFIED) lancets standard Use to test blood sugar 4 times daily or as directed.   Gigi Martin MD     blood glucose (NO BRAND SPECIFIED) lancets standard Use to test blood sugar three times daily or as directed.   Gigi Martin MD     blood glucose (NO BRAND SPECIFIED) test strip Use to test blood sugar 4 times daily or as directed.   Gigi Martin MD No    blood glucose monitoring (NO BRAND SPECIFIED) meter device kit Use to test blood sugar 4 times daily or as directed.   Gigi Martin MD     insulin pen needle (31G X 8 MM) 31G X 8 MM miscellaneous Use 4 pen needles daily or as directed.   Gigi Martin MD         The information provided in this note is only as accurate as the sources available at the time of update(s)

## 2022-06-15 NOTE — ED NOTES
Bed: ED01  Expected date:   Expected time:   Means of arrival:   Comments:  [facility] - 514 - 62 F weakness eta 1200

## 2022-06-15 NOTE — ED PROVIDER NOTES
History     Chief Complaint:  Abdominal Pain       HPI   Yissel Wetzel is a 62 year old female who presents with increasing abdominal distention.  Patient reports she has had increased abdominal distention for several weeks now.  Supposed to have a paracentesis next week at least.  Today went to dialysis which was a routine day and they were unable to run her for more than 10 minutes due to to shortness of breath.  She was able to do dialysis like normal on Monday.  She denies any fevers or cough symptoms.  Feels more short of breath when laying flat.  Lives at home independently by herself.  Gets a ride to dialysis.    ROS:  Review of Systems   All other systems reviewed and are negative.         Allergies:  Albuterol  Aspirin  Byetta [Exenatide]  Clonidine  Codeine  Ezetimibe  Hydralazine  Lisinopril  Metformin Hydrochloride  Pravachol [Pravastatin Sodium]  Simvastatin  Troglitazone     Medications:    acetaminophen (TYLENOL) 500 MG tablet  amiodarone (PACERONE) 200 MG tablet  aspirin (ASA) 81 MG EC tablet  Emollient (CERAVE) CREA  famotidine (PEPCID) 10 MG tablet  fluticasone (FLONASE) 50 MCG/ACT nasal spray  Glycopyrrolate-Formoterol (BEVESPI AEROSPHERE) 9-4.8 MCG/ACT oral inhaler  hypromellose-dextran (ARTIFICAL TEARS) 0.1-0.3 % ophthalmic solution  levalbuterol (XOPENEX HFA) 45 MCG/ACT inhaler  LEVEMIR FLEXTOUCH 100 UNIT/ML pen  NOVOLOG FLEXPEN 100 UNIT/ML soln  ACCU-CHEK CHING PLUS test strip  blood glucose (NO BRAND SPECIFIED) lancets standard  blood glucose (NO BRAND SPECIFIED) lancets standard  blood glucose (NO BRAND SPECIFIED) test strip  blood glucose monitoring (NO BRAND SPECIFIED) meter device kit  insulin pen needle (31G X 8 MM) 31G X 8 MM miscellaneous        Past Medical History:    Past Medical History:   Diagnosis Date     Allergic rhinitis, cause unspecified      Anemia in chronic kidney disease      Anemia of chronic disease      Bleeding pseudoaneurysm of right brachiocephalic  arteriovenous fistula, initial encounter (H) 2019     End stage renal failure on dialysis (H)      Esophagitis, unspecified      Former tobacco use      HEMORRHAGIC GASTROPATHY      History of blood transfusion      Iron deficiency anemia, unspecified      Mild persistent asthma      Obesity      Other and unspecified hyperlipidemia      Pneumonia      Pulmonary hypertension (H)      Tobacco use disorder OK ed      Type 2 diabetes mellitus (H)      Unspecified essential hypertension        Past Surgical History:    Past Surgical History:   Procedure Laterality Date     ABDOMINOPLASTY  pt unsure when    abdominoplasty-      SECTION  ,     x 2     COLONOSCOPY      [facility]     COLONOSCOPY N/A 2021    Procedure: COLONOSCOPY, WITH POLYPECTOMY AND BIOPSY;  Surgeon: Lincoln Farrar MD;  Location:  GI     CREATE FISTULA ARTERIOVENOUS UPPER EXTREMITY Right 2018    Procedure: RIGHT BRACHIAL TO BASILIC ARTERIOVENOUS FISTULA CREATION;  Surgeon: Terry Vizcaino MD;  Location: [facility]     CV HEART CATHETERIZATION WITH POSSIBLE INTERVENTION N/A 2021    Procedure: Heart Catheterization with Possible Intervention;  Surgeon: Joseluis Dean MD;  Location:  HEART CARDIAC CATH LAB     ENT SURGERY  as a child    tonsillectomy     ESOPHAGOSCOPY, GASTROSCOPY, DUODENOSCOPY (EGD), COMBINED N/A 2021    Procedure: ESOPHAGOGASTRODUODENOSCOPY, WITH BIOPSY;  Surgeon: Lincoln Farrar MD;  Location:  GI     EYE SURGERY Left     injections- eye     HYSTERECTOMY  approx     partial hysterectomy-reason bleeding      IR CVC TUNNEL PLACEMENT > 5 YRS OF AGE  2019     IR CVC TUNNEL REMOVAL RIGHT  2020     IR DIALYSIS FISTULOGRAM RIGHT  2019        Family History:    family history includes Arrhythmia in her mother; Asthma in her sister; Cirrhosis in her sister; Diabetes in her brother and daughter; Hypertension in her mother; LUNG  DISEASE in her sister; Pancreatic Cancer in her father.    Social History:   reports that she quit smoking about 22 years ago. Her smoking use included cigarettes. She has a 22.00 pack-year smoking history. She has never used smokeless tobacco. She reports that she does not drink alcohol and does not use drugs.  PCP: Gigi Martin     Physical Exam     Patient Vitals for the past 24 hrs:   BP Temp Temp src Pulse Resp SpO2 Height   06/15/22 1600 (!) 114/38 -- -- 67 -- 99 % --   06/15/22 1400 106/49 -- -- 67 -- -- --   06/15/22 1215 116/54 97.5  F (36.4  C) Oral 66 19 98 % 1.524 m (5')        Physical Exam  Gen: Appears older than stated age  Oral : Mucous membranes moist,   Nose: No rhinorhea  Ears: External near normal, without drainage  Eyes: periorbital tissues and sclera normal   Neck: supple, no abnormal swelling  Lungs: Clear bilaterally, no tachypnea or distress, speaks full sentences  CV: Regular rate, regular rhythm  Abd: Very distended no focal tenderness caput medusa present  Ext: no lower extremity edema  Skin: Dialysis clamp on right upper extremity  Neuro: alert, no gross motor or sensory deficits,   Psych: pleasant mood, normal affect      Emergency Department Course   ECG:  ECG results from 07/22/21   EKG 12-lead, tracing only     Value    Systolic Blood Pressure     Diastolic Blood Pressure     Ventricular Rate 167    Atrial Rate 131    RI Interval     QRS Duration 88        QTc 480    P Axis     R AXIS 77    T Axis 211    Interpretation ECG      Supraventricular tachycardia  ST & T wave abnormality, consider inferior ischemia  Abnormal ECG  When compared with ECG of 14-JUL-2021 21:57,  Aberrant conduction is no longer Present  Vent. rate has increased BY  81 BPM  T wave inversion no longer evident in Anterior leads  Confirmed by GENERATED REPORT, COMPUTER ([id number]),  Nimco Kraus ([id number]) on 7/22/2021 1:25:30 AM         Imaging:  US Paracentesis   Final Result   IMPRESSION: Technically  successful paracentesis without immediate   complications.      JESSE DASILVA MD            SYSTEM ID:  [id number]      CT Chest Pulmonary Embolism w Contrast    (Results Pending)      Report per radiology    Laboratory:  Labs Ordered and Resulted from Time of ED Arrival to Time of ED Departure   BASIC METABOLIC PANEL - Abnormal       Result Value    Sodium 136      Potassium 4.9      Chloride 97      Carbon Dioxide (CO2) 25      Anion Gap 14      Urea Nitrogen 61 (*)     Creatinine 7.66 (*)     Calcium 8.8      Glucose 201 (*)     GFR Estimate 6 (*)    HEPATIC FUNCTION PANEL - Abnormal    Bilirubin Total 0.7      Bilirubin Direct 0.3 (*)     Protein Total 6.2 (*)     Albumin 2.4 (*)     Alkaline Phosphatase 129      AST 34      ALT 42     CBC WITH PLATELETS AND DIFFERENTIAL - Abnormal    WBC Count 8.0      RBC Count 3.51 (*)     Hemoglobin 11.0 (*)     Hematocrit 34.1 (*)     MCV 97      MCH 31.3      MCHC 32.3      RDW 15.4 (*)     Platelet Count 153      % Neutrophils 79      % Lymphocytes 9      % Monocytes 11      % Eosinophils 0      % Basophils 1      % Immature Granulocytes 0      NRBCs per 100 WBC 0      Absolute Neutrophils 6.4      Absolute Lymphocytes 0.7 (*)     Absolute Monocytes 0.9      Absolute Eosinophils 0.0      Absolute Basophils 0.0      Absolute Immature Granulocytes 0.0      Absolute NRBCs 0.0     CELL COUNT BODY FLUID - Abnormal    Color Red (*)     Clarity Bloody      Total Nucleated Cells 403      Cell Count Fluid Source Abdomen     INR - Normal    INR 1.15     INFLUENZA A/B & SARS-COV2 PCR MULTIPLEX - Normal    Influenza A PCR Negative      Influenza B PCR Negative      RSV PCR Negative      SARS CoV2 PCR Negative     DIFERENTIAL BODY FLUID    % Neutrophils 72      % Lymphocytes 4      % Monocyte/Macrophages 23      % Eosinophils 1     AEROBIC BACTERIAL CULTURE ROUTINE   CELL COUNT WITH DIFFERENTIAL FLUID        Procedures       Emergency Department Course:             Reviewed:  I reviewed  nursing notes, vitals and past medical history    Assessments:      Consults:       Interventions:  Medications   cefTRIAXone (ROCEPHIN) 2 g vial to attach to  ml bag for ADULTS or NS 50 ml bag for PEDS (has no administration in time range)   lidocaine (PF) (XYLOCAINE) 1 % injection 10 mL (10 mLs Subcutaneous Given 6/15/22 1435)   iopamidol (ISOVUE-370) solution 61 mL (61 mLs Intravenous Given 6/15/22 1651)   Saline flush (87 mLs Intravenous Given 6/15/22 1652)        Disposition:  The patient was admitted to the hospital under the care of Dr. Garcia.     Impression & Plan    CMS Diagnoses: None      Medical Decision Making:  Patient presents emergency department with abdominal pain increased shortness of breath while at dialysis today.  They can only run her for about 10 minutes.  Patient very clearly had a very large amount of ascites which according to her is never been tapped before.  I arranged to have a diagnostic and therapeutic paracentesis while she is in the ED.  A very large amount of fluid was taken off, nearly 12 L.  Patient really did not feel much different afterwards.  Obtain a CT scan of her chest which shows a right-sided pleural effusion this could be contributing to her shortness of breath as well.  She is not in any extremis, doing well on nasal cannula oxygen just significantly more short of breath with exertion.  Cell count of came back on the ascitic fluid and patient needs criteria for spontaneous bacterial peritonitis.  With some antibiotics started in the ED.  She is already getting albumin.  Contacted hospitalist is in agreement for inpatient management.        Diagnosis:    ICD-10-CM    1. ESRD (end stage renal disease) on dialysis (H)  N18.6     Z99.2    2. Shortness of breath on exertion  R06.02    3. Pleural effusion on right  J90    4. Other ascites  R18.8    5. SBP (spontaneous bacterial peritonitis) (H)  K65.2         Discharge Medications:  New Prescriptions    No medications  on file        6/15/2022   Alverto Fisher,*        Alverto Fisher MD  06/15/22 [id number]

## 2022-06-15 NOTE — CONSULTS
Nephrology Initial Consult  Rosana 15, 2022      Yissel Wetzel MRN:[id number] YOB: 1960  Date of Admission:6/15/2022  Primary care provider: Gigi Martin  Requesting physician: Alverto Fisher,*    ASSESSMENT AND RECOMMENDATIONS:   1 ESRD -   outpt orders as below.   Got only 10 mins of dialysis.   No urgency to run today . She needs paracentesis before she can lay down for dialysis. Plan to run tomorrow.     2 Cirrhosis / Ascites. - pt does not know of having liver dis . Ct from 2021 suggests cirrhotic liver . Has severe pulm HTN and RV failure ( ? Cardiac cirrhosis )   - plan for paracentesis today     3. COPD / Cor pulmonale     4 Anemia in ESRD - Hgb at goal         Thank you for the consult. Will continue to follow along with you .          Tamar Chopra MD  [facility] - Nephrology   [phone]        REASON FOR CONSULT: ESRD    HISTORY OF PRESENT ILLNESS:  Yissel Wetzel is a 62 year old female with past medical history of ESRD on hemodialysis, hyperlipidemia, pulmonary hypertension , type 2 diabetes mellitus, hypertension, liver cirrhosis    She presented for dialysis today but was unable to lay flat with abdominal distention and increasing ascites.  She feels more short of breath.  She is scheduled to get paracentesis next week.  She completed only 10 minutes of dialysis.    Outpt Hd orders-   Alyson HENDRIX Dr Tolins   TW 72.5 kg  Treatment Type  Hemodialysis (procedure)    Dialyzer  : SelStorRO Series: Netadmin Model: 17H SANTIAGO Factor: 1455    Dialysate  BiCarb 33 mEq/L    Dialysate  Calcium 2.5 mEq/L    Dialysate  Base Sodium 138 mEq/L    Dialysate  Potassium 2k    Dialysate Flow Rate  500 ML/min    Blood Flow Rate  450 ML/min    Treatment Time  180 min    Access  AV Fistula Arm (Right upper)    Arterial Needle Guage/Length/Tip  15 g/ 1 in/Sharp    Venous Needle Guage/Length/Tip  15 g/ 1 in/Sharp    Heparin Hourly Dose  500 Units/hr (1:1,000  concentration) Stop 60 minutes before end of dialysis    Heparin Load  1000 Units (1:1,000 concentration)    Hectorol 1 mcg     On eval , she is visibly uncomfortable with hugely distended abd . Sitting up . Shallow breathing. Denies chest pain. NO fever, chills.     PAST MEDICAL HISTORY:  Reviewed with patient on 06/15/2022  and is as listed in HPI.       MEDICATIONS:  PTA Meds  Prior to Admission medications    Medication Sig Last Dose Taking? Auth Provider Long Term End Date   ACCU-CHEK CHING PLUS test strip USE TO TEST BLOOD SUGAR 4 TIMES PER DAY   Gigi Martin MD No    acetaminophen (TYLENOL) 500 MG tablet Take 500 mg by mouth every 8 hours as needed for mild pain   Unknown, Entered By History     amiodarone (PACERONE) 200 MG tablet Take 1 tablet (200 mg) by mouth daily   France Barbosa MD Yes    amLODIPine (NORVASC) 10 MG tablet Take 1 tablet (10 mg) by mouth daily  Patient not taking: Reported on 8/17/2021   Guido Patiño MD Yes    amoxicillin-clavulanate (AUGMENTIN) 875-125 MG tablet Take 1 tablet by mouth 2 times daily  Patient not taking: Reported on 4/5/2022   Gigi Martin MD     aspirin (ASA) 81 MG EC tablet Take 1 tablet (81 mg) by mouth daily   Guido Patiño MD     blood glucose (NO BRAND SPECIFIED) lancets standard Use to test blood sugar 4 times daily or as directed.   Gigi Martin MD     blood glucose (NO BRAND SPECIFIED) lancets standard Use to test blood sugar three times daily or as directed.   Gigi Martin MD     blood glucose (NO BRAND SPECIFIED) test strip Use to test blood sugar 4 times daily or as directed.   Gigi Martin MD No    blood glucose monitoring (NO BRAND SPECIFIED) meter device kit Use to test blood sugar 4 times daily or as directed.   Gigi Martin MD     blood glucose monitoring (NO BRAND SPECIFIED) meter device kit Use to test blood sugar three times daily or as directed.  Patient not taking:  Reported on 4/5/2022   Gigi Martin MD     carvedilol (COREG) 3.125 MG tablet On non dialysis days take 1 tablet twice daily if SBP > 150. On dialysis days, prior to dialysis  accept BP as high as 160 mmHg and do not treat.  If  BP remains high after dialysis( systolic BP>150 , take 1 tablet 3.125 mg as needed) .   France Barbosa MD Yes    Emollient (CERAVE) CREA Externally apply topically 2 times daily   Lior Torrez PA-C     famotidine (PEPCID) 10 MG tablet Take 1 tablet (10 mg) by mouth daily  Patient taking differently: Take 10 mg by mouth daily as needed    Gigi Martin MD     fluticasone (FLONASE) 50 MCG/ACT nasal spray Spray 1 spray into both nostrils daily  Patient taking differently: Spray 1 spray into both nostrils daily as needed    Gigi Martin MD     Glycopyrrolate-Formoterol (BEVESPI AEROSPHERE) 9-4.8 MCG/ACT oral inhaler Inhale 2 puffs into the lungs 2 times daily    Unknown, Entered By History     hypromellose-dextran (ARTIFICAL TEARS) 0.1-0.3 % ophthalmic solution Place 1 drop into both eyes daily as needed for dry eyes   Unknown, Entered By History     insulin pen needle (31G X 8 MM) 31G X 8 MM miscellaneous Use 4 pen needles daily or as directed.   Gigi Martin MD     lanthanum (FOSRENOL) 500 MG chewable tablet Take 500 mg by mouth 3 times daily (with meals)   Unknown, Entered By History     levalbuterol (XOPENEX HFA) 45 MCG/ACT inhaler Inhale 2 puffs into the lungs every 6 hours as needed for shortness of breath / dyspnea or wheezing   Gigi Matrin MD Yes    LEVEMIR FLEXTOUCH 100 UNIT/ML pen Inject 60 Units Subcutaneously At Bedtime   Gigi Martin MD Yes    NOVOLOG FLEXPEN 100 UNIT/ML soln INJECT 9-12 UNITS BEFORE BREAKFAST, LUNCH, AND DINNER PLUS A CORRECTIVE SCALE OF 2 UNITS FOR EVERY 50 OVER 150  Patient taking differently: INJECT 3 UNITS/15gm carbs BEFORE BREAKFAST, LUNCH, AND DINNER PLUS A CORRECTIVE SCALE OF 2 UNITS FOR EVERY  50 OVER 150   Gigi Martin MD Yes       Current Meds    Infusion Meds      ALLERGIES:    Allergies   Allergen Reactions     Albuterol      shakiness     Aspirin      gi     Byetta [Exenatide]      gi     Clonidine      constipation     Codeine      vomiting     Ezetimibe      muscle symptoms     Hydralazine      headaches     Lisinopril      hyperkalemia     Metformin Hydrochloride      vomiting     Pravachol [Pravastatin Sodium]      muscle pains     Simvastatin      myalgias     Troglitazone        REVIEW OF SYSTEMS:  A comprehensive of systems was negative except as noted above.    SOCIAL HISTORY:   Reviewed with patient on 06/15/2022     FAMILY MEDICAL HISTORY:   Family History   Problem Relation Age of Onset     Arrhythmia Mother      Hypertension Mother      Pancreatic Cancer Father      Diabetes Brother      Asthma Sister      LUNG DISEASE Sister      Diabetes Daughter      Cirrhosis Sister      Reviewed with patient on 06/15/2022     PHYSICAL EXAM:   Temp  Av.5  F (36.4  C)  Min: 97.5  F (36.4  C)  Max: 97.5  F (36.4  C)      Pulse  Av  Min: 66  Max: 66 Resp  Av  Min: 19  Max: 19  SpO2  Av %  Min: 98 %  Max: 98 %       /54   Pulse 66   Temp 97.5  F (36.4  C) (Oral)   Resp 19   Ht 1.524 m (5')   SpO2 98%   BMI 30.27 kg/m        Admit       GENERAL APPEARANCE: no distress,  awake  EYES: no scleral icterus, pupils equal  HENT: NC/AT,  mouth  without ulcers or lesions  Lymphatics: no cervical or supraclavicular LAD  Endo: no moon facies, no goiter  Pulmonary: lungs clear to auscultation with equal breath sounds bilaterally, no clubbing  CV: regular rhythm, normal rate, no rub   - JVP -   - Edema-  GI: distension ++  MS: no evidence of inflammation in joints  : no lei  SKIN: no rash, warm, dry, no cyanosis  NEURO: face symmetric, no asterixis   Rt UE AVF - good bruit     LABS:   CMPNo lab results found in last 7 days.  CBC  Recent Labs   Lab 06/15/22  1233   HGB 11.0*    WBC 8.0   RBC 3.51*   HCT 34.1*   MCV 97   MCH 31.3   MCHC 32.3   RDW 15.4*        INRNo lab results found in last 7 days.  ABGNo lab results found in last 7 days.   URINE STUDIES  Recent Labs   Lab Test 09/25/19  1455 06/07/18  1455 08/09/17  1640   COLOR Yellow Yellow Yellow   APPEARANCE Clear Clear Clear   URINEGLC >499* 250* Negative   URINEBILI Negative Negative Negative   URINEKETONE Negative Negative Negative   SG 1.005 1.015 1.020   UBLD Negative Small* Small*   URINEPH 7.0 6.0 6.0   PROTEIN 100* >=300* >=300*   UROBILINOGEN  --  0.2 0.2   NITRITE Negative Negative Negative   LEUKEST Negative Negative Negative   RBCU <1 2-5* O - 2   WBCU 1 0 - 5 O - 2     No lab results found.  PTH  No lab results found.  IRON STUDIES  Recent Labs   Lab Test 03/17/19  0805 08/30/18  1318 09/10/14  1408   IRON 27* 54 85   * 196* 230*   IRONSAT 15 28 37   LATRICE 210 407*  --        IMAGING:  Personally reviewed the images and findings are as listed in HPI     Tamar Chopra MD

## 2022-06-15 NOTE — ED TRIAGE NOTES
EMS report: was just starting dialysis run when started feeling nausea, weak, and BP dropped (has history of that ). Patient requested transport to ED for abdominal pain. Has schedule for paracentesis next week but abdomin taut and distended.  per EMS     Triage Assessment     Row Name 06/15/22 1215       Triage Assessment (Adult)    Airway WDL WDL       Respiratory WDL    Respiratory WDL WDL       Skin Circulation/Temperature WDL    Skin Circulation/Temperature WDL WDL       Cardiac WDL    Cardiac WDL WDL       Cognitive/Neuro/Behavioral WDL    Cognitive/Neuro/Behavioral WDL WDL

## 2022-06-16 LAB
GLUCOSE BLDC GLUCOMTR-MCNC: 149 MG/DL (ref 70–99)
GLUCOSE BLDC GLUCOMTR-MCNC: 196 MG/DL (ref 70–99)
GLUCOSE BLDC GLUCOMTR-MCNC: 226 MG/DL (ref 70–99)
GLUCOSE BLDC GLUCOMTR-MCNC: 254 MG/DL (ref 70–99)
GLUCOSE BLDC GLUCOMTR-MCNC: 51 MG/DL (ref 70–99)
GLUCOSE BLDC GLUCOMTR-MCNC: 91 MG/DL (ref 70–99)

## 2022-06-16 PROCEDURE — 258N000003 HC RX IP 258 OP 636: Performed by: INTERNAL MEDICINE

## 2022-06-16 PROCEDURE — 99233 SBSQ HOSP IP/OBS HIGH 50: CPT | Performed by: HOSPITALIST

## 2022-06-16 PROCEDURE — 87040 BLOOD CULTURE FOR BACTERIA: CPT | Performed by: INTERNAL MEDICINE

## 2022-06-16 PROCEDURE — 250N000012 HC RX MED GY IP 250 OP 636 PS 637: Performed by: INTERNAL MEDICINE

## 2022-06-16 PROCEDURE — 250N000011 HC RX IP 250 OP 636: Performed by: INTERNAL MEDICINE

## 2022-06-16 PROCEDURE — 99223 1ST HOSP IP/OBS HIGH 75: CPT | Performed by: INTERNAL MEDICINE

## 2022-06-16 PROCEDURE — 250N000013 HC RX MED GY IP 250 OP 250 PS 637: Performed by: INTERNAL MEDICINE

## 2022-06-16 PROCEDURE — 36415 COLL VENOUS BLD VENIPUNCTURE: CPT | Performed by: INTERNAL MEDICINE

## 2022-06-16 PROCEDURE — 5A1D70Z PERFORMANCE OF URINARY FILTRATION, INTERMITTENT, LESS THAN 6 HOURS PER DAY: ICD-10-PCS | Performed by: INTERNAL MEDICINE

## 2022-06-16 PROCEDURE — 90937 HEMODIALYSIS REPEATED EVAL: CPT

## 2022-06-16 PROCEDURE — 90935 HEMODIALYSIS ONE EVALUATION: CPT | Performed by: INTERNAL MEDICINE

## 2022-06-16 PROCEDURE — 120N000001 HC R&B MED SURG/OB

## 2022-06-16 RX ORDER — ONDANSETRON 2 MG/ML
4 INJECTION INTRAMUSCULAR; INTRAVENOUS EVERY 6 HOURS PRN
Status: DISCONTINUED | OUTPATIENT
Start: 2022-06-16 | End: 2022-06-21 | Stop reason: HOSPADM

## 2022-06-16 RX ORDER — LIDOCAINE 40 MG/G
CREAM TOPICAL
Status: DISCONTINUED | OUTPATIENT
Start: 2022-06-16 | End: 2022-06-21 | Stop reason: HOSPADM

## 2022-06-16 RX ORDER — LEVALBUTEROL TARTRATE 45 UG/1
2 AEROSOL, METERED ORAL EVERY 6 HOURS PRN
Status: DISCONTINUED | OUTPATIENT
Start: 2022-06-16 | End: 2022-06-21 | Stop reason: HOSPADM

## 2022-06-16 RX ORDER — MIDODRINE HYDROCHLORIDE 5 MG/1
10 TABLET ORAL
Status: DISCONTINUED | OUTPATIENT
Start: 2022-06-16 | End: 2022-06-21 | Stop reason: HOSPADM

## 2022-06-16 RX ORDER — ACETAMINOPHEN 500 MG
500 TABLET ORAL EVERY 8 HOURS PRN
Status: DISCONTINUED | OUTPATIENT
Start: 2022-06-16 | End: 2022-06-21 | Stop reason: HOSPADM

## 2022-06-16 RX ORDER — ONDANSETRON 4 MG/1
4 TABLET, ORALLY DISINTEGRATING ORAL EVERY 6 HOURS PRN
Status: DISCONTINUED | OUTPATIENT
Start: 2022-06-16 | End: 2022-06-21 | Stop reason: HOSPADM

## 2022-06-16 RX ORDER — ALBUMIN (HUMAN) 12.5 G/50ML
70 SOLUTION INTRAVENOUS ONCE
Status: COMPLETED | OUTPATIENT
Start: 2022-06-17 | End: 2022-06-17

## 2022-06-16 RX ORDER — FLUTICASONE PROPIONATE 50 MCG
1 SPRAY, SUSPENSION (ML) NASAL DAILY PRN
Status: DISCONTINUED | OUTPATIENT
Start: 2022-06-16 | End: 2022-06-21 | Stop reason: HOSPADM

## 2022-06-16 RX ADMIN — ONDANSETRON 4 MG: 2 INJECTION INTRAMUSCULAR; INTRAVENOUS at 17:22

## 2022-06-16 RX ADMIN — LEVALBUTEROL TARTRATE 2 PUFF: 45 AEROSOL, METERED ORAL at 21:53

## 2022-06-16 RX ADMIN — SODIUM CHLORIDE 250 ML: 9 INJECTION, SOLUTION INTRAVENOUS at 16:13

## 2022-06-16 RX ADMIN — CEFTRIAXONE SODIUM 2 G: 2 INJECTION, POWDER, FOR SOLUTION INTRAMUSCULAR; INTRAVENOUS at 17:22

## 2022-06-16 RX ADMIN — AMIODARONE HYDROCHLORIDE 200 MG: 200 TABLET ORAL at 10:20

## 2022-06-16 RX ADMIN — INSULIN DETEMIR 8 UNITS: 100 INJECTION, SOLUTION SUBCUTANEOUS at 01:10

## 2022-06-16 RX ADMIN — ASPIRIN 81 MG: 81 TABLET, COATED ORAL at 10:20

## 2022-06-16 RX ADMIN — MIDODRINE HYDROCHLORIDE 10 MG: 5 TABLET ORAL at 18:47

## 2022-06-16 RX ADMIN — SODIUM CHLORIDE 300 ML: 9 INJECTION, SOLUTION INTRAVENOUS at 16:12

## 2022-06-16 RX ADMIN — INSULIN ASPART 2 UNITS: 100 INJECTION, SOLUTION INTRAVENOUS; SUBCUTANEOUS at 01:19

## 2022-06-16 RX ADMIN — UMECLIDINIUM BROMIDE AND VILANTEROL TRIFENATATE 1 PUFF: 62.5; 25 POWDER RESPIRATORY (INHALATION) at 21:52

## 2022-06-16 RX ADMIN — ACETAMINOPHEN 500 MG: 500 TABLET, FILM COATED ORAL at 17:22

## 2022-06-16 ASSESSMENT — ACTIVITIES OF DAILY LIVING (ADL)
ADLS_ACUITY_SCORE: 35
ADLS_ACUITY_SCORE: 29
ADLS_ACUITY_SCORE: 35
ADLS_ACUITY_SCORE: 24
ADLS_ACUITY_SCORE: 35
ADLS_ACUITY_SCORE: 29
ADLS_ACUITY_SCORE: 35

## 2022-06-16 NOTE — PROGRESS NOTES
"[facility]  Medicine Progress Note - Hospitalist Service    Date of Admission:  6/15/2022    Assessment & Plan            Yissel Wetzel is a 62 year old female with history of type 2 diabetes mellitus, diabetic nephropathy, ES Kd on hemodialysis, chronic venous insufficiency, SVT, severe pulmonary hypertension admitted on 6/15/2022 with shortness of breath.      #Shortness of breath  # COPD and Chronic hypoxemic respiratory failure: Uses supplemental O2 at baseline with activity.  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  Moderate pleural effusion on right side noted on CT. presentation not consistent with COPD exacerbation.  -Volume management with dialysis.  -Requiring 1-2 LPM NC O2, and dyspnea has improved. If increasing O2 needs or dyspnea, will consider thoracentesis although expect this will build up quickly given marked ascites.  --Continue PTA inhalers.  She follows with [facility].      #Marked ascites and SBP  S/p paracentesis of 11.75 L of straw-colored fluid on admission.  Total nucleated cells of 403 and 72% neutrophils, which accounts for > 250 neutrophil meeting criteria for SBP.  -Continue with ceftriaxone 2 g daily  -Received albumin 1.5gm./kg on admission, plan is 1 gm /kg on day 3  - f/u on culture /sensitivities-no growth so far.   - Volume management with HD     #Severe pulmonary hypertension \" PA pressure 132/52 mmHg with mean of 81 during right heart catheterization in 07/2021.  She also had severely elevated LVEDP of 35-40 mmHg at the time.\"  #Severe RV dilatation and severe biatrial enlargement by echocardiography.  LVEF 60%-65%.  -Likely contributing to right heart failure causing acetic fluid.  Normal AST, ALT, alkaline phosphatase and bilirubin of 0.7.  -Cardiology consulted to review and discuss options for medications (sildenafil, bosentan, inhaled NO, other.) for pulmonary hypertension  -Felt unable to initiate " nitrate/CCB given soft blood pressure, recommending euvolemia with hemodialysis rounds.     # h/o SVT  -Continue with PTA Amiodarone      # ESKD on HD: On HD M-W-F  - Nephrology consulted.   -Patient seems to have missed dialysis yesterday.   -Dialyzed today, continue per schedule tomorrow  -On midodrine given soft BP.    # Chronic anemia, due to ESRD  -Hemoglobin at baseline monitor    # DM type II  - CT PTA  Levemir (apparently takes 15 units at bedtime although PTA med list states 60 units. Needs to fix for discharge)  - Continue carb coverage  1 unit per 6 g Premeal  -Continue with moderate insulin resistance sliding scale        Diet: Combination Diet Regular Diet Adult; Renal Diet (dialysis)  Snacks/Supplements Adult: Ensure Enlive; With Meals    DVT Prophylaxis: Pneumatic Compression Devices  Weller Catheter: Not present  Central Lines: None  Cardiac Monitoring: None  Code Status:   Full code per prior record    Disposition Plan   Expected Discharge: 2-3 days pending final culture on peritoneal fluid  Anticipated discharge location:  Awaiting care coordination huddle  Delays:              The patient's care was discussed with the Bedside Nurse, Patient and Patient's Family and cardiology consultant.    Patricia Amaya MD  Hospitalist Service  [facility]  Securely message with the Vocera Web Console (learn more here)  Text page via [employer] Paging/Directory         Clinically Significant Risk Factors Present on Admission             # Hypoalbuminemia: Albumin = 2.4 g/dL (Ref range: 3.4 - 5.0 g/dL); 2.5 g/dL (Ref range: 3.4 - 5.0 g/dL) on admission, will monitor as appropriate    # Platelet Defect: home medication list includes an antiplatelet medication       ______________________________________________________________________    Interval History   Patient was evaluated this morning while in ER.  Sitting up in the chair, denies abdominal pain, reports she is always short of breath and  was worse at presentation but is feeling better while sitting up and especially after paracentesis.  -No nausea or vomiting.  No diarrhea.  -Remains afebrile  -daughter at bedside      Data reviewed today: I reviewed all medications, new labs and imaging results over the last 24 hours. I personally reviewed no images or EKG's today.    Physical Exam   Vital Signs: Temp: 97.5  F (36.4  C) Temp src: Oral BP: 113/43 Pulse: 65   Resp: 19 SpO2: 95 % O2 Device: Nasal cannula Oxygen Delivery: 2 LPM (baseline use)  Weight: 0 lbs 0 oz    General: AAOx3, up in the chair, chronically ill-appearing, appears comfortable.  HEENT: PERRLA EOMI. Mucosa moist.   Lungs: Bilateral equal air entry.  Very diminished breath sounds posteriorly especially on right.  CVS: S1S2 regular, no tachycardia.  Soft systolic murmur present.  Abdomen: Soft, diffusely distended with abdominal wall edema anteriorly.  Minimally tender on exam.  MSK: Leg edema (+)  Neuro: AAOX3. CN 2-12 normal. Strength symmetrical.  Skin: No rash.       Data   Recent Labs   Lab 06/16/22  0935 06/16/22  0044 06/15/22  1233   WBC  --   --  8.0   HGB  --   --  11.0*   MCV  --   --  97   PLT  --   --  153   INR  --   --  1.15   NA  --   --  136   POTASSIUM  --   --  4.9   CHLORIDE  --   --  97   CO2  --   --  25   BUN  --   --  61*   CR  --   --  7.66*   ANIONGAP  --   --  14   KANWAL  --   --  8.8   * 254* 201*   ALBUMIN  --   --  2.5*  2.4*   PROTTOTAL  --   --  6.2*   BILITOTAL  --   --  0.7   ALKPHOS  --   --  129   ALT  --   --  42   AST  --   --  34     Recent Results (from the past 24 hour(s))   US Paracentesis    Narrative    US PARACENTESIS WITHOUT ALBUMIN 6/15/2022 3:32 PM     HISTORY: High volume paracentesis with or without diagnostic fluid  analysis with labs to be drawn if ordered. Total paracentesis volume  as much as possible.    FINDINGS: Ultrasound was used to evaluate for the presence and best  approach for paracentesis. Written and oral informed  consent was  obtained. A pause for the cause procedure to verify the correct  patient and correct procedure. The skin overlying the right lower  quadrant was prepped and draped in the usual sterile fashion. The  subcutaneous tissues were anesthetized with 10 mL 1% lidocaine. A  catheter was advanced into the peritoneal space and 11.75 L of  straw  colored fluid was drained. There were no immediate complications.  Ultrasound images were permanently stored.  Patient left the  ultrasound suite in satisfactory condition.      Impression    IMPRESSION: Technically successful paracentesis without immediate  complications.    JESSE DASILVA MD         SYSTEM ID:  [id number]   CT Chest Pulmonary Embolism w Contrast    Narrative    EXAM: CT CHEST PULMONARY EMBOLISM W CONTRAST  LOCATION: [facility]  DATE/TIME: 6/15/2022 4:56 PM    INDICATION: PE suspected, high prob. Increased shortness of breath.  COMPARISON: 07/15/2021.  TECHNIQUE: CT chest pulmonary angiogram during arterial phase injection of IV contrast. Multiplanar reformats and MIP reconstructions were performed. Dose reduction techniques were used.   CONTRAST: 61 mL Isovue 370.    FINDINGS:  ANGIOGRAM CHEST: Pulmonary arteries are normal caliber and negative for pulmonary emboli. Normal caliber thoracic aorta without dissection. Moderate aortic calcification.    LUNGS AND PLEURA: Moderate right-sided pleural fluid collection. Mosaic attenuation of the pulmonary parenchyma diffusely which can be seen with air trapping, edema or viral etiologies. Significant volume loss/atelectasis right lower lobe with moderate   volume loss and atelectasis right middle lobe inferiorly. No left-sided pleural fluid. Mild intralobular septal thickening.    MEDIASTINUM/AXILLAE: Cardiac enlargement without pericardial fluid. Diffuse pericardial calcifications. No lymphadenopathy. Normal caliber esophagus.    CORONARY ARTERY CALCIFICATION: Moderate.    UPPER ABDOMEN:  Partially visualized moderate ascites.    MUSCULOSKELETAL: Minimal degenerative changes in the spine.      Impression    IMPRESSION:  1.  No evidence for pulmonary embolism.    2.  Moderate right-sided pleural fluid collection with atelectasis right lower lobe and right middle lobe as detailed above. Underlying infiltrates in these areas of atelectasis cannot be excluded.    3.  Mosaic attenuation of the pulmonary parenchyma with interlobular septal thickening most likely related to edema. Other considerations would include air trapping related to reactive airways disease or viral etiologies.    4.  Cardiac enlargement with diffuse pericardial calcifications.    5.  Moderate coronary artery calcification.     Medications       [START ON 6/17/2022] albumin human  70 g Intravenous Once     amiodarone  200 mg Oral Daily     aspirin  81 mg Oral Daily     cefTRIAXone  2 g Intravenous Q24H     insulin aspart   Subcutaneous TID w/meals     insulin aspart  1-7 Units Subcutaneous TID AC     insulin aspart  1-5 Units Subcutaneous At Bedtime     insulin detemir  30 Units Subcutaneous At Bedtime

## 2022-06-16 NOTE — PROGRESS NOTES
Potassium   Date Value Ref Range Status   06/15/2022 4.9 3.4 - 5.3 mmol/L Final   10/05/2020 4.1 3.5 - 5.5 mEq/L Final     Hemoglobin   Date Value Ref Range Status   06/15/2022 11.0 (L) 11.7 - 15.7 g/dL Final   01/13/2020 8.4 (L) 11.7 - 15.7 g/dL Final     Creatinine   Date Value Ref Range Status   06/15/2022 7.66 (H) 0.52 - 1.04 mg/dL Final   01/13/2020 5.97 (H) 0.52 - 1.04 mg/dL Final     Urea Nitrogen   Date Value Ref Range Status   06/15/2022 61 (H) 7 - 30 mg/dL Final   01/13/2020 44 (H) 7 - 30 mg/dL Final     Sodium   Date Value Ref Range Status   06/15/2022 136 133 - 144 mmol/L Final   01/13/2020 139 133 - 144 mmol/L Final     INR   Date Value Ref Range Status   06/15/2022 1.15 0.85 - 1.15 Final   01/12/2020 1.23 (H) 0.86 - 1.14 Final      Latest Reference Range & Units 07/14/21 20:24   Hep B Surface Agn Nonreactive  Nonreactive   Hepatitis B Surface Antibody <8.00 m[IU]/mL 15.71 (H) [1]   (H): Data is abnormally high  [1] Reactive, Patient is considered to be immune to infection with hepatitis B when the value is greater than or equal to 12.0 m  DIALYSIS PROCEDURE NOTE  Hepatitis status of previous patient on machine log was checked and verified ok to use with this patients hepatitis status.  Patient dialyzed for 3 hrs. on a K2 bath with a net fluid removal of  1L.  A BFR of 450 ml/min was obtained via a right upper-arm AV fistula using 15 gauge needles.      The treatment plan was discussed with Dr. Chopra during the treatment.    Total heparin received during the treatment: 0 units.   Needle cannulation sites held x 10 min.   Meds  given: none   Complications: none- BP soft but within ordered parameters.      Person educated: patient. Knowledge base substantial. Barriers to learning: none. Educated on procedure via verbal mode. patient verbalized understanding. Pt prefers verbal education style.     ICEBOAT? Timeout performed pre-treatment  I: Patient was identified using 2 identifiers  C:  Consent Signed  Yes  E: Equipment preventative maintenance is current and dialysis delivery system OK to use  B: Hepatitis B Surface Antigen: see above.  O: Dialysis orders present and complete prior to treatment  A: Vascular access verified and assessed prior to treatment  T: Treatment was performed at a clinically appropriate time  ?: Patient was allowed to ask questions and address concerns prior to treatment  See Adult Hemodialysis flowsheet in [facility] for further details and post assessment.  Machine water alarm in place and functioning. Transducer pods intact and checked every 15min.   Pt returned via bed.  Chlorine/Chloramine water system checked every 4 hours.  Outpatient Dialysis at [facility].

## 2022-06-16 NOTE — PLAN OF CARE
Goal Outcome Evaluation:    Went from ED straight to to HD and then arrived to  floor at 1630. AOx4. VSS on 1 L NC. MANE noted. Ortho BP negative. On 2gm Na diet, BS checks w/sliding scale insulin. Up SBA. C/o sore back from laying in ED bed all day, managed with PRN tylenol. PRN zofran given 1x for dry heaving, effective. R arm HD catheter, dressing CDI. Abd distended.  L PIV SL w/int abx. Tele NSR. IS initial education given with pt able to demonstrate proper technique back. Hemodialysis today, removed 1 L, see note. Neph, Cardiology and PT following. Plan on dialysis again tomorrow.

## 2022-06-16 NOTE — PROGRESS NOTES
This patient was seen and examined while on dialysis. Professional oversight of the patient's dialysis care, access care and dialysis related co-morbidities were addressed as necessary with the patient and / or staff.     Access - Rt UE AVF , good BFR   UF Goal - 1 L   Early part of HD going okay . Want K restriction to be taken off.   11.5 L ascitic fluid removed yesteray     Exam  /43   Pulse 63   Temp 98.1  F (36.7  C) (Oral)   Resp 20   Ht 1.524 m (5')   SpO2 94%   BMI 30.27 kg/m    AAO  CTA  S1+s2, no r/g  PA - distended   CNS - non focal   Rt UE AVF - good bruit    Recent Labs   Lab Test 06/16/22  0935 06/16/22  0044 06/15/22  1233 07/23/21  1033 07/23/21  0531   NA  --   --  136  --  132*   POTASSIUM  --   --  4.9  --  5.1   CHLORIDE  --   --  97  --  100   CO2  --   --  25  --  29   ANIONGAP  --   --  14  --  3   * 254* 201*   < > 147*   BUN  --   --  61*  --  40*   CR  --   --  7.66*  --  5.07*   KANWAL  --   --  8.8  --  8.4*    < > = values in this interval not displayed.     Lab Results   Component Value Date    WBC 8.0 06/15/2022    WBC 7.7 01/13/2020     Lab Results   Component Value Date    RBC 3.51 06/15/2022    RBC 2.72 01/13/2020     Lab Results   Component Value Date    HGB 11.0 06/15/2022    HGB 8.4 01/13/2020     Lab Results   Component Value Date    HCT 34.1 06/15/2022    HCT 26.0 01/13/2020     No components found for: MCT  Lab Results   Component Value Date    MCV 97 06/15/2022    MCV 96 01/13/2020     Lab Results   Component Value Date    MCH 31.3 06/15/2022    MCH 30.9 01/13/2020     Lab Results   Component Value Date    MCHC 32.3 06/15/2022    MCHC 32.3 01/13/2020     Lab Results   Component Value Date    RDW 15.4 06/15/2022    RDW 15.5 01/13/2020     Lab Results   Component Value Date     06/15/2022     01/13/2020         ASSESSMENT AND RECOMMENDATIONS:   1 ESRD -   Outpt Hd orders-   MWF, Dr David Ybarra   TW 72.5 kg  Treatment Type   Hemodialysis (procedure)    Dialyzer  : [employer] Series: PlusFourSix Model: 17H SANTIAGO Factor: 1455    Dialysate  BiCarb 33 mEq/L    Dialysate  Calcium 2.5 mEq/L    Dialysate  Base Sodium 138 mEq/L    Dialysate  Potassium 2k    Dialysate Flow Rate  500 ML/min    Blood Flow Rate  450 ML/min    Treatment Time  180 min    Access  AV Fistula Arm (Right upper)    Arterial Needle Guage/Length/Tip  15 g/ 1 in/Sharp    Venous Needle Guage/Length/Tip  15 g/ 1 in/Sharp    Heparin Hourly Dose  500 Units/hr (1:1,000 concentration) Stop 60 minutes before end of dialysis    Heparin Load  1000 Units (1:1,000 concentration)    Hectorol 1 mcg     HD today . Repeat tomorrow per MWF schedule   Midodrine with dialysis      2 Cirrhosis / Ascites. - pt does not know of having liver dis . Ct from 2021 suggests cirrhotic liver . Has severe pulm HTN and RV failure ( ? Cardiac cirrhosis )   - s/p 11.5 L paracentesis on 6/15     3. COPD / Cor pulmonale      4 Anemia in ESRD - Hgb at goal         Tamar Chopra MD   June 16, 2022

## 2022-06-16 NOTE — PROGRESS NOTES
RECEIVING UNIT ED HANDOFF REVIEW    ED Nurse Handoff Report was reviewed by: Krissy Head RN on June 16, 2022 at 11:57 AM

## 2022-06-16 NOTE — CONSULTS
[facility]  Cardiology Consultation     Date of Admission:  6/15/2022    Assessment & Plan   Yissel Wetzel is a 62 year old female with    1.  Severe end-stage pulmonary hypertension-mostly type I but also had elevated wedge on the right heart catheterization.  Not a candidate for advanced therapy.    2.  Cirrhosis-likely cardiac and secondary to right heart disease, s/p paracentesis with removal of 12 L  3.  Right pleural effusion- in the setting of cirrhosis, consider thoracentesis  4.  ESRD on hemodialysis-recommend aggressive removal of fluid to maintain euvolemia.  5.  Moderate-severe TR- secondary to 1  6.  COPD  7.  SVT-on amiodarone, nothing to suggest amiodarone toxicity.  This is unlikely to be contributing to pt symptoms.     This is a very unfortunate situation from cardiac standpoint with the patient having chronic, untreatable and life-threatening condition.  She is not a candidate for advanced therapies or transplant.  The best managed option here is to keep her euvolemic with aggressive hemodialysis runs.  In addition, thoracentesis can be considered if she continues to be short of breath.  Systemic blood pressure is soft and prohibitive for initiation of nitrates/calcium channel blockers.  Prognosis is extremely poor.  Would recommend getting hospice/palliative care consult if patient is okay with it [has refused in the past].  I would recommend patient follow-up in outpatient pulmonary hypertension clinic with [facility] in a week after discharge.  We will make arrangements.    Unfortunately, we do not have much to offer.  Will sign off.  Call with questions.    Gary Car MD    Code Status    Prior    Reason for Consult   Reason for consult: Pulmonary hypertension    Primary Care Physician   Gigi Martin    Chief Complaint   Shortness of    History of Present Illness   Yissel Wetzel is a 62 year old female with past medical history of type 2  diabetes complicated by diabetic nephropathy and ESRD on hemodialysis, COPD, SVT on amiodarone, severe/end-stage pulmonary hypertension with cor pulmonale, cirrhosis complicated by ascites and pleural effusion, who presents to the hospital for complaints of shortness of breath.  The patient has previously been seen during hospital admission for similar complaints in 07/2021 by one of my colleagues.  A transthoracic thoracic echocardiogram at that time had shown severely elevated pulmonary artery pressure with compromise of RV function.  Left and right heart catheterization was performed at that time and showed minimal coronary artery disease.  Right heart catheterization showed severely elevated pulm hypertension with very high pulmonary vascular resistance which decreased, but not normalized with nitrous oxide.  Pulmonary capillary wedge pressure was close to 35 mmHg. His case was discussed with heart failure and pulmonary hypertension team at [facility] who refused transfer due to minimal treatment options.    Patient Active Problem List   Diagnosis     Iron deficiency anemia     Esophagitis     Essential hypertension     DM 2 w ESRD, Hgb A1C 7.4 on 7/14/21     Hyperlipidemia LDL goal <100     Severe Pulm HTN -- /52 w mean 81 on R Ht Cath 7/22/21     ESRD on hemodialysis on M, W, F     Anemia in chronic kidney disease     Paroxysmal SVT     Thrombocytopenia (H)     Symptomatic sinus bradycardia     Shortness of breath on exertion     Other ascites     ESRD (end stage renal disease) on dialysis (H)     Pleural effusion on right       Past Medical History   I have reviewed this patient's medical history and updated it with pertinent information if needed.   Past Medical History:   Diagnosis Date     Allergic rhinitis, cause unspecified      Anemia in chronic kidney disease      Anemia of chronic disease      Bleeding pseudoaneurysm of right brachiocephalic arteriovenous fistula, initial encounter  (H) 2019     End stage renal failure on dialysis (H)      Esophagitis, unspecified      Former tobacco use      HEMORRHAGIC GASTROPATHY      History of blood transfusion     [facility]     Iron deficiency anemia, unspecified      Mild persistent asthma      Obesity      Other and unspecified hyperlipidemia      Pneumonia      Pulmonary hypertension (H)     per 2012 echo mod.to severe pulmonary hypertension     Tobacco use disorder dc ed      Type 2 diabetes mellitus (H)     on Insulin- managed by PCP     Unspecified essential hypertension        Past Surgical History   I have reviewed this patient's surgical history and updated it with pertinent information if needed.  Past Surgical History:   Procedure Laterality Date     ABDOMINOPLASTY  pt unsure when    abdominoplasty-      SECTION  ,     x 2     COLONOSCOPY      [facility]     COLONOSCOPY N/A 2021    Procedure: COLONOSCOPY, WITH POLYPECTOMY AND BIOPSY;  Surgeon: Lincoln Farrar MD;  Location:  GI     CREATE FISTULA ARTERIOVENOUS UPPER EXTREMITY Right 2018    Procedure: RIGHT BRACHIAL TO BASILIC ARTERIOVENOUS FISTULA CREATION;  Surgeon: Terry Vizcaino MD;  Location: [facility]     CV HEART CATHETERIZATION WITH POSSIBLE INTERVENTION N/A 2021    Procedure: Heart Catheterization with Possible Intervention;  Surgeon: Joseluis Dean MD;  Location:  HEART CARDIAC CATH LAB     ENT SURGERY  as a child    tonsillectomy     ESOPHAGOSCOPY, GASTROSCOPY, DUODENOSCOPY (EGD), COMBINED N/A 2021    Procedure: ESOPHAGOGASTRODUODENOSCOPY, WITH BIOPSY;  Surgeon: Lincoln Farrar MD;  Location:  GI     EYE SURGERY Left     injections- eye     HYSTERECTOMY  approx     partial hysterectomy-reason bleeding      IR CVC TUNNEL PLACEMENT > 5 YRS OF AGE  2019     IR CVC TUNNEL REMOVAL RIGHT  2020     IR DIALYSIS FISTULOGRAM RIGHT  2019       Prior to Admission Medications    Prior to Admission Medications   Prescriptions Last Dose Informant Patient Reported? Taking?   ACCU-CHEK CHING PLUS test strip  Self No No   Sig: USE TO TEST BLOOD SUGAR 4 TIMES PER DAY   Emollient (CERAVE) CREA Unknown at Unknown time Self No Yes   Sig: Externally apply topically 2 times daily   Glycopyrrolate-Formoterol (BEVESPI AEROSPHERE) 9-4.8 MCG/ACT oral inhaler 6/15/2022 at am Self Yes Yes   Sig: Inhale 2 puffs into the lungs 2 times daily    LEVEMIR FLEXTOUCH 100 UNIT/ML pen Past Week at Unknown time Self No Yes   Sig: Inject 60 Units Subcutaneously At Bedtime   Patient taking differently: Inject 15 Units Subcutaneous At Bedtime   NOVOLOG FLEXPEN 100 UNIT/ML soln Unknown at Unknown time Self No Yes   Sig: INJECT 9-12 UNITS BEFORE BREAKFAST, LUNCH, AND DINNER PLUS A CORRECTIVE SCALE OF 2 UNITS FOR EVERY 50 OVER 150   Patient taking differently: INJECT 3 UNITS/15gm carbs BEFORE BREAKFAST, LUNCH, AND DINNER PLUS A CORRECTIVE SCALE OF 2 UNITS FOR EVERY 50 OVER 150   acetaminophen (TYLENOL) 500 MG tablet as needed Self Yes Yes   Sig: Take 500 mg by mouth every 8 hours as needed for mild pain   amiodarone (PACERONE) 200 MG tablet Past Week at Unknown time Self No Yes   Sig: Take 1 tablet (200 mg) by mouth daily   aspirin (ASA) 81 MG EC tablet Past Week at Unknown time Self No Yes   Sig: Take 1 tablet (81 mg) by mouth daily   blood glucose (NO BRAND SPECIFIED) lancets standard  Self No No   Sig: Use to test blood sugar three times daily or as directed.   blood glucose (NO BRAND SPECIFIED) lancets standard  Self No No   Sig: Use to test blood sugar 4 times daily or as directed.   blood glucose (NO BRAND SPECIFIED) test strip  Self No No   Sig: Use to test blood sugar 4 times daily or as directed.   blood glucose monitoring (NO BRAND SPECIFIED) meter device kit  Self No No   Sig: Use to test blood sugar 4 times daily or as directed.   famotidine (PEPCID) 10 MG tablet as needed Self No Yes   Sig: Take 1 tablet (10  mg) by mouth daily   Patient taking differently: Take 10 mg by mouth daily as needed   fluticasone (FLONASE) 50 MCG/ACT nasal spray as needed Self No Yes   Sig: Spray 1 spray into both nostrils daily   Patient taking differently: Spray 1 spray into both nostrils daily as needed   hypromellose-dextran (ARTIFICAL TEARS) 0.1-0.3 % ophthalmic solution as needed Self Yes Yes   Sig: Place 1 drop into both eyes daily as needed for dry eyes   insulin pen needle (31G X 8 MM) 31G X 8 MM miscellaneous  Self No No   Sig: Use 4 pen needles daily or as directed.   levalbuterol (XOPENEX HFA) 45 MCG/ACT inhaler  Self No Yes   Sig: Inhale 2 puffs into the lungs every 6 hours as needed for shortness of breath / dyspnea or wheezing      Facility-Administered Medications: None     Current Facility-Administered Medications   Medication Dose Route Frequency     [START ON 6/17/2022] albumin human  70 g Intravenous Once     amiodarone  200 mg Oral Daily     aspirin  81 mg Oral Daily     cefTRIAXone  2 g Intravenous Q24H     insulin aspart   Subcutaneous TID w/meals     insulin aspart  1-7 Units Subcutaneous TID AC     insulin aspart  1-5 Units Subcutaneous At Bedtime     insulin detemir  30 Units Subcutaneous At Bedtime     Current Facility-Administered Medications   Medication Last Rate     Allergies   Allergies   Allergen Reactions     Albuterol      shakiness     Aspirin      gi     Byetta [Exenatide]      gi     Clonidine      constipation     Codeine      vomiting     Ezetimibe      muscle symptoms     Hydralazine      headaches     Lisinopril      hyperkalemia     Metformin Hydrochloride      vomiting     Pravachol [Pravastatin Sodium]      muscle pains     Simvastatin      myalgias     Troglitazone        Social History    reports that she quit smoking about 22 years ago. Her smoking use included cigarettes. She has a 22.00 pack-year smoking history. She has never used smokeless tobacco. She reports that she does not drink alcohol  and does not use drugs.    Family History   Family History   Problem Relation Age of Onset     Arrhythmia Mother      Hypertension Mother      Pancreatic Cancer Father      Diabetes Brother      Asthma Sister      LUNG DISEASE Sister      Diabetes Daughter      Cirrhosis Sister        Review of Systems   The comprehensive review of Systems is negative other than noted in the HPI or here.     Physical Exam   Temp: 97.5  F (36.4  C) Temp src: Oral BP: 113/43 Pulse: 65   Resp: 19 SpO2: 95 % O2 Device: Nasal cannula Oxygen Delivery: 2 LPM (baseline use)  Vital Signs with Ranges  Temp:  [97.5  F (36.4  C)] 97.5  F (36.4  C)  Pulse:  [63-72] 65  Resp:  [19] 19  BP: ()/(36-54) 113/43  SpO2:  [94 %-100 %] 95 %  0 lbs 0 oz    Constitutional:  HEENT: Mild distress, appears much older than stated age, cachectic  Normocephalic, atraumatic, Normal eyes, throat normal.    Lungs: Decreased b/breath sounds on the right   Cardiovascular: Regular rate and rhythm, normal S1 and S2, systolicmurmur   GI:   Lymph node  Neck No jaundice, no palpable hepatospleenomeagaly  No enlargement  +ve jugular vein extension    Skin: Normal   Extremity:  Neurological:  Psych: + edema, bilateral equal pulses  AAOX3  Normal affect       Data   Results for orders placed or performed during the hospital encounter of 06/15/22 (from the past 24 hour(s))   [place] Draw *Canceled*    Narrative    The following orders were created for panel order [place] Draw.  Procedure                               Abnormality         Status                     ---------                               -----------         ------                       Please view results for these tests on the individual orders.   CBC with Platelets & Differential    Narrative    The following orders were created for panel order CBC with Platelets & Differential.  Procedure                               Abnormality         Status                     ---------                                -----------         ------                     CBC with platelets and d...[[id number]]  Abnormal            Final result                 Please view results for these tests on the individual orders.   Basic metabolic panel   Result Value Ref Range    Sodium 136 133 - 144 mmol/L    Potassium 4.9 3.4 - 5.3 mmol/L    Chloride 97 94 - 109 mmol/L    Carbon Dioxide (CO2) 25 20 - 32 mmol/L    Anion Gap 14 3 - 14 mmol/L    Urea Nitrogen 61 (H) 7 - 30 mg/dL    Creatinine 7.66 (H) 0.52 - 1.04 mg/dL    Calcium 8.8 8.5 - 10.1 mg/dL    Glucose 201 (H) 70 - 99 mg/dL    GFR Estimate 6 (L) >60 mL/min/1.73m2   INR   Result Value Ref Range    INR 1.15 0.85 - 1.15   Hepatic function panel   Result Value Ref Range    Bilirubin Total 0.7 0.2 - 1.3 mg/dL    Bilirubin Direct 0.3 (H) 0.0 - 0.2 mg/dL    Protein Total 6.2 (L) 6.8 - 8.8 g/dL    Albumin 2.4 (L) 3.4 - 5.0 g/dL    Alkaline Phosphatase 129 40 - 150 U/L    AST 34 0 - 45 U/L    ALT 42 0 - 50 U/L   CBC with platelets and differential   Result Value Ref Range    WBC Count 8.0 4.0 - 11.0 10e3/uL    RBC Count 3.51 (L) 3.80 - 5.20 10e6/uL    Hemoglobin 11.0 (L) 11.7 - 15.7 g/dL    Hematocrit 34.1 (L) 35.0 - 47.0 %    MCV 97 78 - 100 fL    MCH 31.3 26.5 - 33.0 pg    MCHC 32.3 31.5 - 36.5 g/dL    RDW 15.4 (H) 10.0 - 15.0 %    Platelet Count 153 150 - 450 10e3/uL    % Neutrophils 79 %    % Lymphocytes 9 %    % Monocytes 11 %    % Eosinophils 0 %    % Basophils 1 %    % Immature Granulocytes 0 %    NRBCs per 100 WBC 0 <1 /100    Absolute Neutrophils 6.4 1.6 - 8.3 10e3/uL    Absolute Lymphocytes 0.7 (L) 0.8 - 5.3 10e3/uL    Absolute Monocytes 0.9 0.0 - 1.3 10e3/uL    Absolute Eosinophils 0.0 0.0 - 0.7 10e3/uL    Absolute Basophils 0.0 0.0 - 0.2 10e3/uL    Absolute Immature Granulocytes 0.0 <=0.4 10e3/uL    Absolute NRBCs 0.0 10e3/uL   Albumin level   Result Value Ref Range    Albumin 2.5 (L) 3.4 - 5.0 g/dL   Hemoglobin A1c   Result Value Ref Range    Hemoglobin A1C 7.7 (H) 0.0 - 5.6 %   Cell  count with differential fluid    Narrative    The following orders were created for panel order Cell count with differential fluid.  Procedure                               Abnormality         Status                     ---------                               -----------         ------                     Cell Count Body Fluid[[id number]]        Abnormal            Final result               Differential Body Fluid[[id number]]                          Final result                 Please view results for these tests on the individual orders.   Cell Count Body Fluid   Result Value Ref Range    Color Red (A) Colorless, Yellow    Clarity Bloody Clear, Bloody    Total Nucleated Cells 403 /uL    Cell Count Fluid Source Abdomen     Narrative    No reference ranges have been established.  This result  should be interpreted in the context of the patient's clinical condition and   compared to simultaneous measurement in the patient's blood.         Differential Body Fluid   Result Value Ref Range    % Neutrophils 72 %    % Lymphocytes 4 %    % Monocyte/Macrophages 23 %    % Eosinophils 1 %    Narrative    No reference ranges have been established. This result should be interpreted in the context of the patient's clinical condition and compared to simultaneous measurement in the patient's blood.   Protein fluid   Result Value Ref Range    Protein Fluid Source Abdomen     Protein Total Fluid 4.0 g/dL    Narrative    No reference ranges have been established.  This result should be interpreted in the context of the patient's clinical condition and compared to simultaneous measurement in the patient's blood.  This is a lab developed test. It has not been cleared or approved by the FDA.   Albumin fluid   Result Value Ref Range    Albumin Fluid Source Abdomen     Albumin fluid 1.9 g/dL    Narrative    No reference ranges have been established.  This result should be interpreted in the context of the patient's clinical condition and  compared to simultaneous measurement in the patient's blood.  This is a lab developed test. It has not been cleared or approved by the FDA.   Lactate dehydrogenase fluid   Result Value Ref Range    LD Fluid Source Abdomen     Lactate dehydrogenase fluid 125 U/L    Narrative    No reference ranges have been established.  This result should be interpreted in the context of the patient's clinical condition and compared to simultaneous measurement in the patient's blood.   Glucose fluid   Result Value Ref Range    Glucose Fluid Source Abdomen     Glucose fluid 160 mg/dL    Narrative    No reference ranges have been established.  This result should be interpreted in the context of the patient's clinical condition and compared to simultaneous measurement in the patient's blood.  This is a lab developed test. It has not been cleared or approved by the FDA.   US Paracentesis    Narrative    US PARACENTESIS WITHOUT ALBUMIN 6/15/2022 3:32 PM     HISTORY: High volume paracentesis with or without diagnostic fluid  analysis with labs to be drawn if ordered. Total paracentesis volume  as much as possible.    FINDINGS: Ultrasound was used to evaluate for the presence and best  approach for paracentesis. Written and oral informed consent was  obtained. A pause for the cause procedure to verify the correct  patient and correct procedure. The skin overlying the right lower  quadrant was prepped and draped in the usual sterile fashion. The  subcutaneous tissues were anesthetized with 10 mL 1% lidocaine. A  catheter was advanced into the peritoneal space and 11.75 L of  straw  colored fluid was drained. There were no immediate complications.  Ultrasound images were permanently stored.  Patient left the  ultrasound suite in satisfactory condition.      Impression    IMPRESSION: Technically successful paracentesis without immediate  complications.    JESSE DASILVA MD         SYSTEM ID:  [id number]   Symptomatic; Yes; 6/15/2022 Influenza  A/B & SARS-CoV2 (COVID-19) Virus PCR Multiplex Nasopharyngeal    Specimen: Nasopharyngeal; Swab   Result Value Ref Range    Influenza A PCR Negative Negative    Influenza B PCR Negative Negative    RSV PCR Negative Negative    SARS CoV2 PCR Negative Negative    Narrative    Testing was performed using the Xpert Xpress CoV2/Flu/RSV Assay on the [employer] GeneXpert Instrument. This test should be ordered for the detection of SARS-CoV-2 and influenza viruses in individuals who meet clinical and/or epidemiological criteria. Test performance is unknown in asymptomatic patients. This test is for in vitro diagnostic use under the FDA EUA for laboratories certified under CLIA to perform high or moderate complexity testing. This test has not been FDA cleared or approved. A negative result does not rule out the presence of PCR inhibitors in the specimen or target RNA in concentration below the limit of detection for the assay. If only one viral target is positive but coinfection with multiple targets is suspected, the sample should be re-tested with another FDA cleared, approved, or authorized test, if coinfection would change clinical management. This test was validated by the [facility]. These laboratories are certified under the Clinical  Laboratory Improvement Amendments of 1988 (CLIA-88) as qualified to perform high complexity laboratory testing.   CT Chest Pulmonary Embolism w Contrast    Narrative    EXAM: CT CHEST PULMONARY EMBOLISM W CONTRAST  LOCATION: [facility]  DATE/TIME: 6/15/2022 4:56 PM    INDICATION: PE suspected, high prob. Increased shortness of breath.  COMPARISON: 07/15/2021.  TECHNIQUE: CT chest pulmonary angiogram during arterial phase injection of IV contrast. Multiplanar reformats and MIP reconstructions were performed. Dose reduction techniques were used.   CONTRAST: 61 mL Isovue 370.    FINDINGS:  ANGIOGRAM CHEST: Pulmonary arteries are normal caliber and  negative for pulmonary emboli. Normal caliber thoracic aorta without dissection. Moderate aortic calcification.    LUNGS AND PLEURA: Moderate right-sided pleural fluid collection. Mosaic attenuation of the pulmonary parenchyma diffusely which can be seen with air trapping, edema or viral etiologies. Significant volume loss/atelectasis right lower lobe with moderate   volume loss and atelectasis right middle lobe inferiorly. No left-sided pleural fluid. Mild intralobular septal thickening.    MEDIASTINUM/AXILLAE: Cardiac enlargement without pericardial fluid. Diffuse pericardial calcifications. No lymphadenopathy. Normal caliber esophagus.    CORONARY ARTERY CALCIFICATION: Moderate.    UPPER ABDOMEN: Partially visualized moderate ascites.    MUSCULOSKELETAL: Minimal degenerative changes in the spine.      Impression    IMPRESSION:  1.  No evidence for pulmonary embolism.    2.  Moderate right-sided pleural fluid collection with atelectasis right lower lobe and right middle lobe as detailed above. Underlying infiltrates in these areas of atelectasis cannot be excluded.    3.  Mosaic attenuation of the pulmonary parenchyma with interlobular septal thickening most likely related to edema. Other considerations would include air trapping related to reactive airways disease or viral etiologies.    4.  Cardiac enlargement with diffuse pericardial calcifications.    5.  Moderate coronary artery calcification.   EKG 12 lead   Result Value Ref Range    Systolic Blood Pressure  mmHg    Diastolic Blood Pressure  mmHg    Ventricular Rate 68 BPM    Atrial Rate 68 BPM    IA Interval 216 ms    QRS Duration 102 ms     ms    QTc 533 ms    P Axis 62 degrees    R AXIS 61 degrees    T Axis 66 degrees    Interpretation ECG       Sinus rhythm with 1st degree A-V block  Nonspecific ST and T wave abnormality  Prolonged QT  Abnormal ECG  When compared with ECG of 22-JUL-2021 01:13,  IA interval has increased  Vent. rate has decreased BY   99 BPM  Confirmed by GENERATED REPORT, COMPUTER ([id number]),  Aasen, Bradley ([id number]) on 6/15/2022 11:08:23 PM     Glucose by meter   Result Value Ref Range    GLUCOSE BY METER POCT 254 (H) 70 - 99 mg/dL   Glucose by meter   Result Value Ref Range    GLUCOSE BY METER POCT 196 (H) 70 - 99 mg/dL

## 2022-06-17 LAB
ANION GAP SERPL CALCULATED.3IONS-SCNC: 7 MMOL/L (ref 3–14)
BUN SERPL-MCNC: 36 MG/DL (ref 7–30)
CALCIUM SERPL-MCNC: 8.1 MG/DL (ref 8.5–10.1)
CHLORIDE BLD-SCNC: 100 MMOL/L (ref 94–109)
CO2 SERPL-SCNC: 28 MMOL/L (ref 20–32)
CREAT SERPL-MCNC: 5.3 MG/DL (ref 0.52–1.04)
GFR SERPL CREATININE-BSD FRML MDRD: 9 ML/MIN/1.73M2
GLUCOSE BLD-MCNC: 176 MG/DL (ref 70–99)
GLUCOSE BLDC GLUCOMTR-MCNC: 106 MG/DL (ref 70–99)
GLUCOSE BLDC GLUCOMTR-MCNC: 120 MG/DL (ref 70–99)
GLUCOSE BLDC GLUCOMTR-MCNC: 127 MG/DL (ref 70–99)
GLUCOSE BLDC GLUCOMTR-MCNC: 131 MG/DL (ref 70–99)
GLUCOSE BLDC GLUCOMTR-MCNC: 170 MG/DL (ref 70–99)
GLUCOSE BLDC GLUCOMTR-MCNC: 183 MG/DL (ref 70–99)
GLUCOSE BLDC GLUCOMTR-MCNC: 62 MG/DL (ref 70–99)
GLUCOSE BLDC GLUCOMTR-MCNC: 72 MG/DL (ref 70–99)
GLUCOSE BLDC GLUCOMTR-MCNC: 99 MG/DL (ref 70–99)
POTASSIUM BLD-SCNC: 4.1 MMOL/L (ref 3.4–5.3)
SODIUM SERPL-SCNC: 135 MMOL/L (ref 133–144)

## 2022-06-17 PROCEDURE — 90937 HEMODIALYSIS REPEATED EVAL: CPT

## 2022-06-17 PROCEDURE — 90935 HEMODIALYSIS ONE EVALUATION: CPT | Performed by: INTERNAL MEDICINE

## 2022-06-17 PROCEDURE — 120N000001 HC R&B MED SURG/OB

## 2022-06-17 PROCEDURE — P9047 ALBUMIN (HUMAN), 25%, 50ML: HCPCS | Performed by: INTERNAL MEDICINE

## 2022-06-17 PROCEDURE — 250N000011 HC RX IP 250 OP 636: Performed by: INTERNAL MEDICINE

## 2022-06-17 PROCEDURE — 250N000013 HC RX MED GY IP 250 OP 250 PS 637: Performed by: INTERNAL MEDICINE

## 2022-06-17 PROCEDURE — 36415 COLL VENOUS BLD VENIPUNCTURE: CPT | Performed by: HOSPITALIST

## 2022-06-17 PROCEDURE — 82310 ASSAY OF CALCIUM: CPT | Performed by: HOSPITALIST

## 2022-06-17 PROCEDURE — 258N000003 HC RX IP 258 OP 636: Performed by: INTERNAL MEDICINE

## 2022-06-17 PROCEDURE — 99233 SBSQ HOSP IP/OBS HIGH 50: CPT | Performed by: HOSPITALIST

## 2022-06-17 RX ORDER — FAMOTIDINE 10 MG
10 TABLET ORAL DAILY PRN
Status: DISCONTINUED | OUTPATIENT
Start: 2022-06-17 | End: 2022-06-21 | Stop reason: HOSPADM

## 2022-06-17 RX ADMIN — ACETAMINOPHEN 500 MG: 500 TABLET, FILM COATED ORAL at 21:23

## 2022-06-17 RX ADMIN — CEFTRIAXONE SODIUM 2 G: 2 INJECTION, POWDER, FOR SOLUTION INTRAMUSCULAR; INTRAVENOUS at 16:20

## 2022-06-17 RX ADMIN — ACETAMINOPHEN 500 MG: 500 TABLET, FILM COATED ORAL at 02:02

## 2022-06-17 RX ADMIN — ASPIRIN 81 MG: 81 TABLET, COATED ORAL at 08:09

## 2022-06-17 RX ADMIN — SODIUM CHLORIDE 300 ML: 9 INJECTION, SOLUTION INTRAVENOUS at 09:52

## 2022-06-17 RX ADMIN — MIDODRINE HYDROCHLORIDE 10 MG: 5 TABLET ORAL at 18:39

## 2022-06-17 RX ADMIN — ACETAMINOPHEN 500 MG: 500 TABLET, FILM COATED ORAL at 12:59

## 2022-06-17 RX ADMIN — ALBUMIN HUMAN 70 G: 0.25 SOLUTION INTRAVENOUS at 21:08

## 2022-06-17 RX ADMIN — SODIUM CHLORIDE 250 ML: 9 INJECTION, SOLUTION INTRAVENOUS at 09:52

## 2022-06-17 RX ADMIN — AMIODARONE HYDROCHLORIDE 200 MG: 200 TABLET ORAL at 08:09

## 2022-06-17 RX ADMIN — MIDODRINE HYDROCHLORIDE 10 MG: 5 TABLET ORAL at 08:09

## 2022-06-17 ASSESSMENT — ACTIVITIES OF DAILY LIVING (ADL)
ADLS_ACUITY_SCORE: 29

## 2022-06-17 NOTE — PLAN OF CARE
Dialysis this AM, tolerated well. VSS on 1L O2 via NC. Tele: NSR. PRN tylenol x1 for headache. Up with SBA. Sat up in chair most of the afternoon. Good appetite. , 106, 170. Continues on IV rocephin. Awaiting fluid culture results.

## 2022-06-17 NOTE — PROGRESS NOTES
This patient was seen and examined while on dialysis. Professional oversight of the patient's dialysis care, access care and dialysis related co-morbidities were addressed as necessary with the patient and / or staff.     Access - Rt UE AVF , good BFR   UF Goal - 1 - 1.5 L   Early part of HD going okay .  Had uneventful dialysis yesterday as well secondary to missed dialysis on Wednesday.    Exam  /43   Pulse 60   Temp 97.9  F (36.6  C)   Resp 16   Ht 1.524 m (5')   Wt 61.8 kg (136 lb 3.2 oz)   SpO2 100%   BMI 26.60 kg/m    AAO  CTA  S1+s2, no r/g  PA - distended   CNS - non focal   Rt UE AVF - good bruit  Recent Labs   Lab Test 06/17/22  0727 06/17/22  0719 06/16/22  0044 06/15/22  1233     --   --  136   POTASSIUM 4.1  --   --  4.9   CHLORIDE 100  --   --  97   CO2 28  --   --  25   ANIONGAP 7  --   --  14   * 183*   < > 201*   BUN 36*  --   --  61*   CR 5.30*  --   --  7.66*   KANWAL 8.1*  --   --  8.8    < > = values in this interval not displayed.     Lab Results   Component Value Date    WBC 8.0 06/15/2022    WBC 7.7 01/13/2020     Lab Results   Component Value Date    RBC 3.51 06/15/2022    RBC 2.72 01/13/2020     Lab Results   Component Value Date    HGB 11.0 06/15/2022    HGB 8.4 01/13/2020     Lab Results   Component Value Date    HCT 34.1 06/15/2022    HCT 26.0 01/13/2020     No components found for: MCT  Lab Results   Component Value Date    MCV 97 06/15/2022    MCV 96 01/13/2020     Lab Results   Component Value Date    MCH 31.3 06/15/2022    MCH 30.9 01/13/2020     Lab Results   Component Value Date    MCHC 32.3 06/15/2022    MCHC 32.3 01/13/2020     Lab Results   Component Value Date    RDW 15.4 06/15/2022    RDW 15.5 01/13/2020     Lab Results   Component Value Date     06/15/2022     01/13/2020       ASSESSMENT AND RECOMMENDATIONS:   1 ESRD -   Outpt Hd orders-   MWF, Dr David Ybarra   TW 72.5 kg  Treatment Type  Hemodialysis (procedure)    Dialyzer   : JACOBO Series: AFIA Model: 17H SANTIAGO Factor: 1455    Dialysate  BiCarb 33 mEq/L    Dialysate  Calcium 2.5 mEq/L    Dialysate  Base Sodium 138 mEq/L    Dialysate  Potassium 2k    Dialysate Flow Rate  500 ML/min    Blood Flow Rate  450 ML/min    Treatment Time  180 min    Access  AV Fistula Arm (Right upper)    Arterial Needle Guage/Length/Tip  15 g/ 1 in/Sharp    Venous Needle Guage/Length/Tip  15 g/ 1 in/Sharp    Heparin Hourly Dose  500 Units/hr (1:1,000 concentration) Stop 60 minutes before end of dialysis    Heparin Load  1000 Units (1:1,000 concentration)    Hectorol 1 mcg     HD today .  Anticipate next dialysis on Monday  Midodrine with dialysis      2 Cirrhosis / Ascites. - pt does not know of having liver dis . Ct from 2021 suggests cirrhotic liver . Has severe pulm HTN and RV failure ( ? Cardiac cirrhosis )   - s/p 11.5 L paracentesis on 6/15     3. COPD / Cor pulmonale      4 Anemia in ESRD - Hgb at goal     Complete dialysis today as ordered  Please call over the weekend with any questions.    Tamar Chopra MD   June 16, 2022

## 2022-06-17 NOTE — PLAN OF CARE
A&Ox4, VSS on 1.5 L NC. MANE. Orthos negative per report. On renal diet, BS checks with sliding scale insulin. PRN check done due to 'seeing stars' and pt BG 51@0000. At least 60g carbs given,  by 0100. Pt unable to eat large amounts, vomited small amount after eating and drinking. Pt will not take glucose gel due to dislike of lemon flavour. Says that at home she is not following the currently noted insulin regimen and is using much less insulin than prescribed. R arm HD catheter, dressing CDI. Abd distended. L PIV SL w/int abx. Tele NSR. IS used. Hemodialysis due today. Neph, cardiology, PT following. Discharge pending.

## 2022-06-17 NOTE — PROGRESS NOTES
Potassium   Date Value Ref Range Status   06/17/2022 4.1 3.4 - 5.3 mmol/L Final   10/05/2020 4.1 3.5 - 5.5 mEq/L Final     Hemoglobin   Date Value Ref Range Status   06/15/2022 11.0 (L) 11.7 - 15.7 g/dL Final   01/13/2020 8.4 (L) 11.7 - 15.7 g/dL Final     Creatinine   Date Value Ref Range Status   06/17/2022 5.30 (H) 0.52 - 1.04 mg/dL Final   01/13/2020 5.97 (H) 0.52 - 1.04 mg/dL Final     Urea Nitrogen   Date Value Ref Range Status   06/17/2022 36 (H) 7 - 30 mg/dL Final   01/13/2020 44 (H) 7 - 30 mg/dL Final     Sodium   Date Value Ref Range Status   06/17/2022 135 133 - 144 mmol/L Final   01/13/2020 139 133 - 144 mmol/L Final     INR   Date Value Ref Range Status   06/15/2022 1.15 0.85 - 1.15 Final   01/12/2020 1.23 (H) 0.86 - 1.14 Final       DIALYSIS PROCEDURE NOTE  Hepatitis status of previous patient on machine log was checked and verified ok to use with this patients hepatitis status.  Patient dialyzed for 3 hrs. on a K2 bath with a net fluid removal of  1L.  A BFR of 425 ml/min was obtained via a L AVF using 15 gauge needles.      The treatment plan was discussed with Dr. Chopra during the treatment.    Total heparin received during the treatment: 0 units.   Needle cannulation sites held x 5 min.       Meds  given: none   Complications: none      Person educated: pt. Knowledge base good. Barriers to learning: none. Educated on dialysis     ICEBOAT? Timeout performed pre-treatment  I: Patient was identified using 2 identifiers  C:  Consent Signed Yes  E: Equipment preventative maintenance is current and dialysis delivery system OK to use  B: Hepatitis B Surface Antigen: neg; Draw Date: 7/14/21      Hepatitis B Surface Antibody: immune; Draw Date: 7/14/21  O: Dialysis orders present and complete prior to treatment  A: Vascular access verified and assessed prior to treatment  T: Treatment was performed at a clinically appropriate time  ?: Patient was allowed to ask questions and address concerns prior to  treatment  See Adult Hemodialysis flowsheet in [facility] for further details and post assessment.  Machine water alarm in place and functioning. Transducer pods intact and checked every 15min.   Pt returned via bed.  Chlorine/Chloramine water system checked every 4 hours.  Outpatient Dialysis at [facility]      Post treatment report given to Anjelica Elizabeth RN    RN regarding 1L of fluid removed, last BP, and patient pain rating of 0/10.     Please remove patient dressing on AVF and AVG needle sites 24 hours after dialysis. If leaking occurs please apply a Band-Aid.

## 2022-06-17 NOTE — PROGRESS NOTES
"[facility]  Medicine Progress Note - Hospitalist Service    Date of Admission:  6/15/2022    Assessment & Plan            Yissel Wetzel is a 62 year old female with history of type 2 diabetes mellitus, diabetic nephropathy, ES Kd on hemodialysis, chronic venous insufficiency, SVT, severe pulmonary hypertension admitted on 6/15/2022 with shortness of breath.      #Shortness of breath  # COPD and Chronic hypoxemic respiratory failure: Uses supplemental O2 at baseline with activity.  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  Moderate pleural effusion on right side noted on CT. presentation not consistent with COPD exacerbation.  -Volume management with dialysis.  -Requiring 1-2 LPM NC O2,  dyspnea has improved. If increasing O2 needs or dyspnea, will consider thoracentesis although expect this will build up quickly given marked ascites.  --Continued PTA inhalers.  She follows with [facility].      #Marked ascites and SBP  S/p paracentesis of 11.75 L of straw-colored fluid on admission.  Total nucleated cells of 403 and 72% neutrophils, which accounts for > 250 neutrophil meeting criteria for SBP.  -Continue with ceftriaxone 2 g daily  -Received albumin 1.5gm./kg on admission, plan is 1 gm /kg on day 3  - f/u on culture /sensitivities-no growth so far.   - Volume management with HD     #Severe pulmonary hypertension \" PA pressure 132/52 mmHg with mean of 81 during right heart catheterization in 07/2021.  She also had severely elevated LVEDP of 35-40 mmHg at the time.\"  #Severe RV dilatation and severe biatrial enlargement by echocardiography.  LVEF 60%-65%.  -Likely contributing to right heart failure causing acetic fluid.  Normal AST, ALT, alkaline phosphatase and bilirubin of 0.7.  -Cardiology consulted to review and discuss options for medications (sildenafil, bosentan, inhaled NO, other.) for pulmonary hypertension  -discussed with cardiology, " unfortunately not able to initiate nitrate/CCB given soft blood pressure, recommending euvolemia with hemodialysis rounds, and referral to [facility] after discharge.      # h/o SVT  - Continue with PTA Amiodarone      # ESKD on HD: On HD M-W-F  -Nephrology consulted. Had missed HD on day of admission  -On midodrine given soft BP.  -Will be continued by her dialysis schedule now.  Next HD Monday.    # Chronic anemia, due to ESRD  -Hemoglobin at baseline monitor    # DM type II  - CT PTA  Levemir ( PTA med list states 60 units daily per prescription, apparently reported she is taking 15 units daily but now states uses less than that unclear.)  -Became hypoglycemic, suspect due to short acting insulin when used for 1 unit per 6 gm CHO (although uses 1 Unit/5gm at home)  - Continue carb coverage-reduce to 1 unit per 10 g CHO  -Continue with moderate insulin resistance sliding scale   -Will give her 6 units of Lantus tonight  -Hypoglycemia protocol in place     Diet: Snacks/Supplements Adult: Ensure Enlive; With Meals  Combination Diet Regular Diet Adult; 2 gm NA Diet    DVT Prophylaxis: Pneumatic Compression Devices  Weller Catheter: Not present  Central Lines: None  Cardiac Monitoring: ACTIVE order. Indication: Acute decompensated heart failure (48 hours)  Code Status: Full Code Full code per prior record    Disposition Plan   Expected Discharge: 06/17/2022  pending final culture on peritoneal fluid- likely 2 days  Anticipated discharge location:  Awaiting care coordination huddle  Delays:              The patient's care was discussed with the Bedside Nurse and Patient and cardiology consultant.    Patricia Amaya MD  Hospitalist Service  [facility]  Securely message with the Vocera Web Console (learn more here)  Text page via [employer] Paging/Directory         Clinically Significant Risk Factors Present on Admission              # Overweight: Estimated body mass index is 26.6 kg/m  as calculated from  the following:    Height as of this encounter: 1.524 m (5').    Weight as of this encounter: 61.8 kg (136 lb 3.2 oz).      ______________________________________________________________________    Interval History   Overnight events reviewed, discussed with nursing staff and evaluated patient this morning.  Patient was hypoglycemic, blood sugar  To 50s last night.  -Reports her dyspnea has improved, denies abdominal pain.  No nausea or vomiting.  -Remains afebrile.       Data reviewed today: I reviewed all medications, new labs and imaging results over the last 24 hours. I personally reviewed no images or EKG's today.    Physical Exam   Vital Signs: Temp: 97.9  F (36.6  C) Temp src: Axillary BP: 115/43 Pulse: 61   Resp: 16 SpO2: 100 % O2 Device: Nasal cannula Oxygen Delivery: 1 LPM  Weight: 136 lbs 3.2 oz    General: AAOx3, sitting up in the bed chronically ill-appearing, appears comfortable.  HEENT: PERRLA EOMI. Mucosa moist.   Lungs: Bilateral equal air entry. Very diminished breath sounds posteriorly especially on right.  CVS: S1S2 regular, no tachycardia.  Soft systolic murmur present.  Abdomen: Soft, diffusely distended with abdominal wall edema anteriorly unchanged. Minimally tender on exam.  MSK: Leg edema (+)  Neuro: AAOX3. CN 2-12 normal. Strength symmetrical.  Skin: No rash.       Data   Recent Labs   Lab 06/17/22  0727 06/17/22  0719 06/17/22  0201 06/16/22  0044 06/15/22  1233   WBC  --   --   --   --  8.0   HGB  --   --   --   --  11.0*   MCV  --   --   --   --  97   PLT  --   --   --   --  153   INR  --   --   --   --  1.15     --   --   --  136   POTASSIUM 4.1  --   --   --  4.9   CHLORIDE 100  --   --   --  97   CO2 28  --   --   --  25   BUN 36*  --   --   --  61*   CR 5.30*  --   --   --  7.66*   ANIONGAP 7  --   --   --  14   KANWAL 8.1*  --   --   --  8.8   * 183* 127*   < > 201*   ALBUMIN  --   --   --   --  2.5*  2.4*   PROTTOTAL  --   --   --   --  6.2*   BILITOTAL  --   --   --    --  0.7   ALKPHOS  --   --   --   --  129   ALT  --   --   --   --  42   AST  --   --   --   --  34    < > = values in this interval not displayed.     No results found for this or any previous visit (from the past 24 hour(s)).  Medications       sodium chloride 0.9%  250 mL Intravenous Once in dialysis/CRRT     sodium chloride 0.9%  300 mL Hemodialysis Machine Once     albumin human  70 g Intravenous Once     amiodarone  200 mg Oral Daily     aspirin  81 mg Oral Daily     cefTRIAXone  2 g Intravenous Q24H     insulin aspart   Subcutaneous TID w/meals     insulin aspart  1-7 Units Subcutaneous TID AC     insulin aspart  1-5 Units Subcutaneous At Bedtime     insulin detemir  6 Units Subcutaneous At Bedtime     midodrine  10 mg Oral TID w/meals     - MEDICATION INSTRUCTIONS -   Does not apply Once     - MEDICATION INSTRUCTIONS -   Does not apply Once     sodium chloride (PF)  3 mL Intracatheter Q8H     umeclidinium-vilanterol  1 puff Inhalation Daily

## 2022-06-18 ENCOUNTER — APPOINTMENT (OUTPATIENT)
Dept: PHYSICAL THERAPY | Facility: CLINIC | Age: 62
DRG: 314 | End: 2022-06-18
Attending: INTERNAL MEDICINE
Payer: MEDICARE

## 2022-06-18 LAB
ABO/RH(D): NORMAL
ANTIBODY SCREEN: NEGATIVE
BASOPHILS # BLD AUTO: 0 10E3/UL (ref 0–0.2)
BASOPHILS NFR BLD AUTO: 1 %
EOSINOPHIL # BLD AUTO: 0.2 10E3/UL (ref 0–0.7)
EOSINOPHIL NFR BLD AUTO: 3 %
ERYTHROCYTE [DISTWIDTH] IN BLOOD BY AUTOMATED COUNT: 15.6 % (ref 10–15)
GLUCOSE BLDC GLUCOMTR-MCNC: 151 MG/DL (ref 70–99)
GLUCOSE BLDC GLUCOMTR-MCNC: 163 MG/DL (ref 70–99)
GLUCOSE BLDC GLUCOMTR-MCNC: 187 MG/DL (ref 70–99)
GLUCOSE BLDC GLUCOMTR-MCNC: 203 MG/DL (ref 70–99)
GLUCOSE BLDC GLUCOMTR-MCNC: 203 MG/DL (ref 70–99)
HCT VFR BLD AUTO: 22.6 % (ref 35–47)
HCT VFR BLD AUTO: 24.3 % (ref 35–47)
HGB BLD-MCNC: 7 G/DL (ref 11.7–15.7)
HGB BLD-MCNC: 7.7 G/DL (ref 11.7–15.7)
IMM GRANULOCYTES # BLD: 0 10E3/UL
IMM GRANULOCYTES NFR BLD: 1 %
LYMPHOCYTES # BLD AUTO: 0.7 10E3/UL (ref 0.8–5.3)
LYMPHOCYTES NFR BLD AUTO: 12 %
MCH RBC QN AUTO: 31.1 PG (ref 26.5–33)
MCHC RBC AUTO-ENTMCNC: 31 G/DL (ref 31.5–36.5)
MCV RBC AUTO: 100 FL (ref 78–100)
MONOCYTES # BLD AUTO: 0.9 10E3/UL (ref 0–1.3)
MONOCYTES NFR BLD AUTO: 15 %
NEUTROPHILS # BLD AUTO: 4.4 10E3/UL (ref 1.6–8.3)
NEUTROPHILS NFR BLD AUTO: 68 %
NRBC # BLD AUTO: 0 10E3/UL
NRBC BLD AUTO-RTO: 0 /100
PLATELET # BLD AUTO: 107 10E3/UL (ref 150–450)
RBC # BLD AUTO: 2.25 10E6/UL (ref 3.8–5.2)
SPECIMEN EXPIRATION DATE: NORMAL
WBC # BLD AUTO: 6.3 10E3/UL (ref 4–11)

## 2022-06-18 PROCEDURE — 86850 RBC ANTIBODY SCREEN: CPT | Performed by: HOSPITALIST

## 2022-06-18 PROCEDURE — 250N000013 HC RX MED GY IP 250 OP 250 PS 637: Performed by: INTERNAL MEDICINE

## 2022-06-18 PROCEDURE — 250N000011 HC RX IP 250 OP 636: Performed by: INTERNAL MEDICINE

## 2022-06-18 PROCEDURE — 97530 THERAPEUTIC ACTIVITIES: CPT | Mod: GP | Performed by: PHYSICAL THERAPIST

## 2022-06-18 PROCEDURE — 99233 SBSQ HOSP IP/OBS HIGH 50: CPT | Performed by: HOSPITALIST

## 2022-06-18 PROCEDURE — 120N000001 HC R&B MED SURG/OB

## 2022-06-18 PROCEDURE — 85018 HEMOGLOBIN: CPT | Performed by: HOSPITALIST

## 2022-06-18 PROCEDURE — 97162 PT EVAL MOD COMPLEX 30 MIN: CPT | Mod: GP | Performed by: PHYSICAL THERAPIST

## 2022-06-18 PROCEDURE — 85025 COMPLETE CBC W/AUTO DIFF WBC: CPT | Performed by: HOSPITALIST

## 2022-06-18 PROCEDURE — 36415 COLL VENOUS BLD VENIPUNCTURE: CPT | Performed by: HOSPITALIST

## 2022-06-18 RX ORDER — FUROSEMIDE 10 MG/ML
40 INJECTION INTRAMUSCULAR; INTRAVENOUS
Status: DISCONTINUED | OUTPATIENT
Start: 2022-06-18 | End: 2022-06-21 | Stop reason: HOSPADM

## 2022-06-18 RX ADMIN — ACETAMINOPHEN 500 MG: 500 TABLET, FILM COATED ORAL at 23:43

## 2022-06-18 RX ADMIN — ASPIRIN 81 MG: 81 TABLET, COATED ORAL at 08:20

## 2022-06-18 RX ADMIN — AMIODARONE HYDROCHLORIDE 200 MG: 200 TABLET ORAL at 08:20

## 2022-06-18 RX ADMIN — UMECLIDINIUM BROMIDE AND VILANTEROL TRIFENATATE 1 PUFF: 62.5; 25 POWDER RESPIRATORY (INHALATION) at 08:21

## 2022-06-18 RX ADMIN — MIDODRINE HYDROCHLORIDE 10 MG: 5 TABLET ORAL at 18:39

## 2022-06-18 RX ADMIN — MIDODRINE HYDROCHLORIDE 10 MG: 5 TABLET ORAL at 08:20

## 2022-06-18 RX ADMIN — CEFTRIAXONE SODIUM 2 G: 2 INJECTION, POWDER, FOR SOLUTION INTRAMUSCULAR; INTRAVENOUS at 15:55

## 2022-06-18 ASSESSMENT — ACTIVITIES OF DAILY LIVING (ADL)
ADLS_ACUITY_SCORE: 29

## 2022-06-18 NOTE — PROGRESS NOTES
"[facility]  Medicine Progress Note - Hospitalist Service    Date of Admission:  6/15/2022    Assessment & Plan            Yissel Wetzel is a 62 year old female with history of type 2 diabetes mellitus, diabetic nephropathy, ES Kd on hemodialysis, chronic venous insufficiency, SVT, severe pulmonary hypertension admitted on 6/15/2022 with shortness of breath.      #Shortness of breath  # COPD and Chronic hypoxemic respiratory failure: Uses supplemental O2 at baseline with activity.  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  Moderate pleural effusion on right side noted on CT. presentation not consistent with COPD exacerbation.  --Volume management with dialysis.  --Requiring 1-2 LPM NC O2,  dyspnea has improved.  Hold off on thoracentesis.  --Continued PTA inhalers.  She follows with [facility].   --Exercise oximetry prior to discharge     #Marked ascites and SBP  S/p paracentesis of 11.75 L of straw-colored fluid on admission.  Total nucleated cells of 403 and 72% neutrophils, which accounts for > 250 neutrophil meeting criteria for SBP.  -Continue with ceftriaxone 2 g daily  -Received albumin 1.5gm./kg on admission, and 1 gm /kg on day 3  - f/u on culture /sensitivities-no growth so far.   - Volume management with HD  -Likely needs repeat paracentesis prior to discharge.     #Severe pulmonary hypertension \" PA pressure 132/52 mmHg with mean of 81 during right heart catheterization in 07/2021.  She also had severely elevated LVEDP of 35-40 mmHg at the time.\"  #Severe RV dilatation and severe biatrial enlargement by echocardiography.  LVEF 60%-65%.  -Likely contributing to right heart failure causing acetic fluid.  Normal AST, ALT, alkaline phosphatase and bilirubin of 0.7.  -Cardiology consulted to review and discuss options for medications (sildenafil, bosentan, inhaled NO, other.) for pulmonary hypertension  -discussed with cardiology, " unfortunately not able to initiate nitrate/CCB given soft blood pressure, recommending euvolemia with hemodialysis rounds, and referral to [facility] after discharge.      # h/o SVT  - Continue with PTA Amiodarone      # ESKD on HD: On HD M-W-F  -Nephrology consulted. Had missed HD on day of admission  -On midodrine given soft BP.  -Will be continued per her dialysis schedule now.  Next HD Monday.    # Chronic anemia, due to ESRD  -Baseline hemoglobin 9-10, noted her hemoglobin dropped to 7, no sign of external bleeding.  -Recheck hemoglobin now  -Transfuse if 7 or less, patient is still makes urine so we will give additional Lasix if PRBC transfusion needed.  -recheck after transfusion, If Hb <9, continue to check q8 hr x3    # DM type II  - Patient is on Levemir (PTA med list states 60 units daily per prescription, apparently reported she is taking 15 units daily but now states uses less than that unclear)  - Became hypoglycemic, suspect due to short acting insulin when used for 1 unit per 6 gm CHO (although uses 1 Unit/5gm at home)  - Carb coverage-reduced to 1 unit per 10 g CHO, Levemir 4 units (although patient requests what see wants)  - Hypoglycemia protocol in place     Diet: Snacks/Supplements Adult: Ensure Enlive; With Meals  Combination Diet Regular Diet Adult; 2 gm NA Diet    DVT Prophylaxis: Pneumatic Compression Devices  Weller Catheter: Not present  Central Lines: None  Cardiac Monitoring: ACTIVE order. Indication: Acute decompensated heart failure (48 hours)  Code Status: Full Code Full code per prior record    Disposition Plan   Expected Discharge: 06/19/2022  pending final culture on peritoneal fluid- likely 2 days  Anticipated discharge location:  Awaiting care coordination huddle  Delays:            The patient's care was discussed with the Bedside Nurse and Patient      Patricia Amaya MD  Hospitalist Service  [facility]  Securely message with the Vocera Web Console (learn  more here)  Text page via [facility] Paging/Directory         Clinically Significant Risk Factors Present on Admission              # Overweight: Estimated body mass index is 28.16 kg/m  as calculated from the following:    Height as of this encounter: 1.524 m (5').    Weight as of this encounter: 65.4 kg (144 lb 3.2 oz).      ______________________________________________________________________    Interval History     Discussed with nursing staff and evaluated patient this morning.  No episode of recurrent hypoglycemia.  Blood sugars mostly in 100s.  -Patient feels breathing has improved since admission, she reports she is walking with oxygen and will try without oxygen and monitor her oxygen level.  Remains afebrile.  -Abdominal distention has slightly increased.  Denies pain.  Denies diarrhea.  -No hematochezia or melena, noted hemoglobin has dropped to 7.       Data reviewed today: I reviewed all medications, new labs and imaging results over the last 24 hours. I personally reviewed no images or EKG's today.    Physical Exam   Vital Signs: Temp: 97.5  F (36.4  C) Temp src: Oral BP: 123/45 Pulse: 58   Resp: 17 SpO2: 100 % O2 Device: Nasal cannula    Weight: 144 lbs 3.2 oz    General: AAOx3, sitting up in the bed chronically ill-appearing, appears comfortable.  HEENT: PERRLA EOMI. Mucosa moist.   Lungs: Bilateral equal air entry. Very diminished breath sounds posteriorly especially on right.  CVS: S1S2 regular, no tachycardia.  Soft systolic murmur present.  Abdomen: Soft, diffusely distended with abdominal wall edema anteriorly unchanged. Not tender  MSK: Leg edema (+)  Neuro: AAOX3. CN 2-12 normal. Strength symmetrical.  Skin: No rash.       Data   Recent Labs   Lab 06/18/22  1236 06/18/22  0756 06/18/22  0728 06/18/22  0148 06/17/22  1225 06/17/22  0727 06/16/22  0044 06/15/22  1233   WBC  --  6.3  --   --   --   --   --  8.0   HGB  --  7.0*  --   --   --   --   --  11.0*   MCV  --  100  --   --   --   --   --  97    PLT  --  107*  --   --   --   --   --  153   INR  --   --   --   --   --   --   --  1.15   NA  --   --   --   --   --  135  --  136   POTASSIUM  --   --   --   --   --  4.1  --  4.9   CHLORIDE  --   --   --   --   --  100  --  97   CO2  --   --   --   --   --  28  --  25   BUN  --   --   --   --   --  36*  --  61*   CR  --   --   --   --   --  5.30*  --  7.66*   ANIONGAP  --   --   --   --   --  7  --  14   KANWAL  --   --   --   --   --  8.1*  --  8.8   *  --  203* 163*   < > 176*   < > 201*   ALBUMIN  --   --   --   --   --   --   --  2.5*  2.4*   PROTTOTAL  --   --   --   --   --   --   --  6.2*   BILITOTAL  --   --   --   --   --   --   --  0.7   ALKPHOS  --   --   --   --   --   --   --  129   ALT  --   --   --   --   --   --   --  42   AST  --   --   --   --   --   --   --  34    < > = values in this interval not displayed.     No results found for this or any previous visit (from the past 24 hour(s)).  Medications       amiodarone  200 mg Oral Daily     aspirin  81 mg Oral Daily     cefTRIAXone  2 g Intravenous Q24H     insulin aspart   Subcutaneous TID w/meals     insulin aspart  1-7 Units Subcutaneous TID AC     insulin aspart  1-5 Units Subcutaneous At Bedtime     insulin detemir  6 Units Subcutaneous At Bedtime     midodrine  10 mg Oral TID w/meals     sodium chloride (PF)  3 mL Intracatheter Q8H     umeclidinium-vilanterol  1 puff Inhalation Daily

## 2022-06-18 NOTE — PLAN OF CARE
Goal Outcome Evaluation:    A&O. VSS. On RA while in chair, 2L NC overnight. Tele: Sinus bradycardia with prolonged QT interval. PRN tylenol x1 for back pain and headache. Up SBA. Took shower in the evening. 2 gram sodium diet, good appetite.  and 163. Pt opted to have decreased dose of levemir due to fear of hypo PIV SL. Received albumin, tolerated well. Nephrology, Cardioogy, and PT following. Awaiting fluid culture results prior to discharge.

## 2022-06-18 NOTE — PROGRESS NOTES
"   06/18/22 0910   Quick Adds   Type of Visit Initial PT Evaluation       Present no   Language English   Living Environment   People in Home alone   Current Living Arrangements apartment   Home Accessibility stairs to enter home   Number of Stairs, Main Entrance greater than 10 stairs   Stair Railings, Main Entrance railing on right side (ascending)   Transportation Anticipated family or friend will provide   Living Environment Comments Lives alone in house with 2 dogs. Has 3 flights of stairs to access home env. She describes taking oxygen tank up/down the stairs. Takes standing rest break after each flight. Reports her house is very small and she often turns off oxygen tank, forgetful of turning it back on. She gets her groceries delivered, friend helps with rides and doctor appointments.   Self-Care   Usual Activity Tolerance moderate   Current Activity Tolerance fair   Regular Exercise No   Equipment Currently Used at Home other (see comments)  (O2 tank up/down stairs, 4WW)   Fall history within last six months yes   Number of times patient has fallen within last six months 1   General Information   Onset of Illness/Injury or Date of Surgery 06/15/22   Referring Physician Reginald Garcia MD   Patient/Family Therapy Goals Statement (PT) Feel better, go home   Pertinent History of Current Problem (include personal factors and/or comorbidities that impact the POC) Pt is a 62 year old female with history of type 2 diabetes mellitus, diabetic nephropathy, ES Kd on hemodialysis, chronic venous insufficiency, SVT, severe pulmonary hypertension admitted on 6/15/2022 with shortness of breath. Per cardiology note 6/16, \"This is a very unfortunate situation from cardiac standpoint with the patient having chronic, untreatable and life-threatening condition.  She is not a candidate for advanced therapies or transplant.  The best managed option here is to keep her euvolemic with aggressive hemodialysis " "runs.  In addition, thoracentesis can be considered if she continues to be short of breath.  Systemic blood pressure is soft and prohibitive for initiation of nitrates/calcium channel blockers.  Prognosis is extremely poor.  Would recommend getting hospice/palliative care consult if patient is okay with it (has refused in the past).\"   Existing Precautions/Restrictions fall;oxygen therapy device and L/min  (2L O2)   Cognition   Affect/Mental Status (Cognition) WFL   Orientation Status (Cognition) oriented x 4   Follows Commands (Cognition) WFL   Safety Deficit (Cognition) insight into deficits/self-awareness;moderate deficit;awareness of need for assistance   Pain Assessment   Patient Currently in Pain No   Posture    Posture Forward head position;Protracted shoulders;Kyphosis   Range of Motion (ROM)   Range of Motion ROM is WFL   Strength (Manual Muscle Testing)   Strength Comments Grossly deconditioned, grossly 4/5 B LE   Bed Mobility   Comment, (Bed Mobility) Did not formally assess, pt was already sitting in recliner at start of PT evaluation. Per nursing staff, pt performed sup>sit with no physical assist.   Transfers   Transfers sit-stand transfer   Comment, (Transfers) SB/CG, decr awareness of lines/tubes   Gait/Stairs (Locomotion)   Comment, (Gait/Stairs) SBA   Balance   Balance Comments Benefits from B UE support from 4WW to reduce cardiopulm effort, but is resistant to use of device   Sensory Examination   Sensory Perception patient reports no sensory changes   Coordination   Coordination no deficits were identified   Muscle Tone   Muscle Tone no deficits were identified   Clinical Impression   Criteria for Skilled Therapeutic Intervention Yes, treatment indicated   PT Diagnosis (PT) Impaired fn mobility   Influenced by the following impairments Cardiopulm endurance, activity tolerance, fatigue, strength, balance, insights into deficits and awareness of need for assistance   Functional limitations due to " "impairments Transfers, ambulation, stairs, activity tolerance   Clinical Presentation (PT Evaluation Complexity) Evolving/Changing   Clinical Presentation Rationale Multiple affecting comorbidities, assessment including strength, balance, fn mob.   Clinical Decision Making (Complexity) moderate complexity   Planned Therapy Interventions (PT) gait training;strengthening;transfer training;balance training;home exercise program;patient/family education;postural re-education;stair training;bed mobility training;neuromuscular re-education   Risk & Benefits of therapy have been explained evaluation/treatment results reviewed;care plan/treatment goals reviewed;risks/benefits reviewed;participants voiced agreement with care plan;participants included;patient;current/potential barriers reviewed   PT Discharge Planning   PT Discharge Recommendation (DC Rec) home with home care physical therapy;Long term care facility   PT Rationale for DC Rec Pt here with severe end-stage pulmonary hypertension, per cardiology \"with untreatable and life-threatening condition, would recommend getting hospice/palliative care consult\" She presentes close to her baseline from functional mobiltiy perspective. She completed transfers and ambulation with no AD, mild intermittent unsteadiness but no overt LOB, no knee buckling. She completed stairs with SBA, no octavio LOB, effortful for the patient. O2 sat >90% throughout on 2L. She owns 4WW but reports she does not use at home, likes to hang on to furniture when needed. If d/c home alone, would recommend HHPT to reduce falls risk.  Anticipate pt would benefit from more supportive living environment. She lives alone in apartment and has 3 flights of stairs and baseline 2L O2 to get inside.   PT Brief overview of current status CG with no AD, SB with 4WW   Plan of Care Review   Plan of Care Reviewed With patient   Total Evaluation Time   Total Evaluation Time (Minutes) 10   Physical Therapy Goals   PT " Frequency Daily   PT Predicted Duration/Target Date for Goal Attainment 06/23/22   PT Goals Bed Mobility;Transfers;Gait;Stairs   PT: Bed Mobility Modified independent   PT: Transfers Modified independent   PT: Gait Modified independent;50 feet   PT: Stairs Supervision/stand-by assist;Greater than 10 stairs  (3 flights with standing rest break between each flight)   Psychosocial Support   Trust Relationship/Rapport care explained;choices provided;empathic listening provided;questions answered;questions encouraged;reassurance provided;thoughts/feelings acknowledged

## 2022-06-19 LAB
ANION GAP SERPL CALCULATED.3IONS-SCNC: 6 MMOL/L (ref 3–14)
BASOPHILS # BLD AUTO: 0 10E3/UL (ref 0–0.2)
BASOPHILS NFR BLD AUTO: 0 %
BUN SERPL-MCNC: 41 MG/DL (ref 7–30)
CALCIUM SERPL-MCNC: 8.2 MG/DL (ref 8.5–10.1)
CHLORIDE BLD-SCNC: 97 MMOL/L (ref 94–109)
CO2 SERPL-SCNC: 28 MMOL/L (ref 20–32)
CREAT SERPL-MCNC: 5.2 MG/DL (ref 0.52–1.04)
EOSINOPHIL # BLD AUTO: 0.2 10E3/UL (ref 0–0.7)
EOSINOPHIL NFR BLD AUTO: 3 %
ERYTHROCYTE [DISTWIDTH] IN BLOOD BY AUTOMATED COUNT: 15.6 % (ref 10–15)
FERRITIN SERPL-MCNC: 898 NG/ML (ref 8–252)
GFR SERPL CREATININE-BSD FRML MDRD: 9 ML/MIN/1.73M2
GLUCOSE BLD-MCNC: 163 MG/DL (ref 70–99)
GLUCOSE BLDC GLUCOMTR-MCNC: 124 MG/DL (ref 70–99)
GLUCOSE BLDC GLUCOMTR-MCNC: 137 MG/DL (ref 70–99)
GLUCOSE BLDC GLUCOMTR-MCNC: 164 MG/DL (ref 70–99)
GLUCOSE BLDC GLUCOMTR-MCNC: 214 MG/DL (ref 70–99)
GLUCOSE BLDC GLUCOMTR-MCNC: 236 MG/DL (ref 70–99)
HCT VFR BLD AUTO: 22.7 % (ref 35–47)
HCT VFR BLD AUTO: 25.2 % (ref 35–47)
HGB BLD-MCNC: 7.1 G/DL (ref 11.7–15.7)
HGB BLD-MCNC: 7.9 G/DL (ref 11.7–15.7)
IMM GRANULOCYTES # BLD: 0 10E3/UL
IMM GRANULOCYTES NFR BLD: 1 %
IRON SATN MFR SERPL: 15 % (ref 15–46)
IRON SERPL-MCNC: 27 UG/DL (ref 35–180)
LYMPHOCYTES # BLD AUTO: 0.8 10E3/UL (ref 0.8–5.3)
LYMPHOCYTES NFR BLD AUTO: 11 %
MCH RBC QN AUTO: 31.1 PG (ref 26.5–33)
MCHC RBC AUTO-ENTMCNC: 31.3 G/DL (ref 31.5–36.5)
MCV RBC AUTO: 100 FL (ref 78–100)
MONOCYTES # BLD AUTO: 1 10E3/UL (ref 0–1.3)
MONOCYTES NFR BLD AUTO: 15 %
NEUTROPHILS # BLD AUTO: 4.7 10E3/UL (ref 1.6–8.3)
NEUTROPHILS NFR BLD AUTO: 70 %
NRBC # BLD AUTO: 0 10E3/UL
NRBC BLD AUTO-RTO: 0 /100
PLATELET # BLD AUTO: 125 10E3/UL (ref 150–450)
POTASSIUM BLD-SCNC: 4.4 MMOL/L (ref 3.4–5.3)
RBC # BLD AUTO: 2.28 10E6/UL (ref 3.8–5.2)
SODIUM SERPL-SCNC: 131 MMOL/L (ref 133–144)
TIBC SERPL-MCNC: 178 UG/DL (ref 240–430)
WBC # BLD AUTO: 6.7 10E3/UL (ref 4–11)

## 2022-06-19 PROCEDURE — 250N000013 HC RX MED GY IP 250 OP 250 PS 637: Performed by: INTERNAL MEDICINE

## 2022-06-19 PROCEDURE — 250N000011 HC RX IP 250 OP 636: Performed by: INTERNAL MEDICINE

## 2022-06-19 PROCEDURE — 83550 IRON BINDING TEST: CPT | Performed by: HOSPITALIST

## 2022-06-19 PROCEDURE — 99231 SBSQ HOSP IP/OBS SF/LOW 25: CPT | Performed by: INTERNAL MEDICINE

## 2022-06-19 PROCEDURE — 99232 SBSQ HOSP IP/OBS MODERATE 35: CPT | Performed by: HOSPITALIST

## 2022-06-19 PROCEDURE — 82728 ASSAY OF FERRITIN: CPT | Performed by: HOSPITALIST

## 2022-06-19 PROCEDURE — 36415 COLL VENOUS BLD VENIPUNCTURE: CPT | Performed by: HOSPITALIST

## 2022-06-19 PROCEDURE — 85025 COMPLETE CBC W/AUTO DIFF WBC: CPT | Performed by: HOSPITALIST

## 2022-06-19 PROCEDURE — 120N000001 HC R&B MED SURG/OB

## 2022-06-19 PROCEDURE — 85018 HEMOGLOBIN: CPT | Performed by: HOSPITALIST

## 2022-06-19 PROCEDURE — 80048 BASIC METABOLIC PNL TOTAL CA: CPT | Performed by: HOSPITALIST

## 2022-06-19 RX ADMIN — MIDODRINE HYDROCHLORIDE 10 MG: 5 TABLET ORAL at 18:28

## 2022-06-19 RX ADMIN — AMIODARONE HYDROCHLORIDE 200 MG: 200 TABLET ORAL at 08:04

## 2022-06-19 RX ADMIN — ONDANSETRON 4 MG: 4 TABLET, ORALLY DISINTEGRATING ORAL at 13:39

## 2022-06-19 RX ADMIN — MIDODRINE HYDROCHLORIDE 10 MG: 5 TABLET ORAL at 08:04

## 2022-06-19 RX ADMIN — CEFTRIAXONE SODIUM 2 G: 2 INJECTION, POWDER, FOR SOLUTION INTRAMUSCULAR; INTRAVENOUS at 15:50

## 2022-06-19 RX ADMIN — UMECLIDINIUM BROMIDE AND VILANTEROL TRIFENATATE 1 PUFF: 62.5; 25 POWDER RESPIRATORY (INHALATION) at 08:05

## 2022-06-19 RX ADMIN — ASPIRIN 81 MG: 81 TABLET, COATED ORAL at 08:04

## 2022-06-19 ASSESSMENT — ACTIVITIES OF DAILY LIVING (ADL)
ADLS_ACUITY_SCORE: 29

## 2022-06-19 NOTE — PLAN OF CARE
VSS on 1-2L O2 via NC. Tele: NSR with prolonged QT. Denies pain. Sat up in chair most of the day. Participated with PT. Ambulated in halls. Tolerating 2g sodium diet. , 151, 203. Patient takes less insulin that what is ordered due to concern of hypoglycemia. Levemir decreased to 4 units. Hgb 7.0 this AM. Recheck 7.7. Q8H hgb checks ordered. Conditional orders for blood transfusion for hgb less than 7. Continues on IV rocephin. Awaiting fluid culture results. Nephrology following. Dialysis MWF.

## 2022-06-19 NOTE — PROGRESS NOTES
Renal Medicine     Following for ESRD management     Dialyzed 06/17/22  UF 1 liter    Comfortable in bed  O2 2 LPM    No SOB    Plan dialysis 06/20/22  Patient requests UF profile #7        Recent Labs   Lab 06/19/22  0728 06/19/22  0634   NA  --  131*   POTASSIUM  --  4.4   CHLORIDE  --  97   CO2  --  28   ANIONGAP  --  6   * 163*   BUN  --  41*   CR  --  5.20*   GFRESTIMATED  --  9*   KANWAL  --  8.2*           NIMCO Nguyen    [facility]  [phone]

## 2022-06-19 NOTE — PROGRESS NOTES
"[facility]  Medicine Progress Note - Hospitalist Service    Date of Admission:  6/15/2022    Assessment & Plan            Yissel Wetzel is a 62 year old female with history of type 2 diabetes mellitus, diabetic nephropathy, ES Kd on hemodialysis, chronic venous insufficiency, SVT, severe pulmonary hypertension admitted on 6/15/2022 with shortness of breath.      #Shortness of breath  # COPD and Chronic hypoxemic respiratory failure: Uses supplemental O2 at baseline with activity.  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  Moderate pleural effusion on right side noted on CT. presentation not consistent with COPD exacerbation.  --Volume management with dialysis.  --Requiring 1-2 LPM NC O2,  dyspnea has improved.  Hold off on thoracentesis.  --Continued PTA inhalers.  She follows with [facility].   --Exercise oximetry prior to discharge  --Likely needs another paracentesis tomorrow, will send fluid for analysis again.     #Marked ascites and SBP  S/p paracentesis of 11.75 L of straw-colored fluid on admission.  Total nucleated cells of 403 and 72% neutrophils, which accounts for > 250 neutrophil meeting criteria for SBP.  -Continue with ceftriaxone 2 g daily  -Received albumin 1.5gm./kg on admission, and 1 gm /kg on day 3  - f/u on culture /sensitivities-no growth so far.   - Volume management with HD  -Likely needs repeat paracentesis prior to discharge.     #Severe pulmonary hypertension \" PA pressure 132/52 mmHg with mean of 81 during right heart catheterization in 07/2021.  She also had severely elevated LVEDP of 35-40 mmHg at the time.\"  #Severe RV dilatation and severe biatrial enlargement by echocardiography.  LVEF 60%-65%.  -Likely contributing to right heart failure causing acetic fluid.  Normal AST, ALT, alkaline phosphatase and bilirubin of 0.7.  -Cardiology consulted to review and discuss options for medications (sildenafil, bosentan, " inhaled NO, other.) for pulmonary hypertension  -discussed with cardiology, unfortunately not able to initiate nitrate/CCB given soft blood pressure, recommending euvolemia with hemodialysis rounds, and referral to [facility] after discharge.      # h/o SVT  - Continue with PTA Amiodarone      # ESKD on HD: On HD M-W-F  -Nephrology consulted. Had missed HD on day of admission  -On midodrine given soft BP.  -Will be continued per her dialysis schedule now.  Next HD Monday.    # Chronic anemia, due to ESRD  -Baseline hemoglobin 9-10, noted her hemoglobin dropped to 7, no sign of external bleeding.  -Recheck hemoglobin now  -Transfuse if 7 or less, patient is still makes urine so we will give additional Lasix if PRBC transfusion needed.  Conditional order for transfusion in place, patient is signed consent.  -Hb stable at 7, check twice daily.  -Patient used to get Epogen with dialysis and her hemoglobin improved.  Check iron level, likely benefit from IV iron/Epogen, defer to nephrology.    # DM type II  - Patient is on Levemir (PTA med list states 60 units daily per prescription, apparently reported she is taking 15 units daily but now states uses less than that unclear)  - Became hypoglycemic, suspect due to short acting insulin when used for 1 unit per 6 gm CHO (although uses 1 Unit/5gm at home)  - Carb coverage-reduced to 1 unit per 10 g CHO, Levemir 4 units (although patient requests what see wants) and blood sugar now has been stable in 100s.  - Hypoglycemia protocol in place     Diet: Snacks/Supplements Adult: Ensure Enlive; With Meals  Combination Diet Regular Diet Adult; 2 gm NA Diet    DVT Prophylaxis: Pneumatic Compression Devices  Weller Catheter: Not present  Central Lines: None  Cardiac Monitoring: None  Code Status: Full Code Full code per prior record    Disposition Plan   Expected Discharge: 06/20/2022 HTN possible paracentesis tomorrow and if tolerates discharge tomorrow/in 2 days  Anticipated discharge  location:  Awaiting care coordination huddle  Delays:            The patient's care was discussed with the Bedside Nurse and Patient      Patricia Amaya MD  Hospitalist Service  [facility]  Securely message with the Vocera Web Console (learn more here)  Text page via [employer] Paging/Directory         Clinically Significant Risk Factors Present on Admission              # Overweight: Estimated body mass index is 27.97 kg/m  as calculated from the following:    Height as of this encounter: 1.524 m (5').    Weight as of this encounter: 65 kg (143 lb 3.2 oz).      ______________________________________________________________________    Interval History     No acute issues reported, patient overall feels better, breathing has improved since admission, he is up and walking without getting more dyspneic.  Denies abdominal pain or nausea.  No diarrhea.  Remains afebrile.  - Hemoglobin mostly stable at 7.  No hematochezia or melena.       Data reviewed today: I reviewed all medications, new labs and imaging results over the last 24 hours. I personally reviewed no images or EKG's today.    Physical Exam   Vital Signs: Temp: 97.3  F (36.3  C) Temp src: Oral BP: 116/46 Pulse: 55   Resp: 18 SpO2: 99 % O2 Device: Nasal cannula Oxygen Delivery: 2 LPM  Weight: 143 lbs 3.2 oz    General: AAOx3, sitting up in the bed chronically ill-appearing, appears comfortable.  HEENT: PERRLA EOMI. Mucosa moist.   Lungs: Bilateral equal air entry. Very diminished breath sounds posteriorly especially on right.  CVS: S1S2 regular, no tachycardia.  Soft systolic murmur present.  Abdomen: Soft, diffusely distended with abdominal wall edema anteriorly unchanged. Not tender  MSK: Leg edema (+)  Neuro: AAOX3. CN 2-12 normal. Strength symmetrical.  Skin: No rash.       Data   Recent Labs   Lab 06/19/22  0728 06/19/22  0634 06/19/22  0221 06/18/22  1749 06/18/22  1430 06/18/22  1236 06/18/22  0756 06/17/22  1225 06/17/22  0727  06/16/22  0044 06/15/22  1233   WBC  --  6.7  --   --   --   --  6.3  --   --   --  8.0   HGB  --  7.1*  --   --  7.7*  --  7.0*  --   --   --  11.0*   MCV  --  100  --   --   --   --  100  --   --   --  97   PLT  --  125*  --   --   --   --  107*  --   --   --  153   INR  --   --   --   --   --   --   --   --   --   --  1.15   NA  --  131*  --   --   --   --   --   --  135  --  136   POTASSIUM  --  4.4  --   --   --   --   --   --  4.1  --  4.9   CHLORIDE  --  97  --   --   --   --   --   --  100  --  97   CO2  --  28  --   --   --   --   --   --  28  --  25   BUN  --  41*  --   --   --   --   --   --  36*  --  61*   CR  --  5.20*  --   --   --   --   --   --  5.30*  --  7.66*   ANIONGAP  --  6  --   --   --   --   --   --  7  --  14   KANWAL  --  8.2*  --   --   --   --   --   --  8.1*  --  8.8   * 163* 214*   < >  --    < >  --    < > 176*   < > 201*   ALBUMIN  --   --   --   --   --   --   --   --   --   --  2.5*  2.4*   PROTTOTAL  --   --   --   --   --   --   --   --   --   --  6.2*   BILITOTAL  --   --   --   --   --   --   --   --   --   --  0.7   ALKPHOS  --   --   --   --   --   --   --   --   --   --  129   ALT  --   --   --   --   --   --   --   --   --   --  42   AST  --   --   --   --   --   --   --   --   --   --  34    < > = values in this interval not displayed.     No results found for this or any previous visit (from the past 24 hour(s)).  Medications       - MEDICATION INSTRUCTIONS for Dialysis Patients -   Does not apply See Admin Instructions     amiodarone  200 mg Oral Daily     aspirin  81 mg Oral Daily     cefTRIAXone  2 g Intravenous Q24H     insulin aspart   Subcutaneous TID w/meals     insulin aspart  1-7 Units Subcutaneous TID AC     insulin aspart  1-5 Units Subcutaneous At Bedtime     insulin detemir  4 Units Subcutaneous At Bedtime     midodrine  10 mg Oral TID w/meals     sodium chloride (PF)  3 mL Intracatheter Q8H     umeclidinium-vilanterol  1 puff Inhalation Daily

## 2022-06-19 NOTE — PLAN OF CARE
Goal Outcome Evaluation:  A&O. VSS. On 1-2 L NC. Tele: Sinus perfecto with prolonged QT. Given tylenol x1 for headache. 2 gram sodium diet.  and 214. PIV SL, int abx. Awaiting fluid cultures. Dialysis MWF. Discharge pending.

## 2022-06-19 NOTE — PLAN OF CARE
VSS on 2L O2 via NC. Denies pain. Tele discontinued. Up SBA. Sat up in chair. Tolerating 2g sodium diet. PRN zofran given x1 for nausea. , 236, 124. Hemoglobin 7.1. Recheck this evening. Plan for dialysis tomorrow. Will likely need another paracentesis prior to discharge. Awaiting fluid culture results. Continues on IV rocephin.

## 2022-06-20 ENCOUNTER — APPOINTMENT (OUTPATIENT)
Dept: ULTRASOUND IMAGING | Facility: CLINIC | Age: 62
DRG: 314 | End: 2022-06-20
Attending: HOSPITALIST
Payer: MEDICARE

## 2022-06-20 LAB
% LINING CELLS, BODY FLUID: 10 %
ALBUMIN BODY FLUID SOURCE: NORMAL
ALBUMIN FLD-MCNC: 1.9 G/DL
ALBUMIN SERPL-MCNC: 2.6 G/DL (ref 3.4–5)
ANION GAP SERPL CALCULATED.3IONS-SCNC: 9 MMOL/L (ref 3–14)
APPEARANCE FLD: ABNORMAL
BACTERIA FLD CULT: NO GROWTH
BASOPHILS # BLD AUTO: 0 10E3/UL (ref 0–0.2)
BASOPHILS NFR BLD AUTO: 0 %
BUN SERPL-MCNC: 61 MG/DL (ref 7–30)
CALCIUM SERPL-MCNC: 8.8 MG/DL (ref 8.5–10.1)
CELL COUNT BODY FLUID SOURCE: ABNORMAL
CHLORIDE BLD-SCNC: 94 MMOL/L (ref 94–109)
CO2 SERPL-SCNC: 25 MMOL/L (ref 20–32)
COLOR FLD: ABNORMAL
CREAT SERPL-MCNC: 6.13 MG/DL (ref 0.52–1.04)
EOSINOPHIL # BLD AUTO: 0.1 10E3/UL (ref 0–0.7)
EOSINOPHIL NFR BLD AUTO: 2 %
ERYTHROCYTE [DISTWIDTH] IN BLOOD BY AUTOMATED COUNT: 15.7 % (ref 10–15)
GFR SERPL CREATININE-BSD FRML MDRD: 7 ML/MIN/1.73M2
GLUCOSE BLD-MCNC: 188 MG/DL (ref 70–99)
GLUCOSE BLDC GLUCOMTR-MCNC: 108 MG/DL (ref 70–99)
GLUCOSE BLDC GLUCOMTR-MCNC: 173 MG/DL (ref 70–99)
GLUCOSE BLDC GLUCOMTR-MCNC: 175 MG/DL (ref 70–99)
GLUCOSE BLDC GLUCOMTR-MCNC: 188 MG/DL (ref 70–99)
GLUCOSE BLDC GLUCOMTR-MCNC: 192 MG/DL (ref 70–99)
GRAM STAIN RESULT: NORMAL
GRAM STAIN RESULT: NORMAL
HCT VFR BLD AUTO: 23.3 % (ref 35–47)
HGB BLD-MCNC: 7.4 G/DL (ref 11.7–15.7)
IMM GRANULOCYTES # BLD: 0.1 10E3/UL
IMM GRANULOCYTES NFR BLD: 1 %
LYMPHOCYTES # BLD AUTO: 0.7 10E3/UL (ref 0.8–5.3)
LYMPHOCYTES NFR BLD AUTO: 10 %
LYMPHOCYTES NFR FLD MANUAL: 26 %
MCH RBC QN AUTO: 31.2 PG (ref 26.5–33)
MCHC RBC AUTO-ENTMCNC: 31.8 G/DL (ref 31.5–36.5)
MCV RBC AUTO: 98 FL (ref 78–100)
MONOCYTES # BLD AUTO: 0.9 10E3/UL (ref 0–1.3)
MONOCYTES NFR BLD AUTO: 13 %
MONOS+MACROS NFR FLD MANUAL: 42 %
NEUTROPHILS # BLD AUTO: 5.3 10E3/UL (ref 1.6–8.3)
NEUTROPHILS NFR BLD AUTO: 74 %
NEUTS BAND NFR FLD MANUAL: 22 %
NRBC # BLD AUTO: 0 10E3/UL
NRBC BLD AUTO-RTO: 0 /100
PLATELET # BLD AUTO: 137 10E3/UL (ref 150–450)
POTASSIUM BLD-SCNC: 4.7 MMOL/L (ref 3.4–5.3)
PROT FLD-MCNC: 3.4 G/DL
PROTEIN BODY FLUID SOURCE: NORMAL
RBC # BLD AUTO: 2.37 10E6/UL (ref 3.8–5.2)
SODIUM SERPL-SCNC: 128 MMOL/L (ref 133–144)
WBC # BLD AUTO: 7.1 10E3/UL (ref 4–11)
WBC # FLD AUTO: 351 /UL

## 2022-06-20 PROCEDURE — 99232 SBSQ HOSP IP/OBS MODERATE 35: CPT | Performed by: HOSPITALIST

## 2022-06-20 PROCEDURE — 87070 CULTURE OTHR SPECIMN AEROBIC: CPT | Performed by: HOSPITALIST

## 2022-06-20 PROCEDURE — 634N000001 HC RX 634: Performed by: INTERNAL MEDICINE

## 2022-06-20 PROCEDURE — 99233 SBSQ HOSP IP/OBS HIGH 50: CPT | Performed by: INTERNAL MEDICINE

## 2022-06-20 PROCEDURE — 82310 ASSAY OF CALCIUM: CPT | Performed by: HOSPITALIST

## 2022-06-20 PROCEDURE — 90937 HEMODIALYSIS REPEATED EVAL: CPT

## 2022-06-20 PROCEDURE — 272N000706 US PARACENTESIS WITHOUT ALBUMIN

## 2022-06-20 PROCEDURE — 258N000003 HC RX IP 258 OP 636: Performed by: INTERNAL MEDICINE

## 2022-06-20 PROCEDURE — 82040 ASSAY OF SERUM ALBUMIN: CPT | Performed by: HOSPITALIST

## 2022-06-20 PROCEDURE — 250N000013 HC RX MED GY IP 250 OP 250 PS 637: Performed by: INTERNAL MEDICINE

## 2022-06-20 PROCEDURE — 120N000001 HC R&B MED SURG/OB

## 2022-06-20 PROCEDURE — 87075 CULTR BACTERIA EXCEPT BLOOD: CPT | Performed by: HOSPITALIST

## 2022-06-20 PROCEDURE — 85025 COMPLETE CBC W/AUTO DIFF WBC: CPT | Performed by: HOSPITALIST

## 2022-06-20 PROCEDURE — 87205 SMEAR GRAM STAIN: CPT | Performed by: HOSPITALIST

## 2022-06-20 PROCEDURE — 250N000009 HC RX 250: Performed by: RADIOLOGY

## 2022-06-20 PROCEDURE — 82042 OTHER SOURCE ALBUMIN QUAN EA: CPT | Performed by: HOSPITALIST

## 2022-06-20 PROCEDURE — 84157 ASSAY OF PROTEIN OTHER: CPT | Performed by: HOSPITALIST

## 2022-06-20 PROCEDURE — 0W9G3ZZ DRAINAGE OF PERITONEAL CAVITY, PERCUTANEOUS APPROACH: ICD-10-PCS | Performed by: RADIOLOGY

## 2022-06-20 PROCEDURE — 89051 BODY FLUID CELL COUNT: CPT | Performed by: HOSPITALIST

## 2022-06-20 PROCEDURE — 36415 COLL VENOUS BLD VENIPUNCTURE: CPT | Performed by: HOSPITALIST

## 2022-06-20 PROCEDURE — 250N000011 HC RX IP 250 OP 636: Performed by: INTERNAL MEDICINE

## 2022-06-20 RX ORDER — LIDOCAINE HYDROCHLORIDE 10 MG/ML
10 INJECTION, SOLUTION EPIDURAL; INFILTRATION; INTRACAUDAL; PERINEURAL ONCE
Status: COMPLETED | OUTPATIENT
Start: 2022-06-20 | End: 2022-06-20

## 2022-06-20 RX ADMIN — LIDOCAINE HYDROCHLORIDE 10 ML: 10 INJECTION, SOLUTION EPIDURAL; INFILTRATION; INTRACAUDAL; PERINEURAL at 11:35

## 2022-06-20 RX ADMIN — CEFTRIAXONE SODIUM 2 G: 2 INJECTION, POWDER, FOR SOLUTION INTRAMUSCULAR; INTRAVENOUS at 18:25

## 2022-06-20 RX ADMIN — UMECLIDINIUM BROMIDE AND VILANTEROL TRIFENATATE 1 PUFF: 62.5; 25 POWDER RESPIRATORY (INHALATION) at 08:04

## 2022-06-20 RX ADMIN — SODIUM CHLORIDE 300 ML: 9 INJECTION, SOLUTION INTRAVENOUS at 16:59

## 2022-06-20 RX ADMIN — AMIODARONE HYDROCHLORIDE 200 MG: 200 TABLET ORAL at 07:47

## 2022-06-20 RX ADMIN — MIDODRINE HYDROCHLORIDE 10 MG: 5 TABLET ORAL at 08:01

## 2022-06-20 RX ADMIN — MIDODRINE HYDROCHLORIDE 10 MG: 5 TABLET ORAL at 18:25

## 2022-06-20 RX ADMIN — EPOETIN ALFA-EPBX 10000 UNITS: 10000 INJECTION, SOLUTION INTRAVENOUS; SUBCUTANEOUS at 16:59

## 2022-06-20 RX ADMIN — SODIUM CHLORIDE 250 ML: 9 INJECTION, SOLUTION INTRAVENOUS at 16:59

## 2022-06-20 RX ADMIN — ASPIRIN 81 MG: 81 TABLET, COATED ORAL at 07:47

## 2022-06-20 ASSESSMENT — ACTIVITIES OF DAILY LIVING (ADL)
ADLS_ACUITY_SCORE: 29

## 2022-06-20 NOTE — PROGRESS NOTES
Renal Medicine Progress Note            Assessment/Plan:     62 y.o frail woman with ESRD and liver cirrhosis.     # ESRD   R UE AVF   EDW 72.5 kg   180 minutes   Qb 450 ml/min w 15 g needle   Heparin 1000/500 units   + midodrine    # Liver cirrhosis s/p 11.5 liters LVP: On Rocephin for SBP.     # COPD and Cor pulmonale: On home O2.     # Anemia in ESRD: Hgb is low. EPO with HD and Venofer with HD.     # FEN: Abdomen is pretty distended but soft wo pain. No peripheral edema. Hyponatremia.     Plan:  # Hemodialysis treatment later today as ordered by Dr. Nguyen. I reviewed HD order with dialysis RN.            Interval History:     Patient is laying in bed.   Afebrile. VSS.   She denies worsening SOB.  No N/V.  Denies abdominal pain.            Medications and Allergies:       - MEDICATION INSTRUCTIONS for Dialysis Patients -   Does not apply See Admin Instructions     amiodarone  200 mg Oral Daily     aspirin  81 mg Oral Daily     cefTRIAXone  2 g Intravenous Q24H     insulin aspart   Subcutaneous TID w/meals     insulin aspart  1-7 Units Subcutaneous TID AC     insulin aspart  1-5 Units Subcutaneous At Bedtime     insulin detemir  4 Units Subcutaneous At Bedtime     midodrine  10 mg Oral TID w/meals     sodium chloride (PF)  3 mL Intracatheter Q8H     umeclidinium-vilanterol  1 puff Inhalation Daily        Allergies   Allergen Reactions     Albuterol      shakiness     Aspirin      gi     Byetta [Exenatide]      gi     Clonidine      constipation     Codeine      vomiting     Ezetimibe      muscle symptoms     Hydralazine      headaches     Lisinopril      hyperkalemia     Metformin Hydrochloride      vomiting     Pravachol [Pravastatin Sodium]      muscle pains     Simvastatin      myalgias     Troglitazone             Physical Exam:   Vitals were reviewed   , Blood pressure 124/42, pulse 57, temperature 97.4  F (36.3  C), temperature source Oral, resp. rate 18, height 1.524 m (5'), weight 65.8 kg (145 lb), SpO2  99 %, not currently breastfeeding.    Wt Readings from Last 3 Encounters:   06/20/22 65.8 kg (145 lb)   04/05/22 70.3 kg (155 lb)   07/25/21 77.5 kg (170 lb 14.4 oz)     No intake or output data in the 24 hours ending 06/20/22 1024    GENERAL APPEARANCE: Frail and looks much older than her age.   HEENT:  Eyes/ears/nose/neck grossly normal  RESP: lungs cta b c good efforts, no crackles, rhonchi or wheezes  CV: RRR, nl S1/S2, no m/r/g   ABDOMEN: Pretty distended but soft and NT.  EXTREMITIES/SKIN: No peripheral edema  NEURO: Awake and answering questions appropriately.  R UE AVF + bruit         Data:     CBC RESULTS:     Recent Labs   Lab 06/20/22  0735 06/19/22  1847 06/19/22  0634 06/18/22  1430 06/18/22  0756 06/15/22  1233   WBC 7.1  --  6.7  --  6.3 8.0   RBC 2.37*  --  2.28*  --  2.25* 3.51*   HGB 7.4* 7.9* 7.1* 7.7* 7.0* 11.0*   HCT 23.3* 25.2* 22.7* 24.3* 22.6* 34.1*   *  --  125*  --  107* 153       Basic Metabolic Panel:  Recent Labs   Lab 06/20/22  0735 06/20/22  0700 06/20/22  0200 06/19/22  2138 06/19/22  1751 06/19/22  1237 06/19/22  0728 06/19/22  0634 06/17/22  1225 06/17/22  0727 06/16/22  0044 06/15/22  1233   *  --   --   --   --   --   --  131*  --  135  --  136   POTASSIUM 4.7  --   --   --   --   --   --  4.4  --  4.1  --  4.9   CHLORIDE 94  --   --   --   --   --   --  97  --  100  --  97   CO2 25  --   --   --   --   --   --  28  --  28  --  25   BUN 61*  --   --   --   --   --   --  41*  --  36*  --  61*   CR 6.13*  --   --   --   --   --   --  5.20*  --  5.30*  --  7.66*   * 188* 173* 137* 124* 236*   < > 163*   < > 176*   < > 201*   KANWAL 8.8  --   --   --   --   --   --  8.2*  --  8.1*  --  8.8    < > = values in this interval not displayed.       INR  Recent Labs   Lab 06/15/22  1233   INR 1.15      Attestation:   I have reviewed today's relevant vital signs, notes, medications, labs and imaging.    Theron Alonso MD  [facility] - Nephrology  Office phone  :[phone]  Pager: [phone]

## 2022-06-20 NOTE — PLAN OF CARE
Goal Outcome Evaluation:    A&O. On 2L NC. Bradycardic. Denies pain. Up SBA. 2 gram NA diet. , 173. Hgb 7.9. Had shower on evenings. PIV SL with int abx. Will plan for dialysis this AM and possible paracentesis. Discharge likely today or tomorrow.

## 2022-06-20 NOTE — PROGRESS NOTES
"[facility]  Medicine Progress Note - Hospitalist Service    Date of Admission:  6/15/2022    Assessment & Plan            Yissel Wetzel is a 62 year old female with history of type 2 diabetes mellitus, diabetic nephropathy, ES Kd on hemodialysis, chronic venous insufficiency, SVT, severe pulmonary hypertension admitted on 6/15/2022 with shortness of breath.      #Shortness of breath  # COPD and Chronic hypoxemic respiratory failure: Uses supplemental O2 at baseline with activity.  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  Moderate pleural effusion on right side noted on CT. presentation not consistent with COPD exacerbation.  --Volume management with dialysis.  --Requiring 1-2 LPM NC O2,  dyspnea has improved.  Hold off on thoracentesis.  --Continued PTA inhalers.  She follows with [facility].   --Exercise oximetry prior to discharge  --s/p repeat Paracentesis 6/20     #Marked ascites and SBP  S/p paracentesis of 11.75 L of straw-colored fluid on admission.  Total nucleated cells of 403 and 72% neutrophils, which accounts for > 250 neutrophil meeting criteria for SBP.  -Continue with ceftriaxone 2 g daily for 5 days then PO at discharge  -Received albumin 1.5gm./kg on admission, and 1 gm /kg on day 3  - /u on culture /sensitivities-no growth so far.   - Volume management with HD  -s/p repeat paracentesis today and 4.9 L removed. Repeating fluid analysis given SBP  - closely monitor BP given large vol paracentesis, also scheduled to have HD this afternoon     #Severe pulmonary hypertension \" PA pressure 132/52 mmHg with mean of 81 during right heart catheterization in 07/2021. She also had severely elevated LVEDP of 35-40 mmHg at the time.\"  #Severe RV dilatation and severe biatrial enlargement by echocardiography.  LVEF 60%-65%.  -Likely contributing to right heart failure causing acetic fluid.  Normal AST, ALT, alkaline phosphatase and bilirubin " of 0.7.  -Cardiology consulted to review and discuss options for medications (sildenafil, bosentan, inhaled NO, other.) for pulmonary hypertension  -discussed with cardiology, unfortunately not able to initiate nitrate/CCB given soft blood pressure, recommending euvolemia with hemodialysis rounds, and referral to [facility] after discharge.      # h/o SVT  - Continue with PTA Amiodarone      # ESKD on HD: On HD M-W-F  -Nephrology consulted. Had missed HD on day of admission  -On midodrine given soft BP.  -Will be continued per her dialysis schedule now.  Next HD Monday.    # Chronic anemia, due to ESRD  -Baseline hemoglobin 9-10, noted her hemoglobin dropped to 7, no sign of external bleeding.  -Recheck hemoglobin now  -Transfuse if 7 or less, patient is still makes urine so we will give additional Lasix if PRBC transfusion needed.  Conditional order for transfusion in place, patient is signed consent.  -Hb stable at 7, check twice daily.  -Patient used to get Epogen with dialysis and her hemoglobin improved.  Check iron level, likely benefit from IV iron/Epogen, defer to nephrology.    # DM type II  - Patient is on Levemir (PTA med list states 60 units daily per prescription, apparently reported she is taking 15 units daily but now states uses less than that unclear)  - Became hypoglycemic, suspect due to short acting insulin when used for 1 unit per 6 gm CHO (although uses 1 Unit/5gm at home)  - Carb coverage-reduced to 1 unit per 10 g CHO, Levemir 4 units. BS have been in upper 100s, increase to 6 units (although patient requests what see wants)   - Hypoglycemia protocol in place     Diet: Snacks/Supplements Adult: Ensure Enlive; With Meals  Combination Diet Regular Diet Adult; 2 gm NA Diet    DVT Prophylaxis: Pneumatic Compression Devices  Weller Catheter: Not present  Central Lines: None  Cardiac Monitoring: None  Code Status: Full Code Full code per prior record    Disposition Plan   Expected Discharge: 06/23/2022   In next 24 hours, after HD later today if BP stable.     Anticipated discharge location:  Awaiting care coordination huddle  Delays:            The patient's care was discussed with the Bedside Nurse and Patient      Patricia Amaya MD  Hospitalist Service  [facility]  Securely message with the Vocera Web Console (learn more here)  Text page via [employer] Paging/Directory         Clinically Significant Risk Factors Present on Admission                     ______________________________________________________________________    Interval History     Evaluated patient this morning, patient feels about the same, breathing at her baseline.  Denies abdominal pain.  Remains afebrile.  Hemoglobin stable at 7, no hematochezia or melena.       Data reviewed today: I reviewed all medications, new labs and imaging results over the last 24 hours. I personally reviewed no images or EKG's today.    Physical Exam   Vital Signs: Temp: 97.4  F (36.3  C) Temp src: Axillary BP: 139/50 Pulse: 66   Resp: 20 SpO2: 100 % O2 Device: Nasal cannula Oxygen Delivery: 2 LPM  Weight: 145 lbs 0 oz    General: AAOx3, sitting up in the chair, chronically ill-appearing, appears comfortable.  HEENT: PERRLA EOMI. Mucosa moist.   Lungs: Bilateral equal air entry. Very diminished breath sounds posteriorly especially on right.  CVS: S1S2 regular, no tachycardia.  Soft systolic murmur present.  Abdomen: Soft, diffusely distended with abdominal wall edema anteriorly. Not tender  MSK: Leg edema (+)  Neuro: AAOX3. CN 2-12 normal. Strength symmetrical.  Skin: No rash.       Data   Recent Labs   Lab 06/20/22  0735 06/20/22  0700 06/20/22  0200 06/19/22  2138 06/19/22  1847 06/19/22  0728 06/19/22  0634 06/18/22  1236 06/18/22  0756 06/17/22  1225 06/17/22  0727 06/16/22  0044 06/15/22  1233   WBC 7.1  --   --   --   --   --  6.7  --  6.3  --   --   --  8.0   HGB 7.4*  --   --   --  7.9*  --  7.1*   < > 7.0*  --   --   --  11.0*   MCV 98   --   --   --   --   --  100  --  100  --   --   --  97   *  --   --   --   --   --  125*  --  107*  --   --   --  153   INR  --   --   --   --   --   --   --   --   --   --   --   --  1.15   *  --   --   --   --   --  131*  --   --   --  135  --  136   POTASSIUM 4.7  --   --   --   --   --  4.4  --   --   --  4.1  --  4.9   CHLORIDE 94  --   --   --   --   --  97  --   --   --  100  --  97   CO2 25  --   --   --   --   --  28  --   --   --  28  --  25   BUN 61*  --   --   --   --   --  41*  --   --   --  36*  --  61*   CR 6.13*  --   --   --   --   --  5.20*  --   --   --  5.30*  --  7.66*   ANIONGAP 9  --   --   --   --   --  6  --   --   --  7  --  14   KANWAL 8.8  --   --   --   --   --  8.2*  --   --   --  8.1*  --  8.8   * 188* 173*   < >  --    < > 163*   < >  --    < > 176*   < > 201*   ALBUMIN 2.6*  --   --   --   --   --   --   --   --   --   --   --  2.5*  2.4*   PROTTOTAL  --   --   --   --   --   --   --   --   --   --   --   --  6.2*   BILITOTAL  --   --   --   --   --   --   --   --   --   --   --   --  0.7   ALKPHOS  --   --   --   --   --   --   --   --   --   --   --   --  129   ALT  --   --   --   --   --   --   --   --   --   --   --   --  42   AST  --   --   --   --   --   --   --   --   --   --   --   --  34    < > = values in this interval not displayed.     No results found for this or any previous visit (from the past 24 hour(s)).  Medications       - MEDICATION INSTRUCTIONS for Dialysis Patients -   Does not apply See Admin Instructions     sodium chloride 0.9%  250 mL Intravenous Once in dialysis/CRRT     sodium chloride 0.9%  300 mL Hemodialysis Machine Once     amiodarone  200 mg Oral Daily     aspirin  81 mg Oral Daily     cefTRIAXone  2 g Intravenous Q24H     epoetin fabiana-epbx (RETACRIT) inj ESRD  10,000 Units Intravenous Once in dialysis/CRRT     insulin aspart   Subcutaneous TID w/meals     insulin aspart  1-7 Units Subcutaneous TID AC     insulin aspart  1-5 Units  Subcutaneous At Bedtime     insulin detemir  4 Units Subcutaneous At Bedtime     midodrine  10 mg Oral TID w/meals     - MEDICATION INSTRUCTIONS -   Does not apply Once     sodium chloride (PF)  3 mL Intracatheter Q8H     umeclidinium-vilanterol  1 puff Inhalation Daily

## 2022-06-20 NOTE — PLAN OF CARE
[id number]     Pt A&Ox4. VSS on 2L NC ,ex perfecto. SBA to br, cont B/B. Denies pain. 2g Na diet. BG  188, 175. Hgb 7.4. PIV SL. Dialysis MWF, currently at dialysis. Paracentesis completed, output 4.9 L. Discharge likely tomorrow.

## 2022-06-20 NOTE — PROGRESS NOTES
Potassium   Date Value Ref Range Status   06/20/2022 4.7 3.4 - 5.3 mmol/L Final   10/05/2020 4.1 3.5 - 5.5 mEq/L Final     Hemoglobin   Date Value Ref Range Status   06/20/2022 7.4 (L) 11.7 - 15.7 g/dL Final   01/13/2020 8.4 (L) 11.7 - 15.7 g/dL Final     Creatinine   Date Value Ref Range Status   06/20/2022 6.13 (H) 0.52 - 1.04 mg/dL Final   01/13/2020 5.97 (H) 0.52 - 1.04 mg/dL Final     Urea Nitrogen   Date Value Ref Range Status   06/20/2022 61 (H) 7 - 30 mg/dL Final   01/13/2020 44 (H) 7 - 30 mg/dL Final     Sodium   Date Value Ref Range Status   06/20/2022 128 (L) 133 - 144 mmol/L Final   01/13/2020 139 133 - 144 mmol/L Final     INR   Date Value Ref Range Status   06/15/2022 1.15 0.85 - 1.15 Final   01/12/2020 1.23 (H) 0.86 - 1.14 Final       DIALYSIS PROCEDURE NOTE  Hepatitis status of previous patient on machine log was checked and verified ok to use with this patients hepatitis status.  Patient dialyzed for 2.75 hrs. on a K3 bath with a net fluid removal of  0L.  A BFR of 400 ml/min was obtained via a LAVF using 25 gauge needles.  Run was initiated by Anjelica Martin RN and Amariah Schoener RN, relieved her of the run at 1700. ICEBOAT performed.     The treatment plan was discussed with Dr. Salinas during the treatment.    Total heparin received during the treatment: 0 units.   Needle cannulation sites held x 10 min.     Meds  given: (see mar) given by initiating nurse Anjelica martin  Complications: No fluid was pulled with verbal agreement from the physicians due to patient receiving paracentesis with 4L of fluid removal.  Patient insisted on coming off dialysis 15 minutes early.        Person educated: patient. Knowledge base substantial. Barriers to learning: none. Educated on dialysis via verbal mode. patient/family verbalized understanding. Pt prefers verbal education style.     ICEBOAT? Timeout performed pre-treatment  I: Patient was identified using 2 identifiers  C:  Consent Signed Yes  E: Equipment  preventative maintenance is current and dialysis delivery system OK to use  B: Hepatitis B Surface Antigen: Negative; Draw Date: 7/14/21      Hepatitis B Surface Antibody: Immune; Draw Date: 7/14/21  O: Dialysis orders present and complete prior to treatment  A: Vascular access verified and assessed prior to treatment  T: Treatment was performed at a clinically appropriate time  ?: Patient was allowed to ask questions and address concerns prior to treatment  See Adult Hemodialysis flowsheet in [facility] for further details and post assessment.  Machine water alarm in place and functioning. Transducer pods intact and checked every 15min.   Pt returned via bed.  Chlorine/Chloramine water system checked every 4 hours.      Post treatment report given to CARMELA Bailey RN regarding 0 L of fluid removed, last BP of 135/47, and patient pain rating of 0/10.     Please remove patient dressing on AVF and AVG needle sites 24 hours after dialysis. If leaking occurs please apply a Band-Aid.

## 2022-06-21 VITALS
RESPIRATION RATE: 18 BRPM | TEMPERATURE: 98.5 F | HEART RATE: 58 BPM | SYSTOLIC BLOOD PRESSURE: 120 MMHG | WEIGHT: 145 LBS | DIASTOLIC BLOOD PRESSURE: 95 MMHG | OXYGEN SATURATION: 96 % | BODY MASS INDEX: 28.47 KG/M2 | HEIGHT: 60 IN

## 2022-06-21 LAB
BACTERIA BLD CULT: NO GROWTH
BACTERIA BLD CULT: NO GROWTH
GLUCOSE BLDC GLUCOMTR-MCNC: 151 MG/DL (ref 70–99)
GLUCOSE BLDC GLUCOMTR-MCNC: 218 MG/DL (ref 70–99)

## 2022-06-21 PROCEDURE — 250N000013 HC RX MED GY IP 250 OP 250 PS 637: Performed by: INTERNAL MEDICINE

## 2022-06-21 PROCEDURE — 99239 HOSP IP/OBS DSCHRG MGMT >30: CPT | Performed by: HOSPITALIST

## 2022-06-21 RX ORDER — CEFUROXIME AXETIL 500 MG/1
500 TABLET ORAL 2 TIMES DAILY
Qty: 14 TABLET | Refills: 0 | Status: SHIPPED | OUTPATIENT
Start: 2022-06-21 | End: 2022-06-21

## 2022-06-21 RX ORDER — MIDODRINE HYDROCHLORIDE 10 MG/1
10 TABLET ORAL
Qty: 90 TABLET | Refills: 0 | Status: SHIPPED | OUTPATIENT
Start: 2022-06-21 | End: 2023-09-07

## 2022-06-21 RX ORDER — CEFUROXIME AXETIL 500 MG/1
500 TABLET ORAL DAILY
Qty: 7 TABLET | Refills: 0 | Status: SHIPPED | OUTPATIENT
Start: 2022-06-21 | End: 2022-06-28

## 2022-06-21 RX ORDER — INSULIN DETEMIR 100 [IU]/ML
6 INJECTION, SOLUTION SUBCUTANEOUS AT BEDTIME
Qty: 15 ML | Refills: 0
Start: 2022-06-21 | End: 2023-01-23

## 2022-06-21 RX ADMIN — UMECLIDINIUM BROMIDE AND VILANTEROL TRIFENATATE 1 PUFF: 62.5; 25 POWDER RESPIRATORY (INHALATION) at 08:41

## 2022-06-21 RX ADMIN — ASPIRIN 81 MG: 81 TABLET, COATED ORAL at 07:59

## 2022-06-21 RX ADMIN — MIDODRINE HYDROCHLORIDE 10 MG: 5 TABLET ORAL at 07:59

## 2022-06-21 RX ADMIN — ACETAMINOPHEN 500 MG: 500 TABLET, FILM COATED ORAL at 00:14

## 2022-06-21 RX ADMIN — AMIODARONE HYDROCHLORIDE 200 MG: 200 TABLET ORAL at 07:59

## 2022-06-21 ASSESSMENT — ACTIVITIES OF DAILY LIVING (ADL)
ADLS_ACUITY_SCORE: 29

## 2022-06-21 NOTE — PLAN OF CARE
Pt adequate for discharge. PIV removed. Pt dressed and ready to go. Family member brought oxygen in. Discharge paper explained by AM nurse. Discharge medications brought in with hand off and explained to pt. No further questions. Wheelchair ride down to door.

## 2022-06-21 NOTE — PLAN OF CARE
A&Ox4, VSS on 2L NC with exception of soft Bps. Up SBA. Mild complaint of back pain, tylenol given. Awaiting results of repeat fluid analysis from paracentesis. Possible discharge today if Bps remain stable.

## 2022-06-21 NOTE — DISCHARGE SUMMARY
[facility]  Hospitalist Discharge Summary      Date of Admission:  6/15/2022  Date of Discharge:  6/21/2022  Discharging Provider: Patricia Amaya MD  Discharge Service: Hospitalist Service    Discharge Diagnoses     Please refer to the hospital course below    Follow-ups Needed After Discharge   Follow-up Appointments     Follow-up and recommended labs and tests       Follow up with primary care provider, Gigi Martin, within 7 days   to evaluate medication change, for hospital follow- up, and regarding new   diagnosis.  The following labs/tests are recommended: H&H in a week.    Resume your Outpatient [facility] on Mondays,   Wednesdays & Fridays at 10:00 AM arrival time.               Unresulted Labs Ordered in the Past 30 Days of this Admission     Date and Time Order Name Status Description    6/20/2022  7:09 AM Anaerobic bacterial culture In process     6/20/2022  7:09 AM Ascites Fluid Aerobic Bacterial Culture Routine Preliminary       These results will be followed up by none    Discharge Disposition   Discharged to home  Condition at discharge: Stable      Hospital Course            Yissel Wetzel is a 62 year old female with history of type 2 diabetes mellitus, diabetic nephropathy, ES Kd on hemodialysis, chronic venous insufficiency, SVT, severe pulmonary hypertension admitted on 6/15/2022 with shortness of breath.      #Shortness of breath  # COPD and Chronic hypoxemic respiratory failure: Uses supplemental O2 at baseline with activity.  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  Moderate pleural effusion on right side noted on CT. presentation not consistent with COPD exacerbation.  --Volume management with dialysis.  --Requiring 1-2 LPM NC O2,  dyspnea has improved. Thoracentesis not done.  --Continued PTA inhalers.  She follows with [facility].   --Exercise oximetry prior to discharge  --See  "pulmonary hypertension below     #Marked ascites and SBP  S/p paracentesis of 11.75 L of straw-colored fluid on admission.  Total nucleated cells of 403 and 72% neutrophils, which accounts for > 250 neutrophil meeting criteria for SBP.  -Continue with ceftriaxone 2 g daily for 5 days then PO ceftin for a week at discharge (500 mg daily, renally adjusted dose)  -Received albumin 1.5gm./kg on admission, and 1 gm /kg on day 3  -Fluid culture remains negative  - Volume management with HD  -s/p repeat paracentesis 6/20 and 4.9 L removed. Repeating fluid analysis showed PMN less than 250 (total cell 351 and 22% PMN)   - closely monitor BP given large vol paracentesis, also scheduled to have HD this afternoon     #Severe pulmonary hypertension \" PA pressure 132/52 mmHg with mean of 81 during right heart catheterization in 07/2021. She also had severely elevated LVEDP of 35-40 mmHg at the time.\"  #Severe RV dilatation and severe biatrial enlargement by echocardiography.  LVEF 60%-65%.  -Likely contributing to right heart failure causing acetic fluid.  Normal AST, ALT, alkaline phosphatase and bilirubin of 0.7.  -Cardiology consulted to review and discuss options for medications (sildenafil, bosentan, inhaled NO, other.) for pulmonary hypertension  -discussed with cardiology, unfortunately not able to initiate nitrate/CCB given soft blood pressure, recommending euvolemia with hemodialysis rounds, and referral to [facility] after discharge.      # h/o SVT  - Continue with PTA Amiodarone      # ESKD on HD: On HD M-W-F  -Nephrology consulted. Had missed HD on day of admission  -On midodrine given soft BP.  -Will be continued per her dialysis schedule after discharge    # Chronic anemia, due to ESRD  -Baseline hemoglobin 9-10, noted her hemoglobin dropped to 7, no sign of external bleeding.  -Recheck hemoglobin now  -Transfuse if 7 or less, patient is still makes urine so we will give additional Lasix if PRBC transfusion needed.  " Conditional order for transfusion in place, patient is signed consent.  -Hb stable at 7, received iron and epogen with HHD       # DM type II  - Patient is on Levemir (PTA med list states 60 units daily per prescription, apparently reported she is taking 15 units daily but now states uses less than that unclear)  - Became hypoglycemic, suspect due to short acting insulin when used for 1 unit per 6 gm CHO (although uses 1 Unit/5gm at home)  - Carb coverage-reduced to 1 unit per 10 g CHO, Levemir 6 units.(although patient requests what see wants)   - Hypoglycemia protocol in place    Consultations This Hospital Stay   CARDIOLOGY IP CONSULT  PHYSICAL THERAPY ADULT IP CONSULT  CARE MANAGEMENT / SOCIAL WORK IP CONSULT    Code Status   Full Code    Time Spent on this Encounter   I, Patricia Amaya MD, personally saw the patient today and spent greater than 30 minutes discharging this patient.       Patricia Amaya MD  [place] [address] [facility]  [address] [place] [address], [address]  [facility] [address]  Phone: [phone]  ______________________________________________________________________    Physical Exam   Vital Signs: Temp: 98.5  F (36.9  C) Temp src: Oral BP: (!) 120/95 Pulse: 58   Resp: 18 SpO2: 96 % O2 Device: Nasal cannula Oxygen Delivery: 2 LPM  Weight: 145 lbs 0 oz    General: AAOx3, sitting up in the chair, chronically ill-appearing, appears comfortable.  HEENT: PERRLA EOMI. Mucosa moist.   Lungs: Bilateral equal air entry. Very diminished breath sounds posteriorly especially on right.  CVS: S1S2 regular, no tachycardia.  Soft systolic murmur present.  Abdomen: Soft, diffusely distended with abdominal wall edema anteriorly. Not tender  MSK: Leg edema (+)  Neuro: AAOX3. CN 2-12 normal. Strength symmetrical.  Skin: No rash.           Primary Care Physician   Gigi Martin    Discharge Orders      Primary Care - Care Coordination Referral      Reason for your hospital stay    Spontaneous bacterial  peritonitis     Follow-up and recommended labs and tests     Follow up with primary care provider, Gigi Martin, within 7 days to evaluate medication change, for hospital follow- up, and regarding new diagnosis.  The following labs/tests are recommended: H&H in a week.    Resume your Outpatient [facility] on Mondays, Wednesdays & Fridays at 10:00 AM arrival time.     Activity    Your activity upon discharge: activity as tolerated     Diet    Follow this diet upon discharge: Orders Placed This Encounter      Snacks/Supplements Adult: Ensure Enlive; With Meals      Combination Diet Regular Diet Adult; 2 gm NA Diet       Significant Results and Procedures   Most Recent 3 CBC's:  Recent Labs   Lab Test 06/20/22  0735 06/19/22  1847 06/19/22  0634 06/18/22  1430 06/18/22  0756   WBC 7.1  --  6.7  --  6.3   HGB 7.4* 7.9* 7.1*   < > 7.0*   MCV 98  --  100  --  100   *  --  125*  --  107*    < > = values in this interval not displayed.     Most Recent 3 BMP's:  Recent Labs   Lab Test 06/21/22  1117 06/21/22  0724 06/20/22  2112 06/20/22  1322 06/20/22  0735 06/19/22  0728 06/19/22  0634 06/17/22  1225 06/17/22  0727   NA  --   --   --   --  128*  --  131*  --  135   POTASSIUM  --   --   --   --  4.7  --  4.4  --  4.1   CHLORIDE  --   --   --   --  94  --  97  --  100   CO2  --   --   --   --  25  --  28  --  28   BUN  --   --   --   --  61*  --  41*  --  36*   CR  --   --   --   --  6.13*  --  5.20*  --  5.30*   ANIONGAP  --   --   --   --  9  --  6  --  7   KANWAL  --   --   --   --  8.8  --  8.2*  --  8.1*   * 151* 192*   < > 188*   < > 163*   < > 176*    < > = values in this interval not displayed.     Most Recent 2 LFT's:  Recent Labs   Lab Test 06/15/22  1233 07/23/21  0531   AST 34 19   ALT 42 17   ALKPHOS 129 74   BILITOTAL 0.7 1.3     Most Recent 3 Hemoglobins:  Recent Labs   Lab Test 06/20/22  0735 06/19/22  1847 06/19/22  0634   HGB 7.4* 7.9* 7.1*     Most Recent 3  BNP's:  Recent Labs   Lab Test 08/09/17  2030   NTBNPI 5,862*     Most Recent 6 glucoses:  Recent Labs   Lab Test 06/21/22  1117 06/21/22  0724 06/20/22  2112 06/20/22  1824 06/20/22  1322 06/20/22  0735   * 151* 192* 108* 175* 188*     Most Recent Urinalysis:  Recent Labs   Lab Test 09/25/19  1455 06/07/18  1455   COLOR Yellow Yellow   APPEARANCE Clear Clear   URINEGLC >499* 250*   URINEBILI Negative Negative   URINEKETONE Negative Negative   SG 1.005 1.015   UBLD Negative Small*   URINEPH 7.0 6.0   PROTEIN 100* >=300*   UROBILINOGEN  --  0.2   NITRITE Negative Negative   LEUKEST Negative Negative   RBCU <1 2-5*   WBCU 1 0 - 5     Most Recent Anemia Panel:  Recent Labs   Lab Test 06/20/22  0735 06/19/22  1847 06/19/22  0634 07/16/21  0600 07/15/21  0552   WBC 7.1  --  6.7   < >  --    HGB 7.4*   < > 7.1*   < >  --    HCT 23.3*   < > 22.7*   < >  --    MCV 98  --  100   < >  --    *  --  125*   < >  --    IRON  --   --  27*  --   --    IRONSAT  --   --  15  --   --    FEB  --   --  178*  --   --    LATRICE  --   --  898*  --   --    B12  --   --   --   --  821   FOLIC  --   --   --   --  9.4    < > = values in this interval not displayed.   ,   Results for orders placed or performed during the hospital encounter of 06/15/22   US Paracentesis    Narrative    US PARACENTESIS WITHOUT ALBUMIN 6/15/2022 3:32 PM     HISTORY: High volume paracentesis with or without diagnostic fluid  analysis with labs to be drawn if ordered. Total paracentesis volume  as much as possible.    FINDINGS: Ultrasound was used to evaluate for the presence and best  approach for paracentesis. Written and oral informed consent was  obtained. A pause for the cause procedure to verify the correct  patient and correct procedure. The skin overlying the right lower  quadrant was prepped and draped in the usual sterile fashion. The  subcutaneous tissues were anesthetized with 10 mL 1% lidocaine. A  catheter was advanced into the peritoneal  space and 11.75 L of  straw  colored fluid was drained. There were no immediate complications.  Ultrasound images were permanently stored.  Patient left the  ultrasound suite in satisfactory condition.      Impression    IMPRESSION: Technically successful paracentesis without immediate  complications.    JESSE DASILVA MD         SYSTEM ID:  [id number]   CT Chest Pulmonary Embolism w Contrast    Narrative    EXAM: CT CHEST PULMONARY EMBOLISM W CONTRAST  LOCATION: [facility]  DATE/TIME: 6/15/2022 4:56 PM    INDICATION: PE suspected, high prob. Increased shortness of breath.  COMPARISON: 07/15/2021.  TECHNIQUE: CT chest pulmonary angiogram during arterial phase injection of IV contrast. Multiplanar reformats and MIP reconstructions were performed. Dose reduction techniques were used.   CONTRAST: 61 mL Isovue 370.    FINDINGS:  ANGIOGRAM CHEST: Pulmonary arteries are normal caliber and negative for pulmonary emboli. Normal caliber thoracic aorta without dissection. Moderate aortic calcification.    LUNGS AND PLEURA: Moderate right-sided pleural fluid collection. Mosaic attenuation of the pulmonary parenchyma diffusely which can be seen with air trapping, edema or viral etiologies. Significant volume loss/atelectasis right lower lobe with moderate   volume loss and atelectasis right middle lobe inferiorly. No left-sided pleural fluid. Mild intralobular septal thickening.    MEDIASTINUM/AXILLAE: Cardiac enlargement without pericardial fluid. Diffuse pericardial calcifications. No lymphadenopathy. Normal caliber esophagus.    CORONARY ARTERY CALCIFICATION: Moderate.    UPPER ABDOMEN: Partially visualized moderate ascites.    MUSCULOSKELETAL: Minimal degenerative changes in the spine.      Impression    IMPRESSION:  1.  No evidence for pulmonary embolism.    2.  Moderate right-sided pleural fluid collection with atelectasis right lower lobe and right middle lobe as detailed above. Underlying  infiltrates in these areas of atelectasis cannot be excluded.    3.  Mosaic attenuation of the pulmonary parenchyma with interlobular septal thickening most likely related to edema. Other considerations would include air trapping related to reactive airways disease or viral etiologies.    4.  Cardiac enlargement with diffuse pericardial calcifications.    5.  Moderate coronary artery calcification.   US Paracentesis    Narrative    US PARACENTESIS WITHOUT ALBUMIN 6/20/2022 11:55 AM     HISTORY: High volume paracentesis with or without diagnostic fluid  analysis with labs to be drawn, if ordered. Total paracentesis volume  as much as possible.    FINDINGS: Ultrasound was used to evaluate for the presence and best  approach for paracentesis. Written and oral informed consent was  obtained. A pause for the cause procedure to verify the correct  patient and correct procedure. The skin overlying the right lower  quadrant was prepped and draped in the usual sterile fashion. The  subcutaneous tissues were anesthetized with 10 mL 1% lidocaine. A  catheter was advanced into the peritoneal space and 4.9 L of  straw-colored fluid was drained. There were no immediate  complications. Ultrasound images were permanently stored. Patient left  the ultrasound suite in satisfactory condition.      Impression    IMPRESSION: Technically successful paracentesis without immediate  complications.    JESSE DASILVA MD         SYSTEM ID:  [id number]       Discharge Medications   Current Discharge Medication List      START taking these medications    Details   cefuroxime (CEFTIN) 500 MG tablet Take 1 tablet (500 mg) by mouth daily for 7 days  Qty: 7 tablet, Refills: 0    Associated Diagnoses: SBP (spontaneous bacterial peritonitis) (H)      midodrine (PROAMATINE) 10 MG tablet Take 1 tablet (10 mg) by mouth 3 times daily (with meals)  Qty: 90 tablet, Refills: 0    Comments: Future refills by PCP Dr. Gigi Martin with phone number  [phone].  Associated Diagnoses: ESRD (end stage renal disease) on dialysis (H); Orthostatic hypotension         CONTINUE these medications which have CHANGED    Details   insulin detemir (LEVEMIR FLEXTOUCH) 100 UNIT/ML pen Inject 6 Units Subcutaneous At Bedtime  Qty: 15 mL, Refills: 0    Associated Diagnoses: Type 2 diabetes mellitus with diabetic nephropathy, with long-term current use of insulin (H)         CONTINUE these medications which have NOT CHANGED    Details   acetaminophen (TYLENOL) 500 MG tablet Take 500 mg by mouth every 8 hours as needed for mild pain      amiodarone (PACERONE) 200 MG tablet Take 1 tablet (200 mg) by mouth daily  Qty: 90 tablet, Refills: 1    Comments: Appt is due in June. Please call [phone] to schedule an appt.  Associated Diagnoses: SVT (supraventricular tachycardia) (H)      aspirin (ASA) 81 MG EC tablet Take 1 tablet (81 mg) by mouth daily  Refills: 0    Comments: Stop if causes stomach irritation.  Associated Diagnoses: Type 2 diabetes mellitus with diabetic nephropathy, with long-term current use of insulin (H)      Emollient (CERAVE) CREA Externally apply topically 2 times daily  Qty: 453 g, Refills: 0    Associated Diagnoses: Dry skin      famotidine (PEPCID) 10 MG tablet Take 1 tablet (10 mg) by mouth daily  Qty: 90 tablet, Refills: 3    Associated Diagnoses: Gastroesophageal reflux disease, esophagitis presence not specified      fluticasone (FLONASE) 50 MCG/ACT nasal spray Spray 1 spray into both nostrils daily  Qty: 16 g, Refills: 11    Associated Diagnoses: Seasonal allergic rhinitis, unspecified trigger      Glycopyrrolate-Formoterol (BEVESPI AEROSPHERE) 9-4.8 MCG/ACT oral inhaler Inhale 2 puffs into the lungs 2 times daily       hypromellose-dextran (ARTIFICAL TEARS) 0.1-0.3 % ophthalmic solution Place 1 drop into both eyes daily as needed for dry eyes      levalbuterol (XOPENEX HFA) 45 MCG/ACT inhaler Inhale 2 puffs into the lungs every 6 hours as needed for  shortness of breath / dyspnea or wheezing  Qty: 15 g, Refills: 11    Associated Diagnoses: Pneumonia due to infectious organism, unspecified laterality, unspecified part of lung      NOVOLOG FLEXPEN 100 UNIT/ML soln INJECT 9-12 UNITS BEFORE BREAKFAST, LUNCH, AND DINNER PLUS A CORRECTIVE SCALE OF 2 UNITS FOR EVERY 50 OVER 150  Qty: 15 mL, Refills: 0    Associated Diagnoses: Type 2 diabetes mellitus with diabetic nephropathy, with long-term current use of insulin (H)      !! ACCU-CHEK CHING PLUS test strip USE TO TEST BLOOD SUGAR 4 TIMES PER DAY  Qty: 100 strip, Refills: 3    Associated Diagnoses: Type 2 diabetes mellitus with diabetic nephropathy, with long-term current use of insulin (H)      !! blood glucose (NO BRAND SPECIFIED) lancets standard Use to test blood sugar 4 times daily or as directed.  Qty: 400 lancet, Refills: 3    Comments: Please dispense Accu check soft click  Associated Diagnoses: Type 2 diabetes mellitus with diabetic nephropathy, with long-term current use of insulin (H)      !! blood glucose (NO BRAND SPECIFIED) lancets standard Use to test blood sugar three times daily or as directed.  Qty: 400 lancet, Refills: 11    Associated Diagnoses: Type 2 diabetes mellitus with diabetic nephropathy, with long-term current use of insulin (H)      !! blood glucose (NO BRAND SPECIFIED) test strip Use to test blood sugar 4 times daily or as directed.  Qty: 400 strip, Refills: 3    Comments: Please dispense Accu Check Guide  Associated Diagnoses: Type 2 diabetes mellitus with diabetic nephropathy, with long-term current use of insulin (H)      blood glucose monitoring (NO BRAND SPECIFIED) meter device kit Use to test blood sugar 4 times daily or as directed.  Qty: 1 kit, Refills: 0    Comments: Please dispense Accu Check Guide meter  Associated Diagnoses: Type 2 diabetes mellitus with diabetic nephropathy, with long-term current use of insulin (H)      insulin pen needle (31G X 8 MM) 31G X 8 MM  miscellaneous Use 4 pen needles daily or as directed.  Qty: 100 each, Refills: 11    Associated Diagnoses: Type 2 diabetes mellitus with diabetic nephropathy, with long-term current use of insulin (H)       !! - Potential duplicate medications found. Please discuss with provider.        Allergies   Allergies   Allergen Reactions     Albuterol      shakiness     Aspirin      gi     Byetta [Exenatide]      gi     Clonidine      constipation     Codeine      vomiting     Ezetimibe      muscle symptoms     Hydralazine      headaches     Lisinopril      hyperkalemia     Metformin Hydrochloride      vomiting     Pravachol [Pravastatin Sodium]      muscle pains     Simvastatin      myalgias     Troglitazone

## 2022-06-21 NOTE — PLAN OF CARE
A&Ox4. Had dialysis today. VSS on 2L NC. Denies chest pain and SOB. Very mild MANE. Abdomen distended, soft to touch. Dialysis fistula to RUE +bruit/thrill. Accu checks done. Tolerating 2gr Na diet. Up SBA. Continue to monitor.

## 2022-06-21 NOTE — CONSULTS
Care Management Initial Consult    General Information  Assessment completed with: Cassandra Green  Type of CM/SW Visit: Initial Assessment  [facility] [phone]  Eryn ROMANO  [phone]    Primary Care Provider verified and updated as needed: Yes   Readmission within the last 30 days: no previous admission in last 30 days      Reason for Consult: discharge planning  Advance Care Planning:            Communication Assessment  Patient's communication style: spoken language (English or Bilingual)    Hearing Difficulty or Deaf: no   Wear Glasses or Blind: yes    Cognitive  Cognitive/Neuro/Behavioral: WDL                      Living Environment:   People in home: alone     Current living Arrangements: apartment      Able to return to prior arrangements: yes       Family/Social Support:  Care provided by: self  Provides care for: no one  Marital Status: Single  Significant Other, Children, PCA, Other (specify) ([facility] )          Description of Support System: Supportive, Involved         Current Resources:   Patient receiving home care services:       Community Resources: County Programs, County Worker, Dialysis Services, DME, Transportation Services (Food stamps & energy assistance)  Equipment currently used at home: shower chair  Supplies currently used at home: Nutritional Supplements, Oxygen Tubing/Supplies, Diabetic Supplies    Employment/Financial:  Employment Status: disabled        Financial Concerns: No concerns identified   Referral to Financial Worker: No       Lifestyle & Psychosocial Needs:  Social Determinants of Health     Tobacco Use: Medium Risk     Smoking Tobacco Use: Former Smoker     Smokeless Tobacco Use: Never Used   Alcohol Use: Not on file   Financial Resource Strain: Not on file   Food Insecurity: Not on file   Transportation Needs: Not on file   Physical Activity: Not on file   Stress: Not on file   Social Connections: Not on file   Intimate  Partner Violence: Not on file   Depression: Not at risk     PHQ-2 Score: 0   Housing Stability: Not on file       Functional Status:  Prior to admission patient needed assistance:              Mental Health Status:          Chemical Dependency Status:                Values/Beliefs:  Spiritual, Cultural Beliefs, Congregation Practices, Values that affect care: no               Additional Information:  Per consult for discharge planning, met with pt to discuss her discharge plan.  Per pt, she lives alone with her 2 dogs in a 1 bedroom apt.  Patient shared that she has a worker through her CADI waiver services [facility] that comes to her home every Tuesday & Thursday 10am-2pm that assists her with light housekeeping, laundry, grocery shopping and walks her dogs.  Patient shared she goes to outpt [facility] on [facility] at 10am.  Per pt, she uses her [employer] benefit with her insurance.  Patient shared that she uses home oxygen through [employer] and has a portable tank to get home today with.  Discussed PT recommendation for home care PT and pt declined stating that she has the support from her S.O., son and daughter and if she feels she needs home care she will alert her PCP.  Discussed PCP follow-up and pt was reluctant to have a scheduled office visit but was ok with a telephone follow-up being scheduled.   Scheduled pt with a PCP(her primary not available).  Patient shared that her CADI  is Eryn Reynolds [phone].  Called and left a message with her regarding pt discharging home.  Called pt's Community based service resource with [facility] [phone].  Called and spoke to Bella and she confirmed the services that [facility] provides for pt.  Bella stated that [facility] doesn't provide any personal care for pt.  Called [facility] and spoke to Kezia regarding pt resuming her outpt dialysis tomorrow.  Kezia requested discharge summary be faxed to [phone].      Addendum:  Faxed discharge orders to [facility] and called [facility] and spoke to Charge RN Raymond.  Informed him pt will need H & H next week 6/29 done.  Per Raymond, he will have these labs done at her dialysis run on this date and will fax results to her PCP.  Care Management Discharge Note    Discharge Date: 06/21/2022       Discharge Disposition: Dialysis Services    Discharge Services:      Discharge DME:      Discharge Transportation: family or friend will provide    Private pay costs discussed: Not applicable    PAS Confirmation Code:    Patient/family educated on Medicare website which has current facility and service quality ratings:      Education Provided on the Discharge Plan:    Persons Notified of Discharge Plans: bedside RN  Patient/Family in Agreement with the Plan: yes    Handoff Referral Completed: Yes    Additional Information:  Pt ready for discharge home with PCP follow-up scheduled.  Per pt, her S.O. will be able to transport her home today and pt confirmed she has a portable O2 to get home with.        ANGELICA Durham RN, BSN, OCN   Inpatient Care Coordination [address]  Office: [phone]

## 2022-06-21 NOTE — PLAN OF CARE
Goal Outcome Evaluation:    AOx4. VSS on RA ex perfecto, needs 1-2 L O2 with activity. Up SBA, refusing bed alarm, up in chair for most of shift. Tolerating 2 gm Na diet. Denies pain and N/V. L PIV SL. RUE HD catheter. Discharge instructions reviewed with patient. Currently waiting for discharge medications to be sent up. Plan on discharge later this afternoon once ride is here.

## 2022-06-21 NOTE — PROGRESS NOTES
"Physical Therapy Discharge Summary    Reason for therapy discharge:    Discharged to home with home therapy.    Progress towards therapy goal(s). See goals on Care Plan in [facility] electronic health record for goal details.  Goals not met.  Barriers to achieving goals:   discharge from facility.    Therapy recommendation(s):    consistent RW use, Ax1, further PT. Per last PT note: \"Pt here with severe end-stage pulmonary hypertension, per cardiology \"with untreatable and life-threatening condition, would recommend getting hospice/palliative care consult\" She presentes close to her baseline from functional mobiltiy perspective. She completed transfers and ambulation with no AD, mild intermittent unsteadiness but no overt LOB, no knee buckling. She completed stairs with SBA, no octavio LOB, effortful for the patient. O2 sat >90% throughout on 2L. She owns 4WW but reports she does not use at home, likes to hang on to furniture when needed. If d/c home alone, would recommend HHPT to reduce falls risk.  Anticipate pt would benefit from more supportive living environment. She lives alone in apartment and has 3 flights of stairs and baseline 2L O2 to get inside.\"      "

## 2022-06-22 ENCOUNTER — PATIENT OUTREACH (OUTPATIENT)
Dept: CARE COORDINATION | Facility: CLINIC | Age: 62
End: 2022-06-22

## 2022-06-22 NOTE — PROGRESS NOTES
Clinic Care Coordination Contact  [facility]: Post-Discharge Note  SITUATION                                                      Admission:    Admission Date: 06/15/22   Reason for Admission: Shortness of breath  Discharge:   Discharge Date: 06/21/22  Discharge Diagnosis: SBP, Severe pulmonary hypertension, ESRD    BACKGROUND                                                      Per hospital discharge summary and inpatient provider notes:    Yissel Wetzel is a 62 year old female with history of type 2 diabetes mellitus, diabetic nephropathy, ES Kd on hemodialysis, chronic venous insufficiency, SVT, severe pulmonary hypertension admitted on 6/15/2022 with shortness of breath.      #Shortness of breath  # COPD and Chronic hypoxemic respiratory failure: Uses supplemental O2 at baseline with activity.  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  Moderate pleural effusion on right side noted on CT. presentation not consistent with COPD exacerbation.  --Volume management with dialysis.  --Requiring 1-2 LPM NC O2,  dyspnea has improved. Thoracentesis not done.  --Continued PTA inhalers.  She follows with [facility].   --Exercise oximetry prior to discharge  --See pulmonary hypertension below     #Marked ascites and SBP  S/p paracentesis of 11.75 L of straw-colored fluid on admission.  Total nucleated cells of 403 and 72% neutrophils, which accounts for > 250 neutrophil meeting criteria for SBP.  -Continue with ceftriaxone 2 g daily for 5 days then PO ceftin for a week at discharge (500 mg daily, renally adjusted dose)  -Received albumin 1.5gm./kg on admission, and 1 gm /kg on day 3  -Fluid culture remains negative  - Volume management with HD  -s/p repeat paracentesis 6/20 and 4.9 L removed. Repeating fluid analysis showed PMN less than 250 (total cell 351 and 22% PMN)   - closely monitor BP given large vol paracentesis, also scheduled to have HD this afternoon     #Severe  "pulmonary hypertension \" PA pressure 132/52 mmHg with mean of 81 during right heart catheterization in 07/2021. She also had severely elevated LVEDP of 35-40 mmHg at the time.\"  #Severe RV dilatation and severe biatrial enlargement by echocardiography.  LVEF 60%-65%.  -Likely contributing to right heart failure causing acetic fluid.  Normal AST, ALT, alkaline phosphatase and bilirubin of 0.7.  -Cardiology consulted to review and discuss options for medications (sildenafil, bosentan, inhaled NO, other.) for pulmonary hypertension  -discussed with cardiology, unfortunately not able to initiate nitrate/CCB given soft blood pressure, recommending euvolemia with hemodialysis rounds, and referral to [facility] after discharge.      # h/o SVT  - Continue with PTA Amiodarone      # ESKD on HD: On HD M-W-F  -Nephrology consulted. Had missed HD on day of admission  -On midodrine given soft BP.  -Will be continued per her dialysis schedule after discharge     # Chronic anemia, due to ESRD  -Baseline hemoglobin 9-10, noted her hemoglobin dropped to 7, no sign of external bleeding.  -Recheck hemoglobin now  -Transfuse if 7 or less, patient is still makes urine so we will give additional Lasix if PRBC transfusion needed.  Conditional order for transfusion in place, patient is signed consent.  -Hb stable at 7, received iron and epogen with HHD        # DM type II  - Patient is on Levemir (PTA med list states 60 units daily per prescription, apparently reported she is taking 15 units daily but now states uses less than that unclear)  - Became hypoglycemic, suspect due to short acting insulin when used for 1 unit per 6 gm CHO (although uses 1 Unit/5gm at home)  - Carb coverage-reduced to 1 unit per 10 g CHO, Levemir 6 units.(although patient requests what see wants)   - Hypoglycemia protocol in place       ASSESSMENT      Enrollment  Primary Care Care Coordination Status:  (Unable to discuss)    Discharge Assessment  How are you doing now " "that you are home?: Pt states she's \"doing better now my air conditioner was replaced today; had some trouble with SOB last night because the air conditioner wasn't working too good but it's better now.\"  States she is also having some back pain from the hospital bed, \"but it should be alright in a few days.\"  RN attempted to review medications and appts with pt however, pt states \"I have to figure out my medicines because I was only taking one medicine before this, and for the appts, I don't have a f**giovanna ride for all these appts and then have to pay $7.00 to park, I can't keep doing that, and the doctor can call me but I'll skip the video.\"  Pt then stated she had to go as she's on her way to dialysis.    RN unable to complete full assessment as pt had to go to dialysis.    PLAN                                                      Outpatient Plan:      Follow-up and recommended labs and tests      Follow up with primary care provider, Gigi Martin, within 7 days to evaluate medication change, for hospital follow- up, and regarding new diagnosis.  The following labs/tests are recommended: H&H in a week.     Resume your Outpatient [facility] on Mondays, Wednesdays & Fridays at 10:00 AM arrival time.         Future Appointments   Date Time Provider Department Center   6/23/2022  9:10 AM RUIZ COLEY [facility]   6/23/2022  2:00 PM Christopher Martino MD [facility]          For any urgent concerns, please contact our 24 hour nurse triage line: [phone] ([phone])         Annita Dillon RN                "

## 2022-06-25 LAB — BACTERIA FLD CULT: NO GROWTH

## 2022-06-27 LAB — BACTERIA FLD CULT: NORMAL

## 2022-07-08 DIAGNOSIS — R18.8 OTHER ASCITES: Primary | ICD-10-CM

## 2022-08-16 ENCOUNTER — TELEPHONE (OUTPATIENT)
Dept: CARDIOLOGY | Facility: CLINIC | Age: 62
End: 2022-08-16

## 2022-08-16 DIAGNOSIS — I27.20 PULMONARY HYPERTENSION (H): Primary | ICD-10-CM

## 2022-08-16 NOTE — TELEPHONE ENCOUNTER
[facility] Call Center    Phone Message    May a detailed message be left on voicemail: yes     Reason for Call: Other: Patient called looking to schedule an appt with  on a Tuesday or Thursday around 10 AM in September at the [place] location. Explained to the patient there were not any slots that fit these requirements, and offered her the options listed. Patient declined and requested to speak with a manager. Please have a  the patient back to further discuss, thank you.    Action Taken: Message routed to:  Other: Cardiology    Travel Screening: Not Applicable

## 2022-08-23 NOTE — TELEPHONE ENCOUNTER
Pt called twice stating that she had called last Tuesday and did not get a call back. Pt was in the hospital in June and states that her MD had spoke to Dr Barbosa who stated that pt needed to be seen in a month. Pt states that she is doing well, but has limited times that she can bee seen d/t her dialysis M,W and Friday.  Reviewed Dr Barbosa schedule which is very limited and does not fit the time frame that she was looking for.  Have found a time at 1545, but want to double check with Dr Barbosa if pt to see him or DEREK. Bea

## 2022-08-24 DIAGNOSIS — E11.21 TYPE 2 DIABETES MELLITUS WITH DIABETIC NEPHROPATHY, WITH LONG-TERM CURRENT USE OF INSULIN (H): ICD-10-CM

## 2022-08-24 DIAGNOSIS — Z79.4 TYPE 2 DIABETES MELLITUS WITH DIABETIC NEPHROPATHY, WITH LONG-TERM CURRENT USE OF INSULIN (H): ICD-10-CM

## 2022-08-24 NOTE — TELEPHONE ENCOUNTER
Spoke to pt and made pt aware that Dr Barbosa reviewed her chart while she was in the  Hospital, here were no arrhythmia's.  Dr Barbosa did state that he would like pt to be seen by a General Cardiologist for her Pulmonary hypertension. Made pt aware that OV with Dr Barbosa is not needed, but he would like her to see another Cardiologist for her Pulmonary HTN. Pt asked if this would be at the same clinic and this writer told her it would be.  Pt agreed at this time, will place a referral order per Dr Barbosa. Bea

## 2022-08-26 RX ORDER — BLOOD SUGAR DIAGNOSTIC
STRIP MISCELLANEOUS
Qty: 400 STRIP | Refills: 0 | Status: SHIPPED | OUTPATIENT
Start: 2022-08-26 | End: 2022-11-08

## 2022-08-26 NOTE — TELEPHONE ENCOUNTER
Routing refill request to provider for review/approval because:  Patient needs to be seen because it has been more than 1 year since last office visit.  please route to team to contact patient for appointment       Isidro Colon RN  [facility] Triage Nurse

## 2022-08-27 ENCOUNTER — MEDICAL CORRESPONDENCE (OUTPATIENT)
Dept: HEALTH INFORMATION MANAGEMENT | Facility: CLINIC | Age: 62
End: 2022-08-27

## 2022-08-30 ENCOUNTER — TELEPHONE (OUTPATIENT)
Dept: CARDIOLOGY | Facility: CLINIC | Age: 62
End: 2022-08-30

## 2022-08-30 ENCOUNTER — TRANSFERRED RECORDS (OUTPATIENT)
Dept: HEALTH INFORMATION MANAGEMENT | Facility: CLINIC | Age: 62
End: 2022-08-30

## 2022-08-30 DIAGNOSIS — I47.10 SVT (SUPRAVENTRICULAR TACHYCARDIA) (H): ICD-10-CM

## 2022-08-30 DIAGNOSIS — Z79.899 ON AMIODARONE THERAPY: ICD-10-CM

## 2022-08-30 DIAGNOSIS — R79.89 ABNORMAL THYROID BLOOD TEST: Primary | ICD-10-CM

## 2022-08-30 RX ORDER — AMIODARONE HYDROCHLORIDE 200 MG/1
200 TABLET ORAL DAILY
Qty: 90 TABLET | Refills: 0 | Status: SHIPPED | OUTPATIENT
Start: 2022-08-30 | End: 2022-09-09

## 2022-08-30 NOTE — TELEPHONE ENCOUNTER
Patient called reports she needs refill for her amiodarone.  Patient is not current with her labs.  She was suppose to have TSH w/T4 and hepatic panel completed back in January.  Patient indicated it was done at dialysis.  Patient goes to [facility] ([phone]).  Call and spoke to ANGELICA Andrade at [facility].  He reports the labs were done in January however the TSH never got processed.  Asked it we could get a copy of the January hepatic panel results faxed to us and if he could repeat the labs (TSH w/T4 and hepatic panel tomorrow when patient comes in for her treatment.  He indicated that would not be a problem.  Will follow up on Friday to get results.  Sent one 90 day refill in for amiodarone.  Will update Dr Barbosa regarding above and identify when he wants her to follow up with DEREK.  Currently has an appt with Dr Redd on 11/6 for management of her PHTN.  NAGELICA Fontana

## 2022-08-31 ENCOUNTER — TELEPHONE (OUTPATIENT)
Dept: CARDIOLOGY | Facility: CLINIC | Age: 62
End: 2022-08-31

## 2022-08-31 NOTE — TELEPHONE ENCOUNTER
Received labs from [facility] dated 1/3/2022.  Faxed copy to  for lab and sent to HIM to scan.  ANGELICA Fontana

## 2022-08-31 NOTE — TELEPHONE ENCOUNTER
Left message for patient to discuss follow up as recommended per Dr Barbosa.  Orders placed for follow up.  Awaiting return call from patient.  NAGELICA Fontana

## 2022-09-02 ENCOUNTER — TELEPHONE (OUTPATIENT)
Dept: CARDIOLOGY | Facility: CLINIC | Age: 62
End: 2022-09-02

## 2022-09-02 NOTE — TELEPHONE ENCOUNTER
Received lab from [facility] completed on 9/2.  ALT, TSH and T4 and Alk Phos.  Will fax to lab entry.  Will update Dr Barbosa on abnormal results.  Will send original to NILES Fontana RN

## 2022-09-02 NOTE — TELEPHONE ENCOUNTER
Received labs completed on 9/2 at [facility].  TSH and ALT completed d/t amiodarone therapy (200mg po every day).    Resullts as follows:  TSH--6.41 (H)  T4--1.1  ALT--16  ALK Phos--158 (H)    Last TSH completed on 7/14/2021 3.86, ALT/AST completed 7/23/2021 ALT 17 and AST 19    Will update Dr Barbosa regarding above.  MarizolRN

## 2022-09-02 NOTE — TELEPHONE ENCOUNTER
Spoke to patient regarding recommendations per Dr christie:  Decrease amio- to 100 mg daily.  Repeat TSH in 6 months.  Patient refused to decrease her amiodarone medication for fear that it will make her go back into AF. Discussed with patient that elevated TSH/T4 if not stable can possibly cause palpitation.  Patient indicated she would not decrease dose.  Will update Dr Christie regarding above.  ANGELICA Fontana

## 2022-09-08 NOTE — TELEPHONE ENCOUNTER
After long discussion with patient discussing rationale for lowering dose of amiodarone.  Patient asked if there was an alternative medication she can take to manage her SVT.  She does not want to decrease the amiodarone dosing to 100mg  as she indicated she tried lowering the dose before and she had recurrent racing heart that made her go to the ED.  Patient understands that elevated TSH most likely related to amiodarone use.  Will review above request with Dr Barbosa for recommendations.  ANGELICA Fontana

## 2022-09-08 NOTE — TELEPHONE ENCOUNTER
Per Dr Barbosa:  BTW, she does not have AF.  She has SVT, ordinarily we would be happy to ablate it but the right side of her heart is so huge (from years of severe PH) that we could run into trouble...     She a petite person, I think amio 100 mg daily would likely be effective for SVT, but, what can we do if she refuses to do it..., other than not prescribing it I suppose...  :-)   D

## 2022-09-08 NOTE — TELEPHONE ENCOUNTER
"I understand, I did not know/remember that lowering amiodarone dose to 100 mg daily in the past led to recurrent SVT.    Other more \"typical\" SVT medications were ineffective in the past in suppressing her SVT, that is how we arrived at amiodarone.    It is okay by me to continue it.  Hypothyroidism is not a big deal, it can be treated.    Another option is to more gently lower the dose of amiodarone, not the 50% cut I previously suggested, but the more gentle 14% cut (we could have her skip it every Sunday, so take 6 days/week).  If she is okay with that gentle decrease, let's go ahead with it.  If she does not want it, then continue amiodarone as is.    DI  "

## 2022-09-09 RX ORDER — AMIODARONE HYDROCHLORIDE 200 MG/1
TABLET ORAL
Qty: 78 TABLET | Refills: 2 | Status: SHIPPED | OUTPATIENT
Start: 2022-09-09 | End: 2022-09-09

## 2022-09-09 RX ORDER — AMIODARONE HYDROCHLORIDE 200 MG/1
TABLET ORAL
Qty: 78 TABLET | Refills: 2 | Status: SHIPPED | OUTPATIENT
Start: 2022-09-09 | End: 2023-10-26

## 2022-09-09 RX ORDER — AMIODARONE HYDROCHLORIDE 200 MG/1
TABLET ORAL
Qty: 90 TABLET | Refills: 0 | Status: SHIPPED | OUTPATIENT
Start: 2022-09-09 | End: 2022-09-09

## 2022-09-09 NOTE — TELEPHONE ENCOUNTER
"Spoke to patient regarding recommendations per Dr Barbosa:  I understand, I did not know/remember that lowering amiodarone dose to 100 mg daily in the past led to recurrent SVT.    Other more \"typical\" SVT medications were ineffective in the past in suppressing her SVT, that is how we arrived at amiodarone.    It is okay by me to continue it.  Hypothyroidism is not a big deal, it can be treated.    Another option is to more gently lower the dose of amiodarone, not the 50% cut I previously suggested, but the more gentle 14% cut (we could have her skip it every Sunday, so take 6 days/week).  If she is okay with that gentle decrease, let's go ahead with it.  If she does not want it, then continue amiodarone as is.      Patient has agreed to reduce dose of amiodarone to 6 days a week.  Also patient needs follow up TSH in 6 months (patient will schedule).  Orders placed in epic.  Patient also disclosed she is scheduled to have a telephone visit with her PCP next week and she will discuss above.  Medication list updated in epic to reflect changes.  Pharmacy also updated on changes to amiodarone.  Patient provided verbal understanding regarding above.  ANGELICA Fontana  "

## 2022-09-15 ENCOUNTER — VIRTUAL VISIT (OUTPATIENT)
Dept: INTERNAL MEDICINE | Facility: CLINIC | Age: 62
End: 2022-09-15
Payer: MEDICARE

## 2022-09-15 DIAGNOSIS — E03.8 SUBCLINICAL HYPOTHYROIDISM: ICD-10-CM

## 2022-09-15 DIAGNOSIS — Z79.4 TYPE 2 DIABETES MELLITUS WITH DIABETIC NEPHROPATHY, WITH LONG-TERM CURRENT USE OF INSULIN (H): Primary | ICD-10-CM

## 2022-09-15 DIAGNOSIS — E11.21 TYPE 2 DIABETES MELLITUS WITH DIABETIC NEPHROPATHY, WITH LONG-TERM CURRENT USE OF INSULIN (H): Primary | ICD-10-CM

## 2022-09-15 PROCEDURE — 99214 OFFICE O/P EST MOD 30 MIN: CPT | Mod: 95 | Performed by: INTERNAL MEDICINE

## 2022-09-15 NOTE — PROGRESS NOTES
Cassandra is a 62 year old who is being evaluated via a billable telephone visit.      What phone number would you like to be contacted at? [phone]  How would you like to obtain your AVS? MyChart    Assessment & Plan     Type 2 diabetes mellitus with diabetic nephropathy, with long-term current use of insulin (H)  Would like to try CGM - order placed.  - Continuous Blood Gluc  (FREESTYLE LATOSHA 2 READER) AYDIN; 1 each once for 1 dose Use to read blood sugars per 's instructions.  - Continuous Blood Gluc Sensor (FREESTYLE LATOSHA 2 SENSOR) MISC; 1 each every 14 days Use 1 sensor every 14 days. Use to read blood sugars per 's instructions.    Subclinical hypothyroidism  In context of amiodarone use.  TSH was slightly elevated recently with normal free T4.  No need for thyroid replacement yet as she is clinically euthyroid.  Recheck in 6 months as already planned by her cardiologist.               See Patient Instructions    Return in about 6 months (around 3/15/2023) for Non-fasting Lab Only Visit.    Gigi Martin MD  [facility]              Michelle Trujillo is a 62 year old, presenting for the following health issues:  Diabetes      HPI     Diabetes Follow-up    How often are you checking your blood sugar? Four or more times daily  Blood sugar testing frequency justification:  Uncontrolled diabetes  What time of day are you checking your blood sugars (select all that apply)?  Before and after meals and At bedtime  Have you had any blood sugars above 200?  Yes a couple times-usually at bedtime  Have you had any blood sugars below 70?  Yes a few times    What symptoms do you notice when your blood sugar is low?  Shaky and Other: hungry feeling in stomach    What concerns do you have today about your diabetes? None     Do you have any of these symptoms? (Select all that apply)  Weight loss-will discuss with Dr. Martin      BP Readings from Last 2  Encounters:   06/21/22 (!) 120/95   04/05/22 (!) 157/55     Hemoglobin A1C (%)   Date Value   06/15/2022 7.7 (H)   07/14/2021 7.4 (H)   03/04/2021 7.7 (H)   09/25/2019 8.7 (H)     LDL Cholesterol Calculated (mg/dL)   Date Value   07/22/2016 123 (H)   10/28/2015 64           How many servings of fruits and vegetables do you eat daily?  0-1-2    On average, how many sweetened beverages do you drink each day (Examples: soda, juice, sweet tea, etc.  Do NOT count diet or artificially sweetened beverages)?   4 oz-lemonade once inn a while- watches carb count    How many days per week do you exercise enough to make your heart beat faster? 3 or less-    How many minutes a day do you exercise enough to make your heart beat faster? 9 or less  How many days per week do you miss taking your medication? 0-a couple maybe once in a while      Review of Systems   Constitutional, HEENT, cardiovascular, pulmonary, GI, , musculoskeletal, neuro, skin, endocrine and psych systems are negative, except as otherwise noted.      Objective           Vitals:  No vitals were obtained today due to virtual visit.    Physical Exam   healthy, alert and no distress  PSYCH: Alert and oriented times 3; coherent speech, normal   rate and volume, able to articulate logical thoughts, able   to abstract reason, no tangential thoughts, no hallucinations   or delusions  Her affect is normal  RESP: No cough, no audible wheezing, able to talk in full sentences  Remainder of exam unable to be completed due to telephone visits                Phone call duration: 7 minutes

## 2022-09-30 DIAGNOSIS — R18.8 OTHER ASCITES: Primary | ICD-10-CM

## 2022-10-11 ENCOUNTER — TELEPHONE (OUTPATIENT)
Dept: MEDSURG UNIT | Facility: CLINIC | Age: 62
End: 2022-10-11

## 2022-10-13 ENCOUNTER — HOSPITAL ENCOUNTER (OUTPATIENT)
Facility: CLINIC | Age: 62
Discharge: HOME | End: 2022-10-13
Admitting: RADIOLOGY
Payer: MEDICARE

## 2022-10-13 ENCOUNTER — HOSPITAL ENCOUNTER (OUTPATIENT)
Dept: ULTRASOUND IMAGING | Facility: CLINIC | Age: 62
Discharge: HOME | End: 2022-10-13
Payer: MEDICARE

## 2022-10-13 VITALS
DIASTOLIC BLOOD PRESSURE: 47 MMHG | OXYGEN SATURATION: 100 % | SYSTOLIC BLOOD PRESSURE: 158 MMHG | HEART RATE: 70 BPM | RESPIRATION RATE: 18 BRPM

## 2022-10-13 DIAGNOSIS — R18.8 OTHER ASCITES: ICD-10-CM

## 2022-10-13 PROCEDURE — 272N000706 US PARACENTESIS WITH ALBUMIN

## 2022-10-13 PROCEDURE — 999N000154 HC STATISTIC RADIOLOGY XRAY, US, CT, MAR, NM

## 2022-10-13 RX ORDER — LIDOCAINE HYDROCHLORIDE 10 MG/ML
10 INJECTION, SOLUTION EPIDURAL; INFILTRATION; INTRACAUDAL; PERINEURAL ONCE
Status: COMPLETED | OUTPATIENT
Start: 2022-10-13 | End: 2022-10-13

## 2022-10-13 RX ADMIN — LIDOCAINE HYDROCHLORIDE 10 ML: 10 INJECTION, SOLUTION EPIDURAL; INFILTRATION; INTRACAUDAL; PERINEURAL at 09:41

## 2022-10-13 ASSESSMENT — ACTIVITIES OF DAILY LIVING (ADL)
ADLS_ACUITY_SCORE: 35
ADLS_ACUITY_SCORE: 35

## 2022-10-13 NOTE — DISCHARGE INSTRUCTIONS

## 2022-10-13 NOTE — PROGRESS NOTES
Patient Wellness Screening  1. In the last month, have you been in contact with someone who was confirmed or suspected to have Coronavirus/COVID-19?   no    2. Do you have the following symptoms?  Fever/Chills? No   Cough? No   Shortness of breath? No   New loss of taste or smell? No  Sore throat? No  Muscle or body aches? No  Headaches? No  Fatigue? No  Vomiting or diarrhea? No    Admission Note:  Reason for admission:paracentesis  Time of arrival:0930    Accompanied by:son Oni  Name/phone of discontinue :Oni here    Pre-procedure assessment complete: Yes  If abnormal assessment/labs, provider notified: N/A    Medications held per instructions/orders: N/A  Procedure explained. All questions & concerns addressed: Yes  Consent: obtained    Discharge instructions reviewed: Yes  Patient verbalizes understanding: Yes    Paracentesis:   Patient tolerated well. VSS. 6300 cc dark genie fluid removed from abdomen w/o difficulty. Bandaid and liquid bandage applied to site - CDI. Pt wanted to stop draining fluid at 6300, so paracentesis stopped then. No Albumin given per pt request and because less than 6.5 L fluid removed.               Discharge Note:  Discharge criteria met and vital signs stable: Yes, up to w/c, on room air, denies any c/o, paracentesis site right abdomen soft with bandaid clean and dry.    1100 Patient discharged per ambulatory to private vehicle. All personal belongings taken with patient.

## 2022-10-15 ENCOUNTER — HEALTH MAINTENANCE LETTER (OUTPATIENT)
Age: 62
End: 2022-10-15

## 2022-10-21 NOTE — PROGRESS NOTES
Jose Redd M.D.  Cardiovascular Medicine    I personally saw and examined this patient, discussed care with housestaff and other consultants, reviewed current laboratories and imaging studies, and conveyed impression and diagnostic/therapeutic plan to patient.    Problem List  1. ESRD with dialysis dependence  2. Chronic oxygen dependent respiratory failure  3. History of SVT  4. Diabetes  5. Diabetic nephropathy  6. COLD  7. History of ascites requiring paracentesis  8. Pleural effusion  9. ASHD with stenting  10. Pericardial calcification  11. Remote history of obesity  12. Remote history of smoking      Plan:  Consider simultaneous left and right cardiac pressure measurements for evaluation of possible constrictive pericarditis  CT, non contrast, heart for pericardial thickness  US of liver for cirrhosis, possible transjugular biopsy  Serial daily dialysis as in-patient to establish dry weight and reduce PA pressure    62 year old female with longstanding ESRD seen for pulmonary hypertension secondary to diabetic nephropathy.  She is NYHA functional status 3.      There is no history of typical chest pain, collagen vascular disease, pulmonary embolism, hemoptysis, but ?  liver disease,  But no DVT,dysfunction, substance abuse, myeloproliferative disease, congential heart diease, cardiac murmur, left heart disease, hypertension.  There is no history of recreation drug or alcohol abuse.    Family history: ASHD, diabetes    Constitutional: weight loss, fever, chills, night sweats  HEENT: without visual changes, swallow difficulties  Pulmonary: with shortness of breath, but without cough, wheeze, hemoptysis, however reported history of COLD  Cardiac: without chest pain, MANE, PND, orthopnea, edema, palpitation, pre-syncope, syncope,  GI: without diarrhea, constipation, jaundice, melena, GERD, hematemesis  : without frequency, urgency, dysuria, hematuria/dialysis  Skin: rash, bruise, open lesions  Neuro: without  TIA, focal neurologic complaints, seizure, trauma  Ortho: without pain, swelling, mobility impairment  Endocrine: diabetes, thyroid, heat/cold intolerance, polyuria, polyphagia, change bowel habits.  Sleep:no GIANNA, periodic breathing, fatigue  Other:    Allergies reviewed and confirmed      Objective    Alert, oriented, looks older than age, left basilar dullness, CVP 15, positive HJR, RV lift, murmur of TR, ascites though minimal peripheral edema    Catheterization 7/2021    ) Severe pulmonary hypertension primarily secondary to intrinsic pulmonary vascular disease (mean PA 81 mmHg, PVRI 25, PCWP confirmed with sat 35 mmHg)  2) Severely elevated right-sided filling pressures (mean RA 35 mmHg)  3) Severely elevated left-sided filling pressures (LVEDP 35-40 mmHg)  4) No hemodynamically significant coronary artery disease  5) With NO to 80 ppm the mean PA decreased to 60 mmHg, PCWP remained unchanged at 35 mmHg, PVRI decreased to 8.7 waterman*m2.  Her systemic blood pressure was 160-170 mmHg  6) Episode of SVT terminated with 6mg adenosine          Plan    1) Discussed results with Dr. Dong.  She will need diuresis through dialysis and blood pressure control and initiation of pulmonary vasodilators with PH work up.     Coronary Findings    Diagnostic  Dominance: Right  Left Main   The vessel was visualized by selective angiography and is moderate in size. There was 0% vessel disease.      Left Anterior Descending   Mid LAD lesion is 10% stenosed. The lesion is concentric.      Left Circumflex   The vessel was visualized by selective angiography and is moderate in size. There was 0% vessel disease.      Right Coronary Artery   Prox RCA lesion is 20% stenosed.         Intervention               Current Outpatient Medications   Medication     ACCU-CHEK CHING PLUS test strip     ACCU-CHEK GUIDE test strip     acetaminophen (TYLENOL) 500 MG tablet     amiodarone (PACERONE) 200 MG tablet     aspirin (ASA) 81 MG EC tablet      blood glucose (NO BRAND SPECIFIED) lancets standard     blood glucose (NO BRAND SPECIFIED) lancets standard     blood glucose monitoring (NO BRAND SPECIFIED) meter device kit     Continuous Blood Gluc Sensor (FREESTYLE LATSOHA 2 SENSOR) [place]     Emollient (CERAVE) CREA     famotidine (PEPCID) 10 MG tablet     fluticasone (FLONASE) 50 MCG/ACT nasal spray     Glycopyrrolate-Formoterol (BEVESPI AEROSPHERE) 9-4.8 MCG/ACT oral inhaler     hypromellose-dextran (ARTIFICAL TEARS) 0.1-0.3 % ophthalmic solution     insulin detemir (LEVEMIR FLEXTOUCH) 100 UNIT/ML pen     insulin pen needle (31G X 8 MM) 31G X 8 MM miscellaneous     levalbuterol (XOPENEX HFA) 45 MCG/ACT inhaler     midodrine (PROAMATINE) 10 MG tablet     NOVOLOG FLEXPEN 100 UNIT/ML soln     No current facility-administered medications for this visit.       Meds  Current Outpatient Medications   Medication     ACCU-CHEK CHING PLUS test strip     ACCU-CHEK GUIDE test strip     acetaminophen (TYLENOL) 500 MG tablet     amiodarone (PACERONE) 200 MG tablet     aspirin (ASA) 81 MG EC tablet     blood glucose (NO BRAND SPECIFIED) lancets standard     blood glucose (NO BRAND SPECIFIED) lancets standard     blood glucose monitoring (NO BRAND SPECIFIED) meter device kit     Continuous Blood Gluc Sensor (FREESTYLE LATOSHA 2 SENSOR) [place]     Emollient (CERAVE) CREA     famotidine (PEPCID) 10 MG tablet     fluticasone (FLONASE) 50 MCG/ACT nasal spray     Glycopyrrolate-Formoterol (BEVESPI AEROSPHERE) 9-4.8 MCG/ACT oral inhaler     hypromellose-dextran (ARTIFICAL TEARS) 0.1-0.3 % ophthalmic solution     insulin detemir (LEVEMIR FLEXTOUCH) 100 UNIT/ML pen     insulin pen needle (31G X 8 MM) 31G X 8 MM miscellaneous     levalbuterol (XOPENEX HFA) 45 MCG/ACT inhaler     midodrine (PROAMATINE) 10 MG tablet     NOVOLOG FLEXPEN 100 UNIT/ML soln     No current facility-administered medications for this visit.       Labs      Echocardiogram    Name: ELIECER CHACON  MRN:  [id number]  : 1960  Study Date: 07/15/2021 08:59 AM  Age: 61 yrs  Gender: Female  Patient Location: [facility]  Reason For Study: Tachycardia  Ordering Physician: KERWIN BEATTY  Referring Physician: Gigi Martin  Performed By: Raj Grey     BSA: 1.7 m2  Height: 60 in  Weight: 163 lb  HR: 59  BP: 159/73 mmHg  ______________________________________________________________________________  Procedure  Complete Portable Echo Adult.  ______________________________________________________________________________  Interpretation Summary     Left ventricular systolic function is normal.  The visual ejection fraction is 60-65%.  The right ventricle is severely dilated.  Moderately decreased right ventricular systolic function  There is moderate to mod-severe (2-3+) tricuspid regurgitation.  The right ventricular systolic pressure is approximated at 82mmHg plus the  right atrial pressure.  Right ventricular systolic pressure is elevated, consistent with severe  pulmonary hypertension.  IVC diameter >2.1 cm collapsing <50% with sniff suggests a high RA pressure  estimated at 15 mmHg or greater.  Flattened septum is consistent with RV pressure/volume overload.  Compared to , there is new RV enlargemnt with RV dysfunction. RVSP has  increased compared to 2017 when it was 60 plus RAP.  Findings discussed with Dawna, nursing team for the patient. The study was  technically difficult.  ______________________________________________________________________________  Left Ventricle  The left ventricle is normal in size. There is concentric remodeling present.  Left ventricular systolic function is normal. The visual ejection fraction is  60-65%. Diastolic Doppler findings (E/E' ratio and/or other parameters)  suggest left ventricular filling pressures are increased. Grade III or  advanced diastolic dysfunction. Flattened septum is consistent with RV  pressure/volume overload.     Right Ventricle  The right ventricle  is severely dilated. Moderately decreased right  ventricular systolic function.     Atria  The left atrium is severely dilated. The right atrium is severely dilated.     Mitral Valve  The mitral valve leaflets are mildly thickened. There is mild mitral annular  calcification. There is mild to moderate (1-2+) mitral regurgitation.     Tricuspid Valve  There is moderate to mod-severe (2-3+) tricuspid regurgitation. IVC diameter  >2.1 cm collapsing <50% with sniff suggests a high RA pressure estimated at 15  mmHg or greater. Right ventricular systolic pressure is elevated, consistent  with severe pulmonary hypertension. The right ventricular systolic pressure is  approximated at 82mmHg plus the right atrial pressure.     Aortic Valve  There is mild trileaflet aortic sclerosis. There is trace aortic  regurgitation. No aortic stenosis is present.     Pulmonic Valve  The pulmonic valve is not well visualized.     Pericardium  Trivial pericardial effusion. Small right pleural effusion.     Rhythm  Sinus rhythm was noted.     ______________________________________________________________________________  MMode/2D Measurements & Calculations  IVSd: 0.98 cm  LVIDd: 3.8 cm  LVIDs: 3.6 cm  LVPWd: 1.2 cm  FS: 4.2 %  LV mass(C)d: 128.1 grams  LV mass(C)dI: 74.9 grams/m2     Ao root diam: 2.7 cm  LA dimension: 3.3 cm  asc Aorta Diam: 3.0 cm  LA/Ao: 1.2  LVOT diam: 2.0 cm  LVOT area: 3.1 cm2  LA Volume (BP): 95.2 ml  LA Volume Index (BP): 55.7 ml/m2  RWT: 0.62     Doppler Measurements & Calculations  MV E max artur: 130.0 cm/sec  MV A max artur: 44.1 cm/sec  MV E/A: 2.9  MV max P.7 mmHg  MV mean P.9 mmHg  MV V2 VTI: 34.6 cm     MV dec slope: 583.8 cm/sec2  PA acc time: 0.06 sec  TR max artur: 452.5 cm/sec  TR max P.9 mmHg  E/E' av.9  Lateral E/e': 19.0  Medial E/e': 16.8     Imaging     EXAM: CT CHEST PULMONARY EMBOLISM W CONTRAST  LOCATION: [facility]  DATE/TIME: 6/15/2022 4:56  PM     INDICATION: PE suspected, high prob. Increased shortness of breath.  COMPARISON: 07/15/2021.  TECHNIQUE: CT chest pulmonary angiogram during arterial phase injection of IV contrast. Multiplanar reformats and MIP reconstructions were performed. Dose reduction techniques were used.   CONTRAST: 61 mL Isovue 370.     FINDINGS:  ANGIOGRAM CHEST: Pulmonary arteries are normal caliber and negative for pulmonary emboli. Normal caliber thoracic aorta without dissection. Moderate aortic calcification.     LUNGS AND PLEURA: Moderate right-sided pleural fluid collection. Mosaic attenuation of the pulmonary parenchyma diffusely which can be seen with air trapping, edema or viral etiologies. Significant volume loss/atelectasis right lower lobe with moderate   volume loss and atelectasis right middle lobe inferiorly. No left-sided pleural fluid. Mild intralobular septal thickening.     MEDIASTINUM/AXILLAE: Cardiac enlargement without pericardial fluid. Diffuse pericardial calcifications. No lymphadenopathy. Normal caliber esophagus.     CORONARY ARTERY CALCIFICATION: Moderate.     UPPER ABDOMEN: Partially visualized moderate ascites.     MUSCULOSKELETAL: Minimal degenerative changes in the spine.                                                                      IMPRESSION:  1.  No evidence for pulmonary embolism.     2.  Moderate right-sided pleural fluid collection with atelectasis right lower lobe and right middle lobe as detailed above. Underlying infiltrates in these areas of atelectasis cannot be excluded.     3.  Mosaic attenuation of the pulmonary parenchyma with interlobular septal thickening most likely related to edema. Other considerations would include air trapping related to reactive airways disease or viral etiologies.     4.  Cardiac enlargement with diffuse pericardial calcifications.           CC: Mike Hernandez ([facility]), Gigi Martin M.D., France Barbosa M.D.

## 2022-11-03 ENCOUNTER — OFFICE VISIT (OUTPATIENT)
Dept: CARDIOLOGY | Facility: CLINIC | Age: 62
End: 2022-11-03
Payer: MEDICARE

## 2022-11-03 VITALS
WEIGHT: 135.3 LBS | HEART RATE: 66 BPM | SYSTOLIC BLOOD PRESSURE: 135 MMHG | DIASTOLIC BLOOD PRESSURE: 83 MMHG | HEIGHT: 60 IN | OXYGEN SATURATION: 96 % | BODY MASS INDEX: 26.56 KG/M2

## 2022-11-03 DIAGNOSIS — I27.20 PULMONARY HYPERTENSION (H): ICD-10-CM

## 2022-11-03 PROCEDURE — 99215 OFFICE O/P EST HI 40 MIN: CPT | Performed by: INTERNAL MEDICINE

## 2022-11-03 RX ORDER — LIDOCAINE 40 MG/G
CREAM TOPICAL
Status: CANCELLED | OUTPATIENT
Start: 2022-11-03

## 2022-11-03 RX ORDER — SODIUM CHLORIDE 9 MG/ML
1000 INJECTION, SOLUTION INTRAVENOUS CONTINUOUS
Status: CANCELLED | OUTPATIENT
Start: 2022-11-03

## 2022-11-03 RX ORDER — POTASSIUM CHLORIDE 1500 MG/1
20 TABLET, EXTENDED RELEASE ORAL
Status: CANCELLED | OUTPATIENT
Start: 2022-11-03

## 2022-11-03 NOTE — PATIENT INSTRUCTIONS
Medication Changes:  No medication changes at this time. Please continue current medication regiment.    Patient Instructions:  1. Continue staying active and eat a heart healthy diet.    2. Please keep current list of medications with you at all times.    3. Remember to weigh yourself daily after voiding and before you consume any food or beverages and log the numbers.  If you have gained 2 pounds overnight or 5 pounds in a week contact us immediately for medication adjustments or further instructions.    4. **Please call us immediately if you have any syncope (fainting or passing out), chest pain, edema (swelling or weight gain), or decline in your functional status (general decline in how you are feeling).    5. Patients on Remodulin (treprostinil) or Veletri (epoprostenol): Please make sure that you have your backup pump and supplies with you at all times, your mixing instructions, and contact information for your specialty pharmacy.    Follow up Appointment Information:  Right heart catheterization with admission after for daily diaslysis    *Mandatory COVID Testing: We require COVID testing prior to their procedure regardless of vaccination status.   This can be completed via PCR and it must be completed no more than 4 days prior.   To schedule COVID testing at a [facility] lab, please call [phone])  If completing the COVID Test at an Outside facility please have them fax the results to [phone]  Please bring in a picture of your results if a rapid is completed at home.  If you are running into and issues please call us.      Check-In  Time Check-In Location Estimated Length Procedure   Name        [facility]  waiting room 60-90 minutes Right Heart Catheterization**     Procedure Preparations & Instructions     This is an invasive procedure that DOES require preparation:    Take your temperature in the morning prior to coming in.  If your temperature is 100 F please call [phone] (Opt. 1) and  notify them.  If you do not have access to a thermometer at home, please come in for testing.  If you are running a temperature your procedure may be rescheduled.  Nothing to eat for 6 hours   You may have clear liquids up until the time of your procedure  You can take your morning medications (except diabetic and blood thinners) with sips of water  Please arrange for a ride to drop you off and pick you up in the instance you are unable to drive home, however you should be able to function as you normally would after the procedure     *For Patients with Diabetes: DO NOT take any oral diabetic medication, short-acting diabetes medications/insulin, humalog or regular insulin the morning of your test  Take   dose of long-acting insulin (Lantus, Levemir) the day of your test  Remember to  bring your glucometer and insulin with you to take after your test if needed         For scheduling at [facility] please call [phone]  For scheduling at the [place] please call [phone]  We are located on the second floor [address] at the [facility].  Our address is     [address] [place] [address]   [address]  [place] [address]    Thank you for allowing us to be a part of your care here at the [facility]    If you have questions or concerns please contact us at:    Tee Chappell RN, BSN      Nurse Coordinator       Pulmonary Hypertension     [facility]   (Phone)[phone]  (Fax)[phone]    ** Please note that you will NOT receive a reminder call regarding your scheduled testing, reminder calls are for provider appointments only.  If you are scheduled for testing within the [facility] system you may receive a call regarding pre-registration for billing purposes only.**     Remember to weigh yourself daily after voiding and before you consume any food or beverages and log the numbers.  If you have gained/lost 2 pounds overnight or 5 pounds in a week contact us  immediately for medication adjustments or further instructions.    Support Group:  Pulmonary Hypertension Association  Https://www.phassociation.org/  **Look at the Events Tab** They even have Support Groups that you can call into    [facility] PH Support Group  Second Saturday of the Month from 1-3 PM   Location: [address] [place] [id number]  Leader: Elsa Dooley  Phone: [phone]  Email: [email or web address]     Great Videos about Pulmonary Hypertension!!  Scan ME!    Website: bit.ly/UnderstandingPA

## 2022-11-03 NOTE — LETTER
11/3/2022    Gigi Martin MD  [address] [place] [id number]    RE: Yissel Bustilloson       Dear Colleague,     I had the pleasure of seeing Yissel Wetzel in the [facility].  Jose Redd M.D.  Cardiovascular Medicine    I personally saw and examined this patient, discussed care with housestaff and other consultants, reviewed current laboratories and imaging studies, and conveyed impression and diagnostic/therapeutic plan to patient.    Problem List  1. ESRD with dialysis dependence  2. Chronic oxygen dependent respiratory failure  3. History of SVT  4. Diabetes  5. Diabetic nephropathy  6. COLD  7. History of ascites requiring paracentesis  8. Pleural effusion  9. ASHD with stenting  10. Pericardial calcification  11. Remote history of obesity  12. Remote history of smoking      Plan:  Consider simultaneous left and right cardiac pressure measurements for evaluation of possible constrictive pericarditis  CT, non contrast, heart for pericardial thickness  US of liver for cirrhosis, possible transjugular biopsy  Serial daily dialysis as in-patient to establish dry weight and reduce PA pressure    62 year old female with longstanding ESRD seen for pulmonary hypertension secondary to diabetic nephropathy.  She is NYHA functional status 3.      There is no history of typical chest pain, collagen vascular disease, pulmonary embolism, hemoptysis, but ?  liver disease,  But no DVT,dysfunction, substance abuse, myeloproliferative disease, congential heart diease, cardiac murmur, left heart disease, hypertension.  There is no history of recreation drug or alcohol abuse.    Family history: ASHD, diabetes    Constitutional: weight loss, fever, chills, night sweats  HEENT: without visual changes, swallow difficulties  Pulmonary: with shortness of breath, but without cough, wheeze, hemoptysis, however reported history of COLD  Cardiac: without chest pain, MANE, PND, orthopnea, edema,  palpitation, pre-syncope, syncope,  GI: without diarrhea, constipation, jaundice, melena, GERD, hematemesis  : without frequency, urgency, dysuria, hematuria/dialysis  Skin: rash, bruise, open lesions  Neuro: without TIA, focal neurologic complaints, seizure, trauma  Ortho: without pain, swelling, mobility impairment  Endocrine: diabetes, thyroid, heat/cold intolerance, polyuria, polyphagia, change bowel habits.  Sleep:no GIANNA, periodic breathing, fatigue  Other:    Allergies reviewed and confirmed      Objective    Alert, oriented, looks older than age, left basilar dullness, CVP 15, positive HJR, RV lift, murmur of TR, ascites though minimal peripheral edema    Catheterization 7/2021    ) Severe pulmonary hypertension primarily secondary to intrinsic pulmonary vascular disease (mean PA 81 mmHg, PVRI 25, PCWP confirmed with sat 35 mmHg)  2) Severely elevated right-sided filling pressures (mean RA 35 mmHg)  3) Severely elevated left-sided filling pressures (LVEDP 35-40 mmHg)  4) No hemodynamically significant coronary artery disease  5) With NO to 80 ppm the mean PA decreased to 60 mmHg, PCWP remained unchanged at 35 mmHg, PVRI decreased to 8.7 waterman*m2.  Her systemic blood pressure was 160-170 mmHg  6) Episode of SVT terminated with 6mg adenosine          Plan    1) Discussed results with Dr. Dong.  She will need diuresis through dialysis and blood pressure control and initiation of pulmonary vasodilators with PH work up.     Coronary Findings    Diagnostic  Dominance: Right  Left Main   The vessel was visualized by selective angiography and is moderate in size. There was 0% vessel disease.      Left Anterior Descending   Mid LAD lesion is 10% stenosed. The lesion is concentric.      Left Circumflex   The vessel was visualized by selective angiography and is moderate in size. There was 0% vessel disease.      Right Coronary Artery   Prox RCA lesion is 20% stenosed.         Intervention               Current  Outpatient Medications   Medication     ACCU-CHEK CHING PLUS test strip     ACCU-CHEK GUIDE test strip     acetaminophen (TYLENOL) 500 MG tablet     amiodarone (PACERONE) 200 MG tablet     aspirin (ASA) 81 MG EC tablet     blood glucose (NO BRAND SPECIFIED) lancets standard     blood glucose (NO BRAND SPECIFIED) lancets standard     blood glucose monitoring (NO BRAND SPECIFIED) meter device kit     Continuous Blood Gluc Sensor (FREESTYLE LATOSHA 2 SENSOR) MISC     Emollient (CERAVE) CREA     famotidine (PEPCID) 10 MG tablet     fluticasone (FLONASE) 50 MCG/ACT nasal spray     Glycopyrrolate-Formoterol (BEVESPI AEROSPHERE) 9-4.8 MCG/ACT oral inhaler     hypromellose-dextran (ARTIFICAL TEARS) 0.1-0.3 % ophthalmic solution     insulin detemir (LEVEMIR FLEXTOUCH) 100 UNIT/ML pen     insulin pen needle (31G X 8 MM) 31G X 8 MM miscellaneous     levalbuterol (XOPENEX HFA) 45 MCG/ACT inhaler     midodrine (PROAMATINE) 10 MG tablet     NOVOLOG FLEXPEN 100 UNIT/ML soln     No current facility-administered medications for this visit.       Meds  Current Outpatient Medications   Medication     ACCU-CHEK CHING PLUS test strip     ACCU-CHEK GUIDE test strip     acetaminophen (TYLENOL) 500 MG tablet     amiodarone (PACERONE) 200 MG tablet     aspirin (ASA) 81 MG EC tablet     blood glucose (NO BRAND SPECIFIED) lancets standard     blood glucose (NO BRAND SPECIFIED) lancets standard     blood glucose monitoring (NO BRAND SPECIFIED) meter device kit     Continuous Blood Gluc Sensor (FREESTYLE LATOSHA 2 SENSOR) MISC     Emollient (CERAVE) CREA     famotidine (PEPCID) 10 MG tablet     fluticasone (FLONASE) 50 MCG/ACT nasal spray     Glycopyrrolate-Formoterol (BEVESPI AEROSPHERE) 9-4.8 MCG/ACT oral inhaler     hypromellose-dextran (ARTIFICAL TEARS) 0.1-0.3 % ophthalmic solution     insulin detemir (LEVEMIR FLEXTOUCH) 100 UNIT/ML pen     insulin pen needle (31G X 8 MM) 31G X 8 MM miscellaneous     levalbuterol (XOPENEX HFA) 45 MCG/ACT  inhaler     midodrine (PROAMATINE) 10 MG tablet     NOVOLOG FLEXPEN 100 UNIT/ML soln     No current facility-administered medications for this visit.       Labs      Echocardiogram    Name: ELIECER CHACON  MRN: [id number]  : 1960  Study Date: 07/15/2021 08:59 AM  Age: 61 yrs  Gender: Female  Patient Location[facility]  Reason For Study: Tachycardia  Ordering Physician: KERWIN BEATTY  Referring Physician: Gigi Martin  Performed By: Raj Grey     BSA: 1.7 m2  Height: 60 in  Weight: 163 lb  HR: 59  BP: 159/73 mmHg  ______________________________________________________________________________  Procedure  Complete Portable Echo Adult.  ______________________________________________________________________________  Interpretation Summary     Left ventricular systolic function is normal.  The visual ejection fraction is 60-65%.  The right ventricle is severely dilated.  Moderately decreased right ventricular systolic function  There is moderate to mod-severe (2-3+) tricuspid regurgitation.  The right ventricular systolic pressure is approximated at 82mmHg plus the  right atrial pressure.  Right ventricular systolic pressure is elevated, consistent with severe  pulmonary hypertension.  IVC diameter >2.1 cm collapsing <50% with sniff suggests a high RA pressure  estimated at 15 mmHg or greater.  Flattened septum is consistent with RV pressure/volume overload.  Compared to , there is new RV enlargemnt with RV dysfunction. RVSP has  increased compared to 2017 when it was 60 plus RAP.  Findings discussed with Dawna, nursing team for the patient. The study was  technically difficult.  ______________________________________________________________________________  Left Ventricle  The left ventricle is normal in size. There is concentric remodeling present.  Left ventricular systolic function is normal. The visual ejection fraction is  60-65%. Diastolic Doppler findings (E/E' ratio and/or other  parameters)  suggest left ventricular filling pressures are increased. Grade III or  advanced diastolic dysfunction. Flattened septum is consistent with RV  pressure/volume overload.     Right Ventricle  The right ventricle is severely dilated. Moderately decreased right  ventricular systolic function.     Atria  The left atrium is severely dilated. The right atrium is severely dilated.     Mitral Valve  The mitral valve leaflets are mildly thickened. There is mild mitral annular  calcification. There is mild to moderate (1-2+) mitral regurgitation.     Tricuspid Valve  There is moderate to mod-severe (2-3+) tricuspid regurgitation. IVC diameter  >2.1 cm collapsing <50% with sniff suggests a high RA pressure estimated at 15  mmHg or greater. Right ventricular systolic pressure is elevated, consistent  with severe pulmonary hypertension. The right ventricular systolic pressure is  approximated at 82mmHg plus the right atrial pressure.     Aortic Valve  There is mild trileaflet aortic sclerosis. There is trace aortic  regurgitation. No aortic stenosis is present.     Pulmonic Valve  The pulmonic valve is not well visualized.     Pericardium  Trivial pericardial effusion. Small right pleural effusion.     Rhythm  Sinus rhythm was noted.     ______________________________________________________________________________  MMode/2D Measurements & Calculations  IVSd: 0.98 cm  LVIDd: 3.8 cm  LVIDs: 3.6 cm  LVPWd: 1.2 cm  FS: 4.2 %  LV mass(C)d: 128.1 grams  LV mass(C)dI: 74.9 grams/m2     Ao root diam: 2.7 cm  LA dimension: 3.3 cm  asc Aorta Diam: 3.0 cm  LA/Ao: 1.2  LVOT diam: 2.0 cm  LVOT area: 3.1 cm2  LA Volume (BP): 95.2 ml  LA Volume Index (BP): 55.7 ml/m2  RWT: 0.62     Doppler Measurements & Calculations  MV E max artur: 130.0 cm/sec  MV A max artur: 44.1 cm/sec  MV E/A: 2.9  MV max P.7 mmHg  MV mean P.9 mmHg  MV V2 VTI: 34.6 cm     MV dec slope: 583.8 cm/sec2  PA acc time: 0.06 sec  TR max artur: 452.5 cm/sec  TR  max P.9 mmHg  E/E' av.9  Lateral E/e': 19.0  Medial E/e': 16.8     Imaging     EXAM: CT CHEST PULMONARY EMBOLISM W CONTRAST  LOCATION: [facility]  DATE/TIME: 6/15/2022 4:56 PM     INDICATION: PE suspected, high prob. Increased shortness of breath.  COMPARISON: 07/15/2021.  TECHNIQUE: CT chest pulmonary angiogram during arterial phase injection of IV contrast. Multiplanar reformats and MIP reconstructions were performed. Dose reduction techniques were used.   CONTRAST: 61 mL Isovue 370.     FINDINGS:  ANGIOGRAM CHEST: Pulmonary arteries are normal caliber and negative for pulmonary emboli. Normal caliber thoracic aorta without dissection. Moderate aortic calcification.     LUNGS AND PLEURA: Moderate right-sided pleural fluid collection. Mosaic attenuation of the pulmonary parenchyma diffusely which can be seen with air trapping, edema or viral etiologies. Significant volume loss/atelectasis right lower lobe with moderate   volume loss and atelectasis right middle lobe inferiorly. No left-sided pleural fluid. Mild intralobular septal thickening.     MEDIASTINUM/AXILLAE: Cardiac enlargement without pericardial fluid. Diffuse pericardial calcifications. No lymphadenopathy. Normal caliber esophagus.     CORONARY ARTERY CALCIFICATION: Moderate.     UPPER ABDOMEN: Partially visualized moderate ascites.     MUSCULOSKELETAL: Minimal degenerative changes in the spine.                                                                      IMPRESSION:  1.  No evidence for pulmonary embolism.     2.  Moderate right-sided pleural fluid collection with atelectasis right lower lobe and right middle lobe as detailed above. Underlying infiltrates in these areas of atelectasis cannot be excluded.     3.  Mosaic attenuation of the pulmonary parenchyma with interlobular septal thickening most likely related to edema. Other considerations would include air trapping related to reactive airways disease or  viral etiologies.     4.  Cardiac enlargement with diffuse pericardial calcifications.           CC: Mike Hernandez ([facility]), Gigi Martin M.D., France Barbosa M.D.    Thank you for allowing me to participate in the care of your patient.      Sincerely,     Jose Redd MD     [facility]  cc:   France Barbosa MD  [address]  [place] [address]

## 2022-11-08 DIAGNOSIS — E11.21 TYPE 2 DIABETES MELLITUS WITH DIABETIC NEPHROPATHY, WITH LONG-TERM CURRENT USE OF INSULIN (H): ICD-10-CM

## 2022-11-08 DIAGNOSIS — Z79.4 TYPE 2 DIABETES MELLITUS WITH DIABETIC NEPHROPATHY, WITH LONG-TERM CURRENT USE OF INSULIN (H): ICD-10-CM

## 2022-11-08 RX ORDER — BLOOD SUGAR DIAGNOSTIC
STRIP MISCELLANEOUS
Qty: 400 STRIP | Refills: 0 | Status: SHIPPED | OUTPATIENT
Start: 2022-11-08 | End: 2023-03-17

## 2022-11-08 NOTE — TELEPHONE ENCOUNTER
Patient calling regarding refill request. Patient states the pharmacy told her this was denied. This RN informed patient that this has not been denied. Clinic received refill request today.     Last VV with Dr. Martin was 9/15/2022.       Meg Dawkins RN BSN MSN  [facility]

## 2022-11-08 NOTE — TELEPHONE ENCOUNTER
Prescription approved per [facility] Refill Protocol.      Meg Dawkins, RN BSN MSN  [facility]

## 2022-11-09 ENCOUNTER — TELEPHONE (OUTPATIENT)
Dept: CARDIOLOGY | Facility: CLINIC | Age: 62
End: 2022-11-09

## 2022-11-09 NOTE — TELEPHONE ENCOUNTER
"Attempted to schedule patient for RHC and admission; patient refusing to schedule at this time. Stated she has 2 dogs and \"needs to figure out who will take care of them\" before she can schedule. Advised Dr. Redd is on service Friday Nov 18 but patient does not want to schedule at this time. Asked me to call her again next Tuesday. Lorenza Chappell RN on 11/9/2022 at 8:35 AM    "

## 2022-11-10 ENCOUNTER — TELEPHONE (OUTPATIENT)
Dept: CARDIOLOGY | Facility: CLINIC | Age: 62
End: 2022-11-10

## 2022-11-10 DIAGNOSIS — Z11.52 ENCOUNTER FOR SCREENING FOR COVID-19: Primary | ICD-10-CM

## 2022-11-10 NOTE — TELEPHONE ENCOUNTER
Unable to reach patient; LM to follow up on RHC/admission. Lorenza Chappell RN on 11/10/2022 at 2:49 PM    Scheduled RHC for 11/23 at 7 AM. Admission scheduled for 11/23 at 9 AM for PH management and daily dialysis. [id number]. Patient will get PCR test Monday at local  clinic. Lorenza Chappell RN on 11/10/2022 at 3:14 PM    Letter sent with pre-procedure instructions and admission information. Lorenza Chappell RN on 11/10/2022 at 3:19 PM    ----- Message from Annita Kay sent at 11/10/2022 11:09 AM CST -----  Reminder to call patient to talk about RHC and admission

## 2022-11-10 NOTE — LETTER
November 10, 2022      TO: Yissel Wetzel  [address]  [facility] [address]         Dear Yissel,    Below are the pre-procedure instructions for your upcoming testing at the [facility].    Patient Education    1. Your arrival time is 7 AM.  Location is [address] Waiting Room  2. Please plan on being at the hospital all day.  3. At any time, emergencies and/or urgent cases may come up which could delay the start of your procedure.    COVID Testing Instructions  *Mandatory COVID Testing:   We require COVID testing prior to their procedure regardless of vaccination status.     If you are completing a PCR Lab, it must be completed no more than 4 days prior.     If you are staying overnight a PCR completed at a LAB is required and a Rapid will not be accepted.    To schedule COVID testing at a [place] lab, please call [phone] ([phone])    If completing the COVID Test at an Outside facility please have them fax the results to [phone]    Please bring in a picture of your results if a rapid is completed at home.    If you are running into and issues please call us.         Pre-procedure instructions - Right heart catheterization    - Take your temperature in the morning prior to coming in.  If your temperature is 100 F please call [phone] (Opt. 1) and notify them.  If you do not have access to a thermometer at home, please come in for testing.  If you are running a temperature, your procedure may be rescheduled.  - Nothing to eat for 6 hours   - You may have clear liquids up until the time of your procedure  - You can take your morning medications (except diabetic and blood thinners) with sips of water  - We recommend you arrange for a ride to drop you off and pick you up, in the instance, you are unable to drive home, however you should be able to function as you  normally would after the procedure    Diabetic Medication Instructions  ? DO NOT take any oral diabetic medication, short-acting diabetes medications/insulin, humalog or regular insulin the morning of your test  ? Take   dose of long-acting insulin (Lantus, Levemir) the day of your test  ? Hold Oral Diabetic on the day of the procedure and for 48 hours after IV contrast given  ? Remember to  bring your glucometer and insulin with you to take after your test if needed    We would like you to be admitted under the cardiovascular service by the Pulmonary Hypertension Clinic at the [facility], for Fluid Volume Overload.  We would like to admit you on November 23, 2022 at 9:00 AM.      The Hospitals address is:   [address] [place] [id number]    Please check in to the admissions window in the main lobby of the hospital entrance to the [facility].  The admissions counselor will have your information and should inform you what floor/unit you will be admitted to and instruct you how to proceed.      Please plan to be admitted for approximately 4-5 days under in-patient care.  You are welcome to bring personal items from home, however please do not bring items of value.      Your clinic follow up will be scheduled upon pre-discharge planning for approximately 1 week after you leave the hospital; you should be informed of this appointment date and time prior to leaving the [facility].  In the event you are not given a date and time for clinic follow up, please contact me at [phone] upon discharging from the [facility].         Sincerely,    Lorenza Chappell RN

## 2022-11-16 ENCOUNTER — TELEPHONE (OUTPATIENT)
Dept: CARDIOLOGY | Facility: CLINIC | Age: 62
End: 2022-11-16

## 2022-11-16 NOTE — TELEPHONE ENCOUNTER
Advised patient needs to reschedule COVID appt for either Monday or Tuesday prior to admission. Patient asked I reschedule for Tuesday 11/22 around 1 PM. Lorenza Chappell RN on 11/16/2022 at 9:04 AM    Rescheduled for 11/22 at 12:40 PM. Lorenza Chappell RN on 11/16/2022 at 9:11 AM

## 2022-11-21 ENCOUNTER — TELEPHONE (OUTPATIENT)
Dept: CARDIOLOGY | Facility: CLINIC | Age: 62
End: 2022-11-21

## 2022-11-21 ENCOUNTER — TRANSFERRED RECORDS (OUTPATIENT)
Dept: HEALTH INFORMATION MANAGEMENT | Facility: CLINIC | Age: 62
End: 2022-11-21

## 2022-11-21 DIAGNOSIS — Z11.52 ENCOUNTER FOR SCREENING FOR COVID-19: Primary | ICD-10-CM

## 2022-11-21 NOTE — TELEPHONE ENCOUNTER
Unable to reach; LM to review pre-procedure instructions for upcoming RHC and admission for fluid volume overload. Lorenza Chappell RN on 11/21/2022 at 3:26 PM    Reviewed pre-procedure instructions and provided directions for the hospital. Patient has COVID PCR test today at noon. Lorenza Chappell RN on 11/22/2022 at 9:01 AM

## 2022-11-22 ENCOUNTER — LAB (OUTPATIENT)
Dept: URGENT CARE | Facility: URGENT CARE | Age: 62
End: 2022-11-22
Attending: INTERNAL MEDICINE
Payer: MEDICARE

## 2022-11-22 ENCOUNTER — TELEPHONE (OUTPATIENT)
Dept: CARDIOLOGY | Facility: CLINIC | Age: 62
End: 2022-11-22

## 2022-11-22 DIAGNOSIS — Z11.52 ENCOUNTER FOR SCREENING FOR COVID-19: ICD-10-CM

## 2022-11-22 LAB — SARS-COV-2 RNA RESP QL NAA+PROBE: NEGATIVE

## 2022-11-22 PROCEDURE — U0003 INFECTIOUS AGENT DETECTION BY NUCLEIC ACID (DNA OR RNA); SEVERE ACUTE RESPIRATORY SYNDROME CORONAVIRUS 2 (SARS-COV-2) (CORONAVIRUS DISEASE [COVID-19]), AMPLIFIED PROBE TECHNIQUE, MAKING USE OF HIGH THROUGHPUT TECHNOLOGIES AS DESCRIBED BY CMS-2020-01-R: HCPCS

## 2022-11-22 PROCEDURE — U0005 INFEC AGEN DETEC AMPLI PROBE: HCPCS

## 2022-11-22 NOTE — TELEPHONE ENCOUNTER
Left voicemail for patient to complete Travel Screen for Cardiac Cath Lab appointment on 11/23 and inform patient of updated Visitor Policy.     PCR collected

## 2022-11-23 ENCOUNTER — APPOINTMENT (OUTPATIENT)
Dept: LAB | Facility: CLINIC | Age: 62
DRG: 286 | End: 2022-11-23
Attending: INTERNAL MEDICINE
Payer: MEDICARE

## 2022-11-23 ENCOUNTER — APPOINTMENT (OUTPATIENT)
Dept: CARDIOLOGY | Facility: CLINIC | Age: 62
DRG: 286 | End: 2022-11-23
Attending: STUDENT IN AN ORGANIZED HEALTH CARE EDUCATION/TRAINING PROGRAM
Payer: MEDICARE

## 2022-11-23 ENCOUNTER — HOSPITAL ENCOUNTER (INPATIENT)
Facility: CLINIC | Age: 62
LOS: 2 days | Discharge: HOME | DRG: 286 | End: 2022-11-25
Attending: INTERNAL MEDICINE | Admitting: INTERNAL MEDICINE
Payer: MEDICARE

## 2022-11-23 ENCOUNTER — APPOINTMENT (OUTPATIENT)
Dept: GENERAL RADIOLOGY | Facility: CLINIC | Age: 62
DRG: 286 | End: 2022-11-23
Attending: INTERNAL MEDICINE
Payer: MEDICARE

## 2022-11-23 ENCOUNTER — APPOINTMENT (OUTPATIENT)
Dept: MEDSURG UNIT | Facility: CLINIC | Age: 62
DRG: 286 | End: 2022-11-23
Attending: INTERNAL MEDICINE
Payer: MEDICARE

## 2022-11-23 DIAGNOSIS — Z99.2 ESRD (END STAGE RENAL DISEASE) ON DIALYSIS (H): Primary | ICD-10-CM

## 2022-11-23 DIAGNOSIS — N18.6 ESRD (END STAGE RENAL DISEASE) ON DIALYSIS (H): Primary | ICD-10-CM

## 2022-11-23 DIAGNOSIS — I27.20 PULMONARY HYPERTENSION (H): ICD-10-CM

## 2022-11-23 LAB
ANION GAP SERPL CALCULATED.3IONS-SCNC: 20 MMOL/L (ref 7–15)
BASE EXCESS BLDV CALC-SCNC: -0.3 MMOL/L (ref -7.7–1.9)
BASE EXCESS BLDV CALC-SCNC: -0.4 MMOL/L (ref -7.7–1.9)
BASOPHILS # BLD AUTO: 0 10E3/UL (ref 0–0.2)
BASOPHILS NFR BLD AUTO: 1 %
BUN SERPL-MCNC: 63.6 MG/DL (ref 8–23)
CALCIUM SERPL-MCNC: 9 MG/DL (ref 8.8–10.2)
CHLORIDE SERPL-SCNC: 98 MMOL/L (ref 98–107)
CREAT SERPL-MCNC: 6.94 MG/DL (ref 0.51–0.95)
DEPRECATED HCO3 PLAS-SCNC: 20 MMOL/L (ref 22–29)
EOSINOPHIL # BLD AUTO: 0.1 10E3/UL (ref 0–0.7)
EOSINOPHIL NFR BLD AUTO: 2 %
ERYTHROCYTE [DISTWIDTH] IN BLOOD BY AUTOMATED COUNT: 18 % (ref 10–15)
GFR SERPL CREATININE-BSD FRML MDRD: 6 ML/MIN/1.73M2
GLUCOSE BLDC GLUCOMTR-MCNC: 166 MG/DL (ref 70–99)
GLUCOSE BLDC GLUCOMTR-MCNC: 181 MG/DL (ref 70–99)
GLUCOSE BLDC GLUCOMTR-MCNC: 187 MG/DL (ref 70–99)
GLUCOSE BLDC GLUCOMTR-MCNC: 200 MG/DL (ref 70–99)
GLUCOSE BLDC GLUCOMTR-MCNC: 235 MG/DL (ref 70–99)
GLUCOSE BLDC GLUCOMTR-MCNC: 298 MG/DL (ref 70–99)
GLUCOSE SERPL-MCNC: 231 MG/DL (ref 70–99)
HCO3 BLDV-SCNC: 25 MMOL/L (ref 21–28)
HCO3 BLDV-SCNC: 26 MMOL/L (ref 21–28)
HCT VFR BLD AUTO: 32.1 % (ref 35–47)
HGB BLD-MCNC: 10.1 G/DL (ref 11.7–15.7)
HGB BLD-MCNC: 10.7 G/DL (ref 11.7–15.7)
IMM GRANULOCYTES # BLD: 0 10E3/UL
IMM GRANULOCYTES NFR BLD: 1 %
LVEF ECHO: NORMAL
LYMPHOCYTES # BLD AUTO: 1.1 10E3/UL (ref 0.8–5.3)
LYMPHOCYTES NFR BLD AUTO: 17 %
MCH RBC QN AUTO: 27.9 PG (ref 26.5–33)
MCHC RBC AUTO-ENTMCNC: 31.5 G/DL (ref 31.5–36.5)
MCV RBC AUTO: 89 FL (ref 78–100)
MONOCYTES # BLD AUTO: 0.8 10E3/UL (ref 0–1.3)
MONOCYTES NFR BLD AUTO: 13 %
NEUTROPHILS # BLD AUTO: 4.2 10E3/UL (ref 1.6–8.3)
NEUTROPHILS NFR BLD AUTO: 66 %
NRBC # BLD AUTO: 0 10E3/UL
NRBC BLD AUTO-RTO: 0 /100
NT-PROBNP SERPL-MCNC: ABNORMAL PG/ML (ref 0–900)
O2/TOTAL GAS SETTING VFR VENT: 2 %
O2/TOTAL GAS SETTING VFR VENT: 28 %
OXYHGB MFR BLDV: 57 % (ref 92–100)
OXYHGB MFR BLDV: 63 % (ref 70–75)
OXYHGB MFR BLDV: 66 % (ref 70–75)
PCO2 BLDV: 41 MM HG (ref 40–50)
PCO2 BLDV: 47 MM HG (ref 40–50)
PH BLDV: 7.35 [PH] (ref 7.32–7.43)
PH BLDV: 7.39 [PH] (ref 7.32–7.43)
PLATELET # BLD AUTO: 158 10E3/UL (ref 150–450)
PO2 BLDV: 39 MM HG (ref 25–47)
PO2 BLDV: 40 MM HG (ref 25–47)
POTASSIUM SERPL-SCNC: 4.7 MMOL/L (ref 3.4–5.3)
RBC # BLD AUTO: 3.62 10E6/UL (ref 3.8–5.2)
SODIUM SERPL-SCNC: 138 MMOL/L (ref 136–145)
WBC # BLD AUTO: 6.3 10E3/UL (ref 4–11)

## 2022-11-23 PROCEDURE — 86706 HEP B SURFACE ANTIBODY: CPT | Performed by: INTERNAL MEDICINE

## 2022-11-23 PROCEDURE — 93451 RIGHT HEART CATH: CPT | Performed by: INTERNAL MEDICINE

## 2022-11-23 PROCEDURE — 71045 X-RAY EXAM CHEST 1 VIEW: CPT | Mod: 26 | Performed by: RADIOLOGY

## 2022-11-23 PROCEDURE — 93005 ELECTROCARDIOGRAM TRACING: CPT

## 2022-11-23 PROCEDURE — 999N000248 HC STATISTIC IV INSERT WITH US BY RN

## 2022-11-23 PROCEDURE — 999N000142 HC STATISTIC PROCEDURE PREP ONLY

## 2022-11-23 PROCEDURE — 80048 BASIC METABOLIC PNL TOTAL CA: CPT | Performed by: INTERNAL MEDICINE

## 2022-11-23 PROCEDURE — 250N000013 HC RX MED GY IP 250 OP 250 PS 637: Performed by: STUDENT IN AN ORGANIZED HEALTH CARE EDUCATION/TRAINING PROGRAM

## 2022-11-23 PROCEDURE — 250N000012 HC RX MED GY IP 250 OP 636 PS 637: Performed by: STUDENT IN AN ORGANIZED HEALTH CARE EDUCATION/TRAINING PROGRAM

## 2022-11-23 PROCEDURE — 71045 X-RAY EXAM CHEST 1 VIEW: CPT

## 2022-11-23 PROCEDURE — 85014 HEMATOCRIT: CPT | Performed by: INTERNAL MEDICINE

## 2022-11-23 PROCEDURE — 999N000045 HC STATISTIC DAILY SWAN MONITORING

## 2022-11-23 PROCEDURE — 02HP32Z INSERTION OF MONITORING DEVICE INTO PULMONARY TRUNK, PERCUTANEOUS APPROACH: ICD-10-PCS | Performed by: INTERNAL MEDICINE

## 2022-11-23 PROCEDURE — C1894 INTRO/SHEATH, NON-LASER: HCPCS | Performed by: INTERNAL MEDICINE

## 2022-11-23 PROCEDURE — 4A133B3 MONITORING OF ARTERIAL PRESSURE, PULMONARY, PERCUTANEOUS APPROACH: ICD-10-PCS | Performed by: INTERNAL MEDICINE

## 2022-11-23 PROCEDURE — 250N000009 HC RX 250: Performed by: INTERNAL MEDICINE

## 2022-11-23 PROCEDURE — 85018 HEMOGLOBIN: CPT

## 2022-11-23 PROCEDURE — 272N000001 HC OR GENERAL SUPPLY STERILE: Performed by: INTERNAL MEDICINE

## 2022-11-23 PROCEDURE — 634N000001 HC RX 634: Performed by: INTERNAL MEDICINE

## 2022-11-23 PROCEDURE — 258N000003 HC RX IP 258 OP 636: Performed by: INTERNAL MEDICINE

## 2022-11-23 PROCEDURE — 999N000065 XR CHEST PORT 1 VIEW

## 2022-11-23 PROCEDURE — 82805 BLOOD GASES W/O2 SATURATION: CPT

## 2022-11-23 PROCEDURE — 250N000011 HC RX IP 250 OP 636: Performed by: STUDENT IN AN ORGANIZED HEALTH CARE EDUCATION/TRAINING PROGRAM

## 2022-11-23 PROCEDURE — 93010 ELECTROCARDIOGRAM REPORT: CPT | Performed by: INTERNAL MEDICINE

## 2022-11-23 PROCEDURE — 200N000002 HC R&B ICU UMMC

## 2022-11-23 PROCEDURE — 99223 1ST HOSP IP/OBS HIGH 75: CPT | Performed by: INTERNAL MEDICINE

## 2022-11-23 PROCEDURE — 250N000012 HC RX MED GY IP 250 OP 636 PS 637

## 2022-11-23 PROCEDURE — 93451 RIGHT HEART CATH: CPT | Mod: 26 | Performed by: INTERNAL MEDICINE

## 2022-11-23 PROCEDURE — 90937 HEMODIALYSIS REPEATED EVAL: CPT

## 2022-11-23 PROCEDURE — 4A1239Z MONITORING OF CARDIAC OUTPUT, PERCUTANEOUS APPROACH: ICD-10-PCS | Performed by: INTERNAL MEDICINE

## 2022-11-23 PROCEDURE — 87340 HEPATITIS B SURFACE AG IA: CPT | Performed by: INTERNAL MEDICINE

## 2022-11-23 PROCEDURE — 36415 COLL VENOUS BLD VENIPUNCTURE: CPT | Performed by: INTERNAL MEDICINE

## 2022-11-23 PROCEDURE — 82810 BLOOD GASES O2 SAT ONLY: CPT

## 2022-11-23 PROCEDURE — 4A023N6 MEASUREMENT OF CARDIAC SAMPLING AND PRESSURE, RIGHT HEART, PERCUTANEOUS APPROACH: ICD-10-PCS | Performed by: INTERNAL MEDICINE

## 2022-11-23 PROCEDURE — 999N000157 HC STATISTIC RCP TIME EA 10 MIN

## 2022-11-23 PROCEDURE — 93306 TTE W/DOPPLER COMPLETE: CPT | Mod: 26 | Performed by: STUDENT IN AN ORGANIZED HEALTH CARE EDUCATION/TRAINING PROGRAM

## 2022-11-23 PROCEDURE — 5A1D70Z PERFORMANCE OF URINARY FILTRATION, INTERMITTENT, LESS THAN 6 HOURS PER DAY: ICD-10-PCS | Performed by: INTERNAL MEDICINE

## 2022-11-23 PROCEDURE — 99221 1ST HOSP IP/OBS SF/LOW 40: CPT | Mod: 25 | Performed by: INTERNAL MEDICINE

## 2022-11-23 PROCEDURE — 999N000155 HC STATISTIC RAPCV CVP MONITORING

## 2022-11-23 PROCEDURE — 93306 TTE W/DOPPLER COMPLETE: CPT

## 2022-11-23 PROCEDURE — 250N000011 HC RX IP 250 OP 636: Performed by: INTERNAL MEDICINE

## 2022-11-23 PROCEDURE — 83880 ASSAY OF NATRIURETIC PEPTIDE: CPT | Performed by: STUDENT IN AN ORGANIZED HEALTH CARE EDUCATION/TRAINING PROGRAM

## 2022-11-23 RX ORDER — NICOTINE POLACRILEX 4 MG
15-30 LOZENGE BUCCAL
Status: DISCONTINUED | OUTPATIENT
Start: 2022-11-23 | End: 2022-11-25 | Stop reason: HOSPADM

## 2022-11-23 RX ORDER — HEPARIN SODIUM 5000 [USP'U]/.5ML
5000 INJECTION, SOLUTION INTRAVENOUS; SUBCUTANEOUS EVERY 12 HOURS
Status: DISCONTINUED | OUTPATIENT
Start: 2022-11-23 | End: 2022-11-25 | Stop reason: HOSPADM

## 2022-11-23 RX ORDER — MIDODRINE HYDROCHLORIDE 2.5 MG/1
2.5 TABLET ORAL DAILY PRN
Status: DISCONTINUED | OUTPATIENT
Start: 2022-11-23 | End: 2022-11-25

## 2022-11-23 RX ORDER — LIDOCAINE 40 MG/G
CREAM TOPICAL
Status: COMPLETED | OUTPATIENT
Start: 2022-11-23 | End: 2022-11-23

## 2022-11-23 RX ORDER — POTASSIUM CHLORIDE 750 MG/1
20 TABLET, EXTENDED RELEASE ORAL
Status: DISCONTINUED | OUTPATIENT
Start: 2022-11-23 | End: 2022-11-25 | Stop reason: HOSPADM

## 2022-11-23 RX ORDER — HEPARIN SODIUM 1000 [USP'U]/ML
500 INJECTION, SOLUTION INTRAVENOUS; SUBCUTANEOUS CONTINUOUS
Status: DISCONTINUED | OUTPATIENT
Start: 2022-11-23 | End: 2022-11-23

## 2022-11-23 RX ORDER — AMIODARONE HYDROCHLORIDE 200 MG/1
200 TABLET ORAL
Status: DISCONTINUED | OUTPATIENT
Start: 2022-11-23 | End: 2022-11-25 | Stop reason: HOSPADM

## 2022-11-23 RX ORDER — DEXTROSE MONOHYDRATE 25 G/50ML
25-50 INJECTION, SOLUTION INTRAVENOUS
Status: DISCONTINUED | OUTPATIENT
Start: 2022-11-23 | End: 2022-11-25 | Stop reason: HOSPADM

## 2022-11-23 RX ORDER — MIDODRINE HYDROCHLORIDE 5 MG/1
10 TABLET ORAL
Status: DISCONTINUED | OUTPATIENT
Start: 2022-11-23 | End: 2022-11-24

## 2022-11-23 RX ADMIN — INSULIN ASPART 1 UNITS: 100 INJECTION, SOLUTION INTRAVENOUS; SUBCUTANEOUS at 15:03

## 2022-11-23 RX ADMIN — INSULIN ASPART 2 UNITS: 100 INJECTION, SOLUTION INTRAVENOUS; SUBCUTANEOUS at 19:55

## 2022-11-23 RX ADMIN — IRON SUCROSE 100 MG: 20 INJECTION, SOLUTION INTRAVENOUS at 17:11

## 2022-11-23 RX ADMIN — EPOETIN ALFA-EPBX 4000 UNITS: 10000 INJECTION, SOLUTION INTRAVENOUS; SUBCUTANEOUS at 17:10

## 2022-11-23 RX ADMIN — SODIUM CHLORIDE 250 ML: 9 INJECTION, SOLUTION INTRAVENOUS at 16:45

## 2022-11-23 RX ADMIN — HEPARIN SODIUM 5000 UNITS: 5000 INJECTION, SOLUTION INTRAVENOUS; SUBCUTANEOUS at 17:41

## 2022-11-23 RX ADMIN — AMIODARONE HYDROCHLORIDE 200 MG: 200 TABLET ORAL at 19:53

## 2022-11-23 RX ADMIN — SODIUM CHLORIDE 300 ML: 9 INJECTION, SOLUTION INTRAVENOUS at 16:45

## 2022-11-23 RX ADMIN — LIDOCAINE: 40 CREAM TOPICAL at 08:07

## 2022-11-23 RX ADMIN — INSULIN DETEMIR 6 UNITS: 100 INJECTION, SOLUTION SUBCUTANEOUS at 23:49

## 2022-11-23 RX ADMIN — HEPARIN SODIUM 500 UNITS/HR: 1000 INJECTION INTRAVENOUS; SUBCUTANEOUS at 16:45

## 2022-11-23 RX ADMIN — HEPARIN SODIUM 500 UNITS: 1000 INJECTION INTRAVENOUS; SUBCUTANEOUS at 16:45

## 2022-11-23 ASSESSMENT — ACTIVITIES OF DAILY LIVING (ADL)
WEAR_GLASSES_OR_BLIND: YES
ADLS_ACUITY_SCORE: 36
ADLS_ACUITY_SCORE: 21
WALKING_OR_CLIMBING_STAIRS_DIFFICULTY: NO
DOING_ERRANDS_INDEPENDENTLY_DIFFICULTY: NO
ADLS_ACUITY_SCORE: 22
CHANGE_IN_FUNCTIONAL_STATUS_SINCE_ONSET_OF_CURRENT_ILLNESS/INJURY: NO
TOILETING_ISSUES: NO
DIFFICULTY_EATING/SWALLOWING: NO
ADLS_ACUITY_SCORE: 22
FALL_HISTORY_WITHIN_LAST_SIX_MONTHS: YES
ADLS_ACUITY_SCORE: 22
ADLS_ACUITY_SCORE: 21
CONCENTRATING,_REMEMBERING_OR_MAKING_DECISIONS_DIFFICULTY: NO
ADLS_ACUITY_SCORE: 25
ADLS_ACUITY_SCORE: 35
NUMBER_OF_TIMES_PATIENT_HAS_FALLEN_WITHIN_LAST_SIX_MONTHS: 1
ADLS_ACUITY_SCORE: 35
DRESSING/BATHING_DIFFICULTY: NO

## 2022-11-23 NOTE — PLAN OF CARE
Major Shift Events: Admitted from cath lab for [place] due to elevated pressures. Abnormal waveform lacking notch noted upon arrival, CARDS2 fellow notified and at bedside, placement verified via xray; [place] at 42. CVP does not draw back and dampened waveform, fellow aware. EKG and ECHO completed. SHANA completed x1, see flowsheet; to reassess after dialysis. HD started at 1645. Scheduled midodrine held due to HTN, PRN dose ordered if SBP <130 during dialysis. A&O, following commands, up Ax1. 2L NC, unable to wean. Good PO intake.   Plan: SHANA following HD run then Q8H.   For vital signs and complete assessments, please see documentation flowsheets.

## 2022-11-23 NOTE — Clinical Note
Potential access sites were evaluated for patency using ultrasound.   The right jugular vein was selected. Access was obtained under with Sonosite guidance using a standard 21 guage needle with direct visualization of needle entry.

## 2022-11-23 NOTE — CONSULTS
Nephrology Initial Consult  November 23, 2022      Yissel Wetzel MRN:[id number] YOB: 1960  Date of Admission:11/23/2022  Primary care provider: Gigi Martin  Requesting physician: Yan Heredia MD    ASSESSMENT AND RECOMMENDATIONS:   ESRD on HD  Pt dialyzes on MWF at [facility] via right UE AVF. EDW is at 59.5 kgs. Uses midodrine pre and during the run.   epo 5400 international unit(s) with dialysis along with 100 mg of IV iron with each treatment.  - will plan for HD today as she runs on Wednesday's.  - will aim for 3L of UF   - plan for UF only tomorrow for better volume management in setting of acute on chronic heart failure.    Acute on chronic heart failure. S/p right and left cath with increased pressures.   - HD as above with increased UF    Anemia, Hb is at 10.7, per report she receives epo and venofer, ordered for her session today.    BMD, magdy WNL, will ordered phos with AM labs  Metabolic acidosis, HD as above     Recommendations were communicated to primary team via this note    Ramandeep Whalen MD   Division of Renal Disease and Hypertension  [facility]  myairmail  Vocera Web Console      REASON FOR CONSULT: ESRD on HD    HISTORY OF PRESENT ILLNESS:  Admitting provider and nursing notes reviewed  Ms. Wetzel is a 62 year-old female with a history of DM2, ESRD (2/2 diabetic nephropathy) on HD MWF, severe pulmonary arterial hypertension, COPD, chronic hypoxemic respiratory failure (on 2L O2 at baseline) and cirrhosis (unclear etiology but likely cardiac, c/b recurrent ascites requiring paracenteses) who is admitted after scheduled RHC with leave-in swan for volume optimization with hemodialysis and to reestablish her dry weight. pt endorsed feeling well, no new symtpoms. endosed that challenging dry weight at out pt  Been unsuccessful as she become hypotensive and or have cramping with increased UF.    PAST MEDICAL HISTORY:    Past Medical History:   Diagnosis Date     Allergic  rhinitis, cause unspecified      Anemia in chronic kidney disease      Anemia of chronic disease      Bleeding pseudoaneurysm of right brachiocephalic arteriovenous fistula, initial encounter (H) 2019     End stage renal failure on dialysis (H)      Esophagitis, unspecified      Former tobacco use      HEMORRHAGIC GASTROPATHY      History of blood transfusion     Ho[place]     Iron deficiency anemia, unspecified      Mild persistent asthma      Obesity      Other and unspecified hyperlipidemia      Pneumonia      Pulmonary hypertension (H)     per 2012 echo mod.to severe pulmonary hypertension     Tobacco use disorder dc ed      Type 2 diabetes mellitus (H)     on Insulin- managed by PCP     Unspecified essential hypertension        Past Surgical History:   Procedure Laterality Date     ABDOMINOPLASTY  pt unsure when    abdominoplasty-      SECTION  ,     x 2     COLONOSCOPY      [facility]     COLONOSCOPY N/A 2021    Procedure: COLONOSCOPY, WITH POLYPECTOMY AND BIOPSY;  Surgeon: Lincoln Farrar MD;  Location:  GI     CREATE FISTULA ARTERIOVENOUS UPPER EXTREMITY Right 2018    Procedure: RIGHT BRACHIAL TO BASILIC ARTERIOVENOUS FISTULA CREATION;  Surgeon: eTrry Vizcaino MD;  Location: [facility]     CV HEART CATHETERIZATION WITH POSSIBLE INTERVENTION N/A 2021    Procedure: Heart Catheterization with Possible Intervention;  Surgeon: Joseluis Dean MD;  Location:  HEART CARDIAC CATH LAB     ENT SURGERY  as a child    tonsillectomy     ESOPHAGOSCOPY, GASTROSCOPY, DUODENOSCOPY (EGD), COMBINED N/A 2021    Procedure: ESOPHAGOGASTRODUODENOSCOPY, WITH BIOPSY;  Surgeon: Lincoln Farrar MD;  Location:  GI     EYE SURGERY Left     injections- eye     HYSTERECTOMY  approx     partial hysterectomy-reason bleeding      IR CVC TUNNEL PLACEMENT > 5 YRS OF AGE  2019     IR CVC TUNNEL REMOVAL RIGHT  2020     IR DIALYSIS  FISTULOGRAM RIGHT  12/9/2019        MEDICATIONS:  PTA Meds  Prior to Admission medications    Medication Sig Last Dose Taking? Auth Provider Long Term End Date   acetaminophen (TYLENOL) 500 MG tablet Take 500 mg by mouth every 8 hours as needed for mild pain 11/21/2022 Yes Unknown, Entered By History     amiodarone (PACERONE) 200 MG tablet Take 200mg po 6 days a week.  Hold on Sundays 11/22/2022 at 1800 Yes France Barbosa MD Yes    aspirin (ASA) 81 MG EC tablet Take 1 tablet (81 mg) by mouth daily Unknown Yes Guido Patiño MD     famotidine (PEPCID) 10 MG tablet Take 1 tablet (10 mg) by mouth daily  Patient taking differently: Take 10 mg by mouth daily as needed Unknown Yes Gigi Martin MD     fluticasone (FLONASE) 50 MCG/ACT nasal spray Spray 1 spray into both nostrils daily  Patient taking differently: Spray 1 spray into both nostrils daily as needed Unknown Yes Gigi Martin MD     Glycopyrrolate-Formoterol (BEVESPI AEROSPHERE) 9-4.8 MCG/ACT oral inhaler Inhale 2 puffs into the lungs 2 times daily  11/22/2022 at 0800 Yes Unknown, Entered By History     hypromellose-dextran (ARTIFICAL TEARS) 0.1-0.3 % ophthalmic solution Place 1 drop into both eyes daily as needed for dry eyes Unknown Yes Unknown, Entered By History     levalbuterol (XOPENEX HFA) 45 MCG/ACT inhaler Inhale 2 puffs into the lungs every 6 hours as needed for shortness of breath / dyspnea or wheezing 11/22/2022 at 0800 Yes Gigi Martin MD Yes    midodrine (PROAMATINE) 10 MG tablet Take 1 tablet (10 mg) by mouth 3 times daily (with meals) 11/22/2022 at 1000 Yes Patricia Amaya MD     NOVOLOG FLEXPEN 100 UNIT/ML soln INJECT 9-12 UNITS BEFORE BREAKFAST, LUNCH, AND DINNER PLUS A CORRECTIVE SCALE OF 2 UNITS FOR EVERY 50 OVER 150  Patient taking differently: INJECT 3 UNITS/15gm carbs BEFORE BREAKFAST, LUNCH, AND DINNER PLUS A CORRECTIVE SCALE OF 2 UNITS FOR EVERY 50 OVER 150 11/22/2022 Yes Gigi Martin  MD Kaleb Yes    ACCU-CHEK CHING PLUS test strip USE TO TEST BLOOD SUGAR 4 TIMES PER DAY   Gigi Martin MD No    ACCU-CHEK GUIDE test strip USE TO TEST BLOOD GLUCOSE FOUR TIMES DAILY   Gigi Martin MD No    blood glucose (NO BRAND SPECIFIED) lancets standard Use to test blood sugar 4 times daily or as directed.   Gigi Martin MD     blood glucose (NO BRAND SPECIFIED) lancets standard Use to test blood sugar three times daily or as directed.   Gigi Martin MD     blood glucose monitoring (NO BRAND SPECIFIED) meter device kit Use to test blood sugar 4 times daily or as directed.   Gigi Martin MD     Continuous Blood Gluc Sensor (FREESTYLE LATOSHA 2 SENSOR) MISC 1 each every 14 days Use 1 sensor every 14 days. Use to read blood sugars per 's instructions.   Gigi Martin MD     Emollient (CERAVE) CREA Externally apply topically 2 times daily   Lior Torrez PA-C     insulin detemir (LEVEMIR FLEXTOUCH) 100 UNIT/ML pen Inject 6 Units Subcutaneous At Bedtime 11/21/2022  Patricia Amaya MD Yes    insulin pen needle (31G X 8 MM) 31G X 8 MM miscellaneous Use 4 pen needles daily or as directed.   Gigi Martin MD        Current Meds    amiodarone  200 mg Oral Once per day on Mon Tue Wed Thu Fri Sat     heparin ANTICOAGULANT  5,000 Units Subcutaneous Q12H     [START ON 11/24/2022] insulin aspart   Subcutaneous Daily with breakfast     insulin aspart   Subcutaneous Daily with lunch     insulin aspart   Subcutaneous Daily with supper     insulin aspart  1-7 Units Subcutaneous TID AC     insulin aspart  1-5 Units Subcutaneous At Bedtime     insulin detemir  6 Units Subcutaneous At Bedtime     midodrine  10 mg Oral TID w/meals     Infusion Meds    - MEDICATION INSTRUCTIONS -       heparin (porcine) 500 Units/hr (11/23/22 1645)     BETA BLOCKER NOT PRESCRIBED       - MEDICATION INSTRUCTIONS -         ALLERGIES:    Allergies   Allergen Reactions     Albuterol       shakiness     Aspirin      gi     Byetta [Exenatide]      gi     Clonidine      constipation     Codeine      vomiting     Ezetimibe      muscle symptoms     Hydralazine      headaches     Lisinopril      hyperkalemia     Metformin Hydrochloride      vomiting     Pravachol [Pravastatin Sodium]      muscle pains     Simvastatin      myalgias     Troglitazone        REVIEW OF SYSTEMS:  A comprehensive of systems was negative except as noted above.    SOCIAL HISTORY:   Social History     Socioeconomic History     Marital status: Single     Spouse name: Not on file     Number of children: Not on file     Years of education: Not on file     Highest education level: Not on file   Occupational History     Not on file   Tobacco Use     Smoking status: Former     Packs/day: 1.00     Years: 22.00     Pack years: 22.00     Types: Cigarettes     Quit date: 10/12/1999     Years since quittin.1     Smokeless tobacco: Never   Vaping Use     Vaping Use: Never used   Substance and Sexual Activity     Alcohol use: No     Alcohol/week: 0.0 standard drinks     Drug use: No     Sexual activity: Never   Other Topics Concern      Service Not Asked     Blood Transfusions Yes     Caffeine Concern No     Occupational Exposure Not Asked     Hobby Hazards Not Asked     Sleep Concern No     Stress Concern No     Weight Concern Yes     Comment: would like to lose weight     Special Diet No     Back Care Not Asked     Exercise Yes     Comment: walks daily 2 blocks     Bike Helmet Not Asked     Seat Belt Not Asked     Self-Exams Not Asked     Parent/sibling w/ CABG, MI or angioplasty before 65F 55M? Not Asked   Social History Narrative     Not on file     Social Determinants of Health     Financial Resource Strain: Not on file   Food Insecurity: Not on file   Transportation Needs: Not on file   Physical Activity: Not on file   Stress: Not on file   Social Connections: Not on file   Intimate Partner Violence: Not on file   Housing  Stability: Not on file     FAMILY MEDICAL HISTORY:   Family History   Problem Relation Age of Onset     Arrhythmia Mother      Hypertension Mother      Pancreatic Cancer Father      Diabetes Brother      Asthma Sister      LUNG DISEASE Sister      Diabetes Daughter      Cirrhosis Sister      PHYSICAL EXAM:   Temp  Av.1  F (36.7  C)  Min: 97.9  F (36.6  C)  Max: 98.3  F (36.8  C)      Pulse  Av.4  Min: 61  Max: 67 Resp  Av.9  Min: 15  Max: 22  SpO2  Av %  Min: 91 %  Max: 100 %    CVP (mmHg): 18 mmHg  BP (!) 157/70   Pulse 61   Temp 97.9  F (36.6  C) (Axillary)   Resp 21   Wt 62.2 kg (137 lb 2 oz)   LMP  (LMP Unknown)   SpO2 100%   BMI 26.78 kg/m     Date 22 0700 - 22 0659   Shift 8298-2262 2087-6095 3953-3926 24 Hour Total   INTAKE   P.O. 480   480   Shift Total(mL/kg) 480(7.72)   480(7.72)   OUTPUT   Shift Total(mL/kg)       Weight (kg) 62.2 62.2 62.2 62.2      Admit Weight: 62.2 kg (137 lb 2 oz)     GENERAL APPEARANCE: not in acute distress, pt awake  EYES: no scleral icterus, pupils equal  Lymphatics: no cervical or supraclavicular LAD  Pulmonary: lungs clear to auscultation with equal breath sounds bilaterally, no clubbing  CV: regular rhythm, normal rate, no rub   - Edema mild  GI: soft, nontender, normal bowel sounds  MS: no evidence of inflammation in joints, no muscle tenderness  : no lei  SKIN: no rash, warm, dry, no cyanosis  NEURO: face symmetric, no asterixis     LABS:   I have reviewed the following labs:  CMP  Recent Labs   Lab 22  1502 22  1308 22  1120 22  0729   NA  --   --   --  138   POTASSIUM  --   --   --  4.7   CHLORIDE  --   --   --  98   CO2  --   --   --  20*   ANIONGAP  --   --   --  20*   * 166* 187* 231*   BUN  --   --   --  63.6*   CR  --   --   --  6.94*   GFRESTIMATED  --   --   --  6*   KANWAL  --   --   --  9.0     CBC  Recent Labs   Lab 22  0948 22  0729   HGB 10.7* 10.1*   WBC  --  6.3   RBC  --   3.62*   HCT  --  32.1*   MCV  --  89   MCH  --  27.9   MCHC  --  31.5   RDW  --  18.0*   PLT  --  158     INRNo lab results found in last 7 days.  ABG  Recent Labs   Lab 11/23/22  1350   O2PER 2      URINE STUDIES  Recent Labs   Lab Test 09/25/19  1455 06/07/18  1455 08/09/17  1640   COLOR Yellow Yellow Yellow   APPEARANCE Clear Clear Clear   URINEGLC >499* 250* Negative   URINEBILI Negative Negative Negative   URINEKETONE Negative Negative Negative   SG 1.005 1.015 1.020   UBLD Negative Small* Small*   URINEPH 7.0 6.0 6.0   PROTEIN 100* >=300* >=300*   UROBILINOGEN  --  0.2 0.2   NITRITE Negative Negative Negative   LEUKEST Negative Negative Negative   RBCU <1 2-5* O - 2   WBCU 1 0 - 5 O - 2     IRON STUDIES  Recent Labs   Lab Test 06/19/22  0634 03/17/19  0805 08/30/18  1318 09/10/14  1408   IRON 27* 27* 54 85   * 178* 196* 230*   IRONSAT 15 15 28 37   LATRICE 898* 210 407*  --        Ramandeep Whalen MD

## 2022-11-23 NOTE — PROGRESS NOTES
CLINICAL NUTRITION SERVICES    Reason for Assessment:  Low-sodium (2 g/day) nutrition education    Diet History:  Pt reports she is followed by RD at [facility] dialysis.  Follows a 2gm Na, 2gm K+, low phos, controlled CHO diet.  Receives one case of novasource renal vanilla flavored per month and drinks one to two per day.      Nutrition Diagnosis:  none    Interventions:  No interventions at this time, pt declined need for further diet eduvation    Felicity Shah, RD, LD, CNSC  [address] RD pager: [phone]  Ascom: [phone]

## 2022-11-23 NOTE — PLAN OF CARE
Admitted/transferred from: cath lab   Reason for admission/transfer: leave in [place]  2 RN skin assessment: completed by Dona   Result of skin assessment and interventions/actions: CRISTOPHER nieto  Height, weight, drug calc weight: Done  Patient belongings (see Flowsheet): cell phone, glasses, clothing, shoes at bedside    MDRO education added to care planN/A  ?

## 2022-11-23 NOTE — PROGRESS NOTES
Pt prepped for RHC.  BP elevated, SBP in the 180s.  Pt denies headache or any other pain.  BG at arrival also 199.  Provider paged regarding BP and BG, will await interventions if any.  Consent signed.  Labs pending.  Carlo Bunn at the bedside and will take pt home post-procedure if pt is not to be admitted.     UPDATE at 0844: Per Dr. Heredia, do not need to wait for BMP prior to procedure. Prep now complete.

## 2022-11-23 NOTE — Clinical Note
dry, intact, no bleeding and no hematoma. 7 fr leave in sawn, marked at 40. See flowsheet/avatar for details. Sutured in place and tegaderm placed.

## 2022-11-23 NOTE — H&P
Cards 2 H&P  2022    Patient seen with fellow and agree with his note.    Attendin. Diabetes  2. ESRD  3. Pulmonary hypertension    Plan:  1. Admit  2. Daily dialysis with SG in place  3. Attain new dry weight.    Wt Readings from Last 24 Encounters:   22 61 kg (134 lb 7.7 oz)   22 61.4 kg (135 lb 4.8 oz)   22 65.8 kg (145 lb)   22 70.3 kg (155 lb)   21 77.5 kg (170 lb 14.4 oz)   21 79.4 kg (175 lb 1.6 oz)   21 71.7 kg (158 lb)   21 72.8 kg (160 lb 9.6 oz)   10/14/20 73.3 kg (161 lb 9.6 oz)   20 74.8 kg (165 lb)   20 77.1 kg (170 lb)   20 77.1 kg (170 lb)   20 79.2 kg (174 lb 9.6 oz)   20 80 kg (176 lb 4.8 oz)   19 79.4 kg (175 lb)   19 79.7 kg (175 lb 11.2 oz)   19 77.1 kg (170 lb)   19 80.6 kg (177 lb 11.1 oz)   19 79.6 kg (175 lb 8 oz)   19 79.2 kg (174 lb 11.2 oz)   19 70.3 kg (155 lb)   19 83.7 kg (184 lb 8.4 oz)   18 79.5 kg (175 lb 3.2 oz)   18 77.1 kg (170 lb)       Current Facility-Administered Medications   Medication     0.9% sodium chloride BOLUS     0.9% sodium chloride BOLUS     0.9% sodium chloride BOLUS     ACE Inhibitor/ARB/ARNI not indicated according to guidelines     amiodarone (PACERONE) tablet 200 mg     glucose gel 15-30 g    Or     dextrose 50 % injection 25-50 mL    Or     glucagon injection 1 mg     heparin ANTICOAGULANT injection 5,000 Units     insulin aspart (NovoLOG) injection (RAPID ACTING)     insulin aspart (NovoLOG) injection (RAPID ACTING)     insulin aspart (NovoLOG) injection (RAPID ACTING)     insulin aspart (NovoLOG) injection (RAPID ACTING)     insulin aspart (NovoLOG) injection (RAPID ACTING)     insulin detemir (LEVEMIR PEN) injection 6 Units     lidocaine 1 % 0.5 mL     lidocaine 1 % 0.5 mL     lidocaine 1 %     midodrine (PROAMATINE) tablet 2.5 mg     No heparin via hemodialysis machine     potassium chloride ER  (KLOR-CON M) CR tablet 20 mEq     Reason beta blocker not prescribed     Stop Heparin 60 minutes before end of treatment       Name: ELIECER CHACON  MRN: [id number]  : 1960  Study Date: 2022 03:25 PM  Age: 62 yrs  Gender: Female  Patient Location: [facility]  Reason For Study: Pulmonary Hypertension, Heart Failure  Ordering Physician: ROGER DAWSON  Referring Physician: YAHAIRA HOUSER  Performed By: Cindy Hunter     BSA: 1.6 m2  Height: 60 in  Weight: 137 lb  BP: 166/67 mmHg  ______________________________________________________________________________  Procedure  Echocardiogram with two-dimensional, color and spectral Doppler performed.  Technically difficult study. Poor acoustic windows.  ______________________________________________________________________________  Interpretation Summary  Technically difficult study. Poor acoustic windows.  Left ventricular size, wall motion and function are normal. The ejection  fraction is 55-60%.  Global right ventricular function is mildly reduced.  There is mild right ventricular hypertrophy.  Right ventricular systolic pressure is 55mmHg above the right atrial pressure.  Pulmonary hypertension is present.  IVC diameter <2.1 cm collapsing >50% with sniff suggests a normal RA pressure  of 3 mmHg.  No pericardial effusion is present.     This study was compared with the study from 7/15/2021. RV function is mildly  improved and estimated PA pressure is lower.  ______________________________________________________________________________  Left Ventricle  Left ventricular size, wall motion and function are normal. The ejection  fraction is 55-60%. Left ventricular diastolic function is indeterminate.  Abnormal non-specific septal motion is present.     Right Ventricle  The right ventricle is normal size. There is mild right ventricular  hypertrophy. Global right ventricular function is mildly reduced.     Mitral Valve  Mild mitral  annular calcification is present. Mild mitral insufficiency is  present.     Aortic Valve  The valve leaflets are not well visualized. On Doppler interrogation, there is  no significant stenosis or regurgitation.     Tricuspid Valve  The tricuspid valve is normal. Mild tricuspid insufficiency is present.  Pulmonary hypertension is present. Right ventricular systolic pressure is  55mmHg above the right atrial pressure.     Pulmonic Valve  The pulmonic valve is normal. Trace pulmonic insufficiency is present.     Vessels  The aorta root is normal. The thoracic aorta is normal. IVC diameter <2.1 cm  collapsing >50% with sniff suggests a normal RA pressure of 3 mmHg.     Pericardium  No pericardial effusion is present.     Compared to Previous Study  This study was compared with the study from 7/15/2021 . RV function is mildly  improved and estimated PA pressure is lower.     ______________________________________________________________________________  MMode/2D Measurements & Calculations  IVSd: 0.67 cm  LVIDd: 4.1 cm  LVIDs: 2.6 cm  LVPWd: 0.65 cm  FS: 35.6 %     LV mass(C)d: 74.3 grams  LV mass(C)dI: 46.7 grams/m2  Ao root diam: 2.5 cm  asc Aorta Diam: 3.2 cm  LVOT diam: 1.9 cm  LVOT area: 2.8 cm2  RWT: 0.32     Doppler Measurements & Calculations  MV E max nguyễn: 145.0 cm/sec  MV A max nguyễn: 32.0 cm/sec  MV E/A: 4.5  MV dec slope: 800.0 cm/sec2  MV dec time: 0.18 sec  Ao V2 max: 171.0 cm/sec  Ao max P.7 mmHg  Ao V2 mean: 117.0 cm/sec  Ao mean P.0 mmHg  Ao V2 VTI: 40.7 cm  RADHA(I,D): 1.6 cm2  RADHA(V,D): 1.6 cm2  LV V1 max PG: 3.6 mmHg  LV V1 max: 95.0 cm/sec  LV V1 VTI: 22.5 cm  SV(LVOT): 63.8 ml  SI(LVOT): 40.1 ml/m2  TR max nguyễn: 373.0 cm/sec  TR max P.7 mmHg  RVSP(TR): 58.7 mmHg  AV Nguyễn Ratio (DI): 0.56  RADHA Index (cm2/m2): 0.99  E/E' av.8       RA 12  /15  /40, mean 69, sat 56.7%  PCWP mean 30  CO/CI: 4.9/3.09 by TD, 4.46/2.81 by Audrey  PVR 8.75 waterman  Ao sat 88%, HR 73          Assessment  and Plan:     Ms. Wetzel is a 62 year-old female with a history of DM2, ESRD (2/2 diabetic nephropathy) on HD MWF, severe pulmonary arterial hypertension, COPD, chronic hypoxemic respiratory failure (on 2L O2 at baseline) and cirrhosis (unclear etiology but likely cardiac, c/b recurrent ascites requiring paracenteses) who is admitted after scheduled RHC with leave-in swan for volume optimization with hemodialysis and to reestablish her dry weight.     PLAN:  ===NEURO===  No acute concerns    ===CARDIOVASCULAR===  # Severe pulmonary hypertension (pre and post capillary)  # Chronic diastolic heart failure, AHA/ACC stage C, NYHA class III  11/23 RHC: RA 12, /40/69, PCWP 30, Audrey CO/CI 4.9/3.09, PVR 8.75 AGUSTIN. MAP notably 130 at that time. Consistent with mixed pre and post capillary pulmonary hypertension. For comparison, last RHC on 07/22/2021: RA 35, /52/81, PCWP 36, Audrey CO/CI 4.29/2.5, PVR 15 AGUSTIN. Admitted from the cath lab with leave-in swan with plan to optimize volume status with dialysis and reestablish her dry weight. Her last documented dry weight about a year and a half ago was 160 lbs. Her weight on admission is 137 lbs. Has NYHA class III symptoms at baseline, although improved in the weeks prior to admission.   - Volume: Hemodialysis MWF; nephrology consulted  - Trend swan numbers  - Daily weights   - TTE ordered   - Trend BMP, strict I/Os, salt and fluid restrictions     # History of SVT  - PTA amiodarone     # HLD  - PTA ASA     ===PULMONARY===  # COPD   # Former tobacco use   # Chronic hypoxemic respiratory failure   On 1-2 L supplemental O2 at baseline. Unchanged on admission.   - Continue to monitor     ===GASTROINTESTINAL===  # Decompensated cirrhosis, c/b recurrent ascites   Cirrhotic appearing liver noted on 07/15/2021 CTA chest. Suspect cardiac cirrhosis. Complicated by recurrent ascites requiring intermittent paracentesis. Last run on 10/13 with 6.3 L of fluid out.  - Ultrasound of the  liver with Doppler      # Nutrition   - Regular diet     ===RENAL/ELECTROLYTES===  # ESRD 2/2 diabetic nephropathy, on HD MWF  As discussed above, patient admitted with plan to optimize hemodynamics and reestablish dry weight  - Nephrology consulted for HD (MWF)   - Trend swan numbers, daily weights   - Trend BMP  - Midodrine prior to and during HD runs     ===HEME/ONC===  Anemia of chronic kidney disease  - IV iron and EPO per outpatient regimen     ===ENDOCRINE===  # DM2 (A1C 7.7 on 06/15/2022)  - PTA insulin detemir  - Insulin aspart, sliding scale, QID BG checks     ===INFECTIOUS DISEASE===  No active concerns    Lines : Dresher RIJ, PIV  Prophylaxis:  DVT: heparin   Family:  Will update son  Disposition: ICU    Code Status: Full code    Staffed with Dr. Tramaine Larkin MD  Cardiology Fellow      CC:   Elevated PA pressures       HPI:   Ms. Wetzel is a 62 year-old female with a history of DM2, ESRD (2/2 diabetic nephropathy) on HD MWF, severe pulmonary arterial hypertension, COPD, chronic hypoxemic respiratory failure (on 2L O2 at baseline) and cirrhosis (unclear etiology but likely cardiac, c/b recurrent ascites requiring paracenteses) who presented today for scheduled RHC and is being admitted with leave-in swan for volume optimization.     Patient was last seen by Dr. Redd in clinic on 11/3/2022 for routine follow-up. At that time she was experiencing stable NYHA class III symptoms and plan was made to repeat RHC for hemodynamic assessment. Patient presented today for scheduled procedure, which showed RA 12, /40/69, PA sat 56.7%, PCWP 30, Audrey CO/CI 4.9/3.09,  PVR 8.75 AGUSTIN. MAP notably 130 at that time. Patient admitted with leave-in swan with plan to optimize volume status with dialysis and reestablish her dry weight. Her last documented dry weight about a year and a half ago was 160 lbs. Her weight on admission is 137 lbs.     On interview, she states that this is the best she's felt in  several months in terms of her breathing and activity tolerance. She is able to walk more with her dogs now and is getting back into riding her exercise bike. No fever, chills, chest pain, nausea, vomiting, abdominal pain or bowel/bladder concerns.     Review of Systems:    Complete review of systems was performed and negative except per HPI.        Past Medical History:     Past Medical History:   Diagnosis Date     Allergic rhinitis, cause unspecified      Anemia in chronic kidney disease      Anemia of chronic disease      Bleeding pseudoaneurysm of right brachiocephalic arteriovenous fistula, initial encounter (H) 2019     End stage renal failure on dialysis (H)      Esophagitis, unspecified      Former tobacco use      HEMORRHAGIC GASTROPATHY      History of blood transfusion     [place]     Iron deficiency anemia, unspecified      Mild persistent asthma      Obesity      Other and unspecified hyperlipidemia      Pneumonia      Pulmonary hypertension (H)     per 2012 echo mod.to severe pulmonary hypertension     Tobacco use disorder dc ed      Type 2 diabetes mellitus (H)     on Insulin- managed by PCP     Unspecified essential hypertension         Past Surgical History:     Past Surgical History:   Procedure Laterality Date     ABDOMINOPLASTY  pt unsure when    abdominoplasty-      SECTION  ,     x 2     COLONOSCOPY      [facility]     COLONOSCOPY N/A 2021    Procedure: COLONOSCOPY, WITH POLYPECTOMY AND BIOPSY;  Surgeon: Lincoln Farrar MD;  Location:  GI     CREATE FISTULA ARTERIOVENOUS UPPER EXTREMITY Right 2018    Procedure: RIGHT BRACHIAL TO BASILIC ARTERIOVENOUS FISTULA CREATION;  Surgeon: Terry Vizcaino MD;  Location: [facility]     CV HEART CATHETERIZATION WITH POSSIBLE INTERVENTION N/A 2021    Procedure: Heart Catheterization with Possible Intervention;  Surgeon: Joseluis Dean MD;  Location:  HEART CARDIAC CATH LAB      ENT SURGERY  as a child    tonsillectomy     ESOPHAGOSCOPY, GASTROSCOPY, DUODENOSCOPY (EGD), COMBINED N/A 2021    Procedure: ESOPHAGOGASTRODUODENOSCOPY, WITH BIOPSY;  Surgeon: Lincoln Farrar MD;  Location:  GI     EYE SURGERY Left     injections- eye     HYSTERECTOMY  approx     partial hysterectomy-reason bleeding      IR CVC TUNNEL PLACEMENT > 5 YRS OF AGE  2019     IR CVC TUNNEL REMOVAL RIGHT  2020     IR DIALYSIS FISTULOGRAM RIGHT  2019        Social History:     Social History     Tobacco Use     Smoking status: Former     Packs/day: 1.00     Years: 22.00     Pack years: 22.00     Types: Cigarettes     Quit date: 10/12/1999     Years since quittin.1     Smokeless tobacco: Never   Substance Use Topics     Alcohol use: No     Alcohol/week: 0.0 standard drinks         Family History:     Family History   Problem Relation Age of Onset     Arrhythmia Mother      Hypertension Mother      Pancreatic Cancer Father      Diabetes Brother      Asthma Sister      LUNG DISEASE Sister      Diabetes Daughter      Cirrhosis Sister         Medications:       amiodarone  200 mg Oral Daily     insulin detemir  6 Units Subcutaneous At Bedtime     midodrine  10 mg Oral TID w/meals            Allergies:     Allergies   Allergen Reactions     Albuterol      shakiness     Aspirin      gi     Byetta [Exenatide]      gi     Clonidine      constipation     Codeine      vomiting     Ezetimibe      muscle symptoms     Hydralazine      headaches     Lisinopril      hyperkalemia     Metformin Hydrochloride      vomiting     Pravachol [Pravastatin Sodium]      muscle pains     Simvastatin      myalgias     Troglitazone           Physical Exam:   Temp:  [98.1  F (36.7  C)-98.3  F (36.8  C)] 98.1  F (36.7  C)  Pulse:  [65-67] 66  Resp:  [15-20] 18  BP: (159-198)/() 159/107  SpO2:  [91 %-97 %] 97 %  GEN: pleasant, no acute distress  HEENT: RONEN, EOMI, no scleral icterus  CV: RRR, normal s1/s2, no  murmurs. JVP mid-neck at 30 degrees.   CHEST: clear to ausculation bilaterally, no rales or wheezing  ABD: soft,moderately distended, normal active bowel sounds  EXTR: pulses 2+. No edema.   NEURO: alert oriented, speech fluent/appropriate, motor grossly nonfocal     Data:   CBC  Recent Labs   Lab 11/23/22  0948 11/23/22  0729   WBC  --  6.3   RBC  --  3.62*   HGB 10.7* 10.1*   HCT  --  32.1*   MCV  --  89   MCH  --  27.9   MCHC  --  31.5   RDW  --  18.0*   PLT  --  158     CMP  Recent Labs   Lab 11/23/22  1120 11/23/22  0729   NA  --  138   POTASSIUM  --  4.7   CHLORIDE  --  98   CO2  --  20*   ANIONGAP  --  20*   * 231*   BUN  --  63.6*   CR  --  6.94*   GFRESTIMATED  --  6*   KANWAL  --  9.0     Troponins  Lab Results   Component Value Date    TROPI <0.015 11/24/2017    TROPI  08/09/2017     <0.015  The 99th percentile for upper reference range is 0.045 ug/L.  Troponin values in   the range of 0.045 - 0.120 ug/L may be associated with risks of adverse   clinical events.      TROPI  09/30/2015     <0.015  The 99th percentile for upper reference range is 0.045 ug/L.  Troponin values in   the range of 0.045 - 0.120 ug/L may be associated with risks of adverse   clinical events.      TROPONIN 0.022 07/22/2021    TROPONIN 0.437 () 07/14/2021    TROPONIN 0.446 () 07/14/2021    TROPONIN 0.460 () 07/14/2021    TROPONIN 0.144 () 07/14/2021     EKG: NSR, no acute ST/T changes      11/23/2022 RHC  RA 12  /15  /40, mean 69, sat 56.7%  PCWP mean 30  CO/CI: 4.9/3.09 by TD, 4.46/2.81 by Audrey  PVR 8.75 waterman  Ao sat 88%, HR 73       07/22/2021 RHC/LHC  RA 35  /42  /52/81  PCWP 36  CO/CI: 4.29/2.5 by Audrey  PVR 15 WATERMAN    07/15/2021 Transthoracic echocardiogram:   Interpretation Summary  Left ventricular systolic function is normal.  The visual ejection fraction is 60-65%.  The right ventricle is severely dilated.  Moderately decreased right ventricular systolic function  There is moderate to  mod-severe (2-3+) tricuspid regurgitation.  The right ventricular systolic pressure is approximated at 82mmHg plus the  right atrial pressure.  Right ventricular systolic pressure is elevated, consistent with severe  pulmonary hypertension.  IVC diameter >2.1 cm collapsing <50% with sniff suggests a high RA pressure  estimated at 15 mmHg or greater.  Flattened septum is consistent with RV pressure/volume overload.  Compared to 9/19, there is new RV enlargemnt with RV dysfunction. RVSP has  increased compared to 2017 when it was 60 plus RAP.  Findings discussed with Dawna, nursing team for the patient. The study was  technically difficult.

## 2022-11-23 NOTE — Clinical Note
Secured with Catheter remains in place at 40cm.    Secured in normal fashion with eugenia cruz sterile coverby Dr Toledo.

## 2022-11-24 LAB
ALBUMIN SERPL BCG-MCNC: 2.9 G/DL (ref 3.5–5.2)
ALP SERPL-CCNC: 140 U/L (ref 35–104)
ALT SERPL W P-5'-P-CCNC: 6 U/L (ref 10–35)
ANION GAP SERPL CALCULATED.3IONS-SCNC: 18 MMOL/L (ref 7–15)
AST SERPL W P-5'-P-CCNC: 18 U/L (ref 10–35)
ATRIAL RATE - MUSE: 66 BPM
BASE EXCESS BLDV CALC-SCNC: 1.4 MMOL/L (ref -7.7–1.9)
BASE EXCESS BLDV CALC-SCNC: 2.4 MMOL/L (ref -7.7–1.9)
BASE EXCESS BLDV CALC-SCNC: 2.5 MMOL/L (ref -7.7–1.9)
BASOPHILS # BLD AUTO: 0 10E3/UL (ref 0–0.2)
BASOPHILS NFR BLD AUTO: 1 %
BILIRUB SERPL-MCNC: 0.4 MG/DL
BUN SERPL-MCNC: 46.7 MG/DL (ref 8–23)
CALCIUM SERPL-MCNC: 8.5 MG/DL (ref 8.8–10.2)
CHLORIDE SERPL-SCNC: 97 MMOL/L (ref 98–107)
CREAT SERPL-MCNC: 5.05 MG/DL (ref 0.51–0.95)
DEPRECATED HCO3 PLAS-SCNC: 20 MMOL/L (ref 22–29)
DIASTOLIC BLOOD PRESSURE - MUSE: NORMAL MMHG
EOSINOPHIL # BLD AUTO: 0.1 10E3/UL (ref 0–0.7)
EOSINOPHIL NFR BLD AUTO: 2 %
ERYTHROCYTE [DISTWIDTH] IN BLOOD BY AUTOMATED COUNT: 18 % (ref 10–15)
GFR SERPL CREATININE-BSD FRML MDRD: 9 ML/MIN/1.73M2
GLUCOSE BLDC GLUCOMTR-MCNC: 136 MG/DL (ref 70–99)
GLUCOSE BLDC GLUCOMTR-MCNC: 151 MG/DL (ref 70–99)
GLUCOSE BLDC GLUCOMTR-MCNC: 202 MG/DL (ref 70–99)
GLUCOSE BLDC GLUCOMTR-MCNC: 214 MG/DL (ref 70–99)
GLUCOSE BLDC GLUCOMTR-MCNC: 252 MG/DL (ref 70–99)
GLUCOSE BLDC GLUCOMTR-MCNC: 284 MG/DL (ref 70–99)
GLUCOSE BLDC GLUCOMTR-MCNC: 299 MG/DL (ref 70–99)
GLUCOSE SERPL-MCNC: 240 MG/DL (ref 70–99)
HCO3 BLDV-SCNC: 27 MMOL/L (ref 21–28)
HCO3 BLDV-SCNC: 28 MMOL/L (ref 21–28)
HCO3 BLDV-SCNC: 28 MMOL/L (ref 21–28)
HCT VFR BLD AUTO: 30.4 % (ref 35–47)
HGB BLD-MCNC: 9.7 G/DL (ref 11.7–15.7)
IMM GRANULOCYTES # BLD: 0 10E3/UL
IMM GRANULOCYTES NFR BLD: 1 %
INR PPP: 1.18 (ref 0.85–1.15)
INTERPRETATION ECG - MUSE: NORMAL
LYMPHOCYTES # BLD AUTO: 0.7 10E3/UL (ref 0.8–5.3)
LYMPHOCYTES NFR BLD AUTO: 13 %
MCH RBC QN AUTO: 28.4 PG (ref 26.5–33)
MCHC RBC AUTO-ENTMCNC: 31.9 G/DL (ref 31.5–36.5)
MCV RBC AUTO: 89 FL (ref 78–100)
MONOCYTES # BLD AUTO: 0.7 10E3/UL (ref 0–1.3)
MONOCYTES NFR BLD AUTO: 13 %
NEUTROPHILS # BLD AUTO: 4 10E3/UL (ref 1.6–8.3)
NEUTROPHILS NFR BLD AUTO: 70 %
NRBC # BLD AUTO: 0 10E3/UL
NRBC BLD AUTO-RTO: 0 /100
O2/TOTAL GAS SETTING VFR VENT: 21 %
O2/TOTAL GAS SETTING VFR VENT: 21 %
O2/TOTAL GAS SETTING VFR VENT: 28 %
OXYHGB MFR BLDV: 54 % (ref 70–75)
OXYHGB MFR BLDV: 57 % (ref 70–75)
OXYHGB MFR BLDV: 58 % (ref 70–75)
P AXIS - MUSE: 70 DEGREES
PCO2 BLDV: 47 MM HG (ref 40–50)
PCO2 BLDV: 48 MM HG (ref 40–50)
PCO2 BLDV: 49 MM HG (ref 40–50)
PH BLDV: 7.37 [PH] (ref 7.32–7.43)
PH BLDV: 7.37 [PH] (ref 7.32–7.43)
PH BLDV: 7.38 [PH] (ref 7.32–7.43)
PHOSPHATE SERPL-MCNC: 7.4 MG/DL (ref 2.5–4.5)
PLATELET # BLD AUTO: 137 10E3/UL (ref 150–450)
PO2 BLDV: 33 MM HG (ref 25–47)
PO2 BLDV: 34 MM HG (ref 25–47)
PO2 BLDV: 35 MM HG (ref 25–47)
POTASSIUM SERPL-SCNC: 4.2 MMOL/L (ref 3.4–5.3)
PR INTERVAL - MUSE: 182 MS
PROT SERPL-MCNC: 5.8 G/DL (ref 6.4–8.3)
QRS DURATION - MUSE: 92 MS
QT - MUSE: 468 MS
QTC - MUSE: 490 MS
R AXIS - MUSE: 54 DEGREES
RBC # BLD AUTO: 3.41 10E6/UL (ref 3.8–5.2)
SODIUM SERPL-SCNC: 135 MMOL/L (ref 136–145)
SYSTOLIC BLOOD PRESSURE - MUSE: NORMAL MMHG
T AXIS - MUSE: 14 DEGREES
VENTRICULAR RATE- MUSE: 66 BPM
WBC # BLD AUTO: 5.6 10E3/UL (ref 4–11)

## 2022-11-24 PROCEDURE — 82805 BLOOD GASES W/O2 SATURATION: CPT

## 2022-11-24 PROCEDURE — 999N000155 HC STATISTIC RAPCV CVP MONITORING

## 2022-11-24 PROCEDURE — 85025 COMPLETE CBC W/AUTO DIFF WBC: CPT | Performed by: STUDENT IN AN ORGANIZED HEALTH CARE EDUCATION/TRAINING PROGRAM

## 2022-11-24 PROCEDURE — 250N000013 HC RX MED GY IP 250 OP 250 PS 637: Performed by: STUDENT IN AN ORGANIZED HEALTH CARE EDUCATION/TRAINING PROGRAM

## 2022-11-24 PROCEDURE — 999N000157 HC STATISTIC RCP TIME EA 10 MIN

## 2022-11-24 PROCEDURE — 85610 PROTHROMBIN TIME: CPT | Performed by: STUDENT IN AN ORGANIZED HEALTH CARE EDUCATION/TRAINING PROGRAM

## 2022-11-24 PROCEDURE — 84100 ASSAY OF PHOSPHORUS: CPT | Performed by: INTERNAL MEDICINE

## 2022-11-24 PROCEDURE — 90937 HEMODIALYSIS REPEATED EVAL: CPT

## 2022-11-24 PROCEDURE — 250N000011 HC RX IP 250 OP 636: Performed by: STUDENT IN AN ORGANIZED HEALTH CARE EDUCATION/TRAINING PROGRAM

## 2022-11-24 PROCEDURE — 200N000002 HC R&B ICU UMMC

## 2022-11-24 PROCEDURE — 258N000003 HC RX IP 258 OP 636: Performed by: INTERNAL MEDICINE

## 2022-11-24 PROCEDURE — 36415 COLL VENOUS BLD VENIPUNCTURE: CPT | Performed by: STUDENT IN AN ORGANIZED HEALTH CARE EDUCATION/TRAINING PROGRAM

## 2022-11-24 PROCEDURE — 99233 SBSQ HOSP IP/OBS HIGH 50: CPT | Mod: GC | Performed by: INTERNAL MEDICINE

## 2022-11-24 PROCEDURE — 999N000045 HC STATISTIC DAILY SWAN MONITORING

## 2022-11-24 PROCEDURE — 80053 COMPREHEN METABOLIC PANEL: CPT | Performed by: STUDENT IN AN ORGANIZED HEALTH CARE EDUCATION/TRAINING PROGRAM

## 2022-11-24 RX ADMIN — INSULIN DETEMIR 6 UNITS: 100 INJECTION, SOLUTION SUBCUTANEOUS at 23:27

## 2022-11-24 RX ADMIN — HEPARIN SODIUM 5000 UNITS: 5000 INJECTION, SOLUTION INTRAVENOUS; SUBCUTANEOUS at 04:52

## 2022-11-24 RX ADMIN — SODIUM CHLORIDE 250 ML: 9 INJECTION, SOLUTION INTRAVENOUS at 15:46

## 2022-11-24 RX ADMIN — Medication: at 15:46

## 2022-11-24 RX ADMIN — SODIUM CHLORIDE 300 ML: 9 INJECTION, SOLUTION INTRAVENOUS at 15:45

## 2022-11-24 RX ADMIN — AMIODARONE HYDROCHLORIDE 200 MG: 200 TABLET ORAL at 19:28

## 2022-11-24 RX ADMIN — HEPARIN SODIUM 5000 UNITS: 5000 INJECTION, SOLUTION INTRAVENOUS; SUBCUTANEOUS at 18:18

## 2022-11-24 ASSESSMENT — ACTIVITIES OF DAILY LIVING (ADL)
ADLS_ACUITY_SCORE: 28
ADLS_ACUITY_SCORE: 25
ADLS_ACUITY_SCORE: 25
ADLS_ACUITY_SCORE: 28
ADLS_ACUITY_SCORE: 28

## 2022-11-24 NOTE — PLAN OF CARE
Goal Outcome Evaluation:      Plan of Care Reviewed With: patient    Overall Patient Progress: improving    Shift Events: A&Ox4, Ax1 to commode/chair. SR 60s, Q8 SHANA numbers. 2L NC, LS clear. Firm abdomen, dialysis last night - 1.2L (came off early due to cramping), regular diet w/ 2L fluid restriction, BM this am. R internal jugular - swan advanced then pulled back to 47 - confirmed with X-ray. Scab on left forearm open to air. Awaiting AM labs.    Plan: Another round of dialysis today and increase activity as tolerated.    For complete assessments and vital signs, please refer to flowsheets.

## 2022-11-24 NOTE — PROGRESS NOTES
Nephrology Progress Note  11/24/2022         Assessment & Recommendations:   ESRD on HD  Pt dialyzes on MWF at [facility] via right UE AVF. EDW is at 59.5 kgs. Uses midodrine pre and during the run.   epo 5400 international unit(s) with dialysis along with 100 mg of IV iron with each treatment.  - had 1.2L of UF achieved yesterday with a post-dialysis weight at 61 kg.   - plan for UF only today for better volume management in the setting of acute on chronic heart failure.     Acute on chronic heart failure. S/p right and left cath with increased pressures. 11/23 RHC: RA 12, /40/69, PCWP 30, Audrey CO/CI 4.9/3.09, PVR 8.75 AGUSTIN. MAP notably 130 at that time. Consistent with mixed pre and post capillary pulmonary hypertension.   - HD as above with increased UF     Anemia, Hb  10.7 -> 9.7 , per report she receives epo and venofer, will increase the doses if continues to worsen    Electrolytes Na 135 K 4.2 no acute issues     BMD, calcium 8.5 albumin 2.6 phosphorus 7.4    Metabolic acidosis: CO2 20 should improve with dialysis     Recommendations were communicated to primary team via this note      Daria Iraheta MD   Division of Renal Disease and Hypertension  [facility]  [facility] [employer]    Interval History :   Nursing and provider notes from last 24 hours reviewed.  In the last 24 hours Bentonkrishna BESSY Wetzel had no acute event  Review of Systems:   I reviewed the following systems:  GI: good appetite. No nausea or vomiting or diarrhea.   Neuro:  no confusion  Constitutional:  no fever or chills  CV: no dyspnea or edema.  No chest pain.    Physical Exam:   I/O last 3 completed shifts:  In: 840 [P.O.:840]  Out: 1200 [Other:1200]   /57 (BP Location: Left arm)   Pulse 62   Temp 98.4  F (36.9  C) (Oral)   Resp 18   Wt 61 kg (134 lb 7.7 oz)   LMP  (LMP Unknown)   SpO2 95%   BMI 26.26 kg/m       GENERAL APPEARANCE:   Alert in NAD sitting in chair  EYES:  no scleral icterus, pupils equal  PULM: lungs clear to  auscultation bilaterally, equal air movement, no clubbing  CV: regular rhythm, normal rate, no rub     -JVD none     -edema none   GI: soft, not tender, non distended, bowel sounds are positive  INTEGUMENT: no cyanosis, no rash  NEURO:  no asterixis       Labs:   All labs reviewed by me  Electrolytes/Renal -   Recent Labs   Lab Test 11/24/22  0801 11/24/22  0638 11/24/22  0546 11/23/22  1120 11/23/22  0729 06/20/22  1322 06/20/22  0735 12/05/19  2125 09/25/19  1453 03/18/19  0935   NA  --   --  135*  --  138  --  128*   < > 133 142   POTASSIUM  --   --  4.2  --  4.7  --  4.7   < > 4.3 3.9   CHLORIDE  --   --  97*  --  98  --  94   < > 102 107   CO2  --   --  20*  --  20*  --  25   < > 25 27   BUN  --   --  46.7*  --  63.6*  --  61*   < > 37* 46*   CR  --   --  5.05*  --  6.94*  --  6.13*   < > 4.53* 3.43*   * 214* 240*   < > 231*   < > 188*   < > 300* 184*   KANWAL  --   --  8.5*  --  9.0  --  8.8   < > 8.4* 7.5*   PHOS  --   --  7.4*  --   --   --   --   --  5.8* 3.6    < > = values in this interval not displayed.       CBC -   Recent Labs   Lab Test 11/24/22  0546 11/23/22  0948 11/23/22  0729 06/20/22  0735   WBC 5.6  --  6.3 7.1   HGB 9.7* 10.7* 10.1* 7.4*   *  --  158 137*       LFTs -   Recent Labs   Lab Test 11/24/22  0546 06/20/22  0735 06/15/22  1233 07/23/21  0531   ALKPHOS 140*  --  129 74   BILITOTAL 0.4  --  0.7 1.3   ALT 6*  --  42 17   AST 18  --  34 19   PROTTOTAL 5.8*  --  6.2* 6.0*   ALBUMIN 2.9* 2.6* 2.5*  2.4* 3.1*       Iron Panel -   Recent Labs   Lab Test 06/19/22  0634 03/17/19  0805 08/30/18  1318   IRON 27* 27* 54   IRONSAT 15 15 28   LATRICE 898* 210 407*             Current Medications:    sodium chloride 0.9%  250 mL Intravenous Once in dialysis/CRRT     sodium chloride 0.9%  300 mL Hemodialysis Machine Once     amiodarone  200 mg Oral Once per day on Mon Tue Wed Thu Fri Sat     heparin ANTICOAGULANT  5,000 Units Subcutaneous Q12H     insulin aspart   Subcutaneous Daily with  breakfast     insulin aspart   Subcutaneous Daily with lunch     insulin aspart   Subcutaneous Daily with supper     insulin aspart  1-7 Units Subcutaneous TID AC     insulin aspart  1-5 Units Subcutaneous At Bedtime     insulin detemir  6 Units Subcutaneous At Bedtime     - MEDICATION INSTRUCTIONS -   Does not apply Once       - MEDICATION INSTRUCTIONS -       BETA BLOCKER NOT PRESCRIBED       - MEDICATION INSTRUCTIONS -       Daria Iraheta MD

## 2022-11-24 NOTE — PROGRESS NOTES
[facility]    I personally saw and examined this pateint in ICU where she remains for central hemodynamic monitoring.  I coordinated care with nephrology, performed hemodynamic measurements on my own, reviewed laboratories and imaging and agree with observations and plan outlined below.  45 minutes ICU time  Cards 2 Progress Note  11/24/2022    Assessment and Plan:     Ms. Wetzel is a 62 year-old female with a history of DM2, ESRD (2/2 diabetic nephropathy) on HD MWF, severe pulmonary arterial hypertension, COPD, chronic hypoxemic respiratory failure (on 2L O2 at baseline) and cirrhosis (unclear etiology but likely cardiac, c/b recurrent ascites requiring paracenteses) who is admitted after scheduled RHC with leave-in swan for volume optimization with hemodialysis and to reestablish her dry weight. EDW prior to admission was 59.5 Kg (132 lbs).      Plan Today:  - UF planned by nephrology today. Will repeat swan numbers afterwards     -----------------------------------------------------------------------------------------------------------------  ===NEURO===  No acute concerns     ===CARDIOVASCULAR===  # Severe pulmonary hypertension (pre and post capillary)  # Chronic diastolic heart failure, AHA/ACC stage C, NYHA class III  11/23 RHC: RA 12, /40/69, PCWP 30, Audrey CO/CI 4.9/3.09, PVR 8.75 AGUSTIN. MAP notably 130 at that time. Consistent with mixed pre and post capillary pulmonary hypertension. For comparison, last RHC on 07/22/2021: RA 35, /52/81, PCWP 36, Audrey CO/CI 4.29/2.5, PVR 15 AGUSTIN. Admitted from the cath lab with leave-in swan with plan to optimize volume status with dialysis and reestablish her dry weight. EDW prior to admission is 59.5 Kg (132 lbs). Her weight on admission is 137 lbs. Has NYHA class III symptoms at baseline, although improved in the weeks prior to admission.   - Volume:             - 11/23 UF with 1.2L removed, post-dialysis weight 61kg (134  lbs)            - 11/24 UF planned by nephrology   - Trend swan numbers  - Daily weights   - TTE pending    - Trend BMP, strict I/Os, salt and fluid restrictions   - Hemodynamic data as per below:  DATE MAP CVP PAP PCWP Audrey CO Audrey CI PVR SVR MVO2 Therapies   11/23 RHC, pre- 12 117/40/69 30 5.0 3.1 8.75      11/23 post HD 94 7 67/17/35 none 4.2 2.6   66    11/24 80 10 75/20/38 none 3.7 2.3  1513 57       # History of SVT  - PTA amiodarone      # HLD  - PTA ASA      ===PULMONARY===  # COPD   # Former tobacco use   # Chronic hypoxemic respiratory failure   On 1-2 L supplemental O2 at baseline. Unchanged on admission.   - Continue to monitor      ===GASTROINTESTINAL===  # Decompensated cirrhosis, c/b recurrent ascites   Cirrhotic appearing liver noted on 07/15/2021 CTA chest. Suspect cardiac cirrhosis. Complicated by recurrent ascites requiring intermittent paracentesis. Last run on 10/13 with 6.3 L of fluid out.  - Ultrasound of the liver with Doppler       # Nutrition   - Regular diet      ===RENAL/ELECTROLYTES===  # ESRD 2/2 diabetic nephropathy, on HD MWF  As discussed above, patient admitted with plan to optimize hemodynamics and reestablish dry weight  - Nephrology consulted for HD  - Trend swan numbers, daily weights   - Trend BMP  - Midodrine prior to and during HD runs      ===HEME/ONC===  Anemia of chronic kidney disease  - IV iron and EPO per outpatient regimen      ===ENDOCRINE===  # DM2 (A1C 7.7 on 06/15/2022)  - PTA insulin detemir  - Insulin aspart, sliding scale, QID BG checks      ===INFECTIOUS DISEASE===  No active concerns     Lines : [place] RIJ, PIV  Prophylaxis:  DVT: heparin   Family:  Will update son  Disposition: ICU     Code Status: Full code     Staffed with Dr. Tramaine Larkin MD  Cardiology Fellow      Interval History:   - Dialyzed overnight, only 1.2L removed due to cramping   - PM swan numbers: RA 7, PA 67/17/35, unable to wedge as swan kept coiling in RA  - This AM, she  feels great, sitting in the chair having breakfast. No shortness of breath with transfers or walking around room. No chest pain     Objective:   BP (!) 140/57 (BP Location: Left arm)   Pulse 59   Temp 98.5  F (36.9  C) (Oral)   Resp 18   Wt 61 kg (134 lb 7.7 oz)   LMP  (LMP Unknown)   SpO2 99%   BMI 26.26 kg/m    Temp (24hrs), Av.2  F (36.8  C), Min:97.9  F (36.6  C), Max:98.5  F (36.9  C)    I/O:    Intake/Output Summary (Last 24 hours) at 2022 0625  Last data filed at 2022  Gross per 24 hour   Intake 720 ml   Output 1200 ml   Net -480 ml     Wt:   Wt Readings from Last 5 Encounters:   22 61 kg (134 lb 7.7 oz)   22 61.4 kg (135 lb 4.8 oz)   22 65.8 kg (145 lb)   22 70.3 kg (155 lb)   21 77.5 kg (170 lb 14.4 oz)     Physical Exam   GEN: pleasant, no acute distress  HEENT: RONEN, EOMI, no scleral icterus  CV: RRR, normal s1/s2, no murmurs. JVP lower 3rd of neck at 30 degrees.   CHEST: clear to ausculation bilaterally, no rales or wheezing  ABD: soft,moderately distended, normal active bowel sounds  EXTR: pulses 2+. No edema.   NEURO: alert oriented, speech fluent/appropriate, motor grossly nonfocal    DATA:   Labs, EKGs and imaging reviewed. Pertinent results discussed in assessment and plan above.

## 2022-11-24 NOTE — PROGRESS NOTES
HEMODIALYSIS TREATMENT NOTE    Date: 11/23/2022  Time: 8:00 PM    Data:  Pre Wt:   62.2 kg  Desired Wt:  65.2 kg   Post Wt: 61 kg   Weight change:  1.2 kg  Ultrafiltration - Post Run Net Total Removed (mL): 1200 mL  Vascular Access Status: Fistula  patent  Dialyzer Rinse: Clear  Total Blood Volume Processed: 49.4 L   Total Dialysis (Treatment) Time: 2 hours 40 mins    Dialysate Bath: K 3, Ca 3  Heparin 500 units loading + 500 units/hr    Lab:   Yes; Hep B series    Interventions:  ESRD pt who was cannulated with 15 g needle X 2 without lido @ . During tx, pt began c/o leg cramps, and UF was turned off. 50mins to end tx, pt requested to end tx d/t ongoing leg cramps, writer updated MD Koby, and HD was ended as requested. 1.2L of fluid net was pulled. Epogen/Venofer administered per order. Ending BP was 138/63. Pt rinsed back post tx, needles were pulled, new dressings applied, and sites were held for about 10 mins to help achieve hemostasis. Hand off report was given to ANGELICA Jaramillo    Assessment:  -Calm and Cooperative  -VSS  -A & O X 4     Plan:    Per renal

## 2022-11-24 NOTE — PLAN OF CARE
Major Shift Events: SHANA numbers q8h, numbers in chart, latest PA 60/22 (29) CVP 7. Not allowing accurate insulin dosage to carb intake due to fear of being low, education given but pt still refusing. Team aware of this. HD run today, 1.5L off. Ambulated around unit x1 w/ SBA due to swan catheter. VSS on RA.    Plan: Potential discharge tomorrow  For vital signs and complete assessments, please see documentation flowsheets.

## 2022-11-25 ENCOUNTER — APPOINTMENT (OUTPATIENT)
Dept: ULTRASOUND IMAGING | Facility: CLINIC | Age: 62
DRG: 286 | End: 2022-11-25
Attending: STUDENT IN AN ORGANIZED HEALTH CARE EDUCATION/TRAINING PROGRAM
Payer: MEDICARE

## 2022-11-25 ENCOUNTER — APPOINTMENT (OUTPATIENT)
Dept: GENERAL RADIOLOGY | Facility: CLINIC | Age: 62
DRG: 286 | End: 2022-11-25
Attending: INTERNAL MEDICINE
Payer: MEDICARE

## 2022-11-25 VITALS
SYSTOLIC BLOOD PRESSURE: 147 MMHG | TEMPERATURE: 98.3 F | OXYGEN SATURATION: 99 % | BODY MASS INDEX: 25.53 KG/M2 | RESPIRATION RATE: 18 BRPM | WEIGHT: 130.73 LBS | HEART RATE: 62 BPM | DIASTOLIC BLOOD PRESSURE: 64 MMHG

## 2022-11-25 LAB
ALBUMIN SERPL BCG-MCNC: 3 G/DL (ref 3.5–5.2)
ALP SERPL-CCNC: 153 U/L (ref 35–104)
ALT SERPL W P-5'-P-CCNC: 6 U/L (ref 10–35)
ANION GAP SERPL CALCULATED.3IONS-SCNC: 20 MMOL/L (ref 7–15)
AST SERPL W P-5'-P-CCNC: 18 U/L (ref 10–35)
BASE EXCESS BLDV CALC-SCNC: -0.7 MMOL/L (ref -7.7–1.9)
BASE EXCESS BLDV CALC-SCNC: -1.7 MMOL/L (ref -7.7–1.9)
BASE EXCESS BLDV CALC-SCNC: 4.4 MMOL/L (ref -7.7–1.9)
BASOPHILS # BLD AUTO: 0 10E3/UL (ref 0–0.2)
BASOPHILS NFR BLD AUTO: 1 %
BILIRUB SERPL-MCNC: 0.4 MG/DL
BUN SERPL-MCNC: 63.3 MG/DL (ref 8–23)
CALCIUM SERPL-MCNC: 8.9 MG/DL (ref 8.8–10.2)
CHLORIDE SERPL-SCNC: 97 MMOL/L (ref 98–107)
CREAT SERPL-MCNC: 6.39 MG/DL (ref 0.51–0.95)
DEPRECATED HCO3 PLAS-SCNC: 21 MMOL/L (ref 22–29)
EOSINOPHIL # BLD AUTO: 0.1 10E3/UL (ref 0–0.7)
EOSINOPHIL NFR BLD AUTO: 2 %
ERYTHROCYTE [DISTWIDTH] IN BLOOD BY AUTOMATED COUNT: 18.1 % (ref 10–15)
GFR SERPL CREATININE-BSD FRML MDRD: 7 ML/MIN/1.73M2
GLUCOSE BLDC GLUCOMTR-MCNC: 131 MG/DL (ref 70–99)
GLUCOSE BLDC GLUCOMTR-MCNC: 142 MG/DL (ref 70–99)
GLUCOSE SERPL-MCNC: 132 MG/DL (ref 70–99)
HBV SURFACE AB SERPL IA-ACNC: 10.89 M[IU]/ML
HBV SURFACE AB SERPL IA-ACNC: NORMAL M[IU]/ML
HBV SURFACE AG SERPL QL IA: NONREACTIVE
HCO3 BLDV-SCNC: 24 MMOL/L (ref 21–28)
HCO3 BLDV-SCNC: 25 MMOL/L (ref 21–28)
HCO3 BLDV-SCNC: 29 MMOL/L (ref 21–28)
HCT VFR BLD AUTO: 31.2 % (ref 35–47)
HGB BLD-MCNC: 9.9 G/DL (ref 11.7–15.7)
IMM GRANULOCYTES # BLD: 0 10E3/UL
IMM GRANULOCYTES NFR BLD: 1 %
INR PPP: 1.15 (ref 0.85–1.15)
LYMPHOCYTES # BLD AUTO: 0.9 10E3/UL (ref 0.8–5.3)
LYMPHOCYTES NFR BLD AUTO: 16 %
MCH RBC QN AUTO: 28.4 PG (ref 26.5–33)
MCHC RBC AUTO-ENTMCNC: 31.7 G/DL (ref 31.5–36.5)
MCV RBC AUTO: 90 FL (ref 78–100)
MONOCYTES # BLD AUTO: 0.9 10E3/UL (ref 0–1.3)
MONOCYTES NFR BLD AUTO: 15 %
NEUTROPHILS # BLD AUTO: 3.9 10E3/UL (ref 1.6–8.3)
NEUTROPHILS NFR BLD AUTO: 65 %
NRBC # BLD AUTO: 0 10E3/UL
NRBC BLD AUTO-RTO: 0 /100
O2/TOTAL GAS SETTING VFR VENT: 0 %
O2/TOTAL GAS SETTING VFR VENT: 1 %
O2/TOTAL GAS SETTING VFR VENT: 24 %
OXYHGB MFR BLDV: 58 % (ref 70–75)
OXYHGB MFR BLDV: 61 % (ref 70–75)
OXYHGB MFR BLDV: 65 % (ref 70–75)
PCO2 BLDV: 41 MM HG (ref 40–50)
PCO2 BLDV: 43 MM HG (ref 40–50)
PCO2 BLDV: 45 MM HG (ref 40–50)
PH BLDV: 7.34 [PH] (ref 7.32–7.43)
PH BLDV: 7.37 [PH] (ref 7.32–7.43)
PH BLDV: 7.45 [PH] (ref 7.32–7.43)
PLATELET # BLD AUTO: 134 10E3/UL (ref 150–450)
PO2 BLDV: 33 MM HG (ref 25–47)
PO2 BLDV: 38 MM HG (ref 25–47)
PO2 BLDV: 39 MM HG (ref 25–47)
POTASSIUM SERPL-SCNC: 4.9 MMOL/L (ref 3.4–5.3)
PROT SERPL-MCNC: 6 G/DL (ref 6.4–8.3)
RBC # BLD AUTO: 3.48 10E6/UL (ref 3.8–5.2)
SODIUM SERPL-SCNC: 138 MMOL/L (ref 136–145)
WBC # BLD AUTO: 5.9 10E3/UL (ref 4–11)

## 2022-11-25 PROCEDURE — 76705 ECHO EXAM OF ABDOMEN: CPT

## 2022-11-25 PROCEDURE — 90937 HEMODIALYSIS REPEATED EVAL: CPT

## 2022-11-25 PROCEDURE — 82805 BLOOD GASES W/O2 SATURATION: CPT

## 2022-11-25 PROCEDURE — 999N000111 HC STATISTIC OT IP EVAL DEFER

## 2022-11-25 PROCEDURE — 258N000003 HC RX IP 258 OP 636: Performed by: INTERNAL MEDICINE

## 2022-11-25 PROCEDURE — 99233 SBSQ HOSP IP/OBS HIGH 50: CPT | Performed by: INTERNAL MEDICINE

## 2022-11-25 PROCEDURE — 250N000011 HC RX IP 250 OP 636: Performed by: STUDENT IN AN ORGANIZED HEALTH CARE EDUCATION/TRAINING PROGRAM

## 2022-11-25 PROCEDURE — 99239 HOSP IP/OBS DSCHRG MGMT >30: CPT | Mod: GC | Performed by: INTERNAL MEDICINE

## 2022-11-25 PROCEDURE — 36415 COLL VENOUS BLD VENIPUNCTURE: CPT | Performed by: STUDENT IN AN ORGANIZED HEALTH CARE EDUCATION/TRAINING PROGRAM

## 2022-11-25 PROCEDURE — 250N000013 HC RX MED GY IP 250 OP 250 PS 637: Performed by: STUDENT IN AN ORGANIZED HEALTH CARE EDUCATION/TRAINING PROGRAM

## 2022-11-25 PROCEDURE — 85610 PROTHROMBIN TIME: CPT | Performed by: STUDENT IN AN ORGANIZED HEALTH CARE EDUCATION/TRAINING PROGRAM

## 2022-11-25 PROCEDURE — 250N000011 HC RX IP 250 OP 636: Performed by: INTERNAL MEDICINE

## 2022-11-25 PROCEDURE — 80053 COMPREHEN METABOLIC PANEL: CPT | Performed by: STUDENT IN AN ORGANIZED HEALTH CARE EDUCATION/TRAINING PROGRAM

## 2022-11-25 PROCEDURE — 71045 X-RAY EXAM CHEST 1 VIEW: CPT | Mod: 26 | Performed by: RADIOLOGY

## 2022-11-25 PROCEDURE — 85025 COMPLETE CBC W/AUTO DIFF WBC: CPT | Performed by: STUDENT IN AN ORGANIZED HEALTH CARE EDUCATION/TRAINING PROGRAM

## 2022-11-25 PROCEDURE — 76705 ECHO EXAM OF ABDOMEN: CPT | Mod: 26 | Performed by: RADIOLOGY

## 2022-11-25 PROCEDURE — 71045 X-RAY EXAM CHEST 1 VIEW: CPT

## 2022-11-25 RX ORDER — SODIUM BICARBONATE 650 MG/1
650 TABLET ORAL 2 TIMES DAILY
Qty: 14 TABLET | Refills: 0 | Status: SHIPPED | OUTPATIENT
Start: 2022-11-25 | End: 2022-12-02

## 2022-11-25 RX ORDER — HEPARIN SODIUM 1000 [USP'U]/ML
500 INJECTION, SOLUTION INTRAVENOUS; SUBCUTANEOUS CONTINUOUS
Status: DISCONTINUED | OUTPATIENT
Start: 2022-11-25 | End: 2022-11-25 | Stop reason: HOSPADM

## 2022-11-25 RX ORDER — MIDODRINE HYDROCHLORIDE 5 MG/1
10 TABLET ORAL DAILY PRN
Status: DISCONTINUED | OUTPATIENT
Start: 2022-11-25 | End: 2022-11-25 | Stop reason: HOSPADM

## 2022-11-25 RX ORDER — SODIUM BICARBONATE 650 MG/1
650 TABLET ORAL 2 TIMES DAILY
Status: DISCONTINUED | OUTPATIENT
Start: 2022-11-25 | End: 2022-11-25 | Stop reason: HOSPADM

## 2022-11-25 RX ADMIN — SODIUM CHLORIDE 300 ML: 9 INJECTION, SOLUTION INTRAVENOUS at 11:45

## 2022-11-25 RX ADMIN — SODIUM CHLORIDE 250 ML: 9 INJECTION, SOLUTION INTRAVENOUS at 11:45

## 2022-11-25 RX ADMIN — HEPARIN SODIUM 500 UNITS/HR: 1000 INJECTION INTRAVENOUS; SUBCUTANEOUS at 11:45

## 2022-11-25 RX ADMIN — HEPARIN SODIUM 500 UNITS: 1000 INJECTION INTRAVENOUS; SUBCUTANEOUS at 11:45

## 2022-11-25 RX ADMIN — HEPARIN SODIUM 5000 UNITS: 5000 INJECTION, SOLUTION INTRAVENOUS; SUBCUTANEOUS at 04:09

## 2022-11-25 RX ADMIN — MIDODRINE HYDROCHLORIDE 10 MG: 5 TABLET ORAL at 13:10

## 2022-11-25 ASSESSMENT — ACTIVITIES OF DAILY LIVING (ADL)
ADLS_ACUITY_SCORE: 28

## 2022-11-25 NOTE — PROGRESS NOTES
Nephrology Progress Note  11/25/2022         Assessment & Recommendations:   ESRD on HD  Pt dialyzes on MWF at [facility] via right UE AVF. EDW is at 59.5 kgs. Uses midodrine pre and during the run. epo 5400 international unit(s) with dialysis along with 100 mg of IV iron with each treatment.  - underwent HD on 11/23, UF only on 11/24, today undergoing HD again   - aiming for UF of 2L, but was not able to achieve UF as expected in her with given hypotension and cramping after 1.2 to 1.5L of UF  - will give a trial of UF tomorrow, if pt tolerates UF today.     Acute on chronic heart failure. S/p right and left cath with increased pressures. 11/23 RHC: RA 12, /40/69, PCWP 30, Audrey CO/CI 4.9/3.09, PVR 8.75 AGUSTIN. MAP notably 130 at that time. Consistent with mixed pre and post capillary pulmonary hypertension.   - HD as above with increased UF     Anemia, Hb ~9-10 , per report she receives epo and venofer, will increase the doses if continues to worsen     Electrolytes Na 138 K 4.9 no acute issues     BMD, calcium 8.9 albumin 3 phosphorus was 7.4     Metabolic acidosis: CO2 21 should improve with dialysis     Recommendations were communicated to primary team via this note    Ramandeep Whalen MD   Division of Renal Disease and Hypertension  Formerly [facility]  [employer]is [employer]  [employer]    Interval History :   Nursing and provider notes from last 24 hours reviewed.  In the last 24 hours Yissel Wetzel been hemodynamically stable, no new symptoms today. Pt endorsed feeling about the same and does not feel different with increased UF.  Review of Systems:   I reviewed the following systems:  GI: no change in appetite. no nausea or vomiting or diarrhea.   Neuro:  no confusion  Constitutional:  no fever or chills  CV: no dyspnea or edema.  no chest pain.    Physical Exam:   I/O last 3 completed shifts:  In: 480 [P.O.:480]  Out: 1500 [Other:1500]   /57   Pulse 58   Temp 98.6  F (37  C) (Oral)   Resp 22   Wt 61.9 kg (136 lb  7.4 oz)   LMP  (LMP Unknown)   SpO2 95%   BMI 26.65 kg/m       General : Pt awake, not in acute distress   Lungs : anterior lung fields are clear  Cardiac : S1, S2 present  Abdomen : Soft/ND/NT  LE : no significant Edema noted  Dialysis Access : right AVF    Labs:   All labs reviewed by me  Electrolytes/Renal - Recent Labs   Lab Test 11/25/22  0658 11/25/22  0641 11/24/22  2328 11/24/22  0638 11/24/22  0546 11/23/22  1120 11/23/22  0729 12/05/19  2125 09/25/19  1453 03/18/19  0935   NA  --  138  --   --  135*  --  138   < > 133 142   POTASSIUM  --  4.9  --   --  4.2  --  4.7   < > 4.3 3.9   CHLORIDE  --  97*  --   --  97*  --  98   < > 102 107   CO2  --  21*  --   --  20*  --  20*   < > 25 27   BUN  --  63.3*  --   --  46.7*  --  63.6*   < > 37* 46*   CR  --  6.39*  --   --  5.05*  --  6.94*   < > 4.53* 3.43*   * 132* 299*   < > 240*   < > 231*   < > 300* 184*   KANWAL  --  8.9  --   --  8.5*  --  9.0   < > 8.4* 7.5*   PHOS  --   --   --   --  7.4*  --   --   --  5.8* 3.6    < > = values in this interval not displayed.       CBC -   Recent Labs   Lab Test 11/25/22  0641 11/24/22  0546 11/23/22  0948 11/23/22  0729   WBC 5.9 5.6  --  6.3   HGB 9.9* 9.7* 10.7* 10.1*   * 137*  --  158       LFTs -   Recent Labs   Lab Test 11/25/22  0641 11/24/22  0546 06/20/22  0735 06/15/22  1233   ALKPHOS 153* 140*  --  129   BILITOTAL 0.4 0.4  --  0.7   ALT 6* 6*  --  42   AST 18 18  --  34   PROTTOTAL 6.0* 5.8*  --  6.2*   ALBUMIN 3.0* 2.9* 2.6* 2.5*  2.4*       Iron Panel -   Recent Labs   Lab Test 06/19/22  0634 03/17/19  0805 08/30/18  1318   IRON 27* 27* 54   IRONSAT 15 15 28   LATRICE 898* 210 407*      Current Medications:    amiodarone  200 mg Oral Once per day on Mon Tue Wed Thu Fri Sat     heparin ANTICOAGULANT  5,000 Units Subcutaneous Q12H     insulin aspart   Subcutaneous Daily with breakfast     insulin aspart   Subcutaneous Daily with lunch     insulin aspart   Subcutaneous Daily with supper     insulin  aspart  1-7 Units Subcutaneous TID AC     insulin aspart  1-5 Units Subcutaneous At Bedtime     insulin detemir  6 Units Subcutaneous At Bedtime       - MEDICATION INSTRUCTIONS -       heparin (porcine) 500 Units/hr (11/25/22 0245)     BETA BLOCKER NOT PRESCRIBED       Ramandeep Whalen MD

## 2022-11-25 NOTE — DISCHARGE SUMMARY
[facility]    Discharge Summary: Cardiology Service    Admission Date: 11/23/2022  Discharge Date: 11/25/22    Discharge Diagnoses:  1. Severe pulmonary hypertension (pre and post capillary)  2. Chronic diastolic heart failure, AHA/ACC stage C, NYHA class III  3. Chronic hypoxemic respiratory failure  4. History of SVT  5. Hyperlipidemia   6. Decompensated cirrhosis, likely secondary to right heart disease  7. End-stage renal disease secondary to diabetic nephropathy (on HD MWF)  8. Anemia of chronic disease   9. Type 2 diabetes mellitus     Pertinent Procedures:  11/23/2022 Right heart catheterization:   RA 12  /15  /40, mean 69, sat 56.7%  PCWP mean 30  CO/CI: 4.9/3.09 by TD, 4.46/2.81 by Audrey  PVR 8.75 waterman  Ao sat 88%, HR 73    Consults:  Nephrology     Imaging with results:  Echocardiogram 11/23/2022:  Interpretation Summary  Technically difficult study. Poor acoustic windows.  Left ventricular size, wall motion and function are normal. The ejection  fraction is 55-60%.  Global right ventricular function is mildly reduced.  There is mild right ventricular hypertrophy.  Right ventricular systolic pressure is 55mmHg above the right atrial pressure.  Pulmonary hypertension is present.  IVC diameter <2.1 cm collapsing >50% with sniff suggests a normal RA pressure  of 3 mmHg.  No pericardial effusion is present.     This study was compared with the study from 7/15/2021. RV function is mildly  improved and estimated PA pressure is lower.    Brief HPI:  Ms. Wetzel is a 62 year-old female with a history of DM2, ESRD (2/2 diabetic nephropathy) on HD MWF, severe pulmonary arterial hypertension, COPD, chronic hypoxemic respiratory failure (on 2L O2 at baseline) and cirrhosis (unclear etiology but likely due to right heart disease, c/b recurrent ascites requiring paracenteses) who was admitted after scheduled RHC with leave-in swan for volume optimization with hemodialysis  and to re-establish her dry weight. EDW prior to admission was 59.5 Kg (131 lbs).     Hospital Course by Diagnosis:  Results of her RHC on admission (11/23) are as follows: RA 12, /40/69, PCWP 30, Audrey CO/CI 4.9/3.09, PVR 8.75 AGUSTIN. MAP notably 130 at that time. Consistent with mixed pre and post capillary pulmonary hypertension. For comparison, last RHC on 07/22/2021 showed RA 35, /52/81, PCWP 36, Audrey CO/CI 4.29/2.5, PVR 15 AGUSTIN. Pateint was admitted from the cath lab with leave-in swan with plan to optimize volume status with dialysis and reestablish her dry weight. EDW prior to admission was 59.5 Kg (131 lbs). Her weight on admission was 137 lbs, and she was exhibiting stable Has NYHA class III symptoms, which is at her baseline. Weights and swan numbers outlined below after runs of dialysis and ultrafiltration. Lowest weight during this admission was 130.3 lbs.     - 11/23: Run of hemodialysis with 1.2 L of fluid removed. Post dialysis weight 61 kg (134.5 lbs). [place]: CVP 7, PA 67/17/35, unable to wedge  - 11/24: Run of ultrafiltration with 1.5 L of fluid removed. Post dialysis weight 59.5 kg (131.2 lbs). [place]: CVP 7, PA 60/18/32, unable to wedge  - 11/25: Run of hemodialysis with 0.5 L of fluid removed. Post dialysis weight 59.1 kg (130.3 lbs). [place]: CVP 8, PA 45/15/25, unable to wedge    Condition on discharge  Temp:  [97.7  F (36.5  C)-98.8  F (37.1  C)] 98.6  F (37  C)  Pulse:  [58-73] 68  Resp:  [17-22] 17  BP: (100-166)/(51-89) 122/61  SpO2:  [92 %-100 %] 98 %  GEN: pleasant, no acute distress  HEENT: RONEN, EOMI, no scleral icterus  CV: RRR, normal s1/s2, no murmurs. JVP lower 3rd of neck at 30 degrees.   CHEST: clear to ausculation bilaterally, no rales or wheezing  ABD: soft,moderately distended, normal active bowel sounds  EXTR: pulses 2+. No edema.   NEURO: alert oriented, speech fluent/appropriate, motor grossly nonfocal    Medication Changes:  Started sodium bicarb tabs per  nephrology    Discharge medications:   Current Discharge Medication List      CONTINUE these medications which have NOT CHANGED    Details   acetaminophen (TYLENOL) 500 MG tablet Take 500 mg by mouth every 8 hours as needed for mild pain      amiodarone (PACERONE) 200 MG tablet Take 200mg po 6 days a week.  Hold on Sundays  Qty: 78 tablet, Refills: 2    Associated Diagnoses: SVT (supraventricular tachycardia) (H)      aspirin (ASA) 81 MG EC tablet Take 1 tablet (81 mg) by mouth daily  Refills: 0    Comments: Stop if causes stomach irritation.  Associated Diagnoses: Type 2 diabetes mellitus with diabetic nephropathy, with long-term current use of insulin (H)      famotidine (PEPCID) 10 MG tablet Take 1 tablet (10 mg) by mouth daily  Qty: 90 tablet, Refills: 3    Associated Diagnoses: Gastroesophageal reflux disease, esophagitis presence not specified      fluticasone (FLONASE) 50 MCG/ACT nasal spray Spray 1 spray into both nostrils daily  Qty: 16 g, Refills: 11    Associated Diagnoses: Seasonal allergic rhinitis, unspecified trigger      Glycopyrrolate-Formoterol (BEVESPI AEROSPHERE) 9-4.8 MCG/ACT oral inhaler Inhale 2 puffs into the lungs 2 times daily       hypromellose-dextran (ARTIFICAL TEARS) 0.1-0.3 % ophthalmic solution Place 1 drop into both eyes daily as needed for dry eyes      levalbuterol (XOPENEX HFA) 45 MCG/ACT inhaler Inhale 2 puffs into the lungs every 6 hours as needed for shortness of breath / dyspnea or wheezing  Qty: 15 g, Refills: 11    Associated Diagnoses: Pneumonia due to infectious organism, unspecified laterality, unspecified part of lung      midodrine (PROAMATINE) 10 MG tablet Take 1 tablet (10 mg) by mouth 3 times daily (with meals)  Qty: 90 tablet, Refills: 0    Comments: Future refills by PCP Dr. Gigi Martin with phone number [phone].  Associated Diagnoses: ESRD (end stage renal disease) on dialysis (H); Orthostatic hypotension      NOVOLOG FLEXPEN 100 UNIT/ML soln INJECT  9-12 UNITS BEFORE BREAKFAST, LUNCH, AND DINNER PLUS A CORRECTIVE SCALE OF 2 UNITS FOR EVERY 50 OVER 150  Qty: 15 mL, Refills: 0    Associated Diagnoses: Type 2 diabetes mellitus with diabetic nephropathy, with long-term current use of insulin (H)      !! ACCU-CHEK CHING PLUS test strip USE TO TEST BLOOD SUGAR 4 TIMES PER DAY  Qty: 100 strip, Refills: 3    Associated Diagnoses: Type 2 diabetes mellitus with diabetic nephropathy, with long-term current use of insulin (H)      !! ACCU-CHEK GUIDE test strip USE TO TEST BLOOD GLUCOSE FOUR TIMES DAILY  Qty: 400 strip, Refills: 0    Associated Diagnoses: Type 2 diabetes mellitus with diabetic nephropathy, with long-term current use of insulin (H)      !! blood glucose (NO BRAND SPECIFIED) lancets standard Use to test blood sugar 4 times daily or as directed.  Qty: 400 lancet, Refills: 3    Comments: Please dispense Accu check soft click  Associated Diagnoses: Type 2 diabetes mellitus with diabetic nephropathy, with long-term current use of insulin (H)      !! blood glucose (NO BRAND SPECIFIED) lancets standard Use to test blood sugar three times daily or as directed.  Qty: 400 lancet, Refills: 11    Associated Diagnoses: Type 2 diabetes mellitus with diabetic nephropathy, with long-term current use of insulin (H)      blood glucose monitoring (NO BRAND SPECIFIED) meter device kit Use to test blood sugar 4 times daily or as directed.  Qty: 1 kit, Refills: 0    Comments: Please dispense Accu Check Guide meter  Associated Diagnoses: Type 2 diabetes mellitus with diabetic nephropathy, with long-term current use of insulin (H)      Continuous Blood Gluc Sensor (FREESTYLE LATOSHA 2 SENSOR) MISC 1 each every 14 days Use 1 sensor every 14 days. Use to read blood sugars per 's instructions.  Qty: 2 each, Refills: 5    Associated Diagnoses: Type 2 diabetes mellitus with diabetic nephropathy, with long-term current use of insulin (H)      Emollient (CERAVE) CREA Externally  apply topically 2 times daily  Qty: 453 g, Refills: 0    Associated Diagnoses: Dry skin      insulin detemir (LEVEMIR FLEXTOUCH) 100 UNIT/ML pen Inject 6 Units Subcutaneous At Bedtime  Qty: 15 mL, Refills: 0    Associated Diagnoses: Type 2 diabetes mellitus with diabetic nephropathy, with long-term current use of insulin (H)      insulin pen needle (31G X 8 MM) 31G X 8 MM miscellaneous Use 4 pen needles daily or as directed.  Qty: 100 each, Refills: 11    Associated Diagnoses: Type 2 diabetes mellitus with diabetic nephropathy, with long-term current use of insulin (H)       !! - Potential duplicate medications found. Please discuss with provider.        Labs or imaging requiring follow-up after discharge:  None    Follow-up:  - Resume outpatient dialysis on MWF    Code status:  Full code     Patient Care Team:  Gigi Martin MD as PCP - General (Internal Medicine)  Gigi Martin MD as Assigned PCP  Rahul Hernandez MD as MD (Nephrology)  France Barbosa MD as Assigned Heart and Vascular Provider    Virginia Larkin MD  Cardiology Fellow

## 2022-11-25 NOTE — PROGRESS NOTES
HEMODIALYSIS TREATMENT NOTE    Date: 11/24/2022  Time: 6:12 PM    Data:  Pre Wt: 61 kg     Desired Wt: 59.5 kg   Post Wt: 59.5 kg   Weight change: 1.5 kg  Ultrafiltration - Post Run Net Total Removed (mL): 1500 mL  Vascular Access Status: Fistula  patent  Dialyzer Rinse: Clear  Total Blood Volume Processed: 0 L   Total Dialysis (Treatment) Time: 2     Lab:   No    Interventions:  HD treatment terminated 1 hour early d/t pt cramping. Pt was scheduled to run 3 hrs of UF only dialysis tx and able to complete 2 hrs. Net 1.5 fluid removed. No medication given. AVF cannulated with 15g needles. Positive for thrill and bruit. 400 BFR achieved. Post tx hemostasis gained within 10 min. Handoff report given to ANGELICA Toney.    Assessment:  VSS  Alert and oriented x 4  Lets needs know.  Denied of pain and SOB.     Plan:    Next HD per renal

## 2022-11-25 NOTE — PROGRESS NOTES
HEMODIALYSIS TREATMENT NOTE    Date: 11/25/2022  Time: 5:53 PM    Data:  Pre Wt:   59.3 kg  Desired Wt: 57.3  kg   Post Wt:  58.8 kg  Weight change:  0.5 kg  Ultrafiltration - Post Run Net Total Removed (mL): 500 mL  Vascular Access Status: Fistula  patent  Dialyzer Rinse: Clear  Total Blood Volume Processed: 42.9 L   Total Dialysis (Treatment) Time: An hour 45 mins   Dialysate Bath: K 2, Ca 2.5  Heparin 500 units loading + 500 units/hr    Lab:   No    Interventions:  ESRD pt who was cannulated with 15 g needle X 2 without lido @ . As tx progresses, SBP began dropping, Goal was dropped from 2 to 1.5L, and UF was then turned off with no effect. Pt got prn midodrine also with no effect. Pt threw up, and writer had to page MD Koby who gave an ok to keep UF off. 0.5L of fluid net was pulled. At about an hour 45mins to end tx, pt began to c/o severe leg cramps, and requested to end tx, HD terminated per pt request, and MD Koby was updated with no new orders. Ending BP was 100/49. [place] were pulled, new dressings applied, and sites were held for about 10 mins to help achieve hemostasis. Hand off report was given to ANGELICA Mckee.    Assessment:  -Calm & Cooperative  -VSS  -A & O X 4     Plan:    Per renal

## 2022-11-25 NOTE — PLAN OF CARE
Goal Outcome Evaluation:      Plan of Care Reviewed With: patient    Overall Patient Progress: no change    Shift Events: A&Ox4, Ax1 - ambulated around the whole unit and to the BR. SR 60s to SB 50s, q8h SHANA numbers. 1L NC, LS clear. Firm abdomen, regular diet w/ 2L fluid restriction, not allowing accurate insulin dose to carb intake due to fear of being low - education given but still refusing, team is aware. BM overnight. R internal jugular - swan remains in place. Scab on left forearm open to air. Awaiting AM labs.    Plan: Abdominal Ultrasound this AM.    For complete assessments and vital signs, please refer to flowsheets.

## 2022-11-25 NOTE — PLAN OF CARE
"Discharged to: home @ 1540  Belongings: clothing, cell phone, , home meds returned to patient  AVS (After Visit Summary) discussed with: patient    VSS on RA, pt frustrated with hospitalization. HD run 500mL off, pt began cramping and BP decreasing with pulling. Okay per Cards 2. Emesis x 1, per pt caused by \"not getting midodrine prior to HD\". [place] capped, line pulled, discharge paperwork reviewed and questions answered.       Goal Outcome Evaluation:      Plan of Care Reviewed With: patient    Overall Patient Progress: improvingOverall Patient Progress: improving    Outcome Evaluation: appropriate for discharge to home      "

## 2022-11-25 NOTE — PLAN OF CARE
Occupational Therapy: Orders received. Chart reviewed and discussed with care team.? Occupational Therapy not indicated due to pt at functional baseline, assist available PRN w/ heavy ADL/IADL. Recommend OP CR to progress activity tolerance and cardiopulmonary health. Anticipate pt will be safe to discharge home w/ A and OP CR when medically ready.? Will complete orders. Please re-consult if pt experiences a change in status or if further needs are identified.

## 2022-11-28 ENCOUNTER — PATIENT OUTREACH (OUTPATIENT)
Dept: CARE COORDINATION | Facility: CLINIC | Age: 62
End: 2022-11-28

## 2022-11-28 DIAGNOSIS — I27.20 PULMONARY HYPERTENSION (H): Primary | ICD-10-CM

## 2022-11-28 LAB — GLUCOSE BLDC GLUCOMTR-MCNC: 199 MG/DL (ref 70–99)

## 2022-11-28 NOTE — PROGRESS NOTES
Clinic Care Coordination Contact  [facility]: Post-Discharge Note  SITUATION                                                      Admission:    Admission Date: 11/23/22   Reason for Admission: Severe pulmonary hypertension (pre and post capillary)  Discharge:   Discharge Date: 11/25/22  Discharge Diagnosis: 1. Severe pulmonary hypertension (pre and post capillary)  2. Chronic diastolic heart failure, AHA/ACC stage C, NYHA class III  3. Chronic hypoxemic respiratory failure  4. History of SVT  5. Hyperlipidemia   6. Decompensated cirrhosis, likely secondary to right heart disease  7. End-stage renal disease secondary to diabetic nephropathy (on HD MWF)  8. Anemia of chronic disease   9. Type 2 diabetes mellitus    BACKGROUND                                                      Per hospital discharge summary and inpatient provider notes:    Brief HPI:  Ms. Wetzel is a 62 year-old female with a history of DM2, ESRD (2/2 diabetic nephropathy) on HD MWF, severe pulmonary arterial hypertension, COPD, chronic hypoxemic respiratory failure (on 2L O2 at baseline) and cirrhosis (unclear etiology but likely due to right heart disease, c/b recurrent ascites requiring paracenteses) who was admitted after scheduled RHC with leave-in swan for volume optimization with hemodialysis and to re-establish her dry weight. EDW prior to admission was 59.5 Kg (131 lbs).      Hospital Course by Diagnosis:  Results of her RHC on admission (11/23) are as follows: RA 12, /40/69, PCWP 30, Audrey CO/CI 4.9/3.09, PVR 8.75 AGUSTIN. MAP notably 130 at that time. Consistent with mixed pre and post capillary pulmonary hypertension. For comparison, last RHC on 07/22/2021 showed RA 35, /52/81, PCWP 36, Audrey CO/CI 4.29/2.5, PVR 15 AGUSTIN. Pateint was admitted from the cath lab with leave-in swan with plan to optimize volume status with dialysis and reestablish her dry weight. EDW prior to admission was 59.5 Kg (131 lbs). Her weight on admission  "was 137 lbs, and she was exhibiting stable Has NYHA class III symptoms, which is at her baseline. Weights and swan numbers outlined below after runs of dialysis and ultrafiltration. Lowest weight during this admission was 130.3 lbs.      - 11/23: Run of hemodialysis with 1.2 L of fluid removed. Post dialysis weight 61 kg (134.5 lbs). [place]: CVP 7, PA 67/17/35, unable to wedge  - 11/24: Run of ultrafiltration with 1.5 L of fluid removed. Post dialysis weight 59.5 kg (131.2 lbs). [place]: CVP 7, PA 60/18/32, unable to wedge  - 11/25: Run of hemodialysis with 0.5 L of fluid removed. Post dialysis weight 59.1 kg (130.3 lbs). [place]: CVP 8, PA 45/15/25, unable to wedge    ASSESSMENT           Discharge Assessment  How are you doing now that you are home?: Pt states she's feeling \"better now that I'm home.\"  States she was supposed to go to dialysis today but didn't go because she's \"sick; my nose is running, coughing up a bunch of phlegm, same old cold stuff.\"  Her last dialysis was on Friday, 11/25/22, next scheduled dialysis is on Wednesday, 11/30/22.  Didn't sleep well in the hospital, but since home sleeping much better.  Pt states \"I actually feel like I'm doing pretty good from a dialysis standpoint, plus I'm barely drinking any fluids, no more than I have to which is 1.5 L of fluid a day or less per one of the doctor's I've seen in the hospital before.\"  RN encouraged pt not to miss her next dialysis session on the 30th, and if sx of fluid overload contact dialysis center/nephrologist to see about sooner appt.  How are your symptoms? (Red Flag symptoms escalate to triage hotline per guidelines): Improved  Do you feel your condition is stable enough to be safe at home until your provider visit?: Yes  Does the patient have their discharge instructions? : Yes  Does the patient have questions regarding their discharge instructions? : No  Were you started on any new medications or were there changes to any of your previous " "medications? : Yes  Does the patient have all of their medications?: Yes  Do you have questions regarding any of your medications? : No  Do you have all of your needed medical supplies or equipment (DME)?  (i.e. oxygen tank, CPAP, cane, etc.): Yes  Discharge follow-up appointment scheduled within 14 calendar days? : No  Is patient agreeable to assistance with scheduling? : No (Pt states she will schedule her follow up with cardiology within 4 weeks of d/c.  RN encouraged pt to schedule her Medicare AWV; pt states she had a physical within the past year but has not had an AWV.)         Post-op (Clinicians Only)  Did the patient have surgery or a procedure: Yes (s/p R heart cath via R jugular access)  Incision: healing  Drainage: No  Bleeding: none  Fever: No  Chills: No  Redness: No  Warmth: No  Swelling: No  Incision site pain: No  Closure: none (Pt states sutures were removed prior to d/c)  Eating & Drinking: eating and drinking without complaints/concerns  PO Intake: other;regular diet (Diabetic diet, renal diet)  Additional Symptoms:  (Pt denies)  Bowel Function: normal  Date of last BM: 11/28/22  Urinary Status: voiding without complaint/concerns (Pt states she still produces \"a little bit of urine,\" no problems.)      PLAN                                                      Outpatient Plan:      Follow up with Dr. Jose Redd in cardiology clinic in 4 weeks  Cardiac Rehab Referral      No future appointments.      For any urgent concerns, please contact our 24 hour nurse triage line: [phone] ([phone])         Annita Dillon RN                  "

## 2022-11-29 ENCOUNTER — NURSE TRIAGE (OUTPATIENT)
Dept: INTERNAL MEDICINE | Facility: CLINIC | Age: 62
End: 2022-11-29

## 2022-11-29 NOTE — TELEPHONE ENCOUNTER
"Called and spoke to the patient. Relayed message from the provider. Patient states she is unable to get into the clinic or emergency room today due to lack of transportation and she is unable to come tomorrow as she will be at dialysis. Patient states she \"will just have to sit here and suffer.\"     Will route back to PCP for further recommendations?     Josee Levi RN    "

## 2022-11-29 NOTE — TELEPHONE ENCOUNTER
Provider Response to 2nd Level Triage Request    I have reviewed the RN documentation. My recommendation is:  Needs to be seen somewhere today.

## 2022-11-29 NOTE — TELEPHONE ENCOUNTER
"Nurse Triage SBAR    Is this a 2nd Level Triage? YES, LICENSED PRACTITIONER REVIEW IS REQUIRED    Situation: Patient reporting chest tightness and productive cough starting 11/25/22 after hospital discharge    Background: Patient discharged 11/25/22 after R heart cath procedure. Patient stated she previously had similar symptoms and had abx called to pharmacy to prevent pneumonia, she would like this again.     Assessment:   - Patient started having productive cough (yellow phlegm) starting 11/25 evening  - Runny nose (yellow drainage)  - Chest started feeling tight 11/28/22  - Difficulty breathing (slight wheezing)  - denies SOB but chest feels tight.   - Using levalbuterol inhaler- not helping  - SpO2 95 on 2L O2 nasal cannula (does not normally wear oxygen during the day)      Protocol Recommended Disposition:   Go To ED/UCC Now (Or To Office With PCP Approval)    Recommendation:    Recommended patient take home covid test while waiting for call back, patient declines and stated she 'knows the difference'.     Patient is unable to go to UC/ED due to lack transportation. Patient is requesting an antibiotic to prevent pneumonia.     Routed to provider    Does the patient meet one of the following criteria for ADS visit consideration? 16+ years old, with an [facility] PCP     TIP  Providers, please consider if this condition is appropriate for management at one of our Acute and Diagnostic Services sites.     If patient is a good candidate, please use dotphrase <dot>triageresponse and select Refer to ADS to document.      1. ONSET: \"When did the nasal discharge start?\"       11/25  2. AMOUNT: \"How much discharge is there?\"       'Quite a bit'  3. COUGH: \"Do you have a cough?\" If yes, ask: \"Describe the color of your sputum\" (clear, white, yellow, green)      Yes- Yellow  4. RESPIRATORY DISTRESS: \"Describe your breathing.\"       Denies SOB, chest feels tight and inhaler is not helping.  5. FEVER: \"Do you have a fever?\" If " "Yes, ask: \"What is your temperature, how was it measured, and when did it start?\"      No  6. SEVERITY: \"Overall, how bad are you feeling right now?\" (e.g., doesn't interfere with normal activities, staying home from school/work, staying in bed)       Interferes with normal activities  7. OTHER SYMPTOMS: \"Do you have any other symptoms?\" (e.g., sore throat, earache, wheezing, vomiting)      Slight wheezing, runny nose  8. PREGNANCY: \"Is there any chance you are pregnant?\" \"When was your last menstrual period?\"      N/A    Reason for Disposition    Difficulty breathing and not from stuffy nose (e.g., not relieved by cleaning out the nose)    Patient sounds very sick or weak to the triager    Additional Information    Negative: SEVERE difficulty breathing (e.g., struggling for each breath, speaks in single words)    Negative: Very weak (can't stand)    Negative: Sounds like a life-threatening emergency to the triager    Negative: SEVERE difficulty breathing (e.g., struggling for each breath, speaks in single words, pulse > 120)    Negative: Breathing stopped and hasn't returned    Negative: Choking on something    Negative: Bluish (or gray) lips or face    Negative: Difficult to awaken or acting confused (e.g., disoriented, slurred speech)    Negative: Passed out (i.e., fainted, collapsed and was not responding)    Negative: Wheezing started suddenly after medicine, an allergic food, or bee sting    Negative: Stridor    Negative: Slow, shallow and weak breathing    Negative: Sounds like a life-threatening emergency to the triager    Negative: Chest pain    Negative: Wheezing (high pitched whistling sound) and previous asthma attacks or use of asthma medicines    Negative: Difficulty breathing and within 14 days of COVID-19 Exposure    Negative: Difficulty breathing and only present when coughing    Negative: Difficulty breathing and only from stuffy nose    Negative: Difficulty breathing and only from stuffy nose or " "runny nose from common cold    Negative: MODERATE difficulty breathing (e.g., speaks in phrases, SOB even at rest, pulse 100-120) of new-onset or worse than normal    Negative: Oxygen level (e.g., pulse oximetry) 90 percent or lower    Negative: Wheezing can be heard across the room    Negative: Drooling or spitting out saliva (because can't swallow)    Negative: Any history of prior \"blood clot\" in leg or lungs    Negative: Illness requiring prolonged bedrest in past month (e.g., immobilization, long hospital stay)    Negative: Hip or leg fracture (broken bone) in past month (or had cast on leg or ankle in past month)    Negative: Major surgery in the past month    Negative: Long-distance travel in past month (e.g., car, bus, train, plane; with trip lasting 6 or more hours)    Negative: Cancer treatment in past six months (or has cancer now)    Negative: Extra heart beats OR irregular heart beating (i.e., \"palpitations\")    Negative: Fever > 103 F (39.4 C)    Negative: Fever > 101 F (38.3 C) and over 60 years of age    Negative: Fever > 100.0 F (37.8 C) and bedridden (e.g., nursing home patient, stroke, chronic illness, recovering from surgery)    Negative: Fever > 100.0 F (37.8 C) and diabetes mellitus or weak immune system (e.g., HIV positive, cancer chemo, splenectomy, organ transplant, chronic steroids)    Negative: Periods where breathing stops and then resumes normally and bedridden (e.g., nursing home patient, CVA)    Negative: Pregnant or postpartum (from 0 to 6 weeks after delivery)    Protocols used: COMMON COLD-A-OH, BREATHING DIFFICULTY-A-OH      "

## 2022-11-30 NOTE — TELEPHONE ENCOUNTER
Pt called back and the provider message was reinforced from yesterday. Pt continues to express frustration about her symptoms, lack of transportation to office or urgent care and schedule conflict with dialysis from 1030a-3p today.    Routing to provider for further recommendations?    Brooklyn Lawrence RN

## 2022-12-07 DIAGNOSIS — R18.8 OTHER ASCITES: Primary | ICD-10-CM

## 2022-12-15 ENCOUNTER — TELEPHONE (OUTPATIENT)
Dept: CARDIOLOGY | Facility: CLINIC | Age: 62
End: 2022-12-15

## 2022-12-15 NOTE — TELEPHONE ENCOUNTER
"Attempted to return patient's call regarding \"a couple of issues I'm having.\" Unable to reach; LM asking patient to call me back today when able. Lorenza Chappell RN on 12/15/2022 at 10:19 AM    Unable to reach x2. LM to call back when able. Lorenza Chappell RN on 12/15/2022 at 12:20 PM    Unable to reach x3. Lorenza Chappell RN on 12/15/2022 at 3:36 PM    Schedule patient for follow up with Dr. Redd on 1/5 at 3:30 PM. Lorenza Chappell RN on 12/19/2022 at 1:08 PM          "

## 2022-12-20 ENCOUNTER — TELEPHONE (OUTPATIENT)
Dept: MEDSURG UNIT | Facility: CLINIC | Age: 62
End: 2022-12-20

## 2022-12-27 ENCOUNTER — HOSPITAL ENCOUNTER (OUTPATIENT)
Dept: ULTRASOUND IMAGING | Facility: CLINIC | Age: 62
Discharge: HOME | End: 2022-12-27
Payer: MEDICARE

## 2022-12-27 ENCOUNTER — HOSPITAL ENCOUNTER (OUTPATIENT)
Facility: CLINIC | Age: 62
Discharge: HOME | End: 2022-12-27
Admitting: RADIOLOGY
Payer: MEDICARE

## 2022-12-27 DIAGNOSIS — R18.8 OTHER ASCITES: ICD-10-CM

## 2022-12-27 PROCEDURE — 250N000009 HC RX 250: Performed by: RADIOLOGY

## 2022-12-27 PROCEDURE — 272N000706 US PARACENTESIS WITH ALBUMIN

## 2022-12-27 RX ORDER — LIDOCAINE HYDROCHLORIDE 10 MG/ML
10 INJECTION, SOLUTION EPIDURAL; INFILTRATION; INTRACAUDAL; PERINEURAL ONCE
Status: COMPLETED | OUTPATIENT
Start: 2022-12-27 | End: 2022-12-27

## 2022-12-27 RX ADMIN — LIDOCAINE HYDROCHLORIDE 10 ML: 10 INJECTION, SOLUTION EPIDURAL; INFILTRATION; INTRACAUDAL; PERINEURAL at 14:12

## 2022-12-27 ASSESSMENT — ACTIVITIES OF DAILY LIVING (ADL): ADLS_ACUITY_SCORE: 35

## 2022-12-28 NOTE — PROGRESS NOTES
Jose Redd M.D.  Cardiovascular Medicine    Lev Hernandez M.D.  Gigi Martin M.D.    60 minutes     Problem List  1. ESRD with dialysis dependence  2. Chronic oxygen dependent respiratory failure  3. History of SVT  4. Diabetes  5. Diabetic nephropathy  6. COLD  7. History of ascites requiring paracentesis  8. Pleural effusion  9. ASHD with stenting  10. Pericardial calcification  11. Remote history of obesity  12. Remote history of smoking  13. Cirrhotic hepatic morphology.      Dear Lev and Gigi:     I saw your patient, Yissel Chacon, a 62 year old female with longstanding ESRD for a follow-up visit today.  As you will recall, she was recently hospitalized at [facility] to try and establish a more stable dry weight and central cardiovascular hemodynamics.  This did not go as smoothly or successfully as I would have hoped.  Her right heart catheterization performed on 2022 (weight 137 pounds)  demonstrated:    RA 12  /15  /40, mean 69, sat 56.7%  PCWP mean 30  CO/CI: 4.9/3.09 by TD, 4.46/2.81 by Audrey  PVR 8.75 waterman  Ao sat 88%, HR 73    Interestingly, her RA pressure was only modestly elevated, given that her presumed cirrhosis has been attributed to right heart failure.  She has evidence for bothsevere pre and post capillary pulmonary hypertension as evidenced by the markedly elevated wedge pressure of 30 and the severely elevated pulmonary artery pressure and pulmonary vascular resistance.      An echocardiogram performed the same day demonstrated:     Name: YISSEL CHACON  MRN: [id number]  : 1960  Study Date: 2022 03:25 PM  Age: 62 yrs  Gender: Female  Patient Location: [facility]  Reason For Study: Pulmonary Hypertension, Heart Failure  Ordering Physician: ROGER DAWSON  Referring Physician: YAHAIRA HOUSER  Performed By: Cindy Hunter     BSA: 1.6 m2  Height: 60 in  Weight: 137 lb  BP: 166/67  mmHg  ______________________________________________________________________________  Procedure  Echocardiogram with two-dimensional, color and spectral Doppler performed.  Technically difficult study. Poor acoustic windows.  ______________________________________________________________________________  Interpretation Summary  Technically difficult study. Poor acoustic windows.  Left ventricular size, wall motion and function are normal. The ejection  fraction is 55-60%.  Global right ventricular function is mildly reduced.  There is mild right ventricular hypertrophy.  Right ventricular systolic pressure is 55mmHg above the right atrial pressure.  Pulmonary hypertension is present.  IVC diameter <2.1 cm collapsing >50% with sniff suggests a normal RA pressure  of 3 mmHg.  No pericardial effusion is present.     This study was compared with the study from 7/15/2021. RV function is mildly  improved and estimated PA pressure is lower.  ______________________________________________________________________________  Left Ventricle  Left ventricular size, wall motion and function are normal. The ejection  fraction is 55-60%. Left ventricular diastolic function is indeterminate.  Abnormal non-specific septal motion is present.     Right Ventricle  The right ventricle is normal size. There is mild right ventricular  hypertrophy. Global right ventricular function is mildly reduced.     Mitral Valve  Mild mitral annular calcification is present. Mild mitral insufficiency is  present.     Aortic Valve  The valve leaflets are not well visualized. On Doppler interrogation, there is  no significant stenosis or regurgitation.     Tricuspid Valve  The tricuspid valve is normal. Mild tricuspid insufficiency is present.  Pulmonary hypertension is present. Right ventricular systolic pressure is  55mmHg above the right atrial pressure.     Pulmonic Valve  The pulmonic valve is normal. Trace pulmonic insufficiency is present.      Vessels  The aorta root is normal. The thoracic aorta is normal. IVC diameter <2.1 cm  collapsing >50% with sniff suggests a normal RA pressure of 3 mmHg.     Pericardium  No pericardial effusion is present.     Compared to Previous Study  This study was compared with the study from 7/15/2021 . RV function is mildly  improved and estimated PA pressure is lower.     ______________________________________________________________________________  MMode/2D Measurements & Calculations  IVSd: 0.67 cm  LVIDd: 4.1 cm  LVIDs: 2.6 cm  LVPWd: 0.65 cm  FS: 35.6 %     LV mass(C)d: 74.3 grams  LV mass(C)dI: 46.7 grams/m2  Ao root diam: 2.5 cm  asc Aorta Diam: 3.2 cm  LVOT diam: 1.9 cm  LVOT area: 2.8 cm2  RWT: 0.32     Doppler Measurements & Calculations  MV E max nguyễn: 145.0 cm/sec  MV A max nguyễn: 32.0 cm/sec  MV E/A: 4.5  MV dec slope: 800.0 cm/sec2  MV dec time: 0.18 sec  Ao V2 max: 171.0 cm/sec  Ao max P.7 mmHg  Ao V2 mean: 117.0 cm/sec  Ao mean P.0 mmHg  Ao V2 VTI: 40.7 cm  RADHA(I,D): 1.6 cm2  RADHA(V,D): 1.6 cm2  LV V1 max PG: 3.6 mmHg  LV V1 max: 95.0 cm/sec  LV V1 VTI: 22.5 cm  SV(LVOT): 63.8 ml  SI(LVOT): 40.1 ml/m2  TR max nguyễn: 373.0 cm/sec  TR max P.7 mmHg  RVSP(TR): 58.7 mmHg  AV Nguyễn Ratio (DI): 0.56  RADHA Index (cm2/m2): 0.99  E/E' av.8     Lateral E/e': 18.5  Medial E/e': 21.2    This only adds to the mystery as her right sided function is good and her left sided function is also good and does not demonstrate severe diastolic abnormalities.  The estimated echo PA pressures are much lower than the measured PA pressures.    Thus, from my perspective we do not have a good explanation for many of her abnormalities.  Certainly her hemodynamics are not consistent with longevity.  In addition her PA pressures would preclude renal or renal or hepatic transplantation.      She has had compressive atelectasis of the right lower lobe secondary to a pleural effusion and has been persistently hypoxic in the past  though interestingly this week has not had hypoxia or required oxygen.     She does have pericardial calcification but the hemodynamics were not classic for constrictive pericarditis as seen in the very early days of dialysis.    I am not sure where you want to go with all this. Optimization of her evaluation and status might be accomplished with re-admission and the followin. Hepatology consult and probable transjugular liver biopsy  2. Repeat right and left heart catheterization with simultaneous RV and LV pressure determination to clarify constriction question  3. Her dialysis runs are complicated by cramping and hypotension requiring an alpha agonist.  Rather than intermittent dialysis would consider CVVH possibly using parenteral, infused vasopressors to keep her pressures up.  4. At the time of catheterization would give her either inhaled NO or limited dosage of sodium nitroprusside to assess the effects of afterload reduction on central hemodynamics.  Her blood pressure runs in the range of 135 to 175 systolic.  5. VQ lung scan to exclude chronic thromboembolic PH                Family history: ASHD, diabetes       Objective    Alert, oriented, looks older than age, left basilar dullness, CVP 10-12, positive HJR,no  RV lift, murmur of TR, ascites though minimal peripheral edema    Constitutional: weight loss, fever, chills, night sweats  HEENT: without visual changes, swallow difficulties  Pulmonary: with shortness of breath, but without cough, wheeze, hemoptysis, however reported history of COLD  Cardiac: without chest pain, MANE, PND, orthopnea, edema, palpitation, pre-syncope, syncope,  GI: without diarrhea, constipation, jaundice, melena, GERD, hematemesis  : without frequency, urgency, dysuria, hematuria but dialysis dependent.  Skin: rash, bruise, open lesions  Neuro: without TIA, focal neurologic complaints, seizure, trauma  Ortho: without pain, swelling, mobility impairment  Endocrine:+  diabetes,but no thyroid, heat/cold intolerance, polyuria, polyphagia, change bowel habits.  Sleep:+periodic breathing, fatigue      Allergies reviewed and confirmed    Wt Readings from Last 24 Encounters:   01/05/23 62.9 kg (138 lb 9.6 oz)   11/25/22 59.3 kg (130 lb 11.7 oz)   11/03/22 61.4 kg (135 lb 4.8 oz)   06/20/22 65.8 kg (145 lb)   04/05/22 70.3 kg (155 lb)   07/25/21 77.5 kg (170 lb 14.4 oz)   07/16/21 79.4 kg (175 lb 1.6 oz)   05/27/21 71.7 kg (158 lb)   03/04/21 72.8 kg (160 lb 9.6 oz)   10/14/20 73.3 kg (161 lb 9.6 oz)   08/05/20 74.8 kg (165 lb)   05/07/20 77.1 kg (170 lb)   04/22/20 77.1 kg (170 lb)   01/20/20 79.2 kg (174 lb 9.6 oz)   01/13/20 80 kg (176 lb 4.8 oz)   12/31/19 79.4 kg (175 lb)   12/31/19 79.7 kg (175 lb 11.2 oz)   12/09/19 77.1 kg (170 lb)   12/05/19 80.6 kg (177 lb 11.1 oz)   11/01/19 79.6 kg (175 lb 8 oz)   09/25/19 79.2 kg (174 lb 11.2 oz)   07/02/19 70.3 kg (155 lb)   03/19/19 83.7 kg (184 lb 8.4 oz)   12/26/18 79.5 kg (175 lb 3.2 oz)           Current Outpatient Medications   Medication     ACCU-CHEK CHING PLUS test strip     ACCU-CHEK GUIDE test strip     acetaminophen (TYLENOL) 500 MG tablet     amiodarone (PACERONE) 200 MG tablet     aspirin (ASA) 81 MG EC tablet     blood glucose (NO BRAND SPECIFIED) lancets standard     blood glucose (NO BRAND SPECIFIED) lancets standard     blood glucose monitoring (NO BRAND SPECIFIED) meter device kit     Continuous Blood Gluc Sensor (FREESTYLE [place]OS[place] 2 SENSOR) [facility]     Emollient (CERAVE) CREA     famotidine (PEPCID) 10 MG tablet     fluticasone (FLONASE) 50 MCG/ACT nasal spray     Glycopyrrolate-Formoterol (BEVESPI AEROSPHERE) 9-4.8 MCG/ACT oral inhaler     hypromellose-dextran (ARTIFICAL TEARS) 0.1-0.3 % ophthalmic solution     insulin detemir (LEVEMIR FLEXTOUCH) 100 UNIT/ML pen     insulin pen needle (31G X 8 MM) 31G X 8 MM miscellaneous     levalbuterol (XOPENEX HFA) 45 MCG/ACT inhaler     midodrine (PROAMATINE) 10 MG tablet     NOVOLOG  FLEXPEN 100 UNIT/ML soln     No current facility-administered medications for this visit.           EXAMINATION: Limited Abdominal Ultrasound, 11/25/2022 9:38 AM      COMPARISON: None.     HISTORY: Cirrhotic appearing liver noted on 7/2021 CT chest     FINDINGS:      Liver: The liver demonstrates heterogenous and coarsened hepatic  echotexture and nodular hepatic surface contour. Hepatomegaly  measuring 17.8 cm in craniocaudal dimension. There is no focal mass.  The main vein is patent with flow towards the liver.     Gallbladder: Shadowing gallstones in the neck of the gallbladder. No  gallbladder wall thickening, pericholecystic fluid, or positive  sonographic Gomez's sign. There is an approximately 1.1 x 1.1 cm  masslike thickening of the gallbladder fundus which could represent a  collection of biliary sludge versus adherent stone versus gallbladder  polyp.     Bile Ducts: Both the intra- and extrahepatic biliary system are of  normal caliber.  The common bile duct measures 1.9 mm in diameter.     Pancreas: Visualized portions of the head and body of the pancreas are  unremarkable.      Kidney: The right kidney is atrophic measuring 7.1 cm long with  increased cortical echogenicity. There is no hydronephrosis or  hydroureter, no shadowing renal calculi, cystic lesion or mass.      Fluid: Moderate ascites in the right upper quadrant.                                                                      IMPRESSION:   1.  Nodular hepatic surface contour and heterogenous coarsened hepatic  echotexture consistent with hepatic cirrhosis. No focal hepatic mass.  2.  1.1 x 1.1 cm masslike thickening of the gallbladder fundus.  Biliary tumefactive sludge and/or secondary to chronic liver disease  versus adherent stone versus gallbladder polyp. Recommend follow-up  ultrasound in 6 months.  3.  Cholelithiasis   4.  Moderate ascites.      Heart Catheterization:11/23/2022    RA 12  /15  /40, mean 69, sat  56.7%  PCWP mean 30  CO/CI: 4.9/3.09 by TD, 4.46/2.81 by Audrey  PVR 8.75 waterman  Ao sat 88%, HR 73   Right sided filling pressures are mildly elevated. Left sided filling pressures are moderately elevated. Severely elevated pulmonary artery hypertension. Normal cardiac output level. Hemodynamic data has been modified in Epic per physician review.        Catheterization 2021    ) Severe pulmonary hypertension primarily secondary to intrinsic pulmonary vascular disease (mean PA 81 mmHg, PVRI 25, PCWP confirmed with sat 35 mmHg)  2) Severely elevated right-sided filling pressures (mean RA 35 mmHg)  3) Severely elevated left-sided filling pressures (LVEDP 35-40 mmHg)  4) No hemodynamically significant coronary artery disease  5) With NO to 80 ppm the mean PA decreased to 60 mmHg, PCWP remained unchanged at 35 mmHg, PVRI decreased to 8.7 waterman*m2.  Her systemic blood pressure was 160-170 mmHg  6) Episode of SVT terminated with 6mg adenosine          Kameron    Echocardiogram        Name: ELIECER CHACON  MRN: [id number]  : 1960  Study Date: 2022 03:25 PM  Age: 62 yrs  Gender: Female  Patient Location: [facility]  Reason For Study: Pulmonary Hypertension, Heart Failure  Ordering Physician: ROGER DAWSON  Referring Physician: YAHAIRA HOUSER  Performed By: Cindy Hunter     BSA: 1.6 m2  Height: 60 in  Weight: 137 lb  BP: 166/67 mmHg  ______________________________________________________________________________  Procedure  Echocardiogram with two-dimensional, color and spectral Doppler performed.  Technically difficult study. Poor acoustic windows.  ______________________________________________________________________________  Interpretation Summary  Technically difficult study. Poor acoustic windows.  Left ventricular size, wall motion and function are normal. The ejection  fraction is 55-60%.  Global right ventricular function is mildly reduced.  There is mild right ventricular  hypertrophy.  Right ventricular systolic pressure is 55mmHg above the right atrial pressure.  Pulmonary hypertension is present.  IVC diameter <2.1 cm collapsing >50% with sniff suggests a normal RA pressure  of 3 mmHg.  No pericardial effusion is present.     This study was compared with the study from 7/15/2021. RV function is mildly  improved and estimated PA pressure is lower.  ______________________________________________________________________________  Left Ventricle  Left ventricular size, wall motion and function are normal. The ejection  fraction is 55-60%. Left ventricular diastolic function is indeterminate.  Abnormal non-specific septal motion is present.     Right Ventricle  The right ventricle is normal size. There is mild right ventricular  hypertrophy. Global right ventricular function is mildly reduced.     Mitral Valve  Mild mitral annular calcification is present. Mild mitral insufficiency is  present.     Aortic Valve  The valve leaflets are not well visualized. On Doppler interrogation, there is  no significant stenosis or regurgitation.     Tricuspid Valve  The tricuspid valve is normal. Mild tricuspid insufficiency is present.  Pulmonary hypertension is present. Right ventricular systolic pressure is  55mmHg above the right atrial pressure.     Pulmonic Valve  The pulmonic valve is normal. Trace pulmonic insufficiency is present.     Vessels  The aorta root is normal. The thoracic aorta is normal. IVC diameter <2.1 cm  collapsing >50% with sniff suggests a normal RA pressure of 3 mmHg.     Pericardium  No pericardial effusion is present.     Compared to Previous Study  This study was compared with the study from 7/15/2021 . RV function is mildly  improved and estimated PA pressure is lower.     ______________________________________________________________________________  MMode/2D Measurements & Calculations  IVSd: 0.67 cm  LVIDd: 4.1 cm  LVIDs: 2.6 cm  LVPWd: 0.65 cm  FS: 35.6 %     LV  mass(C)d: 74.3 grams  LV mass(C)dI: 46.7 grams/m2  Ao root diam: 2.5 cm  asc Aorta Diam: 3.2 cm  LVOT diam: 1.9 cm  LVOT area: 2.8 cm2  RWT: 0.32     Doppler Measurements & Calculations  MV E max nguyễn: 145.0 cm/sec  MV A max nguyễn: 32.0 cm/sec  MV E/A: 4.5  MV dec slope: 800.0 cm/sec2  MV dec time: 0.18 sec  Ao V2 max: 171.0 cm/sec  Ao max P.7 mmHg  Ao V2 mean: 117.0 cm/sec  Ao mean P.0 mmHg  Ao V2 VTI: 40.7 cm  RADHA(I,D): 1.6 cm2  RADHA(V,D): 1.6 cm2  LV V1 max PG: 3.6 mmHg  LV V1 max: 95.0 cm/sec  LV V1 VTI: 22.5 cm  SV(LVOT): 63.8 ml  SI(LVOT): 40.1 ml/m2  TR max nguyễn: 373.0 cm/sec  TR max P.7 mmHg  RVSP(TR): 58.7 mmHg  AV Nguyễn Ratio (DI): 0.56  RADHA Index (cm2/m2): 0.99  E/E' av.8     Lateral E/e': 18.5  Medial E/e': 21.2            Name: ELIECER CHACON  MRN: [id number]  : 1960  Study Date: 07/15/2021 08:59 AM  Age: 61 yrs  Gender: Female  Patient Location: [facility]  Reason For Study: Tachycardia  Ordering Physician: KERWIN BEATTY  Referring Physician: Gigi Martin  Performed By: Raj Grey     BSA: 1.7 m2  Height: 60 in  Weight: 163 lb  HR: 59  BP: 159/73 mmHg  ______________________________________________________________________________  Procedure  Complete Portable Echo Adult.  ______________________________________________________________________________  Interpretation Summary     Left ventricular systolic function is normal.  The visual ejection fraction is 60-65%.  The right ventricle is severely dilated.  Moderately decreased right ventricular systolic function  There is moderate to mod-severe (2-3+) tricuspid regurgitation.  The right ventricular systolic pressure is approximated at 82mmHg plus the  right atrial pressure.  Right ventricular systolic pressure is elevated, consistent with severe  pulmonary hypertension.  IVC diameter >2.1 cm collapsing <50% with sniff suggests a high RA pressure  estimated at 15 mmHg or greater.  Flattened septum is consistent with RV  pressure/volume overload.  Compared to 9/19, there is new RV enlargemnt with RV dysfunction. RVSP has  increased compared to 2017 when it was 60 plus RAP.  Findings discussed with Dawna, nursing team for the patient. The study was  technically difficult.  ______________________________________________________________________________  Left Ventricle  The left ventricle is normal in size. There is concentric remodeling present.  Left ventricular systolic function is normal. The visual ejection fraction is  60-65%. Diastolic Doppler findings (E/E' ratio and/or other parameters)  suggest left ventricular filling pressures are increased. Grade III or  advanced diastolic dysfunction. Flattened septum is consistent with RV  pressure/volume overload.     Right Ventricle  The right ventricle is severely dilated. Moderately decreased right  ventricular systolic function.     Atria  The left atrium is severely dilated. The right atrium is severely dilated.     Mitral Valve  The mitral valve leaflets are mildly thickened. There is mild mitral annular  calcification. There is mild to moderate (1-2+) mitral regurgitation.     Tricuspid Valve  There is moderate to mod-severe (2-3+) tricuspid regurgitation. IVC diameter  >2.1 cm collapsing <50% with sniff suggests a high RA pressure estimated at 15  mmHg or greater. Right ventricular systolic pressure is elevated, consistent  with severe pulmonary hypertension. The right ventricular systolic pressure is  approximated at 82mmHg plus the right atrial pressure.     Aortic Valve  There is mild trileaflet aortic sclerosis. There is trace aortic  regurgitation. No aortic stenosis is present.     Pulmonic Valve  The pulmonic valve is not well visualized.     Pericardium  Trivial pericardial effusion. Small right pleural effusion.     Rhythm  Sinus rhythm was noted.     ______________________________________________________________________________  MMode/2D Measurements &  Calculations  IVSd: 0.98 cm  LVIDd: 3.8 cm  LVIDs: 3.6 cm  LVPWd: 1.2 cm  FS: 4.2 %  LV mass(C)d: 128.1 grams  LV mass(C)dI: 74.9 grams/m2     Ao root diam: 2.7 cm  LA dimension: 3.3 cm  asc Aorta Diam: 3.0 cm  LA/Ao: 1.2  LVOT diam: 2.0 cm  LVOT area: 3.1 cm2  LA Volume (BP): 95.2 ml  LA Volume Index (BP): 55.7 ml/m2  RWT: 0.62     Doppler Measurements & Calculations  MV E max artur: 130.0 cm/sec  MV A max artur: 44.1 cm/sec  MV E/A: 2.9  MV max P.7 mmHg  MV mean P.9 mmHg  MV V2 VTI: 34.6 cm     MV dec slope: 583.8 cm/sec2  PA acc time: 0.06 sec  TR max artur: 452.5 cm/sec  TR max P.9 mmHg  E/E' av.9  Lateral E/e': 19.0  Medial E/e': 16.8     Imaging     EXAM: CT CHEST PULMONARY EMBOLISM W CONTRAST  LOCATION: [facility]  DATE/TIME: 6/15/2022 4:56 PM     INDICATION: PE suspected, high prob. Increased shortness of breath.  COMPARISON: 07/15/2021.  TECHNIQUE: CT chest pulmonary angiogram during arterial phase injection of IV contrast. Multiplanar reformats and MIP reconstructions were performed. Dose reduction techniques were used.   CONTRAST: 61 mL Isovue 370.     FINDINGS:  ANGIOGRAM CHEST: Pulmonary arteries are normal caliber and negative for pulmonary emboli. Normal caliber thoracic aorta without dissection. Moderate aortic calcification.     LUNGS AND PLEURA: Moderate right-sided pleural fluid collection. Mosaic attenuation of the pulmonary parenchyma diffusely which can be seen with air trapping, edema or viral etiologies. Significant volume loss/atelectasis right lower lobe with moderate   volume loss and atelectasis right middle lobe inferiorly. No left-sided pleural fluid. Mild intralobular septal thickening.     MEDIASTINUM/AXILLAE: Cardiac enlargement without pericardial fluid. Diffuse pericardial calcifications. No lymphadenopathy. Normal caliber esophagus.     CORONARY ARTERY CALCIFICATION: Moderate.     UPPER ABDOMEN: Partially visualized moderate  ascites.     MUSCULOSKELETAL: Minimal degenerative changes in the spine.                                                                      IMPRESSION:  1.  No evidence for pulmonary embolism.     2.  Moderate right-sided pleural fluid collection with atelectasis right lower lobe and right middle lobe as detailed above. Underlying infiltrates in these areas of atelectasis cannot be excluded.     3.  Mosaic attenuation of the pulmonary parenchyma with interlobular septal thickening most likely related to edema. Other considerations would include air trapping related to reactive airways disease or viral etiologies.     4.  Cardiac enlargement with diffuse pericardial calcifications.           CC: Lev Hernandez ([facility]), Gigi Martin M.D., [place] Barbosa M.D.

## 2023-01-02 ENCOUNTER — DOCUMENTATION ONLY (OUTPATIENT)
Dept: INTERNAL MEDICINE | Facility: CLINIC | Age: 63
End: 2023-01-02

## 2023-01-02 DIAGNOSIS — I27.20 PULMONARY HYPERTENSION, UNSPECIFIED (H): Primary | ICD-10-CM

## 2023-01-05 ENCOUNTER — OFFICE VISIT (OUTPATIENT)
Dept: CARDIOLOGY | Facility: CLINIC | Age: 63
End: 2023-01-05
Payer: MEDICARE

## 2023-01-05 VITALS
BODY MASS INDEX: 27.21 KG/M2 | DIASTOLIC BLOOD PRESSURE: 69 MMHG | OXYGEN SATURATION: 93 % | WEIGHT: 138.6 LBS | HEART RATE: 63 BPM | SYSTOLIC BLOOD PRESSURE: 175 MMHG | HEIGHT: 60 IN

## 2023-01-05 DIAGNOSIS — N18.6 END STAGE RENAL DISEASE (H): ICD-10-CM

## 2023-01-05 DIAGNOSIS — R06.02 SHORTNESS OF BREATH: Primary | ICD-10-CM

## 2023-01-05 DIAGNOSIS — I27.20 PULMONARY HYPERTENSION (H): ICD-10-CM

## 2023-01-05 PROCEDURE — 99215 OFFICE O/P EST HI 40 MIN: CPT | Performed by: INTERNAL MEDICINE

## 2023-01-05 RX ORDER — AMOXICILLIN 500 MG/1
CAPSULE ORAL
COMMUNITY
Start: 2022-12-31 | End: 2023-09-07

## 2023-01-05 NOTE — NURSING NOTE
Patient left before I could review AVS; phone visit scheduled for next week. Lorenza Chappell RN on 1/5/2023 at 4:33 PM

## 2023-01-05 NOTE — PATIENT INSTRUCTIONS
Medication Changes:  - None    Patient Instructions:  1. Continue staying active and eat a heart healthy diet.    2. Please keep current list of medications with you at all times.    3. Remember to weigh yourself daily after voiding and before you consume any food or beverages and log the numbers.  If you have gained 2 pounds overnight or 5 pounds in a week contact us immediately for medication adjustments or further instructions.    4. **Please call us immediately if you have any syncope (fainting or passing out), chest pain, edema (swelling or weight gain), or decline in your functional status (general decline in how you are feeling).    5. Patients on Remodulin (treprostinil) or Veletri (epoprostenol): Please make sure that you have your backup pump and supplies with you at all times, your mixing instructions, and contact information for your specialty pharmacy.    Follow up Appointment Information:  - 1 week virtual visit follow up Wednesday, January 11      For scheduling at [facility] please call [phone]  For scheduling at the [facility] please call [phone]  We are located on the second floor [address] at the [facility].  Our address is     [address] [place] [address] [address]   [address]  [place] [address]    Thank you for allowing us to be a part of your care here at the [facility]    If you have questions or concerns please contact us at:    Tee Chappell, RN, BSN      Nurse Coordinator       Pulmonary Hypertension     [facility]   (Phone)[phone]  (Fax)[phone]    ** Please note that you will NOT receive a reminder call regarding your scheduled testing, reminder calls are for provider appointments only.  If you are scheduled for testing within the [facility] system you may receive a call regarding pre-registration for billing purposes only.**     Remember to weigh yourself daily after voiding and before you consume any food or beverages and log  the numbers.  If you have gained/lost 2 pounds overnight or 5 pounds in a week contact us immediately for medication adjustments or further instructions.    Support Group:  Pulmonary Hypertension Association  Https://www.phassociation.org/  **Look at the Events Tab** They even have Support Groups that you can call into    [facility] PH Support Group  Second Saturday of the Month from 1-3 PM   Location: [address] [place] [id number]  Leader: Elsa Dooley  Phone: [phone]  Email: [email or web address]     Great Videos about Pulmonary Hypertension!!  Scan ME!    Website: [email or web address]

## 2023-01-05 NOTE — LETTER
2023    Gigi Martin MD  [address] [place] [id number]    RE: Yissel Chacon       Dear Colleague,     I had the pleasure of seeing Yissel Chacon in the [facility].      Jose Redd M.D.  Cardiovascular Medicine    Lev Hernandez M.D.  Gigi Martin M.D.    60 minutes     Problem List  1. ESRD with dialysis dependence  2. Chronic oxygen dependent respiratory failure  3. History of SVT  4. Diabetes  5. Diabetic nephropathy  6. COLD  7. History of ascites requiring paracentesis  8. Pleural effusion  9. ASHD with stenting  10. Pericardial calcification  11. Remote history of obesity  12. Remote history of smoking  13. Cirrhotic hepatic morphology.      Dear Lev and Gigi:     I saw your patient, Yissel Chacon, a 62 year old female with longstanding ESRD for a follow-up visit today.  As you will recall, she was recently hospitalized at [facility] to try and establish a more stable dry weight and central cardiovascular hemodynamics.  This did not go as smoothly or successfully as I would have hoped.  Her right heart catheterization performed on 2022 (weight 137 pounds)  demonstrated:    RA 12  /15  /40, mean 69, sat 56.7%  PCWP mean 30  CO/CI: 4.9/3.09 by TD, 4.46/2.81 by Audrey  PVR 8.75 waterman  Ao sat 88%, HR 73    Interestingly, her RA pressure was only modestly elevated, given that her presumed cirrhosis has been attributed to right heart failure.  She has evidence for bothsevere pre and post capillary pulmonary hypertension as evidenced by the markedly elevated wedge pressure of 30 and the severely elevated pulmonary artery pressure and pulmonary vascular resistance.      An echocardiogram performed the same day demonstrated:     Name: YISSEL CHACON  MRN: [id number]  : 1960  Study Date: 2022 03:25 PM  Age: 62 yrs  Gender: Female  Patient Location: [facility]  Reason For Study: Pulmonary Hypertension, Heart Failure  Ordering Physician: ROGER DAWSON  MARCIE  Referring Physician: YAHAIRA HOUSER  Performed By: Cindy Hunter     BSA: 1.6 m2  Height: 60 in  Weight: 137 lb  BP: 166/67 mmHg  ______________________________________________________________________________  Procedure  Echocardiogram with two-dimensional, color and spectral Doppler performed.  Technically difficult study. Poor acoustic windows.  ______________________________________________________________________________  Interpretation Summary  Technically difficult study. Poor acoustic windows.  Left ventricular size, wall motion and function are normal. The ejection  fraction is 55-60%.  Global right ventricular function is mildly reduced.  There is mild right ventricular hypertrophy.  Right ventricular systolic pressure is 55mmHg above the right atrial pressure.  Pulmonary hypertension is present.  IVC diameter <2.1 cm collapsing >50% with sniff suggests a normal RA pressure  of 3 mmHg.  No pericardial effusion is present.     This study was compared with the study from 7/15/2021. RV function is mildly  improved and estimated PA pressure is lower.  ______________________________________________________________________________  Left Ventricle  Left ventricular size, wall motion and function are normal. The ejection  fraction is 55-60%. Left ventricular diastolic function is indeterminate.  Abnormal non-specific septal motion is present.     Right Ventricle  The right ventricle is normal size. There is mild right ventricular  hypertrophy. Global right ventricular function is mildly reduced.     Mitral Valve  Mild mitral annular calcification is present. Mild mitral insufficiency is  present.     Aortic Valve  The valve leaflets are not well visualized. On Doppler interrogation, there is  no significant stenosis or regurgitation.     Tricuspid Valve  The tricuspid valve is normal. Mild tricuspid insufficiency is present.  Pulmonary hypertension is present. Right ventricular systolic  pressure is  55mmHg above the right atrial pressure.     Pulmonic Valve  The pulmonic valve is normal. Trace pulmonic insufficiency is present.     Vessels  The aorta root is normal. The thoracic aorta is normal. IVC diameter <2.1 cm  collapsing >50% with sniff suggests a normal RA pressure of 3 mmHg.     Pericardium  No pericardial effusion is present.     Compared to Previous Study  This study was compared with the study from 7/15/2021 . RV function is mildly  improved and estimated PA pressure is lower.     ______________________________________________________________________________  MMode/2D Measurements & Calculations  IVSd: 0.67 cm  LVIDd: 4.1 cm  LVIDs: 2.6 cm  LVPWd: 0.65 cm  FS: 35.6 %     LV mass(C)d: 74.3 grams  LV mass(C)dI: 46.7 grams/m2  Ao root diam: 2.5 cm  asc Aorta Diam: 3.2 cm  LVOT diam: 1.9 cm  LVOT area: 2.8 cm2  RWT: 0.32     Doppler Measurements & Calculations  MV E max nguyễn: 145.0 cm/sec  MV A max nguyễn: 32.0 cm/sec  MV E/A: 4.5  MV dec slope: 800.0 cm/sec2  MV dec time: 0.18 sec  Ao V2 max: 171.0 cm/sec  Ao max P.7 mmHg  Ao V2 mean: 117.0 cm/sec  Ao mean P.0 mmHg  Ao V2 VTI: 40.7 cm  RADHA(I,D): 1.6 cm2  RADHA(V,D): 1.6 cm2  LV V1 max PG: 3.6 mmHg  LV V1 max: 95.0 cm/sec  LV V1 VTI: 22.5 cm  SV(LVOT): 63.8 ml  SI(LVOT): 40.1 ml/m2  TR max nguyễn: 373.0 cm/sec  TR max P.7 mmHg  RVSP(TR): 58.7 mmHg  AV Nguyễn Ratio (DI): 0.56  RADHA Index (cm2/m2): 0.99  E/E' av.8     Lateral E/e': 18.5  Medial E/e': 21.2    This only adds to the mystery as her right sided function is good and her left sided function is also good and does not demonstrate severe diastolic abnormalities.  The estimated echo PA pressures are much lower than the measured PA pressures.    Thus, from my perspective we do not have a good explanation for many of her abnormalities.  Certainly her hemodynamics are not consistent with longevity.  In addition her PA pressures would preclude renal or renal or hepatic  transplantation.      She has had compressive atelectasis of the right lower lobe secondary to a pleural effusion and has been persistently hypoxic in the past though interestingly this week has not had hypoxia or required oxygen.     She does have pericardial calcification but the hemodynamics were not classic for constrictive pericarditis as seen in the very early days of dialysis.    I am not sure where you want to go with all this. Optimization of her evaluation and status might be accomplished with re-admission and the followin. Hepatology consult and probable transjugular liver biopsy  2. Repeat right and left heart catheterization with simultaneous RV and LV pressure determination to clarify constriction question  3. Her dialysis runs are complicated by cramping and hypotension requiring an alpha agonist.  Rather than intermittent dialysis would consider CVVH possibly using parenteral, infused vasopressors to keep her pressures up.  4. At the time of catheterization would give her either inhaled NO or limited dosage of sodium nitroprusside to assess the effects of afterload reduction on central hemodynamics.  Her blood pressure runs in the range of 135 to 175 systolic.  5. VQ lung scan to exclude chronic thromboembolic PH                Family history: ASHD, diabetes       Objective    Alert, oriented, looks older than age, left basilar dullness, CVP 10-12, positive HJR,no  RV lift, murmur of TR, ascites though minimal peripheral edema    Constitutional: weight loss, fever, chills, night sweats  HEENT: without visual changes, swallow difficulties  Pulmonary: with shortness of breath, but without cough, wheeze, hemoptysis, however reported history of COLD  Cardiac: without chest pain, MANE, PND, orthopnea, edema, palpitation, pre-syncope, syncope,  GI: without diarrhea, constipation, jaundice, melena, GERD, hematemesis  : without frequency, urgency, dysuria, hematuria but dialysis dependent.  Skin:  rash, bruise, open lesions  Neuro: without TIA, focal neurologic complaints, seizure, trauma  Ortho: without pain, swelling, mobility impairment  Endocrine:+ diabetes,but no thyroid, heat/cold intolerance, polyuria, polyphagia, change bowel habits.  Sleep:+periodic breathing, fatigue      Allergies reviewed and confirmed    Wt Readings from Last 24 Encounters:   01/05/23 62.9 kg (138 lb 9.6 oz)   11/25/22 59.3 kg (130 lb 11.7 oz)   11/03/22 61.4 kg (135 lb 4.8 oz)   06/20/22 65.8 kg (145 lb)   04/05/22 70.3 kg (155 lb)   07/25/21 77.5 kg (170 lb 14.4 oz)   07/16/21 79.4 kg (175 lb 1.6 oz)   05/27/21 71.7 kg (158 lb)   03/04/21 72.8 kg (160 lb 9.6 oz)   10/14/20 73.3 kg (161 lb 9.6 oz)   08/05/20 74.8 kg (165 lb)   05/07/20 77.1 kg (170 lb)   04/22/20 77.1 kg (170 lb)   01/20/20 79.2 kg (174 lb 9.6 oz)   01/13/20 80 kg (176 lb 4.8 oz)   12/31/19 79.4 kg (175 lb)   12/31/19 79.7 kg (175 lb 11.2 oz)   12/09/19 77.1 kg (170 lb)   12/05/19 80.6 kg (177 lb 11.1 oz)   11/01/19 79.6 kg (175 lb 8 oz)   09/25/19 79.2 kg (174 lb 11.2 oz)   07/02/19 70.3 kg (155 lb)   03/19/19 83.7 kg (184 lb 8.4 oz)   12/26/18 79.5 kg (175 lb 3.2 oz)           Current Outpatient Medications   Medication     ACCU-CHEK CHING PLUS test strip     ACCU-CHEK GUIDE test strip     acetaminophen (TYLENOL) 500 MG tablet     amiodarone (PACERONE) 200 MG tablet     aspirin (ASA) 81 MG EC tablet     blood glucose (NO BRAND SPECIFIED) lancets standard     blood glucose (NO BRAND SPECIFIED) lancets standard     blood glucose monitoring (NO BRAND SPECIFIED) meter device kit     Continuous Blood Gluc Sensor (FREESTYLE LATOSHA 2 SENSOR) MISC     Emollient (CERAVE) CREA     famotidine (PEPCID) 10 MG tablet     fluticasone (FLONASE) 50 MCG/ACT nasal spray     Glycopyrrolate-Formoterol (BEVESPI AEROSPHERE) 9-4.8 MCG/ACT oral inhaler     hypromellose-dextran (ARTIFICAL TEARS) 0.1-0.3 % ophthalmic solution     insulin detemir (LEVEMIR FLEXTOUCH) 100 UNIT/ML pen      insulin pen needle (31G X 8 MM) 31G X 8 MM miscellaneous     levalbuterol (XOPENEX HFA) 45 MCG/ACT inhaler     midodrine (PROAMATINE) 10 MG tablet     NOVOLOG FLEXPEN 100 UNIT/ML soln     No current facility-administered medications for this visit.           EXAMINATION: Limited Abdominal Ultrasound, 11/25/2022 9:38 AM      COMPARISON: None.     HISTORY: Cirrhotic appearing liver noted on 7/2021 CT chest     FINDINGS:      Liver: The liver demonstrates heterogenous and coarsened hepatic  echotexture and nodular hepatic surface contour. Hepatomegaly  measuring 17.8 cm in craniocaudal dimension. There is no focal mass.  The main vein is patent with flow towards the liver.     Gallbladder: Shadowing gallstones in the neck of the gallbladder. No  gallbladder wall thickening, pericholecystic fluid, or positive  sonographic Gomez's sign. There is an approximately 1.1 x 1.1 cm  masslike thickening of the gallbladder fundus which could represent a  collection of biliary sludge versus adherent stone versus gallbladder  polyp.     Bile Ducts: Both the intra- and extrahepatic biliary system are of  normal caliber.  The common bile duct measures 1.9 mm in diameter.     Pancreas: Visualized portions of the head and body of the pancreas are  unremarkable.      Kidney: The right kidney is atrophic measuring 7.1 cm long with  increased cortical echogenicity. There is no hydronephrosis or  hydroureter, no shadowing renal calculi, cystic lesion or mass.      Fluid: Moderate ascites in the right upper quadrant.                                                                      IMPRESSION:   1.  Nodular hepatic surface contour and heterogenous coarsened hepatic  echotexture consistent with hepatic cirrhosis. No focal hepatic mass.  2.  1.1 x 1.1 cm masslike thickening of the gallbladder fundus.  Biliary tumefactive sludge and/or secondary to chronic liver disease  versus adherent stone versus gallbladder polyp. Recommend  follow-up  ultrasound in 6 months.  3.  Cholelithiasis   4.  Moderate ascites.      Heart Catheterization:2022    RA 12  /15  /40, mean 69, sat 56.7%  PCWP mean 30  CO/CI: 4.9/3.09 by TD, 4.46/2.81 by Audrey  PVR 8.75 waterman  Ao sat 88%, HR 73   Right sided filling pressures are mildly elevated. Left sided filling pressures are moderately elevated. Severely elevated pulmonary artery hypertension. Normal cardiac output level. Hemodynamic data has been modified in Epic per physician review.        Catheterization 2021    ) Severe pulmonary hypertension primarily secondary to intrinsic pulmonary vascular disease (mean PA 81 mmHg, PVRI 25, PCWP confirmed with sat 35 mmHg)  2) Severely elevated right-sided filling pressures (mean RA 35 mmHg)  3) Severely elevated left-sided filling pressures (LVEDP 35-40 mmHg)  4) No hemodynamically significant coronary artery disease  5) With NO to 80 ppm the mean PA decreased to 60 mmHg, PCWP remained unchanged at 35 mmHg, PVRI decreased to 8.7 waterman*m2.  Her systemic blood pressure was 160-170 mmHg  6) Episode of SVT terminated with 6mg adenosine          Kameron    Echocardiogram        Name: ELIECER CHACON  MRN: [id number]  : 1960  Study Date: 2022 03:25 PM  Age: 62 yrs  Gender: Female  Patient [facility]  Reason For Study: Pulmonary Hypertension, Heart Failure  Ordering Physician: ROGER DAWSON  Referring Physician: YAHAIRA HOUSER  Performed By: Cindy Hunter     BSA: 1.6 m2  Height: 60 in  Weight: 137 lb  BP: 166/67 mmHg  ______________________________________________________________________________  Procedure  Echocardiogram with two-dimensional, color and spectral Doppler performed.  Technically difficult study. Poor acoustic windows.  ______________________________________________________________________________  Interpretation Summary  Technically difficult study. Poor acoustic windows.  Left ventricular size, wall  motion and function are normal. The ejection  fraction is 55-60%.  Global right ventricular function is mildly reduced.  There is mild right ventricular hypertrophy.  Right ventricular systolic pressure is 55mmHg above the right atrial pressure.  Pulmonary hypertension is present.  IVC diameter <2.1 cm collapsing >50% with sniff suggests a normal RA pressure  of 3 mmHg.  No pericardial effusion is present.     This study was compared with the study from 7/15/2021. RV function is mildly  improved and estimated PA pressure is lower.  ______________________________________________________________________________  Left Ventricle  Left ventricular size, wall motion and function are normal. The ejection  fraction is 55-60%. Left ventricular diastolic function is indeterminate.  Abnormal non-specific septal motion is present.     Right Ventricle  The right ventricle is normal size. There is mild right ventricular  hypertrophy. Global right ventricular function is mildly reduced.     Mitral Valve  Mild mitral annular calcification is present. Mild mitral insufficiency is  present.     Aortic Valve  The valve leaflets are not well visualized. On Doppler interrogation, there is  no significant stenosis or regurgitation.     Tricuspid Valve  The tricuspid valve is normal. Mild tricuspid insufficiency is present.  Pulmonary hypertension is present. Right ventricular systolic pressure is  55mmHg above the right atrial pressure.     Pulmonic Valve  The pulmonic valve is normal. Trace pulmonic insufficiency is present.     Vessels  The aorta root is normal. The thoracic aorta is normal. IVC diameter <2.1 cm  collapsing >50% with sniff suggests a normal RA pressure of 3 mmHg.     Pericardium  No pericardial effusion is present.     Compared to Previous Study  This study was compared with the study from 7/15/2021 . RV function is mildly  improved and estimated PA pressure is lower.      ______________________________________________________________________________  MMode/2D Measurements & Calculations  IVSd: 0.67 cm  LVIDd: 4.1 cm  LVIDs: 2.6 cm  LVPWd: 0.65 cm  FS: 35.6 %     LV mass(C)d: 74.3 grams  LV mass(C)dI: 46.7 grams/m2  Ao root diam: 2.5 cm  asc Aorta Diam: 3.2 cm  LVOT diam: 1.9 cm  LVOT area: 2.8 cm2  RWT: 0.32     Doppler Measurements & Calculations  MV E max nguyễn: 145.0 cm/sec  MV A max nguyễn: 32.0 cm/sec  MV E/A: 4.5  MV dec slope: 800.0 cm/sec2  MV dec time: 0.18 sec  Ao V2 max: 171.0 cm/sec  Ao max P.7 mmHg  Ao V2 mean: 117.0 cm/sec  Ao mean P.0 mmHg  Ao V2 VTI: 40.7 cm  RADHA(I,D): 1.6 cm2  RADHA(V,D): 1.6 cm2  LV V1 max PG: 3.6 mmHg  LV V1 max: 95.0 cm/sec  LV V1 VTI: 22.5 cm  SV(LVOT): 63.8 ml  SI(LVOT): 40.1 ml/m2  TR max nguyễn: 373.0 cm/sec  TR max P.7 mmHg  RVSP(TR): 58.7 mmHg  AV Nguyễn Ratio (DI): 0.56  RADHA Index (cm2/m2): 0.99  E/E' av.8     Lateral E/e': 18.5  Medial E/e': 21.2            Name: ELIECER CHACON  MRN: [id number]  : 1960  Study Date: 07/15/2021 08:59 AM  Age: 61 yrs  Gender: Female  Patient Location: [facility]  Reason For Study: Tachycardia  Ordering Physician: KERWIN BEATTY  Referring Physician: Gigi Martin  Performed By: Raj Grey     BSA: 1.7 m2  Height: 60 in  Weight: 163 lb  HR: 59  BP: 159/73 mmHg  ______________________________________________________________________________  Procedure  Complete Portable Echo Adult.  ______________________________________________________________________________  Interpretation Summary     Left ventricular systolic function is normal.  The visual ejection fraction is 60-65%.  The right ventricle is severely dilated.  Moderately decreased right ventricular systolic function  There is moderate to mod-severe (2-3+) tricuspid regurgitation.  The right ventricular systolic pressure is approximated at 82mmHg plus the  right atrial pressure.  Right ventricular systolic pressure is elevated,  consistent with severe  pulmonary hypertension.  IVC diameter >2.1 cm collapsing <50% with sniff suggests a high RA pressure  estimated at 15 mmHg or greater.  Flattened septum is consistent with RV pressure/volume overload.  Compared to 9/19, there is new RV enlargemnt with RV dysfunction. RVSP has  increased compared to 2017 when it was 60 plus RAP.  Findings discussed with Dawna, nursing team for the patient. The study was  technically difficult.  ______________________________________________________________________________  Left Ventricle  The left ventricle is normal in size. There is concentric remodeling present.  Left ventricular systolic function is normal. The visual ejection fraction is  60-65%. Diastolic Doppler findings (E/E' ratio and/or other parameters)  suggest left ventricular filling pressures are increased. Grade III or  advanced diastolic dysfunction. Flattened septum is consistent with RV  pressure/volume overload.     Right Ventricle  The right ventricle is severely dilated. Moderately decreased right  ventricular systolic function.     Atria  The left atrium is severely dilated. The right atrium is severely dilated.     Mitral Valve  The mitral valve leaflets are mildly thickened. There is mild mitral annular  calcification. There is mild to moderate (1-2+) mitral regurgitation.     Tricuspid Valve  There is moderate to mod-severe (2-3+) tricuspid regurgitation. IVC diameter  >2.1 cm collapsing <50% with sniff suggests a high RA pressure estimated at 15  mmHg or greater. Right ventricular systolic pressure is elevated, consistent  with severe pulmonary hypertension. The right ventricular systolic pressure is  approximated at 82mmHg plus the right atrial pressure.     Aortic Valve  There is mild trileaflet aortic sclerosis. There is trace aortic  regurgitation. No aortic stenosis is present.     Pulmonic Valve  The pulmonic valve is not well visualized.     Pericardium  Trivial pericardial  effusion. Small right pleural effusion.     Rhythm  Sinus rhythm was noted.     ______________________________________________________________________________  MMode/2D Measurements & Calculations  IVSd: 0.98 cm  LVIDd: 3.8 cm  LVIDs: 3.6 cm  LVPWd: 1.2 cm  FS: 4.2 %  LV mass(C)d: 128.1 grams  LV mass(C)dI: 74.9 grams/m2     Ao root diam: 2.7 cm  LA dimension: 3.3 cm  asc Aorta Diam: 3.0 cm  LA/Ao: 1.2  LVOT diam: 2.0 cm  LVOT area: 3.1 cm2  LA Volume (BP): 95.2 ml  LA Volume Index (BP): 55.7 ml/m2  RWT: 0.62     Doppler Measurements & Calculations  MV E max artur: 130.0 cm/sec  MV A max artur: 44.1 cm/sec  MV E/A: 2.9  MV max P.7 mmHg  MV mean P.9 mmHg  MV V2 VTI: 34.6 cm     MV dec slope: 583.8 cm/sec2  PA acc time: 0.06 sec  TR max artur: 452.5 cm/sec  TR max P.9 mmHg  E/E' av.9  Lateral E/e': 19.0  Medial E/e': 16.8     Imaging     EXAM: CT CHEST PULMONARY EMBOLISM W CONTRAST  LOCATION: [facility]  DATE/TIME: 6/15/2022 4:56 PM     INDICATION: PE suspected, high prob. Increased shortness of breath.  COMPARISON: 07/15/2021.  TECHNIQUE: CT chest pulmonary angiogram during arterial phase injection of IV contrast. Multiplanar reformats and MIP reconstructions were performed. Dose reduction techniques were used.   CONTRAST: 61 mL Isovue 370.     FINDINGS:  ANGIOGRAM CHEST: Pulmonary arteries are normal caliber and negative for pulmonary emboli. Normal caliber thoracic aorta without dissection. Moderate aortic calcification.     LUNGS AND PLEURA: Moderate right-sided pleural fluid collection. Mosaic attenuation of the pulmonary parenchyma diffusely which can be seen with air trapping, edema or viral etiologies. Significant volume loss/atelectasis right lower lobe with moderate   volume loss and atelectasis right middle lobe inferiorly. No left-sided pleural fluid. Mild intralobular septal thickening.     MEDIASTINUM/AXILLAE: Cardiac enlargement without pericardial fluid. Diffuse  pericardial calcifications. No lymphadenopathy. Normal caliber esophagus.     CORONARY ARTERY CALCIFICATION: Moderate.     UPPER ABDOMEN: Partially visualized moderate ascites.     MUSCULOSKELETAL: Minimal degenerative changes in the spine.                                                                      IMPRESSION:  1.  No evidence for pulmonary embolism.     2.  Moderate right-sided pleural fluid collection with atelectasis right lower lobe and right middle lobe as detailed above. Underlying infiltrates in these areas of atelectasis cannot be excluded.     3.  Mosaic attenuation of the pulmonary parenchyma with interlobular septal thickening most likely related to edema. Other considerations would include air trapping related to reactive airways disease or viral etiologies.     4.  Cardiac enlargement with diffuse pericardial calcifications.           CC: Lev Hernandez ([employer]), Gigi Martin M.D., France Barbosa M.D.          Thank you for allowing me to participate in the care of your patient.      Sincerely,     Jose Redd MD     [facility]  cc:   No referring provider defined for this encounter.

## 2023-01-11 ENCOUNTER — VIRTUAL VISIT (OUTPATIENT)
Dept: CARDIOLOGY | Facility: CLINIC | Age: 63
End: 2023-01-11
Attending: INTERNAL MEDICINE
Payer: MEDICARE

## 2023-01-11 DIAGNOSIS — I27.20 PULMONARY HYPERTENSION (H): Primary | ICD-10-CM

## 2023-01-11 PROCEDURE — 99443 PR PHYSICIAN TELEPHONE EVALUATION 21-30 MIN: CPT | Mod: 95 | Performed by: INTERNAL MEDICINE

## 2023-01-11 NOTE — PROGRESS NOTES
Jose Redd M.D.  Cardiovascular Medicine    Lev Hernandez M.D.  Gigi Martin M.D.    See dictated note this date  30 minutes telephone visit    P  CC: Lev Hernandez ([employer] [facility]), Gigi Martin M.D., France Barbosa M.D.

## 2023-01-11 NOTE — LETTER
1/11/2023      RE: Yissel Wetzel  [address]  [place] [facility] [address]       Dear Colleague,    Thank you for the opportunity to participate in the care of your patient, Yissel Wetzel, at the [facility] [place] at [facility]. Please see a copy of my visit note below.        Jose Redd M.D.  Cardiovascular Medicine    Lev Hernandez M.D.  Gigi Martin M.D.    See dictated note this date  30 minutes telephone visit    P  CC: Lev Hernandez ([employer]), Gigi Martin M.D., France Barbosa M.D.                Please do not hesitate to contact me if you have any questions/concerns.     Sincerely,     Jose Redd MD

## 2023-01-11 NOTE — LETTER
2023      RE: Yissel Wetzel  [address]  [facility] [address]           Jose Redd M.D.  Cardiovascular Medicine    Lev Hernandez M.D.  Gigi Martin M.D.    P  CC: Lev Hernandez ([facility]), Gigi Martin M.D., France Barbosa M.D.      Service Date: 2023    Rahul Hernandez MD  [facility]  [address] [place] [address], [address]  [place]  [address]    CARYN Martin MD  [facility]  [address] [address]    RE:  Yissel Wetzel  MRN:  [id number]  :  1960    IMPRESSION:    1.  ESRD with dialysis dependence.  2.  Chronic oxygen-dependent respiratory failure.  3.  History of supraventricular tachycardia.  4.  Diabetes.  5.  Diabetic nephropathy.  6.  Chronic obstructive lung disease.  7.  History of recurrent ascites requiring paracenteses.  8.  Pleural effusion.  9.  ASHD with stenting.  10.  Pericardial calcification.  11.  Remote history of obesity.  12.  Remote history of smoking.  13.  Cirrhotic hepatic morphology.    Dear Lev and Gigi:    I briefly talked with your patient, Yissel Wetzel, today.  She is doing well, and in fact, they were able to pull almost 2-1/2 pounds today, which is substantially more than they have been able to pull in the past.  As I mentioned to you previously, she has severe pulmonary arterial hypertension with a right atrial pressure of 10-12, pulmonary artery pressure of 117/40 with a mean of 69, but an elevated pulmonary capillary wedge pressure of 30.  Her cardiac output is 4.9 with an index of 3.09 and a pulmonary vascular resistance of 8.75.    While Mrs. Wetzel has any number of medical obstacles, I am impressed by how well she has done for so long a period of time.  Therefore, I wanted know from you two how we might proceed.  As outlined in my previous letter, I thought about a Hepatology consult and probable transjugular liver biopsy, repeat right and left heart catheterization with  simultaneous right and left pressure determinations to clarify the question of constriction.  Ideally, we would like to bring her pulmonary capillary wedge pressure down substantially, if possible.  Rather than intermittent dialysis as an outpatient, CVVH as an inpatient would allow for longer runs and longer more gentle fluid withdrawals.  In addition, parenteral vasopressors could be used to facilitate her dialysis.  At the time of catheterization, we would probably give her either inhaled nitric oxide or a limited dose of nitroprusside to assess the effects of afterload reduction on her central hemodynamics.  In looking through her chart, her blood pressure is consistently (before dialysis) in the range of 135-175, which would constitute a substantial left ventricular afterload.  She also needs a V/Q lung scan to exclude chronic thromboembolic PH.    If both of you would give me a call and advise as to how you want to proceed, I would appreciate it.  My cell phone is [phone].    Sincerely yours,        Jose Redd MD  Professor of Cardiovascular Medicine and Surgery  Division of Cardiology Department of Medicine  [facility]    Jose Redd MD        D: 2023   T: 2023   MT: suman    Name:     ELIECER CHACON  MRN:      [id number]        Account:      [id number]   :      1960           Service Date: 2023       Document: [id number]

## 2023-01-11 NOTE — NURSING NOTE
Chief Complaint   Patient presents with     Follow Up     Patient declined individual allergy and medication review by support staff because they deny any changes and state that all information remains accurate since last reviewed/verified.    Marce Bartholomew VF

## 2023-01-12 NOTE — PROGRESS NOTES
Service Date: 2023    Rahul Hernandez MD  [facility]  [address] [facility], [address]  [place]  [address]    CARYN Martin MD  [facility]  [address] [address]    RE:  Yissel Wetzel  MRN:  [id number]  :  1960    IMPRESSION:    1.  ESRD with dialysis dependence.  2.  Chronic oxygen-dependent respiratory failure.  3.  History of supraventricular tachycardia.  4.  Diabetes.  5.  Diabetic nephropathy.  6.  Chronic obstructive lung disease.  7.  History of recurrent ascites requiring paracenteses.  8.  Pleural effusion.  9.  ASHD with stenting.  10.  Pericardial calcification.  11.  Remote history of obesity.  12.  Remote history of smoking.  13.  Cirrhotic hepatic morphology.    Dear Lev and Gigi:    I briefly talked with your patient, Ysisel Wetzel, today.  She is doing well, and in fact, they were able to pull almost 2-1/2 pounds today, which is substantially more than they have been able to pull in the past.  As I mentioned to you previously, she has severe pulmonary arterial hypertension with a right atrial pressure of 10-12, pulmonary artery pressure of 117/40 with a mean of 69, but an elevated pulmonary capillary wedge pressure of 30.  Her cardiac output is 4.9 with an index of 3.09 and a pulmonary vascular resistance of 8.75.    While Mrs. Wetzel has any number of medical obstacles, I am impressed by how well she has done for so long a period of time.  Therefore, I wanted know from you two how we might proceed.  As outlined in my previous letter, I thought about a Hepatology consult and probable transjugular liver biopsy, repeat right and left heart catheterization with simultaneous right and left pressure determinations to clarify the question of constriction.  Ideally, we would like to bring her pulmonary capillary wedge pressure down substantially, if possible.  Rather than intermittent dialysis as an outpatient, CVVH as an inpatient would  allow for longer runs and longer more gentle fluid withdrawals.  In addition, parenteral vasopressors could be used to facilitate her dialysis.  At the time of catheterization, we would probably give her either inhaled nitric oxide or a limited dose of nitroprusside to assess the effects of afterload reduction on her central hemodynamics.  In looking through her chart, her blood pressure is consistently (before dialysis) in the range of 135-175, which would constitute a substantial left ventricular afterload.  She also needs a V/Q lung scan to exclude chronic thromboembolic PH.    If both of you would give me a call and advise as to how you want to proceed, I would appreciate it.  My cell phone is [phone].    Sincerely yours,        Jose Redd MD  Professor of Cardiovascular Medicine and Surgery  Division of Cardiology Department of Medicine  [facility]    Dictated but not signed.    Jose Redd MD        D: 2023   T: 2023   MT: suman    Name:     ELIECER CHACON  MRN:      [id number]        Account:      [id number]   :      1960           Service Date: 2023       Document: [id number]

## 2023-01-22 DIAGNOSIS — E11.21 TYPE 2 DIABETES MELLITUS WITH DIABETIC NEPHROPATHY, WITH LONG-TERM CURRENT USE OF INSULIN (H): ICD-10-CM

## 2023-01-22 DIAGNOSIS — Z79.4 TYPE 2 DIABETES MELLITUS WITH DIABETIC NEPHROPATHY, WITH LONG-TERM CURRENT USE OF INSULIN (H): ICD-10-CM

## 2023-01-23 RX ORDER — INSULIN ASPART 100 [IU]/ML
INJECTION, SOLUTION INTRAVENOUS; SUBCUTANEOUS
Qty: 15 ML | Refills: 11 | Status: SHIPPED | OUTPATIENT
Start: 2023-01-23 | End: 2024-02-21

## 2023-01-23 RX ORDER — INSULIN DETEMIR 100 [IU]/ML
INJECTION, SOLUTION SUBCUTANEOUS
Qty: 15 ML | Refills: 3 | Status: ON HOLD | OUTPATIENT
Start: 2023-01-23 | End: 2023-09-09

## 2023-01-23 NOTE — TELEPHONE ENCOUNTER
Spoke with patient to relay PCP approval of prescriptions. Patient verbalized understanding and had no additional questions or concerns.

## 2023-01-23 NOTE — TELEPHONE ENCOUNTER
Marce Richardson is a 56year old male. HPI:   Here with persistent and worsening right elbow pain. Hx of tendonitis in high school, had surgery, screws placed.   Over the years he's had episodes of pain that was relieved with advil and ice/rest.  Over the p Pt calling requesting update on refill for insulin. Pt states that her current RX's are . Requesting refills by tomorrow.     Michelle ABAD RN  EP Triage     218 lb   SpO2 99%   BMI 28.76 kg/m²   GENERAL: well developed, well nourished, in no apparent distress  SKIN: warm & dry  HEENT: atraumatic, normocephalic   NECK: supple,no adenopathy   LUNGS: CTA, easy breathing  MS: right elbow full ROM; no point tendern

## 2023-02-09 ENCOUNTER — TRANSFERRED RECORDS (OUTPATIENT)
Dept: HEALTH INFORMATION MANAGEMENT | Facility: CLINIC | Age: 63
End: 2023-02-09
Payer: MEDICARE

## 2023-02-09 LAB — RETINOPATHY: POSITIVE

## 2023-03-02 ENCOUNTER — TRANSFERRED RECORDS (OUTPATIENT)
Dept: HEALTH INFORMATION MANAGEMENT | Facility: CLINIC | Age: 63
End: 2023-03-02
Payer: MEDICARE

## 2023-03-02 LAB
ALT SERPL-CCNC: 13 IU/L (ref 0–32)
AST SERPL-CCNC: 11 IU/L (ref 0–40)
CREATININE (EXTERNAL): 5.57 MG/DL (ref 0.57–1)
GFR ESTIMATED (EXTERNAL): 8 ML/MIN/1.73
GLUCOSE (EXTERNAL): 147 MG/DL (ref 70–99)
HEP C HIM: NORMAL
POTASSIUM (EXTERNAL): 4.8 MMOL/L (ref 3.5–5.2)

## 2023-03-14 ENCOUNTER — TELEPHONE (OUTPATIENT)
Dept: MEDSURG UNIT | Facility: CLINIC | Age: 63
End: 2023-03-14
Payer: MEDICARE

## 2023-03-14 NOTE — TELEPHONE ENCOUNTER
CSE, no pertinent allergies.  Will need albumin orders if indicated, none listed under standing order.

## 2023-03-17 ENCOUNTER — HOSPITAL ENCOUNTER (OUTPATIENT)
Dept: ULTRASOUND IMAGING | Facility: CLINIC | Age: 63
Discharge: HOME | End: 2023-03-17
Attending: INTERNAL MEDICINE
Payer: MEDICARE

## 2023-03-17 ENCOUNTER — HOSPITAL ENCOUNTER (OUTPATIENT)
Facility: CLINIC | Age: 63
Discharge: HOME | End: 2023-03-17
Admitting: RADIOLOGY
Payer: MEDICARE

## 2023-03-17 DIAGNOSIS — E11.21 TYPE 2 DIABETES MELLITUS WITH DIABETIC NEPHROPATHY, WITH LONG-TERM CURRENT USE OF INSULIN (H): ICD-10-CM

## 2023-03-17 DIAGNOSIS — Z79.4 TYPE 2 DIABETES MELLITUS WITH DIABETIC NEPHROPATHY, WITH LONG-TERM CURRENT USE OF INSULIN (H): ICD-10-CM

## 2023-03-17 DIAGNOSIS — R18.8 ASCITES: ICD-10-CM

## 2023-03-17 PROCEDURE — 250N000009 HC RX 250: Performed by: RADIOLOGY

## 2023-03-17 PROCEDURE — 272N000706 US PARACENTESIS WITH ALBUMIN

## 2023-03-17 RX ORDER — BLOOD SUGAR DIAGNOSTIC
STRIP MISCELLANEOUS
Qty: 400 STRIP | Refills: 0 | Status: SHIPPED | OUTPATIENT
Start: 2023-03-17 | End: 2023-08-15

## 2023-03-17 RX ORDER — LIDOCAINE HYDROCHLORIDE 10 MG/ML
10 INJECTION, SOLUTION EPIDURAL; INFILTRATION; INTRACAUDAL; PERINEURAL ONCE
Status: COMPLETED | OUTPATIENT
Start: 2023-03-17 | End: 2023-03-17

## 2023-03-17 RX ADMIN — LIDOCAINE HYDROCHLORIDE 10 ML: 10 INJECTION, SOLUTION EPIDURAL; INFILTRATION; INTRACAUDAL; PERINEURAL at 12:56

## 2023-03-17 ASSESSMENT — ACTIVITIES OF DAILY LIVING (ADL): ADLS_ACUITY_SCORE: 35

## 2023-04-03 ENCOUNTER — TRANSFERRED RECORDS (OUTPATIENT)
Dept: HEALTH INFORMATION MANAGEMENT | Facility: CLINIC | Age: 63
End: 2023-04-03

## 2023-04-03 LAB
ALT SERPL-CCNC: 15 U/L (ref 10–49)
AST SERPL-CCNC: 13 U/L (ref 0–33)
CREATININE (EXTERNAL): 7.81 MG/DL (ref 0.5–1.1)
POTASSIUM (EXTERNAL): 5.5 MEQ/L (ref 3.5–5.5)

## 2023-04-14 ENCOUNTER — TRANSFERRED RECORDS (OUTPATIENT)
Dept: HEALTH INFORMATION MANAGEMENT | Facility: CLINIC | Age: 63
End: 2023-04-14

## 2023-05-11 ENCOUNTER — VIRTUAL VISIT (OUTPATIENT)
Dept: INTERNAL MEDICINE | Facility: CLINIC | Age: 63
End: 2023-05-11
Payer: MEDICARE

## 2023-05-11 DIAGNOSIS — G25.81 RESTLESS LEGS SYNDROME: Primary | ICD-10-CM

## 2023-05-11 DIAGNOSIS — Z99.2 ESRD ON DIALYSIS (H): ICD-10-CM

## 2023-05-11 DIAGNOSIS — N18.6 ESRD ON DIALYSIS (H): ICD-10-CM

## 2023-05-11 PROCEDURE — 99441 PR PHYSICIAN TELEPHONE EVALUATION 5-10 MIN: CPT | Mod: 95 | Performed by: INTERNAL MEDICINE

## 2023-05-11 RX ORDER — PREGABALIN 50 MG/1
50 CAPSULE ORAL AT BEDTIME
Qty: 30 CAPSULE | Refills: 1 | Status: SHIPPED | OUTPATIENT
Start: 2023-05-11 | End: 2023-09-07

## 2023-05-11 NOTE — PROGRESS NOTES
Cassandra is a 62 year old who is being evaluated via a billable telephone visit.      What phone number would you like to be contacted at? [phone]  How would you like to obtain your AVS? Mail a copy  Distant Location (provider location):  On-site    Assessment & Plan     Restless legs syndrome  Low-dose Lyrica nightly, potential side effects reviewed with patient  - pregabalin (LYRICA) 50 MG capsule; Take 1 capsule (50 mg) by mouth At Bedtime    ESRD on hemodialysis on M, W, F  Continues to follow with nephrology             BMI:   Estimated body mass index is 27.07 kg/m  as calculated from the following:    Height as of 1/5/23: 1.524 m (5').    Weight as of 1/5/23: 62.9 kg (138 lb 9.6 oz).       See Patient Instructions    Gigi Martin MD  [facility]    Michelle Trujillo is a 62 year old, presenting for the following health issues:  Diabetes and Recheck Medication         View : No data to display.              HPI     Diabetes Follow-up    How often are you checking your blood sugar? Four or more times daily  Blood sugar testing frequency justification:  .  What time of day are you checking your blood sugars (select all that apply)?  Before meals, Before and after meals and At bedtime  Have you had any blood sugars above 200?  Yes   Have you had any blood sugars below 70?  Yes     What symptoms do you notice when your blood sugar is low? stomach pain/nausea    What concerns do you have today about your diabetes? None     Do you have any of these symptoms? (Select all that apply)  No numbness or tingling in feet.  No redness, sores or blisters on feet.  No complaints of excessive thirst.  No reports of blurry vision.  No significant changes to weight.      BP Readings from Last 2 Encounters:   01/05/23 (!) 175/69   11/25/22 (!) 147/64     Hemoglobin A1C (%)   Date Value   06/15/2022 7.7 (H)   07/14/2021 7.4 (H)   03/04/2021 7.7 (H)   09/25/2019 8.7 (H)     LDL  Cholesterol Calculated (mg/dL)   Date Value   07/22/2016 123 (H)   10/28/2015 64             Continues on dialysis, is being considered for renal transplant, but hypoxemia is preventing full work-up at this time.    Like to try medication for restless leg symptoms        Review of Systems   Constitutional, HEENT, cardiovascular, pulmonary, gi and gu systems are negative, except as otherwise noted.      Objective           Vitals:  No vitals were obtained today due to virtual visit.    Physical Exam   healthy, alert and no distress  PSYCH: Alert and oriented times 3; coherent speech, normal   rate and volume, able to articulate logical thoughts, able   to abstract reason, no tangential thoughts, no hallucinations   or delusions  Her affect is normal  RESP: No cough, no audible wheezing, able to talk in full sentences  Remainder of exam unable to be completed due to telephone visits                Phone call duration: 8 minutes

## 2023-05-22 DIAGNOSIS — R18.8 OTHER ASCITES: Primary | ICD-10-CM

## 2023-06-27 ENCOUNTER — HOSPITAL ENCOUNTER (OUTPATIENT)
Dept: ULTRASOUND IMAGING | Facility: CLINIC | Age: 63
Discharge: HOME | End: 2023-06-27
Admitting: SPECIALIST/TECHNOLOGIST
Payer: MEDICARE

## 2023-06-27 VITALS
DIASTOLIC BLOOD PRESSURE: 67 MMHG | HEART RATE: 117 BPM | SYSTOLIC BLOOD PRESSURE: 174 MMHG | RESPIRATION RATE: 16 BRPM | OXYGEN SATURATION: 96 %

## 2023-06-27 DIAGNOSIS — R18.8 OTHER ASCITES: ICD-10-CM

## 2023-06-27 PROCEDURE — 250N000009 HC RX 250: Performed by: RADIOLOGY

## 2023-06-27 PROCEDURE — 272N000706 US PARACENTESIS WITHOUT ALBUMIN

## 2023-06-27 RX ADMIN — LIDOCAINE HYDROCHLORIDE 10 ML: 10 INJECTION, SOLUTION EPIDURAL; INFILTRATION; INTRACAUDAL; PERINEURAL at 11:39

## 2023-06-27 NOTE — PROGRESS NOTES
Daily Note     Today's date: 2022  Patient name: Princess Blankenship  : 1965  MRN: [id number]  Referring provider: Ridge Flores DO  Dx:   Encounter Diagnosis     ICD-10-CM    1  Closed fracture of distal end of left radius, unspecified fracture morphology, initial encounter  S52 502A           Start Time: 0800  Stop Time: 0840  Total time in clinic (min): 40 minutes    Subjective: Patient reports that wrist feels stiff prior to start of session  She states that she feels that her brace is too tight  She states she has difficulty with grasping and twisting activities  Patient inquired if there is a better brace that she could trial  Patient also states that she tends to compensates with her (R) hand during activities  Objective: See treatment diary below      Assessment: Patient responded well to treatment  Restrictions noted in flex>extenension during manuals  Increased challenges with twisting activities this session  Patient would benefit from continued PT  Plan: Continue per plan of care        Precautions: DOI 22  HEP: wrist, forearm, thumb, AA/AROM      Manuals 11/11 11/15 11/22 11/29         Wrist AAROM x10' x10' x10' x10'                                                Neuro Re-Ed             Wrist widget  NV           Grooved pegs  NV                                                                            Ther Ex             Towel bunches x3'            Ball roll for wrist x3' x3' x3' x3'         Pegs grasping x2' fingerweb red 30x fingerweb red 30x fingerweb red 30x         Wrist AROM Cone x20 ea 2# 2x10 1# 20x 1# 20x         FA rotation AROM Wheel x3' 2# 2x10 1# 20x 1# 20x         flexbar towel twist  yellow 20x  yellow 20x yellow 20x         flexbar sup/pron  yellow 20x  yelow 20x yelow 20x         clothespins   Blue/green 4 min Blue/green 4 min           20 25 25 min         Ther Activity                                       Gait Training Paracentesis completed per Dr. Lou without difficulty for 3800 dark brown fluid, patient tolerated well. All discharge criteria were met and patient discharged to home with friend in stable condition.   Modalities             MH 10' 10' x2 10 10         CP deferred

## 2023-08-14 DIAGNOSIS — E11.21 TYPE 2 DIABETES MELLITUS WITH DIABETIC NEPHROPATHY, WITH LONG-TERM CURRENT USE OF INSULIN (H): ICD-10-CM

## 2023-08-14 DIAGNOSIS — Z79.4 TYPE 2 DIABETES MELLITUS WITH DIABETIC NEPHROPATHY, WITH LONG-TERM CURRENT USE OF INSULIN (H): ICD-10-CM

## 2023-08-15 RX ORDER — BLOOD SUGAR DIAGNOSTIC
STRIP MISCELLANEOUS
Qty: 400 STRIP | Refills: 0 | Status: SHIPPED | OUTPATIENT
Start: 2023-08-15 | End: 2024-02-07

## 2023-09-07 ENCOUNTER — APPOINTMENT (OUTPATIENT)
Dept: CT IMAGING | Facility: CLINIC | Age: 63
End: 2023-09-07
Attending: EMERGENCY MEDICINE
Payer: MEDICARE

## 2023-09-07 ENCOUNTER — HOSPITAL ENCOUNTER (OUTPATIENT)
Facility: CLINIC | Age: 63
Setting detail: OBSERVATION
Discharge: HOME | End: 2023-09-09
Attending: EMERGENCY MEDICINE | Admitting: RADIOLOGY
Payer: MEDICARE

## 2023-09-07 DIAGNOSIS — K76.9 LIVER DISEASE: ICD-10-CM

## 2023-09-07 DIAGNOSIS — Z79.4 TYPE 2 DIABETES MELLITUS WITH DIABETIC NEPHROPATHY, WITH LONG-TERM CURRENT USE OF INSULIN (H): ICD-10-CM

## 2023-09-07 DIAGNOSIS — T14.8XXA WOUND DRAINAGE: Primary | ICD-10-CM

## 2023-09-07 DIAGNOSIS — R18.8 OTHER ASCITES: ICD-10-CM

## 2023-09-07 DIAGNOSIS — E11.21 TYPE 2 DIABETES MELLITUS WITH DIABETIC NEPHROPATHY, WITH LONG-TERM CURRENT USE OF INSULIN (H): ICD-10-CM

## 2023-09-07 DIAGNOSIS — K42.9 UMBILICAL HERNIA WITHOUT OBSTRUCTION AND WITHOUT GANGRENE: ICD-10-CM

## 2023-09-07 LAB
ALBUMIN SERPL BCG-MCNC: 3.4 G/DL (ref 3.5–5.2)
ALP SERPL-CCNC: 104 U/L (ref 35–104)
ALT SERPL W P-5'-P-CCNC: 8 U/L (ref 0–50)
ANION GAP SERPL CALCULATED.3IONS-SCNC: 20 MMOL/L (ref 7–15)
AST SERPL W P-5'-P-CCNC: 15 U/L (ref 0–45)
BASOPHILS # BLD AUTO: 0.1 10E3/UL (ref 0–0.2)
BASOPHILS NFR BLD AUTO: 1 %
BILIRUB SERPL-MCNC: 0.4 MG/DL
BUN SERPL-MCNC: 42 MG/DL (ref 8–23)
CALCIUM SERPL-MCNC: 8.8 MG/DL (ref 8.8–10.2)
CHLORIDE SERPL-SCNC: 98 MMOL/L (ref 98–107)
CREAT SERPL-MCNC: 8.13 MG/DL (ref 0.51–0.95)
DEPRECATED HCO3 PLAS-SCNC: 22 MMOL/L (ref 22–29)
EGFRCR SERPLBLD CKD-EPI 2021: 5 ML/MIN/1.73M2
EOSINOPHIL # BLD AUTO: 0.1 10E3/UL (ref 0–0.7)
EOSINOPHIL NFR BLD AUTO: 2 %
ERYTHROCYTE [DISTWIDTH] IN BLOOD BY AUTOMATED COUNT: 16.8 % (ref 10–15)
GLUCOSE BLDC GLUCOMTR-MCNC: 234 MG/DL (ref 70–99)
GLUCOSE SERPL-MCNC: 198 MG/DL (ref 70–99)
HCT VFR BLD AUTO: 40.1 % (ref 35–47)
HGB BLD-MCNC: 12.2 G/DL (ref 11.7–15.7)
IMM GRANULOCYTES # BLD: 0 10E3/UL
IMM GRANULOCYTES NFR BLD: 0 %
LYMPHOCYTES # BLD AUTO: 0.8 10E3/UL (ref 0.8–5.3)
LYMPHOCYTES NFR BLD AUTO: 15 %
MCH RBC QN AUTO: 26.2 PG (ref 26.5–33)
MCHC RBC AUTO-ENTMCNC: 30.4 G/DL (ref 31.5–36.5)
MCV RBC AUTO: 86 FL (ref 78–100)
MONOCYTES # BLD AUTO: 0.7 10E3/UL (ref 0–1.3)
MONOCYTES NFR BLD AUTO: 14 %
NEUTROPHILS # BLD AUTO: 3.7 10E3/UL (ref 1.6–8.3)
NEUTROPHILS NFR BLD AUTO: 68 %
NRBC # BLD AUTO: 0 10E3/UL
NRBC BLD AUTO-RTO: 0 /100
PLATELET # BLD AUTO: 141 10E3/UL (ref 150–450)
POTASSIUM SERPL-SCNC: 4.6 MMOL/L (ref 3.4–5.3)
PROT SERPL-MCNC: 6.1 G/DL (ref 6.4–8.3)
RBC # BLD AUTO: 4.65 10E6/UL (ref 3.8–5.2)
SODIUM SERPL-SCNC: 140 MMOL/L (ref 136–145)
WBC # BLD AUTO: 5.4 10E3/UL (ref 4–11)

## 2023-09-07 PROCEDURE — 250N000011 HC RX IP 250 OP 636: Mod: JZ | Performed by: EMERGENCY MEDICINE

## 2023-09-07 PROCEDURE — G1010 CDSM STANSON: HCPCS

## 2023-09-07 PROCEDURE — 99285 EMERGENCY DEPT VISIT HI MDM: CPT | Mod: 25

## 2023-09-07 PROCEDURE — 250N000009 HC RX 250: Performed by: EMERGENCY MEDICINE

## 2023-09-07 PROCEDURE — 250N000012 HC RX MED GY IP 250 OP 636 PS 637: Performed by: HOSPITALIST

## 2023-09-07 PROCEDURE — 85025 COMPLETE CBC W/AUTO DIFF WBC: CPT | Performed by: EMERGENCY MEDICINE

## 2023-09-07 PROCEDURE — 36415 COLL VENOUS BLD VENIPUNCTURE: CPT | Performed by: EMERGENCY MEDICINE

## 2023-09-07 PROCEDURE — 80053 COMPREHEN METABOLIC PANEL: CPT | Performed by: EMERGENCY MEDICINE

## 2023-09-07 PROCEDURE — 96374 THER/PROPH/DIAG INJ IV PUSH: CPT | Mod: 59

## 2023-09-07 PROCEDURE — 99223 1ST HOSP IP/OBS HIGH 75: CPT | Mod: A1 | Performed by: HOSPITALIST

## 2023-09-07 PROCEDURE — 74177 CT ABD & PELVIS W/CONTRAST: CPT | Mod: MG

## 2023-09-07 PROCEDURE — 120N000001 HC R&B MED SURG/OB

## 2023-09-07 PROCEDURE — 250N000011 HC RX IP 250 OP 636: Performed by: EMERGENCY MEDICINE

## 2023-09-07 RX ORDER — CEFTRIAXONE 1 G/1
1 INJECTION, POWDER, FOR SOLUTION INTRAMUSCULAR; INTRAVENOUS ONCE
Status: COMPLETED | OUTPATIENT
Start: 2023-09-07 | End: 2023-09-07

## 2023-09-07 RX ORDER — ACETAMINOPHEN 500 MG
500 TABLET ORAL EVERY 8 HOURS PRN
Status: DISCONTINUED | OUTPATIENT
Start: 2023-09-07 | End: 2023-09-09 | Stop reason: HOSPADM

## 2023-09-07 RX ORDER — ONDANSETRON 2 MG/ML
4 INJECTION INTRAMUSCULAR; INTRAVENOUS EVERY 6 HOURS PRN
Status: DISCONTINUED | OUTPATIENT
Start: 2023-09-07 | End: 2023-09-09 | Stop reason: HOSPADM

## 2023-09-07 RX ORDER — IOPAMIDOL 755 MG/ML
70 INJECTION, SOLUTION INTRAVASCULAR ONCE
Status: COMPLETED | OUTPATIENT
Start: 2023-09-07 | End: 2023-09-07

## 2023-09-07 RX ORDER — MIDODRINE HYDROCHLORIDE 5 MG/1
20 TABLET ORAL
Status: DISCONTINUED | OUTPATIENT
Start: 2023-09-09 | End: 2023-09-09 | Stop reason: HOSPADM

## 2023-09-07 RX ORDER — MIDODRINE HYDROCHLORIDE 10 MG/1
20 TABLET ORAL 2 TIMES DAILY
COMMUNITY

## 2023-09-07 RX ORDER — ALBUMIN (HUMAN) 12.5 G/50ML
25-50 SOLUTION INTRAVENOUS ONCE
Status: DISCONTINUED | OUTPATIENT
Start: 2023-09-07 | End: 2023-09-09

## 2023-09-07 RX ORDER — MIDODRINE HYDROCHLORIDE 5 MG/1
10 TABLET ORAL
Status: DISCONTINUED | OUTPATIENT
Start: 2023-09-08 | End: 2023-09-09 | Stop reason: HOSPADM

## 2023-09-07 RX ORDER — ONDANSETRON 4 MG/1
4 TABLET, ORALLY DISINTEGRATING ORAL EVERY 6 HOURS PRN
Status: DISCONTINUED | OUTPATIENT
Start: 2023-09-07 | End: 2023-09-09 | Stop reason: HOSPADM

## 2023-09-07 RX ORDER — NICOTINE POLACRILEX 4 MG
15-30 LOZENGE BUCCAL
Status: DISCONTINUED | OUTPATIENT
Start: 2023-09-07 | End: 2023-09-09 | Stop reason: HOSPADM

## 2023-09-07 RX ORDER — MIDODRINE HYDROCHLORIDE 10 MG/1
10 TABLET ORAL
COMMUNITY

## 2023-09-07 RX ORDER — AMIODARONE HYDROCHLORIDE 200 MG/1
200 TABLET ORAL DAILY
Status: DISCONTINUED | OUTPATIENT
Start: 2023-09-08 | End: 2023-09-09 | Stop reason: HOSPADM

## 2023-09-07 RX ORDER — FAMOTIDINE 20 MG/1
20 TABLET, FILM COATED ORAL 2 TIMES DAILY PRN
COMMUNITY

## 2023-09-07 RX ORDER — DEXTROSE MONOHYDRATE 25 G/50ML
25-50 INJECTION, SOLUTION INTRAVENOUS
Status: DISCONTINUED | OUTPATIENT
Start: 2023-09-07 | End: 2023-09-09 | Stop reason: HOSPADM

## 2023-09-07 RX ORDER — LIDOCAINE 40 MG/G
CREAM TOPICAL
Status: DISCONTINUED | OUTPATIENT
Start: 2023-09-07 | End: 2023-09-09 | Stop reason: HOSPADM

## 2023-09-07 RX ADMIN — SODIUM CHLORIDE 60 ML: 9 INJECTION, SOLUTION INTRAVENOUS at 18:12

## 2023-09-07 RX ADMIN — INSULIN DETEMIR 8 UNITS: 100 INJECTION, SOLUTION SUBCUTANEOUS at 23:26

## 2023-09-07 RX ADMIN — CEFTRIAXONE 1 G: 1 INJECTION, POWDER, FOR SOLUTION INTRAMUSCULAR; INTRAVENOUS at 21:44

## 2023-09-07 RX ADMIN — IOPAMIDOL 70 ML: 755 INJECTION, SOLUTION INTRAVENOUS at 18:12

## 2023-09-07 ASSESSMENT — ACTIVITIES OF DAILY LIVING (ADL)
VISION_MANAGEMENT: GLASSES
ADLS_ACUITY_SCORE: 35
FALL_HISTORY_WITHIN_LAST_SIX_MONTHS: NO
DOING_ERRANDS_INDEPENDENTLY_DIFFICULTY: YES
TOILETING_ISSUES: NO
ADLS_ACUITY_SCORE: 35
CHANGE_IN_FUNCTIONAL_STATUS_SINCE_ONSET_OF_CURRENT_ILLNESS/INJURY: NO
ADLS_ACUITY_SCORE: 35
ADLS_ACUITY_SCORE: 33
WALKING_OR_CLIMBING_STAIRS_DIFFICULTY: NO
CONCENTRATING,_REMEMBERING_OR_MAKING_DECISIONS_DIFFICULTY: NO
DIFFICULTY_EATING/SWALLOWING: NO
DRESSING/BATHING_DIFFICULTY: NO
WEAR_GLASSES_OR_BLIND: YES

## 2023-09-07 NOTE — ED PROVIDER NOTES
"    History   Chief Complaint:  Wound Check     The history is provided by the patient and medical records.      Yissel Wetzel is a 63 year old female presenting with her son with fluid drainage from her umbilicus that started yesterday.  She endorses mild abdominal discomfort superior to the umbilicus.  No fever, chills, nausea, vomiting, hematuria, dysuria, constipation, diarrhea, black or bloody stool. She had a paracentesis done on 2023.  She reports she has been breathing well after this, and states her belly is not as distended as it used to be.  No recent falls or trauma.  She was at dialysis yesterday, when she noted her bellybutton is now \"an outie\" and there was drainage.    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   End stage renal disease on dialysis   Esophagitis   Hemorrhagic gastropathy   Iron deficiency anemia   Mild persistent asthma  Obesity   Hypertension  Hyperlipidemia  Pulmonary hypertension   Diabetes mellitus, type 2    Past Surgical History:    Abdominoplasty    section (x2)  Colonoscopy   AV fistula, right upper extremity  Heart catheterization   Tonsillectomy  Esophagogastroduodenoscopy   Hysterectomy      Physical Exam   Patient Vitals for the past 24 hrs:   BP Temp Temp src Pulse Resp SpO2   23 1201 (!) 179/64 97.5  F (36.4  C) Temporal 63 16 96 %      Physical Exam  General: Resting on the bed.  Head: No obvious trauma to head.  Ears, Nose, Throat:  External ears normal.  Nose normal.  No pharyngeal erythema, swelling or exudate.  Midline uvula. Moist mucus membranes.  Eyes:  Conjunctivae clear.   Neck: Normal range of motion.  Neck supple.   CV: Regular rate and rhythm.  " No murmurs.      Respiratory: Effort normal and breath sounds normal.  No wheezing or crackles.   Gastrointestinal: Soft.  No distension.  Mild tenderness to palpation superior to the umbilicus.  No guarding or rebound tenderness.  When dressing is removed from the umbilicus, there is yellow clear fluid that is expelled from the umbilicus.  No surrounding erythema or edema.  Musculoskeletal: Normal range of motion.  Non tender extremities to palpations.    Neuro: Alert. Moving all extremities appropriately.  Normal speech.    Skin: Skin is warm and dry.  No rash noted.   Psych: Normal mood and affect. Behavior is normal.     Emergency Department Course   Imaging:  CT Abdomen Pelvis w Contrast   Final Result   IMPRESSION:    1.  Small amount of ascites in the abdomen and pelvis. There is also fluid within a prominent sized umbilical hernia as well as extensive edematous changes in the subcutaneous tissues.   2.  Moderate-sized right pleural effusion and atelectasis right lower lobe.   3.  Several mildly dilated loops of fluid and air-filled small bowel, nonspecific, possibly representing an ileus. No definite findings for bowel obstruction.   4.  Severe atherosclerotic disease.   5.  Gallstones.   6.  Advanced bilateral renal cortical atrophy.      Report per radiology    Laboratory:  Labs Ordered and Resulted from Time of ED Arrival to Time of ED Departure   COMPREHENSIVE METABOLIC PANEL - Abnormal       Result Value    Sodium 140      Potassium 4.6      Chloride 98      Carbon Dioxide (CO2) 22      Anion Gap 20 (*)     Urea Nitrogen 42.0 (*)     Creatinine 8.13 (*)     Calcium 8.8      Glucose 198 (*)     Alkaline Phosphatase 104      AST 15      ALT 8      Protein Total 6.1 (*)     Albumin 3.4 (*)     Bilirubin Total 0.4      GFR Estimate 5 (*)    CBC WITH PLATELETS AND DIFFERENTIAL - Abnormal    WBC Count 5.4      RBC Count 4.65      Hemoglobin 12.2      Hematocrit 40.1      MCV 86      MCH 26.2 (*)     MCHC 30.4  (*)     RDW 16.8 (*)     Platelet Count 141 (*)     % Neutrophils 68      % Lymphocytes 15      % Monocytes 14      % Eosinophils 2      % Basophils 1      % Immature Granulocytes 0      NRBCs per 100 WBC 0      Absolute Neutrophils 3.7      Absolute Lymphocytes 0.8      Absolute Monocytes 0.7      Absolute Eosinophils 0.1      Absolute Basophils 0.1      Absolute Immature Granulocytes 0.0      Absolute NRBCs 0.0        Emergency Department Course & Assessments:     Interventions:  Medications   cefTRIAXone (ROCEPHIN) 1 g vial to attach to  mL bag for ADULTS or NS 50 mL bag for PEDS (has no administration in time range)   insulin aspart (NovoLOG) injection (RAPID ACTING) (has no administration in time range)   Saline (60 mLs As instructed $Given 9/7/23 1812)   iopamidol (ISOVUE-370) solution 70 mL (70 mLs Intravenous $Given 9/7/23 1812)      Assessments:  1610 I obtained history and performed an exam of the patient as documented above.  1932 I rechecked the patient and explained findings. Discussed plan of care. Will give IV antibiotics and discussed admission.     Independent Interpretation (X-rays, CTs, rhythm strip):  None    Consultations/Discussion of Management or Tests:  1925 I spoke with Dr. Bedolla of the general surgery service regarding indication for surgical intervention. Recommends admission.  1945 I spoke with Dr. Barrett of the hospitalist service who accepts the patient for admission.    Social Determinants of Health affecting care:  None     Disposition:  The patient was admitted to the hospital under the care of Dr. Barrett.     Impression & Plan    Medical Decision Making:  Patient arrives with dressing placed over the umbilicus.  When removed, there is notable yellowish clear fluid emanating from the umbilicus.  The abdomen is not distended, rigid or peritonitic.  There is concern for a tract from the peritoneum resulting in ascites draining.  Abdominal CT is obtained, and reveals an  umbilical hernia with surrounding edema.  The patient is scheduled for dialysis tomorrow.  Her creatinine is expectedly elevated.  I discussed the patient with general surgery regarding and he indication for surgical intervention.  He explains there is no indication for emergent or urgent surgical intervention, and recommends IV antibiotics for SBP prophylaxis and admission for diuresis and monitoring.  Rocephin is ordered.  I discussed the patient with the hospitalist who agreed admit the patient to their service.  The patient remains in stable condition and is transferred to the floor.    Diagnosis:    ICD-10-CM    1. Wound drainage  L24.A9       2. Other ascites  R18.8       3. Umbilical hernia without obstruction and without gangrene  K42.9          Scribe Disclosure:  I, Zeny Imani, am serving as a scribe at 6:44 PM on 9/7/2023 to document services personally performed by Delvin Lazcano MD based on my observations and the provider's statements to me.    Delvin Lazcano MD on 9/7/2023 at 10:16 PM       Delvin Lazcano MD  09/07/23 [id number]

## 2023-09-07 NOTE — ED TRIAGE NOTES
Patient comes in for complaints of her belly button leaking. Denies bleeding from the site. Happened yesterday and then it stopped. Denies fevers. History of paracentesis 1 month ago. Had dialysis yesterday without problems.

## 2023-09-08 ENCOUNTER — APPOINTMENT (OUTPATIENT)
Dept: ULTRASOUND IMAGING | Facility: CLINIC | Age: 63
End: 2023-09-08
Attending: HOSPITALIST
Payer: MEDICARE

## 2023-09-08 LAB
% LINING CELLS, BODY FLUID: 1 %
ABSOLUTE NEUTROPHILS, BODY FLUID: 183 /UL
ALBUMIN BODY FLUID SOURCE: NORMAL
ALBUMIN FLD-MCNC: 1.7 G/DL
ANION GAP SERPL CALCULATED.3IONS-SCNC: 19 MMOL/L (ref 7–15)
APPEARANCE FLD: ABNORMAL
BUN SERPL-MCNC: 49 MG/DL (ref 8–23)
CALCIUM SERPL-MCNC: 8.5 MG/DL (ref 8.8–10.2)
CELL COUNT BODY FLUID SOURCE: ABNORMAL
CHLORIDE SERPL-SCNC: 95 MMOL/L (ref 98–107)
COLOR FLD: YELLOW
CREAT SERPL-MCNC: 8.85 MG/DL (ref 0.51–0.95)
DEPRECATED HCO3 PLAS-SCNC: 22 MMOL/L (ref 22–29)
EGFRCR SERPLBLD CKD-EPI 2021: 5 ML/MIN/1.73M2
EOSINOPHIL NFR FLD MANUAL: 1 %
ERYTHROCYTE [DISTWIDTH] IN BLOOD BY AUTOMATED COUNT: 16.9 % (ref 10–15)
GLUCOSE BLDC GLUCOMTR-MCNC: 150 MG/DL (ref 70–99)
GLUCOSE BLDC GLUCOMTR-MCNC: 166 MG/DL (ref 70–99)
GLUCOSE BLDC GLUCOMTR-MCNC: 170 MG/DL (ref 70–99)
GLUCOSE BLDC GLUCOMTR-MCNC: 212 MG/DL (ref 70–99)
GLUCOSE BLDC GLUCOMTR-MCNC: 290 MG/DL (ref 70–99)
GLUCOSE SERPL-MCNC: 237 MG/DL (ref 70–99)
HBV SURFACE AB SERPL IA-ACNC: 2.37 M[IU]/ML
HBV SURFACE AB SERPL IA-ACNC: NONREACTIVE M[IU]/ML
HBV SURFACE AG SERPL QL IA: NONREACTIVE
HCT VFR BLD AUTO: 37.3 % (ref 35–47)
HGB BLD-MCNC: 11.6 G/DL (ref 11.7–15.7)
LYMPHOCYTES NFR FLD MANUAL: 10 %
MCH RBC QN AUTO: 26.7 PG (ref 26.5–33)
MCHC RBC AUTO-ENTMCNC: 31.1 G/DL (ref 31.5–36.5)
MCV RBC AUTO: 86 FL (ref 78–100)
MONOS+MACROS NFR FLD MANUAL: 36 %
NEUTS BAND NFR FLD MANUAL: 52 %
PLATELET # BLD AUTO: 144 10E3/UL (ref 150–450)
POTASSIUM SERPL-SCNC: 4.4 MMOL/L (ref 3.4–5.3)
PROT FLD-MCNC: 2.7 G/DL
PROTEIN BODY FLUID SOURCE: NORMAL
RBC # BLD AUTO: 4.35 10E6/UL (ref 3.8–5.2)
SODIUM SERPL-SCNC: 136 MMOL/L (ref 136–145)
WBC # BLD AUTO: 6 10E3/UL (ref 4–11)
WBC # FLD AUTO: 352 /UL

## 2023-09-08 PROCEDURE — 250N000013 HC RX MED GY IP 250 OP 250 PS 637: Performed by: HOSPITALIST

## 2023-09-08 PROCEDURE — 82310 ASSAY OF CALCIUM: CPT | Performed by: HOSPITALIST

## 2023-09-08 PROCEDURE — 82042 OTHER SOURCE ALBUMIN QUAN EA: CPT | Performed by: HOSPITALIST

## 2023-09-08 PROCEDURE — G0257 UNSCHED DIALYSIS ESRD PT HOS: HCPCS

## 2023-09-08 PROCEDURE — 250N000009 HC RX 250: Performed by: RADIOLOGY

## 2023-09-08 PROCEDURE — 90935 HEMODIALYSIS ONE EVALUATION: CPT

## 2023-09-08 PROCEDURE — 36415 COLL VENOUS BLD VENIPUNCTURE: CPT | Performed by: INTERNAL MEDICINE

## 2023-09-08 PROCEDURE — 87340 HEPATITIS B SURFACE AG IA: CPT | Performed by: INTERNAL MEDICINE

## 2023-09-08 PROCEDURE — 86706 HEP B SURFACE ANTIBODY: CPT | Performed by: INTERNAL MEDICINE

## 2023-09-08 PROCEDURE — 89050 BODY FLUID CELL COUNT: CPT | Performed by: HOSPITALIST

## 2023-09-08 PROCEDURE — 99232 SBSQ HOSP IP/OBS MODERATE 35: CPT | Performed by: STUDENT IN AN ORGANIZED HEALTH CARE EDUCATION/TRAINING PROGRAM

## 2023-09-08 PROCEDURE — 85027 COMPLETE CBC AUTOMATED: CPT | Performed by: HOSPITALIST

## 2023-09-08 PROCEDURE — 89051 BODY FLUID CELL COUNT: CPT | Performed by: HOSPITALIST

## 2023-09-08 PROCEDURE — 250N000012 HC RX MED GY IP 250 OP 636 PS 637: Performed by: HOSPITALIST

## 2023-09-08 PROCEDURE — 82962 GLUCOSE BLOOD TEST: CPT

## 2023-09-08 PROCEDURE — 99222 1ST HOSP IP/OBS MODERATE 55: CPT | Mod: FS | Performed by: SURGERY

## 2023-09-08 PROCEDURE — G0378 HOSPITAL OBSERVATION PER HR: HCPCS

## 2023-09-08 PROCEDURE — 99214 OFFICE O/P EST MOD 30 MIN: CPT | Performed by: INTERNAL MEDICINE

## 2023-09-08 PROCEDURE — 87070 CULTURE OTHR SPECIMN AEROBIC: CPT | Performed by: HOSPITALIST

## 2023-09-08 PROCEDURE — 84157 ASSAY OF PROTEIN OTHER: CPT | Performed by: HOSPITALIST

## 2023-09-08 PROCEDURE — 272N000706 US PARACENTESIS WITH ALBUMIN

## 2023-09-08 RX ORDER — HEPARIN SODIUM 1000 [USP'U]/ML
500 INJECTION, SOLUTION INTRAVENOUS; SUBCUTANEOUS CONTINUOUS
Status: DISCONTINUED | OUTPATIENT
Start: 2023-09-08 | End: 2023-09-09

## 2023-09-08 RX ORDER — AMOXICILLIN 250 MG
1-2 CAPSULE ORAL 2 TIMES DAILY
Status: DISCONTINUED | OUTPATIENT
Start: 2023-09-08 | End: 2023-09-09 | Stop reason: HOSPADM

## 2023-09-08 RX ORDER — MIDODRINE HYDROCHLORIDE 5 MG/1
20 TABLET ORAL ONCE
Status: COMPLETED | OUTPATIENT
Start: 2023-09-08 | End: 2023-09-08

## 2023-09-08 RX ORDER — BISACODYL 10 MG
10 SUPPOSITORY, RECTAL RECTAL DAILY PRN
Status: DISCONTINUED | OUTPATIENT
Start: 2023-09-08 | End: 2023-09-09 | Stop reason: HOSPADM

## 2023-09-08 RX ORDER — LIDOCAINE HYDROCHLORIDE 10 MG/ML
10 INJECTION, SOLUTION EPIDURAL; INFILTRATION; INTRACAUDAL; PERINEURAL ONCE
Status: COMPLETED | OUTPATIENT
Start: 2023-09-08 | End: 2023-09-08

## 2023-09-08 RX ORDER — POLYETHYLENE GLYCOL 3350 17 G/17G
17 POWDER, FOR SOLUTION ORAL 2 TIMES DAILY PRN
Status: DISCONTINUED | OUTPATIENT
Start: 2023-09-08 | End: 2023-09-09 | Stop reason: HOSPADM

## 2023-09-08 RX ADMIN — INSULIN DETEMIR 8 UNITS: 100 INJECTION, SOLUTION SUBCUTANEOUS at 22:31

## 2023-09-08 RX ADMIN — LIDOCAINE HYDROCHLORIDE 10 ML: 10 INJECTION, SOLUTION EPIDURAL; INFILTRATION; INTRACAUDAL; PERINEURAL at 15:33

## 2023-09-08 RX ADMIN — INSULIN ASPART 1 UNITS: 100 INJECTION, SOLUTION INTRAVENOUS; SUBCUTANEOUS at 09:12

## 2023-09-08 RX ADMIN — AMIODARONE HYDROCHLORIDE 200 MG: 200 TABLET ORAL at 14:12

## 2023-09-08 RX ADMIN — ACETAMINOPHEN 500 MG: 500 TABLET, FILM COATED ORAL at 20:08

## 2023-09-08 RX ADMIN — MIDODRINE HYDROCHLORIDE 5 MG: 5 TABLET ORAL at 10:02

## 2023-09-08 RX ADMIN — INSULIN ASPART 2 UNITS: 100 INJECTION, SOLUTION INTRAVENOUS; SUBCUTANEOUS at 17:58

## 2023-09-08 ASSESSMENT — ACTIVITIES OF DAILY LIVING (ADL)
ADLS_ACUITY_SCORE: 25
ADLS_ACUITY_SCORE: 25
ADLS_ACUITY_SCORE: 23
ADLS_ACUITY_SCORE: 25
ADLS_ACUITY_SCORE: 29
ADLS_ACUITY_SCORE: 22
ADLS_ACUITY_SCORE: 25
ADLS_ACUITY_SCORE: 28
ADLS_ACUITY_SCORE: 29
ADLS_ACUITY_SCORE: 28
ADLS_ACUITY_SCORE: 22
ADLS_ACUITY_SCORE: 28

## 2023-09-08 NOTE — PLAN OF CARE
Goal Outcome Evaluation:    Summary: ascites, leaking from umbilical region, umbilical hernia    DATE & TIME: 9/7/23-9/8/23  0853-1370   Cognitive Concerns/ Orientation : A&O x4   BEHAVIOR & AGGRESSION TOOL COLOR: green  CIWA SCORE: NA   ABNL VS/O2: VSS on RA  MOBILITY: SBA, up to chair x1 overnight  PAIN MANAGMENT: denies PRN Tylenol, c/o stomach discomfort/fullness  DIET: Renal, mod. carb  BOWEL/BLADDER: continent  ABNL LAB/BG: Urea nit. 42, Creat 8.3, GFR 5,  & 212 (pt refused short acting insulin)  DRAIN/DEVICES: L PIV SL, R. Arm fistula  TELEMETRY RHYTHM: NA  SKIN: umbilical hernia, leaking at umbilical region of abdomen - ABD dressing placed over leak site (replaced x2)  TESTS/PROCEDURES: awaiting paracentesis  D/C DAY/GOALS/PLACE: back to home  OTHER IMPORTANT INFO: Pt refused bed and chair alarm - educated patient on purpose & safety. Pt states she does not agree with how her BG is managed & prefers only long acting insulin at bedtime like she does at home. Pt does have snacks she eats frequently in her room.

## 2023-09-08 NOTE — H&P
[facility]    History and Physical - Hospitalist Service       Date of Admission:  9/7/2023    Assessment & Plan      Yissel Wetzel is a 63 year old female who has a significant medical history including diabetes mellitus on insulin, ESRD on hemodialysis , severe pulmonary hypertension, chronic diastolic heart failure, hyperlipidemia, history of SVT, anemia of chronic disease, decompensated cirrhosis due to right heart disease who presented to the ED with a chief complaint that she noticed that fluid was draining from her umbilicus and it started yesterday and she was having some discomfort around the umbilicus       Drainage around the umbilical area  History of ascites  History of decompensated cirrhosis likely due to right-sided heart failure with history of severe pulmonary hypertension  Umbilical hernia  -I did review her chart and the patient seems to have history of ascites in the past and last time paracentesis was done on 6/23  -On admission presented with complaint of fluid leakage around the site of the umbilical hernia  -CT scan of the abdomen and pelvis showed small amount of ascites in the abdomen and pelvis and there is also fluid within the prominent sized umbilical hernia as well as edematous changes in the subcutaneous tissue, gallstone, severe atherosclerotic disease  -On examination she has distended abdomen but bowel sounds are present and there is presence of umbilical hernia and there is oozing of some fluid and does not seem to have any signs of peritonitis at this point in time  -The ED physician did talk with surgery and they recommended that patient be started on antibiotics and acetic fluid should be tapped  -Patient was started on Rocephin in the ED and we will continue the same and will plan for paracentesis tomorrow and labs will be ordered for fluid analysis    History of ascites due to decompensated cirrhosis due to right-sided heart failure  -I have  reviewed her EMR and as per last visit note dated by cardiology team on 1/23 there was talk about hepatology consult and liver biopsy and it seems it never happened and I discussed with the patient and she is not aware of the same  -I think patient will benefit from a consult with hepatology as outpatient    Moderate sized right pleural effusion  -CT scan of the abdomen pelvis and chest also showed moderate-sized right pleural effusion and atelectasis of the right lower lobe and this is due to underlying volume overload and patient will undergo hemodialysis which will be helpful    ESRD on hemodialysis  -Patient hemodialysis is on Tuesday Thursdays and Saturday schedule and her last dialysis was yesterday and we will consult nephrology    Diabetes mellitus on insulin  -She is taking 11 units of Levemir in the night along with sliding scale and we will continue the patient with basal and bolus insulin regimen    History of SVT  -Continue with amiodarone    History of severe pulmonary hypertension  -She follows with Dr. Easton from [facility] cardiology and was last seen on 1/11/2023 and I reviewed his note  -Last echo done in 11/22 showed EF of 55 to 60%, RV function is reduced, mild RVH and RVSP is 55 and no pericardial effusion was present at that time    History of COPD  -On exam she does not have any wheezing and we will continue with her PTA home inhalers/nebs    Concern for noncompliance  -I have spoken with the pharmacy staff and there is initial concern that patient might not have been filling her medications or taking her medications properly       Diet:  Renal and diabetic diet  DVT Prophylaxis: Pneumatic Compression Devices  Weller Catheter: Not present  Lines: None     Cardiac Monitoring: None  Code Status:  Full code    Clinically Significant Risk Factors Present on Admission             # Anion Gap Metabolic Acidosis: Highest Anion Gap = 20 mmol/L in last 2 days, will monitor and treat as appropriate  #  Hypoalbuminemia: Lowest albumin = 3.4 g/dL at 9/7/2023  5:11 PM, will monitor as appropriate   # Drug Induced Platelet Defect: home medication list includes an antiplatelet medication   # Hypertension: Noted on problem list                 Disposition Plan           Mila Barrett MD  Hospitalist Service  [facility]  Securely message with [employer] (more info)  Text page via "[employer] Paging/Directory     I am on admitting shift and her care in the morning will be taken over by hospital medicine team  ______________________________________________________________________    Chief Complaint     Came in with bellybutton leaking    History is obtained from the patient    History of Present Illness   Yissel Wetzel is a 63 year old female who has a significant medical history including diabetes mellitus on insulin, ESRD on hemodialysis , severe pulmonary hypertension, chronic diastolic heart failure, hyperlipidemia, history of SVT, anemia of chronic disease, decompensated cirrhosis due to right heart disease who presented to the ED with a chief complaint that she noticed that fluid was draining from her umbilicus and it started yesterday and she was having some discomfort around the umbilicus  She denies any fever, chills, nausea, vomiting, diarrhea or constipation, lightheadedness or dizziness.  She mentioned that she does not have any shortness of breath or chest pain and her abdomen is less distended than before.    Lab work done in the ED showed sodium of 140, potassium of 4.6, chloride of 98, bicarb of 22, BUN of 42, creatinine of 8.13 with GFR of 5, calcium of 8.8, anion gap of 20, albumin of 3.4 with normal alkaline phosphatase, ALT AST and total bilirubin and blood glucose of 198.    WBC count of 5.4, hemoglobin of 12.2 with platelet count of 141 and she has chronic thrombocytopenia and platelet count is at baseline    CT scan of the abdomen and pelvis was done which shows small amount of  ascites in the abdomen and pelvis and there is fluid within a prominent sized umbilical hernia as well as extensive edematous changes in the subcutaneous tissue, moderate size right pleural effusion and atelectasis of the right lower lobe, severely mildly dilated loops of fluid and air-filled small bowel nonspecific and could represent ileus with no evidence of any bowel obstruction, gallstone, advanced bilateral renal cortical atrophy and severe atherosclerotic disease.      Past Medical History    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, initial encounter (H) 2019    End stage renal failure on dialysis (H)     Esophagitis, unspecified     Former tobacco use     HEMORRHAGIC GASTROPATHY     History of blood transfusion     [place]    Iron deficiency anemia, unspecified     Mild persistent asthma     Obesity     Other and unspecified hyperlipidemia     Pneumonia     Pulmonary hypertension (H)     per 2012 echo mod.to severe pulmonary hypertension    Tobacco use disorder dc ed     Type 2 diabetes mellitus (H)     on Insulin- managed by PCP    Unspecified essential hypertension        Past Surgical History   Past Surgical History:   Procedure Laterality Date    ABDOMINOPLASTY  pt unsure when    abdominoplasty-     SECTION  ,     x 2    COLONOSCOPY      [facility]    COLONOSCOPY N/A 2021    Procedure: COLONOSCOPY, WITH POLYPECTOMY AND BIOPSY;  Surgeon: Lincoln Farrar MD;  Location:  GI    CREATE FISTULA ARTERIOVENOUS UPPER EXTREMITY Right 2018    Procedure: RIGHT BRACHIAL TO BASILIC ARTERIOVENOUS FISTULA CREATION;  Surgeon: Terry Vizcaino MD;  Location: [facility]    CV HEART CATHETERIZATION WITH POSSIBLE INTERVENTION N/A 2021    Procedure: Heart Catheterization  with Possible Intervention;  Surgeon: Joseluis Dean MD;  Location:  HEART CARDIAC CATH LAB    CV RIGHT HEART CATH MEASUREMENTS RECORDED N/A 2022    Procedure: Heart Cath Right Heart Cath;  Surgeon: Yan Heredia MD;  Location:  HEART CARDIAC CATH LAB    ENT SURGERY  as a child    tonsillectomy    ESOPHAGOSCOPY, GASTROSCOPY, DUODENOSCOPY (EGD), COMBINED N/A 2021    Procedure: ESOPHAGOGASTRODUODENOSCOPY, WITH BIOPSY;  Surgeon: Lincoln Farrar MD;  Location:  GI    EYE SURGERY Left     injections- eye    HYSTERECTOMY  approx     partial hysterectomy-reason bleeding     IR CVC TUNNEL PLACEMENT > 5 YRS OF AGE  2019    IR CVC TUNNEL REMOVAL RIGHT  2020    IR DIALYSIS FISTULOGRAM RIGHT  2019       Prior to Admission Medications   Prior to Admission Medications   Prescriptions Last Dose Informant Patient Reported? Taking?   ACCU-CHEK CHING PLUS test strip  Self No No   Sig: USE TO TEST BLOOD SUGAR 4 TIMES PER DAY   Continuous Blood Gluc Sensor (FREESTYLE LATOSHA 2 SENSOR) [facility]   No No   Si each every 14 days Use 1 sensor every 14 days. Use to read blood sugars per 's instructions.   Emollient (CERAVE) CREA  Self No No   Sig: Externally apply topically 2 times daily   Glycopyrrolate-Formoterol (BEVESPI AEROSPHERE) 9-4.8 MCG/ACT oral inhaler  Self Yes No   Sig: Inhale 2 puffs into the lungs 2 times daily    LEVEMIR FLEXTOUCH 100 UNIT/ML pen   No No   Sig: Inject 60 Units Subcutaneously At Bedtime   NOVOLOG FLEXPEN 100 UNIT/ML soln   No No   Sig: INJECT 9-12 UNITS BEFORE BREAKFAST, LUNCH, AND DINNER PLUS A CORRECTIVE SCALE OF 2 UNITS FOR EVERY 50 OVER 150   acetaminophen (TYLENOL) 500 MG tablet  Self Yes No   Sig: Take 500 mg by mouth every 8 hours as needed for mild pain   amiodarone (PACERONE) 200 MG tablet   No No   Sig: Take 200mg po 6 days a week.  Hold on Sundays   amoxicillin (AMOXIL) 500 MG capsule   Yes No   Sig: Take 1 capsule (500 mg total) by  mouth in the morning and 1 capsule (500 mg total) in the evening. Do all this for 14 days. Take after dialy   Patient not taking: Reported on 5/11/2023   aspirin (ASA) 81 MG EC tablet  Self No No   Sig: Take 1 tablet (81 mg) by mouth daily   blood glucose (ACCU-CHEK GUIDE) test strip   No No   Sig: USE TO TEST BLOOD GLUCOSE FOUR TIMES DAILY   blood glucose (NO BRAND SPECIFIED) lancets standard  Self No No   Sig: Use to test blood sugar three times daily or as directed.   blood glucose (NO BRAND SPECIFIED) lancets standard  Self No No   Sig: Use to test blood sugar 4 times daily or as directed.   blood glucose monitoring (NO BRAND SPECIFIED) meter device kit  Self No No   Sig: Use to test blood sugar 4 times daily or as directed.   famotidine (PEPCID) 10 MG tablet   No No   Sig: Take 1 tablet (10 mg) by mouth daily   fluticasone (FLONASE) 50 MCG/ACT nasal spray   No No   Sig: Spray 1 spray into both nostrils daily   Patient not taking: Reported on 5/11/2023   hypromellose-dextran (ARTIFICAL TEARS) 0.1-0.3 % ophthalmic solution  Self Yes No   Sig: Place 1 drop into both eyes daily as needed for dry eyes   insulin pen needle (31G X 8 MM) 31G X 8 MM miscellaneous  Self No No   Sig: Use 4 pen needles daily or as directed.   levalbuterol (XOPENEX HFA) 45 MCG/ACT inhaler  Self No No   Sig: Inhale 2 puffs into the lungs every 6 hours as needed for shortness of breath / dyspnea or wheezing   midodrine (PROAMATINE) 10 MG tablet   No No   Sig: Take 1 tablet (10 mg) by mouth 3 times daily (with meals)   pregabalin (LYRICA) 50 MG capsule   No No   Sig: Take 1 capsule (50 mg) by mouth At Bedtime      Facility-Administered Medications: None          Physical Exam   Vital Signs: Temp: 97.5  F (36.4  C) Temp src: Temporal BP: (!) 179/64 Pulse: 63   Resp: 16 SpO2: 96 % O2 Device: None (Room air)    Weight: 0 lbs 0 oz        General: Patient appears comfortable and in no acute distress.  HEENT: Head is atraumatic, normocephalic.   Pupils are equal, round and reactive to light.  No scleral icterus. Oral mucosa is moist   Neck: Neck is supple and No Lymphadenopathy   Respiratory: Lungs are clear to auscultation bilaterally with no wheeze or crackles   Cardiovascular: Regular rate , S1 and S2 normal with no murmer or rubs or gallops  Abdomen:   soft , non tender ,  distended and bowel sound present, umbilical hernia and there is oozing of fluid around the umbilical hernia  Skin: No skin rashes or lesions to inspection or palpation.  Neurologic: Higher functions are within normal limits. No obvious defects in speech, language and memory. No facial droop  Musculoskeletal: Normal Range of motion over upper and lower extremities bilaterally   Psychiatric: cooperative      Medical Decision Making       Time spent in care of patient is 75 minutes and I reviewed her CBC, comprehensive metabolic panel CT scan of the abdomen and pelvis and discussed plan of care in detail with ED physician, patient and I also reviewed her prior records in EMR    Data     I have personally reviewed the following data over the past 24 hrs:    5.4  \   12.2   / 141 (L)     140 98 42.0 (H) /  198 (H)   4.6 22 8.13 (H) \     ALT: 8 AST: 15 AP: 104 TBILI: 0.4   ALB: 3.4 (L) TOT PROTEIN: 6.1 (L) LIPASE: N/A       Imaging results reviewed over the past 24 hrs:   Recent Results (from the past 24 hour(s))   CT Abdomen Pelvis w Contrast    Narrative    EXAM: CT ABDOMEN PELVIS W CONTRAST  LOCATION: [facility]  DATE: 9/7/2023    INDICATION: hx of paracentesis, now has fluid draining from umbilicus  COMPARISON: Abdominal ultrasound 11/25/2022 and CT chest 06/15/2022  TECHNIQUE: CT scan of the abdomen and pelvis was performed following injection of IV contrast. Multiplanar reformats were obtained. Dose reduction techniques were used.  CONTRAST: 70mL Isovue 370    FINDINGS:   LOWER CHEST: Moderate-sized loculated appearing right pleural effusion with  atelectasis involving a significant portion of the right lower lobe. Volume loss right hemithorax with shift of the mediastinum and heart to the right. Extensive pericardial   calcifications. Coronary artery calcification.    HEPATOBILIARY: Small amount of ascites along the liver margin extending inferiorly along the right paracolic gutter. Tiny stones in the gallbladder.    PANCREAS: Normal.    SPLEEN: Normal.    ADRENAL GLANDS: Normal.    KIDNEYS/BLADDER: Marked bilateral renal cortical atrophy. No hydronephrosis.    BOWEL: Several distended loops of fluid and air-filled small bowel. The colon is of normal caliber.    LYMPH NODES: Normal.    VASCULATURE: Advanced atherosclerotic disease abdominal aorta and iliac arteries. Plaque and high-grade stenosis at the origins of the celiac trunk, superior mesenteric artery, and both renal arteries.    PELVIC ORGANS: Small amount of pelvic ascites.    MUSCULOSKELETAL: Fluid containing umbilical hernia measuring 5.8 x 5.0 cm. Extensive edematous changes in the subcutaneous fat anterior lower abdominal wall. Osteopenia. Degenerative disc disease lower lumbar spine.      Impression    IMPRESSION:   1.  Small amount of ascites in the abdomen and pelvis. There is also fluid within a prominent sized umbilical hernia as well as extensive edematous changes in the subcutaneous tissues.  2.  Moderate-sized right pleural effusion and atelectasis right lower lobe.  3.  Several mildly dilated loops of fluid and air-filled small bowel, nonspecific, possibly representing an ileus. No definite findings for bowel obstruction.  4.  Severe atherosclerotic disease.  5.  Gallstones.  6.  Advanced bilateral renal cortical atrophy.

## 2023-09-08 NOTE — PROGRESS NOTES
Admission    Patient arrives to room 610 via cart from ED.  Care plan note: completed    Inpatient nursing criteria listed below were met:    Did you put disposition on whiteboard and in sticky note: Yes  Full skin assessment done (add LDA if skin issue present). Initials of 2nd RN :Yes, PRERNA  Isolation education started/completed NA  Patient allergies verified with patient: Yes  Fall Risk? (Care plan updated, Education given and documented) Yes  Primary Care Plan initiated: Yes  Home medications documented in belongings flowsheet: NA  Patient belongings documented in belongings flowsheet: Yes  Reminder note (belongings/ medications) placed in discharge instructions:Yes  Admission profile/ required documentation complete: Yes  If patient is a 72 hour hold/Commitment are belongings removed from room and locked up? NA

## 2023-09-08 NOTE — CONSULTS
RENAL CONSULTATION NOTE    REFERRING MD:  Mila Barrett MD     REASON FOR CONSULTATION:  ESRD    HPI: 63 y.o woman with ESRD and liver cirrhosis, who was admitted on  for fluid leaking around the umbilicus.     CT scan showed small ascites and fluid in the large umbilical hernia.   A bag is covering the umbilical hernia.   Some abdominal discomfort    She gets dialysis on MWF.   I called and discussed the HD prescription with the outpatient dialysis RN (Raymond).     She says she takes midodrine 10 mg before and 20 mg mid treatment.   She wants her diet change back to regular.   She says she eats a regular diet at home.     ROS:  A complete review of systems was performed and is negative except as noted above.    PMH:    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, initial encounter (H) 2019    End stage renal failure on dialysis (H)     Esophagitis, unspecified     Former tobacco use     HEMORRHAGIC GASTROPATHY     History of blood transfusion     [place]    Iron deficiency anemia, unspecified     Mild persistent asthma     Obesity     Other and unspecified hyperlipidemia     Pneumonia     Pulmonary hypertension (H)     per 2012 echo mod.to severe pulmonary hypertension    Tobacco use disorder dc ed     Type 2 diabetes mellitus (H)     on Insulin- managed by PCP    Unspecified essential hypertension        PSH:    Past Surgical History:   Procedure Laterality Date    ABDOMINOPLASTY  pt unsure when    abdominoplasty-     SECTION  ,     x 2    COLONOSCOPY      [facility]    COLONOSCOPY N/A 2021    Procedure: COLONOSCOPY, WITH POLYPECTOMY AND BIOPSY;  Surgeon: Lincoln Farrar MD;  Location: [facility]    CREATE FISTULA ARTERIOVENOUS UPPER EXTREMITY Right 2018    Procedure: RIGHT  BRACHIAL TO BASILIC ARTERIOVENOUS FISTULA CREATION;  Surgeon: Terry Vizcaino MD;  Location: [facility]    CV HEART CATHETERIZATION WITH POSSIBLE INTERVENTION N/A 7/22/2021    Procedure: Heart Catheterization with Possible Intervention;  Surgeon: Joseluis Dean MD;  Location:  HEART CARDIAC CATH LAB    CV RIGHT HEART CATH MEASUREMENTS RECORDED N/A 11/23/2022    Procedure: Heart Cath Right Heart Cath;  Surgeon: Yan Heredia MD;  Location:  HEART CARDIAC CATH LAB    ENT SURGERY  as a child    tonsillectomy    ESOPHAGOSCOPY, GASTROSCOPY, DUODENOSCOPY (EGD), COMBINED N/A 5/27/2021    Procedure: ESOPHAGOGASTRODUODENOSCOPY, WITH BIOPSY;  Surgeon: Lincoln Farrar MD;  Location:  GI    EYE SURGERY Left     injections- eye    HYSTERECTOMY  approx 1983    partial hysterectomy-reason bleeding     IR CVC TUNNEL PLACEMENT > 5 YRS OF AGE  12/6/2019    IR CVC TUNNEL REMOVAL RIGHT  5/7/2020    IR DIALYSIS FISTULOGRAM RIGHT  12/9/2019       MEDICATIONS:     sodium chloride 0.9%  250 mL Intravenous Once in dialysis/CRRT    sodium chloride 0.9%  300 mL Hemodialysis Machine Once    albumin human  25-50 g Intravenous Once    amiodarone  200 mg Oral Daily    heparin  500 Units Hemodialysis Machine OR IV Push Once in dialysis/CRRT    insulin aspart  1-7 Units Subcutaneous TID AC    insulin aspart  1-5 Units Subcutaneous At Bedtime    insulin detemir  8 Units Subcutaneous At Bedtime    midodrine  10 mg Oral Once per day on Mon Wed Fri    [START ON 9/9/2023] midodrine  20 mg Oral Once per day on Tue Thu Sat    sodium chloride (PF)  3 mL Intracatheter Q8H       ALLERGIES:    Allergies as of 09/07/2023 - Reviewed 09/07/2023   Allergen Reaction Noted    Albuterol  11/29/2012    Aspirin  02/02/2005    Byetta [exenatide]  07/15/2008    Clonidine  03/13/2012    Codeine  07/16/2004    Ezetimibe  07/25/2012    Hydralazine  07/25/2012    Lisinopril  10/16/2012    Metformin hydrochloride  07/16/2004    Pravachol  [pravastatin sodium]  2011    Simvastatin  2011    Troglitazone  2004       FH:    Family History   Problem Relation Age of Onset    Arrhythmia Mother     Hypertension Mother     Pancreatic Cancer Father     Diabetes Brother     Asthma Sister     LUNG DISEASE Sister     Diabetes Daughter     Cirrhosis Sister        SH:    Social History     Socioeconomic History    Marital status: Single     Spouse name: Not on file    Number of children: Not on file    Years of education: Not on file    Highest education level: Not on file   Occupational History    Not on file   Tobacco Use    Smoking status: Former     Packs/day: 1.00     Years: 22.00     Pack years: 22.00     Types: Cigarettes     Quit date: 10/12/1999     Years since quittin.9    Smokeless tobacco: Never   Vaping Use    Vaping Use: Never used   Substance and Sexual Activity    Alcohol use: No     Alcohol/week: 0.0 standard drinks of alcohol    Drug use: No    Sexual activity: Never   Other Topics Concern     Service Not Asked    Blood Transfusions Yes    Caffeine Concern No    Occupational Exposure Not Asked    Hobby Hazards Not Asked    Sleep Concern No    Stress Concern No    Weight Concern Yes     Comment: would like to lose weight    Special Diet No    Back Care Not Asked    Exercise Yes     Comment: walks daily 2 blocks    Bike Helmet Not Asked    Seat Belt Not Asked    Self-Exams Not Asked    Parent/sibling w/ CABG, MI or angioplasty before 65F 55M? Not Asked   Social History Narrative    Not on file     Social Determinants of Health     Financial Resource Strain: Not on file   Food Insecurity: Not on file   Transportation Needs: Not on file   Physical Activity: Not on file   Stress: Not on file   Social Connections: Not on file   Intimate Partner Violence: Not on file   Housing Stability: Not on file       PHYSICAL EXAM:    BP (!) 145/58 (BP Location: Left arm)   Pulse (!) 48   Temp 98.4  F (36.9  C) (Oral)   Resp 16    Wt 63.6 kg (140 lb 4.8 oz)   LMP  (LMP Unknown)   SpO2 92%   BMI 27.40 kg/m    GENERAL: Frail.   HEENT:  Normocephalic. No gross abnormalities.  Pupils equal.  MMM.    CV: RRR, no murmurs, no clicks, gallops, or rubs, no edema,  RESP: No wheezes. BS diminish R lower lobe.   GI: Abdomen Distended, soft, minimal discomfort. A plastic bag is covering the umbilicus.   MUSCULOSKELETAL: extremities no edema  SKIN: no suspicious lesions or rashes, dry to touch  NEURO:  Awake, alert and conversing normally.   PSYCH: mood good, affect appropriate  LYMPH: No palpable ant/post cervical     LABS:      CBC RESULTS:     Recent Labs   Lab 09/08/23  0944 09/07/23  1711   WBC 6.0 5.4   RBC 4.35 4.65   HGB 11.6* 12.2   HCT 37.3 40.1   * 141*       BMP RESULTS:  Recent Labs   Lab 09/08/23  0944 09/08/23  0712 09/08/23  0219 09/07/23  2324 09/07/23  1711     --   --   --  140   POTASSIUM 4.4  --   --   --  4.6   CHLORIDE 95*  --   --   --  98   CO2 22  --   --   --  22   BUN 49.0*  --   --   --  42.0*   CR 8.85*  --   --   --  8.13*   * 170* 212* 234* 198*   KANWAL 8.5*  --   --   --  8.8       INRNo lab results found in last 7 days.     DIAGNOSTICS:  Reviewed    CT:  1.  Small amount of ascites in the abdomen and pelvis. There is also fluid within a prominent sized umbilical hernia as well as extensive edematous changes in the subcutaneous tissues.  2.  Moderate-sized right pleural effusion and atelectasis right lower lobe.  3.  Several mildly dilated loops of fluid and air-filled small bowel, nonspecific, possibly representing an ileus. No definite findings for bowel obstruction.  4.  Severe atherosclerotic disease.  5.  Gallstones.  6.  Advanced bilateral renal cortical atrophy.    A/P:  63 y.o woman with ESLD/cirrhosis and ESRD.     # ESRD:   -MWF   -R AVF, 15 g and Qb 450   -3 hrs   -EDW 64 kg (~ 2 liters)   + heparin 1000/500 units    # Anemia: Hgb is at target.    -Mircera 100 mcg q2 weeks, last dose on  Monday    # Intra-dialytic hypotension:    -midodrine 10 mg before HD and 20 mg mid tx    # ESLD and liver cirrhosis:    # Loculated R pleural effusion:     Plan:  # 3 hrs HD. UF ~ 1 liters. + midodrine  # change to to regular per pateint's request. She says she knows what to eat and what not to eat. Diet change-done.  # Next HD tx is on Monday       Theron Alonso MD  [facility] - Nephrology  Office Phone: [phone]  Pager: [phone]

## 2023-09-08 NOTE — PROGRESS NOTES
Potassium   Date Value Ref Range Status   09/08/2023 4.4 3.4 - 5.3 mmol/L Final   06/20/2022 4.7 3.4 - 5.3 mmol/L Final   10/05/2020 4.1 3.5 - 5.5 mEq/L Final     Hemoglobin   Date Value Ref Range Status   09/08/2023 11.6 (L) 11.7 - 15.7 g/dL Final   01/13/2020 8.4 (L) 11.7 - 15.7 g/dL Final     Creatinine   Date Value Ref Range Status   09/08/2023 8.85 (H) 0.51 - 0.95 mg/dL Final   01/13/2020 5.97 (H) 0.52 - 1.04 mg/dL Final     Urea Nitrogen   Date Value Ref Range Status   09/08/2023 49.0 (H) 8.0 - 23.0 mg/dL Final   06/20/2022 61 (H) 7 - 30 mg/dL Final   01/13/2020 44 (H) 7 - 30 mg/dL Final     Sodium   Date Value Ref Range Status   09/08/2023 136 136 - 145 mmol/L Final   01/13/2020 139 133 - 144 mmol/L Final     INR   Date Value Ref Range Status   11/25/2022 1.15 0.85 - 1.15 Final   01/12/2020 1.23 (H) 0.86 - 1.14 Final       DIALYSIS PROCEDURE NOTE  Hepatitis status of previous patient on machine log was checked and verified ok to use with this patients hepatitis status.  Patient dialyzed for 1 hrs and 15 min. on a K3 bath with a net fluid removal of  0.1L.  A BFR of 400 ml/min was obtained via a LUE AVF using 15 gauge needles.      The treatment plan was discussed with Dr. Alonso during the treatment.    Total heparin received during the treatment: 500 units.   Needle cannulation sites held x 10 min.     Meds  given: None ordered     Complications: PT began cramping 45 min into the run.  Cramping worse 15 min later, PT refused a NS bolus and asked to be taken off HD.  MD Alonso notified.      Person educated: PT. Knowledge base substantial. Barriers to learning: none. Educated on procedural via oral mode. Patient verbalized understanding. Pt prefers oral education style.     ICEBOAT? Timeout performed pre-treatment  I: Patient was identified using 2 identifiers  C:  Consent Signed Yes  E: Equipment preventative maintenance is current and dialysis delivery system OK to use  B: Hepatitis B Surface Antigen: negative;  Draw Date: 08/08/23, new labs drawn 9/8/23      O: Dialysis orders present and complete prior to treatment  A: Vascular access verified and assessed prior to treatment  T: Treatment was performed at a clinically appropriate time  ?: Patient was allowed to ask questions and address concerns prior to treatment  See Adult Hemodialysis flowsheet in [facility] for further details and post assessment.  Machine water alarm in place and functioning. Transducer pods intact and checked every 15min.   Pt returned via bed.  Chlorine/Chloramine water system checked every 4 hours.      Patient repositioned every 2 hours during the treatment.  Post treatment report given to TIM Pace RN regarding 0.1L of fluid removed andlast BP of 182/75.

## 2023-09-08 NOTE — ED NOTES
[facility]  ED Nurse Handoff Report    ED Chief complaint: Wound Check      ED Diagnosis:   Final diagnoses:   Other ascites   Umbilical hernia without obstruction and without gangrene   Wound drainage       Code Status: Full Code    Allergies:   Allergies   Allergen Reactions    Albuterol      shakiness    Aspirin      gi    Byetta [Exenatide]      gi    Clonidine      constipation    Codeine      vomiting    Ezetimibe      muscle symptoms    Hydralazine      headaches    Lisinopril      hyperkalemia    Metformin Hydrochloride      vomiting    Pravachol [Pravastatin Sodium]      muscle pains    Simvastatin      myalgias    Troglitazone        Patient Story: Pt with history of ascites and paracentesis noted fluid draining from her umbilicus  Focused Assessment:  Pt is alert and oriented, pleasant and cooperative.     Treatments and/or interventions provided: Labs, CT, medications  Labs Ordered and Resulted from Time of ED Arrival to Time of ED Departure   COMPREHENSIVE METABOLIC PANEL - Abnormal       Result Value    Sodium 140      Potassium 4.6      Chloride 98      Carbon Dioxide (CO2) 22      Anion Gap 20 (*)     Urea Nitrogen 42.0 (*)     Creatinine 8.13 (*)     Calcium 8.8      Glucose 198 (*)     Alkaline Phosphatase 104      AST 15      ALT 8      Protein Total 6.1 (*)     Albumin 3.4 (*)     Bilirubin Total 0.4      GFR Estimate 5 (*)    CBC WITH PLATELETS AND DIFFERENTIAL - Abnormal    WBC Count 5.4      RBC Count 4.65      Hemoglobin 12.2      Hematocrit 40.1      MCV 86      MCH 26.2 (*)     MCHC 30.4 (*)     RDW 16.8 (*)     Platelet Count 141 (*)     % Neutrophils 68      % Lymphocytes 15      % Monocytes 14      % Eosinophils 2      % Basophils 1      % Immature Granulocytes 0      NRBCs per 100 WBC 0      Absolute Neutrophils 3.7      Absolute Lymphocytes 0.8      Absolute Monocytes 0.7      Absolute Eosinophils 0.1      Absolute Basophils 0.1      Absolute Immature Granulocytes 0.0       Absolute NRBCs 0.0       CT Abdomen Pelvis w Contrast   Final Result   IMPRESSION:    1.  Small amount of ascites in the abdomen and pelvis. There is also fluid within a prominent sized umbilical hernia as well as extensive edematous changes in the subcutaneous tissues.   2.  Moderate-sized right pleural effusion and atelectasis right lower lobe.   3.  Several mildly dilated loops of fluid and air-filled small bowel, nonspecific, possibly representing an ileus. No definite findings for bowel obstruction.   4.  Severe atherosclerotic disease.   5.  Gallstones.   6.  Advanced bilateral renal cortical atrophy.          Patient's response to treatments and/or interventions: Tolerating interventions well    To be done/followed up on inpatient unit:  Continue plan of care    Does this patient have any cognitive concerns?:  none    Activity level - Baseline/Home:  Independent  Activity Level - Current:   Stand with Assist    Patient's Preferred language: English   Needed?: No    Isolation: None  Infection: Not Applicable  Patient tested for COVID 19 prior to admission: NO  Bariatric?: No    Vital Signs:   Vitals:    09/07/23 1201   BP: (!) 179/64   Pulse: 63   Resp: 16   Temp: 97.5  F (36.4  C)   TempSrc: Temporal   SpO2: 96%       Cardiac Rhythm:     Was the PSS-3 completed:   Yes  What interventions are required if any?               Family Comments: Not present   OBS brochure/video discussed/provided to patient/family: No              Name of person given brochure if not patient: n/a              Relationship to patient: n/a    For the majority of the shift this patient's behavior was Green.   Behavioral interventions performed were Rounding.    ED NURSE PHONE NUMBER: RN 1

## 2023-09-08 NOTE — PHARMACY-ADMISSION MEDICATION HISTORY
Pharmacist Admission Medication History    Admission medication history is complete. The information provided in this note is only as accurate as the sources available at the time of the update.    Medication reconciliation/reorder completed by provider prior to medication history? No    Information Source(s): Patient and CareEverywhere/SureScripts via in-person    Pertinent Information:   -Patient notes she's not taking pregabalin  -Patient is a somewhat reliable historian--I do appreciate she hasn't filled some medications since March that were for 90 days supply    Changes made to PTA medication list:  Added: None  Deleted: pregabalin, aspirin, Flonase, Bevespi  Changed: Levemir dosing    Medication History Completed By: Theresa Buckner [facility] 9/7/2023 9:48 PM    Prior to Admission medications    Medication Sig Last Dose Taking? Auth Provider Long Term End Date   acetaminophen (TYLENOL) 500 MG tablet Take 500 mg by mouth every 8 hours as needed for mild pain  at prn Yes Unknown, Entered By History     amiodarone (PACERONE) 200 MG tablet Take 200mg po 6 days a week.  Hold on Sundays Past Week Yes France Barbosa MD Yes    famotidine (PEPCID) 10 MG tablet Take 10 mg by mouth daily as needed  at prn Yes Unknown, Entered By History     levalbuterol (XOPENEX HFA) 45 MCG/ACT inhaler Inhale 2 puffs into the lungs every 6 hours as needed for shortness of breath / dyspnea or wheezing  at prn Yes Gigi Martin MD Yes    LEVEMIR FLEXTOUCH 100 UNIT/ML pen Inject 60 Units Subcutaneously At Bedtime  Patient taking differently: Inject 11 Units Subcutaneous At Bedtime Past Week Yes Gigi Martin MD Yes    midodrine (PROAMATINE) 10 MG tablet Take 10 mg by mouth three times a week Before dialysis, take with food Past Week Yes Unknown, Entered By History     midodrine (PROAMATINE) 10 MG tablet Take 20 mg by mouth three times a week During dialysis, take with food Past Week Yes Unknown, Entered By History      NOVOLOG FLEXPEN 100 UNIT/ML soln INJECT 9-12 UNITS BEFORE BREAKFAST, LUNCH, AND DINNER PLUS A CORRECTIVE SCALE OF 2 UNITS FOR EVERY 50 OVER 150 9/6/2023 Yes Gigi Martin MD Yes    ACCU-CHEK CHING PLUS test strip USE TO TEST BLOOD SUGAR 4 TIMES PER DAY   Gigi Martin MD No    blood glucose (ACCU-CHEK GUIDE) test strip USE TO TEST BLOOD GLUCOSE FOUR TIMES DAILY   Gigi Martin MD No    blood glucose (NO BRAND SPECIFIED) lancets standard Use to test blood sugar 4 times daily or as directed.   Gigi Martin MD     blood glucose (NO BRAND SPECIFIED) lancets standard Use to test blood sugar three times daily or as directed.   Gigi Martin MD     blood glucose monitoring (NO BRAND SPECIFIED) meter device kit Use to test blood sugar 4 times daily or as directed.   Gigi Martin MD     Continuous Blood Gluc Sensor (FREESTYLE LATOSHA 2 SENSOR) MISC 1 each every 14 days Use 1 sensor every 14 days. Use to read blood sugars per 's instructions.   Gigi Martin MD     insulin pen needle (31G X 8 MM) 31G X 8 MM miscellaneous Use 4 pen needles daily or as directed.   Gigi Martin MD

## 2023-09-08 NOTE — CONSULTS
General Surgery Consultation  We are asked by Dr. Gonzales to see Yissel Wetzel in consultation regarding fluid in umbilical hernia and ileus.    Yissel Wetzel  YOB: 1960    Age: 63 year old  MRN#: [id number]    Date of Admission: 9/7/2023  Surgeon:   Arlene Tate MD                  Chief Complaint:   Wound concerns         History of Present Illness:   This patient is a 63 year old female with a history of ESRD on dialysis, DM2, pulmonary hypertension, cirrhosis with ascites who presented to the [facility] ED for evaluation of drainage from her umbilical area. Cassandra most recently had a paracentesis performed on 6/27. Then 2 days ago when she was at dialysis she noticed that her umbilicus was protruding and drainage some clear fluid. Cassandra tells me that she intermittently struggles with her appetite and constipation, this seems to improve after paracentesis at times. She denies any abdominal pain, nausea, or vomiting. She states that she was feeling slightly constipated with decreased appetite earlier this week, but this has been much better the past 2 days. She had 1 BM yesterday, 2 so far today and currently feels like she needs to have another BM. BMs have been fairly soft, not hard and no diarrhea. Her appetite seems to have normalized as well; she is currently eating lunch during my visit.    CT scan of her abdomen on admission yesterday revealed small amount of ascites, mildly dilated loops of small bowel without definite obstruction, and fluid-filled umbilical hernia with subcutaneous edema. We have been consulted regarding possible ileus and umbilical hernia.         Past Medical History:    has a past medical history of Allergic rhinitis, cause unspecified, Anemia in chronic kidney disease, Anemia of chronic disease, Bleeding pseudoaneurysm of right brachiocephalic arteriovenous fistula, initial encounter (H) (12/6/2019), End stage renal failure on dialysis  (H), Esophagitis, unspecified, Former tobacco use, HEMORRHAGIC GASTROPATHY, History of blood transfusion, Iron deficiency anemia, unspecified, Mild persistent asthma, Obesity, Other and unspecified hyperlipidemia, Pneumonia, Pulmonary hypertension (H), Tobacco use disorder ([place] ed ), Type 2 diabetes mellitus (H), and Unspecified essential hypertension.          Past Surgical History:     Past Surgical History:   Procedure Laterality Date    ABDOMINOPLASTY  pt unsure when    abdominoplasty-     SECTION  ,     x 2    COLONOSCOPY      [facility]    COLONOSCOPY N/A 2021    Procedure: COLONOSCOPY, WITH POLYPECTOMY AND BIOPSY;  Surgeon: Lincoln Farrar MD;  Location:  GI    CREATE FISTULA ARTERIOVENOUS UPPER EXTREMITY Right 2018    Procedure: RIGHT BRACHIAL TO BASILIC ARTERIOVENOUS FISTULA CREATION;  Surgeon: Terry Vizcaino MD;  Location: [facility]    CV HEART CATHETERIZATION WITH POSSIBLE INTERVENTION N/A 2021    Procedure: Heart Catheterization with Possible Intervention;  Surgeon: Joseluis Dean MD;  Location:  HEART CARDIAC CATH LAB    CV RIGHT HEART CATH MEASUREMENTS RECORDED N/A 2022    Procedure: Heart Cath Right Heart Cath;  Surgeon: Yan Heredia MD;  Location:  HEART CARDIAC CATH LAB    ENT SURGERY  as a child    tonsillectomy    ESOPHAGOSCOPY, GASTROSCOPY, DUODENOSCOPY (EGD), COMBINED N/A 2021    Procedure: ESOPHAGOGASTRODUODENOSCOPY, WITH BIOPSY;  Surgeon: Lincoln Farrar MD;  Location:  GI    EYE SURGERY Left     injections- eye    HYSTERECTOMY  approx     partial hysterectomy-reason bleeding     IR CVC TUNNEL PLACEMENT > 5 YRS OF AGE  2019    IR CVC TUNNEL REMOVAL RIGHT  2020    IR DIALYSIS FISTULOGRAM RIGHT  2019            Medications:     Prior to Admission medications    Medication Sig Start Date End Date Taking? Authorizing Provider   acetaminophen (TYLENOL) 500 MG tablet Take  500 mg by mouth every 8 hours as needed for mild pain   Yes Unknown, Entered By History   amiodarone (PACERONE) 200 MG tablet Take 200mg po 6 days a week.  Hold on Sundays 9/9/22  Yes France Barbosa MD   famotidine (PEPCID) 10 MG tablet Take 10 mg by mouth daily as needed   Yes Unknown, Entered By History   levalbuterol (XOPENEX HFA) 45 MCG/ACT inhaler Inhale 2 puffs into the lungs every 6 hours as needed for shortness of breath / dyspnea or wheezing 3/9/21  Yes Gigi Martin MD   LEVEMIR FLEXTOUCH 100 UNIT/ML pen Inject 60 Units Subcutaneously At Bedtime  Patient taking differently: Inject 11 Units Subcutaneous At Bedtime 1/23/23  Yes Gigi Martin MD   midodrine (PROAMATINE) 10 MG tablet Take 10 mg by mouth three times a week Before dialysis, take with food   Yes Unknown, Entered By History   midodrine (PROAMATINE) 10 MG tablet Take 20 mg by mouth three times a week During dialysis, take with food   Yes Unknown, Entered By History   NOVOLOG FLEXPEN 100 UNIT/ML soln INJECT 9-12 UNITS BEFORE BREAKFAST, LUNCH, AND DINNER PLUS A CORRECTIVE SCALE OF 2 UNITS FOR EVERY 50 OVER 150 1/23/23  Yes Gigi Martin MD   ACCU-CHEK CHING PLUS test strip USE TO TEST BLOOD SUGAR 4 TIMES PER DAY 10/28/21   Gigi Martin MD   blood glucose (ACCU-CHEK GUIDE) test strip USE TO TEST BLOOD GLUCOSE FOUR TIMES DAILY 8/15/23   Gigi Martin MD   blood glucose (NO BRAND SPECIFIED) lancets standard Use to test blood sugar 4 times daily or as directed. 11/2/21   Gigi Martin MD   blood glucose (NO BRAND SPECIFIED) lancets standard Use to test blood sugar three times daily or as directed. 11/1/21   Gigi Martin MD   blood glucose monitoring (NO BRAND SPECIFIED) meter device kit Use to test blood sugar 4 times daily or as directed. 11/2/21   Gigi Martin MD   Continuous Blood Gluc Sensor (FREESTYLE LATOSHA 2 SENSOR) MISC 1 each every 14 days Use 1 sensor every 14 days. Use to  read blood sugars per 's instructions. 9/15/22   Gigi Martin MD   insulin pen needle (31G X 8 MM) 31G X 8 MM miscellaneous Use 4 pen needles daily or as directed. 21   Gigi Martin MD            Allergies:     Allergies   Allergen Reactions    Albuterol      shakiness    Aspirin      gi    Byetta [Exenatide]      gi    Clonidine      constipation    Codeine      vomiting    Ezetimibe      muscle symptoms    Hydralazine      headaches    Lisinopril      hyperkalemia    Metformin Hydrochloride      vomiting    Pravachol [Pravastatin Sodium]      muscle pains    Simvastatin      myalgias    Troglitazone             Social History:     Social History     Tobacco Use    Smoking status: Former     Packs/day: 1.00     Years: 22.00     Pack years: 22.00     Types: Cigarettes     Quit date: 10/12/1999     Years since quittin.9    Smokeless tobacco: Never   Substance Use Topics    Alcohol use: No     Alcohol/week: 0.0 standard drinks of alcohol              Physical Exam:   Blood pressure (!) 182/75, pulse 72, temperature 98.2  F (36.8  C), temperature source Oral, resp. rate 16, weight 63.6 kg (140 lb 4.8 oz), SpO2 94 %, not currently breastfeeding.  No intake/output data recorded.    General - This is a well developed, well nourished female in no apparent distress. Alert and oriented x 3  Head - normocephalic, atraumatic  Respiratory - non-labored breathing, no supplemental O2  Abdomen - Somewhat distended but completely soft and nontender. Umbilical hernia present with thin overlying skin. This is completely reducible, filled only with fluid that evacuates quickly on palpation. Hernia is completely nontender; there is no warmth or induration. Ostomy appliance is in place and controlling drainage well.   Neurologic - Nonfocal          Data:   Labs:  Recent Labs   Lab Test 23  0944 23  1711 22  0641   WBC 6.0 5.4 5.9   HGB 11.6* 12.2 9.9*   HCT 37.3 40.1 31.2*   PLT  144* 141* 134*     Recent Labs   Lab Test 09/08/23  0944 09/07/23  1711 11/25/22  0641   POTASSIUM 4.4 4.6 4.9   CHLORIDE 95* 98 97*   CO2 22 22 21*   BUN 49.0* 42.0* 63.3*   CR 8.85* 8.13* 6.39*     Recent Labs   Lab Test 09/07/23 1711 11/25/22  0641 11/24/22  0546   BILITOTAL 0.4 0.4 0.4   ALT 8 6* 6*   AST 15 18 18   ALKPHOS 104 153* 140*     Recent Labs   Lab Test 09/08/23  0944 09/07/23  1711 11/25/22  0641 11/24/22  0546 12/05/19 2125 09/25/19  1453 03/18/19  0935   KANWAL 8.5* 8.8 8.9 8.5*   < > 8.4* 7.5*   PHOS  --   --   --  7.4*  --  5.8* 3.6    < > = values in this interval not displayed.     Recent Labs   Lab Test 09/08/23  0944 09/07/23 1711 11/25/22  0641 11/24/22  0546 12/05/19 2125 09/25/19  1455 08/30/18  1318 06/07/18  1455 08/11/17  1335 08/09/17  1640   ANIONGAP 19* 20* 20* 18*   < >  --    < >  --    < >  --    PROTEIN  --   --   --   --   --  100*  --  >=300*  --  >=300*   ALBUMIN  --  3.4* 3.0* 2.9*   < >  --    < >  --    < >  --     < > = values in this interval not displayed.       CT scan of the abdomen:   FINDINGS:   LOWER CHEST: Moderate-sized loculated appearing right pleural effusion with atelectasis involving a significant portion of the right lower lobe. Volume loss right hemithorax with shift of the mediastinum and heart to the right. Extensive pericardial   calcifications. Coronary artery calcification.     HEPATOBILIARY: Small amount of ascites along the liver margin extending inferiorly along the right paracolic gutter. Tiny stones in the gallbladder.     PANCREAS: Normal.     SPLEEN: Normal.     ADRENAL GLANDS: Normal.     KIDNEYS/BLADDER: Marked bilateral renal cortical atrophy. No hydronephrosis.     BOWEL: Several distended loops of fluid and air-filled small bowel. The colon is of normal caliber.     LYMPH NODES: Normal.     VASCULATURE: Advanced atherosclerotic disease abdominal aorta and iliac arteries. Plaque and high-grade stenosis at the origins of the celiac trunk,  superior mesenteric artery, and both renal arteries.     PELVIC ORGANS: Small amount of pelvic ascites.     MUSCULOSKELETAL: Fluid containing umbilical hernia measuring 5.8 x 5.0 cm. Extensive edematous changes in the subcutaneous fat anterior lower abdominal wall. Osteopenia. Degenerative disc disease lower lumbar spine.                                                                      IMPRESSION:   1.  Small amount of ascites in the abdomen and pelvis. There is also fluid within a prominent sized umbilical hernia as well as extensive edematous changes in the subcutaneous tissues.  2.  Moderate-sized right pleural effusion and atelectasis right lower lobe.  3.  Several mildly dilated loops of fluid and air-filled small bowel, nonspecific, possibly representing an ileus. No definite findings for bowel obstruction.  4.  Severe atherosclerotic disease.  5.  Gallstones.  6.  Advanced bilateral renal cortical atrophy.           Assessment:   Yissel Wetzel is a 63 year old female with multiple medical issues including end stage renal disease on dialysis, heart failure, cirrhosis with ascites now admitted with drainage from umbilical hernia and likely mild ileus. Ileus is improving/resolving. She is tolerating regular diet and having bowel function. Umbilical hernia is fluid-filled, but not tender and not incarcerated         Plan:   - Patient is very high risk for any form of hernia repair. With current ascites and fluid building up within hernia, surgical intervention would likely result in chronic wound with ongoing drainage and poor healing.   - No indication for urgent surgical intervention for this reducible, nontender umbilical hernia. Plan is for hepatology consultation as an outpatient. Would at least consider consult for TIPS procedure (not performed at our facility) to alleviate pressure and fluid buildup.  - Continue with wound care / ostomy appliance to umbilicus. Skin viability can be threatened in this  setting. Primary team can continue to monitor.   - Regarding ileus, this is already resolving. Recommend more aggressive bowel regimen with miralax and senna-docusate. Patient stated that she will refuse bowel regimen. Miralax ordered BID prn, senna-docusate BID scheduled while inpatient  - General surgery will sign off at this time. Please call with any questions    Time spent: 60 minutes total time spent on the date of this encounter doing: chart review, review of test results, patient visit/obtaining a history, physical exam, education, counseling, developing plan of care, and documenting.      I have discussed the history, physical, and plan with Dr. Tate who will independently interview and examine the patient and update the stated plan as indicated.        WIL Gorman-POLLO  [facility]  [phone]

## 2023-09-08 NOTE — PROGRESS NOTES
RECEIVING UNIT ED HANDOFF REVIEW    ED Nurse Handoff Report was reviewed by: Magali Iyer RN on September 7, 2023 at 9:44 PM

## 2023-09-08 NOTE — PROGRESS NOTES
[facility]    Medicine Progress Note - Hospitalist Service    Date of Admission:  9/7/2023    Assessment & Plan      Yissel Wetzel is a 63 year old female who has a significant medical history including diabetes mellitus on insulin, ESRD on hemodialysis , severe pulmonary hypertension, chronic diastolic heart failure, hyperlipidemia, history of SVT, anemia of chronic disease, decompensated cirrhosis due to right heart disease who presented to the ED with a chief complaint that she noticed that fluid was draining from her umbilicus and it started yesterday and she was having some discomfort around the umbilicus       Dainage around the umbilical area  History of ascites  History of decompensated cirrhosis likely due to right-sided heart failure with history of severe pulmonary hypertension  Umbilical hernia  -I did review her chart and the patient seems to have history of ascites in the past and last time paracentesis was done on 6/23  -On admission presented with complaint of fluid leakage around the site of the umbilical hernia  -CT scan of the abdomen and pelvis showed small amount of ascites in the abdomen and pelvis and there is also fluid within the prominent sized umbilical hernia as well as edematous changes in the subcutaneous tissue, gallstone, severe atherosclerotic disease  -On examination she has distended abdomen but bowel sounds are present and there is presence of umbilical hernia and there is oozing of some fluid and does not seem to have any signs of peritonitis at this point in time  -The ED physician did talk with surgery and they recommended that patient be started on antibiotics and acetic fluid should be tapped  -Patient was started on Rocephin in the ED and we will continue the same and underwent paracentesis on 9/8.23 and follow labs for sbp testing     Continue ostomy appliance secondary drainage and she underwent paracentesis today  -Patient underwent  paracentesis today.    General surgery has seen the patient and did not recommend umbilical hernia repair as patient is very high risk.             History of ascites due to decompensated cirrhosis due to right-sided heart failure  - patient will benefit from a consult with hepatology as outpatient     Moderate sized right pleural effusion  -CT scan of the abdomen pelvis and chest also showed moderate-sized right pleural effusion and atelectasis of the right lower lobe and this is due to underlying volume overload and patient will undergo hemodialysis which will be helpful  -Underwent dailysis today and will undego dialysis tomorrow      ESRD on hemodialysis  -Patient hemodialysis is on Tuesday Thursdays and Saturday schedule and her last dialysis was yesterday and underwent dailysis today and will undergo dialysis tomorrow      Diabetes mellitus on insulin  -She is taking 11 units of Levemir in the   Patient says that she takes insulin as per carb counting and takes 2 units per 15 g of carb and she wants that to be restarted.    Continue medium dose sliding scale   Hypoglycemia protocol      History of SVT  -Continue with amiodarone     History of severe pulmonary hypertension  -She follows with Dr. Easton from [facility] cardiology and was last seen on 1/11/2023 and I reviewed his note  -Last echo done in 11/22 showed EF of 55 to 60%, RV function is reduced, mild RVH and RVSP is 55 and no pericardial effusion was present at that time     History of COPD  -On exam she does not have any wheezing and we will continue with her PTA home inhalers/nebs     Concern for noncompliance  -I have spoken with the pharmacy staff and there is initial concern that patient might not have been filling her medications or taking her medications properl          Diet: Regular Diet Adult    DVT Prophylaxis: Pneumatic Compression Devices  Weller Catheter: Not present  Lines: None     Cardiac Monitoring: None  Code Status: Full Code       Clinically Significant Risk Factors Present on Admission             # Anion Gap Metabolic Acidosis: Highest Anion Gap = 20 mmol/L in last 2 days, will monitor and treat as appropriate  # Hypoalbuminemia: Lowest albumin = 3.4 g/dL at 9/7/2023  5:11 PM, will monitor as appropriate     # Hypertension: Noted on problem list                 Disposition Plan      Expected Discharge Date: 09/09/2023                  Josemanuel Gonzales MD  Hospitalist Service  [facility]  Securely message with [employer] (more info)  Text page via [employer] Paging/Directory   ______________________________________________________________________    Interval History   Patient seen and examined at bedside.  Dialysis had to be stopped because of cramping.  She does not have any chest pain.  She does not have any abdominal pain.  Not having any fevers wants to restart her home insulin regimen which is carb counting with 2 units per 15 g of carb       Physical Exam   Vital Signs: Temp: 98.2  F (36.8  C) Temp src: Oral BP: (!) 149/62 Pulse: 66   Resp: 16 SpO2: 95 % O2 Device: None (Room air)    Weight: 140 lbs 4.8 oz    Physical Exam  Constitutional:       Comments: Chronically sick appearing    Cardiovascular:      Rate and Rhythm: Normal rate and regular rhythm.      Heart sounds: Normal heart sounds.   Pulmonary:      Effort: Pulmonary effort is normal. No respiratory distress.   Abdominal:      General: There is no distension.      Palpations: Abdomen is soft.      Tenderness: There is no abdominal tenderness.      Comments: Umbilical hernnia +          Medical Decision Making             Data     I have personally reviewed the following data over the past 24 hrs:    6.0  \   11.6 (L)   / 144 (L)     136 95 (L) 49.0 (H) /  150 (H)   4.4 22 8.85 (H) \     ALT: N/A AST: N/A AP: N/A TBILI: N/A   ALB: N/A TOT PROTEIN: N/A LIPASE: N/A       Imaging results reviewed over the past 24 hrs:   Recent Results (from the  past 24 hour(s))   CT Abdomen Pelvis w Contrast    Narrative    EXAM: CT ABDOMEN PELVIS W CONTRAST  LOCATION: [facility]  DATE: 9/7/2023    INDICATION: hx of paracentesis, now has fluid draining from umbilicus  COMPARISON: Abdominal ultrasound 11/25/2022 and CT chest 06/15/2022  TECHNIQUE: CT scan of the abdomen and pelvis was performed following injection of IV contrast. Multiplanar reformats were obtained. Dose reduction techniques were used.  CONTRAST: 70mL Isovue 370    FINDINGS:   LOWER CHEST: Moderate-sized loculated appearing right pleural effusion with atelectasis involving a significant portion of the right lower lobe. Volume loss right hemithorax with shift of the mediastinum and heart to the right. Extensive pericardial   calcifications. Coronary artery calcification.    HEPATOBILIARY: Small amount of ascites along the liver margin extending inferiorly along the right paracolic gutter. Tiny stones in the gallbladder.    PANCREAS: Normal.    SPLEEN: Normal.    ADRENAL GLANDS: Normal.    KIDNEYS/BLADDER: Marked bilateral renal cortical atrophy. No hydronephrosis.    BOWEL: Several distended loops of fluid and air-filled small bowel. The colon is of normal caliber.    LYMPH NODES: Normal.    VASCULATURE: Advanced atherosclerotic disease abdominal aorta and iliac arteries. Plaque and high-grade stenosis at the origins of the celiac trunk, superior mesenteric artery, and both renal arteries.    PELVIC ORGANS: Small amount of pelvic ascites.    MUSCULOSKELETAL: Fluid containing umbilical hernia measuring 5.8 x 5.0 cm. Extensive edematous changes in the subcutaneous fat anterior lower abdominal wall. Osteopenia. Degenerative disc disease lower lumbar spine.      Impression    IMPRESSION:   1.  Small amount of ascites in the abdomen and pelvis. There is also fluid within a prominent sized umbilical hernia as well as extensive edematous changes in the subcutaneous tissues.  2.   Moderate-sized right pleural effusion and atelectasis right lower lobe.  3.  Several mildly dilated loops of fluid and air-filled small bowel, nonspecific, possibly representing an ileus. No definite findings for bowel obstruction.  4.  Severe atherosclerotic disease.  5.  Gallstones.  6.  Advanced bilateral renal cortical atrophy.   US Paracentesis with Albumin    Narrative    US PARACENTESIS WITH ALBUMIN 9/8/2023 3:50 PM    CLINICAL HISTORY: Ascites and has drainage of fluid around the site of  umbilical hernia    PROCEDURE: Informed consent obtained. Time out performed. The abdomen  was prepped and draped in a sterile fashion. 10 mL of 1% lidocaine was  infused into local soft tissues. An 8 Frisian catheter system was  introduced into the abdominal ascites under ultrasound guidance.    0.35 liters of clear fluid were removed and sent to lab if requested.    Patient tolerated procedure well.    Ultrasound imaging was obtained and placed in the patient's permanent  medical record.      Impression    IMPRESSION:  1.  Status post ultrasound-guided paracentesis.    CHETNA LU MD         SYSTEM ID:  [id number]

## 2023-09-08 NOTE — UTILIZATION REVIEW
"  Admission Status; Secondary Review Determination         Under the authority of the Utilization Management Committee, the utilization review process indicated a secondary review on the above patient.  The review outcome is based on review of the medical records, discussions with staff, and applying clinical experience noted on the date of the review.          (x) Observation Status Appropriate - This patient does not meet hospital inpatient criteria and is placed in observation status. If this patient's primary payer is Medicare and was admitted as an inpatient, Condition Code 44 should be used and patient status changed to \"observation\".     RATIONALE FOR DETERMINATION     The severity of illness, intensity of service provided, expected LOS and risk for adverse outcome make the care appropriate for further observation; however, doesn't meet criteria for hospital inpatient admission. Dr Gonzales notified of this determination.      63 year old female who has a significant medical history including diabetes mellitus on insulin, ESRD on hemodialysis , severe pulmonary hypertension, chronic diastolic heart failure, hyperlipidemia, history of SVT, anemia of chronic disease, decompensated cirrhosis due to right heart disease who presented to the ED with a chief complaint that she noticed that fluid was draining from her umbilicus. Surgery was consulted and recommended IV antibiotics and to obtain paracentesis. There are no elevation of septic parameters. CT scan of the abdomen and pelvis showed small amount of ascites in the abdomen and pelvis and there is also fluid within the prominent sized umbilical hernia as well as edematous changes in the subcutaneous tissue, gallstone, severe atherosclerotic disease. Given her current situation, would change to observation status for now pending the paracentesis. If continuedd antibiotics are indicated or if surgical intervention needed would send for re-review.      The " information on this document is developed by the utilization review team in order for the business office to ensure compliance.  This only denotes the appropriateness of proper admission status and does not reflect the quality of care rendered.         The definitions of Inpatient Status and Observation Status used in making the determination above are those provided in the CMS Coverage Manual, Chapter 1 and Chapter 6, section 70.4.      Sincerely,     Artemio Puga DO   Physician Advisor  Utilization Management  [facility].

## 2023-09-09 VITALS
SYSTOLIC BLOOD PRESSURE: 145 MMHG | HEIGHT: 60 IN | BODY MASS INDEX: 27.79 KG/M2 | OXYGEN SATURATION: 95 % | DIASTOLIC BLOOD PRESSURE: 60 MMHG | WEIGHT: 141.54 LBS | HEART RATE: 62 BPM | TEMPERATURE: 98.5 F | RESPIRATION RATE: 16 BRPM

## 2023-09-09 LAB
ANION GAP SERPL CALCULATED.3IONS-SCNC: 17 MMOL/L (ref 7–15)
BUN SERPL-MCNC: 48.2 MG/DL (ref 8–23)
CALCIUM SERPL-MCNC: 8.6 MG/DL (ref 8.8–10.2)
CHLORIDE SERPL-SCNC: 97 MMOL/L (ref 98–107)
CREAT SERPL-MCNC: 7.78 MG/DL (ref 0.51–0.95)
DEPRECATED HCO3 PLAS-SCNC: 22 MMOL/L (ref 22–29)
EGFRCR SERPLBLD CKD-EPI 2021: 5 ML/MIN/1.73M2
GLUCOSE BLDC GLUCOMTR-MCNC: 121 MG/DL (ref 70–99)
GLUCOSE BLDC GLUCOMTR-MCNC: 175 MG/DL (ref 70–99)
GLUCOSE BLDC GLUCOMTR-MCNC: 188 MG/DL (ref 70–99)
GLUCOSE BLDC GLUCOMTR-MCNC: 210 MG/DL (ref 70–99)
GLUCOSE SERPL-MCNC: 169 MG/DL (ref 70–99)
POTASSIUM SERPL-SCNC: 4.9 MMOL/L (ref 3.4–5.3)
SODIUM SERPL-SCNC: 136 MMOL/L (ref 136–145)

## 2023-09-09 PROCEDURE — 99232 SBSQ HOSP IP/OBS MODERATE 35: CPT | Performed by: INTERNAL MEDICINE

## 2023-09-09 PROCEDURE — G0378 HOSPITAL OBSERVATION PER HR: HCPCS

## 2023-09-09 PROCEDURE — 99239 HOSP IP/OBS DSCHRG MGMT >30: CPT | Performed by: STUDENT IN AN ORGANIZED HEALTH CARE EDUCATION/TRAINING PROGRAM

## 2023-09-09 PROCEDURE — 99231 SBSQ HOSP IP/OBS SF/LOW 25: CPT | Performed by: PHYSICIAN ASSISTANT

## 2023-09-09 PROCEDURE — 36415 COLL VENOUS BLD VENIPUNCTURE: CPT | Performed by: STUDENT IN AN ORGANIZED HEALTH CARE EDUCATION/TRAINING PROGRAM

## 2023-09-09 PROCEDURE — 80048 BASIC METABOLIC PNL TOTAL CA: CPT | Performed by: STUDENT IN AN ORGANIZED HEALTH CARE EDUCATION/TRAINING PROGRAM

## 2023-09-09 PROCEDURE — 82962 GLUCOSE BLOOD TEST: CPT

## 2023-09-09 PROCEDURE — 250N000013 HC RX MED GY IP 250 OP 250 PS 637: Performed by: HOSPITALIST

## 2023-09-09 RX ORDER — HEPARIN SODIUM 1000 [USP'U]/ML
500 INJECTION, SOLUTION INTRAVENOUS; SUBCUTANEOUS CONTINUOUS
Status: DISCONTINUED | OUTPATIENT
Start: 2023-09-09 | End: 2023-09-09 | Stop reason: HOSPADM

## 2023-09-09 RX ORDER — INSULIN DETEMIR 100 [IU]/ML
11 INJECTION, SOLUTION SUBCUTANEOUS AT BEDTIME
Start: 2023-09-09 | End: 2024-02-21

## 2023-09-09 RX ORDER — INSULIN DETEMIR 100 [IU]/ML
11 INJECTION, SOLUTION SUBCUTANEOUS AT BEDTIME
Status: CANCELLED
Start: 2023-09-09

## 2023-09-09 RX ORDER — ALBUMIN (HUMAN) 12.5 G/50ML
50 SOLUTION INTRAVENOUS
Status: DISCONTINUED | OUTPATIENT
Start: 2023-09-09 | End: 2023-09-09 | Stop reason: HOSPADM

## 2023-09-09 RX ADMIN — INSULIN ASPART 1 UNITS: 100 INJECTION, SOLUTION INTRAVENOUS; SUBCUTANEOUS at 16:42

## 2023-09-09 RX ADMIN — ACETAMINOPHEN 500 MG: 500 TABLET, FILM COATED ORAL at 14:41

## 2023-09-09 RX ADMIN — ACETAMINOPHEN 500 MG: 500 TABLET, FILM COATED ORAL at 06:53

## 2023-09-09 RX ADMIN — AMIODARONE HYDROCHLORIDE 200 MG: 200 TABLET ORAL at 13:18

## 2023-09-09 RX ADMIN — INSULIN ASPART 1 UNITS: 100 INJECTION, SOLUTION INTRAVENOUS; SUBCUTANEOUS at 08:43

## 2023-09-09 ASSESSMENT — ACTIVITIES OF DAILY LIVING (ADL)
ADLS_ACUITY_SCORE: 29
ADLS_ACUITY_SCORE: 29
ADLS_ACUITY_SCORE: 25
ADLS_ACUITY_SCORE: 29

## 2023-09-09 NOTE — DISCHARGE SUMMARY
[facility]    Hospitalist Discharge Summary       Date of Admission:  9/7/2023  Date of Discharge:  No discharge date for patient encounter.  Discharging Provider: Josemanuel Gonzales MD      Discharge Diagnoses     Leakage around umbilical hernia  -Follow-ups Needed After Discharge   Follow-up Appointments     Follow-up and recommended labs and tests       Follow up with primary care provider, Gigi Martin, within 7 days   to evaluate medication change.  Cbc and bmp    Follow with Hepatology as outpatient for management of liver disease      If you have questions regarding your hernia / drainage at your umbilicus,   you can call the surgical office at [phone] to schedule an   appointment with one of our surgeons. You saw Dr. Arlene Tate during   your hospital stay. You may see any one of our general surgeons. Our   clinic's name is [facility]. The address is [address] [place] [address],   [facility] [address], [place], [address]            Unresulted Labs Ordered in the Past 30 Days of this Admission       Date and Time Order Name Status Description    9/7/2023 10:26 PM Ascites Fluid Aerobic Bacterial Culture Routine Preliminary         These results will be followed up by     Hospital Course   Bentonkrishna Wetzel is a 63 year old female who has a significant medical history including diabetes mellitus on insulin, ESRD on hemodialysis , severe pulmonary hypertension, chronic diastolic heart failure, hyperlipidemia, history of SVT, anemia of chronic disease, decompensated cirrhosis due to right heart disease who presented to the ED with a chief complaint that she noticed that fluid was draining from her umbilicus and it started yesterday and she was having some discomfort around the umbilicus       Dainage around the umbilical area  History of ascites  History of decompensated cirrhosis likely due to right-sided heart failure with history of severe pulmonary hypertension  Umbilical  hernia  Concern for non compliance   -Patient work-up included CT scan of the abdominal pelvis which showed small amount of ascites in the abdomen.  Fluid within the prominent sized umbilical hernia as well as extensive edematous changes in the subcutaneous tissue.  Patient had mildly dilated loop of fluid and air-filled small bowel  -Patient had no nausea or vomiting or signs of bowel obstruction  -Patient was seen by general surgery recommended wound care for drainage at hernia site.  As per general surgery patient is very high risk for any 5 form of hernia repair.  Recommended ostomy appliance to try distressing as needed for drainage.  Given the number of general surgery to follow as an outpatient if she has continued drainage.    -Patient also had paracentesis with 0.35 L of fluid removed.  Testing negative for SBP but culture still pending        History of ascites due to decompensated cirrhosis due to right-sided heart failure  - patient will benefit from a consult with hepatology as outpatient  -Placed a referral for Hepatology      Moderate sized right pleural effusion  -CT scan of the abdomen pelvis and chest also showed moderate-sized right pleural effusion and atelectasis of the right lower lobe and this is due to underlying volume overload and patient will undergo hemodialysis which will be helpful  -Patient was planned for dialysis but she refused and as per nephrology okay to undergo dialysis on Monday.    Patient not in any respiratory distress and breathing comfortably     ESRD on hemodialysis  -Patient hemodialysis is on Tuesday Thursdays and Saturday schedule and her last dialysis was yesterday and underwent dailysis yesterday and will also undergo dialysis on Monday          Diabetes mellitus on insulin  -Continue her home regimen which is carb counting and sliding scale and she follow-up with her primary care provider for further management of insulin.          History of SVT  -Continue with  amiodarone     History of severe pulmonary hypertension  -She follows with Dr. Easton from [facility] cardiology and was last seen on 1/11/2023 and I reviewed his note  -Last echo done in 11/22 showed EF of 55 to 60%, RV function is reduced, mild RVH and RVSP is 55 and no pericardial effusion was present at that time  -Outpatient follow-up      History of COPD  -On exam she does not have any wheezing and we will continue with her PTA home inhalers/nebs          Consultations This Hospital Stay   NEPHROLOGY IP CONSULT  SURGERY GENERAL IP CONSULT    Code Status   Full Code    Time Spent on this Encounter   I,Josemanuel Gonzales, personally saw the patient today and spent approximately greater than 30 minutes discharging this patient.       Josemanuel Gonzales MD  [facility]  ______________________________________________________________________    Physical Exam   Vital Signs: Temp: (P) 98.5  F (36.9  C) Temp src: (P) Oral BP: (!) (P) 142/54 Pulse: (P) 64   Resp: (P) 16 SpO2: (P) 94 % O2 Device: (P) None (Room air)    Weight: 141 lbs 8.57 oz    Physical Exam  Cardiovascular:      Rate and Rhythm: Normal rate and regular rhythm.      Heart sounds: Normal heart sounds.   Pulmonary:      Effort: Pulmonary effort is normal. No respiratory distress.   Abdominal:      General: There is no distension.      Palpations: Abdomen is soft.      Tenderness: There is no abdominal tenderness.      Comments: Umlicial hernia +           Primary Care Physician   Gigi Martin    Discharge Disposition   Discharged to home  Condition at discharge: Stable    Significant Results and Procedures   Most Recent 3 CBC's:  Recent Labs   Lab Test 09/08/23  0944 09/07/23  1711 11/25/22  0641   WBC 6.0 5.4 5.9   HGB 11.6* 12.2 9.9*   MCV 86 86 90   * 141* 134*     Most Recent 3 BMP's:  Recent Labs   Lab Test 09/09/23  1619 09/09/23  1151 09/09/23  1059 09/08/23  1311 09/08/23  0944 09/07/23  232  09/07/23  1711   NA  --   --  136  --  136  --  140   POTASSIUM  --   --  4.9  --  4.4  --  4.6   CHLORIDE  --   --  97*  --  95*  --  98   CO2  --   --  22  --  22  --  22   BUN  --   --  48.2*  --  49.0*  --  42.0*   CR  --   --  7.78*  --  8.85*  --  8.13*   ANIONGAP  --   --  17*  --  19*  --  20*   KANWAL  --   --  8.6*  --  8.5*  --  8.8   * 121* 169*   < > 237*   < > 198*    < > = values in this interval not displayed.     Most Recent 2 LFT's:  Recent Labs   Lab Test 09/07/23  1711 11/25/22  0641   AST 15 18   ALT 8 6*   ALKPHOS 104 153*   BILITOTAL 0.4 0.4     Most Recent 3 INR's:  Recent Labs   Lab Test 11/25/22  0641 11/24/22  0546 06/15/22  1233   INR 1.15 1.18* 1.15     Most Recent 3 Troponin's:  Recent Labs   Lab Test 07/22/21  0130 07/14/21  2357 07/14/21  2132 07/14/21  1209 11/24/17  1816 08/09/17  2030 09/30/15  1533   TROPI  --   --   --   --  <0.015 <0.015  The 99th percentile for upper reference range is 0.045 ug/L.  Troponin values in   the range of 0.045 - 0.120 ug/L may be associated with risks of adverse   clinical events.   <0.015  The 99th percentile for upper reference range is 0.045 ug/L.  Troponin values in   the range of 0.045 - 0.120 ug/L may be associated with risks of adverse   clinical events.     TROPONIN 0.022 0.437* 0.446*   < >  --   --   --     < > = values in this interval not displayed.         Most Recent Cholesterol Panel:  Recent Labs   Lab Test 07/22/16  1056   CHOL 211*   *   HDL 49*   TRIG 193*       Results for orders placed or performed during the hospital encounter of 09/07/23   CT Abdomen Pelvis w Contrast    Narrative    EXAM: CT ABDOMEN PELVIS W CONTRAST  LOCATION: [facility]  DATE: 9/7/2023    INDICATION: hx of paracentesis, now has fluid draining from umbilicus  COMPARISON: Abdominal ultrasound 11/25/2022 and CT chest 06/15/2022  TECHNIQUE: CT scan of the abdomen and pelvis was performed following injection of IV contrast.  Multiplanar reformats were obtained. Dose reduction techniques were used.  CONTRAST: 70mL Isovue 370    FINDINGS:   LOWER CHEST: Moderate-sized loculated appearing right pleural effusion with atelectasis involving a significant portion of the right lower lobe. Volume loss right hemithorax with shift of the mediastinum and heart to the right. Extensive pericardial   calcifications. Coronary artery calcification.    HEPATOBILIARY: Small amount of ascites along the liver margin extending inferiorly along the right paracolic gutter. Tiny stones in the gallbladder.    PANCREAS: Normal.    SPLEEN: Normal.    ADRENAL GLANDS: Normal.    KIDNEYS/BLADDER: Marked bilateral renal cortical atrophy. No hydronephrosis.    BOWEL: Several distended loops of fluid and air-filled small bowel. The colon is of normal caliber.    LYMPH NODES: Normal.    VASCULATURE: Advanced atherosclerotic disease abdominal aorta and iliac arteries. Plaque and high-grade stenosis at the origins of the celiac trunk, superior mesenteric artery, and both renal arteries.    PELVIC ORGANS: Small amount of pelvic ascites.    MUSCULOSKELETAL: Fluid containing umbilical hernia measuring 5.8 x 5.0 cm. Extensive edematous changes in the subcutaneous fat anterior lower abdominal wall. Osteopenia. Degenerative disc disease lower lumbar spine.      Impression    IMPRESSION:   1.  Small amount of ascites in the abdomen and pelvis. There is also fluid within a prominent sized umbilical hernia as well as extensive edematous changes in the subcutaneous tissues.  2.  Moderate-sized right pleural effusion and atelectasis right lower lobe.  3.  Several mildly dilated loops of fluid and air-filled small bowel, nonspecific, possibly representing an ileus. No definite findings for bowel obstruction.  4.  Severe atherosclerotic disease.  5.  Gallstones.  6.  Advanced bilateral renal cortical atrophy.   US Paracentesis with Albumin    Narrative    US PARACENTESIS WITH ALBUMIN  9/8/2023 3:50 PM    CLINICAL HISTORY: Ascites and has drainage of fluid around the site of  umbilical hernia    PROCEDURE: Informed consent obtained. Time out performed. The abdomen  was prepped and draped in a sterile fashion. 10 mL of 1% lidocaine was  infused into local soft tissues. An 8 [place] catheter system was  introduced into the abdominal ascites under ultrasound guidance.    0.35 liters of clear fluid were removed and sent to lab if requested.    Patient tolerated procedure well.    Ultrasound imaging was obtained and placed in the patient's permanent  medical record.      Impression    IMPRESSION:  1.  Status post ultrasound-guided paracentesis.    CHETNA LU MD         SYSTEM ID:  [id number]       Discharge Orders      Adult GI  Referral - Consult Only      Reason for your hospital stay    Drainage around umbilical hernia     Activity    Your activity upon discharge: activity as tolerated     Follow-up and recommended labs and tests     Follow up with primary care provider, Gigi Martin, within 7 days to evaluate medication change.  Cbc and bmp    Follow with Hepatology as outpatient for management of liver disease      If you have questions regarding your hernia / drainage at your umbilicus, you can call the surgical office at [phone] to schedule an appointment with one of our surgeons. You saw Dr. Arlene Tate during your hospital stay. You may see any one of our general surgeons. Our clinic's name is [facility]. The address is [facility], [address], [place], [address]     Wound care and dressings    Drainage at umbilical hernia:  - Keep area covered to keep drainage off clothing  - Use ostomy appliance if needed  - Can also use gauze/tape dressing or bandage if preferred and drainage has slowed     Diet    Follow this diet upon discharge: Orders Placed This Encounter     Renal Diet     Discharge Medications   Current Discharge Medication List        CONTINUE  these medications which have CHANGED    Details   insulin detemir (LEVEMIR FLEXTOUCH) 100 UNIT/ML pen Inject 11 Units Subcutaneous At Bedtime    Associated Diagnoses: Type 2 diabetes mellitus with diabetic nephropathy, with long-term current use of insulin (H)           CONTINUE these medications which have NOT CHANGED    Details   acetaminophen (TYLENOL) 500 MG tablet Take 500 mg by mouth every 8 hours as needed for mild pain      amiodarone (PACERONE) 200 MG tablet Take 200mg po 6 days a week.  Hold on Sundays  Qty: 78 tablet, Refills: 2    Associated Diagnoses: SVT (supraventricular tachycardia) (H)      famotidine (PEPCID) 10 MG tablet Take 10 mg by mouth daily as needed      levalbuterol (XOPENEX HFA) 45 MCG/ACT inhaler Inhale 2 puffs into the lungs every 6 hours as needed for shortness of breath / dyspnea or wheezing  Qty: 15 g, Refills: 11    Associated Diagnoses: Pneumonia due to infectious organism, unspecified laterality, unspecified part of lung      !! midodrine (PROAMATINE) 10 MG tablet Take 10 mg by mouth three times a week Before dialysis, take with food      !! midodrine (PROAMATINE) 10 MG tablet Take 20 mg by mouth three times a week During dialysis, take with food      NOVOLOG FLEXPEN 100 UNIT/ML soln INJECT 9-12 UNITS BEFORE BREAKFAST, LUNCH, AND DINNER PLUS A CORRECTIVE SCALE OF 2 UNITS FOR EVERY 50 OVER 150  Qty: 15 mL, Refills: 11    Associated Diagnoses: Type 2 diabetes mellitus with diabetic nephropathy, with long-term current use of insulin (H)      !! ACCU-CHEK CHING PLUS test strip USE TO TEST BLOOD SUGAR 4 TIMES PER DAY  Qty: 100 strip, Refills: 3    Associated Diagnoses: Type 2 diabetes mellitus with diabetic nephropathy, with long-term current use of insulin (H)      !! blood glucose (ACCU-CHEK GUIDE) test strip USE TO TEST BLOOD GLUCOSE FOUR TIMES DAILY  Qty: 400 strip, Refills: 0    Associated Diagnoses: Type 2 diabetes mellitus with diabetic nephropathy, with long-term current use of  insulin (H)      !! blood glucose (NO BRAND SPECIFIED) lancets standard Use to test blood sugar 4 times daily or as directed.  Qty: 400 lancet, Refills: 3    Comments: Please dispense Accu check soft click  Associated Diagnoses: Type 2 diabetes mellitus with diabetic nephropathy, with long-term current use of insulin (H)      !! blood glucose (NO BRAND SPECIFIED) lancets standard Use to test blood sugar three times daily or as directed.  Qty: 400 lancet, Refills: 11    Associated Diagnoses: Type 2 diabetes mellitus with diabetic nephropathy, with long-term current use of insulin (H)      blood glucose monitoring (NO BRAND SPECIFIED) meter device kit Use to test blood sugar 4 times daily or as directed.  Qty: 1 kit, Refills: 0    Comments: Please dispense Accu Check Guide meter  Associated Diagnoses: Type 2 diabetes mellitus with diabetic nephropathy, with long-term current use of insulin (H)      Continuous Blood Gluc Sensor (FREESTYLE LATOSHA 2 SENSOR) MISC 1 each every 14 days Use 1 sensor every 14 days. Use to read blood sugars per 's instructions.  Qty: 2 each, Refills: 5    Associated Diagnoses: Type 2 diabetes mellitus with diabetic nephropathy, with long-term current use of insulin (H)      insulin pen needle (31G X 8 MM) 31G X 8 MM miscellaneous Use 4 pen needles daily or as directed.  Qty: 100 each, Refills: 11    Associated Diagnoses: Type 2 diabetes mellitus with diabetic nephropathy, with long-term current use of insulin (H)       !! - Potential duplicate medications found. Please discuss with provider.        Allergies   Allergies   Allergen Reactions    Albuterol      shakiness    Aspirin      gi    Byetta [Exenatide]      gi    Clonidine      constipation    Codeine      vomiting    Ezetimibe      muscle symptoms    Hydralazine      headaches    Lisinopril      hyperkalemia    Metformin Hydrochloride      vomiting    Pravachol [Pravastatin Sodium]      muscle pains    Simvastatin       myalgias    Troglitazone

## 2023-09-09 NOTE — PLAN OF CARE
Goal Outcome Evaluation:    Summary: Ascites, leaking from umbilical region, umbilical hernia    DATE & TIME: 9/9/23, 9624-0375    Cognitive Concerns/ Orientation: A&Ox4   BEHAVIOR & AGGRESSION TOOL COLOR: Green  CIWA SCORE: N/A   ABNL VS/O2: VSS on RA  MOBILITY: SBA, ambulates to bathroom  PAIN MANAGMENT: Prn Tylenol given for abdominal pain with decrease in pain  DIET: Regular  BOWEL/BLADDER: Continent. Had 2 small, soft BMs today  ABNL LAB/BG: /121  DRAIN/DEVICES: Left PIV SL, Right upper Arm fistula +bruit/thrill  TELEMETRY RHYTHM: N/A  SKIN: Umbilical hernia, leaking at umbilical region of abdomen, colostomy pouch and ABD dressing in place. CDI.  TESTS/PROCEDURES: Paracentesis was done yesterday not much taken off. Had dialysis for 1.5 hours & will have it again 9/11/23 outpatient.   D/C DAY/GOALS/PLACE: Today; back to home.

## 2023-09-09 NOTE — PROGRESS NOTES
General Surgery Progress Note    Admission Date: 9/7/2023  Today's Date: 9/9/2023         Assessment:      Yissel Wetzel is a 63 year old female with multiple medical issues including end stage renal disease on dialysis, heart failure, cirrhosis with ascites now admitted with drainage from umbilical hernia and mild ileus. Ileus is resolved. She is tolerating regular diet and having bowel function. Umbilical hernia is fluid-filled, but not tender and not incarcerated. Drainage from umbilicus has improved over the past 24 hours.          Plan:   - Discharge home per primary team  - I again reviewed recommendations with Cassandra today including wound care for drainage at hernia site. I have provided our contact information in the AVS. If she has questions after discharge, she can make an appointment in our surgical clinic  - Patient is very high risk for any form of hernia repair at this time. There is no concern for infectious process at the umbilical skin; skin is simply thinned and leaking some ascites.   - Ostomy appliance or dry dressings as needed for drainage  - Continue bowel regimen as needed  - Plan is for hepatology consultation as an outpatient. Would at least consider consult for TIPS procedure (not performed at our facility) if patient is a candidate to alleviate pressure and fluid buildup. Once ascites is better controlled, patient may be a candidate for hernia repair  - General surgery will not continue to follow. Please call with any questions        Interval History:   Afebrile, vitals stable. Cassandra is feeling well today, currently on the commode having a BM. She is tolerating diet fine. Underwent short run of hemodialysis yesterday. Paracentesis with small amount of fluid removed yesterday. Drainage at umbilical hernia has significantly decreased. She requested that I return to see her today to discuss this hernia and wound care going forward.           Physical Exam:   BP (!) 145/60 (BP Location:  Left arm)   Pulse 62   Temp 98.5  F (36.9  C) (Oral)   Resp 16   Ht 1.524 m (5')   Wt 64.2 kg (141 lb 8.6 oz)   LMP  (LMP Unknown)   SpO2 95%   BMI 27.64 kg/m    I/O last 3 completed shifts:  In: 400 [P.O.:400]  Out: 250 [Other:250]  General - This is a well developed, well nourished female in no apparent distress. Alert and oriented x 3  Head - normocephalic, atraumatic  Respiratory - non-labored breathing, no supplemental O2  Abdomen - Soft and nontender. Umbilical hernia present with thin overlying skin. This is completely reducible, filled only with fluid that evacuates quickly on palpation - less fluid today and minimal drainage present in ostomy appliance. Hernia is completely nontender; there is no warmth or induration.  Neurologic - Nonfocal    LABS:  Recent Labs   Lab Test 09/08/23  0944 09/07/23  1711 11/25/22  0641   WBC 6.0 5.4 5.9   HGB 11.6* 12.2 9.9*   MCV 86 86 90   * 141* 134*      Recent Labs   Lab Test 09/09/23  1059 09/08/23  0944 09/07/23  1711   POTASSIUM 4.9 4.4 4.6   CHLORIDE 97* 95* 98   CO2 22 22 22   BUN 48.2* 49.0* 42.0*   CR 7.78* 8.85* 8.13*   ANIONGAP 17* 19* 20*         -------------------------------    Alina Acosta PA-C  [facility]  [phone]

## 2023-09-09 NOTE — PLAN OF CARE
Goal Outcome Evaluation:  Summary: ascites, leaking from umbilical region, umbilical hernia    DATE & TIME: 9/8/23  6047-1457  Cognitive Concerns/ Orientation : A&O x4   BEHAVIOR & AGGRESSION TOOL COLOR: green  CIWA SCORE: NA   ABNL VS/O2: VSS on RA  MOBILITY: SBA, up to chair X2, ambulates to bathroom  PAIN MANAGMENT: denies PRN Tylenol, c/o stomach discomfort/fullness  DIET: Regular  BOWEL/BLADDER: continent  ABNL LAB/BG: Urea nit. 42, Creat 8.3, GFR 5, /150/290 Particular with meds  DRAIN/DEVICES: L PIV SL, R. Arm fistula +bruit/thrill  TELEMETRY RHYTHM: NA  SKIN: umbilical hernia, leaking at umbilical region of abdomen -colostomy pouch attached along with ABD dressing placed over leak site (replaced x2)  TESTS/PROCEDURES: paracentesis, not much taken off; had dialysis for 1.5 hours & will have it again 9/9/23 to finish.   D/C DAY/GOALS/PLACE: back to home  OTHER IMPORTANT INFO: patient fall risk

## 2023-09-09 NOTE — PLAN OF CARE
Summary: Ascites, leaking from umbilical region, umbilical hernia    DATE & TIME: 9/8/23, 1900 - 0730  Cognitive Concerns/ Orientation : A&O x4   BEHAVIOR & AGGRESSION TOOL COLOR: Green  CIWA SCORE: NA   ABNL VS/O2: VSS on RA  MOBILITY: SBA,  ambulates to bathroom  PAIN MANAGMENT: Prn Tylenol x 2 doses given for abdominal pain   DIET: Regular  BOWEL/BLADDER: Continent  ABNL LAB/BG: /210  DRAIN/DEVICES: Left  PIV SL, Right upper Arm fistula +bruit/thrill  TELEMETRY RHYTHM: NA  SKIN: Umbilical hernia, leaking at umbilical region of abdomen, colostomy pouch and ABD dressing in place. Remained CDI overnight  TESTS/PROCEDURES: Paracentesis was done yesterday not much taken off. Had dialysis for 1.5 hours & will have it again 9/9/23 to complete.   D/C DAY/GOALS/PLACE: Back to home  OTHER IMPORTANT INFO: Patient fall risk         Goal Outcome Evaluation:      Plan of Care Reviewed With: patient    Overall Patient Progress: no changeOverall Patient Progress: no change

## 2023-09-09 NOTE — PROGRESS NOTES
[facility]    Nephrology Progress Note     Assessment & Plan     63 y.o woman with ESLD/cirrhosis and ESRD.      ESRD:               -MWF               -R AVF, 15 g and Qb 450               -3 hrs               -EDW 64 kg (~ 2 liters)               + heparin 1000/500 units     Anemia: Hgb is at target.                -Mircera 100 mcg q2 weeks, last dose on Monday     Intra-dialytic hypotension:                -midodrine 10 mg before HD and 20 mg mid tx     ESLD and liver cirrhosis:     Loculated R pleural effusion:      Plan:    She does not want HD today.  This is fine as long as K is OK.  BMP just drawn  Next HD tx is on Monday - at [facility] if discharged.                   Robert Herrera MD  [facility] - Nephrology  [phone]    Interval History     She feels well.  Cramped in HD yesterday.  Does not want to have HD today.  BMP in process.      Physical Exam   Temp: 98.5  F (36.9  C) Temp src: Oral BP: (!) 145/60 Pulse: 62   Resp: 16 SpO2: 95 % O2 Device: None (Room air)    Vitals:    09/07/23 2224 09/09/23 0911   Weight: 63.6 kg (140 lb 4.8 oz) 64.2 kg (141 lb 8.6 oz)     Vital Signs with Ranges  Temp:  [98.2  F (36.8  C)-98.5  F (36.9  C)] 98.5  F (36.9  C)  Pulse:  [62-76] 62  Resp:  [16-24] 16  BP: (130-182)/(51-76) 145/60  SpO2:  [93 %-95 %] 95 %  I/O last 3 completed shifts:  In: 400 [P.O.:400]  Out: 250 [Other:250]    GENERAL APPEARANCE: pleasant, NAD, a & o  HEENT:  Eyes/ears/nose/neck grossly normal  RESP: lungs cta b c good efforts, no crackles, rhonchi or wheezes  CV: RRR, nl S1/S2, no m/r/g   ABDOMEN: ostomy appliance in place  EXTREMITIES/SKIN: changes of chronic venous stasis below knees; no edema    Medications    heparin (porcine)      - MEDICATION INSTRUCTIONS -        - MEDICATION INSTRUCTIONS for Dialysis Patients -   Does not apply See Admin Instructions    sodium chloride 0.9%  250 mL Intravenous Once in dialysis/CRRT    sodium chloride 0.9%  300 mL  Hemodialysis Machine Once    amiodarone  200 mg Oral Daily    heparin  500 Units Hemodialysis Machine OR IV Push Once in dialysis/CRRT    insulin aspart   Subcutaneous TID AC    insulin aspart  1-7 Units Subcutaneous TID AC    insulin aspart  1-5 Units Subcutaneous At Bedtime    insulin detemir  8 Units Subcutaneous At Bedtime    midodrine  10 mg Oral Once per day on Mon Wed Fri    midodrine  20 mg Oral Once per day on Tue Thu Sat    senna-docusate  1-2 tablet Oral BID    sodium chloride (PF)  3 mL Intracatheter Q8H       Data   BMP  Recent Labs   Lab 09/09/23  0726 09/09/23  0211 09/08/23  2230 09/08/23  1719 09/08/23  1311 09/08/23  0944 09/07/23 2324 09/07/23 1711   NA  --   --   --   --   --  136  --  140   POTASSIUM  --   --   --   --   --  4.4  --  4.6   CHLORIDE  --   --   --   --   --  95*  --  98   KANWAL  --   --   --   --   --  8.5*  --  8.8   CO2  --   --   --   --   --  22  --  22   BUN  --   --   --   --   --  49.0*  --  42.0*   CR  --   --   --   --   --  8.85*  --  8.13*   * 210* 166* 290*   < > 237*   < > 198*    < > = values in this interval not displayed.     [email or web address](phos:4)  CBC)  Recent Labs   Lab 09/08/23 0944 09/07/23 1711   WBC 6.0 5.4   HGB 11.6* 12.2   HCT 37.3 40.1   MCV 86 86   * 141*     Recent Labs   Lab 09/07/23 1711   AST 15   ALT 8   ALKPHOS 104   BILITOTAL 0.4         Attestation:   I have reviewed today's relevant vital signs, notes, medications, labs and imaging.

## 2023-09-11 ENCOUNTER — PATIENT OUTREACH (OUTPATIENT)
Dept: CARE COORDINATION | Facility: CLINIC | Age: 63
End: 2023-09-11
Payer: MEDICARE

## 2023-09-11 NOTE — PROGRESS NOTES
Clinic Care Coordination Contact  [facility]: Post-Discharge Note  SITUATION                                                      Admission:    Admission Date: 09/07/23   Reason for Admission: fluid was draining from her umbilicus  Discharge:   Discharge Date: 09/09/23  Discharge Diagnosis: Leakage around umbilical hernia    BACKGROUND                                                      Per hospital discharge summary and inpatient provider notes:    Yissel Wetzel is a 63 year old female who has a significant medical history including diabetes mellitus on insulin, ESRD on hemodialysis , severe pulmonary hypertension, chronic diastolic heart failure, hyperlipidemia, history of SVT, anemia of chronic disease, decompensated cirrhosis due to right heart disease who presented to the ED with a chief complaint that she noticed that fluid was draining from her umbilicus and it started yesterday and she was having some discomfort around the umbilicus       ASSESSMENT           Discharge Assessment  How are you doing now that you are home?: I'm feeling fine  How are your symptoms? (Red Flag symptoms escalate to triage hotline per guidelines): Improved  Do you feel your condition is stable enough to be safe at home until your provider visit?: Yes  Does the patient have questions regarding their discharge instructions? : No  Were you started on any new medications or were there changes to any of your previous medications? : No  Do you have questions regarding any of your medications? : No  Do you have all of your needed medical supplies or equipment (DME)?  (i.e. oxygen tank, CPAP, cane, etc.): Yes (dressing change supplies)  Discharge follow-up appointment scheduled within 14 calendar days? : No  Is patient agreeable to assistance with scheduling? : No (patient prefers to schedule on her own)         Post-op (Clinicians Only)  Did the patient have surgery or a procedure: No    Patient doing well. Wound drainage from  umbilicus has resolved. States she was told she needed follow up with GI/hepatology if her drainage increased. Not able to take down phone number for 24/7 triage nurse/scheduling line as she is at dialysis center now and doesn't use her MyChart actively. No current questions or concerns today per patient.     PLAN                                                      Outpatient Plan:  Follow-up and recommended labs and tests       Follow up with primary care provider, Gigi Martin, within 7 days   to evaluate medication change.  Cbc and bmp     Follow with Hepatology as outpatient for management of liver disease        If you have questions regarding your hernia / drainage at your umbilicus,   you can call the surgical office at [phone] to schedule an   appointment with one of our surgeons. You saw Dr. Arlene Tate during   your hospital stay. You may see any one of our general surgeons. Our   clinic's name is [facility]. The address is [address] [place] [address],   [facility], [place], [place], [address]               No future appointments.      For any urgent concerns, please contact our 24 hour nurse triage line: [phone] ([phone])         Shelbie Grey RN

## 2023-09-13 LAB — BACTERIA FLD CULT: NO GROWTH

## 2023-09-25 ENCOUNTER — TELEPHONE (OUTPATIENT)
Dept: SURGERY | Facility: CLINIC | Age: 63
End: 2023-09-25
Payer: MEDICARE

## 2023-09-25 ENCOUNTER — TELEPHONE (OUTPATIENT)
Dept: INTERNAL MEDICINE | Facility: CLINIC | Age: 63
End: 2023-09-25
Payer: MEDICARE

## 2023-09-25 DIAGNOSIS — T14.8XXA WOUND DRAINAGE: ICD-10-CM

## 2023-09-25 DIAGNOSIS — R18.8 OTHER ASCITES: ICD-10-CM

## 2023-09-25 DIAGNOSIS — K42.9 UMBILICAL HERNIA WITHOUT OBSTRUCTION AND WITHOUT GANGRENE: Primary | ICD-10-CM

## 2023-09-25 NOTE — TELEPHONE ENCOUNTER
Patient states that her umbilical hernia is leaking. States that she needs a number to call for the surgeons office.    Gave patient the below information form hospital stay:  If you have questions regarding your hernia / drainage at your umbilicus,   you can call the surgical office at [phone] to schedule an   appointment with one of our surgeons. You saw Dr. Arlene Tate during   your hospital stay. You may see any one of our general surgeons. Our   clinic's name is [facility]. The address is [facility],   [facility] [place] [address], [id number]     Patient is scheduled for hospital follow up with Dr. Martino tomorrow. Rose Ceballos RN

## 2023-09-25 NOTE — TELEPHONE ENCOUNTER
Patient was seen in the hospital by LYNN for an umbilical hernia that is leaking, patient calling today wanting to get more supplies    Please call after [id number]    Phone: [phone]   Message ok

## 2023-09-25 NOTE — TELEPHONE ENCOUNTER
"Patient recently inpatient and followed by general surgery for umbilical hernia (drainage around umbilical hernia)    Patient was provided with supplies at discharge, but is in need of more. She wants a \"box of supplies\" sent to her \"every month\"    Patient requesting the ostomy appliance, but it \"has to be the one with the cap.\"    Also requesting:    - q-tips with the sticky stuff  - chux pads  - tape  -\"pads used for stitches\"    Informed patient that insurance may require her to be seen in clinic prior to approving any wound care supplies    Will discuss with Dr. Tate/Alina to be provided with list of supplies patient needs    Will update patient tomorrow after discussing with providers    She is in agreement with this plan    Eden Davis RN-BSN    "

## 2023-09-26 ENCOUNTER — TELEPHONE (OUTPATIENT)
Dept: WOUND CARE | Facility: CLINIC | Age: 63
End: 2023-09-26

## 2023-09-26 ENCOUNTER — OFFICE VISIT (OUTPATIENT)
Dept: INTERNAL MEDICINE | Facility: CLINIC | Age: 63
End: 2023-09-26
Payer: MEDICARE

## 2023-09-26 VITALS
TEMPERATURE: 97.5 F | BODY MASS INDEX: 27.85 KG/M2 | HEART RATE: 61 BPM | WEIGHT: 142.6 LBS | OXYGEN SATURATION: 97 % | SYSTOLIC BLOOD PRESSURE: 144 MMHG | DIASTOLIC BLOOD PRESSURE: 52 MMHG

## 2023-09-26 DIAGNOSIS — K42.9 UMBILICAL HERNIA WITHOUT OBSTRUCTION AND WITHOUT GANGRENE: ICD-10-CM

## 2023-09-26 DIAGNOSIS — T14.8XXA OPEN WOUND: ICD-10-CM

## 2023-09-26 DIAGNOSIS — Z09 ENCOUNTER FOR EXAMINATION FOLLOWING TREATMENT AT HOSPITAL: Primary | ICD-10-CM

## 2023-09-26 DIAGNOSIS — R18.8 OTHER ASCITES: ICD-10-CM

## 2023-09-26 PROCEDURE — 99495 TRANSJ CARE MGMT MOD F2F 14D: CPT | Performed by: INTERNAL MEDICINE

## 2023-09-26 NOTE — TELEPHONE ENCOUNTER
Patient calling to schedule with Dr. Tate. I let her know per note that Dr. Tate suggested she see a surgeon at the . Patient states she cannot get to the  and wants to schedule here.     Please call patient to advise.     [phone]  Ok to leave VM

## 2023-09-26 NOTE — TELEPHONE ENCOUNTER
Chart review reveals general surgery saw patient in hospital and recommended the patient schedule an outpatient appointment with them. Returned call to patient to discuss this. The patient does not have a wound rather and draining hernia so general surgery would be more appropriate. Supplies for the draining hernia can also be ordered by PCP.

## 2023-09-26 NOTE — PATIENT INSTRUCTIONS
- Establish with wound care. Referral placed.  - Call [phone] to schedule a follow-up appointment with your liver team  - Keep following with your surgical team  - See Dr. Martin in November

## 2023-09-26 NOTE — PROGRESS NOTES
Assessment & Plan   Encounter for examination following treatment at hospital  Umbilical hernia without obstruction and without gangrene  Managed by gen surg team. Defer cares to them.    Other ascites  STRONGLY encouraged her to reach back out to her team at [facility] to get re-established as controlling her ascites will be very important to this wound healing. Contact info given to her.    Open wound  Discussed that in hospital team recommended formal wound care which I agree with. Referral placed.  - Wound Care Referral; Future    MED REC REQUIRED  Post Medication Reconciliation Status:  Discharge medications reconciled, continue medications without change    Follow-up appointment in ~6 weeks made with patient's preferred PCP today    Christopher Schuler MD  [facility]    Michelle Trujillo is a 63 year old who presents for a next day acute care visit with chief concern of:  Hospital F/U and Wound Check  This is the first time I have met Cassandra.    HPI   She reports her hernia ruptured again last weekend. She's working with her surgery team on supplies. She has not followed up with her hepatologist in months. She tells me 'there is no plan' when I ask what her plan is to manage this open wound. She denies f/c. Gets HD MWF. She is a bit upset she is not seeing Dr. Martin today.    Review of Systems   Constitutional, gi, derm systems are negative, except as otherwise noted.      Objective    BP (!) 144/52   Pulse 61   Temp 97.5  F (36.4  C)   Wt 64.7 kg (142 lb 9.6 oz)   LMP  (LMP Unknown)   SpO2 97%   BMI 27.85 kg/m    Body mass index is 27.85 kg/m .    Physical Exam   GENERAL: alert and in no distress.  EYES: conjunctivae/corneas clear. EOMs grossly intact  HENT: Facies symmetric.  RESP: No iWOB.  MSK: Moves all four extremities freely  SKIN: Open wound with ostomy supplies in place noted.  NEURO: CN II-XII grossly intact.

## 2023-09-27 ENCOUNTER — TELEPHONE (OUTPATIENT)
Dept: SURGERY | Facility: CLINIC | Age: 63
End: 2023-09-27
Payer: MEDICARE

## 2023-09-27 DIAGNOSIS — K42.9 UMBILICAL HERNIA WITHOUT OBSTRUCTION AND WITHOUT GANGRENE: Primary | ICD-10-CM

## 2023-09-27 DIAGNOSIS — Z87.19 HISTORY OF LIVER DISEASE: ICD-10-CM

## 2023-09-27 NOTE — TELEPHONE ENCOUNTER
Pt provided with scheduling number for [facility]    DME order will be placed with [facility]    Eden Davis RN-BSN

## 2023-09-27 NOTE — TELEPHONE ENCOUNTER
Spoke with patient and discussed Dr. Tate's reasoning for wanting patient to see a surgeon at the [facility].    Will order patient supplies x 1 month supply (per Alina Acosta). Patient will hopefully have established care at the [facility] by the time additonal supplies are needed    All questions answered. Patient verbalized understanding. I will call patient if there are any insurance issues with getting wound care supplies approved    Eden Davis, RN-BSN

## 2023-09-27 NOTE — TELEPHONE ENCOUNTER
Patient calling wanting to schedule with LYNN for follow up. Let her know that LYNN is recommending that she be seen at the [facility].  She want to talk to LYNN, feels she wants to be seen here.  Also is in need of supplies and says she has spoken with her PCP and they will not help her with this.    Please call    Phone: [phone]   Message ok

## 2023-09-28 NOTE — TELEPHONE ENCOUNTER
REFERRAL INFORMATION:  Referring Provider: Dr. Arlene Tate  Referring Clinic: [facility] - General Surgery  Reason for Visit/Diagnosis: Umbilical Hernia in Setting of Liver Disease       FUTURE VISIT INFORMATION:  Appointment Date: 10/19/2023  Appointment Time: Noon     NOTES RECORD STATUS  DETAILS   OFFICE NOTE from Referring Provider Internal [facility]:  9/28/23 - Telephone referral from Dr. Tate   OFFICE NOTE from Other Specialists Received / Internal [facility]:  9/26/23 - PCC OV with Dr. Martino  5/11/23 - PCC OV with Dr. Veronica Shields:  4/15/23 - NEPH OV with Dr. Hernandez   [facility] DISCHARGE SUMMARY/ ED VISITS  [facility]:  9/7/23 - Admission with Dr. Barrett  6/15/22 - Admission with Dr. Garcia  * Additional in [facility]:  11/23/22 - Admission with Dr. Heredia   OPERATIVE REPORT N/A    ENDOSCOPY (EGD)  Internal [facility]:  5/27/21 - EGD   PERTINENT LABS Internal    PATHOLOGY REPORTS (RELATED) Internal [facility]:  6/20/22 - Abdomen (Case: [id number])  5/27/21 - Gastric (Case: [id number])   IMAGING (CT, MRI, US, XR)  Internal [facility]:  9/8/23, 6/27/23, 3/17/23, 12/27/22 - US Paracentesis  9/7/23 - CT Abd/Pelvis  11/25/22 - US Abdomen  11/25/22, 11/23/22 - XR Chest  6/15/22, 7/15/21 - CT Chest

## 2023-10-01 PROBLEM — T82.838A: Status: RESOLVED | Noted: 2019-12-06 | Resolved: 2021-07-22

## 2023-10-02 ENCOUNTER — TRANSFERRED RECORDS (OUTPATIENT)
Dept: HEALTH INFORMATION MANAGEMENT | Facility: CLINIC | Age: 63
End: 2023-10-02
Payer: MEDICARE

## 2023-10-02 ENCOUNTER — TELEPHONE (OUTPATIENT)
Dept: SURGERY | Facility: CLINIC | Age: 63
End: 2023-10-02
Payer: MEDICARE

## 2023-10-02 LAB
ALT SERPL-CCNC: 14 U/L (ref 10–49)
AST SERPL-CCNC: 17 U/L (ref 0–33)
CREATININE (EXTERNAL): 8 MG/DL (ref 0.5–1.1)
POTASSIUM (EXTERNAL): 4.2 MEQ/L (ref 3.5–5.5)
TSH SERPL-ACNC: 5.13 UIU/ML (ref 0.6–4.8)

## 2023-10-02 NOTE — TELEPHONE ENCOUNTER
Called patient and left her a message informing her that I received a call from [facility] today and her insurance will NOT cover the wound care supplies/ostomy device    Patient does not have an open wound. Therefore, insurance will not cover    Recommend to buy over the counter elsewhere    Suggested to patient that she buy over the counter elsewhere until she is seen at the [facility] in the middle of October. Once seen, they may be able to help her get the supplies    Call back number provided for any further questions or concerns    Eden Davis RN-BSN

## 2023-10-03 NOTE — CONFIDENTIAL NOTE
Patient Name: Yissel Wetzel  Today's Date: 10/03/23    I saw Cassandra in the hospital in September for umbilical hernia with drainage. Her skin is thin with an open wound of approximately 0.5 x 0.5mm. Ascites has been draining through this opening. We are ordering her 30 days of wound care supplies to get her through until she can see a surgeon at the [facility] for consideration of surgical intervention. She has an appointment scheduled with them this month. If she needs additional wound care supplies beyond these 30 days, she will need to get these ordered through their surgical office as she has not been seen in our clinic.       Alina Acosta PA-C  [facility]  [phone]

## 2023-10-03 NOTE — TELEPHONE ENCOUNTER
Patient calling back again requesting to speak with Eden again.    [phone]  Ok to leave VM   Patient requesting pain medication, ERP informed and orders received.

## 2023-10-03 NOTE — TELEPHONE ENCOUNTER
Patient calling back to talk to Eden. Per patient it is very important-regarding her insurance and supplies. She also wants to see [facility] as soon as possible.  I did let her know that [facility] does not have openings until into November.    [phone]  Ok to leave VM

## 2023-10-03 NOTE — TELEPHONE ENCOUNTER
"Patient reports that she called her insurance company and they said \"all supplies will be covered. Everything will be covered.\" I asked patient if she made her insurance company aware that she does not have an open wound?  \"I DO have an open wound. It can't drain through thin skin!\"  Informed patient, that yes, fluid can drain through thin skin.    Informed her that I will call [facility] and relay the information that she received from her insurance company.      Eden Davis RN-BSN    "

## 2023-10-04 NOTE — TELEPHONE ENCOUNTER
Called and spoke with representative at [facility]    Representative suggests submitting DME order for wound care. Likely will be denied, but can then submit for an appeal    Will inform [facility] of this conversation    Will also update patient    Eden Davis RN-BSN

## 2023-10-04 NOTE — TELEPHONE ENCOUNTER
Patient reports she spoke with someone at [facility] this morning and they informed her that insurance will not cover her supplies. She reports they were told to not even submit it as it will not be covered.    Patient reports she DOES have an open wound and insurance should cover supplies    Informed her that I will call her insurance to discuss the situation. But, at the end of the day, it is out of our hands and up to insurance    Will provide patient with update after talking to her insurance    Eden Davis RN-BSN

## 2023-10-04 NOTE — TELEPHONE ENCOUNTER
Patient calling back to request a call back from Dr. Tate.  Patient can be reached at: [phone]  Ok to leave VM

## 2023-10-05 ENCOUNTER — TRANSFERRED RECORDS (OUTPATIENT)
Dept: HEALTH INFORMATION MANAGEMENT | Facility: CLINIC | Age: 63
End: 2023-10-05
Payer: MEDICARE

## 2023-10-05 LAB — RETINOPATHY: POSITIVE

## 2023-10-16 ENCOUNTER — TRANSFERRED RECORDS (OUTPATIENT)
Dept: HEALTH INFORMATION MANAGEMENT | Facility: CLINIC | Age: 63
End: 2023-10-16
Payer: MEDICARE

## 2023-10-19 ENCOUNTER — PRE VISIT (OUTPATIENT)
Dept: SURGERY | Facility: CLINIC | Age: 63
End: 2023-10-19

## 2023-10-25 ENCOUNTER — TELEPHONE (OUTPATIENT)
Dept: CARDIOLOGY | Facility: CLINIC | Age: 63
End: 2023-10-25
Payer: MEDICARE

## 2023-10-25 DIAGNOSIS — I47.10 SVT (SUPRAVENTRICULAR TACHYCARDIA) (H): ICD-10-CM

## 2023-10-25 NOTE — TELEPHONE ENCOUNTER
Pt needing a refill of her Amiodarone. LM for pt to call back to discuss that she needs to be seen by DEREK and also need to have a TSH done.  Pt may be at dialysis at this time. Bea

## 2023-10-25 NOTE — TELEPHONE ENCOUNTER
Patient is calling as she is returning a call, please call to advise as priority line was busy, thank you.  CRYSTAL BOLDEN on 10/25/2023 at 10:33 AM   Thank you!  [facility]

## 2023-10-25 NOTE — TELEPHONE ENCOUNTER
Spoke to pt who states that he TSH has been done already.  Did find the TSH on 10/2/23-5.13 with T4 8.2. EKG is not current. Explained that she has not been seen since 2021 and clinic protocol is pt should be seen yearly when on Amiodarone. Pt said the Dr Barbosa was getting the lab results via email. Labs are scanned into [facility] from [employer].  Pt did not really want to come in for a visit and wanted this writer to check. Pt all in all is doing well, bp is improved and she is trying to be more active. Bea

## 2023-10-26 RX ORDER — AMIODARONE HYDROCHLORIDE 200 MG/1
TABLET ORAL
Qty: 60 TABLET | Refills: 2 | Status: ON HOLD | OUTPATIENT
Start: 2023-10-26 | End: 2024-08-20

## 2023-10-26 NOTE — TELEPHONE ENCOUNTER
Pt made aware that Dr Barbosa that he recommended reducing the Amiodarone 200 mg 5 days a week, instead of 6 days. Pt states that she most of the time has been taking it 5 days, but does not want it decreased more, because she does not want to go to the hospital. Escript has been sent in with Pt preferred hold days. JNelsonANGELICA

## 2023-11-15 ENCOUNTER — OFFICE VISIT (OUTPATIENT)
Dept: INTERNAL MEDICINE | Facility: CLINIC | Age: 63
End: 2023-11-15
Payer: MEDICARE

## 2023-11-15 VITALS
WEIGHT: 142 LBS | SYSTOLIC BLOOD PRESSURE: 130 MMHG | OXYGEN SATURATION: 90 % | BODY MASS INDEX: 27.73 KG/M2 | RESPIRATION RATE: 16 BRPM | TEMPERATURE: 97.9 F | DIASTOLIC BLOOD PRESSURE: 40 MMHG | HEART RATE: 67 BPM

## 2023-11-15 DIAGNOSIS — E11.21 TYPE 2 DIABETES MELLITUS WITH DIABETIC NEPHROPATHY, WITH LONG-TERM CURRENT USE OF INSULIN (H): Primary | ICD-10-CM

## 2023-11-15 DIAGNOSIS — Z12.31 VISIT FOR SCREENING MAMMOGRAM: ICD-10-CM

## 2023-11-15 DIAGNOSIS — Z99.2 ESRD (END STAGE RENAL DISEASE) ON DIALYSIS (H): ICD-10-CM

## 2023-11-15 DIAGNOSIS — I27.20 PULMONARY HYPERTENSION (H): ICD-10-CM

## 2023-11-15 DIAGNOSIS — Z79.4 TYPE 2 DIABETES MELLITUS WITH DIABETIC NEPHROPATHY, WITH LONG-TERM CURRENT USE OF INSULIN (H): Primary | ICD-10-CM

## 2023-11-15 DIAGNOSIS — N18.6 ESRD (END STAGE RENAL DISEASE) ON DIALYSIS (H): ICD-10-CM

## 2023-11-15 PROCEDURE — 99213 OFFICE O/P EST LOW 20 MIN: CPT | Performed by: INTERNAL MEDICINE

## 2023-11-15 NOTE — PROGRESS NOTES
Assessment & Plan     Type 2 diabetes mellitus with diabetic nephropathy, with long-term current use of insulin (H)  Reasonably well-controlled, per recent glycosylated hemoglobin    ESRD (end stage renal disease) on dialysis (H)  Continues on hemodialysis.    Severe Pulm HTN -- /52 w mean 81 on R Ht Cath 7/22/21  Continues to require supplemental O2    I certify that this patient, Yissel Wtezel has been under my care (or a nurse practitioner or physican's assistant working with me). This is the face-to-face encounter for oxygen medical necessity.      At the time of this encounter supplemental oxygen is reasonable and necessary and is expected to improve the patient's condition in a home setting.       Patient has continued oxygen desaturation due to Pulmonary Hypertension I27.20.    If portability is ordered, is the patient mobile within the home? yes    At the time of this encounter, I have reviewed the qualifying testing and have determined that supplemental oxygen is reasonable and necessary and is expected to improve the patient's condition in a home setting.            BMI:   Estimated body mass index is 27.73 kg/m  as calculated from the following:    Height as of 9/9/23: 1.524 m (5').    Weight as of this encounter: 64.4 kg (142 lb).           Gigi Martin MD  [facility]    Michelle Trujillo is a 63 year old, presenting for the following health issues:  RECHECK      HPI     Pt to update pcp on health and go over lab results from [facility].       Continues to follow-up with multiple specialists including nephrology, cardiology for end-stage renal disease in context of insulin-dependent diabetes, and pulmonary hypertension.  Renal transplant is being considered, obviously there are medical barriers to proceeding with such.    Latest glycosylated hemoglobin reasonably well-controlled at 7.2, she continues on Levemir and NovoLog, basal/bolus  regimen.        Review of Systems   Constitutional, HEENT, cardiovascular, pulmonary, GI, , musculoskeletal, neuro, skin, endocrine and psych systems are negative, except as otherwise noted.      Objective    /40 (BP Location: Left arm, Patient Position: Sitting, Cuff Size: Adult Regular)   Pulse 67   Temp 97.9  F (36.6  C) (Oral)   Resp 16   Wt 64.4 kg (142 lb)   LMP  (LMP Unknown)   SpO2 90%   BMI 27.73 kg/m    Body mass index is 27.73 kg/m .  Physical Exam   GENERAL: healthy, alert and no distress  NECK: no adenopathy, no asymmetry, masses, or scars and thyroid normal to palpation  RESP: lungs clear to auscultation - no rales, rhonchi or wheezes  CV: regular rate and rhythm, normal S1 S2, no S3 or S4, no murmur, click or rub, no peripheral edema and peripheral pulses strong  MS: no gross musculoskeletal defects noted, no edema

## 2023-12-09 DIAGNOSIS — R06.02 SHORTNESS OF BREATH: ICD-10-CM

## 2023-12-09 DIAGNOSIS — I27.20 PULMONARY HYPERTENSION (H): Primary | ICD-10-CM

## 2023-12-11 ENCOUNTER — TELEPHONE (OUTPATIENT)
Dept: CARDIOLOGY | Facility: CLINIC | Age: 63
End: 2023-12-11
Payer: MEDICARE

## 2023-12-11 NOTE — TELEPHONE ENCOUNTER
12/11 talked with pt pt says she cannot talk rn, offered to call back another time to scheduled labs and andrea RANGEL

## 2023-12-12 ENCOUNTER — TELEPHONE (OUTPATIENT)
Dept: CARDIOLOGY | Facility: CLINIC | Age: 63
End: 2023-12-12
Payer: MEDICARE

## 2023-12-12 NOTE — TELEPHONE ENCOUNTER
12/12 spoke to patient and attempted to schedule pt appt w/ Tramaine   Offered pt the soonest appt on 12/19 at the [facility]   Pt stated that she prefers [facility]  Adv pt that the next available wont be until April 2024   Pt stated that she'll have to schedule a ride, to give her some time and will call later  Adv noted

## 2023-12-14 ENCOUNTER — TELEPHONE (OUTPATIENT)
Dept: CARDIOLOGY | Facility: CLINIC | Age: 63
End: 2023-12-14
Payer: MEDICARE

## 2023-12-15 ENCOUNTER — TELEPHONE (OUTPATIENT)
Dept: CARDIOLOGY | Facility: CLINIC | Age: 63
End: 2023-12-15
Payer: MEDICARE

## 2023-12-15 NOTE — TELEPHONE ENCOUNTER
12/15 spoke to pt and offered a sooner appt w/ Tramaine on 12/19   Pt stated that she wants to stick to her appt that's scheduled on 1/25  Adv pt that she'll need labs and will be able to help schedule   Pt state that she'll take care of it on her own and requested to mail her her appts  Adv pt will have it mailed out.  Pt stated we need to get it together

## 2024-01-06 ENCOUNTER — OFFICE VISIT (OUTPATIENT)
Dept: URGENT CARE | Facility: URGENT CARE | Age: 64
End: 2024-01-06
Payer: MEDICARE

## 2024-01-06 VITALS
BODY MASS INDEX: 27.34 KG/M2 | RESPIRATION RATE: 12 BRPM | TEMPERATURE: 97.8 F | OXYGEN SATURATION: 90 % | DIASTOLIC BLOOD PRESSURE: 77 MMHG | SYSTOLIC BLOOD PRESSURE: 148 MMHG | HEART RATE: 61 BPM | WEIGHT: 140 LBS

## 2024-01-06 DIAGNOSIS — L03.115 CELLULITIS OF RIGHT LOWER EXTREMITY: ICD-10-CM

## 2024-01-06 DIAGNOSIS — M76.61 ACHILLES TENDINITIS, RIGHT LEG: Primary | ICD-10-CM

## 2024-01-06 PROCEDURE — 99213 OFFICE O/P EST LOW 20 MIN: CPT | Performed by: FAMILY MEDICINE

## 2024-01-06 RX ORDER — PREDNISONE 20 MG/1
20 TABLET ORAL DAILY
Qty: 5 TABLET | Refills: 0 | Status: SHIPPED | OUTPATIENT
Start: 2024-01-06 | End: 2024-01-11

## 2024-01-06 RX ORDER — CEPHALEXIN 500 MG/1
500 CAPSULE ORAL 2 TIMES DAILY
Qty: 14 CAPSULE | Refills: 0 | Status: SHIPPED | OUTPATIENT
Start: 2024-01-06 | End: 2024-01-13

## 2024-01-06 NOTE — PROGRESS NOTES
SUBJECTIVE: Yissel Wetzel is a 63 year old female presenting with a chief complaint of rt lower leg pain.  Onset of symptoms was day(s) ago.  Course of illness is worsening.        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, initial encounter 2019    End stage renal failure on dialysis (H)     Esophagitis, unspecified     Former tobacco use     HEMORRHAGIC GASTROPATHY     History of blood transfusion     [place]    Iron deficiency anemia, unspecified     Mild persistent asthma     Obesity     Other and unspecified hyperlipidemia     Pneumonia     Pulmonary hypertension (H)     per 2012 echo mod.to severe pulmonary hypertension    Tobacco use disorder dc ed     Type 2 diabetes mellitus (H)     on Insulin- managed by PCP    Unspecified essential hypertension      Allergies   Allergen Reactions    Albuterol      shakiness    Aspirin      gi    Byetta [Exenatide]      gi    Clonidine      constipation    Codeine      vomiting    Ezetimibe      muscle symptoms    Hydralazine      headaches    Lisinopril      hyperkalemia    Metformin Hydrochloride      vomiting    Pravachol [Pravastatin Sodium]      muscle pains    Simvastatin      myalgias    Troglitazone      Social History     Tobacco Use    Smoking status: Former     Packs/day: 1.00     Years: 22.00     Additional pack years: 0.00     Total pack years: 22.00     Types: Cigarettes     Quit date: 10/12/1999     Years since quittin.2    Smokeless tobacco: Never   Substance Use Topics    Alcohol use: No     Alcohol/week: 0.0 standard drinks of alcohol       ROS:  SKIN: no rash  GI: no vomiting    OBJECTIVE:  BP (!) 148/77 (BP Location: Left arm, Patient Position: Sitting, Cuff Size: Adult Regular)   Pulse 61   Temp 97.8  F (36.6  C) (Tympanic)   Resp 12   Wt 63.5 kg (140  lb)   LMP  (LMP Unknown)   SpO2 90%   BMI 27.34 kg/m  GENERAL APPEARANCE: healthy, alert and no distress  SKIN: dry cracked skin with redness tender skin over achilles tendon  Rt achilles tendon pain      ICD-10-CM    1. Achilles tendinitis, right leg  M76.61 Orthopedic  Referral     predniSONE (DELTASONE) 20 MG tablet      2. Cellulitis of right lower extremity  L03.115 cephALEXin (KEFLEX) 500 MG capsule     Orthopedic  Referral        Start icing  Moisturize   Fluids/Rest, f/u if worse/not any better

## 2024-01-20 NOTE — PROGRESS NOTES
Virtual Visit Details    Type of service:  Telephone Visit   Phone call duration: 20 minutes     Jose Redd M.D.  Cardiovascular Medicine    Lev Hernandez M.D.  Gigi Martin M.D.        Problem List  1. ESRD with dialysis dependence  2. Chronic oxygen dependent respiratory failure  3. History of SVT  4. Diabetes  5. Diabetic nephropathy  6. COLD  7. History of ascites requiring paracentesis  8. Pleural effusion  9. ASHD with stenting  10. Pericardial calcification  11. Remote history of obesity  12. Remote history of smoking  13. Cirrhotic hepatic morphology.      Dear Lev and Gigi:     I saw your patient, Yissel Wetzel, a 63 year old female with longstanding ESRD for a follow-up visit today.  She has known, severe PAH but has not been recently seen, nor is she on medication.  Despite this she appears to be doing well, though did have problems with ascites and umbilical hernia this year. She is otherwise doing well.  I talked with her at length regarding preserving her options and suggested repeat echocardiogram at [facility].  I thoroughly discussed the risks and benefits of the contemplated catherization including bleeding, vessel rupture, death, arrhythmia, embolism, stroke, renal failure perforation of pulmonary artery, aortic,lung or heart.  I discussed how the procedure would be performed and alternative diagnostic and therapeutic management strategies.  All questions were answered.       Right heart catheterization  11/2022  RA 12  /15  /40, mean 69, sat 56.7%  PCWP mean 30  CO/CI: 4.9/3.09 by TD, 4.46/2.81 by Audrey  PVR 8.75 waterman  Ao sat 88%, HR 73    Current Outpatient Medications   Medication    ACCU-CHEK CHING PLUS test strip    acetaminophen (TYLENOL) 500 MG tablet    amiodarone (PACERONE) 200 MG tablet    blood glucose (ACCU-CHEK GUIDE) test strip    blood glucose (NO BRAND SPECIFIED) lancets standard    blood glucose (NO BRAND SPECIFIED) lancets standard    blood glucose monitoring (NO BRAND  SPECIFIED) meter device kit    Continuous Blood Gluc Sensor (FREESTYLE LATOSHA 2 SENSOR) MISC    famotidine (PEPCID) 10 MG tablet    insulin detemir (LEVEMIR FLEXTOUCH) 100 UNIT/ML pen    insulin pen needle (31G X 8 MM) 31G X 8 MM miscellaneous    levalbuterol (XOPENEX HFA) 45 MCG/ACT inhaler    midodrine (PROAMATINE) 10 MG tablet    midodrine (PROAMATINE) 10 MG tablet    NOVOLOG FLEXPEN 100 UNIT/ML soln     No current facility-administered medications for this visit.        Wt Readings from Last 24 Encounters:   24 63.5 kg (140 lb)   11/15/23 64.4 kg (142 lb)   23 64.7 kg (142 lb 9.6 oz)   23 64.2 kg (141 lb 8.6 oz)   23 62.9 kg (138 lb 9.6 oz)   22 59.3 kg (130 lb 11.7 oz)   22 61.4 kg (135 lb 4.8 oz)   22 65.8 kg (145 lb)   22 70.3 kg (155 lb)   21 77.5 kg (170 lb 14.4 oz)   21 79.4 kg (175 lb 1.6 oz)   21 71.7 kg (158 lb)   21 72.8 kg (160 lb 9.6 oz)   10/14/20 73.3 kg (161 lb 9.6 oz)   20 74.8 kg (165 lb)   20 77.1 kg (170 lb)   20 77.1 kg (170 lb)   20 79.2 kg (174 lb 9.6 oz)   20 80 kg (176 lb 4.8 oz)   19 79.4 kg (175 lb)   19 79.7 kg (175 lb 11.2 oz)   19 77.1 kg (170 lb)   19 80.6 kg (177 lb 11.1 oz)   19 79.6 kg (175 lb 8 oz)        Right heart catheterization  RA 12  /15  /40, mean 69, sat 56.7%  PCWP mean 30  CO/CI: 4.9/3.09 by TD, 4.46/2.81 by Audrey  PVR 8.75 waterman  Ao sat 88%, HR 73    Echocardiogram    Name: ELIECER CHACON  MRN: [id number]  : 1960  Study Date: 2022 03:25 PM  Age: 62 yrs  Gender: Female  Patient Location: [facility]  Reason For Study: Pulmonary Hypertension, Heart Failure  Ordering Physician: ROGER DAWSON  Referring Physician: YAHAIRA HOUSER  Performed By: Cindy Hunter     BSA: 1.6 m2  Height: 60 in  Weight: 137 lb  BP: 166/67  mmHg  ______________________________________________________________________________  Procedure  Echocardiogram with two-dimensional, color and spectral Doppler performed.  Technically difficult study. Poor acoustic windows.  ______________________________________________________________________________  Interpretation Summary  Technically difficult study. Poor acoustic windows.  Left ventricular size, wall motion and function are normal. The ejection  fraction is 55-60%.  Global right ventricular function is mildly reduced.  There is mild right ventricular hypertrophy.  Right ventricular systolic pressure is 55mmHg above the right atrial pressure.  Pulmonary hypertension is present.  IVC diameter <2.1 cm collapsing >50% with sniff suggests a normal RA pressure  of 3 mmHg.  No pericardial effusion is present.     This study was compared with the study from 7/15/2021. RV function is mildly  improved and estimated PA pressure is lower.  ______________________________________________________________________________  Left Ventricle  Left ventricular size, wall motion and function are normal. The ejection  fraction is 55-60%. Left ventricular diastolic function is indeterminate.  Abnormal non-specific septal motion is present.     Right Ventricle  The right ventricle is normal size. There is mild right ventricular  hypertrophy. Global right ventricular function is mildly reduced.     Mitral Valve  Mild mitral annular calcification is present. Mild mitral insufficiency is  present.     Aortic Valve  The valve leaflets are not well visualized. On Doppler interrogation, there is  no significant stenosis or regurgitation.     Tricuspid Valve  The tricuspid valve is normal. Mild tricuspid insufficiency is present.  Pulmonary hypertension is present. Right ventricular systolic pressure is  55mmHg above the right atrial pressure.     Pulmonic Valve  The pulmonic valve is normal. Trace pulmonic insufficiency is present.      Vessels  The aorta root is normal. The thoracic aorta is normal. IVC diameter <2.1 cm  collapsing >50% with sniff suggests a normal RA pressure of 3 mmHg.     Pericardium  No pericardial effusion is present.     Compared to Previous Study  This study was compared with the study from 7/15/2021 . RV function is mildly  improved and estimated PA pressure is lower.     ______________________________________________________________________________  MMode/2D Measurements & Calculations  IVSd: 0.67 cm  LVIDd: 4.1 cm  LVIDs: 2.6 cm  LVPWd: 0.65 cm  FS: 35.6 %     LV mass(C)d: 74.3 grams  LV mass(C)dI: 46.7 grams/m2  Ao root diam: 2.5 cm  asc Aorta Diam: 3.2 cm  LVOT diam: 1.9 cm  LVOT area: 2.8 cm2  RWT: 0.32     Doppler Measurements & Calculations  MV E max nguyễn: 145.0 cm/sec  MV A max nguyễn: 32.0 cm/sec  MV E/A: 4.5  MV dec slope: 800.0 cm/sec2  MV dec time: 0.18 sec  Ao V2 max: 171.0 cm/sec  Ao max P.7 mmHg  Ao V2 mean: 117.0 cm/sec  Ao mean P.0 mmHg  Ao V2 VTI: 40.7 cm  RADHA(I,D): 1.6 cm2  RADHA(V,D): 1.6 cm2  LV V1 max PG: 3.6 mmHg  LV V1 max: 95.0 cm/sec  LV V1 VTI: 22.5 cm  SV(LVOT): 63.8 ml  SI(LVOT): 40.1 ml/m2  TR max nguyễn: 373.0 cm/sec  TR max P.7 mmHg  RVSP(TR): 58.7 mmHg  AV Nguyễn Ratio (DI): 0.56  RADHA Index (cm2/m2): 0.99  E/E' av.8     Lateral E/e': 18.5  Medial E/e': 21.2    Interestingly, her RA pressure was only modestly elevated, given that her presumed cirrhosis has been attributed to right heart failure.  She has had evidence for both severe pre and post capillary pulmonary hypertension as evidenced by the markedly elevated wedge pressure of 30 and the severely elevated pulmonary artery pressure and pulmonary vascular resistance.      An echocardiogram performed previously:       This only adds to the mystery as her right sided function is good and her left sided function is also good and does not demonstrate severe diastolic abnormalities.  The estimated echo PA pressures are much lower  than the measured PA pressures.    Thus, from my perspective we do not have a good explanation for many of her abnormalities.  Certainly her hemodynamics are not consistent with longevity.  In addition her PA pressures would preclude renal or renal or hepatic transplantation.      She has had compressive atelectasis of the right lower lobe secondary to a pleural effusion and has been persistently hypoxic in the past though interestingly this week has not had hypoxia or required oxygen.     She does have pericardial calcification but the hemodynamics were not classic for constrictive pericarditis as seen in the very early days of dialysis.                Family history: ASHD, diabetes       Objective    Alert, oriented, looks older than age, left basilar dullness, CVP 10-12, positive HJR,no  RV lift, murmur of TR, ascites though minimal peripheral edema    Constitutional: weight loss, fever, chills, night sweats  HEENT: without visual changes, swallow difficulties  Pulmonary: with shortness of breath, but without cough, wheeze, hemoptysis, however reported history of COLD  Cardiac: without chest pain, MANE, PND, orthopnea, edema, palpitation, pre-syncope, syncope,  GI: without diarrhea, constipation, jaundice, melena, GERD, hematemesis  : without frequency, urgency, dysuria, hematuria but dialysis dependent.  Skin: rash, bruise, open lesions  Neuro: without TIA, focal neurologic complaints, seizure, trauma  Ortho: without pain, swelling, mobility impairment  Endocrine:+ diabetes,but no thyroid, heat/cold intolerance, polyuria, polyphagia, change bowel habits.  Sleep:+periodic breathing, fatigue      Allergies reviewed and confirmed    Wt Readings from Last 24 Encounters:   01/06/24 63.5 kg (140 lb)   11/15/23 64.4 kg (142 lb)   09/26/23 64.7 kg (142 lb 9.6 oz)   09/09/23 64.2 kg (141 lb 8.6 oz)   01/05/23 62.9 kg (138 lb 9.6 oz)   11/25/22 59.3 kg (130 lb 11.7 oz)   11/03/22 61.4 kg (135 lb 4.8 oz)   06/20/22 65.8  kg (145 lb)   04/05/22 70.3 kg (155 lb)   07/25/21 77.5 kg (170 lb 14.4 oz)   07/16/21 79.4 kg (175 lb 1.6 oz)   05/27/21 71.7 kg (158 lb)   03/04/21 72.8 kg (160 lb 9.6 oz)   10/14/20 73.3 kg (161 lb 9.6 oz)   08/05/20 74.8 kg (165 lb)   05/07/20 77.1 kg (170 lb)   04/22/20 77.1 kg (170 lb)   01/20/20 79.2 kg (174 lb 9.6 oz)   01/13/20 80 kg (176 lb 4.8 oz)   12/31/19 79.4 kg (175 lb)   12/31/19 79.7 kg (175 lb 11.2 oz)   12/09/19 77.1 kg (170 lb)   12/05/19 80.6 kg (177 lb 11.1 oz)   11/01/19 79.6 kg (175 lb 8 oz)           Current Outpatient Medications   Medication    ACCU-CHEK CHING PLUS test strip    acetaminophen (TYLENOL) 500 MG tablet    amiodarone (PACERONE) 200 MG tablet    blood glucose (ACCU-CHEK GUIDE) test strip    blood glucose (NO BRAND SPECIFIED) lancets standard    blood glucose (NO BRAND SPECIFIED) lancets standard    blood glucose monitoring (NO BRAND SPECIFIED) meter device kit    Continuous Blood Gluc Sensor (FREESTYLE LATOSHA 2 SENSOR) MISC    famotidine (PEPCID) 10 MG tablet    insulin detemir (LEVEMIR FLEXTOUCH) 100 UNIT/ML pen    insulin pen needle (31G X 8 MM) 31G X 8 MM miscellaneous    levalbuterol (XOPENEX HFA) 45 MCG/ACT inhaler    midodrine (PROAMATINE) 10 MG tablet    midodrine (PROAMATINE) 10 MG tablet    NOVOLOG FLEXPEN 100 UNIT/ML soln     No current facility-administered medications for this visit.           EXAMINATION: Limited Abdominal Ultrasound, 11/25/2022 9:38 AM      COMPARISON: None.     HISTORY: Cirrhotic appearing liver noted on 7/2021 CT chest     FINDINGS:      Liver: The liver demonstrates heterogenous and coarsened hepatic  echotexture and nodular hepatic surface contour. Hepatomegaly  measuring 17.8 cm in craniocaudal dimension. There is no focal mass.  The main vein is patent with flow towards the liver.     Gallbladder: Shadowing gallstones in the neck of the gallbladder. No  gallbladder wall thickening, pericholecystic fluid, or positive  sonographic Gomez's  sign. There is an approximately 1.1 x 1.1 cm  masslike thickening of the gallbladder fundus which could represent a  collection of biliary sludge versus adherent stone versus gallbladder  polyp.     Bile Ducts: Both the intra- and extrahepatic biliary system are of  normal caliber.  The common bile duct measures 1.9 mm in diameter.     Pancreas: Visualized portions of the head and body of the pancreas are  unremarkable.      Kidney: The right kidney is atrophic measuring 7.1 cm long with  increased cortical echogenicity. There is no hydronephrosis or  hydroureter, no shadowing renal calculi, cystic lesion or mass.      Fluid: Moderate ascites in the right upper quadrant.                                                                      IMPRESSION:   1.  Nodular hepatic surface contour and heterogenous coarsened hepatic  echotexture consistent with hepatic cirrhosis. No focal hepatic mass.  2.  1.1 x 1.1 cm masslike thickening of the gallbladder fundus.  Biliary tumefactive sludge and/or secondary to chronic liver disease  versus adherent stone versus gallbladder polyp. Recommend follow-up  ultrasound in 6 months.  3.  Cholelithiasis   4.  Moderate ascites.      Heart Catheterization:11/23/2022    RA 12  /15  /40, mean 69, sat 56.7%  PCWP mean 30  CO/CI: 4.9/3.09 by TD, 4.46/2.81 by Audrey  PVR 8.75 waterman  Ao sat 88%, HR 73   Right sided filling pressures are mildly elevated. Left sided filling pressures are moderately elevated. Severely elevated pulmonary artery hypertension. Normal cardiac output level. Hemodynamic data has been modified in Epic per physician review.        Catheterization 7/2021    ) Severe pulmonary hypertension primarily secondary to intrinsic pulmonary vascular disease (mean PA 81 mmHg, PVRI 25, PCWP confirmed with sat 35 mmHg)  2) Severely elevated right-sided filling pressures (mean RA 35 mmHg)  3) Severely elevated left-sided filling pressures (LVEDP 35-40 mmHg)  4) No  hemodynamically significant coronary artery disease  5) With NO to 80 ppm the mean PA decreased to 60 mmHg, PCWP remained unchanged at 35 mmHg, PVRI decreased to 8.7 waterman*m2.  Her systemic blood pressure was 160-170 mmHg  6) Episode of SVT terminated with 6mg adenosine          Kameron    Echocardiogram        Name: ELIECER CHACON  MRN: [id number]  : 1960  Study Date: 2022 03:25 PM  Age: 62 yrs  Gender: Female  Patient Location: [facility]  Reason For Study: Pulmonary Hypertension, Heart Failure  Ordering Physician: ROGER DAWSON  Referring Physician: YAHAIRA HOUSER  Performed By: Cindy Hunter     BSA: 1.6 m2  Height: 60 in  Weight: 137 lb  BP: 166/67 mmHg  ______________________________________________________________________________  Procedure  Echocardiogram with two-dimensional, color and spectral Doppler performed.  Technically difficult study. Poor acoustic windows.  ______________________________________________________________________________  Interpretation Summary  Technically difficult study. Poor acoustic windows.  Left ventricular size, wall motion and function are normal. The ejection  fraction is 55-60%.  Global right ventricular function is mildly reduced.  There is mild right ventricular hypertrophy.  Right ventricular systolic pressure is 55mmHg above the right atrial pressure.  Pulmonary hypertension is present.  IVC diameter <2.1 cm collapsing >50% with sniff suggests a normal RA pressure  of 3 mmHg.  No pericardial effusion is present.     This study was compared with the study from 7/15/2021. RV function is mildly  improved and estimated PA pressure is lower.  ______________________________________________________________________________  Left Ventricle  Left ventricular size, wall motion and function are normal. The ejection  fraction is 55-60%. Left ventricular diastolic function is indeterminate.  Abnormal non-specific septal motion is present.     Right  Ventricle  The right ventricle is normal size. There is mild right ventricular  hypertrophy. Global right ventricular function is mildly reduced.     Mitral Valve  Mild mitral annular calcification is present. Mild mitral insufficiency is  present.     Aortic Valve  The valve leaflets are not well visualized. On Doppler interrogation, there is  no significant stenosis or regurgitation.     Tricuspid Valve  The tricuspid valve is normal. Mild tricuspid insufficiency is present.  Pulmonary hypertension is present. Right ventricular systolic pressure is  55mmHg above the right atrial pressure.     Pulmonic Valve  The pulmonic valve is normal. Trace pulmonic insufficiency is present.     Vessels  The aorta root is normal. The thoracic aorta is normal. IVC diameter <2.1 cm  collapsing >50% with sniff suggests a normal RA pressure of 3 mmHg.     Pericardium  No pericardial effusion is present.     Compared to Previous Study  This study was compared with the study from 7/15/2021 . RV function is mildly  improved and estimated PA pressure is lower.     ______________________________________________________________________________  MMode/2D Measurements & Calculations  IVSd: 0.67 cm  LVIDd: 4.1 cm  LVIDs: 2.6 cm  LVPWd: 0.65 cm  FS: 35.6 %     LV mass(C)d: 74.3 grams  LV mass(C)dI: 46.7 grams/m2  Ao root diam: 2.5 cm  asc Aorta Diam: 3.2 cm  LVOT diam: 1.9 cm  LVOT area: 2.8 cm2  RWT: 0.32     Doppler Measurements & Calculations  MV E max nguyễn: 145.0 cm/sec  MV A max nguyễn: 32.0 cm/sec  MV E/A: 4.5  MV dec slope: 800.0 cm/sec2  MV dec time: 0.18 sec  Ao V2 max: 171.0 cm/sec  Ao max P.7 mmHg  Ao V2 mean: 117.0 cm/sec  Ao mean P.0 mmHg  Ao V2 VTI: 40.7 cm  RADHA(I,D): 1.6 cm2  RADHA(V,D): 1.6 cm2  LV V1 max PG: 3.6 mmHg  LV V1 max: 95.0 cm/sec  LV V1 VTI: 22.5 cm  SV(LVOT): 63.8 ml  SI(LVOT): 40.1 ml/m2  TR max nguyễn: 373.0 cm/sec  TR max P.7 mmHg  RVSP(TR): 58.7 mmHg  AV Nguyễn Ratio (DI): 0.56  RADHA Index (cm2/m2):  0.99  E/E' av.8     Lateral E/e': 18.5  Medial E/e': 21.2            Name: ELIECER CHACON  MRN: [id number]  : 1960  Study Date: 07/15/2021 08:59 AM  Age: 61 yrs  Gender: Female  Patient Location: [facility]  Reason For Study: Tachycardia  Ordering Physician: KERWIN BEATTY  Referring Physician: Gigi Martin  Performed By: Raj Grey     BSA: 1.7 m2  Height: 60 in  Weight: 163 lb  HR: 59  BP: 159/73 mmHg  ______________________________________________________________________________  Procedure  Complete Portable Echo Adult.  ______________________________________________________________________________  Interpretation Summary     Left ventricular systolic function is normal.  The visual ejection fraction is 60-65%.  The right ventricle is severely dilated.  Moderately decreased right ventricular systolic function  There is moderate to mod-severe (2-3+) tricuspid regurgitation.  The right ventricular systolic pressure is approximated at 82mmHg plus the  right atrial pressure.  Right ventricular systolic pressure is elevated, consistent with severe  pulmonary hypertension.  IVC diameter >2.1 cm collapsing <50% with sniff suggests a high RA pressure  estimated at 15 mmHg or greater.  Flattened septum is consistent with RV pressure/volume overload.  Compared to , there is new RV enlargemnt with RV dysfunction. RVSP has  increased compared to 2017 when it was 60 plus RAP.  Findings discussed with aDwna, nursing team for the patient. The study was  technically difficult.  ______________________________________________________________________________  Left Ventricle  The left ventricle is normal in size. There is concentric remodeling present.  Left ventricular systolic function is normal. The visual ejection fraction is  60-65%. Diastolic Doppler findings (E/E' ratio and/or other parameters)  suggest left ventricular filling pressures are increased. Grade III or  advanced diastolic dysfunction.  Flattened septum is consistent with RV  pressure/volume overload.     Right Ventricle  The right ventricle is severely dilated. Moderately decreased right  ventricular systolic function.     Atria  The left atrium is severely dilated. The right atrium is severely dilated.     Mitral Valve  The mitral valve leaflets are mildly thickened. There is mild mitral annular  calcification. There is mild to moderate (1-2+) mitral regurgitation.     Tricuspid Valve  There is moderate to mod-severe (2-3+) tricuspid regurgitation. IVC diameter  >2.1 cm collapsing <50% with sniff suggests a high RA pressure estimated at 15  mmHg or greater. Right ventricular systolic pressure is elevated, consistent  with severe pulmonary hypertension. The right ventricular systolic pressure is  approximated at 82mmHg plus the right atrial pressure.     Aortic Valve  There is mild trileaflet aortic sclerosis. There is trace aortic  regurgitation. No aortic stenosis is present.     Pulmonic Valve  The pulmonic valve is not well visualized.     Pericardium  Trivial pericardial effusion. Small right pleural effusion.     Rhythm  Sinus rhythm was noted.     ______________________________________________________________________________  MMode/2D Measurements & Calculations  IVSd: 0.98 cm  LVIDd: 3.8 cm  LVIDs: 3.6 cm  LVPWd: 1.2 cm  FS: 4.2 %  LV mass(C)d: 128.1 grams  LV mass(C)dI: 74.9 grams/m2     Ao root diam: 2.7 cm  LA dimension: 3.3 cm  asc Aorta Diam: 3.0 cm  LA/Ao: 1.2  LVOT diam: 2.0 cm  LVOT area: 3.1 cm2  LA Volume (BP): 95.2 ml  LA Volume Index (BP): 55.7 ml/m2  RWT: 0.62     Doppler Measurements & Calculations  MV E max artur: 130.0 cm/sec  MV A max artur: 44.1 cm/sec  MV E/A: 2.9  MV max P.7 mmHg  MV mean P.9 mmHg  MV V2 VTI: 34.6 cm     MV dec slope: 583.8 cm/sec2  PA acc time: 0.06 sec  TR max artur: 452.5 cm/sec  TR max P.9 mmHg  E/E' av.9  Lateral E/e': 19.0  Medial E/e': 16.8     Imaging     EXAM: CT CHEST PULMONARY  EMBOLISM W CONTRAST  LOCATION: [facility]  DATE/TIME: 6/15/2022 4:56 PM     INDICATION: PE suspected, high prob. Increased shortness of breath.  COMPARISON: 07/15/2021.  TECHNIQUE: CT chest pulmonary angiogram during arterial phase injection of IV contrast. Multiplanar reformats and MIP reconstructions were performed. Dose reduction techniques were used.   CONTRAST: 61 mL Isovue 370.     FINDINGS:  ANGIOGRAM CHEST: Pulmonary arteries are normal caliber and negative for pulmonary emboli. Normal caliber thoracic aorta without dissection. Moderate aortic calcification.     LUNGS AND PLEURA: Moderate right-sided pleural fluid collection. Mosaic attenuation of the pulmonary parenchyma diffusely which can be seen with air trapping, edema or viral etiologies. Significant volume loss/atelectasis right lower lobe with moderate   volume loss and atelectasis right middle lobe inferiorly. No left-sided pleural fluid. Mild intralobular septal thickening.     MEDIASTINUM/AXILLAE: Cardiac enlargement without pericardial fluid. Diffuse pericardial calcifications. No lymphadenopathy. Normal caliber esophagus.     CORONARY ARTERY CALCIFICATION: Moderate.     UPPER ABDOMEN: Partially visualized moderate ascites.     MUSCULOSKELETAL: Minimal degenerative changes in the spine.                                                                      IMPRESSION:  1.  No evidence for pulmonary embolism.     2.  Moderate right-sided pleural fluid collection with atelectasis right lower lobe and right middle lobe as detailed above. Underlying infiltrates in these areas of atelectasis cannot be excluded.     3.  Mosaic attenuation of the pulmonary parenchyma with interlobular septal thickening most likely related to edema. Other considerations would include air trapping related to reactive airways disease or viral etiologies.     4.  Cardiac enlargement with diffuse pericardial calcifications.           CC: Lev Hernandez  ([facility]), Gigi Martin M.D., [place] Barbosa M.D.

## 2024-01-25 ENCOUNTER — OFFICE VISIT (OUTPATIENT)
Dept: ORTHOPEDICS | Facility: CLINIC | Age: 64
End: 2024-01-25
Payer: MEDICARE

## 2024-01-25 ENCOUNTER — VIRTUAL VISIT (OUTPATIENT)
Dept: CARDIOLOGY | Facility: CLINIC | Age: 64
End: 2024-01-25
Attending: INTERNAL MEDICINE
Payer: MEDICARE

## 2024-01-25 VITALS — HEIGHT: 60 IN | WEIGHT: 138 LBS | BODY MASS INDEX: 27.09 KG/M2

## 2024-01-25 DIAGNOSIS — R06.02 SHORTNESS OF BREATH: ICD-10-CM

## 2024-01-25 DIAGNOSIS — I27.20 PULMONARY HYPERTENSION (H): ICD-10-CM

## 2024-01-25 DIAGNOSIS — M67.971 ACHILLES TENDON DISORDER, RIGHT: Primary | ICD-10-CM

## 2024-01-25 PROCEDURE — 99441 PR PHYSICIAN TELEPHONE EVALUATION 5-10 MIN: CPT | Mod: 95 | Performed by: INTERNAL MEDICINE

## 2024-01-25 PROCEDURE — 99203 OFFICE O/P NEW LOW 30 MIN: CPT | Performed by: FAMILY MEDICINE

## 2024-01-25 RX ORDER — LIDOCAINE 40 MG/G
CREAM TOPICAL
Status: CANCELLED | OUTPATIENT
Start: 2024-01-25

## 2024-01-25 ASSESSMENT — PAIN SCALES - GENERAL: PAINLEVEL: NO PAIN (0)

## 2024-01-25 NOTE — LETTER
1/25/2024      RE: Yissel Wetzel  [address]  [facility] [id number]       Dear Colleague,    Thank you for the opportunity to participate in the care of your patient, Yissel Wetzel, at the [facility] [place] at [facility]. Please see a copy of my visit note below.    Virtual Visit Details    Type of service:  Telephone Visit   Phone call duration: 20 minutes     Jose Redd M.D.  Cardiovascular Medicine    Lev Hernandez M.D.  Gigi Martin M.D.        Problem List  1. ESRD with dialysis dependence  2. Chronic oxygen dependent respiratory failure  3. History of SVT  4. Diabetes  5. Diabetic nephropathy  6. COLD  7. History of ascites requiring paracentesis  8. Pleural effusion  9. ASHD with stenting  10. Pericardial calcification  11. Remote history of obesity  12. Remote history of smoking  13. Cirrhotic hepatic morphology.      Dear Lev and Gigi:     I saw your patient, Yissel Wetzel, a 63 year old female with longstanding ESRD for a follow-up visit today.  She has known, severe PAH but has not been recently seen, nor is she on medication.  Despite this she appears to be doing well, though did have problems with ascites and umbilical hernia this year. She is otherwise doing well.  I talked with her at length regarding preserving her options and suggested repeat echocardiogram at [facility].  I thoroughly discussed the risks and benefits of the contemplated catherization including bleeding, vessel rupture, death, arrhythmia, embolism, stroke, renal failure perforation of pulmonary artery, aortic,lung or heart.  I discussed how the procedure would be performed and alternative diagnostic and therapeutic management strategies.  All questions were answered.       Right heart catheterization  11/2022  RA 12  /15  /40, mean 69, sat 56.7%  PCWP mean 30  CO/CI: 4.9/3.09 by TD, 4.46/2.81 by Audrey  PVR 8.75 waterman  Ao sat 88%, HR  73    Current Outpatient Medications   Medication     ACCU-CHEK CHING PLUS test strip     acetaminophen (TYLENOL) 500 MG tablet     amiodarone (PACERONE) 200 MG tablet     blood glucose (ACCU-CHEK GUIDE) test strip     blood glucose (NO BRAND SPECIFIED) lancets standard     blood glucose (NO BRAND SPECIFIED) lancets standard     blood glucose monitoring (NO BRAND SPECIFIED) meter device kit     Continuous Blood Gluc Sensor (FREESTYLE LATOSHA 2 SENSOR) MISC     famotidine (PEPCID) 10 MG tablet     insulin detemir (LEVEMIR FLEXTOUCH) 100 UNIT/ML pen     insulin pen needle (31G X 8 MM) 31G X 8 MM miscellaneous     levalbuterol (XOPENEX HFA) 45 MCG/ACT inhaler     midodrine (PROAMATINE) 10 MG tablet     midodrine (PROAMATINE) 10 MG tablet     NOVOLOG FLEXPEN 100 UNIT/ML soln     No current facility-administered medications for this visit.        Wt Readings from Last 24 Encounters:   24 63.5 kg (140 lb)   11/15/23 64.4 kg (142 lb)   23 64.7 kg (142 lb 9.6 oz)   23 64.2 kg (141 lb 8.6 oz)   23 62.9 kg (138 lb 9.6 oz)   22 59.3 kg (130 lb 11.7 oz)   22 61.4 kg (135 lb 4.8 oz)   22 65.8 kg (145 lb)   22 70.3 kg (155 lb)   21 77.5 kg (170 lb 14.4 oz)   21 79.4 kg (175 lb 1.6 oz)   21 71.7 kg (158 lb)   21 72.8 kg (160 lb 9.6 oz)   10/14/20 73.3 kg (161 lb 9.6 oz)   20 74.8 kg (165 lb)   20 77.1 kg (170 lb)   20 77.1 kg (170 lb)   20 79.2 kg (174 lb 9.6 oz)   20 80 kg (176 lb 4.8 oz)   19 79.4 kg (175 lb)   19 79.7 kg (175 lb 11.2 oz)   19 77.1 kg (170 lb)   19 80.6 kg (177 lb 11.1 oz)   19 79.6 kg (175 lb 8 oz)        Right heart catheterization  RA 12  /15  /40, mean 69, sat 56.7%  PCWP mean 30  CO/CI: 4.9/3.09 by TD, 4.46/2.81 by Audrey  PVR 8.75 waterman  Ao sat 88%, HR 73    Echocardiogram    Name: ELIECER CHACON  MRN: [id number]  : 1960  Study Date: 2022 03:25 PM  Age:  62 yrs  Gender: Female  Patient Location: [facility]  Reason For Study: Pulmonary Hypertension, Heart Failure  Ordering Physician: ROGER DAWSON  Referring Physician: YAHAIRA HOUSER  Performed By: Cindy Hunter     BSA: 1.6 m2  Height: 60 in  Weight: 137 lb  BP: 166/67 mmHg  ______________________________________________________________________________  Procedure  Echocardiogram with two-dimensional, color and spectral Doppler performed.  Technically difficult study. Poor acoustic windows.  ______________________________________________________________________________  Interpretation Summary  Technically difficult study. Poor acoustic windows.  Left ventricular size, wall motion and function are normal. The ejection  fraction is 55-60%.  Global right ventricular function is mildly reduced.  There is mild right ventricular hypertrophy.  Right ventricular systolic pressure is 55mmHg above the right atrial pressure.  Pulmonary hypertension is present.  IVC diameter <2.1 cm collapsing >50% with sniff suggests a normal RA pressure  of 3 mmHg.  No pericardial effusion is present.     This study was compared with the study from 7/15/2021. RV function is mildly  improved and estimated PA pressure is lower.  ______________________________________________________________________________  Left Ventricle  Left ventricular size, wall motion and function are normal. The ejection  fraction is 55-60%. Left ventricular diastolic function is indeterminate.  Abnormal non-specific septal motion is present.     Right Ventricle  The right ventricle is normal size. There is mild right ventricular  hypertrophy. Global right ventricular function is mildly reduced.     Mitral Valve  Mild mitral annular calcification is present. Mild mitral insufficiency is  present.     Aortic Valve  The valve leaflets are not well visualized. On Doppler interrogation, there is  no significant stenosis or regurgitation.     Tricuspid  Valve  The tricuspid valve is normal. Mild tricuspid insufficiency is present.  Pulmonary hypertension is present. Right ventricular systolic pressure is  55mmHg above the right atrial pressure.     Pulmonic Valve  The pulmonic valve is normal. Trace pulmonic insufficiency is present.     Vessels  The aorta root is normal. The thoracic aorta is normal. IVC diameter <2.1 cm  collapsing >50% with sniff suggests a normal RA pressure of 3 mmHg.     Pericardium  No pericardial effusion is present.     Compared to Previous Study  This study was compared with the study from 7/15/2021 . RV function is mildly  improved and estimated PA pressure is lower.     ______________________________________________________________________________  MMode/2D Measurements & Calculations  IVSd: 0.67 cm  LVIDd: 4.1 cm  LVIDs: 2.6 cm  LVPWd: 0.65 cm  FS: 35.6 %     LV mass(C)d: 74.3 grams  LV mass(C)dI: 46.7 grams/m2  Ao root diam: 2.5 cm  asc Aorta Diam: 3.2 cm  LVOT diam: 1.9 cm  LVOT area: 2.8 cm2  RWT: 0.32     Doppler Measurements & Calculations  MV E max nguyễn: 145.0 cm/sec  MV A max nguyễn: 32.0 cm/sec  MV E/A: 4.5  MV dec slope: 800.0 cm/sec2  MV dec time: 0.18 sec  Ao V2 max: 171.0 cm/sec  Ao max P.7 mmHg  Ao V2 mean: 117.0 cm/sec  Ao mean P.0 mmHg  Ao V2 VTI: 40.7 cm  RADHA(I,D): 1.6 cm2  RADHA(V,D): 1.6 cm2  LV V1 max PG: 3.6 mmHg  LV V1 max: 95.0 cm/sec  LV V1 VTI: 22.5 cm  SV(LVOT): 63.8 ml  SI(LVOT): 40.1 ml/m2  TR max nguyễn: 373.0 cm/sec  TR max P.7 mmHg  RVSP(TR): 58.7 mmHg  AV Nguyễn Ratio (DI): 0.56  RADHA Index (cm2/m2): 0.99  E/E' av.8     Lateral E/e': 18.5  Medial E/e': 21.2    Interestingly, her RA pressure was only modestly elevated, given that her presumed cirrhosis has been attributed to right heart failure.  She has had evidence for both severe pre and post capillary pulmonary hypertension as evidenced by the markedly elevated wedge pressure of 30 and the severely elevated pulmonary artery pressure and pulmonary  vascular resistance.      An echocardiogram performed previously:       This only adds to the mystery as her right sided function is good and her left sided function is also good and does not demonstrate severe diastolic abnormalities.  The estimated echo PA pressures are much lower than the measured PA pressures.    Thus, from my perspective we do not have a good explanation for many of her abnormalities.  Certainly her hemodynamics are not consistent with longevity.  In addition her PA pressures would preclude renal or renal or hepatic transplantation.      She has had compressive atelectasis of the right lower lobe secondary to a pleural effusion and has been persistently hypoxic in the past though interestingly this week has not had hypoxia or required oxygen.     She does have pericardial calcification but the hemodynamics were not classic for constrictive pericarditis as seen in the very early days of dialysis.                Family history: ASHD, diabetes       Objective    Alert, oriented, looks older than age, left basilar dullness, CVP 10-12, positive HJR,no  RV lift, murmur of TR, ascites though minimal peripheral edema    Constitutional: weight loss, fever, chills, night sweats  HEENT: without visual changes, swallow difficulties  Pulmonary: with shortness of breath, but without cough, wheeze, hemoptysis, however reported history of COLD  Cardiac: without chest pain, MANE, PND, orthopnea, edema, palpitation, pre-syncope, syncope,  GI: without diarrhea, constipation, jaundice, melena, GERD, hematemesis  : without frequency, urgency, dysuria, hematuria but dialysis dependent.  Skin: rash, bruise, open lesions  Neuro: without TIA, focal neurologic complaints, seizure, trauma  Ortho: without pain, swelling, mobility impairment  Endocrine:+ diabetes,but no thyroid, heat/cold intolerance, polyuria, polyphagia, change bowel habits.  Sleep:+periodic breathing, fatigue      Allergies reviewed and confirmed    Wt  Readings from Last 24 Encounters:   01/06/24 63.5 kg (140 lb)   11/15/23 64.4 kg (142 lb)   09/26/23 64.7 kg (142 lb 9.6 oz)   09/09/23 64.2 kg (141 lb 8.6 oz)   01/05/23 62.9 kg (138 lb 9.6 oz)   11/25/22 59.3 kg (130 lb 11.7 oz)   11/03/22 61.4 kg (135 lb 4.8 oz)   06/20/22 65.8 kg (145 lb)   04/05/22 70.3 kg (155 lb)   07/25/21 77.5 kg (170 lb 14.4 oz)   07/16/21 79.4 kg (175 lb 1.6 oz)   05/27/21 71.7 kg (158 lb)   03/04/21 72.8 kg (160 lb 9.6 oz)   10/14/20 73.3 kg (161 lb 9.6 oz)   08/05/20 74.8 kg (165 lb)   05/07/20 77.1 kg (170 lb)   04/22/20 77.1 kg (170 lb)   01/20/20 79.2 kg (174 lb 9.6 oz)   01/13/20 80 kg (176 lb 4.8 oz)   12/31/19 79.4 kg (175 lb)   12/31/19 79.7 kg (175 lb 11.2 oz)   12/09/19 77.1 kg (170 lb)   12/05/19 80.6 kg (177 lb 11.1 oz)   11/01/19 79.6 kg (175 lb 8 oz)           Current Outpatient Medications   Medication     ACCU-CHEK CHING PLUS test strip     acetaminophen (TYLENOL) 500 MG tablet     amiodarone (PACERONE) 200 MG tablet     blood glucose (ACCU-CHEK GUIDE) test strip     blood glucose (NO BRAND SPECIFIED) lancets standard     blood glucose (NO BRAND SPECIFIED) lancets standard     blood glucose monitoring (NO BRAND SPECIFIED) meter device kit     Continuous Blood Gluc Sensor (FREESTYLE LATOSHA 2 SENSOR) MISC     famotidine (PEPCID) 10 MG tablet     insulin detemir (LEVEMIR FLEXTOUCH) 100 UNIT/ML pen     insulin pen needle (31G X 8 MM) 31G X 8 MM miscellaneous     levalbuterol (XOPENEX HFA) 45 MCG/ACT inhaler     midodrine (PROAMATINE) 10 MG tablet     midodrine (PROAMATINE) 10 MG tablet     NOVOLOG FLEXPEN 100 UNIT/ML soln     No current facility-administered medications for this visit.           EXAMINATION: Limited Abdominal Ultrasound, 11/25/2022 9:38 AM      COMPARISON: None.     HISTORY: Cirrhotic appearing liver noted on 7/2021 CT chest     FINDINGS:      Liver: The liver demonstrates heterogenous and coarsened hepatic  echotexture and nodular hepatic surface contour.  Hepatomegaly  measuring 17.8 cm in craniocaudal dimension. There is no focal mass.  The main vein is patent with flow towards the liver.     Gallbladder: Shadowing gallstones in the neck of the gallbladder. No  gallbladder wall thickening, pericholecystic fluid, or positive  sonographic Gomez's sign. There is an approximately 1.1 x 1.1 cm  masslike thickening of the gallbladder fundus which could represent a  collection of biliary sludge versus adherent stone versus gallbladder  polyp.     Bile Ducts: Both the intra- and extrahepatic biliary system are of  normal caliber.  The common bile duct measures 1.9 mm in diameter.     Pancreas: Visualized portions of the head and body of the pancreas are  unremarkable.      Kidney: The right kidney is atrophic measuring 7.1 cm long with  increased cortical echogenicity. There is no hydronephrosis or  hydroureter, no shadowing renal calculi, cystic lesion or mass.      Fluid: Moderate ascites in the right upper quadrant.                                                                      IMPRESSION:   1.  Nodular hepatic surface contour and heterogenous coarsened hepatic  echotexture consistent with hepatic cirrhosis. No focal hepatic mass.  2.  1.1 x 1.1 cm masslike thickening of the gallbladder fundus.  Biliary tumefactive sludge and/or secondary to chronic liver disease  versus adherent stone versus gallbladder polyp. Recommend follow-up  ultrasound in 6 months.  3.  Cholelithiasis   4.  Moderate ascites.      Heart Catheterization:11/23/2022    RA 12  /15  /40, mean 69, sat 56.7%  PCWP mean 30  CO/CI: 4.9/3.09 by TD, 4.46/2.81 by Audrey  PVR 8.75 waterman  Ao sat 88%, HR 73   Right sided filling pressures are mildly elevated. Left sided filling pressures are moderately elevated. Severely elevated pulmonary artery hypertension. Normal cardiac output level. Hemodynamic data has been modified in Epic per physician review.        Catheterization 7/2021    ) Severe  pulmonary hypertension primarily secondary to intrinsic pulmonary vascular disease (mean PA 81 mmHg, PVRI 25, PCWP confirmed with sat 35 mmHg)  2) Severely elevated right-sided filling pressures (mean RA 35 mmHg)  3) Severely elevated left-sided filling pressures (LVEDP 35-40 mmHg)  4) No hemodynamically significant coronary artery disease  5) With NO to 80 ppm the mean PA decreased to 60 mmHg, PCWP remained unchanged at 35 mmHg, PVRI decreased to 8.7 wtaerman*m2.  Her systemic blood pressure was 160-170 mmHg  6) Episode of SVT terminated with 6mg adenosine          K[place]on    Echocardiogram        Name: ELIECER CHACON  MRN: [id number]  : 1960  Study Date: 2022 03:25 PM  Age: 62 yrs  Gender: Female  Patient Location: [facility]  Reason For Study: Pulmonary Hypertension, Heart Failure  Ordering Physician: ROGER DAWSON  Referring Physician: YAHAIRA HOUSER  Performed By: Cindy Hunter     BSA: 1.6 m2  Height: 60 in  Weight: 137 lb  BP: 166/67 mmHg  ______________________________________________________________________________  Procedure  Echocardiogram with two-dimensional, color and spectral Doppler performed.  Technically difficult study. Poor acoustic windows.  ______________________________________________________________________________  Interpretation Summary  Technically difficult study. Poor acoustic windows.  Left ventricular size, wall motion and function are normal. The ejection  fraction is 55-60%.  Global right ventricular function is mildly reduced.  There is mild right ventricular hypertrophy.  Right ventricular systolic pressure is 55mmHg above the right atrial pressure.  Pulmonary hypertension is present.  IVC diameter <2.1 cm collapsing >50% with sniff suggests a normal RA pressure  of 3 mmHg.  No pericardial effusion is present.     This study was compared with the study from 7/15/2021. RV function is mildly  improved and estimated PA pressure is  lower.  ______________________________________________________________________________  Left Ventricle  Left ventricular size, wall motion and function are normal. The ejection  fraction is 55-60%. Left ventricular diastolic function is indeterminate.  Abnormal non-specific septal motion is present.     Right Ventricle  The right ventricle is normal size. There is mild right ventricular  hypertrophy. Global right ventricular function is mildly reduced.     Mitral Valve  Mild mitral annular calcification is present. Mild mitral insufficiency is  present.     Aortic Valve  The valve leaflets are not well visualized. On Doppler interrogation, there is  no significant stenosis or regurgitation.     Tricuspid Valve  The tricuspid valve is normal. Mild tricuspid insufficiency is present.  Pulmonary hypertension is present. Right ventricular systolic pressure is  55mmHg above the right atrial pressure.     Pulmonic Valve  The pulmonic valve is normal. Trace pulmonic insufficiency is present.     Vessels  The aorta root is normal. The thoracic aorta is normal. IVC diameter <2.1 cm  collapsing >50% with sniff suggests a normal RA pressure of 3 mmHg.     Pericardium  No pericardial effusion is present.     Compared to Previous Study  This study was compared with the study from 7/15/2021 . RV function is mildly  improved and estimated PA pressure is lower.     ______________________________________________________________________________  MMode/2D Measurements & Calculations  IVSd: 0.67 cm  LVIDd: 4.1 cm  LVIDs: 2.6 cm  LVPWd: 0.65 cm  FS: 35.6 %     LV mass(C)d: 74.3 grams  LV mass(C)dI: 46.7 grams/m2  Ao root diam: 2.5 cm  asc Aorta Diam: 3.2 cm  LVOT diam: 1.9 cm  LVOT area: 2.8 cm2  RWT: 0.32     Doppler Measurements & Calculations  MV E max artur: 145.0 cm/sec  MV A max artur: 32.0 cm/sec  MV E/A: 4.5  MV dec slope: 800.0 cm/sec2  MV dec time: 0.18 sec  Ao V2 max: 171.0 cm/sec  Ao max P.7 mmHg  Ao V2 mean: 117.0  cm/sec  Ao mean P.0 mmHg  Ao V2 VTI: 40.7 cm  RADHA(I,D): 1.6 cm2  RADHA(V,D): 1.6 cm2  LV V1 max PG: 3.6 mmHg  LV V1 max: 95.0 cm/sec  LV V1 VTI: 22.5 cm  SV(LVOT): 63.8 ml  SI(LVOT): 40.1 ml/m2  TR max nguyễn: 373.0 cm/sec  TR max P.7 mmHg  RVSP(TR): 58.7 mmHg  AV Nguyễn Ratio (DI): 0.56  RADHA Index (cm2/m2): 0.99  E/E' av.8     Lateral E/e': 18.5  Medial E/e': 21.2            Name: ELIECER CHACON  MRN: [id number]  : 1960  Study Date: 07/15/2021 08:59 AM  Age: 61 yrs  Gender: Female  Patient [facility]  Reason For Study: Tachycardia  Ordering Physician: KERWIN BEATTY  Referring Physician: Gigi Martin  Performed By: Raj Grey     BSA: 1.7 m2  Height: 60 in  Weight: 163 lb  HR: 59  BP: 159/73 mmHg  ______________________________________________________________________________  Procedure  Complete Portable Echo Adult.  ______________________________________________________________________________  Interpretation Summary     Left ventricular systolic function is normal.  The visual ejection fraction is 60-65%.  The right ventricle is severely dilated.  Moderately decreased right ventricular systolic function  There is moderate to mod-severe (2-3+) tricuspid regurgitation.  The right ventricular systolic pressure is approximated at 82mmHg plus the  right atrial pressure.  Right ventricular systolic pressure is elevated, consistent with severe  pulmonary hypertension.  IVC diameter >2.1 cm collapsing <50% with sniff suggests a high RA pressure  estimated at 15 mmHg or greater.  Flattened septum is consistent with RV pressure/volume overload.  Compared to , there is new RV enlargemnt with RV dysfunction. RVSP has  increased compared to 2017 when it was 60 plus RAP.  Findings discussed with Dawna, nursing team for the patient. The study was  technically difficult.  ______________________________________________________________________________  Left Ventricle  The left ventricle is  normal in size. There is concentric remodeling present.  Left ventricular systolic function is normal. The visual ejection fraction is  60-65%. Diastolic Doppler findings (E/E' ratio and/or other parameters)  suggest left ventricular filling pressures are increased. Grade III or  advanced diastolic dysfunction. Flattened septum is consistent with RV  pressure/volume overload.     Right Ventricle  The right ventricle is severely dilated. Moderately decreased right  ventricular systolic function.     Atria  The left atrium is severely dilated. The right atrium is severely dilated.     Mitral Valve  The mitral valve leaflets are mildly thickened. There is mild mitral annular  calcification. There is mild to moderate (1-2+) mitral regurgitation.     Tricuspid Valve  There is moderate to mod-severe (2-3+) tricuspid regurgitation. IVC diameter  >2.1 cm collapsing <50% with sniff suggests a high RA pressure estimated at 15  mmHg or greater. Right ventricular systolic pressure is elevated, consistent  with severe pulmonary hypertension. The right ventricular systolic pressure is  approximated at 82mmHg plus the right atrial pressure.     Aortic Valve  There is mild trileaflet aortic sclerosis. There is trace aortic  regurgitation. No aortic stenosis is present.     Pulmonic Valve  The pulmonic valve is not well visualized.     Pericardium  Trivial pericardial effusion. Small right pleural effusion.     Rhythm  Sinus rhythm was noted.     ______________________________________________________________________________  MMode/2D Measurements & Calculations  IVSd: 0.98 cm  LVIDd: 3.8 cm  LVIDs: 3.6 cm  LVPWd: 1.2 cm  FS: 4.2 %  LV mass(C)d: 128.1 grams  LV mass(C)dI: 74.9 grams/m2     Ao root diam: 2.7 cm  LA dimension: 3.3 cm  asc Aorta Diam: 3.0 cm  LA/Ao: 1.2  LVOT diam: 2.0 cm  LVOT area: 3.1 cm2  LA Volume (BP): 95.2 ml  LA Volume Index (BP): 55.7 ml/m2  RWT: 0.62     Doppler Measurements & Calculations  MV E max artur: 130.0  cm/sec  MV A max artur: 44.1 cm/sec  MV E/A: 2.9  MV max P.7 mmHg  MV mean P.9 mmHg  MV V2 VTI: 34.6 cm     MV dec slope: 583.8 cm/sec2  PA acc time: 0.06 sec  TR max artur: 452.5 cm/sec  TR max P.9 mmHg  E/E' av.9  Lateral E/e': 19.0  Medial E/e': 16.8     Imaging     EXAM: CT CHEST PULMONARY EMBOLISM W CONTRAST  LOCATION: [facility]  DATE/TIME: 6/15/2022 4:56 PM     INDICATION: PE suspected, high prob. Increased shortness of breath.  COMPARISON: 07/15/2021.  TECHNIQUE: CT chest pulmonary angiogram during arterial phase injection of IV contrast. Multiplanar reformats and MIP reconstructions were performed. Dose reduction techniques were used.   CONTRAST: 61 mL Isovue 370.     FINDINGS:  ANGIOGRAM CHEST: Pulmonary arteries are normal caliber and negative for pulmonary emboli. Normal caliber thoracic aorta without dissection. Moderate aortic calcification.     LUNGS AND PLEURA: Moderate right-sided pleural fluid collection. Mosaic attenuation of the pulmonary parenchyma diffusely which can be seen with air trapping, edema or viral etiologies. Significant volume loss/atelectasis right lower lobe with moderate   volume loss and atelectasis right middle lobe inferiorly. No left-sided pleural fluid. Mild intralobular septal thickening.     MEDIASTINUM/AXILLAE: Cardiac enlargement without pericardial fluid. Diffuse pericardial calcifications. No lymphadenopathy. Normal caliber esophagus.     CORONARY ARTERY CALCIFICATION: Moderate.     UPPER ABDOMEN: Partially visualized moderate ascites.     MUSCULOSKELETAL: Minimal degenerative changes in the spine.                                                                      IMPRESSION:  1.  No evidence for pulmonary embolism.     2.  Moderate right-sided pleural fluid collection with atelectasis right lower lobe and right middle lobe as detailed above. Underlying infiltrates in these areas of atelectasis cannot be excluded.     3.   Mosaic attenuation of the pulmonary parenchyma with interlobular septal thickening most likely related to edema. Other considerations would include air trapping related to reactive airways disease or viral etiologies.     4.  Cardiac enlargement with diffuse pericardial calcifications.           CC: Lev Hernandez ([employer]), Gigi Martin M.D., France Barbosa M.D.        Please do not hesitate to contact me if you have any questions/concerns.     Sincerely,     Jose Redd MD

## 2024-01-25 NOTE — PATIENT INSTRUCTIONS
Thank you for choosing [facility]    DR [facility]OHNS[facility] CLINIC LOCATIONS  [place] [address] [facility], 4th [place]   [place], [place], [address] [place] [address]   [phone]       APPOINTMENTS: [phone]    CARE QUESTIONS: [phone],    BILLING QUESTIONS: [phone]    FAX NUMBER: [phone]        Follow up: In 4-6 weeks if not improving      No diagnosis found.    Stretching exercises    The calf muscles consist of the larger gastrocnemius muscle and the lower soleus muscle. If these muscles are more flexible then they will put less train on the bursa at the back of the heel. Stretching should only be done if it can be done pain free.    Gastrocnemius stretch    To stretch the big gastrocnemius muscle the back leg must be kept straight. Stand with the leg to be stretched at the back and hands on a wall at shoulder height.  Bend the front knee and lean forwards, keeping the back knee straight and pushing the heel down into the floor.    When you can feel a stretch, hold for 20 seconds. If the stretch eases, lean further forwards until you can feel it again. But do not push too far in the early stages. Perform 3 repetitions and repeat this 3-5 times a day.        Soleus muscle stretch      To stretch the deeper soleus muscle the knee of the leg to be stretched needs to be bent. This is because the soleus muscle attaches below the knee and bending the knee allows the gastrocnemius muscle to relax leaving the soleus on stretch.    Lean against a wall with the leg to be stretched at the back. Bend the knee keeping the heel in contact with the ground until a stretch is felt. Hold for 15 to 20 seconds and repeat three times. If a stretch is not felt then another method is to place the ball of foot against the wall and bend the front knee until a stretch is felt.          Stretching on a step    As flexibility increases or if you  have particularly flexible calf muscles it may be better to stretch using a step. Lower the heel off the step dropping down until a stretch is felt.    Hold for 15 to 20 seconds for 3 repetitions and repeat 3 to 5 times a day. The soleus muscle can be stretched similarly but with the knee of the leg to be stretched kept bent.

## 2024-01-25 NOTE — LETTER
1/25/2024         RE: Yissel Wetzel  [address]  [facility] [address]        Dear Colleague,    Thank you for referring your patient, Yissel Wetzel, to the [facility] [place]. Please see a copy of my visit note below.    CHIEF COMPLAINT:  Pain of the Right Ankle       HISTORY OF PRESENT ILLNESS  Ms. Wetzel is a pleasant 63 year old year old female who presents to clinic today with right ankle and achilles tendon pain.  Yissel explains that her right achilles tendon started hurting about 3 weeks ago when she had an infection but she got on antibiotics and the infection went away. Now the pain is minimal but still bothers her.     Onset: gradual  Location: right ankle  Quality:  dull and sore  Duration: 3 weeks   Severity: 4/10 at worst  Timing:intermittent episodes with activity  Modifying factors:  resting and non-use makes it better, movement and use makes it worse  Associated signs & symptoms: pain  Previous similar pain: No  Treatments to date: icing and elevation     Additional history: as documented    Review of Systems:  Have you recently had a a fever, chills, weight loss? No  Do you have any vision problems? No  Do you have any chest pain or edema? No  Do you have any shortness of breath or wheezing?  No  Do you have stomach problems? No  Do you have any numbness or focal weakness? No  Do you have diabetes? No  Do you have problems with bleeding or clotting? No  Do you have an rashes or other skin lesions? No      MEDICAL HISTORY  Patient Active Problem List   Diagnosis     Iron deficiency anemia     Esophagitis     Essential hypertension     DM 2 w ESRD, Hgb A1C 7.4 on 7/14/21     Hyperlipidemia LDL goal <100     Severe Pulm HTN -- /52 w mean 81 on R Ht Cath 7/22/21     ESRD on hemodialysis on M, W, F     Anemia in chronic kidney disease     Paroxysmal SVT     Thrombocytopenia (H24)     Symptomatic sinus bradycardia     Shortness of breath on  exertion     Other ascites     ESRD (end stage renal disease) on dialysis (H)     Pleural effusion on right     Wound drainage     Umbilical hernia without obstruction and without gangrene       Current Outpatient Medications   Medication Sig Dispense Refill     ACCU-CHEK CHING PLUS test strip USE TO TEST BLOOD SUGAR 4 TIMES PER  strip 3     acetaminophen (TYLENOL) 500 MG tablet Take 500 mg by mouth every 8 hours as needed for mild pain       amiodarone (PACERONE) 200 MG tablet Take 200mg po 5 days a week.  Hold wednesday and Sunday 60 tablet 2     blood glucose (ACCU-CHEK GUIDE) test strip USE TO TEST BLOOD GLUCOSE FOUR TIMES DAILY 400 strip 0     blood glucose (NO BRAND SPECIFIED) lancets standard Use to test blood sugar 4 times daily or as directed. 400 lancet 3     blood glucose (NO BRAND SPECIFIED) lancets standard Use to test blood sugar three times daily or as directed. 400 lancet 11     blood glucose monitoring (NO BRAND SPECIFIED) meter device kit Use to test blood sugar 4 times daily or as directed. 1 kit 0     Continuous Blood Gluc Sensor (FREESTYLE LATOSHA 2 SENSOR) MISC 1 each every 14 days Use 1 sensor every 14 days. Use to read blood sugars per 's instructions. 2 each 5     famotidine (PEPCID) 10 MG tablet Take 10 mg by mouth daily as needed       insulin detemir (LEVEMIR FLEXTOUCH) 100 UNIT/ML pen Inject 11 Units Subcutaneous At Bedtime       insulin pen needle (31G X 8 MM) 31G X 8 MM miscellaneous Use 4 pen needles daily or as directed. 100 each 11     levalbuterol (XOPENEX HFA) 45 MCG/ACT inhaler Inhale 2 puffs into the lungs every 6 hours as needed for shortness of breath / dyspnea or wheezing 15 g 11     midodrine (PROAMATINE) 10 MG tablet Take 10 mg by mouth three times a week Before dialysis, take with food       midodrine (PROAMATINE) 10 MG tablet Take 20 mg by mouth three times a week During dialysis, take with food       NOVOLOG FLEXPEN 100 UNIT/ML soln INJECT 9-12 UNITS  BEFORE BREAKFAST, LUNCH, AND DINNER PLUS A CORRECTIVE SCALE OF 2 UNITS FOR EVERY 50 OVER 150 15 mL 11       Allergies   Allergen Reactions     Albuterol      shakiness     Aspirin      gi     Byetta [Exenatide]      gi     Clonidine      constipation     Codeine      vomiting     Ezetimibe      muscle symptoms     Hydralazine      headaches     Lisinopril      hyperkalemia     Metformin Hydrochloride      vomiting     Pravachol [Pravastatin Sodium]      muscle pains     Simvastatin      myalgias     Troglitazone        Family History   Problem Relation Age of Onset     Arrhythmia Mother      Hypertension Mother      Pancreatic Cancer Father      Diabetes Brother      Asthma Sister      LUNG DISEASE Sister      Diabetes Daughter      Cirrhosis Sister        Additional medical/Social/Surgical histories reviewed in [facility] and updated as appropriate.       PHYSICAL EXAM  LMP  (LMP Unknown)     General  - normal appearance, in no obvious distress  Musculoskeletal -right foot / ankle  - stance: normal gait without limp, normal stance without excessive pronation, normal heel inversion with standing heel raise, no obvious leg length discrepancy, normal heel and toe walk  - inspection: mildly thickened mid-substance Achilles tendon,  normal bone and joint alignment, no obvious deformity, no well-demarcated area of erythema.  Does have brawny, indurated lower legs symmetrically bilaterally.  - palpation: tender over the medial aspect of the because tendon into the Kager's fat pad spanning approximately 4 cm.  No warmth palpable.  - ROM: limited passive dorsiflexion with pain, normal plantar flexion, inversion, and eversion  - strength: 5/5 in all planes  Neuro  - no sensory or motor deficit, grossly normal coordination, normal muscle tone  Skin  - no ecchymosis, erythema, warmth, or induration, no obvious rash  Psych  - interactive, appropriate, normal mood and affect    IMAGING : Limited in office ultrasound performed today  using high-frequency linear transducer.  Ultrasound revealing isoechoic fibular pattern of the Achilles tendon without evidence for tearing.  Achilles fat pad with hyperemia using power Doppler index.  Additional hyperemia at the peritendinous region of the Achilles mid to distally.  Using dynamic ultrasound plantarflex and dorsiflex in the ankle, there is no evidence for defect of the tendon, anechoic region or fluid collection.     ASSESSMENT & PLAN  Ms. Wetzel is a 63 year old year old female who presents to clinic today with acute pain of right lower leg which began around the time of cellulitis present of the medial ankle.    She has been treated with resolution of erythema noted at the ankle, status post Keflex.  Achilles tendon pain still persist.  Findings today consistent with Achilles peritendinitis and resolution of cellulitis based on physical examination.    Diagnosis: Achilles peritendinitis of right lower leg, history of cellulitis of right ankle    Discussed starting an ankle brace, heel lift provided to stabilize the Achilles tendon and limited mobility for now.  I encouraged her to use Voltaren gel to the posterior ankle and continue icing.    Additionally discussed gentle stretching program which was provided today.  Physical therapy also given to assess.  If she develops any additional increasing erythema or swelling of the lower leg, possible recurrence of cellulitis that may have been incompletely treated and patient should follow-up with her PCP.    It was a pleasure seeing Debrene today.    Robert Hardy DO, [facility]  Primary Care Sports Medicine      Again, thank you for allowing me to participate in the care of your patient.        Sincerely,        Robert Hardy DO

## 2024-01-25 NOTE — NURSING NOTE
"Reviewed Med list  Completed AVS  Follow-up orders placed  Marked chart \"Ready for Checkout\"  Sent msg to Annita for scheduling  Génesis Baird RN on 1/25/2024 at 5:07 PM    "

## 2024-01-25 NOTE — PATIENT INSTRUCTIONS
You were seen today in the Pulmonary Hypertension Clinic at the [facility].     Cardiology Provider you saw during your visit:    Dr. Redd    Medication Changes:  None    Recommendations:   Complete an Echocardiogram & RHC    Follow-up:   Follow up after testing is complete      Please call us immediately if you have any syncope (fainting or passing out), chest pain, edema (swelling or weight gain), or decline in your functional status (general decline in how you are feeling).    If you have emergent concerns after hours or on the weekend, please call our on-call Cardiologist at [phone], option [phone]. For emergencies call [phone].     Thank you for allowing us to be a part of your care here at the [facility]    If you have questions or concerns please contact us at:    Christopher Jones RN (P: [phone])    Nurse Coordinator       Pulmonary Hypertension     [facility]         EVY Nolan   (Prior Auths & Pt Assistance)   ()  Clinic   Clinic   Pulmonary Hypertension   Pulmonary Hypertension  [facility]  (P)[phone]    (P) [phone]  (F) [phone]

## 2024-01-25 NOTE — NURSING NOTE
Patient confirms medications and allergies are accurate via patients echeck in completion, and or denies any changes since last reviewed/verified.       Is the patient currently in the state of [place]? YES    Visit mode:VIDEO    If the visit is dropped, the patient can be reconnected by: TELEPHONE VISIT: Phone number: [phone]    Will anyone else be joining the visit? NO  (If patient encounters technical issues they should call [phone]    How would you like to obtain your AVS? MyChart    Are changes needed to the allergy or medication list? No    Reason for visit: RECHECK    Zeny CINTRON

## 2024-01-25 NOTE — PROGRESS NOTES
CHIEF COMPLAINT:  Pain of the Right Ankle       HISTORY OF PRESENT ILLNESS  Ms. Wetzel is a pleasant 63 year old year old female who presents to clinic today with right ankle and achilles tendon pain.  Yissel explains that her right achilles tendon started hurting about 3 weeks ago when she had an infection but she got on antibiotics and the infection went away. Now the pain is minimal but still bothers her.     Onset: gradual  Location: right ankle  Quality:  dull and sore  Duration: 3 weeks   Severity: 4/10 at worst  Timing:intermittent episodes with activity  Modifying factors:  resting and non-use makes it better, movement and use makes it worse  Associated signs & symptoms: pain  Previous similar pain: No  Treatments to date: icing and elevation     Additional history: as documented    Review of Systems:  Have you recently had a a fever, chills, weight loss? No  Do you have any vision problems? No  Do you have any chest pain or edema? No  Do you have any shortness of breath or wheezing?  No  Do you have stomach problems? No  Do you have any numbness or focal weakness? No  Do you have diabetes? No  Do you have problems with bleeding or clotting? No  Do you have an rashes or other skin lesions? No      MEDICAL HISTORY  Patient Active Problem List   Diagnosis    Iron deficiency anemia    Esophagitis    Essential hypertension    DM 2 w ESRD, Hgb A1C 7.4 on 7/14/21    Hyperlipidemia LDL goal <100    Severe Pulm HTN -- /52 w mean 81 on R Ht Cath 7/22/21    ESRD on hemodialysis on M, W, F    Anemia in chronic kidney disease    Paroxysmal SVT    Thrombocytopenia (H24)    Symptomatic sinus bradycardia    Shortness of breath on exertion    Other ascites    ESRD (end stage renal disease) on dialysis (H)    Pleural effusion on right    Wound drainage    Umbilical hernia without obstruction and without gangrene       Current Outpatient Medications   Medication Sig Dispense Refill    ACCU-CHEK CHING PLUS test strip  USE TO TEST BLOOD SUGAR 4 TIMES PER  strip 3    acetaminophen (TYLENOL) 500 MG tablet Take 500 mg by mouth every 8 hours as needed for mild pain      amiodarone (PACERONE) 200 MG tablet Take 200mg po 5 days a week.  Hold wednesday and Sunday 60 tablet 2    blood glucose (ACCU-CHEK GUIDE) test strip USE TO TEST BLOOD GLUCOSE FOUR TIMES DAILY 400 strip 0    blood glucose (NO BRAND SPECIFIED) lancets standard Use to test blood sugar 4 times daily or as directed. 400 lancet 3    blood glucose (NO BRAND SPECIFIED) lancets standard Use to test blood sugar three times daily or as directed. 400 lancet 11    blood glucose monitoring (NO BRAND SPECIFIED) meter device kit Use to test blood sugar 4 times daily or as directed. 1 kit 0    Continuous Blood Gluc Sensor (FREESTYLE LATOSHA 2 SENSOR) MISC 1 each every 14 days Use 1 sensor every 14 days. Use to read blood sugars per 's instructions. 2 each 5    famotidine (PEPCID) 10 MG tablet Take 10 mg by mouth daily as needed      insulin detemir (LEVEMIR FLEXTOUCH) 100 UNIT/ML pen Inject 11 Units Subcutaneous At Bedtime      insulin pen needle (31G X 8 MM) 31G X 8 MM miscellaneous Use 4 pen needles daily or as directed. 100 each 11    levalbuterol (XOPENEX HFA) 45 MCG/ACT inhaler Inhale 2 puffs into the lungs every 6 hours as needed for shortness of breath / dyspnea or wheezing 15 g 11    midodrine (PROAMATINE) 10 MG tablet Take 10 mg by mouth three times a week Before dialysis, take with food      midodrine (PROAMATINE) 10 MG tablet Take 20 mg by mouth three times a week During dialysis, take with food      NOVOLOG FLEXPEN 100 UNIT/ML soln INJECT 9-12 UNITS BEFORE BREAKFAST, LUNCH, AND DINNER PLUS A CORRECTIVE SCALE OF 2 UNITS FOR EVERY 50 OVER 150 15 mL 11       Allergies   Allergen Reactions    Albuterol      shakiness    Aspirin      gi    Byetta [Exenatide]      gi    Clonidine      constipation    Codeine      vomiting    Ezetimibe      muscle symptoms     Hydralazine      headaches    Lisinopril      hyperkalemia    Metformin Hydrochloride      vomiting    Pravachol [Pravastatin Sodium]      muscle pains    Simvastatin      myalgias    Troglitazone        Family History   Problem Relation Age of Onset    Arrhythmia Mother     Hypertension Mother     Pancreatic Cancer Father     Diabetes Brother     Asthma Sister     LUNG DISEASE Sister     Diabetes Daughter     Cirrhosis Sister        Additional medical/Social/Surgical [facility] and updated as appropriate.       PHYSICAL EXAM  LMP  (LMP Unknown)     General  - normal appearance, in no obvious distress  Musculoskeletal -right foot / ankle  - stance: normal gait without limp, normal stance without excessive pronation, normal heel inversion with standing heel raise, no obvious leg length discrepancy, normal heel and toe walk  - inspection: mildly thickened mid-substance Achilles tendon,  normal bone and joint alignment, no obvious deformity, no well-demarcated area of erythema.  Does have brawny, indurated lower legs symmetrically bilaterally.  - palpation: tender over the medial aspect of the because tendon into the Kager's fat pad spanning approximately 4 cm.  No warmth palpable.  - ROM: limited passive dorsiflexion with pain, normal plantar flexion, inversion, and eversion  - strength: 5/5 in all planes  Neuro  - no sensory or motor deficit, grossly normal coordination, normal muscle tone  Skin  - no ecchymosis, erythema, warmth, or induration, no obvious rash  Psych  - interactive, appropriate, normal mood and affect    IMAGING : Limited in office ultrasound performed today using high-frequency linear transducer.  Ultrasound revealing isoechoic fibular pattern of the Achilles tendon without evidence for tearing.  Achilles fat pad with hyperemia using power Doppler index.  Additional hyperemia at the peritendinous region of the Achilles mid to distally.  Using dynamic ultrasound plantarflex and  dorsiflex in the ankle, there is no evidence for defect of the tendon, anechoic region or fluid collection.     ASSESSMENT & PLAN  Ms. Wetzel is a 63 year old year old female who presents to clinic today with acute pain of right lower leg which began around the time of cellulitis present of the medial ankle.    She has been treated with resolution of erythema noted at the ankle, status post Keflex.  Achilles tendon pain still persist.  Findings today consistent with Achilles peritendinitis and resolution of cellulitis based on physical examination.    Diagnosis: Achilles peritendinitis of right lower leg, history of cellulitis of right ankle    Discussed starting an ankle brace, heel lift provided to stabilize the Achilles tendon and limited mobility for now.  I encouraged her to use Voltaren gel to the posterior ankle and continue icing.    Additionally discussed gentle stretching program which was provided today.  Physical therapy also given to assess.  If she develops any additional increasing erythema or swelling of the lower leg, possible recurrence of cellulitis that may have been incompletely treated and patient should follow-up with her PCP.    It was a pleasure seeing Debrene today.    Robert Hardy DO, CAQSM  Primary Care Sports Medicine

## 2024-02-05 ENCOUNTER — TELEPHONE (OUTPATIENT)
Dept: CARDIOLOGY | Facility: CLINIC | Age: 64
End: 2024-02-05
Payer: MEDICARE

## 2024-02-05 NOTE — TELEPHONE ENCOUNTER
[facility] Call Center    Phone Message    May a detailed message be left on voicemail: yes     Reason for Call: Other: Please call pt's care coordinator Debbie at [phone] to schedule for New PH     Action Taken: Other: cardio    Travel Screening: Not Applicable    Thank you!  [facility]

## 2024-02-06 DIAGNOSIS — E11.21 TYPE 2 DIABETES MELLITUS WITH DIABETIC NEPHROPATHY, WITH LONG-TERM CURRENT USE OF INSULIN (H): ICD-10-CM

## 2024-02-06 DIAGNOSIS — Z79.4 TYPE 2 DIABETES MELLITUS WITH DIABETIC NEPHROPATHY, WITH LONG-TERM CURRENT USE OF INSULIN (H): ICD-10-CM

## 2024-02-06 NOTE — TELEPHONE ENCOUNTER
"The  called me and stated that the patient refused to do the RHC and that she said she was told to do labs and echo at this time and then she can have the rhc, not sure of who said this from the message that was sent to me but I will wait till the echo is completed on 2/27 and she then said \" stop calling\" and hung up on the RHC  for benton.     Annita Kay  Clinic    Pulmonary Hypertension   [facility]  (P) [phone]   "

## 2024-02-07 RX ORDER — BLOOD SUGAR DIAGNOSTIC
STRIP MISCELLANEOUS
Qty: 400 STRIP | Refills: 0 | Status: SHIPPED | OUTPATIENT
Start: 2024-02-07 | End: 2024-06-06

## 2024-02-21 DIAGNOSIS — Z79.4 TYPE 2 DIABETES MELLITUS WITH DIABETIC NEPHROPATHY, WITH LONG-TERM CURRENT USE OF INSULIN (H): ICD-10-CM

## 2024-02-21 DIAGNOSIS — E11.21 TYPE 2 DIABETES MELLITUS WITH DIABETIC NEPHROPATHY, WITH LONG-TERM CURRENT USE OF INSULIN (H): ICD-10-CM

## 2024-02-21 RX ORDER — INSULIN DETEMIR 100 [IU]/ML
11 INJECTION, SOLUTION SUBCUTANEOUS AT BEDTIME
Qty: 15 ML | Refills: 0 | Status: SHIPPED | OUTPATIENT
Start: 2024-02-21 | End: 2024-08-15

## 2024-02-21 RX ORDER — BLOOD SUGAR DIAGNOSTIC
STRIP MISCELLANEOUS
Qty: 400 STRIP | Refills: 0 | OUTPATIENT
Start: 2024-02-21

## 2024-02-21 RX ORDER — BLOOD SUGAR DIAGNOSTIC
STRIP MISCELLANEOUS
Qty: 100 STRIP | Refills: 3 | OUTPATIENT
Start: 2024-02-21

## 2024-02-21 RX ORDER — INSULIN ASPART 100 [IU]/ML
INJECTION, SOLUTION INTRAVENOUS; SUBCUTANEOUS
Qty: 15 ML | Refills: 11 | Status: SHIPPED | OUTPATIENT
Start: 2024-02-21 | End: 2024-08-18

## 2024-02-21 NOTE — TELEPHONE ENCOUNTER
Patient calling needing you to put in a letter or note that she still needs oxygen. Patient uses oxygen during the night (2L), and sometimes during the day. Patient goes down to 85% without oxygen. Patient experiences the drop around midnight. Patient used it all day. Patient states the oxygen needs renewal.       Patient also needs refills.     ANGELICA Bryant  [facility]

## 2024-02-21 NOTE — TELEPHONE ENCOUNTER
Prescription approved per [facility] Refill Protocol.  Josee Levi, RN  [facility] Triage Nurse

## 2024-02-21 NOTE — TELEPHONE ENCOUNTER
Test Strips: Script sent to the pharmacy on 2/7/24 for 400 test strips. Patient should have a refill on file with the pharmacy.     Josee Levi RN

## 2024-02-22 ENCOUNTER — TRANSFERRED RECORDS (OUTPATIENT)
Dept: HEALTH INFORMATION MANAGEMENT | Facility: CLINIC | Age: 64
End: 2024-02-22
Payer: MEDICARE

## 2024-02-22 LAB — RETINOPATHY: POSITIVE

## 2024-02-27 ENCOUNTER — HOSPITAL ENCOUNTER (OUTPATIENT)
Dept: CARDIOLOGY | Facility: CLINIC | Age: 64
Discharge: HOME | End: 2024-02-27
Attending: INTERNAL MEDICINE | Admitting: INTERNAL MEDICINE
Payer: MEDICARE

## 2024-02-27 ENCOUNTER — LAB (OUTPATIENT)
Dept: LAB | Facility: CLINIC | Age: 64
End: 2024-02-27
Payer: MEDICARE

## 2024-02-27 DIAGNOSIS — I27.20 PULMONARY HYPERTENSION (H): ICD-10-CM

## 2024-02-27 DIAGNOSIS — R06.02 SHORTNESS OF BREATH: ICD-10-CM

## 2024-02-27 LAB
ANION GAP SERPL CALCULATED.3IONS-SCNC: 16 MMOL/L (ref 7–15)
BUN SERPL-MCNC: 34.4 MG/DL (ref 8–23)
CALCIUM SERPL-MCNC: 9.4 MG/DL (ref 8.8–10.2)
CHLORIDE SERPL-SCNC: 95 MMOL/L (ref 98–107)
CREAT SERPL-MCNC: 5.63 MG/DL (ref 0.51–0.95)
DEPRECATED HCO3 PLAS-SCNC: 27 MMOL/L (ref 22–29)
EGFRCR SERPLBLD CKD-EPI 2021: 8 ML/MIN/1.73M2
ERYTHROCYTE [DISTWIDTH] IN BLOOD BY AUTOMATED COUNT: 17.5 % (ref 10–15)
GLUCOSE SERPL-MCNC: 144 MG/DL (ref 70–99)
HCT VFR BLD AUTO: 31.4 % (ref 35–47)
HGB BLD-MCNC: 9.8 G/DL (ref 11.7–15.7)
LVEF ECHO: NORMAL
MCH RBC QN AUTO: 27.2 PG (ref 26.5–33)
MCHC RBC AUTO-ENTMCNC: 31.2 G/DL (ref 31.5–36.5)
MCV RBC AUTO: 87 FL (ref 78–100)
NT-PROBNP SERPL-MCNC: ABNORMAL PG/ML (ref 0–900)
PLATELET # BLD AUTO: 134 10E3/UL (ref 150–450)
POTASSIUM SERPL-SCNC: 3.6 MMOL/L (ref 3.4–5.3)
RBC # BLD AUTO: 3.6 10E6/UL (ref 3.8–5.2)
SODIUM SERPL-SCNC: 138 MMOL/L (ref 135–145)
WBC # BLD AUTO: 5.6 10E3/UL (ref 4–11)

## 2024-02-27 PROCEDURE — 83880 ASSAY OF NATRIURETIC PEPTIDE: CPT | Performed by: INTERNAL MEDICINE

## 2024-02-27 PROCEDURE — 80048 BASIC METABOLIC PNL TOTAL CA: CPT | Performed by: INTERNAL MEDICINE

## 2024-02-27 PROCEDURE — 36415 COLL VENOUS BLD VENIPUNCTURE: CPT | Performed by: INTERNAL MEDICINE

## 2024-02-27 PROCEDURE — 85027 COMPLETE CBC AUTOMATED: CPT | Performed by: INTERNAL MEDICINE

## 2024-02-27 PROCEDURE — 93306 TTE W/DOPPLER COMPLETE: CPT | Mod: 26 | Performed by: INTERNAL MEDICINE

## 2024-02-27 PROCEDURE — 93306 TTE W/DOPPLER COMPLETE: CPT

## 2024-03-04 ENCOUNTER — TELEPHONE (OUTPATIENT)
Dept: INTERNAL MEDICINE | Facility: CLINIC | Age: 64
End: 2024-03-04
Payer: MEDICARE

## 2024-03-12 ENCOUNTER — TELEPHONE (OUTPATIENT)
Dept: INTERNAL MEDICINE | Facility: CLINIC | Age: 64
End: 2024-03-12
Payer: MEDICARE

## 2024-03-12 NOTE — TELEPHONE ENCOUNTER
Ayo calling from  [employer] regarding patient's five year oxygen renewal.    Ayo spoke with patient and advised that she schedule appointment with PCP for the following - medicare requires office visit notes discussing need for oxygen / qualifying oxygen requirements. Patient was very upset and states she did not want to do another appointment.     Ayo is wondering if PCP would be willing to addend office visit notes from last visit (11/15/23) to include oxygen requirement or confirm if patient does need an updated appointment?     If PCP is able to addend last office visit notes, can use dot phrase .[id number]    Ayo states she would also need a new DME order and could pend it up and sent it to Dr. Martin to sign (Ayo will wait to hear back to see if PCP is willing to sign prior to pending order)    Routing to PCP to please advise if able to addend notes / sign new orders or if patient needs an updated visit? Thank you!      Callback to Ayo with provider's recommendation at [phone] - ok to leave detailed VM     Jose Elias Redd RN  [facility]

## 2024-03-15 DIAGNOSIS — R06.02 SHORTNESS OF BREATH: ICD-10-CM

## 2024-03-15 DIAGNOSIS — I27.20 PULMONARY HYPERTENSION (H): Primary | ICD-10-CM

## 2024-03-18 ENCOUNTER — TELEPHONE (OUTPATIENT)
Dept: CARDIOLOGY | Facility: CLINIC | Age: 64
End: 2024-03-18
Payer: MEDICARE

## 2024-03-19 ENCOUNTER — TELEPHONE (OUTPATIENT)
Dept: CARDIOLOGY | Facility: CLINIC | Age: 64
End: 2024-03-19
Payer: MEDICARE

## 2024-03-19 NOTE — TELEPHONE ENCOUNTER
3/19 spoke to pt and attempted to schedule labs and a follow up w/ Tramaine   Pt said to stop calling and doesn't want to schedule and disconnected the call.

## 2024-03-20 NOTE — TELEPHONE ENCOUNTER
Patient Contact    Attempt # 1    Was call answered?  No.  Left detailed message on voicemail with information to call me back.    On call back, please relay PCP message to [facility] Medical staff.

## 2024-03-21 ENCOUNTER — TELEPHONE (OUTPATIENT)
Dept: CARDIOLOGY | Facility: CLINIC | Age: 64
End: 2024-03-21
Payer: MEDICARE

## 2024-03-21 NOTE — TELEPHONE ENCOUNTER
3/21 I attempted 3 calls to the pt that she answered and said hello and then hung up. I attempted a call to the daughter and she does not have a vm set up, sent mc to sched labs and appt with heidi RANGEL

## 2024-03-21 NOTE — TELEPHONE ENCOUNTER
----- Message from Christopher Jones RN sent at 3/15/2024 12:40 PM CDT -----  Regarding: Sched help  Good afternoon Team,    Could you please schedule a follow-up appt for this pt.  She is refusing a RHC, which is fine, but we will need to see her in clinic with labs prior with Gema Fournier.    Thank you,    Christopher Jones RNCC

## 2024-03-25 ENCOUNTER — DOCUMENTATION ONLY (OUTPATIENT)
Dept: INTERNAL MEDICINE | Facility: CLINIC | Age: 64
End: 2024-03-25
Payer: MEDICARE

## 2024-03-25 DIAGNOSIS — I27.20 PULMONARY HYPERTENSION, UNSPECIFIED (H): Primary | ICD-10-CM

## 2024-04-11 DIAGNOSIS — E11.21 TYPE 2 DIABETES MELLITUS WITH DIABETIC NEPHROPATHY, WITH LONG-TERM CURRENT USE OF INSULIN (H): ICD-10-CM

## 2024-04-11 DIAGNOSIS — Z79.4 TYPE 2 DIABETES MELLITUS WITH DIABETIC NEPHROPATHY, WITH LONG-TERM CURRENT USE OF INSULIN (H): ICD-10-CM

## 2024-04-11 NOTE — TELEPHONE ENCOUNTER
Medication Question or Refill        What medication are you calling about (include dose and sig)?:   Continuous Blood Gluc Sensor (FREESTYLE LATOSHA 2 SENSOR) [facility]          Preferred Pharmacy:  [id number] - [facility] [address] [facility]  [address] [place] [address]  Phone: [phone] Fax: [phone]      Controlled Substance Agreement on file:   CSA -- Patient Level:    CSA: None found at the patient level.       Who prescribed the medication?: GAMA MOSQUEDA    Do you need a refill? Yes, - Never was able to to get it back in 2022    When did you use the medication last?     Patient offered an appointment? No    Do you have any questions or concerns?  No

## 2024-04-23 DIAGNOSIS — R06.02 SOB (SHORTNESS OF BREATH): ICD-10-CM

## 2024-04-23 DIAGNOSIS — I27.20 PULMONARY HTN (H): Primary | ICD-10-CM

## 2024-04-26 ENCOUNTER — TELEPHONE (OUTPATIENT)
Dept: CARE COORDINATION | Facility: OTHER | Age: 64
End: 2024-04-26

## 2024-04-26 NOTE — TELEPHONE ENCOUNTER
Telephone call to patient, she stated she lives in the [facility], she is not going to come to [place] for an appt?  She also stated she does not do telemed visits, which is what her appt would be in [place].      Shayy Arguelles RN Cardiology/[facility].....April 26, 2024...9:05 AM             Previous Messages       ----- Message -----  From: Lorelei Soto  Sent: 4/25/2024  10:17 AM CDT  To:  Cardiology    Hello,    This patient needs a follow-up with Dr. Castanon in [place]. Can you please assist with scheduling?    [place] [place]

## 2024-06-05 ENCOUNTER — TELEPHONE (OUTPATIENT)
Dept: INTERNAL MEDICINE | Facility: CLINIC | Age: 64
End: 2024-06-05
Payer: MEDICARE

## 2024-06-05 DIAGNOSIS — E11.21 TYPE 2 DIABETES MELLITUS WITH DIABETIC NEPHROPATHY, WITH LONG-TERM CURRENT USE OF INSULIN (H): Primary | ICD-10-CM

## 2024-06-05 DIAGNOSIS — Z79.4 TYPE 2 DIABETES MELLITUS WITH DIABETIC NEPHROPATHY, WITH LONG-TERM CURRENT USE OF INSULIN (H): Primary | ICD-10-CM

## 2024-06-05 NOTE — TELEPHONE ENCOUNTER
Pt called the clinic regarding lab results.   She states her A1C was 6.9%.  She would like PCP to know this.     If anything further to add, pt would like a call back.   Can we leave a detailed message on this number? YES  Phone number patient can be reached at: Cell number on file:    Telephone Information:   Mobile [phone]       Laura Cummings, RN  [facility] Triage

## 2024-06-05 NOTE — TELEPHONE ENCOUNTER
Called and spoke to the patient. Patient states she currently takes 2 units per 15 carbs and 1 unit for every 50 over 150 for breakfast, lunch, and dinner.     Will route back to PCP for review.     Thank you,  Josee Levi RN

## 2024-06-05 NOTE — TELEPHONE ENCOUNTER
Pharmacy called the clinic stating that insulin aspart (NOVOLOG FLEXPEN) 100 UNIT/ML pen is no longer covered by this pt's insurance.     Her insurance prefers Fiasp instead.     Routing to PCP to advise if this medication can be ordered.     Thank you,  Laura Cummings RN

## 2024-06-06 DIAGNOSIS — E11.21 TYPE 2 DIABETES MELLITUS WITH DIABETIC NEPHROPATHY, WITH LONG-TERM CURRENT USE OF INSULIN (H): ICD-10-CM

## 2024-06-06 DIAGNOSIS — Z79.4 TYPE 2 DIABETES MELLITUS WITH DIABETIC NEPHROPATHY, WITH LONG-TERM CURRENT USE OF INSULIN (H): ICD-10-CM

## 2024-06-06 RX ORDER — BLOOD SUGAR DIAGNOSTIC
STRIP MISCELLANEOUS
Qty: 400 STRIP | Refills: 0 | Status: SHIPPED | OUTPATIENT
Start: 2024-06-06 | End: 2024-08-12

## 2024-06-09 RX ORDER — INSULIN ASPART INJECTION 100 [IU]/ML
INJECTION, SOLUTION SUBCUTANEOUS
Qty: 15 ML | Refills: 11 | Status: SHIPPED | OUTPATIENT
Start: 2024-06-09 | End: 2024-08-15

## 2024-06-10 ENCOUNTER — TELEPHONE (OUTPATIENT)
Dept: INTERNAL MEDICINE | Facility: CLINIC | Age: 64
End: 2024-06-10
Payer: MEDICARE

## 2024-06-10 DIAGNOSIS — Z79.4 TYPE 2 DIABETES MELLITUS WITH DIABETIC NEPHROPATHY, WITH LONG-TERM CURRENT USE OF INSULIN (H): ICD-10-CM

## 2024-06-10 DIAGNOSIS — E11.21 TYPE 2 DIABETES MELLITUS WITH DIABETIC NEPHROPATHY, WITH LONG-TERM CURRENT USE OF INSULIN (H): ICD-10-CM

## 2024-06-10 NOTE — TELEPHONE ENCOUNTER
"TO PCP:     [facility] called regarding the Fiasp insulin     1) the script states \" 2U per 15 carbs, plus 1U for every 50 that preprandial BG over 150\" - but they wanted to clarify, should it be the same sig as pt was getting with the Novolog instead (\"INJECT 9-12 UNITS BEFORE BREAKFAST, LUNCH, AND DINNER PLUS A CORRECTIVE SCALE OF 2 UNITS FOR EVERY 50 OVER 150\")?     2) Fiasp is the only short-acting insulin her insurance will cover. However, it's on backorder and they cannot get it in stock     Pharmacy called pt and left her a message to see if she wants to see if another pharmacy has the Fiasp in stock instead     They also contacted insurance to see if they could do an emergency override to cover another insulin at this time since the Fiasp is on backorder, but they advised this would require a PA to do an override     Please advise     Alina CHAMBERS, Triage RN  [facility]     "

## 2024-06-11 RX ORDER — INSULIN ASPART INJECTION 100 [IU]/ML
INJECTION, SOLUTION SUBCUTANEOUS
Qty: 15 ML | Refills: 11 | Status: CANCELLED | OUTPATIENT
Start: 2024-06-11

## 2024-06-11 NOTE — TELEPHONE ENCOUNTER
Pt called clinic stating she is running out of Novolog. [facility] was called and Fiasp is still on backorder. [facility] was called and they do have one box of 15 mL in stock. Requesting Rx sent here.     Dr. Martin review sig question below before sending and confirm if correct. Thank you.

## 2024-07-02 ENCOUNTER — TELEPHONE (OUTPATIENT)
Dept: INTERNAL MEDICINE | Facility: CLINIC | Age: 64
End: 2024-07-02
Payer: MEDICARE

## 2024-07-02 NOTE — TELEPHONE ENCOUNTER
Prior Authorization Retail Medication Request    Medication/Dose: XOPENEX HFA 45 MCG/ACT inhaler (Brand Name)  Diagnosis and ICD code (if different than what is on RX):    New/renewal/insurance change PA/secondary ins. PA:  Previously Tried and Failed:    Rationale:      Insurance   Primary:   Insurance ID:     Secondary (if applicable):  Insurance ID:      Pharmacy Information (if different than what is on RX)  Name:    Phone:    Fax:

## 2024-07-04 NOTE — PROGRESS NOTES
Virtual Visit Details        Jose Redd M.D.  Cardiovascular Medicine    Lev Hernandez M.D.  Gigi Martin M.D.        Problem List  1. ESRD with dialysis dependence  2. Chronic oxygen dependent respiratory failure  3. History of SVT  4. Diabetes  5. Diabetic nephropathy  6. COLD  7. History of ascites requiring paracentesis  8. Pleural effusion  9. ASHD with stenting  10. Pericardial calcification  11. Remote history of obesity  12. Remote history of smoking  13. Cirrhotic hepatic morphology.  14. Ascites  15  Diastolic dysfunction     Plan:  Admit [facility] for daily dialysis ( slow 4 hour run with slow fluid removal and midodrine to avoid hypotension) Anticipate daily dialysis x 5  The purpose of this is to reduce intravascular volume status to see if PA pressure with come down, allowing for transplantation.  Paracentesis during admission  Repeat echocardiogram (limited) to assess aortic valve      Dear Lev and Gigi:     I saw your patient, Yissel Wetzel, a 64 year old female with longstanding ESRD for a follow-up visit today.  She has known, severe PAH but has not been recently seen, nor is she on medication.  Despite this she appears to be doing well, though did have problems with ascites and umbilical hernia this year. She is otherwise doing well.  I talked with her at length regarding preserving her options and [facility]  I thoroughly discussed the risks and benefits of the contemplated catherization including bleeding, vessel rupture, death, arrhythmia, embolism, stroke, renal failure perforation of pulmonary artery, aortic,lung or heart.  I discussed how the procedure would be performed and alternative diagnostic and therapeutic management strategies.  All questions were answered.       Right heart catheterization  11/2022  RA 12  /15  /40, mean 69, sat 56.7%  PCWP mean 30  CO/CI: 4.9/3.09 by TD, 4.46/2.81 by Audrey  PVR 8.75 waterman  Ao sat 88%, HR 73    Current Outpatient  Medications   Medication    ACCU-CHEK HCING PLUS test strip    acetaminophen (TYLENOL) 500 MG tablet    amiodarone (PACERONE) 200 MG tablet    blood glucose (ACCU-CHEK GUIDE) test strip    blood glucose (NO BRAND SPECIFIED) lancets standard    blood glucose (NO BRAND SPECIFIED) lancets standard    blood glucose monitoring (NO BRAND SPECIFIED) meter device kit    Continuous Blood Gluc Sensor (FREESTYLE LATOSHA 2 SENSOR) MISC    famotidine (PEPCID) 10 MG tablet    insulin detemir (LEVEMIR FLEXTOUCH) 100 UNIT/ML pen    insulin pen needle (31G X 8 MM) 31G X 8 MM miscellaneous    levalbuterol (XOPENEX HFA) 45 MCG/ACT inhaler    midodrine (PROAMATINE) 10 MG tablet    midodrine (PROAMATINE) 10 MG tablet    NOVOLOG FLEXPEN 100 UNIT/ML soln     No current facility-administered medications for this visit.        Wt Readings from Last 24 Encounters:   24 65 kg (143 lb 3.2 oz)   24 62.6 kg (138 lb)   24 63.5 kg (140 lb)   11/15/23 64.4 kg (142 lb)   23 64.7 kg (142 lb 9.6 oz)   23 64.2 kg (141 lb 8.6 oz)   23 62.9 kg (138 lb 9.6 oz)   22 59.3 kg (130 lb 11.7 oz)   22 61.4 kg (135 lb 4.8 oz)   22 65.8 kg (145 lb)   22 70.3 kg (155 lb)   21 77.5 kg (170 lb 14.4 oz)   21 79.4 kg (175 lb 1.6 oz)   21 71.7 kg (158 lb)   21 72.8 kg (160 lb 9.6 oz)   10/14/20 73.3 kg (161 lb 9.6 oz)   20 74.8 kg (165 lb)   20 77.1 kg (170 lb)   20 77.1 kg (170 lb)   20 79.2 kg (174 lb 9.6 oz)   20 80 kg (176 lb 4.8 oz)   19 79.4 kg (175 lb)   19 79.7 kg (175 lb 11.2 oz)   19 77.1 kg (170 lb)        Right heart catheterization  2022  RA 12  /15  /40, mean 69, sat 56.7%  PCWP mean 30  CO/CI: 4.9/3.09 by TD, 4.46/2.81 by Audrey  PVR 8.75 waterman  Ao sat 88%, HR 73    Echocardiogram      Name: ELIECER CHACON  MRN: [id number]  : 1960  Study Date:2024 02:53 PM  Age: 63 yrs  Gender: Female  Patient  Location: [facility]  Reason For Study: Pulmonary hypertension (H), Shortness of breath  Ordering Physician: JARRED OLMSTEAD  Referring Physician: JARRED OLMSTEAD  Performed By: Katheryn Carpenter     BSA: 1.6 m2  Height: 60 in  Weight: 138 lb  HR: 65  BP: 116/51 mmHg  ______________________________________________________________________________  Procedure  Complete Echo Adult.  ______________________________________________________________________________  Interpretation Summary     1. Left ventricular systolic function is normal. The visual ejection fraction  is 60-65%.  2. No regional wall motion abnormalities noted.  3. The right ventricle is normal in structure, function and size.  4. The left atrium is severely dilated.  5. There is mild (1+) mitral regurgitation.  6. Mild valvular aortic stenosis; mean gradient 10 mmHg, peak velocity 2.2  m/sec.    ______________________________________________________________________________  Left Ventricle  The left ventricle is normal in size. There is normal left ventricular wall  thickness. Left ventricular systolic function is normal. The visual ejection  fraction is 60-65%. No regional wall motion abnormalities noted.     Right Ventricle  The right ventricle is normal in structure, function and size.     Atria  The left atrium is severely dilated. The right atrium is severely dilated.  There is no color Doppler evidence of an atrial shunt.     Mitral Valve  There is mild mitral annular calcification. There is mild (1+) mitral  regurgitation.     Tricuspid Valve  The tricuspid valve is normal in structure and function. There is mild (1+)  tricuspid regurgitation. The right ventricular systolic pressure is  approximated at 33mmHg plus the right atrial pressure.     Aortic Valve  Mild valvular aortic stenosis.     Pulmonic Valve  The pulmonic valve is not well seen, but is grossly normal.     Vessels  Normal size aorta. IVC diameter <2.1 cm collapsing >50% with  sniff suggests a  normal RA pressure of 3 mmHg.     Pericardium  There is no pericardial effusion.     Rhythm  Sinus rhythm was noted.  ______________________________________________________________________________  MMode/2D Measurements & Calculations  IVSd: 1.0 cm     LVIDd: 4.3 cm  LVIDs: 3.0 cm  LVPWd: 0.90 cm  FS: 30.2 %  LV mass(C)d: 132.7 grams  LV mass(C)dI: 83.3 grams/m2  Ao root diam: 3.3 cm  LA dimension: 3.1 cm  asc Aorta Diam: 3.1 cm  LA/Ao: 0.94  LVOT diam: 2.0 cm  LVOT area: 3.1 cm2  Ao root diam index Ht(cm/m): 2.2  Ao root diam index BSA (cm/m2): 2.1  Asc Ao diam index BSA (cm/m2): 1.9  Asc Ao diam index Ht(cm/m): 2.0  LA Volume (BP): 50.4 ml     LA Volume Index (BP): 31.7 ml/m2  RWT: 0.42     Doppler Measurements & Calculations  MV E max nguyễn: 141.0 cm/sec  MV dec slope: 671.7 cm/sec2  MV dec time: 0.21 sec  Ao V2 max: 222.0 cm/sec  Ao max P.0 mmHg  Ao V2 mean: 143.0 cm/sec  Ao mean P.5 mmHg  Ao V2 VTI: 40.1 cm  RADHA(I,D): 1.7 cm2  RADHA(V,D): 1.4 cm2  LV V1 max P.1 mmHg  LV V1 max: 101.0 cm/sec  LV V1 VTI: 21.9 cm  SV(LVOT): 68.8 ml  SI(LVOT): 43.2 ml/m2  PA acc time: 0.06 sec  TR max nguyễn: 281.0 cm/sec  TR max P.9 mmHg  AV Nguyễn Ratio (DI): 0.45  RADHA Index (cm2/m2): 1.1  E/E' av.2  Lateral E/e': 15.4     Medial E/e': 19.1  RV S Nguyễn: 5.9 cm/sec     ____________________________________      Name: ELIECER CHACON  MRN: [id number]  : 1960  Study Date: 2022 03:25 PM  Age: 62 yrs  Gender: Female  Patient Location: [facility]  Reason For Study: Pulmonary Hypertension, Heart Failure  Ordering Physician: ROGER DAWSON  Referring Physician: YAHAIRA HOUSER  Performed By: Cindy Hunter     BSA: 1.6 m2  Height: 60 in  Weight: 137 lb  BP: 166/67 mmHg  ______________________________________________________________________________  Procedure  Echocardiogram with two-dimensional, color and spectral Doppler performed.  Technically difficult study. Poor  acoustic windows.  ______________________________________________________________________________  Interpretation Summary  Technically difficult study. Poor acoustic windows.  Left ventricular size, wall motion and function are normal. The ejection  fraction is 55-60%.  Global right ventricular function is mildly reduced.  There is mild right ventricular hypertrophy.  Right ventricular systolic pressure is 55mmHg above the right atrial pressure.  Pulmonary hypertension is present.  IVC diameter <2.1 cm collapsing >50% with sniff suggests a normal RA pressure  of 3 mmHg.  No pericardial effusion is present.     This study was compared with the study from 7/15/2021. RV function is mildly  improved and estimated PA pressure is lower.  ______________________________________________________________________________  Left Ventricle  Left ventricular size, wall motion and function are normal. The ejection  fraction is 55-60%. Left ventricular diastolic function is indeterminate.  Abnormal non-specific septal motion is present.     Right Ventricle  The right ventricle is normal size. There is mild right ventricular  hypertrophy. Global right ventricular function is mildly reduced.     Mitral Valve  Mild mitral annular calcification is present. Mild mitral insufficiency is  present.     Aortic Valve  The valve leaflets are not well visualized. On Doppler interrogation, there is  no significant stenosis or regurgitation.     Tricuspid Valve  The tricuspid valve is normal. Mild tricuspid insufficiency is present.  Pulmonary hypertension is present. Right ventricular systolic pressure is  55mmHg above the right atrial pressure.     Pulmonic Valve  The pulmonic valve is normal. Trace pulmonic insufficiency is present.     Vessels  The aorta root is normal. The thoracic aorta is normal. IVC diameter <2.1 cm  collapsing >50% with sniff suggests a normal RA pressure of 3 mmHg.     Pericardium  No pericardial effusion is present.      Compared to Previous Study  This study was compared with the study from 7/15/2021 . RV function is mildly  improved and estimated PA pressure is lower.     ______________________________________________________________________________  MMode/2D Measurements & Calculations  IVSd: 0.67 cm  LVIDd: 4.1 cm  LVIDs: 2.6 cm  LVPWd: 0.65 cm  FS: 35.6 %     LV mass(C)d: 74.3 grams  LV mass(C)dI: 46.7 grams/m2  Ao root diam: 2.5 cm  asc Aorta Diam: 3.2 cm  LVOT diam: 1.9 cm  LVOT area: 2.8 cm2  RWT: 0.32     Doppler Measurements & Calculations  MV E max nguyễn: 145.0 cm/sec  MV A max nguyễn: 32.0 cm/sec  MV E/A: 4.5  MV dec slope: 800.0 cm/sec2  MV dec time: 0.18 sec  Ao V2 max: 171.0 cm/sec  Ao max P.7 mmHg  Ao V2 mean: 117.0 cm/sec  Ao mean P.0 mmHg  Ao V2 VTI: 40.7 cm  RADHA(I,D): 1.6 cm2  RADHA(V,D): 1.6 cm2  LV V1 max PG: 3.6 mmHg  LV V1 max: 95.0 cm/sec  LV V1 VTI: 22.5 cm  SV(LVOT): 63.8 ml  SI(LVOT): 40.1 ml/m2  TR max nguyễn: 373.0 cm/sec  TR max P.7 mmHg  RVSP(TR): 58.7 mmHg  AV Nguyễn Ratio (DI): 0.56  RADHA Index (cm2/m2): 0.99  E/E' av.8     Lateral E/e': 18.5  Medial E/e': 21.2    2021  Primary Surgeon: Joseluis Dean MD   Procedure: Heart Catheterization with Possible Intervention        Indications    SVT (supraventricular tachycardia) (H24) [I47.1 (ICD-10-CM)]     Comments/Patient Narrative    Ms. Wetzel is a 61 year old woman who presented to the ER today with SVT.  She has a past medical history significant for oxygen dependence (not a smoker), chronic right pleural effusion, severe pulmonary hypertension, type 2 diabetes, and end-stage renal disease for which he is on hemodialysis .  She is anemia of chronic kidney disease.  She has been experiencing episodes of shortness of breath with exertion atypical chest discomfort and recurrent palpitations over the last few months. She was admitted a week ago and was discharged with a monitor. On echo she had suggestion of severe  PH with RV enlargement and strain.        In the ED they were able to break the SVT with adenosine and Cardizem. .  ECG did show some ST changes during the episodes of tachycardia.  Cardiology was consulted who recommended right and left heart cath. EP has also been consulted     Pre Procedure Diagnosis    worsening anginapulmonary hypertension       Conclusion    1) Severe pulmonary hypertension primarily secondary to intrinsic pulmonary vascular disease (mean PA 81 mmHg, PVRI 25, PCWP confirmed with sat 35 mmHg)  2) Severely elevated right-sided filling pressures (mean RA 35 mmHg)  3) Severely elevated left-sided filling pressures (LVEDP 35-40 mmHg)  4) No hemodynamically significant coronary artery disease  5) With NO to 80 ppm the mean PA decreased to 60 mmHg, PCWP remained unchanged at 35 mmHg, PVRI decreased to 8.7 waterman*m2.  Her systemic blood pressure was 160-170 mmHg  6) Episode of SVT terminated with 6mg adenosine        Interestingly, her RA pressure was only modestly elevated, given that her presumed cirrhosis has been attributed to right heart failure.  She has had evidence for both severe pre and post capillary pulmonary hypertension as evidenced by the markedly elevated wedge pressure of 30 and the severely elevated pulmonary artery pressure and pulmonary vascular resistance.      An echocardiogram performed previously:       This only adds to the mystery as her right sided function is good and her left sided function is also good and does not demonstrate severe diastolic abnormalities.  The estimated echo PA pressures are much lower than the measured PA pressures.    Thus, from my perspective we do not have a good explanation for many of her abnormalities.  Certainly her hemodynamics are not consistent with longevity.  In addition her PA pressures would preclude renal or renal or hepatic transplantation.      She has had compressive atelectasis of the right lower lobe secondary to a pleural effusion and  has been persistently hypoxic in the past though interestingly this week has not had hypoxia or required oxygen.     She does have pericardial calcification but the hemodynamics were not classic for constrictive pericarditis as seen in the very early days of dialysis.           Family history: ASHD, diabetes       Objective    Alert, oriented, looks older than age, left basilar dullness, CVP 10-12, positive HJR,no  RV lift, murmur of TR, ascites though minimal peripheral edema    Constitutional: weight loss, fever, chills, night sweats  HEENT: without visual changes, swallow difficulties  Pulmonary: with shortness of breath, but without cough, wheeze, hemoptysis, however reported history of COLD  Cardiac: without chest pain, MANE, PND, orthopnea, edema, palpitation, pre-syncope, syncope,  GI: without diarrhea, constipation, jaundice, melena, GERD, hematemesis  : without frequency, urgency, dysuria, hematuria but dialysis dependent.  Skin: rash, bruise, open lesions  Neuro: without TIA, focal neurologic complaints, seizure, trauma  Ortho: without pain, swelling, mobility impairment  Endocrine:+ diabetes,but no thyroid, heat/cold intolerance, polyuria, polyphagia, change bowel habits.  Sleep:+periodic breathing, fatigue      Allergies reviewed and confirmed    Wt Readings from Last 24 Encounters:   01/06/24 63.5 kg (140 lb)   11/15/23 64.4 kg (142 lb)   09/26/23 64.7 kg (142 lb 9.6 oz)   09/09/23 64.2 kg (141 lb 8.6 oz)   01/05/23 62.9 kg (138 lb 9.6 oz)   11/25/22 59.3 kg (130 lb 11.7 oz)   11/03/22 61.4 kg (135 lb 4.8 oz)   06/20/22 65.8 kg (145 lb)   04/05/22 70.3 kg (155 lb)   07/25/21 77.5 kg (170 lb 14.4 oz)   07/16/21 79.4 kg (175 lb 1.6 oz)   05/27/21 71.7 kg (158 lb)   03/04/21 72.8 kg (160 lb 9.6 oz)   10/14/20 73.3 kg (161 lb 9.6 oz)   08/05/20 74.8 kg (165 lb)   05/07/20 77.1 kg (170 lb)   04/22/20 77.1 kg (170 lb)   01/20/20 79.2 kg (174 lb 9.6 oz)   01/13/20 80 kg (176 lb 4.8 oz)   12/31/19 79.4 kg (175  lb)   12/31/19 79.7 kg (175 lb 11.2 oz)   12/09/19 77.1 kg (170 lb)   12/05/19 80.6 kg (177 lb 11.1 oz)   11/01/19 79.6 kg (175 lb 8 oz)           Current Outpatient Medications   Medication    ACCU-CHEK CHING PLUS test strip    acetaminophen (TYLENOL) 500 MG tablet    amiodarone (PACERONE) 200 MG tablet    blood glucose (ACCU-CHEK GUIDE) test strip    blood glucose (NO BRAND SPECIFIED) lancets standard    blood glucose (NO BRAND SPECIFIED) lancets standard    blood glucose monitoring (NO BRAND SPECIFIED) meter device kit    Continuous Blood Gluc Sensor (FREESTYLE LATOSHA 2 SENSOR) MISC    famotidine (PEPCID) 10 MG tablet    insulin detemir (LEVEMIR FLEXTOUCH) 100 UNIT/ML pen    insulin pen needle (31G X 8 MM) 31G X 8 MM miscellaneous    levalbuterol (XOPENEX HFA) 45 MCG/ACT inhaler    midodrine (PROAMATINE) 10 MG tablet    midodrine (PROAMATINE) 10 MG tablet    NOVOLOG FLEXPEN 100 UNIT/ML soln     No current facility-administered medications for this visit.           EXAMINATION: Limited Abdominal Ultrasound, 11/25/2022 9:38 AM      COMPARISON: None.     HISTORY: Cirrhotic appearing liver noted on 7/2021 CT chest     FINDINGS:      Liver: The liver demonstrates heterogenous and coarsened hepatic  echotexture and nodular hepatic surface contour. Hepatomegaly  measuring 17.8 cm in craniocaudal dimension. There is no focal mass.  The main vein is patent with flow towards the liver.     Gallbladder: Shadowing gallstones in the neck of the gallbladder. No  gallbladder wall thickening, pericholecystic fluid, or positive  sonographic Gomez's sign. There is an approximately 1.1 x 1.1 cm  masslike thickening of the gallbladder fundus which could represent a  collection of biliary sludge versus adherent stone versus gallbladder  polyp.     Bile Ducts: Both the intra- and extrahepatic biliary system are of  normal caliber.  The common bile duct measures 1.9 mm in diameter.     Pancreas: Visualized portions of the head and body  of the pancreas are  unremarkable.      Kidney: The right kidney is atrophic measuring 7.1 cm long with  increased cortical echogenicity. There is no hydronephrosis or  hydroureter, no shadowing renal calculi, cystic lesion or mass.      Fluid: Moderate ascites in the right upper quadrant.                                                                      IMPRESSION:   1.  Nodular hepatic surface contour and heterogenous coarsened hepatic  echotexture consistent with hepatic cirrhosis. No focal hepatic mass.  2.  1.1 x 1.1 cm masslike thickening of the gallbladder fundus.  Biliary tumefactive sludge and/or secondary to chronic liver disease  versus adherent stone versus gallbladder polyp. Recommend follow-up  ultrasound in 6 months.  3.  Cholelithiasis   4.  Moderate ascites.      Heart Catheterization:2022    RA 12  /15  /40, mean 69, sat 56.7%  PCWP mean 30  CO/CI: 4.9/3.09 by TD, 4.46/2.81 by Audrey  PVR 8.75 waterman  Ao sat 88%, HR 73   Right sided filling pressures are mildly elevated. Left sided filling pressures are moderately elevated. Severely elevated pulmonary artery hypertension. Normal cardiac output level. Hemodynamic data has been modified in Epic per physician review.        Catheterization 2021    ) Severe pulmonary hypertension primarily secondary to intrinsic pulmonary vascular disease (mean PA 81 mmHg, PVRI 25, PCWP confirmed with sat 35 mmHg)  2) Severely elevated right-sided filling pressures (mean RA 35 mmHg)  3) Severely elevated left-sided filling pressures (LVEDP 35-40 mmHg)  4) No hemodynamically significant coronary artery disease  5) With NO to 80 ppm the mean PA decreased to 60 mmHg, PCWP remained unchanged at 35 mmHg, PVRI decreased to 8.7 waterman*m2.  Her systemic blood pressure was 160-170 mmHg  6) Episode of SVT terminated with 6mg adenosine          Kameron    Echocardiogram        Name: ELIECER CHACON  MRN: [id number]  : 1960  Study Date: 2022 03:25 PM  Age:  62 yrs  Gender: Female  Patient Location: [facility]  Reason For Study: Pulmonary Hypertension, Heart Failure  Ordering Physician: ROGER DAWSON  Referring Physician: YAHAIRA HOUSER  Performed By: Cindy Hunter     BSA: 1.6 m2  Height: 60 in  Weight: 137 lb  BP: 166/67 mmHg  ______________________________________________________________________________  Procedure  Echocardiogram with two-dimensional, color and spectral Doppler performed.  Technically difficult study. Poor acoustic windows.  ______________________________________________________________________________  Interpretation Summary  Technically difficult study. Poor acoustic windows.  Left ventricular size, wall motion and function are normal. The ejection  fraction is 55-60%.  Global right ventricular function is mildly reduced.  There is mild right ventricular hypertrophy.  Right ventricular systolic pressure is 55mmHg above the right atrial pressure.  Pulmonary hypertension is present.  IVC diameter <2.1 cm collapsing >50% with sniff suggests a normal RA pressure  of 3 mmHg.  No pericardial effusion is present.     This study was compared with the study from 7/15/2021. RV function is mildly  improved and estimated PA pressure is lower.  ______________________________________________________________________________  Left Ventricle  Left ventricular size, wall motion and function are normal. The ejection  fraction is 55-60%. Left ventricular diastolic function is indeterminate.  Abnormal non-specific septal motion is present.     Right Ventricle  The right ventricle is normal size. There is mild right ventricular  hypertrophy. Global right ventricular function is mildly reduced.     Mitral Valve  Mild mitral annular calcification is present. Mild mitral insufficiency is  present.     Aortic Valve  The valve leaflets are not well visualized. On Doppler interrogation, there is  no significant stenosis or regurgitation.     Tricuspid  Valve  The tricuspid valve is normal. Mild tricuspid insufficiency is present.  Pulmonary hypertension is present. Right ventricular systolic pressure is  55mmHg above the right atrial pressure.     Pulmonic Valve  The pulmonic valve is normal. Trace pulmonic insufficiency is present.     Vessels  The aorta root is normal. The thoracic aorta is normal. IVC diameter <2.1 cm  collapsing >50% with sniff suggests a normal RA pressure of 3 mmHg.     Pericardium  No pericardial effusion is present.     Compared to Previous Study  This study was compared with the study from 7/15/2021 . RV function is mildly  improved and estimated PA pressure is lower.     ______________________________________________________________________________  MMode/2D Measurements & Calculations  IVSd: 0.67 cm  LVIDd: 4.1 cm  LVIDs: 2.6 cm  LVPWd: 0.65 cm  FS: 35.6 %     LV mass(C)d: 74.3 grams  LV mass(C)dI: 46.7 grams/m2  Ao root diam: 2.5 cm  asc Aorta Diam: 3.2 cm  LVOT diam: 1.9 cm  LVOT area: 2.8 cm2  RWT: 0.32     Doppler Measurements & Calculations  MV E max nguyễn: 145.0 cm/sec  MV A max nguyễn: 32.0 cm/sec  MV E/A: 4.5  MV dec slope: 800.0 cm/sec2  MV dec time: 0.18 sec  Ao V2 max: 171.0 cm/sec  Ao max P.7 mmHg  Ao V2 mean: 117.0 cm/sec  Ao mean P.0 mmHg  Ao V2 VTI: 40.7 cm  RADHA(I,D): 1.6 cm2  RADHA(V,D): 1.6 cm2  LV V1 max PG: 3.6 mmHg  LV V1 max: 95.0 cm/sec  LV V1 VTI: 22.5 cm  SV(LVOT): 63.8 ml  SI(LVOT): 40.1 ml/m2  TR max nguyễn: 373.0 cm/sec  TR max P.7 mmHg  RVSP(TR): 58.7 mmHg  AV Nguyễn Ratio (DI): 0.56  RADHA Index (cm2/m2): 0.99  E/E' av.8     Lateral E/e': 18.5  Medial E/e': 21.2            Name: ELIECER CHACON  MRN: [id number]  : 1960  Study Date: 07/15/2021 08:59 AM  Age: 61 yrs  Gender: Female  Patient Location: [facility]  Reason For Study: Tachycardia  Ordering Physician: KERWIN BEATTY  Referring Physician: Gigi Martin  Performed By: Raj Grey     BSA: 1.7 m2  Height: 60 in  Weight: 163 lb  HR:  59  BP: 159/73 mmHg  ______________________________________________________________________________  Procedure  Complete Portable Echo Adult.  ______________________________________________________________________________  Interpretation Summary     Left ventricular systolic function is normal.  The visual ejection fraction is 60-65%.  The right ventricle is severely dilated.  Moderately decreased right ventricular systolic function  There is moderate to mod-severe (2-3+) tricuspid regurgitation.  The right ventricular systolic pressure is approximated at 82mmHg plus the  right atrial pressure.  Right ventricular systolic pressure is elevated, consistent with severe  pulmonary hypertension.  IVC diameter >2.1 cm collapsing <50% with sniff suggests a high RA pressure  estimated at 15 mmHg or greater.  Flattened septum is consistent with RV pressure/volume overload.  Compared to 9/19, there is new RV enlargemnt with RV dysfunction. RVSP has  increased compared to 2017 when it was 60 plus RAP.  Findings discussed with Dawna, nursing team for the patient. The study was  technically difficult.  ______________________________________________________________________________  Left Ventricle  The left ventricle is normal in size. There is concentric remodeling present.  Left ventricular systolic function is normal. The visual ejection fraction is  60-65%. Diastolic Doppler findings (E/E' ratio and/or other parameters)  suggest left ventricular filling pressures are increased. Grade III or  advanced diastolic dysfunction. Flattened septum is consistent with RV  pressure/volume overload.     Right Ventricle  The right ventricle is severely dilated. Moderately decreased right  ventricular systolic function.     Atria  The left atrium is severely dilated. The right atrium is severely dilated.     Mitral Valve  The mitral valve leaflets are mildly thickened. There is mild mitral annular  calcification. There is mild to moderate  (1-2+) mitral regurgitation.     Tricuspid Valve  There is moderate to mod-severe (2-3+) tricuspid regurgitation. IVC diameter  >2.1 cm collapsing <50% with sniff suggests a high RA pressure estimated at 15  mmHg or greater. Right ventricular systolic pressure is elevated, consistent  with severe pulmonary hypertension. The right ventricular systolic pressure is  approximated at 82mmHg plus the right atrial pressure.     Aortic Valve  There is mild trileaflet aortic sclerosis. There is trace aortic  regurgitation. No aortic stenosis is present.     Pulmonic Valve  The pulmonic valve is not well visualized.     Pericardium  Trivial pericardial effusion. Small right pleural effusion.     Rhythm  Sinus rhythm was noted.     ______________________________________________________________________________  MMode/2D Measurements & Calculations  IVSd: 0.98 cm  LVIDd: 3.8 cm  LVIDs: 3.6 cm  LVPWd: 1.2 cm  FS: 4.2 %  LV mass(C)d: 128.1 grams  LV mass(C)dI: 74.9 grams/m2     Ao root diam: 2.7 cm  LA dimension: 3.3 cm  asc Aorta Diam: 3.0 cm  LA/Ao: 1.2  LVOT diam: 2.0 cm  LVOT area: 3.1 cm2  LA Volume (BP): 95.2 ml  LA Volume Index (BP): 55.7 ml/m2  RWT: 0.62     Doppler Measurements & Calculations  MV E max artur: 130.0 cm/sec  MV A max artur: 44.1 cm/sec  MV E/A: 2.9  MV max P.7 mmHg  MV mean P.9 mmHg  MV V2 VTI: 34.6 cm     MV dec slope: 583.8 cm/sec2  PA acc time: 0.06 sec  TR max artur: 452.5 cm/sec  TR max P.9 mmHg  E/E' av.9  Lateral E/e': 19.0  Medial E/e': 16.8     Imaging     EXAM: CT CHEST PULMONARY EMBOLISM W CONTRAST  LOCATION: [facility], [place]  DATE/TIME: 6/15/2022 4:56 PM     INDICATION: PE suspected, high prob. Increased shortness of breath.  COMPARISON: 07/15/2021.  TECHNIQUE: CT chest pulmonary angiogram during arterial phase injection of IV contrast. Multiplanar reformats and MIP reconstructions were performed. Dose reduction techniques were used.   CONTRAST: 61 mL Isovue  370.     FINDINGS:  ANGIOGRAM CHEST: Pulmonary arteries are normal caliber and negative for pulmonary emboli. Normal caliber thoracic aorta without dissection. Moderate aortic calcification.     LUNGS AND PLEURA: Moderate right-sided pleural fluid collection. Mosaic attenuation of the pulmonary parenchyma diffusely which can be seen with air trapping, edema or viral etiologies. Significant volume loss/atelectasis right lower lobe with moderate   volume loss and atelectasis right middle lobe inferiorly. No left-sided pleural fluid. Mild intralobular septal thickening.     MEDIASTINUM/AXILLAE: Cardiac enlargement without pericardial fluid. Diffuse pericardial calcifications. No lymphadenopathy. Normal caliber esophagus.     CORONARY ARTERY CALCIFICATION: Moderate.     UPPER ABDOMEN: Partially visualized moderate ascites.     MUSCULOSKELETAL: Minimal degenerative changes in the spine.                                                                      IMPRESSION:  1.  No evidence for pulmonary embolism.     2.  Moderate right-sided pleural fluid collection with atelectasis right lower lobe and right middle lobe as detailed above. Underlying infiltrates in these areas of atelectasis cannot be excluded.     3.  Mosaic attenuation of the pulmonary parenchyma with interlobular septal thickening most likely related to edema. Other considerations would include air trapping related to reactive airways disease or viral etiologies.     4.  Cardiac enlargement with diffuse pericardial calcifications.           CC: Lev Hernandez ([employer]), Gigi Martin M.D., [place] Barbosa M.D.

## 2024-07-05 DIAGNOSIS — R18.8 ASCITES OF LIVER: Primary | ICD-10-CM

## 2024-07-08 ENCOUNTER — MEDICAL CORRESPONDENCE (OUTPATIENT)
Dept: HEALTH INFORMATION MANAGEMENT | Facility: CLINIC | Age: 64
End: 2024-07-08
Payer: MEDICARE

## 2024-07-11 ENCOUNTER — OFFICE VISIT (OUTPATIENT)
Dept: CARDIOLOGY | Facility: CLINIC | Age: 64
End: 2024-07-11
Payer: MEDICARE

## 2024-07-11 VITALS
OXYGEN SATURATION: 96 % | HEIGHT: 60 IN | BODY MASS INDEX: 28.11 KG/M2 | WEIGHT: 143.2 LBS | HEART RATE: 62 BPM | DIASTOLIC BLOOD PRESSURE: 49 MMHG | SYSTOLIC BLOOD PRESSURE: 106 MMHG

## 2024-07-11 DIAGNOSIS — I27.20 PULMONARY HTN (H): ICD-10-CM

## 2024-07-11 DIAGNOSIS — R06.02 SOB (SHORTNESS OF BREATH): ICD-10-CM

## 2024-07-11 PROCEDURE — 99215 OFFICE O/P EST HI 40 MIN: CPT | Performed by: INTERNAL MEDICINE

## 2024-07-11 NOTE — LETTER
7/11/2024    Gigi Martin MD  [address] [place] [id number]    RE: Yissel Wetzel       Dear Colleague,     I had the pleasure of seeing Yissel Wetzel in the [facility].  Virtual Visit Details        Jose Redd M.D.  Cardiovascular Medicine    Lev Hernandez M.D.  Gigi Martin M.D.        Problem List  1. ESRD with dialysis dependence  2. Chronic oxygen dependent respiratory failure  3. History of SVT  4. Diabetes  5. Diabetic nephropathy  6. COLD  7. History of ascites requiring paracentesis  8. Pleural effusion  9. ASHD with stenting  10. Pericardial calcification  11. Remote history of obesity  12. Remote history of smoking  13. Cirrhotic hepatic morphology.  14. Ascites  15  Diastolic dysfunction     Plan:  Admit [facility] for daily dialysis ( slow 4 hour run with slow fluid removal and midodrine to avoid hypotension) Anticipate daily dialysis x 5  The purpose of this is to reduce intravascular volume status to see if PA pressure with come down, allowing for transplantation.  Paracentesis during admission  Repeat echocardiogram (limited) to assess aortic valve      Dear Lev and Gigi:     I saw your patient, Yissel Wetzel, a 64 year old female with longstanding ESRD for a follow-up visit today.  She has known, severe PAH but has not been recently seen, nor is she on medication.  Despite this she appears to be doing well, though did have problems with ascites and umbilical hernia this year. She is otherwise doing well.  I talked with her at length regarding preserving her options and suggested repeat echocardiogram at [facility].  I thoroughly discussed the risks and benefits of the contemplated catherization including bleeding, vessel rupture, death, arrhythmia, embolism, stroke, renal failure perforation of pulmonary artery, aortic,lung or heart.  I discussed how the procedure would be performed and alternative diagnostic and therapeutic management strategies.  All questions were  answered.       Right heart catheterization  11/2022  RA 12  /15  /40, mean 69, sat 56.7%  PCWP mean 30  CO/CI: 4.9/3.09 by TD, 4.46/2.81 by Audrey  PVR 8.75 waterman  Ao sat 88%, HR 73    Current Outpatient Medications   Medication    ACCU-CHEK CHING PLUS test strip    acetaminophen (TYLENOL) 500 MG tablet    amiodarone (PACERONE) 200 MG tablet    blood glucose (ACCU-CHEK GUIDE) test strip    blood glucose (NO BRAND SPECIFIED) lancets standard    blood glucose (NO BRAND SPECIFIED) lancets standard    blood glucose monitoring (NO BRAND SPECIFIED) meter device kit    Continuous Blood Gluc Sensor (FREESTYLE LATOSHA 2 SENSOR) MISC    famotidine (PEPCID) 10 MG tablet    insulin detemir (LEVEMIR FLEXTOUCH) 100 UNIT/ML pen    insulin pen needle (31G X 8 MM) 31G X 8 MM miscellaneous    levalbuterol (XOPENEX HFA) 45 MCG/ACT inhaler    midodrine (PROAMATINE) 10 MG tablet    midodrine (PROAMATINE) 10 MG tablet    NOVOLOG FLEXPEN 100 UNIT/ML soln     No current facility-administered medications for this visit.        Wt Readings from Last 24 Encounters:   07/11/24 65 kg (143 lb 3.2 oz)   01/25/24 62.6 kg (138 lb)   01/06/24 63.5 kg (140 lb)   11/15/23 64.4 kg (142 lb)   09/26/23 64.7 kg (142 lb 9.6 oz)   09/09/23 64.2 kg (141 lb 8.6 oz)   01/05/23 62.9 kg (138 lb 9.6 oz)   11/25/22 59.3 kg (130 lb 11.7 oz)   11/03/22 61.4 kg (135 lb 4.8 oz)   06/20/22 65.8 kg (145 lb)   04/05/22 70.3 kg (155 lb)   07/25/21 77.5 kg (170 lb 14.4 oz)   07/16/21 79.4 kg (175 lb 1.6 oz)   05/27/21 71.7 kg (158 lb)   03/04/21 72.8 kg (160 lb 9.6 oz)   10/14/20 73.3 kg (161 lb 9.6 oz)   08/05/20 74.8 kg (165 lb)   05/07/20 77.1 kg (170 lb)   04/22/20 77.1 kg (170 lb)   01/20/20 79.2 kg (174 lb 9.6 oz)   01/13/20 80 kg (176 lb 4.8 oz)   12/31/19 79.4 kg (175 lb)   12/31/19 79.7 kg (175 lb 11.2 oz)   12/09/19 77.1 kg (170 lb)        Right heart catheterization  11/23/2022  RA 12  /15  /40, mean 69, sat 56.7%  PCWP mean 30  CO/CI: 4.9/3.09  by TD, 4.46/2.81 by Audrey  PVR 8.75 waterman  Ao sat 88%, HR 73    Echocardiogram      Name: ELIECER CHACON  MRN: [id number]  : 1960  Study Date:2024 02:53 PM  Age: 63 yrs  Gender: Female  Patient Location: [facility]  Reason For Study: Pulmonary hypertension (H), Shortness of breath  Ordering Physician: JARRED OLMSTEAD  Referring Physician: JARRED OLMSTEAD  Performed By: Katheryn Carpenter     BSA: 1.6 m2  Height: 60 in  Weight: 138 lb  HR: 65  BP: 116/51 mmHg  ______________________________________________________________________________  Procedure  Complete Echo Adult.  ______________________________________________________________________________  Interpretation Summary     1. Left ventricular systolic function is normal. The visual ejection fraction  is 60-65%.  2. No regional wall motion abnormalities noted.  3. The right ventricle is normal in structure, function and size.  4. The left atrium is severely dilated.  5. There is mild (1+) mitral regurgitation.  6. Mild valvular aortic stenosis; mean gradient 10 mmHg, peak velocity 2.2  m/sec.    ______________________________________________________________________________  Left Ventricle  The left ventricle is normal in size. There is normal left ventricular wall  thickness. Left ventricular systolic function is normal. The visual ejection  fraction is 60-65%. No regional wall motion abnormalities noted.     Right Ventricle  The right ventricle is normal in structure, function and size.     Atria  The left atrium is severely dilated. The right atrium is severely dilated.  There is no color Doppler evidence of an atrial shunt.     Mitral Valve  There is mild mitral annular calcification. There is mild (1+) mitral  regurgitation.     Tricuspid Valve  The tricuspid valve is normal in structure and function. There is mild (1+)  tricuspid regurgitation. The right ventricular systolic pressure is  approximated at 33mmHg plus the right atrial  pressure.     Aortic Valve  Mild valvular aortic stenosis.     Pulmonic Valve  The pulmonic valve is not well seen, but is grossly normal.     Vessels  Normal size aorta. IVC diameter <2.1 cm collapsing >50% with sniff suggests a  normal RA pressure of 3 mmHg.     Pericardium  There is no pericardial effusion.     Rhythm  Sinus rhythm was noted.  ______________________________________________________________________________  MMode/2D Measurements & Calculations  IVSd: 1.0 cm     LVIDd: 4.3 cm  LVIDs: 3.0 cm  LVPWd: 0.90 cm  FS: 30.2 %  LV mass(C)d: 132.7 grams  LV mass(C)dI: 83.3 grams/m2  Ao root diam: 3.3 cm  LA dimension: 3.1 cm  asc Aorta Diam: 3.1 cm  LA/Ao: 0.94  LVOT diam: 2.0 cm  LVOT area: 3.1 cm2  Ao root diam index Ht(cm/m): 2.2  Ao root diam index BSA (cm/m2): 2.1  Asc Ao diam index BSA (cm/m2): 1.9  Asc Ao diam index Ht(cm/m): 2.0  LA Volume (BP): 50.4 ml     LA Volume Index (BP): 31.7 ml/m2  RWT: 0.42     Doppler Measurements & Calculations  MV E max nguyễn: 141.0 cm/sec  MV dec slope: 671.7 cm/sec2  MV dec time: 0.21 sec  Ao V2 max: 222.0 cm/sec  Ao max P.0 mmHg  Ao V2 mean: 143.0 cm/sec  Ao mean P.5 mmHg  Ao V2 VTI: 40.1 cm  RADHA(I,D): 1.7 cm2  RADHA(V,D): 1.4 cm2  LV V1 max P.1 mmHg  LV V1 max: 101.0 cm/sec  LV V1 VTI: 21.9 cm  SV(LVOT): 68.8 ml  SI(LVOT): 43.2 ml/m2  PA acc time: 0.06 sec  TR max nguyễn: 281.0 cm/sec  TR max P.9 mmHg  AV Nguyễn Ratio (DI): 0.45  RADHA Index (cm2/m2): 1.1  E/E' av.2  Lateral E/e': 15.4     Medial E/e': 19.1  RV S Nguyễn: 5.9 cm/sec     ____________________________________      Name: ELIECER CHACON  MRN: [id number]  : 1960  Study Date: 2022 03:25 PM  Age: 62 yrs  Gender: Female  Patient Location: [facility]  Reason For Study: Pulmonary Hypertension, Heart Failure  Ordering Physician: ROGER DAWSON  Referring Physician: YAHAIRA HOUSER  Performed By: Cindy Hunter     BSA: 1.6 m2  Height: 60 in  Weight: 137 lb  BP: 166/67  mmHg  ______________________________________________________________________________  Procedure  Echocardiogram with two-dimensional, color and spectral Doppler performed.  Technically difficult study. Poor acoustic windows.  ______________________________________________________________________________  Interpretation Summary  Technically difficult study. Poor acoustic windows.  Left ventricular size, wall motion and function are normal. The ejection  fraction is 55-60%.  Global right ventricular function is mildly reduced.  There is mild right ventricular hypertrophy.  Right ventricular systolic pressure is 55mmHg above the right atrial pressure.  Pulmonary hypertension is present.  IVC diameter <2.1 cm collapsing >50% with sniff suggests a normal RA pressure  of 3 mmHg.  No pericardial effusion is present.     This study was compared with the study from 7/15/2021. RV function is mildly  improved and estimated PA pressure is lower.  ______________________________________________________________________________  Left Ventricle  Left ventricular size, wall motion and function are normal. The ejection  fraction is 55-60%. Left ventricular diastolic function is indeterminate.  Abnormal non-specific septal motion is present.     Right Ventricle  The right ventricle is normal size. There is mild right ventricular  hypertrophy. Global right ventricular function is mildly reduced.     Mitral Valve  Mild mitral annular calcification is present. Mild mitral insufficiency is  present.     Aortic Valve  The valve leaflets are not well visualized. On Doppler interrogation, there is  no significant stenosis or regurgitation.     Tricuspid Valve  The tricuspid valve is normal. Mild tricuspid insufficiency is present.  Pulmonary hypertension is present. Right ventricular systolic pressure is  55mmHg above the right atrial pressure.     Pulmonic Valve  The pulmonic valve is normal. Trace pulmonic insufficiency is present.      Vessels  The aorta root is normal. The thoracic aorta is normal. IVC diameter <2.1 cm  collapsing >50% with sniff suggests a normal RA pressure of 3 mmHg.     Pericardium  No pericardial effusion is present.     Compared to Previous Study  This study was compared with the study from 7/15/2021 . RV function is mildly  improved and estimated PA pressure is lower.     ______________________________________________________________________________  MMode/2D Measurements & Calculations  IVSd: 0.67 cm  LVIDd: 4.1 cm  LVIDs: 2.6 cm  LVPWd: 0.65 cm  FS: 35.6 %     LV mass(C)d: 74.3 grams  LV mass(C)dI: 46.7 grams/m2  Ao root diam: 2.5 cm  asc Aorta Diam: 3.2 cm  LVOT diam: 1.9 cm  LVOT area: 2.8 cm2  RWT: 0.32     Doppler Measurements & Calculations  MV E max nguyễn: 145.0 cm/sec  MV A max nguyễn: 32.0 cm/sec  MV E/A: 4.5  MV dec slope: 800.0 cm/sec2  MV dec time: 0.18 sec  Ao V2 max: 171.0 cm/sec  Ao max P.7 mmHg  Ao V2 mean: 117.0 cm/sec  Ao mean P.0 mmHg  Ao V2 VTI: 40.7 cm  RADHA(I,D): 1.6 cm2  RADHA(V,D): 1.6 cm2  LV V1 max PG: 3.6 mmHg  LV V1 max: 95.0 cm/sec  LV V1 VTI: 22.5 cm  SV(LVOT): 63.8 ml  SI(LVOT): 40.1 ml/m2  TR max nguyễn: 373.0 cm/sec  TR max P.7 mmHg  RVSP(TR): 58.7 mmHg  AV Nguyễn Ratio (DI): 0.56  RADHA Index (cm2/m2): 0.99  E/E' av.8     Lateral E/e': 18.5  Medial E/e': 21.2    2021  Primary Surgeon: Joseluis Dean MD   Procedure: Heart Catheterization with Possible Intervention        Indications    SVT (supraventricular tachycardia) (H24) [I47.1 (ICD-10-CM)]     Comments/Patient Narrative    Ms. Wetzel is a 61 year old woman who presented to the ER today with SVT.  She has a past medical history significant for oxygen dependence (not a smoker), chronic right pleural effusion, severe pulmonary hypertension, type 2 diabetes, and end-stage renal disease for which he is on hemodialysis .  She is anemia of chronic kidney disease.  She has been experiencing episodes of  shortness of breath with exertion atypical chest discomfort and recurrent palpitations over the last few months. She was admitted a week ago and was discharged with a monitor. On echo she had suggestion of severe PH with RV enlargement and strain.        In the ED they were able to break the SVT with adenosine and Cardizem. .  ECG did show some ST changes during the episodes of tachycardia.  Cardiology was consulted who recommended right and left heart cath. EP has also been consulted     Pre Procedure Diagnosis    worsening anginapulmonary hypertension       Conclusion    1) Severe pulmonary hypertension primarily secondary to intrinsic pulmonary vascular disease (mean PA 81 mmHg, PVRI 25, PCWP confirmed with sat 35 mmHg)  2) Severely elevated right-sided filling pressures (mean RA 35 mmHg)  3) Severely elevated left-sided filling pressures (LVEDP 35-40 mmHg)  4) No hemodynamically significant coronary artery disease  5) With NO to 80 ppm the mean PA decreased to 60 mmHg, PCWP remained unchanged at 35 mmHg, PVRI decreased to 8.7 waterman*m2.  Her systemic blood pressure was 160-170 mmHg  6) Episode of SVT terminated with 6mg adenosine        Interestingly, her RA pressure was only modestly elevated, given that her presumed cirrhosis has been attributed to right heart failure.  She has had evidence for both severe pre and post capillary pulmonary hypertension as evidenced by the markedly elevated wedge pressure of 30 and the severely elevated pulmonary artery pressure and pulmonary vascular resistance.      An echocardiogram performed previously:       This only adds to the mystery as her right sided function is good and her left sided function is also good and does not demonstrate severe diastolic abnormalities.  The estimated echo PA pressures are much lower than the measured PA pressures.    Thus, from my perspective we do not have a good explanation for many of her abnormalities.  Certainly her hemodynamics are not  consistent with longevity.  In addition her PA pressures would preclude renal or renal or hepatic transplantation.      She has had compressive atelectasis of the right lower lobe secondary to a pleural effusion and has been persistently hypoxic in the past though interestingly this week has not had hypoxia or required oxygen.     She does have pericardial calcification but the hemodynamics were not classic for constrictive pericarditis as seen in the very early days of dialysis.           Family history: ASHD, diabetes       Objective    Alert, oriented, looks older than age, left basilar dullness, CVP 10-12, positive HJR,no  RV lift, murmur of TR, ascites though minimal peripheral edema    Constitutional: weight loss, fever, chills, night sweats  HEENT: without visual changes, swallow difficulties  Pulmonary: with shortness of breath, but without cough, wheeze, hemoptysis, however reported history of COLD  Cardiac: without chest pain, MANE, PND, orthopnea, edema, palpitation, pre-syncope, syncope,  GI: without diarrhea, constipation, jaundice, melena, GERD, hematemesis  : without frequency, urgency, dysuria, hematuria but dialysis dependent.  Skin: rash, bruise, open lesions  Neuro: without TIA, focal neurologic complaints, seizure, trauma  Ortho: without pain, swelling, mobility impairment  Endocrine:+ diabetes,but no thyroid, heat/cold intolerance, polyuria, polyphagia, change bowel habits.  Sleep:+periodic breathing, fatigue      Allergies reviewed and confirmed    Wt Readings from Last 24 Encounters:   01/06/24 63.5 kg (140 lb)   11/15/23 64.4 kg (142 lb)   09/26/23 64.7 kg (142 lb 9.6 oz)   09/09/23 64.2 kg (141 lb 8.6 oz)   01/05/23 62.9 kg (138 lb 9.6 oz)   11/25/22 59.3 kg (130 lb 11.7 oz)   11/03/22 61.4 kg (135 lb 4.8 oz)   06/20/22 65.8 kg (145 lb)   04/05/22 70.3 kg (155 lb)   07/25/21 77.5 kg (170 lb 14.4 oz)   07/16/21 79.4 kg (175 lb 1.6 oz)   05/27/21 71.7 kg (158 lb)   03/04/21 72.8 kg (160 lb  9.6 oz)   10/14/20 73.3 kg (161 lb 9.6 oz)   08/05/20 74.8 kg (165 lb)   05/07/20 77.1 kg (170 lb)   04/22/20 77.1 kg (170 lb)   01/20/20 79.2 kg (174 lb 9.6 oz)   01/13/20 80 kg (176 lb 4.8 oz)   12/31/19 79.4 kg (175 lb)   12/31/19 79.7 kg (175 lb 11.2 oz)   12/09/19 77.1 kg (170 lb)   12/05/19 80.6 kg (177 lb 11.1 oz)   11/01/19 79.6 kg (175 lb 8 oz)           Current Outpatient Medications   Medication    ACCU-CHEK CHING PLUS test strip    acetaminophen (TYLENOL) 500 MG tablet    amiodarone (PACERONE) 200 MG tablet    blood glucose (ACCU-CHEK GUIDE) test strip    blood glucose (NO BRAND SPECIFIED) lancets standard    blood glucose (NO BRAND SPECIFIED) lancets standard    blood glucose monitoring (NO BRAND SPECIFIED) meter device kit    Continuous Blood Gluc Sensor (FREESTYLE LATOSHA 2 SENSOR) MISC    famotidine (PEPCID) 10 MG tablet    insulin detemir (LEVEMIR FLEXTOUCH) 100 UNIT/ML pen    insulin pen needle (31G X 8 MM) 31G X 8 MM miscellaneous    levalbuterol (XOPENEX HFA) 45 MCG/ACT inhaler    midodrine (PROAMATINE) 10 MG tablet    midodrine (PROAMATINE) 10 MG tablet    NOVOLOG FLEXPEN 100 UNIT/ML soln     No current facility-administered medications for this visit.           EXAMINATION: Limited Abdominal Ultrasound, 11/25/2022 9:38 AM      COMPARISON: None.     HISTORY: Cirrhotic appearing liver noted on 7/2021 CT chest     FINDINGS:      Liver: The liver demonstrates heterogenous and coarsened hepatic  echotexture and nodular hepatic surface contour. Hepatomegaly  measuring 17.8 cm in craniocaudal dimension. There is no focal mass.  The main vein is patent with flow towards the liver.     Gallbladder: Shadowing gallstones in the neck of the gallbladder. No  gallbladder wall thickening, pericholecystic fluid, or positive  sonographic Gomez's sign. There is an approximately 1.1 x 1.1 cm  masslike thickening of the gallbladder fundus which could represent a  collection of biliary sludge versus adherent stone  versus gallbladder  polyp.     Bile Ducts: Both the intra- and extrahepatic biliary system are of  normal caliber.  The common bile duct measures 1.9 mm in diameter.     Pancreas: Visualized portions of the head and body of the pancreas are  unremarkable.      Kidney: The right kidney is atrophic measuring 7.1 cm long with  increased cortical echogenicity. There is no hydronephrosis or  hydroureter, no shadowing renal calculi, cystic lesion or mass.      Fluid: Moderate ascites in the right upper quadrant.                                                                      IMPRESSION:   1.  Nodular hepatic surface contour and heterogenous coarsened hepatic  echotexture consistent with hepatic cirrhosis. No focal hepatic mass.  2.  1.1 x 1.1 cm masslike thickening of the gallbladder fundus.  Biliary tumefactive sludge and/or secondary to chronic liver disease  versus adherent stone versus gallbladder polyp. Recommend follow-up  ultrasound in 6 months.  3.  Cholelithiasis   4.  Moderate ascites.      Heart Catheterization:11/23/2022    RA 12  /15  /40, mean 69, sat 56.7%  PCWP mean 30  CO/CI: 4.9/3.09 by TD, 4.46/2.81 by Audrey  PVR 8.75 waterman  Ao sat 88%, HR 73   Right sided filling pressures are mildly elevated. Left sided filling pressures are moderately elevated. Severely elevated pulmonary artery hypertension. Normal cardiac output level. Hemodynamic data has been modified in Epic per physician review.        Catheterization 7/2021    ) Severe pulmonary hypertension primarily secondary to intrinsic pulmonary vascular disease (mean PA 81 mmHg, PVRI 25, PCWP confirmed with sat 35 mmHg)  2) Severely elevated right-sided filling pressures (mean RA 35 mmHg)  3) Severely elevated left-sided filling pressures (LVEDP 35-40 mmHg)  4) No hemodynamically significant coronary artery disease  5) With NO to 80 ppm the mean PA decreased to 60 mmHg, PCWP remained unchanged at 35 mmHg, PVRI decreased to 8.7 waterman*m2.  Her  systemic blood pressure was 160-170 mmHg  6) Episode of SVT terminated with 6mg adenosine          [place]on    Echocardiogram        Name: ELIECER CHACON  MRN: [id number]  : 1960  Study Date: 2022 03:25 PM  Age: 62 yrs  Gender: Female  Patient Location: [facility]  Reason For Study: Pulmonary Hypertension, Heart Failure  Ordering Physician: ROGER DAWSON  Referring Physician: YAHAIRA HOUSER  Performed By: Cindy Hunter     BSA: 1.6 m2  Height: 60 in  Weight: 137 lb  BP: 166/67 mmHg  ______________________________________________________________________________  Procedure  Echocardiogram with two-dimensional, color and spectral Doppler performed.  Technically difficult study. Poor acoustic windows.  ______________________________________________________________________________  Interpretation Summary  Technically difficult study. Poor acoustic windows.  Left ventricular size, wall motion and function are normal. The ejection  fraction is 55-60%.  Global right ventricular function is mildly reduced.  There is mild right ventricular hypertrophy.  Right ventricular systolic pressure is 55mmHg above the right atrial pressure.  Pulmonary hypertension is present.  IVC diameter <2.1 cm collapsing >50% with sniff suggests a normal RA pressure  of 3 mmHg.  No pericardial effusion is present.     This study was compared with the study from 7/15/2021. RV function is mildly  improved and estimated PA pressure is lower.  ______________________________________________________________________________  Left Ventricle  Left ventricular size, wall motion and function are normal. The ejection  fraction is 55-60%. Left ventricular diastolic function is indeterminate.  Abnormal non-specific septal motion is present.     Right Ventricle  The right ventricle is normal size. There is mild right ventricular  hypertrophy. Global right ventricular function is mildly reduced.     Mitral Valve  Mild mitral  annular calcification is present. Mild mitral insufficiency is  present.     Aortic Valve  The valve leaflets are not well visualized. On Doppler interrogation, there is  no significant stenosis or regurgitation.     Tricuspid Valve  The tricuspid valve is normal. Mild tricuspid insufficiency is present.  Pulmonary hypertension is present. Right ventricular systolic pressure is  55mmHg above the right atrial pressure.     Pulmonic Valve  The pulmonic valve is normal. Trace pulmonic insufficiency is present.     Vessels  The aorta root is normal. The thoracic aorta is normal. IVC diameter <2.1 cm  collapsing >50% with sniff suggests a normal RA pressure of 3 mmHg.     Pericardium  No pericardial effusion is present.     Compared to Previous Study  This study was compared with the study from 7/15/2021 . RV function is mildly  improved and estimated PA pressure is lower.     ______________________________________________________________________________  MMode/2D Measurements & Calculations  IVSd: 0.67 cm  LVIDd: 4.1 cm  LVIDs: 2.6 cm  LVPWd: 0.65 cm  FS: 35.6 %     LV mass(C)d: 74.3 grams  LV mass(C)dI: 46.7 grams/m2  Ao root diam: 2.5 cm  asc Aorta Diam: 3.2 cm  LVOT diam: 1.9 cm  LVOT area: 2.8 cm2  RWT: 0.32     Doppler Measurements & Calculations  MV E max nguyễn: 145.0 cm/sec  MV A max nguyễn: 32.0 cm/sec  MV E/A: 4.5  MV dec slope: 800.0 cm/sec2  MV dec time: 0.18 sec  Ao V2 max: 171.0 cm/sec  Ao max P.7 mmHg  Ao V2 mean: 117.0 cm/sec  Ao mean P.0 mmHg  Ao V2 VTI: 40.7 cm  RADHA(I,D): 1.6 cm2  RADHA(V,D): 1.6 cm2  LV V1 max PG: 3.6 mmHg  LV V1 max: 95.0 cm/sec  LV V1 VTI: 22.5 cm  SV(LVOT): 63.8 ml  SI(LVOT): 40.1 ml/m2  TR max nguyễn: 373.0 cm/sec  TR max P.7 mmHg  RVSP(TR): 58.7 mmHg  AV Nguyễn Ratio (DI): 0.56  RADHA Index (cm2/m2): 0.99  E/E' av.8     Lateral E/e': 18.5  Medial E/e': 21.2            Name: ELIECER CHACON  MRN: [id number]  : 1960  Study Date: 07/15/2021 08:59 AM  Age: 61  yrs  Gender: Female  Patient Location: [facility]  Reason For Study: Tachycardia  Ordering Physician: KERWIN BEATTY  Referring Physician: Gigi Martin  Performed By: Raj Grey     BSA: 1.7 m2  Height: 60 in  Weight: 163 lb  HR: 59  BP: 159/73 mmHg  ______________________________________________________________________________  Procedure  Complete Portable Echo Adult.  ______________________________________________________________________________  Interpretation Summary     Left ventricular systolic function is normal.  The visual ejection fraction is 60-65%.  The right ventricle is severely dilated.  Moderately decreased right ventricular systolic function  There is moderate to mod-severe (2-3+) tricuspid regurgitation.  The right ventricular systolic pressure is approximated at 82mmHg plus the  right atrial pressure.  Right ventricular systolic pressure is elevated, consistent with severe  pulmonary hypertension.  IVC diameter >2.1 cm collapsing <50% with sniff suggests a high RA pressure  estimated at 15 mmHg or greater.  Flattened septum is consistent with RV pressure/volume overload.  Compared to 9/19, there is new RV enlargemnt with RV dysfunction. RVSP has  increased compared to 2017 when it was 60 plus RAP.  Findings discussed with Dawna, nursing team for the patient. The study was  technically difficult.  ______________________________________________________________________________  Left Ventricle  The left ventricle is normal in size. There is concentric remodeling present.  Left ventricular systolic function is normal. The visual ejection fraction is  60-65%. Diastolic Doppler findings (E/E' ratio and/or other parameters)  suggest left ventricular filling pressures are increased. Grade III or  advanced diastolic dysfunction. Flattened septum is consistent with RV  pressure/volume overload.     Right Ventricle  The right ventricle is severely dilated. Moderately decreased right  ventricular systolic  function.     Atria  The left atrium is severely dilated. The right atrium is severely dilated.     Mitral Valve  The mitral valve leaflets are mildly thickened. There is mild mitral annular  calcification. There is mild to moderate (1-2+) mitral regurgitation.     Tricuspid Valve  There is moderate to mod-severe (2-3+) tricuspid regurgitation. IVC diameter  >2.1 cm collapsing <50% with sniff suggests a high RA pressure estimated at 15  mmHg or greater. Right ventricular systolic pressure is elevated, consistent  with severe pulmonary hypertension. The right ventricular systolic pressure is  approximated at 82mmHg plus the right atrial pressure.     Aortic Valve  There is mild trileaflet aortic sclerosis. There is trace aortic  regurgitation. No aortic stenosis is present.     Pulmonic Valve  The pulmonic valve is not well visualized.     Pericardium  Trivial pericardial effusion. Small right pleural effusion.     Rhythm  Sinus rhythm was noted.     ______________________________________________________________________________  MMode/2D Measurements & Calculations  IVSd: 0.98 cm  LVIDd: 3.8 cm  LVIDs: 3.6 cm  LVPWd: 1.2 cm  FS: 4.2 %  LV mass(C)d: 128.1 grams  LV mass(C)dI: 74.9 grams/m2     Ao root diam: 2.7 cm  LA dimension: 3.3 cm  asc Aorta Diam: 3.0 cm  LA/Ao: 1.2  LVOT diam: 2.0 cm  LVOT area: 3.1 cm2  LA Volume (BP): 95.2 ml  LA Volume Index (BP): 55.7 ml/m2  RWT: 0.62     Doppler Measurements & Calculations  MV E max artur: 130.0 cm/sec  MV A max artur: 44.1 cm/sec  MV E/A: 2.9  MV max P.7 mmHg  MV mean P.9 mmHg  MV V2 VTI: 34.6 cm     MV dec slope: 583.8 cm/sec2  PA acc time: 0.06 sec  TR max artur: 452.5 cm/sec  TR max P.9 mmHg  E/E' av.9  Lateral E/e': 19.0  Medial E/e': 16.8     Imaging     EXAM: CT CHEST PULMONARY EMBOLISM W CONTRAST  LOCATION: [facility]  DATE/TIME: 6/15/2022 4:56 PM     INDICATION: PE suspected, high prob. Increased shortness of  breath.  COMPARISON: 07/15/2021.  TECHNIQUE: CT chest pulmonary angiogram during arterial phase injection of IV contrast. Multiplanar reformats and MIP reconstructions were performed. Dose reduction techniques were used.   CONTRAST: 61 mL Isovue 370.     FINDINGS:  ANGIOGRAM CHEST: Pulmonary arteries are normal caliber and negative for pulmonary emboli. Normal caliber thoracic aorta without dissection. Moderate aortic calcification.     LUNGS AND PLEURA: Moderate right-sided pleural fluid collection. Mosaic attenuation of the pulmonary parenchyma diffusely which can be seen with air trapping, edema or viral etiologies. Significant volume loss/atelectasis right lower lobe with moderate   volume loss and atelectasis right middle lobe inferiorly. No left-sided pleural fluid. Mild intralobular septal thickening.     MEDIASTINUM/AXILLAE: Cardiac enlargement without pericardial fluid. Diffuse pericardial calcifications. No lymphadenopathy. Normal caliber esophagus.     CORONARY ARTERY CALCIFICATION: Moderate.     UPPER ABDOMEN: Partially visualized moderate ascites.     MUSCULOSKELETAL: Minimal degenerative changes in the spine.                                                                      IMPRESSION:  1.  No evidence for pulmonary embolism.     2.  Moderate right-sided pleural fluid collection with atelectasis right lower lobe and right middle lobe as detailed above. Underlying infiltrates in these areas of atelectasis cannot be excluded.     3.  Mosaic attenuation of the pulmonary parenchyma with interlobular septal thickening most likely related to edema. Other considerations would include air trapping related to reactive airways disease or viral etiologies.     4.  Cardiac enlargement with diffuse pericardial calcifications.           CC: Lev Hernandez ([facility]), Gigi Martin M.D., [place] Barbosa M.D.      Thank you for allowing me to participate in the care of your patient.      Sincerely,      Jose Redd MD     [facility]  cc:   Jose Redd MD  [address] [place] [address]

## 2024-07-11 NOTE — NURSING NOTE
Plan as patient understands:  Admit in August on Saturday while Tramaine is on service to have  HD & complete RHC to determine next steps    ========================  Reviewed Med list  Completed AVS  Sent staff msg to Christopher so he can figure out date & time for patient to be admitted & contact patient with details  Patient verbalized understanding, agreed with plan and denied any further questions. Génesis Baird RN on 7/11/2024 at 3:28 PM

## 2024-07-11 NOTE — PATIENT INSTRUCTIONS
You were seen today in the Pulmonary Hypertension Clinic at the [facility].     Cardiology Provider you saw during your visit:    Dr. Redd    Medication Changes:  None      Follow-up:   We will contact you once Dr. Redd has been able to speak with your nephrologist.    Please call us immediately if you have any syncope (fainting or passing out), chest pain, edema (swelling or weight gain), or decline in your functional status (general decline in how you are feeling).    If you have emergent concerns after hours or on the weekend, please call our on-call Cardiologist at [phone], option [phone]. For emergencies call [phone].     Thank you for allowing us to be a part of your care here at the [facility]    If you have questions or concerns please contact us at:    Christopher Jones RN (P: [phone])    Nurse Coordinator       Pulmonary Hypertension     [facility]         EVY Nolan   (Prior Auths & Pt Assistance)   ()  Clinic   Clinic   Pulmonary Hypertension   Pulmonary Hypertension  [facility]  [facility]  (P)[phone]    (P) [phone]  (F) [phone]

## 2024-07-16 NOTE — TELEPHONE ENCOUNTER
Prior Authorization Not Needed per Insurance    Medication: LEVALBUTEROL TARTRATE 45 MCG/ACT IN AERO  Insurance Company: [employer] - Phone [phone] Fax [phone]  Expected CoPay: $    Pharmacy Filling the Rx: [id number] - [facility] [address] [facility]  Pharmacy Notified: N  Patient Notified: N    Care team closed encounter without reply. PA not needed at this time.

## 2024-08-01 ENCOUNTER — TELEPHONE (OUTPATIENT)
Dept: CARDIOLOGY | Facility: CLINIC | Age: 64
End: 2024-08-01
Payer: MEDICARE

## 2024-08-01 NOTE — TELEPHONE ENCOUNTER
Spoke with pt, identified myself and she said that she would need to get back to me.  I gave her my call back number and will wait for her call.    Christopher Jones RN on 8/1/2024 at 1:44 PM      ----- Message from Génesis CANDELARIO sent at 7/11/2024  3:31 PM CDT -----  Regarding: Plan  Christopher,    Patient advised patient she will be admitted in August on the Saturday he is on Service (Date?) so they can perform additional Dialysis & perform RHC.    Please advise patient of exact date & time she needs to be their & set up admission.    Thanks,  Génesis  -------------------------------  I forgot to mention that patient has Ascites as one of the reasons for admission and possible renal Tx workup.

## 2024-08-06 ENCOUNTER — OFFICE VISIT (OUTPATIENT)
Dept: URGENT CARE | Facility: URGENT CARE | Age: 64
End: 2024-08-06
Payer: MEDICARE

## 2024-08-06 VITALS
TEMPERATURE: 97.6 F | DIASTOLIC BLOOD PRESSURE: 47 MMHG | HEART RATE: 60 BPM | SYSTOLIC BLOOD PRESSURE: 119 MMHG | OXYGEN SATURATION: 97 % | RESPIRATION RATE: 14 BRPM

## 2024-08-06 DIAGNOSIS — Z99.2 ESRD ON DIALYSIS (H): ICD-10-CM

## 2024-08-06 DIAGNOSIS — J01.90 ACUTE SINUSITIS WITH COEXISTING CONDITION REQUIRING PROPHYLACTIC TREATMENT: Primary | ICD-10-CM

## 2024-08-06 DIAGNOSIS — N18.6 ESRD ON DIALYSIS (H): ICD-10-CM

## 2024-08-06 PROCEDURE — 99213 OFFICE O/P EST LOW 20 MIN: CPT | Performed by: FAMILY MEDICINE

## 2024-08-06 RX ORDER — AMOXICILLIN AND CLAVULANATE POTASSIUM 500; 125 MG/1; MG/1
TABLET, FILM COATED ORAL
Qty: 5 TABLET | Refills: 0 | Status: SHIPPED | OUTPATIENT
Start: 2024-08-06 | End: 2024-08-13

## 2024-08-06 NOTE — PROGRESS NOTES
SUBJECTIVE: Yissel Wetzel is a 64 year old female here with concerns about sinus infection.  She states onset  of symptoms were greater than 10 days with sinus pressure and drainage.  Course of illness is worsening.    Severity: moderate  Current and Associated symptoms: cold symptoms  Predisposing factors include none.   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, initial encounter 2019    End stage renal failure on dialysis (H)     Esophagitis, unspecified     Former tobacco use     HEMORRHAGIC GASTROPATHY     History of blood transfusion     [place]    Iron deficiency anemia, unspecified     Mild persistent asthma     Obesity     Other and unspecified hyperlipidemia     Pneumonia     Pulmonary hypertension (H)     per 2012 echo mod.to severe pulmonary hypertension    Tobacco use disorder dc ed     Type 2 diabetes mellitus (H)     on Insulin- managed by PCP    Unspecified essential hypertension      Allergies   Allergen Reactions    Albuterol      shakiness    Aspirin      gi    Byetta [Exenatide]      gi    Clonidine      constipation    Codeine      vomiting    Ezetimibe      muscle symptoms    Hydralazine      headaches    Lisinopril      hyperkalemia    Metformin Hydrochloride      vomiting    Pravachol [Pravastatin Sodium]      muscle pains    Simvastatin      myalgias    Troglitazone      Social History     Tobacco Use    Smoking status: Former     Current packs/day: 0.00     Average packs/day: 1 pack/day for 22.0 years (22.0 ttl pk-yrs)     Types: Cigarettes     Start date: 10/12/1977     Quit date: 10/12/1999     Years since quittin.8    Smokeless tobacco: Never   Substance Use Topics    Alcohol use: No     Alcohol/week: 0.0 standard drinks  of alcohol       ROS:   no rash  no vomiting    OBJECTIVE:  /47 (BP Location: Right arm, Patient Position: Sitting, Cuff Size: Adult Regular)   Pulse 60   Temp 97.6  F (36.4  C) (Tympanic)   Resp 14   LMP  (LMP Unknown)   SpO2 97%   NAD  EYES: clear, no mattering  EARS: TM's clear, no redness/buldging, canals clear, no swelling/redness  NOSE: discolored discharge warren. Nares  THROAT: clear, no erythema, no exudate  SINUS: maxillary tenderness with palpation  LUNGS: CTAB, no rhonchi/wheeze/rales      ICD-10-CM    1. Acute sinusitis with coexisting condition requiring prophylactic treatment  J01.90 amoxicillin-clavulanate (AUGMENTIN) 500-125 MG tablet      2. ESRD on hemodialysis on M, W, F  N18.6 amoxicillin-clavulanate (AUGMENTIN) 500-125 MG tablet    Z99.2           Follow up with primary clinic if not improving

## 2024-08-07 ENCOUNTER — TELEPHONE (OUTPATIENT)
Dept: CARDIOLOGY | Facility: CLINIC | Age: 64
End: 2024-08-07
Payer: MEDICARE

## 2024-08-07 NOTE — TELEPHONE ENCOUNTER
Spoke with pt about getting her in for a scheduled admission this weekend.  Pt declined and stated she had so much going on.  Pt informed that Dr Redd would like her to be admitted when he is on service.  Pt stated that September would be better.  Tentative date would be September 28 when he is on service again.    Christopher Jones RN on 8/7/2024 at 4:39 PM

## 2024-08-09 DIAGNOSIS — E11.21 TYPE 2 DIABETES MELLITUS WITH DIABETIC NEPHROPATHY, WITH LONG-TERM CURRENT USE OF INSULIN (H): ICD-10-CM

## 2024-08-09 DIAGNOSIS — Z79.4 TYPE 2 DIABETES MELLITUS WITH DIABETIC NEPHROPATHY, WITH LONG-TERM CURRENT USE OF INSULIN (H): ICD-10-CM

## 2024-08-12 RX ORDER — BLOOD SUGAR DIAGNOSTIC
STRIP MISCELLANEOUS
Qty: 400 STRIP | Refills: 0 | Status: ON HOLD | OUTPATIENT
Start: 2024-08-12 | End: 2024-09-30

## 2024-08-13 ENCOUNTER — HOSPITAL ENCOUNTER (OUTPATIENT)
Facility: CLINIC | Age: 64
Discharge: HOME | End: 2024-08-13
Admitting: INTERNAL MEDICINE
Payer: MEDICARE

## 2024-08-13 ENCOUNTER — HOSPITAL ENCOUNTER (OUTPATIENT)
Dept: ULTRASOUND IMAGING | Facility: CLINIC | Age: 64
Discharge: HOME | End: 2024-08-13
Payer: MEDICARE

## 2024-08-13 DIAGNOSIS — R18.8 ASCITES OF LIVER: ICD-10-CM

## 2024-08-13 DIAGNOSIS — I47.10 SVT (SUPRAVENTRICULAR TACHYCARDIA) (H): Primary | ICD-10-CM

## 2024-08-13 PROCEDURE — 76705 ECHO EXAM OF ABDOMEN: CPT

## 2024-08-13 PROCEDURE — 250N000009 HC RX 250: Performed by: INTERNAL MEDICINE

## 2024-08-13 RX ORDER — LIDOCAINE HYDROCHLORIDE 10 MG/ML
10 INJECTION, SOLUTION EPIDURAL; INFILTRATION; INTRACAUDAL; PERINEURAL ONCE
Status: COMPLETED | OUTPATIENT
Start: 2024-08-13 | End: 2024-08-13

## 2024-08-13 RX ADMIN — LIDOCAINE HYDROCHLORIDE 10 ML: 10 INJECTION, SOLUTION EPIDURAL; INFILTRATION; INTRACAUDAL; PERINEURAL at 12:52

## 2024-08-13 ASSESSMENT — ACTIVITIES OF DAILY LIVING (ADL)
ADLS_ACUITY_SCORE: 38

## 2024-08-15 DIAGNOSIS — Z79.4 TYPE 2 DIABETES MELLITUS WITH DIABETIC NEPHROPATHY, WITH LONG-TERM CURRENT USE OF INSULIN (H): ICD-10-CM

## 2024-08-15 DIAGNOSIS — E11.21 TYPE 2 DIABETES MELLITUS WITH DIABETIC NEPHROPATHY, WITH LONG-TERM CURRENT USE OF INSULIN (H): ICD-10-CM

## 2024-08-15 NOTE — TELEPHONE ENCOUNTER
Patient is asking for refills of insulins.  LOV 11/15/23  Per patient, A1C was 7.0 when checked at [facility] on 8/1/24.

## 2024-08-16 RX ORDER — INSULIN ASPART INJECTION 100 [IU]/ML
INJECTION, SOLUTION SUBCUTANEOUS
Qty: 15 ML | Refills: 11 | Status: SHIPPED | OUTPATIENT
Start: 2024-08-16 | End: 2024-08-23

## 2024-08-16 RX ORDER — INSULIN DETEMIR 100 [IU]/ML
11 INJECTION, SOLUTION SUBCUTANEOUS AT BEDTIME
Qty: 15 ML | Refills: 1 | Status: ON HOLD | OUTPATIENT
Start: 2024-08-16 | End: 2024-08-20

## 2024-08-16 RX ORDER — INSULIN ASPART INJECTION 100 [IU]/ML
INJECTION, SOLUTION SUBCUTANEOUS
Qty: 15 ML | Refills: 11 | Status: SHIPPED | OUTPATIENT
Start: 2024-08-16 | End: 2024-08-16

## 2024-08-16 NOTE — TELEPHONE ENCOUNTER
Pharmacy calling back for PCP to resend script with max # of units/day pt can have in the Sig of the Fiasp Insulin for billing.    Pharmacy will not fill unless max units per day in Sig.    Cindy Gomes RN

## 2024-08-16 NOTE — TELEPHONE ENCOUNTER
Perico (pharmacist) called asking for the maximum amount of units/day pt can have for the Fiasp insulin. Perico stated he needs this for billing purposes.     Routing to PCP to resend script with max # of units/day pt can have.

## 2024-08-18 ENCOUNTER — HOSPITAL ENCOUNTER (INPATIENT)
Facility: CLINIC | Age: 64
LOS: 3 days | Discharge: HOME | DRG: 314 | End: 2024-08-21
Attending: STUDENT IN AN ORGANIZED HEALTH CARE EDUCATION/TRAINING PROGRAM | Admitting: INTERNAL MEDICINE
Payer: MEDICARE

## 2024-08-18 ENCOUNTER — APPOINTMENT (OUTPATIENT)
Dept: CT IMAGING | Facility: CLINIC | Age: 64
DRG: 314 | End: 2024-08-18
Attending: STUDENT IN AN ORGANIZED HEALTH CARE EDUCATION/TRAINING PROGRAM
Payer: MEDICARE

## 2024-08-18 ENCOUNTER — MEDICAL CORRESPONDENCE (OUTPATIENT)
Dept: HEALTH INFORMATION MANAGEMENT | Facility: CLINIC | Age: 64
End: 2024-08-18

## 2024-08-18 DIAGNOSIS — E11.21 TYPE 2 DIABETES MELLITUS WITH DIABETIC NEPHROPATHY, WITH LONG-TERM CURRENT USE OF INSULIN (H): ICD-10-CM

## 2024-08-18 DIAGNOSIS — R53.1 WEAKNESS: ICD-10-CM

## 2024-08-18 DIAGNOSIS — I47.10 SVT (SUPRAVENTRICULAR TACHYCARDIA) (H): Primary | ICD-10-CM

## 2024-08-18 DIAGNOSIS — I95.9 HYPOTENSION, UNSPECIFIED HYPOTENSION TYPE: ICD-10-CM

## 2024-08-18 DIAGNOSIS — Z79.4 TYPE 2 DIABETES MELLITUS WITH DIABETIC NEPHROPATHY, WITH LONG-TERM CURRENT USE OF INSULIN (H): ICD-10-CM

## 2024-08-18 DIAGNOSIS — H91.92 HEARING LOSS OF LEFT EAR, UNSPECIFIED HEARING LOSS TYPE: ICD-10-CM

## 2024-08-18 LAB
ALBUMIN SERPL BCG-MCNC: 3.7 G/DL (ref 3.5–5.2)
ALP SERPL-CCNC: 93 U/L (ref 40–150)
ALT SERPL W P-5'-P-CCNC: 9 U/L (ref 0–50)
ANION GAP SERPL CALCULATED.3IONS-SCNC: 16 MMOL/L (ref 7–15)
AST SERPL W P-5'-P-CCNC: 17 U/L (ref 0–45)
ATRIAL RATE - MUSE: NORMAL BPM
B-OH-BUTYR SERPL-SCNC: 0.23 MMOL/L
BASE EXCESS BLDV CALC-SCNC: 1.5 MMOL/L (ref -3–3)
BASOPHILS # BLD AUTO: 0 10E3/UL (ref 0–0.2)
BASOPHILS NFR BLD AUTO: 1 %
BILIRUB DIRECT SERPL-MCNC: <0.2 MG/DL (ref 0–0.3)
BILIRUB SERPL-MCNC: 0.3 MG/DL
BUN SERPL-MCNC: 38.4 MG/DL (ref 8–23)
CALCIUM SERPL-MCNC: 9.9 MG/DL (ref 8.8–10.4)
CHLORIDE SERPL-SCNC: 96 MMOL/L (ref 98–107)
CREAT SERPL-MCNC: 6.1 MG/DL (ref 0.51–0.95)
DIASTOLIC BLOOD PRESSURE - MUSE: NORMAL MMHG
EGFRCR SERPLBLD CKD-EPI 2021: 7 ML/MIN/1.73M2
EOSINOPHIL # BLD AUTO: 0.1 10E3/UL (ref 0–0.7)
EOSINOPHIL NFR BLD AUTO: 2 %
ERYTHROCYTE [DISTWIDTH] IN BLOOD BY AUTOMATED COUNT: 18.4 % (ref 10–15)
FLUAV RNA SPEC QL NAA+PROBE: NEGATIVE
FLUBV RNA RESP QL NAA+PROBE: NEGATIVE
GLUCOSE BLDC GLUCOMTR-MCNC: 171 MG/DL (ref 70–99)
GLUCOSE BLDC GLUCOMTR-MCNC: 174 MG/DL (ref 70–99)
GLUCOSE BLDC GLUCOMTR-MCNC: 181 MG/DL (ref 70–99)
GLUCOSE BLDC GLUCOMTR-MCNC: 182 MG/DL (ref 70–99)
GLUCOSE SERPL-MCNC: 221 MG/DL (ref 70–99)
HBA1C MFR BLD: 7.5 %
HCO3 BLDV-SCNC: 28 MMOL/L (ref 21–28)
HCO3 SERPL-SCNC: 25 MMOL/L (ref 22–29)
HCT VFR BLD AUTO: 32.3 % (ref 35–47)
HGB BLD-MCNC: 9.8 G/DL (ref 11.7–15.7)
HOLD SPECIMEN: NORMAL
IMM GRANULOCYTES # BLD: 0 10E3/UL
IMM GRANULOCYTES NFR BLD: 1 %
INTERPRETATION ECG - MUSE: NORMAL
LACTATE SERPL-SCNC: 1.4 MMOL/L (ref 0.7–2)
LIPASE SERPL-CCNC: 52 U/L (ref 13–60)
LYMPHOCYTES # BLD AUTO: 1.2 10E3/UL (ref 0.8–5.3)
LYMPHOCYTES NFR BLD AUTO: 20 %
MCH RBC QN AUTO: 28.5 PG (ref 26.5–33)
MCHC RBC AUTO-ENTMCNC: 30.3 G/DL (ref 31.5–36.5)
MCV RBC AUTO: 94 FL (ref 78–100)
MONOCYTES # BLD AUTO: 0.8 10E3/UL (ref 0–1.3)
MONOCYTES NFR BLD AUTO: 13 %
NEUTROPHILS # BLD AUTO: 3.8 10E3/UL (ref 1.6–8.3)
NEUTROPHILS NFR BLD AUTO: 64 %
NRBC # BLD AUTO: 0 10E3/UL
NRBC BLD AUTO-RTO: 0 /100
NT-PROBNP SERPL-MCNC: ABNORMAL PG/ML (ref 0–900)
O2/TOTAL GAS SETTING VFR VENT: 0 %
OXYHGB MFR BLDV: 31 % (ref 70–75)
P AXIS - MUSE: NORMAL DEGREES
PCO2 BLDV: 53 MM HG (ref 40–50)
PH BLDV: 7.33 [PH] (ref 7.32–7.43)
PLATELET # BLD AUTO: 101 10E3/UL (ref 150–450)
PO2 BLDV: 24 MM HG (ref 25–47)
POTASSIUM SERPL-SCNC: 4.9 MMOL/L (ref 3.4–5.3)
PR INTERVAL - MUSE: NORMAL MS
PROT SERPL-MCNC: 6.6 G/DL (ref 6.4–8.3)
QRS DURATION - MUSE: 82 MS
QT - MUSE: 474 MS
QTC - MUSE: 465 MS
R AXIS - MUSE: 77 DEGREES
RBC # BLD AUTO: 3.44 10E6/UL (ref 3.8–5.2)
RSV RNA SPEC NAA+PROBE: NEGATIVE
SAO2 % BLDV: 31.9 % (ref 70–75)
SARS-COV-2 RNA RESP QL NAA+PROBE: NEGATIVE
SODIUM SERPL-SCNC: 137 MMOL/L (ref 135–145)
SYSTOLIC BLOOD PRESSURE - MUSE: NORMAL MMHG
T AXIS - MUSE: 237 DEGREES
TROPONIN T SERPL HS-MCNC: 62 NG/L
TROPONIN T SERPL HS-MCNC: 70 NG/L
VENTRICULAR RATE- MUSE: 58 BPM
WBC # BLD AUTO: 5.9 10E3/UL (ref 4–11)

## 2024-08-18 PROCEDURE — 83690 ASSAY OF LIPASE: CPT | Performed by: STUDENT IN AN ORGANIZED HEALTH CARE EDUCATION/TRAINING PROGRAM

## 2024-08-18 PROCEDURE — 87637 SARSCOV2&INF A&B&RSV AMP PRB: CPT | Performed by: STUDENT IN AN ORGANIZED HEALTH CARE EDUCATION/TRAINING PROGRAM

## 2024-08-18 PROCEDURE — 96360 HYDRATION IV INFUSION INIT: CPT | Mod: 59

## 2024-08-18 PROCEDURE — 82805 BLOOD GASES W/O2 SATURATION: CPT | Performed by: STUDENT IN AN ORGANIZED HEALTH CARE EDUCATION/TRAINING PROGRAM

## 2024-08-18 PROCEDURE — 120N000013 HC R&B IMCU

## 2024-08-18 PROCEDURE — 82010 KETONE BODYS QUAN: CPT | Performed by: STUDENT IN AN ORGANIZED HEALTH CARE EDUCATION/TRAINING PROGRAM

## 2024-08-18 PROCEDURE — 71260 CT THORAX DX C+: CPT | Mod: MA

## 2024-08-18 PROCEDURE — 83036 HEMOGLOBIN GLYCOSYLATED A1C: CPT

## 2024-08-18 PROCEDURE — 93005 ELECTROCARDIOGRAM TRACING: CPT

## 2024-08-18 PROCEDURE — 82962 GLUCOSE BLOOD TEST: CPT

## 2024-08-18 PROCEDURE — 250N000011 HC RX IP 250 OP 636: Performed by: STUDENT IN AN ORGANIZED HEALTH CARE EDUCATION/TRAINING PROGRAM

## 2024-08-18 PROCEDURE — 84484 ASSAY OF TROPONIN QUANT: CPT | Performed by: STUDENT IN AN ORGANIZED HEALTH CARE EDUCATION/TRAINING PROGRAM

## 2024-08-18 PROCEDURE — 93005 ELECTROCARDIOGRAM TRACING: CPT | Mod: 76

## 2024-08-18 PROCEDURE — 99285 EMERGENCY DEPT VISIT HI MDM: CPT | Mod: 25

## 2024-08-18 PROCEDURE — 250N000013 HC RX MED GY IP 250 OP 250 PS 637: Performed by: STUDENT IN AN ORGANIZED HEALTH CARE EDUCATION/TRAINING PROGRAM

## 2024-08-18 PROCEDURE — 36415 COLL VENOUS BLD VENIPUNCTURE: CPT

## 2024-08-18 PROCEDURE — 250N000013 HC RX MED GY IP 250 OP 250 PS 637

## 2024-08-18 PROCEDURE — 83880 ASSAY OF NATRIURETIC PEPTIDE: CPT | Performed by: STUDENT IN AN ORGANIZED HEALTH CARE EDUCATION/TRAINING PROGRAM

## 2024-08-18 PROCEDURE — 250N000012 HC RX MED GY IP 250 OP 636 PS 637

## 2024-08-18 PROCEDURE — 258N000003 HC RX IP 258 OP 636: Performed by: STUDENT IN AN ORGANIZED HEALTH CARE EDUCATION/TRAINING PROGRAM

## 2024-08-18 PROCEDURE — 83605 ASSAY OF LACTIC ACID: CPT

## 2024-08-18 PROCEDURE — 80048 BASIC METABOLIC PNL TOTAL CA: CPT | Performed by: STUDENT IN AN ORGANIZED HEALTH CARE EDUCATION/TRAINING PROGRAM

## 2024-08-18 PROCEDURE — 82248 BILIRUBIN DIRECT: CPT | Performed by: STUDENT IN AN ORGANIZED HEALTH CARE EDUCATION/TRAINING PROGRAM

## 2024-08-18 PROCEDURE — 250N000009 HC RX 250: Performed by: STUDENT IN AN ORGANIZED HEALTH CARE EDUCATION/TRAINING PROGRAM

## 2024-08-18 PROCEDURE — 85025 COMPLETE CBC W/AUTO DIFF WBC: CPT | Performed by: STUDENT IN AN ORGANIZED HEALTH CARE EDUCATION/TRAINING PROGRAM

## 2024-08-18 PROCEDURE — 36415 COLL VENOUS BLD VENIPUNCTURE: CPT | Performed by: STUDENT IN AN ORGANIZED HEALTH CARE EDUCATION/TRAINING PROGRAM

## 2024-08-18 PROCEDURE — 99223 1ST HOSP IP/OBS HIGH 75: CPT | Mod: A1

## 2024-08-18 RX ORDER — LEVALBUTEROL TARTRATE 45 UG/1
2 AEROSOL, METERED ORAL EVERY 6 HOURS PRN
Status: DISCONTINUED | OUTPATIENT
Start: 2024-08-18 | End: 2024-08-21 | Stop reason: HOSPADM

## 2024-08-18 RX ORDER — ACETAMINOPHEN 650 MG/1
650 SUPPOSITORY RECTAL EVERY 4 HOURS PRN
Status: DISCONTINUED | OUTPATIENT
Start: 2024-08-18 | End: 2024-08-21 | Stop reason: HOSPADM

## 2024-08-18 RX ORDER — MIDODRINE HYDROCHLORIDE 5 MG/1
5 TABLET ORAL ONCE
Status: COMPLETED | OUTPATIENT
Start: 2024-08-18 | End: 2024-08-18

## 2024-08-18 RX ORDER — CALCIUM ACETATE 667 MG/1
667 CAPSULE ORAL
COMMUNITY

## 2024-08-18 RX ORDER — CALCIUM ACETATE 667 MG/1
667 CAPSULE ORAL
Status: DISCONTINUED | OUTPATIENT
Start: 2024-08-19 | End: 2024-08-21 | Stop reason: HOSPADM

## 2024-08-18 RX ORDER — AMOXICILLIN 250 MG
2 CAPSULE ORAL 2 TIMES DAILY PRN
Status: DISCONTINUED | OUTPATIENT
Start: 2024-08-18 | End: 2024-08-21 | Stop reason: HOSPADM

## 2024-08-18 RX ORDER — LIDOCAINE 40 MG/G
CREAM TOPICAL
Status: DISCONTINUED | OUTPATIENT
Start: 2024-08-18 | End: 2024-08-20

## 2024-08-18 RX ORDER — AMOXICILLIN 250 MG
1 CAPSULE ORAL 2 TIMES DAILY PRN
Status: DISCONTINUED | OUTPATIENT
Start: 2024-08-18 | End: 2024-08-21 | Stop reason: HOSPADM

## 2024-08-18 RX ORDER — MIDODRINE HYDROCHLORIDE 10 MG/1
30 TABLET ORAL
Status: DISCONTINUED | OUTPATIENT
Start: 2024-08-19 | End: 2024-08-19

## 2024-08-18 RX ORDER — NICOTINE POLACRILEX 4 MG
15-30 LOZENGE BUCCAL
Status: DISCONTINUED | OUTPATIENT
Start: 2024-08-18 | End: 2024-08-18

## 2024-08-18 RX ORDER — DEXTROSE MONOHYDRATE 25 G/50ML
25-50 INJECTION, SOLUTION INTRAVENOUS
Status: DISCONTINUED | OUTPATIENT
Start: 2024-08-18 | End: 2024-08-21 | Stop reason: HOSPADM

## 2024-08-18 RX ORDER — LIDOCAINE 40 MG/G
CREAM TOPICAL
Status: DISCONTINUED | OUTPATIENT
Start: 2024-08-18 | End: 2024-08-21 | Stop reason: HOSPADM

## 2024-08-18 RX ORDER — IOPAMIDOL 755 MG/ML
72 INJECTION, SOLUTION INTRAVASCULAR ONCE
Status: COMPLETED | OUTPATIENT
Start: 2024-08-18 | End: 2024-08-18

## 2024-08-18 RX ORDER — DEXTROSE MONOHYDRATE 25 G/50ML
25-50 INJECTION, SOLUTION INTRAVENOUS
Status: DISCONTINUED | OUTPATIENT
Start: 2024-08-18 | End: 2024-08-18

## 2024-08-18 RX ORDER — MIDODRINE HYDROCHLORIDE 10 MG/1
10 TABLET ORAL
Status: DISCONTINUED | OUTPATIENT
Start: 2024-08-19 | End: 2024-08-19

## 2024-08-18 RX ORDER — MIDODRINE HYDROCHLORIDE 5 MG/1
20 TABLET ORAL
Status: DISCONTINUED | OUTPATIENT
Start: 2024-08-18 | End: 2024-08-19

## 2024-08-18 RX ORDER — MIDODRINE HYDROCHLORIDE 10 MG/1
30 TABLET ORAL DAILY
COMMUNITY

## 2024-08-18 RX ORDER — NICOTINE POLACRILEX 4 MG
15-30 LOZENGE BUCCAL
Status: DISCONTINUED | OUTPATIENT
Start: 2024-08-18 | End: 2024-08-21 | Stop reason: HOSPADM

## 2024-08-18 RX ORDER — ACETAMINOPHEN 325 MG/1
650 TABLET ORAL EVERY 4 HOURS PRN
Status: DISCONTINUED | OUTPATIENT
Start: 2024-08-18 | End: 2024-08-21 | Stop reason: HOSPADM

## 2024-08-18 RX ORDER — GUAIFENESIN 1200 MG/1
1 TABLET, EXTENDED RELEASE ORAL EVERY 12 HOURS PRN
COMMUNITY

## 2024-08-18 RX ORDER — AMIODARONE HYDROCHLORIDE 200 MG/1
200 TABLET ORAL
Status: DISCONTINUED | OUTPATIENT
Start: 2024-08-20 | End: 2024-08-21

## 2024-08-18 RX ADMIN — MIDODRINE HYDROCHLORIDE 5 MG: 5 TABLET ORAL at 15:50

## 2024-08-18 RX ADMIN — IOPAMIDOL 72 ML: 755 INJECTION, SOLUTION INTRAVENOUS at 12:23

## 2024-08-18 RX ADMIN — SODIUM CHLORIDE 60 ML: 9 INJECTION, SOLUTION INTRAVENOUS at 12:23

## 2024-08-18 RX ADMIN — SODIUM CHLORIDE 500 ML: 9 INJECTION, SOLUTION INTRAVENOUS at 12:43

## 2024-08-18 RX ADMIN — INSULIN DETEMIR 10 UNITS: 100 INJECTION, SOLUTION SUBCUTANEOUS at 22:59

## 2024-08-18 RX ADMIN — INSULIN ASPART 1 UNITS: 100 INJECTION, SOLUTION INTRAVENOUS; SUBCUTANEOUS at 18:59

## 2024-08-18 RX ADMIN — MIDODRINE HYDROCHLORIDE 20 MG: 10 TABLET ORAL at 23:10

## 2024-08-18 ASSESSMENT — ACTIVITIES OF DAILY LIVING (ADL)
ADLS_ACUITY_SCORE: 38
ADLS_ACUITY_SCORE: 38
ADLS_ACUITY_SCORE: 28
ADLS_ACUITY_SCORE: 38
ADLS_ACUITY_SCORE: 28
ADLS_ACUITY_SCORE: 38
ADLS_ACUITY_SCORE: 38

## 2024-08-18 ASSESSMENT — COLUMBIA-SUICIDE SEVERITY RATING SCALE - C-SSRS
2. HAVE YOU ACTUALLY HAD ANY THOUGHTS OF KILLING YOURSELF IN THE PAST MONTH?: NO
6. HAVE YOU EVER DONE ANYTHING, STARTED TO DO ANYTHING, OR PREPARED TO DO ANYTHING TO END YOUR LIFE?: NO
1. IN THE PAST MONTH, HAVE YOU WISHED YOU WERE DEAD OR WISHED YOU COULD GO TO SLEEP AND NOT WAKE UP?: NO

## 2024-08-18 NOTE — H&P
[facility]    History and Physical - Hospitalist Service       Date of Admission:  8/18/2024    Assessment & Plan      Yissel Wetzel is a 64 year old female admitted on 8/18/2024 for hypotension. She has a past medical history of diabetes type 2, hypertension, CKD, hyperlipidemia and PHYLLIS.    Generalized weakness  Hypotension  On 8/18, presents to the ED with complaints of generalized weakness, and weakness. She checked her blood pressure at home and found her systolic to be 96. The combination of these factors prompted her to come to the ED.  She has a history of ESRD and receives HD on Monday, Wednesday and Friday, last run being on 8/16. Just before and during her dialysis runs, she takes scheduled midodrine. In the ED, her blood pressure dropped to as low as 86/33. She was given a one time dose of midodrine and her blood pressure improved to upper 90s to low 100s. She is mentating well and does not appear to be in any distress. No signs of shock.     *MAP 55-60  *EKG: Sinus Bradycardia with non-specific t wave changes  *Troponin 70-->62  *Received Midodrine 5 mg once in ED  -Admit to [facility] [place]  -Midodrine 10 mg M,W,F AM   -Midodrine 20 mg BID on non dialysis days  -Midodrine 30 mg M,W,F before dialysis   -Q2h vital signs  -Continuous telemetry monitoring  -Notify provider if SBP below 90    Diabetes type 2-Insulin dependent  *A1C 6/15/2022: 7.7  *BG in ED: 221  -Hypoglycemia protocol  -Medium sliding scale insulin  -BG monitoring TID and at bed time  -Insulin detemir 10 units at bedtime  -Insulin aspart 2U per 15g carbs    ESRD  *Baseline creatinine 5.20-8.13  *Creatinine 8/18/2024: 5.63  *Receives HD Mondy, Wednesday, and Friday   -Nephrology consult  -Avoid nephrotoxins  -Daily BMP    COPD  *On home O2: 2l at night  Resume PTA Levalbuterol 2 puffs every 6 hours as needed for dyspnea or wheezing.  -Oxygen PRN to keep O2 sats 90-94%    History of SVT  -Echocardiogram 2/27/24: EF  "60-65%  -Amiodarone 200 mg 5 x per week. Hold Wednesdays and Sundays (took on Sunday 8/18)   -Cardiology consult    Iron deficiency anemia   *Baseline Hgb: 9.7-12.2  *Hgb 8/18/2024 9.8  *Iron level 6/19/2022: 27  -Monitor Hgb, recheck CBC 8/19    Diet:  Regular adult diet  DVT Prophylaxis: Pneumatic Compression Devices  Weller Catheter: Not present  Lines: None     Cardiac Monitoring: None  Code Status:  Full code    Clinically Significant Risk Factors Present on Admission                # Thrombocytopenia: Lowest platelets = 101 in last 2 days, will monitor for bleeding   # Hypertension: Noted on problem list    # Anemia: based on hgb <11       # Overweight: Estimated body mass index is 27.96 kg/m  as calculated from the following:    Height as of this encounter: 1.549 m (5' 1\").    Weight as of this encounter: 67.1 kg (148 lb).           Disposition Plan     Medically Ready for Discharge: Anticipated in 2-4 Days     The patient's care was discussed with the Patient.    Feng Brand NP  Hospitalist Service  [facility]  Securely message with [employer] (more info)  Text page via [facility] Paging/Directory     ______________________________________________________________________    Chief Complaint   Generalized weakness, dizziness and hypotension    History is obtained from the patient    History of Present Illness   Yissel Wetzel is a 64 year old female who presents to the ED on 8/18 with dizziness and generalized weakness. On 8/18, she checked her blood pressure at home and found her systolic to be 96. The combination of these factors prompted her to come to the ED.  She has a history of ESRD and receives HD on Monday, Wednesday and Friday, last run being on Friday 8/16. Just before and during her dialysis runs, she takes scheduled midodrine. In the ED, her blood pressure dropped to as low as 86/33. She was given a one time dose of midodrine and her blood pressure improved to upper 90s " to low 100s. Of note, she takes amiodarone for a history of SVT. While she is supposed to take this 5 times per week, skipping  and Sundays, she mistakenly took it on  () as well.   She endorses cough, but denies fever or chills, shortness of breath or chest pain. She also denies abdominal pain, nausea or vomiting.  Plan to admit for blood pressure monitoring and treatment of any hypotensive episodes. Will consult cardiology regarding future amiodarone dosing as this may be contributing to her hypotension. Nephrology has also been consulted for HD. Will proceed with her PTA dose of midodrine dosing for now.     Past Medical History    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, initial encounter 2019    End stage renal failure on dialysis (H)     Esophagitis, unspecified     Former tobacco use     HEMORRHAGIC GASTROPATHY     History of blood transfusion     [place]    Iron deficiency anemia, unspecified     Mild persistent asthma     Obesity     Other and unspecified hyperlipidemia     Pneumonia     Pulmonary hypertension (H)     per 2012 echo mod.to severe pulmonary hypertension    Tobacco use disorder dc ed     Type 2 diabetes mellitus (H)     on Insulin- managed by PCP    Unspecified essential hypertension        Past Surgical History   Past Surgical History:   Procedure Laterality Date    ABDOMINOPLASTY  pt unsure when    abdominoplasty-     SECTION  ,     x 2    COLONOSCOPY      [facility]    COLONOSCOPY N/A 2021    Procedure: COLONOSCOPY, WITH POLYPECTOMY AND BIOPSY;  Surgeon: Lincoln Farrar MD;  Location:  GI    CREATE FISTULA ARTERIOVENOUS UPPER EXTREMITY Right 2018    Procedure: RIGHT BRACHIAL TO BASILIC ARTERIOVENOUS FISTULA CREATION;   Surgeon: Terry Vizcaino MD;  Location: [facility]    CV HEART CATHETERIZATION WITH POSSIBLE INTERVENTION N/A 2021    Procedure: Heart Catheterization with Possible Intervention;  Surgeon: Joseluis Dean MD;  Location:  HEART CARDIAC CATH LAB    CV RIGHT HEART CATH MEASUREMENTS RECORDED N/A 2022    Procedure: Heart Cath Right Heart Cath;  Surgeon: Yan Heredia MD;  Location:  HEART CARDIAC CATH LAB    ENT SURGERY  as a child    tonsillectomy    ESOPHAGOSCOPY, GASTROSCOPY, DUODENOSCOPY (EGD), COMBINED N/A 2021    Procedure: ESOPHAGOGASTRODUODENOSCOPY, WITH BIOPSY;  Surgeon: Lincoln Farrar MD;  Location:  GI    EYE SURGERY Left     injections- eye    HYSTERECTOMY  approx     partial hysterectomy-reason bleeding     IR CVC TUNNEL PLACEMENT > 5 YRS OF AGE  2019    IR CVC TUNNEL REMOVAL RIGHT  2020    IR DIALYSIS FISTULOGRAM RIGHT  2019       Prior to Admission Medications   Prior to Admission Medications   Prescriptions Last Dose Informant Patient Reported? Taking?   ACCU-CHEK CHING PLUS test strip  Self No No   Sig: USE TO TEST BLOOD SUGAR 4 TIMES PER DAY   ACCU-CHEK GUIDE test strip   No No   Sig: USE 1 EACH TO TEST BLOOD GLUCOSE FOUR TIMES DAILY   Continuous Glucose Sensor (FREESTYLE LATOSHA 2 SENSOR) [facility] [place]   No No   Si each every 14 days Use 1 sensor every 14 days. Use to read blood sugars per 's instructions.   acetaminophen (TYLENOL) 500 MG tablet  Self Yes No   Sig: Take 500 mg by mouth every 8 hours as needed for mild pain   amiodarone (PACERONE) 200 MG tablet   No No   Sig: Take 200mg po 5 days a week.  Hold wednesday and    blood glucose (NO BRAND SPECIFIED) lancets standard  Self No No   Sig: Use to test blood sugar three times daily or as directed.   blood glucose (NO BRAND SPECIFIED) lancets standard  Self No No   Sig: Use to test blood sugar 4 times daily or as directed.   blood glucose monitoring (NO BRAND SPECIFIED)  meter device kit   No No   Sig: Use to test blood sugar 4 times daily or as directed.   famotidine (PEPCID) 10 MG tablet   Yes No   Sig: Take 10 mg by mouth daily as needed   insulin aspart (FIASP FLEXTOUCH) 100 UNIT/ML pen-injector   No No   SiU per 15 carbs, plus 1U for every 50 that preprandial BG over 150   insulin aspart (NOVOLOG FLEXPEN) 100 UNIT/ML pen   No No   Sig: INJECT 9-12 UNITS BEFORE BREAKFAST, LUNCH, AND DINNER PLUS A CORRECTIVE SCALE OF 2 UNITS FOR EVERY 50 OVER 150   insulin detemir (LEVEMIR FLEXTOUCH) 100 UNIT/ML pen   No No   Sig: Inject 11 Units subcutaneously at bedtime   insulin pen needle (31G X 8 MM) 31G X 8 MM miscellaneous   No No   Sig: Use 4 pen needles daily or as directed.   levalbuterol (XOPENEX HFA) 45 MCG/ACT inhaler  Self No No   Sig: Inhale 2 puffs into the lungs every 6 hours as needed for shortness of breath / dyspnea or wheezing   midodrine (PROAMATINE) 10 MG tablet   Yes No   Sig: Take 10 mg by mouth three times a week Before dialysis, take with food   midodrine (PROAMATINE) 10 MG tablet   Yes No   Sig: Take 20 mg by mouth three times a week During dialysis, take with food      Facility-Administered Medications: None      Physical Exam   Vital Signs: Temp: 97.3  F (36.3  C) Temp src: Temporal BP: (!) 103/39 Pulse: 52   Resp: 16 SpO2: 98 % O2 Device: Nasal cannula Oxygen Delivery: 2 LPM  Weight: 148 lbs 0 oz  Physical Exam  Vitals reviewed.   Cardiovascular:      Rate and Rhythm: Regular rhythm. Bradycardia present.      Pulses: Normal pulses.      Heart sounds: Normal heart sounds.   Pulmonary:      Effort: Pulmonary effort is normal.      Breath sounds: Normal air entry. Examination of the right-lower field reveals decreased breath sounds. Examination of the left-lower field reveals decreased breath sounds. Decreased breath sounds present.   Abdominal:      General: Bowel sounds are normal.      Palpations: Abdomen is soft.      Tenderness: There is no abdominal  tenderness.   Skin:     General: Skin is warm and dry.   Neurological:      General: No focal deficit present.      Mental Status: She is alert.   Psychiatric:         Attention and Perception: Attention normal.         Mood and Affect: Mood normal.         Speech: Speech normal.         Behavior: Behavior normal. Behavior is cooperative.         Thought Content: Thought content normal.         Cognition and Memory: Cognition normal.     Medical Decision Making   75 MINUTES SPENT BY ME on the date of service doing chart review, history, exam, documentation & further activities per the note.      Data     I have personally reviewed the following data over the past 24 hrs:    5.9  \   9.8 (L)   / 101 (L)     137 96 (L) 38.4 (H) /  174 (H)   4.9 25 6.10 (H) \     ALT: 9 AST: 17 AP: 93 TBILI: 0.3   ALB: 3.7 TOT PROTEIN: 6.6 LIPASE: 52     Trop: 62 (H) BNP: 43,326 (H)     TSH: N/A T4: N/A A1C: 7.5 (H)     Procal: N/A CRP: N/A Lactic Acid: 1.4         Imaging results reviewed over the past 24 hrs:   Recent Results (from the past 24 hour(s))   CT Aortic Survey w Contrast    Narrative    EXAM: CT AORTIC SURVEY W CONTRAST  LOCATION: [facility]  DATE: 8/18/2024    INDICATION: Hypotension. Dizziness.  COMPARISON: CT pulmonary angiogram 6/15/2022. CT abdomen and pelvis 9/7/2023.  TECHNIQUE: CT angiogram chest abdomen pelvis during arterial phase of injection of IV contrast. 2D and 3D MIP reconstructions were performed by the CT technologist. Dose reduction techniques were used.   CONTRAST: 72mL Isovue 370    FINDINGS:   CT ANGIOGRAM CHEST, ABDOMEN, AND PELVIS: Moderate calcified atherosclerotic changes throughout normal caliber aorta. No dissection. No hyperdense rim sign within the thoracic aorta to suggest an acute intramural hematoma. Three-vessel arch. Near   circumferential calcified atherosclerotic changes of the origin of the left subclavian artery without significant stenosis. No significant  stenosis of the visualized portions of the arch vessels.    Calcified atherosclerotic changes producing moderate stenosis of the origins of the celiac, superior mesenteric and right renal arteries. There appears to be a severe left renal artery origin stenosis. Patent inferior mesenteric artery. Moderate   calcified atherosclerotic changes of the bilateral common and internal iliac arteries without severe stenosis. Normal caliber external iliac and common femoral arteries.    LUNGS AND PLEURA: Stable, moderate right pleural effusion. Stable moderate atelectatic changes of the posterior aspect of the right lower lobe. Stable, smooth mild interlobular septal thickening.    MEDIASTINUM/AXILLAE: No lymphadenopathy. Mild cardiomegaly. Stable pericardial calcifications centered around the region of the atrioventricular groove.    CORONARY ARTERY CALCIFICATION: Moderate.    HEPATOBILIARY: Contrast reflux into the intrahepatic IVC and hepatic veins likely due to right-sided cardiac insufficiency. Mild intra and extrahepatic biliary dilatation. Small gallstones. No radiopaque filling defects within the extra hepatic bile   ducts.    PANCREAS: Normal.    SPLEEN: Mild splenomegaly, measuring 13.5 cm in length.    ADRENAL GLANDS: Normal.    KIDNEYS/BLADDER: Marked bilateral renal atrophy. No hydronephrosis.    BOWEL: No obstruction or inflammatory change. Mild to moderate abdominal/pelvic ascites.     LYMPH NODES: Normal.    PELVIC ORGANS: Normal.    MUSCULOSKELETAL: Moderate degenerative changes of the lower lumbar spine. Degenerative changes bilateral sacroiliac joints. Anterior abdominal skin thickening with high attenuation striations within this contains fat likely representing wall edema.   Ascitic fluid herniating through a moderate umbilical hernia.      Impression    IMPRESSION:  1.  No evidence for acute aortic syndrome.  2.  Stable, moderate right pleural effusion.  3.  Mild to moderate ascites.  4.  Mild  intrauterine extra hepatic biliary dilatation. This may be secondary to a distal biliary obstruction.  5.  Moderate pericardial calcifications.  6.  Severe bilateral renal atrophy.  7.  Anterior abdominal wall edema.

## 2024-08-18 NOTE — PROGRESS NOTES
.RECEIVING UNIT ED HANDOFF REVIEW    ED Nurse Handoff Report was reviewed by: Liudmila Matthew RN on August 18, 2024 at 5:29 PM

## 2024-08-18 NOTE — ED TRIAGE NOTES
"Patients low BP. Is on dialysis. Has been evaluated for but states \"everybody lets me go home\". C/o lightheadedness. Also reports getting over sinus infection and c/o bilateral ear fullness.     Triage Assessment (Adult)       Row Name 08/18/24 0959          Triage Assessment    Airway WDL WDL        Respiratory WDL    Respiratory WDL WDL        Skin Circulation/Temperature WDL    Skin Circulation/Temperature WDL WDL        Cardiac WDL    Cardiac WDL WDL        Peripheral/Neurovascular WDL    Peripheral Neurovascular WDL WDL        Cognitive/Neuro/Behavioral WDL    Cognitive/Neuro/Behavioral WDL WDL                     "

## 2024-08-18 NOTE — ED NOTES
[facility]  ED Nurse Handoff Report    ED Chief complaint: Hypotension      ED Diagnosis:   Final diagnoses:   Weakness   Hypotension, unspecified hypotension type       Code Status: tbd    Allergies:   Allergies   Allergen Reactions    Albuterol      shakiness    Aspirin      gi    Byetta [Exenatide]      gi    Clonidine      constipation    Codeine      vomiting    Ezetimibe      muscle symptoms    Hydralazine      headaches    Lisinopril      hyperkalemia    Metformin Hydrochloride      vomiting    Pravachol [Pravastatin Sodium]      muscle pains    Simvastatin      myalgias    Troglitazone        Patient Story: Yissel Wetzel is a 64 year old female with history of diabetes mellitus who presents to the ER for hypotension. This morning the patient reports feeling dizzy and weak. She then measured her blood pressure and found the systolic to be 96. The patient receives dialysis every Mondy, Wednesday, and Friday and hasn't missed an appointment. She is currently taking antibiotics for a sinus and ear infection which is causing her headaches. The patient is insulin dependent. Denies chest pain, shortness of breath, nausea, vomiting, diarrhea, urinary issues, abdominal pain, and bloating. Denies history of stent placement. The patient notes being scheduled to go to the [facility] on 9/28/24 and received 3 days of dialysis in a row while monitoring her heart pressures. She received 500cc's of NS and is feeling better. Her bnp and troponins are elevated.          Treatments and/or interventions provided: fluids  Patient's response to treatments and/or interventions: fair    To be done/followed up on inpatient unit:  cont to monitor    Does this patient have any cognitive concerns?:  no    Activity level - Baseline/Home:  Stand with Assist  Activity Level - Current:   Stand with Assist    Patient's Preferred language: English   Needed?: No    Isolation: None  Infection: Not Applicable  Patient  tested for COVID 19 prior to admission: NO  Bariatric?: No    Vital Signs:   Vitals:    08/18/24 1300 08/18/24 1315 08/18/24 1340 08/18/24 1400   BP: (!) 104/29 108/45 (!) 103/39    Pulse: 51 52  52   Resp: 20 18  16   Temp:       TempSrc:       SpO2: 93% 98% 97% 98%   Weight:       Height:           Cardiac Rhythm:     Was the PSS-3 completed:   Yes  What interventions are required if any?               Family Comments: son at bedside  OBS brochure/video discussed/provided to patient/family: No              Name of person given brochure if not patient: na              Relationship to patient: na    For the majority of the shift this patient's behavior was Green.   Behavioral interventions performed were na.    ED NURSE PHONE NUMBER: [phone]

## 2024-08-18 NOTE — ED PROVIDER NOTES
Emergency Department Note      History of Present Illness     Chief Complaint   Hypotension      HPI   Yissel Wetzel is a 64 year old female with history of diabetes mellitus who presents to the ER for hypotension. This morning the patient reports feeling dizzy and weak. She then measured her blood pressure and found the systolic to be 96. The patient receives dialysis every , Wednesday, and Friday and hasn't missed an appointment. She is currently taking antibiotics for a sinus and ear infection which is causing her headaches. The patient is insulin dependent. Denies chest pain, shortness of breath, nausea, vomiting, diarrhea, urinary issues, abdominal pain, and bloating. Denies history of stent placement. The patient notes being scheduled to go to the [facility] on 24 and received 3 days of dialysis in a row while monitoring her heart pressures.    Independent Historian   None    Review of External Notes   Cardiology note : Patient has plan for admission for daily dialysis with slow fluid removal and midodrine to see if her right pulmonary artery pressures would calm down enough to allow for renal or liver transplant    Past Medical History     Medical History and Problem List   Essential hypertension  Hyperlipidemia  Type 2 diabetes mellitus   Tobacco use disorder  Iron deficiency anemia  Chronic kidney disease  Obesity  Pulmonary hypertension  Asthma   Dialysis    Medications   Proamatine  Levalbuterol   Insulin   Pepcid    Surgical History   Hysterectomy    section   Colonoscopy  Eye surgery  Heart catheterization   EGD  Abdominoplasty     Physical Exam     Patient Vitals for the past 24 hrs:   BP Temp Temp src Pulse Resp SpO2 Height Weight   24 1500 103/40 -- -- 53 13 100 % -- --   24 1440 118/49 -- -- 56 20 97 % -- --   24 1420 (!) 100/39 -- -- -- -- -- -- --   24 1400 -- -- -- 52 16 98 % -- --   24 1340 (!) 103/39 -- -- -- -- 97 % -- --   24 1315  "108/45 -- -- 52 18 98 % -- --   08/18/24 1300 (!) 104/29 -- -- 51 20 93 % -- --   08/18/24 1245 99/44 -- -- 57 16 97 % -- --   08/18/24 1200 (!) 86/33 -- -- 52 -- -- -- --   08/18/24 1140 108/45 -- -- 60 -- -- -- --   08/18/24 1135 109/46 -- -- 56 -- -- -- --   08/18/24 0951 (!) 104/35 97.3  F (36.3  C) Temporal 54 18 93 % 1.549 m (5' 1\") 67.1 kg (148 lb)     Physical Exam  General: Awake, alert, in no acute distress   HEENT: Atraumatic   EOM normal   External ears normal   Trachea midline  Neck: Supple, normal ROM  CV: Regular rate, regular rhythm   No lower extremity edema  Capillary refill brisk bilaterally  PULM: Breath sounds normal bilaterally  No wheezes or rales  ABD: Soft, obese abdomen, non-distended  Normal bowel sounds   No rebound or guarding   MSK: No gross deformities  NEURO: Alert, no focal deficits  Skin: Warm, dry and intact      Diagnostics     Lab Results   Labs Ordered and Resulted from Time of ED Arrival to Time of ED Departure   BASIC METABOLIC PANEL - Abnormal       Result Value    Sodium 137      Potassium 4.9      Chloride 96 (*)     Carbon Dioxide (CO2) 25      Anion Gap 16 (*)     Urea Nitrogen 38.4 (*)     Creatinine 6.10 (*)     GFR Estimate 7 (*)     Calcium 9.9      Glucose 221 (*)    CBC WITH PLATELETS AND DIFFERENTIAL - Abnormal    WBC Count 5.9      RBC Count 3.44 (*)     Hemoglobin 9.8 (*)     Hematocrit 32.3 (*)     MCV 94      MCH 28.5      MCHC 30.3 (*)     RDW 18.4 (*)     Platelet Count 101 (*)     % Neutrophils 64      % Lymphocytes 20      % Monocytes 13      % Eosinophils 2      % Basophils 1      % Immature Granulocytes 1      NRBCs per 100 WBC 0      Absolute Neutrophils 3.8      Absolute Lymphocytes 1.2      Absolute Monocytes 0.8      Absolute Eosinophils 0.1      Absolute Basophils 0.0      Absolute Immature Granulocytes 0.0      Absolute NRBCs 0.0     BLOOD GAS VENOUS - Abnormal    pH Venous 7.33      pCO2 Venous 53 (*)     pO2 Venous 24 (*)     Bicarbonate Venous 28 "      Base Excess/Deficit Venous 1.5      FIO2 0      Oxyhemoglobin Venous 31 (*)     O2 Sat, Venous 31.9 (*)    TROPONIN T, HIGH SENSITIVITY - Abnormal    Troponin T, High Sensitivity 70 (*)    NT PROBNP INPATIENT - Abnormal    N terminal Pro BNP Inpatient 43,326 (*)    TROPONIN T, HIGH SENSITIVITY - Abnormal    Troponin T, High Sensitivity 62 (*)    GLUCOSE BY METER - Abnormal    GLUCOSE BY METER POCT 171 (*)    HEMOGLOBIN A1C - Abnormal    Hemoglobin A1C 7.5 (*)    GLUCOSE BY METER - Abnormal    GLUCOSE BY METER POCT 174 (*)    HEPATIC FUNCTION PANEL - Normal    Protein Total 6.6      Albumin 3.7      Bilirubin Total 0.3      Alkaline Phosphatase 93      AST 17      ALT 9      Bilirubin Direct <0.20     LIPASE - Normal    Lipase 52     KETONE BETA-HYDROXYBUTYRATE QUANTITATIVE, RAPID - Normal    Ketone (Beta-Hydroxybutyrate) Quantitative 0.23     INFLUENZA A/B, RSV, & SARS-COV2 PCR - Normal    Influenza A PCR Negative      Influenza B PCR Negative      RSV PCR Negative      SARS CoV2 PCR Negative     LACTIC ACID WHOLE BLOOD - Normal    Lactic Acid 1.4     ROUTINE UA WITH MICROSCOPIC REFLEX TO CULTURE   GLUCOSE MONITOR NURSING POCT   GLUCOSE MONITOR NURSING POCT   GLUCOSE MONITOR NURSING POCT   GLUCOSE MONITOR NURSING POCT   GLUCOSE MONITOR NURSING POCT   GLUCOSE MONITOR NURSING POCT       Imaging   CT Aortic Survey w Contrast   Final Result   IMPRESSION:   1.  No evidence for acute aortic syndrome.   2.  Stable, moderate right pleural effusion.   3.  Mild to moderate ascites.   4.  Mild intrauterine extra hepatic biliary dilatation. This may be secondary to a distal biliary obstruction.   5.  Moderate pericardial calcifications.   6.  Severe bilateral renal atrophy.   7.  Anterior abdominal wall edema.             EKG 1   ECG taken at 1008, ECG read at 1013  Junctional rhythm  ST & T wave abnormality, consider inferior ischemia  ST & T wave abnormality, consider anterolateral ischemia   New T wave inversion  diffusely as compared to prior, dated 11/23/22.  Rate 58 bpm. NM interval * ms. QRS duration 82 ms. QT/QTc 474/465 ms. P-R-T axes * 77 237    EKG 2  ECG taken at 1037, ECG read at 1045  No ST elevation V4 right side   Rate 56 bpm. NM interval * ms. QRS duration 80 ms. QT/QTc 458/441 ms. P-R-T axes * 72 221.       Independent Interpretation   None    ED Course      Medications Administered   Medications   insulin aspart (NovoLOG) injection (RAPID ACTING) (has no administration in time range)   glucose gel 15-30 g (has no administration in time range)     Or   dextrose 50 % injection 25-50 mL (has no administration in time range)     Or   glucagon injection 1 mg (has no administration in time range)   insulin detemir (LEVEMIR PEN) injection 10 Units (has no administration in time range)   insulin aspart (NovoLOG) injection (RAPID ACTING) (has no administration in time range)   insulin aspart (NovoLOG) injection (RAPID ACTING) (has no administration in time range)   midodrine (PROAMATINE) tablet 10 mg (has no administration in time range)   midodrine (PROAMATINE) tablet 30 mg (has no administration in time range)   midodrine (PROAMATINE) tablet 20 mg (has no administration in time range)   sodium chloride 0.9% BOLUS 500 mL (0 mLs Intravenous Stopped 8/18/24 1342)   iopamidol (ISOVUE-370) solution 72 mL (72 mLs Intravenous $Given 8/18/24 1223)   sodium chloride 0.9 % bag 500mL for CT scan flush use (60 mLs Intravenous $Given 8/18/24 1223)   midodrine (PROAMATINE) tablet 5 mg (5 mg Oral $Given 8/18/24 1550)       Procedures   Procedures     Discussion of Management   Admitting Hospitalist, ARABELLA Brand    ED Course   ED Course as of 08/18/24 1805   Sun Aug 18, 2024   1029 I obtained history and performed physical exam as noted above.    1308 I rechecked the patient. She feels the same just coming back from [place].   1448 I spoke with WIL Patel of the hospitalist team, regarding the patient. They accepted the  patient for admission.         Additional Documentation  None    Medical Decision Making / Diagnosis     CMS Diagnoses: None    MIPS       None    MDM   Yissel Wetzel is a 64 year old female who presents with generalized weakness without other specific symptoms in the setting of hypotension.  I suspect this has been an ongoing problem for many months based on my chart review revealing that she has had dialysis with borderline hypotension on midodrine during sessions, chronic right pleural effusions, small amount of abdominal ascites. I cannot identify an acute cause of her relative hypotension that could be contributing to an acute decompensation.  Her BNP is elevated but always around this level.  Had some T wave inversions diffusely on EKG new when compared to prior though electrolytes are reassuring and troponin is downtrending.  Suspect both troponin and BNP elevations are related to her end-stage renal disease.  Blood pressure not significantly responsive to fluid bolus making even intravascular depletion unlikely cause of her hypotension.  She is a very petite woman so possible that MAP goals of 65 is unattainable for her especially with her significant medical comorbidities though I am not finding an indication to put her on peripheral pressors without signs of hemorrhagic, septic nor cardiogenic shock.      She has an extensive upcoming hospitalization at the [facility].  Given hypotension now with symptoms, will start her on midodrine which she may require daily instead of just during dialysis.  Patient to be admitted to hospital medicine.  I spoke with the above hospitalist who kindly accepts.  Will admit to [facility] to monitor blood pressure closely while starting midodrine regimen.    Disposition   The patient was admitted to the hospital.     Diagnosis     ICD-10-CM    1. Weakness  R53.1       2. Hypotension, unspecified hypotension type  I95.9              Scribe Disclosure:  I, Dawit Wang, am serving  as a scribe at 10:51 AM on 8/18/2024 to document services personally performed by Ena Rudolph DO based on my observations and the provider's statements to me.        Ena Rudolph DO  08/18/24 [id number]

## 2024-08-18 NOTE — PHARMACY-ADMISSION MEDICATION HISTORY
Pharmacist Admission Medication History    Admission medication history is complete. The information provided in this note is only as accurate as the sources available at the time of the update.    Information Source(s): Patient and CareEverywhere/SureScripts via in-person    Pertinent Information:   -Midodrine dosin tabs BID on non-dialysis days, 1 tab in the morning and 3 tabs with food prior to dialysis on dialysis days (total of 4 tabs per day every day but timing changes on dialysis vs non-dialysis days)    Changes made to PTA medication list:  Added: PhosLo, Guaifenesin  Deleted: Novolog  Changed: Levemir 11 to 10 units, midodrine    Allergies reviewed with patient and updates made in EHR: no    Medication History Completed By: Meg Mcneil RPH 2024 4:05 PM    PTA Med List   Medication Sig Note Last Dose    acetaminophen (TYLENOL) 500 MG tablet Take 500 mg by mouth every 8 hours as needed for mild pain  Unknown    amiodarone (PACERONE) 200 MG tablet Take 200mg po 5 days a week.  Hold wednesday and 2024: Pt typically holds on  and Sundays but states she got a dose today  so will need to skip tomorrow's dose  2024 at am    calcium acetate (PHOSLO) 667 MG CAPS capsule Take 667 mg by mouth 3 times daily (with meals)  2024    calcium acetate (PHOSLO) 667 MG CAPS capsule Take 667 mg by mouth Take with snacks or supplements  2024    famotidine (PEPCID) 10 MG tablet Take 10 mg by mouth daily as needed  Unknown    guaiFENesin 1200 MG TB12 Take 1 tablet by mouth 2 times daily as needed (cough/congestion)  Unknown    insulin aspart (FIASP FLEXTOUCH) 100 UNIT/ML pen-injector 2U per 15 carbs, plus 1U for every 50 that preprandial BG over 150      insulin detemir (LEVEMIR FLEXTOUCH) 100 UNIT/ML pen Inject 11 Units subcutaneously at bedtime (Patient taking differently: Inject 10 Units subcutaneously at bedtime)  2024    levalbuterol (XOPENEX HFA) 45 MCG/ACT inhaler Inhale 2  puffs into the lungs every 6 hours as needed for shortness of breath / dyspnea or wheezing  Unknown    midodrine (PROAMATINE) 10 MG tablet Take 30 mg by mouth Monday, Wednesdays, and Fridays with food prior to dialysis  Past Week    midodrine (PROAMATINE) 10 MG tablet Take 10 mg by mouth Every Mon, Wed, Fri Morning  Past Week    midodrine (PROAMATINE) 10 MG tablet Take 20 mg by mouth Twice daily (in the morning and evening) on non-dialysis days (Tuesday, Thursday, Saturday, Sunday)  8/17/2024

## 2024-08-19 LAB
ANION GAP SERPL CALCULATED.3IONS-SCNC: 18 MMOL/L (ref 7–15)
BUN SERPL-MCNC: 51.2 MG/DL (ref 8–23)
CALCIUM SERPL-MCNC: 9.6 MG/DL (ref 8.8–10.4)
CHLORIDE SERPL-SCNC: 94 MMOL/L (ref 98–107)
CREAT SERPL-MCNC: 7.44 MG/DL (ref 0.51–0.95)
EGFRCR SERPLBLD CKD-EPI 2021: 6 ML/MIN/1.73M2
ERYTHROCYTE [DISTWIDTH] IN BLOOD BY AUTOMATED COUNT: 18.4 % (ref 10–15)
GLUCOSE BLDC GLUCOMTR-MCNC: 146 MG/DL (ref 70–99)
GLUCOSE BLDC GLUCOMTR-MCNC: 170 MG/DL (ref 70–99)
GLUCOSE BLDC GLUCOMTR-MCNC: 181 MG/DL (ref 70–99)
GLUCOSE BLDC GLUCOMTR-MCNC: 397 MG/DL (ref 70–99)
GLUCOSE SERPL-MCNC: 136 MG/DL (ref 70–99)
HCO3 SERPL-SCNC: 23 MMOL/L (ref 22–29)
HCT VFR BLD AUTO: 28.2 % (ref 35–47)
HGB BLD-MCNC: 8.7 G/DL (ref 11.7–15.7)
MCH RBC QN AUTO: 28.4 PG (ref 26.5–33)
MCHC RBC AUTO-ENTMCNC: 30.9 G/DL (ref 31.5–36.5)
MCV RBC AUTO: 92 FL (ref 78–100)
PLATELET # BLD AUTO: 89 10E3/UL (ref 150–450)
POTASSIUM SERPL-SCNC: 5 MMOL/L (ref 3.4–5.3)
RBC # BLD AUTO: 3.06 10E6/UL (ref 3.8–5.2)
SODIUM SERPL-SCNC: 135 MMOL/L (ref 135–145)
WBC # BLD AUTO: 5.5 10E3/UL (ref 4–11)

## 2024-08-19 PROCEDURE — 258N000003 HC RX IP 258 OP 636: Performed by: INTERNAL MEDICINE

## 2024-08-19 PROCEDURE — 90937 HEMODIALYSIS REPEATED EVAL: CPT

## 2024-08-19 PROCEDURE — 99233 SBSQ HOSP IP/OBS HIGH 50: CPT | Performed by: INTERNAL MEDICINE

## 2024-08-19 PROCEDURE — 99222 1ST HOSP IP/OBS MODERATE 55: CPT | Performed by: INTERNAL MEDICINE

## 2024-08-19 PROCEDURE — 36415 COLL VENOUS BLD VENIPUNCTURE: CPT

## 2024-08-19 PROCEDURE — 85027 COMPLETE CBC AUTOMATED: CPT

## 2024-08-19 PROCEDURE — 5A1D70Z PERFORMANCE OF URINARY FILTRATION, INTERMITTENT, LESS THAN 6 HOURS PER DAY: ICD-10-PCS | Performed by: INTERNAL MEDICINE

## 2024-08-19 PROCEDURE — 250N000013 HC RX MED GY IP 250 OP 250 PS 637

## 2024-08-19 PROCEDURE — 80048 BASIC METABOLIC PNL TOTAL CA: CPT

## 2024-08-19 PROCEDURE — 120N000013 HC R&B IMCU

## 2024-08-19 RX ADMIN — CALCIUM ACETATE 667 MG: 667 CAPSULE ORAL at 07:50

## 2024-08-19 RX ADMIN — INSULIN ASPART 1 UNITS: 100 INJECTION, SOLUTION INTRAVENOUS; SUBCUTANEOUS at 12:46

## 2024-08-19 RX ADMIN — INSULIN DETEMIR 10 UNITS: 100 INJECTION, SOLUTION SUBCUTANEOUS at 22:16

## 2024-08-19 RX ADMIN — MIDODRINE HYDROCHLORIDE 10 MG: 10 TABLET ORAL at 09:00

## 2024-08-19 RX ADMIN — MIDODRINE HYDROCHLORIDE 30 MG: 10 TABLET ORAL at 12:38

## 2024-08-19 RX ADMIN — SODIUM CHLORIDE 300 ML: 9 INJECTION, SOLUTION INTRAVENOUS at 12:55

## 2024-08-19 RX ADMIN — CALCIUM ACETATE 667 MG: 667 CAPSULE ORAL at 12:36

## 2024-08-19 RX ADMIN — Medication: at 12:55

## 2024-08-19 RX ADMIN — INSULIN ASPART 1 UNITS: 100 INJECTION, SOLUTION INTRAVENOUS; SUBCUTANEOUS at 17:49

## 2024-08-19 RX ADMIN — CALCIUM ACETATE 667 MG: 667 CAPSULE ORAL at 17:21

## 2024-08-19 RX ADMIN — SODIUM CHLORIDE 250 ML: 9 INJECTION, SOLUTION INTRAVENOUS at 12:49

## 2024-08-19 ASSESSMENT — ACTIVITIES OF DAILY LIVING (ADL)
ADLS_ACUITY_SCORE: 30
ADLS_ACUITY_SCORE: 28
ADLS_ACUITY_SCORE: 30
ADLS_ACUITY_SCORE: 28
ADLS_ACUITY_SCORE: 30
ADLS_ACUITY_SCORE: 30
ADLS_ACUITY_SCORE: 28
ADLS_ACUITY_SCORE: 30
ADLS_ACUITY_SCORE: 31
ADLS_ACUITY_SCORE: 31
ADLS_ACUITY_SCORE: 28
ADLS_ACUITY_SCORE: 30
ADLS_ACUITY_SCORE: 28
ADLS_ACUITY_SCORE: 31
ADLS_ACUITY_SCORE: 30
ADLS_ACUITY_SCORE: 31
ADLS_ACUITY_SCORE: 30

## 2024-08-19 NOTE — PROGRESS NOTES
[facility]    Internal Medicine Hospitalist Progress Note  08/19/2024  I evaluated patient on the above date.    Barry Ballesteros Jr., MD  [phone] (p)  Text Page  Vocera      [New actions/orders today (08/19/2024) are underlined in the assessment and plan. All lab results in the assessment and plan were reviewed.]  Assessment & Plan      Yissel Wetzel is a 64 year old female with history including COPD; DM2; ESRD on HD; and SVT on amiodarone; who presented 8/18/2024 dizziness and weakness with soft BP's.    On initial evaluation, pt was afebrile, BP's down to 80's/30's, HR 50's, sats 90's. ECG showed bradycardic rhythm that appeared junctional with diffuse TWI/NSTWA. Labs notable for CBC with WBC normal, hgb 9.8; platelets 101K; BMP cr 6.10 (ESRD), bicarb normal, AG 16; LFT's normal; ketones 0.23; NTP-TNP 29212, troponin 70; COVID-19, influenza and RSV negative.      Generalized weakness with hypotension, ?related to bradycardia as well as chronic component.  Bradycardia with junctional rhythm.  * Initial presentation as above. Last dialysis 8/16. She checked her blood pressure at home and found her systolic to be 96. In the ED, her blood pressure dropped to as low as 86/33. She was given a one time dose of midodrine and her blood pressure improved to upper 90s to low 100s. PTA amiodarone held on admit. Continued on PTA midodrine. Cardiology consulted on admit give bradycardic junctional rhythm.   * On 8/19, BP's OK overnight, but soft/low again in am, HR's in 40's-50's. Seen by cardiology who recommended EP evaluation.  - Continue to monitor on telemetry.  - Continue to hold PTA amiodarone.  - Continue PTA midodrine (10 mg qam and 30 mg before HD on MWF; and 20 mg BID on non-dialysis days).    History of SVT.  * PTA on amiodarone.  * Initial presentation as above with junctional rhythm. Amiodarone held on admit.  - Continue to monitor on telemetry.  - Continue to hold amiodarone.  -  "Cardiology consulted as above.    DM2.   [PTA: detemir 10U at bedtime; aspart 2U/15g CHO + ISS with meals.]  * Hgba1c 7.5 on admit.  Recent Labs   Lab 08/19/24  0653 08/18/24  2149 08/18/24  1832 08/18/24  1651 08/18/24  1243 08/18/24  1008   * 182* 181* 174* 171* 221*     Recent Labs   Lab Test 08/18/24  1008 06/15/22  1233   A1C 7.5* 7.7*   - Continue carb controlled diet.  - Continue detemir 10U at bedtime.  - Continue aspart 2U/15g CHO with meals.  - Continue aspart ISS (medium).    ESRD.  * Dialyzes MWF. Nephrology consulted on admit for HD. PTA on midodrine.  - Continue HD per nephrology.  - Continue PTA midodrine as above.    COPD.  * On home O2: 2L at night.  - Continue O2.  - Continue PRN levalbuterol.    Thrombocytopenia, unclear etiology, possibly medication effect or acute/chronic from other cause.  * Platelets normal as recently as 11/2022. Most recently in EPIC, platelets 144K in 9/2023.  * Platelets 101K on admit.   Recent Labs   Lab 08/19/24  0653 08/18/24  1008   PLT 89* 101*   - Continue to monitor CBC - repeat in am.    Chronic anemia.  * Baseline Hgb: 9.7-12.2. Iron studies 6/2022 suggested ACD +/- iron deficiency.  * Hgb 9.8 on admit.  - Continue to monitor CBC periodically as needed.    Mild AS, mild MR.  H/o pulmonology HTN.  * Previous seen by Dr. Redd. Echocardiogram 2/27/24: LVEF 60-65%; RV OK; mild MR; mild AS; RVSP 33 + RAP.  - Continue to monitor clinically.    Clinically Significant Risk Factors Present on Admission                # Thrombocytopenia: Lowest platelets = 89 in last 2 days, will monitor for bleeding   # Hypertension: Noted on problem list    # Anemia: based on hgb <11      # DMII: A1C = 7.5 % (Ref range: <5.7 %) within past 6 months   # Overweight: Estimated body mass index is 27.96 kg/m  as calculated from the following:    Height as of this encounter: 1.549 m (5' 1\").    Weight as of this encounter: 67.1 kg (148 lb).                    Diet: Regular Diet Adult " "   Prophylaxis: PCD's and ambulation  Weller Catheter: Not present  Lines: None     Code Status: Full Code    Disposition Plan   Medically Ready for Discharge: Anticipated in 1-2 Days  Expected discharge to prior living arrangement pending above.    Entered: Barry Ballesteros MD 08/19/2024, 11:29 AM               Interval History   Doing OK overall.  Remains in junctional rhythm.  Does not like dialysis, knows what to avoid.    * Data reviewed today: I reviewed all new labs and imaging over the last 24 hours. I personally reviewed the ECG tracing showing findings as described above in the A/P.    Physical Exam   Most recent vitals:   , Blood pressure 95/42, pulse 52, temperature 97.4  F (36.3  C), temperature source Oral, resp. rate (!) 88, height 1.549 m (5' 1\"), weight 67.1 kg (148 lb), SpO2 98%, not currently breastfeeding. O2 Device: Nasal cannula Oxygen Delivery: 2 LPM  Vitals:    08/18/24 0951   Weight: 67.1 kg (148 lb)     Vital signs with ranges:  Temp:  [97.4  F (36.3  C)-98.2  F (36.8  C)] 97.4  F (36.3  C)  Pulse:  [48-60] 52  Resp:  [13-88] 88  BP: ()/(29-85) 95/42  SpO2:  [90 %-100 %] 98 %  Patient Vitals for the past 24 hrs:   BP Temp Temp src Pulse Resp SpO2   08/19/24 0840 95/42 -- -- 52 (!) 88 98 %   08/19/24 0820 (!) 89/46 -- -- 52 16 100 %   08/19/24 0817 (!) 104/38 97.4  F (36.3  C) Oral -- -- --   08/19/24 0800 -- -- -- 53 (!) 31 91 %   08/19/24 0750 -- -- -- 50 27 99 %   08/19/24 0740 -- -- -- 50 20 100 %   08/19/24 0730 -- -- -- (!) 49 (!) 31 99 %   08/19/24 0720 -- -- -- 50 (!) 41 99 %   08/19/24 0710 -- -- -- 50 (!) 76 100 %   08/19/24 0700 -- -- -- (!) 48 29 100 %   08/19/24 0650 -- -- -- (!) 48 (!) 46 100 %   08/19/24 0640 -- -- -- (!) 49 (!) 32 100 %   08/19/24 0630 -- -- -- 50 26 100 %   08/19/24 0620 -- -- -- 52 18 --   08/19/24 0610 115/84 -- -- 51 18 98 %   08/19/24 0600 -- -- -- 53 16 98 %   08/19/24 0515 113/41 -- -- 54 20 100 %   08/19/24 0235 111/40 -- -- 51 17 100 % "   08/19/24 0000 116/46 -- -- 56 18 98 %   08/18/24 2258 96/49 -- -- -- -- 98 %   08/18/24 2105 110/85 98.2  F (36.8  C) Oral 51 23 90 %   08/18/24 1944 -- -- -- 50 21 100 %   08/18/24 1940 (!) 97/38 -- -- 51 -- --   08/18/24 1920 102/44 -- -- 53 22 98 %   08/18/24 1900 124/56 -- -- 51 15 100 %   08/18/24 1500 103/40 -- -- 53 13 100 %   08/18/24 1440 118/49 -- -- 56 20 97 %   08/18/24 1420 (!) 100/39 -- -- -- -- --   08/18/24 1400 -- -- -- 52 16 98 %   08/18/24 1340 (!) 103/39 -- -- -- -- 97 %   08/18/24 1315 108/45 -- -- 52 18 98 %   08/18/24 1300 (!) 104/29 -- -- 51 20 93 %   08/18/24 1245 99/44 -- -- 57 16 97 %   08/18/24 1200 (!) 86/33 -- -- 52 -- --   08/18/24 1140 108/45 -- -- 60 -- --   08/18/24 1135 109/46 -- -- 56 -- --     I/O's last 24 hours:  No intake/output data recorded.    Constitutional: awake, alert, oriented, pleasant, conversant   Head:   Eyes:   ENT:   Neck:   Cardiovascular: bradycardic, regular rhythm, no murmurs/rubs/gallops  Lungs: diminished in the bases, no crackles or wheezes  Gastrointestinal/Abdomen: soft, non-tender, non-distended, positive bowel sounds  :   Musculoskeletal:   Skin/Extremities: BILATERAL lower extremities with trace pitting edema  Neurologic:   Psychiatric:   Hematologic/Lymphatic/Immunologic:        Data    Labs reviewed.  Recent Labs   Lab 08/19/24  0653 08/18/24  2149 08/18/24  1832 08/18/24  1243 08/18/24  1008   WBC 5.5  --   --   --  5.9   HGB 8.7*  --   --   --  9.8*   MCV 92  --   --   --  94   PLT 89*  --   --   --  101*     --   --   --  137   POTASSIUM 5.0  --   --   --  4.9   CHLORIDE 94*  --   --   --  96*   CO2 23  --   --   --  25   BUN 51.2*  --   --   --  38.4*   CR 7.44*  --   --   --  6.10*   ANIONGAP 18*  --   --   --  16*   KANWAL 9.6  --   --   --  9.9   * 182* 181*   < > 221*   ALBUMIN  --   --   --   --  3.7   PROTTOTAL  --   --   --   --  6.6   BILITOTAL  --   --   --   --  0.3   ALKPHOS  --   --   --   --  93   ALT  --   --   --    "--  9   AST  --   --   --   --  17   LIPASE  --   --   --   --  52    < > = values in this interval not displayed.     Recent Labs   Lab Test 08/18/24  1243 08/18/24  1008 11/23/22  0729 08/09/17 2030   NT-PROBNP, INPATIENT  --  43,326* 25,075* 5,862*   TROPONIN T HIGH SENSITIVITY 62* 70*  --   --      Recent Labs   Lab 08/19/24  0653 08/18/24  2149 08/18/24  1832 08/18/24  1651 08/18/24  1243 08/18/24  1008   * 182* 181* 174* 171* 221*     Recent Labs   Lab Test 08/18/24  1008 06/15/22  1233   A1C 7.5* 7.7*       No results for input(s): \"INR\", \"[id number]\" in the last 168 hours.  Recent Labs   Lab 08/19/24  0653 08/18/24  1645 08/18/24  1108 08/18/24  1008   WBC 5.5  --   --  5.9   LACT  --  1.4  --   --    [id number]  --   --  Negative  --        MICRO:  CULTURES (INCLUDING BLOOD AND URINE):  No lab results found in last 7 days.    Recent Results (from the past 24 hour(s))   CT Aortic Survey w Contrast    Narrative    EXAM: CT AORTIC SURVEY W CONTRAST  LOCATION: [facility]  DATE: 8/18/2024    INDICATION: Hypotension. Dizziness.  COMPARISON: CT pulmonary angiogram 6/15/2022. CT abdomen and pelvis 9/7/2023.  TECHNIQUE: CT angiogram chest abdomen pelvis during arterial phase of injection of IV contrast. 2D and 3D MIP reconstructions were performed by the CT technologist. Dose reduction techniques were used.   CONTRAST: 72mL Isovue 370    FINDINGS:   CT ANGIOGRAM CHEST, ABDOMEN, AND PELVIS: Moderate calcified atherosclerotic changes throughout normal caliber aorta. No dissection. No hyperdense rim sign within the thoracic aorta to suggest an acute intramural hematoma. Three-vessel arch. Near   circumferential calcified atherosclerotic changes of the origin of the left subclavian artery without significant stenosis. No significant stenosis of the visualized portions of the arch vessels.    Calcified atherosclerotic changes producing moderate stenosis of the origins of the celiac, " superior mesenteric and right renal arteries. There appears to be a severe left renal artery origin stenosis. Patent inferior mesenteric artery. Moderate   calcified atherosclerotic changes of the bilateral common and internal iliac arteries without severe stenosis. Normal caliber external iliac and common femoral arteries.    LUNGS AND PLEURA: Stable, moderate right pleural effusion. Stable moderate atelectatic changes of the posterior aspect of the right lower lobe. Stable, smooth mild interlobular septal thickening.    MEDIASTINUM/AXILLAE: No lymphadenopathy. Mild cardiomegaly. Stable pericardial calcifications centered around the region of the atrioventricular groove.    CORONARY ARTERY CALCIFICATION: Moderate.    HEPATOBILIARY: Contrast reflux into the intrahepatic IVC and hepatic veins likely due to right-sided cardiac insufficiency. Mild intra and extrahepatic biliary dilatation. Small gallstones. No radiopaque filling defects within the extra hepatic bile   ducts.    PANCREAS: Normal.    SPLEEN: Mild splenomegaly, measuring 13.5 cm in length.    ADRENAL GLANDS: Normal.    KIDNEYS/BLADDER: Marked bilateral renal atrophy. No hydronephrosis.    BOWEL: No obstruction or inflammatory change. Mild to moderate abdominal/pelvic ascites.     LYMPH NODES: Normal.    PELVIC ORGANS: Normal.    MUSCULOSKELETAL: Moderate degenerative changes of the lower lumbar spine. Degenerative changes bilateral sacroiliac joints. Anterior abdominal skin thickening with high attenuation striations within this contains fat likely representing wall edema.   Ascitic fluid herniating through a moderate umbilical hernia.      Impression    IMPRESSION:  1.  No evidence for acute aortic syndrome.  2.  Stable, moderate right pleural effusion.  3.  Mild to moderate ascites.  4.  Mild intrauterine extra hepatic biliary dilatation. This may be secondary to a distal biliary obstruction.  5.  Moderate pericardial calcifications.  6.  Severe  bilateral renal atrophy.  7.  Anterior abdominal wall edema.         Medications   All medications were reviewed. MAR.    Infusions:  Current Facility-Administered Medications   Medication Dose Route Frequency Provider Last Rate Last Admin     Scheduled Medications:  Current Facility-Administered Medications   Medication Dose Route Frequency Provider Last Rate Last Admin    - MEDICATION INSTRUCTIONS for Dialysis Patients -   Does not apply See Admin Instructions Barry Ballesteros MD        [START ON 8/20/2024] amiodarone (PACERONE) tablet 200 mg  200 mg Oral Once per day on Monday Tuesday Wednesday Thursday Friday Feng Brand NP        calcium acetate (PHOSLO) capsule 667 mg  667 mg Oral TID w/meals Feng Brand NP   667 mg at 08/19/24 0750    insulin aspart (NovoLOG) injection (RAPID ACTING)   Subcutaneous TID w/meals Feng Brand NP   7 Units at 08/19/24 0746    insulin aspart (NovoLOG) injection (RAPID ACTING)  1-7 Units Subcutaneous TID AC Feng Brand NP   1 Units at 08/18/24 1859    insulin aspart (NovoLOG) injection (RAPID ACTING)  1-5 Units Subcutaneous At Bedtime Feng Brand NP        insulin detemir (LEVEMIR PEN) injection 10 Units  10 Units Subcutaneous At Bedtime Feng Brand NP   10 Units at 08/18/24 2259    midodrine (PROAMATINE) tablet 10 mg  10 mg Oral Once per day on Monday Wednesday Friday Feng Brand NP   10 mg at 08/19/24 0900    midodrine (PROAMATINE) tablet 20 mg  20 mg Oral 2 times per day on Sunday Tuesday Thursday Saturday Feng Brand NP   20 mg at 08/18/24 2310    midodrine (PROAMATINE) tablet 30 mg  30 mg Oral Once per day on Monday Wednesday Friday Feng Brand NP        No heparin via hemodialysis machine   Does not apply Once Tamar Chopra MD        sodium chloride (PF) 0.9% PF flush 3 mL  3 mL Intracatheter Q8H Feng Brand  NILDA Eddy        sodium chloride (PF) 0.9% PF flush 3 mL  3 mL Intracatheter Q8H Feng Brand NP   3 mL at 08/18/24 2259    sodium chloride 0.9% BOLUS 250 mL  250 mL Intravenous Once in dialysis/CRRT Tamar Chopra MD        sodium chloride 0.9% BOLUS 300 mL  300 mL Hemodialysis Machine Once Tamar Chopra MD         PRN Medications:  Current Facility-Administered Medications   Medication Dose Route Frequency Provider Last Rate Last Admin    acetaminophen (TYLENOL) tablet 650 mg  650 mg Oral Q4H PRN Feng Brand NP        Or    acetaminophen (TYLENOL) Suppository 650 mg  650 mg Rectal Q4H PRN Feng Brand NP        glucose gel 15-30 g  15-30 g Oral Q15 Min PRN Feng Brand NP        Or    dextrose 50 % injection 25-50 mL  25-50 mL Intravenous Q15 Min PRN Feng Brand NP        Or    glucagon injection 1 mg  1 mg Subcutaneous Q15 Min PRN Feng Brand NP        levalbuterol (XOPENEX HFA) 45 MCG/ACT Inhaler 2 puff  2 puff Inhalation Q6H PRN Feng Brand NP        lidocaine (LMX4) cream   Topical Q1H PRN Feng Brand NP        lidocaine (LMX4) cream   Topical Q1H PRN Feng Brand NP        lidocaine 1 % 0.1-1 mL  0.1-1 mL Other Q1H PRN Feng Brand NP        lidocaine 1 % 0.1-1 mL  0.1-1 mL Other Q1H PRN Feng Brand NP        senna-docusate (SENOKOT-S/PERICOLACE) 8.6-50 MG per tablet 1 tablet  1 tablet Oral BID PRN Feng Brand NP        Or    senna-docusate (SENOKOT-S/PERICOLACE) 8.6-50 MG per tablet 2 tablet  2 tablet Oral BID PRN Feng Brand NP        sodium chloride (PF) 0.9% PF flush 3 mL  3 mL Intracatheter q1 min prn Feng Brand, NP        sodium chloride (PF) 0.9% PF flush 3 mL  3 mL Intracatheter q1 min prn Feng Brand, NP        sodium chloride 0.9% BOLUS 100-150 mL   100-150 mL Intravenous Q15 Min PRN Tamar Chopra MD

## 2024-08-19 NOTE — PLAN OF CARE
Goal Outcome Evaluation:    Pt transferred from ED around 2100 to [facility]. Alert and oriented x4. VSS on 2L NC.Tele: SB.Ls clr.denies SOB,CP. Up SBA.no uop pt on HD. X1 BM this shift. Denies n/v. Mod consistent carb diet. Skin intact. HD fisula in R upper Arm; WDL. Scattered bruising. Small scab on scalp.BLE kendell. PIV x1 SL. Cardiology consulted. Continue with plan of care.

## 2024-08-19 NOTE — PLAN OF CARE
Orientations: A&O, cooperative. Frustrated at times.   Vitals/Pain: VSS on 2L nc. Hypotension at times- midodrine scheduled.   Tele: Junctional rhythm W/ PACs- EP consulted.   Lines/Drains: R Upper fisula in place. L PIV SL  GI/: Pt on hemodialysis. +BM  Ambulation/Assist: SBA  Sleep Quality: good  Diet: regular diet, blood glucose checks with carb coverage.   Pain: Pt complains of ear/ headache. Refused hospital tylenol & brought her own into the hospital that's extended release. Pt educated on importance of following hospital polices. Pt took home tylenol instead. Tylenol now placed in locked cabinet   Plan: Pt completed dialysis today w/ 2L off. Continue to monitor blood pressure. Cardiology following, new consult for EP. Discharge pending.

## 2024-08-19 NOTE — CONSULTS
"Cardiology Progress Note          Assessment and Plan:       Weakness    Hypotension, unspecified hypotension type  Bradycardia  Junctional rhythm  H/o SVT seen by Dr. Barbosa - symptomatic  Pulmonary hypertension followed by Dr. Redd    Recommend hold amiodarone for now  Have EP see patient tomorrow.                  Interval History:     Cassandra Wetzel is a 63 y/o female history of severe pulmonary hypertension followed by Dr. Redd, history of symptomatic SVT followed by Dr. Barbosa, admitted with generalized weakness.  Patient came in with bradycardia.  EKG reviewed showing junctional rhythm rate 52 bpm.  NSST changes.     Last seen by Dr. Redd in   admitted for daily dialysis and paracentesis.    Denies chest pain, SOB.      Complex medical history including ESRD, COPD, DM, COLD, ascites, ASHD, Pericardial calcification, obesity, cirrhosis, diastolic dysfunction.                Review of Systems:   As per subjective, otherwise 5 systems reviewed and negative.           Physical Exam:   Blood pressure 95/42, pulse 52, temperature 97.4  F (36.3  C), temperature source Oral, resp. rate (!) 88, height 1.549 m (5' 1\"), weight 67.1 kg (148 lb), SpO2 98%, not currently breastfeeding.      Vital Sign Ranges  Temperature Temp  Av.6  F (36.4  C)  Min: 97.3  F (36.3  C)  Max: 98.2  F (36.8  C)   Blood pressure Systolic (24hrs), Av , Min:86 , Max:124        Diastolic (24hrs), Av, Min:29, Max:85      Pulse Pulse  Av  Min: 48  Max: 60   Respirations Resp  Av.6  Min: 13  Max: 88   Pulse oximetry SpO2  Av %  Min: 90 %  Max: 100 %       No intake or output data in the 24 hours ending 24 0947    Constitutional:   NAD   Skin:   Warm and dry   Head:   Nontraumatic   Neck:   elevated JVP, 10   Lungs:   normal   Cardiovascular:   bradycardic with regular rhythm    Abdomen:   Benign   Extremities and Back:   Symmetric, no curvature, spinous processes are non-tender on palpation, paraspinous " muscles are non-tender on palpation, no costal vertebral tenderness   Neurological:   Grossly nonfocal            Medications:     No current outpatient medications on file.                Data:     Results for orders placed or performed during the hospital encounter of 08/18/24 (from the past 24 hour(s))   CBC with Platelets & Differential    Narrative    The following orders were created for panel order CBC with Platelets & Differential.  Procedure                               Abnormality         Status                     ---------                               -----------         ------                     CBC with platelets and d...[[id number]]  Abnormal            Final result               RBC and Platelet Morphology[[id number]]                                                   Please view results for these tests on the individual orders.   Basic metabolic panel   Result Value Ref Range    Sodium 137 135 - 145 mmol/L    Potassium 4.9 3.4 - 5.3 mmol/L    Chloride 96 (L) 98 - 107 mmol/L    Carbon Dioxide (CO2) 25 22 - 29 mmol/L    Anion Gap 16 (H) 7 - 15 mmol/L    Urea Nitrogen 38.4 (H) 8.0 - 23.0 mg/dL    Creatinine 6.10 (H) 0.51 - 0.95 mg/dL    GFR Estimate 7 (L) >60 mL/min/1.73m2    Calcium 9.9 8.8 - 10.4 mg/dL    Glucose 221 (H) 70 - 99 mg/dL   [place] Draw    Narrative    The following orders were created for panel order [place] Draw.  Procedure                               Abnormality         Status                     ---------                               -----------         ------                     Extra Blue Top Tube[[id number]]                              Final result                 Please view results for these tests on the individual orders.   CBC with platelets and differential   Result Value Ref Range    WBC Count 5.9 4.0 - 11.0 10e3/uL    RBC Count 3.44 (L) 3.80 - 5.20 10e6/uL    Hemoglobin 9.8 (L) 11.7 - 15.7 g/dL    Hematocrit 32.3 (L) 35.0 - 47.0 %    MCV 94 78 - 100 fL    MCH 28.5 26.5 -  33.0 pg    MCHC 30.3 (L) 31.5 - 36.5 g/dL    RDW 18.4 (H) 10.0 - 15.0 %    Platelet Count 101 (L) 150 - 450 10e3/uL    % Neutrophils 64 %    % Lymphocytes 20 %    % Monocytes 13 %    % Eosinophils 2 %    % Basophils 1 %    % Immature Granulocytes 1 %    NRBCs per 100 WBC 0 <1 /100    Absolute Neutrophils 3.8 1.6 - 8.3 10e3/uL    Absolute Lymphocytes 1.2 0.8 - 5.3 10e3/uL    Absolute Monocytes 0.8 0.0 - 1.3 10e3/uL    Absolute Eosinophils 0.1 0.0 - 0.7 10e3/uL    Absolute Basophils 0.0 0.0 - 0.2 10e3/uL    Absolute Immature Granulocytes 0.0 <=0.4 10e3/uL    Absolute NRBCs 0.0 10e3/uL   Extra Blue Top Tube   Result Value Ref Range    Hold Specimen [facility]    Hepatic function panel   Result Value Ref Range    Protein Total 6.6 6.4 - 8.3 g/dL    Albumin 3.7 3.5 - 5.2 g/dL    Bilirubin Total 0.3 <=1.2 mg/dL    Alkaline Phosphatase 93 40 - 150 U/L    AST 17 0 - 45 U/L    ALT 9 0 - 50 U/L    Bilirubin Direct <0.20 0.00 - 0.30 mg/dL   Lipase   Result Value Ref Range    Lipase 52 13 - 60 U/L   Ketone Beta-Hydroxybutyrate Quantitative   Result Value Ref Range    Ketone (Beta-Hydroxybutyrate) Quantitative 0.23 <=0.30 mmol/L   Troponin T, High Sensitivity   Result Value Ref Range    Troponin T, High Sensitivity 70 (H) <=14 ng/L   BNP   Result Value Ref Range    N terminal Pro BNP Inpatient 43,326 (H) 0 - 900 pg/mL   Hemoglobin A1c   Result Value Ref Range    Hemoglobin A1C 7.5 (H) <5.7 %   EKG 12-lead, tracing only   Result Value Ref Range    Systolic Blood Pressure  mmHg    Diastolic Blood Pressure  mmHg    Ventricular Rate 58 BPM    Atrial Rate  BPM    AK Interval  ms    QRS Duration 82 ms     ms    QTc 465 ms    P Axis  degrees    R AXIS 77 degrees    T Axis 237 degrees    Interpretation ECG       Junctional rhythm  ST & T wave abnormality, consider inferior ischemia  ST & T wave abnormality, consider anterolateral ischemia  Abnormal ECG  When compared with ECG of 23-Nov-2022 11:57,  Junctional rhythm has replaced Sinus  rhythm  T wave inversion now evident in Inferior leads  T wave inversion now evident in Anterolateral leads  Confirmed by PRIORITY READ, : ([id number]),  MELANIE BECKETT ([id number]) on 8/18/2024 3:25:48 PM     Symptomatic Influenza A/B, RSV, & SARS-CoV2 PCR (COVID-19) Nasopharyngeal    Specimen: Nasopharyngeal; Swab   Result Value Ref Range    Influenza A PCR Negative Negative    Influenza B PCR Negative Negative    RSV PCR Negative Negative    SARS CoV2 PCR Negative Negative    Narrative    Testing was performed using the Xpert Xpress CoV2/Flu/RSV Assay on the XOS Digitalpert Instrument. This test should be ordered for the detection of SARS-CoV-2, influenza, and RSV viruses in individuals who meet clinical and/or epidemiological criteria. Test performance is unknown in asymptomatic patients. This test is for in vitro diagnostic use under the FDA EUA for laboratories certified under CLIA to perform high or moderate complexity testing. This test has not been FDA cleared or approved. A negative result does not rule out the presence of PCR inhibitors in the specimen or target RNA in concentration below the limit of detection for the assay. If only one viral target is positive but coinfection with multiple targets is suspected, the sample should be re-tested with another FDA cleared, approved, or authorized test, if coinfection would change clinical management. This test was validated by the [facility]. These laboratories are certified under the Clinical Laboratory Improvement Amendments of 1988 (CLIA-88) as qualified to perform high complexity laboratory testing.   Blood gas venous   Result Value Ref Range    pH Venous 7.33 7.32 - 7.43    pCO2 Venous 53 (H) 40 - 50 mm Hg    pO2 Venous 24 (L) 25 - 47 mm Hg    Bicarbonate Venous 28 21 - 28 mmol/L    Base Excess/Deficit Venous 1.5 -3.0 - 3.0 mmol/L    FIO2 0     Oxyhemoglobin Venous 31 (L) 70 - 75 %    O2 Sat, Venous 31.9 (L) 70.0 - 75.0 %    Narrative     In healthy individuals, oxyhemoglobin (O2Hb) and oxygen saturation (SO2) are approximately equal. In the presence of dyshemoglobins, oxyhemoglobin can be considerably lower than oxygen saturation.   Troponin T, High Sensitivity   Result Value Ref Range    Troponin T, High Sensitivity 62 (H) <=14 ng/L   Glucose by meter   Result Value Ref Range    GLUCOSE BY METER POCT 171 (H) 70 - 99 mg/dL   CT Aortic Survey w Contrast    Narrative    EXAM: CT AORTIC SURVEY W CONTRAST  LOCATION: [facility]  DATE: 8/18/2024    INDICATION: Hypotension. Dizziness.  COMPARISON: CT pulmonary angiogram 6/15/2022. CT abdomen and pelvis 9/7/2023.  TECHNIQUE: CT angiogram chest abdomen pelvis during arterial phase of injection of IV contrast. 2D and 3D MIP reconstructions were performed by the CT technologist. Dose reduction techniques were used.   CONTRAST: 72mL Isovue 370    FINDINGS:   CT ANGIOGRAM CHEST, ABDOMEN, AND PELVIS: Moderate calcified atherosclerotic changes throughout normal caliber aorta. No dissection. No hyperdense rim sign within the thoracic aorta to suggest an acute intramural hematoma. Three-vessel arch. Near   circumferential calcified atherosclerotic changes of the origin of the left subclavian artery without significant stenosis. No significant stenosis of the visualized portions of the arch vessels.    Calcified atherosclerotic changes producing moderate stenosis of the origins of the celiac, superior mesenteric and right renal arteries. There appears to be a severe left renal artery origin stenosis. Patent inferior mesenteric artery. Moderate   calcified atherosclerotic changes of the bilateral common and internal iliac arteries without severe stenosis. Normal caliber external iliac and common femoral arteries.    LUNGS AND PLEURA: Stable, moderate right pleural effusion. Stable moderate atelectatic changes of the posterior aspect of the right lower lobe. Stable, smooth mild interlobular  septal thickening.    MEDIASTINUM/AXILLAE: No lymphadenopathy. Mild cardiomegaly. Stable pericardial calcifications centered around the region of the atrioventricular groove.    CORONARY ARTERY CALCIFICATION: Moderate.    HEPATOBILIARY: Contrast reflux into the intrahepatic IVC and hepatic veins likely due to right-sided cardiac insufficiency. Mild intra and extrahepatic biliary dilatation. Small gallstones. No radiopaque filling defects within the extra hepatic bile   ducts.    PANCREAS: Normal.    SPLEEN: Mild splenomegaly, measuring 13.5 cm in length.    ADRENAL GLANDS: Normal.    KIDNEYS/BLADDER: Marked bilateral renal atrophy. No hydronephrosis.    BOWEL: No obstruction or inflammatory change. Mild to moderate abdominal/pelvic ascites.     LYMPH NODES: Normal.    PELVIC ORGANS: Normal.    MUSCULOSKELETAL: Moderate degenerative changes of the lower lumbar spine. Degenerative changes bilateral sacroiliac joints. Anterior abdominal skin thickening with high attenuation striations within this contains fat likely representing wall edema.   Ascitic fluid herniating through a moderate umbilical hernia.      Impression    IMPRESSION:  1.  No evidence for acute aortic syndrome.  2.  Stable, moderate right pleural effusion.  3.  Mild to moderate ascites.  4.  Mild intrauterine extra hepatic biliary dilatation. This may be secondary to a distal biliary obstruction.  5.  Moderate pericardial calcifications.  6.  Severe bilateral renal atrophy.  7.  Anterior abdominal wall edema.     Lactic acid whole blood   Result Value Ref Range    Lactic Acid 1.4 0.7 - 2.0 mmol/L   Glucose by meter   Result Value Ref Range    GLUCOSE BY METER POCT 174 (H) 70 - 99 mg/dL   Glucose by meter   Result Value Ref Range    GLUCOSE BY METER POCT 181 (H) 70 - 99 mg/dL   Glucose by meter   Result Value Ref Range    GLUCOSE BY METER POCT 182 (H) 70 - 99 mg/dL   Basic metabolic panel   Result Value Ref Range    Sodium 135 135 - 145 mmol/L     "Potassium 5.0 3.4 - 5.3 mmol/L    Chloride 94 (L) 98 - 107 mmol/L    Carbon Dioxide (CO2) 23 22 - 29 mmol/L    Anion Gap 18 (H) 7 - 15 mmol/L    Urea Nitrogen 51.2 (H) 8.0 - 23.0 mg/dL    Creatinine 7.44 (H) 0.51 - 0.95 mg/dL    GFR Estimate 6 (L) >60 mL/min/1.73m2    Calcium 9.6 8.8 - 10.4 mg/dL    Glucose 136 (H) 70 - 99 mg/dL   CBC with platelets   Result Value Ref Range    WBC Count 5.5 4.0 - 11.0 10e3/uL    RBC Count 3.06 (L) 3.80 - 5.20 10e6/uL    Hemoglobin 8.7 (L) 11.7 - 15.7 g/dL    Hematocrit 28.2 (L) 35.0 - 47.0 %    MCV 92 78 - 100 fL    MCH 28.4 26.5 - 33.0 pg    MCHC 30.9 (L) 31.5 - 36.5 g/dL    RDW 18.4 (H) 10.0 - 15.0 %    Platelet Count 89 (L) 150 - 450 10e3/uL       Clinically Significant Risk Factors Present on Admission                # Thrombocytopenia: Lowest platelets = 89 in last 2 days, will monitor for bleeding   # Hypertension: Noted on problem list    # Anemia: based on hgb <11      # DMII: A1C = 7.5 % (Ref range: <5.7 %) within past 6 months   # Overweight: Estimated body mass index is 27.96 kg/m  as calculated from the following:    Height as of this encounter: 1.549 m (5' 1\").    Weight as of this encounter: 67.1 kg (148 lb).                   Cardiac Arrhythmia: Junctional premature depolarization  Cardiomyopathy    Diabetes Type 1    Acidosis            CKD POA List: ESRD on dialysis        COPD    Pulmonary Heart Disease (Pulmonary hypertension or Cor pulmonale): Pulmonary Hypertension, unspecified    Chronic Fatigue and Other Debilities: Limitation of activities due to disability             "

## 2024-08-19 NOTE — CONSULTS
Nephrology Initial Consult  August 19, 2024      Yissel Wetzel MRN:[id number] YOB: 1960  Date of Admission:8/18/2024  Primary care provider: Gigi Martin  Requesting physician: Barry Ballesteros MD    ASSESSMENT AND RECOMMENDATIONS:   1 ESRD-  Monday Wednesday Friday dialysis at [facility] unit under care of Dr. Ace  210 minutes, target weight 63 kg  Right upper extremity AV fistula, 15-gauge, 450 blood flow rate  2K, 2.5 calcium, 35 bicarb  Heparin 1000/500  UF range recently 2.1-2.5 kg.  Has been coming off around 65 kg.  UF profile 3    2 intradialytic hypotension-on high doses of midodrine.  Reportedly takes 30 mg at the start of dialysis and takes 20 mg twice daily on nondialysis days.    3 bradycardia-noted to be in junctional rhythm.  Amiodarone stopped.  Cardiology on board.    4 pulmonary hypertension-noted plans by Dr. Redd for inpatient admission and daily dialysis later this month at [facility].    Plan-  Dialysis today.  To have intermittent 5 L ultrafiltration as tolerated.  Midodrine with dialysis.  Albumin prime.  UF profile 3.  Next dialysis on Wednesday        Thank you for the consult. Will continue to follow along with you .        Tamar Chopra MD  [facility] - Nephrology   [phone]        REASON FOR CONSULT: ESRD    HISTORY OF PRESENT ILLNESS:  Yissel Wetzel is a 64 year old female with type diabetes mellitus, hypertension, ESRD  Last dialysis on Friday  Gets large doses of midodrine on dialysis, up to 30 mg secondary to intradialytic hypotension.   She presented with generalized weakness, dizziness and low systolic blood pressure in 80s, which responded to midodrine  Noted to be in junctional rhythm.  Seen by cardiology.  Amiodarone stopped.  She had attempted paracentesis last week but reportedly did not have enough ascites.    On evaluation, she is awake and alert.  Comfortable.  Denies dizziness.  Blood pressure 90s.   No shortness of breath.      PAST MEDICAL HISTORY:  Reviewed with patient on 08/19/2024  and is as listed in HPI.       MEDICATIONS:  PTA Meds  Prior to Admission medications    Medication Sig Last Dose Taking? Auth Provider Long Term End Date   acetaminophen (TYLENOL) 500 MG tablet Take 500 mg by mouth every 8 hours as needed for mild pain Unknown Yes Unknown, Entered By History     amiodarone (PACERONE) 200 MG tablet Take 200mg po 5 days a week.  Hold wednesday and Sunday 8/18/2024 at am Yes France Barbosa MD Yes    calcium acetate (PHOSLO) 667 MG CAPS capsule Take 667 mg by mouth 3 times daily (with meals) 8/17/2024 Yes Unknown, Entered By History     calcium acetate (PHOSLO) 667 MG CAPS capsule Take 667 mg by mouth Take with snacks or supplements 8/17/2024 Yes Unknown, Entered By History     famotidine (PEPCID) 10 MG tablet Take 10 mg by mouth daily as needed Unknown Yes Unknown, Entered By History     guaiFENesin 1200 MG TB12 Take 1 tablet by mouth 2 times daily as needed (cough/congestion) Unknown Yes Unknown, Entered By History No    insulin aspart (FIASP FLEXTOUCH) 100 UNIT/ML pen-injector 2U per 15 carbs, plus 1U for every 50 that preprandial BG over 150  Yes Gigi Martin MD     insulin detemir (LEVEMIR FLEXTOUCH) 100 UNIT/ML pen Inject 11 Units subcutaneously at bedtime  Patient taking differently: Inject 10 Units subcutaneously at bedtime 8/17/2024 Yes Gigi Martin MD Yes    levalbuterol (XOPENEX HFA) 45 MCG/ACT inhaler Inhale 2 puffs into the lungs every 6 hours as needed for shortness of breath / dyspnea or wheezing Unknown Yes Gigi Martin MD Yes    midodrine (PROAMATINE) 10 MG tablet Take 30 mg by mouth Monday, Wednesdays, and Fridays with food prior to dialysis Past Week Yes Unknown, Entered By History     midodrine (PROAMATINE) 10 MG tablet Take 10 mg by mouth Every Mon, Wed, Fri Morning Past Week Yes Unknown, Entered By History     midodrine (PROAMATINE) 10 MG  tablet Take 20 mg by mouth Twice daily (in the morning and evening) on non-dialysis days (Tuesday, Thursday, Saturday, Sunday) 8/17/2024 Yes Unknown, Entered By History     ACCU-CHEK CHING PLUS test strip USE TO TEST BLOOD SUGAR 4 TIMES PER DAY   Gigi Martin MD No    ACCU-CHEK GUIDE test strip USE 1 EACH TO TEST BLOOD GLUCOSE FOUR TIMES DAILY   Gigi Martin MD No    blood glucose (NO BRAND SPECIFIED) lancets standard Use to test blood sugar 4 times daily or as directed.   Gigi Martin MD     blood glucose (NO BRAND SPECIFIED) lancets standard Use to test blood sugar three times daily or as directed.   Gigi Martin MD     blood glucose monitoring (NO BRAND SPECIFIED) meter device kit Use to test blood sugar 4 times daily or as directed.   Gigi Martin MD     Continuous Glucose Sensor (FREESTYLE LATOSHA 2 SENSOR) MISC 1 each every 14 days Use 1 sensor every 14 days. Use to read blood sugars per 's instructions.   Gigi Martin MD     insulin pen needle (31G X 8 MM) 31G X 8 MM miscellaneous Use 4 pen needles daily or as directed.   Gigi Martin MD        Current Meds  Current Facility-Administered Medications   Medication Dose Route Frequency Provider Last Rate Last Admin    - MEDICATION INSTRUCTIONS for Dialysis Patients -   Does not apply See Admin Instructions Barry Ballesteros MD        [START ON 8/20/2024] amiodarone (PACERONE) tablet 200 mg  200 mg Oral Once per day on Monday Tuesday Wednesday Thursday Friday Feng Brand NP        calcium acetate (PHOSLO) capsule 667 mg  667 mg Oral TID w/meals Feng Brand NP   667 mg at 08/19/24 0750    insulin aspart (NovoLOG) injection (RAPID ACTING)   Subcutaneous TID w/meals Feng Brand NP   7 Units at 08/19/24 0746    insulin aspart (NovoLOG) injection (RAPID ACTING)  1-7 Units Subcutaneous TID AC Feng Brand NP   1 Units at 08/18/24 1859     insulin aspart (NovoLOG) injection (RAPID ACTING)  1-5 Units Subcutaneous At Bedtime Feng Brand NP        insulin detemir (LEVEMIR PEN) injection 10 Units  10 Units Subcutaneous At Bedtime Feng Brand NP   10 Units at 08/18/24 2259    midodrine (PROAMATINE) tablet 10 mg  10 mg Oral Once per day on Monday Wednesday Friday Feng Brand NP   10 mg at 08/19/24 0900    midodrine (PROAMATINE) tablet 20 mg  20 mg Oral 2 times per day on Sunday Tuesday Thursday Saturday Feng Brand NP   20 mg at 08/18/24 2310    midodrine (PROAMATINE) tablet 30 mg  30 mg Oral Once per day on Monday Wednesday Friday Feng Brand NP        No heparin via hemodialysis machine   Does not apply Once Tamar Chopra MD        sodium chloride (PF) 0.9% PF flush 3 mL  3 mL Intracatheter Q8H Feng Brand NP        sodium chloride (PF) 0.9% PF flush 3 mL  3 mL Intracatheter Q8H Feng Brand NP   3 mL at 08/18/24 2259    sodium chloride 0.9% BOLUS 250 mL  250 mL Intravenous Once in dialysis/CRRT Tamar Chopra MD        sodium chloride 0.9% BOLUS 300 mL  300 mL Hemodialysis Machine Once Tamar Chopra MD         Infusion Meds  Current Facility-Administered Medications   Medication Dose Route Frequency Provider Last Rate Last Admin       ALLERGIES:    Allergies   Allergen Reactions    Albuterol      shakiness    Aspirin      gi    Byetta [Exenatide]      gi    Clonidine      constipation    Codeine      vomiting    Ezetimibe      muscle symptoms    Hydralazine      headaches    Lisinopril      hyperkalemia    Metformin Hydrochloride      vomiting    Pravachol [Pravastatin Sodium]      muscle pains    Simvastatin      myalgias    Troglitazone        REVIEW OF SYSTEMS:  A comprehensive of systems was negative except as noted above.    SOCIAL HISTORY:   Reviewed with patient on 08/19/2024     FAMILY MEDICAL HISTORY:   Family History   Problem  "Relation Age of Onset    Arrhythmia Mother     Hypertension Mother     Pancreatic Cancer Father     Diabetes Brother     Asthma Sister     LUNG DISEASE Sister     Diabetes Daughter     Cirrhosis Sister      Reviewed with patient on 2024     PHYSICAL EXAM:   Temp  Av.6  F (36.4  C)  Min: 97.3  F (36.3  C)  Max: 98.2  F (36.8  C)      Pulse  Av  Min: 48  Max: 60 Resp  Av.6  Min: 13  Max: 88  SpO2  Av %  Min: 90 %  Max: 100 %       BP 95/42   Pulse 52   Temp 97.4  F (36.3  C) (Oral)   Resp (!) 88   Ht 1.549 m (5' 1\")   Wt 67.1 kg (148 lb)   LMP  (LMP Unknown)   SpO2 98%   BMI 27.96 kg/m        Admit Weight: 67.1 kg (148 lb)     GENERAL APPEARANCE: no distress,  awake  EYES: no scleral icterus, pupils equal  HENT: NC/AT,  mouth  without ulcers or lesions  Lymphatics: no cervical or supraclavicular LAD  Endo: no moon facies, no goiter  Pulmonary: Diminished in right base  CV:irregular rhythm, normal rate, no rub   - JVP -   - Edema -  GI: soft, nontender,   MS: no evidence of inflammation in joints  : no lei  SKIN: no rash, warm, dry, no cyanosis  NEURO: face symmetric, no asterixis   Right upper extremity AV fistula with good bruit and thrill    LABS:   CMP  Recent Labs   Lab 24  0653 24  2149 24  1832 24  1651 24  1243 24  1008     --   --   --   --  137   POTASSIUM 5.0  --   --   --   --  4.9   CHLORIDE 94*  --   --   --   --  96*   CO2 23  --   --   --   --  25   ANIONGAP 18*  --   --   --   --  16*   * 182* 181* 174*   < > 221*   BUN 51.2*  --   --   --   --  38.4*   CR 7.44*  --   --   --   --  6.10*   GFRESTIMATED 6*  --   --   --   --  7*   KANWAL 9.6  --   --   --   --  9.9   PROTTOTAL  --   --   --   --   --  6.6   ALBUMIN  --   --   --   --   --  3.7   BILITOTAL  --   --   --   --   --  0.3   ALKPHOS  --   --   --   --   --  93   AST  --   --   --   --   --  17   ALT  --   --   --   --   --  9    < > = values in this interval " not displayed.     CBC  Recent Labs   Lab 08/19/24  0653 08/18/24  1008   HGB 8.7* 9.8*   WBC 5.5 5.9   RBC 3.06* 3.44*   HCT 28.2* 32.3*   MCV 92 94   MCH 28.4 28.5   MCHC 30.9* 30.3*   RDW 18.4* 18.4*   PLT 89* 101*     INRNo lab results found in last 7 days.  ABG  Recent Labs   Lab 08/18/24  1115   O2PER 0      URINE STUDIES  Recent Labs   Lab Test 09/25/19  1455 06/07/18  1455 08/09/17  1640   COLOR Yellow Yellow Yellow   APPEARANCE Clear Clear Clear   URINEGLC >499* 250* Negative   URINEBILI Negative Negative Negative   URINEKETONE Negative Negative Negative   SG 1.005 1.015 1.020   UBLD Negative Small* Small*   URINEPH 7.0 6.0 6.0   PROTEIN 100* >=300* >=300*   UROBILINOGEN  --  0.2 0.2   NITRITE Negative Negative Negative   LEUKEST Negative Negative Negative   RBCU <1 2-5* O - 2   WBCU 1 0 - 5 O - 2     No lab results found.  PTH  No lab results found.  IRON STUDIES  Recent Labs   Lab Test 06/19/22  0634 03/17/19  0805 08/30/18  1318   IRON 27* 27* 54   * 178* 196*   IRONSAT 15 15 28   LATRICE 898* 210 407*       IMAGING:  Personally reviewed the images and findings are as listed in HPI     Tamar Chopra MD

## 2024-08-19 NOTE — PROGRESS NOTES
Potassium   Date Value Ref Range Status   08/19/2024 5.0 3.4 - 5.3 mmol/L Final   06/20/2022 4.7 3.4 - 5.3 mmol/L Final   10/05/2020 4.1 3.5 - 5.5 mEq/L Final     Hemoglobin   Date Value Ref Range Status   08/19/2024 8.7 (L) 11.7 - 15.7 g/dL Final   01/13/2020 8.4 (L) 11.7 - 15.7 g/dL Final       DIALYSIS PROCEDURE NOTE  Hepatitis status of previous patient on machine log was checked and verified ok to use with this patients hepatitis status.  Patient dialyzed for 2.5 hrs. on a K3 bath with a net fluid removal of  2L.  A BFR of 450 ml/min was obtained via a right arm AVfistula using 15 gauge needles.      The treatment plan was discussed with Dr. Chopra during the treatment.    Total heparin received during the treatment: 0 units.   Needle cannulation sites held x 10 min.     Meds  given: none   Complications: pt requested to be taken off 30. Education provided but pt adamant about ending tx early. Dr. Chopra made aware.       Person educated: pt. Knowledge base substantial. Barriers to learning: none. Educated on procedure via verbal mode. Patient verbalized understanding.   ICEBOAT? Timeout performed pre-treatment  I: Patient was identified using 2 identifiers  C:  Consent Signed Yes  E: Equipment preventative maintenance is current and dialysis delivery system OK to use  B: Hepatitis B Surface Antigen: negative; Draw Date: 8/5/2024      Hepatitis B Surface Antibody: 8/5/2024; Draw Date: Susceptible  O: Dialysis orders present and complete prior to treatment  A: Vascular access verified and assessed prior to treatment  T: Treatment was performed at a clinically appropriate time  ?: Patient was allowed to ask questions and address concerns prior to treatment  See Adult Hemodialysis flowsheet in [employer] for further details and post assessment.  Machine water alarm in place and functioning. Transducer pods intact and checked every 15min.   Pt assisted with repositioning throughout dialysis treatment.  Pt returned via  bed.  Chlorine/Chloramine water system checked every 4 hours.  Outpatient Dialysis at [facility]. MWF.       Post treatment report given to CARYN Gaytan RN regarding 2L of fluid removed, last BP      Alyson Patel Dialysis RN

## 2024-08-20 LAB
GLUCOSE BLDC GLUCOMTR-MCNC: 101 MG/DL (ref 70–99)
GLUCOSE BLDC GLUCOMTR-MCNC: 147 MG/DL (ref 70–99)
GLUCOSE BLDC GLUCOMTR-MCNC: 169 MG/DL (ref 70–99)
GLUCOSE BLDC GLUCOMTR-MCNC: 188 MG/DL (ref 70–99)

## 2024-08-20 PROCEDURE — 99232 SBSQ HOSP IP/OBS MODERATE 35: CPT | Performed by: INTERNAL MEDICINE

## 2024-08-20 PROCEDURE — 250N000013 HC RX MED GY IP 250 OP 250 PS 637: Performed by: INTERNAL MEDICINE

## 2024-08-20 PROCEDURE — 250N000013 HC RX MED GY IP 250 OP 250 PS 637

## 2024-08-20 PROCEDURE — 99222 1ST HOSP IP/OBS MODERATE 55: CPT | Performed by: INTERNAL MEDICINE

## 2024-08-20 PROCEDURE — 120N000013 HC R&B IMCU

## 2024-08-20 PROCEDURE — 93010 ELECTROCARDIOGRAM REPORT: CPT | Performed by: INTERNAL MEDICINE

## 2024-08-20 PROCEDURE — 93005 ELECTROCARDIOGRAM TRACING: CPT

## 2024-08-20 RX ORDER — MIDODRINE HYDROCHLORIDE 5 MG/1
20 TABLET ORAL
Status: DISCONTINUED | OUTPATIENT
Start: 2024-08-20 | End: 2024-08-20

## 2024-08-20 RX ORDER — MIDODRINE HYDROCHLORIDE 5 MG/1
20 TABLET ORAL
Status: DISCONTINUED | OUTPATIENT
Start: 2024-08-20 | End: 2024-08-21 | Stop reason: HOSPADM

## 2024-08-20 RX ORDER — INSULIN DETEMIR 100 [IU]/ML
10 INJECTION, SOLUTION SUBCUTANEOUS AT BEDTIME
Status: SHIPPED
Start: 2024-08-20 | End: 2024-08-22

## 2024-08-20 RX ORDER — AMIODARONE HYDROCHLORIDE 100 MG/1
100 TABLET ORAL
Qty: 30 TABLET | Refills: 3 | Status: ON HOLD | OUTPATIENT
Start: 2024-08-30 | End: 2024-09-30

## 2024-08-20 RX ADMIN — MIDODRINE HYDROCHLORIDE 20 MG: 5 TABLET ORAL at 18:33

## 2024-08-20 RX ADMIN — INSULIN ASPART 1 UNITS: 100 INJECTION, SOLUTION INTRAVENOUS; SUBCUTANEOUS at 18:51

## 2024-08-20 RX ADMIN — INSULIN DETEMIR 8 UNITS: 100 INJECTION, SOLUTION SUBCUTANEOUS at 22:14

## 2024-08-20 RX ADMIN — CARBAMIDE PEROXIDE 6.5% 10 DROP: 6.5 LIQUID AURICULAR (OTIC) at 12:45

## 2024-08-20 RX ADMIN — CALCIUM ACETATE 667 MG: 667 CAPSULE ORAL at 12:17

## 2024-08-20 RX ADMIN — INSULIN ASPART 1 UNITS: 100 INJECTION, SOLUTION INTRAVENOUS; SUBCUTANEOUS at 07:54

## 2024-08-20 RX ADMIN — MIDODRINE HYDROCHLORIDE 20 MG: 5 TABLET ORAL at 12:17

## 2024-08-20 RX ADMIN — CARBAMIDE PEROXIDE 6.5% 10 DROP: 6.5 LIQUID AURICULAR (OTIC) at 22:17

## 2024-08-20 RX ADMIN — CALCIUM ACETATE 667 MG: 667 CAPSULE ORAL at 07:55

## 2024-08-20 RX ADMIN — CALCIUM ACETATE 667 MG: 667 CAPSULE ORAL at 18:33

## 2024-08-20 ASSESSMENT — ACTIVITIES OF DAILY LIVING (ADL)
ADLS_ACUITY_SCORE: 30
ADLS_ACUITY_SCORE: 31
ADLS_ACUITY_SCORE: 31
ADLS_ACUITY_SCORE: 32
ADLS_ACUITY_SCORE: 30
ADLS_ACUITY_SCORE: 30
ADLS_ACUITY_SCORE: 31
ADLS_ACUITY_SCORE: 30
ADLS_ACUITY_SCORE: 31
ADLS_ACUITY_SCORE: 30
ADLS_ACUITY_SCORE: 31
ADLS_ACUITY_SCORE: 30
ADLS_ACUITY_SCORE: 32
ADLS_ACUITY_SCORE: 30
ADLS_ACUITY_SCORE: 32
ADLS_ACUITY_SCORE: 30
ADLS_ACUITY_SCORE: 30
ADLS_ACUITY_SCORE: 32
ADLS_ACUITY_SCORE: 31
ADLS_ACUITY_SCORE: 30
ADLS_ACUITY_SCORE: 31

## 2024-08-20 NOTE — CONSULTS
"CARDIAC ELECTROPHYSIOLOGY CONSULTATION  August 20, 2024    REQUESTING PROVIDER:  Dr. KIRILL Lala.    REASON FOR CONSULTATION: bradycardia.      HISTORY OF PRESENT ILLNESS:    64-year-old female who is well-known to me. Complex past medical history significant for severe pulmonary hypertension, ESRD-on chronic hemodialysis, COPD (on home O2 at night), diabetes mellitus type II, anemia, hypotension after dialysis (treated with midodrine), and highly symptomatic SVT which has been managed with low-dose amiodarone, 200 mg 5 days per week. She follows with Dr. Roderick Redd at [facility].    She was admitted two days ago with weakness and dizziness. Systolic BP at home was in the 90s. It is often low after dialysis. In the emergency room she was noted to be in junctional rhythm in the 50s.    Dr. Lala saw the patient yesterday and PTA amiodarone was stopped.    Today the patient feels reasonably well. She told me that over the weekend some of her \"dizzy spells\" were in fact associated with vertiginous sensation. She has not fainted.     She has not had SVT for 3-4 years (after starting amiodarone). This was a highly symptomatic arrhythmia in this patient. Catheter ablation was not pursued in the past as PA pressures were prohibitively high, PASP was as high as the 120s mmHg in the past.    The patient lives alone.      DIAGNOSTIC STUDIES:  Labs: sodium 135, potassium 5.0, creatinine 7.44, hematocrit 28%, WBC 5.5  12-lead ECG: junctional rhythm, ST-T abnormality.  Echocardiogram: [place] echocardiogram from February 2025 showed normal LVEF, severe biatrial enlargement, RV size looks normal or mildly dilated. Interestingly, there is no evidence of a significant elevation of RA pressure and/or estimated RVSP (33 mmHg + RA pressure), a significant improvement from previous.      IMPRESSION:  Junctional rhythm in the 50s. History of highly symptomatic SVT (short RP tachycardia). Junctional rhythm is likely related to the chronic " "amiodarone use. It is generally a stable cardiac rhythm. It is unclear how symptomatic it is. Associated bradycardia has been mild. Her reported \"dizziness\" may be related to low BP and/or inner ear causes.  I agree with stopping amiodarone, temporarily. Due to the high likelihood of SVT recurrence, I recommend restarting it at half the previous dose in approximately 10 days.    RECOMMENDATIONS:    Restart amiodarone in ~10 days at 100 mg 5 days per week.  We will arrange for an outpatient cardiac monitor and follow-up.    Signing off.    I appreciate the opportunity to be part of this patient's care.  Please feel free to call me with any questions.    Moderate complexity medical decision-making.     France Barbosa MD, [facility]        PHYSICAL EXAM:  Vitals: /44 (Patient Position: Chair)   Pulse 60   Temp 97.2  F (36.2  C) (Oral)   Resp 16   Ht 1.549 m (5' 1\")   Wt 67.2 kg (148 lb 3.2 oz)   LMP  (LMP Unknown)   SpO2 98%   BMI 28.00 kg/m      Intake/Output Summary (Last 24 hours) at 8/20/2024 1142  Last data filed at 8/20/2024 0000  Gross per 24 hour   Intake 360 ml   Output 2000 ml   Net -1640 ml     Vitals:    08/18/24 0951 08/20/24 0100   Weight: 67.1 kg (148 lb) 67.2 kg (148 lb 3.2 oz)     Constitutional:  Alert and oriented x3.  Pt appears comfortable, has no CP or respiratory distress.  Head: Normocephalic and atraumatic.   Skin:  Normal color and texture.  No rashes, lesions or eruptions.  Eyes:  no jaundice, EOMI.  ENT:  Supple, JVP does not appear significantly elevated, no apparent thyroid enlargement.  Lymph:  No lymphadenopathy   Chest/Lungs: decreased breath sounds bilaterally.  Cardiac:  Regular rhythm, normal S1 and S2.  No murmur, rub or gallop.    GI:  Normal bowel sounds. Abdomen is soft, non-tender & not distended.  No rebound or guarding.    Extremities:  Radial pulses 2+ bilaterally.  DP and PT pulses palpable bilaterally. Trace extremity edema is present.   Neurological:  CN 2-12 " grossly intact.  Strength in UE is normal & symmetric.    Back:  No CVA tenderness.     REVIEW OF SYSTEMS:  A complete review of system was performed and was negative with the exception of what was described in the HPI section.     CURRENT MEDICATIONS:  Current Facility-Administered Medications   Medication Dose Route Frequency Provider Last Rate Last Admin    - MEDICATION INSTRUCTIONS for Dialysis Patients -   Does not apply See Admin Instructions Barry Ballesteros MD        [Held by provider] amiodarone (PACERONE) tablet 200 mg  200 mg Oral Once per day on Monday Tuesday Wednesday Thursday Friday Feng Brand NP        calcium acetate (PHOSLO) capsule 667 mg  667 mg Oral TID w/meals Feng Brand NP   667 mg at 08/20/24 0755    carbamide peroxide (DEBROX) 6.5 % otic solution 10 drop  10 drop Left Ear Once Barry Ballesteros MD        insulin aspart (NovoLOG) injection (RAPID ACTING)   Subcutaneous TID w/meals Barry Ballesteros MD   10 Units at 08/20/24 0754    insulin aspart (NovoLOG) injection (RAPID ACTING)  1-7 Units Subcutaneous TID AC Barry Ballesteros MD   1 Units at 08/20/24 0754    insulin aspart (NovoLOG) injection (RAPID ACTING)  1-5 Units Subcutaneous At Bedtime Barry Ballesteros MD        insulin detemir (LEVEMIR PEN) injection 10 Units  10 Units Subcutaneous At Bedtime Feng Brnad NP   10 Units at 08/19/24 2216    midodrine (PROAMATINE) tablet 20 mg  20 mg Oral Once per day on Sunday Tuesday Thursday Saturday Barry Ballesteros MD        midodrine (PROAMATINE) tablet 20 mg  20 mg Oral Once per day on Sunday Tuesday Thursday Saturday Barry Ballesteros MD        sodium chloride (PF) 0.9% PF flush 3 mL  3 mL Intracatheter Q8H Feng Brand NP        sodium chloride (PF) 0.9% PF flush 3 mL  3 mL Intracatheter Q8H Feng Brand NP   3 mL at 08/20/24 0704       ALLERGIES     Allergies   Allergen Reactions     Albuterol      shakiness    Aspirin      gi    Byetta [Exenatide]      gi    Clonidine      constipation    Codeine      vomiting    Ezetimibe      muscle symptoms    Hydralazine      headaches    Lisinopril      hyperkalemia    Metformin Hydrochloride      vomiting    Pravachol [Pravastatin Sodium]      muscle pains    Simvastatin      myalgias    Troglitazone        PAST MEDICAL HISTORY:  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, initial encounter 2019    End stage renal failure on dialysis (H)     Esophagitis, unspecified     Former tobacco use     HEMORRHAGIC GASTROPATHY     History of blood transfusion     [place]    Iron deficiency anemia, unspecified     Mild persistent asthma     Obesity     Other and unspecified hyperlipidemia     Pneumonia     Pulmonary hypertension (H)     per 2012 echo mod.to severe pulmonary hypertension    Tobacco use disorder dc ed     Type 2 diabetes mellitus (H)     on Insulin- managed by PCP    Unspecified essential hypertension        PAST SURGICAL HISTORY:  Past Surgical History:   Procedure Laterality Date    ABDOMINOPLASTY  pt unsure when    abdominoplasty-     SECTION  ,     x 2    COLONOSCOPY      [facility]    COLONOSCOPY N/A 2021    Procedure: COLONOSCOPY, WITH POLYPECTOMY AND BIOPSY;  Surgeon: Lincoln Farrar MD;  Location:  GI    CREATE FISTULA ARTERIOVENOUS UPPER EXTREMITY Right 2018    Procedure: RIGHT BRACHIAL TO BASILIC ARTERIOVENOUS FISTULA CREATION;  Surgeon: Terry Vizcaino MD;  Location: [facility]    CV HEART CATHETERIZATION WITH POSSIBLE INTERVENTION N/A 2021    Procedure: Heart Catheterization with Possible Intervention;  Surgeon: Joseluis Dean MD;  Location:  HEART CARDIAC CATH LAB    CV RIGHT  HEART CATH MEASUREMENTS RECORDED N/A 2022    Procedure: Heart Cath Right Heart Cath;  Surgeon: Yan Heredia MD;  Location:  HEART CARDIAC CATH LAB    ENT SURGERY  as a child    tonsillectomy    ESOPHAGOSCOPY, GASTROSCOPY, DUODENOSCOPY (EGD), COMBINED N/A 2021    Procedure: ESOPHAGOGASTRODUODENOSCOPY, WITH BIOPSY;  Surgeon: Lincoln Farrar MD;  Location:  GI    EYE SURGERY Left     injections- eye    HYSTERECTOMY  approx     partial hysterectomy-reason bleeding     IR CVC TUNNEL PLACEMENT > 5 YRS OF AGE  2019    IR CVC TUNNEL REMOVAL RIGHT  2020    IR DIALYSIS FISTULOGRAM RIGHT  2019       FAMILY HISTORY:  Family History   Problem Relation Age of Onset    Arrhythmia Mother     Hypertension Mother     Pancreatic Cancer Father     Diabetes Brother     Asthma Sister     LUNG DISEASE Sister     Diabetes Daughter     Cirrhosis Sister        SOCIAL HISTORY:  Social History     Socioeconomic History    Marital status: Single   Tobacco Use    Smoking status: Former     Current packs/day: 0.00     Average packs/day: 1 pack/day for 22.0 years (22.0 ttl pk-yrs)     Types: Cigarettes     Start date: 10/12/1977     Quit date: 10/12/1999     Years since quittin.8    Smokeless tobacco: Never   Vaping Use    Vaping status: Never Used   Substance and Sexual Activity    Alcohol use: No     Alcohol/week: 0.0 standard drinks of alcohol    Drug use: No    Sexual activity: Never   Other Topics Concern    Blood Transfusions Yes    Caffeine Concern No    Sleep Concern No    Stress Concern No    Weight Concern Yes     Comment: would like to lose weight    Special Diet No    Exercise Yes     Comment: walks daily 2 blocks     Social Determinants of Health     Interpersonal Safety: Low Risk  (11/15/2023)    Interpersonal Safety     Do you feel physically and emotionally safe where you currently live?: Yes     Within the past 12 months, have you been hit, slapped, kicked or otherwise  "physically hurt by someone?: No     Within the past 12 months, have you been humiliated or emotionally abused in other ways by your partner or ex-partner?: No         Recent Lab Results:  Recent Labs   Lab 08/20/24  0729 08/19/24  2214 08/19/24  1934 08/19/24  1145 08/19/24  0653 08/18/24  1243 08/18/24  1008   WBC  --   --   --   --  5.5  --  5.9   HGB  --   --   --   --  8.7*  --  9.8*   MCV  --   --   --   --  92  --  94   PLT  --   --   --   --  89*  --  101*   NA  --   --   --   --  135  --  137   POTASSIUM  --   --   --   --  5.0  --  4.9   CHLORIDE  --   --   --   --  94*  --  96*   CO2  --   --   --   --  23  --  25   BUN  --   --   --   --  51.2*  --  38.4*   CR  --   --   --   --  7.44*  --  6.10*   ANIONGAP  --   --   --   --  18*  --  16*   KANWAL  --   --   --   --  9.6  --  9.9   * 170* 397*   < > 136*   < > 221*   ALBUMIN  --   --   --   --   --   --  3.7   PROTTOTAL  --   --   --   --   --   --  6.6   BILITOTAL  --   --   --   --   --   --  0.3   ALKPHOS  --   --   --   --   --   --  93   ALT  --   --   --   --   --   --  9   AST  --   --   --   --   --   --  17   LIPASE  --   --   --   --   --   --  52    < > = values in this interval not displayed.         Clinically Significant Risk Factors                # Thrombocytopenia: Lowest platelets = 89 in last 2 days, will monitor for bleeding   # Hypertension: Noted on problem list          # DMII: A1C = 7.5 % (Ref range: <5.7 %) within past 6 months   # Overweight: Estimated body mass index is 28 kg/m  as calculated from the following:    Height as of this encounter: 1.549 m (5' 1\").    Weight as of this encounter: 67.2 kg (148 lb 3.2 oz)., PRESENT ON ADMISSION                       "

## 2024-08-20 NOTE — PROGRESS NOTES
[facility] [place]    Internal Medicine Hospitalist Progress Note  08/20/2024  I evaluated patient on the above date.    Barry Ballesteros Jr., MD  [phone] (p)  Text Page  Vocera      [New actions/orders today (08/20/2024) are underlined in the assessment and plan. All lab results in the assessment and plan were reviewed.]  Assessment & Plan      Yissel Wetzel is a 64 year old female with history including COPD; DM2; ESRD on HD; h/o severe pHTN with right heart dysfunction; h/o cirrhosis related to right heart disease; SVT on amiodarone; and h/o severe pHTN; who presented 8/18/2024 dizziness and weakness with soft BP's.    On initial evaluation, pt was afebrile, BP's down to 80's/30's, HR 50's, sats 90's. ECG showed bradycardic rhythm that appeared junctional with diffuse TWI/NSTWA. Labs notable for CBC with WBC normal, hgb 9.8; platelets 101K; BMP cr 6.10 (ESRD), bicarb normal, AG 16; LFT's normal; ketones 0.23; NTP-TNP 93647, troponin 70; COVID-19, influenza and RSV negative.      Generalized weakness with hypotension, ?related to bradycardia/junctional rhythm as well as chronic component.  Bradycardia with junctional rhythm.  * Initial presentation as above. Last dialysis 8/16. She checked her blood pressure at home and found her systolic to be 96. In the ED, her blood pressure dropped to as low as 86/33. She was given a one time dose of midodrine and her blood pressure improved to upper 90s to low 100s. PTA amiodarone held on admit. Continued on PTA midodrine. Cardiology consulted on admit give bradycardic junctional rhythm.   * On 8/19, BP's OK overnight, but soft/low again in am, HR's in 40's-50's. Seen by cardiology who recommended EP evaluation.   * On 8/20, remains in junctional rhythm. BP's stable, HR's stable in high 50's mostly.  - I d/w Dr. Barbosa, currently HR's are OK; plan to hold amiodarone for 10 days, and restart at half the PTA dose - decrease to amiodarone 100 mg 5x/week  8/30/2024.  - Zio patch will be ordered for after discharge.  - Continue to monitor on telemetry.  - Continue to hold PTA amiodarone.  - Continue PTA midodrine 10 mg qam and 30 mg before HD on MWF (dialysis days); and 20 mg BID  TThSaSu (non-dialysis days).  - Appreciate help from Dr. Barbosa.    Left ear cerumen impaction.  * On 8/20, pt felt that left ear was blocked up and was contributing to dizziness.  - Order left ear cerumen removal with hydrogen peroxide followed by water irrigation.     History of SVT.  * PTA on amiodarone.  * Initial presentation as above with junctional rhythm. Amiodarone held on admit.  - Continue to monitor on telemetry.  - As above, per d/w Dr. Barbosa, plan to hold amiodarone for 10 days, and restart at half the PTA dose - decrease to amiodarone 100 mg 5x/week 8/30/2024.  - Appreciate cardiology help.    DM2.   [PTA: detemir 10U at bedtime; aspart 2U/15g CHO + ISS with meals.]  * Hgba1c 7.5 on admit.  Recent Labs   Lab 08/20/24  0729 08/19/24  2214 08/19/24  1934 08/19/24  1623 08/19/24  1145 08/19/24  0653   * 170* 397* 181* 146* 136*     Recent Labs   Lab Test 08/18/24  1008 06/15/22  1233   A1C 7.5* 7.7*   - Continue carb controlled diet.  - Continue detemir 10U at bedtime.  - Continue aspart 2U/15g CHO with meals.  - Continue aspart ISS (medium).    ESRD.  * Dialyzes MWF. Nephrology consulted on admit for HD. PTA on midodrine.  - Continue HD per nephrology.  - Continue PTA midodrine 10 mg qam and 30 mg before HD on MWF (dialysis days); and 20 mg BID  TThSaSu (non-dialysis days).    COPD.  * On home O2: 2L at night.  - Continue O2.  - Continue PRN levalbuterol.    Thrombocytopenia, unclear etiology, possibly medication effect or acute/chronic from other cause.  * Platelets normal as recently as 11/2022. Most recently in [facility], platelets 144K in 9/2023.  * Platelets 101K on admit.   Recent Labs   Lab 08/19/24  0653 08/18/24  1008   PLT 89* 101*   - Continue to monitor CBC - repeat  "in am.    Chronic anemia.  * Baseline Hgb: 9.7-12.2. Iron studies 6/2022 suggested ACD +/- iron deficiency.  * Hgb 9.8 on admit.  - Continue to monitor CBC periodically as needed.    H/o severe pulmonology HTN felt to be related to intrinsic pulmonary vascular disease.  Mild AS, mild MR.  * Followed by Dr. Redd. Echocardiogram 2/27/24: LVEF 60-65%; RV OK; mild MR; mild AS; RVSP 33 + RAP.  - Continue to monitor clinically.  - Is to be admitted to [facility] for right heart catheterization 9/28/2024.    H/o cirrhosis noted to be related to severe pHTN with right heart dysfunction.  * LFT's normal on admit.  - Continue to monitor clinically.    Clinically Significant Risk Factors                # Thrombocytopenia: Lowest platelets = 89 in last 2 days, will monitor for bleeding   # Hypertension: Noted on problem list          # DMII: A1C = 7.5 % (Ref range: <5.7 %) within past 6 months   # Overweight: Estimated body mass index is 28 kg/m  as calculated from the following:    Height as of this encounter: 1.549 m (5' 1\").    Weight as of this encounter: 67.2 kg (148 lb 3.2 oz)., PRESENT ON ADMISSION                  Diet: Regular Diet Adult    Prophylaxis: PCD's and ambulation  Weller Catheter: Not present  Lines: None     Code Status: Full Code    Disposition Plan   Medically Ready for Discharge: Anticipated Today  Expected discharge to prior living arrangement pending above.    Entered: Barry Ballesteros MD 08/20/2024, 9:31 AM               Interval History   Remains in junctional rhythm, rate in high 50's.  BP's overall stable.  Pt c/o left ear congestion, feels it is contributing to dizziness.    * Data reviewed today: I reviewed all new labs and imaging over the last 24 hours. I personally reviewed no images or ECG's today.    Physical Exam   Most recent vitals:   , Blood pressure 114/41, pulse 60, temperature 97.2  F (36.2  C), temperature source Oral, resp. rate 15, height 1.549 m (5' 1\"), weight 67.2 kg (148 lb " 3.2 oz), SpO2 94%, not currently breastfeeding. O2 Device: Nasal cannula Oxygen Delivery: 2 LPM  Vitals:    08/18/24 0951 08/20/24 0100   Weight: 67.1 kg (148 lb) 67.2 kg (148 lb 3.2 oz)     Vital signs with ranges:  Temp:  [97.2  F (36.2  C)-98  F (36.7  C)] 97.2  F (36.2  C)  Pulse:  [52-60] 60  Resp:  [0-33] 15  BP: ()/(35-53) 114/41  SpO2:  [87 %-99 %] 94 %  Patient Vitals for the past 24 hrs:   BP Temp Temp src Pulse Resp SpO2 Weight   08/20/24 0800 114/41 -- -- 60 15 -- --   08/20/24 0700 -- 97.2  F (36.2  C) Oral -- -- -- --   08/20/24 0600 102/42 -- -- 55 18 94 % --   08/20/24 0400 110/40 -- -- 53 22 99 % --   08/20/24 0200 108/46 -- -- 53 18 94 % --   08/20/24 0100 -- -- -- -- -- -- 67.2 kg (148 lb 3.2 oz)   08/20/24 0000 99/41 -- -- 52 (!) 32 91 % --   08/19/24 2204 -- -- -- 59 24 98 % --   08/19/24 2200 (!) 92/35 -- -- 53 (!) 0 98 % --   08/19/24 2100 -- -- -- 52 30 98 % --   08/19/24 2004 -- -- -- 56 21 96 % --   08/19/24 2000 97/52 -- -- 54 27 -- --   08/19/24 1914 107/40 98  F (36.7  C) Oral 56 21 90 % --   08/19/24 1645 -- -- -- 55 (!) 33 (!) 87 % --   08/19/24 1630 113/49 -- -- 54 27 -- --   08/19/24 1535 116/41 97.8  F (36.6  C) Oral 58 24 90 % --   08/19/24 1515 116/51 -- -- 58 -- -- --   08/19/24 1445 (!) 108/39 -- -- -- 16 -- --   08/19/24 1430 109/42 -- -- -- 16 -- --   08/19/24 1400 111/41 -- -- -- 15 -- --   08/19/24 1345 114/42 -- -- 60 16 -- --   08/19/24 1330 113/46 -- -- 58 17 -- --   08/19/24 1315 113/42 -- -- 57 16 -- --   08/19/24 1300 108/48 -- -- 57 -- -- --   08/19/24 1244 115/53 -- -- 55 -- -- --   08/19/24 1235 125/47 98  F (36.7  C) Oral 54 16 -- --     I/O's last 24 hours:  I/O last 3 completed shifts:  In: 360 [P.O.:360]  Out: 2000 [Other:2000]    Constitutional: awake, alert, oriented, pleasant, conversant   Head:   Eyes:   ENT: Left ear cerumen blocking visualization of the TM; right ear clear  Neck:   Cardiovascular: bradycardic, regular rhythm, no  "murmurs/rubs/gallops  Lungs: diminished in the bases, no crackles or wheezes  Gastrointestinal/Abdomen: soft, non-tender, non-distended, positive bowel sounds  :   Musculoskeletal:   Skin/Extremities:   Neurologic:   Psychiatric:   Hematologic/Lymphatic/Immunologic:        Data    Labs reviewed.  Recent Labs   Lab 08/20/24  0729 08/19/24  2214 08/19/24  1934 08/19/24  1145 08/19/24  0653 08/18/24  1243 08/18/24  1008   WBC  --   --   --   --  5.5  --  5.9   HGB  --   --   --   --  8.7*  --  9.8*   MCV  --   --   --   --  92  --  94   PLT  --   --   --   --  89*  --  101*   NA  --   --   --   --  135  --  137   POTASSIUM  --   --   --   --  5.0  --  4.9   CHLORIDE  --   --   --   --  94*  --  96*   CO2  --   --   --   --  23  --  25   BUN  --   --   --   --  51.2*  --  38.4*   CR  --   --   --   --  7.44*  --  6.10*   ANIONGAP  --   --   --   --  18*  --  16*   KANWAL  --   --   --   --  9.6  --  9.9   * 170* 397*   < > 136*   < > 221*   ALBUMIN  --   --   --   --   --   --  3.7   PROTTOTAL  --   --   --   --   --   --  6.6   BILITOTAL  --   --   --   --   --   --  0.3   ALKPHOS  --   --   --   --   --   --  93   ALT  --   --   --   --   --   --  9   AST  --   --   --   --   --   --  17   LIPASE  --   --   --   --   --   --  52    < > = values in this interval not displayed.     Recent Labs   Lab Test 08/18/24  1243 08/18/24  1008 11/23/22  0729 08/09/17  2030   NT-PROBNP, INPATIENT  --  43,326* 25,075* 5,862*   TROPONIN T HIGH SENSITIVITY 62* 70*  --   --      Recent Labs   Lab 08/20/24  0729 08/19/24  2214 08/19/24  1934 08/19/24  1623 08/19/24  1145 08/19/24  0653   * 170* 397* 181* 146* 136*     Recent Labs   Lab Test 08/18/24  1008 06/15/22  1233   A1C 7.5* 7.7*       No results for input(s): \"INR\", \"[id number]\" in the last 168 hours.  Recent Labs   Lab 08/19/24  0653 08/18/24  1645 08/18/24  1108 08/18/24  1008   WBC 5.5  --   --  5.9   LACT  --  1.4  --   --    [id number]  --   --  Negative  --  "       MICRO:  CULTURES (INCLUDING BLOOD AND URINE):  No lab results found in last 7 days.    No results found for this or any previous visit (from the past 24 hour(s)).      Medications   All medications were reviewed. MAR.    Infusions:  Current Facility-Administered Medications   Medication Dose Route Frequency Provider Last Rate Last Admin     Scheduled Medications:  Current Facility-Administered Medications   Medication Dose Route Frequency Provider Last Rate Last Admin    - MEDICATION INSTRUCTIONS for Dialysis Patients -   Does not apply See Admin Instructions Barry Ballesteros MD        [Held by provider] amiodarone (PACERONE) tablet 200 mg  200 mg Oral Once per day on Monday Tuesday Wednesday Thursday Friday Feng Brand NP        calcium acetate (PHOSLO) capsule 667 mg  667 mg Oral TID w/meals Feng Brand NP   667 mg at 08/20/24 0755    insulin aspart (NovoLOG) injection (RAPID ACTING)   Subcutaneous TID w/meals Barry Ballesteros MD   10 Units at 08/20/24 0754    insulin aspart (NovoLOG) injection (RAPID ACTING)  1-7 Units Subcutaneous TID AC Barry Ballesteros MD   1 Units at 08/20/24 0754    insulin aspart (NovoLOG) injection (RAPID ACTING)  1-5 Units Subcutaneous At Bedtime Barry Ballesteros MD        insulin detemir (LEVEMIR PEN) injection 10 Units  10 Units Subcutaneous At Bedtime Feng Brand NP   10 Units at 08/19/24 2216    sodium chloride (PF) 0.9% PF flush 3 mL  3 mL Intracatheter Q8H Feng Brand NP        sodium chloride (PF) 0.9% PF flush 3 mL  3 mL Intracatheter Q8H Feng Brand NP   3 mL at 08/20/24 0704     PRN Medications:  Current Facility-Administered Medications   Medication Dose Route Frequency Provider Last Rate Last Admin    acetaminophen (TYLENOL) tablet 650 mg  650 mg Oral Q4H PRN Feng Brand NP        Or    acetaminophen (TYLENOL) Suppository 650 mg  650 mg Rectal Q4H PRN  Feng Brand NP        glucose gel 15-30 g  15-30 g Oral Q15 Min PRN Barry Ballesteros MD        Or    dextrose 50 % injection 25-50 mL  25-50 mL Intravenous Q15 Min PRN Barry Ballesteros MD        Or    glucagon injection 1 mg  1 mg Subcutaneous Q15 Min PRN Barry Ballesteros MD        levalbuterol (XOPENEX HFA) 45 MCG/ACT Inhaler 2 puff  2 puff Inhalation Q6H PRN Feng Brand NP        lidocaine (LMX4) cream   Topical Q1H PRN Feng Brand NP        lidocaine (LMX4) cream   Topical Q1H PRN Feng Brand NP        lidocaine 1 % 0.1-1 mL  0.1-1 mL Other Q1H PRN Feng Brand NP        lidocaine 1 % 0.1-1 mL  0.1-1 mL Other Q1H PRN Feng Brand NP        senna-docusate (SENOKOT-S/PERICOLACE) 8.6-50 MG per tablet 1 tablet  1 tablet Oral BID PRN Feng Brand NP        Or    senna-docusate (SENOKOT-S/PERICOLACE) 8.6-50 MG per tablet 2 tablet  2 tablet Oral BID PRN Feng Brand NP        sodium chloride (PF) 0.9% PF flush 3 mL  3 mL Intracatheter q1 min prn Feng Brand NP        sodium chloride (PF) 0.9% PF flush 3 mL  3 mL Intracatheter q1 min prn Feng Brand NP        sodium chloride 0.9% BOLUS 100-150 mL  100-150 mL Intravenous Q15 Min PRN Tamar Chopra MD

## 2024-08-20 NOTE — PLAN OF CARE
IMC discontinued,     Orientations: A&O, cooperative. Frustrated at times.   Vitals/Pain: VSS on 2L nc. Hypotension at times- midodrine scheduled.   Tele: Junctional rhythm W/ PACs- EP consulted & plan to discharge with monitor & follow up outpatient   Lines/Drains: R Upper fisula in place. L PIV SL  GI/: Pt on hemodialysis. +BM, plan to run tomorrow.   Ambulation/Assist: SBA  Sleep Quality: good  Diet: regular diet, blood glucose checks with carb coverage.   Pain: Pt complains of Left ear/ headache- ear irrigated per order, now new ordered for 2x daily.  Plan: Plan for dialysis tomorrow. Plan for discharge tomorrow.

## 2024-08-20 NOTE — PLAN OF CARE
"Orientation: A&Ox4, cooperative, frustrated at times  Vitals/Pain: VSS on 2L NC, ex soft BP 90-100s/40-50s. Pt reporting 3/10 ear pain stating \"I need my ears cleaned out\", declined medication  Tele: Junctional rhythm, BBB, EP consulted  Lines/Drains: L PIV SL, R upper arm AV fistula  LS: Clear upper, fine crackles lower  GI/: BS active, no BM this shift. Pt on hemodialysis, anuria. Regular diet, BG checks and carb coverage.   Labs: Abnormal/Trends, Electrolyte Replacement-  at 1934 after pt finished meal and had carb coverage insulin, pt stated she did not eat much of anything else after dinner except fruit snacks and this has never happened to her. MD notified and gave 1 time order for 4 units aspart insulin, pt  at 2215.   Ambulation/Assist: A1 GB/SBA  Skin/Wounds: dry, flaky, itchy, pt declined lotion. Scattered abrasions and on scalp. Scattered bruising and on R and L arms.   Plan: Cardiology and nephrology following, new consult for EP, continue to monitor BP, encourage OOB and walks, discharge pending    "

## 2024-08-21 VITALS
DIASTOLIC BLOOD PRESSURE: 59 MMHG | SYSTOLIC BLOOD PRESSURE: 104 MMHG | BODY MASS INDEX: 27.98 KG/M2 | TEMPERATURE: 97.8 F | HEART RATE: 59 BPM | RESPIRATION RATE: 18 BRPM | WEIGHT: 148.2 LBS | HEIGHT: 61 IN | OXYGEN SATURATION: 99 %

## 2024-08-21 LAB
ATRIAL RATE - MUSE: 47 BPM
DIASTOLIC BLOOD PRESSURE - MUSE: NORMAL MMHG
GLUCOSE BLDC GLUCOMTR-MCNC: 164 MG/DL (ref 70–99)
GLUCOSE BLDC GLUCOMTR-MCNC: 191 MG/DL (ref 70–99)
GLUCOSE BLDC GLUCOMTR-MCNC: 94 MG/DL (ref 70–99)
INTERPRETATION ECG - MUSE: NORMAL
P AXIS - MUSE: NORMAL DEGREES
PR INTERVAL - MUSE: NORMAL MS
QRS DURATION - MUSE: 90 MS
QT - MUSE: 468 MS
QTC - MUSE: 459 MS
R AXIS - MUSE: 87 DEGREES
SYSTOLIC BLOOD PRESSURE - MUSE: NORMAL MMHG
T AXIS - MUSE: 216 DEGREES
VENTRICULAR RATE- MUSE: 58 BPM

## 2024-08-21 PROCEDURE — 90937 HEMODIALYSIS REPEATED EVAL: CPT

## 2024-08-21 PROCEDURE — 258N000003 HC RX IP 258 OP 636: Performed by: INTERNAL MEDICINE

## 2024-08-21 PROCEDURE — 250N000013 HC RX MED GY IP 250 OP 250 PS 637: Performed by: INTERNAL MEDICINE

## 2024-08-21 PROCEDURE — 99239 HOSP IP/OBS DSCHRG MGMT >30: CPT | Performed by: INTERNAL MEDICINE

## 2024-08-21 PROCEDURE — 90935 HEMODIALYSIS ONE EVALUATION: CPT | Performed by: INTERNAL MEDICINE

## 2024-08-21 PROCEDURE — 90935 HEMODIALYSIS ONE EVALUATION: CPT

## 2024-08-21 PROCEDURE — 250N000013 HC RX MED GY IP 250 OP 250 PS 637

## 2024-08-21 RX ORDER — MIDODRINE HYDROCHLORIDE 10 MG/1
30 TABLET ORAL
Status: DISCONTINUED | OUTPATIENT
Start: 2024-08-21 | End: 2024-08-21 | Stop reason: HOSPADM

## 2024-08-21 RX ORDER — MIDODRINE HYDROCHLORIDE 5 MG/1
10 TABLET ORAL
Status: DISCONTINUED | OUTPATIENT
Start: 2024-08-21 | End: 2024-08-21 | Stop reason: HOSPADM

## 2024-08-21 RX ORDER — MIDODRINE HYDROCHLORIDE 10 MG/1
30 TABLET ORAL
Status: DISCONTINUED | OUTPATIENT
Start: 2024-08-21 | End: 2024-08-21

## 2024-08-21 RX ADMIN — CALCIUM ACETATE 667 MG: 667 CAPSULE ORAL at 08:35

## 2024-08-21 RX ADMIN — SODIUM CHLORIDE 250 ML: 9 INJECTION, SOLUTION INTRAVENOUS at 13:11

## 2024-08-21 RX ADMIN — SODIUM CHLORIDE 300 ML: 9 INJECTION, SOLUTION INTRAVENOUS at 13:11

## 2024-08-21 RX ADMIN — ACETAMINOPHEN 650 MG: 325 TABLET ORAL at 14:12

## 2024-08-21 RX ADMIN — Medication: at 13:13

## 2024-08-21 RX ADMIN — MIDODRINE HYDROCHLORIDE 30 MG: 10 TABLET ORAL at 11:25

## 2024-08-21 ASSESSMENT — ACTIVITIES OF DAILY LIVING (ADL)
ADLS_ACUITY_SCORE: 32
ADLS_ACUITY_SCORE: 31
ADLS_ACUITY_SCORE: 31
ADLS_ACUITY_SCORE: 32
ADLS_ACUITY_SCORE: 31
ADLS_ACUITY_SCORE: 32
ADLS_ACUITY_SCORE: 31
ADLS_ACUITY_SCORE: 32
ADLS_ACUITY_SCORE: 31
ADLS_ACUITY_SCORE: 32
ADLS_ACUITY_SCORE: 31

## 2024-08-21 NOTE — PROVIDER NOTIFICATION
MD Notification    Notified Person: MD    Notified Person Name: dr. verduzco    Notification Date/Time: 0948    Notification Interaction: vocera    Purpose of Notification:pt want to take 10mg now and 30mg at start of dialysis,she is refusing to go down to dialysis unless she midodrine this way        Orders Received:    Comments: ok to take this way    0953:ok i can give 10mg now and 30mg more soon?

## 2024-08-21 NOTE — CONSULTS
[facility] Consultation by [facility]    Yissel Wetzel MRN# [id number]   Age: 64 year old YOB: 1960     Date of Admission:  8/18/2024  Date of Consult:    08/21/24    Reason for consult: Left Ear plugging       Requesting physician: Barry Ballesteros MD                           Chief Complaint:   Hypotension              HPI:      Yissel Wetzel is a 64 year old female with history including COPD; DM2; ESRD on HD; h/o severe pHTN with right heart dysfunction; h/o cirrhosis related to right heart disease; SVT on amiodarone; and h/o severe pHTN; who presented 8/18/2024 dizziness and weakness with soft BP's.     On initial evaluation, pt was afebrile, BP's down to 80's/30's, HR 50's, sats 90's. ECG showed bradycardic rhythm that appeared junctional with diffuse TWI/NSTWA. Labs notable for CBC with WBC normal, hgb 9.8; platelets 101K; BMP cr 6.10 (ESRD), bicarb normal, AG 16; LFT's normal; ketones 0.23; NTP-TNP 84272, troponin 70; COVID-19, influenza and RSV negative.        Generalized weakness with hypotension, ?related to bradycardia/junctional rhythm as well as chronic component.  Bradycardia with junctional rhythm.  * Initial presentation as above. Last dialysis 8/16. She checked her blood pressure at home and found her systolic to be 96. In the ED, her blood pressure dropped to as low as 86/33. She was given a one time dose of midodrine and her blood pressure improved to upper 90s to low 100s. PTA amiodarone held on admit. Continued on PTA midodrine. Cardiology consulted on admit give bradycardic junctional rhythm.   * On 8/19, BP's OK overnight, but soft/low again in am, HR's in 40's-50's. Seen by cardiology who recommended EP evaluation.   * On 8/20, remains in junctional rhythm. BP's stable, HR's stable in high 50's mostly. Seen by Dr. Barbosa (sees her outpatient for h/o SVT); recommended holding amiodarone for 10 days, and restarting at decreased dose.  - Continue to monitor  "on telemetry.  - Continue to hold amiodarone for 10 days, and restart at half the PTA dose 100 mg 5x/week) starting 8/30/2024.  - Zio patch will be arranged for after discharge.  - Continue PTA midodrine 10 mg qam and 30 mg before HD on MWF (dialysis days); and 20 mg BID  TThSaSu (non-dialysis days).  - Appreciate help from Dr. Barbosa.     Left ear congestion, hearing loss and pain, ?all related to cerumen impaction.  * On 8/20, pt felt that left ear was blocked up and was contributing to dizziness. Ordered hydrogen peroxide drops with irrigation.  * On 8/21, continues to have significant symptoms with hearing loss. Refusing hydrogen peroxide. Attempted further irrigation with warm water at bedside but w/o improvement.  - ENT consult, unclear if needs further intervention.     Patient has had left aural plugging for several days which became worse after attempt to initially clean it.  She has not had otalgia, otorrhea or tinnitus.  She describes dizziness and imbalance though does describe chronic symptoms of this nature as well.  She attempted rinses with hydrogen peroxide and subsequently warm water which had no impact on her symptoms.    Previous tests and diagnostic procedures: see \"Tests and Procedures\" and \"PFSH\".               Past Medical History:     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, initial encounter 12/6/2019    End stage renal failure on dialysis (H)     Esophagitis, unspecified     Former tobacco use     HEMORRHAGIC GASTROPATHY     History of blood transfusion     [place]    Iron deficiency anemia, unspecified     Mild persistent asthma     Obesity     Other and unspecified hyperlipidemia     Pneumonia     Pulmonary hypertension (H)     per 12/2012 echo mod.to severe pulmonary hypertension    Tobacco use disorder dc ed "     Type 2 diabetes mellitus (H)     on Insulin- managed by PCP    Unspecified essential hypertension                Past Surgical History:     Past Surgical History:   Procedure Laterality Date    ABDOMINOPLASTY  pt unsure when    abdominoplasty-     SECTION  ,     x 2    COLONOSCOPY      [facility]    COLONOSCOPY N/A 2021    Procedure: COLONOSCOPY, WITH POLYPECTOMY AND BIOPSY;  Surgeon: Lincoln Farrar MD;  Location:  GI    CREATE FISTULA ARTERIOVENOUS UPPER EXTREMITY Right 2018    Procedure: RIGHT BRACHIAL TO BASILIC ARTERIOVENOUS FISTULA CREATION;  Surgeon: Terry Vizcaino MD;  Location: [facility]    CV HEART CATHETERIZATION WITH POSSIBLE INTERVENTION N/A 2021    Procedure: Heart Catheterization with Possible Intervention;  Surgeon: Joseluis Dean MD;  Location:  HEART CARDIAC CATH LAB    CV RIGHT HEART CATH MEASUREMENTS RECORDED N/A 2022    Procedure: Heart Cath Right Heart Cath;  Surgeon: Yan Heredia MD;  Location:  HEART CARDIAC CATH LAB    ENT SURGERY  as a child    tonsillectomy    ESOPHAGOSCOPY, GASTROSCOPY, DUODENOSCOPY (EGD), COMBINED N/A 2021    Procedure: ESOPHAGOGASTRODUODENOSCOPY, WITH BIOPSY;  Surgeon: Lincoln Farrar MD;  Location:  GI    EYE SURGERY Left     injections- eye    HYSTERECTOMY  approx     partial hysterectomy-reason bleeding     IR CVC TUNNEL PLACEMENT > 5 YRS OF AGE  2019    IR CVC TUNNEL REMOVAL RIGHT  2020    IR DIALYSIS FISTULOGRAM RIGHT  2019               Social History:     Social History     Socioeconomic History    Marital status: Single     Spouse name: Not on file    Number of children: Not on file    Years of education: Not on file    Highest education level: Not on file   Occupational History    Not on file   Tobacco Use    Smoking status: Former     Current packs/day: 0.00     Average packs/day: 1 pack/day for 22.0 years (22.0 ttl pk-yrs)      Types: Cigarettes     Start date: 10/12/1977     Quit date: 10/12/1999     Years since quittin.8    Smokeless tobacco: Never   Vaping Use    Vaping status: Never Used   Substance and Sexual Activity    Alcohol use: No     Alcohol/week: 0.0 standard drinks of alcohol    Drug use: No    Sexual activity: Never   Other Topics Concern     Service Not Asked    Blood Transfusions Yes    Caffeine Concern No    Occupational Exposure Not Asked    Hobby Hazards Not Asked    Sleep Concern No    Stress Concern No    Weight Concern Yes     Comment: would like to lose weight    Special Diet No    Back Care Not Asked    Exercise Yes     Comment: walks daily 2 blocks    Bike Helmet Not Asked    Seat Belt Not Asked    Self-Exams Not Asked    Parent/sibling w/ CABG, MI or angioplasty before 65F 55M? Not Asked   Social History Narrative    Not on file     Social Determinants of Health     Financial Resource Strain: Not on file   Food Insecurity: Not on file   Transportation Needs: Not on file   Physical Activity: Not on file   Stress: Not on file   Social Connections: Not on file   Interpersonal Safety: Low Risk  (11/15/2023)    Interpersonal Safety     Do you feel physically and emotionally safe where you currently live?: Yes     Within the past 12 months, have you been hit, slapped, kicked or otherwise physically hurt by someone?: No     Within the past 12 months, have you been humiliated or emotionally abused in other ways by your partner or ex-partner?: No   Housing Stability: Not on file               Family History:     Family History   Problem Relation Age of Onset    Arrhythmia Mother     Hypertension Mother     Pancreatic Cancer Father     Diabetes Brother     Asthma Sister     LUNG DISEASE Sister     Diabetes Daughter     Cirrhosis Sister                Immunizations:     Immunization History   Administered Date(s) Administered    COVID-19 Monovalent 18+ (Moderna) 2021, 2021    Flu, Unspecified  10/19/2019, 09/30/2020, 10/18/2021, 10/24/2022    Hep B, Adult (Heplisav- B) 03/01/2021, 07/27/2022    Influenza (IIV3) PF 10/25/2005, 03/01/2012, 10/16/2012, 08/28/2015    Influenza Vaccine 18-64 (Flublok) 03/21/2019    Influenza Vaccine >6 months,quad, PF 10/28/2013    Mantoux Tuberculin Skin Test 03/21/2019, 04/11/2019, 04/06/2020, 04/14/2021, 04/06/2022    Pneumo Conj 13-V (2010&after) 10/16/2012, 03/19/2021    Pneumococcal 23 valent 10/16/2012    Pneumococcal, Unspecified 03/21/2019, 12/08/2021    TDAP Vaccine (Adacel) 10/16/2012    Td (Adult), Adsorbed 12/20/2004    Tetanus 08/16/1971               Allergies:   Albuterol, Aspirin, Byetta [exenatide], Clonidine, Codeine, Ezetimibe, Hydralazine, Lisinopril, Metformin hydrochloride, Pravachol [pravastatin sodium], Simvastatin, and Troglitazone          Medications:     Current Facility-Administered Medications:     - MEDICATION INSTRUCTIONS for Dialysis Patients -, , Does not apply, See Admin Instructions, Barry Ballesteros MD    acetaminophen (TYLENOL) tablet 650 mg, 650 mg, Oral, Q4H PRN, 650 mg at 08/21/24 1412 **OR** acetaminophen (TYLENOL) Suppository 650 mg, 650 mg, Rectal, Q4H PRN, Feng Brand NP    calcium acetate (PHOSLO) capsule 667 mg, 667 mg, Oral, TID w/meals, Feng Brand NP, 667 mg at 08/21/24 0835    glucose gel 15-30 g, 15-30 g, Oral, Q15 Min PRN **OR** dextrose 50 % injection 25-50 mL, 25-50 mL, Intravenous, Q15 Min PRN **OR** glucagon injection 1 mg, 1 mg, Subcutaneous, Q15 Min PRN, Barry Ballesteros MD    insulin aspart (NovoLOG) injection (RAPID ACTING), , Subcutaneous, TID w/meals, Barry Ballesteros MD, 10 Units at 08/21/24 0945    insulin aspart (NovoLOG) injection (RAPID ACTING), 1-7 Units, Subcutaneous, TID AC, Barry Ballesteros MD, 1 Units at 08/20/24 1851    insulin aspart (NovoLOG) injection (RAPID ACTING), 1-5 Units, Subcutaneous, At Bedtime, Barry Ballesteros MD    insulin detemir  "(LEVEMIR PEN) injection 10 Units, 10 Units, Subcutaneous, At Bedtime, Feng Brand NP, 8 Units at 08/20/24 2214    levalbuterol (XOPENEX HFA) 45 MCG/ACT Inhaler 2 puff, 2 puff, Inhalation, Q6H PRN, Feng Brand NP    lidocaine (LMX4) cream, , Topical, Q1H PRN, Feng Brand NP    lidocaine 1 % 0.1-1 mL, 0.1-1 mL, Other, Q1H PRN, Feng Brand NP    midodrine (PROAMATINE) tablet 10 mg, 10 mg, Oral, Once per day on Monday Wednesday Friday, Barry Ballesteros MD    midodrine (PROAMATINE) tablet 20 mg, 20 mg, Oral, Once per day on Sunday Tuesday Thursday Saturday, Barry Ballesteros MD, 20 mg at 08/20/24 1217    midodrine (PROAMATINE) tablet 20 mg, 20 mg, Oral, Once per day on Sunday Tuesday Thursday Saturday, Barry Ballesteros MD, 20 mg at 08/20/24 1833    midodrine (PROAMATINE) tablet 30 mg, 30 mg, Oral, Once per day on Monday Wednesday Friday, Barry Ballesteros MD, 30 mg at 08/21/24 1125    senna-docusate (SENOKOT-S/PERICOLACE) 8.6-50 MG per tablet 1 tablet, 1 tablet, Oral, BID PRN **OR** senna-docusate (SENOKOT-S/PERICOLACE) 8.6-50 MG per tablet 2 tablet, 2 tablet, Oral, BID PRN, Feng Brand, NP    sodium chloride (PF) 0.9% PF flush 3 mL, 3 mL, Intracatheter, Q8H, Feng Brand NP, 3 mL at 08/21/24 0611    sodium chloride (PF) 0.9% PF flush 3 mL, 3 mL, Intracatheter, q1 min prn, Feng Brand NP    sodium chloride 0.9% BOLUS 100-150 mL, 100-150 mL, Intravenous, Q15 Min PRN, Tamar Chopra MD             Physical exam:   CONSTITUTION:    /40   Pulse 75   Temp 97.9  F (36.6  C) (Oral)   Resp 18   Ht 1.549 m (5' 1\")   Wt 67.2 kg (148 lb 3.2 oz)   LMP  (LMP Unknown)   SpO2 100%   BMI 28.00 kg/m       General appearance:Well developed, well nourished and groomed. No apparent acute or chronic distress.    Ability to communicate: normal.       HEAD, FACE, SALIVARY GLANDS AND TMJ:  "   Inspection of Head and Face: Normal contour and symmetry. No masses, lesions, or significant scars observed.    Palpation/Percussion of the Face: No tenderness, deformity or instability.    Palpation of Parotid and Submaxillary glands: Normal.    Facial Mobility: Normal.       EYES: Ocular Motility: gaze appears conjugate in all positions; no evident nystagmus.       EAR, NOSE, MOUTH AND THROAT:    Pinnas and External Nose: Normal.    Otoscopic exam: Right ear normal, left ear with narrow canal, severe occlusion with epithelial and ceruminous debris.  No cellulitis lateral meatus, no otorrhea, nontender to palpation..    Hearing: Conversational speech rarely or never misunderstands words.      NECK AND THYROID:    Neck: normal symmetry; trachea midline; normal laryngeal crepitation; no adenopathy; no neck masses; no skin lesions; no scars.        LYMPH NODES: Neck Nodes: normal, no adenopathy.       NEUROLOGIC:    Level of orientation: normal to time, place, person and situation.    Cranial nerves: Cranial nerves II-XII grossly intact and symmetrical.       PSYCHIATRIC:    Level of consciousness: awake and alert.    Judgment and insight: normal.    Mood and affect: normal and appropriate to the situation.             Data:     Lab Results   Component Value Date    WBC 5.5 08/19/2024    WBC 5.9 08/18/2024    WBC 5.6 02/27/2024    HGB 8.7 (L) 08/19/2024    HGB 9.8 (L) 08/18/2024    HGB 9.8 (L) 02/27/2024    HCT 28.2 (L) 08/19/2024    HCT 32.3 (L) 08/18/2024    HCT 31.4 (L) 02/27/2024    PLT 89 (L) 08/19/2024     (L) 08/18/2024     (L) 02/27/2024     08/19/2024     08/18/2024     02/27/2024    POTASSIUM 5.0 08/19/2024    POTASSIUM 4.9 08/18/2024    POTASSIUM 3.6 02/27/2024    CHLORIDE 94 (L) 08/19/2024    CHLORIDE 96 (L) 08/18/2024    CHLORIDE 95 (L) 02/27/2024    CO2 23 08/19/2024    CO2 25 08/18/2024    CO2 27 02/27/2024    BUN 51.2 (H) 08/19/2024    BUN 38.4 (H) 08/18/2024    BUN  34.4 (H) 02/27/2024    CR 7.44 (H) 08/19/2024    CR 6.10 (H) 08/18/2024    CR 5.63 (H) 02/27/2024    GLC 94 08/21/2024     (H) 08/21/2024     (H) 08/21/2024    SED 44 (H) 08/29/2006    DD 2.3 (H) 08/09/2017    NTBNPI 43,326 (H) 08/18/2024    NTBNPI 25,075 (H) 11/23/2022    NTBNPI 5,862 (H) 08/09/2017    NTBNP 35,681 (H) 02/27/2024    NTBNP 4210 (H) 11/29/2012    NTBNP 916 (H) 11/24/2007    TROPONIN 0.022 07/22/2021    TROPONIN 0.437 (HH) 07/14/2021    TROPONIN 0.446 (HH) 07/14/2021    TROPI <0.015 11/24/2017    TROPI  08/09/2017     <0.015  The 99th percentile for upper reference range is 0.045 ug/L.  Troponin values in   the range of 0.045 - 0.120 ug/L may be associated with risks of adverse   clinical events.      TROPI  09/30/2015     <0.015  The 99th percentile for upper reference range is 0.045 ug/L.  Troponin values in   the range of 0.045 - 0.120 ug/L may be associated with risks of adverse   clinical events.      AST 17 08/18/2024    AST 15 09/07/2023    AST 18 11/25/2022    ALT 9 08/18/2024    ALT 8 09/07/2023    ALT 6 (L) 11/25/2022    ALKPHOS 93 08/18/2024    ALKPHOS 104 09/07/2023    ALKPHOS 153 (H) 11/25/2022    BILITOTAL 0.3 08/18/2024    BILITOTAL 0.4 09/07/2023    BILITOTAL 0.4 11/25/2022    INR 1.15 11/25/2022    INR 1.18 (H) 11/24/2022    INR 1.15 06/15/2022           Assessment and Plan:   Patient with left aural plugging and a sense of dizziness and imbalance.  Notes that left ear has become more plugged with attempt to clean.  She has no symptoms on the right cheek.  She is quite bothered by the hearing loss on the left.  On examination, she is found to have occlusive squamous and ceruminous debris completely filling the left external canal.  There are no signs of infection on examination.  We discussed the common nature of occlusive debris in the canal and anticipated improvement in cleaning the left ear.  We discussed that while it is feeling plugged, this is commonly treated in  the outpatient setting and tools necessary for cleaning the left ear safely at this time are unavailable.  She is examined during dialysis.  Recommend she follow-up as an outpatient where the ear can be better examined, more safely cleaned and audiometric testing may then be obtained as well.  Please call with questions or concerns.  Call [phone] for scheduling.  Patient has dialysis on Monday's, Wednesday's and Friday's and we will schedule her within our clinics on a Tuesday or Thursday.    Attestation:  I have reviewed today's vital signs, notes, medications, medication allergies, and PFSH/ROSlabs and imaging.    Dong Steele MD

## 2024-08-21 NOTE — PROGRESS NOTES
PT 13 was ambulated  to the bathroom, and I shared with her I will remove her brief.  She was really frustrated at me asked me why did I do that cursed me and told me she will have me fired!    I shared with the charge nurse far as her behavior.    Per Pamela Vuong.

## 2024-08-21 NOTE — PROGRESS NOTES
[facility]    Internal Medicine Hospitalist Progress Note  08/21/2024  I evaluated patient on the above date.    Barry Ballesteros Jr., MD  [phone] (p)  Text Page  Vocera      [New actions/orders today (08/21/2024) are underlined in the assessment and plan. All lab results in the assessment and plan were reviewed.]  Assessment & Plan      Yissel Wetzel is a 64 year old female with history including COPD; DM2; ESRD on HD; h/o severe pHTN with right heart dysfunction; h/o cirrhosis related to right heart disease; SVT on amiodarone; and h/o severe pHTN; who presented 8/18/2024 dizziness and weakness with soft BP's.    On initial evaluation, pt was afebrile, BP's down to 80's/30's, HR 50's, sats 90's. ECG showed bradycardic rhythm that appeared junctional with diffuse TWI/NSTWA. Labs notable for CBC with WBC normal, hgb 9.8; platelets 101K; BMP cr 6.10 (ESRD), bicarb normal, AG 16; LFT's normal; ketones 0.23; NTP-TNP 25050, troponin 70; COVID-19, influenza and RSV negative.      Generalized weakness with hypotension, ?related to bradycardia/junctional rhythm as well as chronic component.  Bradycardia with junctional rhythm.  * Initial presentation as above. Last dialysis 8/16. She checked her blood pressure at home and found her systolic to be 96. In the ED, her blood pressure dropped to as low as 86/33. She was given a one time dose of midodrine and her blood pressure improved to upper 90s to low 100s. PTA amiodarone held on admit. Continued on PTA midodrine. Cardiology consulted on admit give bradycardic junctional rhythm.   * On 8/19, BP's OK overnight, but soft/low again in am, HR's in 40's-50's. Seen by cardiology who recommended EP evaluation.   * On 8/20, remains in junctional rhythm. BP's stable, HR's stable in high 50's mostly. Seen by Dr. Barbosa (sees her outpatient for h/o SVT); recommended holding amiodarone for 10 days, and restarting at decreased dose.  - Continue to monitor on  telemetry.  - Continue to hold amiodarone for 10 days, and restart at half the PTA dose 100 mg 5x/week) starting 8/30/2024.  - Zio patch will be arranged for after discharge.  - Continue PTA midodrine 10 mg qam and 30 mg before HD on MWF (dialysis days); and 20 mg BID  TThSaSu (non-dialysis days).  - Appreciate help from Dr. Barbosa.    Left ear congestion, hearing loss and pain, ?all related to cerumen impaction.  * On 8/20, pt felt that left ear was blocked up and was contributing to dizziness. Ordered hydrogen peroxide drops with irrigation.  * On 8/21, continues to have significant symptoms with hearing loss. Refusing hydrogen peroxide. Attempted further irrigation with warm water at bedside but w/o improvement.  - ENT consult, unclear if needs further intervention.    History of SVT.  * PTA on amiodarone.  * Initial presentation as above with junctional rhythm. Amiodarone held on admit.  - Continue to monitor on telemetry.  - As above, continue to hold amiodarone for 10 days, and restart at half the PTA dose 100 mg 5x/week) starting 8/30/2024.  - Appreciate cardiology help.    DM2.   [PTA: detemir 10U at bedtime; aspart 2U/15g CHO + ISS with meals.]  * Hgba1c 7.5 on admit.  Recent Labs   Lab 08/21/24  0746 08/21/24  0206 08/20/24  2107 08/20/24  1657 08/20/24  1155 08/20/24  0729   * 191* 101* 169* 147* 188*     Recent Labs   Lab Test 08/18/24  1008 06/15/22  1233   A1C 7.5* 7.7*   - Continue carb controlled diet.  - Continue detemir 10U at bedtime.  - Continue aspart 2U/15g CHO with meals.  - Continue aspart ISS (medium).    ESRD.  * Dialyzes MWF. Nephrology consulted on admit for HD. PTA on midodrine.  - Continue HD per nephrology.  - Continue PTA midodrine 10 mg qam and 30 mg before HD on MWF (dialysis days); and 20 mg BID  TThSaSu (non-dialysis days).    COPD.  * On home O2: 2L at night.  - Continue O2.  - Continue PRN levalbuterol.    Thrombocytopenia, unclear etiology, possibly medication effect or  "acute/chronic from other cause.  * Platelets normal as recently as 11/2022. Most recently in EPIC, platelets 144K in 9/2023.  * Platelets 101K on admit.   Recent Labs   Lab 08/19/24  0653 08/18/24  1008   PLT 89* 101*   - Continue to monitor CBC - repeat in am.    Chronic anemia.  * Baseline Hgb: 9.7-12.2. Iron studies 6/2022 suggested ACD +/- iron deficiency.  * Hgb 9.8 on admit.  - Continue to monitor CBC periodically as needed.    H/o severe pulmonology HTN felt to be related to intrinsic pulmonary vascular disease.  Mild AS, mild MR.  * Followed by Dr. Redd. Echocardiogram 2/27/24: LVEF 60-65%; RV OK; mild MR; mild AS; RVSP 33 + RAP.  - Continue to monitor clinically.  - Is to be admitted to [facility] for right heart catheterization 9/28/2024.    H/o cirrhosis noted to be related to severe pHTN with right heart dysfunction.  * LFT's normal on admit.  - Continue to monitor clinically.    Clinically Significant Risk Factors                  # Hypertension: Noted on problem list          # DMII: A1C = 7.5 % (Ref range: <5.7 %) within past 6 months   # Overweight: Estimated body mass index is 28 kg/m  as calculated from the following:    Height as of this encounter: 1.549 m (5' 1\").    Weight as of this encounter: 67.2 kg (148 lb 3.2 oz)., PRESENT ON ADMISSION                  Diet: Regular Diet Adult    Prophylaxis: PCD's and ambulation  Weller Catheter: Not present  Lines: None     Code Status: Full Code    Disposition Plan   Medically Ready for Discharge: Anticipated Today  Expected discharge to prior living arrangement pending  continued improvement, ENT evaluation .    Entered: Barry Ballesteros MD 08/21/2024, 11:29 AM           Interval History   Very upset with interaction overnight with staff (see notes).  Complains of left ear hearing loss.  Refuses to go home until it is improved.  Ear drops not helping and refused this am.    * Data reviewed today: I reviewed all new labs and imaging over the last 24 " "hours. I personally reviewed no images or ECG's today.    Physical Exam   Most recent vitals:   , Blood pressure (!) 94/31, pulse 51, temperature 97.8  F (36.6  C), temperature source Oral, resp. rate 10, height 1.549 m (5' 1\"), weight 67.2 kg (148 lb 3.2 oz), SpO2 98%, not currently breastfeeding. O2 Device: Nasal cannula    Vitals:    08/18/24 0951 08/20/24 0100   Weight: 67.1 kg (148 lb) 67.2 kg (148 lb 3.2 oz)     Vital signs with ranges:  Temp:  [97.2  F (36.2  C)-97.8  F (36.6  C)] 97.8  F (36.6  C)  Pulse:  [51-58] 51  Resp:  [10-24] 10  BP: ()/(29-74) 94/31  Patient Vitals for the past 24 hrs:   BP Temp Temp src Pulse Resp   08/21/24 0742 (!) 94/31 -- -- 51 --   08/21/24 0737 (!) 97/36 97.8  F (36.6  C) Oral 58 10   08/20/24 2339 107/41 -- -- 53 --   08/20/24 2246 (!) 102/37 97.3  F (36.3  C) Oral 55 12   08/20/24 1900 (!) 112/29 97.2  F (36.2  C) Oral -- --   08/20/24 1545 -- 97.7  F (36.5  C) Oral -- --   08/20/24 1500 106/42 -- -- -- --   08/20/24 1400 100/74 -- -- 55 23   08/20/24 1315 122/52 -- -- 57 24   08/20/24 1215 -- 97.3  F (36.3  C) Axillary -- --   08/20/24 1200 -- -- -- 57 24     I/O's last 24 hours:  No intake/output data recorded.    Constitutional: awake, alert, oriented, pleasant, conversant   Head:   Eyes:   ENT: Left ear cerumen blocking visualization of the TM; irrigated multiple times with warm water w/o significant improvement.  Neck:   Cardiovascular:   Lungs:   Gastrointestinal/Abdomen:   :   Musculoskeletal:   Skin/Extremities:   Neurologic:   Psychiatric:   Hematologic/Lymphatic/Immunologic:        Data    Labs reviewed.  Recent Labs   Lab 08/21/24  0746 08/21/24  0206 08/20/24  2107 08/19/24  1145 08/19/24  0653 08/18/24  1243 08/18/24  1008   WBC  --   --   --   --  5.5  --  5.9   HGB  --   --   --   --  8.7*  --  9.8*   MCV  --   --   --   --  92  --  94   PLT  --   --   --   --  89*  --  101*   NA  --   --   --   --  135  --  137   POTASSIUM  --   --   --   --  5.0  --  " "4.9   CHLORIDE  --   --   --   --  94*  --  96*   CO2  --   --   --   --  23  --  25   BUN  --   --   --   --  51.2*  --  38.4*   CR  --   --   --   --  7.44*  --  6.10*   ANIONGAP  --   --   --   --  18*  --  16*   KANWAL  --   --   --   --  9.6  --  9.9   * 191* 101*   < > 136*   < > 221*   ALBUMIN  --   --   --   --   --   --  3.7   PROTTOTAL  --   --   --   --   --   --  6.6   BILITOTAL  --   --   --   --   --   --  0.3   ALKPHOS  --   --   --   --   --   --  93   ALT  --   --   --   --   --   --  9   AST  --   --   --   --   --   --  17   LIPASE  --   --   --   --   --   --  52    < > = values in this interval not displayed.     Recent Labs   Lab Test 08/18/24  1243 08/18/24  1008 11/23/22  0729 08/09/17  2030   NT-PROBNP, INPATIENT  --  43,326* 25,075* 5,862*   TROPONIN T HIGH SENSITIVITY 62* 70*  --   --      Recent Labs   Lab 08/21/24  0746 08/21/24  0206 08/20/24  2107 08/20/24  1657 08/20/24  1155 08/20/24  0729   * 191* 101* 169* 147* 188*     Recent Labs   Lab Test 08/18/24  1008 06/15/22  1233   A1C 7.5* 7.7*       No results for input(s): \"INR\", \"[id number]\" in the last 168 hours.  Recent Labs   Lab 08/19/24  0653 08/18/24  1645 08/18/24  1108 08/18/24  1008   WBC 5.5  --   --  5.9   LACT  --  1.4  --   --    [id number]  --   --  Negative  --        MICRO:  CULTURES (INCLUDING BLOOD AND URINE):  No lab results found in last 7 days.    No results found for this or any previous visit (from the past 24 hour(s)).      Medications   All medications were reviewed. MAR.    Infusions:  Current Facility-Administered Medications   Medication Dose Route Frequency Provider Last Rate Last Admin     Scheduled Medications:  Current Facility-Administered Medications   Medication Dose Route Frequency Provider Last Rate Last Admin    - MEDICATION INSTRUCTIONS for Dialysis Patients -   Does not apply See Admin Instructions Barry Ballesteros MD        [Held by provider] amiodarone (PACERONE) tablet 200 mg "  200 mg Oral Once per day on Monday Tuesday Wednesday Thursday Friday Feng Brand NP        calcium acetate (PHOSLO) capsule 667 mg  667 mg Oral TID w/meals Feng Brand NP   667 mg at 08/21/24 0835    carbamide peroxide (DEBROX) 6.5 % otic solution 10 drop  10 drop Left Ear BID Barry Ballesteros MD   10 drop at 08/20/24 2217    insulin aspart (NovoLOG) injection (RAPID ACTING)   Subcutaneous TID w/meals Barry Ballesteros MD   10 Units at 08/21/24 0945    insulin aspart (NovoLOG) injection (RAPID ACTING)  1-7 Units Subcutaneous TID AC Barry Ballesteros MD   1 Units at 08/20/24 1851    insulin aspart (NovoLOG) injection (RAPID ACTING)  1-5 Units Subcutaneous At Bedtime Barry Ballesteros MD        insulin detemir (LEVEMIR PEN) injection 10 Units  10 Units Subcutaneous At Bedtime Feng Brand NP   8 Units at 08/20/24 2214    midodrine (PROAMATINE) tablet 10 mg  10 mg Oral Once per day on Monday Wednesday Friday Barry Ballesteros MD        midodrine (PROAMATINE) tablet 20 mg  20 mg Oral Once per day on Sunday Tuesday Thursday Saturday Barry Ballesteros MD   20 mg at 08/20/24 1217    midodrine (PROAMATINE) tablet 20 mg  20 mg Oral Once per day on Sunday Tuesday Thursday Saturday Barry Ballesteros MD   20 mg at 08/20/24 1833    midodrine (PROAMATINE) tablet 30 mg  30 mg Oral Once per day on Monday Wednesday Friday Barry Ballesteros MD        No heparin via hemodialysis machine   Does not apply Once Tamar Chopra MD        sodium chloride (PF) 0.9% PF flush 3 mL  3 mL Intracatheter Q8H Feng Brand NP   3 mL at 08/21/24 0611    sodium chloride 0.9% BOLUS 250 mL  250 mL Intravenous Once in dialysis/CRRT Tamar Chopra MD        sodium chloride 0.9% BOLUS 300 mL  300 mL Hemodialysis Machine Once Tamar Chopra MD         PRN Medications:  Current Facility-Administered Medications   Medication Dose Route Frequency Provider Last Rate  Last Admin    acetaminophen (TYLENOL) tablet 650 mg  650 mg Oral Q4H PRN Feng Brand NP        Or    acetaminophen (TYLENOL) Suppository 650 mg  650 mg Rectal Q4H PRN Feng Brand NP        glucose gel 15-30 g  15-30 g Oral Q15 Min PRN Barry Ballesteros MD        Or    dextrose 50 % injection 25-50 mL  25-50 mL Intravenous Q15 Min PRN Barry Ballesteros MD        Or    glucagon injection 1 mg  1 mg Subcutaneous Q15 Min PRN Barry Ballesteros MD        levalbuterol (XOPENEX HFA) 45 MCG/ACT Inhaler 2 puff  2 puff Inhalation Q6H PRN Feng Brand NP        lidocaine (LMX4) cream   Topical Q1H PRN Feng Brand NP        lidocaine 1 % 0.1-1 mL  0.1-1 mL Other Q1H PRN Feng Brand NP        senna-docusate (SENOKOT-S/PERICOLACE) 8.6-50 MG per tablet 1 tablet  1 tablet Oral BID PRN Feng Brand NP        Or    senna-docusate (SENOKOT-S/PERICOLACE) 8.6-50 MG per tablet 2 tablet  2 tablet Oral BID PRN Feng Brand NP        sodium chloride (PF) 0.9% PF flush 3 mL  3 mL Intracatheter q1 min prn Feng Brand NP        sodium chloride 0.9% BOLUS 100-150 mL  100-150 mL Intravenous Q15 Min PRN Tamar Chopra MD

## 2024-08-21 NOTE — DISCHARGE SUMMARY
[facility]  Discharge Summary        Yissel Wetzel MRN# [id number]   YOB: 1960 Age: 64 year old     Date of Admission: 8/18/2024  Date of Discharge: 08/21/2024  Admitting Physician: Feng Brand NP  Discharge Physician: Barry Ballesteros MD     Primary Provider: Gigi Martin  Primary Care Physician Phone Number: [phone]         Discharge Diagnoses:   Generalized weakness with hypotension, possibly related to bradycardia/junctional rhythm as well as chronic component.  Bradycardia with junctional rhythm.  Left ear cerumen impaction.  Thrombocytopenia, unclear etiology, possibly medication effect or acute/chronic from other cause.        Other Chronic Medical Problems:      COPD.  DM2.   ESRD.  Mild AS, mild MR.  Chronic anemia.  History of SVT.  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  History of cirrhosis noted to be related to severe pHTN with right heart dysfunction.       Allergies:         Allergies   Allergen Reactions    Albuterol      shakiness    Aspirin      gi    Byetta [Exenatide]      gi    Clonidine      constipation    Codeine      vomiting    Ezetimibe      muscle symptoms    Hydralazine      headaches    Lisinopril      hyperkalemia    Metformin Hydrochloride      vomiting    Pravachol [Pravastatin Sodium]      muscle pains    Simvastatin      myalgias    Troglitazone            Discharge Medications:        Current Discharge Medication List        START taking these medications    Details   carbamide peroxide (DEBROX) 6.5 % otic solution Place 10 drops Into the left ear 2 times daily.  Qty: 15 mL, Refills: 0    Associated Diagnoses: Hearing loss of left ear, unspecified hearing loss type           CONTINUE these medications which have CHANGED    Details   amiodarone (PACERONE) 100 MG TABS tablet Take 1 tablet (100 mg) by mouth five times a week Take on Mondays, Tuesdays, Thursdays,  Fridays and Saturdays; hold on Wednesdays and Sundays.  Qty: 30 tablet, Refills: 3    Associated Diagnoses: SVT (supraventricular tachycardia) (H24)      insulin detemir (LEVEMIR FLEXTOUCH) 100 UNIT/ML pen Inject 10 Units subcutaneously at bedtime    Associated Diagnoses: Type 2 diabetes mellitus with diabetic nephropathy, with long-term current use of insulin (H)           CONTINUE these medications which have NOT CHANGED    Details   acetaminophen (TYLENOL) 500 MG tablet Take 500 mg by mouth every 8 hours as needed for mild pain      !! calcium acetate (PHOSLO) 667 MG CAPS capsule Take 667 mg by mouth 3 times daily (with meals)      !! calcium acetate (PHOSLO) 667 MG CAPS capsule Take 667 mg by mouth Take with snacks or supplements      famotidine (PEPCID) 10 MG tablet Take 10 mg by mouth daily as needed      guaiFENesin 1200 MG TB12 Take 1 tablet by mouth 2 times daily as needed (cough/congestion)      insulin aspart (FIASP FLEXTOUCH) 100 UNIT/ML pen-injector 2U per 15 carbs, plus 1U for every 50 that preprandial BG over 150  Qty: 15 mL, Refills: 11    Associated Diagnoses: Type 2 diabetes mellitus with diabetic nephropathy, with long-term current use of insulin (H)      levalbuterol (XOPENEX HFA) 45 MCG/ACT inhaler Inhale 2 puffs into the lungs every 6 hours as needed for shortness of breath / dyspnea or wheezing  Qty: 15 g, Refills: 11    Associated Diagnoses: Pneumonia due to infectious organism, unspecified laterality, unspecified part of lung      !! midodrine (PROAMATINE) 10 MG tablet Take 30 mg by mouth Monday, Wednesdays, and Fridays with food prior to dialysis      !! midodrine (PROAMATINE) 10 MG tablet Take 10 mg by mouth Every Mon, Wed, Fri Morning      !! midodrine (PROAMATINE) 10 MG tablet Take 20 mg by mouth Twice daily (in the morning and evening) on non-dialysis days (Tuesday, Thursday, Saturday, Sunday)      !! ACCU-CHEK CHING PLUS test strip USE TO TEST BLOOD SUGAR 4 TIMES PER DAY  Qty: 100  strip, Refills: 3    Associated Diagnoses: Type 2 diabetes mellitus with diabetic nephropathy, with long-term current use of insulin (H)      !! ACCU-CHEK GUIDE test strip USE 1 EACH TO TEST BLOOD GLUCOSE FOUR TIMES DAILY  Qty: 400 strip, Refills: 0    Associated Diagnoses: Type 2 diabetes mellitus with diabetic nephropathy, with long-term current use of insulin (H)      !! blood glucose (NO BRAND SPECIFIED) lancets standard Use to test blood sugar 4 times daily or as directed.  Qty: 400 lancet, Refills: 3    Comments: Please dispense Accu check soft click  Associated Diagnoses: Type 2 diabetes mellitus with diabetic nephropathy, with long-term current use of insulin (H)      !! blood glucose (NO BRAND SPECIFIED) lancets standard Use to test blood sugar three times daily or as directed.  Qty: 400 lancet, Refills: 11    Associated Diagnoses: Type 2 diabetes mellitus with diabetic nephropathy, with long-term current use of insulin (H)      blood glucose monitoring (NO BRAND SPECIFIED) meter device kit Use to test blood sugar 4 times daily or as directed.  Qty: 1 kit, Refills: 0    Associated Diagnoses: Type 2 diabetes mellitus with diabetic nephropathy, with long-term current use of insulin (H)      Continuous Glucose Sensor (FREESTYLE LATOSHA 2 SENSOR) MISC 1 each every 14 days Use 1 sensor every 14 days. Use to read blood sugars per 's instructions.  Qty: 2 each, Refills: 5    Associated Diagnoses: Type 2 diabetes mellitus with diabetic nephropathy, with long-term current use of insulin (H)      insulin pen needle (31G X 8 MM) 31G X 8 MM miscellaneous Use 4 pen needles daily or as directed.  Qty: 400 each, Refills: 0    Associated Diagnoses: Type 2 diabetes mellitus with diabetic nephropathy, with long-term current use of insulin (H)       !! - Potential duplicate medications found. Please discuss with provider.              Discharge Instructions and Follow-Up:      Discharge Orders      Adult ENT   Referral      Activity    Your activity upon discharge: activity as tolerated     Follow-up and recommended labs and tests     Follow up with primary care provider, Gigi Martin, within 7 days for hospital follow- up.  The following labs/tests are recommended: CBC.  Follow-up with ENT for left earwax removal.  Follow-up with Dr. Barbosa (his clinic will arrange).  Follow-up with dialysis per usual routine.     Reason for your hospital stay    1. Generalized weakness with hypotension, possibly related to bradycardia/junctional rhythm as well as chronic component.  2. Bradycardia with junctional rhythm.  3. Left ear cerumen impaction.  4. Thrombocytopenia, unclear etiology, possibly medication effect or acute/chronic from other cause.     Follow-up and recommended labs and tests     Appointment with DR Steele ENT specialist on Tuesday 8/27 at 11am  [address] [place] [address]   6.9 mi  [phone]     Diet    Follow this diet upon discharge: Orders Placed This Encounter      Regular Diet Adult             Consultations This Hospital Stay:      NEPHROLOGY IP CONSULT  CARDIOLOGY IP CONSULT  ELECTROPHYSIOLOGY IP CONSULT  ENT IP CONSULT        Admission History:      Please see the H&P by Feng Brand NP on 8/18/2024 for complete details. Briefly, Yissel Wetzel is a 64 year old female with history including COPD; DM2; ESRD on HD; h/o severe pHTN with right heart dysfunction; h/o cirrhosis related to right heart disease; SVT on amiodarone; and h/o severe pHTN; who presented 8/18/2024 dizziness and weakness with soft BP's.    On initial evaluation, pt was afebrile, BP's down to 80's/30's, HR 50's, sats 90's. ECG showed bradycardic rhythm that appeared junctional with diffuse TWI/NSTWA. Labs notable for CBC with WBC normal, hgb 9.8; platelets 101K; BMP cr 6.10 (ESRD), bicarb normal, AG 16; LFT's normal; ketones 0.23; NTP-TNP 89679, troponin 70; COVID-19, influenza and RSV  negative.        Problem Oriented Hospital Course:        Generalized weakness with hypotension, ?related to bradycardia/junctional rhythm as well as chronic component.  Bradycardia with junctional rhythm.  * Initial presentation as above. Last dialysis 8/16. She checked her blood pressure at home and found her systolic to be 96. In the ED, her blood pressure dropped to as low as 86/33. She was given a one time dose of midodrine and her blood pressure improved to upper 90s to low 100s. PTA amiodarone held on admit. Continued on PTA midodrine. Cardiology consulted on admit give bradycardic junctional rhythm.   * On 8/19, BP's OK overnight, but soft/low again in am, HR's in 40's-50's. Seen by cardiology who recommended EP evaluation.   * On 8/20, remains in junctional rhythm. BP's stable, HR's stable in high 50's mostly. Seen by Dr. Barbosa (sees her outpatient for h/o SVT); recommended holding amiodarone for 10 days, and restarting at decreased dose.  - Continue to monitor on telemetry.  - Continue to hold amiodarone for 10 days, and restart at half the PTA dose 100 mg 5x/week) starting 8/30/2024.  - Zio patch will be arranged for after discharge.  - Continue PTA midodrine 10 mg qam and 30 mg before HD on MWF (dialysis days); and 20 mg BID  TThSaSu (non-dialysis days).  - Follow-up with Dr. Barbosa (his clinic will arrange).    Left ear cerumen impaction.  * On 8/20, pt felt that left ear was blocked up and was contributing to dizziness. Ordered hydrogen peroxide drops with irrigation.  * On 8/21, continues to have significant symptoms with hearing loss. Refusing hydrogen peroxide. Attempted further irrigation with warm water at bedside but w/o improvement. Pt insistent on ENT evaluation. ENT consulted and recommended outpatient treatment.  - Follow-up ENT for left ear cerumen impaction treatment.    History of SVT.  * PTA on amiodarone.  * Initial presentation as above with junctional rhythm. Amiodarone held on  "admit.  - As above, continue to hold amiodarone for 10 days, and restart at half the PTA dose 100 mg 5x/week) starting 8/30/2024.  - As above, Zio patch and EP follow-up as above.    DM2.   [PTA: detemir 10U at bedtime; aspart 2U/15g CHO + ISS with meals.]  * Hgba1c 7.5 on admit.  - Continue carb controlled diet.  - Continue PTA regimen.    ESRD.  * Dialyzes MWF. Nephrology consulted on admit for HD. PTA on midodrine.  - Continue HD per nephrology.  - Continue PTA midodrine 10 mg qam and 30 mg before HD on MWF (dialysis days); and 20 mg BID  TThSaSu (non-dialysis days).    COPD.  * On home O2: 2L at night.  - Continue O2.  - Continue PRN levalbuterol.    Thrombocytopenia, unclear etiology, possibly medication effect or acute/chronic from other cause.  * Platelets normal as recently as 11/2022. Most recently in [facility], platelets 144K in 9/2023.  * Platelets 101K on admit.   Recent Labs   Lab 08/19/24  0653 08/18/24  1008   PLT 89* 101*   - Continue to monitor CBC.    Chronic anemia.  * Baseline Hgb: 9.7-12.2. Iron studies 6/2022 suggested ACD +/- iron deficiency.  * Hgb 9.8 on admit.  - Continue to monitor CBC.    H/o severe pulmonology HTN felt to be related to intrinsic pulmonary vascular disease.  Mild AS, mild MR.  * Followed by Dr. Redd. Echocardiogram 2/27/24: LVEF 60-65%; RV OK; mild MR; mild AS; RVSP 33 + RAP.  - Is to be admitted to [facility] for right heart catheterization 9/28/2024.    H/o cirrhosis noted to be related to severe pHTN with right heart dysfunction.  * LFT's normal on admit.  - Continue to monitor clinically.      Clinically Significant Risk Factors                  # Hypertension: Noted on problem list          # DMII: A1C = 7.5 % (Ref range: <5.7 %) within past 6 months   # Overweight: Estimated body mass index is 28 kg/m  as calculated from the following:    Height as of this encounter: 1.549 m (5' 1\").    Weight as of this encounter: 67.2 kg (148 lb 3.2 oz)., PRESENT ON ADMISSION     "                    Pending Results:        Unresulted Labs Ordered in the Past 30 Days of this Admission       No orders found from 7/19/2024 to 8/19/2024.                  Discharge Disposition:      Discharge to home.        Discharge Time:      Approximately 45 minutes.          Condition and Physical on Discharge:    See progress note on the same date as this discharge summary.          Key Imaging Studies, Lab Findings and Procedures/Surgeries:        Results for orders placed or performed during the hospital encounter of 08/18/24   CT Aortic Survey w Contrast    Narrative    EXAM: CT AORTIC SURVEY W CONTRAST  LOCATION: [facility]  DATE: 8/18/2024    INDICATION: Hypotension. Dizziness.  COMPARISON: CT pulmonary angiogram 6/15/2022. CT abdomen and pelvis 9/7/2023.  TECHNIQUE: CT angiogram chest abdomen pelvis during arterial phase of injection of IV contrast. 2D and 3D MIP reconstructions were performed by the CT technologist. Dose reduction techniques were used.   CONTRAST: 72mL Isovue 370    FINDINGS:   CT ANGIOGRAM CHEST, ABDOMEN, AND PELVIS: Moderate calcified atherosclerotic changes throughout normal caliber aorta. No dissection. No hyperdense rim sign within the thoracic aorta to suggest an acute intramural hematoma. Three-vessel arch. Near   circumferential calcified atherosclerotic changes of the origin of the left subclavian artery without significant stenosis. No significant stenosis of the visualized portions of the arch vessels.    Calcified atherosclerotic changes producing moderate stenosis of the origins of the celiac, superior mesenteric and right renal arteries. There appears to be a severe left renal artery origin stenosis. Patent inferior mesenteric artery. Moderate   calcified atherosclerotic changes of the bilateral common and internal iliac arteries without severe stenosis. Normal caliber external iliac and common femoral arteries.    LUNGS AND PLEURA: Stable,  moderate right pleural effusion. Stable moderate atelectatic changes of the posterior aspect of the right lower lobe. Stable, smooth mild interlobular septal thickening.    MEDIASTINUM/AXILLAE: No lymphadenopathy. Mild cardiomegaly. Stable pericardial calcifications centered around the region of the atrioventricular groove.    CORONARY ARTERY CALCIFICATION: Moderate.    HEPATOBILIARY: Contrast reflux into the intrahepatic IVC and hepatic veins likely due to right-sided cardiac insufficiency. Mild intra and extrahepatic biliary dilatation. Small gallstones. No radiopaque filling defects within the extra hepatic bile   ducts.    PANCREAS: Normal.    SPLEEN: Mild splenomegaly, measuring 13.5 cm in length.    ADRENAL GLANDS: Normal.    KIDNEYS/BLADDER: Marked bilateral renal atrophy. No hydronephrosis.    BOWEL: No obstruction or inflammatory change. Mild to moderate abdominal/pelvic ascites.     LYMPH NODES: Normal.    PELVIC ORGANS: Normal.    MUSCULOSKELETAL: Moderate degenerative changes of the lower lumbar spine. Degenerative changes bilateral sacroiliac joints. Anterior abdominal skin thickening with high attenuation striations within this contains fat likely representing wall edema.   Ascitic fluid herniating through a moderate umbilical hernia.      Impression    IMPRESSION:  1.  No evidence for acute aortic syndrome.  2.  Stable, moderate right pleural effusion.  3.  Mild to moderate ascites.  4.  Mild intrauterine extra hepatic biliary dilatation. This may be secondary to a distal biliary obstruction.  5.  Moderate pericardial calcifications.  6.  Severe bilateral renal atrophy.  7.  Anterior abdominal wall edema.

## 2024-08-21 NOTE — PLAN OF CARE
Goal Outcome Evaluation:    Orientations: Alert and oriented x 4  Vitals/Pain: Vital signs stable on 2 L NC ex for perfecto . Patient denied pain.  Tele: Junctional Rhythm with ST abnormal  Skin/Wounds: HD R upper arm fistula, WDL, scattered bruising.  GI/: Bowel sounds audible, flatus present.  Labs:   Ambulation/Assist: Up with assist of 1.  Sleep Quality: good   Plan: possible discharge with tele

## 2024-08-21 NOTE — PLAN OF CARE
"Patient Name: Anthony  MRN: [id number]  Date of Admission: 8/18/2024  Reason for Admission: Hypotension      Vitals:   BP Readings from Last 1 Encounters:   08/21/24 104/59     Pulse Readings from Last 1 Encounters:   08/21/24 59     Wt Readings from Last 1 Encounters:   08/20/24 67.2 kg (148 lb 3.2 oz)     Ht Readings from Last 1 Encounters:   08/18/24 1.549 m (5' 1\")     Estimated body mass index is 28 kg/m  as calculated from the following:    Height as of this encounter: 1.549 m (5' 1\").    Weight as of this encounter: 67.2 kg (148 lb 3.2 oz).  Temp Readings from Last 1 Encounters:   08/21/24 97.8  F (36.6  C) (Oral)       Pain: Pain goal 0 Pain Rating 0 Effective pain medication/regimen tylenol    CV Surgery Patient: No    Assessment  VSS on 2L for pt preference o2 sats   Resp: WDL  Telemetry:  junctional perfecto  Neuro: WDL  GI/: pt on hemodialysis no urine, bowel sounds active, -BM.  Skin/Wounds: scabs to scalp  Lines/Drains: fistula to L upper arm  Activity: Up SBA    Aggression Stop Light: Yellow    Discharge instructions discussed with pt and meds and belongs sent with pt.  "

## 2024-08-21 NOTE — PROGRESS NOTES
DIALYSIS PROCEDURE NOTE  Hepatitis status of previous patient on machine log was checked and verified ok to use with this patients hepatitis status.    Total UF removed:   1.4 L    HD Tx Time:              2.15 hours    Total Blood Volume Processed ( BVP):   56.3 L    Access:  Good flow of  ml/min M/T via a right arm AVfistula using 15 gauge needles.  Bruit and Thrill present.  No complications.  Access patent.  Hemostasis achieved within 8 min; secured with new drrsgs & tape.     Run Summary:  K2 bath per most recent potassium of 5.0.  Bps soft prior to start of HD. Gave Midodrine 30 mg x 1.   Complications: pt requested to be taken off 45 min early D/T some dizziness and cramps in RUE.  MD aware.    ICEBOAT completed prior to run and timeout performed pre-treatment  I: Patient was identified using 2 identifiers  C:  Consent Signed Yes  E: Equipment preventative maintenance is current and dialysis delivery system OK to use  B: Hepatitis B Surface Antigen: negative; Draw Date: 8/5/2024      Hepatitis B Surface Antibody: 8/5/2024; Draw Date: Susceptible  O: Dialysis orders present and complete prior to treatment  A: Vascular access verified and assessed prior to treatment  T: Treatment was performed at a clinically appropriate time  ?: Patient was allowed to ask questions and address concerns prior to treatment    Pt assisted with repositioning throughout dialysis treatment    Outpatient Dialysis at [facility]. MWF.     Post treatment report given to Bautista MORA RN B., AN[facility]EL[facility]

## 2024-08-21 NOTE — PROVIDER NOTIFICATION
MD Notification    Notified Person: MD    Notified Person Name: Dr. verduzco    Notification Date/Time: 0840    Notification Interaction: vocera    Purpose of Notification: pt want 30 mg of midodrine right before dialysis refusing to take 10mg now and 30mg this afternoon, pt al refused ear drops and wants ENT to come by    Orders Received:    Comments: give 30mg now, No ENT consult

## 2024-08-21 NOTE — PROGRESS NOTES
"Nephrology Progress Note  08/21/2024         Assessment & Recommendations:   1  ESRD-  Monday Wednesday Friday dialysis at [facility] unit under care of Dr. Ace  210 minutes, target weight 63 kg  Right upper extremity AV fistula, 15-gauge, 450 blood flow rate  2K, 2.5 calcium, 35 bicarb  Heparin 1000/500  UF range recently 2.1-2.5 kg.  Has been coming off around 65 kg.  UF profile 3     2 intradialytic hypotension-on high doses of midodrine.  Reportedly takes 30 mg at the start of dialysis and takes 20 mg twice daily on nondialysis days.     3 bradycardia-noted to be in junctional rhythm.  Amiodarone stopped-plan to restart at a lower dose after several days..  Cardiology on board.     4 pulmonary hypertension-noted plans by Dr. Redd for inpatient admission and daily dialysis later this month at [facility].     Plan-  Dialysis today.  2 L ultrafiltration as tolerated.  Midodrine with dialysis.  Albumin prime.  UF profile 3.  Next dialysis on Friday      Tamar Chopra MD  [facility] - Nephrology   [phone]      Interval History :   Seen / examined earlier on the floor and again on dialysis  Early part of dialysis going okay.  Ultrafiltration-2 L plan.  Fistula with good blood flow rate.      Physical Exam:   No intake/output data recorded.  /43   Pulse 62   Temp 97.9  F (36.6  C) (Oral)   Resp 18   Ht 1.549 m (5' 1\")   Wt 67.2 kg (148 lb 3.2 oz)   LMP  (LMP Unknown)   SpO2 100%   BMI 28.00 kg/m      GENERAL APPEARANCE: alert and no distress  Pulmonary: Diminished in right base  CV:irregular rhythm, normal rate, no rub   - JVP -   - Edema -  GI: soft, nontender,   MS: no evidence of inflammation in joints  : no lei  SKIN: no rash, warm, dry, no cyanosis  NEURO: face symmetric, no asterixis   Right upper extremity AV fistula with good bruit and thrill    Labs:   All labs reviewed by me  Electrolytes/Renal -   Recent Labs   Lab Test 08/21/24  0746 " 08/21/24  0206 08/20/24  2107 08/19/24  1145 08/19/24  0653 08/18/24  1243 08/18/24  1008 02/27/24  1424 11/24/22  0638 11/24/22  0546 12/05/19  2125 09/25/19  1453 03/18/19  0935   NA  --   --   --   --  135  --  137 138   < > 135*   < > 133 142   POTASSIUM  --   --   --   --  5.0  --  4.9 3.6   < > 4.2   < > 4.3 3.9   CHLORIDE  --   --   --   --  94*  --  96* 95*   < > 97*   < > 102 107   CO2  --   --   --   --  23  --  25 27   < > 20*   < > 25 27   BUN  --   --   --   --  51.2*  --  38.4* 34.4*   < > 46.7*   < > 37* 46*   CR  --   --   --   --  7.44*  --  6.10* 5.63*   < > 5.05*   < > 4.53* 3.43*   * 191* 101*   < > 136*   < > 221* 144*   < > 240*   < > 300* 184*   KANWAL  --   --   --   --  9.6  --  9.9 9.4   < > 8.5*   < > 8.4* 7.5*   PHOS  --   --   --   --   --   --   --   --   --  7.4*  --  5.8* 3.6    < > = values in this interval not displayed.       CBC -   Recent Labs   Lab Test 08/19/24  0653 08/18/24  1008 02/27/24  1424   WBC 5.5 5.9 5.6   HGB 8.7* 9.8* 9.8*   PLT 89* 101* 134*       LFTs -   Recent Labs   Lab Test 08/18/24  1008 09/07/23  1711 11/25/22  0641   ALKPHOS 93 104 153*   BILITOTAL 0.3 0.4 0.4   ALT 9 8 6*   AST 17 15 18   PROTTOTAL 6.6 6.1* 6.0*   ALBUMIN 3.7 3.4* 3.0*       Iron Panel -   Recent Labs   Lab Test 06/19/22  0634 03/17/19  0805 08/30/18  1318   IRON 27* 27* 54   IRONSAT 15 15 28   LATRICE 898* 210 407*       Current Medications:  Current Facility-Administered Medications   Medication Dose Route Frequency Provider Last Rate Last Admin    - MEDICATION INSTRUCTIONS for Dialysis Patients -   Does not apply See Admin Instructions Barry Ballesteros MD        calcium acetate (PHOSLO) capsule 667 mg  667 mg Oral TID w/meals Feng Brand NP   667 mg at 08/21/24 0835    insulin aspart (NovoLOG) injection (RAPID ACTING)   Subcutaneous TID w/meals Barry Ballesteros MD   10 Units at 08/21/24 0945    insulin aspart (NovoLOG) injection (RAPID ACTING)  1-7 Units  Subcutaneous TID AC Barry Ballesteros MD   1 Units at 08/20/24 1851    insulin aspart (NovoLOG) injection (RAPID ACTING)  1-5 Units Subcutaneous At Bedtime Barry Ballesteros MD        insulin detemir (LEVEMIR PEN) injection 10 Units  10 Units Subcutaneous At Bedtime Feng Brand NP   8 Units at 08/20/24 2214    midodrine (PROAMATINE) tablet 10 mg  10 mg Oral Once per day on Monday Wednesday Friday Barry Ballesteros MD        midodrine (PROAMATINE) tablet 20 mg  20 mg Oral Once per day on Sunday Tuesday Thursday Saturday Barry Ballesteros MD   20 mg at 08/20/24 1217    midodrine (PROAMATINE) tablet 20 mg  20 mg Oral Once per day on Sunday Tuesday Thursday Saturday Barry Ballesteros MD   20 mg at 08/20/24 1833    midodrine (PROAMATINE) tablet 30 mg  30 mg Oral Once per day on Monday Wednesday Friday Barry Ballesteros MD        No heparin via hemodialysis machine   Does not apply Once Tamar Chopra MD        sodium chloride (PF) 0.9% PF flush 3 mL  3 mL Intracatheter Q8H Feng Brand, NP   3 mL at 08/21/24 0611    sodium chloride 0.9% BOLUS 250 mL  250 mL Intravenous Once in dialysis/CRRT Tamar Chopra MD        sodium chloride 0.9% BOLUS 300 mL  300 mL Hemodialysis Machine Once Tamar Chopra MD         Current Facility-Administered Medications   Medication Dose Route Frequency Provider Last Rate Last Admin     Tamar Chopra MD

## 2024-08-22 ENCOUNTER — TELEPHONE (OUTPATIENT)
Dept: CARDIOLOGY | Facility: CLINIC | Age: 64
End: 2024-08-22
Payer: MEDICARE

## 2024-08-22 ENCOUNTER — ORDERS ONLY (AUTO-RELEASED) (OUTPATIENT)
Dept: MEDSURG UNIT | Facility: CLINIC | Age: 64
End: 2024-08-22
Payer: MEDICARE

## 2024-08-22 ENCOUNTER — PATIENT OUTREACH (OUTPATIENT)
Dept: CARE COORDINATION | Facility: CLINIC | Age: 64
End: 2024-08-22
Payer: MEDICARE

## 2024-08-22 ENCOUNTER — PATIENT OUTREACH (OUTPATIENT)
Dept: INTERNAL MEDICINE | Facility: CLINIC | Age: 64
End: 2024-08-22
Payer: MEDICARE

## 2024-08-22 ENCOUNTER — TELEPHONE (OUTPATIENT)
Dept: INTERNAL MEDICINE | Facility: CLINIC | Age: 64
End: 2024-08-22
Payer: MEDICARE

## 2024-08-22 DIAGNOSIS — Z79.4 TYPE 2 DIABETES MELLITUS WITH DIABETIC NEPHROPATHY, WITH LONG-TERM CURRENT USE OF INSULIN (H): Primary | ICD-10-CM

## 2024-08-22 DIAGNOSIS — Z79.4 TYPE 2 DIABETES MELLITUS WITH DIABETIC NEPHROPATHY, WITH LONG-TERM CURRENT USE OF INSULIN (H): ICD-10-CM

## 2024-08-22 DIAGNOSIS — E11.21 TYPE 2 DIABETES MELLITUS WITH DIABETIC NEPHROPATHY, WITH LONG-TERM CURRENT USE OF INSULIN (H): Primary | ICD-10-CM

## 2024-08-22 DIAGNOSIS — E11.21 TYPE 2 DIABETES MELLITUS WITH DIABETIC NEPHROPATHY, WITH LONG-TERM CURRENT USE OF INSULIN (H): ICD-10-CM

## 2024-08-22 DIAGNOSIS — I47.10 SVT (SUPRAVENTRICULAR TACHYCARDIA) (H): ICD-10-CM

## 2024-08-22 RX ORDER — INSULIN DETEMIR 100 [IU]/ML
INJECTION, SOLUTION SUBCUTANEOUS
Qty: 15 ML | Refills: 0 | Status: SHIPPED | OUTPATIENT
Start: 2024-08-22 | End: 2024-08-22

## 2024-08-22 RX ORDER — INSULIN ASPART INJECTION 100 [IU]/ML
INJECTION, SOLUTION SUBCUTANEOUS
OUTPATIENT
Start: 2024-08-22

## 2024-08-22 NOTE — TELEPHONE ENCOUNTER
Received a call from the patient stating she is needing her insulin sent to the pharmacy ASAP. Patient states she is completely out of medication and she is needing to  this medication by noon today otherwise she will not be able to eat lunch.     Per the message below, a new script needs to be sent to the pharmacy with the max amount of units/day.     Will route HP message to PCP to please sent a new script to the pharmacy with this added information.     Triage: please call the patient back once a new script has been sent. Thank you!  Can we leave a detailed message on this number? YES  Phone number patient can be reached at: Cell number on file:    Telephone Information:   Mobile [phone]       Josee Levi RN  [facility] Triage

## 2024-08-22 NOTE — PROGRESS NOTES
[facility]: Transitions of Care Outreach  Chief Complaint   Patient presents with    Clinic Care Coordination - Post Hospital       Most Recent Admission Date: 8/18/2024   Most Recent Admission Diagnosis: Weakness - R53.1  Hypotension, unspecified hypotension type - I95.9     Most Recent Discharge Date: 8/21/2024   Most Recent Discharge Diagnosis: Weakness - R53.1  Hypotension, unspecified hypotension type - I95.9  Type 2 diabetes mellitus with diabetic nephropathy, with long-term current use of insulin (H) - E11.21, Z79.4  SVT (supraventricular tachycardia) (H24) - I47.10  Hearing loss of left ear, unspecified hearing loss type - H91.92     Transitions of Care Assessment    Discharge Assessment  How are you doing now that you are home?: Patient is doing well, she reports still being dizzy still but has a follow up with a specialist on 8/27/2024. No questions or concerns.She reports that she is checking her blood pressures; they have been good readings. She is wearing her life alert and has her phone with her at all times incase of dizziness.  How are your symptoms? (Red Flag symptoms escalate to triage hotline per guidelines): Improved  Do you know how to contact your clinic care team if you have future questions or changes to your health status? : Yes  Does the patient have their discharge instructions? : Yes  Does the patient have questions regarding their discharge instructions? : No  Were you started on any new medications or were there changes to any of your previous medications? : Yes  Does the patient have all of their medications?: Yes  Do you have questions regarding any of your medications? : No  Do you have all of your needed medical supplies or equipment (DME)?  (i.e. oxygen tank, CPAP, cane, etc.): Yes                Follow up Plan     Follow up with primary care provider, Gigi Martin, within 7 days for hospital follow- up.  The following labs/tests are recommended:  CBC.  Follow-up with ENT for left earwax removal.  Follow-up with Dr. Barbosa (his clinic will arrange).  Follow-up with dialysis per usual routine.     Discharge Follow-Up  Discharge follow up appointment scheduled in alignment with recommended follow up timeframe or Transitions of Risk Category? (Low = within 30 days; Moderate= within 14 days; High= within 7 days): Yes  Discharge Follow Up Appointment Date: 08/27/24  Discharge Follow Up Appointment Scheduled with?: Specialty Care Provider    Future Appointments   Date Time Provider Department Center   8/29/2024  5:30 PM Gigi Martin MD [facility]   9/24/2024  3:00 PM Felicity Green APRN CNP [facility]       Outpatient Plan as outlined on AVS reviewed with patient.    For any urgent concerns, please contact our 24 hour nurse triage line: [phone] ([phone])       MICHAEL Wise (she/her/hers)  Social Work Clinic Care Coordinator   [facility]  [email or web address]  [phone]

## 2024-08-22 NOTE — TELEPHONE ENCOUNTER
"Patient was admitted to [facility] on 8/18/24 with weakness and dizziness. Systolic BP at home was in the 90s. It is often low after dialysis. In the emergency room she was noted to be in junctional rhythm in the 50's. Cardiology and then EP consulted.    PMH: severe pulmonary hypertension, ESRD-on chronic hemodialysis, COPD (on home O2 at night), diabetes mellitus type II, anemia, hypotension after dialysis (treated with Midodrine), and highly symptomatic SVT which has been managed with low-dose Amiodarone, 200 mg 5 days per week. She follows with Dr. Roderick Redd at [facility].     Per Dr. Barbosa' IP note, \"She has not had SVT for 3-4 years (after starting Amiodarone). This was a highly symptomatic arrhythmia in this patient. Catheter ablation was not pursued in the past as PA pressures were prohibitively high.\" \"Junctional rhythm in the 50's. History of highly symptomatic SVT (short RP tachycardia). Junctional rhythm is likely related to the chronic amiodarone use. It is generally a stable cardiac rhythm. It is unclear how symptomatic it is. Associated bradycardia has been mild. Her reported \"dizziness\" may be related to low BP and/or inner ear causes. I agree with stopping amiodarone, temporarily. Due to the high likelihood of SVT recurrence, I recommend restarting it at half the previous dose in approximately 10 days.  Restart Amiodarone in ~10 days at 100 mg 5 days per week.\"    Continue to hold Amiodarone for 10 days, and restart at half the PTA dose 100 mg 5x/week) starting 8/30/2024. 14 day Zio Patch mailed out.    Pt is scheduled for an OV on 9/24/24 at 1450 with DEREK Felicity Green at our [place] Office.    Writer attempted to call pt for a cardiology post discharge phone call, but no answer. VM left to return my call. CARMELA Figueroa RN.    "

## 2024-08-22 NOTE — TELEPHONE ENCOUNTER
Per pharmacy staff levimir is no longer covered by insurance. Covered medications are basaglar, toujeo, tresiba. Alternative rx requested. Please advise.     Thank you-    Terri Grey RN

## 2024-08-22 NOTE — TELEPHONE ENCOUNTER
Patient's insulins were discontinued while in-patient from 8/18-8/21.    [facility] needs new prescription for FIASP + Levemir.    FIASP instructions MUST specify Maximum Units Per Day to allow dispensing    Scripts + [facility] pending~

## 2024-08-23 RX ORDER — INSULIN ASPART INJECTION 100 [IU]/ML
INJECTION, SOLUTION SUBCUTANEOUS
Qty: 15 ML | Refills: 11 | Status: SHIPPED | OUTPATIENT
Start: 2024-08-23

## 2024-08-23 NOTE — TELEPHONE ENCOUNTER
[facility] calling back to inform that the prescription that they have on file for FIASP (insulin aspart) has been deactivated. It was deactivated during the patient's last hospitalization.     Patient is out of FIASP and has been contacting the pharmacy.    Please reorder as soon as possible. See note below for FIASP:  instructions MUST specify Maximum Units Per Day to allow dispensing.  Rose Ceballos RN

## 2024-08-27 ENCOUNTER — TRANSFERRED RECORDS (OUTPATIENT)
Dept: HEALTH INFORMATION MANAGEMENT | Facility: CLINIC | Age: 64
End: 2024-08-27
Payer: MEDICARE

## 2024-08-29 ENCOUNTER — VIRTUAL VISIT (OUTPATIENT)
Dept: INTERNAL MEDICINE | Facility: CLINIC | Age: 64
End: 2024-08-29
Attending: NURSE PRACTITIONER
Payer: MEDICARE

## 2024-08-29 DIAGNOSIS — I47.10 SVT (SUPRAVENTRICULAR TACHYCARDIA) (H): ICD-10-CM

## 2024-08-29 PROCEDURE — 99441 PR PHYSICIAN TELEPHONE EVALUATION 5-10 MIN: CPT | Mod: 93 | Performed by: INTERNAL MEDICINE

## 2024-08-29 NOTE — PROGRESS NOTES
"Cassandra is a 64 year old who is being evaluated via a billable telephone visit.    What phone number would you like to be contacted at? [phone]  How would you like to obtain your AVS? Henrique  Originating Location (pt. Location): Home    Distant Location (provider location):  On-site    Assessment & Plan     Paroxysmal SVT    - Primary Care Referral        MED REC REQUIRED  Post Medication Reconciliation Status:   BMI  Estimated body mass index is 28 kg/m  as calculated from the following:    Height as of 8/18/24: 1.549 m (5' 1\").    Weight as of 8/20/24: 67.2 kg (148 lb 3.2 oz).             Subjective   Cassandra is a 64 year old, presenting for the following health issues:  No chief complaint on file.    HPI        Hospital Follow-up Visit:    Hospital/Nursing Home/IP Rehab Facility: [facility]  Date of Admission: 8/18/2024  Date of Discharge: 08/21/2024  Reason(s) for Admission: , SVT, Weaknes, hypotention  Was the patient in the ICU or did the patient experience delirium during hospitalization?  No  Do you have any other stressors you would like to discuss with your provider? No    Problems taking medications regularly:  None  Medication changes since discharge: Start taking carbamide  Problems adhering to non-medication therapy:  None    Summary of hospitalization:    Diagnostic Tests/Treatments reviewed.  Follow up needed:   Other Healthcare Providers Involved in Patient s Care:           Update since discharge:          Plan of care communicated with                    Review of Systems  Constitutional, HEENT, cardiovascular, pulmonary, gi and gu systems are negative, except as otherwise noted.      Objective           Vitals:  No vitals were obtained today due to virtual visit.    Physical Exam   General: Alert and no distress //Respiratory: No audible wheeze, cough, or shortness of breath // Psychiatric:  Appropriate affect, tone, and pace of words            Phone call duration: " 5 minutes  Signed Electronically by: Gigi Martin MD

## 2024-08-30 ENCOUNTER — TELEPHONE (OUTPATIENT)
Dept: CARDIOLOGY | Facility: CLINIC | Age: 64
End: 2024-08-30
Payer: MEDICARE

## 2024-08-30 NOTE — TELEPHONE ENCOUNTER
Health Call Center    Phone Message    May a detailed message be left on voicemail: yes     Reason for Call: Medication Question or concern regarding medication   Prescription Clarification  Name of Medication: amiodarone  Prescribing Provider: [id number] - [place], [place] - [address]       What on the order needs clarification? Patient would like a call back. She needs to know if and how she should be taking this medication.       Action Taken: Other: cardio    Travel Screening: Not Applicable     Date of Service:

## 2024-09-03 NOTE — TELEPHONE ENCOUNTER
Called patient regarding amiodarone prescription, no answer. VM left with direct callback number.

## 2024-09-04 ENCOUNTER — TELEPHONE (OUTPATIENT)
Dept: INTERNAL MEDICINE | Facility: CLINIC | Age: 64
End: 2024-09-04
Payer: MEDICARE

## 2024-09-04 DIAGNOSIS — E11.21 TYPE 2 DIABETES MELLITUS WITH DIABETIC NEPHROPATHY, WITH LONG-TERM CURRENT USE OF INSULIN (H): ICD-10-CM

## 2024-09-04 DIAGNOSIS — Z79.4 TYPE 2 DIABETES MELLITUS WITH DIABETIC NEPHROPATHY, WITH LONG-TERM CURRENT USE OF INSULIN (H): ICD-10-CM

## 2024-09-04 NOTE — TELEPHONE ENCOUNTER
Health Call Center    Phone Message    May a detailed message be left on voicemail: yes     Reason for Call: Other: Patient requesting a call back from someone on their care team, but did not specify on why. Please call patient back to further discuss.     Action Taken: Other: Cardiology    Travel Screening: Not Applicable     Thank you!  [facility]

## 2024-09-04 NOTE — TELEPHONE ENCOUNTER
Per Debbie, Care-Coordinator @ [facility]    -Requesting hardcopy of prescription for Continuous Glucose Sensor (FREESTYLE ALTOSHA 2 SENSOR) MISC  --States that [facility] informed patient that script must be sent to supplier    Please send hardcopy to:  Fax: [phone]

## 2024-09-06 ENCOUNTER — TELEPHONE (OUTPATIENT)
Dept: CARDIOLOGY | Facility: CLINIC | Age: 64
End: 2024-09-06
Payer: MEDICARE

## 2024-09-06 NOTE — TELEPHONE ENCOUNTER
Spoke with pt about her hospital admission that was delayed from August.  She stated that she would be available for admission on 9/28 when Dr Redd is on service.  This order has been placed and will plan on her being admitted at 2pm on Saturday the 28th.  Pt is agreeable to plan.    Pt also requesting to speak with Dr Barbosa.  Staff message sent to Dr Barbosa/team.    Christopher Jones RN on 9/6/2024 at 2:02 PM

## 2024-09-09 NOTE — TELEPHONE ENCOUNTER
Is This A New Presentation, Or A Follow-Up?: Growths
Spoke to patient in follow up to request per request of RN coordinator Christopher Jones.  Patient spoke to him asking for Dr Barbosa to call her.  She reports she has attempted to call x3 this week and no return call.  Did review records and no documentation noted that patient has made any attempts to call.   Provided patient with direct number to cardiology clinic for future communication.  Reason why patient wanted to a call back as she was told to call Dr Barbosa and report if her diastolic readings were in the 30's and 40's.   Patient reports she monitors her BP several times a day (she did not have numbers to provide for true data).  She reports her systolic has been running 100-127 and diastolic 30-40's.  Patient was seen in hospital on 8/20 by Dr Barbosa  (reason for consult was for bradycardia)  Last OV with Dr Barbosa was 12/16/2021  Reviewed recommendation per hospital consult 8/20 per Dr Barbosa  (amiodarone dose change, 14 day monitor and follow up visit).  Patient has ZP monitor in her possession and does not know how to place it and has no one to help her.  Offered appointment to get monitor placed and patient declined.  She also declined her follow up appt at this time which was scheduled 9/24.    She reports she is being hospitalized at the end of the month per Dr Redd request for treatment of her PHTN.  Will update Dr Barbosa regarding above.  ANGELICA Fontana  
Spoke to patient to review that Dr Barbosa is not gong to make any changes to her medications after review of update.  Scheduling did call patient who indicated she would come in for the follow up appt and would do the monitor same day.  Refused to come in for two appt on two separate days.  Reviewed appointments to get confirmation from patient and she could not confirm that she would be at the appointments.  She did start yelling at this writer and terminated phone call.  ANGELICA Fontana  
Thank you for the update Pat.    Not sure what to say except I never told the patient to call if her diastolic blood pressure is <40 mmHg...     Thanks for offering to help her with the Ziopatch, not sure what else we can do if she does not want to come in.    DI      
What Type Of Note Output Would You Prefer (Optional)?: Standard Output
How Severe Is Your Skin Lesion?: mild
Has Your Skin Lesion Been Treated?: not been treated

## 2024-09-23 ENCOUNTER — TELEPHONE (OUTPATIENT)
Dept: CARDIOLOGY | Facility: CLINIC | Age: 64
End: 2024-09-23
Payer: MEDICARE

## 2024-09-23 NOTE — TELEPHONE ENCOUNTER
Pt calling stating the she needs to talk to Dr Redd about a hospital stay. Will forward this message to Dr Easton and his nurse team. [email or web address]

## 2024-09-23 NOTE — TELEPHONE ENCOUNTER
Returned pt call about her scheduled admission for this weekend.  LVM for pt to return call.    Christopher Jones RN on 9/23/2024 at 12:12 PM    LVM for pt regarding her oxygen needs, diet requests, and wishing to be roomed immediately when she gets to the hospital at 2pm on Saturday 9/28/24.    Christopher Jones RN on 9/25/2024 at 11:51 AM

## 2024-09-24 ENCOUNTER — ALLIED HEALTH/NURSE VISIT (OUTPATIENT)
Dept: CARDIOLOGY | Facility: CLINIC | Age: 64
End: 2024-09-24
Payer: MEDICARE

## 2024-09-24 ENCOUNTER — OFFICE VISIT (OUTPATIENT)
Dept: CARDIOLOGY | Facility: CLINIC | Age: 64
End: 2024-09-24
Payer: MEDICARE

## 2024-09-24 VITALS — SYSTOLIC BLOOD PRESSURE: 125 MMHG | HEART RATE: 63 BPM | OXYGEN SATURATION: 91 % | DIASTOLIC BLOOD PRESSURE: 50 MMHG

## 2024-09-24 DIAGNOSIS — I47.10 SVT (SUPRAVENTRICULAR TACHYCARDIA) (H): ICD-10-CM

## 2024-09-24 DIAGNOSIS — I47.10 SVT (SUPRAVENTRICULAR TACHYCARDIA) (H): Primary | ICD-10-CM

## 2024-09-24 PROCEDURE — 93000 ELECTROCARDIOGRAM COMPLETE: CPT | Performed by: NURSE PRACTITIONER

## 2024-09-24 PROCEDURE — 99215 OFFICE O/P EST HI 40 MIN: CPT | Performed by: NURSE PRACTITIONER

## 2024-09-24 NOTE — LETTER
9/24/2024    Gigi Martin MD  [address] [place] [id number]    RE: Yissel Wetzel       Dear Colleague,     I had the pleasure of seeing Yissel Wetzel in the [facility].    Electrophysiology Clinic Progress Note  Yissel Wetzel MRN# [id number]   YOB: 1960 Age: 64 year old     Primary cardiologist: Dr. Redd () and Dr. Barbosa (EP)    Reason for visit: Discharge follow up    History of presenting illness:    Yissel Wetzel is a pleasant 64 year old patient with past medical history significant for:    Highly symptomatic SVT/junctional rhythm with HR in 50s: Managed with low-dose amiodarone at 200 mg 5 days a week that was held due to junctional rhythm.  Amiodarone was restarted on 8/30 at 100 mg 5 days a week  Severe pulmonary hypertension: follows with Dr. Redd (see plan for admission below)   COPD on chronic home oxygen at night  End-stage renal disease on chronic hemodialysis  Hypertension with hypotension postdialysis treated with midodrine  Diastolic dysfunction  Ascites  Type 2 diabetes  Chronic anemia    She was admitted to [facility] on 8/18/2024 with weakness and dizziness and SBP at home in the 90s and with junctional rhythm in the 50s.  She was evaluated by Dr. Lala with general cardiology and PTA amiodarone was discontinued.  EP was consulted and per the patient report she had not had recurrent SVT for approximately 3 to 4 years after starting amiodarone.    Dr. Barbosa felt that the junctional rhythm was likely secondary to the chronic use of amiodarone, but was unsure if the bradycardia was contributing to her dizziness or fifths was a multifactorial issue due to low BP and inner ear issues.  He agreed with holding amiodarone and starting 100 mg 5 days a week in approximately 10 days which would be 8/30.  It was recommended that she undergo an outpatient Zio patch after restarting amiodarone and follow-up to review results.       Cassandra is planned to have an admission at [facility] on 9/28 for daily dialysis (slow 4-hour run with the midodrine to avoid hypotension) for approximately 5 days to reduce intravascular volume status to see if PA pressure decreases allowing for transplantation.  Also, during admission a paracentesis will potentially be performed and repeat echocardiogram to assess aortic valve.    Today she presents for discharge follow-up accompanied by her son, Oni, post her recent hospitalization in 8/2024.  Given that she will be on continuous telemetry during her admission, we will defer her Zio patch until a later date.  We spent most of the office visit discussing her values on her blood pressure monitor.  Cassandra was confused thinking that the diastolic blood pressure was her heart rate and occasionally would have readings in the 40s.  Overall, she reports no symptomatic SVT since the decrease of amiodarone.  She did report she had a Zio patch mailed to her home but was unable to place it herself and did not have assistance.    Diagnotic studies:  EKG 9/24/2024: Junctional rhythm at 67 bpm with , QTc 430, QRSd 88  Echocardiogram 2/2024: LVEF of 60 to 65% without regional wall motion abnormalities.  LA was severely dilated with mild MR.  Mild aortic valve stenosis.  RVSP 33 mmHg plus RAP.  RHC 2022: Mildly elevated right-sided filling pressure, severe pulmonary artery hypertension, moderately elevated left-sided pressures, normal cardiac output.  Coronary angiogram/RHC 2021: Mild nonobstructive CAD with 10% LAD lesion and 20% proximal RCA lesion.  Severe pulmonary hypertension, severely elevated right and left-sided filling pressures (mean RA 35 mmHg and LVEDP 30 to 40 mmHg) episode of SVT terminated by adenosine.            Assessment and Plan:     ASSESSMENT:    Highly symptomatic SVT/junctional rhythm with HR in 50s:   Previously maintained on low-dose amiodarone at 200 mg 5 days a week that was held due to  junctional rhythm.  Amiodarone was restarted on 8/30 at 100 mg 5 days a week  No symptomatic breakthrough    Severe pulmonary hypertension:  Follows with Dr. Redd  Plan for admission on 9/28 (see above)    COPD  Chronic nocturnal oxygen    ERSD with postdialysis hypotension  Currently dialyzes on MWF  Midodrine 10 mg qam and 30 mg before HD on MWF (dialysis days) and 20 mg BID TThSaSu (non-dialysis days).     Amiodarone monitoring  TSH 5.13 10/2023  ALT 9 AST 17 8/2024    PLAN:     Follow-up with Dr. Redd as dictated for management of pulmonary hypertension  Follow-up with electrophysiology as needed       Orders this Visit:  Orders Placed This Encounter   Procedures     EKG 12-lead complete w/read - Clinics (performed today)     No orders of the defined types were placed in this encounter.    There are no discontinued medications.    Today's clinic visit entailed:  Review of the result(s) of each unique test - Echo, eKG, RHC, Cor angiogram  Assessment requiring an independent historian(s) - family - Son  The following tests were independently interpreted by me as noted in my documentation: EKG  Prescription drug management  I spent a total of 40 minutes on the day of the visit.   Time spent by me doing chart review, history and exam, documentation and further activities per the note  Provider  Link to [facility] Help Grid     The level of medical decision making during this visit was of moderate complexity.           Review of Systems:     Review of Systems:  Skin:        Eyes:       ENT:       Respiratory:       Cardiovascular:       Gastroenterology:      Genitourinary:       Musculoskeletal:       Neurologic:       Psychiatric:       Heme/Lymph/Imm:       Endocrine:                 Physical Exam:     Vitals: /50   Pulse 63   LMP  (LMP Unknown)   SpO2 91%   Constitutional: Thin and frail with thinning hair on chronic O2 and in no apparent distress.  Eyes: Pupils equal, round.   HEENT: Normocephalic,  atraumatic.   Neck: Supple.   Respiratory: Breathing non-labored. Lungs clear to auscultation bilaterally.  Cardiovascular:  Regular rate and rhythm, normal S1 and S2. No murmur   Skin: Warm, dry.   Extremities: Chronic venous stasis changes  Neurologic: No gross motor deficits. Alert, awake, and oriented to person, place and time.  Psychiatric: Affect appropriate.        CURRENT MEDICATIONS:  Current Outpatient Medications   Medication Sig Dispense Refill     ACCU-CHEK CHING PLUS test strip USE TO TEST BLOOD SUGAR 4 TIMES PER  strip 3     ACCU-CHEK GUIDE test strip USE 1 EACH TO TEST BLOOD GLUCOSE FOUR TIMES DAILY 400 strip 0     acetaminophen (TYLENOL) 500 MG tablet Take 500 mg by mouth every 8 hours as needed for mild pain       amiodarone (PACERONE) 100 MG TABS tablet Take 1 tablet (100 mg) by mouth five times a week Take on Mondays, Tuesdays, Thursdays, Fridays and Saturdays; hold on Wednesdays and Sundays. 30 tablet 3     blood glucose (NO BRAND SPECIFIED) lancets standard Use to test blood sugar 4 times daily or as directed. 400 lancet 3     blood glucose (NO BRAND SPECIFIED) lancets standard Use to test blood sugar three times daily or as directed. 400 lancet 11     blood glucose monitoring (NO BRAND SPECIFIED) meter device kit Use to test blood sugar 4 times daily or as directed. 1 kit 0     calcium acetate (PHOSLO) 667 MG CAPS capsule Take 667 mg by mouth 3 times daily (with meals)       calcium acetate (PHOSLO) 667 MG CAPS capsule Take 667 mg by mouth Take with snacks or supplements       carbamide peroxide (DEBROX) 6.5 % otic solution Place 10 drops Into the left ear 2 times daily. 15 mL 0     Continuous Glucose Sensor (FREESTYLE LATOSHA 2 SENSOR) MISC 1 each every 14 days. Use 1 sensor every 14 days. Use to read blood sugars per 's instructions. 2 each 5     famotidine (PEPCID) 10 MG tablet Take 10 mg by mouth daily as needed       guaiFENesin 1200 MG TB12 Take 1 tablet by mouth 2 times  daily as needed (cough/congestion)       insulin aspart (FIASP FLEXTOUCH) 100 UNIT/ML pen-injector 2U per 15 carbs, plus 1U for every 50 that preprandial BG over 150 - MAXIMUM 20 UNITS PER DAY 15 mL 11     insulin glargine (LANTUS PEN) 100 UNIT/ML pen Inject 11 Units subcutaneously at bedtime. 15 mL 3     insulin pen needle (31G X 8 MM) 31G X 8 MM miscellaneous Use 4 pen needles daily or as directed. 400 each 0     levalbuterol (XOPENEX HFA) 45 MCG/ACT inhaler Inhale 2 puffs into the lungs every 6 hours as needed for shortness of breath / dyspnea or wheezing 15 g 11     midodrine (PROAMATINE) 10 MG tablet Take 30 mg by mouth Monday, Wednesdays, and Fridays with food prior to dialysis       midodrine (PROAMATINE) 10 MG tablet Take 10 mg by mouth Every Mon, Wed, Fri Morning       midodrine (PROAMATINE) 10 MG tablet Take 20 mg by mouth Twice daily (in the morning and evening) on non-dialysis days (Tuesday, Thursday, Saturday, Sunday)         ALLERGIES  Allergies   Allergen Reactions     Albuterol      shakiness     Aspirin      gi     Byetta [Exenatide]      gi     Clonidine      constipation     Codeine      vomiting     Ezetimibe      muscle symptoms     Hydralazine      headaches     Lisinopril      hyperkalemia     Metformin Hydrochloride      vomiting     Pravachol [Pravastatin Sodium]      muscle pains     Simvastatin      myalgias     Troglitazone          PAST MEDICAL HISTORY:  Past Medical History:   Diagnosis Date     Allergic rhinitis, cause unspecified      Anemia in chronic kidney disease      Anemia of chronic disease      Bleeding pseudoaneurysm of right brachiocephalic arteriovenous fistula, initial encounter 12/6/2019     End stage renal failure on dialysis (H)      Esophagitis, unspecified      Former tobacco use      HEMORRHAGIC GASTROPATHY      History of blood transfusion     [place]     Iron deficiency anemia, unspecified      Mild persistent asthma      Obesity      Other and unspecified  hyperlipidemia      Pneumonia      Pulmonary hypertension (H)     per 2012 echo mod.to severe pulmonary hypertension     Tobacco use disorder dc ed      Type 2 diabetes mellitus (H)     on Insulin- managed by PCP     Unspecified essential hypertension        PAST SURGICAL HISTORY:  Past Surgical History:   Procedure Laterality Date     ABDOMINOPLASTY  pt unsure when    abdominoplasty-      SECTION  ,     x 2     COLONOSCOPY      [facility]     COLONOSCOPY N/A 2021    Procedure: COLONOSCOPY, WITH POLYPECTOMY AND BIOPSY;  Surgeon: Lincoln Farrar MD;  Location:  GI     CREATE FISTULA ARTERIOVENOUS UPPER EXTREMITY Right 2018    Procedure: RIGHT BRACHIAL TO BASILIC ARTERIOVENOUS FISTULA CREATION;  Surgeon: Terry Vizcaino MD;  Location: [facility]     CV HEART CATHETERIZATION WITH POSSIBLE INTERVENTION N/A 2021    Procedure: Heart Catheterization with Possible Intervention;  Surgeon: Joseluis Dean MD;  Location:  HEART CARDIAC CATH LAB     CV RIGHT HEART CATH MEASUREMENTS RECORDED N/A 2022    Procedure: Heart Cath Right Heart Cath;  Surgeon: Yan Heredia MD;  Location:  HEART CARDIAC CATH LAB     ENT SURGERY  as a child    tonsillectomy     ESOPHAGOSCOPY, GASTROSCOPY, DUODENOSCOPY (EGD), COMBINED N/A 2021    Procedure: ESOPHAGOGASTRODUODENOSCOPY, WITH BIOPSY;  Surgeon: Lincoln Farrar MD;  Location:  GI     EYE SURGERY Left     injections- eye     HYSTERECTOMY  approx     partial hysterectomy-reason bleeding      IR CVC TUNNEL PLACEMENT > 5 YRS OF AGE  2019     IR CVC TUNNEL REMOVAL RIGHT  2020     IR DIALYSIS FISTULOGRAM RIGHT  2019       FAMILY HISTORY:  Family History   Problem Relation Age of Onset     Arrhythmia Mother      Hypertension Mother      Pancreatic Cancer Father      Diabetes Brother      Asthma Sister      LUNG DISEASE Sister      Diabetes Daughter      Cirrhosis Sister         SOCIAL HISTORY:  Social History     Socioeconomic History     Marital status: Single   Tobacco Use     Smoking status: Former     Current packs/day: 0.00     Average packs/day: 1 pack/day for 22.0 years (22.0 ttl pk-yrs)     Types: Cigarettes     Start date: 10/12/1977     Quit date: 10/12/1999     Years since quittin.9     Smokeless tobacco: Never   Vaping Use     Vaping status: Never Used   Substance and Sexual Activity     Alcohol use: No     Alcohol/week: 0.0 standard drinks of alcohol     Drug use: No     Sexual activity: Never   Other Topics Concern     Blood Transfusions Yes     Caffeine Concern No     Sleep Concern No     Stress Concern No     Weight Concern Yes     Comment: would like to lose weight     Special Diet No     Exercise Yes     Comment: walks daily 2 blocks     Social Determinants of Health     Interpersonal Safety: Low Risk  (11/15/2023)    Interpersonal Safety      Do you feel physically and emotionally safe where you currently live?: Yes      Within the past 12 months, have you been hit, slapped, kicked or otherwise physically hurt by someone?: No      Within the past 12 months, have you been humiliated or emotionally abused in other ways by your partner or ex-partner?: No               Thank you for allowing me to participate in the care of your patient.      Sincerely,     DAVID Tamayo CNP     [facility]  cc:   DAVID Silva CNP  [address]  [place] [address]

## 2024-09-24 NOTE — CONFIDENTIAL NOTE
Yissel Wetzel arrived here on 9/24/2024 1:53 PM for {ZIO PATCH DURATION:106913}  Zio monitor placement per ordering provider *** for the diagnosis ***.  Patient s skin was prepped per protocol.  *** is the supervising MD.  Zio monitor was placed.  Instructions were reviewed with and given to the patient.  Patient verbalized understanding of wear, troubleshooting and monitor return instructions.

## 2024-09-24 NOTE — PROGRESS NOTES
Electrophysiology Clinic Progress Note  Yissel Wetzel MRN# [id number]   YOB: 1960 Age: 64 year old     Primary cardiologist: Dr. Redd () and Dr. Barbosa (EP)    Reason for visit: Discharge follow up    History of presenting illness:    Yissel Wetzel is a pleasant 64 year old patient with past medical history significant for:    Highly symptomatic SVT/junctional rhythm with HR in 50s: Managed with low-dose amiodarone at 200 mg 5 days a week that was held due to junctional rhythm.  Amiodarone was restarted on 8/30 at 100 mg 5 days a week  Severe pulmonary hypertension: follows with Dr. Redd (see plan for admission below)   COPD on chronic home oxygen at night  End-stage renal disease on chronic hemodialysis  Hypertension with hypotension postdialysis treated with midodrine  Diastolic dysfunction  Ascites  Type 2 diabetes  Chronic anemia    She was admitted to [facility] on 8/18/2024 with weakness and dizziness and SBP at home in the 90s and with junctional rhythm in the 50s.  She was evaluated by Dr. Lala with general cardiology and PTA amiodarone was discontinued.  EP was consulted and per the patient report she had not had recurrent SVT for approximately 3 to 4 years after starting amiodarone.    Dr. Barbosa felt that the junctional rhythm was likely secondary to the chronic use of amiodarone, but was unsure if the bradycardia was contributing to her dizziness or fifths was a multifactorial issue due to low BP and inner ear issues.  He agreed with holding amiodarone and starting 100 mg 5 days a week in approximately 10 days which would be 8/30.  It was recommended that she undergo an outpatient Zio patch after restarting amiodarone and follow-up to review resultsEkaterina Trujillo is planned to have an admission at [facility] on 9/28 for daily dialysis (slow 4-hour run with the midodrine to avoid hypotension) for approximately 5 days to reduce intravascular volume status to see if  PA pressure decreases allowing for transplantation.  Also, during admission a paracentesis will potentially be performed and repeat echocardiogram to assess aortic valve.    Today she presents for discharge follow-up accompanied by her son, Oni, post her recent hospitalization in 8/2024.  Given that she will be on continuous telemetry during her admission, we will defer her Zio patch until a later date.  We spent most of the office visit discussing her values on her blood pressure monitor.  Cassandra was confused thinking that the diastolic blood pressure was her heart rate and occasionally would have readings in the 40s.  Overall, she reports no symptomatic SVT since the decrease of amiodarone.  She did report she had a Zio patch mailed to her home but was unable to place it herself and did not have assistance.    Diagnotic studies:  EKG 9/24/2024: Junctional rhythm at 67 bpm with , QTc 430, QRSd 88  Echocardiogram 2/2024: LVEF of 60 to 65% without regional wall motion abnormalities.  LA was severely dilated with mild MR.  Mild aortic valve stenosis.  RVSP 33 mmHg plus RAP.  RHC 2022: Mildly elevated right-sided filling pressure, severe pulmonary artery hypertension, moderately elevated left-sided pressures, normal cardiac output.  Coronary angiogram/RHC 2021: Mild nonobstructive CAD with 10% LAD lesion and 20% proximal RCA lesion.  Severe pulmonary hypertension, severely elevated right and left-sided filling pressures (mean RA 35 mmHg and LVEDP 30 to 40 mmHg) episode of SVT terminated by adenosine.            Assessment and Plan:     ASSESSMENT:    Highly symptomatic SVT/junctional rhythm with HR in 50s:   Previously maintained on low-dose amiodarone at 200 mg 5 days a week that was held due to junctional rhythm.  Amiodarone was restarted on 8/30 at 100 mg 5 days a week  No symptomatic breakthrough    Severe pulmonary hypertension:  Follows with Dr. Redd  Plan for admission on 9/28 (see  above)    COPD  Chronic nocturnal oxygen    ERSD with postdialysis hypotension  Currently dialyzes on MWF  Midodrine 10 mg qam and 30 mg before HD on MWF (dialysis days) and 20 mg BID TThSaSu (non-dialysis days).     Amiodarone monitoring  TSH 5.13 10/2023  ALT 9 AST 17 8/2024    PLAN:     Follow-up with Dr. Redd as dictated for management of pulmonary hypertension  Follow-up with electrophysiology as needed       Orders this Visit:  Orders Placed This Encounter   Procedures    EKG 12-lead complete w/read - Clinics (performed today)     No orders of the defined types were placed in this encounter.    There are no discontinued medications.    Today's clinic visit entailed:  Review of the result(s) of each unique test - Echo, eKG, RHC, Cor angiogram  Assessment requiring an independent historian(s) - family - Son  The following tests were independently interpreted by me as noted in my documentation: EKG  Prescription drug management  I spent a total of 40 minutes on the day of the visit.   Time spent by me doing chart review, history and exam, documentation and further activities per the note  Provider  Link to [employer] Help Grid     The level of medical decision making during this visit was of moderate complexity.           Review of Systems:     Review of Systems:  Skin:        Eyes:       ENT:       Respiratory:       Cardiovascular:       Gastroenterology:      Genitourinary:       Musculoskeletal:       Neurologic:       Psychiatric:       Heme/Lymph/Imm:       Endocrine:                 Physical Exam:     Vitals: /50   Pulse 63   LMP  (LMP Unknown)   SpO2 91%   Constitutional: Thin and frail with thinning hair on chronic O2 and in no apparent distress.  Eyes: Pupils equal, round.   HEENT: Normocephalic, atraumatic.   Neck: Supple.   Respiratory: Breathing non-labored. Lungs clear to auscultation bilaterally.  Cardiovascular:  Regular rate and rhythm, normal S1 and S2. No murmur   Skin: Warm, dry.    Extremities: Chronic venous stasis changes  Neurologic: No gross motor deficits. Alert, awake, and oriented to person, place and time.  Psychiatric: Affect appropriate.        CURRENT MEDICATIONS:  Current Outpatient Medications   Medication Sig Dispense Refill    ACCU-CHEK CHING PLUS test strip USE TO TEST BLOOD SUGAR 4 TIMES PER  strip 3    ACCU-CHEK GUIDE test strip USE 1 EACH TO TEST BLOOD GLUCOSE FOUR TIMES DAILY 400 strip 0    acetaminophen (TYLENOL) 500 MG tablet Take 500 mg by mouth every 8 hours as needed for mild pain      amiodarone (PACERONE) 100 MG TABS tablet Take 1 tablet (100 mg) by mouth five times a week Take on Mondays, Tuesdays, Thursdays, Fridays and Saturdays; hold on Wednesdays and Sundays. 30 tablet 3    blood glucose (NO BRAND SPECIFIED) lancets standard Use to test blood sugar 4 times daily or as directed. 400 lancet 3    blood glucose (NO BRAND SPECIFIED) lancets standard Use to test blood sugar three times daily or as directed. 400 lancet 11    blood glucose monitoring (NO BRAND SPECIFIED) meter device kit Use to test blood sugar 4 times daily or as directed. 1 kit 0    calcium acetate (PHOSLO) 667 MG CAPS capsule Take 667 mg by mouth 3 times daily (with meals)      calcium acetate (PHOSLO) 667 MG CAPS capsule Take 667 mg by mouth Take with snacks or supplements      carbamide peroxide (DEBROX) 6.5 % otic solution Place 10 drops Into the left ear 2 times daily. 15 mL 0    Continuous Glucose Sensor (FREESTYLE LATOSHA 2 SENSOR) MISC 1 each every 14 days. Use 1 sensor every 14 days. Use to read blood sugars per 's instructions. 2 each 5    famotidine (PEPCID) 10 MG tablet Take 10 mg by mouth daily as needed      guaiFENesin 1200 MG TB12 Take 1 tablet by mouth 2 times daily as needed (cough/congestion)      insulin aspart (FIASP FLEXTOUCH) 100 UNIT/ML pen-injector 2U per 15 carbs, plus 1U for every 50 that preprandial BG over 150 - MAXIMUM 20 UNITS PER DAY 15 mL 11     insulin glargine (LANTUS PEN) 100 UNIT/ML pen Inject 11 Units subcutaneously at bedtime. 15 mL 3    insulin pen needle (31G X 8 MM) 31G X 8 MM miscellaneous Use 4 pen needles daily or as directed. 400 each 0    levalbuterol (XOPENEX HFA) 45 MCG/ACT inhaler Inhale 2 puffs into the lungs every 6 hours as needed for shortness of breath / dyspnea or wheezing 15 g 11    midodrine (PROAMATINE) 10 MG tablet Take 30 mg by mouth Monday, Wednesdays, and Fridays with food prior to dialysis      midodrine (PROAMATINE) 10 MG tablet Take 10 mg by mouth Every Mon, Wed, Fri Morning      midodrine (PROAMATINE) 10 MG tablet Take 20 mg by mouth Twice daily (in the morning and evening) on non-dialysis days (Tuesday, Thursday, Saturday, Sunday)         ALLERGIES  Allergies   Allergen Reactions    Albuterol      shakiness    Aspirin      gi    Byetta [Exenatide]      gi    Clonidine      constipation    Codeine      vomiting    Ezetimibe      muscle symptoms    Hydralazine      headaches    Lisinopril      hyperkalemia    Metformin Hydrochloride      vomiting    Pravachol [Pravastatin Sodium]      muscle pains    Simvastatin      myalgias    Troglitazone          PAST MEDICAL HISTORY:  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, initial encounter 12/6/2019    End stage renal failure on dialysis (H)     Esophagitis, unspecified     Former tobacco use     HEMORRHAGIC GASTROPATHY     History of blood transfusion     [place]    Iron deficiency anemia, unspecified     Mild persistent asthma     Obesity     Other and unspecified hyperlipidemia     Pneumonia     Pulmonary hypertension (H)     per 12/2012 echo mod.to severe pulmonary hypertension    Tobacco use disorder dc ed 1999    Type 2 diabetes mellitus (H)     on Insulin- managed by PCP    Unspecified essential  hypertension        PAST SURGICAL HISTORY:  Past Surgical History:   Procedure Laterality Date    ABDOMINOPLASTY  pt unsure when    abdominoplasty-     SECTION  ,     x 2    COLONOSCOPY      [facility]    COLONOSCOPY N/A 2021    Procedure: COLONOSCOPY, WITH POLYPECTOMY AND BIOPSY;  Surgeon: Lincoln Farrar MD;  Location:  GI    CREATE FISTULA ARTERIOVENOUS UPPER EXTREMITY Right 2018    Procedure: RIGHT BRACHIAL TO BASILIC ARTERIOVENOUS FISTULA CREATION;  Surgeon: Terry Vizcaino MD;  Location: [facility]    CV HEART CATHETERIZATION WITH POSSIBLE INTERVENTION N/A 2021    Procedure: Heart Catheterization with Possible Intervention;  Surgeon: Joseluis Dean MD;  Location:  HEART CARDIAC CATH LAB    CV RIGHT HEART CATH MEASUREMENTS RECORDED N/A 2022    Procedure: Heart Cath Right Heart Cath;  Surgeon: Yan Heredia MD;  Location:  HEART CARDIAC CATH LAB    ENT SURGERY  as a child    tonsillectomy    ESOPHAGOSCOPY, GASTROSCOPY, DUODENOSCOPY (EGD), COMBINED N/A 2021    Procedure: ESOPHAGOGASTRODUODENOSCOPY, WITH BIOPSY;  Surgeon: Lincoln Farrar MD;  Location:  GI    EYE SURGERY Left     injections- eye    HYSTERECTOMY  approx     partial hysterectomy-reason bleeding     IR CVC TUNNEL PLACEMENT > 5 YRS OF AGE  2019    IR CVC TUNNEL REMOVAL RIGHT  2020    IR DIALYSIS FISTULOGRAM RIGHT  2019       FAMILY HISTORY:  Family History   Problem Relation Age of Onset    Arrhythmia Mother     Hypertension Mother     Pancreatic Cancer Father     Diabetes Brother     Asthma Sister     LUNG DISEASE Sister     Diabetes Daughter     Cirrhosis Sister        SOCIAL HISTORY:  Social History     Socioeconomic History    Marital status: Single   Tobacco Use    Smoking status: Former     Current packs/day: 0.00     Average packs/day: 1 pack/day for 22.0 years (22.0 ttl pk-yrs)     Types: Cigarettes     Start date:  10/12/1977     Quit date: 10/12/1999     Years since quittin.9    Smokeless tobacco: Never   Vaping Use    Vaping status: Never Used   Substance and Sexual Activity    Alcohol use: No     Alcohol/week: 0.0 standard drinks of alcohol    Drug use: No    Sexual activity: Never   Other Topics Concern    Blood Transfusions Yes    Caffeine Concern No    Sleep Concern No    Stress Concern No    Weight Concern Yes     Comment: would like to lose weight    Special Diet No    Exercise Yes     Comment: walks daily 2 blocks     Social Determinants of Health     Interpersonal Safety: Low Risk  (11/15/2023)    Interpersonal Safety     Do you feel physically and emotionally safe where you currently live?: Yes     Within the past 12 months, have you been hit, slapped, kicked or otherwise physically hurt by someone?: No     Within the past 12 months, have you been humiliated or emotionally abused in other ways by your partner or ex-partner?: No

## 2024-09-28 ENCOUNTER — HOSPITAL ENCOUNTER (INPATIENT)
Facility: CLINIC | Age: 64
LOS: 6 days | Discharge: HOME | End: 2024-10-04
Attending: INTERNAL MEDICINE | Admitting: INTERNAL MEDICINE
Payer: MEDICARE

## 2024-09-28 DIAGNOSIS — N18.6 ESRD ON DIALYSIS (H): ICD-10-CM

## 2024-09-28 DIAGNOSIS — Z99.2 ESRD ON DIALYSIS (H): ICD-10-CM

## 2024-09-28 DIAGNOSIS — I27.20 PULMONARY HYPERTENSION (H): ICD-10-CM

## 2024-09-28 DIAGNOSIS — I27.20 PULMONARY HYPERTENSION (H): Primary | ICD-10-CM

## 2024-09-28 LAB
ALBUMIN SERPL BCG-MCNC: 3.8 G/DL (ref 3.5–5.2)
ALP SERPL-CCNC: 94 U/L (ref 40–150)
ALT SERPL W P-5'-P-CCNC: 5 U/L (ref 0–50)
ANION GAP SERPL CALCULATED.3IONS-SCNC: 17 MMOL/L (ref 7–15)
AST SERPL W P-5'-P-CCNC: 16 U/L (ref 0–45)
BASOPHILS # BLD AUTO: 0 10E3/UL (ref 0–0.2)
BASOPHILS NFR BLD AUTO: 1 %
BILIRUB SERPL-MCNC: 0.4 MG/DL
BUN SERPL-MCNC: 31.4 MG/DL (ref 8–23)
CALCIUM SERPL-MCNC: 9.9 MG/DL (ref 8.8–10.4)
CHLORIDE SERPL-SCNC: 97 MMOL/L (ref 98–107)
CREAT SERPL-MCNC: 7.09 MG/DL (ref 0.51–0.95)
EGFRCR SERPLBLD CKD-EPI 2021: 6 ML/MIN/1.73M2
EOSINOPHIL # BLD AUTO: 0.1 10E3/UL (ref 0–0.7)
EOSINOPHIL NFR BLD AUTO: 2 %
ERYTHROCYTE [DISTWIDTH] IN BLOOD BY AUTOMATED COUNT: 17 % (ref 10–15)
GLUCOSE BLDC GLUCOMTR-MCNC: 145 MG/DL (ref 70–99)
GLUCOSE BLDC GLUCOMTR-MCNC: 157 MG/DL (ref 70–99)
GLUCOSE SERPL-MCNC: 173 MG/DL (ref 70–99)
HCO3 SERPL-SCNC: 25 MMOL/L (ref 22–29)
HCT VFR BLD AUTO: 30.8 % (ref 35–47)
HGB BLD-MCNC: 9 G/DL (ref 11.7–15.7)
IMM GRANULOCYTES # BLD: 0 10E3/UL
IMM GRANULOCYTES NFR BLD: 1 %
LYMPHOCYTES # BLD AUTO: 1 10E3/UL (ref 0.8–5.3)
LYMPHOCYTES NFR BLD AUTO: 18 %
MAGNESIUM SERPL-MCNC: 2.3 MG/DL (ref 1.7–2.3)
MCH RBC QN AUTO: 28.2 PG (ref 26.5–33)
MCHC RBC AUTO-ENTMCNC: 29.2 G/DL (ref 31.5–36.5)
MCV RBC AUTO: 97 FL (ref 78–100)
MONOCYTES # BLD AUTO: 1 10E3/UL (ref 0–1.3)
MONOCYTES NFR BLD AUTO: 17 %
NEUTROPHILS # BLD AUTO: 3.6 10E3/UL (ref 1.6–8.3)
NEUTROPHILS NFR BLD AUTO: 63 %
NRBC # BLD AUTO: 0 10E3/UL
NRBC BLD AUTO-RTO: 0 /100
PHOSPHATE SERPL-MCNC: 5.2 MG/DL (ref 2.5–4.5)
PLATELET # BLD AUTO: 121 10E3/UL (ref 150–450)
POTASSIUM SERPL-SCNC: 5.3 MMOL/L (ref 3.4–5.3)
PROT SERPL-MCNC: 6.8 G/DL (ref 6.4–8.3)
RBC # BLD AUTO: 3.19 10E6/UL (ref 3.8–5.2)
SODIUM SERPL-SCNC: 139 MMOL/L (ref 135–145)
WBC # BLD AUTO: 5.8 10E3/UL (ref 4–11)

## 2024-09-28 PROCEDURE — 85004 AUTOMATED DIFF WBC COUNT: CPT

## 2024-09-28 PROCEDURE — 93010 ELECTROCARDIOGRAM REPORT: CPT | Performed by: INTERNAL MEDICINE

## 2024-09-28 PROCEDURE — 250N000013 HC RX MED GY IP 250 OP 250 PS 637

## 2024-09-28 PROCEDURE — 99222 1ST HOSP IP/OBS MODERATE 55: CPT | Mod: AI | Performed by: INTERNAL MEDICINE

## 2024-09-28 PROCEDURE — 93005 ELECTROCARDIOGRAM TRACING: CPT

## 2024-09-28 PROCEDURE — 83735 ASSAY OF MAGNESIUM: CPT

## 2024-09-28 PROCEDURE — 80053 COMPREHEN METABOLIC PANEL: CPT

## 2024-09-28 PROCEDURE — 120N000005 HC R&B MS OVERFLOW UMMC

## 2024-09-28 PROCEDURE — 84100 ASSAY OF PHOSPHORUS: CPT

## 2024-09-28 PROCEDURE — 36415 COLL VENOUS BLD VENIPUNCTURE: CPT

## 2024-09-28 PROCEDURE — 250N000012 HC RX MED GY IP 250 OP 636 PS 637

## 2024-09-28 RX ORDER — MIDODRINE HYDROCHLORIDE 5 MG/1
30 TABLET ORAL DAILY PRN
Status: DISPENSED | OUTPATIENT
Start: 2024-09-28 | End: 2024-10-02

## 2024-09-28 RX ORDER — LEVALBUTEROL TARTRATE 45 UG/1
2 AEROSOL, METERED ORAL EVERY 6 HOURS PRN
Status: DISCONTINUED | OUTPATIENT
Start: 2024-09-28 | End: 2024-10-04 | Stop reason: HOSPADM

## 2024-09-28 RX ORDER — NICOTINE POLACRILEX 4 MG
15-30 LOZENGE BUCCAL
Status: DISCONTINUED | OUTPATIENT
Start: 2024-09-28 | End: 2024-10-04 | Stop reason: HOSPADM

## 2024-09-28 RX ORDER — CALCIUM ACETATE 667 MG/1
667 CAPSULE ORAL
Status: DISCONTINUED | OUTPATIENT
Start: 2024-09-28 | End: 2024-10-04 | Stop reason: HOSPADM

## 2024-09-28 RX ORDER — ACETAMINOPHEN 500 MG
500 TABLET ORAL EVERY 8 HOURS PRN
Status: DISCONTINUED | OUTPATIENT
Start: 2024-09-28 | End: 2024-10-04 | Stop reason: HOSPADM

## 2024-09-28 RX ORDER — POLYETHYLENE GLYCOL 3350 17 G/17G
17 POWDER, FOR SOLUTION ORAL DAILY PRN
Status: DISCONTINUED | OUTPATIENT
Start: 2024-09-28 | End: 2024-10-04 | Stop reason: HOSPADM

## 2024-09-28 RX ORDER — GUAIFENESIN 600 MG/1
1200 TABLET, EXTENDED RELEASE ORAL 2 TIMES DAILY PRN
Status: DISCONTINUED | OUTPATIENT
Start: 2024-09-28 | End: 2024-10-04 | Stop reason: HOSPADM

## 2024-09-28 RX ORDER — DEXTROSE MONOHYDRATE 25 G/50ML
25-50 INJECTION, SOLUTION INTRAVENOUS
Status: DISCONTINUED | OUTPATIENT
Start: 2024-09-28 | End: 2024-10-04 | Stop reason: HOSPADM

## 2024-09-28 RX ORDER — AMIODARONE HYDROCHLORIDE 100 MG/1
100 TABLET ORAL
Status: DISCONTINUED | OUTPATIENT
Start: 2024-09-30 | End: 2024-09-30

## 2024-09-28 RX ORDER — FAMOTIDINE 10 MG
10 TABLET ORAL DAILY PRN
Status: DISCONTINUED | OUTPATIENT
Start: 2024-09-28 | End: 2024-10-04 | Stop reason: HOSPADM

## 2024-09-28 RX ORDER — MIDODRINE HYDROCHLORIDE 5 MG/1
10 TABLET ORAL
Status: DISCONTINUED | OUTPATIENT
Start: 2024-09-30 | End: 2024-10-04 | Stop reason: HOSPADM

## 2024-09-28 RX ORDER — MIDODRINE HYDROCHLORIDE 5 MG/1
20 TABLET ORAL
Status: DISCONTINUED | OUTPATIENT
Start: 2024-09-28 | End: 2024-10-04 | Stop reason: HOSPADM

## 2024-09-28 RX ORDER — AMOXICILLIN 250 MG
1 CAPSULE ORAL
Status: DISCONTINUED | OUTPATIENT
Start: 2024-09-28 | End: 2024-10-04 | Stop reason: HOSPADM

## 2024-09-28 RX ADMIN — ACETAMINOPHEN 500 MG: 500 TABLET ORAL at 23:01

## 2024-09-28 RX ADMIN — GUAIFENESIN 1200 MG: 600 TABLET ORAL at 21:39

## 2024-09-28 RX ADMIN — INSULIN GLARGINE 7 UNITS: 100 INJECTION, SOLUTION SUBCUTANEOUS at 23:25

## 2024-09-28 ASSESSMENT — ACTIVITIES OF DAILY LIVING (ADL)
ADLS_ACUITY_SCORE: 39
ADLS_ACUITY_SCORE: 38
ADLS_ACUITY_SCORE: 39
ADLS_ACUITY_SCORE: 45
ADLS_ACUITY_SCORE: 45
ADLS_ACUITY_SCORE: 38
ADLS_ACUITY_SCORE: 39
ADLS_ACUITY_SCORE: 45

## 2024-09-28 NOTE — H&P
"Cardiology 2    History and Physical    Date of Admission:  9/28/2024  Date of Service (when I saw the patient): 09/28/24    I prsonally sdaw and exmained this patient with resident and agree with assessment qnd plan to establish new  new dry weight and fcilitat3 treatment of PAH    Assessment & Plan   Yissel Wetzel is a 64 year old female with severe pulmonary hypertension admitted for daily dialysis and RHC to assess pulmonary artery pressure to eval for transplant     Severe pulmonary hypertension primarily secondary to intrinsic pulmonary vascular disease (mean PA 81 mmHg, PVRI 25, PCWP confirmed with sat 35 mmHg)   Severely elevated right-sided filling pressures (mean RA 35 mmHg)  Severely elevated left-sided filling pressures (LVEDP 35-40 mmHg)   Followed by Dr. Redd. Per his last note,  \"No hemodynamically significant coronary artery disease  With NO to 80 ppm the mean PA decreased to 60 mmHg, PCWP remained unchanged at 35 mmHg, PVRI decreased to 8.7 waterman*m2. She is euvolemic on exam. Feeling at her baseline. Labs are stable.     Nephrology consult for dialysis   Not on any pulm htn meds   Will likely have RHC this admission     ESRD.  Nephrology consulted for daily dialysis   - Continue PTA midodrine 10 mg qam and 30 mg before HD on MWF (dialysis days); and 20 mg BID  TThSaSu (non-dialysis days).  -will likely need to adjust midodrine pending dialysis schedule. No urgent need for dialysis this evening based on labs     History of SVT.  Recently seen by EP at end of September 2024  Continue pta amiodarone 100 mg 5 days a week      DM2.   10 units of glargine and Carb coverage at home     Decrease to 7 units at bedtime will eval daily   Medium sliding scale      COPD.  * On home O2: 2L at night.  - Continue O2.  - Continue PRN levalbuterol.     Thrombocytopenia  Platelets stable     Chronic anemia.  * Baseline Hgb: 9.7-12.2  * Hgb 9.0 on admit.  - Continue to monitor CBC.    H/o cirrhosis noted to be " related to severe pHTN with right heart dysfunction.  * LFT's normal on admit.    FEN: 2 g  na, 2 L fluid s  DVT Prophylaxis: will hold this evening. Patient at baseline   Code Status: Full Code  Disposition: Expected discharge in 5+ days once dialysis and RHC completed     Will be discussed with Dr. Redd in AM    Simi Bateman MD  Internal Medicine PGY2     Primary Care Physician   Gigi Martin    Chief Complaint   Elevated PAP     History is obtained from the patient    History of Present Illness   Yissel Wetzel is a 64 year old female with severe pulmonary hypertension admitted for daily dialysis and RHC to assess pulmonary artery pressure to eval for transplant.     Cassandra is a patient of Dr. Redd.  She was last seen in clinic in July 2024.  Plan was to have patient admitted for daily dialysis to determine if her PAP would improve enough to be able to undergo a heart transplant.    She reports that she has been feeling well.  Feels like she is at her baseline.  She has been eating okay at home.  Reports her shortness of breath is at baseline and has actually been okay over the last few days.  She really has no concerns today.  She is a little worried about doing daily dialysis that she sometimes has issues tolerating it.  She is unable to explain further the reasons for not tolerating.     She is worried about the food here. No current nausea, vomiting     No fevers, chills, sore throat, cough, chest pain, abdominal pain, constipation, urinary symptoms, peripheral edema.    Past Medical History    I have reviewed this patient's medical history and updated it with pertinent information if needed.   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, initial encounter 12/6/2019    End stage renal failure on dialysis (H)      Esophagitis, unspecified     Former tobacco use     HEMORRHAGIC GASTROPATHY     History of blood transfusion     [place]    Iron deficiency anemia, unspecified     Mild persistent asthma     Obesity     Other and unspecified hyperlipidemia     Pneumonia     Pulmonary hypertension (H)     per 2012 echo mod.to severe pulmonary hypertension    Tobacco use disorder dc ed     Type 2 diabetes mellitus (H)     on Insulin- managed by PCP    Unspecified essential hypertension        Past Surgical History   I have reviewed this patient's surgical history and updated it with pertinent information if needed.  Past Surgical History:   Procedure Laterality Date    ABDOMINOPLASTY  pt unsure when    abdominoplasty-     SECTION  ,     x 2    COLONOSCOPY      [facility]    COLONOSCOPY N/A 2021    Procedure: COLONOSCOPY, WITH POLYPECTOMY AND BIOPSY;  Surgeon: Lincoln Farrar MD;  Location:  GI    CREATE FISTULA ARTERIOVENOUS UPPER EXTREMITY Right 2018    Procedure: RIGHT BRACHIAL TO BASILIC ARTERIOVENOUS FISTULA CREATION;  Surgeon: Terry Vizcaino MD;  Location: [facility]    CV HEART CATHETERIZATION WITH POSSIBLE INTERVENTION N/A 2021    Procedure: Heart Catheterization with Possible Intervention;  Surgeon: Joseluis Dean MD;  Location:  HEART CARDIAC CATH LAB    CV RIGHT HEART CATH MEASUREMENTS RECORDED N/A 2022    Procedure: Heart Cath Right Heart Cath;  Surgeon: Yan Heredia MD;  Location:  HEART CARDIAC CATH LAB    ENT SURGERY  as a child    tonsillectomy    ESOPHAGOSCOPY, GASTROSCOPY, DUODENOSCOPY (EGD), COMBINED N/A 2021    Procedure: ESOPHAGOGASTRODUODENOSCOPY, WITH BIOPSY;  Surgeon: Lincoln Farrar MD;  Location:  GI    EYE SURGERY Left     injections- eye    HYSTERECTOMY  approx     partial hysterectomy-reason bleeding     IR CVC TUNNEL PLACEMENT > 5 YRS OF AGE  2019    IR CVC TUNNEL REMOVAL RIGHT   2020    IR DIALYSIS FISTULOGRAM RIGHT  2019       Prior to Admission Medications   Prior to Admission Medications   Prescriptions Last Dose Informant Patient Reported? Taking?   ACCU-CHEK CHING PLUS test strip  Self No No   Sig: USE TO TEST BLOOD SUGAR 4 TIMES PER DAY   ACCU-CHEK GUIDE test strip   No No   Sig: USE 1 EACH TO TEST BLOOD GLUCOSE FOUR TIMES DAILY   Continuous Glucose Sensor (FREESTYLE LATOSHA 2 SENSOR) [facility]   No No   Si each every 14 days. Use 1 sensor every 14 days. Use to read blood sugars per 's instructions.   acetaminophen (TYLENOL) 500 MG tablet  Self Yes No   Sig: Take 500 mg by mouth every 8 hours as needed for mild pain   amiodarone (PACERONE) 100 MG TABS tablet   No No   Sig: Take 1 tablet (100 mg) by mouth five times a week Take on , , ,  and ; hold on  and Sundays.   blood glucose (NO BRAND SPECIFIED) lancets standard  Self No No   Sig: Use to test blood sugar three times daily or as directed.   blood glucose (NO BRAND SPECIFIED) lancets standard  Self No No   Sig: Use to test blood sugar 4 times daily or as directed.   blood glucose monitoring (NO BRAND SPECIFIED) meter device kit   No No   Sig: Use to test blood sugar 4 times daily or as directed.   calcium acetate (PHOSLO) 667 MG CAPS capsule   Yes No   Sig: Take 667 mg by mouth 3 times daily (with meals)   calcium acetate (PHOSLO) 667 MG CAPS capsule   Yes No   Sig: Take 667 mg by mouth Take with snacks or supplements   carbamide peroxide (DEBROX) 6.5 % otic solution   No No   Sig: Place 10 drops Into the left ear 2 times daily.   famotidine (PEPCID) 10 MG tablet   Yes No   Sig: Take 10 mg by mouth daily as needed   guaiFENesin 1200 MG TB12   Yes No   Sig: Take 1 tablet by mouth 2 times daily as needed (cough/congestion)   insulin aspart (FIASP FLEXTOUCH) 100 UNIT/ML pen-injector   No No   SiU per 15 carbs, plus 1U for every 50 that preprandial BG over 150 -  MAXIMUM 20 UNITS PER DAY   insulin glargine (LANTUS PEN) 100 UNIT/ML pen   No No   Sig: Inject 11 Units subcutaneously at bedtime.   insulin pen needle (31G X 8 MM) 31G X 8 MM miscellaneous   No No   Sig: Use 4 pen needles daily or as directed.   levalbuterol (XOPENEX HFA) 45 MCG/ACT inhaler  Self No No   Sig: Inhale 2 puffs into the lungs every 6 hours as needed for shortness of breath / dyspnea or wheezing   midodrine (PROAMATINE) 10 MG tablet   Yes No   Sig: Take 10 mg by mouth Every Mon, Wed, Fri Morning   midodrine (PROAMATINE) 10 MG tablet   Yes No   Sig: Take 20 mg by mouth Twice daily (in the morning and evening) on non-dialysis days (Tuesday, Thursday, Saturday, Sunday)   midodrine (PROAMATINE) 10 MG tablet   Yes No   Sig: Take 30 mg by mouth Monday, Wednesdays, and Fridays with food prior to dialysis      Facility-Administered Medications: None     Allergies   Allergies   Allergen Reactions    Albuterol      shakiness    Aspirin      gi    Byetta [Exenatide]      gi    Clonidine      constipation    Codeine      vomiting    Ezetimibe      muscle symptoms    Hydralazine      headaches    Lisinopril      hyperkalemia    Metformin Hydrochloride      vomiting    Pravachol [Pravastatin Sodium]      muscle pains    Simvastatin      myalgias    Troglitazone        Social History   I have personally reviewed the social history with the patient showing .    Family History   Family history reviewed with patient and is noncontributory.    Review of Systems   The 10 point Review of Systems is negative other than noted in the HPI or here.     Physical Exam   Temp: 98.3  F (36.8  C) Temp src: Oral BP: 116/58 Pulse: 55   Resp: 16 SpO2: 98 % O2 Device: Nasal cannula Oxygen Delivery: 2 LPM  Vital Signs with Ranges  Temp:  [98.3  F (36.8  C)] 98.3  F (36.8  C)  Pulse:  [55] 55  Resp:  [16] 16  BP: (116)/(58) 116/58  SpO2:  [98 %] 98 %  149 lbs 4.8 oz    GEN:  Alert, interactive, appears comfortable, NAD.  HEENT:   Normocephalic/atraumatic, no scleral icterus, no nasal discharge  CV:  Regular rate and rhythm, no murmur  LUNGS:  Clear to auscultation bilaterally, no wheezes or crackles.   ABD:  soft, non-tender/non-distended.  No rebound/guarding/rigidity.  EXT:  No edema or cyanosis.  Hands/feet warm to touch with good signs of peripheral perfusion.   SKIN:  Dry to touch  NEURO:  Alert and oriented, no new focal deficits appreciated.      Data   Data reviewed today:    CMP stable  Hg/platelets at baseline     Diagnotic studies:  EKG 9/24/2024: Junctional rhythm at 67 bpm with , QTc 430, QRSd 88  Echocardiogram 2/2024: LVEF of 60 to 65% without regional wall motion abnormalities.  LA was severely dilated with mild MR.  Mild aortic valve stenosis.  RVSP 33 mmHg plus RAP.  RHC 2022: Mildly elevated right-sided filling pressure, severe pulmonary artery hypertension, moderately elevated left-sided pressures, normal cardiac output.  Coronary angiogram/RHC 2021: Mild nonobstructive CAD with 10% LAD lesion and 20% proximal RCA lesion.  Severe pulmonary hypertension, severely elevated right and left-sided filling pressures (mean RA 35 mmHg and LVEDP 30 to 40 mmHg) episode of SVT terminated by adenosine.    Recent Labs   Lab 09/28/24  1619 09/28/24  1612   WBC  --  5.8   HGB  --  9.0*   MCV  --  97   PLT  --  121*   NA  --  139   POTASSIUM  --  5.3   CHLORIDE  --  97*   CO2  --  25   BUN  --  31.4*   CR  --  7.09*   ANIONGAP  --  17*   KANWAL  --  9.9   * 173*   ALBUMIN  --  3.8   PROTTOTAL  --  6.8   BILITOTAL  --  0.4   ALKPHOS  --  94   ALT  --  5   AST  --  16       No results found for this or any previous visit (from the past 24 hour(s)).

## 2024-09-28 NOTE — PROGRESS NOTES
General Appearance: A+O VSS  Respiratory: Lungs CTA with diminished bases  Cardiovascular: sinus rhythm-sinus perfecto  GI: Eating well Last BM today.Aneuric and is on HD   Skin: Scalp with some scabs-healing over. Bruise on left arm. Fistula in right arm  Other: Patient direct admit from home. Brought to room 6409 and settled in. Awaiting med orders.

## 2024-09-29 ENCOUNTER — APPOINTMENT (OUTPATIENT)
Dept: GENERAL RADIOLOGY | Facility: CLINIC | Age: 64
End: 2024-09-29
Payer: MEDICARE

## 2024-09-29 LAB
ALBUMIN SERPL BCG-MCNC: 3.6 G/DL (ref 3.5–5.2)
ALP SERPL-CCNC: 92 U/L (ref 40–150)
ALT SERPL W P-5'-P-CCNC: <5 U/L (ref 0–50)
ANION GAP SERPL CALCULATED.3IONS-SCNC: 15 MMOL/L (ref 7–15)
ANION GAP SERPL CALCULATED.3IONS-SCNC: 16 MMOL/L (ref 7–15)
AST SERPL W P-5'-P-CCNC: 13 U/L (ref 0–45)
BASE EXCESS BLDV CALC-SCNC: 2.7 MMOL/L (ref -3–3)
BASOPHILS # BLD AUTO: 0.1 10E3/UL (ref 0–0.2)
BASOPHILS NFR BLD AUTO: 1 %
BILIRUB SERPL-MCNC: 0.3 MG/DL
BUN SERPL-MCNC: 38.4 MG/DL (ref 8–23)
BUN SERPL-MCNC: 39.6 MG/DL (ref 8–23)
CALCIUM SERPL-MCNC: 9.7 MG/DL (ref 8.8–10.4)
CALCIUM SERPL-MCNC: 9.7 MG/DL (ref 8.8–10.4)
CHLORIDE SERPL-SCNC: 98 MMOL/L (ref 98–107)
CHLORIDE SERPL-SCNC: 99 MMOL/L (ref 98–107)
CHOLEST SERPL-MCNC: 110 MG/DL
CREAT SERPL-MCNC: 7.76 MG/DL (ref 0.51–0.95)
CREAT SERPL-MCNC: 8.1 MG/DL (ref 0.51–0.95)
EGFRCR SERPLBLD CKD-EPI 2021: 5 ML/MIN/1.73M2
EGFRCR SERPLBLD CKD-EPI 2021: 5 ML/MIN/1.73M2
EOSINOPHIL # BLD AUTO: 0.1 10E3/UL (ref 0–0.7)
EOSINOPHIL NFR BLD AUTO: 2 %
ERYTHROCYTE [DISTWIDTH] IN BLOOD BY AUTOMATED COUNT: 16.8 % (ref 10–15)
ERYTHROCYTE [DISTWIDTH] IN BLOOD BY AUTOMATED COUNT: 17 % (ref 10–15)
FERRITIN SERPL-MCNC: 299 NG/ML (ref 11–328)
GLUCOSE BLDC GLUCOMTR-MCNC: 124 MG/DL (ref 70–99)
GLUCOSE BLDC GLUCOMTR-MCNC: 142 MG/DL (ref 70–99)
GLUCOSE BLDC GLUCOMTR-MCNC: 182 MG/DL (ref 70–99)
GLUCOSE BLDC GLUCOMTR-MCNC: 210 MG/DL (ref 70–99)
GLUCOSE SERPL-MCNC: 152 MG/DL (ref 70–99)
GLUCOSE SERPL-MCNC: 220 MG/DL (ref 70–99)
HCO3 BLDV-SCNC: 29 MMOL/L (ref 21–28)
HCO3 SERPL-SCNC: 24 MMOL/L (ref 22–29)
HCO3 SERPL-SCNC: 24 MMOL/L (ref 22–29)
HCT VFR BLD AUTO: 29.7 % (ref 35–47)
HCT VFR BLD AUTO: 30.9 % (ref 35–47)
HDLC SERPL-MCNC: 36 MG/DL
HGB BLD-MCNC: 8.9 G/DL (ref 11.7–15.7)
HGB BLD-MCNC: 9.2 G/DL (ref 11.7–15.7)
IMM GRANULOCYTES # BLD: 0 10E3/UL
IMM GRANULOCYTES NFR BLD: 0 %
INR PPP: 1.21 (ref 0.85–1.15)
IRON BINDING CAPACITY (ROCHE): 303 UG/DL (ref 240–430)
IRON SATN MFR SERPL: 12 % (ref 15–46)
IRON SERPL-MCNC: 35 UG/DL (ref 37–145)
LACTATE SERPL-SCNC: 1.1 MMOL/L (ref 0.7–2)
LDLC SERPL CALC-MCNC: 53 MG/DL
LYMPHOCYTES # BLD AUTO: 1.1 10E3/UL (ref 0.8–5.3)
LYMPHOCYTES NFR BLD AUTO: 20 %
MAGNESIUM SERPL-MCNC: 2.5 MG/DL (ref 1.7–2.3)
MCH RBC QN AUTO: 28.3 PG (ref 26.5–33)
MCH RBC QN AUTO: 28.4 PG (ref 26.5–33)
MCHC RBC AUTO-ENTMCNC: 29.8 G/DL (ref 31.5–36.5)
MCHC RBC AUTO-ENTMCNC: 30 G/DL (ref 31.5–36.5)
MCV RBC AUTO: 95 FL (ref 78–100)
MCV RBC AUTO: 95 FL (ref 78–100)
MONOCYTES # BLD AUTO: 0.9 10E3/UL (ref 0–1.3)
MONOCYTES NFR BLD AUTO: 15 %
NEUTROPHILS # BLD AUTO: 3.6 10E3/UL (ref 1.6–8.3)
NEUTROPHILS NFR BLD AUTO: 62 %
NONHDLC SERPL-MCNC: 74 MG/DL
NRBC # BLD AUTO: 0 10E3/UL
NRBC BLD AUTO-RTO: 0 /100
O2/TOTAL GAS SETTING VFR VENT: 2 %
OXYHGB MFR BLDV: 78 % (ref 70–75)
PCO2 BLDV: 52 MM HG (ref 40–50)
PH BLDV: 7.35 [PH] (ref 7.32–7.43)
PHOSPHATE SERPL-MCNC: 6.3 MG/DL (ref 2.5–4.5)
PLAT MORPH BLD: NORMAL
PLATELET # BLD AUTO: 111 10E3/UL (ref 150–450)
PLATELET # BLD AUTO: 119 10E3/UL (ref 150–450)
PO2 BLDV: 49 MM HG (ref 25–47)
POTASSIUM SERPL-SCNC: 5.5 MMOL/L (ref 3.4–5.3)
POTASSIUM SERPL-SCNC: 5.8 MMOL/L (ref 3.4–5.3)
PROT SERPL-MCNC: 6.3 G/DL (ref 6.4–8.3)
RBC # BLD AUTO: 3.14 10E6/UL (ref 3.8–5.2)
RBC # BLD AUTO: 3.24 10E6/UL (ref 3.8–5.2)
RBC MORPH BLD: NORMAL
SAO2 % BLDV: 79.7 % (ref 70–75)
SODIUM SERPL-SCNC: 137 MMOL/L (ref 135–145)
SODIUM SERPL-SCNC: 139 MMOL/L (ref 135–145)
TRIGL SERPL-MCNC: 107 MG/DL
WBC # BLD AUTO: 5.5 10E3/UL (ref 4–11)
WBC # BLD AUTO: 5.7 10E3/UL (ref 4–11)

## 2024-09-29 PROCEDURE — 99232 SBSQ HOSP IP/OBS MODERATE 35: CPT | Mod: GC | Performed by: INTERNAL MEDICINE

## 2024-09-29 PROCEDURE — 90935 HEMODIALYSIS ONE EVALUATION: CPT

## 2024-09-29 PROCEDURE — 85025 COMPLETE CBC W/AUTO DIFF WBC: CPT

## 2024-09-29 PROCEDURE — 120N000003 HC R&B IMCU UMMC

## 2024-09-29 PROCEDURE — 36415 COLL VENOUS BLD VENIPUNCTURE: CPT

## 2024-09-29 PROCEDURE — 250N000013 HC RX MED GY IP 250 OP 250 PS 637

## 2024-09-29 PROCEDURE — 71045 X-RAY EXAM CHEST 1 VIEW: CPT

## 2024-09-29 PROCEDURE — 83550 IRON BINDING TEST: CPT | Performed by: INTERNAL MEDICINE

## 2024-09-29 PROCEDURE — 80061 LIPID PANEL: CPT

## 2024-09-29 PROCEDURE — 258N000003 HC RX IP 258 OP 636: Performed by: STUDENT IN AN ORGANIZED HEALTH CARE EDUCATION/TRAINING PROGRAM

## 2024-09-29 PROCEDURE — 82805 BLOOD GASES W/O2 SATURATION: CPT

## 2024-09-29 PROCEDURE — 83605 ASSAY OF LACTIC ACID: CPT

## 2024-09-29 PROCEDURE — 82728 ASSAY OF FERRITIN: CPT | Performed by: INTERNAL MEDICINE

## 2024-09-29 PROCEDURE — 83735 ASSAY OF MAGNESIUM: CPT

## 2024-09-29 PROCEDURE — 84100 ASSAY OF PHOSPHORUS: CPT

## 2024-09-29 PROCEDURE — 71045 X-RAY EXAM CHEST 1 VIEW: CPT | Mod: 26 | Performed by: RADIOLOGY

## 2024-09-29 PROCEDURE — 85027 COMPLETE CBC AUTOMATED: CPT

## 2024-09-29 PROCEDURE — 85610 PROTHROMBIN TIME: CPT

## 2024-09-29 PROCEDURE — 120N000005 HC R&B MS OVERFLOW UMMC

## 2024-09-29 PROCEDURE — 80053 COMPREHEN METABOLIC PANEL: CPT

## 2024-09-29 PROCEDURE — 5A1D70Z PERFORMANCE OF URINARY FILTRATION, INTERMITTENT, LESS THAN 6 HOURS PER DAY: ICD-10-PCS | Performed by: INTERNAL MEDICINE

## 2024-09-29 PROCEDURE — 99222 1ST HOSP IP/OBS MODERATE 55: CPT | Mod: GC | Performed by: INTERNAL MEDICINE

## 2024-09-29 PROCEDURE — 250N000012 HC RX MED GY IP 250 OP 636 PS 637

## 2024-09-29 RX ORDER — MIDODRINE HYDROCHLORIDE 5 MG/1
5 TABLET ORAL ONCE
Status: COMPLETED | OUTPATIENT
Start: 2024-09-29 | End: 2024-09-29

## 2024-09-29 RX ORDER — MIDODRINE HYDROCHLORIDE 5 MG/1
30 TABLET ORAL ONCE
Status: COMPLETED | OUTPATIENT
Start: 2024-09-29 | End: 2024-09-29

## 2024-09-29 RX ORDER — MIDODRINE HYDROCHLORIDE 5 MG/1
10 TABLET ORAL ONCE
Status: DISCONTINUED | OUTPATIENT
Start: 2024-09-29 | End: 2024-09-29

## 2024-09-29 RX ADMIN — INSULIN ASPART 1 UNITS: 100 INJECTION, SOLUTION INTRAVENOUS; SUBCUTANEOUS at 13:45

## 2024-09-29 RX ADMIN — MIDODRINE HYDROCHLORIDE 30 MG: 5 TABLET ORAL at 14:42

## 2024-09-29 RX ADMIN — Medication: at 15:09

## 2024-09-29 RX ADMIN — SODIUM CHLORIDE 300 ML: 9 INJECTION, SOLUTION INTRAVENOUS at 15:09

## 2024-09-29 RX ADMIN — MIDODRINE HYDROCHLORIDE 20 MG: 5 TABLET ORAL at 07:32

## 2024-09-29 RX ADMIN — CARBAMIDE PEROXIDE 6.5% 3 DROP: 6.5 LIQUID AURICULAR (OTIC) at 08:12

## 2024-09-29 RX ADMIN — CARBAMIDE PEROXIDE 6.5% 3 DROP: 6.5 LIQUID AURICULAR (OTIC) at 01:24

## 2024-09-29 RX ADMIN — CALCIUM ACETATE 667 MG: 667 CAPSULE ORAL at 07:32

## 2024-09-29 RX ADMIN — SODIUM CHLORIDE 250 ML: 9 INJECTION, SOLUTION INTRAVENOUS at 15:09

## 2024-09-29 RX ADMIN — CARBAMIDE PEROXIDE 6.5% 3 DROP: 6.5 LIQUID AURICULAR (OTIC) at 20:13

## 2024-09-29 RX ADMIN — ACETAMINOPHEN 500 MG: 500 TABLET ORAL at 21:06

## 2024-09-29 RX ADMIN — CALCIUM ACETATE 667 MG: 667 CAPSULE ORAL at 11:56

## 2024-09-29 RX ADMIN — CALCIUM ACETATE 667 MG: 667 CAPSULE ORAL at 19:20

## 2024-09-29 RX ADMIN — MIDODRINE HYDROCHLORIDE 5 MG: 5 TABLET ORAL at 01:03

## 2024-09-29 ASSESSMENT — ACTIVITIES OF DAILY LIVING (ADL)
ADLS_ACUITY_SCORE: 45

## 2024-09-29 NOTE — CONSULTS
Nephrology Initial Consult  September 29, 2024      Yissel Wetzel MRN:[id number] YOB: 1960  Date of Admission:9/28/2024  Primary care provider: Gigi Martin  Requesting physician: Jose Redd, *    Reason for consult: ESRD    MEDICAL DECISION MAKING     RECOMMENDATIONS:  2h HD run today w goal UF 2L as tolerated, UF profile 3    Renally dose medications, avoid unnecessary nephrotoxins, avoid unnecessary radiographic contrast, daily renal panel, strict I/O, daily standing weights     ASSESSMENT:  Yissel Wetzel is a 64 year old ESRD and severe pulmonary hypertension admitted for transplant eval  Nephrology consulted for ESRD     ESRD  Dialyzes MWF at [facility]. Primary nephrologist Dr Hernandez. Access w R AVF.  Dry weight 63kg.  Last session prior to admit: 9/27. Anuric   Today's HD Rx: 2h,  / , UF goal 2L    Anemia: hgb 9.2, no recent Fe studies   Volume/HTN: weight above dry weight but does not look overtly volume overloaded  Acidosis: bicarb 24, on bicarb  MBD: Ca  9.7, phos 6.3, cont phoslo    Intradialytic hypotension: on midodrine 10mg in AM MWF + 30mg prior to HD on MWF. Midodrine 20mg BID on non-HD days    Recommendations were communicated to primary team via note & vocera    Seen and discussed with Dr. Chadwick Cordova MD  Division of Renal Disease and Hypertension  [facility]  [employer] Web Console    SUBJECTIVE     HISTORY OF PRESENT ILLNESS:  Admitting provider and nursing notes reviewed  Yissel Wetzel is a 64 year old ESRD and severe pulmonary hypertension admitted for transplant eval. Cardiology wishes to aggressively dialyze over multiple days to obtain dry weight and then get RHC for transplant eval    REVIEW OF SYSTEMS:  A comprehensive review of systems was negative except as noted above.    PAST MEDICAL HISTORY:  Past Medical History:   Diagnosis Date    Allergic rhinitis, cause unspecified     Anemia in chronic kidney  disease     Anemia of chronic disease     Bleeding pseudoaneurysm of right brachiocephalic arteriovenous fistula, initial encounter 2019    End stage renal failure on dialysis (H)     Esophagitis, unspecified     Former tobacco use     HEMORRHAGIC GASTROPATHY     History of blood transfusion     [place]    Iron deficiency anemia, unspecified     Mild persistent asthma     Obesity     Other and unspecified hyperlipidemia     Pneumonia     Pulmonary hypertension (H)     per 2012 echo mod.to severe pulmonary hypertension    Tobacco use disorder dc ed     Type 2 diabetes mellitus (H)     on Insulin- managed by PCP    Unspecified essential hypertension        Past Surgical History:   Procedure Laterality Date    ABDOMINOPLASTY  pt unsure when    abdominoplasty-     SECTION  ,     x 2    COLONOSCOPY      [facility]    COLONOSCOPY N/A 2021    Procedure: COLONOSCOPY, WITH POLYPECTOMY AND BIOPSY;  Surgeon: Lincoln Farrar MD;  Location:  GI    CREATE FISTULA ARTERIOVENOUS UPPER EXTREMITY Right 2018    Procedure: RIGHT BRACHIAL TO BASILIC ARTERIOVENOUS FISTULA CREATION;  Surgeon: Terry Vizcaino MD;  Location: [facility]    CV HEART CATHETERIZATION WITH POSSIBLE INTERVENTION N/A 2021    Procedure: Heart Catheterization with Possible Intervention;  Surgeon: Joseluis Dean MD;  Location:  HEART CARDIAC CATH LAB    CV RIGHT HEART CATH MEASUREMENTS RECORDED N/A 2022    Procedure: Heart Cath Right Heart Cath;  Surgeon: Yan Heredia MD;  Location:  HEART CARDIAC CATH LAB    ENT SURGERY  as a child    tonsillectomy    ESOPHAGOSCOPY, GASTROSCOPY, DUODENOSCOPY (EGD), COMBINED N/A 2021    Procedure: ESOPHAGOGASTRODUODENOSCOPY, WITH BIOPSY;  Surgeon: Lincoln Farrar MD;  Location:  GI    EYE SURGERY Left     injections- eye    HYSTERECTOMY  approx     partial hysterectomy-reason bleeding     IR CVC TUNNEL PLACEMENT >  5 YRS OF AGE  12/6/2019    IR CVC TUNNEL REMOVAL RIGHT  5/7/2020    IR DIALYSIS FISTULOGRAM RIGHT  12/9/2019        MEDICATIONS:  PTA Meds  Prior to Admission medications    Medication Sig Last Dose Taking? Auth Provider Long Term End Date   ACCU-CHEK CHING PLUS test strip USE TO TEST BLOOD SUGAR 4 TIMES PER DAY   Gigi Martin MD No    ACCU-CHEK GUIDE test strip USE 1 EACH TO TEST BLOOD GLUCOSE FOUR TIMES DAILY   Gigi Martin MD No    acetaminophen (TYLENOL) 500 MG tablet Take 500 mg by mouth every 8 hours as needed for mild pain   Unknown, Entered By History     amiodarone (PACERONE) 100 MG TABS tablet Take 1 tablet (100 mg) by mouth five times a week Take on Mondays, Tuesdays, Thursdays, Fridays and Saturdays; hold on Wednesdays and Sundays.   Barry Ballesteros MD Yes    blood glucose (NO BRAND SPECIFIED) lancets standard Use to test blood sugar 4 times daily or as directed.   Gigi Martin MD     blood glucose (NO BRAND SPECIFIED) lancets standard Use to test blood sugar three times daily or as directed.   Gigi Martin MD     blood glucose monitoring (NO BRAND SPECIFIED) meter device kit Use to test blood sugar 4 times daily or as directed.   Gigi Martin MD     calcium acetate (PHOSLO) 667 MG CAPS capsule Take 667 mg by mouth 3 times daily (with meals)   Unknown, Entered By History     calcium acetate (PHOSLO) 667 MG CAPS capsule Take 667 mg by mouth Take with snacks or supplements   Unknown, Entered By History     carbamide peroxide (DEBROX) 6.5 % otic solution Place 10 drops Into the left ear 2 times daily.   Barry Ballesteros MD     Continuous Glucose Sensor (FREESTYLE LATOSHA 2 SENSOR) MISC 1 each every 14 days. Use 1 sensor every 14 days. Use to read blood sugars per 's instructions.   Gigi Martin MD     famotidine (PEPCID) 10 MG tablet Take 10 mg by mouth daily as needed   Unknown, Entered By History     guaiFENesin 1200 MG TB12 Take 1  tablet by mouth 2 times daily as needed (cough/congestion)   Unknown, Entered By History No    insulin aspart (FIASP FLEXTOUCH) 100 UNIT/ML pen-injector 2U per 15 carbs, plus 1U for every 50 that preprandial BG over 150 - MAXIMUM 20 UNITS PER DAY   Gigi Martin MD     insulin glargine (LANTUS PEN) 100 UNIT/ML pen Inject 11 Units subcutaneously at bedtime.   Gigi Martin MD Yes    insulin pen needle (31G X 8 MM) 31G X 8 MM miscellaneous Use 4 pen needles daily or as directed.   Gigi Martin MD     levalbuterol (XOPENEX HFA) 45 MCG/ACT inhaler Inhale 2 puffs into the lungs every 6 hours as needed for shortness of breath / dyspnea or wheezing   Gigi Martin MD Yes    midodrine (PROAMATINE) 10 MG tablet Take 30 mg by mouth Monday, Wednesdays, and Fridays with food prior to dialysis   Unknown, Entered By History     midodrine (PROAMATINE) 10 MG tablet Take 10 mg by mouth Every Mon, Wed, Fri Morning   Unknown, Entered By History     midodrine (PROAMATINE) 10 MG tablet Take 20 mg by mouth Twice daily (in the morning and evening) on non-dialysis days (Tuesday, Thursday, Saturday, Sunday)   Unknown, Entered By History        Current Meds  Current Facility-Administered Medications   Medication Dose Route Frequency Provider Last Rate Last Admin    [START ON 9/30/2024] amiodarone (PACERONE) tablet 100 mg  100 mg Oral Once per day on Monday Tuesday Wednesday Thursday Friday Simi Bateman MD        calcium acetate (PHOSLO) capsule 667 mg  667 mg Oral TID w/meals Simi Bateman MD   667 mg at 09/29/24 1156    carbamide peroxide (DEBROX) 6.5 % otic solution 3 drop  3 drop Right Ear BID Tory Castellanos MD   3 drop at 09/29/24 0812    insulin aspart (NovoLOG) injection (RAPID ACTING)  1-7 Units Subcutaneous TID AC Simi Bateman MD        insulin aspart (NovoLOG) injection (RAPID ACTING)  1-5 Units Subcutaneous At Bedtime Simi Bateman MD        insulin glargine (LANTUS PEN) injection  7 Units  7 Units Subcutaneous At Bedtime Simi Bateman MD   7 Units at 24 2325    [START ON 2024] midodrine (PROAMATINE) tablet 10 mg  10 mg Oral Q  Simi Bateman MD        midodrine (PROAMATINE) tablet 20 mg  20 mg Oral 2 times per day on  Simi Bateman MD   20 mg at 24 0732    midodrine (PROAMATINE) tablet 30 mg  30 mg Oral Once Felix Cordova MD         Infusion Meds  Current Facility-Administered Medications   Medication Dose Route Frequency Provider Last Rate Last Admin    ACE Inhibitor/ARB/ARNI not indicated according to guidelines   Does not apply DOES NOT GO TO Simi Condon MD        Continuing beta blocker from home medication list OR beta blocker order already placed during this visit   Does not apply DOES NOT GO TO Simi Condon MD           ALLERGIES:    Allergies   Allergen Reactions    Albuterol      shakiness    Aspirin      gi    Byetta [Exenatide]      gi    Clonidine      constipation    Codeine      vomiting    Ezetimibe      muscle symptoms    Hydralazine      headaches    Lisinopril      hyperkalemia    Metformin Hydrochloride      vomiting    Pravachol [Pravastatin Sodium]      muscle pains    Simvastatin      myalgias    Troglitazone        SOCIAL HISTORY:   Social History     Socioeconomic History    Marital status: Single     Spouse name: Not on file    Number of children: Not on file    Years of education: Not on file    Highest education level: Not on file   Occupational History    Not on file   Tobacco Use    Smoking status: Former     Current packs/day: 0.00     Average packs/day: 1 pack/day for 22.0 years (22.0 ttl pk-yrs)     Types: Cigarettes     Start date: 10/12/1977     Quit date: 10/12/1999     Years since quittin.9    Smokeless tobacco: Never   Vaping Use    Vaping status: Never Used   Substance and Sexual Activity    Alcohol use: No     Alcohol/week: 0.0 standard drinks of  alcohol    Drug use: No    Sexual activity: Never   Other Topics Concern     Service Not Asked    Blood Transfusions Yes    Caffeine Concern No    Occupational Exposure Not Asked    Hobby Hazards Not Asked    Sleep Concern No    Stress Concern No    Weight Concern Yes     Comment: would like to lose weight    Special Diet No    Back Care Not Asked    Exercise Yes     Comment: walks daily 2 blocks    Bike Helmet Not Asked    Seat Belt Not Asked    Self-Exams Not Asked    Parent/sibling w/ CABG, MI or angioplasty before 65F 55M? Not Asked   Social History Narrative    Not on file     Social Determinants of Health     Financial Resource Strain: Not on file   Food Insecurity: Not on file   Transportation Needs: Not on file   Physical Activity: Not on file   Stress: Not on file   Social Connections: Not on file   Interpersonal Safety: Low Risk  (11/15/2023)    Interpersonal Safety     Do you feel physically and emotionally safe where you currently live?: Yes     Within the past 12 months, have you been hit, slapped, kicked or otherwise physically hurt by someone?: No     Within the past 12 months, have you been humiliated or emotionally abused in other ways by your partner or ex-partner?: No   Housing Stability: Not on file       FAMILY MEDICAL HISTORY:   Family History   Problem Relation Age of Onset    Arrhythmia Mother     Hypertension Mother     Pancreatic Cancer Father     Diabetes Brother     Asthma Sister     LUNG DISEASE Sister     Diabetes Daughter     Cirrhosis Sister        OBJECTIVE     PHYSICAL EXAM:   Temp  Av.9  F (36.6  C)  Min: 97.3  F (36.3  C)  Max: 98.4  F (36.9  C)      Pulse  Av.3  Min: 55  Max: 65 Resp  Av.2  Min: 16  Max: 18  SpO2  Av.2 %  Min: 95 %  Max: 98 %       /47 (BP Location: Left arm)   Pulse 55   Temp 97.6  F (36.4  C) (Oral)   Resp 16   Ht 1.524 m (5')   Wt 67.9 kg (149 lb 12.8 oz)   LMP  (LMP Unknown)   SpO2 95%   BMI 29.26 kg/m     Date  09/29/24 0700 - 09/30/24 0659   Shift 0932-4712 7411-4024 6423-9251 24 Hour Total   INTAKE   P.O. 120   120   Shift Total(mL/kg) 120(1.77)   120(1.77)   OUTPUT   Shift Total(mL/kg)       Weight (kg) 67.95 67.95 67.95 67.95      Admit Weight: 67.7 kg (149 lb 4.8 oz)     General:sitting up at edge of bed  HEENT:mmm  Cardiac:rrr  Pulmonary:unlabored  Abdominal: nondistended  Extremities:no LE edema   Neuro:A&Ox4  Access:R AVF w thrill    LABS:   CMP  Recent Labs   Lab 09/29/24  1122 09/29/24  0803 09/29/24  0723 09/29/24 0321 09/28/24  1619 09/28/24  1612   NA  --  139  --  137  --  139   POTASSIUM  --  5.8*  --  5.5*  --  5.3   CHLORIDE  --  99  --  98  --  97*   CO2  --  24  --  24  --  25   ANIONGAP  --  16*  --  15  --  17*   * 152* 124* 220*   < > 173*   BUN  --  39.6*  --  38.4*  --  31.4*   CR  --  8.10*  --  7.76*  --  7.09*   GFRESTIMATED  --  5*  --  5*  --  6*   KANWAL  --  9.7  --  9.7  --  9.9   MAG  --  2.5*  --   --   --  2.3   PHOS  --  6.3*  --   --   --  5.2*   PROTTOTAL  --   --   --  6.3*  --  6.8   ALBUMIN  --   --   --  3.6  --  3.8   BILITOTAL  --   --   --  0.3  --  0.4   ALKPHOS  --   --   --  92  --  94   AST  --   --   --  13  --  16   ALT  --   --   --  <5  --  5    < > = values in this interval not displayed.     CBC  Recent Labs   Lab 09/29/24  0803 09/29/24 0321 09/28/24  1612   HGB 9.2* 8.9* 9.0*   WBC 5.7 5.5 5.8   RBC 3.24* 3.14* 3.19*   HCT 30.9* 29.7* 30.8*   MCV 95 95 97   MCH 28.4 28.3 28.2   MCHC 29.8* 30.0* 29.2*   RDW 17.0* 16.8* 17.0*   * 111* 121*     INR  Recent Labs   Lab 09/29/24  0803   INR 1.21*     ABG  Recent Labs   Lab 09/29/24  0321   O2PER 2      URINE STUDIES  Recent Labs   Lab Test 09/25/19  1455 06/07/18  1455 08/09/17  1640   COLOR Yellow Yellow Yellow   APPEARANCE Clear Clear Clear   URINEGLC >499* 250* Negative   URINEBILI Negative Negative Negative   URINEKETONE Negative Negative Negative   SG 1.005 1.015 1.020   UBLD Negative Small* Small*    URINEPH 7.0 6.0 6.0   PROTEIN 100* >=300* >=300*   UROBILINOGEN  --  0.2 0.2   NITRITE Negative Negative Negative   LEUKEST Negative Negative Negative   RBCU <1 2-5* O - 2   WBCU 1 0 - 5 O - 2     No lab results found.  PTH  No lab results found.  IRON STUDIES  Recent Labs   Lab Test 06/19/22  0634 03/17/19  0805 08/30/18  1318   IRON 27* 27* 54   * 178* 196*   IRONSAT 15 15 28   LATRICE 898* 210 407*       IMAGING:  All imaging studies reviewed by me.     Felix Cordova MD

## 2024-09-29 NOTE — PROGRESS NOTES
HEMODIALYSIS TREATMENT NOTE    Date: 9/29/2024  Time: 1730     Data:  Pre Wt:   67.9 kg (standing scale)  Post Wt:  66.4 kg (standing scale)  Weight change: - 2 kg  Ultrafiltration - Post Run Net Total Removed (mL):  2000 ml  Vascular Access Status: Fistula patent  Dialyzer Rinse:  Light  Total Blood Volume Processed: 47.8 L   Total Dialysis (Treatment) Time:   2 Hrs  Dialysate Bath: K 2, Ca 2.5  Heparin: Heparin: None     Lab:   No    Interventions:  Dialysis done through right AV Fistula using 15 gauge needles. ,   UF set to 2.3 Liters, accommodating priming and rinse back volumes  Meds administered per MAR  See Flowsheet for Crit Profile throughout the run  Glucose checks done. Inta-dialysis: 210 mg/dl; Treatment has ended safely and  blood is rinsed back completely, pt tolerated well with dialysis tx, pt reported little cramping at the arm, folded wash cloth given to squeeze it, pt reported some improvement.    Decannulation done post HD, hemostasis is achieved in 10 minutes. Pressure dressing is applied, to be removed after 4-6 hours  Post Tx assessment done. Patient is sent back to [facility] room in stable condition  Report given to PCN, RN     Assessment:  A/O x 4, calm and cooperative, denies pain  right arm AV Fistula has good thrill and bruit                Plan:    Per Renal team

## 2024-09-29 NOTE — PROGRESS NOTES
D:  admitted for daily dialysis and RHC to assess pulmonary artery pressure to eval for transplant        PMHx:    I: Monitored vitals and assessed pt status. Encouraged activity.      Changed: BP was soft. The team aware of it. The SBP goal is <90.   IV Access: Pt refused PIV insertion    PRN: Midodrine 5 mg one time dose  Tele: SR/SB denies chest pain or palpitation  O2: 2L NC sating above 92% denies SOB  Mobility: SBA with gait belt  Skin: dry scab on forehead and head, generalized bruises  GI/: anuria, Pt is on hemodialysis  Diet: 2g Na diet and 2L fluid restriction       A: A&Ox4. VSS. Afebrile.      P: Continue to monitor pt status and report changes to treatment team.

## 2024-09-29 NOTE — PROGRESS NOTES
"0040 - Writer, circulator RN, spoke with patient regarding her refusal of cares. Explained to patient that her blood pressure is low (map 58) and the medical team has ordered IV access, meds, fluid, and labs that are necessary and will help us take care of her. Pt responded \"I know. I don't care. Let me sleep!\" Primary RN updated.   "

## 2024-09-29 NOTE — PROGRESS NOTES
Attempted to give patient her insulin per sliding scale post eating. She then stated she would not take it unless we covered her carbs. She had discussed this 2 times with the MD. She refused coverage and became irate and was swearing and yelling. MD notified and awaiting response.

## 2024-09-30 ENCOUNTER — APPOINTMENT (OUTPATIENT)
Dept: OCCUPATIONAL THERAPY | Facility: CLINIC | Age: 64
DRG: 286 | End: 2024-09-30
Attending: INTERNAL MEDICINE
Payer: MEDICARE

## 2024-09-30 DIAGNOSIS — I47.10 SVT (SUPRAVENTRICULAR TACHYCARDIA) (H): ICD-10-CM

## 2024-09-30 LAB
ANION GAP SERPL CALCULATED.3IONS-SCNC: 13 MMOL/L (ref 7–15)
ATRIAL RATE - MUSE: NORMAL BPM
BASOPHILS # BLD AUTO: 0 10E3/UL (ref 0–0.2)
BASOPHILS NFR BLD AUTO: 1 %
BUN SERPL-MCNC: 29 MG/DL (ref 8–23)
CALCIUM SERPL-MCNC: 9.4 MG/DL (ref 8.8–10.4)
CHLORIDE SERPL-SCNC: 100 MMOL/L (ref 98–107)
CREAT SERPL-MCNC: 6.25 MG/DL (ref 0.51–0.95)
DIASTOLIC BLOOD PRESSURE - MUSE: NORMAL MMHG
EGFRCR SERPLBLD CKD-EPI 2021: 7 ML/MIN/1.73M2
EOSINOPHIL # BLD AUTO: 0.1 10E3/UL (ref 0–0.7)
EOSINOPHIL NFR BLD AUTO: 2 %
ERYTHROCYTE [DISTWIDTH] IN BLOOD BY AUTOMATED COUNT: 17 % (ref 10–15)
GLUCOSE BLDC GLUCOMTR-MCNC: 104 MG/DL (ref 70–99)
GLUCOSE BLDC GLUCOMTR-MCNC: 149 MG/DL (ref 70–99)
GLUCOSE BLDC GLUCOMTR-MCNC: 159 MG/DL (ref 70–99)
GLUCOSE BLDC GLUCOMTR-MCNC: 216 MG/DL (ref 70–99)
GLUCOSE BLDC GLUCOMTR-MCNC: 228 MG/DL (ref 70–99)
GLUCOSE BLDC GLUCOMTR-MCNC: 231 MG/DL (ref 70–99)
GLUCOSE BLDC GLUCOMTR-MCNC: 84 MG/DL (ref 70–99)
GLUCOSE SERPL-MCNC: 174 MG/DL (ref 70–99)
HBV SURFACE AB SERPL IA-ACNC: <3.5 M[IU]/ML
HBV SURFACE AB SERPL IA-ACNC: NONREACTIVE M[IU]/ML
HBV SURFACE AG SERPL QL IA: NONREACTIVE
HCO3 SERPL-SCNC: 25 MMOL/L (ref 22–29)
HCT VFR BLD AUTO: 28.9 % (ref 35–47)
HGB BLD-MCNC: 8.8 G/DL (ref 11.7–15.7)
IMM GRANULOCYTES # BLD: 0 10E3/UL
IMM GRANULOCYTES NFR BLD: 1 %
INR PPP: 1.23 (ref 0.85–1.15)
INTERPRETATION ECG - MUSE: NORMAL
LYMPHOCYTES # BLD AUTO: 1.3 10E3/UL (ref 0.8–5.3)
LYMPHOCYTES NFR BLD AUTO: 21 %
MAGNESIUM SERPL-MCNC: 2.1 MG/DL (ref 1.7–2.3)
MCH RBC QN AUTO: 28.9 PG (ref 26.5–33)
MCHC RBC AUTO-ENTMCNC: 30.4 G/DL (ref 31.5–36.5)
MCV RBC AUTO: 95 FL (ref 78–100)
MONOCYTES # BLD AUTO: 1 10E3/UL (ref 0–1.3)
MONOCYTES NFR BLD AUTO: 16 %
NEUTROPHILS # BLD AUTO: 3.7 10E3/UL (ref 1.6–8.3)
NEUTROPHILS NFR BLD AUTO: 60 %
NRBC # BLD AUTO: 0 10E3/UL
NRBC BLD AUTO-RTO: 0 /100
P AXIS - MUSE: NORMAL DEGREES
PHOSPHATE SERPL-MCNC: 6 MG/DL (ref 2.5–4.5)
PLATELET # BLD AUTO: 200 10E3/UL (ref 150–450)
POTASSIUM SERPL-SCNC: 5.1 MMOL/L (ref 3.4–5.3)
PR INTERVAL - MUSE: NORMAL MS
QRS DURATION - MUSE: 90 MS
QT - MUSE: 520 MS
QTC - MUSE: 497 MS
R AXIS - MUSE: 93 DEGREES
RBC # BLD AUTO: 3.05 10E6/UL (ref 3.8–5.2)
SODIUM SERPL-SCNC: 138 MMOL/L (ref 135–145)
SYSTOLIC BLOOD PRESSURE - MUSE: NORMAL MMHG
T AXIS - MUSE: 224 DEGREES
TRANSFERRIN SERPL-MCNC: 222 MG/DL (ref 200–360)
VENTRICULAR RATE- MUSE: 55 BPM
WBC # BLD AUTO: 6.2 10E3/UL (ref 4–11)

## 2024-09-30 PROCEDURE — 97110 THERAPEUTIC EXERCISES: CPT | Mod: GO

## 2024-09-30 PROCEDURE — 84100 ASSAY OF PHOSPHORUS: CPT

## 2024-09-30 PROCEDURE — 85025 COMPLETE CBC W/AUTO DIFF WBC: CPT

## 2024-09-30 PROCEDURE — 90935 HEMODIALYSIS ONE EVALUATION: CPT

## 2024-09-30 PROCEDURE — 87340 HEPATITIS B SURFACE AG IA: CPT | Performed by: INTERNAL MEDICINE

## 2024-09-30 PROCEDURE — 99233 SBSQ HOSP IP/OBS HIGH 50: CPT | Mod: GC | Performed by: INTERNAL MEDICINE

## 2024-09-30 PROCEDURE — 36415 COLL VENOUS BLD VENIPUNCTURE: CPT

## 2024-09-30 PROCEDURE — 250N000013 HC RX MED GY IP 250 OP 250 PS 637

## 2024-09-30 PROCEDURE — 85610 PROTHROMBIN TIME: CPT

## 2024-09-30 PROCEDURE — 86706 HEP B SURFACE ANTIBODY: CPT | Performed by: INTERNAL MEDICINE

## 2024-09-30 PROCEDURE — 99232 SBSQ HOSP IP/OBS MODERATE 35: CPT | Mod: GC | Performed by: INTERNAL MEDICINE

## 2024-09-30 PROCEDURE — 120N000003 HC R&B IMCU UMMC

## 2024-09-30 PROCEDURE — 84466 ASSAY OF TRANSFERRIN: CPT | Performed by: INTERNAL MEDICINE

## 2024-09-30 PROCEDURE — 80048 BASIC METABOLIC PNL TOTAL CA: CPT

## 2024-09-30 PROCEDURE — 83735 ASSAY OF MAGNESIUM: CPT

## 2024-09-30 PROCEDURE — 97165 OT EVAL LOW COMPLEX 30 MIN: CPT | Mod: GO

## 2024-09-30 PROCEDURE — 120N000005 HC R&B MS OVERFLOW UMMC

## 2024-09-30 PROCEDURE — 258N000003 HC RX IP 258 OP 636: Performed by: INTERNAL MEDICINE

## 2024-09-30 RX ORDER — AMIODARONE HYDROCHLORIDE 200 MG/1
200 TABLET ORAL
Status: ON HOLD | COMMUNITY
End: 2024-09-30

## 2024-09-30 RX ORDER — AMIODARONE HYDROCHLORIDE 100 MG/1
100 TABLET ORAL
Qty: 30 TABLET | Refills: 3 | Status: SHIPPED | OUTPATIENT
Start: 2024-09-30

## 2024-09-30 RX ORDER — AMIODARONE HYDROCHLORIDE 100 MG/1
100 TABLET ORAL
Status: DISCONTINUED | OUTPATIENT
Start: 2024-09-30 | End: 2024-10-04 | Stop reason: HOSPADM

## 2024-09-30 RX ADMIN — MIDODRINE HYDROCHLORIDE 30 MG: 5 TABLET ORAL at 18:52

## 2024-09-30 RX ADMIN — CALCIUM ACETATE 667 MG: 667 CAPSULE ORAL at 10:36

## 2024-09-30 RX ADMIN — SODIUM CHLORIDE 250 ML: 9 INJECTION, SOLUTION INTRAVENOUS at 18:54

## 2024-09-30 RX ADMIN — MIDODRINE HYDROCHLORIDE 10 MG: 5 TABLET ORAL at 10:36

## 2024-09-30 RX ADMIN — CALCIUM ACETATE 667 MG: 667 CAPSULE ORAL at 18:01

## 2024-09-30 RX ADMIN — CARBAMIDE PEROXIDE 6.5% 3 DROP: 6.5 LIQUID AURICULAR (OTIC) at 23:29

## 2024-09-30 RX ADMIN — SODIUM CHLORIDE 300 ML: 9 INJECTION, SOLUTION INTRAVENOUS at 18:53

## 2024-09-30 RX ADMIN — CARBAMIDE PEROXIDE 6.5% 3 DROP: 6.5 LIQUID AURICULAR (OTIC) at 08:34

## 2024-09-30 RX ADMIN — CALCIUM ACETATE 667 MG: 667 CAPSULE ORAL at 14:05

## 2024-09-30 ASSESSMENT — ACTIVITIES OF DAILY LIVING (ADL)
ADLS_ACUITY_SCORE: 45
PREVIOUS_RESPONSIBILITIES: MEAL PREP;HOUSEKEEPING;LAUNDRY;SHOPPING;MEDICATION MANAGEMENT;FINANCES
ADLS_ACUITY_SCORE: 45

## 2024-09-30 NOTE — PROGRESS NOTES
Shift: 0700 - 1900  Neuro: A&Ox4.  Cardiac: SR, bradycardia 50's, VSS.   Respiratory: Sating 100% on 2L NC, was on RA most of the day, pr trying to wean self off O2.  GI/: Anuric, pt on dialysis BM X1  Diet/appetite: Tolerating 2G Na diet. Eating well.  Activity:  SBA when, up to chair and in halls.  Pain: Denies.   Skin: No new deficits noted. See PCS for assessment details.  LDA's: No PIV.    Shift update: Pt left for dialysis @ 1835. Plan for RHC come Wednesday post HD.    Plan: Continue with POC. Notify primary team  CARDS 2 with changes.

## 2024-09-30 NOTE — PROGRESS NOTES
D:   admitted for daily dialysis and RHC to assess pulmonary artery pressure to eval for transplant     PMHx:    I: Monitored vitals and assessed pt status. Encouraged activity.      Changed: Pt refused 8pm midodrine meds and bedtime lantus. The team aware of it.       Tele: SR/SB HR at 48-64's, denies chest pain or palpitation  O2: 2L NC denies SOB sating above 92% lungs sound diminished and course crackle  Mobility: SBA with gait belt  PRN: Tylenol  Skin: dry scab on forehead and head, generalized bruises  Endo: BS check with insulin sliding scale  GI/: Anuria, Pt is on HD, Her last BM was 9/29.  Diet: 2g Na diet and 2L fluid restriction       A: A&Ox4. 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  Pt expressed frustration towards diet order and insulin sliding scale order. VSS. Afebrile. Pt will have HD today 9/30.      P: Continue to monitor pt status and report changes to treatment team.

## 2024-09-30 NOTE — PROGRESS NOTES
"   09/30/24 1500   Appointment Info   Signing Clinician's Name / Credentials (OT) Shantel Pressley, OTD, OTR/L   Rehab Comments (OT) OT/CR only   Living Environment   People in Home alone   Current Living Arrangements independent living facility  (\"senior living\")   Home Accessibility wheelchair accessible   Transportation Anticipated family or friend will provide   Living Environment Comments pt lives in senior living with elevator access and WIS with bench. pt occasionally has boyfriend stay with her, has a safety necklace in case of falls. Has two dogs.   Self-Care   Usual Activity Tolerance moderate   Current Activity Tolerance good   Regular Exercise Yes   Activity/Exercise Type walking  (pt endorses often walking around facility)   Equipment Currently Used at Home grab bar, tub/shower;shower chair   Activity/Exercise/Self-Care Comment Pt often ambulates around facility to \"attend poker and go to the library\". does not use AD, is IND with ADLs. Pt stating she sometimes gets dizzy when she bends over.   Instrumental Activities of Daily Living (IADL)   Previous Responsibilities meal prep;housekeeping;laundry;shopping;medication management;finances   IADL Comments IND, has PCA services on tuesdays to help with heavy IADLs   General Information   Onset of Illness/Injury or Date of Surgery 09/28/24   Referring Physician Simi Bateman MD   Additional Occupational Profile Info/Pertinent History of Current Problem \"Yissel Wetzel is a 64 year old female with severe pulmonary hypertension admitted for daily dialysis and RHC to assess pulmonary artery pressure to eval for transplant\"   Existing Precautions/Restrictions cardiac;fall;oxygen therapy device and L/min  (2L via NC at baseline)   Left Upper Extremity (Weight-bearing Status) full weight-bearing (FWB)   Right Upper Extremity (Weight-bearing Status) full weight-bearing (FWB)   Left Lower Extremity (Weight-bearing Status) full weight-bearing (FWB)   Right Lower " Extremity (Weight-bearing Status) full weight-bearing (FWB)   Cognitive Status Examination   Orientation Status orientation to person, place and time   Affect/Mental Status (Cognitive) WFL   Follows Commands WFL   Visual Perception   Impact of Vision Impairment on Function (Vision) pt is blind in R eye   Range of Motion Comprehensive   Comment, General Range of Motion WFL   Strength Comprehensive (MMT)   Comment, General Manual Muscle Testing (MMT) Assessment appears near baseline with some general weakness   Coordination   Upper Extremity Coordination No deficits were identified   Bed Mobility   Comment (Bed Mobility) not witnessed, pt sitting EOB upon arrival   Transfers   Transfer Comments STS SBA with no AD   Activities of Daily Living   BADL Assessment/Intervention upper body dressing;lower body dressing;toileting   Upper Body Dressing Assessment/Training   Comment, (Upper Body Dressing) anticipate SBA per clinical judgement   Lower Body Dressing Assessment/Training   Comment, (Lower Body Dressing) anticipate SBA per clinical judgement   Toileting   Comment, (Toileting) anticipate SBA per clinical judgement   Clinical Impression   Criteria for Skilled Therapeutic Interventions Met (OT) Yes, treatment indicated   OT Diagnosis CR- dec endurance & general weakness   OT Problem List-Impairments impacting ADL problems related to;activity tolerance impaired;mobility;strength   Assessment of Occupational Performance 1-3 Performance Deficits   Identified Performance Deficits dressing, toileting, home management   Planned Therapy Interventions (OT) ADL retraining;IADL retraining;bed mobility training;strengthening;transfer training;home program guidelines;progressive activity/exercise   Clinical Decision Making Complexity (OT) problem focused assessment/low complexity   Risk & Benefits of therapy have been explained evaluation/treatment results reviewed;care plan/treatment goals reviewed;risks/benefits  reviewed;current/potential barriers reviewed;participants voiced agreement with care plan;participants included;patient   OT Total Evaluation Time   OT Eval, Low Complexity Minutes (57308) 9   OT Goals   Therapy Frequency (OT) 3 times/week   OT Predicted Duration/Target Date for Goal Attainment 10/14/24   OT Goals Upper Body Dressing;Lower Body Dressing;Toilet Transfer/Toileting;Cardiac Phase 1;Home Management   OT: Upper Body Dressing Independent   OT: Lower Body Dressing Independent   OT: Toilet Transfer/Toileting Independent;toilet transfer;cleaning and garment management   OT: Home Management Modified independent;with light demand household tasks   OT: Understanding of cardiac education to maximize quality of life, condition management, and health outcomes Patient;Verbalize;Demonstrate   OT: Perform aerobic activity with stable cardiovascular response 10 minutes;ambulation   OT Discharge Planning   OT Plan progress endurance, FBD, toilet transfers, home mgmt   OT Discharge Recommendation (DC Rec) home with assist   OT Rationale for DC Rec pt mobilizing near baseline, anticipate with IP therapy will be safe to return home with prior level of assist.   OT Brief overview of current status SBA w/o AD   Total Session Time   Timed Code Treatment Minutes 11   Total Session Time (sum of timed and untimed services) 20

## 2024-09-30 NOTE — PROGRESS NOTES
General Appearance: Vss A+O noncooperative  Respiratory: Fine crackles lower lobes  Cardiovascular: Sinus perfecto  GI: Eating well. Very unhappy with food choices. Renal low sodium diet. Yelling and swearing that we do not understand her. States she will be leaving tomorrow.  Skin: No changes. Dressing over fistula site CDI  Other: Family boyfriend here half the day. Patient upset about insulin coverage and food.

## 2024-09-30 NOTE — PROGRESS NOTES
Nephrology progress note  September 30, 2024      Yissel Wetzel MRN:[id number] YOB: 1960  Date of Admission:9/28/2024  Primary care provider: Gigi Martin  Requesting physician: Jose Redd, *    Reason for consult: ESRD    MEDICAL DECISION MAKING     RECOMMENDATIONS:  Will do HD around 5pm today and tomorrow   RHC on Wednesday after Weds HD    Renally dose medications, avoid unnecessary nephrotoxins, avoid unnecessary radiographic contrast, daily renal panel, strict I/O, daily standing weights     ASSESSMENT:  Yissel Wetzel is a 64 year old ESRD and severe pulmonary hypertension admitted for transplant eval  Nephrology consulted for ESRD     ESRD  Dialyzes MWF at [facility]. Primary nephrologist Dr Hernandez. Access w R AVF.  Dry weight 63kg.  Last session prior to admit: 9/27. Anuric   Today's HD Rx: 2h,  / , UF goal 2L    Anemia: hgb 9.2, no recent Fe studies   Volume/HTN: weight above dry weight but does not look overtly volume overloaded  Acidosis: bicarb 24, on bicarb  MBD: Ca  9.7, phos 6.3, cont phoslo    Intradialytic hypotension: on midodrine 10mg in AM MWF + 30mg prior to HD on MWF. Midodrine 20mg BID on non-HD days    Recommendations were communicated to primary team via note & vocera       Jorge A Jacobo MD  Division of Renal Disease and Hypertension  [facility]  [employer]    SUBJECTIVE     Interval events   Seen on RA   Will do HD around 5pm today and tomorrow   RHC on Wednesday after Weds HD     REVIEW OF SYSTEMS:  A comprehensive review of systems was negative except as noted above.    PAST MEDICAL HISTORY:  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, initial encounter 12/6/2019    End stage renal failure on dialysis (H)     Esophagitis, unspecified      Former tobacco use     HEMORRHAGIC GASTROPATHY     History of blood transfusion     Jamul    Iron deficiency anemia, unspecified     Mild persistent asthma     Obesity     Other and unspecified hyperlipidemia     Pneumonia     Pulmonary hypertension (H)     per 2012 echo mod.to severe pulmonary hypertension    Tobacco use disorder dc ed     Type 2 diabetes mellitus (H)     on Insulin- managed by PCP    Unspecified essential hypertension        Past Surgical History:   Procedure Laterality Date    ABDOMINOPLASTY  pt unsure when    abdominoplasty-     SECTION  ,     x 2    COLONOSCOPY      [facility]    COLONOSCOPY N/A 2021    Procedure: COLONOSCOPY, WITH POLYPECTOMY AND BIOPSY;  Surgeon: Lincoln Farrar MD;  Location:  GI    CREATE FISTULA ARTERIOVENOUS UPPER EXTREMITY Right 2018    Procedure: RIGHT BRACHIAL TO BASILIC ARTERIOVENOUS FISTULA CREATION;  Surgeon: Terry Vizcaino MD;  Location: [facility]    CV HEART CATHETERIZATION WITH POSSIBLE INTERVENTION N/A 2021    Procedure: Heart Catheterization with Possible Intervention;  Surgeon: Joseluis Dean MD;  Location:  HEART CARDIAC CATH LAB    CV RIGHT HEART CATH MEASUREMENTS RECORDED N/A 2022    Procedure: Heart Cath Right Heart Cath;  Surgeon: Yan Heredia MD;  Location:  HEART CARDIAC CATH LAB    ENT SURGERY  as a child    tonsillectomy    ESOPHAGOSCOPY, GASTROSCOPY, DUODENOSCOPY (EGD), COMBINED N/A 2021    Procedure: ESOPHAGOGASTRODUODENOSCOPY, WITH BIOPSY;  Surgeon: Lincoln Farrar MD;  Location:  GI    EYE SURGERY Left     injections- eye    HYSTERECTOMY  approx     partial hysterectomy-reason bleeding     IR CVC TUNNEL PLACEMENT > 5 YRS OF AGE  2019    IR CVC TUNNEL REMOVAL RIGHT  2020    IR DIALYSIS FISTULOGRAM RIGHT  2019        MEDICATIONS:  PTA Meds  Prior to Admission medications    Medication Sig Last Dose Taking? Auth  Provider Long Term End Date   acetaminophen (TYLENOL) 500 MG tablet Take 500-1,000 mg by mouth every 6 hours as needed. Past Week at PRN Yes Unknown, Entered By History     amiodarone (PACERONE) 100 MG TABS tablet Take 1 tablet (100 mg) by mouth five times a week. Take on Mondays, Tuesdays, Thursdays, Fridays and Saturdays; hold on Wednesdays and Sundays. Past Week Yes Felicity Green, DAVID CNP Yes    calcium acetate (PHOSLO) 667 MG CAPS capsule Take 667 mg by mouth 3 times daily (with meals) Past Week Yes Unknown, Entered By History     calcium acetate (PHOSLO) 667 MG CAPS capsule Take 667 mg by mouth Take with snacks or supplements Past Week Yes Unknown, Entered By History     carbamide peroxide (DEBROX) 6.5 % otic solution Place 10 drops Into the left ear 2 times daily. Past Week Yes Barry Ballesteros MD     famotidine (PEPCID) 20 MG tablet Take 20 mg by mouth 2 times daily as needed. Past Week at PRN Yes Unknown, Entered By History     guaiFENesin 1200 MG TB12 Take 1 tablet by mouth every 12 hours as needed (cough/congestion). Past Week at PRN Yes Unknown, Entered By History No    insulin aspart (FIASP FLEXTOUCH) 100 UNIT/ML pen-injector 2U per 15 carbs, plus 1U for every 50 that preprandial BG over 150 - MAXIMUM 20 UNITS PER DAY Past Week Yes Gigi Martin MD     insulin glargine (LANTUS PEN) 100 UNIT/ML pen Inject 11 Units subcutaneously at bedtime. Past Week Yes Gigi Martin MD Yes    levalbuterol (XOPENEX HFA) 45 MCG/ACT inhaler Inhale 2 puffs into the lungs every 6 hours as needed for shortness of breath / dyspnea or wheezing Past Week at PRN Yes Gigi Martin MD Yes    midodrine (PROAMATINE) 10 MG tablet Take 30 mg by mouth daily. On Monday, Wednesday, Friday before dialysis Past Week Yes Unknown, Entered By History     midodrine (PROAMATINE) 10 MG tablet Take 10 mg by mouth Every Mon, Wed, Fri Morning Past Week Yes Unknown, Entered By History     midodrine (PROAMATINE) 10 MG tablet  Take 20 mg by mouth 2 times daily. On non-dialysis days (Tuesday, Thursday, Saturday, Sunday) Past Week Yes Unknown, Entered By History        Current Meds  Current Facility-Administered Medications   Medication Dose Route Frequency Provider Last Rate Last Admin    amiodarone (PACERONE) tablet 100 mg  100 mg Oral Once per day on Monday Tuesday Thursday Friday Saturday Jose Redd MD        calcium acetate (PHOSLO) capsule 667 mg  667 mg Oral TID w/meals Simi Bateman MD   667 mg at 09/30/24 1405    carbamide peroxide (DEBROX) 6.5 % otic solution 3 drop  3 drop Right Ear BID Tory Castellanos MD   3 drop at 09/30/24 0834    insulin aspart (NovoLOG) injection (RAPID ACTING)  1-7 Units Subcutaneous TID AC Alina Arrington MD   2 Units at 09/30/24 1030    insulin aspart (NovoLOG) injection (RAPID ACTING)  1-5 Units Subcutaneous At Bedtime Alina Arrington MD        insulin aspart (NovoLOG) injection (RAPID ACTING)   Subcutaneous Daily with breakfast Simi Bateman MD   8 Units at 09/30/24 1032    insulin aspart (NovoLOG) injection (RAPID ACTING)   Subcutaneous Daily with lunch Simi Bateman MD   4 Units at 09/30/24 1409    insulin aspart (NovoLOG) injection (RAPID ACTING)   Subcutaneous Daily with supper Simi Bateman MD   2 Units at 09/29/24 1920    insulin glargine (LANTUS PEN) injection 10 Units  10 Units Subcutaneous At Bedtime Alina Arrington MD        midodrine (PROAMATINE) tablet 10 mg  10 mg Oral Q Mon Wed Fri AM Simi Bateman MD   10 mg at 09/30/24 1036    midodrine (PROAMATINE) tablet 20 mg  20 mg Oral 2 times per day on Sunday Tuesday Thursday Saturday Simi Bateman MD   20 mg at 09/29/24 0732    No heparin via hemodialysis machine   Does not apply Once Elsie Genao DO        sodium chloride 0.9% BOLUS 250 mL  250 mL Intravenous Once in dialysis/CRRT Birkelo, Elsie, DO        sodium chloride 0.9% BOLUS 300 mL  300 mL Hemodialysis Machine Once Elsie Genao,           Infusion Meds  Current Facility-Administered Medications   Medication Dose Route Frequency Provider Last Rate Last Admin    ACE Inhibitor/ARB/ARNI not indicated according to guidelines   Does not apply DOES NOT GO TO Simi Condon MD        Continuing beta blocker from home medication list OR beta blocker order already placed during this visit   Does not apply DOES NOT GO TO Simi Condon MD        Stop Heparin 60 minutes before end of treatment   Does not apply Continuous PRN Elsie Genao DO           ALLERGIES:    Allergies   Allergen Reactions    Albuterol      shakiness    Aspirin      gi    Byetta [Exenatide]      gi    Clonidine      constipation    Codeine      vomiting    Ezetimibe      muscle symptoms    Hydralazine      headaches    Lisinopril      hyperkalemia    Metformin Hydrochloride      vomiting    Pravachol [Pravastatin Sodium]      muscle pains    Simvastatin      myalgias    Troglitazone        SOCIAL HISTORY:   Social History     Socioeconomic History    Marital status: Single     Spouse name: Not on file    Number of children: Not on file    Years of education: Not on file    Highest education level: Not on file   Occupational History    Not on file   Tobacco Use    Smoking status: Former     Current packs/day: 0.00     Average packs/day: 1 pack/day for 22.0 years (22.0 ttl pk-yrs)     Types: Cigarettes     Start date: 10/12/1977     Quit date: 10/12/1999     Years since quittin.9    Smokeless tobacco: Never   Vaping Use    Vaping status: Never Used   Substance and Sexual Activity    Alcohol use: No     Alcohol/week: 0.0 standard drinks of alcohol    Drug use: No    Sexual activity: Never   Other Topics Concern     Service Not Asked    Blood Transfusions Yes    Caffeine Concern No    Occupational Exposure Not Asked    Hobby Hazards Not Asked    Sleep Concern No    Stress Concern No    Weight Concern Yes     Comment: would like to lose weight    Special  Diet No    Back Care Not Asked    Exercise Yes     Comment: walks daily 2 blocks    Bike Helmet Not Asked    Seat Belt Not Asked    Self-Exams Not Asked    Parent/sibling w/ CABG, MI or angioplasty before 65F 55M? Not Asked   Social History Narrative    Not on file     Social Determinants of Health     Financial Resource Strain: Not on file   Food Insecurity: Not on file   Transportation Needs: Not on file   Physical Activity: Not on file   Stress: Not on file   Social Connections: Not on file   Interpersonal Safety: Low Risk  (11/15/2023)    Interpersonal Safety     Do you feel physically and emotionally safe where you currently live?: Yes     Within the past 12 months, have you been hit, slapped, kicked or otherwise physically hurt by someone?: No     Within the past 12 months, have you been humiliated or emotionally abused in other ways by your partner or ex-partner?: No   Housing Stability: Not on file       FAMILY MEDICAL HISTORY:   Family History   Problem Relation Age of Onset    Arrhythmia Mother     Hypertension Mother     Pancreatic Cancer Father     Diabetes Brother     Asthma Sister     LUNG DISEASE Sister     Diabetes Daughter     Cirrhosis Sister        OBJECTIVE     PHYSICAL EXAM:   Temp  Av.9  F (36.6  C)  Min: 97.3  F (36.3  C)  Max: 98.4  F (36.9  C)      Pulse  Av.3  Min: 55  Max: 65 Resp  Av.2  Min: 16  Max: 18  SpO2  Av.2 %  Min: 95 %  Max: 98 %       /43 (BP Location: Left arm)   Pulse 59   Temp 98.1  F (36.7  C) (Oral)   Resp 18   Ht 1.524 m (5')   Wt 66.6 kg (146 lb 14.4 oz)   LMP  (LMP Unknown)   SpO2 94%   BMI 28.69 kg/m     Date 24 0700 - 24 0659   Shift 2827-7480 5766-6884 7789-6615 24 Hour Total   INTAKE   P.O. 120   120   Shift Total(mL/kg) 120(1.77)   120(1.77)   OUTPUT   Shift Total(mL/kg)       Weight (kg) 67.95 67.95 67.95 67.95      Admit Weight: 67.7 kg (149 lb 4.8 oz)     General:sitting up at edge of  bed  HEENT:mmm  Cardiac:rrr  Pulmonary:unlabored  Abdominal: nondistended  Extremities:no LE edema   Neuro:A&Ox4  Access:R AVF w thrill    LABS:   CMP  Recent Labs   Lab 09/30/24  1620 09/30/24  1331 09/30/24  0847 09/30/24  0552 09/29/24  1122 09/29/24  0803 09/29/24  0723 09/29/24  0321 09/28/24  1619 09/28/24  1612   NA  --   --   --  138  --  139  --  137  --  139   POTASSIUM  --   --   --  5.1  --  5.8*  --  5.5*  --  5.3   CHLORIDE  --   --   --  100  --  99  --  98  --  97*   CO2  --   --   --  25  --  24  --  24  --  25   ANIONGAP  --   --   --  13  --  16*  --  15  --  17*   * 84 228* 174*   < > 152*   < > 220*   < > 173*   BUN  --   --   --  29.0*  --  39.6*  --  38.4*  --  31.4*   CR  --   --   --  6.25*  --  8.10*  --  7.76*  --  7.09*   GFRESTIMATED  --   --   --  7*  --  5*  --  5*  --  6*   KANWAL  --   --   --  9.4  --  9.7  --  9.7  --  9.9   MAG  --   --   --  2.1  --  2.5*  --   --   --  2.3   PHOS  --   --   --  6.0*  --  6.3*  --   --   --  5.2*   PROTTOTAL  --   --   --   --   --   --   --  6.3*  --  6.8   ALBUMIN  --   --   --   --   --   --   --  3.6  --  3.8   BILITOTAL  --   --   --   --   --   --   --  0.3  --  0.4   ALKPHOS  --   --   --   --   --   --   --  92  --  94   AST  --   --   --   --   --   --   --  13  --  16   ALT  --   --   --   --   --   --   --  <5  --  5    < > = values in this interval not displayed.     CBC  Recent Labs   Lab 09/30/24  0552 09/29/24  0803 09/29/24  0321 09/28/24  1612   HGB 8.8* 9.2* 8.9* 9.0*   WBC 6.2 5.7 5.5 5.8   RBC 3.05* 3.24* 3.14* 3.19*   HCT 28.9* 30.9* 29.7* 30.8*   MCV 95 95 95 97   MCH 28.9 28.4 28.3 28.2   MCHC 30.4* 29.8* 30.0* 29.2*   RDW 17.0* 17.0* 16.8* 17.0*    119* 111* 121*     INR  Recent Labs   Lab 09/30/24  0552 09/29/24  0803   INR 1.23* 1.21*     ABG  Recent Labs   Lab 09/29/24 0321   O2PER 2      URINE STUDIES  Recent Labs   Lab Test 09/25/19  1455 06/07/18  1455 08/09/17  1640   COLOR Yellow Yellow Yellow    APPEARANCE Clear Clear Clear   URINEGLC >499* 250* Negative   URINEBILI Negative Negative Negative   URINEKETONE Negative Negative Negative   SG 1.005 1.015 1.020   UBLD Negative Small* Small*   URINEPH 7.0 6.0 6.0   PROTEIN 100* >=300* >=300*   UROBILINOGEN  --  0.2 0.2   NITRITE Negative Negative Negative   LEUKEST Negative Negative Negative   RBCU <1 2-5* O - 2   WBCU 1 0 - 5 O - 2     No lab results found.  PTH  No lab results found.  IRON STUDIES  Recent Labs   Lab Test 09/29/24  0803 06/19/22  0634 03/17/19  0805 08/30/18  1318   IRON 35* 27* 27* 54    178* 178* 196*   IRONSAT 12* 15 15 28   LATRICE 299 898* 210 407*       IMAGING:  All imaging studies reviewed by me.     Jorge A Jacobo MD    I was present with the fellow during the history and exam.  I discussed the case with the fellow and agree with the findings as documented in the assessment and plan.    Sandra Viera MD   of Medicine  Department of Nephrology  [facility]

## 2024-09-30 NOTE — PROGRESS NOTES
Cardiology Progress Note  Yissel Wetzel MRN: [id number]  Age: 64 year old, : 1960      Assessment & Plan   Yissel Wetzel is a 64 year old female with severe pulmonary hypertension admitted for daily dialysis and RHC to assess pulmonary artery pressure to eval for transplant     I personally saw, examined and discussed this patient with doctor in training;/resident reviewing interim imaging, laboratories, consults, medications and nursie notes and agree with observations and plan outlined.     Today:  - Continue daily HD for volume removal back to dry weight (63kg)  - RHC on Wednesday after  HD  - Switched to her home insulin regimen per her request     Severe pulmonary hypertension primarily secondary to intrinsic pulmonary vascular disease (mean PA 81 mmHg, PVRI 25, PCWP confirmed with sat 35 mmHg)  Severely elevated right-sided filling pressures (mean RA 35 mmHg)  Severely elevated left-sided filling pressures (LVEDP 35-40 mmHg)  Followed by Dr. Redd. Appears euvolemic to slightly hypervolemic. Dry weight 63 kg per prior notes. Plan is to do HD to euvolemia prior to RHC  - Nephrology consulted  - Daily HD until RHC on 10/2  - Not on any pulm HTN meds     ESRD  Nephrology consulted for daily dialysis   - Continue PTA midodrine 10 mg qam and 30 mg before HD on MWF (dialysis days); and 20 mg BID  TThSaSu (non-dialysis days).  -will likely need to adjust midodrine pending dialysis schedule. No urgent need for dialysis this evening based on labs     R pleural effusion, chronic  Unchanged on CXR    History of SVT  Recently seen by EP at end of 2024  Continue pta amiodarone 100 mg 5 days a week      DM2.   - PTA glargine 10u at bedtime  - PTA carb coverage 2u:15g  - PTA sliding scale 1u for every 50 above 150    COPD.  - On home O2: 2L at night.  - Continue PRN levalbuterol.     Thrombocytopenia  Platelets stable      Chronic anemia.  Baseline Hgb:  9.7-12.2    H/o cirrhosis noted to be related to severe pHTN with right heart dysfunction.  LFT's normal on admit.     FEN: 2 g  na, 2 L fluid s  DVT Prophylaxis: Low risk  Code Status: Full Code  Disposition: Expected discharge in 3+ days once dialysis and RHC completed      Discussed with Dr. Tramaine Arrington MD  Internal Medicine PGY3    Cardiology 2 Service    Interval History     She is frustrated that her insulin is not ordered as she takes it at home. No chest pain or dyspnea. No orthopnea. She does not like the low sodium-renal combined diet.    VS: HR 55-60. /47- MAP 64. Maps 64-74 overall. Weight 66.6 from 67.7    I/O: -2000 off with HD. -1430 recorded    Labs: K 5.1. Mg 2.1. Phos 6. Hgb 8.8. Plt 200. INR 1.23    CXR: stable moderate loculated R pleural effusion    Physical Exam   Temp: 97.5  F (36.4  C) Temp src: Oral BP: 102/47 Pulse: 60   Resp: 18 SpO2: 99 % O2 Device: Nasal cannula Oxygen Delivery: 2 LPM  Vitals:    09/28/24 1536 09/29/24 0501 09/30/24 0400   Weight: 67.7 kg (149 lb 4.8 oz) 67.9 kg (149 lb 12.8 oz) 66.6 kg (146 lb 14.4 oz)     Vital Signs with Ranges  Temp:  [97.3  F (36.3  C)-99.1  F (37.3  C)] 97.5  F (36.4  C)  Pulse:  [52-69] 60  Resp:  [16-18] 18  BP: (100-127)/(41-65) 102/47  SpO2:  [94 %-100 %] 99 %  I/O last 3 completed shifts:  In: 500 [P.O.:500]  Out: 2000 [Other:2000]  Constitutional: awake, alert, cooperative, no apparent distress  HENT: , EOM grossly intact, sclera anicteric  Respiratory: non-labored respirations on nasal cannula, crackles RLL   Cardiovascular: RRR, no murmur noted, no LE edema or JVD  GI: soft, non-distended, non-tender  Skin: warm and dry, no rashes or lesions  Neurologic: Cranial nerves II-XII are grossly intact, moving all extremities equally and independently      Medications   Current Facility-Administered Medications   Medication Dose Route Frequency Provider Last Rate Last Admin    ACE Inhibitor/ARB/ARNI not indicated according to  guidelines   Does not apply DOES NOT GO TO Simi Condon MD        Continuing beta blocker from home medication list OR beta blocker order already placed during this visit   Does not apply DOES NOT GO TO Simi Condon MD         Current Facility-Administered Medications   Medication Dose Route Frequency Provider Last Rate Last Admin    amiodarone (PACERONE) tablet 100 mg  100 mg Oral Once per day on Monday Tuesday Wednesday Thursday Friday Simi Bateman MD        calcium acetate (PHOSLO) capsule 667 mg  667 mg Oral TID w/meals Simi Bateman MD   667 mg at 09/29/24 1920    carbamide peroxide (DEBROX) 6.5 % otic solution 3 drop  3 drop Right Ear BID Tory Castellanos MD   3 drop at 09/29/24 2013    insulin aspart (NovoLOG) injection (RAPID ACTING)   Subcutaneous Daily with breakfast Simi Bateman MD        insulin aspart (NovoLOG) injection (RAPID ACTING)   Subcutaneous Daily with lunch Simi Bateman MD   9 Units at 09/29/24 1343    insulin aspart (NovoLOG) injection (RAPID ACTING)   Subcutaneous Daily with supper Simi Bateman MD   2 Units at 09/29/24 1920    insulin aspart (NovoLOG) injection (RAPID ACTING)  1-3 Units Subcutaneous TID AC Simi Bateman MD   2 Units at 09/29/24 1921    insulin aspart (NovoLOG) injection (RAPID ACTING)  1-3 Units Subcutaneous At Bedtime Simi Bateman MD        insulin glargine (LANTUS PEN) injection 7 Units  7 Units Subcutaneous At Bedtime Simi Bateman MD   7 Units at 09/28/24 2325    midodrine (PROAMATINE) tablet 10 mg  10 mg Oral Q Mon Wed Fri AM Simi Bateman MD        midodrine (PROAMATINE) tablet 20 mg  20 mg Oral 2 times per day on Sunday Tuesday Thursday Saturday Simi Bateman MD   20 mg at 09/29/24 0732       Data   Recent Labs   Lab 09/30/24  0552 09/30/24  0318 09/29/24  2108 09/29/24  1122 09/29/24  0803 09/29/24  0723 09/29/24  0321 09/28/24  1619 09/28/24  1612   WBC 6.2  --   --   --  5.7  --  5.5  --  5.8    HGB 8.8*  --   --   --  9.2*  --  8.9*  --  9.0*   MCV 95  --   --   --  95  --  95  --  97     --   --   --  119*  --  111*  --  121*   INR 1.23*  --   --   --  1.21*  --   --   --   --      --   --   --  139  --  137  --  139   POTASSIUM 5.1  --   --   --  5.8*  --  5.5*  --  5.3   CHLORIDE 100  --   --   --  99  --  98  --  97*   CO2 25  --   --   --  24  --  24  --  25   BUN 29.0*  --   --   --  39.6*  --  38.4*  --  31.4*   CR 6.25*  --   --   --  8.10*  --  7.76*  --  7.09*   ANIONGAP 13  --   --   --  16*  --  15  --  17*   KANWAL 9.4  --   --   --  9.7  --  9.7  --  9.9   * 149* 142*   < > 152*   < > 220*   < > 173*   ALBUMIN  --   --   --   --   --   --  3.6  --  3.8   PROTTOTAL  --   --   --   --   --   --  6.3*  --  6.8   BILITOTAL  --   --   --   --   --   --  0.3  --  0.4   ALKPHOS  --   --   --   --   --   --  92  --  94   ALT  --   --   --   --   --   --  <5  --  5   AST  --   --   --   --   --   --  13  --  16    < > = values in this interval not displayed.       Recent Results (from the past 24 hour(s))   XR Chest Port 1 View    Narrative    Exam: XR CHEST PORT 1 VIEW, 9/29/2024 11:48 AM    Indication: crackles right lower lobe, eval for fluid or pneumonia    Comparison: 8/18/2024    Findings:   Stable enlarged cardiac silhouette. The pulmonary vasculature is  prominent and indistinct. Stable moderate loculated right pleural  effusion and adjacent streaky right perihilar and basilar opacities.  No appreciable left pleural effusion. No pneumothorax. Mild bilateral  interstitial prominence. Mild streaky left perihilar opacities.      Impression    Impression: Stable moderate loculated right pleural effusion with  associated streaky right perihilar and basilar atelectasis.  Superimposed infection is not excluded.    CONOR ELLIOTT DO         SYSTEM ID:  [id number]

## 2024-09-30 NOTE — PHARMACY-ADMISSION MEDICATION HISTORY
Pharmacy Intern Admission Medication History    Admission medication history is complete. The information provided in this note is only as accurate as the sources available at the time of the update.    Information Source(s): Patient and CareEverywhere/SureScripts via in-person    Pertinent Information:   Last doses - patient admitted 9/28, unsure when she last took medications at home. Last doses left blank on PTA list.  Amiodarone - of note, dose decreased from 200 mg to 100 mg during admission.    Changes made to PTA medication list:  Added: None  Deleted: None  Changed: None    Allergies reviewed with patient and updates made in EHR: no    Medication History Completed By: Dawna Esquivel 9/30/2024 10:22 AM    PTA Med List   Medication Sig Last Dose    acetaminophen (TYLENOL) 500 MG tablet Take 500-1,000 mg by mouth every 6 hours as needed. Past Week at PRN    amiodarone (PACERONE) 200 MG tablet Take 200 mg by mouth five times a week. Monday, Tuesday, Thursday, Friday, Saturday (hold Wednesday and Sunday) Past Week    calcium acetate (PHOSLO) 667 MG CAPS capsule Take 667 mg by mouth 3 times daily (with meals) Past Week    calcium acetate (PHOSLO) 667 MG CAPS capsule Take 667 mg by mouth Take with snacks or supplements Past Week    carbamide peroxide (DEBROX) 6.5 % otic solution Place 10 drops Into the left ear 2 times daily. Past Week    famotidine (PEPCID) 20 MG tablet Take 20 mg by mouth 2 times daily as needed. Past Week at PRN    guaiFENesin 1200 MG TB12 Take 1 tablet by mouth every 12 hours as needed (cough/congestion). Past Week at PRN    insulin aspart (FIASP FLEXTOUCH) 100 UNIT/ML pen-injector 2U per 15 carbs, plus 1U for every 50 that preprandial BG over 150 - MAXIMUM 20 UNITS PER DAY Past Week    insulin glargine (LANTUS PEN) 100 UNIT/ML pen Inject 11 Units subcutaneously at bedtime. Past Week    levalbuterol (XOPENEX HFA) 45 MCG/ACT inhaler Inhale 2 puffs into the lungs every 6 hours as needed for  shortness of breath / dyspnea or wheezing Past Week at PRN    midodrine (PROAMATINE) 10 MG tablet Take 30 mg by mouth daily. On Monday, Wednesday, Friday before dialysis Past Week    midodrine (PROAMATINE) 10 MG tablet Take 10 mg by mouth Every Mon, Wed, Fri Morning Past Week    midodrine (PROAMATINE) 10 MG tablet Take 20 mg by mouth 2 times daily. On non-dialysis days (Tuesday, Thursday, Saturday, Sunday) Past Week

## 2024-10-01 LAB
ANION GAP SERPL CALCULATED.3IONS-SCNC: 13 MMOL/L (ref 7–15)
BASOPHILS # BLD AUTO: 0 10E3/UL (ref 0–0.2)
BASOPHILS NFR BLD AUTO: 1 %
BUN SERPL-MCNC: 15.9 MG/DL (ref 8–23)
CALCIUM SERPL-MCNC: 9 MG/DL (ref 8.8–10.4)
CHLORIDE SERPL-SCNC: 99 MMOL/L (ref 98–107)
CREAT SERPL-MCNC: 3.94 MG/DL (ref 0.51–0.95)
EGFRCR SERPLBLD CKD-EPI 2021: 12 ML/MIN/1.73M2
EOSINOPHIL # BLD AUTO: 0.1 10E3/UL (ref 0–0.7)
EOSINOPHIL NFR BLD AUTO: 2 %
ERYTHROCYTE [DISTWIDTH] IN BLOOD BY AUTOMATED COUNT: 16.9 % (ref 10–15)
GLUCOSE BLDC GLUCOMTR-MCNC: 132 MG/DL (ref 70–99)
GLUCOSE BLDC GLUCOMTR-MCNC: 135 MG/DL (ref 70–99)
GLUCOSE BLDC GLUCOMTR-MCNC: 212 MG/DL (ref 70–99)
GLUCOSE BLDC GLUCOMTR-MCNC: 252 MG/DL (ref 70–99)
GLUCOSE SERPL-MCNC: 140 MG/DL (ref 70–99)
HCO3 SERPL-SCNC: 26 MMOL/L (ref 22–29)
HCT VFR BLD AUTO: 30.8 % (ref 35–47)
HGB BLD-MCNC: 9.3 G/DL (ref 11.7–15.7)
IMM GRANULOCYTES # BLD: 0 10E3/UL
IMM GRANULOCYTES NFR BLD: 1 %
INR PPP: 1.14 (ref 0.85–1.15)
LYMPHOCYTES # BLD AUTO: 0.9 10E3/UL (ref 0.8–5.3)
LYMPHOCYTES NFR BLD AUTO: 16 %
MAGNESIUM SERPL-MCNC: 1.8 MG/DL (ref 1.7–2.3)
MCH RBC QN AUTO: 28.7 PG (ref 26.5–33)
MCHC RBC AUTO-ENTMCNC: 30.2 G/DL (ref 31.5–36.5)
MCV RBC AUTO: 95 FL (ref 78–100)
MONOCYTES # BLD AUTO: 0.9 10E3/UL (ref 0–1.3)
MONOCYTES NFR BLD AUTO: 15 %
NEUTROPHILS # BLD AUTO: 3.7 10E3/UL (ref 1.6–8.3)
NEUTROPHILS NFR BLD AUTO: 65 %
NRBC # BLD AUTO: 0 10E3/UL
NRBC BLD AUTO-RTO: 0 /100
PHOSPHATE SERPL-MCNC: 4.1 MG/DL (ref 2.5–4.5)
PLAT MORPH BLD: ABNORMAL
PLATELET # BLD AUTO: 135 10E3/UL (ref 150–450)
POLYCHROMASIA BLD QL SMEAR: SLIGHT
POTASSIUM SERPL-SCNC: 4.3 MMOL/L (ref 3.4–5.3)
RBC # BLD AUTO: 3.24 10E6/UL (ref 3.8–5.2)
RBC MORPH BLD: ABNORMAL
SODIUM SERPL-SCNC: 138 MMOL/L (ref 135–145)
WBC # BLD AUTO: 5.7 10E3/UL (ref 4–11)

## 2024-10-01 PROCEDURE — 99232 SBSQ HOSP IP/OBS MODERATE 35: CPT | Mod: GC | Performed by: INTERNAL MEDICINE

## 2024-10-01 PROCEDURE — 36415 COLL VENOUS BLD VENIPUNCTURE: CPT

## 2024-10-01 PROCEDURE — 80048 BASIC METABOLIC PNL TOTAL CA: CPT

## 2024-10-01 PROCEDURE — 120N000005 HC R&B MS OVERFLOW UMMC

## 2024-10-01 PROCEDURE — 250N000013 HC RX MED GY IP 250 OP 250 PS 637: Performed by: INTERNAL MEDICINE

## 2024-10-01 PROCEDURE — 85610 PROTHROMBIN TIME: CPT

## 2024-10-01 PROCEDURE — 83735 ASSAY OF MAGNESIUM: CPT

## 2024-10-01 PROCEDURE — 90935 HEMODIALYSIS ONE EVALUATION: CPT

## 2024-10-01 PROCEDURE — 250N000013 HC RX MED GY IP 250 OP 250 PS 637

## 2024-10-01 PROCEDURE — 99233 SBSQ HOSP IP/OBS HIGH 50: CPT | Mod: GC | Performed by: INTERNAL MEDICINE

## 2024-10-01 PROCEDURE — 84100 ASSAY OF PHOSPHORUS: CPT

## 2024-10-01 PROCEDURE — 85025 COMPLETE CBC W/AUTO DIFF WBC: CPT

## 2024-10-01 PROCEDURE — 258N000003 HC RX IP 258 OP 636: Performed by: STUDENT IN AN ORGANIZED HEALTH CARE EDUCATION/TRAINING PROGRAM

## 2024-10-01 RX ORDER — VIT B COMP NO.3/FOLIC/C/BIOTIN 1 MG-60 MG
1 TABLET ORAL DAILY
Status: DISCONTINUED | OUTPATIENT
Start: 2024-10-01 | End: 2024-10-04 | Stop reason: HOSPADM

## 2024-10-01 RX ADMIN — ACETAMINOPHEN 500 MG: 500 TABLET ORAL at 16:26

## 2024-10-01 RX ADMIN — Medication: at 13:54

## 2024-10-01 RX ADMIN — CALCIUM ACETATE 667 MG: 667 CAPSULE ORAL at 08:16

## 2024-10-01 RX ADMIN — SODIUM CHLORIDE 300 ML: 9 INJECTION, SOLUTION INTRAVENOUS at 13:54

## 2024-10-01 RX ADMIN — CALCIUM ACETATE 667 MG: 667 CAPSULE ORAL at 18:03

## 2024-10-01 RX ADMIN — CALCIUM ACETATE 667 MG: 667 CAPSULE ORAL at 12:48

## 2024-10-01 RX ADMIN — MIDODRINE HYDROCHLORIDE 10 MG: 5 TABLET ORAL at 08:17

## 2024-10-01 RX ADMIN — MIDODRINE HYDROCHLORIDE 30 MG: 5 TABLET ORAL at 13:30

## 2024-10-01 RX ADMIN — CARBAMIDE PEROXIDE 6.5% 3 DROP: 6.5 LIQUID AURICULAR (OTIC) at 22:32

## 2024-10-01 RX ADMIN — CARBAMIDE PEROXIDE 6.5% 3 DROP: 6.5 LIQUID AURICULAR (OTIC) at 08:19

## 2024-10-01 RX ADMIN — AMIODARONE HYDROCHLORIDE 100 MG: 100 TABLET ORAL at 22:31

## 2024-10-01 RX ADMIN — SODIUM CHLORIDE 250 ML: 9 INJECTION, SOLUTION INTRAVENOUS at 13:54

## 2024-10-01 ASSESSMENT — ACTIVITIES OF DAILY LIVING (ADL)
ADLS_ACUITY_SCORE: 32
ADLS_ACUITY_SCORE: 32
ADLS_ACUITY_SCORE: 45
ADLS_ACUITY_SCORE: 32
ADLS_ACUITY_SCORE: 32
ADLS_ACUITY_SCORE: 45
ADLS_ACUITY_SCORE: 32
ADLS_ACUITY_SCORE: 45
ADLS_ACUITY_SCORE: 32
ADLS_ACUITY_SCORE: 32
ADLS_ACUITY_SCORE: 45
ADLS_ACUITY_SCORE: 32
ADLS_ACUITY_SCORE: 32
ADLS_ACUITY_SCORE: 45

## 2024-10-01 NOTE — PLAN OF CARE
Shift: 1149-1073    Neuro: A&Ox4. Denies headache, lightheadedness, and dizziness.  Cardiac: SR/SB, HR in the 50's-60's. Afebrile, VSS. BP's have been soft per patient baseline.    Respiratory: Sating >95% on 2L NC.   GI/: Anuric, pt on dialysis. Had 1 BM this shift.   Diet/appetite: Tolerating 2G Na diet. 2000 mL fluid restriction. Had 1 occurrence of nausea/vomiting. Provider notified of occurrence.   Activity: Up with SBA assist.  Pain: Abdominal cramping/gas when going to bathroom.   Skin: No new deficits noted.  Lines: No PIV    Plan: RHC possibly Wednesday (9/2/24) post HD. Followed ongoing plan of care, notify CARDS 2 of any changes.

## 2024-10-01 NOTE — PROGRESS NOTES
HEMODIALYSIS TREATMENT NOTE    Date: 10/1/2024  Time: 04:22 PM     Data:  Pre Wt:   66.1 kg (Standing scale)  Desired Wt:   To be established  Post Wt:  65.8 kg (Standing scale)  Weight change: - 0.3 kg  Ultrafiltration - Post Run Net Total Removed (mL):  300 ml  Vascular Access Status: Fistula patent  Dialyzer Rinse:  Light  Total Blood Volume Processed: 20.58 L   Total Dialysis (Treatment) Time:   1 Hrs and 5 minutes  Dialysate Bath: K 3, Ca 2.25  Heparin: Heparin: None     Lab:   No  HbsAg - 9/30/24 (Non reactive)  HbsAb - 9/30/24  (Susceptible)      Interventions:  Dialysis done through Right AV Fistula using 15 gauge needles. ,   UF set to 2.3 Liters, accommodating priming and rinse back volumes  Medication administered per MAR  See Flowsheet for Crit Profile throughout the run  Patient was hemodynamically stable on HD.  55 minutes remaining in the treatment patient complained of severe muscle cramping in her right hand, legs and abdomen. UF paused, informed Dr Jules.   NS bolus given total of 600mL.  Net UF removed 300mL.  Patient verbalized relief from muscle cramping after intervention.  Treatment has ended safely and  blood is rinsed back completely  Decannulation done post HD, hemostasis is achieved in 10 minutes  Pressure dressing is applied, to be removed after 4-6 hours  Post Tx assessment done. Patient is sent back to [facility] room in stable condition  Report given to ANGELICA Boyer     Assessment:  A/O x 4, calm and cooperative, denies pain  Lung sounds  anterior and lateral BUL,  BLL: clear ; diminished  Right arm AV Fistula has good thrill and bruit                Plan:    Per Renal team

## 2024-10-01 NOTE — PROGRESS NOTES
Nephrology progress note  10/01/2024     Yissel Wetzel MRN:[id number] YOB: 1960  Date of Admission:9/28/2024  Primary care provider: Gigi Martin  Requesting physician: Jose Redd, *    Reason for consult: ESRD    MEDICAL DECISION MAKING     RECOMMENDATIONS:  Limited UF today of only 300 ml due to severe muscle cramps   HD tomorrow on 10/2  RHC on Wednesday after Weds HD    Renally dose medications, avoid unnecessary nephrotoxins, avoid unnecessary radiographic contrast, daily renal panel, strict I/O, daily standing weights     ASSESSMENT:  Yissel Wetzel is a 64 year old ESRD and severe pulmonary hypertension admitted for transplant eval  Nephrology consulted for ESRD     ESRD  Dialyzes MWF at [facility]. Primary nephrologist Dr Hernandez. Access w R AVF.  Dry weight 63kg.  Last session prior to admit: 9/27. Anuric   Today's HD Rx: 2h,  / , UF goal 2L    Anemia: hgb 9.2, TSAT 12%, ferritin 299  -Mircera 100 mcg q2 weeks,   -I ordered venofer 100 mg on 10/2 during HD     Volume/HTN: weight above dry weight but does not look overtly volume overloaded    Acidosis: bicarb 24, on bicarb    MBD: Ca  9.7, phos 6.3, cont phoslo    Intradialytic hypotension: on midodrine 10mg in AM MWF + 30mg prior to HD on MWF. Midodrine 20mg BID on non-HD days    Recommendations were communicated to primary team via gayle & vocera       Jorge A Jacobo MD  Division of Renal Disease and Hypertension  [facility]  jason [employer] Web Console    SUBJECTIVE     Interval events   Seen on RA   Severe calf cramp during HD today, got total UF only 300 ml   RHC on Wednesday after Weds HD     REVIEW OF SYSTEMS:  A comprehensive review of systems was negative except as noted above.    PAST MEDICAL HISTORY:  Past Medical History:   Diagnosis Date    Allergic rhinitis, cause unspecified     Anemia in chronic kidney disease     Anemia of chronic disease     Bleeding pseudoaneurysm of right  brachiocephalic arteriovenous fistula, initial encounter 2019    End stage renal failure on dialysis (H)     Esophagitis, unspecified     Former tobacco use     HEMORRHAGIC GASTROPATHY     History of blood transfusion     [place]    Iron deficiency anemia, unspecified     Mild persistent asthma     Obesity     Other and unspecified hyperlipidemia     Pneumonia     Pulmonary hypertension (H)     per 2012 echo mod.to severe pulmonary hypertension    Tobacco use disorder dc ed     Type 2 diabetes mellitus (H)     on Insulin- managed by PCP    Unspecified essential hypertension        Past Surgical History:   Procedure Laterality Date    ABDOMINOPLASTY  pt unsure when    abdominoplasty-     SECTION  ,     x 2    COLONOSCOPY      [facility]    COLONOSCOPY N/A 2021    Procedure: COLONOSCOPY, WITH POLYPECTOMY AND BIOPSY;  Surgeon: Lincoln Farrar MD;  Location:  GI    CREATE FISTULA ARTERIOVENOUS UPPER EXTREMITY Right 2018    Procedure: RIGHT BRACHIAL TO BASILIC ARTERIOVENOUS FISTULA CREATION;  Surgeon: Terry Vizcaino MD;  Location: [facility]    CV HEART CATHETERIZATION WITH POSSIBLE INTERVENTION N/A 2021    Procedure: Heart Catheterization with Possible Intervention;  Surgeon: Joseluis Dean MD;  Location:  HEART CARDIAC CATH LAB    CV RIGHT HEART CATH MEASUREMENTS RECORDED N/A 2022    Procedure: Heart Cath Right Heart Cath;  Surgeon: Yan Heredia MD;  Location:  HEART CARDIAC CATH LAB    ENT SURGERY  as a child    tonsillectomy    ESOPHAGOSCOPY, GASTROSCOPY, DUODENOSCOPY (EGD), COMBINED N/A 2021    Procedure: ESOPHAGOGASTRODUODENOSCOPY, WITH BIOPSY;  Surgeon: Lincoln Farrar MD;  Location:  GI    EYE SURGERY Left     injections- eye    HYSTERECTOMY  approx     partial hysterectomy-reason bleeding     IR CVC TUNNEL PLACEMENT > 5 YRS OF AGE  2019    IR CVC TUNNEL REMOVAL RIGHT  2020    IR  DIALYSIS FISTULOGRAM RIGHT  12/9/2019        MEDICATIONS:  PTA Meds  Prior to Admission medications    Medication Sig Last Dose Taking? Auth Provider Long Term End Date   acetaminophen (TYLENOL) 500 MG tablet Take 500-1,000 mg by mouth every 6 hours as needed. Past Week at PRN Yes Unknown, Entered By History     amiodarone (PACERONE) 100 MG TABS tablet Take 1 tablet (100 mg) by mouth five times a week. Take on Mondays, Tuesdays, Thursdays, Fridays and Saturdays; hold on Wednesdays and Sundays. Past Week Yes Felicity Green, APRN CNP Yes    calcium acetate (PHOSLO) 667 MG CAPS capsule Take 667 mg by mouth 3 times daily (with meals) Past Week Yes Unknown, Entered By History     calcium acetate (PHOSLO) 667 MG CAPS capsule Take 667 mg by mouth Take with snacks or supplements Past Week Yes Unknown, Entered By History     carbamide peroxide (DEBROX) 6.5 % otic solution Place 10 drops Into the left ear 2 times daily. Past Week Yes Barry Ballesteros MD     famotidine (PEPCID) 20 MG tablet Take 20 mg by mouth 2 times daily as needed. Past Week at PRN Yes Unknown, Entered By History     guaiFENesin 1200 MG TB12 Take 1 tablet by mouth every 12 hours as needed (cough/congestion). Past Week at PRN Yes Unknown, Entered By History No    insulin aspart (FIASP FLEXTOUCH) 100 UNIT/ML pen-injector 2U per 15 carbs, plus 1U for every 50 that preprandial BG over 150 - MAXIMUM 20 UNITS PER DAY Past Week Yes Gigi Martin MD     insulin glargine (LANTUS PEN) 100 UNIT/ML pen Inject 11 Units subcutaneously at bedtime. Past Week Yes Gigi Martin MD Yes    levalbuterol (XOPENEX HFA) 45 MCG/ACT inhaler Inhale 2 puffs into the lungs every 6 hours as needed for shortness of breath / dyspnea or wheezing Past Week at PRN Yes Gigi Martin MD Yes    midodrine (PROAMATINE) 10 MG tablet Take 30 mg by mouth daily. On Monday, Wednesday, Friday before dialysis Past Week Yes Unknown, Entered By History     midodrine (PROAMATINE) 10  MG tablet Take 10 mg by mouth Every Mon, Wed, Fri Morning Past Week Yes Unknown, Entered By History     midodrine (PROAMATINE) 10 MG tablet Take 20 mg by mouth 2 times daily. On non-dialysis days (Tuesday, Thursday, Saturday, Sunday) Past Week Yes Unknown, Entered By History        Current Meds  Current Facility-Administered Medications   Medication Dose Route Frequency Provider Last Rate Last Admin    amiodarone (PACERONE) tablet 100 mg  100 mg Oral Once per day on Monday Tuesday Thursday Friday Saturday Jose Redd MD        calcium acetate (PHOSLO) capsule 667 mg  667 mg Oral TID w/meals Simi Bateman MD   667 mg at 10/01/24 1248    carbamide peroxide (DEBROX) 6.5 % otic solution 3 drop  3 drop Right Ear BID Tory Castellanos MD   3 drop at 10/01/24 0819    insulin aspart (NovoLOG) injection (RAPID ACTING)  1-7 Units Subcutaneous TID AC Alina Arrington MD   3 Units at 10/01/24 1249    insulin aspart (NovoLOG) injection (RAPID ACTING)  1-5 Units Subcutaneous At Bedtime Alina Arrington MD        insulin aspart (NovoLOG) injection (RAPID ACTING)   Subcutaneous Daily with breakfast Simi Bateman MD   6 Units at 10/01/24 0820    insulin aspart (NovoLOG) injection (RAPID ACTING)   Subcutaneous Daily with lunch Simi Bateman MD   5 Units at 10/01/24 1250    insulin aspart (NovoLOG) injection (RAPID ACTING)   Subcutaneous Daily with supper Simi Bateman MD   4 Units at 09/30/24 1653    insulin glargine (LANTUS PEN) injection 8 Units  8 Units Subcutaneous At Bedtime Alina Arrington MD        midodrine (PROAMATINE) tablet 10 mg  10 mg Oral Q Mon Wed Fri AM Simi Bateman MD   10 mg at 09/30/24 1036    midodrine (PROAMATINE) tablet 20 mg  20 mg Oral 2 times per day on Sunday Tuesday Thursday Saturday Simi Bateman MD   10 mg at 10/01/24 0817    multivitamin RENAL (RENAVITE RX/NEPHROVITE) tablet 1 tablet  1 tablet Oral Daily Alina Arrington MD         Infusion Meds  Current  Facility-Administered Medications   Medication Dose Route Frequency Provider Last Rate Last Admin    ACE Inhibitor/ARB/ARNI not indicated according to guidelines   Does not apply DOES NOT GO TO Simi Condon MD        Continuing beta blocker from home medication list OR beta blocker order already placed during this visit   Does not apply DOES NOT GO TO Simi Condon MD           ALLERGIES:    Allergies   Allergen Reactions    Albuterol      shakiness    Aspirin      gi    Byetta [Exenatide]      gi    Clonidine      constipation    Codeine      vomiting    Ezetimibe      muscle symptoms    Hydralazine      headaches    Lisinopril      hyperkalemia    Metformin Hydrochloride      vomiting    Pravachol [Pravastatin Sodium]      muscle pains    Simvastatin      myalgias    Troglitazone        SOCIAL HISTORY:   Social History     Socioeconomic History    Marital status: Single     Spouse name: Not on file    Number of children: Not on file    Years of education: Not on file    Highest education level: Not on file   Occupational History    Not on file   Tobacco Use    Smoking status: Former     Current packs/day: 0.00     Average packs/day: 1 pack/day for 22.0 years (22.0 ttl pk-yrs)     Types: Cigarettes     Start date: 10/12/1977     Quit date: 10/12/1999     Years since quittin.9    Smokeless tobacco: Never   Vaping Use    Vaping status: Never Used   Substance and Sexual Activity    Alcohol use: No     Alcohol/week: 0.0 standard drinks of alcohol    Drug use: No    Sexual activity: Never   Other Topics Concern     Service Not Asked    Blood Transfusions Yes    Caffeine Concern No    Occupational Exposure Not Asked    Hobby Hazards Not Asked    Sleep Concern No    Stress Concern No    Weight Concern Yes     Comment: would like to lose weight    Special Diet No    Back Care Not Asked    Exercise Yes     Comment: walks daily 2 blocks    Bike Helmet Not Asked    Seat Belt Not Asked     Self-Exams Not Asked    Parent/sibling w/ CABG, MI or angioplasty before 65F 55M? Not Asked   Social History Narrative    Not on file     Social Determinants of Health     Financial Resource Strain: Low Risk  (10/1/2024)    Financial Resource Strain     Within the past 12 months, have you or your family members you live with been unable to get utilities (heat, electricity) when it was really needed?: No   Food Insecurity: Low Risk  (10/1/2024)    Food Insecurity     Within the past 12 months, did you worry that your food would run out before you got money to buy more?: No     Within the past 12 months, did the food you bought just not last and you didn t have money to get more?: No   Transportation Needs: Low Risk  (10/1/2024)    Transportation Needs     Within the past 12 months, has lack of transportation kept you from medical appointments, getting your medicines, non-medical meetings or appointments, work, or from getting things that you need?: No   Physical Activity: Not on file   Stress: Not on file   Social Connections: Not on file   Interpersonal Safety: Low Risk  (10/1/2024)    Interpersonal Safety     Do you feel physically and emotionally safe where you currently live?: Yes     Within the past 12 months, have you been hit, slapped, kicked or otherwise physically hurt by someone?: No     Within the past 12 months, have you been humiliated or emotionally abused in other ways by your partner or ex-partner?: No   Housing Stability: High Risk (10/1/2024)    Housing Stability     Do you have housing? : No     Are you worried about losing your housing?: No       FAMILY MEDICAL HISTORY:   Family History   Problem Relation Age of Onset    Arrhythmia Mother     Hypertension Mother     Pancreatic Cancer Father     Diabetes Brother     Asthma Sister     LUNG DISEASE Sister     Diabetes Daughter     Cirrhosis Sister        OBJECTIVE     PHYSICAL EXAM:   Temp  Av.9  F (36.6  C)  Min: 97.3  F (36.3  C)  Max: 98.4  F  (36.9  C)      Pulse  Av.3  Min: 55  Max: 65 Resp  Av.2  Min: 16  Max: 18  SpO2  Av.2 %  Min: 95 %  Max: 98 %       /43 (BP Location: Left arm)   Pulse 57   Temp 98.1  F (36.7  C)   Resp 18   Ht 1.524 m (5')   Wt 65.2 kg (143 lb 11.2 oz)   LMP  (LMP Unknown)   SpO2 100%   BMI 28.06 kg/m     Date 24 07 - 24 0659   Shift 3858-3542 [id number] 24 Hour Total   INTAKE   P.O. 120   120   Shift Total(mL/kg) 120(1.77)   120(1.77)   OUTPUT   Shift Total(mL/kg)       Weight (kg) 67.95 67.95 67.95 67.95      Admit Weight: 67.7 kg (149 lb 4.8 oz)     General:sitting up at edge of bed  HEENT:mmm  Cardiac:rrr  Pulmonary:unlabored  Abdominal: nondistended  Extremities:no LE edema   Neuro:A&Ox4  Access:R AVF w thrill    LABS:   CMP  Recent Labs   Lab 10/01/24  1127 10/01/24  0737 10/01/24  0600 24  2320 24  0847 24  0552 24  1122 24  0803 24  0723 24  0321 24  1619 24  1612   NA  --   --  138  --   --  138  --  139  --  137  --  139   POTASSIUM  --   --  4.3  --   --  5.1  --  5.8*  --  5.5*  --  5.3   CHLORIDE  --   --  99  --   --  100  --  99  --  98  --  97*   CO2  --   --  26  --   --  25  --  24  --  24  --  25   ANIONGAP  --   --  13  --   --  13  --  16*  --  15  --  17*   * 132* 140* 231*   < > 174*   < > 152*   < > 220*   < > 173*   BUN  --   --  15.9  --   --  29.0*  --  39.6*  --  38.4*  --  31.4*   CR  --   --  3.94*  --   --  6.25*  --  8.10*  --  7.76*  --  7.09*   GFRESTIMATED  --   --  12*  --   --  7*  --  5*  --  5*  --  6*   KANWAL  --   --  9.0  --   --  9.4  --  9.7  --  9.7  --  9.9   MAG  --   --  1.8  --   --  2.1  --  2.5*  --   --   --  2.3   PHOS  --   --  4.1  --   --  6.0*  --  6.3*  --   --   --  5.2*   PROTTOTAL  --   --   --   --   --   --   --   --   --  6.3*  --  6.8   ALBUMIN  --   --   --   --   --   --   --   --   --  3.6  --  3.8   BILITOTAL  --   --   --   --   --   --   --   --   --   0.3  --  0.4   ALKPHOS  --   --   --   --   --   --   --   --   --  92  --  94   AST  --   --   --   --   --   --   --   --   --  13  --  16   ALT  --   --   --   --   --   --   --   --   --  <5  --  5    < > = values in this interval not displayed.     CBC  Recent Labs   Lab 10/01/24  0600 09/30/24  0552 09/29/24  0803 09/29/24  0321   HGB 9.3* 8.8* 9.2* 8.9*   WBC 5.7 6.2 5.7 5.5   RBC 3.24* 3.05* 3.24* 3.14*   HCT 30.8* 28.9* 30.9* 29.7*   MCV 95 95 95 95   MCH 28.7 28.9 28.4 28.3   MCHC 30.2* 30.4* 29.8* 30.0*   RDW 16.9* 17.0* 17.0* 16.8*   * 200 119* 111*     INR  Recent Labs   Lab 10/01/24  0600 09/30/24  0552 09/29/24  0803   INR 1.14 1.23* 1.21*     ABG  Recent Labs   Lab 09/29/24  0321   O2PER 2      URINE STUDIES  Recent Labs   Lab Test 09/25/19  1455 06/07/18  1455 08/09/17  1640   COLOR Yellow Yellow Yellow   APPEARANCE Clear Clear Clear   URINEGLC >499* 250* Negative   URINEBILI Negative Negative Negative   URINEKETONE Negative Negative Negative   SG 1.005 1.015 1.020   UBLD Negative Small* Small*   URINEPH 7.0 6.0 6.0   PROTEIN 100* >=300* >=300*   UROBILINOGEN  --  0.2 0.2   NITRITE Negative Negative Negative   LEUKEST Negative Negative Negative   RBCU <1 2-5* O - 2   WBCU 1 0 - 5 O - 2     No lab results found.  PTH  No lab results found.  IRON STUDIES  Recent Labs   Lab Test 09/29/24  0803 06/19/22  0634 03/17/19  0805 08/30/18  1318   IRON 35* 27* 27* 54    178* 178* 196*   IRONSAT 12* 15 15 28   LATRICE 299 898* 210 407*       IMAGING:  All imaging studies reviewed by me.     Jorge A Jacobo MD    I was present with the fellow during the history and exam.  I discussed the case with the fellow and agree with the findings as documented in the assessment and plan.    Sandra Viera MD   of Medicine  Department of Nephrology  [facility]

## 2024-10-01 NOTE — PROGRESS NOTES
"CLINICAL NUTRITION SERVICES - ASSESSMENT NOTE     Nutrition Prescription    RECOMMENDATIONS FOR MDs/PROVIDERS TO ORDER:  None at this time    Malnutrition Status:    Patient does not meet two of the established criteria necessary for diagnosing malnutrition but is at risk for malnutrition    Recommendations already ordered by Registered Dietitian (RD):  Snacks/supplements    Future/Additional Recommendations:  -Rec continue current diet, as ordered. Encourage pt to self-select tolerated foods/beverages. Rec small, frequent meals and use of oral supplements. Pt with increased protein needs.  -Continue phos lo; monitor need to adjust    -Monitor K+ and phos trends, potential need for diet restriction (avoid if able).  -Monitor BG control. Hgb A1c of 7.5 on 8/18/24. BG was 140 on 10/1. DM2.   -Iron supplementation as per provider.  -Consider checking vitamin D with a CRP lab. Pt's vitamin D 1,25 was 25 on 7/25/2012.      REASON FOR ASSESSMENT  Yissel Wetzel is a/an 64 year old female assessed by the dietitian for Provider Order - Congestive Heart Failure (CHF) - Dietitian to instruct patient on 2 gram sodium diet, interdisciplinary rounds     NUTRITION/ADDITIONAL HISTORY  Per RD note 11/23/2022, \"Pt reports she is followed by RD at [facility] dialysis.  Follows a 2gm Na, 2gm K+, low phos, controlled CHO diet.  Receives one case of novasource renal vanilla flavored per month and drinks one to two per day.\"     Per H & P, \"Severe pulmonary hypertension admitted for daily dialysis and RHC to assess pulmonary artery pressure to eval for transplant.She reports that she has been feeling well. Feels like she is at her baseline. She has been eating okay at home. Reports her shortness of breath is at baseline and has actually been okay over the last few days. She is worried about the food here. No current nausea, vomiting.\" DM2. Pt was ordered to take, noting, amiodarone, phoslo, pepcid, and lantus PTA.     Per discussion with " "pt, states her appetite was good PTA. She does the cooking in her household. Eats three meals a day. About twice a week, she'll order [employer] six-piece and unsalted fries. Occasionally she eats a healthier frozen meal option. States she has been following a low-sodium diet for years (limits to 2000 mg/day) and limits her potassium intake to 2000 mg/day. She also watches her carbohydrate intake. Aware of high phosphorus sources and reports taking her phos binders. Per pt, has a dietitian at [facility] who has educated her over the years. She obtains Zone bars while at dialysis. Has consumed [employer] Renal but it did not agree with her stomach lately. Per pt, avoids sucralose due to gas/bloating and diarrhea.     CURRENT NUTRITION ORDERS  Diet: 2 g Sodium  (since 9/30) and 2000 mL Fluid Restriction.  Pt previously on a renal dialysis diet.   Intake/Tolerance: Tolerating diet. Per nursing flowsheets (% intake), pt consuming 50% with a fair appetite 9/28, 75% with a good appetite 9/29, and % with a fair to good appetite 9/30. Per pt, she does have an appetite. Explained she has eaten less overall due to diet order. Also, pt does not care for hospital food. Has a little outside food in her room.     LABS  Labs reviewed    MEDICATIONS  Medications reviewed    ANTHROPOMETRICS  Height: 152.4 cm (5' 0\")  Most Recent Weight: 65.2 kg (143 lb 11.2 oz)    IBW: 45.5 kg. Pt at 143% IBW   BMI: Overweight BMI 25-29.9. Current Body mass index is 28.06 kg/m .   Weight History: 64.9 kg (7/16/2004), 65.8 kg (6/20/2022), 64.7 kg (9/26/2023), 64.4 kg (11/15/2023), 62.6 kg (1/25/2024), 65 kg (7/112024), 67.2 kg (8/20/2024), 67.7 kg (9/28/2024, admit), 65.2 kg (10/1) - Pt has lost 3.7% of body wt since admission. Pt being diuresed.   Dosing Weight: 50 kg (based on lowest wt so far this admission of 65.2 kg on 10/1)    ASSESSED NUTRITION NEEDS (for inpatient hospital stay)  Estimated Energy Needs: 5241-8495 kcals/day (25 - 30 " kcals/kg)  Justification: Maintenance needs  Estimated Protein Needs: 60-90 grams protein/day (1.2 - 1.8 grams of pro/kg)  Justification: Dialysis and Increased needs  Estimated Fluid Needs: 2000 mL/day    Justification: On a fluid restriction    PHYSICAL FINDINGS/OTHER FINDINGS  See malnutrition section below.  Resp: 2 L O2  GI: Having zero to one stool/s daily on average. Last Bowel Movement: 10/1. Stools are soft and brown.   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  WOC: Not following at this time     MALNUTRITION  % Intake: Decreased intake does not meet criteria  % Weight Loss: Difficult to assess wt changes with changes in fluid status, diuresis  Subcutaneous Fat Loss: None observed  Muscle Loss: Arms: Mild, Dorsal hand:  Mild and Posterior calf:  Mild  Fluid Accumulation/Edema: Does not meet criteria  Malnutrition Diagnosis: Patient does not meet two of the established criteria necessary for diagnosing malnutrition but is at risk for malnutrition    NUTRITION DIAGNOSIS  Inadequate oral intake related to restrictive diet order and food preferences as evidenced by pt consuming 50% of meals at times.       INTERVENTIONS  Implementation  Multivitamin/mineral supplement therapy: Ordered a renal multivitamin with minerals to help meet micronutrient needs.  Medical food supplement therapy, Modify composition of meals/snacks: Discussed snacks and oral supplements. Ordered Special K bars daily at 14:00. Placed a one-time order for [employer] Renal supplement. Wrote supplement name down for pt's room. Pt aware, able to request via room service if desired.   Nutrition education: Explained rec for 2 g sodium diet, fluid restriction. Pt already aware. Provided the following handouts: How to Read Nutrition Labels, Low-Sodium Foods and Drinks, and gave sodium room service menu (to help with selection at meals).     Goals  Patient to consume % of nutritionally adequate meal trays TID, or the equivalent with supplements/snacks.    "  Monitoring/Evaluation  Progress toward goals will be monitored and evaluated per protocol.       Melodie Curry, MS, RD, LD, CNSC      No longer available via pager  6C (beds [id number] and [phone]): Vocera \"6C Clinical Dietitian\"   Weekend/Holiday: Vocera \"Weekend Clinical Dietitian\"   "

## 2024-10-01 NOTE — PROGRESS NOTES
Cardiology Progress Note  Yissel Wetzel MRN: [id number]  Age: 64 year old, : 1960      Assessment & Plan   Yissel Wetzel is a 64 year old female with severe pulmonary hypertension admitted for daily dialysis and RHC to assess pulmonary artery pressure to eval for transplant       I personally saw and examined this patient with resident and fellow, coordinated care with nephrology, reviewed imaging and laboratory studies, confirmed physical examination and discussed results and plan with patient and or family.    Today:  - Continue daily HD for volume removal back to dry weight (63kg) or until BP cannot tolerate UF  - RHC on Wednesday after  HD  - Will ask renal for recs on midodrine dosing, as she is receiving dialysis not on usual days     Severe pulmonary hypertension primarily secondary to intrinsic pulmonary vascular disease (mean PA 81 mmHg, PVRI 25, PCWP confirmed with sat 35 mmHg)  Severely elevated right-sided filling pressures (mean RA 35 mmHg)  Severely elevated left-sided filling pressures (LVEDP 35-40 mmHg)  Followed by Dr. Redd. Appears euvolemic to slightly hypervolemic. Dry weight 63 kg per prior notes. Plan is to do HD to euvolemia prior to RHC  - Nephrology consulted  - Daily HD until RHC on 10/2 or until she cannot tolerate volume removal due to blood pressure  - Not on any pulm HTN meds     ESRD  Nephrology consulted for daily dialysis   - Continue PTA midodrine 10 mg qam and 30 mg before HD on MWF (dialysis days); and 20 mg BID  TThSaSu (non-dialysis days).  -will ask how to adjust midodrine pending dialysis schedule. No urgent need for dialysis this evening based on labs     R pleural effusion, chronic  Unchanged on CXR    History of SVT  Recently seen by EP at end of 2024  Continue pta amiodarone 100 mg 5 days a week      DM2.   - PTA glargine 10u at bedtime  - PTA carb coverage 2u:15g  - PTA sliding scale 1u for every 50 above  150    COPD.  - On home O2: 2L at night.  - Continue PRN levalbuterol.     Thrombocytopenia  Platelets stable      Chronic anemia.  Baseline Hgb: 9.7-12.2    Ear fullness  PTA debrox    H/o cirrhosis noted to be related to severe pHTN with right heart dysfunction.  LFT's normal on admit.     FEN: 2 g  na, 2 L fluid   DVT Prophylaxis: Low risk  Code Status: Full Code  Disposition: Expected discharge in 3+ days once dialysis and RHC completed      Discussed with Dr. Tramaine Arrington MD  Internal Medicine PGY3    Cardiology 2 Service    Interval History     Felt crampy, weak overnight. Feeling some fullness in her ears and a room spinning sensation. Was called to HD this morning, but she delayed this for afternoon HD per personal preference.     VS: HR 55-60. BP 100s-120s MAPs 63-69. Weight 65.2 from 66.6    I/O: -2000 off with HD. No urine recorded    Labs: K 4.3. Mg 1.8. CBC stable.     No new imaging    Physical Exam   Temp: 97.9  F (36.6  C) Temp src: Oral BP: 122/50 Pulse: 61   Resp: 18 SpO2: 95 % O2 Device: Nasal cannula Oxygen Delivery: 2 LPM  Vitals:    09/29/24 0501 09/30/24 0400 10/01/24 0400   Weight: 67.9 kg (149 lb 12.8 oz) 66.6 kg (146 lb 14.4 oz) 65.2 kg (143 lb 11.2 oz)     Vital Signs with Ranges  Temp:  [97.7  F (36.5  C)-98.8  F (37.1  C)] 97.9  F (36.6  C)  Pulse:  [54-71] 61  Resp:  [16-18] 18  BP: ()/(37-62) 122/50  SpO2:  [86 %-100 %] 95 %  I/O last 3 completed shifts:  In: 350 [P.O.:350]  Out: 2000 [Other:2000]  Constitutional: awake, alert, cooperative, no apparent distress  HENT: , EOM grossly intact, sclera anicteric  Respiratory: non-labored respirations on nasal cannula, crackles RLL, rest of lung fields clear  Cardiovascular: RRR, no murmur noted, no LE edema or JVD  GI: soft, non-distended, non-tender  Skin: warm and dry, no rashes or lesions  Neurologic: Cranial nerves II-XII are grossly intact, moving all extremities equally and independently      Medications   Current  Facility-Administered Medications   Medication Dose Route Frequency Provider Last Rate Last Admin    ACE Inhibitor/ARB/ARNI not indicated according to guidelines   Does not apply DOES NOT GO TO Simi Condon MD        Continuing beta blocker from home medication list OR beta blocker order already placed during this visit   Does not apply DOES NOT GO TO Simi Condon MD         Current Facility-Administered Medications   Medication Dose Route Frequency Provider Last Rate Last Admin    amiodarone (PACERONE) tablet 100 mg  100 mg Oral Once per day on Monday Tuesday Thursday Friday Saturday Jose Redd MD        calcium acetate (PHOSLO) capsule 667 mg  667 mg Oral TID w/meals Simi Bateman MD   667 mg at 09/30/24 1801    carbamide peroxide (DEBROX) 6.5 % otic solution 3 drop  3 drop Right Ear BID Tory Castellanos MD   3 drop at 09/30/24 2329    insulin aspart (NovoLOG) injection (RAPID ACTING)  1-7 Units Subcutaneous TID AC Alina Arrington MD   2 Units at 09/30/24 1030    insulin aspart (NovoLOG) injection (RAPID ACTING)  1-5 Units Subcutaneous At Bedtime Alina Arrington MD        insulin aspart (NovoLOG) injection (RAPID ACTING)   Subcutaneous Daily with breakfast Simi Bateman MD   8 Units at 09/30/24 1032    insulin aspart (NovoLOG) injection (RAPID ACTING)   Subcutaneous Daily with lunch Simi Bateman MD   4 Units at 09/30/24 1409    insulin aspart (NovoLOG) injection (RAPID ACTING)   Subcutaneous Daily with supper Simi Bateman MD   4 Units at 09/30/24 1653    insulin glargine (LANTUS PEN) injection 10 Units  10 Units Subcutaneous At Bedtime Alina Arrington MD   10 Units at 09/30/24 2329    midodrine (PROAMATINE) tablet 10 mg  10 mg Oral Q Mon Wed Fri AM Simi Bateman MD   10 mg at 09/30/24 1036    midodrine (PROAMATINE) tablet 20 mg  20 mg Oral 2 times per day on Sunday Tuesday Thursday Saturday Simi Bateman MD   20 mg at 09/29/24 0732    No heparin via  hemodialysis machine   Does not apply Once Jorge A Jacobo MD        sodium chloride 0.9% BOLUS 250 mL  250 mL Intravenous Once in dialysis/CRRT Jorge A Jacobo MD        sodium chloride 0.9% BOLUS 300 mL  300 mL Hemodialysis Machine Once Jorge A Jacobo MD           Data   Recent Labs   Lab 10/01/24  0737 10/01/24  0600 09/30/24  2320 09/30/24  0847 09/30/24  0552 09/29/24  1122 09/29/24  0803 09/29/24  0723 09/29/24  0321 09/28/24  1619 09/28/24  1612   WBC  --  5.7  --   --  6.2  --  5.7  --  5.5  --  5.8   HGB  --  9.3*  --   --  8.8*  --  9.2*  --  8.9*  --  9.0*   MCV  --  95  --   --  95  --  95  --  95  --  97   PLT  --  135*  --   --  200  --  119*  --  111*  --  121*   INR  --  1.14  --   --  1.23*  --  1.21*  --   --   --   --    NA  --  138  --   --  138  --  139  --  137  --  139   POTASSIUM  --  4.3  --   --  5.1  --  5.8*  --  5.5*  --  5.3   CHLORIDE  --  99  --   --  100  --  99  --  98  --  97*   CO2  --  26  --   --  25  --  24  --  24  --  25   BUN  --  15.9  --   --  29.0*  --  39.6*  --  38.4*  --  31.4*   CR  --  3.94*  --   --  6.25*  --  8.10*  --  7.76*  --  7.09*   ANIONGAP  --  13  --   --  13  --  16*  --  15  --  17*   KANWAL  --  9.0  --   --  9.4  --  9.7  --  9.7  --  9.9   * 140* 231*   < > 174*   < > 152*   < > 220*   < > 173*   ALBUMIN  --   --   --   --   --   --   --   --  3.6  --  3.8   PROTTOTAL  --   --   --   --   --   --   --   --  6.3*  --  6.8   BILITOTAL  --   --   --   --   --   --   --   --  0.3  --  0.4   ALKPHOS  --   --   --   --   --   --   --   --  92  --  94   ALT  --   --   --   --   --   --   --   --  <5  --  5   AST  --   --   --   --   --   --   --   --  13  --  16    < > = values in this interval not displayed.       No results found for this or any previous visit (from the past 24 hour(s)).

## 2024-10-01 NOTE — PROGRESS NOTES
Shift: 0700 - 1900  Neuro: A&Ox4.  Cardiac: SR, bradycardia 50-60's, VSS.   Respiratory: Sating 100% on 2L NC.  GI/: Anuric, pt on dialysis BM X1  Diet/appetite: Tolerating 2G Na diet. NPO @ midnight for a RHC.  Eating well.  Activity:  SBA when, up to chair and in halls.  Pain: Denies.   Skin: No new deficits noted. See PCS for assessment details.  LDA's: No PIV.    Shift update: Pt had dialysis today, did not tolerate r/t cramping. Will need need dialysis in AM again prior to RHC.    Plan: Continue with POC. Notify primary team  CARDS 2 with changes.

## 2024-10-01 NOTE — PROGRESS NOTES
HEMODIALYSIS TREATMENT NOTE    Time: ***    Data:  Pre Wt: 67.8 kg, standing scale  Desired Wt: - 3 to -3.5. kg  Post Wt: -2 kg  Ultrafiltration - Post Run Net Total Removed (mL): 2000 mL, Renal team informed.   Vascular Access Status: patent  Dialyzer Rinse: Light streaked  Total Blood Volume Processed: 73.5 Liters  Total Dialysis (Treatment) Time: 3 Hours only as per pt request, Renal team notified  Access: AVF right arm  Needle size: 15 g x 2  Bleeding time: arterial site 10 mins and venous 10 mins     BP/MAP parameter: SBP> 90  Heparin: none     Lab:  Yes     Assessment:  -A & O x 4, calm and cooperative  - AVF with  bruit and thrill present  - able to make needs known  - on Oxygen 2LPM via NC    Interventions:  Ran with K+ 2 Ca++ 2.5 bath for K serum 5.1 mmol/L and Ca serum 9.4 mg/dL for 3 hrs, accessed with 15 g  X 2 needles, 425 - 450 BFR  achieved and maintained, Vital signs monitored every 15 min and PRN. Pt c/o cramping during the HD tx, UF off and resumed when resolved. Pt remained hemodynamically stable throughout hemodialysis.  Post rinsed back successfully, needles pulled with ease,  pressure dressing applied and secured with gauze once homeostasis achieved.    Meds given: see MAR    See HD flow sheet and MAR for details.    Handoff report given to CHARLES Cameron RN and endorsed pt with stable condition.                Plan:    Per renal team

## 2024-10-01 NOTE — PROVIDER NOTIFICATION
BP (!) 109/38 (BP Location: Left arm)   Pulse 64   Temp 98.8  F (37.1  C) (Oral)   Resp 18   Ht 1.524 m (5')   Wt 66.6 kg (146 lb 14.4 oz)   LMP  (LMP Unknown)   SpO2 95%   BMI 28.69 kg/m    October 1, 2024  1:41 AM    Provider notified: Tory Castellanos  Reason: Low BP taken from patient, and patient was having abdominal cramping/discomfort, and N/V  Orders/outcome: BP taken again sitting up and was 118/47. Will continue to monitor patient.

## 2024-10-02 LAB
ANION GAP SERPL CALCULATED.3IONS-SCNC: 12 MMOL/L (ref 7–15)
BASOPHILS # BLD AUTO: 0 10E3/UL (ref 0–0.2)
BASOPHILS NFR BLD AUTO: 1 %
BUN SERPL-MCNC: 26.5 MG/DL (ref 8–23)
CALCIUM SERPL-MCNC: 9.4 MG/DL (ref 8.8–10.4)
CHLORIDE SERPL-SCNC: 98 MMOL/L (ref 98–107)
CREAT SERPL-MCNC: 4.87 MG/DL (ref 0.51–0.95)
EGFRCR SERPLBLD CKD-EPI 2021: 9 ML/MIN/1.73M2
EOSINOPHIL # BLD AUTO: 0.1 10E3/UL (ref 0–0.7)
EOSINOPHIL NFR BLD AUTO: 2 %
ERYTHROCYTE [DISTWIDTH] IN BLOOD BY AUTOMATED COUNT: 16.8 % (ref 10–15)
GLUCOSE BLDC GLUCOMTR-MCNC: 107 MG/DL (ref 70–99)
GLUCOSE BLDC GLUCOMTR-MCNC: 130 MG/DL (ref 70–99)
GLUCOSE BLDC GLUCOMTR-MCNC: 151 MG/DL (ref 70–99)
GLUCOSE BLDC GLUCOMTR-MCNC: 187 MG/DL (ref 70–99)
GLUCOSE BLDC GLUCOMTR-MCNC: 217 MG/DL (ref 70–99)
GLUCOSE SERPL-MCNC: 138 MG/DL (ref 70–99)
HCO3 SERPL-SCNC: 26 MMOL/L (ref 22–29)
HCT VFR BLD AUTO: 29.9 % (ref 35–47)
HGB BLD-MCNC: 9.1 G/DL (ref 11.7–15.7)
IMM GRANULOCYTES # BLD: 0 10E3/UL
IMM GRANULOCYTES NFR BLD: 0 %
INR PPP: 1.17 (ref 0.85–1.15)
LYMPHOCYTES # BLD AUTO: 1.1 10E3/UL (ref 0.8–5.3)
LYMPHOCYTES NFR BLD AUTO: 19 %
MAGNESIUM SERPL-MCNC: 1.9 MG/DL (ref 1.7–2.3)
MCH RBC QN AUTO: 28.5 PG (ref 26.5–33)
MCHC RBC AUTO-ENTMCNC: 30.4 G/DL (ref 31.5–36.5)
MCV RBC AUTO: 94 FL (ref 78–100)
MONOCYTES # BLD AUTO: 1 10E3/UL (ref 0–1.3)
MONOCYTES NFR BLD AUTO: 17 %
NEUTROPHILS # BLD AUTO: 3.5 10E3/UL (ref 1.6–8.3)
NEUTROPHILS NFR BLD AUTO: 62 %
NRBC # BLD AUTO: 0 10E3/UL
NRBC BLD AUTO-RTO: 0 /100
PHOSPHATE SERPL-MCNC: 4.6 MG/DL (ref 2.5–4.5)
PLATELET # BLD AUTO: 198 10E3/UL (ref 150–450)
POTASSIUM SERPL-SCNC: 4.6 MMOL/L (ref 3.4–5.3)
RBC # BLD AUTO: 3.19 10E6/UL (ref 3.8–5.2)
SODIUM SERPL-SCNC: 136 MMOL/L (ref 135–145)
WBC # BLD AUTO: 5.7 10E3/UL (ref 4–11)

## 2024-10-02 PROCEDURE — 85610 PROTHROMBIN TIME: CPT

## 2024-10-02 PROCEDURE — 120N000005 HC R&B MS OVERFLOW UMMC

## 2024-10-02 PROCEDURE — 36415 COLL VENOUS BLD VENIPUNCTURE: CPT

## 2024-10-02 PROCEDURE — 80048 BASIC METABOLIC PNL TOTAL CA: CPT

## 2024-10-02 PROCEDURE — 99233 SBSQ HOSP IP/OBS HIGH 50: CPT | Mod: GC | Performed by: INTERNAL MEDICINE

## 2024-10-02 PROCEDURE — 83735 ASSAY OF MAGNESIUM: CPT

## 2024-10-02 PROCEDURE — 85025 COMPLETE CBC W/AUTO DIFF WBC: CPT

## 2024-10-02 PROCEDURE — 84100 ASSAY OF PHOSPHORUS: CPT

## 2024-10-02 PROCEDURE — 250N000013 HC RX MED GY IP 250 OP 250 PS 637

## 2024-10-02 PROCEDURE — 99232 SBSQ HOSP IP/OBS MODERATE 35: CPT | Mod: 25 | Performed by: INTERNAL MEDICINE

## 2024-10-02 RX ORDER — MIDODRINE HYDROCHLORIDE 5 MG/1
10 TABLET ORAL ONCE
Status: COMPLETED | OUTPATIENT
Start: 2024-10-03 | End: 2024-10-03

## 2024-10-02 RX ADMIN — CALCIUM ACETATE 667 MG: 667 CAPSULE ORAL at 19:39

## 2024-10-02 RX ADMIN — CALCIUM ACETATE 667 MG: 667 CAPSULE ORAL at 15:38

## 2024-10-02 RX ADMIN — ACETAMINOPHEN 500 MG: 500 TABLET ORAL at 20:24

## 2024-10-02 ASSESSMENT — ACTIVITIES OF DAILY LIVING (ADL)
ADLS_ACUITY_SCORE: 33
ADLS_ACUITY_SCORE: 33
ADLS_ACUITY_SCORE: 32
ADLS_ACUITY_SCORE: 33
ADLS_ACUITY_SCORE: 32
ADLS_ACUITY_SCORE: 33
ADLS_ACUITY_SCORE: 32
ADLS_ACUITY_SCORE: 33
ADLS_ACUITY_SCORE: 32
ADLS_ACUITY_SCORE: 33
ADLS_ACUITY_SCORE: 32
ADLS_ACUITY_SCORE: 33
ADLS_ACUITY_SCORE: 33

## 2024-10-02 NOTE — PLAN OF CARE
D: pt admitted on 9/280with severe pulmonary hypertension admitted for daily dialysis and RHC to assess pulmonary artery pressure to eval for transplant  with   PMH of Severe pulmonary hypertension primarily secondary to intrinsic pulmonary vascular disease severely elevated right and left-sided filling pressures.      A:   Neuro: Ax4   Cardiac: SR  Bradycardia 50'2-60's.  Respiratory: sating >95 on 2L NC.  Diet/appetite: NPO on 10/2 at 0000 for RHC. 2g NA, 2L restriction. Eats well.  GI/:  Anuric, pt on dialysis.  Activity:  SBA.  Pain: Denies  Skin: No new issues to note.  LDAs: No PIV    Plan: Continue with POC. Notify primary team  CARDS 2 with changes.             Problem: Adult Inpatient Plan of Care  Goal: Plan of Care Review  Description: The Plan of Care Review/Shift note should be completed every shift.  The Outcome Evaluation is a brief statement about your assessment that the patient is improving, declining, or no change.  This information will be displayed automatically on your shift  note.  Outcome: Progressing  Flowsheets (Taken 10/2/2024 0642)  Plan of Care Reviewed With: patient  Goal: Absence of Hospital-Acquired Illness or Injury  Intervention: Identify and Manage Fall Risk  Recent Flowsheet Documentation  Taken 10/2/2024 0445 by Petty Bui RN  Safety Promotion/Fall Prevention: activity supervised  Taken 10/2/2024 0030 by Petty Bui RN  Safety Promotion/Fall Prevention:   clutter free environment maintained   nonskid shoes/slippers when out of bed   safety round/check completed  Taken 10/1/2024 2040 by Petty Bui RN  Safety Promotion/Fall Prevention:   clutter free environment maintained   nonskid shoes/slippers when out of bed   safety round/check completed  Intervention: Prevent Skin Injury  Recent Flowsheet Documentation  Taken 10/2/2024 0445 by Petty Bui RN  Body Position: position changed independently  Device Skin Pressure Protection: absorbent pad  utilized/changed  Taken 10/2/2024 0030 by Petty Bui RN  Body Position: position changed independently  Device Skin Pressure Protection: absorbent pad utilized/changed  Taken 10/1/2024 2040 by Petty Bui RN  Body Position: position changed independently  Intervention: Prevent and Manage VTE (Venous Thromboembolism) Risk  Recent Flowsheet Documentation  Taken 10/2/2024 0030 by Petty Bui RN  VTE Prevention/Management: SCDs off (sequential compression devices)  Taken 10/1/2024 2040 by Petty Bui RN  VTE Prevention/Management: SCDs off (sequential compression devices)  Intervention: Prevent Infection  Recent Flowsheet Documentation  Taken 10/2/2024 0030 by Petty Bui RN  Infection Prevention:   environmental surveillance performed   hand hygiene promoted   single patient room provided  Taken 10/1/2024 2040 by Petty Bui RN  Infection Prevention:   environmental surveillance performed   hand hygiene promoted   single patient room provided     Problem: Hemodialysis  Goal: Safe, Effective Therapy Delivery  Intervention: Optimize Device Care and Function  Recent Flowsheet Documentation  Taken 10/2/2024 0445 by Petty Bui RN  Medication Review/Management: medications reviewed  Taken 10/2/2024 0030 by Petty Bui RN  Medication Review/Management: medications reviewed  Taken 10/1/2024 2040 by Petty Bui RN  Medication Review/Management: medications reviewed  Goal: Absence of Infection Signs and Symptoms  Intervention: Prevent or Manage Infection  Recent Flowsheet Documentation  Taken 10/2/2024 0030 by Petty Bui RN  Infection Prevention:   environmental surveillance performed   hand hygiene promoted   single patient room provided  Taken 10/1/2024 2040 by Petty Bui RN  Infection Prevention:   environmental surveillance performed   hand hygiene promoted   single patient room provided

## 2024-10-02 NOTE — PLAN OF CARE
No remarkable changes in pt status. Pt irritable and non agreeable. HD postponed to tomorrow AM and RHC on Friday per Cards resident (if the pt dialyzes with Midodrine)  VSS, NC at 2L, SR/SB. Denies pain, 2g Na diet. Up SBA.    Continue POC per team    Goal Outcome Evaluation:    Plan of Care Reviewed With: patient    Overall Patient Progress: no change Overall Patient Progress: no change    Outcome Evaluation: Pt unable to agree with a time for HD, set for tomorrow AM. RHC to be done after the HD

## 2024-10-02 NOTE — PROGRESS NOTES
Nephrology progress note  10/02/2024     Yissel Wetzel MRN:[id number] YOB: 1960  Date of Admission:9/28/2024  Primary care provider: Gigi Martin  Requesting physician: Jose Redd, *    Reason for consult: ESRD    MEDICAL DECISION MAKING     RECOMMENDATIONS:  Limited UF 10/1 of only 300 ml due to severe muscle cramps   No HD today 10/2  HD tomorrow on 10/3 at 745 am then RHC   Renally dose medications, avoid unnecessary nephrotoxins, avoid unnecessary radiographic contrast, daily renal panel, strict I/O, daily standing weights     ASSESSMENT:  Yissel Wetzel is a 64 year old ESRD and severe pulmonary hypertension admitted for transplant eval  Nephrology consulted for ESRD     ESRD  Dialyzes MWF at [facility], [place]. Primary nephrologist Dr Hernandez. Access w R AVF.  Dry weight 63kg.  Last session prior to admit: 9/27. Anuric   Today's HD Rx: 2h,  / , UF goal 2L    Anemia: hgb 9.2, TSAT 12%, ferritin 299  -Mircera 100 mcg q2 weeks,   -venofer 100 mg on 10/3 during HD     Volume/HTN: weight above dry weight but does not look overtly volume overloaded    Acidosis: bicarb 24, on bicarb    MBD: Ca  9.7, phos 6.3, cont phoslo    Intradialytic hypotension: on midodrine 10mg in AM MWF + 30mg prior to HD on MWF. Midodrine 20mg BID on non-HD days    Recommendations were communicated to primary team via gayle & vocera       Jorge A Jacobo MD  Division of Renal Disease and Hypertension  [facility]  [employer]    SUBJECTIVE     Interval events   Seen on RA   Severe calf cramp during HD today, got total UF only 300 ml   RHC on Wednesday after Weds HD     REVIEW OF SYSTEMS:  A comprehensive review of systems was negative except as noted above.    PAST MEDICAL HISTORY:  Past Medical History:   Diagnosis Date    Allergic rhinitis, cause unspecified     Anemia in chronic kidney disease     Anemia of chronic disease     Bleeding pseudoaneurysm of right brachiocephalic  arteriovenous fistula, initial encounter 2019    End stage renal failure on dialysis (H)     Esophagitis, unspecified     Former tobacco use     HEMORRHAGIC GASTROPATHY     History of blood transfusion     [place]    Iron deficiency anemia, unspecified     Mild persistent asthma     Obesity     Other and unspecified hyperlipidemia     Pneumonia     Pulmonary hypertension (H)     per 2012 echo mod.to severe pulmonary hypertension    Tobacco use disorder dc ed     Type 2 diabetes mellitus (H)     on Insulin- managed by PCP    Unspecified essential hypertension        Past Surgical History:   Procedure Laterality Date    ABDOMINOPLASTY  pt unsure when    abdominoplasty-     SECTION  ,     x 2    COLONOSCOPY      [facility]    COLONOSCOPY N/A 2021    Procedure: COLONOSCOPY, WITH POLYPECTOMY AND BIOPSY;  Surgeon: Lincoln Farrar MD;  Location:  GI    CREATE FISTULA ARTERIOVENOUS UPPER EXTREMITY Right 2018    Procedure: RIGHT BRACHIAL TO BASILIC ARTERIOVENOUS FISTULA CREATION;  Surgeon: Terry Vizcaino MD;  Location: [facility]    CV HEART CATHETERIZATION WITH POSSIBLE INTERVENTION N/A 2021    Procedure: Heart Catheterization with Possible Intervention;  Surgeon: Joseluis Dean MD;  Location:  HEART CARDIAC CATH LAB    CV RIGHT HEART CATH MEASUREMENTS RECORDED N/A 2022    Procedure: Heart Cath Right Heart Cath;  Surgeon: Yan Heredia MD;  Location:  HEART CARDIAC CATH LAB    ENT SURGERY  as a child    tonsillectomy    ESOPHAGOSCOPY, GASTROSCOPY, DUODENOSCOPY (EGD), COMBINED N/A 2021    Procedure: ESOPHAGOGASTRODUODENOSCOPY, WITH BIOPSY;  Surgeon: Lincoln Farrar MD;  Location:  GI    EYE SURGERY Left     injections- eye    HYSTERECTOMY  approx     partial hysterectomy-reason bleeding     IR CVC TUNNEL PLACEMENT > 5 YRS OF AGE  2019    IR CVC TUNNEL REMOVAL RIGHT  2020    IR DIALYSIS FISTULOGRAM  RIGHT  12/9/2019        MEDICATIONS:  PTA Meds  Prior to Admission medications    Medication Sig Last Dose Taking? Auth Provider Long Term End Date   acetaminophen (TYLENOL) 500 MG tablet Take 500-1,000 mg by mouth every 6 hours as needed. Past Week at PRN Yes Unknown, Entered By History     amiodarone (PACERONE) 100 MG TABS tablet Take 1 tablet (100 mg) by mouth five times a week. Take on Mondays, Tuesdays, Thursdays, Fridays and Saturdays; hold on Wednesdays and Sundays. Past Week Yes Felicity Green, DAVID CNP Yes    calcium acetate (PHOSLO) 667 MG CAPS capsule Take 667 mg by mouth 3 times daily (with meals) Past Week Yes Unknown, Entered By History     calcium acetate (PHOSLO) 667 MG CAPS capsule Take 667 mg by mouth Take with snacks or supplements Past Week Yes Unknown, Entered By History     carbamide peroxide (DEBROX) 6.5 % otic solution Place 10 drops Into the left ear 2 times daily. Past Week Yes Barry Ballesteros MD     famotidine (PEPCID) 20 MG tablet Take 20 mg by mouth 2 times daily as needed. Past Week at PRN Yes Unknown, Entered By History     guaiFENesin 1200 MG TB12 Take 1 tablet by mouth every 12 hours as needed (cough/congestion). Past Week at PRN Yes Unknown, Entered By History No    insulin aspart (FIASP FLEXTOUCH) 100 UNIT/ML pen-injector 2U per 15 carbs, plus 1U for every 50 that preprandial BG over 150 - MAXIMUM 20 UNITS PER DAY Past Week Yes Gigi Martin MD     insulin glargine (LANTUS PEN) 100 UNIT/ML pen Inject 11 Units subcutaneously at bedtime. Past Week Yes Gigi Martin MD Yes    levalbuterol (XOPENEX HFA) 45 MCG/ACT inhaler Inhale 2 puffs into the lungs every 6 hours as needed for shortness of breath / dyspnea or wheezing Past Week at PRN Yes Gigi Martin MD Yes    midodrine (PROAMATINE) 10 MG tablet Take 30 mg by mouth daily. On Monday, Wednesday, Friday before dialysis Past Week Yes Unknown, Entered By History     midodrine (PROAMATINE) 10 MG tablet Take 10 mg  by mouth Every Mon, Wed, Fri Morning Past Week Yes Unknown, Entered By History     midodrine (PROAMATINE) 10 MG tablet Take 20 mg by mouth 2 times daily. On non-dialysis days (Tuesday, Thursday, Saturday, Sunday) Past Week Yes Unknown, Entered By History        Current Meds  Current Facility-Administered Medications   Medication Dose Route Frequency Provider Last Rate Last Admin    amiodarone (PACERONE) tablet 100 mg  100 mg Oral Once per day on Monday Tuesday Thursday Friday Saturday Jose Redd MD   100 mg at 10/01/24 2231    calcium acetate (PHOSLO) capsule 667 mg  667 mg Oral TID w/meals Simi Bateman MD   667 mg at 10/02/24 1538    carbamide peroxide (DEBROX) 6.5 % otic solution 3 drop  3 drop Right Ear BID Tory Castellanos MD   3 drop at 10/01/24 2232    insulin aspart (NovoLOG) injection (RAPID ACTING)  1-7 Units Subcutaneous TID AC Alina Arrington MD   2 Units at 10/02/24 1541    insulin aspart (NovoLOG) injection (RAPID ACTING)  1-5 Units Subcutaneous At Bedtime Alina Arrington MD        insulin aspart (NovoLOG) injection (RAPID ACTING)   Subcutaneous Daily with breakfast Simi Bateman MD   6 Units at 10/01/24 0820    insulin aspart (NovoLOG) injection (RAPID ACTING)   Subcutaneous Daily with lunch Simi Bateman MD   3 Units at 10/02/24 1203    insulin aspart (NovoLOG) injection (RAPID ACTING)   Subcutaneous Daily with supper Simi Bateman MD   3 Units at 10/01/24 1805    insulin glargine (LANTUS PEN) injection 10 Units  10 Units Subcutaneous At Bedtime Tory Castellanos MD   10 Units at 10/01/24 2300    iron sucrose (VENOFER) injection 100 mg  100 mg Intravenous Once Jorge A Jacobo MD        midodrine (PROAMATINE) tablet 10 mg  10 mg Oral Q Mon Wed Fri AM Simi Bateman MD   10 mg at 09/30/24 1036    [Held by provider] midodrine (PROAMATINE) tablet 20 mg  20 mg Oral 2 times per day on Sunday Tuesday Thursday Saturday Simi Bateman MD   10 mg at 10/01/24 0817     multivitamin RENAL (RENAVITE RX/NEPHROVITE) tablet 1 tablet  1 tablet Oral Daily Alina Arrington MD        No heparin via hemodialysis machine   Does not apply Once Jorge A Jacobo MD        sodium chloride 0.9% BOLUS 250 mL  250 mL Intravenous Once in dialysis/CRRT Jorge A Jacobo MD        sodium chloride 0.9% BOLUS 300 mL  300 mL Hemodialysis Machine Once Jorge A Jacobo MD         Infusion Meds  Current Facility-Administered Medications   Medication Dose Route Frequency Provider Last Rate Last Admin    ACE Inhibitor/ARB/ARNI not indicated according to guidelines   Does not apply DOES NOT GO TO Simi Condon MD        Continuing beta blocker from home medication list OR beta blocker order already placed during this visit   Does not apply DOES NOT GO TO Simi Condon MD           ALLERGIES:    Allergies   Allergen Reactions    Albuterol      shakiness    Aspirin      gi    Byetta [Exenatide]      gi    Clonidine      constipation    Codeine      vomiting    Ezetimibe      muscle symptoms    Hydralazine      headaches    Lisinopril      hyperkalemia    Metformin Hydrochloride      vomiting    Pravachol [Pravastatin Sodium]      muscle pains    Simvastatin      myalgias    Troglitazone        SOCIAL HISTORY:   Social History     Socioeconomic History    Marital status: Single     Spouse name: Not on file    Number of children: Not on file    Years of education: Not on file    Highest education level: Not on file   Occupational History    Not on file   Tobacco Use    Smoking status: Former     Current packs/day: 0.00     Average packs/day: 1 pack/day for 22.0 years (22.0 ttl pk-yrs)     Types: Cigarettes     Start date: 10/12/1977     Quit date: 10/12/1999     Years since quittin.9    Smokeless tobacco: Never   Vaping Use    Vaping status: Never Used   Substance and Sexual Activity    Alcohol use: No     Alcohol/week: 0.0 standard drinks of alcohol    Drug use: No    Sexual activity: Never    Other Topics Concern     Service Not Asked    Blood Transfusions Yes    Caffeine Concern No    Occupational Exposure Not Asked    Hobby Hazards Not Asked    Sleep Concern No    Stress Concern No    Weight Concern Yes     Comment: would like to lose weight    Special Diet No    Back Care Not Asked    Exercise Yes     Comment: walks daily 2 blocks    Bike Helmet Not Asked    Seat Belt Not Asked    Self-Exams Not Asked    Parent/sibling w/ CABG, MI or angioplasty before 65F 55M? Not Asked   Social History Narrative    Not on file     Social Determinants of Health     Financial Resource Strain: Low Risk  (10/1/2024)    Financial Resource Strain     Within the past 12 months, have you or your family members you live with been unable to get utilities (heat, electricity) when it was really needed?: No   Food Insecurity: Low Risk  (10/1/2024)    Food Insecurity     Within the past 12 months, did you worry that your food would run out before you got money to buy more?: No     Within the past 12 months, did the food you bought just not last and you didn t have money to get more?: No   Transportation Needs: Low Risk  (10/1/2024)    Transportation Needs     Within the past 12 months, has lack of transportation kept you from medical appointments, getting your medicines, non-medical meetings or appointments, work, or from getting things that you need?: No   Physical Activity: Not on file   Stress: Not on file   Social Connections: Not on file   Interpersonal Safety: Low Risk  (10/1/2024)    Interpersonal Safety     Do you feel physically and emotionally safe where you currently live?: Yes     Within the past 12 months, have you been hit, slapped, kicked or otherwise physically hurt by someone?: No     Within the past 12 months, have you been humiliated or emotionally abused in other ways by your partner or ex-partner?: No   Housing Stability: High Risk (10/1/2024)    Housing Stability     Do you have housing? : No      Are you worried about losing your housing?: No       FAMILY MEDICAL HISTORY:   Family History   Problem Relation Age of Onset    Arrhythmia Mother     Hypertension Mother     Pancreatic Cancer Father     Diabetes Brother     Asthma Sister     LUNG DISEASE Sister     Diabetes Daughter     Cirrhosis Sister        OBJECTIVE     PHYSICAL EXAM:   Temp  Av.9  F (36.6  C)  Min: 97.3  F (36.3  C)  Max: 98.4  F (36.9  C)      Pulse  Av.3  Min: 55  Max: 65 Resp  Av.2  Min: 16  Max: 18  SpO2  Av.2 %  Min: 95 %  Max: 98 %       /49 (BP Location: Left arm)   Pulse 60   Temp 97.8  F (36.6  C) (Oral)   Resp 18   Ht 1.524 m (5')   Wt 65.9 kg (145 lb 4.5 oz)   LMP  (LMP Unknown)   SpO2 95%   BMI 28.37 kg/m     Date 24 07 - 24 0659   Shift 9719-8666 7399-1918 9191-4787 24 Hour Total   INTAKE   P.O. 120   120   Shift Total(mL/kg) 120(1.77)   120(1.77)   OUTPUT   Shift Total(mL/kg)       Weight (kg) 67.95 67.95 67.95 67.95      Admit Weight: 67.7 kg (149 lb 4.8 oz)     General:sitting up at edge of bed  HEENT:mmm  Cardiac:rrr  Pulmonary:unlabored  Abdominal: nondistended  Extremities:no LE edema   Neuro:A&Ox4  Access:R AVF w thrill    LABS:   CMP  Recent Labs   Lab 10/02/24  1355 10/02/24  0812 10/02/24  0538 10/02/24  0205 10/01/24  0737 10/01/24  0600 24  0847 24  0552 24  1122 24  0803 24  0723 24  0321 24  1619 24  1612   NA  --   --  136  --   --  138  --  138  --  139  --  137  --  139   POTASSIUM  --   --  4.6  --   --  4.3  --  5.1  --  5.8*  --  5.5*  --  5.3   CHLORIDE  --   --  98  --   --  99  --  100  --  99  --  98  --  97*   CO2  --   --  26  --   --  26  --  25  --  24  --  24  --  25   ANIONGAP  --   --  12  --   --  13  --  13  --  16*  --  15  --  17*   * 107* 138* 187*   < > 140*   < > 174*   < > 152*   < > 220*   < > 173*   BUN  --   --  26.5*  --   --  15.9  --  29.0*  --  39.6*  --  38.4*  --  31.4*   CR  --    --  4.87*  --   --  3.94*  --  6.25*  --  8.10*  --  7.76*  --  7.09*   GFRESTIMATED  --   --  9*  --   --  12*  --  7*  --  5*  --  5*  --  6*   KANWAL  --   --  9.4  --   --  9.0  --  9.4  --  9.7  --  9.7  --  9.9   MAG  --   --  1.9  --   --  1.8  --  2.1  --  2.5*  --   --   --  2.3   PHOS  --   --  4.6*  --   --  4.1  --  6.0*  --  6.3*  --   --   --  5.2*   PROTTOTAL  --   --   --   --   --   --   --   --   --   --   --  6.3*  --  6.8   ALBUMIN  --   --   --   --   --   --   --   --   --   --   --  3.6  --  3.8   BILITOTAL  --   --   --   --   --   --   --   --   --   --   --  0.3  --  0.4   ALKPHOS  --   --   --   --   --   --   --   --   --   --   --  92  --  94   AST  --   --   --   --   --   --   --   --   --   --   --  13  --  16   ALT  --   --   --   --   --   --   --   --   --   --   --  <5  --  5    < > = values in this interval not displayed.     CBC  Recent Labs   Lab 10/02/24  0538 10/01/24  0600 09/30/24  0552 09/29/24  0803   HGB 9.1* 9.3* 8.8* 9.2*   WBC 5.7 5.7 6.2 5.7   RBC 3.19* 3.24* 3.05* 3.24*   HCT 29.9* 30.8* 28.9* 30.9*   MCV 94 95 95 95   MCH 28.5 28.7 28.9 28.4   MCHC 30.4* 30.2* 30.4* 29.8*   RDW 16.8* 16.9* 17.0* 17.0*    135* 200 119*     INR  Recent Labs   Lab 10/02/24  0538 10/01/24  0600 09/30/24  0552 09/29/24  0803   INR 1.17* 1.14 1.23* 1.21*     ABG  Recent Labs   Lab 09/29/24  0321   O2PER 2      URINE STUDIES  Recent Labs   Lab Test 09/25/19  1455 06/07/18  1455 08/09/17  1640   COLOR Yellow Yellow Yellow   APPEARANCE Clear Clear Clear   URINEGLC >499* 250* Negative   URINEBILI Negative Negative Negative   URINEKETONE Negative Negative Negative   SG 1.005 1.015 1.020   UBLD Negative Small* Small*   URINEPH 7.0 6.0 6.0   PROTEIN 100* >=300* >=300*   UROBILINOGEN  --  0.2 0.2   NITRITE Negative Negative Negative   LEUKEST Negative Negative Negative   RBCU <1 2-5* O - 2   WBCU 1 0 - 5 O - 2     No lab results found.  PTH  No lab results found.  IRON STUDIES  Recent Labs    Lab Test 09/29/24  0803 06/19/22  0634 03/17/19  0805 08/30/18  1318   IRON 35* 27* 27* 54    178* 178* 196*   IRONSAT 12* 15 15 28   LATRICE 299 898* 210 407*       IMAGING:  All imaging studies reviewed by me.     Jorge A Jacobo MD    I was present with the fellow during the history and exam.  I discussed the case with the fellow and agree with the findings as documented in the assessment and plan.    Sandra Viera MD   of Medicine  Department of Nephrology  [facility]

## 2024-10-02 NOTE — PROGRESS NOTES
Cardiology Progress Note  Yissel Wetzel MRN: [id number]  Age: 64 year old, : 1960      Assessment & Plan   Yissel Wetzel is a 64 year old female with severe pulmonary hypertension admitted for daily dialysis and RHC to assess pulmonary artery pressure to eval for transplant     I personally saw and examined this patient with resident and fellow, coordinated care with nephrology, reviewed imaging and laboratory studies, confirmed physical examination and discussed results and plan with patient and or family.     Today:  - Patient refused HD today (due to not being able to eat prior), will plan to do HD tomorrow AM with NO midodrine prior so as not to interfere with RHC   - Will plan to do RHC tomorrow PM after AM HD     Severe pulmonary hypertension primarily secondary to intrinsic pulmonary vascular disease (mean PA 81 mmHg, PVRI 25, PCWP confirmed with sat 35 mmHg)  Severely elevated right-sided filling pressures (mean RA 35 mmHg)  Severely elevated left-sided filling pressures (LVEDP 35-40 mmHg)  Followed by Dr. Redd. Appears euvolemic to slightly hypervolemic. Dry weight 63 kg per prior notes. Plan is to do HD to euvolemia prior to RHC  - Nephrology consulted  - HD on 10/3, RHC afterward   - OK to eat early morning of 10/3, NPO after this for PM RHC  - Not on any pulm HTN meds     ESRD  Nephrology consulted for daily dialysis   - Continue PTA midodrine 10 mg qam and 30 mg before HD on MWF (dialysis days); and 20 mg BID  TThSaSu (non-dialysis days)   - HOLD midodrine AM of 10/3 for RHC    R pleural effusion, chronic  Unchanged on CXR    History of SVT  Recently seen by EP at end of 2024  Continue pta amiodarone 100 mg 5 days a week      DM2.   - PTA glargine 11u at bedtime  - PTA carb coverage 2u:15g  - PTA sliding scale 1u for every 50 above 150    COPD.  - On home O2: 2L at night.  - Continue PRN levalbuterol.     Thrombocytopenia  Platelets stable       Chronic anemia.  Baseline Hgb: 9.7-12.2    Ear fullness  PTA debrox    H/o cirrhosis noted to be related to severe pHTN with right heart dysfunction.  LFT's normal on admit.     FEN: 2 g  na, 2 L fluid   DVT Prophylaxis: Low risk  Code Status: Full Code  Disposition: Expected discharge in 3+ days once dialysis and RHC completed      Discussed with Dr. Tramaine Arrington MD  Internal Medicine PGY3    Cardiology 2 Service    Interval History     HD volume removal yesterday limited by cramping, only able to remove 300 cc. This morning refused to go to dialysis reporting she needs a full meal to take her midodrine with and was NPO for RHC. Due to this delay, she had to be delayed for dialysis until tomorrow, as well as RHC following delayed until tomorrow.    Physical Exam   Temp: 97.4  F (36.3  C) Temp src: Oral BP: 123/49 Pulse: 56   Resp: 18 SpO2: 98 % O2 Device: Nasal cannula Oxygen Delivery: 1 LPM  Vitals:    09/30/24 0400 10/01/24 0400 10/02/24 0155   Weight: 66.6 kg (146 lb 14.4 oz) 65.2 kg (143 lb 11.2 oz) 65.9 kg (145 lb 4.5 oz)     Vital Signs with Ranges  Temp:  [97.4  F (36.3  C)-98.4  F (36.9  C)] 97.4  F (36.3  C)  Pulse:  [53-78] 56  Resp:  [16-18] 18  BP: (100-130)/(36-99) 123/49  SpO2:  [96 %-100 %] 98 %  I/O last 3 completed shifts:  In: 120 [P.O.:120]  Out: 300 [Other:300]  Constitutional: no distress  HENT: , EOM grossly intact, sclera anicteric  Respiratory: non-labored respirations on nasal cannula  Cardiovascular: RRR, no murmur noted, no LE edema or JVD  GI: soft, non-distended, non-tender  Skin: warm and dry, no rashes or lesions  Neurologic: Cranial nerves II-XII are grossly intact, moving all extremities equally and independently      Medications   Current Facility-Administered Medications   Medication Dose Route Frequency Provider Last Rate Last Admin    ACE Inhibitor/ARB/ARNI not indicated according to guidelines   Does not apply DOES NOT GO TO Simi Condon MD         Continuing beta blocker from home medication list OR beta blocker order already placed during this visit   Does not apply DOES NOT GO TO Simi Condon MD         Current Facility-Administered Medications   Medication Dose Route Frequency Provider Last Rate Last Admin    amiodarone (PACERONE) tablet 100 mg  100 mg Oral Once per day on Monday Tuesday Thursday Friday Saturday Jose Redd MD   100 mg at 10/01/24 2231    calcium acetate (PHOSLO) capsule 667 mg  667 mg Oral TID w/meals Simi Bateman MD   667 mg at 10/01/24 1803    carbamide peroxide (DEBROX) 6.5 % otic solution 3 drop  3 drop Right Ear BID Tory Castellanos MD   3 drop at 10/01/24 2232    insulin aspart (NovoLOG) injection (RAPID ACTING)  1-7 Units Subcutaneous TID AC Alina Arrington MD   3 Units at 10/01/24 1249    insulin aspart (NovoLOG) injection (RAPID ACTING)  1-5 Units Subcutaneous At Bedtime Alina Arrington MD        insulin aspart (NovoLOG) injection (RAPID ACTING)   Subcutaneous Daily with breakfast Simi Bateman MD   6 Units at 10/01/24 0820    insulin aspart (NovoLOG) injection (RAPID ACTING)   Subcutaneous Daily with lunch Simi Bateman MD   5 Units at 10/01/24 1250    insulin aspart (NovoLOG) injection (RAPID ACTING)   Subcutaneous Daily with supper Simi Bateman MD   3 Units at 10/01/24 1805    insulin glargine (LANTUS PEN) injection 10 Units  10 Units Subcutaneous At Bedtime Tory Castellanos MD   10 Units at 10/01/24 2300    iron sucrose (VENOFER) injection 100 mg  100 mg Intravenous Once Jorge A Jacobo MD        midodrine (PROAMATINE) tablet 10 mg  10 mg Oral Q Mon Wed Fri AM Simi Bateman MD   10 mg at 09/30/24 1036    midodrine (PROAMATINE) tablet 20 mg  20 mg Oral 2 times per day on Sunday Tuesday Thursday Saturday Simi Bateman MD   10 mg at 10/01/24 0817    multivitamin RENAL (RENAVITE RX/NEPHROVITE) tablet 1 tablet  1 tablet Oral Daily Alina Arrington MD           Data   Recent Labs    Lab 10/02/24  0538 10/02/24  0205 10/01/24  2239 10/01/24  0737 10/01/24  0600 09/30/24  0847 09/30/24  0552 09/29/24  0723 09/29/24  0321 09/28/24  1619 09/28/24  1612   WBC 5.7  --   --   --  5.7  --  6.2   < > 5.5  --  5.8   HGB 9.1*  --   --   --  9.3*  --  8.8*   < > 8.9*  --  9.0*   MCV 94  --   --   --  95  --  95   < > 95  --  97     --   --   --  135*  --  200   < > 111*  --  121*   INR 1.17*  --   --   --  1.14  --  1.23*   < >  --   --   --      --   --   --  138  --  138   < > 137  --  139   POTASSIUM 4.6  --   --   --  4.3  --  5.1   < > 5.5*  --  5.3   CHLORIDE 98  --   --   --  99  --  100   < > 98  --  97*   CO2 26  --   --   --  26  --  25   < > 24  --  25   BUN 26.5*  --   --   --  15.9  --  29.0*   < > 38.4*  --  31.4*   CR 4.87*  --   --   --  3.94*  --  6.25*   < > 7.76*  --  7.09*   ANIONGAP 12  --   --   --  13  --  13   < > 15  --  17*   KANWAL 9.4  --   --   --  9.0  --  9.4   < > 9.7  --  9.9   * 187* 212*   < > 140*   < > 174*   < > 220*   < > 173*   ALBUMIN  --   --   --   --   --   --   --   --  3.6  --  3.8   PROTTOTAL  --   --   --   --   --   --   --   --  6.3*  --  6.8   BILITOTAL  --   --   --   --   --   --   --   --  0.3  --  0.4   ALKPHOS  --   --   --   --   --   --   --   --  92  --  94   ALT  --   --   --   --   --   --   --   --  <5  --  5   AST  --   --   --   --   --   --   --   --  13  --  16    < > = values in this interval not displayed.       No results found for this or any previous visit (from the past 24 hour(s)).

## 2024-10-03 LAB
ANION GAP SERPL CALCULATED.3IONS-SCNC: 14 MMOL/L (ref 7–15)
BASOPHILS # BLD AUTO: 0 10E3/UL (ref 0–0.2)
BASOPHILS NFR BLD AUTO: 0 %
BUN SERPL-MCNC: 43.2 MG/DL (ref 8–23)
CALCIUM SERPL-MCNC: 9.6 MG/DL (ref 8.8–10.4)
CHLORIDE SERPL-SCNC: 95 MMOL/L (ref 98–107)
CREAT SERPL-MCNC: 6.56 MG/DL (ref 0.51–0.95)
EGFRCR SERPLBLD CKD-EPI 2021: 7 ML/MIN/1.73M2
EOSINOPHIL # BLD AUTO: 0.1 10E3/UL (ref 0–0.7)
EOSINOPHIL NFR BLD AUTO: 2 %
ERYTHROCYTE [DISTWIDTH] IN BLOOD BY AUTOMATED COUNT: 16.3 % (ref 10–15)
GLUCOSE BLDC GLUCOMTR-MCNC: 133 MG/DL (ref 70–99)
GLUCOSE BLDC GLUCOMTR-MCNC: 173 MG/DL (ref 70–99)
GLUCOSE BLDC GLUCOMTR-MCNC: 193 MG/DL (ref 70–99)
GLUCOSE BLDC GLUCOMTR-MCNC: 222 MG/DL (ref 70–99)
GLUCOSE BLDC GLUCOMTR-MCNC: 228 MG/DL (ref 70–99)
GLUCOSE SERPL-MCNC: 210 MG/DL (ref 70–99)
HCO3 SERPL-SCNC: 24 MMOL/L (ref 22–29)
HCT VFR BLD AUTO: 29.7 % (ref 35–47)
HGB BLD-MCNC: 9 G/DL (ref 11.7–15.7)
IMM GRANULOCYTES # BLD: 0 10E3/UL
IMM GRANULOCYTES NFR BLD: 0 %
INR PPP: 1.09 (ref 0.85–1.15)
LYMPHOCYTES # BLD AUTO: 1.1 10E3/UL (ref 0.8–5.3)
LYMPHOCYTES NFR BLD AUTO: 21 %
MAGNESIUM SERPL-MCNC: 2 MG/DL (ref 1.7–2.3)
MCH RBC QN AUTO: 28.3 PG (ref 26.5–33)
MCHC RBC AUTO-ENTMCNC: 30.3 G/DL (ref 31.5–36.5)
MCV RBC AUTO: 93 FL (ref 78–100)
MONOCYTES # BLD AUTO: 0.8 10E3/UL (ref 0–1.3)
MONOCYTES NFR BLD AUTO: 15 %
NEUTROPHILS # BLD AUTO: 3.2 10E3/UL (ref 1.6–8.3)
NEUTROPHILS NFR BLD AUTO: 62 %
NRBC # BLD AUTO: 0 10E3/UL
NRBC BLD AUTO-RTO: 0 /100
PHOSPHATE SERPL-MCNC: 5.3 MG/DL (ref 2.5–4.5)
PLATELET # BLD AUTO: 130 10E3/UL (ref 150–450)
POTASSIUM SERPL-SCNC: 4.7 MMOL/L (ref 3.4–5.3)
RBC # BLD AUTO: 3.18 10E6/UL (ref 3.8–5.2)
SODIUM SERPL-SCNC: 133 MMOL/L (ref 135–145)
WBC # BLD AUTO: 5.1 10E3/UL (ref 4–11)

## 2024-10-03 PROCEDURE — 85004 AUTOMATED DIFF WBC COUNT: CPT

## 2024-10-03 PROCEDURE — 80048 BASIC METABOLIC PNL TOTAL CA: CPT

## 2024-10-03 PROCEDURE — 120N000005 HC R&B MS OVERFLOW UMMC

## 2024-10-03 PROCEDURE — 258N000003 HC RX IP 258 OP 636: Performed by: STUDENT IN AN ORGANIZED HEALTH CARE EDUCATION/TRAINING PROGRAM

## 2024-10-03 PROCEDURE — 84100 ASSAY OF PHOSPHORUS: CPT

## 2024-10-03 PROCEDURE — 36415 COLL VENOUS BLD VENIPUNCTURE: CPT

## 2024-10-03 PROCEDURE — 85610 PROTHROMBIN TIME: CPT

## 2024-10-03 PROCEDURE — 83735 ASSAY OF MAGNESIUM: CPT

## 2024-10-03 PROCEDURE — 99233 SBSQ HOSP IP/OBS HIGH 50: CPT | Mod: GC | Performed by: INTERNAL MEDICINE

## 2024-10-03 PROCEDURE — 250N000013 HC RX MED GY IP 250 OP 250 PS 637: Performed by: STUDENT IN AN ORGANIZED HEALTH CARE EDUCATION/TRAINING PROGRAM

## 2024-10-03 PROCEDURE — 250N000013 HC RX MED GY IP 250 OP 250 PS 637: Performed by: INTERNAL MEDICINE

## 2024-10-03 PROCEDURE — 250N000013 HC RX MED GY IP 250 OP 250 PS 637

## 2024-10-03 PROCEDURE — 99232 SBSQ HOSP IP/OBS MODERATE 35: CPT | Mod: GC | Performed by: INTERNAL MEDICINE

## 2024-10-03 PROCEDURE — 90937 HEMODIALYSIS REPEATED EVAL: CPT

## 2024-10-03 RX ORDER — ACETAMINOPHEN 325 MG/1
650 TABLET ORAL ONCE
Status: COMPLETED | OUTPATIENT
Start: 2024-10-03 | End: 2024-10-03

## 2024-10-03 RX ORDER — MIDODRINE HYDROCHLORIDE 5 MG/1
30 TABLET ORAL ONCE
Status: COMPLETED | OUTPATIENT
Start: 2024-10-03 | End: 2024-10-03

## 2024-10-03 RX ADMIN — CALCIUM ACETATE 667 MG: 667 CAPSULE ORAL at 11:41

## 2024-10-03 RX ADMIN — SODIUM CHLORIDE 250 ML: 9 INJECTION, SOLUTION INTRAVENOUS at 08:54

## 2024-10-03 RX ADMIN — LEVALBUTEROL TARTRATE 2 PUFF: 45 AEROSOL, METERED ORAL at 04:22

## 2024-10-03 RX ADMIN — SODIUM CHLORIDE 300 ML: 9 INJECTION, SOLUTION INTRAVENOUS at 08:53

## 2024-10-03 RX ADMIN — ACETAMINOPHEN 650 MG: 325 TABLET ORAL at 01:58

## 2024-10-03 RX ADMIN — ACETAMINOPHEN 500 MG: 500 TABLET ORAL at 22:37

## 2024-10-03 RX ADMIN — CALCIUM ACETATE 667 MG: 667 CAPSULE ORAL at 17:02

## 2024-10-03 RX ADMIN — AMIODARONE HYDROCHLORIDE 100 MG: 100 TABLET ORAL at 11:41

## 2024-10-03 RX ADMIN — MIDODRINE HYDROCHLORIDE 30 MG: 5 TABLET ORAL at 08:20

## 2024-10-03 RX ADMIN — Medication: at 08:53

## 2024-10-03 ASSESSMENT — ACTIVITIES OF DAILY LIVING (ADL)
ADLS_ACUITY_SCORE: 31
ADLS_ACUITY_SCORE: 30
ADLS_ACUITY_SCORE: 33
ADLS_ACUITY_SCORE: 33
ADLS_ACUITY_SCORE: 29
ADLS_ACUITY_SCORE: 30
ADLS_ACUITY_SCORE: 29
ADLS_ACUITY_SCORE: 29
ADLS_ACUITY_SCORE: 33
ADLS_ACUITY_SCORE: 31
ADLS_ACUITY_SCORE: 32
ADLS_ACUITY_SCORE: 32
ADLS_ACUITY_SCORE: 33
ADLS_ACUITY_SCORE: 31
ADLS_ACUITY_SCORE: 32
ADLS_ACUITY_SCORE: 31

## 2024-10-03 NOTE — PROGRESS NOTES
Nephrology progress note  10/03/2024     Yissel Wetzel MRN:[id number] YOB: 1960  Date of Admission:9/28/2024  Primary care provider: Gigi Martin  Requesting physician: Jose Redd, *    Reason for consult: ESRD    MEDICAL DECISION MAKING     RECOMMENDATIONS:  Limited UF 10/1 of only 300 ml due to severe muscle cramps   HD on 10/3 removed 1.4 liters  We will plan to do another HD run tomorrow after RHC 10/4  Renally dose medications, avoid unnecessary nephrotoxins, avoid unnecessary radiographic contrast, daily renal panel, strict I/O, daily standing weights     ASSESSMENT:  Yissel Wetzel is a 64 year old ESRD and severe pulmonary hypertension admitted for transplant eval  Nephrology consulted for ESRD     ESRD  Dialyzes MWF at [facility]. Primary nephrologist Dr Hernandez. Access w R AVF.  Dry weight 63kg.  Last session prior to admit: 9/27. Anuric   Today's HD Rx: 2h,  / , UF goal 2L    Anemia: hgb 9.2, TSAT 12%, ferritin 299  -Mircera 100 mcg q2 weeks,   -venofer 100 mg on 10/4 during HD     Volume/HTN: weight above dry weight but does not look overtly volume overloaded    Acidosis: bicarb 24, on bicarb    MBD: Ca  9.7, phos 6.3, cont phoslo    Intradialytic hypotension: on midodrine 10mg in AM MWF + 30mg prior to HD on MWF. Midodrine 20mg BID on non-HD days      pulmonary hypertension primarily secondary to intrinsic pulmonary vascular disease   RHC to be done 10/4 followed by HD run       Recommendations were communicated to primary team via gayle & vocgayatri Jacobo MD  Division of Renal Disease and Hypertension  [facility]  jason [employer] Web [employer]    SUBJECTIVE     Interval events   Seen on RA   Severe calf cramp during HD today, got total UF only 300 ml   RHC on Wednesday after Weds HD     REVIEW OF SYSTEMS:  A comprehensive review of systems was negative except as noted above.    PAST MEDICAL HISTORY:  Past Medical History:   Diagnosis  Date    Allergic rhinitis, cause unspecified     Anemia in chronic kidney disease     Anemia of chronic disease     Bleeding pseudoaneurysm of right brachiocephalic arteriovenous fistula, initial encounter 2019    End stage renal failure on dialysis (H)     Esophagitis, unspecified     Former tobacco use     HEMORRHAGIC GASTROPATHY     History of blood transfusion     [place]    Iron deficiency anemia, unspecified     Mild persistent asthma     Obesity     Other and unspecified hyperlipidemia     Pneumonia     Pulmonary hypertension (H)     per 2012 echo mod.to severe pulmonary hypertension    Tobacco use disorder dc ed     Type 2 diabetes mellitus (H)     on Insulin- managed by PCP    Unspecified essential hypertension        Past Surgical History:   Procedure Laterality Date    ABDOMINOPLASTY  pt unsure when    abdominoplasty-     SECTION  ,     x 2    COLONOSCOPY      [facility]    COLONOSCOPY N/A 2021    Procedure: COLONOSCOPY, WITH POLYPECTOMY AND BIOPSY;  Surgeon: Lincoln Farrar MD;  Location:  GI    CREATE FISTULA ARTERIOVENOUS UPPER EXTREMITY Right 2018    Procedure: RIGHT BRACHIAL TO BASILIC ARTERIOVENOUS FISTULA CREATION;  Surgeon: Terry Vizcaino MD;  Location: [facility]    CV HEART CATHETERIZATION WITH POSSIBLE INTERVENTION N/A 2021    Procedure: Heart Catheterization with Possible Intervention;  Surgeon: Joseluis Dean MD;  Location:  HEART CARDIAC CATH LAB    CV RIGHT HEART CATH MEASUREMENTS RECORDED N/A 2022    Procedure: Heart Cath Right Heart Cath;  Surgeon: Yan Heredia MD;  Location:  HEART CARDIAC CATH LAB    ENT SURGERY  as a child    tonsillectomy    ESOPHAGOSCOPY, GASTROSCOPY, DUODENOSCOPY (EGD), COMBINED N/A 2021    Procedure: ESOPHAGOGASTRODUODENOSCOPY, WITH BIOPSY;  Surgeon: Lincoln Farrar MD;  Location:  GI    EYE SURGERY Left     injections- eye    HYSTERECTOMY  approx  1983    partial hysterectomy-reason bleeding     IR CVC TUNNEL PLACEMENT > 5 YRS OF AGE  12/6/2019    IR CVC TUNNEL REMOVAL RIGHT  5/7/2020    IR DIALYSIS FISTULOGRAM RIGHT  12/9/2019        MEDICATIONS:  PTA Meds  Prior to Admission medications    Medication Sig Last Dose Taking? Auth Provider Long Term End Date   acetaminophen (TYLENOL) 500 MG tablet Take 500-1,000 mg by mouth every 6 hours as needed. Past Week at PRN Yes Unknown, Entered By History     amiodarone (PACERONE) 100 MG TABS tablet Take 1 tablet (100 mg) by mouth five times a week. Take on Mondays, Tuesdays, Thursdays, Fridays and Saturdays; hold on Wednesdays and Sundays. Past Week Yes Felicity Green, DAVID CNP Yes    calcium acetate (PHOSLO) 667 MG CAPS capsule Take 667 mg by mouth 3 times daily (with meals) Past Week Yes Unknown, Entered By History     calcium acetate (PHOSLO) 667 MG CAPS capsule Take 667 mg by mouth Take with snacks or supplements Past Week Yes Unknown, Entered By History     carbamide peroxide (DEBROX) 6.5 % otic solution Place 10 drops Into the left ear 2 times daily. Past Week Yes Barry Ballesteros MD     famotidine (PEPCID) 20 MG tablet Take 20 mg by mouth 2 times daily as needed. Past Week at PRN Yes Unknown, Entered By History     guaiFENesin 1200 MG TB12 Take 1 tablet by mouth every 12 hours as needed (cough/congestion). Past Week at PRN Yes Unknown, Entered By History No    insulin aspart (FIASP FLEXTOUCH) 100 UNIT/ML pen-injector 2U per 15 carbs, plus 1U for every 50 that preprandial BG over 150 - MAXIMUM 20 UNITS PER DAY Past Week Yes Gigi Martin MD     insulin glargine (LANTUS PEN) 100 UNIT/ML pen Inject 11 Units subcutaneously at bedtime. Past Week Yes Gigi Martin MD Yes    levalbuterol (XOPENEX HFA) 45 MCG/ACT inhaler Inhale 2 puffs into the lungs every 6 hours as needed for shortness of breath / dyspnea or wheezing Past Week at PRN Yes Gigi Martin MD Yes    midodrine (PROAMATINE) 10 MG  tablet Take 30 mg by mouth daily. On Monday, Wednesday, Friday before dialysis Past Week Yes Unknown, Entered By History     midodrine (PROAMATINE) 10 MG tablet Take 10 mg by mouth Every Mon, Wed, Fri Morning Past Week Yes Unknown, Entered By History     midodrine (PROAMATINE) 10 MG tablet Take 20 mg by mouth 2 times daily. On non-dialysis days (Tuesday, Thursday, Saturday, Sunday) Past Week Yes Unknown, Entered By History        Current Meds  Current Facility-Administered Medications   Medication Dose Route Frequency Provider Last Rate Last Admin    amiodarone (PACERONE) tablet 100 mg  100 mg Oral Once per day on Monday Tuesday Thursday Friday Saturday Jose Redd MD   100 mg at 10/03/24 1141    calcium acetate (PHOSLO) capsule 667 mg  667 mg Oral TID w/meals Smii Bateman MD   667 mg at 10/03/24 1141    carbamide peroxide (DEBROX) 6.5 % otic solution 3 drop  3 drop Right Ear BID Tory Castellanos MD   3 drop at 10/01/24 2232    insulin aspart (NovoLOG) injection (RAPID ACTING)  1-7 Units Subcutaneous TID AC Alina Arrington MD   1 Units at 10/03/24 0821    insulin aspart (NovoLOG) injection (RAPID ACTING)  1-5 Units Subcutaneous At Bedtime Alina Arrington MD        insulin aspart (NovoLOG) injection (RAPID ACTING)   Subcutaneous Daily with breakfast Simi Bateman MD   5 Units at 10/03/24 0821    insulin aspart (NovoLOG) injection (RAPID ACTING)   Subcutaneous Daily with lunch Simi Bateman MD   3 Units at 10/03/24 1222    insulin aspart (NovoLOG) injection (RAPID ACTING)   Subcutaneous Daily with supper Simi Bateman MD   5 Units at 10/02/24 2014    insulin glargine (LANTUS PEN) injection 10 Units  10 Units Subcutaneous At Bedtime Tory Castellanos MD   10 Units at 10/02/24 2220    midodrine (PROAMATINE) tablet 10 mg  10 mg Oral Q Mon Wed Fri AM Simi Bateman MD   10 mg at 09/30/24 1036    [Held by provider] midodrine (PROAMATINE) tablet 20 mg  20 mg Oral 2 times per day on Sunday   Simi Bateman MD   10 mg at 10/01/24 0817    multivitamin RENAL (RENAVITE RX/NEPHROVITE) tablet 1 tablet  1 tablet Oral Daily Alina Arrington MD         Infusion Meds  Current Facility-Administered Medications   Medication Dose Route Frequency Provider Last Rate Last Admin    ACE Inhibitor/ARB/ARNI not indicated according to guidelines   Does not apply DOES NOT GO TO Simi Condon MD        Continuing beta blocker from home medication list OR beta blocker order already placed during this visit   Does not apply DOES NOT GO TO Simi Condon MD           ALLERGIES:    Allergies   Allergen Reactions    Albuterol      shakiness    Aspirin      gi    Byetta [Exenatide]      gi    Clonidine      constipation    Codeine      vomiting    Ezetimibe      muscle symptoms    Hydralazine      headaches    Lisinopril      hyperkalemia    Metformin Hydrochloride      vomiting    Pravachol [Pravastatin Sodium]      muscle pains    Simvastatin      myalgias    Troglitazone        SOCIAL HISTORY:   Social History     Socioeconomic History    Marital status: Single     Spouse name: Not on file    Number of children: Not on file    Years of education: Not on file    Highest education level: Not on file   Occupational History    Not on file   Tobacco Use    Smoking status: Former     Current packs/day: 0.00     Average packs/day: 1 pack/day for 22.0 years (22.0 ttl pk-yrs)     Types: Cigarettes     Start date: 10/12/1977     Quit date: 10/12/1999     Years since quittin.9    Smokeless tobacco: Never   Vaping Use    Vaping status: Never Used   Substance and Sexual Activity    Alcohol use: No     Alcohol/week: 0.0 standard drinks of alcohol    Drug use: No    Sexual activity: Never   Other Topics Concern     Service Not Asked    Blood Transfusions Yes    Caffeine Concern No    Occupational Exposure Not Asked    Hobby Hazards Not Asked    Sleep Concern No    Stress Concern No     Weight Concern Yes     Comment: would like to lose weight    Special Diet No    Back Care Not Asked    Exercise Yes     Comment: walks daily 2 blocks    Bike Helmet Not Asked    Seat Belt Not Asked    Self-Exams Not Asked    Parent/sibling w/ CABG, MI or angioplasty before 65F 55M? Not Asked   Social History Narrative    Not on file     Social Determinants of Health     Financial Resource Strain: Low Risk  (10/1/2024)    Financial Resource Strain     Within the past 12 months, have you or your family members you live with been unable to get utilities (heat, electricity) when it was really needed?: No   Food Insecurity: Low Risk  (10/1/2024)    Food Insecurity     Within the past 12 months, did you worry that your food would run out before you got money to buy more?: No     Within the past 12 months, did the food you bought just not last and you didn t have money to get more?: No   Transportation Needs: Low Risk  (10/1/2024)    Transportation Needs     Within the past 12 months, has lack of transportation kept you from medical appointments, getting your medicines, non-medical meetings or appointments, work, or from getting things that you need?: No   Physical Activity: Not on file   Stress: Not on file   Social Connections: Not on file   Interpersonal Safety: Low Risk  (10/1/2024)    Interpersonal Safety     Do you feel physically and emotionally safe where you currently live?: Yes     Within the past 12 months, have you been hit, slapped, kicked or otherwise physically hurt by someone?: No     Within the past 12 months, have you been humiliated or emotionally abused in other ways by your partner or ex-partner?: No   Housing Stability: High Risk (10/1/2024)    Housing Stability     Do you have housing? : No     Are you worried about losing your housing?: No       FAMILY MEDICAL HISTORY:   Family History   Problem Relation Age of Onset    Arrhythmia Mother     Hypertension Mother     Pancreatic Cancer Father      Diabetes Brother     Asthma Sister     LUNG DISEASE Sister     Diabetes Daughter     Cirrhosis Sister        OBJECTIVE     PHYSICAL EXAM:   Temp  Av.9  F (36.6  C)  Min: 97.3  F (36.3  C)  Max: 98.4  F (36.9  C)      Pulse  Av.3  Min: 55  Max: 65 Resp  Av.2  Min: 16  Max: 18  SpO2  Av.2 %  Min: 95 %  Max: 98 %       /41 (BP Location: Left arm)   Pulse 64   Temp 97.4  F (36.3  C) (Oral)   Resp 16   Ht 1.524 m (5')   Wt 66.7 kg (147 lb 1.6 oz)   LMP  (LMP Unknown)   SpO2 97%   BMI 28.73 kg/m     Date 24 - 24 0659   Shift 6914-2213 1338-6252 7449-9911 24 Hour Total   INTAKE   P.O. 120   120   Shift Total(mL/kg) 120(1.77)   120(1.77)   OUTPUT   Shift Total(mL/kg)       Weight (kg) 67.95 67.95 67.95 67.95      Admit Weight: 67.7 kg (149 lb 4.8 oz)     General:sitting up at edge of bed  HEENT:mmm  Cardiac:rrr  Pulmonary:unlabored  Abdominal: nondistended  Extremities:no LE edema   Neuro:A&Ox4  Access:R AVF w thrill    LABS:   CMP  Recent Labs   Lab 10/03/24  1125 10/03/24  0621 10/03/24  0458 10/02/24  2218 10/02/24  0812 10/02/24  0538 10/01/24  0737 10/01/24  0600 24  0847 24  0552 24  0723 24  0321 24  1619 24  1612   NA  --   --  133*  --   --  136  --  138  --  138   < > 137  --  139   POTASSIUM  --   --  4.7  --   --  4.6  --  4.3  --  5.1   < > 5.5*  --  5.3   CHLORIDE  --   --  95*  --   --  98  --  99  --  100   < > 98  --  97*   CO2  --   --  24  --   --  26  --  26  --  25   < > 24  --  25   ANIONGAP  --   --  14  --   --  12  --  13  --  13   < > 15  --  17*   * 193* 210* 151*   < > 138*   < > 140*   < > 174*   < > 220*   < > 173*   BUN  --   --  43.2*  --   --  26.5*  --  15.9  --  29.0*   < > 38.4*  --  31.4*   CR  --   --  6.56*  --   --  4.87*  --  3.94*  --  6.25*   < > 7.76*  --  7.09*   GFRESTIMATED  --   --  7*  --   --  9*  --  12*  --  7*   < > 5*  --  6*   KANWAL  --   --  9.6  --   --  9.4  --  9.0  --  9.4    < > 9.7  --  9.9   MAG  --   --  2.0  --   --  1.9  --  1.8  --  2.1   < >  --   --  2.3   PHOS  --   --  5.3*  --   --  4.6*  --  4.1  --  6.0*   < >  --   --  5.2*   PROTTOTAL  --   --   --   --   --   --   --   --   --   --   --  6.3*  --  6.8   ALBUMIN  --   --   --   --   --   --   --   --   --   --   --  3.6  --  3.8   BILITOTAL  --   --   --   --   --   --   --   --   --   --   --  0.3  --  0.4   ALKPHOS  --   --   --   --   --   --   --   --   --   --   --  92  --  94   AST  --   --   --   --   --   --   --   --   --   --   --  13  --  16   ALT  --   --   --   --   --   --   --   --   --   --   --  <5  --  5    < > = values in this interval not displayed.     CBC  Recent Labs   Lab 10/03/24  0458 10/02/24  0538 10/01/24  0600 09/30/24  0552   HGB 9.0* 9.1* 9.3* 8.8*   WBC 5.1 5.7 5.7 6.2   RBC 3.18* 3.19* 3.24* 3.05*   HCT 29.7* 29.9* 30.8* 28.9*   MCV 93 94 95 95   MCH 28.3 28.5 28.7 28.9   MCHC 30.3* 30.4* 30.2* 30.4*   RDW 16.3* 16.8* 16.9* 17.0*   * 198 135* 200     INR  Recent Labs   Lab 10/02/24  0538 10/01/24  0600 09/30/24  0552 09/29/24  0803   INR 1.17* 1.14 1.23* 1.21*     ABG  Recent Labs   Lab 09/29/24  0321   O2PER 2      URINE STUDIES  Recent Labs   Lab Test 09/25/19  1455 06/07/18  1455 08/09/17  1640   COLOR Yellow Yellow Yellow   APPEARANCE Clear Clear Clear   URINEGLC >499* 250* Negative   URINEBILI Negative Negative Negative   URINEKETONE Negative Negative Negative   SG 1.005 1.015 1.020   UBLD Negative Small* Small*   URINEPH 7.0 6.0 6.0   PROTEIN 100* >=300* >=300*   UROBILINOGEN  --  0.2 0.2   NITRITE Negative Negative Negative   LEUKEST Negative Negative Negative   RBCU <1 2-5* O - 2   WBCU 1 0 - 5 O - 2     No lab results found.  PTH  No lab results found.  IRON STUDIES  Recent Labs   Lab Test 09/29/24  0803 06/19/22  0634 03/17/19  0805 08/30/18  1318   IRON 35* 27* 27* 54    178* 178* 196*   IRONSAT 12* 15 15 28   LATRICE 299 898* 210 407*       IMAGING:  All imaging  studies reviewed by me.     Jorge A Jacobo MD     I was present with the fellow during the history and exam.  I discussed the case with the fellow and agree with the findings as documented in the assessment and plan.    Sandra Viera MD   of Medicine  Department of Nephrology  [facility]

## 2024-10-03 NOTE — PROGRESS NOTES
May 3, 2018      Jensen Moreland  [id number]  [facility] [address]        Dear Mr. Moreland:    The biopsies obtained at your recent visit here showed:    harmless mole on the toe and leg. Barnacle on the scalp all treated at visit.  Call if concerns or questions       Sincerely,         Amanda Reyes M.D.   Dermatology  [facility]   [id number] [place]  [address] [phone]       These results were reviewed by Dr. Amanda Reyes.   HEMODIALYSIS TREATMENT NOTE    Date: 10/3/2024  Time: 11:23 AM     Data:  Pre Wt:   66.7 kg (bed scale)  Desired Wt:   To be established  Post Wt:  65.5 kg (bed scale)  Weight change: - 1.2 kg  Ultrafiltration - Post Run Net Total Removed (mL):  1450 ml  Vascular Access Status: CVC and Fistula patent  Dialyzer Rinse:  Light  Total Blood Volume Processed: 41.78 L   Total Dialysis (Treatment) Time:   1.75 Hrs (1 hour 45 mins)  Dialysate Bath: K 2, Ca 2.5  Heparin: Heparin: None     Lab:   No  HbsAg - 9/30/2024 (Non Reactive)  HbsAb - 9/30/2024 <3.50 (Susceptible)      Interventions:  Dialysis done through Right arm AV Fistula using 15 gauge needles. ,   UF set to 2.0 Liters, accommodating priming and rinse back volumes  Pre-HD Midodrine 10 mg PO administered by PCN prior to transfer  See Flowsheet for Crit Profile throughout the run  Treatment terminated with 1:45 RTD due to arm access/leg/abdominal cramping (Nephrology provider, Jorge A Jacobo, advised termination), blood is rinsed back completely  Decannulation done post HD, hemostasis is achieved in 10 minutes  Pressure dressing is applied, to be removed after 4-6 hours  Post Tx assessment done. Patient is sent back to [facility] in stable condition  Report given to Dirk ARMSTRONG RN     Assessment:  A/O x 4, calm and cooperative, denies pain  Right arm AV Fistula has good thrill and bruit                Plan:    Per Renal team     General

## 2024-10-03 NOTE — PLAN OF CARE
D: Yissel Wetzel is a 64 year old female with severe pulmonary hypertension admitted for daily dialysis and RHC to assess pulmonary artery pressure to eval for transplant      I/A:   Neuro: A&O x4. Calls appropriately.   Cardiac: SR, occasionally SB HR 60s-70s. Denies chest pain and shortness of breath.   Respiratory: 2L NC Sats >92%. MANE.   GI/: Hemodialysis declined 10/2, will have dialysis 10/3 AM. 2g Na diet, 2000 mL FR. LBM 10/2.   Skin/drains: Scab on head and forehead. Ruslan LE, ashen skin. R arm HD fistula.   IV/Drips: No PIV  Pain: 2/10 generalized body ache pain, given Tylenol x1 PRN.   Activity: SBA      P: Pt will have HD 10/3 AM, ok to give Midorine per Alina Arrington. RHC Friday 10/4.  Notifying Cards 2 of changes.

## 2024-10-03 NOTE — PLAN OF CARE
Hours of Care: 2300 - 1900    Neuro: A&Ox4, able to make needs known, calls appropriately  Cardiac: SR/SB, HR in the 50's-60's. BP's soft, baseline per pt.   Respiratory: Sats above 95 on room air, pt requested nasal cannula after ambulating in hallway on 2L  GI/: Anuric, pt on dialysis. Had 2 BMs this shift.   Diet/appetite: 2g Na diet, 2L fluid restriction. No nausea reported overnight  Activity: Up with SBA assist, ambulated in room and in hallways overnight  Pain: Right and left thigh muscle cramping, notified provider and gave one-time additional dose of Tylenol, other interventions included ambulation, repositioning, massage and heat packs  Skin: No new deficits noted.  Lines: No PIV    Other: MAP < 60 before midnight, notified provider who requested recheck and midodrine if still low; MAP recheck 66     Plan: Dialysis 10/3, okay to give 10 mg midodrine before; RHC likely 10/4      Problem: Adult Inpatient Plan of Care  Goal: Plan of Care Review  Description: The Plan of Care Review/Shift note should be completed every shift.  The Outcome Evaluation is a brief statement about your assessment that the patient is improving, declining, or no change.  This information will be displayed automatically on your shift  note.  Flowsheets (Taken 10/3/2024 0128)  Outcome Evaluation:   Low MAPs, asymptomatic   planning HD 10/3 am, RHC likely Friday  Plan of Care Reviewed With: patient  Overall Patient Progress: no change   Goal Outcome Evaluation:      Plan of Care Reviewed With: patient    Overall Patient Progress: no changeOverall Patient Progress: no change    Outcome Evaluation: Low MAPs, asymptomatic; planning HD 10/3 am, RHC likely Friday

## 2024-10-03 NOTE — PROGRESS NOTES
Cardiology Progress Note  Yissel Wetzel MRN: [id number]  Age: 64 year old, : 1960      Assessment & Plan   Yissel Wetzel is a 64 year old female with severe pulmonary hypertension admitted for daily dialysis and RHC to assess pulmonary artery pressure to [facility] for transplant       Faculty Attestation  Jose Redd M.D.    I personally saw and examined this patient,with resident reviewed recent laboratories and imaging studies, discussed the case with the housestaff, and conveyed plan to the patient.  I answered all questions from patient and/or family. I agree with the examination, assessment and plan outlined   Today's Updates:  - Patient received midodrine 30 mg today  - Completed HD with 1.4 L removed (10/3)  - Will plan to do RHC tomorrow AM with PM HD     Severe pulmonary hypertension primarily secondary to intrinsic pulmonary vascular disease (mean PA 81 mmHg, PVRI 25, PCWP confirmed with sat 35 mmHg)  Severely elevated right-sided filling pressures (mean RA 35 mmHg)  Severely elevated left-sided filling pressures (LVEDP 35-40 mmHg)  Followed by Dr. Redd. Appears euvolemic to slightly hypervolemic. Dry weight 63 kg per prior notes.  - Nephrology consulted - HD planned for 10/3 after RHC  - NPO at midnight for RHC on 10/3  - Not on any pulm HTN meds     ESRD  Nephrology consulted for daily dialysis   - Continue PTA midodrine 10 mg qam and 30 mg before HD on MWF (dialysis days); and 20 mg BID  TThSaSu (non-dialysis days)   - HOLD midodrine AM of 10/3 for RHC    R pleural effusion, chronic  Unchanged on CXR    History of SVT  Recently seen by EP at end of 2024  Continue pta amiodarone 100 mg 5 days a week      DM2.   - PTA glargine 11u at bedtime  - PTA carb coverage 2u:15g  - PTA sliding scale 1u for every 50 above 150    COPD.  - On home O2: 2L at night.  - Continue PRN levalbuterol.     Thrombocytopenia  Platelets stable      Chronic anemia.  Baseline  Hgb: 9.7-12.2    Ear fullness  PTA debrox    H/o cirrhosis noted to be related to severe pHTN with right heart dysfunction.  LFT's normal on admit.     FEN: 2 g  na, 2 L fluid   DVT Prophylaxis: Low risk  Code Status: Full Code  Disposition: Expected discharge in 3+ days once dialysis and RHC completed      Discussed with Dr. Tramaine Pike MD  Internal Medicine, PGY-1  Cards II Service  [facility]  October 3, 2024    Interval History     NAEON. Patient had some difficulty taking medications per nursing. But worked with adjusted dosage of midodrine per nephrology. No acute complaints.    Physical Exam   Temp: 97.4  F (36.3  C) Temp src: Oral BP: 117/41 Pulse: 64   Resp: 16 SpO2: 93 % O2 Device: None (Room air) Oxygen Delivery: 3 LPM  Vitals:    10/01/24 0400 10/02/24 0155 10/03/24 0123   Weight: 65.2 kg (143 lb 11.2 oz) 65.9 kg (145 lb 4.5 oz) 66.7 kg (147 lb 1.6 oz)     Vital Signs with Ranges  Temp:  [97.3  F (36.3  C)-97.8  F (36.6  C)] 97.4  F (36.3  C)  Pulse:  [48-74] 64  Resp:  [16-21] 16  BP: (100-129)/(36-87) 117/41  Cuff Mean (mmHg):  [71] 71  SpO2:  [91 %-100 %] 93 %  I/O last 3 completed shifts:  In: 480 [P.O.:480]  Out: -   Constitutional: no distress  HENT: , EOM grossly intact, sclera anicteric  Respiratory: non-labored respirations on nasal cannula  Cardiovascular: RRR, no murmur noted, no LE edema or JVD  GI: soft, non-distended, non-tender  Skin: warm and dry, no rashes or lesions  Neurologic: Cranial nerves II-XII are grossly intact, moving all extremities equally and independently      Medications   Current Facility-Administered Medications   Medication Dose Route Frequency Provider Last Rate Last Admin    ACE Inhibitor/ARB/ARNI not indicated according to guidelines   Does not apply DOES NOT GO TO Simi Condon MD        Continuing beta blocker from home medication list OR beta blocker order already placed during this visit   Does not apply DOES NOT GO TO MAR  Simi Bateman MD         Current Facility-Administered Medications   Medication Dose Route Frequency Provider Last Rate Last Admin    amiodarone (PACERONE) tablet 100 mg  100 mg Oral Once per day on Monday Tuesday Thursday Friday Saturday Jose Redd MD   100 mg at 10/03/24 1141    calcium acetate (PHOSLO) capsule 667 mg  667 mg Oral TID w/meals Simi Bateman MD   667 mg at 10/03/24 1141    carbamide peroxide (DEBROX) 6.5 % otic solution 3 drop  3 drop Right Ear BID Tory Castellanos MD   3 drop at 10/01/24 2232    insulin aspart (NovoLOG) injection (RAPID ACTING)  1-7 Units Subcutaneous TID AC Alina Arrington MD   1 Units at 10/03/24 0821    insulin aspart (NovoLOG) injection (RAPID ACTING)  1-5 Units Subcutaneous At Bedtime Alina Arrington MD        insulin aspart (NovoLOG) injection (RAPID ACTING)   Subcutaneous Daily with breakfast Simi Bateman MD   5 Units at 10/03/24 0821    insulin aspart (NovoLOG) injection (RAPID ACTING)   Subcutaneous Daily with lunch Simi Bateman MD   3 Units at 10/02/24 1203    insulin aspart (NovoLOG) injection (RAPID ACTING)   Subcutaneous Daily with supper Simi Bateman MD   5 Units at 10/02/24 2014    insulin glargine (LANTUS PEN) injection 10 Units  10 Units Subcutaneous At Bedtime Tory Castellanos MD   10 Units at 10/02/24 2220    midodrine (PROAMATINE) tablet 10 mg  10 mg Oral Q Mon Wed Fri AM Simi Bateman MD   10 mg at 09/30/24 1036    [Held by provider] midodrine (PROAMATINE) tablet 20 mg  20 mg Oral 2 times per day on Sunday Tuesday Thursday Saturday Simi Bateman MD   10 mg at 10/01/24 0817    multivitamin RENAL (RENAVITE RX/NEPHROVITE) tablet 1 tablet  1 tablet Oral Daily Alina Arrington MD           Data   Recent Labs   Lab 10/03/24  1125 10/03/24  0621 10/03/24  0458 10/02/24  0812 10/02/24  0538 10/01/24  0737 10/01/24  0600 09/30/24  0847 09/30/24  0552 09/29/24  0723 09/29/24  0321 09/28/24  1619 09/28/24  1612   WBC  --   --   5.1  --  5.7  --  5.7  --  6.2   < > 5.5  --  5.8   HGB  --   --  9.0*  --  9.1*  --  9.3*  --  8.8*   < > 8.9*  --  9.0*   MCV  --   --  93  --  94  --  95  --  95   < > 95  --  97   PLT  --   --  130*  --  198  --  135*  --  200   < > 111*  --  121*   INR  --   --   --   --  1.17*  --  1.14  --  1.23*   < >  --   --   --    NA  --   --  133*  --  136  --  138  --  138   < > 137  --  139   POTASSIUM  --   --  4.7  --  4.6  --  4.3  --  5.1   < > 5.5*  --  5.3   CHLORIDE  --   --  95*  --  98  --  99  --  100   < > 98  --  97*   CO2  --   --  24  --  26  --  26  --  25   < > 24  --  25   BUN  --   --  43.2*  --  26.5*  --  15.9  --  29.0*   < > 38.4*  --  31.4*   CR  --   --  6.56*  --  4.87*  --  3.94*  --  6.25*   < > 7.76*  --  7.09*   ANIONGAP  --   --  14  --  12  --  13  --  13   < > 15  --  17*   KANWAL  --   --  9.6  --  9.4  --  9.0  --  9.4   < > 9.7  --  9.9   * 193* 210*   < > 138*   < > 140*   < > 174*   < > 220*   < > 173*   ALBUMIN  --   --   --   --   --   --   --   --   --   --  3.6  --  3.8   PROTTOTAL  --   --   --   --   --   --   --   --   --   --  6.3*  --  6.8   BILITOTAL  --   --   --   --   --   --   --   --   --   --  0.3  --  0.4   ALKPHOS  --   --   --   --   --   --   --   --   --   --  92  --  94   ALT  --   --   --   --   --   --   --   --   --   --  <5  --  5   AST  --   --   --   --   --   --   --   --   --   --  13  --  16    < > = values in this interval not displayed.       No results found for this or any previous visit (from the past 24 hour(s)).

## 2024-10-03 NOTE — PLAN OF CARE
[id number] Goal Outcome Evaluation:         Overall Patient Progress: no change       VS:  /44 (BP Location: Left arm)   Pulse 56   Temp 97.8  F (36.6  C) (Oral)   Resp 16   Ht 1.524 m (5')   Wt 65.5 kg (144 lb 6.4 oz)   LMP  (LMP Unknown)   SpO2 97%   BMI 28.20 kg/m      Cardiac:  HR 56-64, -129/41-78, denied CP   Respiratory:  Sating >92% on 2L, denied SOB, refused cont pulse ox, LSCTA & diminished @ bilat bases   GI/:  HD pt, LBM 10/3, up to bathroom   Neuro:  A&O x4, denied new numbness or tingling, used call light appropriately   Pain:  Pt denied this shift   Activity:  SBA-Ind in room   Skin:  Scab in superior medial forehead DANIEL   Dressing:  R AV fistula dressing CDI   Diet:  Reg w/ thin, 2 L FR; denied N/V   100% intake for breakfast, lunch, & supper;   NPO @ midnight for RHC on 10/4   LDA:  R AV fistula for HD   Plan:  Cont POC;   RHC 10/4 to assess pulmonary artery pressure for txp eval in AM w/ PM HD   Additional Info:  HD today, 30 mg midodrine given per pt's request; pt did not tolerate the whole HD session d/t cramps in all extremities and had to go back up w/ ~1 hr of HD left, 1.4 L removed s/p HD.

## 2024-10-03 NOTE — PROGRESS NOTES
"Pt is refusing to take 10 mg of Midodrine prior to HD, stating that she wants \"to do it my way\" and asked for 30 mg instead. Furthermore, for her , pt should be getting 1 unit according to correctional scale but pt again wants \"to do it my way\" and asked writer to inject her w/ 5 units instead. Writer refused and provided education but pt is not mentally ready or agreeable crystal.  "

## 2024-10-04 VITALS
SYSTOLIC BLOOD PRESSURE: 117 MMHG | DIASTOLIC BLOOD PRESSURE: 44 MMHG | HEART RATE: 70 BPM | OXYGEN SATURATION: 95 % | TEMPERATURE: 98.1 F | RESPIRATION RATE: 20 BRPM | HEIGHT: 60 IN | WEIGHT: 145.3 LBS | BODY MASS INDEX: 28.53 KG/M2

## 2024-10-04 LAB
ANION GAP SERPL CALCULATED.3IONS-SCNC: 13 MMOL/L (ref 7–15)
BASOPHILS # BLD AUTO: 0 10E3/UL (ref 0–0.2)
BASOPHILS NFR BLD AUTO: 1 %
BUN SERPL-MCNC: 39.4 MG/DL (ref 8–23)
CALCIUM SERPL-MCNC: 9.3 MG/DL (ref 8.8–10.4)
CHLORIDE SERPL-SCNC: 97 MMOL/L (ref 98–107)
CREAT SERPL-MCNC: 5.77 MG/DL (ref 0.51–0.95)
EGFRCR SERPLBLD CKD-EPI 2021: 8 ML/MIN/1.73M2
EOSINOPHIL # BLD AUTO: 0.1 10E3/UL (ref 0–0.7)
EOSINOPHIL NFR BLD AUTO: 2 %
ERYTHROCYTE [DISTWIDTH] IN BLOOD BY AUTOMATED COUNT: 16.3 % (ref 10–15)
GLUCOSE BLDC GLUCOMTR-MCNC: 100 MG/DL (ref 70–99)
GLUCOSE BLDC GLUCOMTR-MCNC: 144 MG/DL (ref 70–99)
GLUCOSE BLDC GLUCOMTR-MCNC: 164 MG/DL (ref 70–99)
GLUCOSE SERPL-MCNC: 99 MG/DL (ref 70–99)
HCO3 SERPL-SCNC: 28 MMOL/L (ref 22–29)
HCT VFR BLD AUTO: 29.6 % (ref 35–47)
HGB BLD-MCNC: 9 G/DL (ref 11.7–15.7)
HGB BLD-MCNC: 9.6 G/DL (ref 11.7–15.7)
IMM GRANULOCYTES # BLD: 0 10E3/UL
IMM GRANULOCYTES NFR BLD: 0 %
INR PPP: 1.17 (ref 0.85–1.15)
LYMPHOCYTES # BLD AUTO: 1.1 10E3/UL (ref 0.8–5.3)
LYMPHOCYTES NFR BLD AUTO: 23 %
MAGNESIUM SERPL-MCNC: 1.8 MG/DL (ref 1.7–2.3)
MCH RBC QN AUTO: 28.1 PG (ref 26.5–33)
MCHC RBC AUTO-ENTMCNC: 30.4 G/DL (ref 31.5–36.5)
MCV RBC AUTO: 93 FL (ref 78–100)
MONOCYTES # BLD AUTO: 0.8 10E3/UL (ref 0–1.3)
MONOCYTES NFR BLD AUTO: 18 %
NEUTROPHILS # BLD AUTO: 2.7 10E3/UL (ref 1.6–8.3)
NEUTROPHILS NFR BLD AUTO: 57 %
NRBC # BLD AUTO: 0 10E3/UL
NRBC BLD AUTO-RTO: 0 /100
OXYHGB MFR BLDV: 51 % (ref 70–75)
PHOSPHATE SERPL-MCNC: 5.5 MG/DL (ref 2.5–4.5)
PLAT MORPH BLD: NORMAL
PLATELET # BLD AUTO: 131 10E3/UL (ref 150–450)
POTASSIUM SERPL-SCNC: 4.4 MMOL/L (ref 3.4–5.3)
RBC # BLD AUTO: 3.2 10E6/UL (ref 3.8–5.2)
RBC MORPH BLD: NORMAL
SODIUM SERPL-SCNC: 138 MMOL/L (ref 135–145)
WBC # BLD AUTO: 4.7 10E3/UL (ref 4–11)

## 2024-10-04 PROCEDURE — 250N000013 HC RX MED GY IP 250 OP 250 PS 637

## 2024-10-04 PROCEDURE — 93451 RIGHT HEART CATH: CPT | Mod: 26 | Performed by: INTERNAL MEDICINE

## 2024-10-04 PROCEDURE — 83735 ASSAY OF MAGNESIUM: CPT

## 2024-10-04 PROCEDURE — 85018 HEMOGLOBIN: CPT

## 2024-10-04 PROCEDURE — 82810 BLOOD GASES O2 SAT ONLY: CPT

## 2024-10-04 PROCEDURE — 4A023N6 MEASUREMENT OF CARDIAC SAMPLING AND PRESSURE, RIGHT HEART, PERCUTANEOUS APPROACH: ICD-10-PCS | Performed by: INTERNAL MEDICINE

## 2024-10-04 PROCEDURE — 250N000009 HC RX 250: Performed by: INTERNAL MEDICINE

## 2024-10-04 PROCEDURE — 93451 RIGHT HEART CATH: CPT | Performed by: INTERNAL MEDICINE

## 2024-10-04 PROCEDURE — 85025 COMPLETE CBC W/AUTO DIFF WBC: CPT

## 2024-10-04 PROCEDURE — 84100 ASSAY OF PHOSPHORUS: CPT

## 2024-10-04 PROCEDURE — C1751 CATH, INF, PER/CENT/MIDLINE: HCPCS | Performed by: INTERNAL MEDICINE

## 2024-10-04 PROCEDURE — 258N000003 HC RX IP 258 OP 636: Performed by: STUDENT IN AN ORGANIZED HEALTH CARE EDUCATION/TRAINING PROGRAM

## 2024-10-04 PROCEDURE — 272N000001 HC OR GENERAL SUPPLY STERILE: Performed by: INTERNAL MEDICINE

## 2024-10-04 PROCEDURE — 85610 PROTHROMBIN TIME: CPT

## 2024-10-04 PROCEDURE — 93010 ELECTROCARDIOGRAM REPORT: CPT | Mod: XU | Performed by: INTERNAL MEDICINE

## 2024-10-04 PROCEDURE — 80048 BASIC METABOLIC PNL TOTAL CA: CPT

## 2024-10-04 PROCEDURE — 93005 ELECTROCARDIOGRAM TRACING: CPT

## 2024-10-04 PROCEDURE — 99239 HOSP IP/OBS DSCHRG MGMT >30: CPT | Mod: 25 | Performed by: INTERNAL MEDICINE

## 2024-10-04 PROCEDURE — 90937 HEMODIALYSIS REPEATED EVAL: CPT

## 2024-10-04 PROCEDURE — 99233 SBSQ HOSP IP/OBS HIGH 50: CPT | Mod: GC | Performed by: INTERNAL MEDICINE

## 2024-10-04 PROCEDURE — 36415 COLL VENOUS BLD VENIPUNCTURE: CPT

## 2024-10-04 RX ADMIN — SODIUM CHLORIDE 250 ML: 9 INJECTION, SOLUTION INTRAVENOUS at 15:15

## 2024-10-04 RX ADMIN — CALCIUM ACETATE 667 MG: 667 CAPSULE ORAL at 15:42

## 2024-10-04 RX ADMIN — SODIUM CHLORIDE 300 ML: 9 INJECTION, SOLUTION INTRAVENOUS at 15:15

## 2024-10-04 RX ADMIN — MIDODRINE HYDROCHLORIDE 20 MG: 5 TABLET ORAL at 10:37

## 2024-10-04 RX ADMIN — CALCIUM ACETATE 667 MG: 667 CAPSULE ORAL at 11:48

## 2024-10-04 ASSESSMENT — ACTIVITIES OF DAILY LIVING (ADL)
ADLS_ACUITY_SCORE: 29

## 2024-10-04 NOTE — PROGRESS NOTES
Nephrology progress note  10/04/2024     Yissel Wetzel MRN:[id number] YOB: 1960  Date of Admission:9/28/2024  Primary care provider: Gigi Martin  Requesting physician: Jose Redd, *    Reason for consult: ESRD    MEDICAL DECISION MAKING     RECOMMENDATIONS:  Limited UF 10/1 of only 300 ml due to severe muscle cramps   HD on 10/3 removed 1.4 liters  Rhc done today, will do an HD run today then discharge home     ASSESSMENT:  Yissel Wetzel is a 64 year old ESRD and severe pulmonary hypertension admitted for transplant eval  Nephrology consulted for ESRD     ESRD  Dialyzes MWF at [facility]. Primary nephrologist Dr Hernandez. Access w R AVF.  Dry weight 63kg.  Last session prior to admit: 9/27. Anuric   Today's HD Rx: 2h,  / , UF goal 2L    Anemia: hgb 9.2, TSAT 12%, ferritin 299  -Mircera 100 mcg q2 weeks,   -venofer 100 mg on 10/4 during HD     Volume/HTN: weight above dry weight but does not look overtly volume overloaded    Acidosis: bicarb 24, on bicarb    MBD: Ca  9.7, phos 6.3, cont phoslo    Intradialytic hypotension: on midodrine 10mg in AM MWF + 30mg prior to HD on MWF. Midodrine 20mg BID on non-HD days      pulmonary hypertension primarily secondary to intrinsic pulmonary vascular disease   RHC 10/4 followed by HD run       Recommendations were communicated to primary team via gayle & vocgayatri Jacobo MD  Division of Renal Disease and Hypertension  [facility]  jason Holley Web Console    SUBJECTIVE     Interval events   RHC done 10/4  Hd TODAY at 12pm     REVIEW OF SYSTEMS:  A comprehensive review of systems was negative except as noted above.    PAST MEDICAL HISTORY:  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, initial encounter 12/6/2019    End stage renal  Chief Complaint   Patient presents with    Referral Follow Up     Hemotology     1. Have you been to the ER, urgent care clinic since your last visit? Hospitalized since your last visit? No    2. Have you seen or consulted any other health care providers outside of the [address] [place] [address] since your last visit? Include any pap smears or colon screening.  No failure on dialysis (H)     Esophagitis, unspecified     Former tobacco use     HEMORRHAGIC GASTROPATHY     History of blood transfusion     [place]    Iron deficiency anemia, unspecified     Mild persistent asthma     Obesity     Other and unspecified hyperlipidemia     Pneumonia     Pulmonary hypertension (H)     per 2012 echo mod.to severe pulmonary hypertension    Tobacco use disorder dc ed     Type 2 diabetes mellitus (H)     on Insulin- managed by PCP    Unspecified essential hypertension        Past Surgical History:   Procedure Laterality Date    ABDOMINOPLASTY  pt unsure when    abdominoplasty-     SECTION  ,     x 2    COLONOSCOPY      [facility]    COLONOSCOPY N/A 2021    Procedure: COLONOSCOPY, WITH POLYPECTOMY AND BIOPSY;  Surgeon: Lincoln Farrar MD;  Location:  GI    CREATE FISTULA ARTERIOVENOUS UPPER EXTREMITY Right 2018    Procedure: RIGHT BRACHIAL TO BASILIC ARTERIOVENOUS FISTULA CREATION;  Surgeon: Terry Vizcaino MD;  Location: [facility]    CV HEART CATHETERIZATION WITH POSSIBLE INTERVENTION N/A 2021    Procedure: Heart Catheterization with Possible Intervention;  Surgeon: Joseluis Dean MD;  Location:  HEART CARDIAC CATH LAB    CV RIGHT HEART CATH MEASUREMENTS RECORDED N/A 2022    Procedure: Heart Cath Right Heart Cath;  Surgeon: Yan Heredia MD;  Location:  HEART CARDIAC CATH LAB    ENT SURGERY  as a child    tonsillectomy    ESOPHAGOSCOPY, GASTROSCOPY, DUODENOSCOPY (EGD), COMBINED N/A 2021    Procedure: ESOPHAGOGASTRODUODENOSCOPY, WITH BIOPSY;  Surgeon: Lincoln Farrar MD;  Location:  GI    EYE SURGERY Left     injections- eye    HYSTERECTOMY  approx     partial hysterectomy-reason bleeding     IR CVC TUNNEL PLACEMENT > 5 YRS OF AGE  2019    IR CVC TUNNEL REMOVAL RIGHT  2020    IR DIALYSIS FISTULOGRAM RIGHT  2019        MEDICATIONS:  PTA Meds  Prior to Admission  medications    Medication Sig Last Dose Taking? Auth Provider Long Term End Date   acetaminophen (TYLENOL) 500 MG tablet Take 500-1,000 mg by mouth every 6 hours as needed. Past Week at PRN Yes Unknown, Entered By History     amiodarone (PACERONE) 100 MG TABS tablet Take 1 tablet (100 mg) by mouth five times a week. Take on Mondays, Tuesdays, Thursdays, Fridays and Saturdays; hold on Wednesdays and Sundays. Past Week Yes Felicity Green, DAVID CNP Yes    calcium acetate (PHOSLO) 667 MG CAPS capsule Take 667 mg by mouth 3 times daily (with meals) Past Week Yes Unknown, Entered By History     calcium acetate (PHOSLO) 667 MG CAPS capsule Take 667 mg by mouth Take with snacks or supplements Past Week Yes Unknown, Entered By History     carbamide peroxide (DEBROX) 6.5 % otic solution Place 10 drops Into the left ear 2 times daily. Past Week Yes Barry Ballesteros MD     famotidine (PEPCID) 20 MG tablet Take 20 mg by mouth 2 times daily as needed. Past Week at PRN Yes Unknown, Entered By History     guaiFENesin 1200 MG TB12 Take 1 tablet by mouth every 12 hours as needed (cough/congestion). Past Week at PRN Yes Unknown, Entered By History No    insulin aspart (FIASP FLEXTOUCH) 100 UNIT/ML pen-injector 2U per 15 carbs, plus 1U for every 50 that preprandial BG over 150 - MAXIMUM 20 UNITS PER DAY Past Week Yes Gigi Martin MD     insulin glargine (LANTUS PEN) 100 UNIT/ML pen Inject 11 Units subcutaneously at bedtime. Past Week Yes Gigi Martin MD Yes    levalbuterol (XOPENEX HFA) 45 MCG/ACT inhaler Inhale 2 puffs into the lungs every 6 hours as needed for shortness of breath / dyspnea or wheezing Past Week at PRN Yes Gigi Martin MD Yes    midodrine (PROAMATINE) 10 MG tablet Take 30 mg by mouth daily. On Monday, Wednesday, Friday before dialysis Past Week Yes Unknown, Entered By History     midodrine (PROAMATINE) 10 MG tablet Take 10 mg by mouth Every Mon, Wed, Fri Morning Past Week Yes Unknown,  Entered By History     midodrine (PROAMATINE) 10 MG tablet Take 20 mg by mouth 2 times daily. On non-dialysis days (Tuesday, Thursday, Saturday, Sunday) Past Week Yes Unknown, Entered By History        Current Meds  Current Facility-Administered Medications   Medication Dose Route Frequency Provider Last Rate Last Admin    amiodarone (PACERONE) tablet 100 mg  100 mg Oral Once per day on Monday Tuesday Thursday Friday Saturday Jose Redd MD   100 mg at 10/03/24 1141    calcium acetate (PHOSLO) capsule 667 mg  667 mg Oral TID w/meals Simi Bateman MD   667 mg at 10/03/24 1702    carbamide peroxide (DEBROX) 6.5 % otic solution 3 drop  3 drop Right Ear BID Tory Castellanos MD   3 drop at 10/01/24 2232    insulin aspart (NovoLOG) injection (RAPID ACTING)  1-7 Units Subcutaneous TID AC lAina Arrington MD   1 Units at 10/03/24 1702    insulin aspart (NovoLOG) injection (RAPID ACTING)  1-5 Units Subcutaneous At Bedtime Alina Arrington MD        insulin aspart (NovoLOG) injection (RAPID ACTING)   Subcutaneous Daily with breakfast Simi Bateman MD   5 Units at 10/03/24 0821    insulin aspart (NovoLOG) injection (RAPID ACTING)   Subcutaneous Daily with lunch Simi Bateman MD   3 Units at 10/03/24 1222    insulin aspart (NovoLOG) injection (RAPID ACTING)   Subcutaneous Daily with supper Simi Bateman MD   4 Units at 10/03/24 1702    insulin glargine (LANTUS PEN) injection 10 Units  10 Units Subcutaneous At Bedtime Tory Castellanos MD   10 Units at 10/03/24 2234    iron sucrose (VENOFER) 100 mg in sodium chloride 0.9 % 5 mL intermittent infusion  100 mg Intravenous Once Jorge A Jacobo MD        midodrine (PROAMATINE) tablet 10 mg  10 mg Oral Q Mon Wed Fri AM Alina Arrington MD   10 mg at 09/30/24 1036    midodrine (PROAMATINE) tablet 20 mg  20 mg Oral 2 times per day on Sunday Tuesday Thursday Saturday Alina Arrington MD   20 mg at 10/04/24 1037    multivitamin RENAL (RENAVITE RX/NEPHROVITE) tablet 1  tablet  1 tablet Oral Daily Alina Arrington MD        No heparin via hemodialysis machine   Does not apply Once Jorge A Jacobo MD        sodium chloride 0.9% BOLUS 250 mL  250 mL Intravenous Once in dialysis/CRRT Jorge A Jacobo MD        sodium chloride 0.9% BOLUS 300 mL  300 mL Hemodialysis Machine Once Jorge A Jacobo MD         Infusion Meds  Current Facility-Administered Medications   Medication Dose Route Frequency Provider Last Rate Last Admin    ACE Inhibitor/ARB/ARNI not indicated according to guidelines   Does not apply DOES NOT GO TO Simi Condon MD        Continuing beta blocker from home medication list OR beta blocker order already placed during this visit   Does not apply DOES NOT GO TO Simi Condon MD           ALLERGIES:    Allergies   Allergen Reactions    Albuterol      shakiness    Aspirin      gi    Byetta [Exenatide]      gi    Clonidine      constipation    Codeine      vomiting    Ezetimibe      muscle symptoms    Hydralazine      headaches    Lisinopril      hyperkalemia    Metformin Hydrochloride      vomiting    Pravachol [Pravastatin Sodium]      muscle pains    Simvastatin      myalgias    Troglitazone        SOCIAL HISTORY:   Social History     Socioeconomic History    Marital status: Single     Spouse name: Not on file    Number of children: Not on file    Years of education: Not on file    Highest education level: Not on file   Occupational History    Not on file   Tobacco Use    Smoking status: Former     Current packs/day: 0.00     Average packs/day: 1 pack/day for 22.0 years (22.0 ttl pk-yrs)     Types: Cigarettes     Start date: 10/12/1977     Quit date: 10/12/1999     Years since quittin.9    Smokeless tobacco: Never   Vaping Use    Vaping status: Never Used   Substance and Sexual Activity    Alcohol use: No     Alcohol/week: 0.0 standard drinks of alcohol    Drug use: No    Sexual activity: Never   Other Topics Concern     Service Not Asked     Blood Transfusions Yes    Caffeine Concern No    Occupational Exposure Not Asked    Hobby Hazards Not Asked    Sleep Concern No    Stress Concern No    Weight Concern Yes     Comment: would like to lose weight    Special Diet No    Back Care Not Asked    Exercise Yes     Comment: walks daily 2 blocks    Bike Helmet Not Asked    Seat Belt Not Asked    Self-Exams Not Asked    Parent/sibling w/ CABG, MI or angioplasty before 65F 55M? Not Asked   Social History Narrative    Not on file     Social Determinants of Health     Financial Resource Strain: Low Risk  (10/1/2024)    Financial Resource Strain     Within the past 12 months, have you or your family members you live with been unable to get utilities (heat, electricity) when it was really needed?: No   Food Insecurity: Low Risk  (10/1/2024)    Food Insecurity     Within the past 12 months, did you worry that your food would run out before you got money to buy more?: No     Within the past 12 months, did the food you bought just not last and you didn t have money to get more?: No   Transportation Needs: Low Risk  (10/1/2024)    Transportation Needs     Within the past 12 months, has lack of transportation kept you from medical appointments, getting your medicines, non-medical meetings or appointments, work, or from getting things that you need?: No   Physical Activity: Not on file   Stress: Not on file   Social Connections: Not on file   Interpersonal Safety: Low Risk  (10/1/2024)    Interpersonal Safety     Do you feel physically and emotionally safe where you currently live?: Yes     Within the past 12 months, have you been hit, slapped, kicked or otherwise physically hurt by someone?: No     Within the past 12 months, have you been humiliated or emotionally abused in other ways by your partner or ex-partner?: No   Housing Stability: High Risk (10/1/2024)    Housing Stability     Do you have housing? : No     Are you worried about losing your housing?: No        FAMILY MEDICAL HISTORY:   Family History   Problem Relation Age of Onset    Arrhythmia Mother     Hypertension Mother     Pancreatic Cancer Father     Diabetes Brother     Asthma Sister     LUNG DISEASE Sister     Diabetes Daughter     Cirrhosis Sister        OBJECTIVE     PHYSICAL EXAM:   Temp  Av.9  F (36.6  C)  Min: 97.3  F (36.3  C)  Max: 98.4  F (36.9  C)      Pulse  Av.3  Min: 55  Max: 65 Resp  Av.2  Min: 16  Max: 18  SpO2  Av.2 %  Min: 95 %  Max: 98 %       BP 92/76 (BP Location: Left arm)   Pulse 56   Temp 97.4  F (36.3  C) (Axillary)   Resp 16   Ht 1.524 m (5')   Wt 65.9 kg (145 lb 4.8 oz)   LMP  (LMP Unknown)   SpO2 100%   BMI 28.38 kg/m     Date 24 07 - 24 0659   Shift 7119-0305 2808-4703 2896-6161 24 Hour Total   INTAKE   P.O. 120   120   Shift Total(mL/kg) 120(1.77)   120(1.77)   OUTPUT   Shift Total(mL/kg)       Weight (kg) 67.95 67.95 67.95 67.95      Admit Weight: 67.7 kg (149 lb 4.8 oz)     General:sitting up at edge of bed  HEENT:mmm  Cardiac:rrr  Pulmonary:unlabored  Abdominal: nondistended  Extremities:no LE edema   Neuro:A&Ox4  Access:R AVF w thrill    LABS:   CMP  Recent Labs   Lab 10/04/24  1021 10/04/24  0806 10/04/24  0646 10/03/24  2231 10/03/24  0621 10/03/24  0458 10/02/24  0812 10/02/24  0538 10/01/24  0737 10/01/24  0600 24  0723 24  0321 24  1619 24  1612   NA  --   --  138  --   --  133*  --  136  --  138   < > 137  --  139   POTASSIUM  --   --  4.4  --   --  4.7  --  4.6  --  4.3   < > 5.5*  --  5.3   CHLORIDE  --   --  97*  --   --  95*  --  98  --  99   < > 98  --  97*   CO2  --   --  28  --   --  24  --  26  --  26   < > 24  --  25   ANIONGAP  --   --  13  --   --  14  --  12  --  13   < > 15  --  17*   * 100* 99 222*   < > 210*   < > 138*   < > 140*   < > 220*   < > 173*   BUN  --   --  39.4*  --   --  43.2*  --  26.5*  --  15.9   < > 38.4*  --  31.4*   CR  --   --  5.77*  --   --  6.56*  --  4.87*  --   3.94*   < > 7.76*  --  7.09*   GFRESTIMATED  --   --  8*  --   --  7*  --  9*  --  12*   < > 5*  --  6*   KANWAL  --   --  9.3  --   --  9.6  --  9.4  --  9.0   < > 9.7  --  9.9   MAG  --   --  1.8  --   --  2.0  --  1.9  --  1.8   < >  --   --  2.3   PHOS  --   --  5.5*  --   --  5.3*  --  4.6*  --  4.1   < >  --   --  5.2*   PROTTOTAL  --   --   --   --   --   --   --   --   --   --   --  6.3*  --  6.8   ALBUMIN  --   --   --   --   --   --   --   --   --   --   --  3.6  --  3.8   BILITOTAL  --   --   --   --   --   --   --   --   --   --   --  0.3  --  0.4   ALKPHOS  --   --   --   --   --   --   --   --   --   --   --  92  --  94   AST  --   --   --   --   --   --   --   --   --   --   --  13  --  16   ALT  --   --   --   --   --   --   --   --   --   --   --  <5  --  5    < > = values in this interval not displayed.     CBC  Recent Labs   Lab 10/04/24  0942 10/04/24  0646 10/03/24  0458 10/02/24  0538 10/01/24  0600   HGB 9.6* 9.0* 9.0* 9.1* 9.3*   WBC  --  4.7 5.1 5.7 5.7   RBC  --  3.20* 3.18* 3.19* 3.24*   HCT  --  29.6* 29.7* 29.9* 30.8*   MCV  --  93 93 94 95   MCH  --  28.1 28.3 28.5 28.7   MCHC  --  30.4* 30.3* 30.4* 30.2*   RDW  --  16.3* 16.3* 16.8* 16.9*   PLT  --  131* 130* 198 135*     INR  Recent Labs   Lab 10/04/24  0646 10/03/24  1635 10/02/24  0538 10/01/24  0600   INR 1.17* 1.09 1.17* 1.14     ABG  Recent Labs   Lab 09/29/24  0321   O2PER 2      URINE STUDIES  Recent Labs   Lab Test 09/25/19  1455 06/07/18  1455 08/09/17  1640   COLOR Yellow Yellow Yellow   APPEARANCE Clear Clear Clear   URINEGLC >499* 250* Negative   URINEBILI Negative Negative Negative   URINEKETONE Negative Negative Negative   SG 1.005 1.015 1.020   UBLD Negative Small* Small*   URINEPH 7.0 6.0 6.0   PROTEIN 100* >=300* >=300*   UROBILINOGEN  --  0.2 0.2   NITRITE Negative Negative Negative   LEUKEST Negative Negative Negative   RBCU <1 2-5* O - 2   WBCU 1 0 - 5 O - 2     No lab results found.  PTH  No lab results found.  IRON  STUDIES  Recent Labs   Lab Test 09/29/24  0803 06/19/22  0634 03/17/19  0805 08/30/18  1318   IRON 35* 27* 27* 54    178* 178* 196*   IRONSAT 12* 15 15 28   LATRICE 299 898* 210 407*       IMAGING:  All imaging studies reviewed by me.     Jorge A Jacobo MD

## 2024-10-04 NOTE — PROVIDER NOTIFICATION
Discharged to: Home with ride   Belongings: sent with  AVS (After Visit Summary) discussed with:  patient

## 2024-10-04 NOTE — PROGRESS NOTES
HEMODIALYSIS TREATMENT NOTE    Date: 10/4/2024  Time: 2:55 PM    Data:  Modality: bed   Pre Wt: 65.9 kg   Desired Wt: 63.9kg   Post Wt: 64.9 kg  Weight change: 1.0kg  Ultrafiltration - Post Run Net Total Removed (mL): 1000 mL  Vascular Access Site: patent   Dialyzer Rinse: Streaked  Total Blood Volume Processed: 32.1 L Liters  Total Dialysis (Treatment) Time: 1.5 Hours  Dialysate Bath: K 3, Ca 3  Heparin: Heparin: None    Lab:   No    Interventions:  Dialysis done through: RUE AVF. Pt cannulated w 15g x 1- c/o needle pain. Needle removed and hemostasis obtained within 10 minutes. Pt re cannulated w 16x2 successfully. Hemostasis obtained within 10 minutes.   Pt refused as it would have extended her tx. Pt edu provided   UF set to 1.0 Liters of fluid removal, accommodating priming and rinse back volumes  BFR: Blood Flow Rate (BFR): 350mL/min  DFR: Potassium/Calcium Rate: 600 mL/min  No medications administered per MAR  CritLine stable throughout the run, tolerating UF pull, see flowsheets for additional information.    Report given to PCN, sent back to [facility] room in stable condition.    Assessment:  A/O x 4, denies pain  Lung sounds diminished anterior and lateral BUL/BLL  Access site intact, previous dressing clean and dry     Plan:    Per Renal team

## 2024-10-04 NOTE — DISCHARGE SUMMARY
"[facility]  Cardiology 2 Service / Advanced Heart Failure  Discharge Summary       Date of Admission:  9/28/2024   Date of Discharge:  10/4/2024    Discharge Diagnoses:     Severe pulmonary hypertension primarily secondary to intrinsic pulmonary vascular disease  Junctional rhythm  ESRD  R pleural effusion, chronic  History of SVT  DM2   COPD  I personally saw and exmined this patient for discharge formulation and hospitalization summery.  Despite removal of fluid and resetting of her \"dry\" weight her puilmonary hypertension remains severely elevated, disqualifying her from consideration for transplantation.  45 minutes    Follow-up Planning:     Medication changes: None  Follow-up appointments: Follow with Dr. Redd in 2 months  Pending diagnostics: None  Other: None    Follow-up Appointments     Follow Up ([facility])      Follow up with Dr. Redd in 2 months    Appointments on [place] and/or [facility] (with [facility] or [facility]   provider or service). Call [phone] if you haven't heard regarding   these appointments within 7 days of discharge.           Unresulted Labs Ordered in the Past 30 Days of this Admission       No orders found from 8/29/2024 to 9/29/2024.            Hospital Course     Yissel Wetzel is a 64 year old female with severe pulmonary hypertension admitted for daily dialysis and RHC to assess pulmonary artery pressure to eval for candidacy for renal transplant     Please see H&P from 9/28/2024 for full details of presentation. The following problems were addressed during hospitalization:    Severe pulmonary hypertension primarily secondary to intrinsic pulmonary vascular disease  Followed by Dr. Redd. Admitted for daily HD to ensure euvolemia prior to RHC. Underwent HD x 4. RHC with moderately elevated right sided filling pressures, severely elevated pulmonary artery hypertension. Due to this, she likely would not be a candidate for " kidney transplant.  RHC: RA 23/22/27  /5/19  /35/66  PCW 35/29/25  CO 2.45  - Follow up with Dr. Redd in clinic in 2 months     Junctional rhythm  Telemetry read as Afib, however EKG revealed junctional rhythm with PACs.    =============================    Chronic/Stable    ESRD  Nephrology consulted for daily dialysis for volume removal. Likely not candidate for renal transplant based on RHC results.  - Continue PTA midodrine 10 mg qam and 30 mg before HD on MWF (dialysis days); and 20 mg BID  TThSaSu (non-dialysis days)     R pleural effusion, chronic  Unchanged on CXR. Remained on home O2    History of SVT  Recently seen by EP at end of September 2024  - Continue pta amiodarone 100 mg 5 days a week      DM2   - PTA glargine 11u at bedtime  - PTA carb coverage 2u:15g  - PTA sliding scale 1u for every 50 above 150    COPD  - On home O2: 2L at night.  - Continue PRN levalbuterol.     Thrombocytopenia  Platelets stable      Chronic anemia  Baseline Hgb: 9.7-12.2     H/o cirrhosis noted to be related to severe pHTN with right heart dysfunction    Discharge Disposition   Discharged to home  Condition at discharge: Stable    Code Status on Discharge   Full Code    The patient was discussed on day of discharge with Dr. Redd.    Alina Arrington MD   Internal Medicine PGY-3  Cardiology 2 Service  ==================================    Discharge Physical Exam   Vital Signs: /47   Pulse 66   Temp 97.1  F (36.2  C) (Axillary)   Resp 20   Ht 1.524 m (5')   Wt 65.9 kg (145 lb 4.8 oz)   LMP  (LMP Unknown)   SpO2 100%   BMI 28.38 kg/m    Weight: 145 lbs 4.8 oz    Constitutional: no distress  HENT: , EOM grossly intact, sclera anicteric  Respiratory: non-labored respirations on nasal cannula  Cardiovascular: RRR, no murmur noted, no LE edema or JVD  GI: soft, non-distended, non-tender  Skin: warm and dry, no rashes or lesions  Neurologic: Cranial nerves II-XII are grossly intact, moving all  extremities equally and independently    Significant Results and Procedures     Results for orders placed or performed during the hospital encounter of 09/28/24   XR Chest Port 1 View    Narrative    Exam: XR CHEST PORT 1 VIEW, 9/29/2024 11:48 AM    Indication: crackles right lower lobe, eval for fluid or pneumonia    Comparison: 8/18/2024    Findings:   Stable enlarged cardiac silhouette. The pulmonary vasculature is  prominent and indistinct. Stable moderate loculated right pleural  effusion and adjacent streaky right perihilar and basilar opacities.  No appreciable left pleural effusion. No pneumothorax. Mild bilateral  interstitial prominence. Mild streaky left perihilar opacities.      Impression    Impression: Stable moderate loculated right pleural effusion with  associated streaky right perihilar and basilar atelectasis.  Superimposed infection is not excluded.    CONOR ELLIOTT DO         SYSTEM ID:  [id number]   Cardiac Catheterization    Narrative      Right sided filling pressures are moderately elevated.    Severely elevated pulmonary artery hypertension.    Normal cardiac output level.         Consultations this Admission   OCCUPATIONAL THERAPY ADULT IP CONSULT  NUTRITION SERVICES ADULT IP CONSULT  NEPHROLOGY ESRD ADULT IP CONSULT  NEPHROLOGY ESRD ADULT IP CONSULT  NEPHROLOGY ESRD ADULT IP CONSULT  NURSING TO CONSULT FOR VASCULAR ACCESS CARE IP CONSULT  NURSING TO CONSULT FOR VASCULAR ACCESS CARE IP CONSULT    Discharge Orders        Primary Care - Care Coordination Referral      Reason for your hospital stay    You were admitted to the hospital for a right heart catheterization to measure the pressures in your heart and lungs. We did dialysis to remove as much fluid as possible prior to measuring these pressures. Your pressures were still very high, even with removing as much fluid as we could remove.     Activity    Your activity upon discharge: activity as tolerated     Follow Up ([facility])    Follow  up with Dr. Redd in 2 months    Appointments on [place] and/or [facility] (with [facility] or [facility] provider or service). Call [phone] if you haven't heard regarding these appointments within 7 days of discharge.     Diet    Follow this diet upon discharge: Current Diet:Orders Placed This Encounter      Snacks/Supplements Adult: [employer]; Between Meals      Snacks/Supplements Adult: Other; Please allow pt to order snacks/oral supplements prn. Ok to send additional supplements.; With Meals      Fluid restriction 2000 ML FLUID      Regular Diet Adult       Discharge Medications     Current Discharge Medication List        CONTINUE these medications which have NOT CHANGED    Details   acetaminophen (TYLENOL) 500 MG tablet Take 500-1,000 mg by mouth every 6 hours as needed.      amiodarone (PACERONE) 100 MG TABS tablet Take 1 tablet (100 mg) by mouth five times a week. Take on Mondays, Tuesdays, Thursdays, Fridays and Saturdays; hold on Wednesdays and Sundays.  Qty: 30 tablet, Refills: 3    Associated Diagnoses: SVT (supraventricular tachycardia) (H)      !! calcium acetate (PHOSLO) 667 MG CAPS capsule Take 667 mg by mouth 3 times daily (with meals)      !! calcium acetate (PHOSLO) 667 MG CAPS capsule Take 667 mg by mouth Take with snacks or supplements      carbamide peroxide (DEBROX) 6.5 % otic solution Place 10 drops Into the left ear 2 times daily.  Qty: 15 mL, Refills: 0    Associated Diagnoses: Hearing loss of left ear, unspecified hearing loss type      famotidine (PEPCID) 20 MG tablet Take 20 mg by mouth 2 times daily as needed.      guaiFENesin 1200 MG TB12 Take 1 tablet by mouth every 12 hours as needed (cough/congestion).      insulin aspart (FIASP FLEXTOUCH) 100 UNIT/ML pen-injector 2U per 15 carbs, plus 1U for every 50 that preprandial BG over 150 - MAXIMUM 20 UNITS PER DAY  Qty: 15 mL, Refills: 11    Associated Diagnoses: Type 2 diabetes mellitus with diabetic nephropathy, with long-term  current use of insulin (H)      insulin glargine (LANTUS PEN) 100 UNIT/ML pen Inject 11 Units subcutaneously at bedtime.  Qty: 15 mL, Refills: 3    Comments: If Lantus is not covered by insurance, may substitute Basaglar or Semglee or other insulin glargine product per insurance preference at same dose and frequency.    Associated Diagnoses: Type 2 diabetes mellitus with diabetic nephropathy, with long-term current use of insulin (H)      levalbuterol (XOPENEX HFA) 45 MCG/ACT inhaler Inhale 2 puffs into the lungs every 6 hours as needed for shortness of breath / dyspnea or wheezing  Qty: 15 g, Refills: 11    Associated Diagnoses: Pneumonia due to infectious organism, unspecified laterality, unspecified part of lung      !! midodrine (PROAMATINE) 10 MG tablet Take 30 mg by mouth daily. On Monday, Wednesday, Friday before dialysis      !! midodrine (PROAMATINE) 10 MG tablet Take 10 mg by mouth Every Mon, Wed, Fri Morning      !! midodrine (PROAMATINE) 10 MG tablet Take 20 mg by mouth 2 times daily. On non-dialysis days (Tuesday, Thursday, Saturday, Sunday)       !! - Potential duplicate medications found. Please discuss with provider.           Primary Care Physician   Gigi Martin

## 2024-10-04 NOTE — PROGRESS NOTES
Care Management Discharge Note    Discharge Date: 10/04/2024       Discharge Disposition:  Home    Discharge Services:    Resume Hemodialysis  On Monday, 10/7/24     [facility]  [address]  [place] [address]  Ph: [phone]  Fax: [phone]     Days: MWF  Time: 10am    Discharge DME:  none  OT says home w assist 9/30/24--pt agrees    Discharge Transportation: family or friend will provide  10/4 Pt to have 1.5 hour HD run from 12n-1:30pm--she will call her friend for a ride approx 2:15-2:30pm today.    Private pay costs discussed: Not applicable    Does the patient's insurance plan have a 3 day qualifying hospital stay waiver?  No        Education Provided on the Discharge Plan:  I met with pt in her room, I explained my role and she is sitting in the chair and pleasant. She informed me of HD run today and her DV [place] does have Saturday runs but she refuses to go even if I could get her in.  Persons Notified of Discharge Plans: Pt/bedside ANGELICA Simeon, nanci 2 Dr Madden  Patient/Family in Agreement with the Plan:  Yes    Handoff Referral Completed: Yes, [facility] PCP: Internal handoff referral completed    Additional Information: Has [employer] pmap--has rides.      Zaira Melendez RN

## 2024-10-04 NOTE — PLAN OF CARE
D: Yissel Wetzel is a 64 year old female with severe pulmonary hypertension admitted for daily dialysis and RHC to assess pulmonary artery pressure to eval for transplant      I/A:   Neuro: A&O x4. Call light appropriate.   Cardiac: SR/SB HR 50s-60s. Soft Bps, baseline per pt. Denies chest pain and palpitations.   Respiratory: 1.5L NC Sats >92%  GI/: Hemodialysis. 2g Na diet, 2L FR; NPO at midnight RHC procedure. LBM 10/3.   Skin/drains: Scab on head and forehead. Ruslan LE, ashen skin. R arm HD fistula.   IV/Drips: No PIV   Pain: PRN Tylenol given x1  Activity: Independent     P: RHC 10/4 AM, HD 10/4 PM. Patient will discharge 10/4. Notifying Cards 2 of changes.     Goal Outcome Evaluation:      Plan of Care Reviewed With: patient    Overall Patient Progress: no changeOverall Patient Progress: no change

## 2024-10-04 NOTE — DISCHARGE INSTRUCTIONS
Resume Hemodialysis  On Monday, 10/7/24    [facility] [place]  [address]  [place] [address]  Ph: [phone]  Fax: [phone]    Days: MWF  Time: 10am

## 2024-10-04 NOTE — PROVIDER NOTIFICATION
"Patient blood glucose 222 mg/dL. Per sliding scale, patient is to receive 2 units of insulin. Patient refused sliding scale, \"I don't take the sliding scale at night\". Patient education provided; patient continued to refuse. Provider Tory Castellanos notified.   "

## 2024-10-05 NOTE — PLAN OF CARE
Occupational Therapy Discharge Summary    Reason for therapy discharge:    Discharged to home.    Progress towards therapy goal(s). See goals on Care Plan in [facility] electronic health record for goal details.  Goals partially met.  Barriers to achieving goals:   discharge from facility.    Therapy recommendation(s):    Pt will benefit from continued assist as needed with I/ADLs

## 2024-10-07 ENCOUNTER — PATIENT OUTREACH (OUTPATIENT)
Dept: CARE COORDINATION | Facility: CLINIC | Age: 64
End: 2024-10-07
Payer: MEDICARE

## 2024-10-07 DIAGNOSIS — I27.20 PULMONARY HYPERTENSION, UNSPECIFIED (H): ICD-10-CM

## 2024-10-07 DIAGNOSIS — Z12.31 ENCOUNTER FOR SCREENING MAMMOGRAM FOR BREAST CANCER: Primary | ICD-10-CM

## 2024-10-07 DIAGNOSIS — R06.02 SOB (SHORTNESS OF BREATH): Primary | ICD-10-CM

## 2024-10-07 NOTE — PROGRESS NOTES
Clinic Care Coordination Contact  Transitions of Care Outreach  Chief Complaint   Patient presents with    Clinic Care Coordination - Post Hospital       Most Recent Admission Date: 9/28/2024   Most Recent Admission Diagnosis:      Most Recent Discharge Date: 10/4/2024   Most Recent Discharge Diagnosis: Pulmonary hypertension (H) - I27.20  Pulmonary hypertension (H) - I27.20  ESRD on dialysis (H) - N18.6, Z99.2     Transitions of Care Assessment    Discharge Assessment  How are you doing now that you are home?: Doing ok.  Do you know how to contact your clinic care team if you have future questions or changes to your health status? : Yes  Does the patient have their discharge instructions? : Yes  Does the patient have questions regarding their discharge instructions? : No  Were you started on any new medications or were there changes to any of your previous medications? : Yes  Does the patient have all of their medications?: Yes  Do you have questions regarding any of your medications? : No  Do you have all of your needed medical supplies or equipment (DME)?  (i.e. oxygen tank, CPAP, cane, etc.): Yes         Post-op (Clinicians Only)  Did the patient have surgery or a procedure: No  Fever: No  Chills: No  Eating & Drinking: eating and drinking without complaints/concerns  PO Intake: regular diet  Additional Symptoms:  (Denies)  Bowel Function: normal  Date of last BM: 10/07/24  Urinary Status: voiding without complaint/concerns    Care Management   None    Follow up Plan     Patient declined Care Coordination services at this time.   Patient is aware of how to initiate Care Coordination services with the clinic in the future.     Patient declined to review medication list.     Patient declined setting up visit with PCP. Discussed importance of the visit and follow up. Patient declined.   Reviewed she is due for AWV. Encouraged to schedule.     Patient would like to set up a mammogram. Has a [employer] form where she can  get $30 if she completes.   Helped patient schedule for tomorrow.     Discharge Follow-Up  Discharge follow up appointment scheduled in alignment with recommended follow up timeframe or Transitions of Risk Category? (Low = within 30 days; Moderate= within 14 days; High= within 7 days): No  Patient's follow up appointment not scheduled: Patient declined scheduling support. Education on the importance of transitions of care follow up. Provided scheduling phone number.    Future Appointments   Date Time Provider Department Center   10/8/2024  9:30 AM OXMA1 OXMAM OX       Outpatient Plan as outlined on AVS reviewed with patient.    For any urgent concerns, please contact our 24 hour nurse triage line: [phone] ([phone])       Annita Gilman RN

## 2024-10-08 ENCOUNTER — ANCILLARY PROCEDURE (OUTPATIENT)
Dept: MAMMOGRAPHY | Facility: CLINIC | Age: 64
End: 2024-10-08
Attending: INTERNAL MEDICINE
Payer: MEDICARE

## 2024-10-08 DIAGNOSIS — Z12.31 ENCOUNTER FOR SCREENING MAMMOGRAM FOR BREAST CANCER: ICD-10-CM

## 2024-10-08 DIAGNOSIS — Z12.31 VISIT FOR SCREENING MAMMOGRAM: ICD-10-CM

## 2024-10-08 PROCEDURE — 77067 SCR MAMMO BI INCL CAD: CPT | Mod: TC | Performed by: RADIOLOGY

## 2024-10-08 PROCEDURE — 77063 BREAST TOMOSYNTHESIS BI: CPT | Mod: TC | Performed by: RADIOLOGY

## 2024-10-09 LAB
ATRIAL RATE - MUSE: NORMAL BPM
DIASTOLIC BLOOD PRESSURE - MUSE: NORMAL MMHG
INTERPRETATION ECG - MUSE: NORMAL
P AXIS - MUSE: NORMAL DEGREES
PR INTERVAL - MUSE: NORMAL MS
QRS DURATION - MUSE: 84 MS
QT - MUSE: 464 MS
QTC - MUSE: 482 MS
R AXIS - MUSE: 77 DEGREES
SYSTOLIC BLOOD PRESSURE - MUSE: NORMAL MMHG
T AXIS - MUSE: 219 DEGREES
VENTRICULAR RATE- MUSE: 65 BPM

## 2024-10-11 ENCOUNTER — TELEPHONE (OUTPATIENT)
Dept: CARDIOLOGY | Facility: CLINIC | Age: 64
End: 2024-10-11
Payer: MEDICARE

## 2024-10-11 NOTE — TELEPHONE ENCOUNTER
10/11/2024 2:43PM Viviana Tan  Patient Contacted for the patient to call back and schedule the following:    Appointment type: Return Pulmonary Hypertension  Provider: Dr. Redd  Return date: 2 month follow-up (December 2024)  Specialty phone number: [phone] option 1  Additional appointment(s) needed: labs prior  Additonal Notes: 10/11 Called to schedule [facility] w/ Dr. Redd w/ labs prior, but pt said she can only do Tuesday appts at . [place]. Redd is available [place] Tues[place] [facility] and Thursdays at , but pt said she cannot get a ride on another day and they don't go to [facility]. BRANDY   Viviana Dominick 10/11/2024 2:43PM

## 2024-10-11 NOTE — TELEPHONE ENCOUNTER
----- Message from Lorenza CANDELARIO sent at 10/4/2024  2:40 PM CDT -----  Regarding: FW: Hospital follow up    ----- Message -----  From: Alina Arrington MD  Sent: 10/4/2024   2:11 PM CDT  To: Jose Redd MD; #  Subject: Hospital follow up                               Hello, we are discharging Cassandra from the hospital today. Could you please arrange follow up with Dr. Redd in 2 months?

## 2024-10-25 DIAGNOSIS — E11.21 TYPE 2 DIABETES MELLITUS WITH DIABETIC NEPHROPATHY, WITH LONG-TERM CURRENT USE OF INSULIN (H): Primary | ICD-10-CM

## 2024-10-25 DIAGNOSIS — Z79.4 TYPE 2 DIABETES MELLITUS WITH DIABETIC NEPHROPATHY, WITH LONG-TERM CURRENT USE OF INSULIN (H): Primary | ICD-10-CM

## 2024-11-18 ENCOUNTER — TELEPHONE (OUTPATIENT)
Dept: INTERNAL MEDICINE | Facility: CLINIC | Age: 64
End: 2024-11-18
Payer: MEDICARE

## 2024-11-18 DIAGNOSIS — Z79.4 TYPE 2 DIABETES MELLITUS WITH DIABETIC NEPHROPATHY, WITH LONG-TERM CURRENT USE OF INSULIN (H): ICD-10-CM

## 2024-11-18 DIAGNOSIS — E11.21 TYPE 2 DIABETES MELLITUS WITH DIABETIC NEPHROPATHY, WITH LONG-TERM CURRENT USE OF INSULIN (H): ICD-10-CM

## 2024-11-18 NOTE — TELEPHONE ENCOUNTER
[facility] pharmacist called. Pt is requesting Novolog since she FIASP is not working for her. Triage advised VV to discuss insulin change. Pt request for PA. Please advice. Ok to proceed with PA for Novolog?

## 2024-11-21 RX ORDER — INSULIN ASPART 100 [IU]/ML
INJECTION, SOLUTION INTRAVENOUS; SUBCUTANEOUS
Qty: 15 ML | Refills: 11 | Status: SHIPPED | OUTPATIENT
Start: 2024-11-21 | End: 2024-11-25

## 2024-11-21 NOTE — TELEPHONE ENCOUNTER
Please sign med order as pended and route back to triage pool so we can complete PA and re-send on patient's behalf.     Thank you-    Terri Grey RN

## 2024-11-21 NOTE — TELEPHONE ENCOUNTER
Pt wants letter sent to her insurance to approve her Novolog. States she is running out of medication and needs Novolog.     Rx pended. Please sign if agree with Novolog order. Writer will also route message to PA team to help with PA once Rx is signed. Thank you.

## 2024-11-21 NOTE — TELEPHONE ENCOUNTER
In order to send a Prior Authorization, the Retail PA Team does require an active Rx order on the patient's chart with directions for usage, diagnosis code, ANAID code and filling pharmacy.    Once that has been, please then route this encounter back to [facility] PA MED and a PA can be submitted.

## 2024-11-21 NOTE — TELEPHONE ENCOUNTER
Retail Pharmacy Prior Authorization Team   Phone: [phone]      PA Initiation    Medication: NOVOLOG FLEXPEN SC  Insurance Company: [employer] - Phone [phone] Fax [phone]  Pharmacy Filling the Rx: [id number] - [place], [place] - [address] [facility]  Filling Pharmacy Phone:    Filling Pharmacy Fax:    Start Date: 11/21/2024

## 2024-11-22 NOTE — TELEPHONE ENCOUNTER
"Retail Pharmacy Prior Authorization Team   Phone: [phone]      PRIOR AUTHORIZATION DENIED    Medication: NOVOLOG FLEXPEN SC  Insurance Company: [employer] - Phone [phone] Fax [phone]  Denial Date: 11/21/2024  Denial Reason(s):         Appeal Information: To send an appeal to the patient's insurance, please provide a letter of medical necessity for the requested medication that was denied.  Please use the denial rationale from the patient's insurance to write the letter.  Once it is completed, please have it available under the \"letters tab\" in the patient's chart and route directly back to me: [place] and I can send the appeal to the patient's insurance.     Patient Notified: No. The [facility] PA Team does not notify of the denial outcomes as the patient often will ask what their provider will prescribe in place of the denied medication, or additional information in regards to other therapies they can take in place of the denied medication.  This is not something we can advise on in our department    "

## 2024-11-25 ENCOUNTER — TELEPHONE (OUTPATIENT)
Dept: CARDIOLOGY | Facility: CLINIC | Age: 64
End: 2024-11-25
Payer: MEDICARE

## 2024-11-25 ENCOUNTER — TELEPHONE (OUTPATIENT)
Dept: INTERNAL MEDICINE | Facility: CLINIC | Age: 64
End: 2024-11-25
Payer: MEDICARE

## 2024-11-25 DIAGNOSIS — Z79.4 TYPE 2 DIABETES MELLITUS WITH DIABETIC NEPHROPATHY, WITH LONG-TERM CURRENT USE OF INSULIN (H): Primary | ICD-10-CM

## 2024-11-25 DIAGNOSIS — E11.21 TYPE 2 DIABETES MELLITUS WITH DIABETIC NEPHROPATHY, WITH LONG-TERM CURRENT USE OF INSULIN (H): Primary | ICD-10-CM

## 2024-11-25 NOTE — TELEPHONE ENCOUNTER
Patient Contacted for the patient to call back and schedule the following:    Appointment type: RTN PH  Provider: AYSHA  Return date: NONE  Specialty phone number: [phone] OPT 1  Additional appointment(s) needed: N/A  Additonal Notes: PT [place] [facility]. PT WANTS ANOTHER DOCTOR IN [place].

## 2024-11-25 NOTE — TELEPHONE ENCOUNTER
Relayed alternative script info to patient. Patient appreciates the update and has no questions at this time.

## 2024-11-25 NOTE — TELEPHONE ENCOUNTER
We are currently unable to fill Freestyle Ashley 2 for the patient due to patient not meeting Medicare Guidelines. In order to meet guidelines we need the following:    Patient must be seen/clinical notes must be written in the last 6 months by the prescribing provider.  Must state that the patient is injecting insulin 1 time daily or more (Can be written that patient is injecting with insulin pump) We cannot accept medication list as insulin regimen.   Must state that the patient is a type 1 or type 2 diabetic.  Must state that patient is using CGM    Please schedule an appointment for the patient and send new prescriptions after the appointment.     Thank you!    Diabetes Care Services Pharmacy Team  [facility]  Phone: [phone]  Fax: [phone]  Pool: Pharm Diabetes

## 2024-11-25 NOTE — TELEPHONE ENCOUNTER
Nelda (pharmacist) called the clinic stating the script needs to have the maximum amount of units per day pt will getting on it in order to bill insurance.     Routing to PCP to resend script.

## 2024-11-25 NOTE — TELEPHONE ENCOUNTER
Patient called the clinic regarding her Novolog. She stated that she is almost out of the medication. A PA was done on the Novolog on 11/18 and it was denied. Patient is requesting an insulin be sent in today so she does not go without.     Sarah CHANEL RN  [facility] Triage Team

## 2024-11-26 NOTE — TELEPHONE ENCOUNTER
Pt called in the clinic reporting that her insulin was canceled at the pharmacy. Writer called the pharmacy and activated the order for Novolin. Called pt to relay the message. Pt verbalized understanding and has no questions or concerns.     Sam Zelaya RN  [facility]

## 2024-12-23 ENCOUNTER — VIRTUAL VISIT (OUTPATIENT)
Dept: INTERNAL MEDICINE | Facility: CLINIC | Age: 64
End: 2024-12-23
Payer: MEDICARE

## 2024-12-23 DIAGNOSIS — E11.21 TYPE 2 DIABETES MELLITUS WITH DIABETIC NEPHROPATHY, WITH LONG-TERM CURRENT USE OF INSULIN (H): Primary | ICD-10-CM

## 2024-12-23 DIAGNOSIS — Z79.4 TYPE 2 DIABETES MELLITUS WITH DIABETIC NEPHROPATHY, WITH LONG-TERM CURRENT USE OF INSULIN (H): Primary | ICD-10-CM

## 2024-12-23 PROCEDURE — 99441 PR PHYSICIAN TELEPHONE EVALUATION 5-10 MIN: CPT | Mod: 93 | Performed by: INTERNAL MEDICINE

## 2024-12-23 RX ORDER — ACYCLOVIR 800 MG/1
TABLET ORAL
Qty: 6 EACH | Refills: 1 | Status: CANCELLED | OUTPATIENT
Start: 2024-12-23

## 2024-12-23 RX ORDER — KETOROLAC TROMETHAMINE 30 MG/ML
INJECTION, SOLUTION INTRAMUSCULAR; INTRAVENOUS
Status: CANCELLED | OUTPATIENT
Start: 2024-12-23

## 2024-12-23 NOTE — PROGRESS NOTES
Cassandra is a 64 year old who is being evaluated via a billable telephone visit.    What phone number would you like to be contacted at? [phone]  How would you like to obtain your AVS? MyChart  Originating Location (pt. Location): Home    Distant Location (provider location):  On-site    Assessment & Plan     Type 2 diabetes mellitus with diabetic nephropathy, with long-term current use of insulin (H)  Patient has benefited and will continue to benefit from use of continuous glucose monitoring.  She is a brittle type II diabetic, is using continuous glucose monitoring currently, and is giving herself at least 4 insulin injection doses per day.  - Continuous Glucose Sensor (FREESTYLE ASHLEY 2 SENSOR) MISC; Change every 14 days.  - Continuous Glucose  (FREESTYLE ASHLEY 2 READER) AYDIN; Use to read blood sugars as per 's instructions.          BMI  Estimated body mass index is 28.38 kg/m  as calculated from the following:    Height as of 9/28/24: 1.524 m (5').    Weight as of 10/4/24: 65.9 kg (145 lb 4.8 oz).             Subjective   Cassandra is a 64 year old, presenting for the following health issues:  Medication Request    HPI     Discuss the free style Ashley 3.               Review of Systems  Constitutional, HEENT, cardiovascular, pulmonary, gi and gu systems are negative, except as otherwise noted.      Objective           Vitals:  No vitals were obtained today due to virtual visit.    Physical Exam   General: Alert and no distress //Respiratory: No audible wheeze, cough, or shortness of breath // Psychiatric:  Appropriate affect, tone, and pace of words            Phone call duration: 6 minutes  Signed Electronically by: Gigi Martin MD

## 2025-01-01 ENCOUNTER — APPOINTMENT (OUTPATIENT)
Dept: GENERAL RADIOLOGY | Facility: CLINIC | Age: 65
DRG: 242 | End: 2025-01-01
Attending: INTERNAL MEDICINE
Payer: COMMERCIAL

## 2025-01-01 ENCOUNTER — APPOINTMENT (OUTPATIENT)
Dept: CT IMAGING | Facility: CLINIC | Age: 65
DRG: 242 | End: 2025-01-01
Attending: INTERNAL MEDICINE
Payer: COMMERCIAL

## 2025-01-01 ENCOUNTER — APPOINTMENT (OUTPATIENT)
Dept: CARDIOLOGY | Facility: CLINIC | Age: 65
DRG: 242 | End: 2025-01-01
Attending: INTERNAL MEDICINE
Payer: COMMERCIAL

## 2025-01-01 ENCOUNTER — ANESTHESIA (OUTPATIENT)
Dept: INTENSIVE CARE | Facility: CLINIC | Age: 65
DRG: 242 | End: 2025-01-01
Payer: COMMERCIAL

## 2025-01-01 ENCOUNTER — APPOINTMENT (OUTPATIENT)
Dept: ULTRASOUND IMAGING | Facility: CLINIC | Age: 65
DRG: 242 | End: 2025-01-01
Attending: INTERNAL MEDICINE
Payer: COMMERCIAL

## 2025-01-01 ENCOUNTER — ANESTHESIA EVENT (OUTPATIENT)
Dept: INTENSIVE CARE | Facility: CLINIC | Age: 65
DRG: 242 | End: 2025-01-01
Payer: COMMERCIAL

## 2025-01-01 PROCEDURE — 74177 CT ABD & PELVIS W/CONTRAST: CPT

## 2025-01-01 PROCEDURE — 999N000065 XR CHEST PORT 1 VIEW

## 2025-01-01 PROCEDURE — 93306 TTE W/DOPPLER COMPLETE: CPT | Mod: 26 | Performed by: INTERNAL MEDICINE

## 2025-01-01 PROCEDURE — 71045 X-RAY EXAM CHEST 1 VIEW: CPT

## 2025-01-01 PROCEDURE — 370N000003 HC ANESTHESIA WARD SERVICE: Performed by: NURSE ANESTHETIST, CERTIFIED REGISTERED

## 2025-01-01 PROCEDURE — 49083 ABD PARACENTESIS W/IMAGING: CPT

## 2025-01-01 PROCEDURE — C8929 TTE W OR WO FOL WCON,DOPPLER: HCPCS

## 2025-01-01 NOTE — PROGRESS NOTES
No show    Impression:  Pulmonary hypertension  Hypertension  ESRD with dialysis dependence  Diabetes  Iron deficiency                  [employer]   [address] [facility]   [address]   [place] [address]   [phone]               Severe pulmonary hypertension primarily secondary to intrinsic pulmonary vascular disease  Junctional rhythm  ESRD  R pleural effusion, chronic  History of SVT  DM2   COPD                Right sided filling pressures are severely elevated.    Left sided filling pressures are severely elevated.    Severely elevated pulmonary artery hypertension.    Normal cardiac output level.    Hemodynamic data has been modified in [facility] per physician review.     /59/85  RA --/--/20  /20  /35/66  PCW --/--/25    PA sat 51%  AO sat 90%    CO by SHANA 3.99 (2.45)    PVR 10 Right sided filling pressures are severely elevated. Left sided filling pressures are severely elevated. Severely elevated pulmonary artery hypertension. Normal cardiac output level. Hemodynamic data has been modified in Epic per physician review.       Echocardiogram 2024      Name: ELIECER CHACON  MRN: [id number]  : 1960  Study Date: 2024 02:53 PM  Age: 63 yrs  Gender: Female  Patient Location: [facility]  Reason For Study: Pulmonary hypertension (H), Shortness of breath  Ordering Physician: JARRED OLMSTEAD  Referring Physician: JARRED OLMSTEAD  Performed By: Katheryn Carpenter     BSA: 1.6 m2  Height: 60 in  Weight: 138 lb  HR: 65  BP: 116/51 mmHg  ______________________________________________________________________________  Procedure  Complete Echo Adult.  ______________________________________________________________________________  Interpretation Summary     1. Left ventricular systolic function is normal. The visual ejection fraction  is 60-65%.  2. No regional wall motion abnormalities noted.  3. The right ventricle is normal in structure, function and size.  4. The left  atrium is severely dilated.  5. There is mild (1+) mitral regurgitation.  6. Mild valvular aortic stenosis; mean gradient 10 mmHg, peak velocity 2.2  m/sec.  7. The right ventricular systolic pressure is approximated at 33mmHg plus the  right atrial pressure. IVC diameter <2.1 cm collapsing >50% with sniff  suggests a normal RA pressure of 3 mmHg.  ______________________________________________________________________________  Left Ventricle  The left ventricle is normal in size. There is normal left ventricular wall  thickness. Left ventricular systolic function is normal. The visual ejection  fraction is 60-65%. No regional wall motion abnormalities noted.     Right Ventricle  The right ventricle is normal in structure, function and size.     Atria  The left atrium is severely dilated. The right atrium is severely dilated.  There is no color Doppler evidence of an atrial shunt.     Mitral Valve  There is mild mitral annular calcification. There is mild (1+) mitral  regurgitation.     Tricuspid Valve  The tricuspid valve is normal in structure and function. There is mild (1+)  tricuspid regurgitation. The right ventricular systolic pressure is  approximated at 33mmHg plus the right atrial pressure.     Aortic Valve  Mild valvular aortic stenosis.     Pulmonic Valve  The pulmonic valve is not well seen, but is grossly normal.     Vessels  Normal size aorta. IVC diameter <2.1 cm collapsing >50% with sniff suggests a  normal RA pressure of 3 mmHg.     Pericardium  There is no pericardial effusion.     Rhythm  Sinus rhythm was noted.  ______________________________________________________________________________  MMode/2D Measurements & Calculations  IVSd: 1.0 cm     LVIDd: 4.3 cm  LVIDs: 3.0 cm  LVPWd: 0.90 cm  FS: 30.2 %  LV mass(C)d: 132.7 grams  LV mass(C)dI: 83.3 grams/m2  Ao root diam: 3.3 cm  LA dimension: 3.1 cm  asc Aorta Diam: 3.1 cm  LA/Ao: 0.94  LVOT diam: 2.0 cm  LVOT area: 3.1 cm2  Ao root diam index  Ht(cm/m): 2.2  Ao root diam index BSA (cm/m2): 2.1  Asc Ao diam index BSA (cm/m2): 1.9  Asc Ao diam index Ht(cm/m): 2.0  LA Volume (BP): 50.4 ml     LA Volume Index (BP): 31.7 ml/m2  RWT: 0.42     Doppler Measurements & Calculations  MV E max nguyễn: 141.0 cm/sec  MV dec slope: 671.7 cm/sec2  MV dec time: 0.21 sec  Ao V2 max: 222.0 cm/sec  Ao max P.0 mmHg  Ao V2 mean: 143.0 cm/sec  Ao mean P.5 mmHg  Ao V2 VTI: 40.1 cm  RADHA(I,D): 1.7 cm2  RADHA(V,D): 1.4 cm2  LV V1 max P.1 mmHg  LV V1 max: 101.0 cm/sec  LV V1 VTI: 21.9 cm  SV(LVOT): 68.8 ml  SI(LVOT): 43.2 ml/m2  PA acc time: 0.06 sec  TR max nguyễn: 281.0 cm/sec  TR max P.9 mmHg  AV Nguyễn Ratio (DI): 0.45  RADHA Index (cm2/m2): 1.1  E/E' av.2  Lateral E/e': 15.4

## 2025-01-02 ENCOUNTER — OFFICE VISIT (OUTPATIENT)
Dept: CARDIOLOGY | Facility: CLINIC | Age: 65
End: 2025-01-02
Payer: MEDICARE

## 2025-01-02 DIAGNOSIS — R06.02 SOB (SHORTNESS OF BREATH): Primary | ICD-10-CM

## 2025-01-02 NOTE — LETTER
2025    Gigi Martin MD  [address] [place] [address]    RE: Yissel Chacon       Dear Colleague,     I had the pleasure of seeing Yissel Chacon in the [facility].  No show    Impression:  Pulmonary hypertension  Hypertension  ESRD with dialysis dependence  Diabetes  Iron deficiency                  [employer]   [address]   [address]   [place] [address]   [phone]               Severe pulmonary hypertension primarily secondary to intrinsic pulmonary vascular disease  Junctional rhythm  ESRD  R pleural effusion, chronic  History of SVT  DM2   COPD                 Right sided filling pressures are severely elevated.     Left sided filling pressures are severely elevated.     Severely elevated pulmonary artery hypertension.     Normal cardiac output level.     Hemodynamic data has been modified in [employer] per physician review.     /59/85  RA --/--/20  /20  /35/66  PCW --/--/25    PA sat 51%  AO sat 90%    CO by SHANA 3.99 (2.45)    PVR 10 Right sided filling pressures are severely elevated. Left sided filling pressures are severely elevated. Severely elevated pulmonary artery hypertension. Normal cardiac output level. Hemodynamic data has been modified in [employer] per physician review.       Echocardiogram 2024      Name: YISSEL CHACON  MRN: [id number]  : 1960  Study Date: 2024 02:53 PM  Age: 63 yrs  Gender: Female  Patient Location: [facility]  Reason For Study: Pulmonary hypertension (H), Shortness of breath  Ordering Physician: JARRED OLMSTEAD  Referring Physician: JARRED OLMSTEAD  Performed By: Katheryn Carpenter     BSA: 1.6 m2  Height: 60 in  Weight: 138 lb  HR: 65  BP: 116/51 mmHg  ______________________________________________________________________________  Procedure  Complete Echo Adult.  ______________________________________________________________________________  Interpretation Summary     1. Left  ventricular systolic function is normal. The visual ejection fraction  is 60-65%.  2. No regional wall motion abnormalities noted.  3. The right ventricle is normal in structure, function and size.  4. The left atrium is severely dilated.  5. There is mild (1+) mitral regurgitation.  6. Mild valvular aortic stenosis; mean gradient 10 mmHg, peak velocity 2.2  m/sec.  7. The right ventricular systolic pressure is approximated at 33mmHg plus the  right atrial pressure. IVC diameter <2.1 cm collapsing >50% with sniff  suggests a normal RA pressure of 3 mmHg.  ______________________________________________________________________________  Left Ventricle  The left ventricle is normal in size. There is normal left ventricular wall  thickness. Left ventricular systolic function is normal. The visual ejection  fraction is 60-65%. No regional wall motion abnormalities noted.     Right Ventricle  The right ventricle is normal in structure, function and size.     Atria  The left atrium is severely dilated. The right atrium is severely dilated.  There is no color Doppler evidence of an atrial shunt.     Mitral Valve  There is mild mitral annular calcification. There is mild (1+) mitral  regurgitation.     Tricuspid Valve  The tricuspid valve is normal in structure and function. There is mild (1+)  tricuspid regurgitation. The right ventricular systolic pressure is  approximated at 33mmHg plus the right atrial pressure.     Aortic Valve  Mild valvular aortic stenosis.     Pulmonic Valve  The pulmonic valve is not well seen, but is grossly normal.     Vessels  Normal size aorta. IVC diameter <2.1 cm collapsing >50% with sniff suggests a  normal RA pressure of 3 mmHg.     Pericardium  There is no pericardial effusion.     Rhythm  Sinus rhythm was noted.  ______________________________________________________________________________  MMode/2D Measurements & Calculations  IVSd: 1.0 cm     LVIDd: 4.3 cm  LVIDs: 3.0 cm  LVPWd: 0.90  cm  FS: 30.2 %  LV mass(C)d: 132.7 grams  LV mass(C)dI: 83.3 grams/m2  Ao root diam: 3.3 cm  LA dimension: 3.1 cm  asc Aorta Diam: 3.1 cm  LA/Ao: 0.94  LVOT diam: 2.0 cm  LVOT area: 3.1 cm2  Ao root diam index Ht(cm/m): 2.2  Ao root diam index BSA (cm/m2): 2.1  Asc Ao diam index BSA (cm/m2): 1.9  Asc Ao diam index Ht(cm/m): 2.0  LA Volume (BP): 50.4 ml     LA Volume Index (BP): 31.7 ml/m2  RWT: 0.42     Doppler Measurements & Calculations  MV E max nguyễn: 141.0 cm/sec  MV dec slope: 671.7 cm/sec2  MV dec time: 0.21 sec  Ao V2 max: 222.0 cm/sec  Ao max P.0 mmHg  Ao V2 mean: 143.0 cm/sec  Ao mean P.5 mmHg  Ao V2 VTI: 40.1 cm  RADHA(I,D): 1.7 cm2  RADHA(V,D): 1.4 cm2  LV V1 max P.1 mmHg  LV V1 max: 101.0 cm/sec  LV V1 VTI: 21.9 cm  SV(LVOT): 68.8 ml  SI(LVOT): 43.2 ml/m2  PA acc time: 0.06 sec  TR max nguyễn: 281.0 cm/sec  TR max P.9 mmHg  AV Nguyễn Ratio (DI): 0.45  RADHA Index (cm2/m2): 1.1  E/E' av.2  Lateral E/e': 15.4      Thank you for allowing me to participate in the care of your patient.      Sincerely,     Jose Redd MD     [facility]  cc:   Jose Redd MD  [address] [place] [id number]

## 2025-01-26 ENCOUNTER — TRANSFERRED RECORDS (OUTPATIENT)
Dept: HEALTH INFORMATION MANAGEMENT | Facility: CLINIC | Age: 65
End: 2025-01-26

## 2025-01-26 DIAGNOSIS — E11.21 TYPE 2 DIABETES MELLITUS WITH DIABETIC NEPHROPATHY, WITH LONG-TERM CURRENT USE OF INSULIN (H): ICD-10-CM

## 2025-01-26 DIAGNOSIS — Z79.4 TYPE 2 DIABETES MELLITUS WITH DIABETIC NEPHROPATHY, WITH LONG-TERM CURRENT USE OF INSULIN (H): ICD-10-CM

## 2025-01-29 NOTE — TELEPHONE ENCOUNTER
Dr. Martin - please see refill request, with pended order and pharmacy ready for your review.    Berkley Richmond RN

## 2025-02-04 DIAGNOSIS — E11.21 TYPE 2 DIABETES MELLITUS WITH DIABETIC NEPHROPATHY, WITH LONG-TERM CURRENT USE OF INSULIN (H): ICD-10-CM

## 2025-02-04 DIAGNOSIS — Z79.4 TYPE 2 DIABETES MELLITUS WITH DIABETIC NEPHROPATHY, WITH LONG-TERM CURRENT USE OF INSULIN (H): ICD-10-CM

## 2025-02-04 RX ORDER — BLOOD SUGAR DIAGNOSTIC
STRIP MISCELLANEOUS
Qty: 400 STRIP | Refills: 2 | Status: SHIPPED | OUTPATIENT
Start: 2025-02-04

## 2025-02-04 NOTE — TELEPHONE ENCOUNTER
Pt called requesting acu check guide test strips be refilled. Medication and pharmacy pended.    Alexia Benson RN

## 2025-02-05 RX ORDER — BLOOD SUGAR DIAGNOSTIC
STRIP MISCELLANEOUS
Qty: 400 STRIP | Refills: 0 | OUTPATIENT
Start: 2025-02-05

## 2025-02-08 ENCOUNTER — OFFICE VISIT (OUTPATIENT)
Dept: URGENT CARE | Facility: URGENT CARE | Age: 65
End: 2025-02-08
Payer: MEDICARE

## 2025-02-08 VITALS
DIASTOLIC BLOOD PRESSURE: 62 MMHG | SYSTOLIC BLOOD PRESSURE: 138 MMHG | OXYGEN SATURATION: 96 % | HEART RATE: 85 BPM | TEMPERATURE: 98 F

## 2025-02-08 DIAGNOSIS — R10.13 ABDOMINAL PAIN, EPIGASTRIC: Primary | ICD-10-CM

## 2025-02-08 LAB
ALBUMIN SERPL-MCNC: 3.2 G/DL (ref 3.4–5)
ALP SERPL-CCNC: 62 U/L (ref 40–150)
ALT SERPL W P-5'-P-CCNC: 10 U/L (ref 0–50)
AMYLASE SERPL-CCNC: 37 U/L (ref 28–100)
ANION GAP SERPL CALCULATED.3IONS-SCNC: 9 MMOL/L (ref 3–14)
AST SERPL W P-5'-P-CCNC: 16 U/L (ref 0–45)
BASOPHILS # BLD AUTO: 0 10E3/UL (ref 0–0.2)
BASOPHILS NFR BLD AUTO: 1 %
BILIRUB SERPL-MCNC: 0.7 MG/DL (ref 0.2–1.3)
BUN SERPL-MCNC: 17 MG/DL (ref 7–30)
CALCIUM SERPL-MCNC: 9.3 MG/DL (ref 8.5–10.1)
CHLORIDE BLD-SCNC: 99 MMOL/L (ref 94–109)
CO2 SERPL-SCNC: 32 MMOL/L (ref 20–32)
CREAT SERPL-MCNC: 4.4 MG/DL (ref 0.52–1.04)
EGFRCR SERPLBLD CKD-EPI 2021: 11 ML/MIN/1.73M2
EOSINOPHIL # BLD AUTO: 0.1 10E3/UL (ref 0–0.7)
EOSINOPHIL NFR BLD AUTO: 3 %
ERYTHROCYTE [DISTWIDTH] IN BLOOD BY AUTOMATED COUNT: 15.5 % (ref 10–15)
GLUCOSE BLD-MCNC: 183 MG/DL (ref 70–99)
HCT VFR BLD AUTO: 30.6 % (ref 35–47)
HGB BLD-MCNC: 10 G/DL (ref 11.7–15.7)
IMM GRANULOCYTES # BLD: 0 10E3/UL
IMM GRANULOCYTES NFR BLD: 0 %
LIPASE SERPL-CCNC: 36 U/L (ref 13–60)
LYMPHOCYTES # BLD AUTO: 0.7 10E3/UL (ref 0.8–5.3)
LYMPHOCYTES NFR BLD AUTO: 15 %
MCH RBC QN AUTO: 30.8 PG (ref 26.5–33)
MCHC RBC AUTO-ENTMCNC: 32.7 G/DL (ref 31.5–36.5)
MCV RBC AUTO: 94 FL (ref 78–100)
MONOCYTES # BLD AUTO: 0.6 10E3/UL (ref 0–1.3)
MONOCYTES NFR BLD AUTO: 11 %
NEUTROPHILS # BLD AUTO: 3.5 10E3/UL (ref 1.6–8.3)
NEUTROPHILS NFR BLD AUTO: 70 %
NRBC # BLD AUTO: 0 10E3/UL
NRBC BLD AUTO-RTO: 0 /100
PLATELET # BLD AUTO: 104 10E3/UL (ref 150–450)
POTASSIUM BLD-SCNC: 4.4 MMOL/L (ref 3.4–5.3)
PROT SERPL-MCNC: 6.4 G/DL (ref 6.8–8.8)
RBC # BLD AUTO: 3.25 10E6/UL (ref 3.8–5.2)
SODIUM SERPL-SCNC: 140 MMOL/L (ref 135–145)
WBC # BLD AUTO: 4.9 10E3/UL (ref 4–11)

## 2025-02-08 PROCEDURE — 36415 COLL VENOUS BLD VENIPUNCTURE: CPT | Performed by: FAMILY MEDICINE

## 2025-02-08 PROCEDURE — 83690 ASSAY OF LIPASE: CPT | Performed by: FAMILY MEDICINE

## 2025-02-08 PROCEDURE — 85025 COMPLETE CBC W/AUTO DIFF WBC: CPT | Performed by: FAMILY MEDICINE

## 2025-02-08 PROCEDURE — 82150 ASSAY OF AMYLASE: CPT | Performed by: FAMILY MEDICINE

## 2025-02-08 PROCEDURE — 80053 COMPREHEN METABOLIC PANEL: CPT | Performed by: FAMILY MEDICINE

## 2025-02-08 PROCEDURE — 99214 OFFICE O/P EST MOD 30 MIN: CPT | Performed by: FAMILY MEDICINE

## 2025-02-08 RX ORDER — OMEPRAZOLE 20 MG/1
20 CAPSULE, DELAYED RELEASE ORAL DAILY
Qty: 30 CAPSULE | Refills: 1 | Status: SHIPPED | OUTPATIENT
Start: 2025-02-08 | End: 2025-04-09

## 2025-02-08 ASSESSMENT — PAIN SCALES - GENERAL: PAINLEVEL_OUTOF10: SEVERE PAIN (7)

## 2025-02-08 NOTE — PROGRESS NOTES
SUBJECTIVE  HPI: Yissel Wetzel is a 64 year old female  who presents with the CC of abdominal/pelvic pain.   Pain is located in the epigastric area    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, initial encounter 2019    End stage renal failure on dialysis (H)     Esophagitis, unspecified     Former tobacco use     HEMORRHAGIC GASTROPATHY     History of blood transfusion     [place]    Iron deficiency anemia, unspecified     Mild persistent asthma     Obesity     Other and unspecified hyperlipidemia     Pneumonia     Pulmonary hypertension (H)     per 2012 echo mod.to severe pulmonary hypertension    Tobacco use disorder dc ed     Type 2 diabetes mellitus (H)     on Insulin- managed by PCP    Unspecified essential hypertension      Allergies   Allergen Reactions    Albuterol      shakiness    Aspirin      gi    Byetta [Exenatide]      gi    Clonidine      constipation    Codeine      vomiting    Ezetimibe      muscle symptoms    Hydralazine      headaches    Lisinopril      hyperkalemia    Metformin Hydrochloride      vomiting    Pravachol [Pravastatin Sodium]      muscle pains    Simvastatin      myalgias    Troglitazone      Social History     Tobacco Use    Smoking status: Former     Current packs/day: 0.00     Average packs/day: 1 pack/day for 22.0 years (22.0 ttl pk-yrs)     Types: Cigarettes     Start date: 10/12/1977     Quit date: 10/12/1999     Years since quittin.3    Smokeless tobacco: Never   Substance Use Topics    Alcohol use: No     Alcohol/week: 0.0 standard drinks of alcohol       ROS:CONSTITUTIONAL:NEGATIVE for fever, chills, change in weight    EXAMINATION:  /62   Pulse 85   Temp 98  F (36.7  C) (Tympanic)   LMP  (LMP Unknown)   SpO2 96% GENERAL APPEARANCE: healthy, alert and no  distress  ABDOMEN: tenderness mild epigastric      ICD-10-CM    1. Abdominal pain, epigastric  R10.13 Comprehensive metabolic panel     Amylase     Lipase     CBC with Platelets & Differential     omeprazole (PRILOSEC) 20 MG DR capsule          F/U PCP/IM/FP, ED if worse

## 2025-02-24 ENCOUNTER — DOCUMENTATION ONLY (OUTPATIENT)
Dept: INTERNAL MEDICINE | Facility: CLINIC | Age: 65
End: 2025-02-24
Payer: MEDICARE

## 2025-02-24 DIAGNOSIS — I27.20 PULMONARY HYPERTENSION, UNSPECIFIED (H): Primary | ICD-10-CM

## 2025-03-30 ENCOUNTER — OFFICE VISIT (OUTPATIENT)
Dept: URGENT CARE | Facility: URGENT CARE | Age: 65
End: 2025-03-30
Payer: COMMERCIAL

## 2025-03-30 VITALS
SYSTOLIC BLOOD PRESSURE: 110 MMHG | HEART RATE: 59 BPM | TEMPERATURE: 98 F | OXYGEN SATURATION: 92 % | RESPIRATION RATE: 18 BRPM | DIASTOLIC BLOOD PRESSURE: 67 MMHG

## 2025-03-30 DIAGNOSIS — J32.9 CHRONIC RHINOSINUSITIS: ICD-10-CM

## 2025-03-30 DIAGNOSIS — H60.393 INFECTIVE OTITIS EXTERNA, BILATERAL: ICD-10-CM

## 2025-03-30 DIAGNOSIS — J30.89 NON-SEASONAL ALLERGIC RHINITIS, UNSPECIFIED TRIGGER: ICD-10-CM

## 2025-03-30 DIAGNOSIS — Z99.2 ESRD (END STAGE RENAL DISEASE) ON DIALYSIS (H): ICD-10-CM

## 2025-03-30 DIAGNOSIS — H65.03 NON-RECURRENT ACUTE SEROUS OTITIS MEDIA OF BOTH EARS: ICD-10-CM

## 2025-03-30 DIAGNOSIS — N18.6 ESRD (END STAGE RENAL DISEASE) ON DIALYSIS (H): ICD-10-CM

## 2025-03-30 DIAGNOSIS — H69.93 EUSTACHIAN TUBE DYSFUNCTION, BILATERAL: Primary | ICD-10-CM

## 2025-03-30 PROBLEM — E11.3512: Status: ACTIVE | Noted: 2025-03-30

## 2025-03-30 PROBLEM — D84.81 IMMUNODEFICIENCY DUE TO CONDITIONS CLASSIFIED ELSEWHERE: Status: ACTIVE | Noted: 2025-03-30

## 2025-03-30 PROBLEM — D84.9 IMMUNOSUPPRESSED STATUS: Status: ACTIVE | Noted: 2025-03-30

## 2025-03-30 PROBLEM — H93.8X2 EAR PRESSURE, LEFT: Status: ACTIVE | Noted: 2025-03-30

## 2025-03-30 PROBLEM — H60.399 INFECTIVE OTITIS EXTERNA: Status: ACTIVE | Noted: 2025-03-30

## 2025-03-30 PROBLEM — N19: Status: ACTIVE | Noted: 2025-01-21

## 2025-03-30 PROBLEM — I13.2: Status: ACTIVE | Noted: 2025-01-21

## 2025-03-30 PROBLEM — R80.9 PROTEINURIA: Status: ACTIVE | Noted: 2018-08-28

## 2025-03-30 PROBLEM — E87.5 HYPERKALEMIA: Status: ACTIVE | Noted: 2018-08-30

## 2025-03-30 PROBLEM — N25.81 SECONDARY HYPERPARATHYROIDISM OF RENAL ORIGIN: Status: ACTIVE | Noted: 2025-01-21

## 2025-03-30 PROBLEM — R42 DIZZINESS: Status: ACTIVE | Noted: 2025-03-30

## 2025-03-30 PROCEDURE — 3078F DIAST BP <80 MM HG: CPT | Performed by: FAMILY MEDICINE

## 2025-03-30 PROCEDURE — 3074F SYST BP LT 130 MM HG: CPT | Performed by: FAMILY MEDICINE

## 2025-03-30 PROCEDURE — 99214 OFFICE O/P EST MOD 30 MIN: CPT | Performed by: FAMILY MEDICINE

## 2025-03-30 RX ORDER — NEOMYCIN SULFATE, POLYMYXIN B SULFATE AND HYDROCORTISONE 10; 3.5; 1 MG/ML; MG/ML; [USP'U]/ML
3 SUSPENSION/ DROPS AURICULAR (OTIC) 3 TIMES DAILY
Qty: 10 ML | Refills: 0 | Status: SHIPPED | OUTPATIENT
Start: 2025-03-30 | End: 2025-04-06

## 2025-03-30 RX ORDER — PREDNISONE 20 MG/1
40 TABLET ORAL DAILY
Qty: 10 TABLET | Refills: 0 | Status: SHIPPED | OUTPATIENT
Start: 2025-03-30 | End: 2025-04-04

## 2025-03-30 RX ORDER — LORATADINE 10 MG/1
10 TABLET ORAL
Qty: 15 TABLET | Refills: 0 | Status: SHIPPED | OUTPATIENT
Start: 2025-03-30 | End: 2025-04-29

## 2025-03-30 RX ORDER — GUAIFENESIN 1200 MG/1
1200 TABLET, EXTENDED RELEASE ORAL DAILY
Qty: 30 TABLET | Refills: 0 | Status: SHIPPED | OUTPATIENT
Start: 2025-03-30 | End: 2025-04-29

## 2025-03-30 RX ORDER — FLUTICASONE PROPIONATE 50 MCG
1 SPRAY, SUSPENSION (ML) NASAL 2 TIMES DAILY
Qty: 16 G | Refills: 0 | Status: SHIPPED | OUTPATIENT
Start: 2025-03-30

## 2025-03-30 NOTE — PROGRESS NOTES
ASSESSMENT/PLAN:      ICD-10-CM    1. Non-recurrent acute serous otitis media of both ears  H65.03 fluticasone (FLONASE) 50 MCG/ACT nasal spray     guaiFENesin 1200 MG TB12      2. Eustachian tube dysfunction, bilateral  H69.93 predniSONE (DELTASONE) 20 MG tablet      3. Infective otitis externa, bilateral  H60.393 neomycin-polymyxin-hydrocortisone (CORTISPORIN) 3.5-07999-6 otic suspension      4. Chronic rhinosinusitis  J32.9 fluticasone (FLONASE) 50 MCG/ACT nasal spray     guaiFENesin 1200 MG TB12      5. Non-seasonal allergic rhinitis, unspecified trigger  J30.89 loratadine (CLARITIN) 10 MG tablet          Patient Instructions     For your congestion and dizziness -    Start prednisone 2 tablets daily for 5 days-take first dose tomorrow in the am  and take it every day for 5 days, do not take at night -will make it difficult to sleep -this medication to open up the eustachian tubes-the tubes deep inside that drain the fluid from behind your ear drums and makes the dizziness go away     Continue Mucinex ( guaifenesin)-1200 mg daily -the generic is fine -for congestion/sinus congestion-will also help with fluid behind the ear drums  as well      Start Flonase1 spray in each nostril in am and bedtime for nasal congestion and drainage -will help with fluid behind the ear drums as well for 14 days      Start Claritin 10 mg every other day for 30 days for your chronic congestion- if the nasal congestion resolves then comes back after stopping the claritin-please ask your kidney specialist/nephrologist if you can continue claritin every other day to add to the medicines you have to take for life     For the infection of your ear canals     Cortisporin drops in both ears 3 times a day for 7 days       to ER if dizziness or vertigo worsens, changes in vision, severe headache, feel like you are going to faint/pass out     Follow up with your primary care provider or clinic in about 2-3 days  if your symptoms do not  improve              Reviewed medication instructions and side effects. Follow up if experiences side effects.     I reviewed supportive care, otc meds to use if needed, expected course, and signs of concern.  Follow up as needed or if she does not improve within  1-2 days or if worsens in any way.  Reviewed red flag symptoms and is to go to the ER if experiences any of these.     The use of Dragon/Maluubaation services may have been used to construct the content in this note; any grammatical or spelling errors are non-intentional. Please contact the author of this note directly if you are in need of any clarification.      On the day of the encounter, time spend on chart review, patient visit, review of testing, documentation was 30  minutes              Patient presents with:  Ear Problem: Both-fullness       Subjective     Yissel Wetzel is a 64 year old female who presents to clinic today for the following health issues:    HPI               {UC Conditions (Optional):088566}  {additional problems for provider to add (Optional): 818548}  {ACUTE SUPERLIST - extended history:329398}    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, initial encounter 12/6/2019    End stage renal failure on dialysis (H)     Esophagitis, unspecified     Former tobacco use     HEMORRHAGIC GASTROPATHY     History of blood transfusion     [place]    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) 1/21/2025    Iron deficiency anemia, unspecified     Mild persistent asthma     Obesity     Other and unspecified hyperlipidemia     Pneumonia     Pulmonary hypertension (H)     per 12/2012 echo mod.to severe pulmonary hypertension    Tobacco use disorder dc ed 1999    Type 2 diabetes mellitus (H)     on Insulin-  managed by PCP    Unspecified essential hypertension      Social History     Tobacco Use    Smoking status: Former     Current packs/day: 0.00     Average packs/day: 1 pack/day for 22.0 years (22.0 ttl pk-yrs)     Types: Cigarettes     Start date: 10/12/1977     Quit date: 10/12/1999     Years since quittin.4    Smokeless tobacco: Never   Substance Use Topics    Alcohol use: No     Alcohol/week: 0.0 standard drinks of alcohol       Current Outpatient Medications   Medication Sig Dispense Refill    acetaminophen (TYLENOL) 500 MG tablet Take 500-1,000 mg by mouth every 6 hours as needed.      amiodarone (PACERONE) 100 MG TABS tablet Take 1 tablet (100 mg) by mouth five times a week. Take on , , ,  and ; hold on  and Sundays. 30 tablet 3    blood glucose (ACCU-CHEK GUIDE TEST) test strip Use to test blood sugars 4 times daily or as directed. 400 strip 2    calcium acetate (PHOSLO) 667 MG CAPS capsule Take 667 mg by mouth 3 times daily (with meals)      calcium acetate (PHOSLO) 667 MG CAPS capsule Take 667 mg by mouth Take with snacks or supplements      Continuous Glucose  (FREESTYLE LATOSHA 2 READER) AYDIN Use to read blood sugars as per 's instructions. 1 each 0    Continuous Glucose Sensor (FREESTYLE LATOSHA 2 SENSOR) MISC Change every 14 days. 6 each 1    famotidine (PEPCID) 20 MG tablet Take 20 mg by mouth 2 times daily as needed.      fluticasone (FLONASE) 50 MCG/ACT nasal spray Spray 1 spray into both nostrils 2 times daily. 16 g 0    guaiFENesin 1200 MG TB12 Take 1 tablet (1,200 mg) by mouth daily. 30 tablet 0    guaiFENesin 1200 MG TB12 Take 1 tablet by mouth every 12 hours as needed (cough/congestion).      insulin aspart (FIASP FLEXTOUCH) 100 UNIT/ML pen-injector 2U per 15 carbs, plus 1U for every 50 that preprandial BG over 150 - MAXIMUM 20 UNITS PER DAY 15 mL 11    insulin glargine (LANTUS PEN) 100 UNIT/ML pen Inject 11 Units  subcutaneously at bedtime. 15 mL 3    insulin regular (NOVOLIN R FLEXPEN) 100 UNIT/ML pen INJECT 9-12 UNITS BEFORE BREAKFAST, LUNCH, AND DINNER PLUS A CORRECTIVE SCALE OF 2 UNITS FOR EVERY 50 OVER 150 - MAXIMUM 20 unit(s) PER DAY 15 mL 11    levalbuterol (XOPENEX HFA) 45 MCG/ACT inhaler Inhale 2 puffs into the lungs every 6 hours as needed for shortness of breath / dyspnea or wheezing 15 g 11    loratadine (CLARITIN) 10 MG tablet Take 1 tablet (10 mg) by mouth every 48 hours. 15 tablet 0    midodrine (PROAMATINE) 10 MG tablet Take 30 mg by mouth daily. On Monday, Wednesday, Friday before dialysis      midodrine (PROAMATINE) 10 MG tablet Take 10 mg by mouth Every Mon, Wed, Fri Morning      midodrine (PROAMATINE) 10 MG tablet Take 20 mg by mouth 2 times daily. On non-dialysis days (Tuesday, Thursday, Saturday, Sunday)      neomycin-polymyxin-hydrocortisone (CORTISPORIN) 3.5-19232-8 otic suspension Place 3 drops into both ears 3 times daily for 7 days. 10 mL 0    omeprazole (PRILOSEC) 20 MG DR capsule Take 1 capsule (20 mg) by mouth daily. 30 capsule 1    predniSONE (DELTASONE) 20 MG tablet Take 2 tablets (40 mg) by mouth daily for 5 days. 10 tablet 0    carbamide peroxide (DEBROX) 6.5 % otic solution Place 10 drops Into the left ear 2 times daily. (Patient not taking: Reported on 3/30/2025) 15 mL 0     Allergies   Allergen Reactions    Albuterol      shakiness    Aspirin      gi    Byetta [Exenatide]      gi    Clonidine      constipation    Codeine      vomiting    Ezetimibe      muscle symptoms    Hydralazine      headaches    Lisinopril      hyperkalemia    Metformin Hydrochloride      vomiting    Pravachol [Pravastatin Sodium]      muscle pains    Simvastatin      myalgias    Troglitazone              ROS are negative, except as otherwise noted HPI      Objective    /67   Pulse 59   Temp 98  F (36.7  C)   Resp 18   LMP  (LMP Unknown)   SpO2 92%   There is no height or weight on file to calculate  BMI.  Physical Exam   {Exam List (Optional):064722}  {O PHRASES:974366}     Diagnostic Test Results:  Labs reviewed in Epic  No results found for any visits on 03/30/25.

## 2025-03-30 NOTE — PATIENT INSTRUCTIONS
For your congestion and dizziness -    Start prednisone 2 tablets daily for 5 days-take first dose tomorrow in the am  and take it every day for 5 days, do not take at night -will make it difficult to sleep -this medication to open up the eustachian tubes-the tubes deep inside that drain the fluid from behind your ear drums and makes the dizziness go away     Continue Mucinex ( guaifenesin)-1200 mg daily -the generic is fine -for congestion/sinus congestion-will also help with fluid behind the ear drums  as well      Start Flonase1 spray in each nostril in am and bedtime for nasal congestion and drainage -will help with fluid behind the ear drums as well for 14 days      Start Claritin 10 mg every other day for 30 days for your chronic congestion- if the nasal congestion resolves then comes back after stopping the claritin-please ask your kidney specialist/nephrologist if you can continue claritin every other day to add to the medicines you have to take for life     For the infection of your ear canals     Cortisporin drops in both ears 3 times a day for 7 days       to ER if dizziness or vertigo worsens, changes in vision, severe headache, feel like you are going to faint/pass out     Follow up with your primary care provider or clinic in about 2-3 days  if your symptoms do not improve

## 2025-04-16 ENCOUNTER — TELEPHONE (OUTPATIENT)
Dept: INTERNAL MEDICINE | Facility: CLINIC | Age: 65
End: 2025-04-16
Payer: COMMERCIAL

## 2025-04-16 NOTE — TELEPHONE ENCOUNTER
Patient called and wanted a message sent to the provider. She stated that she had her A1C was 6.5 a week ago. Routing to provider to review and advise.     Sarah CHANEL RN  [facility] Triage Team

## 2025-04-23 ENCOUNTER — LAB REQUISITION (OUTPATIENT)
Dept: LAB | Facility: CLINIC | Age: 65
End: 2025-04-23

## 2025-04-23 DIAGNOSIS — N18.6 END STAGE RENAL DISEASE (H): ICD-10-CM

## 2025-04-23 LAB — HGB BLD-MCNC: 8.8 G/DL (ref 11.7–15.7)

## 2025-04-23 PROCEDURE — 85018 HEMOGLOBIN: CPT

## 2025-05-05 DIAGNOSIS — E11.21 TYPE 2 DIABETES MELLITUS WITH DIABETIC NEPHROPATHY, WITH LONG-TERM CURRENT USE OF INSULIN (H): ICD-10-CM

## 2025-05-05 DIAGNOSIS — Z79.4 TYPE 2 DIABETES MELLITUS WITH DIABETIC NEPHROPATHY, WITH LONG-TERM CURRENT USE OF INSULIN (H): ICD-10-CM

## 2025-05-05 RX ORDER — BLOOD SUGAR DIAGNOSTIC
STRIP MISCELLANEOUS
Refills: 0 | OUTPATIENT
Start: 2025-05-05

## 2025-05-12 ENCOUNTER — APPOINTMENT (OUTPATIENT)
Dept: GENERAL RADIOLOGY | Facility: CLINIC | Age: 65
DRG: 308 | End: 2025-05-12
Attending: EMERGENCY MEDICINE
Payer: COMMERCIAL

## 2025-05-12 ENCOUNTER — HOSPITAL ENCOUNTER (INPATIENT)
Facility: CLINIC | Age: 65
DRG: 308 | End: 2025-05-12
Attending: EMERGENCY MEDICINE | Admitting: HOSPITALIST
Payer: COMMERCIAL

## 2025-05-12 DIAGNOSIS — I48.91 ATRIAL FIBRILLATION WITH RAPID VENTRICULAR RESPONSE (H): ICD-10-CM

## 2025-05-12 DIAGNOSIS — Z71.89 OTHER SPECIFIED COUNSELING: Chronic | ICD-10-CM

## 2025-05-12 DIAGNOSIS — N18.6 ESRD ON DIALYSIS (H): ICD-10-CM

## 2025-05-12 DIAGNOSIS — I47.10 SVT (SUPRAVENTRICULAR TACHYCARDIA): Primary | ICD-10-CM

## 2025-05-12 DIAGNOSIS — Z99.2 ESRD ON DIALYSIS (H): ICD-10-CM

## 2025-05-12 DIAGNOSIS — R42 DIZZINESS: ICD-10-CM

## 2025-05-12 DIAGNOSIS — I95.9 HYPOTENSION, UNSPECIFIED HYPOTENSION TYPE: ICD-10-CM

## 2025-05-12 LAB
ALBUMIN SERPL BCG-MCNC: 3.5 G/DL (ref 3.5–5.2)
ALBUMIN SERPL BCG-MCNC: 3.5 G/DL (ref 3.5–5.2)
ALP SERPL-CCNC: 77 U/L (ref 40–150)
ALP SERPL-CCNC: ABNORMAL U/L
ALT SERPL W P-5'-P-CCNC: 7 U/L (ref 0–50)
ALT SERPL W P-5'-P-CCNC: ABNORMAL U/L
ANION GAP SERPL CALCULATED.3IONS-SCNC: 21 MMOL/L (ref 7–15)
AST SERPL W P-5'-P-CCNC: 16 U/L (ref 0–45)
AST SERPL W P-5'-P-CCNC: 34 U/L (ref 0–45)
ATRIAL RATE - MUSE: NORMAL BPM
ATRIAL RATE - MUSE: NORMAL BPM
BASOPHILS # BLD AUTO: 0.1 10E3/UL (ref 0–0.2)
BASOPHILS NFR BLD AUTO: 1 %
BILIRUB DIRECT SERPL-MCNC: 0.14 MG/DL (ref 0–0.3)
BILIRUB SERPL-MCNC: 0.3 MG/DL
BILIRUB SERPL-MCNC: 0.4 MG/DL
BUN SERPL-MCNC: 51.1 MG/DL (ref 8–23)
CALCIUM SERPL-MCNC: 9.5 MG/DL (ref 8.8–10.4)
CHLORIDE SERPL-SCNC: 91 MMOL/L (ref 98–107)
CREAT SERPL-MCNC: 6.7 MG/DL (ref 0.51–0.95)
DIASTOLIC BLOOD PRESSURE - MUSE: NORMAL MMHG
DIASTOLIC BLOOD PRESSURE - MUSE: NORMAL MMHG
EGFRCR SERPLBLD CKD-EPI 2021: 6 ML/MIN/1.73M2
EOSINOPHIL # BLD AUTO: 0.1 10E3/UL (ref 0–0.7)
EOSINOPHIL NFR BLD AUTO: 1 %
ERYTHROCYTE [DISTWIDTH] IN BLOOD BY AUTOMATED COUNT: 15 % (ref 10–15)
GLUCOSE BLDC GLUCOMTR-MCNC: 128 MG/DL (ref 70–99)
GLUCOSE BLDC GLUCOMTR-MCNC: 167 MG/DL (ref 70–99)
GLUCOSE SERPL-MCNC: 209 MG/DL (ref 70–99)
HBV SURFACE AG SERPL QL IA: NONREACTIVE
HCO3 SERPL-SCNC: 21 MMOL/L (ref 22–29)
HCT VFR BLD AUTO: 30.3 % (ref 35–47)
HGB BLD-MCNC: 9.5 G/DL (ref 11.7–15.7)
HOLD SPECIMEN: NORMAL
IMM GRANULOCYTES # BLD: 0 10E3/UL
IMM GRANULOCYTES NFR BLD: 0 %
INTERPRETATION ECG - MUSE: NORMAL
INTERPRETATION ECG - MUSE: NORMAL
LYMPHOCYTES # BLD AUTO: 0.9 10E3/UL (ref 0.8–5.3)
LYMPHOCYTES NFR BLD AUTO: 11 %
MAGNESIUM SERPL-MCNC: 2.2 MG/DL (ref 1.7–2.3)
MCH RBC QN AUTO: 30.3 PG (ref 26.5–33)
MCHC RBC AUTO-ENTMCNC: 31.4 G/DL (ref 31.5–36.5)
MCV RBC AUTO: 97 FL (ref 78–100)
MONOCYTES # BLD AUTO: 0.8 10E3/UL (ref 0–1.3)
MONOCYTES NFR BLD AUTO: 9 %
NEUTROPHILS # BLD AUTO: 6.5 10E3/UL (ref 1.6–8.3)
NEUTROPHILS NFR BLD AUTO: 78 %
NRBC # BLD AUTO: 0 10E3/UL
NRBC BLD AUTO-RTO: 0 /100
NT-PROBNP SERPL-MCNC: ABNORMAL PG/ML (ref 0–226)
P AXIS - MUSE: NORMAL DEGREES
P AXIS - MUSE: NORMAL DEGREES
PLATELET # BLD AUTO: 113 10E3/UL (ref 150–450)
POTASSIUM SERPL-SCNC: 4 MMOL/L (ref 3.4–5.3)
POTASSIUM SERPL-SCNC: 5.9 MMOL/L (ref 3.4–5.3)
PR INTERVAL - MUSE: NORMAL MS
PR INTERVAL - MUSE: NORMAL MS
PROT SERPL-MCNC: 6.4 G/DL (ref 6.4–8.3)
PROT SERPL-MCNC: 6.7 G/DL (ref 6.4–8.3)
QRS DURATION - MUSE: 132 MS
QRS DURATION - MUSE: 138 MS
QT - MUSE: 384 MS
QT - MUSE: 434 MS
QTC - MUSE: 551 MS
QTC - MUSE: 581 MS
R AXIS - MUSE: 106 DEGREES
R AXIS - MUSE: 108 DEGREES
RBC # BLD AUTO: 3.14 10E6/UL (ref 3.8–5.2)
SODIUM SERPL-SCNC: 133 MMOL/L (ref 135–145)
SYSTOLIC BLOOD PRESSURE - MUSE: NORMAL MMHG
SYSTOLIC BLOOD PRESSURE - MUSE: NORMAL MMHG
T AXIS - MUSE: -87 DEGREES
T AXIS - MUSE: 250 DEGREES
TROPONIN T SERPL HS-MCNC: 1194 NG/L
TROPONIN T SERPL HS-MCNC: 1315 NG/L
TSH SERPL DL<=0.005 MIU/L-ACNC: 4.04 UIU/ML (ref 0.3–4.2)
UFH PPP CHRO-ACNC: 0.61 IU/ML (ref ?–1.1)
VENTRICULAR RATE- MUSE: 138 BPM
VENTRICULAR RATE- MUSE: 97 BPM
WBC # BLD AUTO: 8.4 10E3/UL (ref 4–11)

## 2025-05-12 PROCEDURE — 250N000013 HC RX MED GY IP 250 OP 250 PS 637: Performed by: INTERNAL MEDICINE

## 2025-05-12 PROCEDURE — 83735 ASSAY OF MAGNESIUM: CPT | Performed by: EMERGENCY MEDICINE

## 2025-05-12 PROCEDURE — 84132 ASSAY OF SERUM POTASSIUM: CPT | Performed by: PHYSICIAN ASSISTANT

## 2025-05-12 PROCEDURE — 96361 HYDRATE IV INFUSION ADD-ON: CPT

## 2025-05-12 PROCEDURE — 5A1D70Z PERFORMANCE OF URINARY FILTRATION, INTERMITTENT, LESS THAN 6 HOURS PER DAY: ICD-10-PCS | Performed by: INTERNAL MEDICINE

## 2025-05-12 PROCEDURE — 250N000011 HC RX IP 250 OP 636: Performed by: EMERGENCY MEDICINE

## 2025-05-12 PROCEDURE — 99254 IP/OBS CNSLTJ NEW/EST MOD 60: CPT | Performed by: INTERNAL MEDICINE

## 2025-05-12 PROCEDURE — 250N000012 HC RX MED GY IP 250 OP 636 PS 637: Performed by: PHYSICIAN ASSISTANT

## 2025-05-12 PROCEDURE — 85520 HEPARIN ASSAY: CPT | Performed by: PHYSICIAN ASSISTANT

## 2025-05-12 PROCEDURE — 84450 TRANSFERASE (AST) (SGOT): CPT | Performed by: EMERGENCY MEDICINE

## 2025-05-12 PROCEDURE — 99223 1ST HOSP IP/OBS HIGH 75: CPT | Performed by: PHYSICIAN ASSISTANT

## 2025-05-12 PROCEDURE — 250N000011 HC RX IP 250 OP 636: Performed by: INTERNAL MEDICINE

## 2025-05-12 PROCEDURE — P9045 ALBUMIN (HUMAN), 5%, 250 ML: HCPCS | Performed by: INTERNAL MEDICINE

## 2025-05-12 PROCEDURE — 83880 ASSAY OF NATRIURETIC PEPTIDE: CPT | Performed by: EMERGENCY MEDICINE

## 2025-05-12 PROCEDURE — 99291 CRITICAL CARE FIRST HOUR: CPT | Mod: 25

## 2025-05-12 PROCEDURE — 99255 IP/OBS CONSLTJ NEW/EST HI 80: CPT | Performed by: INTERNAL MEDICINE

## 2025-05-12 PROCEDURE — 250N000013 HC RX MED GY IP 250 OP 250 PS 637: Performed by: PHYSICIAN ASSISTANT

## 2025-05-12 PROCEDURE — 93005 ELECTROCARDIOGRAM TRACING: CPT

## 2025-05-12 PROCEDURE — 71045 X-RAY EXAM CHEST 1 VIEW: CPT

## 2025-05-12 PROCEDURE — 210N000001 HC R&B IMCU HEART CARE

## 2025-05-12 PROCEDURE — 96367 TX/PROPH/DG ADDL SEQ IV INF: CPT

## 2025-05-12 PROCEDURE — 84155 ASSAY OF PROTEIN SERUM: CPT | Performed by: PHYSICIAN ASSISTANT

## 2025-05-12 PROCEDURE — 82040 ASSAY OF SERUM ALBUMIN: CPT | Performed by: PHYSICIAN ASSISTANT

## 2025-05-12 PROCEDURE — 250N000009 HC RX 250: Performed by: EMERGENCY MEDICINE

## 2025-05-12 PROCEDURE — 258N000003 HC RX IP 258 OP 636: Performed by: EMERGENCY MEDICINE

## 2025-05-12 PROCEDURE — 84155 ASSAY OF PROTEIN SERUM: CPT | Performed by: EMERGENCY MEDICINE

## 2025-05-12 PROCEDURE — 85025 COMPLETE CBC W/AUTO DIFF WBC: CPT | Performed by: EMERGENCY MEDICINE

## 2025-05-12 PROCEDURE — 96375 TX/PRO/DX INJ NEW DRUG ADDON: CPT

## 2025-05-12 PROCEDURE — 93005 ELECTROCARDIOGRAM TRACING: CPT | Mod: 76

## 2025-05-12 PROCEDURE — 87340 HEPATITIS B SURFACE AG IA: CPT | Performed by: INTERNAL MEDICINE

## 2025-05-12 PROCEDURE — 36415 COLL VENOUS BLD VENIPUNCTURE: CPT | Performed by: EMERGENCY MEDICINE

## 2025-05-12 PROCEDURE — 84484 ASSAY OF TROPONIN QUANT: CPT | Performed by: EMERGENCY MEDICINE

## 2025-05-12 PROCEDURE — 250N000013 HC RX MED GY IP 250 OP 250 PS 637: Performed by: EMERGENCY MEDICINE

## 2025-05-12 PROCEDURE — 250N000011 HC RX IP 250 OP 636: Performed by: PHYSICIAN ASSISTANT

## 2025-05-12 PROCEDURE — 250N000009 HC RX 250: Performed by: INTERNAL MEDICINE

## 2025-05-12 PROCEDURE — 84443 ASSAY THYROID STIM HORMONE: CPT | Performed by: EMERGENCY MEDICINE

## 2025-05-12 PROCEDURE — 96365 THER/PROPH/DIAG IV INF INIT: CPT

## 2025-05-12 PROCEDURE — 258N000003 HC RX IP 258 OP 636: Performed by: INTERNAL MEDICINE

## 2025-05-12 PROCEDURE — 36415 COLL VENOUS BLD VENIPUNCTURE: CPT | Performed by: PHYSICIAN ASSISTANT

## 2025-05-12 RX ORDER — HEPARIN SODIUM 1000 [USP'U]/ML
500 INJECTION, SOLUTION INTRAVENOUS; SUBCUTANEOUS CONTINUOUS
Status: DISCONTINUED | OUTPATIENT
Start: 2025-05-12 | End: 2025-05-12

## 2025-05-12 RX ORDER — METOPROLOL TARTRATE 1 MG/ML
2.5 INJECTION, SOLUTION INTRAVENOUS ONCE
Status: COMPLETED | OUTPATIENT
Start: 2025-05-12 | End: 2025-05-12

## 2025-05-12 RX ORDER — MIDODRINE HYDROCHLORIDE 10 MG/1
30 TABLET ORAL
Status: DISCONTINUED | OUTPATIENT
Start: 2025-05-14 | End: 2025-05-18 | Stop reason: HOSPADM

## 2025-05-12 RX ORDER — AMOXICILLIN 250 MG
2 CAPSULE ORAL 2 TIMES DAILY PRN
Status: DISCONTINUED | OUTPATIENT
Start: 2025-05-12 | End: 2025-05-18 | Stop reason: HOSPADM

## 2025-05-12 RX ORDER — NICOTINE POLACRILEX 4 MG
15-30 LOZENGE BUCCAL
Status: DISCONTINUED | OUTPATIENT
Start: 2025-05-12 | End: 2025-05-18 | Stop reason: HOSPADM

## 2025-05-12 RX ORDER — DEXTROSE MONOHYDRATE 25 G/50ML
25-50 INJECTION, SOLUTION INTRAVENOUS
Status: DISCONTINUED | OUTPATIENT
Start: 2025-05-12 | End: 2025-05-18 | Stop reason: HOSPADM

## 2025-05-12 RX ORDER — MIDODRINE HYDROCHLORIDE 5 MG/1
20 TABLET ORAL
Status: DISCONTINUED | OUTPATIENT
Start: 2025-05-13 | End: 2025-05-18 | Stop reason: HOSPADM

## 2025-05-12 RX ORDER — LIDOCAINE 40 MG/G
CREAM TOPICAL
Status: DISCONTINUED | OUTPATIENT
Start: 2025-05-12 | End: 2025-05-18 | Stop reason: HOSPADM

## 2025-05-12 RX ORDER — ACETAMINOPHEN 325 MG/1
650 TABLET ORAL EVERY 4 HOURS PRN
Status: DISCONTINUED | OUTPATIENT
Start: 2025-05-12 | End: 2025-05-18 | Stop reason: HOSPADM

## 2025-05-12 RX ORDER — FAMOTIDINE 20 MG/1
20 TABLET, FILM COATED ORAL
Status: DISCONTINUED | OUTPATIENT
Start: 2025-05-12 | End: 2025-05-18 | Stop reason: HOSPADM

## 2025-05-12 RX ORDER — HEPARIN SODIUM 10000 [USP'U]/100ML
0-5000 INJECTION, SOLUTION INTRAVENOUS CONTINUOUS
Status: DISCONTINUED | OUTPATIENT
Start: 2025-05-12 | End: 2025-05-13

## 2025-05-12 RX ORDER — METOPROLOL TARTRATE 1 MG/ML
2.5 INJECTION, SOLUTION INTRAVENOUS EVERY 4 HOURS PRN
Status: DISCONTINUED | OUTPATIENT
Start: 2025-05-12 | End: 2025-05-12

## 2025-05-12 RX ORDER — MIDODRINE HYDROCHLORIDE 10 MG/1
30 TABLET ORAL DAILY
Status: DISCONTINUED | OUTPATIENT
Start: 2025-05-14 | End: 2025-05-12

## 2025-05-12 RX ORDER — ACETAMINOPHEN 650 MG/1
650 SUPPOSITORY RECTAL EVERY 4 HOURS PRN
Status: DISCONTINUED | OUTPATIENT
Start: 2025-05-12 | End: 2025-05-18 | Stop reason: HOSPADM

## 2025-05-12 RX ORDER — AMOXICILLIN 500 MG/1
500 TABLET, FILM COATED ORAL 2 TIMES DAILY
COMMUNITY

## 2025-05-12 RX ORDER — AMOXICILLIN 250 MG
1 CAPSULE ORAL 2 TIMES DAILY PRN
Status: DISCONTINUED | OUTPATIENT
Start: 2025-05-12 | End: 2025-05-18 | Stop reason: HOSPADM

## 2025-05-12 RX ORDER — METOPROLOL TARTRATE 1 MG/ML
2.5 INJECTION, SOLUTION INTRAVENOUS EVERY 6 HOURS
Status: DISCONTINUED | OUTPATIENT
Start: 2025-05-12 | End: 2025-05-16

## 2025-05-12 RX ORDER — AMOXICILLIN 500 MG/1
500 CAPSULE ORAL 2 TIMES DAILY
Status: DISCONTINUED | OUTPATIENT
Start: 2025-05-12 | End: 2025-05-18 | Stop reason: HOSPADM

## 2025-05-12 RX ORDER — ONDANSETRON 4 MG/1
4 TABLET, ORALLY DISINTEGRATING ORAL EVERY 6 HOURS PRN
Status: DISCONTINUED | OUTPATIENT
Start: 2025-05-12 | End: 2025-05-12

## 2025-05-12 RX ORDER — CALCIUM ACETATE 667 MG/1
667 CAPSULE ORAL
Status: DISCONTINUED | OUTPATIENT
Start: 2025-05-12 | End: 2025-05-18 | Stop reason: HOSPADM

## 2025-05-12 RX ORDER — MIDODRINE HYDROCHLORIDE 5 MG/1
10 TABLET ORAL
Status: DISCONTINUED | OUTPATIENT
Start: 2025-05-14 | End: 2025-05-18 | Stop reason: HOSPADM

## 2025-05-12 RX ORDER — MIDODRINE HYDROCHLORIDE 10 MG/1
10 TABLET ORAL ONCE
Status: COMPLETED | OUTPATIENT
Start: 2025-05-12 | End: 2025-05-12

## 2025-05-12 RX ORDER — ONDANSETRON 2 MG/ML
4 INJECTION INTRAMUSCULAR; INTRAVENOUS EVERY 6 HOURS PRN
Status: DISCONTINUED | OUTPATIENT
Start: 2025-05-12 | End: 2025-05-12

## 2025-05-12 RX ORDER — MIDODRINE HYDROCHLORIDE 5 MG/1
10 TABLET ORAL ONCE
Status: COMPLETED | OUTPATIENT
Start: 2025-05-12 | End: 2025-05-12

## 2025-05-12 RX ORDER — MIDODRINE HYDROCHLORIDE 5 MG/1
20 TABLET ORAL ONCE
Status: COMPLETED | OUTPATIENT
Start: 2025-05-12 | End: 2025-05-12

## 2025-05-12 RX ADMIN — METOPROLOL TARTRATE 2.5 MG: 5 INJECTION INTRAVENOUS at 17:10

## 2025-05-12 RX ADMIN — SODIUM CHLORIDE 500 ML: 0.9 INJECTION, SOLUTION INTRAVENOUS at 13:44

## 2025-05-12 RX ADMIN — AMOXICILLIN 500 MG: 500 CAPSULE ORAL at 22:47

## 2025-05-12 RX ADMIN — MIDODRINE HYDROCHLORIDE 10 MG: 10 TABLET ORAL at 12:12

## 2025-05-12 RX ADMIN — METOPROLOL TARTRATE 2.5 MG: 5 INJECTION INTRAVENOUS at 10:26

## 2025-05-12 RX ADMIN — CALCIUM ACETATE 667 MG: 667 CAPSULE ORAL at 19:06

## 2025-05-12 RX ADMIN — INSULIN ASPART 4 UNITS: 100 INJECTION, SOLUTION INTRAVENOUS; SUBCUTANEOUS at 19:48

## 2025-05-12 RX ADMIN — AMIODARONE HYDROCHLORIDE 150 MG: 1.5 INJECTION, SOLUTION INTRAVENOUS at 10:45

## 2025-05-12 RX ADMIN — MIDODRINE HYDROCHLORIDE 10 MG: 5 TABLET ORAL at 22:47

## 2025-05-12 RX ADMIN — SODIUM CHLORIDE 500 ML: 0.9 INJECTION, SOLUTION INTRAVENOUS at 10:06

## 2025-05-12 RX ADMIN — MIDODRINE HYDROCHLORIDE 20 MG: 5 TABLET ORAL at 13:44

## 2025-05-12 RX ADMIN — HEPARIN SODIUM 800 UNITS/HR: 10000 INJECTION, SOLUTION INTRAVENOUS at 12:47

## 2025-05-12 RX ADMIN — INSULIN ASPART 4 UNITS: 100 INJECTION, SOLUTION INTRAVENOUS; SUBCUTANEOUS at 23:16

## 2025-05-12 RX ADMIN — ALBUMIN HUMAN 250 ML: 0.05 INJECTION, SOLUTION INTRAVENOUS at 14:52

## 2025-05-12 RX ADMIN — SODIUM CHLORIDE 200 ML: 0.9 INJECTION, SOLUTION INTRAVENOUS at 13:44

## 2025-05-12 ASSESSMENT — ACTIVITIES OF DAILY LIVING (ADL)
ADLS_ACUITY_SCORE: 58
VISION_MANAGEMENT: GLASSES
ADLS_ACUITY_SCORE: 58
ADLS_ACUITY_SCORE: 35
ADLS_ACUITY_SCORE: 36
ADLS_ACUITY_SCORE: 36
DOING_ERRANDS_INDEPENDENTLY_DIFFICULTY: NO
ADLS_ACUITY_SCORE: 56
WEAR_GLASSES_OR_BLIND: YES
HEARING_DIFFICULTY_OR_DEAF: NO
ADLS_ACUITY_SCORE: 56
DIFFICULTY_COMMUNICATING: NO
CONCENTRATING,_REMEMBERING_OR_MAKING_DECISIONS_DIFFICULTY: NO
WALKING_OR_CLIMBING_STAIRS_DIFFICULTY: NO
ADLS_ACUITY_SCORE: 56
ADLS_ACUITY_SCORE: 58
FALL_HISTORY_WITHIN_LAST_SIX_MONTHS: NO
ADLS_ACUITY_SCORE: 58
TOILETING_ISSUES: NO
ADLS_ACUITY_SCORE: 56
ADLS_ACUITY_SCORE: 35
DIFFICULTY_EATING/SWALLOWING: NO
CHANGE_IN_FUNCTIONAL_STATUS_SINCE_ONSET_OF_CURRENT_ILLNESS/INJURY: NO
ADLS_ACUITY_SCORE: 58
ADLS_ACUITY_SCORE: 36
DRESSING/BATHING_DIFFICULTY: NO

## 2025-05-12 NOTE — ED NOTES
[place] Update to Hospitalist [facility]: Going to dialysis now, continued hypotension in the SBP 70s-80s awaiting second Midodrine from Pharmacy. Holding amiodarone, heparin infusing. Trop critically elevated. Plan from hospitalist for [facility] level of care.

## 2025-05-12 NOTE — PHARMACY-ADMISSION MEDICATION HISTORY
Pharmacist Admission Medication History    Admission medication history is complete. The information provided in this note is only as accurate as the sources available at the time of the update.    Information Source(s): Patient and [email or web address]/SureScripts via in-person    Pertinent Information: Has only taken amoxicillin for three days     Changes made to PTA medication list:  Added: amoxicillin  Deleted: carbamide peroxide ear drops, levalbuterol, Novolog  Changed: None    Allergies reviewed with patient and updates made in EHR: no    Medication History Completed By: Debbie Vincent [facility] 5/12/2025 12:42 PM    PTA Med List   Medication Sig Last Dose/Taking    acetaminophen (TYLENOL) 500 MG tablet Take 500-1,000 mg by mouth every 6 hours as needed. Taking As Needed    amiodarone (PACERONE) 100 MG TABS tablet Take 1 tablet (100 mg) by mouth five times a week. Take on Mondays, Tuesdays, Thursdays, Fridays and Saturdays; hold on Wednesdays and Sundays. 5/10/2025    amoxicillin (AMOXIL) 500 MG tablet Take 500 mg by mouth 2 times daily. For ten days 5/11/2025 Evening    calcium acetate (PHOSLO) 667 MG CAPS capsule Take 667 mg by mouth 3 times daily (with meals) 5/11/2025 Evening    calcium acetate (PHOSLO) 667 MG CAPS capsule Take 667 mg by mouth Take with snacks or supplements Taking As Needed    famotidine (PEPCID) 20 MG tablet Take 20 mg by mouth 2 times daily as needed. Taking As Needed    fluticasone (FLONASE) 50 MCG/ACT nasal spray Spray 1 spray into both nostrils 2 times daily. 5/11/2025 Evening    guaiFENesin 1200 MG TB12 Take 1 tablet by mouth every 12 hours as needed (cough/congestion). Taking As Needed    insulin glargine (LANTUS PEN) 100 UNIT/ML pen Inject 11 Units subcutaneously at bedtime. 5/11/2025 Bedtime    insulin regular (NOVOLIN R FLEXPEN) 100 UNIT/ML pen INJECT 9-12 UNITS BEFORE BREAKFAST, LUNCH, AND DINNER PLUS A CORRECTIVE SCALE OF 2 UNITS FOR EVERY 50 OVER 150 - MAXIMUM 20 unit(s) PER DAY 5/11/2025  Evening    midodrine (PROAMATINE) 10 MG tablet Take 30 mg by mouth daily. On Monday, Wednesday, Friday before dialysis Past Week    midodrine (PROAMATINE) 10 MG tablet Take 10 mg by mouth Every Mon, Wed, Fri Morning Past Week    midodrine (PROAMATINE) 10 MG tablet Take 20 mg by mouth 2 times daily. On non-dialysis days (Tuesday, Thursday, Saturday, Sunday) 5/11/2025 Evening

## 2025-05-12 NOTE — CONSULTS
HOSPITAL CARDIOLOGY CONSULTATION NOTE      Yissel Wetzel MRN# [id number]   YOB: 1960 Age: 64 year old   Date of Admission: 5/12/2025     Reason for consult:            Assessment and Plan:     This patient is a 64 year old female with pertinent past medical history pertinent for atrial fibrillation, SVT, severe pulmonary hypertension (mean PA pressure 66 mmHg on right heart catheterization October 2024; felt secondary to intrinsic pulmonary vascular disease), COPD, hypertension, end-stage renal disease on hemodialysis, anemia of chronic disease, type 2 diabetes mellitus, dyslipidemia, esophagitis/gastropathy, cardiac cirrhosis, secondary hyperparathyroidism, who was admitted 5/12/2025 for palpitations and lightheadedness over the past week; in the ER noted to have atrial fibrillation with rapid rate response with heart rate 140 bpm with branch block with secondary and also nonspecific ST and T wave abnormality.  Cardiac troponin elevated at approximately 1100.  Notably no chest pain symptomatology.  She has underlying end-stage renal disease on hemodialysis.  She has low systolic blood pressures in the 90s. Cardiology consultation requested for atrial fibrillation with RVR with low blood pressure.    Paroxysmal atrial fibrillation, prior history of SVT/junctional rhythm:   A.  No chart history of atrial fibrillation although review of prior EKG in October 2024 demonstrates atrial fibrillation at that time as well.  Notably she has developed atrial fibrillation despite being on amiodarone (she has been on amiodarone for PSVT).  Recommend long-term rate control approach (given comorbidities including severe pulmonary hypertension as well as lack of efficacy of amiodarone in the past).  B.  She is a complicated patient; also history of PSVT/junctional rhythm; now with AF-RVR with limited blood pressure.  In the short-term use IV amiodarone for rate control, supplemental metoprolol 2.5 mg IV every 6  hours (for SBP greater than 90 mmHg).  Recommend EP consultation tomorrow for recommendations regarding optimal management of atrial fibrillation.  C.  Currently on IV heparin; in the the long-term recommend starting apixaban 5 mg twice daily for stroke prophylaxis.    Severe pulmonary hypertension:   A.  She is severe pulmonary hypertension with right heart catheterization October 2024 demonstrating mean PA pressure 66 mmHg and pulmonary capillary wedge pressure 24 mmHg.  Notably she has underlying COPD.  She was seen Dr. Redd at [facility]; no specific vasodilator therapy is recommended in the past.  B.  Volume regulation via hemodialysis.    Elevated cardiac troponin, NSTEMI:   A.  Elevated troponin possibly 1100.  Recommend trending troponins.  I suspect this likely represents a supply/demand mismatch due to AF-RVR in a patient with underlying end-stage renal disease on hemodialysis.  Notably coronary angiogram 2021 demonstrated essentially normal coronary arteries.  B.  Continue to use IV heparin while trending troponins.  If significant uptrend, then we will consider diagnostic angiography.  `  Hypotension:   A.  Continue midodrine schedule.    Melissa Burdick MD                     Chief Complaint:   Atrial fibrillation, low blood pressure.             History of Present Illness:   This patient is a 64 year old female with pertinent past medical history pertinent for atrial fibrillation, SVT, severe pulmonary hypertension (mean PA pressure 66 mmHg on right heart catheterization October 2024; felt secondary to intrinsic pulmonary vascular disease), COPD, hypertension, end-stage renal disease on hemodialysis, anemia of chronic disease, type 2 diabetes mellitus, dyslipidemia, esophagitis/gastropathy, cardiac cirrhosis, secondary hyperparathyroidism, who was admitted 5/12/2025 for palpitations and lightheadedness over the past week; in the ER noted to have atrial fibrillation with rapid rate  response with heart rate 140 bpm with branch block with secondary and also nonspecific ST and T wave abnormality.  Cardiac troponin elevated at approximately 1100.  Notably no chest pain symptomatology.  She has underlying end-stage renal disease on hemodialysis.  She has low systolic blood pressures in the 90s. Cardiology consultation requested for atrial fibrillation with RVR with low blood pressure.    She does not carry a diagnosis of atrial fibrillation; however prior EKG in October 2024 demonstrated atrial fibrillation with heart rate of 65 bpm.  She does have a history of symptomatic PSVT and junctional rhythm.  She has followed with Dr. Ratliff. She is treated with amiodarone 200 mg 5 times a week.  She has history of severe COPD.  She also has a history of postdialysis hypotension and is treated with midodrine.  Furthermore there is a history of pulmonary hypertension; she has seen Dr. Redd at the [facility] in this regard.  Right heart catheterization October 2024 demonstrated pulmonary capillary wedge pressure 24 mmHg and mean PA pressure 66 mmHg.  Coronary angiogram in 2021 which demonstrated mild nonobstructive CAD (10-20% lesion).  Echocardiogram in February 2024 demonstrated LVEF 60 to 65%, mild aortic stenosis with mean gradient of 10 mmHg.               Review of Systems:     The 10 point Review of Systems is negative other than noted in the HPI                Past Medical History:   I have reviewed this patient's past medical history  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, initial encounter 12/6/2019    End stage renal failure on dialysis (H)     Esophagitis, unspecified     Former tobacco use     HEMORRHAGIC GASTROPATHY     History of blood transfusion     [place]    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 (H) 2025    Iron deficiency anemia, unspecified     Mild persistent asthma     Obesity     Other and unspecified hyperlipidemia     Pneumonia     Pulmonary hypertension (H)     per 2012 echo mod.to severe pulmonary hypertension    Tobacco use disorder dc ed     Type 2 diabetes mellitus (H)     on Insulin- managed by PCP    Unspecified essential hypertension                Past Surgical History:   I have reviewed this patient's past surgical history  Past Surgical History:   Procedure Laterality Date    ABDOMINOPLASTY  pt unsure when    abdominoplasty-     SECTION  ,     x 2    COLONOSCOPY      [facility]    COLONOSCOPY N/A 2021    Procedure: COLONOSCOPY, WITH POLYPECTOMY AND BIOPSY;  Surgeon: Lincoln Farrar MD;  Location:  GI    CREATE FISTULA ARTERIOVENOUS UPPER EXTREMITY Right 2018    Procedure: RIGHT BRACHIAL TO BASILIC ARTERIOVENOUS FISTULA CREATION;  Surgeon: Terry Vizcaino MD;  Location: [facility]    CV HEART CATHETERIZATION WITH POSSIBLE INTERVENTION N/A 2021    Procedure: Heart Catheterization with Possible Intervention;  Surgeon: Joseluis Dean MD;  Location:  HEART CARDIAC CATH LAB    CV RIGHT HEART CATH MEASUREMENTS RECORDED N/A 2022    Procedure: Heart Cath Right Heart Cath;  Surgeon: Yan Heredia MD;  Location:  HEART CARDIAC CATH LAB    CV RIGHT HEART CATH MEASUREMENTS RECORDED N/A 10/4/2024    Procedure: Right Heart Catheterization;  Surgeon: Jag Traylor MD;  Location:  HEART CARDIAC CATH LAB    ENT SURGERY  as a child    tonsillectomy    ESOPHAGOSCOPY, GASTROSCOPY, DUODENOSCOPY (EGD), COMBINED N/A 2021    Procedure: ESOPHAGOGASTRODUODENOSCOPY, WITH BIOPSY;  Surgeon: Lincoln Farrar MD;  Location:  GI    EYE SURGERY Left     injections- eye    HYSTERECTOMY  approx     partial hysterectomy-reason bleeding      IR CVC TUNNEL PLACEMENT > 5 YRS OF AGE  2019    IR CVC TUNNEL REMOVAL RIGHT  2020    IR DIALYSIS FISTULOGRAM RIGHT  2019                 Social History:   I have reviewed this patient's social history  Social History     Tobacco Use    Smoking status: Former     Current packs/day: 0.00     Average packs/day: 1 pack/day for 22.0 years (22.0 ttl pk-yrs)     Types: Cigarettes     Start date: 10/12/1977     Quit date: 10/12/1999     Years since quittin.6    Smokeless tobacco: Never   Substance Use Topics    Alcohol use: No     Alcohol/week: 0.0 standard drinks of alcohol               Family History:   I have reviewed this patient's family history  Family History   Problem Relation Age of Onset    Arrhythmia Mother     Hypertension Mother     Pancreatic Cancer Father     Diabetes Brother     Asthma Sister     LUNG DISEASE Sister     Diabetes Daughter     Cirrhosis Sister                Medications:   I have reviewed this patient's current medications  (Not in a hospital admission)    Current Facility-Administered Medications   Medication Dose Route Frequency Provider Last Rate Last Admin    albumin human 5 % injection 250 mL  250 mL Intravenous Once in dialysis/CRRT Theron Alonso MD        albumin human 5 % injection  mL   mL Intravenous Q1H PRN Theron Alonso MD        amiodarone (NEXTERONE) 1.8 mg/mL in dextrose 5% 200 mL ADULT STANDARD infusion  0.5 mg/min Intravenous Continuous Jasson Jacob MD   Held at 25 1214    heparin (porcine) injection  500 Units Hemodialysis Machine OR IV Push Once in dialysis/CRRT Theron Alonso MD        heparin 10,000 units/10 mL infusion (DIALYSIS USE)  500 Units/hr Hemodialysis Machine Continuous Theron Alonso MD        heparin 25,000 units in 0.45% NaCl 250 mL ANTICOAGULANT infusion  0-5,000 Units/hr Intravenous Continuous Tameka Sabillon PA-C 8 mL/hr at 25 1256 800 Units/hr at 25 1256    lidocaine 1 % 0.5  mL  0.5 mL Intradermal Once PRN Theron Alonso MD        lidocaine 1 % 0.5 mL  0.5 mL Intradermal Once PRN Theron Alonso MD        metoprolol (LOPRESSOR) injection 2.5 mg  2.5 mg Intravenous Q4H PRN Jasson Jacob MD        midodrine (PROAMATINE) tablet 20 mg  20 mg Oral Once Theron Alonso MD        sodium chloride 0.9% BOLUS 100-150 mL  100-150 mL Intravenous Q15 Min PRN Theron Alonso MD        sodium chloride 0.9% BOLUS 200 mL  200 mL Hemodialysis Machine Once Theron Alonso MD        sodium chloride 0.9% BOLUS 500 mL  500 mL Hemodialysis Machine Once Theron Alonso MD        Stop Heparin 60 minutes before end of treatment   Does not apply Continuous PRN Theron Alonso MD         Current Outpatient Medications   Medication Sig Dispense Refill    acetaminophen (TYLENOL) 500 MG tablet Take 500-1,000 mg by mouth every 6 hours as needed.      amiodarone (PACERONE) 100 MG TABS tablet Take 1 tablet (100 mg) by mouth five times a week. Take on Mondays, Tuesdays, Thursdays, Fridays and Saturdays; hold on Wednesdays and Sundays. 30 tablet 3    amoxicillin (AMOXIL) 500 MG tablet Take 500 mg by mouth 2 times daily. For ten days      calcium acetate (PHOSLO) 667 MG CAPS capsule Take 667 mg by mouth 3 times daily (with meals)      calcium acetate (PHOSLO) 667 MG CAPS capsule Take 667 mg by mouth Take with snacks or supplements      famotidine (PEPCID) 20 MG tablet Take 20 mg by mouth 2 times daily as needed.      fluticasone (FLONASE) 50 MCG/ACT nasal spray Spray 1 spray into both nostrils 2 times daily. 16 g 0    guaiFENesin 1200 MG TB12 Take 1 tablet by mouth every 12 hours as needed (cough/congestion).      insulin glargine (LANTUS PEN) 100 UNIT/ML pen Inject 11 Units subcutaneously at bedtime. 15 mL 3    insulin regular (NOVOLIN R FLEXPEN) 100 UNIT/ML pen INJECT 9-12 UNITS BEFORE BREAKFAST, LUNCH, AND DINNER PLUS A CORRECTIVE SCALE OF 2 UNITS FOR EVERY 50 OVER 150 - MAXIMUM 20 unit(s) PER  DAY 15 mL 11    midodrine (PROAMATINE) 10 MG tablet Take 30 mg by mouth daily. On Monday, Wednesday, Friday before dialysis      midodrine (PROAMATINE) 10 MG tablet Take 10 mg by mouth Every Mon, Wed, Fri Morning      midodrine (PROAMATINE) 10 MG tablet Take 20 mg by mouth 2 times daily. On non-dialysis days (Tuesday, Thursday, Saturday, )      blood glucose (ACCU-CHEK GUIDE TEST) test strip Use to test blood sugars 4 times daily or as directed. 400 strip 2    Continuous Glucose  (FREESTYLE LATOSHA 2 READER) AYDIN Use to read blood sugars as per 's instructions. 1 each 0    Continuous Glucose Sensor (FREESTYLE LATOSHA 2 SENSOR) MISC Change every 14 days. 6 each 1               Allergies:     Allergies   Allergen Reactions    Albuterol      shakiness    Aspirin      gi    Byetta [Exenatide]      gi    Clonidine      constipation    Codeine      vomiting    Ezetimibe      muscle symptoms    Hydralazine      headaches    Lisinopril      hyperkalemia    Metformin Hydrochloride      vomiting    Pravachol [Pravastatin Sodium]      muscle pains    Simvastatin      myalgias    Troglitazone                  Physical Exam:   Vitals were reviewed  Blood pressure (!) 81/61, pulse 112, temperature 97  F (36.1  C), temperature source Axillary, resp. rate 15, weight 67.1 kg (148 lb), SpO2 98%, not currently breastfeeding.  Temperatures:  Current - Temp: 97  F (36.1  C); Max - Temp  Av  F (36.1  C)  Min: 97  F (36.1  C)  Max: 97  F (36.1  C)  Respiration range: Resp  Avg: 15.4  Min: 9  Max: 26  Pulse range: Pulse  Av.4  Min: 103  Max: 146  Blood pressure range: Systolic (24hrs), Av , Min:72 , Max:107   ; Diastolic (24hrs), Av, Min:35, Max:69    Pulse oximetry range: SpO2  Av.3 %  Min: 94 %  Max: 100 %    Intake/Output Summary (Last 24 hours) at 2025 1317  Last data filed at 2025 1120  Gross per 24 hour   Intake 100 ml   Output --   Net 100 ml     Constitutional:   Awake, alert,  cooperative, no apparent distress, and appears stated age     Eyes:   Lids and lashes normal, extra ocular muscles intact, sclera clear, conjunctiva normal     Neck:   Supple, symmetrical, trachea midline, no JVD     Back:   Symmetric     Lungs:   No increased work of breathing, diminished air entry.       Cardiovascular:   Irregular rate and rhythm, normal S1 and S2, no S3 or S4, and no murmur noted.      Abdomen:   Non-tender     Musculoskeletal:   Grossly normal motor strength in all extremities bilaterally     Neurologic:   Grossly nonfocal     Skin:   no bruising or bleeding     Additional findings:     No edema                 Data:   All laboratory data reviewed  Results for orders placed or performed during the hospital encounter of 05/12/25 (from the past 24 hours)   CBC with platelets + differential    Narrative    The following orders were created for panel order CBC with platelets + differential.  Procedure                               Abnormality         Status                     ---------                               -----------         ------                     CBC with platelets and ...[[id number]]  Abnormal            Final result               RBC and Platelet Morpho...[[id number]]                                                   Please view results for these tests on the individual orders.   Comprehensive metabolic panel   Result Value Ref Range    Sodium 133 (L) 135 - 145 mmol/L    Potassium 5.9 (H) 3.4 - 5.3 mmol/L    Carbon Dioxide (CO2) 21 (L) 22 - 29 mmol/L    Anion Gap 21 (H) 7 - 15 mmol/L    Urea Nitrogen 51.1 (H) 8.0 - 23.0 mg/dL    Creatinine 6.70 (H) 0.51 - 0.95 mg/dL    GFR Estimate 6 (L) >60 mL/min/1.73m2    Calcium 9.5 8.8 - 10.4 mg/dL    Chloride 91 (L) 98 - 107 mmol/L    Glucose 209 (H) 70 - 99 mg/dL    Alkaline Phosphatase      AST 34 0 - 45 U/L    ALT      Protein Total 6.7 6.4 - 8.3 g/dL    Albumin 3.5 3.5 - 5.2 g/dL    Bilirubin Total 0.4 <=1.2 mg/dL   Extra Tube     Narrative    The following orders were created for panel order Extra Tube.  Procedure                               Abnormality         Status                     ---------                               -----------         ------                     Extra Blue Top Tube[[id number]]                             Final result                 Please view results for these tests on the individual orders.   CBC with platelets and differential   Result Value Ref Range    WBC Count 8.4 4.0 - 11.0 10e3/uL    RBC Count 3.14 (L) 3.80 - 5.20 10e6/uL    Hemoglobin 9.5 (L) 11.7 - 15.7 g/dL    Hematocrit 30.3 (L) 35.0 - 47.0 %    MCV 97 78 - 100 fL    MCH 30.3 26.5 - 33.0 pg    MCHC 31.4 (L) 31.5 - 36.5 g/dL    RDW 15.0 10.0 - 15.0 %    Platelet Count 113 (L) 150 - 450 10e3/uL    % Neutrophils 78 %    % Lymphocytes 11 %    % Monocytes 9 %    % Eosinophils 1 %    % Basophils 1 %    % Immature Granulocytes 0 %    NRBCs per 100 WBC 0 <1 /100    Absolute Neutrophils 6.5 1.6 - 8.3 10e3/uL    Absolute Lymphocytes 0.9 0.8 - 5.3 10e3/uL    Absolute Monocytes 0.8 0.0 - 1.3 10e3/uL    Absolute Eosinophils 0.1 0.0 - 0.7 10e3/uL    Absolute Basophils 0.1 0.0 - 0.2 10e3/uL    Absolute Immature Granulocytes 0.0 <=0.4 10e3/uL    Absolute NRBCs 0.0 10e3/uL   Extra Blue Top Tube   Result Value Ref Range    Hold Specimen [facility]    NT-proBNP   Result Value Ref Range    NT-proBNP >70,000 (H) 0 - 226 pg/mL   Magnesium   Result Value Ref Range    Magnesium 2.2 1.7 - 2.3 mg/dL   EKG 12 lead   Result Value Ref Range    Systolic Blood Pressure  mmHg    Diastolic Blood Pressure  mmHg    Ventricular Rate 138 BPM    Atrial Rate  BPM    OK Interval  ms    QRS Duration 132 ms     ms    QTc 581 ms    P Axis  degrees    R AXIS 108 degrees    T Axis -87 degrees    Interpretation ECG       Atrial fibrillation with rapid ventricular response  Right bundle branch block  T wave abnormality, consider inferolateral ischemia  Abnormal ECG  When compared with ECG of  04-Oct-2024 12:18,  Vent. rate has increased by  73 bpm  Right bundle branch block is now Present  Confirmed by GENERATED REPORT, COMPUTER ([id number]),  Coby Lopez ([id number]) on 5/12/2025 9:59:08 AM     EKG 12-lead, tracing only   Result Value Ref Range    Systolic Blood Pressure  mmHg    Diastolic Blood Pressure  mmHg    Ventricular Rate 97 BPM    Atrial Rate  BPM    NM Interval  ms    QRS Duration 138 ms     ms    QTc 551 ms    P Axis  degrees    R AXIS 106 degrees    T Axis 250 degrees    Interpretation ECG       Atrial fibrillation  Right bundle branch block  T wave abnormality, consider inferolateral ischemia  Abnormal ECG  When compared with ECG of 12-May-2025 09:35,  No significant change was found  Confirmed by GENERATED REPORT, COMPUTER ([id number]),  Coby Lopez ([id number]) on 5/12/2025 11:24:34 AM     XR Chest Port 1 View    Narrative    EXAM: XR CHEST PORT 1 VIEW  LOCATION: [facility]  DATE: 5/12/2025    INDICATION: Chest pain, SOB, rapid A fib.  COMPARISON: Chest x-ray 9/29/2024.      Impression    IMPRESSION: Stable cardiomegaly. Hazy bilateral pulmonary opacities with vascular and interstitial prominence again noted consistent with pulmonary edema, but appears mildly improved. Small right base pleural fluid is slightly larger.   Troponin T, High Sensitivity   Result Value Ref Range    Troponin T, High Sensitivity 1,194 (HH) <=14 ng/L   Hepatic panel   Result Value Ref Range    Protein Total 6.4 6.4 - 8.3 g/dL    Albumin 3.5 3.5 - 5.2 g/dL    Bilirubin Total 0.3 <=1.2 mg/dL    Alkaline Phosphatase 77 40 - 150 U/L    AST 16 0 - 45 U/L    ALT 7 0 - 50 U/L    Bilirubin Direct 0.14 0.00 - 0.30 mg/dL   Potassium   Result Value Ref Range    Potassium 4.0 3.4 - 5.3 mmol/L   TSH with free T4 reflex   Result Value Ref Range    TSH 4.04 0.30 - 4.20 uIU/mL     *Note: Due to a large number of results and/or encounters for the requested time period, some results have not been  "displayed. A complete set of results can be found in Results Review.       CARDIAC ENZYMES:  No lab results found in last 7 days.  Recent Labs   Lab 05/12/25  0933   NTBNP >70,000*       GENERAL:  Recent Labs   Lab 05/12/25  1138 05/12/25  0933   WBC  --  8.4   HGB  --  9.5*   MCV  --  97   PLT  --  113*   NA  --  133*   POTASSIUM 4.0 5.9*   CHLORIDE  --  91*   CO2  --  21*   BUN  --  51.1*   CR  --  6.70*   GFRESTIMATED  --  6*   ANIONGAP  --  21*   KANWAL  --  9.5   GLC  --  209*   ALBUMIN 3.5 3.5   PROTTOTAL 6.4 6.7   BILITOTAL 0.3 0.4   ALKPHOS 77  --    ALT 7  --    AST 16 34       No results for input(s): \"SED\", \"CRP\" in the last 168 hours.    No results for input(s): \"DD\" in the last 168 hours.    Recent Labs   Lab 05/12/25  1138   TSH 4.04       LIPIDS:  Recent Labs   Lab Test 09/29/24  0803   CHOL 110   HDL 36*   LDL 53   TRIG 107       BLOOD GASES:  No results for input(s): \"PH\", \"PHV\", \"PO2\", \"PO2V\", \"SAT\", \"PCO2\", \"PCO2V\", \"HCO3\", \"HCO3V\" in the last 168 hours.    EKG results:  5/12/2025: Atrial fibrillation 138 bpm, right axis deviation, right bundle branch block with secondary ST and T wave abnormality.  Nonspecific ST and T wave abnormality.      10/4/2024: Atrial fibrillation with heart rate 64 bpm, nonspecific ST and T wave abnormality precordial leads.    Chest X-ray (personally reviewed): Personally reviewed.  Mild pulmonary vascular congestion.  Stable cardiomegaly. Hazy bilateral pulmonary opacities with vascular and interstitial prominence again noted consistent with pulmonary edema, but appears mildly improved. Small right base pleural fluid is slightly larger.     Current echocardiogram: Pending.  Stable cardiomegaly. Hazy bilateral pulmonary opacities with vascular and interstitial prominence again noted consistent with pulmonary edema, but appears mildly improved. Small right base pleural fluid is slightly larger.     Echocardiogram 2/27/2024:  1. Left ventricular systolic function is normal. " The visual ejection fraction  is 60-65%.  2. No regional wall motion abnormalities noted.  3. The right ventricle is normal in structure, function and size.  4. The left atrium is severely dilated.  5. There is mild (1+) mitral regurgitation.  6. Mild valvular aortic stenosis; mean gradient 10 mmHg, peak velocity 2.2  m/sec.  7. The right ventricular systolic pressure is approximated at 33mmHg plus the  right atrial pressure. IVC diameter <2.1 cm collapsing >50% with sniff  suggests a normal RA pressure of 3 mmHg.    Right heart catheterization:  Right sided filling pressures are severely elevated.    Left sided filling pressures are severely elevated.    Severely elevated pulmonary artery hypertension.    Normal cardiac output level.    Hemodynamic data has been modified in [employer] per physician review.  /59/85  RA --/--/20  /20  /35/66  PCW --/--/25    PA sat 51%  AO sat 90%    CO by SHANA 3.99 (2.45)    PVR 10 Right sided filling pressures are severely elevated. Left sided filling pressures are severely elevated. Severely elevated pulmonary artery hypertension. Normal cardiac output level. Hemodynamic data has been modified in [employer] per physician review.     Imaging (past 48 hours):  Recent Results (from the past 48 hours)   XR Chest Port 1 View    Narrative    EXAM: XR CHEST PORT 1 VIEW  LOCATION: [facility]  DATE: 5/12/2025    INDICATION: Chest pain, SOB, rapid A fib.  COMPARISON: Chest x-ray 9/29/2024.      Impression    IMPRESSION: Stable cardiomegaly. Hazy bilateral pulmonary opacities with vascular and interstitial prominence again noted consistent with pulmonary edema, but appears mildly improved. Small right base pleural fluid is slightly larger.         Clinically Significant Risk Factors Present on Admission        # Hyperkalemia: Highest K = 5.9 mmol/L in last 2 days, will monitor as appropriate  # Hyponatremia: Lowest Na = 133 mmol/L in last 2 days, will monitor  as appropriate  # Hypochloremia: Lowest Cl = 91 mmol/L in last 2 days, will monitor as appropriate     # Anion Gap Metabolic Acidosis: Highest Anion Gap = 21 mmol/L in last 2 days, will monitor and treat as appropriate    # Thrombocytopenia: Lowest platelets = 113 in last 2 days, will monitor for bleeding   # Hypertension: Noted on problem list  # Chronic heart failure with preserved ejection fraction: heart failure noted on problem list and last echo with EF >50%     # Anemia: based on hgb <11

## 2025-05-12 NOTE — ED NOTES
MD aware of continued hypotension, given PO midodrine (See MAR), holding amiodarone infusion at this time.

## 2025-05-12 NOTE — H&P
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 admitted on 5/12/25 with atrial fibrillation with RVR.    Atrial fibrillation with RVR  Hx SVT/junctional rhythm  Elevated troponin  *Established with Dr. Barbosa. She is maintained on low dose Amiodarone 200 mg 5 times weekly, which was held for a period of time summer 2024 due to junctional rhythm but restarted 8/30/24.  *Presented with lightheadedness/dizziness, palpitations, mild dyspnea since 5/6. No chest pain. She noted her BP to be 80/50 and  when she called cardiology clinic on 5/8 and it was recommended she go to the ER, but patient declined. She presented to the ED 5/12 and was found to be in afib RVR with HR mostly 120-130s, SBP . Saturating 98% on RA. ProBNP >70k, troponin 1,194. CXR w/ findings of pulmonary edema. She is mentating well.  *Per chart review, no formal diagnosis of atrial fibrillation although she has an EKG from 10/4/24 that appears consistent w/ afib.  *[id number] score is at least 2 for gender and DM II. She has no hx GIB or frequent falls. Notes hx epistaxis a few years ago while on baby Aspirin and thus hesitant to start anticoagulation, but this was not a life threatening bleed nor anything that required blood transfusion. Discussed that currently benefits appear to exceed risks, she was ultimately agreeable to start IV Heparin with close monitoring.  - Continue IV Amiodarone infusion as long as SBP >90  - Start IV Heparin  - Pharmacy consult for DOAC cost  - Serial troponin  - Repeat echocardiogram  - Continuous telemetry  - NPO for now  pending cardiology consult  - Cardiology consult  - Reportedly going to dialyze this afternoon, will need to reassess hemodynamics afterwards.    DM II, insulin dependent  *A1c was 7.5 on 8/18/24  - Hold Lantus 11 units HS while NPO  - Hold PTA insulin aspart 2U per 15g CHO with meals while NPO  - Accuchecks q4h with sliding scale coverage    ESRD on dialysis MWF  - Continue PTA Midodrine 10 mg qAM and 30 mg before HD on MWF (dialysis days) and 20 mg BID TThSaSu (non-dialysis days)  - Nephrology consult for dialysis    Mild hyponatremia  *Sodium 133 on admission, s/p IV  mL in the ED.  - Repeat CMP tomorrow AM    Chronic anemia  Chronic thrombocytopenia  *Hgb 9.5, stable from 8.8 on 4/23/25. Platelets 113, stable from 104 on 2/8/25.  - Repeat CBC tomorrow AM    COPD  Chronic respiratory failure with hypoxia  *Patient uses 2 L PRN and at HS  - Continue PTA Levalbuterol  - Supplemental oxygen    Severe pulmonary hypertension felt to be due to intrinsic pulmonary vascular disease  *Established with Dr. Redd  *Echo 2/27/24 EF 60-65% with mild mitral regurgitation, mild valvular aortic stenosis, mild tricuspid regurgitation with right ventricular systolic pressure approximated at 33 mmHg plus the right atrial pressure, IVC diameters <2.1 cm collapsing >50% with sniff suggests normal RA pressure of 3 mmHg  *S/p RHC 10/2/24 with severely elevated left and right sided filling pressures, severely elevated pulmonary artery hypertension, normal cardiac output level.  - Monitor I&O  - Daily weights    History of cirrhosis due to severe pulmonary HTN  *Initial LFTs hemolyzed  - Repeat LFTs    Clinically Significant Risk Factors Present on Admission        # Hyperkalemia: Highest K = 5.9 mmol/L in last 2 days, will monitor as appropriate  # Hyponatremia: Lowest Na = 133 mmol/L in last 2 days, will monitor as appropriate  # Hypochloremia: Lowest Cl = 91 mmol/L in last 2 days, will monitor as appropriate     # Anion Gap  Metabolic Acidosis: Highest Anion Gap = 21 mmol/L in last 2 days, will monitor and treat as appropriate    # Thrombocytopenia: Lowest platelets = 113 in last 2 days, will monitor for bleeding   # Hypertension: Noted on problem list    # Chronic heart failure with preserved ejection fraction: heart failure noted on problem list and last echo with EF >50%     # Anemia: based on hgb <11                       Diet:  NPO  DVT Prophylaxis: IV Heparin  Weller Catheter: Not present  Lines: None     Cardiac Monitoring: None  Code Status:  full         Disposition Plan      Expected Discharge Date: 05/14/2025             Entered: Tameka Sabillon PA-C 05/12/2025, 11:31 AM     Medically Ready for Discharge: Anticipated in 2-4 Days       The patient's care was discussed with the Attending Physician, Dr. Huddleston and Patient.    Tameka Sabillon PA-C  [facility]  Securely message with the Vocera Web Console (learn more here)      ______________________________________________________________________    Chief Complaint   Lightheadedness, fast heart rate    History is obtained from the patient and EMR.      History of Present Illness   Yissel Wetzel is a 64 year old female with PMH of HFpEF, pulmonary HTN, cirrhosis, hx SVT on Amiodarone, hx cirrhosis due to right heart disease, ESRD on dialysis, secondary hyperparathyroidism, DM II, chronic respiratory failure with hypoxia, etc. admitted on 5/12/25 with atrial fibrillation with RVR.    Patient presents to the ED with lightheadedness, palpitations, fast heart rate since Tuesday 5/6. She has some associated mild dyspnea. Symptoms are more prominent with exertion. No overt chest pain. No fevers, chills, cough, LE edema, weight gain. She contacted her cardiology clinic 5/8 to report symptoms as long as  and BP 80/50 and was instructed to go to the ER, but declined at the time.    In the ED, afebrile with -130s, /57, saturating 100% on 2  L O2. CBC with stable hgb 9.5, platelets 113. CMP sodium 133, K 5.9 (hemolyzed), bicarb 21, creatinine 6.70, anion gap 21, . ProBNP 70,000. EKG atrial fibrillation with RVR, RBBB, T wave abnormality. RBBB is new. Patient was given IV  mL, IV Lopressor 2.5 mg x 2 and then IV Amiodarone 150 mg. CXR pending. ED provider spoke with cardiology who recommended IV Amiodarone infusion.    Non smoker. Confirms full code status. No alcohol use.    Past Medical History    I have reviewed this patient's medical history and updated it with pertinent information if needed.   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, initial encounter 12/6/2019    End stage renal failure on dialysis (H)     Esophagitis, unspecified     Former tobacco use     HEMORRHAGIC GASTROPATHY     History of blood transfusion     [place]    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) 1/21/2025    Iron deficiency anemia, unspecified     Mild persistent asthma     Obesity     Other and unspecified hyperlipidemia     Pneumonia     Pulmonary hypertension (H)     per 12/2012 echo mod.to severe pulmonary hypertension    Tobacco use disorder dc ed 1999    Type 2 diabetes mellitus (H)     on Insulin- managed by PCP    Unspecified essential hypertension        Prior to Admission Medications   Prior to Admission Medications   Prescriptions Last Dose Informant Patient Reported? Taking?   Continuous Glucose  (FREESTYLE LATOSHA 2 READER) AYDIN   No No   Sig: Use to read blood sugars as per 's instructions.   Continuous Glucose Sensor (FREESTYLE LATOSHA 2 SENSOR) MISC   No No   Sig: Change every 14 days.   acetaminophen (TYLENOL) 500 MG tablet   Yes No   Sig: Take 500-1,000 mg by mouth every 6 hours as needed.    amiodarone (PACERONE) 100 MG TABS tablet   No No   Sig: Take 1 tablet (100 mg) by mouth five times a week. Take on , , ,  and ; hold on  and Sundays.   blood glucose (ACCU-CHEK GUIDE TEST) test strip   No No   Sig: Use to test blood sugars 4 times daily or as directed.   calcium acetate (PHOSLO) 667 MG CAPS capsule   Yes No   Sig: Take 667 mg by mouth 3 times daily (with meals)   calcium acetate (PHOSLO) 667 MG CAPS capsule   Yes No   Sig: Take 667 mg by mouth Take with snacks or supplements   carbamide peroxide (DEBROX) 6.5 % otic solution   No No   Sig: Place 10 drops Into the left ear 2 times daily.   Patient not taking: Reported on 3/30/2025   famotidine (PEPCID) 20 MG tablet   Yes No   Sig: Take 20 mg by mouth 2 times daily as needed.   fluticasone (FLONASE) 50 MCG/ACT nasal spray   No No   Sig: Spray 1 spray into both nostrils 2 times daily.   guaiFENesin 1200 MG TB12   Yes No   Sig: Take 1 tablet by mouth every 12 hours as needed (cough/congestion).   insulin aspart (FIASP FLEXTOUCH) 100 UNIT/ML pen-injector   No No   SiU per 15 carbs, plus 1U for every 50 that preprandial BG over 150 - MAXIMUM 20 UNITS PER DAY   insulin glargine (LANTUS PEN) 100 UNIT/ML pen   No No   Sig: Inject 11 Units subcutaneously at bedtime.   insulin regular (NOVOLIN R FLEXPEN) 100 UNIT/ML pen   No No   Sig: INJECT 9-12 UNITS BEFORE BREAKFAST, LUNCH, AND DINNER PLUS A CORRECTIVE SCALE OF 2 UNITS FOR EVERY 50 OVER 150 - MAXIMUM 20 unit(s) PER DAY   levalbuterol (XOPENEX HFA) 45 MCG/ACT inhaler   No No   Sig: Inhale 2 puffs into the lungs every 6 hours as needed for shortness of breath / dyspnea or wheezing   midodrine (PROAMATINE) 10 MG tablet   Yes No   Sig: Take 10 mg by mouth Every Mon, Wed, Fri Morning   midodrine (PROAMATINE) 10 MG tablet   Yes No   Sig: Take 20 mg by mouth 2 times daily. On non-dialysis days (Tuesday, Thursday, Saturday, )   midodrine (PROAMATINE) 10  MG tablet   Yes No   Sig: Take 30 mg by mouth daily. On Monday, Wednesday, Friday before dialysis      Facility-Administered Medications: None     Allergies   Allergies   Allergen Reactions    Albuterol      shakiness    Aspirin      gi    Byetta [Exenatide]      gi    Clonidine      constipation    Codeine      vomiting    Ezetimibe      muscle symptoms    Hydralazine      headaches    Lisinopril      hyperkalemia    Metformin Hydrochloride      vomiting    Pravachol [Pravastatin Sodium]      muscle pains    Simvastatin      myalgias    Troglitazone        Physical Exam   Vital Signs: Temp: 97  F (36.1  C) Temp src: Axillary BP: (!) 82/59 Pulse: (!) 134   Resp: (!) 9 SpO2: 99 % O2 Device: Nasal cannula Oxygen Delivery: 2 LPM  Weight: 0 lbs 0 oz    Constitutional: Awake, alert, cooperative, no apparent distress.    ENT: Normocephalic, without obvious abnormality, atraumatic. Normal sclera.    Neck: Supple, symmetrical, trachea midline  Pulmonary: No increased work of breathing, diminished  Cardiovascular: irregular, tachycardic  GI: Normal bowel sounds, soft, mildly distended, non tender  Skin: Visualized skin appeared clear.  Neuro: Oriented x 3. Moves all extremities spontaneously. No focal neuro deficits.  Psych:  Normal affect and mood  Extremities: Normal muscle tone. No gross deformities noted. Calves non-tender b/l. Trace pitting edema b/l LE    Medical Decision Making       75 MINUTES SPENT BY ME on the date of service doing chart review, history, exam, documentation & further activities per the note.         Data   Data reviewed today: I reviewed all medications, new labs and imaging results over the last 24 hours. I personally reviewed the EKG tracing showing atrial fibrillation with RVR at 138 bpm, RBBB, T wave abnormality.      I have personally reviewed the following data over the past 24 hrs:    8.4  \   9.5 (L)   / 113 (L)     133 (L) 91 (L) 51.1 (H) /  209 (H)   5.9 (H) 21 (L) 6.70 (H) \     ALT: N/A  AST: 34 AP: N/A TBILI: 0.4   ALB: 3.5 TOT PROTEIN: 6.7 LIPASE: N/A     Trop: N/A BNP: >70,000 (H)       Imaging results reviewed over the past 24 hrs:   No results found for this or any previous visit (from the past 24 hours).

## 2025-05-12 NOTE — CONSULTS
RENAL CONSULTATION NOTE    REFERRING MD:  Tameka Sabillon PA-C     REASON FOR CONSULTATION: ESRD and hyperkalemia    HPI:  64 y.o woman with ESRD, diabetes, afib and  liver cirrhosis, who is admitted for symptomatic afib with RVR.     Patient presents to the ED with lightheadedness, palpitations, fast heart rate since . She has some associated mild dyspnea. Symptoms are more prominent with exertion. No overt chest pain. No fevers, chills, cough, LE edema, weight gain. She contacted her cardiology clinic  to report symptoms as long as  and BP 80/50 and was instructed to go to the ER, but declined at the time.     Evaluated by inpatient cardiology, who recommended EP consult tomorrow.       ROS:  A complete review of systems was performed and is negative except as noted above.    PMH:    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, initial encounter 2019    End stage renal failure on dialysis (H)     Esophagitis, unspecified     Former tobacco use     HEMORRHAGIC GASTROPATHY     History of blood transfusion     [place]    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) 2025    Iron deficiency anemia, unspecified     Mild persistent asthma     Obesity     Other and unspecified hyperlipidemia     Pneumonia     Pulmonary hypertension (H)     per 2012 echo mod.to severe pulmonary hypertension    Tobacco use disorder [place] ed     Type 2 diabetes mellitus (H)     on Insulin- managed by PCP    Unspecified essential hypertension        PSH:    Past Surgical History:   Procedure Laterality Date    ABDOMINOPLASTY  pt unsure when    abdominoplasty-     SECTION  ,     x 2    COLONOSCOPY  2006    [facility]    COLONOSCOPY N/A 2021     Procedure: COLONOSCOPY, WITH POLYPECTOMY AND BIOPSY;  Surgeon: Lincoln Farrar MD;  Location:  GI    CREATE FISTULA ARTERIOVENOUS UPPER EXTREMITY Right 12/26/2018    Procedure: RIGHT BRACHIAL TO BASILIC ARTERIOVENOUS FISTULA CREATION;  Surgeon: Terry Vizcaino MD;  Location:  SD    CV HEART CATHETERIZATION WITH POSSIBLE INTERVENTION N/A 7/22/2021    Procedure: Heart Catheterization with Possible Intervention;  Surgeon: Joseluis Dean MD;  Location:  HEART CARDIAC CATH LAB    CV RIGHT HEART CATH MEASUREMENTS RECORDED N/A 11/23/2022    Procedure: Heart Cath Right Heart Cath;  Surgeon: Yan Heredia MD;  Location:  HEART CARDIAC CATH LAB    CV RIGHT HEART CATH MEASUREMENTS RECORDED N/A 10/4/2024    Procedure: Right Heart Catheterization;  Surgeon: Jag Traylor MD;  Location:  HEART CARDIAC CATH LAB    ENT SURGERY  as a child    tonsillectomy    ESOPHAGOSCOPY, GASTROSCOPY, DUODENOSCOPY (EGD), COMBINED N/A 5/27/2021    Procedure: ESOPHAGOGASTRODUODENOSCOPY, WITH BIOPSY;  Surgeon: Lincoln Farrar MD;  Location:  GI    EYE SURGERY Left     injections- eye    HYSTERECTOMY  approx 1983    partial hysterectomy-reason bleeding     IR CVC TUNNEL PLACEMENT > 5 YRS OF AGE  12/6/2019    IR CVC TUNNEL REMOVAL RIGHT  5/7/2020    IR DIALYSIS FISTULOGRAM RIGHT  12/9/2019       MEDICATIONS:    Current Facility-Administered Medications   Medication Dose Route Frequency Provider Last Rate Last Admin    metoprolol (LOPRESSOR) injection 2.5 mg  2.5 mg Intravenous Q6H Melissa Burdick MD, MD           ALLERGIES:    Allergies as of 05/12/2025 - Reviewed 05/12/2025   Allergen Reaction Noted    Albuterol  11/29/2012    Aspirin  02/02/2005    Byetta [exenatide]  07/15/2008    Clonidine  03/13/2012    Codeine  07/16/2004    Ezetimibe  07/25/2012    Hydralazine  07/25/2012    Lisinopril  10/16/2012    Metformin hydrochloride  07/16/2004    Pravachol [pravastatin sodium]  05/19/2011     Simvastatin  2011    Troglitazone  2004       FH:    Family History   Problem Relation Age of Onset    Arrhythmia Mother     Hypertension Mother     Pancreatic Cancer Father     Diabetes Brother     Asthma Sister     LUNG DISEASE Sister     Diabetes Daughter     Cirrhosis Sister        SH:    Social History     Socioeconomic History    Marital status: Single     Spouse name: Not on file    Number of children: Not on file    Years of education: Not on file    Highest education level: Not on file   Occupational History    Not on file   Tobacco Use    Smoking status: Former     Current packs/day: 0.00     Average packs/day: 1 pack/day for 22.0 years (22.0 ttl pk-yrs)     Types: Cigarettes     Start date: 10/12/1977     Quit date: 10/12/1999     Years since quittin.6    Smokeless tobacco: Never   Vaping Use    Vaping status: Never Used   Substance and Sexual Activity    Alcohol use: No     Alcohol/week: 0.0 standard drinks of alcohol    Drug use: No    Sexual activity: Never   Other Topics Concern     Service Not Asked    Blood Transfusions Yes    Caffeine Concern No    Occupational Exposure Not Asked    Hobby Hazards Not Asked    Sleep Concern No    Stress Concern No    Weight Concern Yes     Comment: would like to lose weight    Special Diet No    Back Care Not Asked    Exercise Yes     Comment: walks daily 2 blocks    Bike Helmet Not Asked    Seat Belt Not Asked    Self-Exams Not Asked    Parent/sibling w/ CABG, MI or angioplasty before 65F 55M? Not Asked   Social History Narrative    Not on file     Social Drivers of Health     Financial Resource Strain: Low Risk  (10/1/2024)    Financial Resource Strain     Within the past 12 months, have you or your family members you live with been unable to get utilities (heat, electricity) when it was really needed?: No   Food Insecurity: Low Risk  (10/1/2024)    Food Insecurity     Within the past 12 months, did you worry that your food would run  out before you got money to buy more?: No     Within the past 12 months, did the food you bought just not last and you didn t have money to get more?: No   Transportation Needs: Low Risk  (10/1/2024)    Transportation Needs     Within the past 12 months, has lack of transportation kept you from medical appointments, getting your medicines, non-medical meetings or appointments, work, or from getting things that you need?: No   Physical Activity: Not on file   Stress: Not on file   Social Connections: Not on file   Interpersonal Safety: Low Risk  (10/1/2024)    Interpersonal Safety     Do you feel physically and emotionally safe where you currently live?: Yes     Within the past 12 months, have you been hit, slapped, kicked or otherwise physically hurt by someone?: No     Within the past 12 months, have you been humiliated or emotionally abused in other ways by your partner or ex-partner?: No   Housing Stability: High Risk (10/1/2024)    Housing Stability     Do you have housing? : No     Are you worried about losing your housing?: No       PHYSICAL EXAM:    /51   Pulse 103   Temp 97.2  F (36.2  C) (Axillary)   Resp 24   Wt 67.1 kg (148 lb)   LMP  (LMP Unknown)   SpO2 100%   BMI 28.90 kg/m    GENERAL: Frail.   HEENT:  EOMI.   CV: irregular  RESP: Clear bilaterally with good efforts  GI: Abdomen o/s/nt/nd, BS present. No masses, organomegaly  MUSCULOSKELETAL: no edema  SKIN: no suspicious lesions or rashes, dry to touch  NEURO: Generalized weakness.   PSYCH: mood good, affect appropriate  LYMPH: No palpable ant/post cervical     LABS:      CBC RESULTS:     Recent Labs   Lab 05/12/25  0933   WBC 8.4   RBC 3.14*   HGB 9.5*   HCT 30.3*   *       BMP RESULTS:  Recent Labs   Lab 05/12/25  1138 05/12/25  0933   NA  --  133*   POTASSIUM 4.0 5.9*   CHLORIDE  --  91*   CO2  --  21*   BUN  --  51.1*   CR  --  6.70*   GLC  --  209*   KANWAL  --  9.5       INRNo lab results found in last 7 days.     DIAGNOSTICS:   Reviewed    A/P: 64 y.o woman with ESRD    # ESRD:    -MWF   -180 minutes   -EDW 65 kg   -R AVF, Qb 450   +heparin   -Lydia Long   -Dr. Rob    # Anemia: Epo 2400 units     # IDH: on  high dose midodrine, 30 mg before HD.    # Afib with RVR:     Theron Alonso MD  [facility] - Nephrology  Office Phone: [phone]  Pager: [phone]

## 2025-05-12 NOTE — ED TRIAGE NOTES
Patient presents to the ED via EMS for evaluation of lightheadedness/dizziness. Patient reports lives at home, baseline 2L of O2 via NC. Feeling lightheaded/dizzy with ambulation at home today, supposed to have dialysis today, found to be hypotensive/tachycardic for EMS. Hx of amiodarone and midodrine for medications. Denies blood thinners, used to take a baby asa no longer takes that.      Triage Assessment (Adult)       Row Name 05/12/25 0938          Triage Assessment    Airway WDL WDL        Respiratory WDL    Respiratory WDL all     Rhythm/Pattern, Respiratory unlabored;pattern regular;depth regular  2L baseline at home        Cardiac WDL    Cardiac WDL X;rhythm     Pulse Rate & Regularity tachycardic      Cardiac Rhythm Atrial fibrillation

## 2025-05-12 NOTE — ED NOTES
Bed: ED06  Expected date:   Expected time:   Means of arrival:   Comments:  A 521 64 F dizzy abfib eta 4705

## 2025-05-12 NOTE — ED PROVIDER NOTES
Emergency Department Note      History of Present Illness     Chief Complaint   Dizziness (/) and Tachycardia      HPI   Yissel Wetzel is a 64 year old female who presents by EMS for assessment of feeling dizzy and rapid pulse.  Patient is 64 with a complicated past medical history that includes A-fib.  Does have kidney disease and is on dialysis.  Does take amiodarone 100 mg 5 times a day.  Does not for felt well since Tuesday.  Has had some dizziness and some shortness of breath with activity.  No clear chest pain.  Patient felt worse today.  Could not get to dialysis and EMS was brought her to the hospital noted to have a lower blood pressure and a heart rate that was high.    Independent Historian   None    Review of External Notes       Past Medical History     Medical History and Problem List   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, initial encounter 12/6/2019    End stage renal failure on dialysis (H)     Esophagitis, unspecified     Former tobacco use     HEMORRHAGIC GASTROPATHY     History of blood transfusion     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) 1/21/2025    Iron deficiency anemia, unspecified     Mild persistent asthma     Obesity     Other and unspecified hyperlipidemia     Pneumonia     Pulmonary hypertension (H)     Tobacco use disorder [place] 1999    Type 2 diabetes mellitus (H)     Unspecified essential hypertension        Medications   acetaminophen (TYLENOL) 500 MG tablet  amiodarone (PACERONE) 100 MG TABS tablet  blood glucose (ACCU-CHEK GUIDE TEST) test strip  calcium acetate (PHOSLO) 667 MG CAPS capsule  calcium acetate (PHOSLO) 667 MG CAPS capsule  carbamide peroxide (DEBROX) 6.5 % otic solution  Continuous Glucose  (FREESTYLE LATOSHA 2 READER)  AYDIN  Continuous Glucose Sensor (FREESTYLE LATOSHA 2 SENSOR) MISC  famotidine (PEPCID) 20 MG tablet  fluticasone (FLONASE) 50 MCG/ACT nasal spray  guaiFENesin 1200 MG TB12  insulin aspart (FIASP FLEXTOUCH) 100 UNIT/ML pen-injector  insulin glargine (LANTUS PEN) 100 UNIT/ML pen  insulin regular (NOVOLIN R FLEXPEN) 100 UNIT/ML pen  levalbuterol (XOPENEX HFA) 45 MCG/ACT inhaler  midodrine (PROAMATINE) 10 MG tablet  midodrine (PROAMATINE) 10 MG tablet  midodrine (PROAMATINE) 10 MG tablet        Surgical History   Past Surgical History:   Procedure Laterality Date    ABDOMINOPLASTY  pt unsure when    abdominoplasty-     SECTION  ,     x 2    COLONOSCOPY      [facility]    COLONOSCOPY N/A 2021    Procedure: COLONOSCOPY, WITH POLYPECTOMY AND BIOPSY;  Surgeon: Lincoln Farrar MD;  Location:  GI    CREATE FISTULA ARTERIOVENOUS UPPER EXTREMITY Right 2018    Procedure: RIGHT BRACHIAL TO BASILIC ARTERIOVENOUS FISTULA CREATION;  Surgeon: Terry Vizcaino MD;  Location: [facility]    CV HEART CATHETERIZATION WITH POSSIBLE INTERVENTION N/A 2021    Procedure: Heart Catheterization with Possible Intervention;  Surgeon: Joseluis Dean MD;  Location:  HEART CARDIAC CATH LAB    CV RIGHT HEART CATH MEASUREMENTS RECORDED N/A 2022    Procedure: Heart Cath Right Heart Cath;  Surgeon: Yan Heredia MD;  Location:  HEART CARDIAC CATH LAB    CV RIGHT HEART CATH MEASUREMENTS RECORDED N/A 10/4/2024    Procedure: Right Heart Catheterization;  Surgeon: Jag Traylor MD;  Location:  HEART CARDIAC CATH LAB    ENT SURGERY  as a child    tonsillectomy    ESOPHAGOSCOPY, GASTROSCOPY, DUODENOSCOPY (EGD), COMBINED N/A 2021    Procedure: ESOPHAGOGASTRODUODENOSCOPY, WITH BIOPSY;  Surgeon: Lincoln Farrar MD;  Location:  GI    EYE SURGERY Left     injections- eye    HYSTERECTOMY  1983    partial hysterectomy-reason bleeding     IR  CVC TUNNEL PLACEMENT > 5 YRS OF AGE  12/6/2019    IR CVC TUNNEL REMOVAL RIGHT  5/7/2020    IR DIALYSIS FISTULOGRAM RIGHT  12/9/2019       Physical Exam     Patient Vitals for the past 24 hrs:   BP Temp Temp src Pulse Resp SpO2   05/12/25 0935 103/57 97  F (36.1  C) Axillary (!) 146 (!) 9 100 %   05/12/25 0930 -- -- -- (!) 138 13 99 %     Physical Exam  Vitals reviewed.   Constitutional:       Comments: Chronically ill-appearing looks older than stated age.   HENT:      Head: Normocephalic.      Nose: Nose normal.      Mouth/Throat:      Mouth: Mucous membranes are moist.   Eyes:      Pupils: Pupils are equal, round, and reactive to light.   Cardiovascular:      Rate and Rhythm: Tachycardia present. Rhythm irregular.   Pulmonary:      Breath sounds: Normal breath sounds.   Abdominal:      General: Abdomen is flat. Bowel sounds are normal.   Musculoskeletal:         General: Normal range of motion.   Skin:     General: Skin is warm.      Capillary Refill: Capillary refill takes less than 2 seconds.   Neurological:      General: No focal deficit present.      Mental Status: She is alert and oriented to person, place, and time.   Psychiatric:         Mood and Affect: Mood normal.         Diagnostics     Lab Results   Labs Ordered and Resulted from Time of ED Arrival to Time of ED Departure   COMPREHENSIVE METABOLIC PANEL - Abnormal       Result Value    Sodium 133 (*)     Potassium 5.9 (*)     Carbon Dioxide (CO2) 21 (*)     Anion Gap 21 (*)     Urea Nitrogen 51.1 (*)     Creatinine 6.70 (*)     GFR Estimate 6 (*)     Calcium 9.5      Chloride 91 (*)     Glucose 209 (*)     Alkaline Phosphatase        AST 34      ALT        Protein Total 6.7      Albumin 3.5      Bilirubin Total 0.4     CBC WITH PLATELETS AND DIFFERENTIAL - Abnormal    WBC Count 8.4      RBC Count 3.14 (*)     Hemoglobin 9.5 (*)     Hematocrit 30.3 (*)     MCV 97      MCH 30.3      MCHC 31.4 (*)     RDW 15.0      Platelet Count 113 (*)     % Neutrophils  78      % Lymphocytes 11      % Monocytes 9      % Eosinophils 1      % Basophils 1      % Immature Granulocytes 0      NRBCs per 100 WBC 0      Absolute Neutrophils 6.5      Absolute Lymphocytes 0.9      Absolute Monocytes 0.8      Absolute Eosinophils 0.1      Absolute Basophils 0.1      Absolute Immature Granulocytes 0.0      Absolute NRBCs 0.0     NT-PROBNP - Abnormal    NT-proBNP >70,000 (*)    TROPONIN T, HIGH SENSITIVITY - Abnormal    Troponin T, High Sensitivity 1,194 (*)    MAGNESIUM - Normal    Magnesium 2.2     HEPATIC FUNCTION PANEL - Normal    Protein Total 6.4      Albumin 3.5      Bilirubin Total 0.3      Alkaline Phosphatase 77      AST 16      ALT 7      Bilirubin Direct 0.14     POTASSIUM - Normal    Potassium 4.0     HEPATITIS B SURFACE ANTIGEN - Normal    Hepatitis B Surface Antigen Nonreactive     TSH WITH FREE T4 REFLEX - Normal    TSH 4.04         Imaging   No orders to display       EKG   ECG taken at 0935, ECG read at 0940  Rate 138 bpm. PA interval  ms. QRS duration 132 ms. QT/QTc 384/581 ms atrial fibrillation with rapid ventricular response.  Her QRS is widened at 132 ms consistent with right bundle branch block pattern.  Right bundle branch new since prior EKG.      EKG #2 heart rate 97 bpm.  Right bundle branch block pattern in A-fib similar to prior but heart rate slower.  Persistent biphasic T wave and ST segment depression in V2.  Independent Interpretation   None    ED Course      Medications Administered   Medications   amiodarone (NEXTERONE) bolus 150 mg (has no administration in time range)   metoprolol (LOPRESSOR) injection 2.5 mg (has no administration in time range)       Procedures   Procedures     Discussion of Management   None    ED Course        Additional Documentation  None    Medical Decision Making / Diagnosis     CMS Diagnoses: None    MIPS            None    [facility]   Yissel DOHERTY Rashard is a 64 year old female presents with recurrent rapid A-fib.  Patient has a history of  hypotension and end-stage renal disease.  Is on midodrine.  Mentating well but vital signs show blood pressure only in the 80s.  Cause of which is unclear but likely related to her chronic hypotension and rapid rhythm.  Challenges are to manage her A-fib in the setting of relative hypotension.  No signs of shock or high endorgan injury.  A bolus of fluid was given with some response but patient continued to be hypotensive.  Cardiology was consulted patient is been symptomatic for a week is not anticoagulated.  Concerns about cardioversion and causing issues stroke.  Did not recommend emergent cardioversion.  Trialed a few doses of metoprolol which she did tolerate but heart rate is difficult to control attempting an IV dose of amiodarone and then amiodarone drip and low-dose.  Patient is chronically on 500 mg of amiodarone 5 times a day.  Will recommend admission consider JARROD and cardioversion.  Patient will require higher level care due to need for dialysis today as well as relative hypotension.  Care was discussed with cardiology's consultation as well as the hospitalist and was admitted to cardiology bed in guarded condition due to chronic illness chronic hypotension in the management of A-fib.  Disposition   The patient was admitted to the hospital.     Diagnosis     ICD-10-CM    1. Atrial fibrillation with rapid ventricular response (H)  I48.91            Discharge Medications   New Prescriptions    No medications on file         MD [place] Brian Samuel, MD  05/13/25 0683

## 2025-05-13 ENCOUNTER — APPOINTMENT (OUTPATIENT)
Dept: CARDIOLOGY | Facility: CLINIC | Age: 65
DRG: 308 | End: 2025-05-13
Attending: INTERNAL MEDICINE
Payer: COMMERCIAL

## 2025-05-13 ENCOUNTER — APPOINTMENT (OUTPATIENT)
Dept: CARDIOLOGY | Facility: CLINIC | Age: 65
DRG: 308 | End: 2025-05-13
Attending: PHYSICIAN ASSISTANT
Payer: COMMERCIAL

## 2025-05-13 ENCOUNTER — ANESTHESIA (OUTPATIENT)
Dept: SURGERY | Facility: CLINIC | Age: 65
End: 2025-05-13
Payer: COMMERCIAL

## 2025-05-13 ENCOUNTER — ANESTHESIA EVENT (OUTPATIENT)
Dept: SURGERY | Facility: CLINIC | Age: 65
End: 2025-05-13
Payer: COMMERCIAL

## 2025-05-13 LAB
ALBUMIN SERPL BCG-MCNC: 3.9 G/DL (ref 3.5–5.2)
ALP SERPL-CCNC: 78 U/L (ref 40–150)
ALT SERPL W P-5'-P-CCNC: 7 U/L (ref 0–50)
ANION GAP SERPL CALCULATED.3IONS-SCNC: 16 MMOL/L (ref 7–15)
AST SERPL W P-5'-P-CCNC: 16 U/L (ref 0–45)
ATRIAL RATE - MUSE: 141 BPM
BASOPHILS # BLD AUTO: 0 10E3/UL (ref 0–0.2)
BASOPHILS NFR BLD AUTO: 1 %
BILIRUB SERPL-MCNC: 0.3 MG/DL
BUN SERPL-MCNC: 31.1 MG/DL (ref 8–23)
CALCIUM SERPL-MCNC: 9.1 MG/DL (ref 8.8–10.4)
CHLORIDE SERPL-SCNC: 92 MMOL/L (ref 98–107)
CREAT SERPL-MCNC: 4.5 MG/DL (ref 0.51–0.95)
DIASTOLIC BLOOD PRESSURE - MUSE: NORMAL MMHG
EGFRCR SERPLBLD CKD-EPI 2021: 10 ML/MIN/1.73M2
EOSINOPHIL # BLD AUTO: 0.1 10E3/UL (ref 0–0.7)
EOSINOPHIL NFR BLD AUTO: 2 %
ERYTHROCYTE [DISTWIDTH] IN BLOOD BY AUTOMATED COUNT: 15 % (ref 10–15)
GLUCOSE BLDC GLUCOMTR-MCNC: 143 MG/DL (ref 70–99)
GLUCOSE BLDC GLUCOMTR-MCNC: 166 MG/DL (ref 70–99)
GLUCOSE BLDC GLUCOMTR-MCNC: 178 MG/DL (ref 70–99)
GLUCOSE BLDC GLUCOMTR-MCNC: 186 MG/DL (ref 70–99)
GLUCOSE BLDC GLUCOMTR-MCNC: 228 MG/DL (ref 70–99)
GLUCOSE SERPL-MCNC: 212 MG/DL (ref 70–99)
HCO3 SERPL-SCNC: 25 MMOL/L (ref 22–29)
HCT VFR BLD AUTO: 26.5 % (ref 35–47)
HGB BLD-MCNC: 8.7 G/DL (ref 11.7–15.7)
IMM GRANULOCYTES # BLD: 0 10E3/UL
IMM GRANULOCYTES NFR BLD: 0 %
INTERPRETATION ECG - MUSE: NORMAL
LVEF ECHO: NORMAL
LVEF ECHO: NORMAL
LYMPHOCYTES # BLD AUTO: 0.8 10E3/UL (ref 0.8–5.3)
LYMPHOCYTES NFR BLD AUTO: 13 %
MAGNESIUM SERPL-MCNC: 1.9 MG/DL (ref 1.7–2.3)
MCH RBC QN AUTO: 30.9 PG (ref 26.5–33)
MCHC RBC AUTO-ENTMCNC: 32.8 G/DL (ref 31.5–36.5)
MCV RBC AUTO: 94 FL (ref 78–100)
MONOCYTES # BLD AUTO: 0.7 10E3/UL (ref 0–1.3)
MONOCYTES NFR BLD AUTO: 12 %
NEUTROPHILS # BLD AUTO: 4.4 10E3/UL (ref 1.6–8.3)
NEUTROPHILS NFR BLD AUTO: 73 %
NRBC # BLD AUTO: 0 10E3/UL
NRBC BLD AUTO-RTO: 0 /100
P AXIS - MUSE: NORMAL DEGREES
PHOSPHATE SERPL-MCNC: 6.1 MG/DL (ref 2.5–4.5)
PLATELET # BLD AUTO: 105 10E3/UL (ref 150–450)
POTASSIUM SERPL-SCNC: 3.7 MMOL/L (ref 3.4–5.3)
PR INTERVAL - MUSE: NORMAL MS
PROT SERPL-MCNC: 6.7 G/DL (ref 6.4–8.3)
QRS DURATION - MUSE: 138 MS
QT - MUSE: 374 MS
QTC - MUSE: 566 MS
R AXIS - MUSE: 109 DEGREES
RBC # BLD AUTO: 2.82 10E6/UL (ref 3.8–5.2)
SODIUM SERPL-SCNC: 133 MMOL/L (ref 135–145)
SYSTOLIC BLOOD PRESSURE - MUSE: NORMAL MMHG
T AXIS - MUSE: 265 DEGREES
UFH PPP CHRO-ACNC: 0.26 IU/ML (ref ?–1.1)
UFH PPP CHRO-ACNC: 0.51 IU/ML (ref ?–1.1)
UFH PPP CHRO-ACNC: 0.97 IU/ML (ref ?–1.1)
VENTRICULAR RATE- MUSE: 138 BPM
WBC # BLD AUTO: 6 10E3/UL (ref 4–11)

## 2025-05-13 PROCEDURE — 258N000003 HC RX IP 258 OP 636: Performed by: NURSE ANESTHETIST, CERTIFIED REGISTERED

## 2025-05-13 PROCEDURE — 83735 ASSAY OF MAGNESIUM: CPT

## 2025-05-13 PROCEDURE — 258N000003 HC RX IP 258 OP 636: Performed by: INTERNAL MEDICINE

## 2025-05-13 PROCEDURE — 250N000011 HC RX IP 250 OP 636: Performed by: EMERGENCY MEDICINE

## 2025-05-13 PROCEDURE — 93010 ELECTROCARDIOGRAM REPORT: CPT | Mod: XU | Performed by: INTERNAL MEDICINE

## 2025-05-13 PROCEDURE — 250N000009 HC RX 250: Performed by: NURSE ANESTHETIST, CERTIFIED REGISTERED

## 2025-05-13 PROCEDURE — 84100 ASSAY OF PHOSPHORUS: CPT

## 2025-05-13 PROCEDURE — P9045 ALBUMIN (HUMAN), 5%, 250 ML: HCPCS

## 2025-05-13 PROCEDURE — 93308 TTE F-UP OR LMTD: CPT | Mod: 26 | Performed by: INTERNAL MEDICINE

## 2025-05-13 PROCEDURE — 93325 DOPPLER ECHO COLOR FLOW MAPG: CPT

## 2025-05-13 PROCEDURE — 250N000011 HC RX IP 250 OP 636: Performed by: NURSE ANESTHETIST, CERTIFIED REGISTERED

## 2025-05-13 PROCEDURE — 250N000011 HC RX IP 250 OP 636: Performed by: INTERNAL MEDICINE

## 2025-05-13 PROCEDURE — 250N000011 HC RX IP 250 OP 636: Mod: JZ | Performed by: INTERNAL MEDICINE

## 2025-05-13 PROCEDURE — 250N000011 HC RX IP 250 OP 636: Mod: JZ | Performed by: NURSE ANESTHETIST, CERTIFIED REGISTERED

## 2025-05-13 PROCEDURE — 82435 ASSAY OF BLOOD CHLORIDE: CPT | Performed by: PHYSICIAN ASSISTANT

## 2025-05-13 PROCEDURE — 250N000013 HC RX MED GY IP 250 OP 250 PS 637: Performed by: PHYSICIAN ASSISTANT

## 2025-05-13 PROCEDURE — 36415 COLL VENOUS BLD VENIPUNCTURE: CPT | Performed by: STUDENT IN AN ORGANIZED HEALTH CARE EDUCATION/TRAINING PROGRAM

## 2025-05-13 PROCEDURE — 99255 IP/OBS CONSLTJ NEW/EST HI 80: CPT | Mod: 25 | Performed by: INTERNAL MEDICINE

## 2025-05-13 PROCEDURE — 250N000013 HC RX MED GY IP 250 OP 250 PS 637: Performed by: INTERNAL MEDICINE

## 2025-05-13 PROCEDURE — 5A2204Z RESTORATION OF CARDIAC RHYTHM, SINGLE: ICD-10-PCS | Performed by: INTERNAL MEDICINE

## 2025-05-13 PROCEDURE — 210N000001 HC R&B IMCU HEART CARE

## 2025-05-13 PROCEDURE — 93312 ECHO TRANSESOPHAGEAL: CPT | Mod: 26 | Performed by: INTERNAL MEDICINE

## 2025-05-13 PROCEDURE — 999N000010 HC STATISTIC ANES STAT CODE-CRNA PER MINUTE

## 2025-05-13 PROCEDURE — 93321 DOPPLER ECHO F-UP/LMTD STD: CPT | Mod: 26 | Performed by: INTERNAL MEDICINE

## 2025-05-13 PROCEDURE — 93325 DOPPLER ECHO COLOR FLOW MAPG: CPT | Mod: 26 | Performed by: INTERNAL MEDICINE

## 2025-05-13 PROCEDURE — 82310 ASSAY OF CALCIUM: CPT | Performed by: PHYSICIAN ASSISTANT

## 2025-05-13 PROCEDURE — 85520 HEPARIN ASSAY: CPT | Performed by: HOSPITALIST

## 2025-05-13 PROCEDURE — 999N000183 HC STATISTIC TEE INCLUDES SEDATION

## 2025-05-13 PROCEDURE — 93320 DOPPLER ECHO COMPLETE: CPT | Mod: 26 | Performed by: INTERNAL MEDICINE

## 2025-05-13 PROCEDURE — B241ZZ4 ULTRASONOGRAPHY OF MULTIPLE CORONARY ARTERIES, TRANSESOPHAGEAL: ICD-10-PCS | Performed by: INTERNAL MEDICINE

## 2025-05-13 PROCEDURE — 250N000011 HC RX IP 250 OP 636

## 2025-05-13 PROCEDURE — 370N000017 HC ANESTHESIA TECHNICAL FEE, PER MIN

## 2025-05-13 PROCEDURE — 92960 CARDIOVERSION ELECTRIC EXT: CPT

## 2025-05-13 PROCEDURE — 36415 COLL VENOUS BLD VENIPUNCTURE: CPT | Performed by: HOSPITALIST

## 2025-05-13 PROCEDURE — 99207 PR APP CREDIT; MD BILLING SHARED VISIT: CPT

## 2025-05-13 PROCEDURE — 92960 CARDIOVERSION ELECTRIC EXT: CPT | Performed by: INTERNAL MEDICINE

## 2025-05-13 PROCEDURE — 250N000012 HC RX MED GY IP 250 OP 636 PS 637: Performed by: PHYSICIAN ASSISTANT

## 2025-05-13 PROCEDURE — 85520 HEPARIN ASSAY: CPT | Performed by: STUDENT IN AN ORGANIZED HEALTH CARE EDUCATION/TRAINING PROGRAM

## 2025-05-13 PROCEDURE — 85025 COMPLETE CBC W/AUTO DIFF WBC: CPT | Performed by: PHYSICIAN ASSISTANT

## 2025-05-13 PROCEDURE — 250N000009 HC RX 250: Performed by: INTERNAL MEDICINE

## 2025-05-13 PROCEDURE — 99233 SBSQ HOSP IP/OBS HIGH 50: CPT | Performed by: STUDENT IN AN ORGANIZED HEALTH CARE EDUCATION/TRAINING PROGRAM

## 2025-05-13 PROCEDURE — 93005 ELECTROCARDIOGRAM TRACING: CPT

## 2025-05-13 RX ORDER — LIDOCAINE HYDROCHLORIDE 40 MG/ML
1.5 SOLUTION TOPICAL ONCE
Status: COMPLETED | OUTPATIENT
Start: 2025-05-13 | End: 2025-05-13

## 2025-05-13 RX ORDER — LIDOCAINE 50 MG/G
OINTMENT TOPICAL ONCE
Status: COMPLETED | OUTPATIENT
Start: 2025-05-13 | End: 2025-05-13

## 2025-05-13 RX ORDER — NALOXONE HYDROCHLORIDE 0.4 MG/ML
0.4 INJECTION, SOLUTION INTRAMUSCULAR; INTRAVENOUS; SUBCUTANEOUS
Status: DISCONTINUED | OUTPATIENT
Start: 2025-05-13 | End: 2025-05-15

## 2025-05-13 RX ORDER — GLYCOPYRROLATE 0.2 MG/ML
0.1 INJECTION, SOLUTION INTRAMUSCULAR; INTRAVENOUS ONCE
Status: COMPLETED | OUTPATIENT
Start: 2025-05-13 | End: 2025-05-13

## 2025-05-13 RX ORDER — AMIODARONE HYDROCHLORIDE 200 MG/1
200 TABLET ORAL DAILY
Status: DISCONTINUED | OUTPATIENT
Start: 2025-05-13 | End: 2025-05-18 | Stop reason: HOSPADM

## 2025-05-13 RX ORDER — NALOXONE HYDROCHLORIDE 0.4 MG/ML
0.2 INJECTION, SOLUTION INTRAMUSCULAR; INTRAVENOUS; SUBCUTANEOUS
Status: DISCONTINUED | OUTPATIENT
Start: 2025-05-13 | End: 2025-05-15

## 2025-05-13 RX ORDER — FENTANYL CITRATE 50 UG/ML
25 INJECTION, SOLUTION INTRAMUSCULAR; INTRAVENOUS
Status: DISCONTINUED | OUTPATIENT
Start: 2025-05-13 | End: 2025-05-15

## 2025-05-13 RX ORDER — BENZOCAINE/MENTHOL 6 MG-10 MG
1 LOZENGE MUCOUS MEMBRANE 3 TIMES DAILY PRN
Status: DISPENSED | OUTPATIENT
Start: 2025-05-13 | End: 2025-05-15

## 2025-05-13 RX ORDER — FLUMAZENIL 0.1 MG/ML
0.2 INJECTION, SOLUTION INTRAVENOUS
Status: DISCONTINUED | OUTPATIENT
Start: 2025-05-13 | End: 2025-05-15

## 2025-05-13 RX ORDER — DEXTROSE MONOHYDRATE 25 G/50ML
9.5 INJECTION, SOLUTION INTRAVENOUS
Status: DISCONTINUED | OUTPATIENT
Start: 2025-05-13 | End: 2025-05-18 | Stop reason: HOSPADM

## 2025-05-13 RX ORDER — EPHEDRINE SULFATE 50 MG/ML
INJECTION, SOLUTION INTRAMUSCULAR; INTRAVENOUS; SUBCUTANEOUS PRN
Status: DISCONTINUED | OUTPATIENT
Start: 2025-05-13 | End: 2025-05-13

## 2025-05-13 RX ORDER — PROCHLORPERAZINE MALEATE 5 MG/1
5 TABLET ORAL EVERY 6 HOURS PRN
Status: DISCONTINUED | OUTPATIENT
Start: 2025-05-13 | End: 2025-05-18 | Stop reason: HOSPADM

## 2025-05-13 RX ORDER — LIDOCAINE 40 MG/G
CREAM TOPICAL
Status: DISCONTINUED | OUTPATIENT
Start: 2025-05-13 | End: 2025-05-13

## 2025-05-13 RX ORDER — ACETAMINOPHEN 325 MG/1
650 TABLET ORAL EVERY 4 HOURS PRN
Status: DISCONTINUED | OUTPATIENT
Start: 2025-05-13 | End: 2025-05-13

## 2025-05-13 RX ORDER — MAGNESIUM HYDROXIDE/ALUMINUM HYDROXICE/SIMETHICONE 120; 1200; 1200 MG/30ML; MG/30ML; MG/30ML
30 SUSPENSION ORAL EVERY 8 HOURS PRN
Status: ACTIVE | OUTPATIENT
Start: 2025-05-13 | End: 2025-05-15

## 2025-05-13 RX ORDER — VASOPRESSIN IN 0.9 % NACL 2 UNIT/2ML
SYRINGE (ML) INTRAVENOUS PRN
Status: DISCONTINUED | OUTPATIENT
Start: 2025-05-13 | End: 2025-05-13

## 2025-05-13 RX ORDER — PROPOFOL 10 MG/ML
INJECTION, EMULSION INTRAVENOUS PRN
Status: DISCONTINUED | OUTPATIENT
Start: 2025-05-13 | End: 2025-05-13

## 2025-05-13 RX ORDER — PROCHLORPERAZINE 25 MG
25 SUPPOSITORY, RECTAL RECTAL EVERY 12 HOURS PRN
Status: DISCONTINUED | OUTPATIENT
Start: 2025-05-13 | End: 2025-05-18 | Stop reason: HOSPADM

## 2025-05-13 RX ORDER — SODIUM CHLORIDE 9 MG/ML
30 INJECTION, SOLUTION INTRAVENOUS CONTINUOUS
Status: DISCONTINUED | OUTPATIENT
Start: 2025-05-13 | End: 2025-05-18 | Stop reason: HOSPADM

## 2025-05-13 RX ORDER — DIGOXIN 0.25 MG/ML
250 INJECTION INTRAMUSCULAR; INTRAVENOUS ONCE
Status: COMPLETED | OUTPATIENT
Start: 2025-05-13 | End: 2025-05-13

## 2025-05-13 RX ORDER — FENTANYL CITRATE 50 UG/ML
50 INJECTION, SOLUTION INTRAMUSCULAR; INTRAVENOUS
Status: DISCONTINUED | OUTPATIENT
Start: 2025-05-13 | End: 2025-05-15

## 2025-05-13 RX ORDER — MAGNESIUM SULFATE HEPTAHYDRATE 40 MG/ML
2 INJECTION, SOLUTION INTRAVENOUS
Status: DISCONTINUED | OUTPATIENT
Start: 2025-05-13 | End: 2025-05-18 | Stop reason: HOSPADM

## 2025-05-13 RX ORDER — POTASSIUM CHLORIDE 1500 MG/1
20 TABLET, EXTENDED RELEASE ORAL
Status: ACTIVE | OUTPATIENT
Start: 2025-05-13 | End: 2025-05-13

## 2025-05-13 RX ORDER — SODIUM CHLORIDE 9 MG/ML
1000 INJECTION, SOLUTION INTRAVENOUS CONTINUOUS
Status: DISCONTINUED | OUTPATIENT
Start: 2025-05-13 | End: 2025-05-18 | Stop reason: HOSPADM

## 2025-05-13 RX ADMIN — MIDODRINE HYDROCHLORIDE 20 MG: 5 TABLET ORAL at 16:37

## 2025-05-13 RX ADMIN — CALCIUM ACETATE 667 MG: 667 CAPSULE ORAL at 18:29

## 2025-05-13 RX ADMIN — PROPOFOL 20 MG: 10 INJECTION, EMULSION INTRAVENOUS at 14:07

## 2025-05-13 RX ADMIN — Medication 10 MG: at 14:16

## 2025-05-13 RX ADMIN — PROCHLORPERAZINE EDISYLATE 5 MG: 5 INJECTION INTRAMUSCULAR; INTRAVENOUS at 02:53

## 2025-05-13 RX ADMIN — PHENYLEPHRINE HYDROCHLORIDE 100 MCG: 10 INJECTION INTRAVENOUS at 13:56

## 2025-05-13 RX ADMIN — INSULIN ASPART 2 UNITS: 100 INJECTION, SOLUTION INTRAVENOUS; SUBCUTANEOUS at 08:00

## 2025-05-13 RX ADMIN — Medication 5 MG: at 14:19

## 2025-05-13 RX ADMIN — Medication 5 MG: at 14:20

## 2025-05-13 RX ADMIN — PHENYLEPHRINE HYDROCHLORIDE 150 MCG: 10 INJECTION INTRAVENOUS at 14:12

## 2025-05-13 RX ADMIN — AMIODARONE HYDROCHLORIDE 200 MG: 200 TABLET ORAL at 16:37

## 2025-05-13 RX ADMIN — MIDAZOLAM 2 MG: 1 INJECTION INTRAMUSCULAR; INTRAVENOUS at 13:46

## 2025-05-13 RX ADMIN — ACETAMINOPHEN 650 MG: 325 TABLET, FILM COATED ORAL at 02:01

## 2025-05-13 RX ADMIN — ACETAMINOPHEN 325 MG: 325 TABLET, FILM COATED ORAL at 10:54

## 2025-05-13 RX ADMIN — DIGOXIN 250 MCG: 0.25 INJECTION INTRAMUSCULAR; INTRAVENOUS at 10:39

## 2025-05-13 RX ADMIN — TOPICAL ANESTHETIC 1 ML: 200 SPRAY DENTAL; PERIODONTAL at 13:37

## 2025-05-13 RX ADMIN — APIXABAN 5 MG: 5 TABLET, FILM COATED ORAL at 16:37

## 2025-05-13 RX ADMIN — LIDOCAINE HYDROCHLORIDE 1.5 ML: 40 SOLUTION TOPICAL at 13:11

## 2025-05-13 RX ADMIN — GLYCOPYRROLATE 0.1 MG: 0.2 INJECTION, SOLUTION INTRAMUSCULAR; INTRAVENOUS at 13:12

## 2025-05-13 RX ADMIN — SODIUM CHLORIDE 125 ML: 0.9 INJECTION, SOLUTION INTRAVENOUS at 02:34

## 2025-05-13 RX ADMIN — Medication 5 MG: at 14:15

## 2025-05-13 RX ADMIN — PHENYLEPHRINE HYDROCHLORIDE 100 MCG: 10 INJECTION INTRAVENOUS at 14:06

## 2025-05-13 RX ADMIN — PHENYLEPHRINE HYDROCHLORIDE 100 MCG: 10 INJECTION INTRAVENOUS at 14:20

## 2025-05-13 RX ADMIN — AMOXICILLIN 500 MG: 500 CAPSULE ORAL at 21:52

## 2025-05-13 RX ADMIN — INSULIN ASPART 8 UNITS: 100 INJECTION, SOLUTION INTRAVENOUS; SUBCUTANEOUS at 18:46

## 2025-05-13 RX ADMIN — INSULIN ASPART 1 UNITS: 100 INJECTION, SOLUTION INTRAVENOUS; SUBCUTANEOUS at 17:02

## 2025-05-13 RX ADMIN — Medication 1 UNITS: at 14:22

## 2025-05-13 RX ADMIN — AMIODARONE HYDROCHLORIDE 0.5 MG/MIN: 1.8 INJECTION, SOLUTION INTRAVENOUS at 01:41

## 2025-05-13 RX ADMIN — ALBUMIN HUMAN 25 G: 0.05 INJECTION, SOLUTION INTRAVENOUS at 02:57

## 2025-05-13 RX ADMIN — PHENYLEPHRINE HYDROCHLORIDE 150 MCG: 10 INJECTION INTRAVENOUS at 13:57

## 2025-05-13 RX ADMIN — FENTANYL CITRATE 25 MCG: 50 INJECTION, SOLUTION INTRAMUSCULAR; INTRAVENOUS at 13:47

## 2025-05-13 RX ADMIN — PHENYLEPHRINE HYDROCHLORIDE 150 MCG: 10 INJECTION INTRAVENOUS at 14:11

## 2025-05-13 RX ADMIN — PHENYLEPHRINE HYDROCHLORIDE 100 MCG: 10 INJECTION INTRAVENOUS at 14:04

## 2025-05-13 RX ADMIN — SODIUM CHLORIDE 30 ML/HR: 0.9 INJECTION, SOLUTION INTRAVENOUS at 13:14

## 2025-05-13 RX ADMIN — PHENYLEPHRINE HYDROCHLORIDE 100 MCG: 10 INJECTION INTRAVENOUS at 14:07

## 2025-05-13 ASSESSMENT — ACTIVITIES OF DAILY LIVING (ADL)
ADLS_ACUITY_SCORE: 37
DEPENDENT_IADLS:: CLEANING;TRANSPORTATION
ADLS_ACUITY_SCORE: 37
ADLS_ACUITY_SCORE: 36
ADLS_ACUITY_SCORE: 37
ADLS_ACUITY_SCORE: 36
ADLS_ACUITY_SCORE: 37
ADLS_ACUITY_SCORE: 36
ADLS_ACUITY_SCORE: 37
ADLS_ACUITY_SCORE: 36
ADLS_ACUITY_SCORE: 37

## 2025-05-13 ASSESSMENT — ENCOUNTER SYMPTOMS: DYSRHYTHMIAS: 1

## 2025-05-13 NOTE — PLAN OF CARE
Care plan note:      Recent Vitals:  Temp: 97.2  F (36.2  C) Temp src: Axillary BP: 99/66 Pulse: (!) 143   Resp: 16 SpO2: 96 % O2 Device: Nasal cannula      Orientation/Neuro: Alert and Oriented x 4, restless behavior  Pain: Pain is controlled with current analgesics.  Denies chest pain. Medication(s) being used: acetaminophen   Tele: Atrial fibrillation - rapid,    IV medications: Heparin, Amiodarone, and Albumin (completed)   Mobility: Assist of 1 and Walker   Skin: Bruises: scattered, L arm and scab to scalp and face, RUE AV fistula for dialysis   GI: nausea intermittent, onset with hypotension (MAP < 65)  : Hemodialysis     Diet: Tolerating diet:   Well and pt bringing in snacks   Orders Placed This Encounter      NPO for Medical/Clinical Reasons Except for: Ice Chips, Meds      Safety/Concerns:  Fall Risk and pt refused chair alarm, frequently standing up d/t restless legs  Aggression Color: Yellow    Plan: Echo, EP consult today.   Continue to monitor.      Anjelica Luong RN

## 2025-05-13 NOTE — PROGRESS NOTES
MD Notification    Notified Person: MD    Notified Person Name: Heriberto Castañeda (vocera) and Cardiology DEREK (verbal)     Notification Date/Time: 0927 5/13/25    Notification Interaction: Leydi message/ verbal    Purpose of Notification: FYI- Pt had RRT overnight for similar occurrence, but BPs continue to be low- last was 82/50, MAP 63, HR 130s. She got albumin overnight and had amio stopped. She is currently asymptomatic. Cardiology DEREK walked by and advised pausing amio. Patient does have midodrine but is refusing to take it until she eats.    Orders Received: Pause amiodarone until EP sees patient     Comments:

## 2025-05-13 NOTE — ANESTHESIA PREPROCEDURE EVALUATION
Anesthesia Pre-Procedure Evaluation    Patient: Yissel Wetzel   MRN: [id number] : 1960          Procedure : Procedure(s):  Anesthesia cardioversion         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, initial encounter 2019    End stage renal failure on dialysis (H)     Esophagitis, unspecified     Former tobacco use     HEMORRHAGIC GASTROPATHY     History of blood transfusion     [place]    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) 2025    Iron deficiency anemia, unspecified     Mild persistent asthma     Obesity     Other and unspecified hyperlipidemia     Pneumonia     Pulmonary hypertension (H)     per 2012 echo mod.to severe pulmonary hypertension    Tobacco use disorder [facility] ed     Type 2 diabetes mellitus (H)     on Insulin- managed by PCP    Unspecified essential hypertension       Past Surgical History:   Procedure Laterality Date    ABDOMINOPLASTY  pt unsure when    abdominoplasty-     SECTION  ,     x 2    COLONOSCOPY      [facility]    COLONOSCOPY N/A 2021    Procedure: COLONOSCOPY, WITH POLYPECTOMY AND BIOPSY;  Surgeon: Lincoln Farrar MD;  Location:  GI    CREATE FISTULA ARTERIOVENOUS UPPER EXTREMITY Right 2018    Procedure: RIGHT BRACHIAL TO BASILIC ARTERIOVENOUS FISTULA CREATION;  Surgeon: Terry Vizcaino MD;  Location: [facility]    CV HEART CATHETERIZATION WITH POSSIBLE INTERVENTION N/A 2021    Procedure: Heart Catheterization with Possible Intervention;  Surgeon: Joseluis Dean MD;  Location:  HEART CARDIAC CATH LAB    CV RIGHT HEART CATH MEASUREMENTS RECORDED N/A 2022    Procedure: Heart Cath Right Heart Cath;  Surgeon: Yan Heredia MD;  Location: [facility]  CARDIAC CATH LAB    CV RIGHT HEART CATH MEASUREMENTS RECORDED N/A 10/4/2024    Procedure: Right Heart Catheterization;  Surgeon: Jag Traylor MD;  Location:  HEART CARDIAC CATH LAB    ENT SURGERY  as a child    tonsillectomy    ESOPHAGOSCOPY, GASTROSCOPY, DUODENOSCOPY (EGD), COMBINED N/A 2021    Procedure: ESOPHAGOGASTRODUODENOSCOPY, WITH BIOPSY;  Surgeon: Lincoln Farrar MD;  Location:  GI    EYE SURGERY Left     injections- eye    HYSTERECTOMY  approx     partial hysterectomy-reason bleeding     IR CVC TUNNEL PLACEMENT > 5 YRS OF AGE  2019    IR CVC TUNNEL REMOVAL RIGHT  2020    IR DIALYSIS FISTULOGRAM RIGHT  2019      Allergies   Allergen Reactions    Albuterol      shakiness    Aspirin      gi    Byetta [Exenatide]      gi    Clonidine      constipation    Codeine      vomiting    Ezetimibe      muscle symptoms    Hydralazine      headaches    Lisinopril      hyperkalemia    Metformin Hydrochloride      vomiting    Pravachol [Pravastatin Sodium]      muscle pains    Simvastatin      myalgias    Troglitazone       Social History     Tobacco Use    Smoking status: Former     Current packs/day: 0.00     Average packs/day: 1 pack/day for 22.0 years (22.0 ttl pk-yrs)     Types: Cigarettes     Start date: 10/12/1977     Quit date: 10/12/1999     Years since quittin.6    Smokeless tobacco: Never   Substance Use Topics    Alcohol use: No     Alcohol/week: 0.0 standard drinks of alcohol      Wt Readings from Last 1 Encounters:   25 68.6 kg (151 lb 3.2 oz)        Anesthesia Evaluation   Pt has had prior anesthetic.     No history of anesthetic complications       ROS/MED HX  ENT/Pulmonary:     (+)                      asthma        recent URI,          Neurologic:       Cardiovascular:     (+)  hypertension- -   -  - -      CHF                  dysrhythmias (SVT), a-fib and Other,       pulmonary hypertension, Previous cardiac testing   Echo: Date:   Results:  The rhythm was rapid atrial fibrillation.  With rapid atrial fibrillation, the visual approximation of left ventricular  systolic function may be falsely decreased.  Left ventricular systolic function is mild to moderately reduced.  The visual ejection fraction is 40-45%.  The left ventricle is normal in size.  Moderately decreased right ventricular systolic function  The right ventricle is mildly dilated.  Flattened septum is consistent with RV pressure overload.  There is moderate biatrial enlargement.  Right ventricular systolic pressure is elevated, consistent with moderate  pulmonary hypertension.  Small right pleural effusion     Siince the last study 2/27/2024 there has been interim deelopment of atrial  fibrillation with a rapid ventricular response rate, a moderate decline in  estimated global LV/RV systolic performance and an increase in estimated  Pulmonary artery pressures.    Stress Test:  Date: Results:    ECG Reviewed:  Date: Results:    Cath:  Date: Results:      METS/Exercise Tolerance:     Hematologic:       Musculoskeletal:       GI/Hepatic:       Renal/Genitourinary:     (+) renal disease,             Endo:     (+) type I DM,              Obesity,       Psychiatric/Substance Use:       Infectious Disease:       Malignancy:       Other:              Physical Exam  Airway  Mallampati: II  TM distance: >3 FB  Neck ROM: full  Mouth opening: >= 4 cm    Cardiovascular   Rhythm: irregular  Rate: tachycardia     Dental  dental implants, bridges or caps present   Pulmonary Breath sounds clear to auscultation        Neurological   She appears lethargic and sedated.    Other Findings       OUTSIDE LABS:  CBC:   Lab Results   Component Value Date    WBC 6.0 05/13/2025    WBC 8.4 05/12/2025    HGB 8.7 (L) 05/13/2025    HGB 9.5 (L) 05/12/2025    HCT 26.5 (L) 05/13/2025    HCT 30.3 (L) 05/12/2025     (L) 05/13/2025     (L) 05/12/2025     BMP:   Lab Results   Component Value Date    NA  133 (L) 05/13/2025     (L) 05/12/2025    POTASSIUM 3.7 05/13/2025    POTASSIUM 4.0 05/12/2025    CHLORIDE 92 (L) 05/13/2025    CHLORIDE 91 (L) 05/12/2025    CO2 25 05/13/2025    CO2 21 (L) 05/12/2025    BUN 31.1 (H) 05/13/2025    BUN 51.1 (H) 05/12/2025    CR 4.50 (H) 05/13/2025    CR 6.70 (H) 05/12/2025     (H) 05/13/2025     (H) 05/13/2025     COAGS:   Lab Results   Component Value Date    PTT 31 09/25/2019    INR 1.17 (H) 10/04/2024     POC:   Lab Results   Component Value Date     (H) 05/27/2021     HEPATIC:   Lab Results   Component Value Date    ALBUMIN 3.9 05/13/2025    PROTTOTAL 6.7 05/13/2025    ALT 7 05/13/2025    AST 16 05/13/2025    ALKPHOS 78 05/13/2025    BILITOTAL 0.3 05/13/2025     OTHER:   Lab Results   Component Value Date    PH 7.24 (L) 07/22/2021    LACT 1.1 09/29/2024    A1C 7.5 (H) 08/18/2024    KANWAL 9.1 05/13/2025    PHOS 6.1 (H) 05/13/2025    MAG 1.9 05/13/2025    LIPASE 36 02/08/2025    AMYLASE 37 02/08/2025    TSH 4.04 05/12/2025    SED 44 (H) 08/29/2006       Anesthesia Plan    ASA Status:  4      NPO Status: NPO Appropriate   Anesthesia Type: General.   Induction: intravenous.        Consents    Anesthesia Plan(s) and associated risks, benefits, and realistic alternatives discussed. Questions answered and patient/representative(s) expressed understanding.     - Discussed:     - Discussed with:  Patient            Postoperative Care            Comments:                   Feng Moore MD    I have reviewed the pertinent notes and labs in the chart from the past 30 days and (re)examined the patient.  Any updates or changes from those notes are reflected in this note.    Clinically Significant Risk Factors        # Hyperkalemia: Highest K = 5.9 mmol/L in last 2 days, will monitor as appropriate  # Hyponatremia: Lowest Na = 133 mmol/L in last 2 days, will monitor as appropriate  # Hypochloremia: Lowest Cl = 91 mmol/L in last 2 days, will monitor as appropriate      # Anion Gap Metabolic Acidosis: Highest Anion Gap = 21 mmol/L in last 2 days, will monitor and treat as appropriate    # Thrombocytopenia: Lowest platelets = 105 in last 2 days, will monitor for bleeding   # Hypertension: Noted on problem list  # Heart failure, NOS: heart failure noted on the problem list and last echo with EF 40-50%           # Overweight: Estimated body mass index is 29.53 kg/m  as calculated from the following:    Height as of this encounter: 1.524 m (5').    Weight as of this encounter: 68.6 kg (151 lb 3.2 oz)., PRESENT ON ADMISSION     # Financial/Environmental Concerns: none

## 2025-05-13 NOTE — PROCEDURES
[facility]    Procedure: Cardioversion    Date/Time: 5/13/2025 4:19 PM    Performed by: Gary Car MD  Authorized by: Iain Hairston MD      UNIVERSAL PROTOCOL   Site Marked: Yes  Prior Images Obtained and Reviewed:  Yes  Required items: Required blood products, implants, devices and special equipment available    Patient identity confirmed:  Verbally with patient  Patient was reevaluated immediately before administering moderate or deep sedation or anesthesia  Confirmation Checklist:  Patient's identity using two indicators  Time out: Immediately prior to the procedure a time out was called    Universal Protocol: the Joint Commission Universal Protocol was followed    Preparation: Patient was prepped and draped in usual sterile fashion      SEDATION  Patient Sedated: Yes    Vital signs: Vital signs monitored during sedation      PROCEDURE DETAILS  Cardioversion basis: emergent  Pre-procedure rhythm: atrial fibrillation  Patient position: patient was placed in a supine position  Chest area: chest area exposed  Electrodes: pads  Electrodes placed: anterior-posterior  Number of attempts: 3    Details of Attempts:  150x1  200x2  Post-procedure rhythm: normal sinus rhythm  Complications: no complications      PROCEDURE    Patient Tolerance:  Patient tolerated the procedure well with no immediate complications  Length of time physician/provider present for 1:1 monitoring during sedation: 45

## 2025-05-13 NOTE — CONSULTS
Care Management Initial Consult    General Information  Assessment completed with: Cassandra Green  Type of CM/SW Visit: Initial Assessment    Primary Care Provider verified and updated as needed: Yes   Readmission within the last 30 days: no previous admission in last 30 days      Reason for Consult: other (see comments) (elevated risk score)  Advance Care Planning: Advance Care Planning Reviewed: no concerns identified          Communication Assessment  Patient's communication style: spoken language (English or Bilingual)    Hearing Difficulty or Deaf: no   Wear Glasses or Blind: yes    Cognitive  Cognitive/Neuro/Behavioral: .WDL except, mood/behavior  Level of Consciousness: alert  Arousal Level: opens eyes spontaneously  Orientation: oriented x 4  Mood/Behavior: restless, anxious, cooperative     Speech: clear    Living Environment:   People in home: alone     Current living Arrangements: apartment      Able to return to prior arrangements:         Family/Social Support:  Care provided by: self  Provides care for: no one  Marital Status: Single  Support system: Children          Description of Support System:           Current Resources:   Patient receiving home care services: No        Community Resources: County Programs, Dialysis Services, Housekeeping/Chore Agency, Transportation Services  Equipment currently used at home: grab bar, toilet, grab bar, tub/shower, glucometer, walker, standard  Supplies currently used at home: Diabetic Supplies, Oxygen Tubing/Supplies (oxygen through [facility])    Employment/Financial:  Employment Status: disabled        Financial Concerns: none   Referral to Financial Worker: No       Does the patient's insurance plan have a 3 day qualifying hospital stay waiver?  No    Lifestyle & Psychosocial Needs:  Social Drivers of Health     Food Insecurity: Low Risk  (5/12/2025)    Food Insecurity     Within the past 12 months, did you worry that your food would run out before you got money  to buy more?: No     Within the past 12 months, did the food you bought just not last and you didn t have money to get more?: No   Depression: Not at risk (1/25/2024)    PHQ-2     PHQ-2 Score: 0   Housing Stability: Low Risk  (5/12/2025)    Housing Stability     Do you have housing? : Yes     Are you worried about losing your housing?: No   Tobacco Use: Medium Risk (12/23/2024)    Patient History     Smoking Tobacco Use: Former     Smokeless Tobacco Use: Never     Passive Exposure: Not on file   Financial Resource Strain: Low Risk  (5/12/2025)    Financial Resource Strain     Within the past 12 months, have you or your family members you live with been unable to get utilities (heat, electricity) when it was really needed?: No   Alcohol Use: Not on file   Transportation Needs: Low Risk  (5/12/2025)    Transportation Needs     Within the past 12 months, has lack of transportation kept you from medical appointments, getting your medicines, non-medical meetings or appointments, work, or from getting things that you need?: No   Physical Activity: Not on file   Interpersonal Safety: Low Risk  (5/12/2025)    Interpersonal Safety     Do you feel physically and emotionally safe where you currently live?: Yes     Within the past 12 months, have you been hit, slapped, kicked or otherwise physically hurt by someone?: No     Within the past 12 months, have you been humiliated or emotionally abused in other ways by your partner or ex-partner?: No   Stress: Not on file   Social Connections: Not on file   Health Literacy: Not on file       Functional Status:  Prior to admission patient needed assistance:   Dependent ADLs:: Independent (states has been using a walker recently with dizziness)  Dependent IADLs:: Cleaning, Transportation       Mental Health Status:  Mental Health Status: No Current Concerns       Chemical Dependency Status:  Chemical Dependency Status: No Current Concerns             Values/Beliefs:  Spiritual,  Cultural Beliefs, Sikhism Practices, Values that affect care: no               Discussed  Partnership in Safe Discharge Planning  document with patient/family: Yes: patient    Additional Information:  Consult for elevated risk score.  Met with pt and introduced self and role.  Pt shared she lives independently in her apartment.  No stairs.  Has grab bar by toilet and in shower.  Has a shower chair.  Pt stated she has been using a walker due to dizziness.      Pt has home oxygen through [facility], baseline 2L as needed and at night.      Pt shared she has someone that comes 1x/week to assist with light cleaning and will take to some appointments or run errands at times.    Pt has a Freestyle Ashley glucose meter, uses insulin at home.    Pt has outpatient dialysis at [facility]          Next Steps: Follow for any discharge needs.  Update [facility] at discharge.    Salome Brenner RN, BS  Care Coordinator  [email or web address]

## 2025-05-13 NOTE — ANESTHESIA CARE TRANSFER NOTE
Patient: Yissel Wetzel    Procedure: Procedure(s):  Anesthesia cardioversion       Diagnosis: Atrial fibrillation, unspecified type (H) [I48.91]  Diagnosis Additional Information: No value filed.    Anesthesia Type:   No value filed.     Note:    Oropharynx: oropharynx clear of all foreign objects and spontaneously breathing  Level of Consciousness: drowsy  Oxygen Supplementation: nasal cannula  Level of Supplemental Oxygen (L/min / FiO2): 5  Independent Airway: airway patency satisfactory and stable  Dentition: dentition unchanged  Vital Signs Stable: post-procedure vital signs reviewed and stable  Report to RN Given: handoff report given  Patient transferred to: [facility]          Vitals:  Vitals Value Taken Time   BP 97/66    Temp     Pulse 96    Resp 12    SpO2 98        Electronically Signed By: DAVID Soliz CRNA  May 13, 2025  2:40 PM

## 2025-05-13 NOTE — CONSULTS
Patient has Medicare + Medicaid through [facility].    Xarelto/Eliquis:  $0/mo.     Luzma Caruso  Pharmacy Technician/Liaison, Discharge Pharmacy   [phone] (voice or text)  [email or web address]  Pharmacy test claims are estimates and may not reflect final costs.  Suggested alternatives aim to be cost-effective and may not be therapeutically equivalent as this consult is informational and does not constitute medical advice.  Clinical decisions should be made by qualified healthcare providers.

## 2025-05-13 NOTE — PLAN OF CARE
Goal Outcome Evaluation:      Plan of Care Reviewed With: patient          Outcome Evaluation: Anticipate home with resumption of outpatient dialysis at discharge.

## 2025-05-13 NOTE — PROGRESS NOTES
1305: A/O. Pt denies difficulty swallowing or sleep apnea. No dentures or loose teeth. NPO x 12+ hrs. All questions & concerns addressed.    JARROD: 1346: Total sedation given - 2 mg Versed & 25 mcg Fentanyl. Pt became hypotensive 9 minutes into procedure. NS fluid bolus initiated. CRNA at bedside at this time and medication given for hypotension. Pressures improved. See anesthesia notes.    CDV: 1407: CDV x 3 @ 150 joules x1 and 200 joules x2.. See rhythm strips. Total sedation given by anesthesia - 20mg propofol. Hypotensive again after propofol and unable to arouse pt. CRNA remained at bedside.     1440: pt's BPs stable x15 minutes without medication and pt arousing. I took over care at this time.    1455: Report to Alanna ORR RN.    1516 Pt transferred to [facility] per cart. Pt drowsy but able responsive. Resp even & unlabored upon transfer.

## 2025-05-13 NOTE — ANESTHESIA POSTPROCEDURE EVALUATION
Patient: Yissel Wetzel    Procedure: Procedure(s):  Anesthesia cardioversion       Anesthesia Type:  No value filed.    Note:  Disposition: Inpatient   Postop Pain Control: Uneventful            Sign Out: Well controlled pain   PONV: No   Neuro/Psych: Uneventful            Sign Out: Acceptable/Baseline neuro status   Airway/Respiratory: Uneventful            Sign Out: Acceptable/Baseline resp. status   CV/Hemodynamics: Uneventful            Sign Out: Acceptable CV status; No obvious hypovolemia; No obvious fluid overload   Other NRE: NONE   DID A NON-ROUTINE EVENT OCCUR? No           Last vitals:  Vitals:    05/13/25 1403 05/13/25 1442 05/13/25 1445   BP: (!) 88/57 96/66 96/62   Pulse: 110 98 95   Resp: 15 12 21   Temp:      SpO2: 97% 98% 97%       Electronically Signed By: Feng Moore MD  May 13, 2025  2:47 PM

## 2025-05-13 NOTE — PROVIDER NOTIFICATION
MD Notification    Notified Person: MD    Notified Person Name: Yovanny Timmons    Notification Date/Time:  5/13/25 16:55     Notification Interaction: [employer]    Purpose of Notification: ST changes on EKG    Orders Received:  acknowledged call back received no new orders      Comments:

## 2025-05-13 NOTE — CONSULTS
[facility]    Electrophysiology Consultation     Date of Admission:  5/12/2025  Date of Consult: 05/13/25    Assessment & Plan   Yissel Wetzel is a 64 year old female who was admitted on 5/12/2025. We were asked to see the patient for new atrial fibrillation with RVR.  Her heart rates remains elevated up to 160's even at rest and rate control has been significant limited by hypotension.  He has been getting small doses of IV metoprolol, which has not been adequate for rate control.  She has been on a very low dose of amiodarone PTA for history of paroxysmal SVT.  We have limited options for rate/rhythm control given her multiple co-morbidities.  We will give her digoxin this morning for rate control.  Will try to arrange JARROD and cardioversion today, procedure was discussed with the patient and formal written consent was obtained.  Anticipate increase amiodarone dosage for rhythm control after.    CHADS VASc of 4 (female, DM2, HTN, CHF).  Currently on heparin drip with plans to transition to Eliquis.       - JARROD/cardioversion  - Will give a dose of IV digoxin 0.25 mg now for rate control   - Okay to continue IV amiodarone for now, plan to transition back to PO amiodarone and consider increasing dose to 200 mg daily after cardioversion  - Consider pacemaker implant if she has recurrent bradycardia on the higher dose of amiodarone  - Continue anticoagulation - agree with transitioning to oral anticoagulation      Iain Hairston MD        Code Status    Full Code    Reason for Consult   Reason for consult: Consult performed at the request of the attending physician, Marlys Huddleston*, for evaluation of atrial fibrillation      Chief Complaint   Dizziness, tachycardia    History is obtained from the patient and EPIC chart.      History of Present Illness   Yissel Wetzel is a 64 year old female with insuline dependent DM2, ESRD on HD, HTN, chronic anemia, COPD, liver cirrhosis, COPD with  chronic respiratory failure on home O2, severe pulmonary hypertension, right heart failure, hypotension on midodrine, SVT on low dose amiodarone due to junctional rhythm, who presents with atrial fibrillation with RVR.  Presenting EKG shows Afib rate 138 bpm.  Telemetry this morning shows -160's even sitting at rest.  Remains hypotensive, with systolic down to 70's.     She reports symptoms starting about a week ago.  She intermittently feels palpitations/tachycardia and has noted lightheadedness especially when her blood pressure is low.  No chest pain.  Breathing is stable.  She has not been on anticoagulation previously.  No history of bleeding issues.        Echo 2/27/2024  1. Left ventricular systolic function is normal. The visual ejection fraction is 60-65%.  2. No regional wall motion abnormalities noted.  3. The right ventricle is normal in structure, function and size.  4. The left atrium is severely dilated.  5. There is mild (1+) mitral regurgitation.  6. Mild valvular aortic stenosis; mean gradient 10 mmHg, peak velocity 2.2 m/sec.  7. The right ventricular systolic pressure is approximated at 33mmHg plus the  right atrial pressure. IVC diameter <2.1 cm collapsing >50% with sniff  suggests a normal RA pressure of 3 mmHg.      Past Medical History   I have reviewed this patient's medical history and updated it with pertinent information if needed.   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, initial encounter 12/6/2019    End stage renal failure on dialysis (H)     Esophagitis, unspecified     Former tobacco use     HEMORRHAGIC GASTROPATHY     History of blood transfusion     [place]    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) 1/21/2025    Iron deficiency anemia,  unspecified     Mild persistent asthma     Obesity     Other and unspecified hyperlipidemia     Pneumonia     Pulmonary hypertension (H)     per 2012 echo mod.to severe pulmonary hypertension    Tobacco use disorder dc ed     Type 2 diabetes mellitus (H)     on Insulin- managed by PCP    Unspecified essential hypertension        Past Surgical History   I have reviewed this patient's surgical history and updated it with pertinent information if needed.  Past Surgical History:   Procedure Laterality Date    ABDOMINOPLASTY  pt unsure when    abdominoplasty-     SECTION  ,     x 2    COLONOSCOPY      [facility]    COLONOSCOPY N/A 2021    Procedure: COLONOSCOPY, WITH POLYPECTOMY AND BIOPSY;  Surgeon: Lincoln Farrar MD;  Location:  GI    CREATE FISTULA ARTERIOVENOUS UPPER EXTREMITY Right 2018    Procedure: RIGHT BRACHIAL TO BASILIC ARTERIOVENOUS FISTULA CREATION;  Surgeon: Terry Vizcaino MD;  Location: [facility]    CV HEART CATHETERIZATION WITH POSSIBLE INTERVENTION N/A 2021    Procedure: Heart Catheterization with Possible Intervention;  Surgeon: Joseluis Dean MD;  Location:  HEART CARDIAC CATH LAB    CV RIGHT HEART CATH MEASUREMENTS RECORDED N/A 2022    Procedure: Heart Cath Right Heart Cath;  Surgeon: Yan Heredia MD;  Location:  HEART CARDIAC CATH LAB    CV RIGHT HEART CATH MEASUREMENTS RECORDED N/A 10/4/2024    Procedure: Right Heart Catheterization;  Surgeon: Jag Traylor MD;  Location:  HEART CARDIAC CATH LAB    ENT SURGERY  as a child    tonsillectomy    ESOPHAGOSCOPY, GASTROSCOPY, DUODENOSCOPY (EGD), COMBINED N/A 2021    Procedure: ESOPHAGOGASTRODUODENOSCOPY, WITH BIOPSY;  Surgeon: Lincoln Farrar MD;  Location:  GI    EYE SURGERY Left     injections- eye    HYSTERECTOMY  approx     partial hysterectomy-reason bleeding     IR CVC TUNNEL PLACEMENT > 5 YRS OF AGE  2019     IR CVC TUNNEL REMOVAL RIGHT  5/7/2020    IR DIALYSIS FISTULOGRAM RIGHT  12/9/2019       Prior to Admission Medications   Prior to Admission Medications   Prescriptions Last Dose Informant Patient Reported? Taking?   Continuous Glucose  (FREESTYLE LATOSHA 2 READER) AYDIN   No No   Sig: Use to read blood sugars as per 's instructions.   Continuous Glucose Sensor (FREESTYLE LATOSHA 2 SENSOR) MISC   No No   Sig: Change every 14 days.   acetaminophen (TYLENOL) 500 MG tablet   Yes Yes   Sig: Take 500-1,000 mg by mouth every 6 hours as needed.   amiodarone (PACERONE) 100 MG TABS tablet 5/10/2025  No Yes   Sig: Take 1 tablet (100 mg) by mouth five times a week. Take on Mondays, Tuesdays, Thursdays, Fridays and Saturdays; hold on Wednesdays and Sundays.   amoxicillin (AMOXIL) 500 MG tablet 5/11/2025 Evening  Yes Yes   Sig: Take 500 mg by mouth 2 times daily. For ten days   blood glucose (ACCU-CHEK GUIDE TEST) test strip   No No   Sig: Use to test blood sugars 4 times daily or as directed.   calcium acetate (PHOSLO) 667 MG CAPS capsule 5/11/2025 Evening  Yes Yes   Sig: Take 667 mg by mouth 3 times daily (with meals)   calcium acetate (PHOSLO) 667 MG CAPS capsule   Yes Yes   Sig: Take 667 mg by mouth Take with snacks or supplements   famotidine (PEPCID) 20 MG tablet   Yes Yes   Sig: Take 20 mg by mouth 2 times daily as needed.   fluticasone (FLONASE) 50 MCG/ACT nasal spray 5/11/2025 Evening  No Yes   Sig: Spray 1 spray into both nostrils 2 times daily.   guaiFENesin 1200 MG TB12   Yes Yes   Sig: Take 1 tablet by mouth every 12 hours as needed (cough/congestion).   insulin glargine (LANTUS PEN) 100 UNIT/ML pen 5/11/2025 Bedtime  No Yes   Sig: Inject 11 Units subcutaneously at bedtime.   insulin regular (NOVOLIN R FLEXPEN) 100 UNIT/ML pen 5/11/2025 Evening  No Yes   Sig: INJECT 9-12 UNITS BEFORE BREAKFAST, LUNCH, AND DINNER PLUS A CORRECTIVE SCALE OF 2 UNITS FOR EVERY 50 OVER 150 - MAXIMUM 20 unit(s) PER DAY    midodrine (PROAMATINE) 10 MG tablet Past Week  Yes Yes   Sig: Take 10 mg by mouth Every Mon, Wed, Fri Morning   midodrine (PROAMATINE) 10 MG tablet 5/11/2025 Evening  Yes Yes   Sig: Take 20 mg by mouth 2 times daily. On non-dialysis days (Tuesday, Thursday, Saturday, Sunday)   midodrine (PROAMATINE) 10 MG tablet Past Week  Yes Yes   Sig: Take 30 mg by mouth daily. On Monday, Wednesday, Friday before dialysis      Facility-Administered Medications: None         Medications   Current Facility-Administered Medications   Medication Dose Route Frequency Provider Last Rate Last Admin    amiodarone (NEXTERONE) 1.8 mg/mL in dextrose 5% 200 mL ADULT STANDARD infusion  0.5 mg/min Intravenous Continuous Jasson Jacob MD   Stopped at 05/13/25 0920    heparin 25,000 units in 0.45% NaCl 250 mL ANTICOAGULANT infusion  0-5,000 Units/hr Intravenous Continuous Tameka Sabillon PA-C 6 mL/hr at 05/12/25 2025 600 Units/hr at 05/12/25 2025    Patient is already receiving anticoagulation with heparin, enoxaparin (LOVENOX), warfarin (COUMADIN)  or other anticoagulant medication   Does not apply Continuous PRN Tameka Sabillon PA-C        Stop Heparin 60 minutes before end of treatment   Does not apply Continuous PRN Theron Alonso MD         Current Facility-Administered Medications   Medication Dose Route Frequency Provider Last Rate Last Admin    amoxicillin (AMOXIL) capsule 500 mg  500 mg Oral BID Tameka Sabillon PA-C   500 mg at 05/12/25 2247    calcium acetate (PHOSLO) capsule 667 mg  667 mg Oral TID w/meals Tameka Sabillon PA-C   667 mg at 05/12/25 1906    insulin aspart (NovoLOG) injection (RAPID ACTING)  1-7 Units Subcutaneous TID AC Tameka Sabillon PA-C   2 Units at 05/13/25 0800    insulin aspart (NovoLOG) injection (RAPID ACTING)  1-5 Units Subcutaneous At Bedtime Tameka Sabillon PA-C        insulin aspart (NovoLOG) injection (RAPID ACTING)   Subcutaneous TID w/meals Tameka Sabillon  ARABELLA   4 Units at 05/12/25 2316    [Held by provider] insulin glargine (LANTUS PEN) injection 11 Units  11 Units Subcutaneous At Bedtime Tameka Sabillon PA-C        metoprolol (LOPRESSOR) injection 2.5 mg  2.5 mg Intravenous Q6H Melissa Burdick MD, MD   2.5 mg at 05/12/25 1710    [START ON 5/14/2025] midodrine (PROAMATINE) tablet 10 mg  10 mg Oral Q Mon Wed Fri AM Tameka Sabillon PA-C        midodrine (PROAMATINE) tablet 20 mg  20 mg Oral 2 times per day on Sunday Tuesday Thursday Saturday Tameka Sabillon PA-C        [START ON 5/14/2025] midodrine (PROAMATINE) tablet 30 mg  30 mg Oral Q Mon Wed Fri AM Tameka Sabillon PA-C        sodium chloride (PF) 0.9% PF flush 3 mL  3 mL Intracatheter Q8H [facility] Tameka Sabillon PA-C        sodium chloride 0.9% BOLUS 250 mL  250 mL Intravenous Once Salomón Bender APRN CNP   Held at 05/13/25 0426       Allergies   Allergies   Allergen Reactions    Albuterol      shakiness    Aspirin      gi    Byetta [Exenatide]      gi    Clonidine      constipation    Codeine      vomiting    Ezetimibe      muscle symptoms    Hydralazine      headaches    Lisinopril      hyperkalemia    Metformin Hydrochloride      vomiting    Pravachol [Pravastatin Sodium]      muscle pains    Simvastatin      myalgias    Troglitazone        Social History   I have updated and reviewed the following Social History Narrative:   Social History     Social History Narrative    Not on file        Family History   I have reviewed this patient's family history and updated it with pertinent information if needed.   Family History   Problem Relation Age of Onset    Arrhythmia Mother     Hypertension Mother     Pancreatic Cancer Father     Diabetes Brother     Asthma Sister     LUNG DISEASE Sister     Diabetes Daughter     Cirrhosis Sister        Review of Systems   A complete 10-point review of systems was done and is negative with the exceptions noted in HPI.      Physical Exam   Temp: 97.2  F  (36.2  C) Temp src: Axillary BP: (!) 82/50 Pulse: (!) 139   Resp: 16 SpO2: 94 % O2 Device: Nasal cannula Oxygen Delivery: 2 LPM  Vital Signs with Ranges  Temp:  [97.2  F (36.2  C)] 97.2  F (36.2  C)  Pulse:  [] 139  Resp:  [9-26] 16  BP: ()/(35-71) 82/50  SpO2:  [94 %-100 %] 94 %  151 lbs 3.2 oz    General: Pleasant, mild distress due to restless leg, appears chronically ill  Resp: Breathing comfortably by NC  Card: Irregular, tachycardic   Extremities: No significant edema   Neuro: Awake, alert, answers questions appropriately       Diagnostics:  I personally reviewed the patient's EKG, lab work, and pertinent imaging    Recent Labs   Lab Test 05/13/25  0439 05/12/25  0933 04/23/25  1030 02/08/25  1111 12/05/19  2125 09/25/19  1453   WBC 6.0 8.4  --  4.9   < > 6.5   HGB 8.7* 9.5* 8.8* 10.0*   < > 10.2*   HCT 26.5* 30.3*  --  30.6*   < > 31.1*   MCV 94 97  --  94   < > 100   * 113*  --  104*   < > 151   * 133*  --  140   < > 133   CO2 25 21*  --  32   < > 25   BUN 31.1* 51.1*  --  17   < > 37*   CR 4.50* 6.70*  --  4.40*   < > 4.53*   PTT  --   --   --   --   --  31    < > = values in this interval not displayed.       Last Comprehensive Metabolic Panel:  Lab Results   Component Value Date     (L) 05/13/2025    POTASSIUM 3.7 05/13/2025    CHLORIDE 92 (L) 05/13/2025    CO2 25 05/13/2025    ANIONGAP 16 (H) 05/13/2025     (H) 05/13/2025    BUN 31.1 (H) 05/13/2025    CR 4.50 (H) 05/13/2025    GFRESTIMATED 10 (L) 05/13/2025    KANWAL 9.1 05/13/2025         Results for orders placed or performed during the hospital encounter of 05/12/25 (from the past 24 hours)   EKG 12-lead, tracing only   Result Value Ref Range    Systolic Blood Pressure  mmHg    Diastolic Blood Pressure  mmHg    Ventricular Rate 97 BPM    Atrial Rate  BPM    IN Interval  ms    QRS Duration 138 ms     ms    QTc 551 ms    P Axis  degrees    R AXIS 106 degrees    T Axis 250 degrees    Interpretation ECG        Atrial fibrillation  Right bundle branch block  T wave abnormality, consider inferolateral ischemia  Abnormal ECG  When compared with ECG of 12-May-2025 09:35,  No significant change was found  Confirmed by GENERATED REPORT, COMPUTER ([id number]),  Coby Lopez ([id number]) on 5/12/2025 11:24:34 AM     XR Chest Port 1 View    Narrative    EXAM: XR CHEST PORT 1 VIEW  LOCATION: [facility]  DATE: 5/12/2025    INDICATION: Chest pain, SOB, rapid A fib.  COMPARISON: Chest x-ray 9/29/2024.      Impression    IMPRESSION: Stable cardiomegaly. Hazy bilateral pulmonary opacities with vascular and interstitial prominence again noted consistent with pulmonary edema, but appears mildly improved. Small right base pleural fluid is slightly larger.   Troponin T, High Sensitivity   Result Value Ref Range    Troponin T, High Sensitivity 1,194 (HH) <=14 ng/L   Hepatic panel   Result Value Ref Range    Protein Total 6.4 6.4 - 8.3 g/dL    Albumin 3.5 3.5 - 5.2 g/dL    Bilirubin Total 0.3 <=1.2 mg/dL    Alkaline Phosphatase 77 40 - 150 U/L    AST 16 0 - 45 U/L    ALT 7 0 - 50 U/L    Bilirubin Direct 0.14 0.00 - 0.30 mg/dL   Potassium   Result Value Ref Range    Potassium 4.0 3.4 - 5.3 mmol/L   Hepatitis B surface antigen   Result Value Ref Range    Hepatitis B Surface Antigen Nonreactive Nonreactive   TSH with free T4 reflex   Result Value Ref Range    TSH 4.04 0.30 - 4.20 uIU/mL   Echocardiogram Complete *Canceled*    Narrative    The following orders were created for panel order Echocardiogram Complete.  Procedure                               Abnormality         Status                     ---------                               -----------         ------                       Please view results for these tests on the individual orders.   Echocardiogram Complete *Canceled*    Narrative    The following orders were created for panel order Echocardiogram Complete.  Procedure                                Abnormality         Status                     ---------                               -----------         ------                       Please view results for these tests on the individual orders.   Glucose by meter   Result Value Ref Range    GLUCOSE BY METER POCT 128 (H) 70 - 99 mg/dL   Troponin T, High Sensitivity   Result Value Ref Range    Troponin T, High Sensitivity 1,315 (HH) <=14 ng/L   Heparin Unfractionated Anti Xa Level   Result Value Ref Range    Anti Xa Unfractionated Heparin 0.61 For Reference Range, See Comment IU/mL    Narrative    Therapeutic Range: UFH: 0.25-0.50 IU/mL for low intensity dosing,  0.30-0.70 IU/mL for high intensity dosing DVT and PE.  This test is not validated for other direct factor X inhibitors (e.g. rivaroxaban, apixaban, edoxaban, betrixaban, fondaparinux) and should not be used for monitoring of other medications.   Glucose by meter   Result Value Ref Range    GLUCOSE BY METER POCT 167 (H) 70 - 99 mg/dL   Glucose by meter   Result Value Ref Range    GLUCOSE BY METER POCT 166 (H) 70 - 99 mg/dL   CBC with platelets differential    Narrative    The following orders were created for panel order CBC with platelets differential.  Procedure                               Abnormality         Status                     ---------                               -----------         ------                     CBC with platelets and ...[[id number]]  Abnormal            Final result               RBC and Platelet Morpho...[[id number]]                                                   Please view results for these tests on the individual orders.   Comprehensive metabolic panel   Result Value Ref Range    Sodium 133 (L) 135 - 145 mmol/L    Potassium 3.7 3.4 - 5.3 mmol/L    Carbon Dioxide (CO2) 25 22 - 29 mmol/L    Anion Gap 16 (H) 7 - 15 mmol/L    Urea Nitrogen 31.1 (H) 8.0 - 23.0 mg/dL    Creatinine 4.50 (H) 0.51 - 0.95 mg/dL    GFR Estimate 10 (L) >60 mL/min/1.73m2    Calcium 9.1 8.8 - 10.4  mg/dL    Chloride 92 (L) 98 - 107 mmol/L    Glucose 212 (H) 70 - 99 mg/dL    Alkaline Phosphatase 78 40 - 150 U/L    AST 16 0 - 45 U/L    ALT 7 0 - 50 U/L    Protein Total 6.7 6.4 - 8.3 g/dL    Albumin 3.9 3.5 - 5.2 g/dL    Bilirubin Total 0.3 <=1.2 mg/dL   Heparin Unfractionated Anti Xa Level   Result Value Ref Range    Anti Xa Unfractionated Heparin 0.26 For Reference Range, See Comment IU/mL    Narrative    Therapeutic Range: UFH: 0.25-0.50 IU/mL for low intensity dosing,  0.30-0.70 IU/mL for high intensity dosing DVT and PE.  This test is not validated for other direct factor X inhibitors (e.g. rivaroxaban, apixaban, edoxaban, betrixaban, fondaparinux) and should not be used for monitoring of other medications.   Magnesium   Result Value Ref Range    Magnesium 1.9 1.7 - 2.3 mg/dL   Phosphorus   Result Value Ref Range    Phosphorus 6.1 (H) 2.5 - 4.5 mg/dL   CBC with platelets and differential   Result Value Ref Range    WBC Count 6.0 4.0 - 11.0 10e3/uL    RBC Count 2.82 (L) 3.80 - 5.20 10e6/uL    Hemoglobin 8.7 (L) 11.7 - 15.7 g/dL    Hematocrit 26.5 (L) 35.0 - 47.0 %    MCV 94 78 - 100 fL    MCH 30.9 26.5 - 33.0 pg    MCHC 32.8 31.5 - 36.5 g/dL    RDW 15.0 10.0 - 15.0 %    Platelet Count 105 (L) 150 - 450 10e3/uL    % Neutrophils 73 %    % Lymphocytes 13 %    % Monocytes 12 %    % Eosinophils 2 %    % Basophils 1 %    % Immature Granulocytes 0 %    NRBCs per 100 WBC 0 <1 /100    Absolute Neutrophils 4.4 1.6 - 8.3 10e3/uL    Absolute Lymphocytes 0.8 0.8 - 5.3 10e3/uL    Absolute Monocytes 0.7 0.0 - 1.3 10e3/uL    Absolute Eosinophils 0.1 0.0 - 0.7 10e3/uL    Absolute Basophils 0.0 0.0 - 0.2 10e3/uL    Absolute Immature Granulocytes 0.0 <=0.4 10e3/uL    Absolute NRBCs 0.0 10e3/uL   Glucose by meter   Result Value Ref Range    GLUCOSE BY METER POCT 228 (H) 70 - 99 mg/dL     *Note: Due to a large number of results and/or encounters for the requested time period, some results have not been displayed. A complete set  of results can be found in Results Review.         Clinically Significant Risk Factors Present on Admission        # Hyperkalemia: Highest K = 5.9 mmol/L in last 2 days, will monitor as appropriate  # Hyponatremia: Lowest Na = 133 mmol/L in last 2 days, will monitor as appropriate  # Hypochloremia: Lowest Cl = 91 mmol/L in last 2 days, will monitor as appropriate     # Anion Gap Metabolic Acidosis: Highest Anion Gap = 21 mmol/L in last 2 days, will monitor and treat as appropriate    # Thrombocytopenia: Lowest platelets = 105 in last 2 days, will monitor for bleeding   # Hypertension: Noted on problem list  # Chronic heart failure with preserved ejection fraction: heart failure noted on problem list and last echo with EF >50%     # Anemia: based on hgb <11       # Overweight: Estimated body mass index is 29.53 kg/m  as calculated from the following:    Height as of this encounter: 1.524 m (5').    Weight as of this encounter: 68.6 kg (151 lb 3.2 oz).

## 2025-05-13 NOTE — CODE/RAPID RESPONSE
[facility]  House DEREK RRT Note  5/13/2025   Time called: 0233  RRT called for: Hypotension  Code status: Full Code    Assessment & Plan   Yissel Wetzel is a 64 year old female with PMH of HFpEF, pulmonary HTN, cirrhosis, hx SVT on Amiodarone, hx cirrhosis due to right heart disease, ESRD on dialysis, secondary hyperparathyroidism, DM II, chronic respiratory failure with hypoxia, etc. admitted on 5/12/25 with atrial fibrillation with RVR.     I was paged to the bedside to evaluate Ms. Yissel Wetzel for an acute episode of hypotension. Patient had HD on 5/12 with 1.2L removed, stopped early due to hypotension and patient request.    Diagnosis:  ESRD on HD MWF  Afib RVR  Pulmonary hypertension  Hypotension  Upon my arrival the patient was sitting up in the chair and in no acute distress. Patient was awake and denying dizziness, light-headedness, chest pain, dyspnea, numbness, or tingling. Patient did endorse fatigue due to not being able to sleep and she stated that she was feeling nauseated. Inaccurate I/O but approximately net +225 prior to RRT after  ml bolus prior to HD, 700 ml after HD, and another 125 ml on the evening of 5/12. BP is not far off her previous baseline with MAPs 60-70, SBP in the  range. Most importantly patient is asymptomatic and HR has increased to compensate. Amiodarone stopped for now until BP improves. Patient was very restless throughout the RRT, standing up, ambulating between the bed and chair. Patient stated that this is what she does at home when she can't sleep due to restless legs. I offered pharmacological interventions which patient refused.    Awake and alert, oriented to place, time, and self  Lung sounds clear to auscultation  Heart tones with irregular rate and rhythm, normal S1 and S2, no gallop, rub, murmur, or click  Abdomen soft and non-tender, no guarding  Extremities warm and dry with 2+ pulses, no peripheral edema  Skin is intact  with scattered bruising from venipunctures    Plan:  -- Albumin 5% 25g for persistent hypotension  -- Compazine for nausea  -- Continue amiodarone when allowed by parameters (SBP > 90)  -- Slightly lower MAP goal overnight, maintain MAP > 60 mmHg ideally, if MAP is less than 60, repeat BP and check for symptoms of hypotension (dizziness, light-headedness, confusion, chest pain, numbness, tingling, diaphoresis, vision changes, etc)    BP 94/59   Pulse (!) 124   Temp 97.2  F (36.2  C) (Axillary)   Resp 16   Ht 1.524 m (5')   Wt 67.4 kg (148 lb 9.6 oz)   LMP  (LMP Unknown)   SpO2 95%   BMI 29.02 kg/m      Symptoms improved during albumin infusion, reassess BP after albumin for the need of additional volume expansion.    At the conclusion of this RRT patient was hemodynamically stable and will remain on current unit.    Her history is significant for:  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, initial encounter 2019    End stage renal failure on dialysis (H)     Esophagitis, unspecified     Former tobacco use     HEMORRHAGIC GASTROPATHY     History of blood transfusion     [place]    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) 2025    Iron deficiency anemia, unspecified     Mild persistent asthma     Obesity     Other and unspecified hyperlipidemia     Pneumonia     Pulmonary hypertension (H)     per 2012 echo mod.to severe pulmonary hypertension    Tobacco use disorder dc ed     Type 2 diabetes mellitus (H)     on Insulin- managed by PCP    Unspecified essential hypertension      Past Surgical History:   Procedure Laterality Date    ABDOMINOPLASTY  pt unsure when    abdominoplasty-     SECTION  ,     x 2    COLONOSCOPY      [place]  [place]    COLONOSCOPY N/A 5/27/2021    Procedure: COLONOSCOPY, WITH POLYPECTOMY AND BIOPSY;  Surgeon: Lincoln Farrar MD;  Location:  GI    CREATE FISTULA ARTERIOVENOUS UPPER EXTREMITY Right 12/26/2018    Procedure: RIGHT BRACHIAL TO BASILIC ARTERIOVENOUS FISTULA CREATION;  Surgeon: Terry Vizcaino MD;  Location:  SD    CV HEART CATHETERIZATION WITH POSSIBLE INTERVENTION N/A 7/22/2021    Procedure: Heart Catheterization with Possible Intervention;  Surgeon: Joseluis Dean MD;  Location:  HEART CARDIAC CATH LAB    CV RIGHT HEART CATH MEASUREMENTS RECORDED N/A 11/23/2022    Procedure: Heart Cath Right Heart Cath;  Surgeon: Yan Heredia MD;  Location:  HEART CARDIAC CATH LAB    CV RIGHT HEART CATH MEASUREMENTS RECORDED N/A 10/4/2024    Procedure: Right Heart Catheterization;  Surgeon: Jag Traylor MD;  Location:  HEART CARDIAC CATH LAB    ENT SURGERY  as a child    tonsillectomy    ESOPHAGOSCOPY, GASTROSCOPY, DUODENOSCOPY (EGD), COMBINED N/A 5/27/2021    Procedure: ESOPHAGOGASTRODUODENOSCOPY, WITH BIOPSY;  Surgeon: Lincoln Farrar MD;  Location:  GI    EYE SURGERY Left     injections- eye    HYSTERECTOMY  approx 1983    partial hysterectomy-reason bleeding     IR CVC TUNNEL PLACEMENT > 5 YRS OF AGE  12/6/2019    IR CVC TUNNEL REMOVAL RIGHT  5/7/2020    IR DIALYSIS FISTULOGRAM RIGHT  12/9/2019       Review of Systems   The 10 point Review of Systems is negative other than noted in the HPI or here.     Allergies   Allergies   Allergen Reactions    Albuterol      shakiness    Aspirin      gi    Byetta [Exenatide]      gi    Clonidine      constipation    Codeine      vomiting    Ezetimibe      muscle symptoms    Hydralazine      headaches    Lisinopril      hyperkalemia    Metformin Hydrochloride      vomiting    Pravachol [Pravastatin Sodium]      muscle pains    Simvastatin      myalgias    Troglitazone        Physical Exam   Physical  Exam  Constitutional:       General: She is not in acute distress.     Appearance: She is ill-appearing.   HENT:      Mouth/Throat:      Mouth: Mucous membranes are moist.   Eyes:      Pupils: Pupils are equal, round, and reactive to light.   Cardiovascular:      Rate and Rhythm: Tachycardia present. Rhythm irregular.      Pulses: Normal pulses.      Heart sounds: Normal heart sounds. No murmur heard.  Pulmonary:      Effort: Pulmonary effort is normal.      Breath sounds: Normal breath sounds.   Abdominal:      Palpations: Abdomen is soft.      Tenderness: There is no abdominal tenderness. There is no guarding.   Musculoskeletal:      Right lower leg: No edema.      Left lower leg: No edema.   Skin:     General: Skin is warm and dry.      Capillary Refill: Capillary refill takes less than 2 seconds.   Neurological:      General: No focal deficit present.      Mental Status: She is alert and oriented to person, place, and time.   Psychiatric:         Mood and Affect: Mood normal.       Vital Signs with Ranges:  Temp:  [97  F (36.1  C)-97.2  F (36.2  C)] 97.2  F (36.2  C)  Pulse:  [] 124  Resp:  [9-26] 16  BP: ()/(35-71) 94/59  SpO2:  [94 %-100 %] 96 %  I/O last 3 completed shifts:  In: 100 [I.V.:100]  Out: 1200 [Other:1200]    Data   Results for orders placed or performed during the hospital encounter of 05/12/25 (from the past 24 hours)   CBC with platelets + differential    Narrative    The following orders were created for panel order CBC with platelets + differential.  Procedure                               Abnormality         Status                     ---------                               -----------         ------                     CBC with platelets and ...[[id number]]  Abnormal            Final result               RBC and Platelet Morpho...[[id number]]                                                   Please view results for these tests on the individual orders.   Comprehensive metabolic  panel   Result Value Ref Range    Sodium 133 (L) 135 - 145 mmol/L    Potassium 5.9 (H) 3.4 - 5.3 mmol/L    Carbon Dioxide (CO2) 21 (L) 22 - 29 mmol/L    Anion Gap 21 (H) 7 - 15 mmol/L    Urea Nitrogen 51.1 (H) 8.0 - 23.0 mg/dL    Creatinine 6.70 (H) 0.51 - 0.95 mg/dL    GFR Estimate 6 (L) >60 mL/min/1.73m2    Calcium 9.5 8.8 - 10.4 mg/dL    Chloride 91 (L) 98 - 107 mmol/L    Glucose 209 (H) 70 - 99 mg/dL    Alkaline Phosphatase      AST 34 0 - 45 U/L    ALT      Protein Total 6.7 6.4 - 8.3 g/dL    Albumin 3.5 3.5 - 5.2 g/dL    Bilirubin Total 0.4 <=1.2 mg/dL   Extra Tube    Narrative    The following orders were created for panel order Extra Tube.  Procedure                               Abnormality         Status                     ---------                               -----------         ------                     Extra Blue Top Tube[[id number]]                             Final result                 Please view results for these tests on the individual orders.   CBC with platelets and differential   Result Value Ref Range    WBC Count 8.4 4.0 - 11.0 10e3/uL    RBC Count 3.14 (L) 3.80 - 5.20 10e6/uL    Hemoglobin 9.5 (L) 11.7 - 15.7 g/dL    Hematocrit 30.3 (L) 35.0 - 47.0 %    MCV 97 78 - 100 fL    MCH 30.3 26.5 - 33.0 pg    MCHC 31.4 (L) 31.5 - 36.5 g/dL    RDW 15.0 10.0 - 15.0 %    Platelet Count 113 (L) 150 - 450 10e3/uL    % Neutrophils 78 %    % Lymphocytes 11 %    % Monocytes 9 %    % Eosinophils 1 %    % Basophils 1 %    % Immature Granulocytes 0 %    NRBCs per 100 WBC 0 <1 /100    Absolute Neutrophils 6.5 1.6 - 8.3 10e3/uL    Absolute Lymphocytes 0.9 0.8 - 5.3 10e3/uL    Absolute Monocytes 0.8 0.0 - 1.3 10e3/uL    Absolute Eosinophils 0.1 0.0 - 0.7 10e3/uL    Absolute Basophils 0.1 0.0 - 0.2 10e3/uL    Absolute Immature Granulocytes 0.0 <=0.4 10e3/uL    Absolute NRBCs 0.0 10e3/uL   Extra Blue Top Tube   Result Value Ref Range    Hold Specimen JIC    NT-proBNP   Result Value Ref Range    NT-proBNP  >70,000 (H) 0 - 226 pg/mL   Magnesium   Result Value Ref Range    Magnesium 2.2 1.7 - 2.3 mg/dL   EKG 12 lead   Result Value Ref Range    Systolic Blood Pressure  mmHg    Diastolic Blood Pressure  mmHg    Ventricular Rate 138 BPM    Atrial Rate  BPM    AK Interval  ms    QRS Duration 132 ms     ms    QTc 581 ms    P Axis  degrees    R AXIS 108 degrees    T Axis -87 degrees    Interpretation ECG       Atrial fibrillation with rapid ventricular response  Right bundle branch block  T wave abnormality, consider inferolateral ischemia  Abnormal ECG  When compared with ECG of 04-Oct-2024 12:18,  Vent. rate has increased by  73 bpm  Right bundle branch block is now Present  Confirmed by GENERATED REPORT, COMPUTER ([id number]),  Coby Lopez ([id number]) on 5/12/2025 9:59:08 AM     EKG 12-lead, tracing only   Result Value Ref Range    Systolic Blood Pressure  mmHg    Diastolic Blood Pressure  mmHg    Ventricular Rate 97 BPM    Atrial Rate  BPM    AK Interval  ms    QRS Duration 138 ms     ms    QTc 551 ms    P Axis  degrees    R AXIS 106 degrees    T Axis 250 degrees    Interpretation ECG       Atrial fibrillation  Right bundle branch block  T wave abnormality, consider inferolateral ischemia  Abnormal ECG  When compared with ECG of 12-May-2025 09:35,  No significant change was found  Confirmed by GENERATED REPORT, COMPUTER ([id number]),  Coby Lopez ([id number]) on 5/12/2025 11:24:34 AM     XR Chest Port 1 View    Narrative    EXAM: XR CHEST PORT 1 VIEW  LOCATION: [facility]  DATE: 5/12/2025    INDICATION: Chest pain, SOB, rapid A fib.  COMPARISON: Chest x-ray 9/29/2024.      Impression    IMPRESSION: Stable cardiomegaly. Hazy bilateral pulmonary opacities with vascular and interstitial prominence again noted consistent with pulmonary edema, but appears mildly improved. Small right base pleural fluid is slightly larger.   Troponin T, High Sensitivity   Result Value Ref Range    Troponin T,  High Sensitivity 1,194 (HH) <=14 ng/L   Hepatic panel   Result Value Ref Range    Protein Total 6.4 6.4 - 8.3 g/dL    Albumin 3.5 3.5 - 5.2 g/dL    Bilirubin Total 0.3 <=1.2 mg/dL    Alkaline Phosphatase 77 40 - 150 U/L    AST 16 0 - 45 U/L    ALT 7 0 - 50 U/L    Bilirubin Direct 0.14 0.00 - 0.30 mg/dL   Potassium   Result Value Ref Range    Potassium 4.0 3.4 - 5.3 mmol/L   Hepatitis B surface antigen   Result Value Ref Range    Hepatitis B Surface Antigen Nonreactive Nonreactive   TSH with free T4 reflex   Result Value Ref Range    TSH 4.04 0.30 - 4.20 uIU/mL   Glucose by meter   Result Value Ref Range    GLUCOSE BY METER POCT 128 (H) 70 - 99 mg/dL   Troponin T, High Sensitivity   Result Value Ref Range    Troponin T, High Sensitivity 1,315 (HH) <=14 ng/L   Heparin Unfractionated Anti Xa Level   Result Value Ref Range    Anti Xa Unfractionated Heparin 0.61 For Reference Range, See Comment IU/mL    Narrative    Therapeutic Range: UFH: 0.25-0.50 IU/mL for low intensity dosing,  0.30-0.70 IU/mL for high intensity dosing DVT and PE.  This test is not validated for other direct factor X inhibitors (e.g. rivaroxaban, apixaban, edoxaban, betrixaban, fondaparinux) and should not be used for monitoring of other medications.   Glucose by meter   Result Value Ref Range    GLUCOSE BY METER POCT 167 (H) 70 - 99 mg/dL   Glucose by meter   Result Value Ref Range    GLUCOSE BY METER POCT 166 (H) 70 - 99 mg/dL     *Note: Due to a large number of results and/or encounters for the requested time period, some results have not been displayed. A complete set of results can be found in Results Review.     COVID-19 PCR Results          8/18/2024    11:08   COVID-19 PCR Results   SARS CoV2 PCR Negative      COVID-19 Antibody Results, Testing for Immunity           No data to display                Time Spent on this Encounter   I spent 45 minutes on the unit/floor managing the care of Yissel Wetzel. Over 50% of my time was spent  counseling the patient and/or coordinating care regarding services listed in this note.    DAVID Pereyra, CNP  Hospitalist - [facility]  Text me on the [employer] [facility] for a textback

## 2025-05-13 NOTE — PROGRESS NOTES
[facility]    Medicine Progress Note - Hospitalist Service    Date of Admission:  5/12/2025    Assessment & Plan   Yissel Wetzel is a 64 year old female with PMH of HFpEF, pulmonary HTN, cirrhosis, hx SVT on Amiodarone, hx cirrhosis due to right heart disease, ESRD on dialysis, secondary hyperparathyroidism, DM II, chronic respiratory failure with hypoxia, etc. admitted on 5/12/25 with atrial fibrillation with RVR.     Atrial fibrillation with RVR  Hx SVT/junctional rhythm  Elevated troponin  *Established with Dr. Barbosa. She is maintained on low dose Amiodarone 200 mg 5 times weekly, which was held for a period of time summer 2024 due to junctional rhythm but restarted 8/30/24.  *Presented with lightheadedness/dizziness, palpitations, mild dyspnea since 5/6. No chest pain. She noted her BP to be 80/50 and  when she called cardiology clinic on 5/8 and it was recommended she go to the ER, but patient declined. She presented to the ED 5/12 and was found to be in afib RVR with HR mostly 120-130s, SBP . Saturating 98% on RA. ProBNP >70k, troponin 1,194. CXR w/ findings of pulmonary edema. She is mentating well.  *Per chart review, no formal diagnosis of atrial fibrillation although she has an EKG from 10/4/24 that appears consistent w/ afib.  *DMN4OS5-ROBx score is at least 2 for gender and DM II. She has no hx GIB or frequent falls. Notes hx epistaxis a few years ago while on baby Aspirin and thus hesitant to start anticoagulation, but this was not a life threatening bleed nor anything that required blood transfusion. Discussed that currently benefits appear to exceed risks, she was ultimately agreeable to start IV Heparin with close monitoring.  * RRT called overnight for hypotension. Rates remain elevated despite metoprolol and amiodarone. Hypotension precludes any additional rate controlling agent  * Cardiology planning for JARROD guided cardioversion today 5/13   - Cardiology  consult appreciated  - Continuing IV amiodarone for now  - Continue IV heparin, will switch to PO anticoagulation after cardioversion      DM II, insulin dependent  *A1c was 7.5 on 8/18/24  - Hold Lantus 11 units HS while NPO  - Hold PTA insulin aspart 2U per 15g CHO with meals while NPO  - Accuchecks q4h with sliding scale coverage     ESRD on dialysis MWF  - Continue PTA Midodrine 10 mg qAM and 30 mg before HD on MWF (dialysis days) and 20 mg BID TThSaSu (non-dialysis days)  - Nephrology consult for dialysis     Mild hyponatremia  *Sodium 133 on admission, s/p IV  mL in the ED.  - Repeat CMP tomorrow AM     Chronic anemia  Chronic thrombocytopenia  *Hgb 9.5, stable from 8.8 on 4/23/25. Platelets 113, stable from 104 on 2/8/25.  - Repeat CBC tomorrow AM     COPD  Chronic respiratory failure with hypoxia  *Patient uses 2 L PRN and at HS  - Continue PTA Levalbuterol  - Supplemental oxygen     Severe pulmonary hypertension felt to be due to intrinsic pulmonary vascular disease  *Established with Dr. Redd  *Echo 2/27/24 EF 60-65% with mild mitral regurgitation, mild valvular aortic stenosis, mild tricuspid regurgitation with right ventricular systolic pressure approximated at 33 mmHg plus the right atrial pressure, IVC diameters <2.1 cm collapsing >50% with sniff suggests normal RA pressure of 3 mmHg  *S/p RHC 10/2/24 with severely elevated left and right sided filling pressures, severely elevated pulmonary artery hypertension, normal cardiac output level.  - Monitor I&O  - Daily weights     History of cirrhosis due to severe pulmonary HTN  *Initial LFTs hemolyzed  - Repeat LFTs    RLS  Pt reports severe RLS discomfort, but refuses any medications.           Diet: NPO for Medical/Clinical Reasons Except for: Meds, Ice Chips    DVT Prophylaxis: IV heparin  Weller Catheter: Not present  Lines: None     Cardiac Monitoring: ACTIVE order. Indication: Tachyarrhythmias, acute (48 hours)  Code Status: Full Code       Clinically Significant Risk Factors        # Hyperkalemia: Highest K = 5.9 mmol/L in last 2 days, will monitor as appropriate  # Hyponatremia: Lowest Na = 133 mmol/L in last 2 days, will monitor as appropriate  # Hypochloremia: Lowest Cl = 91 mmol/L in last 2 days, will monitor as appropriate     # Anion Gap Metabolic Acidosis: Highest Anion Gap = 21 mmol/L in last 2 days, will monitor and treat as appropriate    # Thrombocytopenia: Lowest platelets = 105 in last 2 days, will monitor for bleeding   # Hypertension: Noted on problem list  # Chronic heart failure with preserved ejection fraction: heart failure noted on problem list and last echo with EF >50%           # Overweight: Estimated body mass index is 29.53 kg/m  as calculated from the following:    Height as of this encounter: 1.524 m (5').    Weight as of this encounter: 68.6 kg (151 lb 3.2 oz)., PRESENT ON ADMISSION            Social Drivers of Health    Tobacco Use: Medium Risk (12/23/2024)    Patient History     Smoking Tobacco Use: Former     Smokeless Tobacco Use: Never          Disposition Plan     Medically Ready for Discharge: Anticipated Tomorrow      Mildred Harden MD  Hospitalist Service  [facility]  Securely message with [email or web address] (more info)  Text page via [employer] Paging/Directory   ______________________________________________________________________    Interval History   RRT called overnight due to hypotension. Pt relatively asymptomatic. HRs remain in the 120-150 range. SBPs in the 90s. Pt endorses discomfort from her RLS. Declines medications. Has some phlegm production. Denies CP or shortness of breath.     Physical Exam   Vital Signs: Temp: 97.2  F (36.2  C) Temp src: Axillary BP: 109/61 Pulse: (!) 121   Resp: 16 SpO2: 94 % O2 Device: Nasal cannula Oxygen Delivery: 2 LPM  Weight: 151 lbs 3.2 oz    Constitutional: Awake, alert, cooperative, no apparent distress.  Eyes: Conjunctiva and pupils examined and  normal.  HEENT: Moist mucous membranes, normal dentition.  Respiratory: non-labored breathing  Cardiovascular: Tachycardic  Skin: No rashes, no cyanosis, no edema.  Musculoskeletal: No joint swelling, erythema or tenderness.  Neurologic: Cranial nerves 2-12 intact, normal strength and sensation.  Psychiatric: Alert, oriented to person, place and time, no obvious anxiety or depression.    Medical Decision Making       50 MINUTES SPENT BY ME on the date of service doing chart review, history, exam, documentation & further activities per the note.      Data     I have personally reviewed the following data over the past 24 hrs:    6.0  \   8.7 (L)   / 105 (L)     133 (L) 92 (L) 31.1 (H) /  228 (H)   3.7 25 4.50 (H) \     ALT: 7 AST: 16 AP: 78 TBILI: 0.3   ALB: 3.9 TOT PROTEIN: 6.7 LIPASE: N/A     Trop: 1,315 (HH) BNP: N/A     TSH: N/A T4: N/A A1C: N/A       Imaging results reviewed over the past 24 hrs:   Recent Results (from the past 24 hours)   Echocardiogram Complete *Canceled*    Narrative    The following orders were created for panel order Echocardiogram Complete.  Procedure                               Abnormality         Status                     ---------                               -----------         ------                       Please view results for these tests on the individual orders.   Echocardiogram Complete *Canceled*    Narrative    The following orders were created for panel order Echocardiogram Complete.  Procedure                               Abnormality         Status                     ---------                               -----------         ------                       Please view results for these tests on the individual orders.   Echocardiogram Complete *Canceled*    Narrative    The following orders were created for panel order Echocardiogram Complete.  Procedure                               Abnormality         Status                     ---------                                -----------         ------                       Please view results for these tests on the individual orders.   Echocardiogram Complete *Canceled*    Narrative    The following orders were created for panel order Echocardiogram Complete.  Procedure                               Abnormality         Status                     ---------                               -----------         ------                       Please view results for these tests on the individual orders.

## 2025-05-14 LAB
ATRIAL RATE - MUSE: 119 BPM
ATRIAL RATE - MUSE: 73 BPM
ATRIAL RATE - MUSE: 94 BPM
DIASTOLIC BLOOD PRESSURE - MUSE: NORMAL MMHG
GLUCOSE BLDC GLUCOMTR-MCNC: 138 MG/DL (ref 70–99)
GLUCOSE BLDC GLUCOMTR-MCNC: 189 MG/DL (ref 70–99)
GLUCOSE BLDC GLUCOMTR-MCNC: 189 MG/DL (ref 70–99)
GLUCOSE BLDC GLUCOMTR-MCNC: 200 MG/DL (ref 70–99)
INTERPRETATION ECG - MUSE: NORMAL
P AXIS - MUSE: 77 DEGREES
P AXIS - MUSE: NORMAL DEGREES
P AXIS - MUSE: NORMAL DEGREES
PR INTERVAL - MUSE: 220 MS
PR INTERVAL - MUSE: NORMAL MS
PR INTERVAL - MUSE: NORMAL MS
QRS DURATION - MUSE: 134 MS
QRS DURATION - MUSE: 140 MS
QRS DURATION - MUSE: 144 MS
QT - MUSE: 394 MS
QT - MUSE: 432 MS
QT - MUSE: 448 MS
QTC - MUSE: 486 MS
QTC - MUSE: 540 MS
QTC - MUSE: 543 MS
R AXIS - MUSE: 111 DEGREES
R AXIS - MUSE: 67 DEGREES
R AXIS - MUSE: 94 DEGREES
SYSTOLIC BLOOD PRESSURE - MUSE: NORMAL MMHG
T AXIS - MUSE: 166 DEGREES
T AXIS - MUSE: 167 DEGREES
T AXIS - MUSE: 225 DEGREES
VENTRICULAR RATE- MUSE: 114 BPM
VENTRICULAR RATE- MUSE: 71 BPM
VENTRICULAR RATE- MUSE: 94 BPM

## 2025-05-14 PROCEDURE — 250N000013 HC RX MED GY IP 250 OP 250 PS 637: Performed by: PHYSICIAN ASSISTANT

## 2025-05-14 PROCEDURE — 99232 SBSQ HOSP IP/OBS MODERATE 35: CPT | Performed by: STUDENT IN AN ORGANIZED HEALTH CARE EDUCATION/TRAINING PROGRAM

## 2025-05-14 PROCEDURE — 210N000002 HC R&B HEART CARE

## 2025-05-14 PROCEDURE — 250N000012 HC RX MED GY IP 250 OP 636 PS 637: Performed by: STUDENT IN AN ORGANIZED HEALTH CARE EDUCATION/TRAINING PROGRAM

## 2025-05-14 PROCEDURE — 250N000009 HC RX 250: Mod: JW | Performed by: INTERNAL MEDICINE

## 2025-05-14 PROCEDURE — 634N000001 HC RX 634: Mod: JZ | Performed by: INTERNAL MEDICINE

## 2025-05-14 PROCEDURE — 99232 SBSQ HOSP IP/OBS MODERATE 35: CPT | Mod: FS | Performed by: PHYSICIAN ASSISTANT

## 2025-05-14 PROCEDURE — 93010 ELECTROCARDIOGRAM REPORT: CPT | Performed by: INTERNAL MEDICINE

## 2025-05-14 PROCEDURE — 99207 PR NO BILLABLE SERVICE THIS VISIT: CPT | Performed by: STUDENT IN AN ORGANIZED HEALTH CARE EDUCATION/TRAINING PROGRAM

## 2025-05-14 PROCEDURE — 258N000003 HC RX IP 258 OP 636: Performed by: INTERNAL MEDICINE

## 2025-05-14 PROCEDURE — 250N000011 HC RX IP 250 OP 636: Performed by: INTERNAL MEDICINE

## 2025-05-14 PROCEDURE — 250N000011 HC RX IP 250 OP 636

## 2025-05-14 PROCEDURE — 250N000013 HC RX MED GY IP 250 OP 250 PS 637: Performed by: INTERNAL MEDICINE

## 2025-05-14 PROCEDURE — 93005 ELECTROCARDIOGRAM TRACING: CPT

## 2025-05-14 PROCEDURE — P9045 ALBUMIN (HUMAN), 5%, 250 ML: HCPCS | Performed by: INTERNAL MEDICINE

## 2025-05-14 PROCEDURE — 90935 HEMODIALYSIS ONE EVALUATION: CPT | Performed by: INTERNAL MEDICINE

## 2025-05-14 PROCEDURE — 90935 HEMODIALYSIS ONE EVALUATION: CPT

## 2025-05-14 RX ORDER — AMIODARONE HYDROCHLORIDE 200 MG/1
200 TABLET ORAL DAILY
Qty: 30 TABLET | Refills: 0 | Status: SHIPPED | OUTPATIENT
Start: 2025-05-14

## 2025-05-14 RX ORDER — BENZOCAINE/MENTHOL 6 MG-10 MG
LOZENGE MUCOUS MEMBRANE 2 TIMES DAILY
Status: DISCONTINUED | OUTPATIENT
Start: 2025-05-14 | End: 2025-05-14

## 2025-05-14 RX ORDER — ALBUMIN (HUMAN) 12.5 G/50ML
50 SOLUTION INTRAVENOUS
Status: DISCONTINUED | OUTPATIENT
Start: 2025-05-14 | End: 2025-05-14

## 2025-05-14 RX ADMIN — SODIUM CHLORIDE 200 ML: 0.9 INJECTION, SOLUTION INTRAVENOUS at 13:32

## 2025-05-14 RX ADMIN — INSULIN ASPART 1 UNITS: 100 INJECTION, SOLUTION INTRAVENOUS; SUBCUTANEOUS at 08:21

## 2025-05-14 RX ADMIN — ALBUMIN HUMAN 250 ML: 0.05 INJECTION, SOLUTION INTRAVENOUS at 13:31

## 2025-05-14 RX ADMIN — SODIUM CHLORIDE 500 ML: 0.9 INJECTION, SOLUTION INTRAVENOUS at 13:32

## 2025-05-14 RX ADMIN — METOPROLOL TARTRATE 2.5 MG: 5 INJECTION INTRAVENOUS at 16:50

## 2025-05-14 RX ADMIN — MIDODRINE HYDROCHLORIDE 30 MG: 10 TABLET ORAL at 12:45

## 2025-05-14 RX ADMIN — AMIODARONE HYDROCHLORIDE 200 MG: 200 TABLET ORAL at 16:51

## 2025-05-14 RX ADMIN — INSULIN ASPART 4 UNITS: 100 INJECTION, SOLUTION INTRAVENOUS; SUBCUTANEOUS at 17:17

## 2025-05-14 RX ADMIN — CALCIUM ACETATE 667 MG: 667 CAPSULE ORAL at 17:22

## 2025-05-14 RX ADMIN — CALCIUM ACETATE 667 MG: 667 CAPSULE ORAL at 08:15

## 2025-05-14 RX ADMIN — INSULIN ASPART 2 UNITS: 100 INJECTION, SOLUTION INTRAVENOUS; SUBCUTANEOUS at 16:52

## 2025-05-14 RX ADMIN — EPOETIN ALFA-EPBX 3000 UNITS: 3000 INJECTION, SOLUTION INTRAVENOUS; SUBCUTANEOUS at 16:51

## 2025-05-14 RX ADMIN — Medication: at 13:32

## 2025-05-14 RX ADMIN — INSULIN GLARGINE 11 UNITS: 100 INJECTION, SOLUTION SUBCUTANEOUS at 20:55

## 2025-05-14 RX ADMIN — INSULIN ASPART 9 UNITS: 100 INJECTION, SOLUTION INTRAVENOUS; SUBCUTANEOUS at 08:25

## 2025-05-14 RX ADMIN — HYDROCORTISONE 1 G: 10 CREAM TOPICAL at 20:51

## 2025-05-14 RX ADMIN — ACETAMINOPHEN 650 MG: 325 TABLET, FILM COATED ORAL at 17:13

## 2025-05-14 RX ADMIN — PROCHLORPERAZINE EDISYLATE 5 MG: 5 INJECTION INTRAMUSCULAR; INTRAVENOUS at 12:02

## 2025-05-14 RX ADMIN — ACETAMINOPHEN 650 MG: 325 TABLET, FILM COATED ORAL at 03:09

## 2025-05-14 ASSESSMENT — ACTIVITIES OF DAILY LIVING (ADL)
ADLS_ACUITY_SCORE: 37

## 2025-05-14 NOTE — PROGRESS NOTES
Care Management Follow Up    Length of Stay (days): 2    Expected Discharge Date: 05/15/2025     Concerns to be Addressed: discharge planning     Patient plan of care discussed at interdisciplinary rounds: Yes    Anticipated Discharge Disposition: Home              Anticipated Discharge Services:    Anticipated Discharge DME:      Patient/family educated on Medicare website which has current facility and service quality ratings:    Education Provided on the Discharge Plan:    Patient/Family in Agreement with the Plan: yes    Referrals Placed by CM/SW:    Private pay costs discussed: Not applicable    Discussed  Partnership in Safe Discharge Planning  document with patient/family: No     Handoff Completed: No, handoff not indicated or clinically appropriate    Additional Information:  Care coordination team following for discharge planning.     Luzma Narayanan RN Care Coordinator  [facility]  [phone]

## 2025-05-14 NOTE — PROGRESS NOTES
Renal Medicine Progress Note            Assessment/Plan:     64 y.o woman with ESRD     # ESRD:                -MWF               -180 minutes               -EDW 65 kg               -R AVF, Qb 450               +heparin               -Lydia Long               -Dr. Rob     # Anemia: Epo 2400 units      # IDH: on  high dose midodrine, 30 mg before HD. BP is better.     # Afib with RVR s/p JARROD cardioversion    Plan:  # 3 hrs HD and UF Goal ~ 2 liters of BP tolerates. Prime with albumin. + midodrine.   # EPO 3000 units          Interval History:     She was cardioverted yesterday.  She says she vomited this morning.           Medications and Allergies:     Current Facility-Administered Medications   Medication Dose Route Frequency Provider Last Rate Last Admin    albumin human 5 % injection 250 mL  250 mL Intravenous Once in dialysis/CRRT Theron Alonso MD        amiodarone (PACERONE) tablet 200 mg  200 mg Oral Daily Iain Hairston MD   200 mg at 05/13/25 1637    amoxicillin (AMOXIL) capsule 500 mg  500 mg Oral BID Tameka Sabillon PA-C   500 mg at 05/13/25 2152    apixaban ANTICOAGULANT (ELIQUIS) tablet 5 mg  5 mg Oral BID Iain Hairston MD   5 mg at 05/13/25 1637    calcium acetate (PHOSLO) capsule 667 mg  667 mg Oral TID w/meals Tameka Sabillon PA-C   667 mg at 05/14/25 0815    insulin aspart (NovoLOG) injection (RAPID ACTING)  1-7 Units Subcutaneous TID AC Tameka Sabillon PA-C   1 Units at 05/14/25 0821    insulin aspart (NovoLOG) injection (RAPID ACTING)  1-5 Units Subcutaneous At Bedtime Tameka Sabillon PA-C        insulin aspart (NovoLOG) injection (RAPID ACTING)   Subcutaneous TID w/meals Tameka Sabillon PA-C   9 Units at 05/14/25 0825    [Held by provider] insulin glargine (LANTUS PEN) injection 11 Units  11 Units Subcutaneous At Bedtime Tameka Sabillon PA-C        metoprolol (LOPRESSOR) injection 2.5 mg  2.5 mg Intravenous Q6H Melissa Burdick MD, MD   2.5 mg  at 05/12/25 1710    midodrine (PROAMATINE) tablet 10 mg  10 mg Oral Q Mon Wed Fri AM Tameka Sabillon PA-C        midodrine (PROAMATINE) tablet 20 mg  20 mg Oral 2 times per day on Sunday Tuesday Thursday Saturday Tameka Sabillon PA-C   20 mg at 05/13/25 1637    midodrine (PROAMATINE) tablet 30 mg  30 mg Oral Q Mon Wed Fri AM Tameka Sabillon PA-C   30 mg at 05/14/25 1245    No heparin via hemodialysis machine   Does not apply Once Theron Alonso MD        sodium chloride (PF) 0.9% PF flush 3 mL  3 mL Intracatheter Q8H RADHA Tameka Sabillon PA-C   3 mL at 05/13/25 2153    sodium chloride 0.9% BOLUS 200 mL  200 mL Hemodialysis Machine Once Theron Alonso MD        sodium chloride 0.9% BOLUS 250 mL  250 mL Intravenous Once Salomón Bender APRN CNP   Held at 05/13/25 0426    sodium chloride 0.9% BOLUS 500 mL  500 mL Hemodialysis Machine Once Theron Alonso MD            Allergies   Allergen Reactions    Albuterol      shakiness    Aspirin      gi    Byetta [Exenatide]      gi    Clonidine      constipation    Codeine      vomiting    Ezetimibe      muscle symptoms    Hydralazine      headaches    Lisinopril      hyperkalemia    Metformin Hydrochloride      vomiting    Pravachol [Pravastatin Sodium]      muscle pains    Simvastatin      myalgias    Troglitazone             Physical Exam:   Vitals were reviewed   , Blood pressure 112/63, pulse 86, temperature (!) 96.5  F (35.8  C), temperature source Axillary, resp. rate (!) 39, height 1.524 m (5'), weight 70.5 kg (155 lb 6.4 oz), SpO2 96%, not currently breastfeeding.    Wt Readings from Last 3 Encounters:   05/14/25 70.5 kg (155 lb 6.4 oz)   10/04/24 65.9 kg (145 lb 4.8 oz)   08/20/24 67.2 kg (148 lb 3.2 oz)       Intake/Output Summary (Last 24 hours) at 5/14/2025 1300  Last data filed at 5/14/2025 0325  Gross per 24 hour   Intake 730 ml   Output --   Net 730 ml       GENERAL APPEARANCE: frail.   HEENT:  Eyes/ears/nose/neck grossly  normal  RESP: lungs cta b c good efforts, no crackles, rhonchi or wheezes  CV: RRR  ABDOMEN: soft, NT  EXTREMITIES/SKIN: + edema  NEURO: Awake, alert and conversing.          Data:     CBC RESULTS:     Recent Labs   Lab 05/13/25 0439 05/12/25  0933   WBC 6.0 8.4   RBC 2.82* 3.14*   HGB 8.7* 9.5*   HCT 26.5* 30.3*   * 113*       Basic Metabolic Panel:  Recent Labs   Lab 05/14/25  1217 05/14/25  0803 05/13/25  2150 05/13/25  1640 05/13/25  1232 05/13/25  0743 05/13/25  0439 05/12/25  1817 05/12/25  1138 05/12/25  0933   NA  --   --   --   --   --   --  133*  --   --  133*   POTASSIUM  --   --   --   --   --   --  3.7  --  4.0 5.9*   CHLORIDE  --   --   --   --   --   --  92*  --   --  91*   CO2  --   --   --   --   --   --  25  --   --  21*   BUN  --   --   --   --   --   --  31.1*  --   --  51.1*   CR  --   --   --   --   --   --  4.50*  --   --  6.70*   * 189* 178* 186* 143* 228* 212*   < >  --  209*   KANWAL  --   --   --   --   --   --  9.1  --   --  9.5    < > = values in this interval not displayed.       INRNo lab results found in last 7 days.   Attestation:   I have reviewed today's relevant vital signs, notes, medications, labs and imaging.    Theron Alonso MD  [facility] Nephrology  Office phone :[phone]  Pager: [phone]

## 2025-05-14 NOTE — PROGRESS NOTES
I was notified patient demanded to be taken off dialysis shortly after she was put on.  Will try again tomorrow.

## 2025-05-14 NOTE — PLAN OF CARE
Goal Outcome Evaluation:    A&Ox4, SBA w/ walker and GB/walker, continues to be hypotensive, NC 2L overnight. Denies CP, SOB, palpitations, and headache. Afib/SR w/ BBB w/ HR in the 70s per tele. C/o intermittent dizziness w/ ambulation and lower back pain managed w/ PRNs. Amio gtt stopped at 0023, told to follow up with cards during the day to confirm amio gtt stopping, PO amio started yesterday. No BM or urine overnight. Plan for cards follow-up and dialysis if able today.    Problem: Adult Inpatient Plan of Care  Goal: Optimal Comfort and Wellbeing  Outcome: Progressing  Problem: Dysrhythmia  Goal: Normalized Cardiac Rhythm  Outcome: Progressing     Problem: Adult Inpatient Plan of Care  Goal: Absence of Hospital-Acquired Illness or Injury  Intervention: Identify and Manage Fall Risk  Recent Flowsheet Documentation  Taken 5/14/2025 0325 by Fernando Fishman RN  Safety Promotion/Fall Prevention:   activity supervised   clutter free environment maintained   lighting adjusted   nonskid shoes/slippers when out of bed   room door open   room near nurse's station   safety round/check completed  Taken 5/13/2025 2300 by Fernando Fishman RN  Safety Promotion/Fall Prevention:   activity supervised   clutter free environment maintained   lighting adjusted   nonskid shoes/slippers when out of bed   room door open   room near nurse's station   safety round/check completed  Taken 5/13/2025 2128 by Fernando Fishman RN  Safety Promotion/Fall Prevention:   activity supervised   clutter free environment maintained   lighting adjusted   nonskid shoes/slippers when out of bed   room door open   room near nurse's station   safety round/check completed  Intervention: Prevent Skin Injury  Recent Flowsheet Documentation  Taken 5/14/2025 0510 by Fernando Fishman, RN  Body Position: position changed independently  Taken 5/14/2025 0325 by Fernando Fishman RN  Body Position: position changed independently  Taken 5/13/2025 2300 by Fernando Fishman  RN  Body Position: position changed independently  Taken 5/13/2025 2128 by Fernando Fishman RN  Body Position: position changed independently  Taken 5/13/2025 2005 by Fernando Fishman RN  Body Position: position changed independently  Intervention: Prevent Infection  Recent Flowsheet Documentation  Taken 5/14/2025 0325 by Fernando Fishman RN  Infection Prevention: single patient room provided  Taken 5/13/2025 2300 by Fernando Fishman RN  Infection Prevention: single patient room provided  Taken 5/13/2025 2128 by Fernando Fishman RN  Infection Prevention: single patient room provided

## 2025-05-14 NOTE — PROGRESS NOTES
DIALYSIS PROCEDURE NOTE      Patient dialyzed for 15 mins. on a K3 bath with a net fluid removal of  0L.  A BFR of 300 ml/min was obtained via a AVf using 16 gauge needles.      The treatment plan was discussed with Dr. Alonso during the treatment.    Total heparin received during the treatment: 0 units.   Needle cannulation sites held x 10 min.     Meds  given: albumin   Complications: Patients stated that her leg was restless and demanded to be taken off. Dialysis nurse suggested medication to help but had denied. Educated  patient about risk factors of not receiving full treatment. Patient stated that they understood but would still like to be taken off. MD and floor nurse made aware. Will reattempt tomorrow.    Person educated: patient. Barriers to learning: agitated due to restless leg. Educated on procedure via verbal mode. Patient verbalized understanding.     ICEBOAT    I: Patient was identified using 2 identifiers  C:  Consent Signed Yes  E: Equipment preventative maintenance is current and dialysis delivery system OK to use  B: Hepatitis B Surface result    Latest Reference Range & Units 05/12/25 11:38   Hep B Surface Agn Nonreactive  Nonreactive     O: Dialysis orders present and complete prior to treatment  A: Vascular access verified and assessed prior to treatment  T: Treatment was performed at a clinically appropriate time  ?: Patient was allowed to ask questions and address concerns prior to treatment  Machine water alarm in place and functioning. Transducer pods intact and checked every 15min.   Pt returned via bed.  Chlorine/Chloramine water system checked every 4 hours.  Outpatient Dialysis at [facility]    Patient repositioned every 2 hours during the treatment.  Post treatment report given     Please remove patient dressing on AVF and AVG needle sites 24 hours after dialysis. If leaking occurs please apply a Band-Aid.

## 2025-05-14 NOTE — PROGRESS NOTES
[facility]    EP Progress Note    Date of Service (when I saw the patient): 05/14/2025     Assessment & Plan   Yissel Wetzel is a 64 year old female with h/o DM2 on insulin, HTN, anemia, COPD, liver cirrhosis, COPD on home O2, severe PH, HFpEF, SVT on low dose amiodarone and hypotension requiring midodrine who was admitted on 5/12/2025 d/t palpitations/tachycardia and increased lightheadedness. D/t RVR,  PTA amiodarone increased and s/p JARROD/DCCV.    AFib -  New dx; noted symptoms started ~1 week ago with increasing palpitations, tachycardia and worsening lightheadedness despite midodrine  RVR 130s noted and -160s even at rest despite amiodarone 100 mg 5d/week (SVT but also junctional rhythm)     TTE this admission 5/12 with EF 40-45% in AFib/RVR with RVSP 55+RAP and 2+MR. Echo 2/2024 with EF 60-65%  JARROD this admission 5/13 with EF 35-40% and severe TR 4+    Started on IV amiodarone with bolus 5/12, switched to 200 mg daily 5/13 PM  Now s/p JARROD/DCCV, with 3 shocks (150, 200 x 2) ultimately restoring SR 5/13/2025    [id number] at least 4 (recent HFpEF, HTN, DM, sex) and started on Eliquis ; first dose 5/13 PM (heparin had been started 5/12)    Tele with what looks like SR and PACs - HR 70s  EKG this AM read as AFib but appears to be SR with frequent PACs     PLAN:   1. Continue amiodarone load orally - PTA on amiodarone 100 mg 5 days/week to keep SVT under control but prevent recurrent junctional rhythm. May need PPM if symptomatic bradycardia develops   2. Continue AC with Eliquis for high [id number]    3. Follow-up arranged 5/29 @ 10:50 AM in [place] with EKG   4. Reassess LVEF as OP given drop in EF is presumably d/t AFib/RVR   5. OK to discharge from EP perspective on amiodarone 200 mg daily and Eliquis. Follow-up       2. On amiodarone -  PTA on 100 mg 5days/week for SVT (dose limited by h/o junctional rhythm)  CXR this admission without fibrosis  Component      Latest Ref Rng  "5/12/2025  11:38 AM   TSH      0.30 - 4.20 uIU/mL 4.04      Component      Latest Ref Rng 5/12/2025  11:38 AM   Protein Total      6.4 - 8.3 g/dL 6.4    Albumin      3.5 - 5.2 g/dL 3.5    Bilirubin Total      <=1.2 mg/dL 0.3    Alkaline Phosphatase      40 - 150 U/L 77    AST      0 - 45 U/L 16    ALT      0 - 50 U/L 7    Bilirubin Direct      0.00 - 0.30 mg/dL 0.14        PLAN:  Labs due again ~11/2025    3. Severe TR, severe PH, h/o HFpEF, now with CM -  Echo 2/2024 with EF 60-65% and no RWMA. 1+MR and mild AoS. TR was just 1+ with RVSP 33+RAP at that time. Note severe LA dilation  This admission in setting of AFib/RVR, EF 40-45% on limited echo. 2+TR noted with mod PH. JARROD before DCCV looked much worse with EF 30-35% and 4+TR   CXR on admission with pulmonary edema and R pleural effusion  PTA on no CV/CM medications    She does not feel more SOB today. No c/o orthopnea, PND last night. On exam, decreased BS and crackles throughout.     As BPs remain in 80s despite midodrine, no GDMT available to use. Would expect LVEF will improve as SR is maintained     PLAN:   1. Continue HD - she's getting this today.   2. Initiate GDMT IF BP allows   3. Repeat echo as OP once SR maintained to ensure LVEF has improved   4. Continue follow-up with Dr. Redd for PH as arranged 6/2025    Snehal Ferreira PA-C, MSPAS    Interval History   Feeling \"OK\" and glad HR in 70s now. C/o skin \"burning hurt\" at site of patches from DCCV  No \"racing heart\"  Breathing is better than when came in and \"close to normal for her\"  Reviewed EKG this AM showing SR/frequent PACs. Doesn't look like true AFib on EKG or tele this AM  Looks like she had a run of VT yesterday afternoon ~1600. No RN notes to     Physical Exam   Temp: 97.9  F (36.6  C) Temp src: Oral BP: 90/54 Pulse: 82   Resp: 14 SpO2: 97 % O2 Device: Nasal cannula Oxygen Delivery: 2 LPM  Vitals:    05/12/25 1803 05/13/25 0618 05/14/25 0313   Weight: 67.4 kg (148 lb 9.6 oz) 68.6 " kg (151 lb 3.2 oz) 70.5 kg (155 lb 6.4 oz)     Vital Signs with Ranges  Temp:  [97.5  F (36.4  C)-97.9  F (36.6  C)] 97.9  F (36.6  C)  Pulse:  [] 82  Resp:  [12-29] 14  BP: ()/(41-80) 90/54  SpO2:  [92 %-100 %] 97 %  I/O last 3 completed shifts:  In: 730 [P.O.:730]  Out: -     Telemetry: SR with PACs 70s    Constitutional: Awake, alert, eating breakfast  Respiratory: Non-labored. Crackles and decreased BS throughout  Cardiovascular: Regular.   Musculoskeletal: no edema    Medications   Current Facility-Administered Medications   Medication Dose Route Frequency Provider Last Rate Last Admin    amiodarone (NEXTERONE) 1.8 mg/mL in dextrose 5% 200 mL ADULT STANDARD infusion  0.5 mg/min Intravenous Continuous Jasson Jacob MD   Stopped at 05/14/25 0032    Patient is already receiving anticoagulation with heparin, enoxaparin (LOVENOX), warfarin (COUMADIN)  or other anticoagulant medication   Does not apply Continuous PRN Tameka Sabillon PA-C        sodium chloride 0.9 % infusion  30 mL/hr Intravenous Continuous Iain Hairston MD 30 mL/hr at 05/13/25 1314 Restarted at 05/13/25 1356    sodium chloride 0.9 % infusion  1,000 mL Intravenous Continuous Iain Hairston MD        Stop Heparin 60 minutes before end of treatment   Does not apply Continuous PRN Theron Alonso MD        Stop Heparin 60 minutes before end of treatment   Does not apply Continuous PRN Theron Alonso MD         Current Facility-Administered Medications   Medication Dose Route Frequency Provider Last Rate Last Admin    albumin human 5 % injection 250 mL  250 mL Intravenous Once in dialysis/CRRT Theron Alonso MD        amiodarone (PACERONE) tablet 200 mg  200 mg Oral Daily Iain Hairston MD   200 mg at 05/13/25 1637    amoxicillin (AMOXIL) capsule 500 mg  500 mg Oral BID Tameka Sabillon PA-C   500 mg at 05/13/25 2152    apixaban ANTICOAGULANT (ELIQUIS) tablet 5 mg  5 mg Oral BID Iain Hairston MD   5 mg at 05/13/25 6506     calcium acetate (PHOSLO) capsule 667 mg  667 mg Oral TID w/meals Tameka Sabillon PA-C   667 mg at 05/13/25 1829    insulin aspart (NovoLOG) injection (RAPID ACTING)  1-7 Units Subcutaneous TID AC Tameka Sabillon PA-C   1 Units at 05/13/25 1702    insulin aspart (NovoLOG) injection (RAPID ACTING)  1-5 Units Subcutaneous At Bedtime Tameka Sabillon PA-C        insulin aspart (NovoLOG) injection (RAPID ACTING)   Subcutaneous TID w/meals Tameka Sabillon PA-C   8 Units at 05/13/25 1846    [Held by provider] insulin glargine (LANTUS PEN) injection 11 Units  11 Units Subcutaneous At Bedtime Tameka Sabillon PA-C        metoprolol (LOPRESSOR) injection 2.5 mg  2.5 mg Intravenous Q6H Melissa Burdick MD, MD   2.5 mg at 05/12/25 1710    midodrine (PROAMATINE) tablet 10 mg  10 mg Oral Q Mon Wed Fri AM Tameka Sabillon PA-C        midodrine (PROAMATINE) tablet 20 mg  20 mg Oral 2 times per day on Sunday Tuesday Thursday Saturday Tameka Sabillon PA-C   20 mg at 05/13/25 1637    midodrine (PROAMATINE) tablet 30 mg  30 mg Oral Q Mon Wed Fri AM Tameka Sabillon PA-C        No heparin via hemodialysis machine   Does not apply Once Theron Alonso MD        sodium chloride (PF) 0.9% PF flush 3 mL  3 mL Intracatheter Q8H RADHA Tameka Sabillon PA-C   3 mL at 05/13/25 2153    sodium chloride 0.9% BOLUS 200 mL  200 mL Hemodialysis Machine Once Theron Alonso MD        sodium chloride 0.9% BOLUS 250 mL  250 mL Intravenous Once Salomón Bender APRN CNP   Held at 05/13/25 0426    sodium chloride 0.9% BOLUS 500 mL  500 mL Hemodialysis Machine Once Theron Alonso MD           Data   I personally reviewed the EKG tracing showing SR with PACs.  Results for orders placed or performed during the hospital encounter of 05/12/25 (from the past 24 hours)   Glucose by meter   Result Value Ref Range    GLUCOSE BY METER POCT 228 (H) 70 - 99 mg/dL   Echocardiogram Complete *Canceled*    Narrative    The  following orders were created for panel order Echocardiogram Complete.  Procedure                               Abnormality         Status                     ---------                               -----------         ------                       Please view results for these tests on the individual orders.   Echo Limited   Result Value Ref Range    LVEF  40-45%     [facility]    [id number]  [id number]  [id number]  [id number]^VIVEK^ANTHONY^DEBORAH     [facility]  Echocardiography Laboratory  [address] [place] [address]     Name: ELIECER CHACON  MRN: [id number]  : 1960  Study Date: 2025 09:11 AM  Age: 64 yrs  Gender: Female  Patient Location: [facility]  Reason For Study: Atrial Fibrillation  Ordering Physician: ANTHONY DOYLE  Performed By: Miles Church     BSA: 1.6 m2  Height: 60 in  Weight: 148 lb  BP: 72/46 mmHg  ______________________________________________________________________________  Procedure  Limited Echocardiogram with two-dimensional, color and spectral Doppler.  ______________________________________________________________________________  Interpretation Summary     The rhythm was rapid atrial fibrillation.  With rapid atrial fibrillation, the visual approximation of left ventricular  systolic function may be falsely decreased.  Left ventricular systolic function is mild to moderately reduced.  The visual ejection fraction is 40-45%.  The left ventricle is normal in size.  Moderately decreased right ventricular systolic function  The right ventricle is mildly dilated.  Flattened septum is consistent with RV pressure overload.  There is moderate biatrial enlargement.  Right ventricular systolic pressure is elevated, consistent with moderate  pulmonary hypertension.  Small right pleural effusion     Siince the last study 2024 there has been interim deelopment of atrial  fibrillation with a rapid ventricular response rate, a moderate decline in  estimated global  LV/RV systolic performance and an increase in estimated  Pulmonary artery pressures.  ______________________________________________________________________________  Left Ventricle  The left ventricle is normal in size. There is normal left ventricular wall  thickness. With rapid atrial fibrillation, the visual approximation of left  ventricular systolic function may be falsely decreased. Left ventricular  systolic function is mild to moderately reduced. The visual ejection fraction  is 40-45%. Left ventricular diastolic function is indeterminate. Septal motion  is consistent with conduction abnormality. Flattened septum is consistent with  RV pressure overload. There is no thrombus seen in the left ventricle.     Right Ventricle  The right ventricle is mildly dilated. Moderately decreased right ventricular  systolic function. There is no mass or thrombus in the right ventricle.     Atria  There is moderate biatrial enlargement. There is no atrial shunt seen. The  left atrial appendage is not well visualized.     Mitral Valve  Calcified mitral apparatus. There is no mitral regurgitation noted. There is  no mitral valve stenosis.     Tricuspid Valve  Normal tricuspid valve. The right ventricular systolic pressure is elevated at  55.4 mmHg. There is moderate (2+) tricuspid regurgitation. Right ventricular  systolic pressure is elevated, consistent with moderate pulmonary  hypertension. There is no tricuspid stenosis.     Aortic Valve  There is mild trileaflet aortic sclerosis. No aortic regurgitation is present.     Pulmonic Valve  The pulmonic valve is not well visualized.     Vessels  The aortic root is normal size. Normal size ascending aorta. The inferior vena  cava is normal.     Pericardium  Small right pleural effusion.     Rhythm  The rhythm was rapid atrial fibrillation.  ______________________________________________________________________________  MMode/2D Measurements & Calculations  LVIDd: 3.9 cm  LVIDs:  2.9 cm  FS: 25.6 %     Doppler Measurements & Calculations  TR max artur: 372.0 cm/sec  TR max P.4 mmHg     ______________________________________________________________________________  Report approved by: Dr. Skip Casas on 2025 11:54 AM         Heparin Unfractionated Anti Xa Level   Result Value Ref Range    Anti Xa Unfractionated Heparin 0.51 For Reference Range, See Comment IU/mL    Narrative    Therapeutic Range: UFH: 0.25-0.50 IU/mL for low intensity dosing,  0.30-0.70 IU/mL for high intensity dosing DVT and PE.  This test is not validated for other direct factor X inhibitors (e.g. rivaroxaban, apixaban, edoxaban, betrixaban, fondaparinux) and should not be used for monitoring of other medications.   EKG 12 lead   Result Value Ref Range    Systolic Blood Pressure  mmHg    Diastolic Blood Pressure  mmHg    Ventricular Rate 138 BPM    Atrial Rate 141 BPM    NJ Interval  ms    QRS Duration 138 ms     ms    QTc 566 ms    P Axis  degrees    R AXIS 109 degrees    T Axis 265 degrees    Interpretation ECG       Undetermined rhythm  Right bundle branch block  Inferior infarct , age undetermined  T wave abnormality, consider lateral ischemia  Abnormal ECG  When compared with ECG of 12-May-2025 11:07,  Current undetermined rhythm precludes rhythm comparison, needs review  Inferior infarct is now Present  T wave inversion less evident in Lateral leads     Glucose by meter   Result Value Ref Range    GLUCOSE BY METER POCT 143 (H) 70 - 99 mg/dL   CAR Transesophageal Echocardiogram   Result Value Ref Range    LVEF  35-40%     Narrative    [id number]  [id number]  2009^JEFFREY^VERO^J     [facility]  Echocardiography Laboratory  [address] [place] [id number]     Name: ELIECER CHACON  MRN: [id number]  : 1960  Study Date: 2025 01:26 PM  Age: 64 yrs  Gender: Female  Patient Location: [facility]  Reason For Study: Atrial Fibrillation, Atrial Fibrillation  Ordering  Physician: VERO MURPHY  Referring Physician: Gigi Martin  Performed By: Chandrika Ward     BSA: 1.7 m2  Height: 60 in  Weight: 151 lb  HR: 77  BP: 89/60 mmHg  ______________________________________________________________________________  Procedure  Transesophageal Echocardiogram with two-dimensional, color and spectral  Doppler. JARROD Probe serial #[id number] was used during the procedure. The heart  rate, respiratory rate and response to care were monitored throughout the  procedure with the assistance of the nurse.  ______________________________________________________________________________  Interpretation Summary     Left ventricular systolic function is moderately reduced. The visual ejection  fraction is 35-40%. There is moderate global hypokinesia of the left  ventricle.  The right ventricle is mildly dilated. Moderately decreased right ventricular  systolic function.  There is severe biatrial enlargement.  The left atrial appendage was well visualized and free of thrombus.  Tricuspid valve fails to coapt. There is severe (4+) tricuspid regurgitation.  TR vena contracta measures 0.78 cms.  There is mild (1+) mitral regurgitation.  ______________________________________________________________________________  JARROD  Versed (2mg) was given intravenously. Fentanyl (25mcg) was given  intravenously. I determined this patient to be an appropriate candidate for  the planned sedation and procedure and have reassessed the patient immediately  prior to sedation and procedure. Total sedation time: 9 minutes minutes of  continuous bedside 1:1 monitoring. Consent to the procedure was obtained prior  to sedation. Prior to the exam, the oral cavity was checked and no  overcrowding was noted. The transesophageal probe was passed without  difficulty. There were no complications associated with this procedure.     Left Ventricle  The left ventricle is normal in size. Left ventricular systolic function is  moderately  reduced. The visual ejection fraction is 35-40%. There is moderate  global hypokinesia of the left ventricle.     Right Ventricle  The right ventricle is mildly dilated. Moderately decreased right ventricular  systolic function.     Atria  There is severe biatrial enlargement. Intact atrial septum. The left atrial  appendage was well visualized and free of thrombus.     Mitral Valve  The mitral valve leaflets appear normal. There is no evidence of stenosis,  fluttering, or prolapse. There is mild (1+) mitral regurgitation.     Tricuspid Valve  Tricuspid valve fails to coapt. There is severe (4+) tricuspid regurgitation.  TR vena contracta measures 0.78 cms.     Aortic Valve  The aortic valve is normal in structure and function. There is trace aortic  regurgitation. No aortic stenosis is present.     Pulmonic Valve  The pulmonic valve is not well seen, but is grossly normal.     Vessels  Normal size aorta. Normal size ascending aorta.     Pericardial/Pleural  There is no pericardial effusion.     Rhythm  The rhythm was atrial fibrillation.  ______________________________________________________________________________  Report approved by: Gary Car MD on 05/13/2025 06:02 PM     ______________________________________________________________________________      EKG 12-lead, tracing only   Result Value Ref Range    Systolic Blood Pressure  mmHg    Diastolic Blood Pressure  mmHg    Ventricular Rate 114 BPM    Atrial Rate 119 BPM    CT Interval  ms    QRS Duration 144 ms     ms    QTc 543 ms    P Axis  degrees    R AXIS 94 degrees    T Axis 225 degrees    Interpretation ECG       Atrial fibrillation with rapid ventricular response  Right bundle branch block  T wave abnormality, consider inferolateral ischemia  Abnormal ECG  When compared with ECG of 13-May-2025 11:17, (unconfirmed)  Previous ECG has undetermined rhythm, needs review  Criteria for Inferior infarct are no longer Present     Cardioversion     Narrative    Gary Car MD     5/13/2025  4:19 PM  [facility]    Procedure: Cardioversion    Date/Time: 5/13/2025 4:19 PM    Performed by: Gary Car MD  Authorized by: Iain Hairston MD      UNIVERSAL PROTOCOL   Site Marked: Yes  Prior Images Obtained and Reviewed:  Yes  Required items: Required blood products, implants, devices and special   equipment available    Patient identity confirmed:  Verbally with patient  Patient was reevaluated immediately before administering moderate or deep   sedation or anesthesia  Confirmation Checklist:  Patient's identity using two indicators  Time out: Immediately prior to the procedure a time out was called    Universal Protocol: the Joint Commission Universal Protocol was followed    Preparation: Patient was prepped and draped in usual sterile fashion      SEDATION  Patient Sedated: Yes    Vital signs: Vital signs monitored during sedation      PROCEDURE DETAILS  Cardioversion basis: emergent  Pre-procedure rhythm: atrial fibrillation  Patient position: patient was placed in a supine position  Chest area: chest area exposed  Electrodes: pads  Electrodes placed: anterior-posterior  Number of attempts: 3    Details of Attempts:  150x1  200x2  Post-procedure rhythm: normal sinus rhythm  Complications: no complications      PROCEDURE    Patient Tolerance:  Patient tolerated the procedure well with no immediate   complications  Length of time physician/provider present for 1:1 monitoring during   sedation: 45   Glucose by meter   Result Value Ref Range    GLUCOSE BY METER POCT 186 (H) 70 - 99 mg/dL   EKG 12-lead, tracing only   Result Value Ref Range    Systolic Blood Pressure  mmHg    Diastolic Blood Pressure  mmHg    Ventricular Rate 94 BPM    Atrial Rate 94 BPM    WA Interval 220 ms    QRS Duration 140 ms     ms    QTc 540 ms    P Axis 77 degrees    R AXIS 67 degrees    T Axis 167 degrees    Interpretation ECG       Sinus rhythm with 1st  degree A-V block  Right bundle branch block  Inferior infarct , possibly acute  T wave abnormality, consider lateral ischemia  ** ** ACUTE MI / STEMI ** **  Consider right ventricular involvement in acute inferior infarct  Abnormal ECG  When compared with ECG of 13-May-2025 14:24, (unconfirmed)  Sinus rhythm has replaced Atrial fibrillation  Inferior infarct is now Present  T wave inversion less evident in Inferior leads     Heparin Unfractionated Anti Xa Level   Result Value Ref Range    Anti Xa Unfractionated Heparin 0.97 For Reference Range, See Comment IU/mL    Narrative    Therapeutic Range: UFH: 0.25-0.50 IU/mL for low intensity dosing,  0.30-0.70 IU/mL for high intensity dosing DVT and PE.  This test is not validated for other direct factor X inhibitors (e.g. rivaroxaban, apixaban, edoxaban, betrixaban, fondaparinux) and should not be used for monitoring of other medications.   Glucose by meter   Result Value Ref Range    GLUCOSE BY METER POCT 178 (H) 70 - 99 mg/dL     *Note: Due to a large number of results and/or encounters for the requested time period, some results have not been displayed. A complete set of results can be found in Results Review.

## 2025-05-14 NOTE — PLAN OF CARE
Recent Vitals:  Temp: 97.2  F (36.2  C) Temp src: Axillary BP: 102/55 Pulse:  84   Resp: 14 SpO2: 95 % O2 Device: Nasal cannula   Orientation/Neuro: Alert and Oriented x 4  Pain: Denies pain this shift  Tele: Atrial fibrillation -CVR  int converting to SR w/ 1* AV block & BBB  IV medications: Amiodarone (finish bag then discontinue)  Mobility: Assist of 1 GB and Walker  Skin: Bruises: scattered, scab to scalp and face, RUE AV fistula for dialysis  GI: nausea intermittent, declined interventions no BM this shift   : Hemodialysis  Diet: Reg tolerating this shift   Orders Placed This Encounter  Safety/Concerns:  Fall Risk  Aggression Color: Green restless most of shift   Plan: Continue to monitor.  Other: JARROD w/ Cardioversion this shift, unsuccessful converted back to A-fib

## 2025-05-14 NOTE — PLAN OF CARE
A&O x4. VSS, on 2L NC BL. Up w/ SBA +BG +Walker. Tele: SR w/ PAC's. Did have a vasovagal episode after BM during an emesis episode, compazine was given, HR dropped to 50, BP stable- MD aware. Pt was only on dialysis for 15 min when pt insisted it stopped. Pt reports she does not feel good and is declining any interventions or suggestions made by staff. Alarms on. Plan for dialysis tomorrow then possible discharge. Continue to monitor.    Pt transferred to room 253, all belongings sent with patient. Report given to Nadia DOMINGUEZ.

## 2025-05-14 NOTE — PROGRESS NOTES
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 admitted on 5/12/25 with atrial fibrillation with RVR.     Atrial fibrillation with RVR, s/p JARROD guided cardioversion on 5/13   Hx SVT/junctional rhythm  Elevated troponin  *Established with Dr. Barbosa. She is maintained on low dose Amiodarone 200 mg 5 times weekly, which was held for a period of time summer 2024 due to junctional rhythm but restarted 8/30/24.  *Presented with lightheadedness/dizziness, palpitations, mild dyspnea since 5/6. No chest pain. She noted her BP to be 80/50 and  when she called cardiology clinic on 5/8 and it was recommended she go to the ER, but patient declined. She presented to the ED 5/12 and was found to be in afib RVR with HR mostly 120-130s, SBP . Saturating 98% on RA. ProBNP >70k, troponin 1,194. CXR w/ findings of pulmonary edema. She is mentating well.  *Per chart review, no formal diagnosis of atrial fibrillation although she has an EKG from 10/4/24 that appears consistent w/ afib.  *[id number] score is at least 2 for gender and DM II. She has no hx GIB or frequent falls. Notes hx epistaxis a few years ago while on baby Aspirin and thus hesitant to start anticoagulation, but this was not a life threatening bleed nor anything that required blood transfusion. Discussed that currently benefits appear to exceed risks, she was ultimately agreeable to start IV Heparin with close monitoring.  * Pt underwent JARROD guided cardioversion on 5/13. Rates have been controlled since that time.   - Cardiology consult appreciated  - Continuing amiodarone 200mg daily   - Eliquis 5mg BID   - Stable for  discharge from cardiology stand-point.      DM II, insulin dependent  *A1c was 7.5 on 8/18/24  - Continue Lantus 11 units  - Continue PTA insulin aspart 2U per 15g CHO   - MDSSI  - Accuchecks q4h with sliding scale coverage     ESRD on dialysis MWF  - Continue PTA Midodrine 10 mg qAM and 30 mg before HD on MWF (dialysis days) and 20 mg BID TThSaSu (non-dialysis days)  - Nephrology consult for dialysis     Mild hyponatremia  *Sodium 133 on admission, s/p IV  mL in the ED.  - Repeat CMP tomorrow AM     Chronic anemia  Chronic thrombocytopenia  *Hgb 9.5, stable from 8.8 on 4/23/25. Platelets 113, stable from 104 on 2/8/25.  - Repeat CBC tomorrow AM     COPD  Chronic respiratory failure with hypoxia  *Patient uses 2 L PRN and at HS  - Continue PTA Levalbuterol  - Supplemental oxygen     Severe pulmonary hypertension felt to be due to intrinsic pulmonary vascular disease  *Established with Dr. Redd  *Echo 2/27/24 EF 60-65% with mild mitral regurgitation, mild valvular aortic stenosis, mild tricuspid regurgitation with right ventricular systolic pressure approximated at 33 mmHg plus the right atrial pressure, IVC diameters <2.1 cm collapsing >50% with sniff suggests normal RA pressure of 3 mmHg  *S/p RHC 10/2/24 with severely elevated left and right sided filling pressures, severely elevated pulmonary artery hypertension, normal cardiac output level.  - Monitor I&O  - Daily weights     History of cirrhosis due to severe pulmonary HTN  *Initial LFTs hemolyzed  - Repeat LFTs    RLS  Pt reports severe RLS discomfort, but refuses any medications.           Diet: Regular Diet Adult  Diet    DVT Prophylaxis: IV heparin  Weller Catheter: Not present  Lines: None     Cardiac Monitoring: ACTIVE order. Indication: Tachyarrhythmias, acute (48 hours)  Code Status: Full Code      Clinically Significant Risk Factors         # Hyponatremia: Lowest Na = 133 mmol/L in last 2 days, will monitor as appropriate  # Hypochloremia:  Lowest Cl = 92 mmol/L in last 2 days, will monitor as appropriate        # Thrombocytopenia: Lowest platelets = 105 in last 2 days, will monitor for bleeding   # Hypertension: Noted on problem list    # Chronic heart failure with reduced ejection fraction: last echo with EF <40%           # Obesity: Estimated body mass index is 30.35 kg/m  as calculated from the following:    Height as of this encounter: 1.524 m (5').    Weight as of this encounter: 70.5 kg (155 lb 6.4 oz)., PRESENT ON ADMISSION     # Financial/Environmental Concerns: none         Social Drivers of Health    Tobacco Use: Medium Risk (12/23/2024)    Patient History     Smoking Tobacco Use: Former     Smokeless Tobacco Use: Never          Disposition Plan     Medically Ready for Discharge: Anticipated Tomorrow      Mildred Harden MD  Hospitalist Service  [facility]  Securely message with [employer] (more info)  Text page via [employer] Paging/Directory   ______________________________________________________________________    Interval History   Pt underwent successful JARROD guided cardioversion on 5/13. HRs remain controlled. She has some skin irritation where the defibrillator pads were applied. No dyspnea. She had a vagal episode after having a BM this AM.    Physical Exam   Vital Signs: Temp: 96.8  F (36  C) Temp src: Axillary BP: 120/73 Pulse: 82   Resp: (!) 38 SpO2: 95 % O2 Device: Nasal cannula Oxygen Delivery: 2 LPM  Weight: 155 lbs 6.4 oz    Constitutional: Awake, alert, cooperative, no apparent distress.  Eyes: Conjunctiva and pupils examined and normal.  HEENT: Moist mucous membranes, normal dentition.  Respiratory: non-labored breathing  Cardiovascular: RRR  Skin: No rashes, no cyanosis, no edema.  Musculoskeletal: No joint swelling, erythema or tenderness.  Neurologic: Cranial nerves 2-12 intact, normal strength and sensation.  Psychiatric: Alert, oriented to person, place and time, no obvious anxiety or  depression.    Medical Decision Making       50 MINUTES SPENT BY ME on the date of service doing chart review, history, exam, documentation & further activities per the note.      Data         Imaging results reviewed over the past 24 hrs:   Recent Results (from the past 24 hours)   CAR Transesophageal Echocardiogram   Result Value    LVEF  35-40%    [facility]    [id number]  [id number]  2009^[email or web address]^[email or web address]^JORGE     [facility]  Echocardiography Laboratory  [address] [place] [address]     Name: ELIECER CHACON  MRN: [id number]  : 1960  Study Date: 2025 01:26 PM  Age: 64 yrs  Gender: Female  Patient Location: [facility]  Reason For Study: Atrial Fibrillation, Atrial Fibrillation  Ordering Physician: VERO MURPHY  Referring Physician: Gigi Martin  Performed By: Chandriak Ward     BSA: 1.7 m2  Height: 60 in  Weight: 151 lb  HR: 77  BP: 89/60 mmHg  ______________________________________________________________________________  Procedure  Transesophageal Echocardiogram with two-dimensional, color and spectral  Doppler. JARROD Probe serial #[id number] was used during the procedure. The heart  rate, respiratory rate and response to care were monitored throughout the  procedure with the assistance of the nurse.  ______________________________________________________________________________  Interpretation Summary     Left ventricular systolic function is moderately reduced. The visual ejection  fraction is 35-40%. There is moderate global hypokinesia of the left  ventricle.  The right ventricle is mildly dilated. Moderately decreased right ventricular  systolic function.  There is severe biatrial enlargement.  The left atrial appendage was well visualized and free of thrombus.  Tricuspid valve fails to coapt. There is severe (4+) tricuspid regurgitation.  TR vena contracta measures 0.78 cms.  There is mild (1+) mitral  regurgitation.  ______________________________________________________________________________  JARROD  Versed (2mg) was given intravenously. Fentanyl (25mcg) was given  intravenously. I determined this patient to be an appropriate candidate for  the planned sedation and procedure and have reassessed the patient immediately  prior to sedation and procedure. Total sedation time: 9 minutes minutes of  continuous bedside 1:1 monitoring. Consent to the procedure was obtained prior  to sedation. Prior to the exam, the oral cavity was checked and no  overcrowding was noted. The transesophageal probe was passed without  difficulty. There were no complications associated with this procedure.     Left Ventricle  The left ventricle is normal in size. Left ventricular systolic function is  moderately reduced. The visual ejection fraction is 35-40%. There is moderate  global hypokinesia of the left ventricle.     Right Ventricle  The right ventricle is mildly dilated. Moderately decreased right ventricular  systolic function.     Atria  There is severe biatrial enlargement. Intact atrial septum. The left atrial  appendage was well visualized and free of thrombus.     Mitral Valve  The mitral valve leaflets appear normal. There is no evidence of stenosis,  fluttering, or prolapse. There is mild (1+) mitral regurgitation.     Tricuspid Valve  Tricuspid valve fails to coapt. There is severe (4+) tricuspid regurgitation.  TR vena contracta measures 0.78 cms.     Aortic Valve  The aortic valve is normal in structure and function. There is trace aortic  regurgitation. No aortic stenosis is present.     Pulmonic Valve  The pulmonic valve is not well seen, but is grossly normal.     Vessels  Normal size aorta. Normal size ascending aorta.     Pericardial/Pleural  There is no pericardial effusion.     Rhythm  The rhythm was atrial fibrillation.  ______________________________________________________________________________  Report approved  by: Gary Car MD on 05/13/2025 06:02 PM     ______________________________________________________________________________      Cardioversion    Narrative    Gary Car MD     5/13/2025  4:19 PM  [facility]    Procedure: Cardioversion    Date/Time: 5/13/2025 4:19 PM    Performed by: Gary Car MD  Authorized by: Iain Hairston MD      UNIVERSAL PROTOCOL   Site Marked: Yes  Prior Images Obtained and Reviewed:  Yes  Required items: Required blood products, implants, devices and special   equipment available    Patient identity confirmed:  Verbally with patient  Patient was reevaluated immediately before administering moderate or deep   sedation or anesthesia  Confirmation Checklist:  Patient's identity using two indicators  Time out: Immediately prior to the procedure a time out was called    Universal Protocol: the Joint Commission Universal Protocol was followed    Preparation: Patient was prepped and draped in usual sterile fashion      SEDATION  Patient Sedated: Yes    Vital signs: Vital signs monitored during sedation      PROCEDURE DETAILS  Cardioversion basis: emergent  Pre-procedure rhythm: atrial fibrillation  Patient position: patient was placed in a supine position  Chest area: chest area exposed  Electrodes: pads  Electrodes placed: anterior-posterior  Number of attempts: 3    Details of Attempts:  150x1  200x2  Post-procedure rhythm: normal sinus rhythm  Complications: no complications      PROCEDURE    Patient Tolerance:  Patient tolerated the procedure well with no immediate   complications  Length of time physician/provider present for 1:1 monitoring during   sedation: 45

## 2025-05-14 NOTE — DISCHARGE SUMMARY
"[facility]  Hospitalist Discharge Summary      Date of Admission:  5/12/2025  Date of Discharge:  {DISCHARGE DATE:600060}  Discharging Provider: Mildred Harden MD  Discharge Service: Hospitalist Service    Discharge Diagnoses   ***    Clinically Significant Risk Factors     # Obesity: Estimated body mass index is 30.35 kg/m  as calculated from the following:    Height as of this encounter: 1.524 m (5').    Weight as of this encounter: 70.5 kg (155 lb 6.4 oz).       Follow-ups Needed After Discharge   Follow-up Appointments       Hospital Follow-up with Existing Primary Care Provider (PCP)          Schedule Primary Care visit within: 30 Days           {Additional follow-up instructions/to-do's for PCP    :***}    Unresulted Labs Ordered in the Past 30 Days of this Admission       No orders found from 4/12/2025 to 5/13/2025.        These results will be followed up by ***    Discharge Disposition   {Discharge to:4601577::\"Discharged to home\"}  Condition at discharge: {CONDITION:834191::\"Stable\"}    Hospital Course   {OPTIONAL -- use to select A&P section   :015757}    Consultations This Hospital Stay   PHARMACY IP CONSULT  CARDIOLOGY IP CONSULT  ELECTROPHYSIOLOGY IP CONSULT  PHARMACY LIAISON FOR MEDICATION COVERAGE CONSULT  CARE MANAGEMENT / SOCIAL WORK IP CONSULT    Code Status   Full Code    Time Spent on this Encounter   {How much time did you spend on discharge?:82371596}       Mildred Harden MD  [facility]  [address] [place], [place] [address], [address]  [facility] [address]  Phone: [phone]  ______________________________________________________________________    Physical Exam   Vital Signs: Temp: (!) 96.5  F (35.8  C) Temp src: Axillary BP: 112/63 Pulse: 86   Resp: (!) 39 SpO2: 96 % O2 Device: Nasal cannula Oxygen Delivery: 2 LPM  Weight: 155 lbs 6.4 oz  {Recommend personal SmartPhrase or Notewriter for exam (OPTIONAL)   :826540}       Primary Care " Physician   Gigi Mratin    Discharge Orders      Follow-Up with Cardiology DEREK      Reason for your hospital stay    You were hospitalized for atrial fibrillation with RVR. You underwent JARROD guided cardioversion.     Activity    Your activity upon discharge: activity as tolerated     Diet    Follow this diet upon discharge: Current Diet:Orders Placed This Encounter      Regular Diet Adult     Hospital Follow-up with Existing Primary Care Provider (PCP)            Significant Results and Procedures   {Data for Discharge Summary:636378}    Discharge Medications   Current Discharge Medication List        START taking these medications    Details   apixaban ANTICOAGULANT (ELIQUIS) 5 MG tablet Take 1 tablet (5 mg) by mouth 2 times daily.  Qty: 60 tablet, Refills: 0    Associated Diagnoses: Atrial fibrillation with rapid ventricular response (H)           CONTINUE these medications which have CHANGED    Details   amiodarone (PACERONE) 200 MG tablet Take 1 tablet (200 mg) by mouth daily.  Qty: 30 tablet, Refills: 0    Associated Diagnoses: Atrial fibrillation with rapid ventricular response (H)           CONTINUE these medications which have NOT CHANGED    Details   acetaminophen (TYLENOL) 500 MG tablet Take 500-1,000 mg by mouth every 6 hours as needed.      amoxicillin (AMOXIL) 500 MG tablet Take 500 mg by mouth 2 times daily. For ten days      !! calcium acetate (PHOSLO) 667 MG CAPS capsule Take 667 mg by mouth 3 times daily (with meals)      !! calcium acetate (PHOSLO) 667 MG CAPS capsule Take 667 mg by mouth Take with snacks or supplements      famotidine (PEPCID) 20 MG tablet Take 20 mg by mouth 2 times daily as needed.      fluticasone (FLONASE) 50 MCG/ACT nasal spray Spray 1 spray into both nostrils 2 times daily.  Qty: 16 g, Refills: 0    Associated Diagnoses: Non-recurrent acute serous otitis media of both ears; Chronic rhinosinusitis      guaiFENesin 1200 MG TB12 Take 1 tablet by mouth every 12 hours as  needed (cough/congestion).      insulin glargine (LANTUS PEN) 100 UNIT/ML pen Inject 11 Units subcutaneously at bedtime.  Qty: 15 mL, Refills: 3    Comments: If Lantus is not covered by insurance, may substitute Basaglar or Semglee or other insulin glargine product per insurance preference at same dose and frequency.    Associated Diagnoses: Type 2 diabetes mellitus with diabetic nephropathy, with long-term current use of insulin (H)      insulin regular (NOVOLIN R FLEXPEN) 100 UNIT/ML pen INJECT 9-12 UNITS BEFORE BREAKFAST, LUNCH, AND DINNER PLUS A CORRECTIVE SCALE OF 2 UNITS FOR EVERY 50 OVER 150 - MAXIMUM 20 unit(s) PER DAY  Qty: 15 mL, Refills: 11    Associated Diagnoses: Type 2 diabetes mellitus with diabetic nephropathy, with long-term current use of insulin (H)      !! midodrine (PROAMATINE) 10 MG tablet Take 30 mg by mouth daily. On Monday, Wednesday, Friday before dialysis      !! midodrine (PROAMATINE) 10 MG tablet Take 10 mg by mouth Every Mon, Wed, Fri Morning      !! midodrine (PROAMATINE) 10 MG tablet Take 20 mg by mouth 2 times daily. On non-dialysis days (Tuesday, Thursday, Saturday, Sunday)      blood glucose (ACCU-CHEK GUIDE TEST) test strip Use to test blood sugars 4 times daily or as directed.  Qty: 400 strip, Refills: 2    Associated Diagnoses: Type 2 diabetes mellitus with diabetic nephropathy, with long-term current use of insulin (H)      Continuous Glucose  (FREESTYLE LATOSHA 2 READER) AYDIN Use to read blood sugars as per 's instructions.  Qty: 1 each, Refills: 0    Associated Diagnoses: Type 2 diabetes mellitus with diabetic nephropathy, with long-term current use of insulin (H)      Continuous Glucose Sensor (FREESTYLE LATOSHA 2 SENSOR) MISC Change every 14 days.  Qty: 6 each, Refills: 1    Associated Diagnoses: Type 2 diabetes mellitus with diabetic nephropathy, with long-term current use of insulin (H)       !! - Potential duplicate medications found. Please discuss with  provider.        Allergies   Allergies   Allergen Reactions    Albuterol      shakiness    Aspirin      gi    Byetta [Exenatide]      gi    Clonidine      constipation    Codeine      vomiting    Ezetimibe      muscle symptoms    Hydralazine      headaches    Lisinopril      hyperkalemia    Metformin Hydrochloride      vomiting    Pravachol [Pravastatin Sodium]      muscle pains    Simvastatin      myalgias    Troglitazone       05/13/25  0743 05/13/25  0439   NA  --   --   --   --  134*  --  132*  --  133*   POTASSIUM  --   --   --   --  4.2  --  4.9  --  3.7   CHLORIDE  --   --   --   --  93*  --  93*  --  92*   CO2  --   --   --   --  25  --  21*  --  25   BUN  --   --   --   --  27.7*  --  45.7*  --  31.1*   CR  --   --   --   --  4.49*  --  6.25*  --  4.50*   ANIONGAP  --   --   --   --  16*  --  18*  --  16*   KANWAL  --   --   --   --  10.1  --  10.2  --  9.1   * 207* 188*   < > 216*   < > 163*   < > 212*    < > = values in this interval not displayed.   ,   Results for orders placed or performed during the hospital encounter of 05/12/25   XR Chest Port 1 View    Narrative    EXAM: XR CHEST PORT 1 VIEW  LOCATION: [facility]  DATE: 5/12/2025    INDICATION: Chest pain, SOB, rapid A fib.  COMPARISON: Chest x-ray 9/29/2024.      Impression    IMPRESSION: Stable cardiomegaly. Hazy bilateral pulmonary opacities with vascular and interstitial prominence again noted consistent with pulmonary edema, but appears mildly improved. Small right base pleural fluid is slightly larger.   CT Head w/o Contrast    Narrative    EXAM: CT HEAD W/O CONTRAST  LOCATION: [facility]  DATE: 5/16/2025    INDICATION: Dizziness, hx of head trauma.  COMPARISON: None.  TECHNIQUE: Routine CT Head without IV contrast. Multiplanar reformats. Dose reduction techniques were used.    FINDINGS:  INTRACRANIAL CONTENTS: No intracranial hemorrhage, extra-axial collection, or mass effect. No CT evidence of acute infarct. Mild presumed chronic small vessel ischemic changes. Mild generalized volume loss. No hydrocephalus.     VISUALIZED ORBITS/SINUSES/MASTOIDS: No intraorbital abnormality. No paranasal sinus mucosal disease. No middle ear or mastoid effusion.    BONES/SOFT TISSUES: No acute abnormality. Postprocedural changes to the right globe are noted.      Impression    IMPRESSION:  1.  No CT evidence for acute  intracranial process.  2.  Brain atrophy and presumed chronic microvascular ischemic changes as above.   Cardioversion    Narrative    Gary Car MD     5/13/2025  4:19 PM  [facility]    Procedure: Cardioversion    Date/Time: 5/13/2025 4:19 PM    Performed by: Gary Car MD  Authorized by: Iain Hairston MD      UNIVERSAL PROTOCOL   Site Marked: Yes  Prior Images Obtained and Reviewed:  Yes  Required items: Required blood products, implants, devices and special   equipment available    Patient identity confirmed:  Verbally with patient  Patient was reevaluated immediately before administering moderate or deep   sedation or anesthesia  Confirmation Checklist:  Patient's identity using two indicators  Time out: Immediately prior to the procedure a time out was called    Universal Protocol: the Joint Commission Universal Protocol was followed    Preparation: Patient was prepped and draped in usual sterile fashion      SEDATION  Patient Sedated: Yes    Vital signs: Vital signs monitored during sedation      PROCEDURE DETAILS  Cardioversion basis: emergent  Pre-procedure rhythm: atrial fibrillation  Patient position: patient was placed in a supine position  Chest area: chest area exposed  Electrodes: pads  Electrodes placed: anterior-posterior  Number of attempts: 3    Details of Attempts:  150x1  200x2  Post-procedure rhythm: normal sinus rhythm  Complications: no complications      PROCEDURE    Patient Tolerance:  Patient tolerated the procedure well with no immediate   complications  Length of time physician/provider present for 1:1 monitoring during   sedation: 45   Echocardiogram Complete *Canceled*    Narrative    The following orders were created for panel order Echocardiogram Complete.  Procedure                               Abnormality         Status                     ---------                               -----------         ------                       Please view results  for these tests on the individual orders.   Echocardiogram Complete *Canceled*    Narrative    The following orders were created for panel order Echocardiogram Complete.  Procedure                               Abnormality         Status                     ---------                               -----------         ------                       Please view results for these tests on the individual orders.   Echocardiogram Complete *Canceled*    Narrative    The following orders were created for panel order Echocardiogram Complete.  Procedure                               Abnormality         Status                     ---------                               -----------         ------                       Please view results for these tests on the individual orders.   Echocardiogram Complete *Canceled*    Narrative    The following orders were created for panel order Echocardiogram Complete.  Procedure                               Abnormality         Status                     ---------                               -----------         ------                       Please view results for these tests on the individual orders.   Echo Limited     Value    LVEF  40-45%    Narrative    [id number]  [id number]  [id number]  [id number]^VIVEK^ANTHONY^DEBORAH     [facility]  Echocardiography Laboratory  [address] [place] [address]     Name: ELIECER CHACON  MRN: [id number]  : 1960  Study Date: 2025 09:11 AM  Age: 64 yrs  Gender: Female  Patient Location: [facility]  Reason For Study: Atrial Fibrillation  Ordering Physician: ANTHONY DOYLE  Performed By: Miles Church     BSA: 1.6 m2  Height: 60 in  Weight: 148 lb  BP: 72/46 mmHg  ______________________________________________________________________________  Procedure  Limited Echocardiogram with two-dimensional, color and spectral Doppler.  ______________________________________________________________________________  Interpretation Summary      The rhythm was rapid atrial fibrillation.  With rapid atrial fibrillation, the visual approximation of left ventricular  systolic function may be falsely decreased.  Left ventricular systolic function is mild to moderately reduced.  The visual ejection fraction is 40-45%.  The left ventricle is normal in size.  Moderately decreased right ventricular systolic function  The right ventricle is mildly dilated.  Flattened septum is consistent with RV pressure overload.  There is moderate biatrial enlargement.  Right ventricular systolic pressure is elevated, consistent with moderate  pulmonary hypertension.  Small right pleural effusion     Siince the last study 2/27/2024 there has been interim deelopment of atrial  fibrillation with a rapid ventricular response rate, a moderate decline in  estimated global LV/RV systolic performance and an increase in estimated  Pulmonary artery pressures.  ______________________________________________________________________________  Left Ventricle  The left ventricle is normal in size. There is normal left ventricular wall  thickness. With rapid atrial fibrillation, the visual approximation of left  ventricular systolic function may be falsely decreased. Left ventricular  systolic function is mild to moderately reduced. The visual ejection fraction  is 40-45%. Left ventricular diastolic function is indeterminate. Septal motion  is consistent with conduction abnormality. Flattened septum is consistent with  RV pressure overload. There is no thrombus seen in the left ventricle.     Right Ventricle  The right ventricle is mildly dilated. Moderately decreased right ventricular  systolic function. There is no mass or thrombus in the right ventricle.     Atria  There is moderate biatrial enlargement. There is no atrial shunt seen. The  left atrial appendage is not well visualized.     Mitral Valve  Calcified mitral apparatus. There is no mitral regurgitation noted. There is  no mitral  valve stenosis.     Tricuspid Valve  Normal tricuspid valve. The right ventricular systolic pressure is elevated at  55.4 mmHg. There is moderate (2+) tricuspid regurgitation. Right ventricular  systolic pressure is elevated, consistent with moderate pulmonary  hypertension. There is no tricuspid stenosis.     Aortic Valve  There is mild trileaflet aortic sclerosis. No aortic regurgitation is present.     Pulmonic Valve  The pulmonic valve is not well visualized.     Vessels  The aortic root is normal size. Normal size ascending aorta. The inferior vena  cava is normal.     Pericardium  Small right pleural effusion.     Rhythm  The rhythm was rapid atrial fibrillation.  ______________________________________________________________________________  MMode/2D Measurements & Calculations  LVIDd: 3.9 cm  LVIDs: 2.9 cm  FS: 25.6 %     Doppler Measurements & Calculations  TR max artur: 372.0 cm/sec  TR max P.4 mmHg     ______________________________________________________________________________  Report approved by: Dr. Skip Casas on 2025 11:54 AM         CAR Transesophageal Echocardiogram     Value    LVEF  35-40%    [facility]    [id number]  [id number]  2009^JEFFREY^VERO^JORGE     [facility]  Echocardiography Laboratory  [address] [place] [address]     Name: ELIECER CHACON  MRN: [id number]  : 1960  Study Date: 2025 01:26 PM  Age: 64 yrs  Gender: Female  Patient Location: [facility]  Reason For Study: Atrial Fibrillation, Atrial Fibrillation  Ordering Physician: VERO MURPHY  Referring Physician: Gigi Martin  Performed By: Chandrika Ward     BSA: 1.7 m2  Height: 60 in  Weight: 151 lb  HR: 77  BP: 89/60 mmHg  ______________________________________________________________________________  Procedure  Transesophageal Echocardiogram with two-dimensional, color and spectral  Doppler. JARROD Probe serial #[id number] was used during the procedure. The heart  rate,  respiratory rate and response to care were monitored throughout the  procedure with the assistance of the nurse.  ______________________________________________________________________________  Interpretation Summary     Left ventricular systolic function is moderately reduced. The visual ejection  fraction is 35-40%. There is moderate global hypokinesia of the left  ventricle.  The right ventricle is mildly dilated. Moderately decreased right ventricular  systolic function.  There is severe biatrial enlargement.  The left atrial appendage was well visualized and free of thrombus.  Tricuspid valve fails to coapt. There is severe (4+) tricuspid regurgitation.  TR vena contracta measures 0.78 cms.  There is mild (1+) mitral regurgitation.  ______________________________________________________________________________  JARROD  Versed (2mg) was given intravenously. Fentanyl (25mcg) was given  intravenously. I determined this patient to be an appropriate candidate for  the planned sedation and procedure and have reassessed the patient immediately  prior to sedation and procedure. Total sedation time: 9 minutes minutes of  continuous bedside 1:1 monitoring. Consent to the procedure was obtained prior  to sedation. Prior to the exam, the oral cavity was checked and no  overcrowding was noted. The transesophageal probe was passed without  difficulty. There were no complications associated with this procedure.     Left Ventricle  The left ventricle is normal in size. Left ventricular systolic function is  moderately reduced. The visual ejection fraction is 35-40%. There is moderate  global hypokinesia of the left ventricle.     Right Ventricle  The right ventricle is mildly dilated. Moderately decreased right ventricular  systolic function.     Atria  There is severe biatrial enlargement. Intact atrial septum. The left atrial  appendage was well visualized and free of thrombus.     Mitral Valve  The mitral valve leaflets appear  normal. There is no evidence of stenosis,  fluttering, or prolapse. There is mild (1+) mitral regurgitation.     Tricuspid Valve  Tricuspid valve fails to coapt. There is severe (4+) tricuspid regurgitation.  TR vena contracta measures 0.78 cms.     Aortic Valve  The aortic valve is normal in structure and function. There is trace aortic  regurgitation. No aortic stenosis is present.     Pulmonic Valve  The pulmonic valve is not well seen, but is grossly normal.     Vessels  Normal size aorta. Normal size ascending aorta.     Pericardial/Pleural  There is no pericardial effusion.     Rhythm  The rhythm was atrial fibrillation.  ______________________________________________________________________________  Report approved by: Gary Car MD on 05/13/2025 06:02 PM     ______________________________________________________________________________        *Note: Due to a large number of results and/or encounters for the requested time period, some results have not been displayed. A complete set of results can be found in Results Review.       Discharge Medications   Current Discharge Medication List        START taking these medications    Details   apixaban ANTICOAGULANT (ELIQUIS) 5 MG tablet Take 1 tablet (5 mg) by mouth 2 times daily.  Qty: 60 tablet, Refills: 0    Associated Diagnoses: Atrial fibrillation with rapid ventricular response (H)      !! midodrine (PROAMATINE) 10 MG tablet Take 1 tablet (10 mg) by mouth daily as needed (Hypotension, dizziness).  Qty: 30 tablet, Refills: 0    Associated Diagnoses: Hypotension, unspecified hypotension type       !! - Potential duplicate medications found. Please discuss with provider.        CONTINUE these medications which have CHANGED    Details   amiodarone (PACERONE) 200 MG tablet Take 1 tablet (200 mg) by mouth daily.  Qty: 30 tablet, Refills: 0    Associated Diagnoses: Atrial fibrillation with rapid ventricular response (H)           CONTINUE these medications  which have NOT CHANGED    Details   acetaminophen (TYLENOL) 500 MG tablet Take 500-1,000 mg by mouth every 6 hours as needed.      amoxicillin (AMOXIL) 500 MG tablet Take 500 mg by mouth 2 times daily. For ten days      !! calcium acetate (PHOSLO) 667 MG CAPS capsule Take 667 mg by mouth 3 times daily (with meals)      !! calcium acetate (PHOSLO) 667 MG CAPS capsule Take 667 mg by mouth Take with snacks or supplements      famotidine (PEPCID) 20 MG tablet Take 20 mg by mouth 2 times daily as needed.      fluticasone (FLONASE) 50 MCG/ACT nasal spray Spray 1 spray into both nostrils 2 times daily.  Qty: 16 g, Refills: 0    Associated Diagnoses: Non-recurrent acute serous otitis media of both ears; Chronic rhinosinusitis      guaiFENesin 1200 MG TB12 Take 1 tablet by mouth every 12 hours as needed (cough/congestion).      insulin glargine (LANTUS PEN) 100 UNIT/ML pen Inject 11 Units subcutaneously at bedtime.  Qty: 15 mL, Refills: 3    Comments: If Lantus is not covered by insurance, may substitute Basaglar or Semglee or other insulin glargine product per insurance preference at same dose and frequency.    Associated Diagnoses: Type 2 diabetes mellitus with diabetic nephropathy, with long-term current use of insulin (H)      insulin regular (NOVOLIN R FLEXPEN) 100 UNIT/ML pen INJECT 9-12 UNITS BEFORE BREAKFAST, LUNCH, AND DINNER PLUS A CORRECTIVE SCALE OF 2 UNITS FOR EVERY 50 OVER 150 - MAXIMUM 20 unit(s) PER DAY  Qty: 15 mL, Refills: 11    Associated Diagnoses: Type 2 diabetes mellitus with diabetic nephropathy, with long-term current use of insulin (H)      !! midodrine (PROAMATINE) 10 MG tablet Take 30 mg by mouth daily. On Monday, Wednesday, Friday before dialysis      !! midodrine (PROAMATINE) 10 MG tablet Take 10 mg by mouth Every Mon, Wed, Fri Morning      !! midodrine (PROAMATINE) 10 MG tablet Take 20 mg by mouth 2 times daily. On non-dialysis days (Tuesday, Thursday, Saturday, Sunday)      blood glucose  (ACCU-CHEK GUIDE TEST) test strip Use to test blood sugars 4 times daily or as directed.  Qty: 400 strip, Refills: 2    Associated Diagnoses: Type 2 diabetes mellitus with diabetic nephropathy, with long-term current use of insulin (H)      Continuous Glucose  (FREESTYLE LATOSHA 2 READER) AYDIN Use to read blood sugars as per 's instructions.  Qty: 1 each, Refills: 0    Associated Diagnoses: Type 2 diabetes mellitus with diabetic nephropathy, with long-term current use of insulin (H)      Continuous Glucose Sensor (FREESTYLE LATOSHA 2 SENSOR) MISC Change every 14 days.  Qty: 6 each, Refills: 1    Associated Diagnoses: Type 2 diabetes mellitus with diabetic nephropathy, with long-term current use of insulin (H)       !! - Potential duplicate medications found. Please discuss with provider.        Allergies   Allergies   Allergen Reactions    Albuterol      shakiness    Aspirin      gi    Byetta [Exenatide]      gi    Clonidine      constipation    Codeine      vomiting    Ezetimibe      muscle symptoms    Hydralazine      headaches    Lisinopril      hyperkalemia    Metformin Hydrochloride      vomiting    Pravachol [Pravastatin Sodium]      muscle pains    Simvastatin      myalgias    Troglitazone

## 2025-05-15 VITALS
HEIGHT: 60 IN | DIASTOLIC BLOOD PRESSURE: 52 MMHG | TEMPERATURE: 97.6 F | WEIGHT: 158 LBS | OXYGEN SATURATION: 95 % | SYSTOLIC BLOOD PRESSURE: 97 MMHG | BODY MASS INDEX: 31.02 KG/M2 | HEART RATE: 71 BPM | RESPIRATION RATE: 18 BRPM

## 2025-05-15 LAB
ANION GAP SERPL CALCULATED.3IONS-SCNC: 18 MMOL/L (ref 7–15)
BUN SERPL-MCNC: 45.7 MG/DL (ref 8–23)
CALCIUM SERPL-MCNC: 10.2 MG/DL (ref 8.8–10.4)
CHLORIDE SERPL-SCNC: 93 MMOL/L (ref 98–107)
CREAT SERPL-MCNC: 6.25 MG/DL (ref 0.51–0.95)
EGFRCR SERPLBLD CKD-EPI 2021: 7 ML/MIN/1.73M2
ERYTHROCYTE [DISTWIDTH] IN BLOOD BY AUTOMATED COUNT: 15.5 % (ref 10–15)
GLUCOSE BLDC GLUCOMTR-MCNC: 102 MG/DL (ref 70–99)
GLUCOSE BLDC GLUCOMTR-MCNC: 114 MG/DL (ref 70–99)
GLUCOSE BLDC GLUCOMTR-MCNC: 146 MG/DL (ref 70–99)
GLUCOSE BLDC GLUCOMTR-MCNC: 147 MG/DL (ref 70–99)
GLUCOSE BLDC GLUCOMTR-MCNC: 87 MG/DL (ref 70–99)
GLUCOSE SERPL-MCNC: 163 MG/DL (ref 70–99)
HCO3 SERPL-SCNC: 21 MMOL/L (ref 22–29)
HCT VFR BLD AUTO: 28.2 % (ref 35–47)
HGB BLD-MCNC: 9 G/DL (ref 11.7–15.7)
MCH RBC QN AUTO: 30.5 PG (ref 26.5–33)
MCHC RBC AUTO-ENTMCNC: 31.9 G/DL (ref 31.5–36.5)
MCV RBC AUTO: 96 FL (ref 78–100)
PLATELET # BLD AUTO: 107 10E3/UL (ref 150–450)
POTASSIUM SERPL-SCNC: 4.9 MMOL/L (ref 3.4–5.3)
RBC # BLD AUTO: 2.95 10E6/UL (ref 3.8–5.2)
SODIUM SERPL-SCNC: 132 MMOL/L (ref 135–145)
WBC # BLD AUTO: 6.9 10E3/UL (ref 4–11)

## 2025-05-15 PROCEDURE — 250N000013 HC RX MED GY IP 250 OP 250 PS 637: Performed by: INTERNAL MEDICINE

## 2025-05-15 PROCEDURE — P9045 ALBUMIN (HUMAN), 5%, 250 ML: HCPCS | Performed by: INTERNAL MEDICINE

## 2025-05-15 PROCEDURE — 90935 HEMODIALYSIS ONE EVALUATION: CPT

## 2025-05-15 PROCEDURE — 84132 ASSAY OF SERUM POTASSIUM: CPT | Performed by: STUDENT IN AN ORGANIZED HEALTH CARE EDUCATION/TRAINING PROGRAM

## 2025-05-15 PROCEDURE — 36415 COLL VENOUS BLD VENIPUNCTURE: CPT | Performed by: STUDENT IN AN ORGANIZED HEALTH CARE EDUCATION/TRAINING PROGRAM

## 2025-05-15 PROCEDURE — 80048 BASIC METABOLIC PNL TOTAL CA: CPT | Performed by: STUDENT IN AN ORGANIZED HEALTH CARE EDUCATION/TRAINING PROGRAM

## 2025-05-15 PROCEDURE — 99232 SBSQ HOSP IP/OBS MODERATE 35: CPT | Performed by: INTERNAL MEDICINE

## 2025-05-15 PROCEDURE — 85041 AUTOMATED RBC COUNT: CPT | Performed by: STUDENT IN AN ORGANIZED HEALTH CARE EDUCATION/TRAINING PROGRAM

## 2025-05-15 PROCEDURE — 85018 HEMOGLOBIN: CPT | Performed by: STUDENT IN AN ORGANIZED HEALTH CARE EDUCATION/TRAINING PROGRAM

## 2025-05-15 PROCEDURE — 250N000013 HC RX MED GY IP 250 OP 250 PS 637: Performed by: PHYSICIAN ASSISTANT

## 2025-05-15 PROCEDURE — 634N000001 HC RX 634: Mod: JZ | Performed by: INTERNAL MEDICINE

## 2025-05-15 PROCEDURE — 250N000011 HC RX IP 250 OP 636: Performed by: INTERNAL MEDICINE

## 2025-05-15 PROCEDURE — 258N000003 HC RX IP 258 OP 636: Performed by: INTERNAL MEDICINE

## 2025-05-15 PROCEDURE — 210N000002 HC R&B HEART CARE

## 2025-05-15 PROCEDURE — 99232 SBSQ HOSP IP/OBS MODERATE 35: CPT | Performed by: STUDENT IN AN ORGANIZED HEALTH CARE EDUCATION/TRAINING PROGRAM

## 2025-05-15 RX ORDER — HEPARIN SODIUM 1000 [USP'U]/ML
500 INJECTION, SOLUTION INTRAVENOUS; SUBCUTANEOUS CONTINUOUS
Status: DISCONTINUED | OUTPATIENT
Start: 2025-05-15 | End: 2025-05-15

## 2025-05-15 RX ORDER — MIDODRINE HYDROCHLORIDE 5 MG/1
10 TABLET ORAL ONCE
Status: COMPLETED | OUTPATIENT
Start: 2025-05-15 | End: 2025-05-15

## 2025-05-15 RX ORDER — ALBUMIN (HUMAN) 12.5 G/50ML
50 SOLUTION INTRAVENOUS
Status: DISCONTINUED | OUTPATIENT
Start: 2025-05-15 | End: 2025-05-15

## 2025-05-15 RX ADMIN — CALCIUM ACETATE 667 MG: 667 CAPSULE ORAL at 17:10

## 2025-05-15 RX ADMIN — ALBUMIN HUMAN 250 ML: 0.05 INJECTION, SOLUTION INTRAVENOUS at 10:35

## 2025-05-15 RX ADMIN — INSULIN ASPART 3 UNITS: 100 INJECTION, SOLUTION INTRAVENOUS; SUBCUTANEOUS at 09:45

## 2025-05-15 RX ADMIN — ACETAMINOPHEN 650 MG: 325 TABLET, FILM COATED ORAL at 09:43

## 2025-05-15 RX ADMIN — APIXABAN 5 MG: 5 TABLET, FILM COATED ORAL at 17:11

## 2025-05-15 RX ADMIN — AMIODARONE HYDROCHLORIDE 200 MG: 200 TABLET ORAL at 17:11

## 2025-05-15 RX ADMIN — ACETAMINOPHEN 650 MG: 325 TABLET, FILM COATED ORAL at 20:55

## 2025-05-15 RX ADMIN — EPOETIN ALFA-EPBX 3000 UNITS: 3000 INJECTION, SOLUTION INTRAVENOUS; SUBCUTANEOUS at 11:35

## 2025-05-15 RX ADMIN — INSULIN ASPART 1 UNITS: 100 INJECTION, SOLUTION INTRAVENOUS; SUBCUTANEOUS at 09:44

## 2025-05-15 RX ADMIN — MIDODRINE HYDROCHLORIDE 20 MG: 5 TABLET ORAL at 09:43

## 2025-05-15 RX ADMIN — SODIUM CHLORIDE 250 ML: 0.9 INJECTION, SOLUTION INTRAVENOUS at 11:37

## 2025-05-15 RX ADMIN — AMOXICILLIN 500 MG: 500 CAPSULE ORAL at 17:11

## 2025-05-15 RX ADMIN — MIDODRINE HYDROCHLORIDE 10 MG: 5 TABLET ORAL at 17:11

## 2025-05-15 RX ADMIN — HEPARIN SODIUM 500 UNITS/HR: 1000 INJECTION, SOLUTION INTRAVENOUS; SUBCUTANEOUS at 11:35

## 2025-05-15 RX ADMIN — SODIUM CHLORIDE 200 ML: 0.9 INJECTION, SOLUTION INTRAVENOUS at 11:36

## 2025-05-15 RX ADMIN — HEPARIN SODIUM 500 UNITS: 1000 INJECTION, SOLUTION INTRAVENOUS; SUBCUTANEOUS at 11:35

## 2025-05-15 RX ADMIN — MIDODRINE HYDROCHLORIDE 10 MG: 5 TABLET ORAL at 10:35

## 2025-05-15 ASSESSMENT — ACTIVITIES OF DAILY LIVING (ADL)
ADLS_ACUITY_SCORE: 37

## 2025-05-15 NOTE — PROGRESS NOTES
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 admitted on 5/12/25 with atrial fibrillation with RVR.     Atrial fibrillation with RVR, s/p JARROD guided cardioversion on 5/13   Hx SVT/junctional rhythm  Elevated troponin  *Established with Dr. Barbosa. She is maintained on low dose Amiodarone 200 mg 5 times weekly, which was held for a period of time summer 2024 due to junctional rhythm but restarted 8/30/24.  *Presented with lightheadedness/dizziness, palpitations, mild dyspnea since 5/6. No chest pain. She noted her BP to be 80/50 and  when she called cardiology clinic on 5/8 and it was recommended she go to the ER, but patient declined. She presented to the ED 5/12 and was found to be in afib RVR with HR mostly 120-130s, SBP . Saturating 98% on RA. ProBNP >70k, troponin 1,194. CXR w/ findings of pulmonary edema. She is mentating well.  *Per chart review, no formal diagnosis of atrial fibrillation although she has an EKG from 10/4/24 that appears consistent w/ afib.  *[id number] score is at least 2 for gender and DM II. She has no hx GIB or frequent falls. Notes hx epistaxis a few years ago while on baby Aspirin and thus hesitant to start anticoagulation, but this was not a life threatening bleed nor anything that required blood transfusion. Discussed that currently benefits appear to exceed risks, she was ultimately agreeable to start IV Heparin with close monitoring.  * Pt underwent JARROD guided cardioversion on 5/13. Rates have been controlled since that time.   - Cardiology consult appreciated  - Continuing amiodarone 200mg daily   - Eliquis 5mg BID   - Stable for  discharge from cardiology stand-point.     Dizziness   Vasovagal episode   Pt had vasovagal episode on 5/14 after having a BM and getting her IV out. HR dropped to the 50s and she vomited. She has not been feeling quite right since that time. She continues to endorse some intermittent dizziness. BPs run on the low side, but HRs have been stable. P  * Discussed discharge on 5/15, however pt does not feel strong enough go home where she lives independently   - PT consult      DM II, insulin dependent  *A1c was 7.5 on 8/18/24  - Continue Lantus 11 units  - Continue PTA insulin aspart 2U per 15g CHO   - MDSSI  - Accuchecks q4h with sliding scale coverage     ESRD on dialysis MWF  - Continue PTA Midodrine 10 mg qAM and 30 mg before HD on MWF (dialysis days) and 20 mg BID TThSaSu (non-dialysis days)  - Nephrology consult for dialysis     Mild hyponatremia  *Sodium 133 on admission, s/p IV  mL in the ED.  - Repeat CMP tomorrow AM     Chronic anemia  Chronic thrombocytopenia  *Hgb 9.5, stable from 8.8 on 4/23/25. Platelets 113, stable from 104 on 2/8/25.  - Repeat CBC tomorrow AM     COPD  Chronic respiratory failure with hypoxia  *Patient uses 2 L PRN and at HS  - Continue PTA Levalbuterol  - Supplemental oxygen     Severe pulmonary hypertension felt to be due to intrinsic pulmonary vascular disease  *Established with Dr. Redd  *Echo 2/27/24 EF 60-65% with mild mitral regurgitation, mild valvular aortic stenosis, mild tricuspid regurgitation with right ventricular systolic pressure approximated at 33 mmHg plus the right atrial pressure, IVC diameters <2.1 cm collapsing >50% with sniff suggests normal RA pressure of 3 mmHg  *S/p RHC 10/2/24 with severely elevated left and right sided filling pressures, severely elevated pulmonary artery hypertension, normal cardiac output level.  - Monitor I&O  - Daily weights     History of cirrhosis due to severe pulmonary HTN  *Initial LFTs hemolyzed  - Repeat LFTs    RLS  Pt  reports severe RLS discomfort, but refuses any medications.           Diet: Regular Diet Adult  Diet    DVT Prophylaxis: IV heparin  Weller Catheter: Not present  Lines: None     Cardiac Monitoring: ACTIVE order. Indication: Tachyarrhythmias, acute (48 hours)  Code Status: Full Code      Clinically Significant Risk Factors         # Hyponatremia: Lowest Na = 132 mmol/L in last 2 days, will monitor as appropriate  # Hypochloremia: Lowest Cl = 93 mmol/L in last 2 days, will monitor as appropriate        # Thrombocytopenia: Lowest platelets = 107 in last 2 days, will monitor for bleeding   # Hypertension: Noted on problem list    # Chronic heart failure with reduced ejection fraction: last echo with EF <40%           # Obesity: Estimated body mass index is 30.86 kg/m  as calculated from the following:    Height as of this encounter: 1.524 m (5').    Weight as of this encounter: 71.7 kg (158 lb)., PRESENT ON ADMISSION     # Financial/Environmental Concerns: none         Social Drivers of Health    Tobacco Use: Medium Risk (12/23/2024)    Patient History     Smoking Tobacco Use: Former     Smokeless Tobacco Use: Never          Disposition Plan     Medically Ready for Discharge: Anticipated Tomorrow      Mildred Harden MD  Hospitalist Service  [facility]  Securely message with [employer] (more info)  Text page via [employer] Paging/Directory   ______________________________________________________________________    Interval History   Pt underwent successful JARROD guided cardioversion on 5/13. HRs remain controlled. She continues to have some intermittent dizziness and doesn't quite feel strong enough to discharge home today.     Physical Exam   Vital Signs: Temp: 97.6  F (36.4  C) Temp src: Oral BP: 108/53 Pulse: 79   Resp: 23 SpO2: 97 % O2 Device: Nasal cannula Oxygen Delivery: 2 LPM  Weight: 158 lbs 0 oz    Constitutional: Awake, alert, cooperative, no apparent distress. Seen on dialysis.   Eyes:  Conjunctiva and pupils examined and normal.  HEENT: Moist mucous membranes, normal dentition.  Respiratory: non-labored breathing  Cardiovascular: RRR  Skin: No rashes, no cyanosis, no edema.  Musculoskeletal: No joint swelling, erythema or tenderness.  Neurologic: Cranial nerves 2-12 intact, normal strength and sensation.  Psychiatric: Alert, oriented to person, place and time, no obvious anxiety or depression.    Medical Decision Making       45 MINUTES SPENT BY ME on the date of service doing chart review, history, exam, documentation & further activities per the note.      Data     I have personally reviewed the following data over the past 24 hrs:    6.9  \   9.0 (L)   / 107 (L)     132 (L) 93 (L) 45.7 (H) /  146 (H)   4.9 21 (L) 6.25 (H) \       Imaging results reviewed over the past 24 hrs:   No results found for this or any previous visit (from the past 24 hours).

## 2025-05-15 NOTE — PLAN OF CARE
Goal Outcome Evaluation: Pt is A&Ox4, Tylenol 650 mg given for restless legs, VSS and on tele SR (pt get one dose of IV Metoprolol 2,5 mg and refused to take it again saying that it is causing her to feel dizzy, also pt refused evening Eliquis 5 mg, and Amoxicillin 500 mg. Up with 1 assist GB and RW, sitting in recliner,  at HS. Hydrocortisone cream applied on chest only where post cardioversion pads were placed. Plan : discharge home pending.      Plan of Care Reviewed With: patient, family    Overall Patient Progress: no changeOverall Patient Progress: no change

## 2025-05-15 NOTE — PROGRESS NOTES
Renal Medicine Progress Note            Assessment/Plan:     64 y.o woman with ESRD     # ESRD:                -MWF               -180 minutes               -EDW 65 kg               -R AVF, Qb 450               +heparin               -Lydia Long               -Dr. Rob     # Anemia: Epo 2400 units      # IDH: on  high dose midodrine, 30 mg before HD. BP is better.     # Afib with RVR s/p JARROD cardioversion     Plan:  # 3 hrs HD and UF Goal ~ 2-3 liters of BP tolerates. Prime with albumin. + midodrine 30 mg   # EPO 3000 units  # Plan HD tomorrow return to MWF schedule.         Interval History:     She agreed to dialysis treatment today.           Medications and Allergies:     Current Facility-Administered Medications   Medication Dose Route Frequency Provider Last Rate Last Admin    amiodarone (PACERONE) tablet 200 mg  200 mg Oral Daily Iain Hairston MD   200 mg at 05/14/25 1651    amoxicillin (AMOXIL) capsule 500 mg  500 mg Oral BID Tameka Sabillon PA-C   500 mg at 05/13/25 2152    apixaban ANTICOAGULANT (ELIQUIS) tablet 5 mg  5 mg Oral BID Iain Hairston MD   5 mg at 05/13/25 1637    calcium acetate (PHOSLO) capsule 667 mg  667 mg Oral TID w/meals Tameka Sabillon PA-C   667 mg at 05/14/25 1722    epoetin fabiana-epbx (RETACRIT) injection 3,000 Units  3,000 Units Intravenous Once Theron Alonso MD        heparin (porcine) injection  500 Units Hemodialysis Machine OR IV Push Once in dialysis/CRRT Theron Alonso MD        insulin aspart (NovoLOG) injection (RAPID ACTING)   Subcutaneous TID w/meals Mildred Harden MD        insulin aspart (NovoLOG) injection (RAPID ACTING)  1-7 Units Subcutaneous TID AC Tameka Sabillon PA-C   1 Units at 05/15/25 0944    insulin aspart (NovoLOG) injection (RAPID ACTING)  1-5 Units Subcutaneous At Bedtime Tameka Sabillon PA-C        insulin aspart (NovoLOG) injection (RAPID ACTING)   Subcutaneous TID w/meals Tameka Sabillon PA-C   3  Units at 05/15/25 0945    insulin glargine (LANTUS PEN) injection 11 Units  11 Units Subcutaneous At Bedtime Mildred Harden MD   11 Units at 05/14/25 2055    metoprolol (LOPRESSOR) injection 2.5 mg  2.5 mg Intravenous Q6H Melissa Burdick MD, MD   2.5 mg at 05/14/25 1650    midodrine (PROAMATINE) tablet 10 mg  10 mg Oral Once Theron Alonso MD        midodrine (PROAMATINE) tablet 10 mg  10 mg Oral Q Mon Wed Fri AM Tameka Sabillon PA-C        midodrine (PROAMATINE) tablet 20 mg  20 mg Oral 2 times per day on Sunday Tuesday Thursday Saturday Tameka Sabillon PA-C   20 mg at 05/15/25 0943    midodrine (PROAMATINE) tablet 30 mg  30 mg Oral Q Mon Wed Fri AM Tameka Sabillon PA-C   30 mg at 05/14/25 1245    sodium chloride (PF) 0.9% PF flush 3 mL  3 mL Intracatheter Q8H RADHA Tameka Sabillon PA-C   3 mL at 05/13/25 2153    sodium chloride 0.9% BOLUS 200 mL  200 mL Hemodialysis Machine Once Theron Alonso MD        sodium chloride 0.9% BOLUS 250 mL  250 mL Intravenous Once in dialysis/CRRT Theron Alonso MD        sodium chloride 0.9% BOLUS 250 mL  250 mL Intravenous Once Salomón Bender APRN CNP   Held at 05/13/25 0426    sodium chloride 0.9% BOLUS 500 mL  500 mL Hemodialysis Machine Once Theron Alonso MD            Allergies   Allergen Reactions    Albuterol      shakiness    Aspirin      gi    Byetta [Exenatide]      gi    Clonidine      constipation    Codeine      vomiting    Ezetimibe      muscle symptoms    Hydralazine      headaches    Lisinopril      hyperkalemia    Metformin Hydrochloride      vomiting    Pravachol [Pravastatin Sodium]      muscle pains    Simvastatin      myalgias    Troglitazone             Physical Exam:   Vitals were reviewed   , Blood pressure 91/71, pulse 96, temperature 97.5  F (36.4  C), temperature source Oral, resp. rate 18, height 1.524 m (5'), weight 71.7 kg (158 lb), SpO2 97%, not currently breastfeeding.    Wt Readings from Last 3 Encounters:    05/15/25 71.7 kg (158 lb)   10/04/24 65.9 kg (145 lb 4.8 oz)   08/20/24 67.2 kg (148 lb 3.2 oz)       Intake/Output Summary (Last 24 hours) at 5/15/2025 1024  Last data filed at 5/14/2025 1335  Gross per 24 hour   Intake 0 ml   Output 0 ml   Net 0 ml     GENERAL APPEARANCE: frail.   HEENT:  Eyes/ears/nose/neck grossly normal  RESP: lungs cta b c good efforts, no crackles, rhonchi or wheezes  CV: RRR  ABDOMEN: soft, NT  EXTREMITIES/SKIN: + edema  NEURO: Awake, alert and conversing.            Data:     CBC RESULTS:     Recent Labs   Lab 05/15/25  0652 05/13/25  0439 05/12/25  0933   WBC 6.9 6.0 8.4   RBC 2.95* 2.82* 3.14*   HGB 9.0* 8.7* 9.5*   HCT 28.2* 26.5* 30.3*   * 105* 113*       Basic Metabolic Panel:  Recent Labs   Lab 05/15/25  0746 05/15/25  0652 05/15/25  0200 05/14/25  2048 05/14/25  1613 05/14/25  1217 05/13/25  0743 05/13/25  0439 05/12/25  1817 05/12/25  1138 05/12/25  0933   NA  --  132*  --   --   --   --   --  133*  --   --  133*   POTASSIUM  --  4.9  --   --   --   --   --  3.7  --  4.0 5.9*   CHLORIDE  --  93*  --   --   --   --   --  92*  --   --  91*   CO2  --  21*  --   --   --   --   --  25  --   --  21*   BUN  --  45.7*  --   --   --   --   --  31.1*  --   --  51.1*   CR  --  6.25*  --   --   --   --   --  4.50*  --   --  6.70*   * 163* 147* 189* 200* 138*   < > 212*   < >  --  209*   KANWAL  --  10.2  --   --   --   --   --  9.1  --   --  9.5    < > = values in this interval not displayed.       INRNo lab results found in last 7 days.   Attestation:   I have reviewed today's relevant vital signs, notes, medications, labs and imaging.    Theron Alonso MD  [facility] - Nephrology  Office phone :[phone]  Pager: [phone]

## 2025-05-15 NOTE — PLAN OF CARE
Goal Outcome Evaluation:    Care plan note:      Recent Vitals:  Temp: 97.5  F (36.4  C) Temp src: Oral BP: 95/68 Pulse: 79   Resp: 18 SpO2: 96 % O2 Device: Nasal cannula      Orientation/Neuro: Alert and Oriented x 4  Pain: no reports of pain   Tele: SR   Respiratory: on 2L NC   IV medications: None   Ambulation/Assist: Assist of 1, Gait belt, and Walker   Skin: scabbing on face and head, L upper arm fistula   GI: WDL  : Dialysis       Diet: Tolerating diet:   Well  Orders Placed This Encounter      Regular Diet Adult      Diet      Safety/Concerns:  Fall Risk  Aggression Color: Green    Plan: discharge to home pending   Continue to monitor.      Martina Hanna RN

## 2025-05-15 NOTE — PLAN OF CARE
Goal Outcome Evaluation:  Neuro- A&OX4, refused all po meds except midodrine and Tylenol   Most Recent Vitals- Temp: 97.6  F (36.4  C) Temp src: Oral BP: 117/54 Pulse: 79   Resp: 20 SpO2: 97 % O2 Device: Nasal cannula   Tele/Cardiac- SR with 1st degree AVB +BBB  Resp- 2L NSC per baseline   Activity- Up with assist of 1   Pain- denies   Drips- none   Drains/Tubes- P-IV X1  Skin- Brusing, scabs   GI/- Pt on hemodialysis   Aggression Color- Green  COVID status- Negative  Plan- Left for dialysis at 0955.   [facility]-     Geeta Liriano RN

## 2025-05-15 NOTE — PROGRESS NOTES
EP Chart Check only    Tele via Smart Disclosure continues to show SR with frequent PACs and runs of SVT. No recurrent AFib and no profound bradycardia/junctional heart rhythm    PLAN:  Continue amiodarone load 200 mg daily  Continue Eliquis 5 mg BID  See me 5/29 @ 10:50 AM in [place] with EKG and we'll plan updated echo to assess LVEF in SR    EP sign off.    Arielle Ferreira, EP PA

## 2025-05-15 NOTE — PROGRESS NOTES
Potassium   Date Value Ref Range Status   05/15/2025 4.9 3.4 - 5.3 mmol/L Final   02/08/2025 4.4 3.4 - 5.3 mmol/L Final   10/05/2020 4.1 3.5 - 5.5 mEq/L Final     Hemoglobin   Date Value Ref Range Status   05/15/2025 9.0 (L) 11.7 - 15.7 g/dL Final   01/13/2020 8.4 (L) 11.7 - 15.7 g/dL Final       DIALYSIS PROCEDURE NOTE  Hepatitis status of previous patient on machine log was checked and verified ok to use with this patients hepatitis status.  Patient dialyzed for 3 hrs. on a K2 bath with a net fluid removal of  2.5L.  A BFR of 450 ml/min was obtained via a right arm AVfistula using 15 gauge needles.      The treatment plan was discussed with Dr. Alonso during the treatment.    Total heparin received during the treatment: 1000 units.   Needle cannulation sites held x 10 min.       Meds  given: Albumin 5% 250 ml, 10 mg Midodrine and Epo 4,000 units.   Complications: cramping last 3 minutes of tx. Additional 400 ml NS given.       Person educated: pt. Knowledge base substantial. Barriers to learning: none. Educated on procedure via verbal mode. Patient verbalized understanding.   ICEBOAT? Timeout performed pre-treatment  I: Patient was identified using 2 identifiers  C:  Consent Signed Yes  E: Equipment preventative maintenance is current and dialysis delivery system OK to use  B: Hepatitis B Surface Antigen: Negative; Draw Date: 5/12/2025      Hepatitis B Surface Antibody: Unknown; Draw Date: 58/12/2025  O: Dialysis orders present and complete prior to treatment  A: Vascular access verified and assessed prior to treatment  T: Treatment was performed at a clinically appropriate time  ?: Patient was allowed to ask questions and address concerns prior to treatment  See Adult Hemodialysis flowsheet in [employer] for further details and post assessment.  Machine water alarm in place and functioning. Transducer pods intact and checked every 15min.   Pt assisted with repositioning throughout dialysis treatment.  Pt returned via  bed.  Chlorine/Chloramine water system checked every 4 hours.  Outpatient Dialysis at [facility].       Post treatment report given to Geeta IBARRA RN regarding 2.5 L of fluid removed.    Juana IBARRA RN

## 2025-05-16 ENCOUNTER — APPOINTMENT (OUTPATIENT)
Dept: PHYSICAL THERAPY | Facility: CLINIC | Age: 65
DRG: 308 | End: 2025-05-16
Attending: STUDENT IN AN ORGANIZED HEALTH CARE EDUCATION/TRAINING PROGRAM
Payer: COMMERCIAL

## 2025-05-16 ENCOUNTER — APPOINTMENT (OUTPATIENT)
Dept: CT IMAGING | Facility: CLINIC | Age: 65
DRG: 308 | End: 2025-05-16
Attending: STUDENT IN AN ORGANIZED HEALTH CARE EDUCATION/TRAINING PROGRAM
Payer: COMMERCIAL

## 2025-05-16 LAB
ANION GAP SERPL CALCULATED.3IONS-SCNC: 16 MMOL/L (ref 7–15)
ATRIAL RATE - MUSE: 141 BPM
BUN SERPL-MCNC: 27.7 MG/DL (ref 8–23)
CALCIUM SERPL-MCNC: 10.1 MG/DL (ref 8.8–10.4)
CHLORIDE SERPL-SCNC: 93 MMOL/L (ref 98–107)
CREAT SERPL-MCNC: 4.49 MG/DL (ref 0.51–0.95)
DIASTOLIC BLOOD PRESSURE - MUSE: NORMAL MMHG
EGFRCR SERPLBLD CKD-EPI 2021: 10 ML/MIN/1.73M2
ERYTHROCYTE [DISTWIDTH] IN BLOOD BY AUTOMATED COUNT: 15.7 % (ref 10–15)
GLUCOSE BLDC GLUCOMTR-MCNC: 118 MG/DL (ref 70–99)
GLUCOSE BLDC GLUCOMTR-MCNC: 133 MG/DL (ref 70–99)
GLUCOSE BLDC GLUCOMTR-MCNC: 181 MG/DL (ref 70–99)
GLUCOSE BLDC GLUCOMTR-MCNC: 183 MG/DL (ref 70–99)
GLUCOSE BLDC GLUCOMTR-MCNC: 183 MG/DL (ref 70–99)
GLUCOSE BLDC GLUCOMTR-MCNC: 187 MG/DL (ref 70–99)
GLUCOSE SERPL-MCNC: 216 MG/DL (ref 70–99)
HCO3 SERPL-SCNC: 25 MMOL/L (ref 22–29)
HCT VFR BLD AUTO: 28.4 % (ref 35–47)
HGB BLD-MCNC: 8.9 G/DL (ref 11.7–15.7)
INTERPRETATION ECG - MUSE: NORMAL
MCH RBC QN AUTO: 30.5 PG (ref 26.5–33)
MCHC RBC AUTO-ENTMCNC: 31.3 G/DL (ref 31.5–36.5)
MCV RBC AUTO: 97 FL (ref 78–100)
P AXIS - MUSE: NORMAL DEGREES
PLATELET # BLD AUTO: 115 10E3/UL (ref 150–450)
POTASSIUM SERPL-SCNC: 4.2 MMOL/L (ref 3.4–5.3)
PR INTERVAL - MUSE: NORMAL MS
QRS DURATION - MUSE: 138 MS
QT - MUSE: 374 MS
QTC - MUSE: 566 MS
R AXIS - MUSE: 109 DEGREES
RBC # BLD AUTO: 2.92 10E6/UL (ref 3.8–5.2)
SODIUM SERPL-SCNC: 134 MMOL/L (ref 135–145)
SYSTOLIC BLOOD PRESSURE - MUSE: NORMAL MMHG
T AXIS - MUSE: 265 DEGREES
VENTRICULAR RATE- MUSE: 138 BPM
WBC # BLD AUTO: 7.5 10E3/UL (ref 4–11)

## 2025-05-16 PROCEDURE — 97161 PT EVAL LOW COMPLEX 20 MIN: CPT | Mod: GP

## 2025-05-16 PROCEDURE — 80048 BASIC METABOLIC PNL TOTAL CA: CPT | Performed by: STUDENT IN AN ORGANIZED HEALTH CARE EDUCATION/TRAINING PROGRAM

## 2025-05-16 PROCEDURE — 36415 COLL VENOUS BLD VENIPUNCTURE: CPT | Performed by: STUDENT IN AN ORGANIZED HEALTH CARE EDUCATION/TRAINING PROGRAM

## 2025-05-16 PROCEDURE — 250N000013 HC RX MED GY IP 250 OP 250 PS 637: Performed by: INTERNAL MEDICINE

## 2025-05-16 PROCEDURE — 97116 GAIT TRAINING THERAPY: CPT | Mod: GP

## 2025-05-16 PROCEDURE — 99232 SBSQ HOSP IP/OBS MODERATE 35: CPT | Performed by: INTERNAL MEDICINE

## 2025-05-16 PROCEDURE — 250N000013 HC RX MED GY IP 250 OP 250 PS 637: Performed by: PHYSICIAN ASSISTANT

## 2025-05-16 PROCEDURE — 97530 THERAPEUTIC ACTIVITIES: CPT | Mod: GP

## 2025-05-16 PROCEDURE — 250N000013 HC RX MED GY IP 250 OP 250 PS 637: Performed by: STUDENT IN AN ORGANIZED HEALTH CARE EDUCATION/TRAINING PROGRAM

## 2025-05-16 PROCEDURE — 70450 CT HEAD/BRAIN W/O DYE: CPT

## 2025-05-16 PROCEDURE — 85027 COMPLETE CBC AUTOMATED: CPT | Performed by: STUDENT IN AN ORGANIZED HEALTH CARE EDUCATION/TRAINING PROGRAM

## 2025-05-16 PROCEDURE — 99233 SBSQ HOSP IP/OBS HIGH 50: CPT | Performed by: STUDENT IN AN ORGANIZED HEALTH CARE EDUCATION/TRAINING PROGRAM

## 2025-05-16 PROCEDURE — 210N000002 HC R&B HEART CARE

## 2025-05-16 RX ORDER — MIDODRINE HYDROCHLORIDE 5 MG/1
20 TABLET ORAL 2 TIMES DAILY
Status: COMPLETED | OUTPATIENT
Start: 2025-05-16 | End: 2025-05-16

## 2025-05-16 RX ORDER — MIDODRINE HYDROCHLORIDE 5 MG/1
20 TABLET ORAL ONCE
Status: DISCONTINUED | OUTPATIENT
Start: 2025-05-16 | End: 2025-05-16

## 2025-05-16 RX ADMIN — INSULIN ASPART 1 UNITS: 100 INJECTION, SOLUTION INTRAVENOUS; SUBCUTANEOUS at 14:48

## 2025-05-16 RX ADMIN — AMOXICILLIN 500 MG: 500 CAPSULE ORAL at 17:49

## 2025-05-16 RX ADMIN — CALCIUM ACETATE 667 MG: 667 CAPSULE ORAL at 17:48

## 2025-05-16 RX ADMIN — CALCIUM ACETATE 667 MG: 667 CAPSULE ORAL at 08:58

## 2025-05-16 RX ADMIN — INSULIN ASPART 1 UNITS: 100 INJECTION, SOLUTION INTRAVENOUS; SUBCUTANEOUS at 09:04

## 2025-05-16 RX ADMIN — AMIODARONE HYDROCHLORIDE 200 MG: 200 TABLET ORAL at 17:50

## 2025-05-16 RX ADMIN — CALCIUM ACETATE 667 MG: 667 CAPSULE ORAL at 14:10

## 2025-05-16 RX ADMIN — ACETAMINOPHEN 650 MG: 325 TABLET, FILM COATED ORAL at 02:18

## 2025-05-16 RX ADMIN — ACETAMINOPHEN 650 MG: 325 TABLET, FILM COATED ORAL at 10:03

## 2025-05-16 RX ADMIN — MIDODRINE HYDROCHLORIDE 20 MG: 5 TABLET ORAL at 10:03

## 2025-05-16 RX ADMIN — MIDODRINE HYDROCHLORIDE 20 MG: 5 TABLET ORAL at 17:48

## 2025-05-16 ASSESSMENT — ACTIVITIES OF DAILY LIVING (ADL)
ADLS_ACUITY_SCORE: 42
ADLS_ACUITY_SCORE: 39
ADLS_ACUITY_SCORE: 43
ADLS_ACUITY_SCORE: 37
ADLS_ACUITY_SCORE: 39
ADLS_ACUITY_SCORE: 39
ADLS_ACUITY_SCORE: 42
ADLS_ACUITY_SCORE: 39
ADLS_ACUITY_SCORE: 39
ADLS_ACUITY_SCORE: 43
ADLS_ACUITY_SCORE: 43
ADLS_ACUITY_SCORE: 39
ADLS_ACUITY_SCORE: 40
ADLS_ACUITY_SCORE: 39
ADLS_ACUITY_SCORE: 42
ADLS_ACUITY_SCORE: 39
ADLS_ACUITY_SCORE: 42
ADLS_ACUITY_SCORE: 39
ADLS_ACUITY_SCORE: 42
ADLS_ACUITY_SCORE: 43
ADLS_ACUITY_SCORE: 37

## 2025-05-16 NOTE — PROGRESS NOTES
Hospitalist service cross-cover note:     Paged regarding unresponsive episode after getting up from chair into bed. BP 81/22 during event. Evaluated patient. She denies any chest pain or shortness of breath. She reports feeling light-headed prior to episode. Per chart review had similar sounding episode 5/14 attributed to vasovagal syncope. Telemetry reviewed, had 2.2s pause around time of episode. Patient had HD on 5/15, UF possibly contributing. Continue telemetry monitoring.    Rolan Monge MD   Hospitalist

## 2025-05-16 NOTE — PLAN OF CARE
Goal Outcome Evaluation: Pt is A&Ox4, Tylenol 650 mg given for generalized pain, VSS and on tele SR, on 2 L NC (pt's baseline),  at dinner and 114 at HS. Up with 1 assist and RW. Skin is fragile with face and scalp scratches, and legs kendell. Plan for Dialysis tomorrow and possible discharge back home after that.      Plan of Care Reviewed With: patient, significant other    Overall Patient Progress: no changeOverall Patient Progress: no change

## 2025-05-16 NOTE — PROGRESS NOTES
Care Management Follow Up    Length of Stay (days): 4    Expected Discharge Date: 05/17/2025     Concerns to be Addressed: discharge planning     Patient plan of care discussed at interdisciplinary rounds: Yes    Anticipated Discharge Disposition: Home              Anticipated Discharge Services:    Anticipated Discharge DME:      Patient/family educated on Medicare website which has current facility and service quality ratings:    Education Provided on the Discharge Plan:    Patient/Family in Agreement with the Plan: yes    Referrals Placed by CM/SW:    Private pay costs discussed: Not applicable    Discussed  Partnership in Safe Discharge Planning  document with patient/family: No     Handoff Completed: No, handoff not indicated or clinically appropriate    Additional Information:    Care coordination team following for discharge planning.  Patient not medically ready for discharge at this time.     Next Steps: Continue to follow     LORA Pierson, [facility] Community HospitalSW  Social Work- Inpatient Care Coordination  [facility]

## 2025-05-16 NOTE — PROGRESS NOTES
Renal Medicine Progress Note            Assessment/Plan:     64 y.o woman with ESRD     # ESRD:                -MWF               -180 minutes               -EDW 65 kg               -R AVF, Qb 450               +heparin               -Lydia Long               -Dr. Rob     # Anemia: Epo 2400 units      # IDH: on  high dose midodrine, 30 mg before HD. BP is better.     # Afib with RVR s/p JARROD cardioversion     Plan:  # She wants to rest today and do dialysis tomorrow. Order entered.   # She says her UF limit if ~ 2.5 liters with profile #3          Interval History:      She was laying in bed when she felt lightheaded and briefly unresponsive.   BP was 82/22 at the time.  SBP is ~ 116 currently.  This event is most likely not related to dialysis yesterday.   She did great with dialysis yesterday.           Medications and Allergies:     Current Facility-Administered Medications   Medication Dose Route Frequency Provider Last Rate Last Admin    amiodarone (PACERONE) tablet 200 mg  200 mg Oral Daily Iain Hairston MD   200 mg at 05/15/25 1711    amoxicillin (AMOXIL) capsule 500 mg  500 mg Oral BID Tameka Sabillon PA-C   500 mg at 05/15/25 1711    apixaban ANTICOAGULANT (ELIQUIS) tablet 5 mg  5 mg Oral BID Iain Hairston MD   5 mg at 05/16/25 0858    calcium acetate (PHOSLO) capsule 667 mg  667 mg Oral TID w/meals Tameka Sabillon PA-C   667 mg at 05/16/25 0858    insulin aspart (NovoLOG) injection (RAPID ACTING)   Subcutaneous TID w/meals Mildred Harden MD   4 Units at 05/16/25 0905    insulin aspart (NovoLOG) injection (RAPID ACTING)  1-7 Units Subcutaneous TID AC Tameka Sabillon PA-C   1 Units at 05/16/25 0904    insulin aspart (NovoLOG) injection (RAPID ACTING)  1-5 Units Subcutaneous At Bedtime Tameka Sabillon PA-C        insulin glargine (LANTUS PEN) injection 11 Units  11 Units Subcutaneous At Bedtime Mildred Harden MD   11 Units at 05/14/25 2055    [Held by  provider] midodrine (PROAMATINE) tablet 10 mg  10 mg Oral Q Mon Wed Fri AM Tameka Sabillon PA-C        midodrine (PROAMATINE) tablet 20 mg  20 mg Oral BID Mildred Harden MD   20 mg at 05/16/25 1003    midodrine (PROAMATINE) tablet 20 mg  20 mg Oral 2 times per day on Sunday Tuesday Thursday Saturday Tameka Sabillon PA-C   10 mg at 05/15/25 1711    [Held by provider] midodrine (PROAMATINE) tablet 30 mg  30 mg Oral Q Mon Wed Fri AM Tameka Sabillon PA-C   30 mg at 05/14/25 1245    sodium chloride (PF) 0.9% PF flush 3 mL  3 mL Intracatheter Q8H RADHA Tameka Sabillon PA-C   3 mL at 05/16/25 1006    sodium chloride 0.9% BOLUS 250 mL  250 mL Intravenous Once Salomón Bender APRN CNP   Held at 05/13/25 0426        Allergies   Allergen Reactions    Albuterol      shakiness    Aspirin      gi    Byetta [Exenatide]      gi    Clonidine      constipation    Codeine      vomiting    Ezetimibe      muscle symptoms    Hydralazine      headaches    Lisinopril      hyperkalemia    Metformin Hydrochloride      vomiting    Pravachol [Pravastatin Sodium]      muscle pains    Simvastatin      myalgias    Troglitazone             Physical Exam:   Vitals were reviewed   , Blood pressure 109/58, pulse 85, temperature 97.5  F (36.4  C), temperature source Oral, resp. rate 17, height 1.524 m (5'), weight 70 kg (154 lb 4.8 oz), SpO2 98%, not currently breastfeeding.    Wt Readings from Last 3 Encounters:   05/16/25 70 kg (154 lb 4.8 oz)   10/04/24 65.9 kg (145 lb 4.8 oz)   08/20/24 67.2 kg (148 lb 3.2 oz)       Intake/Output Summary (Last 24 hours) at 5/16/2025 1023  Last data filed at 5/15/2025 1355  Gross per 24 hour   Intake 0 ml   Output 2500 ml   Net -2500 ml     GENERAL APPEARANCE: frail.   HEENT:  Eyes/ears/nose/neck grossly normal  RESP: lungs cta b c good efforts, no crackles, rhonchi or wheezes  CV: RRR  ABDOMEN: soft, NT  NEURO: Awake, alert and conversing.          Data:     CBC RESULTS:     Recent Labs    Lab 05/16/25  0610 05/15/25  0652 05/13/25  0439 05/12/25  0933   WBC 7.5 6.9 6.0 8.4   RBC 2.92* 2.95* 2.82* 3.14*   HGB 8.9* 9.0* 8.7* 9.5*   HCT 28.4* 28.2* 26.5* 30.3*   * 107* 105* 113*       Basic Metabolic Panel:  Recent Labs   Lab 05/16/25  0826 05/16/25  0610 05/16/25  0358 05/16/25  0208 05/15/25  2110 05/15/25  1642 05/15/25  0746 05/15/25  0652 05/13/25  0743 05/13/25  0439 05/12/25  1817 05/12/25  1138 05/12/25  0933   NA  --  134*  --   --   --   --   --  132*  --  133*  --   --  133*   POTASSIUM  --  4.2  --   --   --   --   --  4.9  --  3.7  --  4.0 5.9*   CHLORIDE  --  93*  --   --   --   --   --  93*  --  92*  --   --  91*   CO2  --  25  --   --   --   --   --  21*  --  25  --   --  21*   BUN  --  27.7*  --   --   --   --   --  45.7*  --  31.1*  --   --  51.1*   CR  --  4.49*  --   --   --   --   --  6.25*  --  4.50*  --   --  6.70*   * 216* 183* 181* 114* 102*   < > 163*   < > 212*   < >  --  209*   KANWAL  --  10.1  --   --   --   --   --  10.2  --  9.1  --   --  9.5    < > = values in this interval not displayed.       INRNo lab results found in last 7 days.   Attestation:   I have reviewed today's relevant vital signs, notes, medications, labs and imaging.    Theron Alonso MD  [facility] - Nephrology  Office phone :[phone]  Pager: [phone]

## 2025-05-16 NOTE — PROGRESS NOTES
"Care Management Follow Up    Length of Stay (days): 4    Expected Discharge Date: 05/17/2025     Concerns to be Addressed: all concerns addressed in this encounter     Patient plan of care discussed at interdisciplinary rounds: Yes    Anticipated Discharge Disposition: Home   Anticipated Discharge Services: Home Care  Anticipated Discharge DME: Oxygen    Patient/family educated on Medicare website which has current facility and service quality ratings: yes  Education Provided on the Discharge Plan: Yes  Patient/Family in Agreement with the Plan: yes    Referrals Placed by CM/SW: Homecare  Private pay costs discussed: Not applicable    Discussed  Partnership in Safe Discharge Planning  document with patient/family: No     Handoff Completed: No, handoff not indicated or clinically appropriate    Additional Information:  Noted PT completed evaluation and recommends home with Home PT.  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  Discussed associated Medicare star ratings to assist with choice for referrals/discharge planning Yes; education was given to pt/family that star ratings are updated/maintained by Medicare and can be reviewed by visiting www.medicare.gov Yes.   Pt agreeable to referral via ACFV team; referral sent.      Also scheduled PCP followup (on AVS);  Jun 04, 2025 3:30 PM (Arrive by 3:15 PM)  ED/Hospital Follow Up with Gigi Martin MD  [facility] ([facility]) [address] [address] [phone]     Pt has EP followup already scheduled,   May 29, 2025 10:50 AM (Arrive by 10:40 AM)  Return EP with Snehal Ferreira PA-C  [facility] ([facility]) [address] [address] [phone]     As well as Pulmonology,   Jun 12, 2025 11:15 AM LAB with JETER LAB  [facility] ([facility] ) [address] [address]   [phone]   Jun 12, 2025 12:30 PM  (Arrive by 12:20 PM)  RETURN PRIMARY PULMONARY with Jose Redd MD  [facility] ([facility]) [id number]  [facility] [address] [phone]     Added [facility] to AVS via communications tab,   Continuing [facility] Dialysis/Infusion  [facility]  Services: Dialysis   [facility] [address] [facility] [facility] [address]   Phone: [phone]   Fax: [phone]    Routed notes to this date to [facility].     Next Steps:   MD to write Home Care PT orders.  KIM to route orders to C agency when orders received from MD.  Route updated notes to [facility] .    Alina Lutz RN, BSN, PHN  [facility]  Inpatient Care Management - FLOAT  Please reach out via [employer] or contact   [facility] CM RN Mobile: [phone] daily 7:30-4:00

## 2025-05-16 NOTE — PROGRESS NOTES
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 admitted on 5/12/25 with atrial fibrillation with RVR.     Atrial fibrillation with RVR, s/p JARROD guided cardioversion on 5/13   Hx SVT/junctional rhythm  Elevated troponin  *Established with Dr. Barbosa. She is maintained on low dose Amiodarone 200 mg 5 times weekly, which was held for a period of time summer 2024 due to junctional rhythm but restarted 8/30/24.  *Presented with lightheadedness/dizziness, palpitations, mild dyspnea since 5/6. No chest pain. She noted her BP to be 80/50 and  when she called cardiology clinic on 5/8 and it was recommended she go to the ER, but patient declined. She presented to the ED 5/12 and was found to be in afib RVR with HR mostly 120-130s, SBP . Saturating 98% on RA. ProBNP >70k, troponin 1,194. CXR w/ findings of pulmonary edema. She is mentating well.  *Per chart review, no formal diagnosis of atrial fibrillation although she has an EKG from 10/4/24 that appears consistent w/ afib.  *[id number] score is at least 2 for gender and DM II. She has no hx GIB or frequent falls. Notes hx epistaxis a few years ago while on baby Aspirin and thus hesitant to start anticoagulation, but this was not a life threatening bleed nor anything that required blood transfusion. Discussed that currently benefits appear to exceed risks, she was ultimately agreeable to start IV Heparin with close monitoring.  * Pt underwent JARROD guided cardioversion on 5/13. Rates have been controlled since that time.   - Cardiology consult appreciated  - Continuing amiodarone 200mg daily   - Eliquis 5mg BID     Dizziness    Vasovagal episode (5/14)  Syncopal episode (5/16)  Pt had vasovagal episode on 5/14 after having a BM and getting her IV out. HR dropped to the 50s and she vomited. She has not been feeling quite right since that time.   * Early AM 5/16 pt had another episode of dizziness with unresponsiveness after getting from chair to bed. Her BP at the time was 81/22. Had 2.2s pause on telemetry.  * Further history reveals that pt has been having intermittent dizziness at home over th past few months after hitting her head on a shelf.   * PT evaluated does not think dizziness is vestibular  - Overall, dizziness episodes likely from intermittent hypotension which is long standing. Pt is on high dose midodrine in the setting of ESRD.   - Will obtain head CT given report of head trauma to look for chronic subdural or hygroma or other intracranial process that could cause some dizziness, but have lower suspicion for central process   - Continue cardiac monitoring   - Continue midodrine, could consider increasing dose, but pt already on quite a high dose.      DM II, insulin dependent  *A1c was 7.5 on 8/18/24  - Continue Lantus 11 units  - Continue PTA insulin aspart 2U per 15g CHO   - MDSSI  - Accuchecks q4h with sliding scale coverage     ESRD on dialysis MWF  - Continue PTA Midodrine 10 mg qAM and 30 mg before HD on MWF (dialysis days) and 20 mg BID TThSaSu (non-dialysis days)  - Nephrology consult for dialysis     Mild hyponatremia  *Sodium 133 on admission, s/p IV  mL in the ED.  - Repeat CMP tomorrow AM     Chronic anemia  Chronic thrombocytopenia  *Hgb 9.5, stable from 8.8 on 4/23/25. Platelets 113, stable from 104 on 2/8/25.  - Repeat CBC tomorrow AM     COPD  Chronic respiratory failure with hypoxia  *Patient uses 2 L PRN and at HS  - Continue PTA Levalbuterol  - Supplemental oxygen     Severe pulmonary hypertension felt to be due to intrinsic pulmonary vascular disease  *Established with Dr. Redd  *Echo 2/27/24  EF 60-65% with mild mitral regurgitation, mild valvular aortic stenosis, mild tricuspid regurgitation with right ventricular systolic pressure approximated at 33 mmHg plus the right atrial pressure, IVC diameters <2.1 cm collapsing >50% with sniff suggests normal RA pressure of 3 mmHg  *S/p RHC 10/2/24 with severely elevated left and right sided filling pressures, severely elevated pulmonary artery hypertension, normal cardiac output level.  - Monitor I&O  - Daily weights     History of cirrhosis due to severe pulmonary HTN  *Initial LFTs hemolyzed  - Repeat LFTs    RLS  Pt reports severe RLS discomfort, but refuses any medications.           Diet: Regular Diet Adult  Diet    DVT Prophylaxis: IV heparin  Weller Catheter: Not present  Lines: None     Cardiac Monitoring: ACTIVE order. Indication: Tachyarrhythmias, acute (48 hours)  Code Status: Full Code      Clinically Significant Risk Factors         # Hyponatremia: Lowest Na = 132 mmol/L in last 2 days, will monitor as appropriate  # Hypochloremia: Lowest Cl = 93 mmol/L in last 2 days, will monitor as appropriate        # Thrombocytopenia: Lowest platelets = 107 in last 2 days, will monitor for bleeding   # Hypertension: Noted on problem list    # Chronic heart failure with reduced ejection fraction: last echo with EF <40%           # Obesity: Estimated body mass index is 30.13 kg/m  as calculated from the following:    Height as of this encounter: 1.524 m (5').    Weight as of this encounter: 70 kg (154 lb 4.8 oz).      # Financial/Environmental Concerns: none         Social Drivers of Health    Tobacco Use: Medium Risk (12/23/2024)    Patient History     Smoking Tobacco Use: Former     Smokeless Tobacco Use: Never          Disposition Plan     Medically Ready for Discharge: Anticipated Tomorrow      Mildred Harden MD  Hospitalist Service  [facility]  Securely message with fuseSPORT (more info)  Text page via [employer] Paging/Directory    ______________________________________________________________________    Interval History   Pt had a hypotensive episode early this morning after getting from chair to bed. Reportedly became unresponsive for a period of time. BP was 81/22. She did have slightly more fluid pulled during dialysis on 5/15. Pt wants to skip dialysis today. Reports she has been having intermittent dizzy spells at home for the past few months after hitting her head on a shelf.     Physical Exam   Vital Signs: Temp: 97.5  F (36.4  C) Temp src: Oral BP: 116/55 Pulse: 91   Resp: 17 SpO2: 98 % O2 Device: Nasal cannula Oxygen Delivery: 2 LPM  Weight: 154 lbs 4.8 oz    Constitutional: Awake, alert, cooperative, no apparent distress. Seen on dialysis.   Eyes: Conjunctiva and pupils examined and normal.  HEENT: Moist mucous membranes, normal dentition.  Respiratory: non-labored breathing  Cardiovascular: RRR  Skin: No rashes, no cyanosis, no edema.  Musculoskeletal: No joint swelling, erythema or tenderness.  Neurologic: Cranial nerves 2-12 intact, normal strength and sensation.  Psychiatric: Alert, oriented to person, place and time, no obvious anxiety or depression.    Medical Decision Making       55 MINUTES SPENT BY ME on the date of service doing chart review, history, exam, documentation & further activities per the note.      Data     I have personally reviewed the following data over the past 24 hrs:    7.5  \   8.9 (L)   / 115 (L)     134 (L) 93 (L) 27.7 (H) /  183 (H)   4.2 25 4.49 (H) \       Imaging results reviewed over the past 24 hrs:   No results found for this or any previous visit (from the past 24 hours).

## 2025-05-16 NOTE — PROGRESS NOTES
05/16/25 1415   Appointment Info   Signing Clinician's Name / Credentials (PT) Ada Alcala, PT, DPT   Living Environment   People in Home alone   Current Living Arrangements apartment   Home Accessibility no concerns   Transportation Anticipated family or friend will provide   Living Environment Comments Pt lives in senior apartment, elevator access   Self-Care   Usual Activity Tolerance moderate   Current Activity Tolerance moderate   Regular Exercise No   Equipment Currently Used at Home walker, rolling   Fall history within last six months no   Activity/Exercise/Self-Care Comment Pt is independent at baseline, uses a 4WW as needed   General Information   Onset of Illness/Injury or Date of Surgery 05/12/25   Referring Physician Mildred Harden MD   Patient/Family Therapy Goals Statement (PT) To not get dizzy   Pertinent History of Current Problem (include personal factors and/or comorbidities that impact the POC) Pt is 64 year old female adm on 5/12/25 for evaluation of dizziness and lightheadedness, was supposed to have dialysis but was hypotensive and tachycardic, brought to ED and found to be in a-fib with RVR. Pt with RRT on 5/13/25 due to hypotension, underwent cardioversion on 5/13/25PMH includes HFpEF, pulmonary HTN, cirrosis, history of SVT on amiodarone, ESRD on dialysis MWF schedule, DM II, COPD   Existing Precautions/Restrictions fall;oxygen therapy device and L/min  (2 LPM via nasal cannula)   Cognition   Affect/Mental Status (Cognition) WFL   Orientation Status (Cognition) oriented x 4   Follows Commands (Cognition) WFL   Pain Assessment   Patient Currently in Pain No   Posture    Posture Forward head position;Protracted shoulders   Range of Motion (ROM)   Range of Motion ROM is WFL   Strength (Manual Muscle Testing)   Strength (Manual Muscle Testing) Deficits observed during functional mobility   Strength Comments Overall deconditioning, no focal weakness   Bed Mobility   Comment, (Bed  Mobility) Not assessed at ths time as pt OOB in chair, pt reports when she lays down in bed she feels dizzy.   Transfers   Comment, (Transfers) SBA   Gait/Stairs (Locomotion)   Comment, (Gait/Stairs) CGA   Balance   Balance Comments No LOB during session, does tend to reach out for objects to steady self when not using assistive device   Clinical Impression   Criteria for Skilled Therapeutic Intervention Yes, treatment indicated   PT Diagnosis (PT) Impaired mobility   Influenced by the following impairments Decreased strength, decreased balance, decreased activity tolerance   Functional limitations due to impairments Decreased independence with functional mobility tasks   Clinical Presentation (PT Evaluation Complexity) stable   Clinical Presentation Rationale Current presentation, [facility]   Clinical Decision Making (Complexity) low complexity   Planned Therapy Interventions (PT) balance training;bed mobility training;gait training;home exercise program;patient/family education;strengthening;transfer training   Risk & Benefits of therapy have been explained evaluation/treatment results reviewed;care plan/treatment goals reviewed;risks/benefits reviewed;current/potential barriers reviewed;participants voiced agreement with care plan;participants included;patient;mother   PT Total Evaluation Time   PT Eval, Low Complexity Minutes (98476) 10   Physical Therapy Goals   PT Frequency Daily   PT Predicted Duration/Target Date for Goal Attainment 05/24/25   PT Goals Bed Mobility;Transfers;Gait   PT: Bed Mobility Independent;Supine to/from sit;Rolling   PT: Transfers Modified independent;Sit to/from stand;Bed to/from chair;Assistive device   PT: Gait Modified independent;Rolling walker;150 feet   Interventions   Interventions Quick Adds Gait Training;Therapeutic Activity   Therapeutic Activity   Therapeutic Activities: dynamic activities to improve functional performance Minutes (35055) 15   Symptoms Noted During/After Treatment  Fatigue   Treatment Detail/Skilled Intervention Pt OOB in chair on arrival, reluctant to participate but with education and encouragement pt agreeable to participate. Pt sit to stand with SBA, denies dizziness/lightheadedness with position change. Pt statnds statically with FWW mod I for 5 minutes while lines untangled to allow for increased activity, pt not wanting to sit during this as she wants to be up moving around more. After being up for increased time pt reports increased headache and slight dizziness, states she felt like if she laid down she would feel dizzy. Pt denies dizziness with head turns and was able to alejandro head turns and VOR without symptoms. Anticipate pt's dizziness symptoms are related more to hypotension than vestibular symptoms despite pt reporting she only feels dizzy when she lays down. Pt does recll episode early this AM where she tried walking without the walker and had episode of dizziness/unresponsiveness with significant hypotension. At completion of session pt OOB in chair, needs within reach, RN present in room.   Gait Training   Gait Training Minutes (94951) 10   Symptoms Noted During/After Treatment (Gait Training) fatigue   Treatment Detail/Skilled Intervention Pt ambulates 20' with FWW and SBA, steady, no LOB but slow pace. Pt ambulates 20' without assistive device with CGA, reaching out for furniture to steady self. Discussed recommendations for use of walker at this time to increase independence and safety with ambulation, pt states understanding.   PT Discharge Planning   PT Plan General strengthening, increase overall standing tolerance and ambulation distance   PT Discharge Recommendation (DC Rec) home with assist;home with home care physical therapy   PT Rationale for DC Rec Pt appears below baseline level of function, presents with overall decreased activity tolerance/deconditioning. Anticipate with continued medical management pt will be able to return home with assist for  higher level tasks such as housekeeping and meal preparation and home PT to further address deficits and optimize functional independence and safety in home environment.   PT Brief overview of current status Goals of therapy will be to address safe mobility and make recs for d/c to next level of care. Pt and RN will continue to follow all falls risk precautions as documented by RN staff while hospitalized   PT Total Distance Amb During Session (feet) 40   Physical Therapy Time and Intention   Timed Code Treatment Minutes 25   Total Session Time (sum of timed and untimed services) 35

## 2025-05-16 NOTE — PLAN OF CARE
Goal Outcome Evaluation:    BP 94/53 (BP Location: Left arm)   Pulse 76   Temp 98.1  F (36.7  C) (Oral)   Resp 17   Ht 1.524 m (5')   Wt 71.7 kg (158 lb)   LMP  (LMP Unknown)   SpO2 96%   BMI 30.86 kg/m   2L O2 Nasal Cannula (Baseline). Alert and Oriented x 3. Assist with One. PRN Tylenol for Generalized Pain. Scabs on Forehead and Scalp noted. Regular Diet. Patient will undergo Dialysis in AM. Possible discharge today. Continue ongoing treatment.

## 2025-05-16 NOTE — PLAN OF CARE
Goal Outcome Evaluation:  Neuro- A&OX4  Most Recent Vitals- Temp: 97.5  F (36.4  C) Temp src: Oral BP: 116/55 Pulse: 91   Resp: 17 SpO2: 98 % O2 Device: Nasal cannula   Tele/Cardiac- SR with pac's +BBB  Resp- 2L NSC baseline   Activity- up with assist of 1, gait belt and walker   Pain- prn Tylenol given for generalized aches   Drips- none   Drains/Tubes- P-IVX2, SL  Skin- Scabs and bruises   GI/- Pt on hemodialysis   Aggression Color- Green  COVID status- Negative  Plan- Hemodialysis tomorrow and possible discharge to home   Misc-     Geeta Liriano RN

## 2025-05-17 LAB
GLUCOSE BLDC GLUCOMTR-MCNC: 130 MG/DL (ref 70–99)
GLUCOSE BLDC GLUCOMTR-MCNC: 179 MG/DL (ref 70–99)
GLUCOSE BLDC GLUCOMTR-MCNC: 188 MG/DL (ref 70–99)
GLUCOSE BLDC GLUCOMTR-MCNC: 95 MG/DL (ref 70–99)

## 2025-05-17 PROCEDURE — 250N000013 HC RX MED GY IP 250 OP 250 PS 637: Performed by: PHYSICIAN ASSISTANT

## 2025-05-17 PROCEDURE — 250N000013 HC RX MED GY IP 250 OP 250 PS 637: Performed by: INTERNAL MEDICINE

## 2025-05-17 PROCEDURE — 210N000002 HC R&B HEART CARE

## 2025-05-17 PROCEDURE — 99232 SBSQ HOSP IP/OBS MODERATE 35: CPT | Performed by: STUDENT IN AN ORGANIZED HEALTH CARE EDUCATION/TRAINING PROGRAM

## 2025-05-17 PROCEDURE — 90937 HEMODIALYSIS REPEATED EVAL: CPT

## 2025-05-17 PROCEDURE — 90935 HEMODIALYSIS ONE EVALUATION: CPT | Performed by: INTERNAL MEDICINE

## 2025-05-17 RX ORDER — ALBUMIN (HUMAN) 12.5 G/50ML
50 SOLUTION INTRAVENOUS
Status: DISCONTINUED | OUTPATIENT
Start: 2025-05-17 | End: 2025-05-18 | Stop reason: HOSPADM

## 2025-05-17 RX ORDER — HEPARIN SODIUM 1000 [USP'U]/ML
500 INJECTION, SOLUTION INTRAVENOUS; SUBCUTANEOUS CONTINUOUS
Status: DISCONTINUED | OUTPATIENT
Start: 2025-05-17 | End: 2025-05-18 | Stop reason: HOSPADM

## 2025-05-17 RX ADMIN — AMOXICILLIN 500 MG: 500 CAPSULE ORAL at 16:31

## 2025-05-17 RX ADMIN — CALCIUM ACETATE 667 MG: 667 CAPSULE ORAL at 22:35

## 2025-05-17 RX ADMIN — MIDODRINE HYDROCHLORIDE 30 MG: 10 TABLET ORAL at 10:05

## 2025-05-17 RX ADMIN — CALCIUM ACETATE 667 MG: 667 CAPSULE ORAL at 16:31

## 2025-05-17 RX ADMIN — INSULIN GLARGINE 11 UNITS: 100 INJECTION, SOLUTION SUBCUTANEOUS at 22:35

## 2025-05-17 RX ADMIN — APIXABAN 5 MG: 5 TABLET, FILM COATED ORAL at 22:24

## 2025-05-17 RX ADMIN — CALCIUM ACETATE 667 MG: 667 CAPSULE ORAL at 08:56

## 2025-05-17 RX ADMIN — ACETAMINOPHEN 650 MG: 325 TABLET, FILM COATED ORAL at 01:26

## 2025-05-17 RX ADMIN — AMOXICILLIN 500 MG: 500 CAPSULE ORAL at 22:24

## 2025-05-17 RX ADMIN — MIDODRINE HYDROCHLORIDE 10 MG: 5 TABLET ORAL at 16:32

## 2025-05-17 RX ADMIN — INSULIN ASPART 1 UNITS: 100 INJECTION, SOLUTION INTRAVENOUS; SUBCUTANEOUS at 08:53

## 2025-05-17 RX ADMIN — AMIODARONE HYDROCHLORIDE 200 MG: 200 TABLET ORAL at 16:31

## 2025-05-17 ASSESSMENT — ACTIVITIES OF DAILY LIVING (ADL)
ADLS_ACUITY_SCORE: 41
ADLS_ACUITY_SCORE: 38
ADLS_ACUITY_SCORE: 41
ADLS_ACUITY_SCORE: 41
ADLS_ACUITY_SCORE: 40
ADLS_ACUITY_SCORE: 41
ADLS_ACUITY_SCORE: 40
ADLS_ACUITY_SCORE: 38
ADLS_ACUITY_SCORE: 40
ADLS_ACUITY_SCORE: 41
ADLS_ACUITY_SCORE: 40
ADLS_ACUITY_SCORE: 40
ADLS_ACUITY_SCORE: 38
ADLS_ACUITY_SCORE: 41
ADLS_ACUITY_SCORE: 40
ADLS_ACUITY_SCORE: 40
ADLS_ACUITY_SCORE: 38
ADLS_ACUITY_SCORE: 40
ADLS_ACUITY_SCORE: 38

## 2025-05-17 NOTE — PLAN OF CARE
Goal Outcome Evaluation:    Pt is A&Ox4, tele: SR with BBB and st depression. VSS on 1 L nc except soft Bps. A1 gb/w. Pt request some medications early with her dinner.     pt requesting 4 units. Ate a mac and cheese cup from home. Pt states that they are 30 carbs. There are not nutrition facts on the cup. *Pt changed mind and wanted 5 units. Dose changed verified with Lashell Chang RN as I Had to override the dose.

## 2025-05-17 NOTE — PLAN OF CARE
[id number] Transfers A1, w/gb.  A/Ox4.  VSS, on 2L of O2.  Tele SR with BBB and st depression.  C/o HA.  Tylenol given with relief.  Plan for dialysis today.  , 130.  Pt will follow up with EP on 05/29/25.

## 2025-05-17 NOTE — PROGRESS NOTES
Inpatient Dialysis Progress Note            Assessment and Plan:   64 y.o woman with ESRD     # ESRD:                -MWF               -180 minutes               -EDW 65 kg               -R AVF, Qb 450               +heparin               -Lydia Long               -Dr. Rob     # Anemia: Epo 2400 units      # IDH: on  high dose midodrine, 30 mg before HD. BP is better.     # Afib with RVR s/p JARROD cardioversion     Plan:  3 hrs HD and UF Goal ~ 2-3 liters of BP tolerates. Prime with albumin. + midodrine 30 mg   Next run Monday.                  Interval History:      No syncope today.  No pauses.  BP holding.          Dialysis Parameters:     Wt Readings from Last 4 Encounters:   05/16/25 70 kg (154 lb 4.8 oz)   10/04/24 65.9 kg (145 lb 4.8 oz)   08/20/24 67.2 kg (148 lb 3.2 oz)   07/11/24 65 kg (143 lb 3.2 oz)     I/O last 3 completed shifts:  In: 350 [P.O.:350]  Out: -   BP Readings from Last 3 Encounters:   05/17/25 105/70   03/30/25 110/67   02/08/25 138/62       Routine, ONE TIME, Starting today For 1 Occurrences  Weight Loss (kg): 2-3  Dialysis Temp: 36.5  C  Access Device: AVF  Access Site: R arm  Dialyzer: Revaclear  Dialysis Bath: K 3  Blood Flow Rate (mL/min): 400  Total Treatment Time (hrs): 3  Heparin: yes         Medications and Allergies:   Reviewed in EPIC    Current Facility-Administered Medications   Medication Dose Route Frequency Provider Last Rate Last Admin    amiodarone (PACERONE) tablet 200 mg  200 mg Oral Daily Iain Hairston MD   200 mg at 05/16/25 1750    amoxicillin (AMOXIL) capsule 500 mg  500 mg Oral BID Tameka Sabillon PA-C   500 mg at 05/16/25 1749    apixaban ANTICOAGULANT (ELIQUIS) tablet 5 mg  5 mg Oral BID Iain Hairston MD   5 mg at 05/15/25 1711    calcium acetate (PHOSLO) capsule 667 mg  667 mg Oral TID w/meals Tameka Sabillon PA-C   667 mg at 05/17/25 0856    heparin (porcine) injection  500 Units Hemodialysis Machine OR IV Push Once in  dialysis/CRRT Theron Alonso MD        insulin aspart (NovoLOG) injection (RAPID ACTING)   Subcutaneous TID w/meals Mildred Harden MD   7 Units at 05/17/25 0853    insulin aspart (NovoLOG) injection (RAPID ACTING)  1-7 Units Subcutaneous TID AC Tameka Sabillon PA-C   1 Units at 05/17/25 0853    insulin aspart (NovoLOG) injection (RAPID ACTING)  1-5 Units Subcutaneous At Bedtime Tameka Sabillon PA-C        insulin glargine (LANTUS PEN) injection 11 Units  11 Units Subcutaneous At Bedtime Mildred Harden MD   11 Units at 05/14/25 2055    [Held by provider] midodrine (PROAMATINE) tablet 10 mg  10 mg Oral Q Mon Wed Fri AM Tameka Sabillon PA-C        midodrine (PROAMATINE) tablet 20 mg  20 mg Oral 2 times per day on Sunday Tuesday Thursday Saturday Tameka Sabillon PA-C   10 mg at 05/15/25 1711    [Held by provider] midodrine (PROAMATINE) tablet 30 mg  30 mg Oral Q Mon Wed Fri AM Tameka aSbillon PA-C   30 mg at 05/17/25 1005    sodium chloride (PF) 0.9% PF flush 3 mL  3 mL Intracatheter Q8H RADHA Tameka Sabillon PA-C   3 mL at 05/16/25 1807    sodium chloride 0.9% BOLUS 200 mL  200 mL Hemodialysis Machine Once Theron Alonso MD        sodium chloride 0.9% BOLUS 250 mL  250 mL Intravenous Once in dialysis/CRRT Theron Alonso MD        sodium chloride 0.9% BOLUS 250 mL  250 mL Intravenous Once Salomón Bender APRN CNP   Held at 05/13/25 0426    sodium chloride 0.9% BOLUS 500 mL  500 mL Hemodialysis Machine Once Theron Alonso MD         Current Facility-Administered Medications   Medication Dose Route Frequency Provider Last Rate Last Admin    acetaminophen (TYLENOL) tablet 650 mg  650 mg Oral Q4H PRN Tameka Sabillon PA-C   650 mg at 05/17/25 0126    Or    acetaminophen (TYLENOL) Suppository 650 mg  650 mg Rectal Q4H PRN Tameka Sabillon PA-POLLO        albumin human 25 % injection 50 mL  50 mL Intravenous Q1H PRN Theron Alonso MD        albumin human 5 % injection   mL   mL Intravenous Q1H PRN Theron Alonso MD        calcium acetate (PHOSLO) capsule 667 mg  667 mg Oral With Snacks or Supplements Tameka Sabillon PA-C        glucose gel 15-30 g  15-30 g Oral Q15 Min PRN Tameka Sabillon PA-C        Or    dextrose 50 % injection 25-50 mL  25-50 mL Intravenous Q15 Min PRN Tameka Sabillon PA-C        Or    glucagon injection 1 mg  1 mg Subcutaneous Q15 Min PRN Tameka Sabillon PA-C        sodium chloride (PF) 0.9% PF flush 9.5 mL  9.5 mL Intracatheter q1 min prn Iain Hairston MD        Or    dextrose 50 % injection 9.5 mL  9.5 mL Intravenous q1 min prn Iain Hairston MD        famotidine (PEPCID) tablet 20 mg  20 mg Oral Q48H PRN Tameka Sabillon PA-C        lidocaine (LMX4) cream   Topical Q1H PRN Tameka Sabillon PA-C        lidocaine 1 % 0.1-1 mL  0.1-1 mL Other Q1H PRN Tameka Sabillon PA-C        lidocaine 1 % 0.5 mL  0.5 mL Intradermal Once PRN Theron Alonso MD        lidocaine 1 % 0.5 mL  0.5 mL Intradermal Once PRN Theron Alonso MD        magnesium sulfate 2 g in 50 mL sterile water intermittent infusion  2 g Intravenous Once PRN Iain Hairston MD        midazolam (VERSED) injection 1 mg  1 mg Intravenous Q2 Min PRN Iain Hairston MD   2 mg at 05/13/25 1346    Patient is already receiving anticoagulation with heparin, enoxaparin (LOVENOX), warfarin (COUMADIN)  or other anticoagulant medication   Does not apply Continuous PRN Tameka Sabillon PA-C        prochlorperazine (COMPAZINE) injection 5 mg  5 mg Intravenous Q6H PRN Salomón Bender APRN CNP   5 mg at 05/14/25 1202    Or    prochlorperazine (COMPAZINE) tablet 5 mg  5 mg Oral Q6H PRN Salomón Bender APRN CNP        Or    prochlorperazine (COMPAZINE) suppository 25 mg  25 mg Rectal Q12H PRN Salomón Bender APRN CNP        senna-docusate (SENOKOT-S/PERICOLACE) 8.6-50 MG per tablet 1 tablet  1 tablet Oral BID PRN Tameka Sabillon PA-C        Or    senna-docusate  (SENOKOT-S/PERICOLACE) 8.6-50 MG per tablet 2 tablet  2 tablet Oral BID PRN Tameka Sabillon PA-C        sodium chloride (PF) 0.9% PF flush 3 mL  3 mL Intracatheter q1 min prn Tameka Sabillon PA-C   3 mL at 05/16/25 0044    sodium chloride 0.9% BOLUS 100-150 mL  100-150 mL Intravenous Q15 Min PRN Theron Alonso MD        sodium chloride 0.9% BOLUS 100-150 mL  100-150 mL Intravenous Q15 Min PRN Theron Alonso MD        Stop Heparin 60 minutes before end of treatment   Does not apply Continuous PRN Theron Alonso MD            Allergies   Allergen Reactions    Albuterol      shakiness    Aspirin      gi    Byetta [Exenatide]      gi    Clonidine      constipation    Codeine      vomiting    Ezetimibe      muscle symptoms    Hydralazine      headaches    Lisinopril      hyperkalemia    Metformin Hydrochloride      vomiting    Pravachol [Pravastatin Sodium]      muscle pains    Simvastatin      myalgias    Troglitazone               Labs:     [facility]  Recent Labs   Lab 05/17/25  0735 05/17/25  0109 05/16/25  2144 05/16/25  1714 05/16/25  0826 05/16/25  0610 05/15/25  0746 05/15/25  0652 05/13/25  0743 05/13/25  0439 05/12/25  1817 05/12/25  1138 05/12/25  0933   NA  --   --   --   --   --  134*  --  132*  --  133*  --   --  133*   POTASSIUM  --   --   --   --   --  4.2  --  4.9  --  3.7  --  4.0 5.9*   CHLORIDE  --   --   --   --   --  93*  --  93*  --  92*  --   --  91*   KANWAL  --   --   --   --   --  10.1  --  10.2  --  9.1  --   --  9.5   CO2  --   --   --   --   --  25  --  21*  --  25  --   --  21*   BUN  --   --   --   --   --  27.7*  --  45.7*  --  31.1*  --   --  51.1*   CR  --   --   --   --   --  4.49*  --  6.25*  --  4.50*  --   --  6.70*   * 130* 133* 118*   < > 216*   < > 163*   < > 212*   < >  --  209*    < > = values in this interval not displayed.     CBC  Recent Labs   Lab 05/16/25  0610 05/15/25  0652 05/13/25  0439 05/12/25  0933   WBC 7.5 6.9 6.0 8.4   HGB 8.9* 9.0* 8.7* 9.5*    HCT 28.4* 28.2* 26.5* 30.3*   MCV 97 96 94 97   * 107* 105* 113*     Lab Results   Component Value Date    AST 16 05/13/2025    ALT 7 05/13/2025    ALKPHOS 78 05/13/2025    BILITOTAL 0.3 05/13/2025    BILICONJ 0.0 02/10/2007            Physical Exam:   [facility]    Wt Readings from Last 3 Encounters:   05/16/25 70 kg (154 lb 4.8 oz)   10/04/24 65.9 kg (145 lb 4.8 oz)   08/20/24 67.2 kg (148 lb 3.2 oz)       Intake/Output Summary (Last 24 hours) at 5/17/2025 1048  Last data filed at 5/17/2025 0906  Gross per 24 hour   Intake 240 ml   Output --   Net 240 ml       GENERAL APPEARANCE: pleasant,  a & o  HEENT:  Eyes/ears/nose grossly normal, neck supple  RESP: lungs clear to auscultation with good efforts, no crackles, rhonchi or wheezes  CV: regular rate and rhythm, normal S1 S2   ABDOMEN: soft, nontender, bowel sounds normal  EXTREMITIES/SKIN: no edema, no rashes or lesions     Pt seen on dialysis.  Stable run.  Good BFR.      Attestation:  I have reviewed today's vital signs, notes, medications, labs and imaging.     Robert Herrera MD  [facility] - Nephrology  [phone]

## 2025-05-17 NOTE — PLAN OF CARE
Date & Time: 5/17 [id number]  Diagnosis: Afib RVR  Procedures: 5/13 - cardioversion x3  Orientation/Cognitive: AOx4  VS/O2: VSS, 2L O2 NC  Mobility: A1 + gb/walker  Diet: Regular  Pain Management: Denies - PRNs available  Neuro: Intact ex legally blind in R eye  Bowel & Bladder: on HD  Skin: Scattered scabs & bruises  Abnormal Labs: Na 134, BUN 27.7, Cr 4.49, anion 16, , Hgb 8.9, plt 115  Tele: SR + BBB + ST abn  IV Access/Drips/Fluids: L PIV SL, R HD fistula  Drains: NA  Tests/Imaging: CT head - negative, xray chest - pulmonary edema  Consults: Cardiology, EP, hospitalist, nephrology  Discharge Plan: Pending  Other: Dialysis MWF (missed Friday 5/16, went 5/17)

## 2025-05-17 NOTE — PROGRESS NOTES
Potassium   Date Value Ref Range Status   05/16/2025 4.2 3.4 - 5.3 mmol/L Final   02/08/2025 4.4 3.4 - 5.3 mmol/L Final   10/05/2020 4.1 3.5 - 5.5 mEq/L Final     Hemoglobin   Date Value Ref Range Status   05/16/2025 8.9 (L) 11.7 - 15.7 g/dL Final   01/13/2020 8.4 (L) 11.7 - 15.7 g/dL Final     Creatinine   Date Value Ref Range Status   05/16/2025 4.49 (H) 0.51 - 0.95 mg/dL Final   01/13/2020 5.97 (H) 0.52 - 1.04 mg/dL Final     Urea Nitrogen   Date Value Ref Range Status   05/16/2025 27.7 (H) 8.0 - 23.0 mg/dL Final   02/08/2025 17 7 - 30 mg/dL Final   01/13/2020 44 (H) 7 - 30 mg/dL Final     Sodium   Date Value Ref Range Status   05/16/2025 134 (L) 135 - 145 mmol/L Final   01/13/2020 139 133 - 144 mmol/L Final     INR   Date Value Ref Range Status   10/04/2024 1.17 (H) 0.85 - 1.15 Final   01/12/2020 1.23 (H) 0.86 - 1.14 Final       DIALYSIS PROCEDURE NOTE  Hepatitis status of previous patient on machine log was checked and verified ok to use with this patients hepatitis status.  Patient dialyzed for 3   hrs. on a K3bath with a net fluid removal of  2.5L.  A BFR of 400 ml/min was obtained via a  using ravf 15gauge needles.      The treatment plan was discussed with Dr. Herrera during the treatment.       Needle cannulation sites held x arterial prolonged bleeding 30 min.       Meds  given: nione   Complications: \none noted      Person educated: infection prevention. Knowledge base .   ICEBOAT? Timeout performed pre-treatment  I: Patient was identified using 2 identifiers  C:  Consent Signed Yes  E: Equipment preventative maintenance is current and dialysis delivery system OK to use  B: Hepatitis B Surface Antigen: neg; Draw Date: 05/12/2025      Hepatitis B Surface Antibody: unknown; Draw Date: 05/12/2025  O: Dialysis orders present and complete prior to treatment  A: Vascular access verified and assessed prior to treatment  T: Treatment was performed at a clinically appropriate time  ?: Patient was allowed to ask  questions and address concerns prior to treatment  See Adult Hemodialysis flowsheet in [facility] for further details and post assessment.  Machine water alarm in place and functioning. Transducer pods intact and checked every 15min.   Pt assisted with repositioning throughout dialysis treatment.  Pt returned via bed.  Chlorine/Chloramine water system checked every 4 hours.  Outpatient Dialysis at [place]      Post treatment report given to beatriz wasserman RN regarding 2.5L of fluid removed, last BP    Please remove patient dressing on AVF and AVG needle sites 24 hours after dialysis. If leaking occurs please apply a Band-Aid.

## 2025-05-17 NOTE — PROGRESS NOTES
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 admitted on 5/12/25 with atrial fibrillation with RVR.     Atrial fibrillation with RVR, s/p JARROD guided cardioversion on 5/13   Hx SVT/junctional rhythm  Elevated troponin  *Established with Dr. Barbosa. She is maintained on low dose Amiodarone 200 mg 5 times weekly, which was held for a period of time summer 2024 due to junctional rhythm but restarted 8/30/24.  *Presented with lightheadedness/dizziness, palpitations, mild dyspnea since 5/6. No chest pain. She noted her BP to be 80/50 and  when she called cardiology clinic on 5/8 and it was recommended she go to the ER, but patient declined. She presented to the ED 5/12 and was found to be in afib RVR with HR mostly 120-130s, SBP . Saturating 98% on RA. ProBNP >70k, troponin 1,194. CXR w/ findings of pulmonary edema. She is mentating well.  *Per chart review, no formal diagnosis of atrial fibrillation although she has an EKG from 10/4/24 that appears consistent w/ afib.  *[id number] score is at least 2 for gender and DM II. She has no hx GIB or frequent falls. Notes hx epistaxis a few years ago while on baby Aspirin and thus hesitant to start anticoagulation, but this was not a life threatening bleed nor anything that required blood transfusion. Discussed that currently benefits appear to exceed risks, she was ultimately agreeable to start IV Heparin with close monitoring.  * Pt underwent JARROD guided cardioversion on 5/13. Rates have been controlled since that time.   - Cardiology consult appreciated  - Continuing amiodarone 200mg daily   - Eliquis 5mg BID     Dizziness    Vasovagal episode (5/14)  Syncopal episode (5/16)  Pt had vasovagal episode on 5/14 after having a BM and getting her IV out. HR dropped to the 50s and she vomited. She has not been feeling quite right since that time.   * Early AM 5/16 pt had another episode of dizziness with unresponsiveness after getting from chair to bed. Her BP at the time was 81/22. Had 2.2s pause on telemetry.  * Further history reveals that pt has been having intermittent dizziness at home over th past few months after hitting her head on a shelf.   * PT evaluated does not think dizziness is vestibular  * Head CT negative for intracranial pathology  - Overall, dizziness episodes likely from intermittent hypotension which is long standing. Pt is on high dose midodrine in the setting of ESRD.   - Continue cardiac monitoring   - Continue midodrine, could consider increasing dose, but pt already on quite a high dose.      DM II, insulin dependent  *A1c was 7.5 on 8/18/24  - Continue Lantus 11 units  - Continue PTA insulin aspart 2U per 15g CHO   - MDSSI  - Accuchecks q4h with sliding scale coverage     ESRD on dialysis MWF  - Continue PTA Midodrine 10 mg qAM and 30 mg before HD on MWF (dialysis days) and 20 mg BID TThSaSu (non-dialysis days)  - Nephrology consult for dialysis     Mild hyponatremia  *Sodium 133 on admission, s/p IV  mL in the ED.  - Repeat CMP tomorrow AM     Chronic anemia  Chronic thrombocytopenia  *Hgb 9.5, stable from 8.8 on 4/23/25. Platelets 113, stable from 104 on 2/8/25.  - Repeat CBC tomorrow AM     COPD  Chronic respiratory failure with hypoxia  *Patient uses 2 L PRN and at HS  - Continue PTA Levalbuterol  - Supplemental oxygen     Severe pulmonary hypertension felt to be due to intrinsic pulmonary vascular disease  *Established with Dr. Redd  *Echo 2/27/24 EF 60-65% with mild mitral regurgitation, mild valvular aortic stenosis, mild tricuspid regurgitation with right ventricular systolic pressure  approximated at 33 mmHg plus the right atrial pressure, IVC diameters <2.1 cm collapsing >50% with sniff suggests normal RA pressure of 3 mmHg  *S/p RHC 10/2/24 with severely elevated left and right sided filling pressures, severely elevated pulmonary artery hypertension, normal cardiac output level.  - Monitor I&O  - Daily weights     History of cirrhosis due to severe pulmonary HTN  *Initial LFTs hemolyzed  - Repeat LFTs    RLS  Pt reports severe RLS discomfort, but refuses any medications.           Diet: Regular Diet Adult  Diet    DVT Prophylaxis: IV heparin  Weller Catheter: Not present  Lines: None     Cardiac Monitoring: ACTIVE order. Indication: Tachyarrhythmias, acute (48 hours)  Code Status: Full Code      Clinically Significant Risk Factors         # Hyponatremia: Lowest Na = 134 mmol/L in last 2 days, will monitor as appropriate  # Hypochloremia: Lowest Cl = 93 mmol/L in last 2 days, will monitor as appropriate        # Thrombocytopenia: Lowest platelets = 115 in last 2 days, will monitor for bleeding   # Hypertension: Noted on problem list    # Chronic heart failure with reduced ejection fraction: last echo with EF <40%           # Obesity: Estimated body mass index is 30.13 kg/m  as calculated from the following:    Height as of this encounter: 1.524 m (5').    Weight as of this encounter: 70 kg (154 lb 4.8 oz).      # Financial/Environmental Concerns: none         Social Drivers of Health    Tobacco Use: Medium Risk (12/23/2024)    Patient History     Smoking Tobacco Use: Former     Smokeless Tobacco Use: Never          Disposition Plan     Medically Ready for Discharge: Anticipated Tomorrow      Mildred Harden MD  Hospitalist Service  [facility]  Securely message with [employer] (more info)  Text page via [employer] Paging/Directory   ______________________________________________________________________    Interval History   Doing well today. Tolerated dialysis well. No further  dizzy spells, but has not been up walking much. Wants to take more walks to day. Discussed discharge possibly tomorrow.     Physical Exam   Vital Signs: Temp: 97.6  F (36.4  C) Temp src: Oral BP: 99/47 Pulse: 72   Resp: 18 SpO2: 100 % O2 Device: None (Room air) Oxygen Delivery: 2 LPM  Weight: 154 lbs 4.8 oz    Constitutional: Awake, alert, cooperative, no apparent distress. Seen on dialysis.   Eyes: Conjunctiva and pupils examined and normal.  HEENT: Moist mucous membranes, normal dentition.  Respiratory: non-labored breathing  Cardiovascular: RRR  Skin: No rashes, no cyanosis, no edema.  Musculoskeletal: No joint swelling, erythema or tenderness.  Neurologic: Cranial nerves 2-12 intact, normal strength and sensation.  Psychiatric: Alert, oriented to person, place and time, no obvious anxiety or depression.    Medical Decision Making       55 MINUTES SPENT BY ME on the date of service doing chart review, history, exam, documentation & further activities per the note.      Data         Imaging results reviewed over the past 24 hrs:   Recent Results (from the past 24 hours)   CT Head w/o Contrast    Narrative    EXAM: CT HEAD W/O CONTRAST  LOCATION: [facility]  DATE: 5/16/2025    INDICATION: Dizziness, hx of head trauma.  COMPARISON: None.  TECHNIQUE: Routine CT Head without IV contrast. Multiplanar reformats. Dose reduction techniques were used.    FINDINGS:  INTRACRANIAL CONTENTS: No intracranial hemorrhage, extra-axial collection, or mass effect. No CT evidence of acute infarct. Mild presumed chronic small vessel ischemic changes. Mild generalized volume loss. No hydrocephalus.     VISUALIZED ORBITS/SINUSES/MASTOIDS: No intraorbital abnormality. No paranasal sinus mucosal disease. No middle ear or mastoid effusion.    BONES/SOFT TISSUES: No acute abnormality. Postprocedural changes to the right globe are noted.      Impression    IMPRESSION:  1.  No CT evidence for acute intracranial  process.  2.  Brain atrophy and presumed chronic microvascular ischemic changes as above.

## 2025-05-18 ENCOUNTER — APPOINTMENT (OUTPATIENT)
Dept: PHYSICAL THERAPY | Facility: CLINIC | Age: 65
DRG: 308 | End: 2025-05-18
Payer: COMMERCIAL

## 2025-05-18 VITALS
SYSTOLIC BLOOD PRESSURE: 110 MMHG | BODY MASS INDEX: 29.98 KG/M2 | DIASTOLIC BLOOD PRESSURE: 62 MMHG | HEIGHT: 60 IN | TEMPERATURE: 97.2 F | WEIGHT: 152.7 LBS | RESPIRATION RATE: 18 BRPM | HEART RATE: 75 BPM | OXYGEN SATURATION: 98 %

## 2025-05-18 LAB
GLUCOSE BLDC GLUCOMTR-MCNC: 141 MG/DL (ref 70–99)
GLUCOSE BLDC GLUCOMTR-MCNC: 159 MG/DL (ref 70–99)
GLUCOSE BLDC GLUCOMTR-MCNC: 207 MG/DL (ref 70–99)

## 2025-05-18 PROCEDURE — 250N000013 HC RX MED GY IP 250 OP 250 PS 637: Performed by: INTERNAL MEDICINE

## 2025-05-18 PROCEDURE — 99239 HOSP IP/OBS DSCHRG MGMT >30: CPT | Performed by: STUDENT IN AN ORGANIZED HEALTH CARE EDUCATION/TRAINING PROGRAM

## 2025-05-18 PROCEDURE — 250N000013 HC RX MED GY IP 250 OP 250 PS 637: Performed by: PHYSICIAN ASSISTANT

## 2025-05-18 PROCEDURE — 97530 THERAPEUTIC ACTIVITIES: CPT | Mod: GP

## 2025-05-18 PROCEDURE — 97116 GAIT TRAINING THERAPY: CPT | Mod: GP

## 2025-05-18 RX ORDER — MIDODRINE HYDROCHLORIDE 10 MG/1
10 TABLET ORAL DAILY PRN
Qty: 30 TABLET | Refills: 0 | Status: SHIPPED | OUTPATIENT
Start: 2025-05-18

## 2025-05-18 RX ORDER — ALBUMIN (HUMAN) 12.5 G/50ML
50 SOLUTION INTRAVENOUS
OUTPATIENT
Start: 2025-05-18

## 2025-05-18 RX ADMIN — INSULIN ASPART 1 UNITS: 100 INJECTION, SOLUTION INTRAVENOUS; SUBCUTANEOUS at 12:54

## 2025-05-18 RX ADMIN — CALCIUM ACETATE 667 MG: 667 CAPSULE ORAL at 08:44

## 2025-05-18 RX ADMIN — MIDODRINE HYDROCHLORIDE 20 MG: 5 TABLET ORAL at 08:43

## 2025-05-18 RX ADMIN — ACETAMINOPHEN 650 MG: 325 TABLET, FILM COATED ORAL at 00:29

## 2025-05-18 RX ADMIN — CALCIUM ACETATE 667 MG: 667 CAPSULE ORAL at 12:51

## 2025-05-18 RX ADMIN — ACETAMINOPHEN 650 MG: 325 TABLET, FILM COATED ORAL at 12:50

## 2025-05-18 RX ADMIN — APIXABAN 5 MG: 5 TABLET, FILM COATED ORAL at 08:44

## 2025-05-18 RX ADMIN — AMOXICILLIN 500 MG: 500 CAPSULE ORAL at 08:44

## 2025-05-18 RX ADMIN — INSULIN ASPART 1 UNITS: 100 INJECTION, SOLUTION INTRAVENOUS; SUBCUTANEOUS at 08:43

## 2025-05-18 ASSESSMENT — ACTIVITIES OF DAILY LIVING (ADL)
ADLS_ACUITY_SCORE: 39
ADLS_ACUITY_SCORE: 37
ADLS_ACUITY_SCORE: 39
ADLS_ACUITY_SCORE: 39
ADLS_ACUITY_SCORE: 37
ADLS_ACUITY_SCORE: 39
ADLS_ACUITY_SCORE: 37
ADLS_ACUITY_SCORE: 37
ADLS_ACUITY_SCORE: 39
ADLS_ACUITY_SCORE: 37

## 2025-05-18 NOTE — PLAN OF CARE
Goal Outcome Evaluation:  -~Care plan-end of shift note: 23--0730   -~Orientation/Mentation:A&O x4, legally blind in R eye  -~VS: VSS on RA  -~LS/Pulm:Ls clear diminished   -~Tele/Cardiac:SR  -~GI:WDL  -~:on hemodialysis  -~Pain:managed with Tylenol  -~Mobility:up w/1A/GB/W  -~Skin:Scattered bruises/scabs & R/L heel peeling  -~Diet:regular  -~Lines/IVs: R arm fistula/PIV SL  -~Safety/Concern:fall risk  -~Aggression color:green  -~Plan/Shift summary/Goals:denies SOB/CP. Plans for discharge pending clinical improvement dialyzed yesterday, next dialysis on tomorrow -Monday.

## 2025-05-18 NOTE — PLAN OF CARE
Physical Therapy Discharge Summary    Reason for therapy discharge:    Discharged to home with home therapy.    Progress towards therapy goal(s). See goals on Care Plan in [facility] electronic health record for goal details.  Goals met    Therapy recommendation(s):    Continued therapy is recommended.  Rationale/Recommendations:  To further increase mobility and independence.

## 2025-05-18 NOTE — PROGRESS NOTES
Care Management Discharge Note    Discharge Date: 05/18/2025       Discharge Disposition: Home    Discharge Services: Home Care    Discharge DME:     Discharge Transportation: family or friend will provide    Private pay costs discussed: Not applicable    Does the patient's insurance plan have a 3 day qualifying hospital stay waiver?  No    PAS Confirmation Code: n/a  Patient/family educated on Medicare website which has current facility and service quality ratings: yes    Education Provided on the Discharge Plan: Yes  Persons Notified of Discharge Plans: patient, homecare, Nurse, MD  Patient/Family in Agreement with the Plan: yes    Handoff Referral Completed: No, handoff not indicated or clinically appropriate    Additional Information:  Patient discharging home today. Patient accepted at [facility] for homecare. Orders faxed via [facility]. Discharge orders faxed to [facility]. She has dialysis M, W, F.    LORA Pierson, [facility]  Social Work- Inpatient Care Coordination  [facility]

## 2025-05-19 ENCOUNTER — PATIENT OUTREACH (OUTPATIENT)
Dept: CARE COORDINATION | Facility: CLINIC | Age: 65
End: 2025-05-19
Payer: COMMERCIAL

## 2025-05-19 ENCOUNTER — HOSPITAL ENCOUNTER (EMERGENCY)
Facility: CLINIC | Age: 65
Discharge: HOME | End: 2025-05-19
Attending: EMERGENCY MEDICINE | Admitting: EMERGENCY MEDICINE
Payer: COMMERCIAL

## 2025-05-19 ENCOUNTER — TELEPHONE (OUTPATIENT)
Dept: INTERNAL MEDICINE | Facility: CLINIC | Age: 65
End: 2025-05-19
Payer: COMMERCIAL

## 2025-05-19 VITALS
SYSTOLIC BLOOD PRESSURE: 119 MMHG | DIASTOLIC BLOOD PRESSURE: 71 MMHG | HEART RATE: 78 BPM | OXYGEN SATURATION: 94 % | RESPIRATION RATE: 19 BRPM

## 2025-05-19 DIAGNOSIS — Z09 HOSPITAL DISCHARGE FOLLOW-UP: ICD-10-CM

## 2025-05-19 DIAGNOSIS — T82.838A HEMORRHAGE OF ARTERIOVENOUS FISTULA, INITIAL ENCOUNTER: ICD-10-CM

## 2025-05-19 DIAGNOSIS — R18.8 ASCITES OF LIVER: Primary | ICD-10-CM

## 2025-05-19 PROCEDURE — 99283 EMERGENCY DEPT VISIT LOW MDM: CPT

## 2025-05-19 PROCEDURE — 12001 RPR S/N/AX/GEN/TRNK 2.5CM/<: CPT

## 2025-05-19 ASSESSMENT — COLUMBIA-SUICIDE SEVERITY RATING SCALE - C-SSRS
6. HAVE YOU EVER DONE ANYTHING, STARTED TO DO ANYTHING, OR PREPARED TO DO ANYTHING TO END YOUR LIFE?: NO
1. IN THE PAST MONTH, HAVE YOU WISHED YOU WERE DEAD OR WISHED YOU COULD GO TO SLEEP AND NOT WAKE UP?: NO
2. HAVE YOU ACTUALLY HAD ANY THOUGHTS OF KILLING YOURSELF IN THE PAST MONTH?: NO

## 2025-05-19 ASSESSMENT — ACTIVITIES OF DAILY LIVING (ADL)
ADLS_ACUITY_SCORE: 57
ADLS_ACUITY_SCORE: 57

## 2025-05-19 NOTE — TELEPHONE ENCOUNTER
Home Care is calling regarding an established patient with [facility].  Requesting orders from: Gigi Martin  RN APPROVED: RN able to provide verbal orders.  Home Care will send orders for signature.  RN will close encounter.  Is this a request for a temporary pause in the home care episode?  No    Orders Requested    Skilled Nursing  Request for delay in care, service to be provided within 7 days of previously   Service rescheduled to 5/22/25  Patient was discharged 5/18/25  RN gave verbal order: Yes      Physical Therapy  Request for delay in care, service to be provided within 7 days of previously   Service rescheduled to 5/22/25  Discharged 5/18/25  RN gave verbal order: Yes      Occupational Therapy  Request for delay in care, service to be provided within 7 days of previously   Service rescheduled to 5/22/25  Discharged 5/18/22  RN gave verbal order: Yes        Phone number Home Care can be reached at:   Okay to leave a detailed message?: Yes    Contacts       Contact Date/Time Type Contact Phone/Fax    05/19/2025 02:07 PM CDT Phone (Incoming) [facility] BRITT, Bertha [phone]     Ext [phone]          Rose Ceballos, RN

## 2025-05-19 NOTE — ED TRIAGE NOTES
Pt comes from [facility] with bleeding from fistula site. Tourniquet placed by EMS at 1550. Pt arrives with bleeding controlled by tourniquet and pressure dressing. . Pt refused pain meds en route. Wears 2L oxygen nasal cannula at baseline.      Triage Assessment (Adult)       Row Name 05/19/25 1640          Triage Assessment    Airway WDL WDL        Respiratory WDL    Respiratory WDL WDL        Skin Circulation/Temperature WDL    Skin Circulation/Temperature WDL WDL        Cognitive/Neuro/Behavioral WDL    Cognitive/Neuro/Behavioral WDL WDL

## 2025-05-19 NOTE — ED PROVIDER NOTES
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 EMS reports that the patient arrives from dialysis at [facility] after sudden onset of uncontrollable hemorrhage upon attempted hemodialysis via an arteriovenous fistula, tourniquet placed en route.    Independent Historian   EMS and Nurse via EMS as detailed above.    Review of External Notes   \    Past Medical History     Medical History and Problem List   Abnormal auditory perception  Acidosis  Bilateral sensorineural hearing loss  Chronic diastolic heart failure  COPD  Chronic respiratory failure with hypoxia  Cirrhosis, non-alcoholic  Dependence on renal dialysis  Dependence on supplemental oxygen  Type 2 diabetes mellitus  Stage 5 CKD  ESRD on hemodialysis  Hypertension  Hemorrhage due to vascular prosthetic devices, implants and grafts  Hyperlipidemia  Thrombocytopenia  Anemia in CKD  Atrial fibrillation with rapid ventricular response  Hypotension  Immunodeficiency and immunosuppression  Iron deficiency anemia  Umbilical hernia without obstruction and without gangrene    Medications   Rocaltrol  Epogen internal jugular  65 mg Fe  Novolog pen  Levemir vial  Amoxil  Phoslo  Lantus pen  Proamatine  Pacerone  Eliquis  Freestyle Ashley 2 Sensor  Novolin R Flexpen    Surgical History   Abdominoplasty  Anesthesia cardioversion   x2  Colonoscopy x2  Create fistula arteriovenous upper extremity, right  CV Heart catheterization with possible  intervention  Tonsillectomy  EGD, combined  Eye injections  Hysterectomy, partial  IR CVC Tunnel Placement  IR CVC Tunnel removal, right  IR Dialysis fistulogram, right    Physical Exam     Patient Vitals for the past 24 hrs:   Pulse Resp SpO2   05/19/25 1634 78 19 94 %     Physical Exam  Vitals and nursing note reviewed.   Constitutional:       Comments: Uncomfortable appearing tourniquet on right upper arm.   HENT:      Head: Normocephalic.   Cardiovascular:      Rate and Rhythm: Normal rate.   Pulmonary:      Effort: Pulmonary effort is normal.   Musculoskeletal:      Comments: AV fistula in the right proximal arm.  Covered with bandage.   Skin:     General: Skin is warm.      Capillary Refill: Capillary refill takes less than 2 seconds.   Neurological:      General: No focal deficit present.      Mental Status: She is alert.           Diagnostics     Lab Results   Labs Ordered and Resulted from Time of ED Arrival to Time of ED Departure - No data to display    Imaging   No orders to display       Independent Interpretation   None    ED Course      Medications Administered   Medications - No data to display    Procedures     Laceration Repair      Procedure: Laceration Repair    Indication: Laceration    Consent: Verbal    T    Location: Right Upper extremity     Length: Pinpoint arterial hemorrhage from the AV fistula    Preparation: Irrigation with Tap Water.    Anesthesia/Sedation: Lidocaine with Epinephrine - 1%      Treatment/Exploration: Wound explored, no foreign bodies found     Closure: The wound was closed with one layer. Skin/superficial layer was closed with 2 x 3-0 and 4-0 Nylon using Interrupted and Horizontal mattress sutures.     Patient Status: The patient tolerated the procedure well: Yes. There were no complications.    Discussion of Management   None    ED Course   ED Course as of 05/19/25 1748   Mon May 19, 2025   1627 I obtained history and examined the patient as noted above. Placed  tourniquet to manage bleeding.         1633 I performed a laceration repair localized to dialysis vascular port access.    1745 I rechecked the patient and explained findings. No bleeding.         Additional Documentation  None    Medical Decision Making / Diagnosis     CMS Diagnoses: None    MIPS   None               [facility]   Bentonkrishna Wetzel is a 65 year old female who presents with active extravasation of blood from her AV fistula.  Patient presents by EMS with a tourniquet on.  This was removed and showed arterial bleeding from the proximal arm.  Using help from the nurse proximal compression was performed the tourniquet was released.  Local anesthetic was provided in the form of 1% lidocaine with epinephrine epinephrine.  2 figure-of-eight sutures were placed to fold over the skin over the active bleeding site and this resolved the problem.  Patient was observed for an hour and a half in the emergency room and dressing was removed and no active bleeding was noted.  There was an ongoing thrill felt in the AV fistula.  No need for imaging.  Patient offered reassurance and discharge.  Patient is at risk for further bleeding due to patient's now anticoagulant use this patient was recently seen by me for new onset A-fib and admitted to the hospital.  Patient was advised to follow-up with her dialysis clinic and was discharged home in stable condition.    Disposition   The patient was discharged.     Diagnosis     ICD-10-CM    1. Hemorrhage of arteriovenous fistula, initial encounter  T82.838A            Discharge Medications   New Prescriptions    No medications on file         Scribe Disclosure:  I, Danny France, am serving as a scribe at 4:37 PM on 5/19/2025 to document services personally performed by No att. providers found based on my observations and the provider's statements to me.        Jasson Jacob MD  05/21/25 0001

## 2025-05-19 NOTE — ED NOTES
Bed: ED05  Expected date:   Expected time:   Means of arrival:   Comments:  [facility] 65f dialysis port bleed - have tourniquet on eta 9

## 2025-05-19 NOTE — PROGRESS NOTES
Clinic Care Coordination Contact  Transitions of Care Outreach  Chief Complaint   Patient presents with    Clinic Care Coordination - Post Hospital       Most Recent Admission Date: 5/12/2025   Most Recent Admission Diagnosis: Atrial fibrillation with rapid ventricular response (H) - I48.91     Most Recent Discharge Date: 5/18/2025   Most Recent Discharge Diagnosis: Atrial fibrillation with rapid ventricular response (H) - I48.91  SVT (supraventricular tachycardia) - I47.10  Hypotension, unspecified hypotension type - I95.9  ESRD on dialysis (H) - N18.6, Z99.2  Dizziness - R42  Other specified counseling - Z71.89     Transitions of Care Assessment    Discharge Assessment  How are you doing now that you are home?: My BP has been ching low at dialysis it went to the 80's now is 101. I felt better after getting my oxygen  How are your symptoms? (Red Flag symptoms escalate to triage hotline per guidelines): Improved (a little better)  Do you know how to contact your clinic care team if you have future questions or changes to your health status? : Yes  Does the patient have their discharge instructions? : Yes  Does the patient have questions regarding their discharge instructions? : No  Were you started on any new medications or were there changes to any of your previous medications? : Yes  Does the patient have all of their medications?: Yes  Do you have questions regarding any of your medications? : No  Do you have all of your needed medical supplies or equipment (DME)?  (i.e. oxygen tank, CPAP, cane, etc.): Yes         Post-op (Clinicians Only)  Fever: No  Chills: No  Eating & Drinking: eating and drinking without complaints/concerns  PO Intake: clear liquids  Bowel Function: normal        Follow up Plan     Discharge Follow-Up  Discharge follow up appointment scheduled in alignment with recommended follow up timeframe or Transitions of Risk Category? (Low = within 30 days; Moderate= within 14 days; High= within 7  days): Yes  Discharge Follow Up Appointment Date: 05/22/25  Discharge Follow Up Appointment Scheduled with?: Primary Care Provider    Future Appointments   Date Time Provider Department Center   5/22/2025 12:45 PM SHUSSharda ROLON [facility]   5/29/2025 10:50 AM Snehal Ferreira PA-C [facility]   6/4/2025  3:30 PM Gigi Martin MD [facility]   6/12/2025 11:15 AM JETER LAB [facility]   6/12/2025 12:30 PM Jose Redd MD [facility]       Outpatient Plan as outlined on AVS reviewed with patient. She reports some low Bps this am at dialysis but it is over 100 now. She felt dizzy this am but that resolved with oxygen use. No questions or concerns at this time. No Care Coordination needs at this time.     For any urgent concerns, please contact our 24 hour nurse triage line: [phone] ([phone])       EVELIN MENDOZA RN

## 2025-05-19 NOTE — DISCHARGE INSTRUCTIONS
We have placed 2 stitches to close the hole from dialysis.  This is stop the bleeding.  Please hold your blood thinner tonight and then restart tomorrow morning.  The sutures need to be removed in a week.  Return with any concern for infection or concern for bleeding.  Thanks for your patience today.

## 2025-05-21 ENCOUNTER — TELEPHONE (OUTPATIENT)
Dept: CARDIOLOGY | Facility: CLINIC | Age: 65
End: 2025-05-21
Payer: COMMERCIAL

## 2025-05-21 NOTE — TELEPHONE ENCOUNTER
"Patient was admitted to [facility] on 5/12/25 with palpitations/tachycardia and increased lightheadedness. Found to be in new onset A. Fib with RVR. NSTEMI. Started on IV Amiodarone with bolus 5/12/25.    PMH: severe pulmonary hypertension (mean PA pressure 66 mmHg on right heart catheterization October 2024; felt secondary to intrinsic pulmonary vascular disease), COPD-on home 02, hypertension, end-stage renal disease on hemodialysis MWF, anemia of chronic disease, type 2 diabetes mellitus-on insulin, dyslipidemia, esophagitis/gastropathy, cardiac cirrhosis, secondary hyperparathyroidism, liver cirrhosis, HFpEF, SVT on low dose Amiodarone and hypotension requiring Midodrine.     5/13/25: JARROD showed EF of 35-40% and severe TR 4+. Followed with successful DCCV to NSR after shock x 3.    PTA Amiodarone dosage increased.    Pt was started on Eliquis. PTA Amiodarone continued at higher dosage at time of discharge.    Called patient to discuss any post hospital d/c questions she may have, review medication changes, and confirm f/u appts.     Patient denied any SOB, chest pain, racing HR or light headedness. Pt is difficult to assess via phone. States her BP's have been in the 90's/40's today. States she did not go to HD today, \"because I have a tummy tape tomorrow.\" Pt takes Midodrine for hypotension. Encouraged pt to discuss BP with providers tomorrow. She said OK and hung up.    Pt is scheduled for an OV on 5/29/25 at 1040 with DEREK Arielle Ferreira at our [place] Office.    Discharged to home with [facility] services. CARMELA Figueroa RN.      "

## 2025-05-22 ENCOUNTER — TELEPHONE (OUTPATIENT)
Dept: INTERNAL MEDICINE | Facility: CLINIC | Age: 65
End: 2025-05-22
Payer: COMMERCIAL

## 2025-05-22 ENCOUNTER — TRANSFERRED RECORDS (OUTPATIENT)
Dept: HEALTH INFORMATION MANAGEMENT | Facility: CLINIC | Age: 65
End: 2025-05-22
Payer: COMMERCIAL

## 2025-05-22 NOTE — TELEPHONE ENCOUNTER
Pt's Care Coordinator with [employer] called the clinic requesting to know when pt is scheduled for her next physical therapy appts.     Provided her with [employer] phone number as per chart review, it appears that this is the company she is currently using.     Thank you,  Laura Cummings RN

## 2025-06-02 ENCOUNTER — TELEPHONE (OUTPATIENT)
Dept: INTERNAL MEDICINE | Facility: CLINIC | Age: 65
End: 2025-06-02
Payer: COMMERCIAL

## 2025-06-02 NOTE — TELEPHONE ENCOUNTER
Home care called the clinic and stated that patient's blood pressure was 101/45. She stated that patient also had a nose bleed on Thursday so she has stopped her blood thinner (Eliquis). Routing to provider to review and advise.     Call office number with any changes in orders: [phone]    Sarah CHANEL RN  [facility] Triage Team

## 2025-06-03 ENCOUNTER — OFFICE VISIT (OUTPATIENT)
Dept: URGENT CARE | Facility: URGENT CARE | Age: 65
End: 2025-06-03
Payer: COMMERCIAL

## 2025-06-03 ENCOUNTER — LAB (OUTPATIENT)
Dept: LAB | Facility: CLINIC | Age: 65
End: 2025-06-03
Payer: COMMERCIAL

## 2025-06-03 VITALS
TEMPERATURE: 97.2 F | HEART RATE: 72 BPM | SYSTOLIC BLOOD PRESSURE: 99 MMHG | OXYGEN SATURATION: 92 % | RESPIRATION RATE: 18 BRPM | DIASTOLIC BLOOD PRESSURE: 50 MMHG

## 2025-06-03 DIAGNOSIS — H69.93 DYSFUNCTION OF BOTH EUSTACHIAN TUBES: Primary | ICD-10-CM

## 2025-06-03 DIAGNOSIS — Z11.1 SCREENING EXAMINATION FOR PULMONARY TUBERCULOSIS: ICD-10-CM

## 2025-06-03 PROCEDURE — 3074F SYST BP LT 130 MM HG: CPT | Performed by: FAMILY MEDICINE

## 2025-06-03 PROCEDURE — 3078F DIAST BP <80 MM HG: CPT | Performed by: FAMILY MEDICINE

## 2025-06-03 PROCEDURE — 36415 COLL VENOUS BLD VENIPUNCTURE: CPT

## 2025-06-03 PROCEDURE — 86481 TB AG RESPONSE T-CELL SUSP: CPT

## 2025-06-03 PROCEDURE — 99213 OFFICE O/P EST LOW 20 MIN: CPT | Performed by: FAMILY MEDICINE

## 2025-06-03 NOTE — PROGRESS NOTES
SUBJECTIVE: Yissel Wetzel is a 65 year old female presenting with a chief complaint of ear congestion.  Onset of symptoms was day(s) ago.      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, initial encounter 2019    End stage renal failure on dialysis (H)     Esophagitis, unspecified     Former tobacco use     HEMORRHAGIC GASTROPATHY     History of blood transfusion     [place]    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) 2025    Iron deficiency anemia, unspecified     Mild persistent asthma     Obesity     Other and unspecified hyperlipidemia     Pneumonia     Pulmonary hypertension (H)     per 2012 echo mod.to severe pulmonary hypertension    Tobacco use disorder dc ed     Type 2 diabetes mellitus (H)     on Insulin- managed by PCP    Unspecified essential hypertension      Allergies   Allergen Reactions    Albuterol      shakiness    Aspirin      gi    Byetta [Exenatide]      gi    Clonidine      constipation    Codeine      vomiting    Ezetimibe      muscle symptoms    Hydralazine      headaches    Lisinopril      hyperkalemia    Metformin Hydrochloride      vomiting    Pravachol [Pravastatin Sodium]      muscle pains    Simvastatin      myalgias    Troglitazone      Social History     Tobacco Use    Smoking status: Former     Current packs/day: 0.00     Average packs/day: 1 pack/day for 22.0 years (22.0 ttl pk-yrs)     Types: Cigarettes     Start date: 10/12/1977     Quit date: 10/12/1999     Years since quittin.6    Smokeless tobacco: Never   Substance Use Topics    Alcohol use: No     Alcohol/week: 0.0 standard drinks of alcohol       ROS:  SKIN: no rash  GI: no vomiting    OBJECTIVE:  BP 99/50   Pulse 72   Temp 97.2  F (36.2  C) (Tympanic)   Resp 18   LMP   (LMP Unknown)   SpO2 92% GENERAL APPEARANCE: healthy, alert and no distress  EYES: EOMI,  PERRL, conjunctiva clear  HENT: ear canals and TM's normal.  Nose and mouth without ulcers, erythema or lesions  SKIN: no suspicious lesions or rashes      ICD-10-CM    1. Dysfunction of both eustachian tubes  H69.93 Adult ENT  Referral        OTC  Fluids/Rest, f/u if worse/not any better

## 2025-06-03 NOTE — PROGRESS NOTES
"Urgent Care Clinic Visit    Chief Complaint   Patient presents with    Urgent Care     \"It feels like my ears won't pop\" - states issue has been going on for approximately a week.  States Dizzy and concerned may be caused by low blood pressure.  Denies - N/V/D               6/3/2025    11:07 AM   Additional Questions   Roomed by Mike Juarez RN   Accompanied by Mahesh - VALERIANO         6/3/2025   Declines Weight   Did patient decline having their weight taken? Yes             "

## 2025-06-05 LAB
GAMMA INTERFERON BACKGROUND BLD IA-ACNC: 0.13 IU/ML
M TB IFN-G BLD-IMP: NEGATIVE
M TB IFN-G CD4+ BCKGRND COR BLD-ACNC: 9.87 IU/ML
MITOGEN IGNF BCKGRD COR BLD-ACNC: 0.01 IU/ML
MITOGEN IGNF BCKGRD COR BLD-ACNC: 0.03 IU/ML
QUANTIFERON MITOGEN: 10 IU/ML
QUANTIFERON NIL TUBE: 0.13 IU/ML
QUANTIFERON TB1 TUBE: 0.14 IU/ML
QUANTIFERON TB2 TUBE: 0.16

## 2025-06-06 ENCOUNTER — HOSPITAL ENCOUNTER (EMERGENCY)
Facility: CLINIC | Age: 65
Discharge: HOME | End: 2025-06-06
Attending: EMERGENCY MEDICINE | Admitting: EMERGENCY MEDICINE
Payer: COMMERCIAL

## 2025-06-06 VITALS
SYSTOLIC BLOOD PRESSURE: 119 MMHG | RESPIRATION RATE: 16 BRPM | BODY MASS INDEX: 29.45 KG/M2 | HEART RATE: 56 BPM | HEIGHT: 60 IN | WEIGHT: 150 LBS | DIASTOLIC BLOOD PRESSURE: 41 MMHG | TEMPERATURE: 97.5 F | OXYGEN SATURATION: 100 %

## 2025-06-06 DIAGNOSIS — R42 DIZZINESS: ICD-10-CM

## 2025-06-06 DIAGNOSIS — D64.9 CHRONIC ANEMIA: ICD-10-CM

## 2025-06-06 DIAGNOSIS — Z79.4 TYPE 2 DIABETES MELLITUS WITH HYPERGLYCEMIA, WITH LONG-TERM CURRENT USE OF INSULIN (H): ICD-10-CM

## 2025-06-06 DIAGNOSIS — E11.65 TYPE 2 DIABETES MELLITUS WITH HYPERGLYCEMIA, WITH LONG-TERM CURRENT USE OF INSULIN (H): ICD-10-CM

## 2025-06-06 DIAGNOSIS — I95.9 TRANSIENT HYPOTENSION: ICD-10-CM

## 2025-06-06 LAB
ANION GAP SERPL CALCULATED.3IONS-SCNC: 17 MMOL/L (ref 7–15)
ATRIAL RATE - MUSE: 113 BPM
BASOPHILS # BLD AUTO: 0 10E3/UL (ref 0–0.2)
BASOPHILS NFR BLD AUTO: 1 %
BUN SERPL-MCNC: 28.2 MG/DL (ref 8–23)
CALCIUM SERPL-MCNC: 9.7 MG/DL (ref 8.8–10.4)
CHLORIDE SERPL-SCNC: 95 MMOL/L (ref 98–107)
CREAT SERPL-MCNC: 4.63 MG/DL (ref 0.51–0.95)
DIASTOLIC BLOOD PRESSURE - MUSE: NORMAL MMHG
EGFRCR SERPLBLD CKD-EPI 2021: 10 ML/MIN/1.73M2
EOSINOPHIL # BLD AUTO: 0.1 10E3/UL (ref 0–0.7)
EOSINOPHIL NFR BLD AUTO: 3 %
ERYTHROCYTE [DISTWIDTH] IN BLOOD BY AUTOMATED COUNT: 17.2 % (ref 10–15)
GLUCOSE SERPL-MCNC: 206 MG/DL (ref 70–99)
HCO3 SERPL-SCNC: 25 MMOL/L (ref 22–29)
HCT VFR BLD AUTO: 28.9 % (ref 35–47)
HGB BLD-MCNC: 8.8 G/DL (ref 11.7–15.7)
IMM GRANULOCYTES # BLD: 0 10E3/UL
IMM GRANULOCYTES NFR BLD: 0 %
INTERPRETATION ECG - MUSE: NORMAL
LYMPHOCYTES # BLD AUTO: 0.8 10E3/UL (ref 0.8–5.3)
LYMPHOCYTES NFR BLD AUTO: 15 %
MCH RBC QN AUTO: 30.3 PG (ref 26.5–33)
MCHC RBC AUTO-ENTMCNC: 30.4 G/DL (ref 31.5–36.5)
MCV RBC AUTO: 100 FL (ref 78–100)
MONOCYTES # BLD AUTO: 0.7 10E3/UL (ref 0–1.3)
MONOCYTES NFR BLD AUTO: 14 %
NEUTROPHILS # BLD AUTO: 3.5 10E3/UL (ref 1.6–8.3)
NEUTROPHILS NFR BLD AUTO: 67 %
NRBC # BLD AUTO: 0 10E3/UL
NRBC BLD AUTO-RTO: 0 /100
P AXIS - MUSE: 58 DEGREES
PLATELET # BLD AUTO: 98 10E3/UL (ref 150–450)
POTASSIUM SERPL-SCNC: 3.5 MMOL/L (ref 3.4–5.3)
PR INTERVAL - MUSE: NORMAL MS
QRS DURATION - MUSE: 144 MS
QT - MUSE: 526 MS
QTC - MUSE: 529 MS
R AXIS - MUSE: 83 DEGREES
RBC # BLD AUTO: 2.9 10E6/UL (ref 3.8–5.2)
SODIUM SERPL-SCNC: 137 MMOL/L (ref 135–145)
SYSTOLIC BLOOD PRESSURE - MUSE: NORMAL MMHG
T AXIS - MUSE: -71 DEGREES
VENTRICULAR RATE- MUSE: 61 BPM
WBC # BLD AUTO: 5.2 10E3/UL (ref 4–11)

## 2025-06-06 PROCEDURE — 99284 EMERGENCY DEPT VISIT MOD MDM: CPT

## 2025-06-06 PROCEDURE — 85025 COMPLETE CBC W/AUTO DIFF WBC: CPT | Performed by: EMERGENCY MEDICINE

## 2025-06-06 PROCEDURE — 36415 COLL VENOUS BLD VENIPUNCTURE: CPT | Performed by: EMERGENCY MEDICINE

## 2025-06-06 PROCEDURE — 93005 ELECTROCARDIOGRAM TRACING: CPT

## 2025-06-06 PROCEDURE — 84132 ASSAY OF SERUM POTASSIUM: CPT | Performed by: EMERGENCY MEDICINE

## 2025-06-06 ASSESSMENT — ACTIVITIES OF DAILY LIVING (ADL)
ADLS_ACUITY_SCORE: 57

## 2025-06-06 ASSESSMENT — COLUMBIA-SUICIDE SEVERITY RATING SCALE - C-SSRS
1. IN THE PAST MONTH, HAVE YOU WISHED YOU WERE DEAD OR WISHED YOU COULD GO TO SLEEP AND NOT WAKE UP?: NO
2. HAVE YOU ACTUALLY HAD ANY THOUGHTS OF KILLING YOURSELF IN THE PAST MONTH?: NO
6. HAVE YOU EVER DONE ANYTHING, STARTED TO DO ANYTHING, OR PREPARED TO DO ANYTHING TO END YOUR LIFE?: NO

## 2025-06-06 NOTE — ED TRIAGE NOTES
Pt arrives via EMS to triage from Dialysis. Pt. States they did not pull dialysis due to hypotension. Pt has clamp on from dialysis on right arm fistula.     Triage Assessment (Adult)       Row Name 06/06/25 1253          Triage Assessment    Airway WDL WDL        Respiratory WDL    Respiratory WDL X  nc at 2lpm        Skin Circulation/Temperature WDL    Skin Circulation/Temperature WDL WDL        Cardiac WDL    Cardiac WDL WDL        Peripheral/Neurovascular WDL    Peripheral Neurovascular WDL X

## 2025-06-06 NOTE — ED NOTES
Pt to D/C to home.  Pt provided with d/c instructions, including new medications, when medications were last given, and when to take them again.  Pt also informed to f/u with PCP as needed.  Pt verbalized understanding of all d/c and f/u instructions.  All questions were answered at this time.  Copy of paperwork sent with pt.

## 2025-06-06 NOTE — ED PROVIDER NOTES
Emergency Department Note      History of Present Illness     Chief Complaint   Hypotension      HPI   Yissel Wetzel is a 65 year old female on hemodialysis with a history of DM2, ESRD, HTN, hypotension, atrial fibrillation, and paroxysmal SVT presenting to the ED for hypotension. She comes from her dialysis clinic and states that they were unable to pull after an hour on dialysis due to hypotension. Prior to this, she reports feeling fine in the morning with a couple bouts of lightheadedness, but felt weak and funny while at her clinic. While there, she took 3x 10 mg of her Midodrine, which didn't seem to resolve her hypotension, resulting in her being referred to the ED. En route to the ED, she received 0.25 L of fluid and reports that her BP delia to 117 and reached 138 at the ED. She typically goes to dialysis 3x a week MWF, and takes 2x 20 mg Midodrine on non-dialysis days and 1x 20 mg and 3x 10mg on dialysis days. She notes being hospitalized 3 weeks ago for tachycardia, in which she underwent a cardioversion and was placed on Eliquis. She recently stopped taking her Eliquis due to it causing an excessive nose bleed. She is on 2L of O2 at home. Denies urinary symptoms, fever, emesis, diarrhea, or cough.     Independent Historian   None    Review of External Notes   Last CBC with platelets, from 5/15/25, where HgB was 9.0.      Baseline HgB is 8.7-10     Past Medical History     Medical History and Problem List   Iron deficiency anemia  HTN  DM2 with ESRD  HLD  ESRP, hemodialysis   Paroxysmal SVT  Thrombocytopenia   Sinus bradycardia  Ascites  Umbilical hernia  Hypotension  Hyperkalemia  Proteinuria  Atrial fibrillation   Asthma  Right brachiocephalic arteriovenous fistula  Tobacco use disorder     Medications   Pacerone  Eliquis  Insulin  Flonase  Proamatine  Amoxil  Phoslo   Pepcid    Surgical History   Past Surgical History:   Procedure Laterality Date    ABDOMINOPLASTY  pt unsure when    abdominoplasty-     ANESTHESIA CARDIOVERSION N/A 2025    Procedure: Anesthesia cardioversion;  Surgeon: GENERIC ANESTHESIA PROVIDER;  Location:  OR     SECTION       x 2    COLONOSCOPY      [facility]    COLONOSCOPY N/A 2021    Procedure: COLONOSCOPY, WITH POLYPECTOMY AND BIOPSY;  Surgeon: Lincoln Farrar MD;  Location:  GI    CREATE FISTULA ARTERIOVENOUS UPPER EXTREMITY Right 2018    Procedure: RIGHT BRACHIAL TO BASILIC ARTERIOVENOUS FISTULA CREATION;  Surgeon: Terry Vizcaino MD;  Location:  SD    CV HEART CATHETERIZATION WITH POSSIBLE INTERVENTION N/A 2021    Procedure: Heart Catheterization with Possible Intervention;  Surgeon: Joseluis Dean MD;  Location:  HEART CARDIAC CATH LAB    CV RIGHT HEART CATH MEASUREMENTS RECORDED N/A 2022    Procedure: Heart Cath Right Heart Cath;  Surgeon: Yan Heredia MD;  Location:  HEART CARDIAC CATH LAB    CV RIGHT HEART CATH MEASUREMENTS RECORDED N/A 10/4/2024    Procedure: Right Heart Catheterization;  Surgeon: Jag Traylor MD;  Location:  HEART CARDIAC CATH LAB    ENT SURGERY  as a child    tonsillectomy    ESOPHAGOSCOPY, GASTROSCOPY, DUODENOSCOPY (EGD), COMBINED N/A 2021    Procedure: ESOPHAGOGASTRODUODENOSCOPY, WITH BIOPSY;  Surgeon: Lincoln Farrar MD;  Location:  GI    EYE SURGERY Left     injections- eye    HYSTERECTOMY  approx     partial hysterectomy-reason bleeding     IR CVC TUNNEL PLACEMENT > 5 YRS OF AGE  2019    IR CVC TUNNEL REMOVAL RIGHT  2020    IR DIALYSIS FISTULOGRAM RIGHT  2019       Physical Exam     Patient Vitals for the past 24 hrs:   BP Temp Temp src Pulse Resp SpO2 Height Weight   25 1341 111/49 -- -- 56 -- 100 % -- --   25 1250 108/52 97.5  F (36.4  C) Temporal 63 16 100 % 1.524 m (5') 68 kg (150 lb)     Physical Exam  Nursing note and vitals reviewed.  Constitutional:  Appears well-developed and  well-nourished.   HENT:   Head:    Atraumatic.   Mouth/Throat:   Oropharynx is clear and moist. No oropharyngeal exudate.   Eyes:    Right cornea opaque. Left eye appears normal.   Neck:    Normal range of motion. Neck supple.      No tracheal deviation present. No thyromegaly present.   Cardiovascular:  Normal rate, regular rhythm, no murmur   Pulmonary/Chest: Breath sounds are clear and equal without wheezes or crackles.  Abdominal:   Soft. Bowel sounds are normal. Exhibits no distension and      no mass. There is no tenderness.      There is no rebound and no guarding.   Musculoskeletal:  Exhibits no edema.   Lymphadenopathy:  No cervical adenopathy.   Neurological:   Alert and oriented to person, place, and time. GCS 15.  CN 2-12 intact.  and proximal upper extremity strength strong and equal.  Bilateral lower extremity strength strong and equal, including strong dorsiflexion and plantarflexion strength.  Sensation intact and equal to the face, arms and legs.  No facial droop or weakness. Normal speech.  Follows commands and answers questions normally.    Skin:    No active bleeding from right arm fistula, removed clamp uneventfully     Diagnostics     Lab Results   Labs Ordered and Resulted from Time of ED Arrival to Time of ED Departure   BASIC METABOLIC PANEL - Abnormal       Result Value    Sodium 137      Potassium 3.5      Chloride 95 (*)     Carbon Dioxide (CO2) 25      Anion Gap 17 (*)     Urea Nitrogen 28.2 (*)     Creatinine 4.63 (*)     GFR Estimate 10 (*)     Calcium 9.7      Glucose 206 (*)    CBC WITH PLATELETS AND DIFFERENTIAL - Abnormal    WBC Count 5.2      RBC Count 2.90 (*)     Hemoglobin 8.8 (*)     Hematocrit 28.9 (*)           MCH 30.3      MCHC 30.4 (*)     RDW 17.2 (*)     Platelet Count 98 (*)     % Neutrophils 67      % Lymphocytes 15      % Monocytes 14      % Eosinophils 3      % Basophils 1      % Immature Granulocytes 0      NRBCs per 100 WBC 0      Absolute Neutrophils  3.5      Absolute Lymphocytes 0.8      Absolute Monocytes 0.7      Absolute Eosinophils 0.1      Absolute Basophils 0.0      Absolute Immature Granulocytes 0.0      Absolute NRBCs 0.0         Imaging   No orders to display       EKG   ECG taken at 1416, ECG read at 1423  Sinus rhythm  Right bundle branch block  Inferior infarct, age undetermined  T wave abnormality, consider lateral ischemia  Abnormal ECG   No significant change as compared to prior, dated 5/14/25.  Rate 61 bpm. IL interval * ms. QRS duration 144 ms. QT/QTc 526/529 ms. P-R-T axes 58 83 -71.    Independent Interpretation   None    ED Course      Medications Administered   Medications - No data to display    Procedures   Procedures     Discussion of Management   None    ED Course   ED Course as of 06/06/25 1551   Fri Jun 06, 2025   1358 I obtained history and examined the patient as noted above.         Additional Documentation  None    Medical Decision Making / Diagnosis     CMS Diagnoses: None    MIPS   None     [facility]   Yissel Wetzel is a 65 year old female with extensive past medical history including ESRD on hemodialysis Monday, Wednesday, Friday who arrives to the emergency department from dialysis due to hypotension.  The patient reports that she normally takes midodrine due to hypotension and that during her dialysis run she had low blood pressure in the 80s which only came up to 91 once.  She took her midodrine and currently her blood pressure is normal.  She is not currently symptomatic.  I did not feel that there was any concern for sepsis or acute blood loss.  She has chronic anemia with a hemoglobin unchanged from her prior most recent hemoglobins.  She does not have any fever, tachycardia or symptoms concerning for infection and her white blood cell count is normal.  She was given IV fluids and dialysis and feels improved without any current dizziness and she has normal blood pressures so I felt she be safely discharged home.  She is  instructed to drink plenty of fluids and have dialysis finished tomorrow.  I did not feel there was any concern for stroke, intracranial bleeding or cardiac problem.  ECG does not show any sign of arrhythmia or ischemia.  She has diabetes mellitus and elevated blood glucose but no sign of DKA.  She was told to return for any concerning symptoms especially any fever, dizziness, chest pain, vomiting or other concerning symptoms.  She was told to follow-up with her primary medical doctor on Monday.    Disposition   The patient was discharged.     Diagnosis     ICD-10-CM    1. Dizziness  R42       2. Transient hypotension  I95.9       3. Chronic anemia  D64.9       4. Type 2 diabetes mellitus with hyperglycemia, with long-term current use of insulin (H)  E11.65     Z79.4            Discharge Medications   New Prescriptions    No medications on file         Scribe Disclosure:  I, Eduardo Ayers, am serving as a scribe at 2:50 PM on 6/6/2025 to document services personally performed by Esperanza Winkler MD based on my observations and the provider's statements to me.        Esperanza Winkler MD  06/06/25 1947       Esperanza Winkler MD  06/06/25 1947

## 2025-06-08 NOTE — PROGRESS NOTES
No show    Impression:  Pulmonary hypertension  Hypertension  ESRD with dialysis dependence  Diabetes  Iron deficiency    Yissel Wetzel is a 65 year old female on hemodialysis with a history of DM2, ESRD, HTN, hypotension, atrial fibrillation, and paroxysmal SVT presenting to the ED for hypotension. She comes from her dialysis clinic and states that they were unable to pull after an hour on dialysis due to hypotension. Prior to this, she reports feeling fine in the morning with a couple bouts of lightheadedness, but felt weak and funny while at her clinic. While there, she took 3x 10 mg of her Midodrine, which didn't seem to resolve her hypotension, resulting in her being referred to the ED. En route to the ED, she received 0.25 L of fluid and reports that her BP delia to 117 and reached 138 at the ED. She typically goes to dialysis 3x a week MWF, and takes 2x 20 mg Midodrine on non-dialysis days and 1x 20 mg and 3x 10mg on dialysis days. She notes being hospitalized 3 weeks ago for tachycardia, in which she underwent a cardioversion and was placed on Eliquis. She recently stopped taking her Eliquis due to it causing an excessive nose bleed. She is on 2L of O2 at home. Denies urinary symptoms, fever, emesis, diarrhea, or cough       [facility]   [address]   [address]   [place], [place] [id number] [phone]                Severe pulmonary hypertension primarily secondary to intrinsic pulmonary vascular disease  Junctional rhythm  ESRD  R pleural effusion, chronic  History of SVT  DM2   COPD                Right sided filling pressures are severely elevated.    Left sided filling pressures are severely elevated.    Severely elevated pulmonary artery hypertension.    Normal cardiac output level.    Hemodynamic data has been modified in Epic per physician review.     /59/85  RA --/--/20  /20  /35/66  PCW --/--/25    PA sat 51%  AO sat 90%    CO by SHANA 3.99 (2.45)    PVR 10 Right sided  filling pressures are severely elevated. Left sided filling pressures are severely elevated. Severely elevated pulmonary artery hypertension. Normal cardiac output level. Hemodynamic data has been modified in Epic per physician review.       Echocardiogram 2024      Name: ELIECER CHACON  MRN: [id number]  : 1960  Study Date: 2024 02:53 PM  Age: 63 yrs  Gender: Female  Patient Location: [facility]  Reason For Study: Pulmonary hypertension (H), Shortness of breath  Ordering Physician: JARRED OLMSTEAD  Referring Physician: JARRED OLMSTEAD  Performed By: Katheryn Carpenter     BSA: 1.6 m2  Height: 60 in  Weight: 138 lb  HR: 65  BP: 116/51 mmHg  ___________________________________________________________  Interpretation Summary     1. Left ventricular systolic function is normal. The visual ejection fraction  is 60-65%.  2. No regional wall motion abnormalities noted.  3. The right ventricle is normal in structure, function and size.  4. The left atrium is severely dilated.  5. There is mild (1+) mitral regurgitation.  6. Mild valvular aortic stenosis; mean gradient 10 mmHg, peak velocity 2.2  m/sec.  7. The right ventricular systolic pressure is approximated at 33mmHg plus the  right atrial pressure. IVC diameter <2.1 cm collapsing >50% with sniff  suggests a normal RA pressure of 3 mmHg.  ______________________________________________________________________________  Left Ventricle  The left ventricle is normal in size. There is normal left ventricular wall  thickness. Left ventricular systolic function is normal. The visual ejection  fraction is 60-65%. No regional wall motion abnormalities noted.     Right Ventricle  The right ventricle is normal in structure, function and size.     Atria  The left atrium is severely dilated. The right atrium is severely dilated.  There is no color Doppler evidence of an atrial shunt.     Mitral Valve  There is mild mitral annular calcification. There is  mild (1+) mitral  regurgitation.     Tricuspid Valve  The tricuspid valve is normal in structure and function. There is mild (1+)  tricuspid regurgitation. The right ventricular systolic pressure is  approximated at 33mmHg plus the right atrial pressure.     Aortic Valve  Mild valvular aortic stenosis.     Pulmonic Valve  The pulmonic valve is not well seen, but is grossly normal.     Vessels  Normal size aorta. IVC diameter <2.1 cm collapsing >50% with sniff suggests a  normal RA pressure of 3 mmHg.     Pericardium  There is no pericardial effusion.     Rhythm  Sinus rhythm was noted.  ______________________________________________________________________________  MMode/2D Measurements & Calculations  IVSd: 1.0 cm     LVIDd: 4.3 cm  LVIDs: 3.0 cm  LVPWd: 0.90 cm  FS: 30.2 %  LV mass(C)d: 132.7 grams  LV mass(C)dI: 83.3 grams/m2  Ao root diam: 3.3 cm  LA dimension: 3.1 cm  asc Aorta Diam: 3.1 cm  LA/Ao: 0.94  LVOT diam: 2.0 cm  LVOT area: 3.1 cm2  Ao root diam index Ht(cm/m): 2.2  Ao root diam index BSA (cm/m2): 2.1  Asc Ao diam index BSA (cm/m2): 1.9  Asc Ao diam index Ht(cm/m): 2.0  LA Volume (BP): 50.4 ml     LA Volume Index (BP): 31.7 ml/m2  RWT: 0.42     Doppler Measurements & Calculations  MV E max nguyễn: 141.0 cm/sec  MV dec slope: 671.7 cm/sec2  MV dec time: 0.21 sec  Ao V2 max: 222.0 cm/sec  Ao max P.0 mmHg  Ao V2 mean: 143.0 cm/sec  Ao mean P.5 mmHg  Ao V2 VTI: 40.1 cm  RADHA(I,D): 1.7 cm2  RADHA(V,D): 1.4 cm2  LV V1 max P.1 mmHg  LV V1 max: 101.0 cm/sec  LV V1 VTI: 21.9 cm  SV(LVOT): 68.8 ml  SI(LVOT): 43.2 ml/m2  PA acc time: 0.06 sec  TR max nguyễn: 281.0 cm/sec  TR max P.9 mmHg  AV Nguyễn Ratio (DI): 0.45  RADHA Index (cm2/m2): 1.1  E/E' av.2  Lateral E/e': 15.4

## 2025-06-09 ENCOUNTER — TELEPHONE (OUTPATIENT)
Dept: INTERNAL MEDICINE | Facility: CLINIC | Age: 65
End: 2025-06-09
Payer: COMMERCIAL

## 2025-06-09 DIAGNOSIS — R18.8 OTHER ASCITES: Primary | ICD-10-CM

## 2025-06-09 NOTE — TELEPHONE ENCOUNTER
Berkley PTA from [facility] called stating pts BP from 6/5/25 was 98/46. Pt was asymptotic.     Please also see note from 6/2/25.     Please call the office back at [phone] if any changes/updates from PCP.

## 2025-06-10 NOTE — TELEPHONE ENCOUNTER
Received a call from IRMA Gooden with [facility], stating the patient's blood pressure today was 96/44, HR 64. Patient did complain of some dizziness but the patient informed Berkley that she has been experiencing this dizziness off and on for the past couple of months.     Patient is scheduled to see Cardiology on Thursday.     Appointments in Next Year      Jun 12, 2025 12:30 PM  (Arrive by 12:20 PM)  RETURN PRIMARY PULMONARY with Jose Redd MD  [facility] ([facility]) [phone]     Jun 24, 2025 12:40 PM  (Arrive by 12:30 PM)  Return EP with Snehal Ferreira PA-C  [facility] ([facility]) [phone]     Will route to PCP for review. Please route back to triage if anything additional is needed at this time.     Thank you,  Josee Levi RN

## 2025-06-12 ENCOUNTER — LAB (OUTPATIENT)
Dept: LAB | Facility: CLINIC | Age: 65
End: 2025-06-12
Payer: COMMERCIAL

## 2025-06-12 ENCOUNTER — OFFICE VISIT (OUTPATIENT)
Dept: CARDIOLOGY | Facility: CLINIC | Age: 65
End: 2025-06-12
Payer: COMMERCIAL

## 2025-06-12 VITALS — OXYGEN SATURATION: 98 % | DIASTOLIC BLOOD PRESSURE: 63 MMHG | SYSTOLIC BLOOD PRESSURE: 99 MMHG | HEART RATE: 54 BPM

## 2025-06-12 DIAGNOSIS — I27.20 PULMONARY HTN (H): Primary | ICD-10-CM

## 2025-06-12 DIAGNOSIS — I27.20 PULMONARY HYPERTENSION, UNSPECIFIED (H): ICD-10-CM

## 2025-06-12 DIAGNOSIS — R06.02 SOB (SHORTNESS OF BREATH): ICD-10-CM

## 2025-06-12 LAB
ALBUMIN SERPL BCG-MCNC: 3.8 G/DL (ref 3.5–5.2)
ALP SERPL-CCNC: 77 U/L (ref 40–150)
ALT SERPL W P-5'-P-CCNC: 6 U/L (ref 0–50)
ANION GAP SERPL CALCULATED.3IONS-SCNC: 15 MMOL/L (ref 7–15)
AST SERPL W P-5'-P-CCNC: 13 U/L (ref 0–45)
BILIRUB SERPL-MCNC: 0.3 MG/DL
BUN SERPL-MCNC: 20.9 MG/DL (ref 8–23)
CALCIUM SERPL-MCNC: 10.3 MG/DL (ref 8.8–10.4)
CHLORIDE SERPL-SCNC: 96 MMOL/L (ref 98–107)
CREAT SERPL-MCNC: 4.68 MG/DL (ref 0.51–0.95)
EGFRCR SERPLBLD CKD-EPI 2021: 10 ML/MIN/1.73M2
ERYTHROCYTE [DISTWIDTH] IN BLOOD BY AUTOMATED COUNT: 17.6 % (ref 10–15)
GLUCOSE SERPL-MCNC: 153 MG/DL (ref 70–99)
HCO3 SERPL-SCNC: 28 MMOL/L (ref 22–29)
HCT VFR BLD AUTO: 31.5 % (ref 35–47)
HGB BLD-MCNC: 9.4 G/DL (ref 11.7–15.7)
MCH RBC QN AUTO: 30.2 PG (ref 26.5–33)
MCHC RBC AUTO-ENTMCNC: 29.8 G/DL (ref 31.5–36.5)
MCV RBC AUTO: 101 FL (ref 78–100)
NT-PROBNP SERPL-MCNC: ABNORMAL PG/ML (ref 0–226)
PLATELET # BLD AUTO: 97 10E3/UL (ref 150–450)
POTASSIUM SERPL-SCNC: 3.6 MMOL/L (ref 3.4–5.3)
PROT SERPL-MCNC: 6.5 G/DL (ref 6.4–8.3)
RBC # BLD AUTO: 3.11 10E6/UL (ref 3.8–5.2)
SODIUM SERPL-SCNC: 139 MMOL/L (ref 135–145)
WBC # BLD AUTO: 6.4 10E3/UL (ref 4–11)

## 2025-06-12 NOTE — PROGRESS NOTES
Atrial fibrillation with RVR, s/p JARROD guided cardioversion on 5/13   Hx SVT/junctional rhythm  Elevated troponin  Dizziness   Vasovagal episode (5/14)  Syncopal episode (5/16)  DM II, insulin dependent   ESRD on dialysis MWF   Mild hyponatremia   Chronic anemia  Chronic thrombocytopenia  COPD  Chronic respiratory failure with hypoxia  Severe pulmonary hypertension felt to be due to intrinsic pulmonary vascular disease   History of cirrhosis due to severe pulmonary HTN     Plan:  RTC early July  Discussed case with Dr. Jacobo  Appears to have right heart failure with severe PH and may need to admit and remove fluid with longer, slower daily dialysis  There is no interim history of increasing shortness of breath, orthopnea, PND, ankle edema, increasing abdominal girth, palpitation, pre-syncope, syncope, bleeding or thromboembolic complications.  The patient is taking medication regularly, following a low salt diet, and exercising regularly as outlined.         06/06/25 06/12/25 12:25   /41 99/63   Temp 97.5  F (36.4  C)    Pulse 56 54   Resp 16    SpO2 100 % 98 %   Height 1.524 m (5')  --  [1]   BMI (Calculated) 29.29    Weight (lbs) 150 lb  --  [2]   Weight (kgs) 68 kg (150 lb)  --  [2]       Wt Readings from Last 24 Encounters:   06/06/25 68 kg (150 lb)   05/18/25 69.3 kg (152 lb 11.2 oz)   10/04/24 65.9 kg (145 lb 4.8 oz)   08/20/24 67.2 kg (148 lb 3.2 oz)   07/11/24 65 kg (143 lb 3.2 oz)   01/25/24 62.6 kg (138 lb)   01/06/24 63.5 kg (140 lb)   11/15/23 64.4 kg (142 lb)   09/26/23 64.7 kg (142 lb 9.6 oz)   09/09/23 64.2 kg (141 lb 8.6 oz)   01/05/23 62.9 kg (138 lb 9.6 oz)   11/25/22 59.3 kg (130 lb 11.7 oz)   11/03/22 61.4 kg (135 lb 4.8 oz)   06/20/22 65.8 kg (145 lb)   04/05/22 70.3 kg (155 lb)   07/25/21 77.5 kg (170 lb 14.4 oz)   07/16/21 79.4 kg (175 lb 1.6 oz)   05/27/21 71.7 kg (158 lb)   03/04/21 72.8 kg (160 lb 9.6 oz)   10/14/20 73.3 kg (161 lb 9.6 oz)   08/05/20 74.8 kg (165 lb)   05/07/20 77.1  kg (170 lb)   04/22/20 77.1 kg (170 lb)   01/20/20 79.2 kg (174 lb 9.6 oz)        Current Outpatient Medications   Medication Sig Dispense Refill    acetaminophen (TYLENOL) 500 MG tablet Take 500-1,000 mg by mouth every 6 hours as needed.      amiodarone (PACERONE) 200 MG tablet Take 1 tablet (200 mg) by mouth daily. 30 tablet 0    amoxicillin (AMOXIL) 500 MG tablet Take 500 mg by mouth 2 times daily. For ten days      blood glucose (ACCU-CHEK GUIDE TEST) test strip Use to test blood sugars 4 times daily or as directed. 400 strip 2    calcium acetate (PHOSLO) 667 MG CAPS capsule Take 667 mg by mouth 3 times daily (with meals)      calcium acetate (PHOSLO) 667 MG CAPS capsule Take 667 mg by mouth Take with snacks or supplements      Continuous Glucose  (FREESTYLE LATOSHA 2 READER) AYDIN Use to read blood sugars as per 's instructions. 1 each 0    Continuous Glucose Sensor (FREESTYLE LATOSHA 2 SENSOR) MISC Change every 14 days. 6 each 1    famotidine (PEPCID) 20 MG tablet Take 20 mg by mouth 2 times daily as needed.      guaiFENesin 1200 MG TB12 Take 1 tablet by mouth every 12 hours as needed (cough/congestion).      insulin glargine (LANTUS PEN) 100 UNIT/ML pen Inject 11 Units subcutaneously at bedtime. 15 mL 3    insulin regular (NOVOLIN R FLEXPEN) 100 UNIT/ML pen INJECT 9-12 UNITS BEFORE BREAKFAST, LUNCH, AND DINNER PLUS A CORRECTIVE SCALE OF 2 UNITS FOR EVERY 50 OVER 150 - MAXIMUM 20 unit(s) PER DAY 15 mL 11    midodrine (PROAMATINE) 10 MG tablet Take 1 tablet (10 mg) by mouth daily as needed (Hypotension, dizziness). 30 tablet 0    midodrine (PROAMATINE) 10 MG tablet Take 10 mg by mouth Every Mon, Wed, Fri Morning      apixaban ANTICOAGULANT (ELIQUIS) 5 MG tablet Take 1 tablet (5 mg) by mouth 2 times daily. (Patient not taking: Reported on 6/12/2025) 60 tablet 0    fluticasone (FLONASE) 50 MCG/ACT nasal spray Spray 1 spray into both nostrils 2 times daily. (Patient not taking: Reported on  2025) 16 g 0    midodrine (PROAMATINE) 10 MG tablet Take 30 mg by mouth daily. On Monday, Wednesday, Friday before dialysis      midodrine (PROAMATINE) 10 MG tablet Take 20 mg by mouth 2 times daily. On non-dialysis days (Tuesday, Thursday, Saturday, )       No current facility-administered medications for this visit.         Latest Reference Range & Units 25 14:49   Sodium 135 - 145 mmol/L 137   Potassium 3.4 - 5.3 mmol/L 3.5   Chloride 98 - 107 mmol/L 95 (L)   Carbon Dioxide (CO2) 22 - 29 mmol/L 25   Urea Nitrogen 8.0 - 23.0 mg/dL 28.2 (H)   Creatinine 0.51 - 0.95 mg/dL 4.63 (H)   GFR Estimate >60 mL/min/1.73m2 10 (L)   Calcium 8.8 - 10.4 mg/dL 9.7   Anion Gap 7 - 15 mmol/L 17 (H)       Name: ELIECER CHACON  MRN: [id number]  : 1960  Study Date: 2025 09:11 AM  Age: 64 yrs  Gender: Female  Patient Location: [facility]  Reason For Study: Atrial Fibrillation  Ordering Physician: ANTHONY DOYLE  Performed By: Miles Church     BSA: 1.6 m2  Height: 60 in  Weight: 148 lb  BP: 72/46 mmHg  ______________________________________________________________________________  Procedure  Limited Echocardiogram with two-dimensional, color and spectral Doppler.  ______________________________________________________________________________  Interpretation Summary     The rhythm was rapid atrial fibrillation.  With rapid atrial fibrillation, the visual approximation of left ventricular  systolic function may be falsely decreased.  Left ventricular systolic function is mild to moderately reduced.  The visual ejection fraction is 40-45%.  The left ventricle is normal in size.  Moderately decreased right ventricular systolic function  The right ventricle is mildly dilated.  Flattened septum is consistent with RV pressure overload.  There is moderate biatrial enlargement.  Right ventricular systolic pressure is elevated, consistent with moderate  pulmonary hypertension.  Small right pleural effusion      Siince the last study 2/27/2024 there has been interim deelopment of atrial  fibrillation with a rapid ventricular response rate, a moderate decline in  estimated global LV/RV systolic performance and an increase in estimated  Pulmonary artery pressures.  ______________________________________________________________________________  Left Ventricle  The left ventricle is normal in size. There is normal left ventricular wall  thickness. With rapid atrial fibrillation, the visual approximation of left  ventricular systolic function may be falsely decreased. Left ventricular  systolic function is mild to moderately reduced. The visual ejection fraction  is 40-45%. Left ventricular diastolic function is indeterminate. Septal motion  is consistent with conduction abnormality. Flattened septum is consistent with  RV pressure overload. There is no thrombus seen in the left ventricle.     Right Ventricle  The right ventricle is mildly dilated. Moderately decreased right ventricular  systolic function. There is no mass or thrombus in the right ventricle.     Atria  There is moderate biatrial enlargement. There is no atrial shunt seen. The  left atrial appendage is not well visualized.     Mitral Valve  Calcified mitral apparatus. There is no mitral regurgitation noted. There is  no mitral valve stenosis.     Tricuspid Valve  Normal tricuspid valve. The right ventricular systolic pressure is elevated at  55.4 mmHg. There is moderate (2+) tricuspid regurgitation. Right ventricular  systolic pressure is elevated, consistent with moderate pulmonary  hypertension. There is no tricuspid stenosis.     Aortic Valve  There is mild trileaflet aortic sclerosis. No aortic regurgitation is present.     Pulmonic Valve  The pulmonic valve is not well visualized.     Vessels  The aortic root is normal size. Normal size ascending aorta. The inferior vena  cava is normal.     Pericardium  Small right pleural effusion.     Rhythm  The rhythm  was rapid atrial fibrillation.  ______________________________________________________________________________  MMode/2D Measurements & Calculations  LVIDd: 3.9 cm  LVIDs: 2.9 cm  FS: 25.6 %     Doppler Measurements & Calculations  TR max artur: 372.0 cm/sec  TR max P.4 mmHg         Right sided filling pressures are severely elevated.    Left sided filling pressures are severely elevated.    Severely elevated pulmonary artery hypertension.    Normal cardiac output level.    Hemodynamic data has been modified in [facility] per physician review.         Plan     Follow bedrest per protocol   Continued medical management and lifestyle modifications for cardiovascular risk factor optimizations.   Return to the primary inpatient team for further evaluation and managmenet     Hemodynamics    /59/85  RA --/--/20  /20  /35/66  PCW --/--/25    PA sat 51%  AO sat 90%    CO by SHANA 3.99 (2.45)    PVR 10 Right sided filling pressures are severely elevated. Left sided filling pressures are severely elevated. Severely elevated pulmonary artery hypertension. Normal cardiac output level. Hemodynamic data has been modified in [facility] per physician review.     Summary    Detailed    Severe pulm HTN     Pressures Phase: Baseline     Time Systolic (mmHg) Diastolic (mmHg) Mean (mmHg) A Wave (mmHg) V Wave (mmHg) EDP (mmHg) Max dp/dt (mmHg/sec) HR (bpm) Content (mL/dL) SAT (%)   RA Pressures  9:39 AM   17    23    22      60        RV Pressures  9:18 AM       672           9:40     5       19     62        PA Pressures  9:41     35    66        70        PCW Pressures  9:40 AM   25    25    29      61        AO Pressures  9:30     59    85        59          Blood Flow Results Phase: Baseline     Time Results Indexed Values (L/min/m2)   QP  9:18 AM 3.99 L/min    2.45      QS  9:18 AM 3.99 L/min    2.45        Blood Oximetry Phase: Baseline     Time Hb SAT(%) PO2 Content (mL/dL) PA Sat (%)   PA  9:18 AM  51 %       51      Art  9:18 AM  90 %     11.75         Cardiac Output Phase: Baseline     Time TDCO (L/min) TDCI (L/min/m2) Audrey C.O. (L/min) Audrey C.I. (L/min/m2) Audrey HR (bpm)   Cardiac Output Results  9:18 AM 3.25    2    3.99    2.45         9:42 AM 3.25            Resistance Results Phase: Baseline     Time PVR SVR TPR TVR PVR/SVR TPR/TVR   Resistance Results (Metric)  9:18 .83 dsc-5    1363.03 dsc-5    1322.94 dsc-5    1703.79 dsc-5    0.6    0.78      Resistance Results (Wood)  9:18 AM 10.28 AGUSTIN    17.04 AGUSTIN    16.54 AGUSTIN    21.3 AGUSTIN    0.6    0.78        Stoke Volume Results Phase: Baseline     Time RVSW (gm*m) LVSW (gm*m) RVSW-I (gm*m/m2) LVSW-I (gm*m/m2)   Stroke Work Results  9:18 AM 37.99    55.19    23.37    33.95        Hemodynamic Waveforms -- Encounter Level:

## 2025-06-12 NOTE — LETTER
6/12/2025    Gigi Martin MD  [address] [place] [id number]    RE: Yissel Wetzel       Dear Colleague,     I had the pleasure of seeing Yisesl Wetzel in the [facility].  No show    Impression:  Pulmonary hypertension  Hypertension  ESRD with dialysis dependence  Diabetes  Iron deficiency    Yissel Wetzel is a 65 year old female on hemodialysis with a history of DM2, ESRD, HTN, hypotension, atrial fibrillation, and paroxysmal SVT presenting to the ED for hypotension. She comes from her dialysis clinic and states that they were unable to pull after an hour on dialysis due to hypotension. Prior to this, she reports feeling fine in the morning with a couple bouts of lightheadedness, but felt weak and funny while at her clinic. While there, she took 3x 10 mg of her Midodrine, which didn't seem to resolve her hypotension, resulting in her being referred to the ED. En route to the ED, she received 0.25 L of fluid and reports that her BP delia to 117 and reached 138 at the ED. She typically goes to dialysis 3x a week MWF, and takes 2x 20 mg Midodrine on non-dialysis days and 1x 20 mg and 3x 10mg on dialysis days. She notes being hospitalized 3 weeks ago for tachycardia, in which she underwent a cardioversion and was placed on Eliquis. She recently stopped taking her Eliquis due to it causing an excessive nose bleed. She is on 2L of O2 at home. Denies urinary symptoms, fever, emesis, diarrhea, or cough       [facility]   [address] [facility]   [address] [place] [id number]   [phone]                Severe pulmonary hypertension primarily secondary to intrinsic pulmonary vascular disease  Junctional rhythm  ESRD  R pleural effusion, chronic  History of SVT  DM2   COPD                 Right sided filling pressures are severely elevated.     Left sided filling pressures are severely elevated.     Severely elevated pulmonary artery hypertension.     Normal cardiac output  level.     Hemodynamic data has been modified in [facility] per physician review.     /59/85  RA --/--/20  /20  /35/66  PCW --/--/25    PA sat 51%  AO sat 90%    CO by SHANA 3.99 (2.45)    PVR 10 Right sided filling pressures are severely elevated. Left sided filling pressures are severely elevated. Severely elevated pulmonary artery hypertension. Normal cardiac output level. Hemodynamic data has been modified in [facility] per physician review.       Echocardiogram 2024      Name: ELIECER CHACON  MRN: [id number]  : 1960  Study Date: 2024 02:53 PM  Age: 63 yrs  Gender: Female  Patient Location: [facility]  Reason For Study: Pulmonary hypertension (H), Shortness of breath  Ordering Physician: JARRED OLMSTEAD  Referring Physician: JARRED OLMSTEAD  Performed By: Katheryn Carpenter     BSA: 1.6 m2  Height: 60 in  Weight: 138 lb  HR: 65  BP: 116/51 mmHg  ___________________________________________________________  Interpretation Summary     1. Left ventricular systolic function is normal. The visual ejection fraction  is 60-65%.  2. No regional wall motion abnormalities noted.  3. The right ventricle is normal in structure, function and size.  4. The left atrium is severely dilated.  5. There is mild (1+) mitral regurgitation.  6. Mild valvular aortic stenosis; mean gradient 10 mmHg, peak velocity 2.2  m/sec.  7. The right ventricular systolic pressure is approximated at 33mmHg plus the  right atrial pressure. IVC diameter <2.1 cm collapsing >50% with sniff  suggests a normal RA pressure of 3 mmHg.  ______________________________________________________________________________  Left Ventricle  The left ventricle is normal in size. There is normal left ventricular wall  thickness. Left ventricular systolic function is normal. The visual ejection  fraction is 60-65%. No regional wall motion abnormalities noted.     Right Ventricle  The right ventricle is normal in structure, function and  size.     Atria  The left atrium is severely dilated. The right atrium is severely dilated.  There is no color Doppler evidence of an atrial shunt.     Mitral Valve  There is mild mitral annular calcification. There is mild (1+) mitral  regurgitation.     Tricuspid Valve  The tricuspid valve is normal in structure and function. There is mild (1+)  tricuspid regurgitation. The right ventricular systolic pressure is  approximated at 33mmHg plus the right atrial pressure.     Aortic Valve  Mild valvular aortic stenosis.     Pulmonic Valve  The pulmonic valve is not well seen, but is grossly normal.     Vessels  Normal size aorta. IVC diameter <2.1 cm collapsing >50% with sniff suggests a  normal RA pressure of 3 mmHg.     Pericardium  There is no pericardial effusion.     Rhythm  Sinus rhythm was noted.  ______________________________________________________________________________  MMode/2D Measurements & Calculations  IVSd: 1.0 cm     LVIDd: 4.3 cm  LVIDs: 3.0 cm  LVPWd: 0.90 cm  FS: 30.2 %  LV mass(C)d: 132.7 grams  LV mass(C)dI: 83.3 grams/m2  Ao root diam: 3.3 cm  LA dimension: 3.1 cm  asc Aorta Diam: 3.1 cm  LA/Ao: 0.94  LVOT diam: 2.0 cm  LVOT area: 3.1 cm2  Ao root diam index Ht(cm/m): 2.2  Ao root diam index BSA (cm/m2): 2.1  Asc Ao diam index BSA (cm/m2): 1.9  Asc Ao diam index Ht(cm/m): 2.0  LA Volume (BP): 50.4 ml     LA Volume Index (BP): 31.7 ml/m2  RWT: 0.42     Doppler Measurements & Calculations  MV E max nguyễn: 141.0 cm/sec  MV dec slope: 671.7 cm/sec2  MV dec time: 0.21 sec  Ao V2 max: 222.0 cm/sec  Ao max P.0 mmHg  Ao V2 mean: 143.0 cm/sec  Ao mean P.5 mmHg  Ao V2 VTI: 40.1 cm  RADHA(I,D): 1.7 cm2  RADHA(V,D): 1.4 cm2  LV V1 max P.1 mmHg  LV V1 max: 101.0 cm/sec  LV V1 VTI: 21.9 cm  SV(LVOT): 68.8 ml  SI(LVOT): 43.2 ml/m2  PA acc time: 0.06 sec  TR max nguyễn: 281.0 cm/sec  TR max P.9 mmHg  AV Nguyễn Ratio (DI): 0.45  RADHA Index (cm2/m2): 1.1  E/E' av.2  Lateral E/e': 15.4    Atrial  fibrillation with RVR, s/p JARROD guided cardioversion on 5/13   Hx SVT/junctional rhythm  Elevated troponin  Dizziness   Vasovagal episode (5/14)  Syncopal episode (5/16)  DM II, insulin dependent   ESRD on dialysis MWF   Mild hyponatremia   Chronic anemia  Chronic thrombocytopenia  COPD  Chronic respiratory failure with hypoxia  Severe pulmonary hypertension felt to be due to intrinsic pulmonary vascular disease   History of cirrhosis due to severe pulmonary HTN     Plan:  RTC early July  Discussed case with Dr. Jacobo  Appears to have right heart failure with severe PH and may need to admit and remove fluid with longer, slower daily dialysis  There is no interim history of increasing shortness of breath, orthopnea, PND, ankle edema, increasing abdominal girth, palpitation, pre-syncope, syncope, bleeding or thromboembolic complications.  The patient is taking medication regularly, following a low salt diet, and exercising regularly as outlined.         06/06/25 06/12/25 12:25   /41 99/63   Temp 97.5  F (36.4  C)    Pulse 56 54   Resp 16    SpO2 100 % 98 %   Height 1.524 m (5')  --  [1]   BMI (Calculated) 29.29    Weight (lbs) 150 lb  --  [2]   Weight (kgs) 68 kg (150 lb)  --  [2]       Wt Readings from Last 24 Encounters:   06/06/25 68 kg (150 lb)   05/18/25 69.3 kg (152 lb 11.2 oz)   10/04/24 65.9 kg (145 lb 4.8 oz)   08/20/24 67.2 kg (148 lb 3.2 oz)   07/11/24 65 kg (143 lb 3.2 oz)   01/25/24 62.6 kg (138 lb)   01/06/24 63.5 kg (140 lb)   11/15/23 64.4 kg (142 lb)   09/26/23 64.7 kg (142 lb 9.6 oz)   09/09/23 64.2 kg (141 lb 8.6 oz)   01/05/23 62.9 kg (138 lb 9.6 oz)   11/25/22 59.3 kg (130 lb 11.7 oz)   11/03/22 61.4 kg (135 lb 4.8 oz)   06/20/22 65.8 kg (145 lb)   04/05/22 70.3 kg (155 lb)   07/25/21 77.5 kg (170 lb 14.4 oz)   07/16/21 79.4 kg (175 lb 1.6 oz)   05/27/21 71.7 kg (158 lb)   03/04/21 72.8 kg (160 lb 9.6 oz)   10/14/20 73.3 kg (161 lb 9.6 oz)   08/05/20 74.8 kg (165 lb)   05/07/20 77.1 kg (170  lb)   04/22/20 77.1 kg (170 lb)   01/20/20 79.2 kg (174 lb 9.6 oz)        Current Outpatient Medications   Medication Sig Dispense Refill     acetaminophen (TYLENOL) 500 MG tablet Take 500-1,000 mg by mouth every 6 hours as needed.       amiodarone (PACERONE) 200 MG tablet Take 1 tablet (200 mg) by mouth daily. 30 tablet 0     amoxicillin (AMOXIL) 500 MG tablet Take 500 mg by mouth 2 times daily. For ten days       blood glucose (ACCU-CHEK GUIDE TEST) test strip Use to test blood sugars 4 times daily or as directed. 400 strip 2     calcium acetate (PHOSLO) 667 MG CAPS capsule Take 667 mg by mouth 3 times daily (with meals)       calcium acetate (PHOSLO) 667 MG CAPS capsule Take 667 mg by mouth Take with snacks or supplements       Continuous Glucose  (FREESTYLE LATOSHA 2 READER) AYDIN Use to read blood sugars as per 's instructions. 1 each 0     Continuous Glucose Sensor (FREESTYLE LATOSHA 2 SENSOR) MISC Change every 14 days. 6 each 1     famotidine (PEPCID) 20 MG tablet Take 20 mg by mouth 2 times daily as needed.       guaiFENesin 1200 MG TB12 Take 1 tablet by mouth every 12 hours as needed (cough/congestion).       insulin glargine (LANTUS PEN) 100 UNIT/ML pen Inject 11 Units subcutaneously at bedtime. 15 mL 3     insulin regular (NOVOLIN R FLEXPEN) 100 UNIT/ML pen INJECT 9-12 UNITS BEFORE BREAKFAST, LUNCH, AND DINNER PLUS A CORRECTIVE SCALE OF 2 UNITS FOR EVERY 50 OVER 150 - MAXIMUM 20 unit(s) PER DAY 15 mL 11     midodrine (PROAMATINE) 10 MG tablet Take 1 tablet (10 mg) by mouth daily as needed (Hypotension, dizziness). 30 tablet 0     midodrine (PROAMATINE) 10 MG tablet Take 10 mg by mouth Every Mon, Wed, Fri Morning       apixaban ANTICOAGULANT (ELIQUIS) 5 MG tablet Take 1 tablet (5 mg) by mouth 2 times daily. (Patient not taking: Reported on 6/12/2025) 60 tablet 0     fluticasone (FLONASE) 50 MCG/ACT nasal spray Spray 1 spray into both nostrils 2 times daily. (Patient not taking: Reported on  2025) 16 g 0     midodrine (PROAMATINE) 10 MG tablet Take 30 mg by mouth daily. On Monday, Wednesday, Friday before dialysis       midodrine (PROAMATINE) 10 MG tablet Take 20 mg by mouth 2 times daily. On non-dialysis days (Tuesday, Thursday, Saturday, )       No current facility-administered medications for this visit.         Latest Reference Range & Units 25 14:49   Sodium 135 - 145 mmol/L 137   Potassium 3.4 - 5.3 mmol/L 3.5   Chloride 98 - 107 mmol/L 95 (L)   Carbon Dioxide (CO2) 22 - 29 mmol/L 25   Urea Nitrogen 8.0 - 23.0 mg/dL 28.2 (H)   Creatinine 0.51 - 0.95 mg/dL 4.63 (H)   GFR Estimate >60 mL/min/1.73m2 10 (L)   Calcium 8.8 - 10.4 mg/dL 9.7   Anion Gap 7 - 15 mmol/L 17 (H)       Name: ELIECER CHACON  MRN: [id number]  : 1960  Study Date: 2025 09:11 AM  Age: 64 yrs  Gender: Female  Patient [facility]: [facility]  Reason For Study: Atrial Fibrillation  Ordering Physician: ANTHONY DOYLE  Performed By: Miles Church     BSA: 1.6 m2  Height: 60 in  Weight: 148 lb  BP: 72/46 mmHg  ______________________________________________________________________________  Procedure  Limited Echocardiogram with two-dimensional, color and spectral Doppler.  ______________________________________________________________________________  Interpretation Summary     The rhythm was rapid atrial fibrillation.  With rapid atrial fibrillation, the visual approximation of left ventricular  systolic function may be falsely decreased.  Left ventricular systolic function is mild to moderately reduced.  The visual ejection fraction is 40-45%.  The left ventricle is normal in size.  Moderately decreased right ventricular systolic function  The right ventricle is mildly dilated.  Flattened septum is consistent with RV pressure overload.  There is moderate biatrial enlargement.  Right ventricular systolic pressure is elevated, consistent with moderate  pulmonary hypertension.  Small right pleural effusion      Siince the last study 2/27/2024 there has been interim deelopment of atrial  fibrillation with a rapid ventricular response rate, a moderate decline in  estimated global LV/RV systolic performance and an increase in estimated  Pulmonary artery pressures.  ______________________________________________________________________________  Left Ventricle  The left ventricle is normal in size. There is normal left ventricular wall  thickness. With rapid atrial fibrillation, the visual approximation of left  ventricular systolic function may be falsely decreased. Left ventricular  systolic function is mild to moderately reduced. The visual ejection fraction  is 40-45%. Left ventricular diastolic function is indeterminate. Septal motion  is consistent with conduction abnormality. Flattened septum is consistent with  RV pressure overload. There is no thrombus seen in the left ventricle.     Right Ventricle  The right ventricle is mildly dilated. Moderately decreased right ventricular  systolic function. There is no mass or thrombus in the right ventricle.     Atria  There is moderate biatrial enlargement. There is no atrial shunt seen. The  left atrial appendage is not well visualized.     Mitral Valve  Calcified mitral apparatus. There is no mitral regurgitation noted. There is  no mitral valve stenosis.     Tricuspid Valve  Normal tricuspid valve. The right ventricular systolic pressure is elevated at  55.4 mmHg. There is moderate (2+) tricuspid regurgitation. Right ventricular  systolic pressure is elevated, consistent with moderate pulmonary  hypertension. There is no tricuspid stenosis.     Aortic Valve  There is mild trileaflet aortic sclerosis. No aortic regurgitation is present.     Pulmonic Valve  The pulmonic valve is not well visualized.     Vessels  The aortic root is normal size. Normal size ascending aorta. The inferior vena  cava is normal.     Pericardium  Small right pleural effusion.     Rhythm  The rhythm  was rapid atrial fibrillation.  ______________________________________________________________________________  MMode/2D Measurements & Calculations  LVIDd: 3.9 cm  LVIDs: 2.9 cm  FS: 25.6 %     Doppler Measurements & Calculations  TR max artur: 372.0 cm/sec  TR max P.4 mmHg          Right sided filling pressures are severely elevated.     Left sided filling pressures are severely elevated.     Severely elevated pulmonary artery hypertension.     Normal cardiac output level.     Hemodynamic data has been modified in [facility] per physician review.         Plan      Follow bedrest per protocol    Continued medical management and lifestyle modifications for cardiovascular risk factor optimizations.    Return to the primary inpatient team for further evaluation and managmenet     Hemodynamics    /59/85  RA --/--/20  /20  /35/66  PCW --/--/25    PA sat 51%  AO sat 90%    CO by SHANA 3.99 (2.45)    PVR 10 Right sided filling pressures are severely elevated. Left sided filling pressures are severely elevated. Severely elevated pulmonary artery hypertension. Normal cardiac output level. Hemodynamic data has been modified in [employer] per physician review.     Summary    Detailed    Severe pulm HTN     Pressures Phase: Baseline     Time Systolic (mmHg) Diastolic (mmHg) Mean (mmHg) A Wave (mmHg) V Wave (mmHg) EDP (mmHg) Max dp/dt (mmHg/sec) HR (bpm) Content (mL/dL) SAT (%)   RA Pressures  9:39 AM   17    23    22      60        RV Pressures  9:18 AM       672           9:40     5       19     62        PA Pressures  9:41     35    66        70        PCW Pressures  9:40 AM   25    25    29      61        AO Pressures  9:30     59    85        59          Blood Flow Results Phase: Baseline     Time Results Indexed Values (L/min/m2)   QP  9:18 AM 3.99 L/min    2.45      QS  9:18 AM 3.99 L/min    2.45        Blood Oximetry Phase: Baseline     Time Hb SAT(%) PO2 Content (mL/dL) PA Sat (%)   PA  9:18  AM  51 %      51      Art  9:18 AM  90 %     11.75         Cardiac Output Phase: Baseline     Time TDCO (L/min) TDCI (L/min/m2) Audrey C.O. (L/min) Audrey C.I. (L/min/m2) Audrey HR (bpm)   Cardiac Output Results  9:18 AM 3.25    2    3.99    2.45         9:42 AM 3.25            Resistance Results Phase: Baseline     Time PVR SVR TPR TVR PVR/SVR TPR/TVR   Resistance Results (Metric)  9:18 .83 dsc-5    1363.03 dsc-5    1322.94 dsc-5    1703.79 dsc-5    0.6    0.78      Resistance Results (Wood)  9:18 AM 10.28 AGUSTIN    17.04 AGUSTIN    16.54 AGUSTIN    21.3 AGUSTIN    0.6    0.78        Stoke Volume Results Phase: Baseline     Time RVSW (gm*m) LVSW (gm*m) RVSW-I (gm*m/m2) LVSW-I (gm*m/m2)   Stroke Work Results  9:18 AM 37.99    55.19    23.37    33.95        Hemodynamic Waveforms -- Encounter Level:           Thank you for allowing me to participate in the care of your patient.      Sincerely,     Jose Redd MD     [facility]  cc:   Jose Redd MD  [address] [place] [id number]

## 2025-06-12 NOTE — PATIENT INSTRUCTIONS
You were seen today in the Pulmonary Hypertension Clinic at the [facility].     Cardiology Provider you saw during your visit:    Dr. Redd    Medication Changes:  No changes    Follow-up:   Follow-up on July 10th at 1230    Results:    Latest Reference Range & Units 06/12/25 12:20   Sodium 135 - 145 mmol/L 139   Potassium 3.4 - 5.3 mmol/L 3.6   Chloride 98 - 107 mmol/L 96 (L)   Carbon Dioxide (CO2) 22 - 29 mmol/L 28   Urea Nitrogen 8.0 - 23.0 mg/dL 20.9   Creatinine 0.51 - 0.95 mg/dL 4.68 (H)   GFR Estimate >60 mL/min/1.73m2 10 (L)   Calcium 8.8 - 10.4 mg/dL 10.3   Anion Gap 7 - 15 mmol/L 15   Albumin 3.5 - 5.2 g/dL 3.8   Protein Total 6.4 - 8.3 g/dL 6.5   Alkaline Phosphatase 40 - 150 U/L 77   ALT 0 - 50 U/L 6   AST 0 - 45 U/L 13   Bilirubin Total <=1.2 mg/dL 0.3   Glucose 70 - 99 mg/dL 153 (H)   WBC 4.0 - 11.0 10e3/uL 6.4   Hemoglobin 11.7 - 15.7 g/dL 9.4 (L)   Hematocrit 35.0 - 47.0 % 31.5 (L)   Platelet Count 150 - 450 10e3/uL 97 (L)   RBC Count 3.80 - 5.20 10e6/uL 3.11 (L)   MCV 78 - 100 fL 101 (H)   MCH 26.5 - 33.0 pg 30.2   MCHC 31.5 - 36.5 g/dL 29.8 (L)   RDW 10.0 - 15.0 % 17.6 (H)   (L): Data is abnormally low  (H): Data is abnormally high    Please call us immediately if you have any syncope (fainting or passing out), chest pain, edema (swelling or weight gain), or decline in your functional status (general decline in how you are feeling).    If you have emergent concerns after hours or on the weekend, please call our on-call Cardiologist at [phone], option [phone]. For emergencies call [phone].     Thank you for allowing us to be a part of your care here at the [facility]    Please visit the pulmonary hypertension website for more information and support. [email or web address]    If you have questions or concerns please contact us at:    Christopher Jones RN (P: [phone])    Nurse Coordinator       Pulmonary Hypertension     [facility]         EVY Nolan   (Prior Auths & Patient Assistance )             ()  Clinic   Clinic   Pulmonary Hypertension   Pulmonary Hypertension  [facility]  [facility]  (P)[phone]    (P) [phone]  (F) [phone]

## 2025-06-15 ENCOUNTER — HOSPITAL ENCOUNTER (INPATIENT)
Facility: CLINIC | Age: 65
LOS: 1 days | Discharge: HOME | End: 2025-06-17
Attending: EMERGENCY MEDICINE
Payer: COMMERCIAL

## 2025-06-15 ENCOUNTER — APPOINTMENT (OUTPATIENT)
Dept: GENERAL RADIOLOGY | Facility: CLINIC | Age: 65
End: 2025-06-15
Attending: EMERGENCY MEDICINE
Payer: COMMERCIAL

## 2025-06-15 DIAGNOSIS — N18.6 ESRD (END STAGE RENAL DISEASE) ON DIALYSIS (H): ICD-10-CM

## 2025-06-15 DIAGNOSIS — Z99.81 CHRONIC HYPOXIC RESPIRATORY FAILURE, ON HOME OXYGEN THERAPY (H): ICD-10-CM

## 2025-06-15 DIAGNOSIS — I48.91 ATRIAL FIBRILLATION, UNSPECIFIED TYPE (H): ICD-10-CM

## 2025-06-15 DIAGNOSIS — Z99.2 ESRD (END STAGE RENAL DISEASE) ON DIALYSIS (H): ICD-10-CM

## 2025-06-15 DIAGNOSIS — R18.8 OTHER ASCITES: ICD-10-CM

## 2025-06-15 DIAGNOSIS — Z79.01 ANTICOAGULATED: ICD-10-CM

## 2025-06-15 DIAGNOSIS — J96.11 CHRONIC HYPOXIC RESPIRATORY FAILURE, ON HOME OXYGEN THERAPY (H): ICD-10-CM

## 2025-06-15 DIAGNOSIS — Z71.89 OTHER SPECIFIED COUNSELING: Chronic | ICD-10-CM

## 2025-06-15 DIAGNOSIS — R06.00 DYSPNEA, UNSPECIFIED TYPE: ICD-10-CM

## 2025-06-15 DIAGNOSIS — I27.20 PULMONARY HYPERTENSION (H): Primary | ICD-10-CM

## 2025-06-15 LAB
ALBUMIN SERPL BCG-MCNC: 3.8 G/DL (ref 3.5–5.2)
ALP SERPL-CCNC: 71 U/L (ref 40–150)
ALT SERPL W P-5'-P-CCNC: 5 U/L (ref 0–50)
ANION GAP SERPL CALCULATED.3IONS-SCNC: 18 MMOL/L (ref 7–15)
AST SERPL W P-5'-P-CCNC: 13 U/L (ref 0–45)
ATRIAL RATE - MUSE: NORMAL BPM
BASE EXCESS BLDV CALC-SCNC: 2 MMOL/L (ref -3–3)
BASOPHILS # BLD AUTO: 0 10E3/UL (ref 0–0.2)
BASOPHILS NFR BLD AUTO: 1 %
BILIRUB SERPL-MCNC: 0.3 MG/DL
BUN SERPL-MCNC: 32 MG/DL (ref 8–23)
CALCIUM SERPL-MCNC: 10.3 MG/DL (ref 8.8–10.4)
CHLORIDE SERPL-SCNC: 96 MMOL/L (ref 98–107)
CREAT SERPL-MCNC: 5.87 MG/DL (ref 0.51–0.95)
DIASTOLIC BLOOD PRESSURE - MUSE: NORMAL MMHG
EGFRCR SERPLBLD CKD-EPI 2021: 7 ML/MIN/1.73M2
EOSINOPHIL # BLD AUTO: 0.1 10E3/UL (ref 0–0.7)
EOSINOPHIL NFR BLD AUTO: 2 %
ERYTHROCYTE [DISTWIDTH] IN BLOOD BY AUTOMATED COUNT: 17.3 % (ref 10–15)
EST. AVERAGE GLUCOSE BLD GHB EST-MCNC: 151 MG/DL
GLUCOSE BLDC GLUCOMTR-MCNC: 201 MG/DL (ref 70–99)
GLUCOSE SERPL-MCNC: 161 MG/DL (ref 70–99)
HBA1C MFR BLD: 6.9 %
HCO3 BLDV-SCNC: 28 MMOL/L (ref 21–28)
HCO3 SERPL-SCNC: 25 MMOL/L (ref 22–29)
HCT VFR BLD AUTO: 30.2 % (ref 35–47)
HGB BLD-MCNC: 9.1 G/DL (ref 11.7–15.7)
HOLD SPECIMEN: NORMAL
IMM GRANULOCYTES # BLD: 0 10E3/UL
IMM GRANULOCYTES NFR BLD: 0 %
INTERPRETATION ECG - MUSE: NORMAL
LACTATE BLD-SCNC: 1.2 MMOL/L (ref 0.7–2)
LYMPHOCYTES # BLD AUTO: 1.1 10E3/UL (ref 0.8–5.3)
LYMPHOCYTES NFR BLD AUTO: 17 %
MCH RBC QN AUTO: 30.1 PG (ref 26.5–33)
MCHC RBC AUTO-ENTMCNC: 30.1 G/DL (ref 31.5–36.5)
MCV RBC AUTO: 100 FL (ref 78–100)
MONOCYTES # BLD AUTO: 0.9 10E3/UL (ref 0–1.3)
MONOCYTES NFR BLD AUTO: 13 %
NEUTROPHILS # BLD AUTO: 4.5 10E3/UL (ref 1.6–8.3)
NEUTROPHILS NFR BLD AUTO: 67 %
NRBC # BLD AUTO: 0 10E3/UL
NRBC BLD AUTO-RTO: 0 /100
P AXIS - MUSE: NORMAL DEGREES
PCO2 BLDV: 49 MM HG (ref 40–50)
PH BLDV: 7.36 [PH] (ref 7.32–7.43)
PLATELET # BLD AUTO: 95 10E3/UL (ref 150–450)
PO2 BLDV: 42 MM HG (ref 25–47)
POTASSIUM SERPL-SCNC: 4 MMOL/L (ref 3.4–5.3)
PR INTERVAL - MUSE: NORMAL MS
PROT SERPL-MCNC: 6.5 G/DL (ref 6.4–8.3)
QRS DURATION - MUSE: 136 MS
QT - MUSE: 490 MS
QTC - MUSE: 517 MS
R AXIS - MUSE: 91 DEGREES
RBC # BLD AUTO: 3.02 10E6/UL (ref 3.8–5.2)
SAO2 % BLDV: 75 % (ref 70–75)
SODIUM SERPL-SCNC: 139 MMOL/L (ref 135–145)
SYSTOLIC BLOOD PRESSURE - MUSE: NORMAL MMHG
T AXIS - MUSE: 228 DEGREES
VENTRICULAR RATE- MUSE: 67 BPM
WBC # BLD AUTO: 6.7 10E3/UL (ref 4–11)

## 2025-06-15 PROCEDURE — 83036 HEMOGLOBIN GLYCOSYLATED A1C: CPT | Performed by: STUDENT IN AN ORGANIZED HEALTH CARE EDUCATION/TRAINING PROGRAM

## 2025-06-15 PROCEDURE — 82040 ASSAY OF SERUM ALBUMIN: CPT | Performed by: EMERGENCY MEDICINE

## 2025-06-15 PROCEDURE — 85025 COMPLETE CBC W/AUTO DIFF WBC: CPT | Performed by: EMERGENCY MEDICINE

## 2025-06-15 PROCEDURE — 250N000013 HC RX MED GY IP 250 OP 250 PS 637: Performed by: STUDENT IN AN ORGANIZED HEALTH CARE EDUCATION/TRAINING PROGRAM

## 2025-06-15 PROCEDURE — G0378 HOSPITAL OBSERVATION PER HR: HCPCS

## 2025-06-15 PROCEDURE — 99285 EMERGENCY DEPT VISIT HI MDM: CPT | Mod: 25

## 2025-06-15 PROCEDURE — 82962 GLUCOSE BLOOD TEST: CPT

## 2025-06-15 PROCEDURE — 250N000013 HC RX MED GY IP 250 OP 250 PS 637: Performed by: EMERGENCY MEDICINE

## 2025-06-15 PROCEDURE — 36415 COLL VENOUS BLD VENIPUNCTURE: CPT | Performed by: EMERGENCY MEDICINE

## 2025-06-15 PROCEDURE — 71046 X-RAY EXAM CHEST 2 VIEWS: CPT

## 2025-06-15 PROCEDURE — 99222 1ST HOSP IP/OBS MODERATE 55: CPT | Performed by: STUDENT IN AN ORGANIZED HEALTH CARE EDUCATION/TRAINING PROGRAM

## 2025-06-15 PROCEDURE — 93005 ELECTROCARDIOGRAM TRACING: CPT

## 2025-06-15 PROCEDURE — 250N000012 HC RX MED GY IP 250 OP 636 PS 637: Performed by: STUDENT IN AN ORGANIZED HEALTH CARE EDUCATION/TRAINING PROGRAM

## 2025-06-15 PROCEDURE — 83605 ASSAY OF LACTIC ACID: CPT

## 2025-06-15 RX ORDER — MIDODRINE HYDROCHLORIDE 5 MG/1
10 TABLET ORAL DAILY PRN
Status: DISCONTINUED | OUTPATIENT
Start: 2025-06-15 | End: 2025-06-17 | Stop reason: HOSPADM

## 2025-06-15 RX ORDER — CALCIUM ACETATE 667 MG/1
667 CAPSULE ORAL
Status: DISCONTINUED | OUTPATIENT
Start: 2025-06-15 | End: 2025-06-17 | Stop reason: HOSPADM

## 2025-06-15 RX ORDER — AMIODARONE HYDROCHLORIDE 200 MG/1
200 TABLET ORAL EVERY EVENING
Status: DISCONTINUED | OUTPATIENT
Start: 2025-06-15 | End: 2025-06-17 | Stop reason: HOSPADM

## 2025-06-15 RX ORDER — ASPIRIN 81 MG/1
81 TABLET ORAL EVERY EVENING
Status: DISCONTINUED | OUTPATIENT
Start: 2025-06-15 | End: 2025-06-17 | Stop reason: HOSPADM

## 2025-06-15 RX ORDER — AMOXICILLIN 250 MG
2 CAPSULE ORAL 2 TIMES DAILY PRN
Status: DISCONTINUED | OUTPATIENT
Start: 2025-06-15 | End: 2025-06-17 | Stop reason: HOSPADM

## 2025-06-15 RX ORDER — MIDODRINE HYDROCHLORIDE 10 MG/1
10 TABLET ORAL
Status: DISCONTINUED | OUTPATIENT
Start: 2025-06-15 | End: 2025-06-15

## 2025-06-15 RX ORDER — MIDODRINE HYDROCHLORIDE 5 MG/1
10 TABLET ORAL
Status: DISCONTINUED | OUTPATIENT
Start: 2025-06-16 | End: 2025-06-17 | Stop reason: HOSPADM

## 2025-06-15 RX ORDER — POLYETHYLENE GLYCOL 3350 17 G/17G
17 POWDER, FOR SOLUTION ORAL 2 TIMES DAILY PRN
Status: DISCONTINUED | OUTPATIENT
Start: 2025-06-15 | End: 2025-06-17 | Stop reason: HOSPADM

## 2025-06-15 RX ORDER — DEXTROSE MONOHYDRATE 25 G/50ML
25-50 INJECTION, SOLUTION INTRAVENOUS
Status: DISCONTINUED | OUTPATIENT
Start: 2025-06-15 | End: 2025-06-17 | Stop reason: HOSPADM

## 2025-06-15 RX ORDER — MIDODRINE HYDROCHLORIDE 5 MG/1
20 TABLET ORAL
Status: DISCONTINUED | OUTPATIENT
Start: 2025-06-17 | End: 2025-06-17 | Stop reason: HOSPADM

## 2025-06-15 RX ORDER — BENZOCAINE/MENTHOL 6 MG-10 MG
LOZENGE MUCOUS MEMBRANE 2 TIMES DAILY
Status: DISCONTINUED | OUTPATIENT
Start: 2025-06-15 | End: 2025-06-17 | Stop reason: HOSPADM

## 2025-06-15 RX ORDER — HYDROMORPHONE HCL IN WATER/PF 6 MG/30 ML
0.4 PATIENT CONTROLLED ANALGESIA SYRINGE INTRAVENOUS
Status: DISCONTINUED | OUTPATIENT
Start: 2025-06-15 | End: 2025-06-17 | Stop reason: HOSPADM

## 2025-06-15 RX ORDER — ACETAMINOPHEN 650 MG/1
650 SUPPOSITORY RECTAL EVERY 4 HOURS PRN
Status: DISCONTINUED | OUTPATIENT
Start: 2025-06-15 | End: 2025-06-17 | Stop reason: HOSPADM

## 2025-06-15 RX ORDER — NALOXONE HYDROCHLORIDE 0.4 MG/ML
0.2 INJECTION, SOLUTION INTRAMUSCULAR; INTRAVENOUS; SUBCUTANEOUS
Status: DISCONTINUED | OUTPATIENT
Start: 2025-06-15 | End: 2025-06-17 | Stop reason: HOSPADM

## 2025-06-15 RX ORDER — NALOXONE HYDROCHLORIDE 0.4 MG/ML
0.4 INJECTION, SOLUTION INTRAMUSCULAR; INTRAVENOUS; SUBCUTANEOUS
Status: DISCONTINUED | OUTPATIENT
Start: 2025-06-15 | End: 2025-06-17 | Stop reason: HOSPADM

## 2025-06-15 RX ORDER — ACETAMINOPHEN 325 MG/1
650 TABLET ORAL EVERY 4 HOURS PRN
Status: DISCONTINUED | OUTPATIENT
Start: 2025-06-15 | End: 2025-06-17 | Stop reason: HOSPADM

## 2025-06-15 RX ORDER — AMOXICILLIN 250 MG
1 CAPSULE ORAL 2 TIMES DAILY PRN
Status: DISCONTINUED | OUTPATIENT
Start: 2025-06-15 | End: 2025-06-17 | Stop reason: HOSPADM

## 2025-06-15 RX ORDER — MIDODRINE HYDROCHLORIDE 10 MG/1
30 TABLET ORAL
Status: DISCONTINUED | OUTPATIENT
Start: 2025-06-16 | End: 2025-06-17 | Stop reason: HOSPADM

## 2025-06-15 RX ORDER — ASPIRIN 81 MG/1
81 TABLET ORAL EVERY EVENING
COMMUNITY

## 2025-06-15 RX ORDER — HYDROMORPHONE HCL IN WATER/PF 6 MG/30 ML
0.2 PATIENT CONTROLLED ANALGESIA SYRINGE INTRAVENOUS
Status: DISCONTINUED | OUTPATIENT
Start: 2025-06-15 | End: 2025-06-17 | Stop reason: HOSPADM

## 2025-06-15 RX ORDER — NICOTINE POLACRILEX 4 MG
15-30 LOZENGE BUCCAL
Status: DISCONTINUED | OUTPATIENT
Start: 2025-06-15 | End: 2025-06-17 | Stop reason: HOSPADM

## 2025-06-15 RX ORDER — ONDANSETRON 4 MG/1
4 TABLET, ORALLY DISINTEGRATING ORAL EVERY 6 HOURS PRN
Status: DISCONTINUED | OUTPATIENT
Start: 2025-06-15 | End: 2025-06-17 | Stop reason: HOSPADM

## 2025-06-15 RX ORDER — FAMOTIDINE 20 MG/1
20 TABLET, FILM COATED ORAL 2 TIMES DAILY PRN
Status: DISCONTINUED | OUTPATIENT
Start: 2025-06-15 | End: 2025-06-17 | Stop reason: HOSPADM

## 2025-06-15 RX ORDER — AMOXICILLIN 500 MG/1
500 CAPSULE ORAL 2 TIMES DAILY
Status: DISCONTINUED | OUTPATIENT
Start: 2025-06-15 | End: 2025-06-17 | Stop reason: HOSPADM

## 2025-06-15 RX ORDER — HYDROMORPHONE HYDROCHLORIDE 2 MG/1
2 TABLET ORAL EVERY 4 HOURS PRN
Status: DISCONTINUED | OUTPATIENT
Start: 2025-06-15 | End: 2025-06-17 | Stop reason: HOSPADM

## 2025-06-15 RX ORDER — ONDANSETRON 2 MG/ML
4 INJECTION INTRAMUSCULAR; INTRAVENOUS EVERY 6 HOURS PRN
Status: DISCONTINUED | OUTPATIENT
Start: 2025-06-15 | End: 2025-06-17 | Stop reason: HOSPADM

## 2025-06-15 RX ORDER — PROCHLORPERAZINE MALEATE 10 MG
10 TABLET ORAL EVERY 6 HOURS PRN
Status: DISCONTINUED | OUTPATIENT
Start: 2025-06-15 | End: 2025-06-17 | Stop reason: HOSPADM

## 2025-06-15 RX ADMIN — ASPIRIN 81 MG: 81 TABLET, COATED ORAL at 19:54

## 2025-06-15 RX ADMIN — HYDROCORTISONE: 10 CREAM TOPICAL at 19:54

## 2025-06-15 RX ADMIN — INSULIN GLARGINE 10 UNITS: 100 INJECTION, SOLUTION SUBCUTANEOUS at 21:39

## 2025-06-15 RX ADMIN — AMIODARONE HYDROCHLORIDE 200 MG: 200 TABLET ORAL at 19:59

## 2025-06-15 RX ADMIN — AMOXICILLIN 500 MG: 500 CAPSULE ORAL at 19:54

## 2025-06-15 ASSESSMENT — ACTIVITIES OF DAILY LIVING (ADL)
ADLS_ACUITY_SCORE: 57

## 2025-06-15 NOTE — PHARMACY-ADMISSION MEDICATION HISTORY
Pharmacist Admission Medication History    Admission medication history is complete. The information provided in this note is only as accurate as the sources available at the time of the update.    Information Source(s): Patient and CareEverywhere/SureScripts via in-person    Pertinent Information:   -Amoxicillin 500mg BID prescribed 6/11/25 x 7 days - she thinks she started 6/13 or 6/14  -Apixaban was stopped due to nosebleeds, changed to ASA 81mg daily    Changes made to PTA medication list:  Added: Aspirin  Deleted: Not Taking apixaban  Changed: Insulin glargine 11 units > 10 units at bedtime; Novolin R insulin 9-12 units > 2 units/15g CHO plus 1 units for every 50 > 150.    Allergies reviewed with patient and updates made in EHR: she reports no changes to allergies    Medication History Completed By: Snehal Moore [facility] 6/15/2025 4:19 PM    PTA Med List   Medication Sig Note Last Dose/Taking    acetaminophen (TYLENOL) 500 MG tablet Take 500-1,000 mg by mouth every 6 hours as needed.  Taking As Needed    amiodarone (PACERONE) 200 MG tablet Take 1 tablet (200 mg) by mouth daily. (Patient taking differently: Take 200 mg by mouth every evening.)  6/14/2025 Evening    amoxicillin (AMOXIL) 500 MG tablet Take 500 mg by mouth 2 times daily. 6/15/2025: Prescribed 6/11/25 X 7 days - thinks she started 6/13 or 6/14 6/15/2025 Morning    aspirin 81 MG EC tablet Take 81 mg by mouth every evening.  6/14/2025 Evening    calcium acetate (PHOSLO) 667 MG CAPS capsule Take 667 mg by mouth 3 times daily (with meals)  6/15/2025 Morning    calcium acetate (PHOSLO) 667 MG CAPS capsule Take 667 mg by mouth Take with snacks or supplements  Taking As Needed    famotidine (PEPCID) 20 MG tablet Take 20 mg by mouth 2 times daily as needed.  Taking As Needed    guaiFENesin 1200 MG TB12 Take 1 tablet by mouth every 12 hours as needed (cough/congestion).  Taking As Needed    insulin glargine (LANTUS PEN) 100 UNIT/ML pen Inject 11 Units  subcutaneously at bedtime. (Patient taking differently: Inject 10 Units subcutaneously at bedtime.)  6/14/2025 Bedtime    insulin regular (NOVOLIN R FLEXPEN) 100 UNIT/ML pen INJECT 9-12 UNITS BEFORE BREAKFAST, LUNCH, AND DINNER PLUS A CORRECTIVE SCALE OF 2 UNITS FOR EVERY 50 OVER 150 - MAXIMUM 20 unit(s) PER DAY (Patient taking differently: INJECT 2 UNITS/15g Carbohydrate BEFORE BREAKFAST, LUNCH, AND DINNER PLUS A CORRECTIVE SCALE OF 1 UNITS FOR EVERY 50 OVER 150 - MAXIMUM 20 unit(s) PER DAY)  6/15/2025 Morning    midodrine (PROAMATINE) 10 MG tablet Take 1 tablet (10 mg) by mouth daily as needed (Hypotension, dizziness).  Taking As Needed    midodrine (PROAMATINE) 10 MG tablet Take 30 mg by mouth daily. On Monday, Wednesday, Friday before dialysis  6/13/2025    midodrine (PROAMATINE) 10 MG tablet Take 10 mg by mouth Every Mon, Wed, Fri Morning  6/13/2025 Morning    midodrine (PROAMATINE) 10 MG tablet Take 20 mg by mouth 2 times daily. On non-dialysis days (Tuesday, Thursday, Saturday, Sunday)  6/15/2025 Morning

## 2025-06-15 NOTE — ED NOTES
"[facility]  ED Nurse Handoff Report    ED Chief complaint: Dizziness and Shortness of Breath      ED Diagnosis:   Final diagnoses:   Other ascites   ESRD (end stage renal disease) on dialysis (H)   Dyspnea, unspecified type   Atrial fibrillation, unspecified type (H)   Anticoagulated   Chronic hypoxic respiratory failure, on home oxygen therapy (H)       Code Status: provider to assess    Allergies:   Allergies   Allergen Reactions    Albuterol      shakiness    Aspirin      gi    Byetta [Exenatide]      gi    Clonidine      constipation    Codeine      vomiting    Ezetimibe      muscle symptoms    Hydralazine      headaches    Lisinopril      hyperkalemia    Metformin Hydrochloride      vomiting    Pravachol [Pravastatin Sodium]      muscle pains    Simvastatin      myalgias    Troglitazone        Patient Story: Yissel Wetzel is a 65 year old female with a history significant for anemia, bleeding pseudoaneurysm of right brachiocephalic arteriovenous fistula, ESRD, Pulmonary hypertension, T2 diabetes who presents for Dizziness and Shortness of Breath  Pt presents via EMS from home with increased dizziness today to the point pt was unable to ambulate independently at home. Pt is on MWF dialysis and had normal run friday. Pt has noticed increase in ascites and SOB. Pt notes fistula to RUE and notes regular paracentesis apt scheduled this week. Pt used 2L o2 at night. Hypoxic on arrival 89% RA placed on 2L.   Focused Assessment:     Respiratory: Regular rate and depth.shortness of breath noted on exam pt states, \"because of my belly I am short of breath\".   Cardiac:afib with RBBB  Neurologic: A&Ox4   GI: abdominal discomfort and fullness. Rounded, umbilical bulge noted. Abd is distended with distended abdominal veins.     Treatments and/or interventions provided:   Medications - No data to display  XR Chest 2 Views    (Results Pending)     Labs Ordered and Resulted from Time of ED Arrival to Time of " ED Departure   COMPREHENSIVE METABOLIC PANEL - Abnormal       Result Value    Sodium 139      Potassium 4.0      Carbon Dioxide (CO2) 25      Anion Gap 18 (*)     Urea Nitrogen 32.0 (*)     Creatinine 5.87 (*)     GFR Estimate 7 (*)     Calcium 10.3      Chloride 96 (*)     Glucose 161 (*)     Alkaline Phosphatase 71      AST 13      ALT 5      Protein Total 6.5      Albumin 3.8      Bilirubin Total 0.3     CBC WITH PLATELETS AND DIFFERENTIAL - Abnormal    WBC Count 6.7      RBC Count 3.02 (*)     Hemoglobin 9.1 (*)     Hematocrit 30.2 (*)           MCH 30.1      MCHC 30.1 (*)     RDW 17.3 (*)     Platelet Count 95 (*)     % Neutrophils 67      % Lymphocytes 17      % Monocytes 13      % Eosinophils 2      % Basophils 1      % Immature Granulocytes 0      NRBCs per 100 WBC 0      Absolute Neutrophils 4.5      Absolute Lymphocytes 1.1      Absolute Monocytes 0.9      Absolute Eosinophils 0.1      Absolute Basophils 0.0      Absolute Immature Granulocytes 0.0      Absolute NRBCs 0.0     ISTAT GASES LACTATE VENOUS POCT - Normal    Lactic Acid POCT 1.2      Bicarbonate Venous POCT 28      O2 Sat, Venous POCT 75      pCO2 Venous POCT 49      pH Venous POCT 7.36      pO2 Venous POCT 42      Base Excess/Deficit (+/-) POCT 2.0     ROUTINE UA WITH MICROSCOPIC       Patient's response to treatments and/or interventions: Tolerated well     To be done/followed up on inpatient unit:  per provider order    Does this patient have any cognitive concerns?: N/A    Activity level - Baseline/Home:  independent  Activity Level - Current:   stand with assist x1    Patient's Preferred language: English   Needed?: No    Isolation: None  Infection: Not Applicable  Patient tested for COVID 19 prior to admission: NO  Bariatric?: No    Vital Signs:   Vitals:    06/15/25 1420 06/15/25 1531 06/15/25 1535 06/15/25 1536   BP:   106/56    Pulse:   63 62   Resp:    20   Temp:       SpO2: 97% 94%     Weight:       Height:            Cardiac Rhythm:Cardiac Rhythm: Other (Comment) (afib with RBBB)        For the majority of the shift this patient's behavior was Green.   Behavioral interventions performed were N/A.    ED NURSE PHONE NUMBER: [phone]

## 2025-06-15 NOTE — ED NOTES
"Pt requests to be repositioned. Writer RN and EDT at bedside. Attempted to lower head and pt screams \"do not put that down. I need a room now. \" Pt refuses reposition off coccyx onto side, pt refuses boost. Pt states \"please leave my room and call the floor and get me a room upstairs\".   "

## 2025-06-15 NOTE — ED PROVIDER NOTES
"  Emergency Department Note      History of Present Illness     Chief Complaint:  Abdominal swelling and SOB    HPI   Yissel Wetzel is a 65 year old female with complex history including end-stage renal disease on 2 L oxygen by nasal cannula, hemodialysis, last dialysis 2 days ago per routine, who presents emergency department for evaluation of multiple weeks of gradually progressive abdominal swelling that feels like fluid building up in her abdomen again, that is now causing her trouble breathing and difficulty mobilizing in the apartment where she lives alone and normally uses a walker.  She denies any new pain or fever.  She states that she has had dizziness for \"a really long time\" which has not changed.  No falls.  Her right arm fistula has been working well.  She is also known to have severe pulmonary hypertension and is on Eliquis for history of atrial fibrillation.  She volunteers \"they pumped me full of too much fluid\" when she was hospitalized in May for atrial fibrillation at which time she underwent cardioversion.  No fever, no cough.  She makes a small amount of urine which has not changed.    Independent Historian: EMS, who reports that her blood pressure was 96/67, she had slightly low oxygen at 1 point and her baseline 2 L was increased to 5 L during transport with oxygen rising up into the mid 90s.    Review of External Notes: I personally reviewed prior records including May hospital discharge summary.  Last paracentesis was on May 22 which time she had 3 L removed.    Past Medical History     Medical History and Problem List   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, initial encounter 12/6/2019    End stage renal failure on dialysis (H)     Esophagitis, unspecified     Former tobacco use     HEMORRHAGIC GASTROPATHY  "    History of blood transfusion     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) 2025    Iron deficiency anemia, unspecified     Mild persistent asthma     Obesity     Other and unspecified hyperlipidemia     Pneumonia     Pulmonary hypertension (H)     Tobacco use disorder [place] ed     Type 2 diabetes mellitus (H)     Unspecified essential hypertension        Medications   acetaminophen (TYLENOL) 500 MG tablet  amiodarone (PACERONE) 200 MG tablet  amoxicillin (AMOXIL) 500 MG tablet  aspirin 81 MG EC tablet  calcium acetate (PHOSLO) 667 MG CAPS capsule  calcium acetate (PHOSLO) 667 MG CAPS capsule  famotidine (PEPCID) 20 MG tablet  guaiFENesin 1200 MG TB12  insulin glargine (LANTUS PEN) 100 UNIT/ML pen  insulin regular (NOVOLIN R FLEXPEN) 100 UNIT/ML pen  midodrine (PROAMATINE) 10 MG tablet  midodrine (PROAMATINE) 10 MG tablet  midodrine (PROAMATINE) 10 MG tablet  midodrine (PROAMATINE) 10 MG tablet  apixaban ANTICOAGULANT (ELIQUIS) 5 MG tablet  blood glucose (ACCU-CHEK GUIDE TEST) test strip  Continuous Glucose  (FREESTYLE LATOSHA 2 READER) AYDIN  Continuous Glucose Sensor (FREESTYLE LATOSHA 2 SENSOR) MISC  fluticasone (FLONASE) 50 MCG/ACT nasal spray      Surgical History   Past Surgical History:   Procedure Laterality Date    ABDOMINOPLASTY  pt unsure when    abdominoplasty-    ANESTHESIA CARDIOVERSION N/A 2025    Procedure: Anesthesia cardioversion;  Surgeon: GENERIC ANESTHESIA PROVIDER;  Location:  OR     SECTION  ,     x 2    COLONOSCOPY      [facility]    COLONOSCOPY N/A 2021    Procedure: COLONOSCOPY, WITH POLYPECTOMY AND BIOPSY;  Surgeon: Lincoln Farrar MD;  Location:  GI    CREATE FISTULA ARTERIOVENOUS UPPER EXTREMITY Right 2018    Procedure: RIGHT BRACHIAL TO BASILIC ARTERIOVENOUS FISTULA CREATION;  Surgeon: Terry Vizcaino MD;  Location: [facility]    CV HEART CATHETERIZATION WITH  POSSIBLE INTERVENTION N/A 7/22/2021    Procedure: Heart Catheterization with Possible Intervention;  Surgeon: Joseluis Dean MD;  Location:  HEART CARDIAC CATH LAB    CV RIGHT HEART CATH MEASUREMENTS RECORDED N/A 11/23/2022    Procedure: Heart Cath Right Heart Cath;  Surgeon: Yan Heredia MD;  Location:  HEART CARDIAC CATH LAB    CV RIGHT HEART CATH MEASUREMENTS RECORDED N/A 10/4/2024    Procedure: Right Heart Catheterization;  Surgeon: Jag Traylor MD;  Location:  HEART CARDIAC CATH LAB    ENT SURGERY  as a child    tonsillectomy    ESOPHAGOSCOPY, GASTROSCOPY, DUODENOSCOPY (EGD), COMBINED N/A 5/27/2021    Procedure: ESOPHAGOGASTRODUODENOSCOPY, WITH BIOPSY;  Surgeon: Lincoln Farrar MD;  Location:  GI    EYE SURGERY Left     injections- eye    HYSTERECTOMY  approx 1983    partial hysterectomy-reason bleeding     IR CVC TUNNEL PLACEMENT > 5 YRS OF AGE  12/6/2019    IR CVC TUNNEL REMOVAL RIGHT  5/7/2020    IR DIALYSIS FISTULOGRAM RIGHT  12/9/2019     Physical Exam     Patient Vitals for the past 24 hrs:   BP Temp Pulse Resp SpO2 Height Weight   06/15/25 1536 -- -- 62 20 -- -- --   06/15/25 1535 106/56 -- 63 -- -- -- --   06/15/25 1531 -- -- -- -- 94 % -- --   06/15/25 1420 -- -- -- -- 97 % -- --   06/15/25 1412 -- -- -- -- (!) 83 % -- --   06/15/25 1410 -- 98  F (36.7  C) -- -- -- -- --   06/15/25 1404 100/43 -- 59 20 97 % -- --   06/15/25 1403 -- -- -- -- -- 1.524 m (5') 68.9 kg (152 lb)     Physical Exam  General: Chronically ill-appearing woman semirecumbent in room for  HENT: mucous membranes somewhat dry  CV: rate as above, right arm fistula with palpable thrill, no murmur audible, moderate bilateral leg edema  Resp: Oxygen saturation speaks in full phrases, no stridor, no cough observed, mid 90s on 2 L nasal cannula, slightly coarse breath sounds bilaterally special at the right base  GI: Abdomen soft but markedly distended with palpable fluid wave, minimal  diffuse tenderness, umbilical hernia easily reducible  MSK: no bony tenderness, no CVAT  Skin: appropriately warm and dry  Neuro: alert, clear speech, oriented though somewhat poor historian, diffusely weak but symmetric strength in all extremities, no nuchal rigidity  Psych: cooperative, no apparent hallucinations    Diagnostics   Electrocardiogram  ECG taken at 1505, ECG interpreted at 1529 by CHANTELL Landeros MD  Atrial fibrillation with controlled rate, ST depression in leads V2 and V3 appears improved from June 6, 2025  Rate 67 bpm. OK interval n/a. QRS duration 136. QTc 517    Lab Results   Labs Ordered and Resulted from Time of ED Arrival to Time of ED Departure   COMPREHENSIVE METABOLIC PANEL - Abnormal       Result Value    Sodium 139      Potassium 4.0      Carbon Dioxide (CO2) 25      Anion Gap 18 (*)     Urea Nitrogen 32.0 (*)     Creatinine 5.87 (*)     GFR Estimate 7 (*)     Calcium 10.3      Chloride 96 (*)     Glucose 161 (*)     Alkaline Phosphatase 71      AST 13      ALT 5      Protein Total 6.5      Albumin 3.8      Bilirubin Total 0.3     CBC WITH PLATELETS AND DIFFERENTIAL - Abnormal    WBC Count 6.7      RBC Count 3.02 (*)     Hemoglobin 9.1 (*)     Hematocrit 30.2 (*)           MCH 30.1      MCHC 30.1 (*)     RDW 17.3 (*)     Platelet Count 95 (*)     % Neutrophils 67      % Lymphocytes 17      % Monocytes 13      % Eosinophils 2      % Basophils 1      % Immature Granulocytes 0      NRBCs per 100 WBC 0      Absolute Neutrophils 4.5      Absolute Lymphocytes 1.1      Absolute Monocytes 0.9      Absolute Eosinophils 0.1      Absolute Basophils 0.0      Absolute Immature Granulocytes 0.0      Absolute NRBCs 0.0     ISTAT GASES LACTATE VENOUS POCT - Normal    Lactic Acid POCT 1.2      Bicarbonate Venous POCT 28      O2 Sat, Venous POCT 75      pCO2 Venous POCT 49      pH Venous POCT 7.36      pO2 Venous POCT 42      Base Excess/Deficit (+/-) POCT 2.0     ROUTINE UA WITH MICROSCOPIC   ALBUMIN  FLUID   PROTEIN FLUID   AEROBIC BACTERIAL CULTURE ROUTINE   CELL COUNT WITH DIFFERENTIAL FLUID     Imaging   XR Chest 2 Views   Final Result   IMPRESSION: Pericardial calcifications, suggesting prior pericarditis. Small right pleural effusion with passive atelectasis in the right lung. Calcified aortic atherosclerosis. Moderately enlarged cardiac silhouette. Old left rib fractures. Pulmonary    vasculature has returned to normal.      Independent Interpretation:   I personally reviewed CXR images, I see slight R sided fluid.    ED Course      Medications Administered   Medications   midodrine (PROAMATINE) tablet 10 mg (has no administration in time range)     ED Course and Discussion of Management   ED Course as of 06/15/25 1757   Sun Rene 15, 2025   1523 I asked [facility] to call radiology regarding possible US guided paracentesis.   1534 I spoke with Dr. Dumont, Radiologist, no availability for US para today.   1553 I spoke with Dr. Mcgrath, Hospitalist, who accepts care.       Additional Documentation  None    MIPS   None             Medical Decision Making / Diagnosis   Medical Decision Making:  Patient is a somewhat poor historian which made her evaluation more challenging, though ultimately it seems that her primary concern is gradual recurrence of abdominal swelling and fluid which is now leading to trouble breathing.  She has a complex history including recurrent ascites as well as end-stage renal disease for which she reports getting routine dialysis including 2 days ago per her normal schedule.  Fortunately no signs of hyperkalemia.  Sensation not suggestive of infection so antibiotics were deferred.  Spoke with the radiology team, unfortunately urgent paracentesis is not available on this Sunday afternoon from the emergency department so I have arranged for the patient to be hospitalized with the hospitalist service for expedited paracentesis tomorrow, given that the fact that she lives alone and her  comorbidities have made it such that she cannot safely manage in her current state.  She is not hypoxic beyond baseline at this time but does have dyspnea.  She has been accepted by the hospitalist service.    Disposition   Admission to Dr. Mcgrath, hospitalist    Diagnosis     ICD-10-CM    1. Other ascites  R18.8       2. ESRD (end stage renal disease) on dialysis (H)  N18.6     Z99.2       3. Dyspnea, unspecified type  R06.00       4. Atrial fibrillation, unspecified type (H)  I48.91       5. Anticoagulated  Z79.01       6. Chronic hypoxic respiratory failure, on home oxygen therapy (H)  J96.11     Z99.81          6/15/2025   MD Tigre Clark Jeffrey Alan, MD  06/15/25 1395

## 2025-06-15 NOTE — ED TRIAGE NOTES
Patient BIBA from home with increased dizziness that got worse today and patient unable to ambulate independently at home. . Patient is a dialysis patient goes MWF, had a normal run on Friday.  Has noticed an increase in ascites and shortness of breath. Patient has a port on the right side. Patient gets regular paracentesis and has an apt scheduled this week.  Patient doesn't think she can wait that long.  BP 96/67, patient on amio for afib. Patient has 20G on left FA. Patient afib CVR. Patient uses 2L  of oxygen at night. When EMS arrived patient was  hypoxic and was bumped to 5L with mid 90's oxygen.

## 2025-06-15 NOTE — H&P
[facility]    History and Physical - Hospitalist Service       Date of Admission:  6/15/2025    Assessment & Plan      Yissel Wetzel is a 65 year old female admitted on 6/15/2025. She presents with recurrent ascites.    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 Has been having difficulty hitting her dry weight due to fluid shifts into her abdomen with HD  - obs  - paracentesis ordered. IR unable to complete on 6/15. Will plan for 6/16 para. Likely can discharge after this and HD done    ESRD  - nephrology consult  - HD per nephrology  - continue midodrine 10 mg qAM and 30 mg before HD on MWF (dialysis days) and 20 mg BID TThSaSu (non-dialysis days)     DMT2  - continue lantus 10u  - MDSSI    Afib  - continue amiodarone  - stopped taking apixaban  - continue ASA    Severe pHTN due to intrinsic pulmonary vascular disease  Chronic hypoxic respiratory failure on 2L by NC  *Established with Dr. Redd. Last saw on 6/12/25 with plan for follow up in early July  *Echo 2/27/24 EF 60-65% with mild mitral regurgitation, mild valvular aortic stenosis, mild tricuspid regurgitation with right ventricular systolic pressure approximated at 33 mmHg plus the right atrial pressure, IVC diameters <2.1 cm collapsing >50% with sniff suggests normal RA pressure of 3 mmHg  *S/p RHC 10/2/24 with severely elevated left and right sided filling pressures, severely elevated pulmonary artery hypertension, normal cardiac output level.    Chronic anemia  Chronic thrombocytopenia  - noted, stable    RLS  - not on meds. Patient has declined them in the past          Diet:  regular diet  DVT Prophylaxis: Pneumatic Compression Devices  Weller Catheter: Not present  Lines: None     Cardiac Monitoring: None  Code Status:  FULL CODE    Clinically Significant Risk Factors Present on Admission          # Hypochloremia: Lowest Cl = 96 mmol/L in last  2 days, will monitor as appropriate       # Drug Induced Coagulation Defect: home medication list includes an anticoagulant medication  # Thrombocytopenia: Lowest platelets = 95 in last 2 days, will monitor for bleeding   # Hypertension: Noted on problem list  # Chronic heart failure with reduced ejection fraction: last echo with EF <40%     # Anemia: based on hgb <11       # Overweight: Estimated body mass index is 29.69 kg/m  as calculated from the following:    Height as of this encounter: 1.524 m (5').    Weight as of this encounter: 68.9 kg (152 lb).       # Financial/Environmental Concerns:           Disposition Plan     Medically Ready for Discharge: Anticipated Tomorrow           Dakota Mcgrath MD  Hospitalist Service  [facility]  Securely message with [employer] (more info)  Text page via [facility] Paging/Directory     ______________________________________________________________________    Chief Complaint   Abdominal distension     History is obtained from the patient, electronic health record, and emergency department physician    History of Present Illness   Yissel Wetzel is a 65 year old female who has esrd, severe pHTN, cirrhosis, recurrent ascites, recent admission SVT requiring cardioversion. She presents with abdominal distension resulting in difficulty with ambulation around her apartment. She has been struggling to reach her dry weight with HD. Abdomen is quite distended and resuling in SOB      Past Medical History    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, initial encounter 12/6/2019    End stage renal failure on dialysis (H)     Esophagitis, unspecified     Former tobacco use     HEMORRHAGIC GASTROPATHY     History of blood transfusion     [place]    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 (H) 2025    Iron deficiency anemia, unspecified     Mild persistent asthma     Obesity     Other and unspecified hyperlipidemia     Pneumonia     Pulmonary hypertension (H)     per 2012 echo mod.to severe pulmonary hypertension    Tobacco use disorder dc ed     Type 2 diabetes mellitus (H)     on Insulin- managed by PCP    Unspecified essential hypertension        Past Surgical History   Past Surgical History:   Procedure Laterality Date    ABDOMINOPLASTY  pt unsure when    abdominoplasty-    ANESTHESIA CARDIOVERSION N/A 2025    Procedure: Anesthesia cardioversion;  Surgeon: GENERIC ANESTHESIA PROVIDER;  Location:  OR     SECTION  ,     x 2    COLONOSCOPY      [facility]    COLONOSCOPY N/A 2021    Procedure: COLONOSCOPY, WITH POLYPECTOMY AND BIOPSY;  Surgeon: Lincoln Farrar MD;  Location:  GI    CREATE FISTULA ARTERIOVENOUS UPPER EXTREMITY Right 2018    Procedure: RIGHT BRACHIAL TO BASILIC ARTERIOVENOUS FISTULA CREATION;  Surgeon: Terry Vizcaino MD;  Location: [facility]    CV HEART CATHETERIZATION WITH POSSIBLE INTERVENTION N/A 2021    Procedure: Heart Catheterization with Possible Intervention;  Surgeon: Joseluis Dean MD;  Location:  HEART CARDIAC CATH LAB    CV RIGHT HEART CATH MEASUREMENTS RECORDED N/A 2022    Procedure: Heart Cath Right Heart Cath;  Surgeon: Yan Heredia MD;  Location:  HEART CARDIAC CATH LAB    CV RIGHT HEART CATH MEASUREMENTS RECORDED N/A 10/4/2024    Procedure: Right Heart Catheterization;  Surgeon: Jag Traylor MD;  Location:  HEART CARDIAC CATH LAB    ENT SURGERY  as a child    tonsillectomy    ESOPHAGOSCOPY, GASTROSCOPY, DUODENOSCOPY (EGD), COMBINED N/A 2021    Procedure: ESOPHAGOGASTRODUODENOSCOPY, WITH BIOPSY;  Surgeon: Lincoln Farrar MD;  Location:  GI    EYE SURGERY Left     injections-  eye    HYSTERECTOMY  approx 1983    partial hysterectomy-reason bleeding     IR CVC TUNNEL PLACEMENT > 5 YRS OF AGE  12/6/2019    IR CVC TUNNEL REMOVAL RIGHT  5/7/2020    IR DIALYSIS FISTULOGRAM RIGHT  12/9/2019       Prior to Admission Medications   Prior to Admission Medications   Prescriptions Last Dose Informant Patient Reported? Taking?   Continuous Glucose  (FREESTYLE LATOSHA 2 READER) ADYIN   No No   Sig: Use to read blood sugars as per 's instructions.   Continuous Glucose Sensor (FREESTYLE LATOSHA 2 SENSOR) MISC   No No   Sig: Change every 14 days.   acetaminophen (TYLENOL) 500 MG tablet   Yes No   Sig: Take 500-1,000 mg by mouth every 6 hours as needed.   amiodarone (PACERONE) 200 MG tablet   No No   Sig: Take 1 tablet (200 mg) by mouth daily.   amoxicillin (AMOXIL) 500 MG tablet   Yes No   Sig: Take 500 mg by mouth 2 times daily. For ten days   apixaban ANTICOAGULANT (ELIQUIS) 5 MG tablet   No No   Sig: Take 1 tablet (5 mg) by mouth 2 times daily.   Patient not taking: Reported on 6/12/2025   blood glucose (ACCU-CHEK GUIDE TEST) test strip   No No   Sig: Use to test blood sugars 4 times daily or as directed.   calcium acetate (PHOSLO) 667 MG CAPS capsule   Yes No   Sig: Take 667 mg by mouth 3 times daily (with meals)   calcium acetate (PHOSLO) 667 MG CAPS capsule   Yes No   Sig: Take 667 mg by mouth Take with snacks or supplements   famotidine (PEPCID) 20 MG tablet   Yes No   Sig: Take 20 mg by mouth 2 times daily as needed.   fluticasone (FLONASE) 50 MCG/ACT nasal spray   No No   Sig: Spray 1 spray into both nostrils 2 times daily.   Patient not taking: Reported on 6/12/2025   guaiFENesin 1200 MG TB12   Yes No   Sig: Take 1 tablet by mouth every 12 hours as needed (cough/congestion).   insulin glargine (LANTUS PEN) 100 UNIT/ML pen   No No   Sig: Inject 11 Units subcutaneously at bedtime.   insulin regular (NOVOLIN R FLEXPEN) 100 UNIT/ML pen   No No   Sig: INJECT 9-12 UNITS BEFORE  BREAKFAST, LUNCH, AND DINNER PLUS A CORRECTIVE SCALE OF 2 UNITS FOR EVERY 50 OVER 150 - MAXIMUM 20 unit(s) PER DAY   midodrine (PROAMATINE) 10 MG tablet   Yes No   Sig: Take 10 mg by mouth Every Mon, Wed, Fri Morning   midodrine (PROAMATINE) 10 MG tablet   Yes No   Sig: Take 20 mg by mouth 2 times daily. On non-dialysis days (Tuesday, Thursday, Saturday, Sunday)   midodrine (PROAMATINE) 10 MG tablet   Yes No   Sig: Take 30 mg by mouth daily. On Monday, Wednesday, Friday before dialysis   midodrine (PROAMATINE) 10 MG tablet   No No   Sig: Take 1 tablet (10 mg) by mouth daily as needed (Hypotension, dizziness).      Facility-Administered Medications: None           Physical Exam   Vital Signs: Temp: 98  F (36.7  C)   BP: 106/56 Pulse: 62   Resp: 20 SpO2: 94 % O2 Device: Nasal cannula Oxygen Delivery: 2 LPM  Weight: 152 lbs 0 oz    Constitutional: Awake, alert, cooperative, no apparent distress  Respiratory: Clear to auscultation bilaterally, no crackles or wheezing  Cardiovascular: Regular rate and rhythm, normal S1 and S2, and no murmur noted  GI: Normal bowel sounds, taut but not rigid, distended, non-tender  Skin/Integumen: No rashes, no cyanosis, no edema  Other:       Medical Decision Making       60 MINUTES SPENT BY ME on the date of service doing chart review, history, exam, documentation & further activities per the note.      Data   ------------------------- PAST 24 HR DATA REVIEWED -----------------------------------------------    I have personally reviewed the following data over the past 24 hrs:    6.7  \   9.1 (L)   / 95 (L)     139 96 (L) 32.0 (H) /  161 (H)   4.0 25 5.87 (H) \     ALT: 5 AST: 13 AP: 71 TBILI: 0.3   ALB: 3.8 TOT PROTEIN: 6.5 LIPASE: N/A     Procal: N/A CRP: N/A Lactic Acid: 1.2         Imaging results reviewed over the past 24 hrs:   Recent Results (from the past 24 hours)   XR Chest 2 Views    Narrative    EXAM: XR CHEST 2 VIEWS  LOCATION: [facility]  DATE:  6/15/2025    INDICATION: gradual SOB, anasarca, ESRD on HD, no fever cough  COMPARISON: None.      Impression    IMPRESSION: Pericardial calcifications, suggesting prior pericarditis. Small right pleural effusion with passive atelectasis in the right lung. Calcified aortic atherosclerosis. Moderately enlarged cardiac silhouette. Old left rib fractures. Pulmonary   vasculature has returned to normal.

## 2025-06-15 NOTE — PROGRESS NOTES
64 yo with ESRD on dialysis. She runs MWF at [facility].  She has not missed any runs recently.    She has recurrent ascites due to pulmonary HT and high right sided pressures. She is on O2 2L NC at home. Comorbidities nclude afib on apixaban, amiodarone, pulmonary HT, cardiac cirrhosis with recurrent ascites, DM, thrombocytopenia, anemia.     She is admitted for paracentesis.   Orders placed for dialysis tomorrow.

## 2025-06-15 NOTE — ED NOTES
Pt requests cortisone for under left breast.. Pt states it itches. No rash or lesions noted. No erythema noted. Provider notified of pt request. Order obtained

## 2025-06-16 ENCOUNTER — APPOINTMENT (OUTPATIENT)
Dept: ULTRASOUND IMAGING | Facility: CLINIC | Age: 65
End: 2025-06-16
Attending: STUDENT IN AN ORGANIZED HEALTH CARE EDUCATION/TRAINING PROGRAM
Payer: COMMERCIAL

## 2025-06-16 ENCOUNTER — RESULTS FOLLOW-UP (OUTPATIENT)
Dept: CARDIOLOGY | Facility: CLINIC | Age: 65
End: 2025-06-16

## 2025-06-16 LAB
% LINING CELLS, BODY FLUID: 12 %
ALBUMIN BODY FLUID SOURCE: NORMAL
ALBUMIN FLD-MCNC: 1.9 G/DL
ANION GAP SERPL CALCULATED.3IONS-SCNC: 19 MMOL/L (ref 7–15)
APPEARANCE FLD: CLEAR
BUN SERPL-MCNC: 43.3 MG/DL (ref 8–23)
CALCIUM SERPL-MCNC: 10 MG/DL (ref 8.8–10.4)
CHLORIDE SERPL-SCNC: 97 MMOL/L (ref 98–107)
COLOR FLD: YELLOW
CREAT SERPL-MCNC: 6.83 MG/DL (ref 0.51–0.95)
EGFRCR SERPLBLD CKD-EPI 2021: 6 ML/MIN/1.73M2
ERYTHROCYTE [DISTWIDTH] IN BLOOD BY AUTOMATED COUNT: 17.6 % (ref 10–15)
FLUAV RNA SPEC QL NAA+PROBE: NEGATIVE
FLUBV RNA RESP QL NAA+PROBE: NEGATIVE
GLUCOSE BLDC GLUCOMTR-MCNC: 120 MG/DL (ref 70–99)
GLUCOSE BLDC GLUCOMTR-MCNC: 168 MG/DL (ref 70–99)
GLUCOSE BLDC GLUCOMTR-MCNC: 173 MG/DL (ref 70–99)
GLUCOSE BLDC GLUCOMTR-MCNC: 180 MG/DL (ref 70–99)
GLUCOSE SERPL-MCNC: 217 MG/DL (ref 70–99)
HBV SURFACE AB SERPL IA-ACNC: <3.5 M[IU]/ML
HBV SURFACE AB SERPL IA-ACNC: NONREACTIVE M[IU]/ML
HBV SURFACE AG SERPL QL IA: NONREACTIVE
HCO3 SERPL-SCNC: 23 MMOL/L (ref 22–29)
HCT VFR BLD AUTO: 29.9 % (ref 35–47)
HGB BLD-MCNC: 9 G/DL (ref 11.7–15.7)
LYMPHOCYTES NFR FLD MANUAL: 27 %
MCH RBC QN AUTO: 30 PG (ref 26.5–33)
MCHC RBC AUTO-ENTMCNC: 30.1 G/DL (ref 31.5–36.5)
MCV RBC AUTO: 100 FL (ref 78–100)
MONOS+MACROS NFR FLD MANUAL: 58 %
NEUTROPHILS # FLD: 3.8 /UL (ref ?–250)
NEUTS BAND NFR FLD MANUAL: 3 %
PLATELET # BLD AUTO: 82 10E3/UL (ref 150–450)
POTASSIUM SERPL-SCNC: 4.2 MMOL/L (ref 3.4–5.3)
PROT FLD-MCNC: 3.1 G/DL
PROTEIN BODY FLUID SOURCE: NORMAL
RBC # BLD AUTO: 3 10E6/UL (ref 3.8–5.2)
RSV RNA SPEC NAA+PROBE: NEGATIVE
SARS-COV-2 RNA RESP QL NAA+PROBE: NEGATIVE
SODIUM SERPL-SCNC: 139 MMOL/L (ref 135–145)
SPECIMEN SOURCE FLD: NORMAL
WBC # BLD AUTO: 5.9 10E3/UL (ref 4–11)
WBC # FLD AUTO: 125 /UL

## 2025-06-16 PROCEDURE — 250N000009 HC RX 250: Performed by: RADIOLOGY

## 2025-06-16 PROCEDURE — 80048 BASIC METABOLIC PNL TOTAL CA: CPT | Performed by: STUDENT IN AN ORGANIZED HEALTH CARE EDUCATION/TRAINING PROGRAM

## 2025-06-16 PROCEDURE — 36415 COLL VENOUS BLD VENIPUNCTURE: CPT | Performed by: INTERNAL MEDICINE

## 2025-06-16 PROCEDURE — 89050 BODY FLUID CELL COUNT: CPT | Performed by: STUDENT IN AN ORGANIZED HEALTH CARE EDUCATION/TRAINING PROGRAM

## 2025-06-16 PROCEDURE — 250N000013 HC RX MED GY IP 250 OP 250 PS 637

## 2025-06-16 PROCEDURE — G0378 HOSPITAL OBSERVATION PER HR: HCPCS

## 2025-06-16 PROCEDURE — 99222 1ST HOSP IP/OBS MODERATE 55: CPT | Mod: 25 | Performed by: STUDENT IN AN ORGANIZED HEALTH CARE EDUCATION/TRAINING PROGRAM

## 2025-06-16 PROCEDURE — 85014 HEMATOCRIT: CPT | Performed by: STUDENT IN AN ORGANIZED HEALTH CARE EDUCATION/TRAINING PROGRAM

## 2025-06-16 PROCEDURE — 90935 HEMODIALYSIS ONE EVALUATION: CPT | Performed by: STUDENT IN AN ORGANIZED HEALTH CARE EDUCATION/TRAINING PROGRAM

## 2025-06-16 PROCEDURE — 87340 HEPATITIS B SURFACE AG IA: CPT | Performed by: INTERNAL MEDICINE

## 2025-06-16 PROCEDURE — 0W9G3ZZ DRAINAGE OF PERITONEAL CAVITY, PERCUTANEOUS APPROACH: ICD-10-PCS | Performed by: RADIOLOGY

## 2025-06-16 PROCEDURE — 99233 SBSQ HOSP IP/OBS HIGH 50: CPT

## 2025-06-16 PROCEDURE — 82042 OTHER SOURCE ALBUMIN QUAN EA: CPT | Performed by: STUDENT IN AN ORGANIZED HEALTH CARE EDUCATION/TRAINING PROGRAM

## 2025-06-16 PROCEDURE — 90937 HEMODIALYSIS REPEATED EVAL: CPT

## 2025-06-16 PROCEDURE — 258N000003 HC RX IP 258 OP 636: Performed by: INTERNAL MEDICINE

## 2025-06-16 PROCEDURE — 250N000012 HC RX MED GY IP 250 OP 636 PS 637: Performed by: STUDENT IN AN ORGANIZED HEALTH CARE EDUCATION/TRAINING PROGRAM

## 2025-06-16 PROCEDURE — 120N000001 HC R&B MED SURG/OB

## 2025-06-16 PROCEDURE — 87205 SMEAR GRAM STAIN: CPT | Performed by: STUDENT IN AN ORGANIZED HEALTH CARE EDUCATION/TRAINING PROGRAM

## 2025-06-16 PROCEDURE — 49083 ABD PARACENTESIS W/IMAGING: CPT

## 2025-06-16 PROCEDURE — 84157 ASSAY OF PROTEIN OTHER: CPT | Performed by: STUDENT IN AN ORGANIZED HEALTH CARE EDUCATION/TRAINING PROGRAM

## 2025-06-16 PROCEDURE — 82962 GLUCOSE BLOOD TEST: CPT

## 2025-06-16 PROCEDURE — 5A1D70Z PERFORMANCE OF URINARY FILTRATION, INTERMITTENT, LESS THAN 6 HOURS PER DAY: ICD-10-PCS | Performed by: INTERNAL MEDICINE

## 2025-06-16 PROCEDURE — 250N000013 HC RX MED GY IP 250 OP 250 PS 637: Performed by: STUDENT IN AN ORGANIZED HEALTH CARE EDUCATION/TRAINING PROGRAM

## 2025-06-16 PROCEDURE — 86706 HEP B SURFACE ANTIBODY: CPT | Performed by: INTERNAL MEDICINE

## 2025-06-16 PROCEDURE — 87637 SARSCOV2&INF A&B&RSV AMP PRB: CPT

## 2025-06-16 PROCEDURE — 87015 SPECIMEN INFECT AGNT CONCNTJ: CPT | Performed by: STUDENT IN AN ORGANIZED HEALTH CARE EDUCATION/TRAINING PROGRAM

## 2025-06-16 PROCEDURE — 36415 COLL VENOUS BLD VENIPUNCTURE: CPT | Performed by: STUDENT IN AN ORGANIZED HEALTH CARE EDUCATION/TRAINING PROGRAM

## 2025-06-16 RX ORDER — LIDOCAINE HYDROCHLORIDE 10 MG/ML
10 INJECTION, SOLUTION EPIDURAL; INFILTRATION; INTRACAUDAL; PERINEURAL ONCE
Status: COMPLETED | OUTPATIENT
Start: 2025-06-16 | End: 2025-06-16

## 2025-06-16 RX ORDER — ALBUMIN (HUMAN) 12.5 G/50ML
50 SOLUTION INTRAVENOUS
Status: DISCONTINUED | OUTPATIENT
Start: 2025-06-16 | End: 2025-06-17 | Stop reason: HOSPADM

## 2025-06-16 RX ORDER — ALBUMIN (HUMAN) 12.5 G/50ML
50 SOLUTION INTRAVENOUS
Status: DISCONTINUED | OUTPATIENT
Start: 2025-06-16 | End: 2025-06-16

## 2025-06-16 RX ADMIN — ACETAMINOPHEN 650 MG: 325 TABLET, FILM COATED ORAL at 05:53

## 2025-06-16 RX ADMIN — MIDODRINE HYDROCHLORIDE 30 MG: 10 TABLET ORAL at 12:16

## 2025-06-16 RX ADMIN — SODIUM CHLORIDE 500 ML: 0.9 INJECTION, SOLUTION INTRAVENOUS at 14:18

## 2025-06-16 RX ADMIN — AMIODARONE HYDROCHLORIDE 200 MG: 200 TABLET ORAL at 17:18

## 2025-06-16 RX ADMIN — ASPIRIN 81 MG: 81 TABLET, COATED ORAL at 17:18

## 2025-06-16 RX ADMIN — INSULIN GLARGINE 8 UNITS: 100 INJECTION, SOLUTION SUBCUTANEOUS at 22:20

## 2025-06-16 RX ADMIN — AMOXICILLIN 500 MG: 500 CAPSULE ORAL at 15:36

## 2025-06-16 RX ADMIN — LIDOCAINE HYDROCHLORIDE ANHYDROUS 10 ML: 10 INJECTION, SOLUTION INFILTRATION at 10:07

## 2025-06-16 RX ADMIN — INSULIN ASPART 1 UNITS: 100 INJECTION, SOLUTION INTRAVENOUS; SUBCUTANEOUS at 17:21

## 2025-06-16 RX ADMIN — ACETAMINOPHEN 650 MG: 325 TABLET, FILM COATED ORAL at 01:01

## 2025-06-16 RX ADMIN — CALCIUM ACETATE 667 MG: 667 CAPSULE ORAL at 12:08

## 2025-06-16 RX ADMIN — AMOXICILLIN 500 MG: 500 CAPSULE ORAL at 17:18

## 2025-06-16 RX ADMIN — ACETAMINOPHEN 650 MG: 325 TABLET, FILM COATED ORAL at 19:50

## 2025-06-16 RX ADMIN — INSULIN ASPART 1 UNITS: 100 INJECTION, SOLUTION INTRAVENOUS; SUBCUTANEOUS at 12:12

## 2025-06-16 RX ADMIN — INSULIN ASPART 2 UNITS: 100 INJECTION, SOLUTION INTRAVENOUS; SUBCUTANEOUS at 08:28

## 2025-06-16 RX ADMIN — MIDODRINE HYDROCHLORIDE 10 MG: 5 TABLET ORAL at 17:19

## 2025-06-16 RX ADMIN — CALCIUM ACETATE 667 MG: 667 CAPSULE ORAL at 17:16

## 2025-06-16 RX ADMIN — ACETAMINOPHEN 650 MG: 325 TABLET, FILM COATED ORAL at 12:10

## 2025-06-16 RX ADMIN — DICLOFENAC 2 G: 10 GEL TOPICAL at 17:23

## 2025-06-16 ASSESSMENT — ACTIVITIES OF DAILY LIVING (ADL)
ADLS_ACUITY_SCORE: 57
ADLS_ACUITY_SCORE: 57
ADLS_ACUITY_SCORE: 59
ADLS_ACUITY_SCORE: 57
ADLS_ACUITY_SCORE: 59
ADLS_ACUITY_SCORE: 57
ADLS_ACUITY_SCORE: 59
ADLS_ACUITY_SCORE: 60
ADLS_ACUITY_SCORE: 57
ADLS_ACUITY_SCORE: 60
ADLS_ACUITY_SCORE: 57
DEPENDENT_IADLS:: CLEANING;TRANSPORTATION

## 2025-06-16 NOTE — PLAN OF CARE
DATE & SHIFT: -- 9237-2442  PRIMARY Concern: Chronic ascites   SAFETY RISK Concerns (fall risk, behaviors, etc.): Fall risk       Aggression Tool Color: Green  Isolation/Type: NA  Tests/Procedures for NEXT shift: AM labs  Consults? (Pending/following, signed-off?) Nephrology  Where is patient from? (Home, TCU, etc.): Home  Other Important info for NEXT shift: Patient had paracentesis  with 3L taken off. Had dialysis  with schedule MWF.  Patient requested 2000 meds with dinner because that is how she takes it at home.   Anticipated DC date & active delays: TBD?  _____________________________________________________________________________  SUMMARY NOTE:   Orientation/Cognitive: A/Ox4, has repeated requested and gets frustrated easily   Observation Goals (Met/ Not Met): Not met   Mobility Level/Assist Equipment: Ax1 GB/W-- refusing fall alarms, verbalized understanding to not get up and walk alone. Patient is steady to stretch and oriented to call appropriately.   Antibiotics & Plan (IV/po, length of tx left): NA  Pain Management: PRN tylenol   Complete Pain Reassessment: Y/N Y Due next: Next shift   Tele/VS/O2: VSS 2L NC (baseline)  ABNL Lab/BG: Cr 6.83, GFR 6, Hgb 9.0, B, 173, 168  Diet: Reg  Bowel/Bladder: Cont-- BM today, up to bathroom   Skin Concerns: Rash under L breast, scattered bruising/scabs   Drains/Devices: PIV SL, HD fistula R   Patient Stated Goal for Today: Rest

## 2025-06-16 NOTE — PROGRESS NOTES
[facility]    Medicine Progress Note - Hospitalist Service    Date of Admission:  6/15/2025    Assessment & Plan   Yissel Wetzel is a 65 year old female admitted on 6/15/2025. She presents with recurrent ascites.    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 Has been having difficulty hitting her dry weight due to fluid shifts into her abdomen with HD  - Initially admitted to observation and changed to inpatient 6/16  - Underwent paracentesis 6/16 and 3L removed.   - Denies chills, SOB or abdominal pain   - Back to baseline supplemental oxygen use 2L NC     ESRD  - nephrology consulted and HD per nephrology  - continue midodrine 10 mg qAM and 30 mg before HD on MWF (dialysis days) and 20 mg BID TThSaSu (non-dialysis days)   - Anticipated discharge after HD and para however only ran for 15 mins before reporting back pain and requested to be taken off dialysis. Refused pain medications (has acetaminophen and IV/PO hydromorphone. Voltaren gel ordered.     Nasal congestion   Pt coughing so hard it sounds like she is retching and vomiting.   -COVID, RSV, Flu swab ordered     DMT2  - continue lantus 10u  - MDSSI    Afib  - continue amiodarone  - stopped taking apixaban  - continue ASA    Severe pHTN due to intrinsic pulmonary vascular disease  Chronic hypoxic respiratory failure on 2L by NC  *Established with Dr. Redd. Last saw on 6/12/25 with plan for follow up in early July  *Echo 2/27/24 EF 60-65% with mild mitral regurgitation, mild valvular aortic stenosis, mild tricuspid regurgitation with right ventricular systolic pressure approximated at 33 mmHg plus the right atrial pressure, IVC diameters <2.1 cm collapsing >50% with sniff suggests normal RA pressure of 3 mmHg  *S/p RHC 10/2/24 with severely elevated left and right sided filling pressures, severely elevated pulmonary artery hypertension, normal cardiac  output level.    Chronic anemia  Chronic thrombocytopenia  - noted, stable    RLS  - not on meds. Patient has declined them in the past          Diet: Regular Diet Adult    DVT Prophylaxis: Pneumatic Compression Devices  Weller Catheter: Not present  Lines: None     Cardiac Monitoring: None  Code Status: Full Code      Clinically Significant Risk Factors Present on Admission          # Hypochloremia: Lowest Cl = 96 mmol/L in last 2 days, will monitor as appropriate     # Anion Gap Metabolic Acidosis: Highest Anion Gap = 19 mmol/L in last 2 days, will monitor and treat as appropriate   # Drug Induced Coagulation Defect: home medication list includes an anticoagulant medication  # Drug Induced Platelet Defect: home medication list includes an antiplatelet medication   # Hypertension: Noted on problem list  # Chronic heart failure with reduced ejection fraction: last echo with EF <40%     # Anemia: based on hgb <11      # DMII: A1C = 6.9 % (Ref range: <5.7 %) within past 6 months   # Overweight: Estimated body mass index is 29.69 kg/m  as calculated from the following:    Height as of this encounter: 1.524 m (5').    Weight as of this encounter: 68.9 kg (152 lb).       # Financial/Environmental Concerns:           Social Drivers of Health    Tobacco Use: Medium Risk (6/12/2025)    Patient History     Smoking Tobacco Use: Former     Smokeless Tobacco Use: Never          Disposition Plan     Medically Ready for Discharge: Anticipated Tomorrow           The patient's care was discussed with the Attending Physician, Dr. Forbes.    Jodie Colon NP  Hospitalist Service  [facility]  Securely message with [employer] (more info)  Text page via [employer] Paging/Directory   ______________________________________________________________________    Interval History   No acute events overnight.     Physical Exam   Vital Signs: Temp: 97.9  F (36.6  C) Temp src: Oral BP: 108/56 Pulse: 66   Resp: 18 SpO2: 98 % O2  Device: Nasal cannula Oxygen Delivery: 2 LPM  Weight: 152 lbs 0 oz    Physical Exam  HENT:      Mouth/Throat:      Mouth: Mucous membranes are moist.   Eyes:      Pupils: Pupils are equal, round, and reactive to light.   Cardiovascular:      Rate and Rhythm: Normal rate and regular rhythm.      Pulses: Normal pulses.      Heart sounds: Normal heart sounds.   Pulmonary:      Effort: Pulmonary effort is normal.      Breath sounds: Normal breath sounds.   Abdominal:      General: Bowel sounds are decreased. There is distension.      Tenderness: There is generalized abdominal tenderness.      Comments: Mild abdominal distention   Skin:     General: Skin is warm and dry.      Capillary Refill: Capillary refill takes less than 2 seconds.   Neurological:      Mental Status: She is alert and oriented to person, place, and time.   Psychiatric:         Mood and Affect: Mood is anxious.         Speech: Speech normal.         Behavior: Behavior is cooperative.         Cognition and Memory: Memory is impaired.         Judgment: Judgment is impulsive.         Medical Decision Making       50 MINUTES SPENT BY ME on the date of service doing chart review, history, exam, documentation & further activities per the note.      Data     I have personally reviewed the following data over the past 24 hrs:    5.9  \   9.0 (L)   / 82 (L)     139 97 (L) 43.3 (H) /  173 (H)   4.2 23 6.83 (H) \     TSH: N/A T4: N/A A1C: N/A       Imaging results reviewed over the past 24 hrs:   Recent Results (from the past 24 hours)   US Paracentesis without Albumin    Narrative    EXAM:  1. PARACENTESIS  2. ULTRASOUND GUIDANCE  LOCATION: [facility]  DATE: 6/16/2025    INDICATION: Ascites.    PROCEDURE: Informed consent obtained. Time out performed. The abdomen was prepped and draped in a sterile fashion. 10 mL of 1% lidocaine was infused into local soft tissues. A 5 Wolof catheter system was introduced into the abdominal ascites  under   ultrasound guidance.    3 liters of clear fluid were removed and sent to lab if requested.    Patient tolerated procedure well.    Ultrasound imaging was obtained and placed in the patient's permanent medical record.      Impression    IMPRESSION:  1.  Status post ultrasound-guided paracentesis.

## 2025-06-16 NOTE — PROGRESS NOTES
Observation goals  PRIOR TO DISCHARGE        Comments:   -diagnostic tests and consults completed and resulted: NOT MET  -vital signs normal or at patient baseline: MET  Nurse to notify provider when observation goals have been met and patient is ready for discharge.

## 2025-06-16 NOTE — CONSULTS
[facility]    Nephrology Consultation     Date of Admission:  6/15/2025    Assessment & Plan     Yissel Wetzel is a 65 year old female who was admitted on 6/15/2025.     Assessment:    ESRD on HD   MWF at [facility] under care of Dr. Hernandez.   - HD today     Recurrent ascites   Severe pulmonary HTN   Received paracentesis 6/16 with 3L removed.     Plan/Recs:  1)  2)  3)    Clau Nieto MD   [facility] - Nephrology  [phone]  --------------------------------------------------------------------------------------------  Reason for Consult     I was asked to see the patient for ESRD.    Primary Care Physician     Gigi Martin    Chief Complaint     Abd distension    History is obtained from the patient and chart review.      History of Present Illness     Yissel Wetzel is a 65 year old female who presents with abdominal distension.     She presented yesterday with significant abd distension making it difficult to ambulate around her apartment. No missed dialysis. She underwent paracentesis with 3L removed today. I saw her during dialysis for planned 3h run, 2L UF. Within 15 minutes of getting her on the machine she wanted to be taken off due to diffuse body pain. Received tylenol at noon, not able to get another dose. She refused other meds. The pain is nonspecific and diffuse. She would not accept repositioning or other attempts to make her more comfortable.   Denied SOB or LE edema. Denied other complaints. Requested to be taken off. Only ran for ~50-55 min.     Past Medical History   I have reviewed this patient's medical history and updated it with pertinent information if needed.   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, initial encounter 12/6/2019    End stage renal  failure on dialysis (H)     Esophagitis, unspecified     Former tobacco use     HEMORRHAGIC GASTROPATHY     History of blood transfusion     [place]    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) 2025    Iron deficiency anemia, unspecified     Mild persistent asthma     Obesity     Other and unspecified hyperlipidemia     Pneumonia     Pulmonary hypertension (H)     per 2012 echo mod.to severe pulmonary hypertension    Tobacco use disorder dc ed     Type 2 diabetes mellitus (H)     on Insulin- managed by PCP    Unspecified essential hypertension        Past Surgical History   I have reviewed this patient's surgical history and updated it with pertinent information if needed.  Past Surgical History:   Procedure Laterality Date    ABDOMINOPLASTY  pt unsure when    abdominoplasty-    ANESTHESIA CARDIOVERSION N/A 2025    Procedure: Anesthesia cardioversion;  Surgeon: GENERIC ANESTHESIA PROVIDER;  Location:  OR     SECTION  ,     x 2    COLONOSCOPY      [facility]    COLONOSCOPY N/A 2021    Procedure: COLONOSCOPY, WITH POLYPECTOMY AND BIOPSY;  Surgeon: Lincoln Farrar MD;  Location:  GI    CREATE FISTULA ARTERIOVENOUS UPPER EXTREMITY Right 2018    Procedure: RIGHT BRACHIAL TO BASILIC ARTERIOVENOUS FISTULA CREATION;  Surgeon: Terry Vizcaino MD;  Location:  SD    CV HEART CATHETERIZATION WITH POSSIBLE INTERVENTION N/A 2021    Procedure: Heart Catheterization with Possible Intervention;  Surgeon: Joseluis Dean MD;  Location:  HEART CARDIAC CATH LAB    CV RIGHT HEART CATH MEASUREMENTS RECORDED N/A 2022    Procedure: Heart Cath Right Heart Cath;  Surgeon: Yan Heredia MD;  Location:  HEART CARDIAC CATH LAB    CV RIGHT HEART CATH MEASUREMENTS RECORDED N/A 10/4/2024    Procedure: Right Heart Catheterization;  Surgeon: Jag Traylor MD;   Location:  HEART CARDIAC CATH LAB    ENT SURGERY  as a child    tonsillectomy    ESOPHAGOSCOPY, GASTROSCOPY, DUODENOSCOPY (EGD), COMBINED N/A 5/27/2021    Procedure: ESOPHAGOGASTRODUODENOSCOPY, WITH BIOPSY;  Surgeon: Lincoln Farrar MD;  Location:  GI    EYE SURGERY Left     injections- eye    HYSTERECTOMY  approx 1983    partial hysterectomy-reason bleeding     IR CVC TUNNEL PLACEMENT > 5 YRS OF AGE  12/6/2019    IR CVC TUNNEL REMOVAL RIGHT  5/7/2020    IR DIALYSIS FISTULOGRAM RIGHT  12/9/2019       Prior to Admission Medications   Prior to Admission Medications   Prescriptions Last Dose Informant Patient Reported? Taking?   Continuous Glucose  (FREESTYLE LATOSHA 2 READER) AYDIN   No No   Sig: Use to read blood sugars as per 's instructions.   Continuous Glucose Sensor (FREESTYLE LATOSHA 2 SENSOR) MISC   No No   Sig: Change every 14 days.   acetaminophen (TYLENOL) 500 MG tablet   Yes Yes   Sig: Take 500-1,000 mg by mouth every 6 hours as needed.   amiodarone (PACERONE) 200 MG tablet 6/14/2025 Evening  No Yes   Sig: Take 1 tablet (200 mg) by mouth daily.   Patient taking differently: Take 200 mg by mouth every evening.   amoxicillin (AMOXIL) 500 MG tablet 6/15/2025 Morning  Yes Yes   Sig: Take 500 mg by mouth 2 times daily.   apixaban ANTICOAGULANT (ELIQUIS) 5 MG tablet   No No   Sig: Take 1 tablet (5 mg) by mouth 2 times daily.   Patient not taking: Reported on 6/12/2025   aspirin 81 MG EC tablet 6/14/2025 Evening  Yes Yes   Sig: Take 81 mg by mouth every evening.   blood glucose (ACCU-CHEK GUIDE TEST) test strip   No No   Sig: Use to test blood sugars 4 times daily or as directed.   calcium acetate (PHOSLO) 667 MG CAPS capsule 6/15/2025 Morning  Yes Yes   Sig: Take 667 mg by mouth 3 times daily (with meals)   calcium acetate (PHOSLO) 667 MG CAPS capsule   Yes Yes   Sig: Take 667 mg by mouth Take with snacks or supplements   famotidine (PEPCID) 20 MG tablet   Yes Yes   Sig: Take 20 mg by  mouth 2 times daily as needed.   fluticasone (FLONASE) 50 MCG/ACT nasal spray   No No   Sig: Spray 1 spray into both nostrils 2 times daily.   Patient not taking: Reported on 6/12/2025   guaiFENesin 1200 MG TB12   Yes Yes   Sig: Take 1 tablet by mouth every 12 hours as needed (cough/congestion).   insulin glargine (LANTUS PEN) 100 UNIT/ML pen 6/14/2025 Bedtime  No Yes   Sig: Inject 11 Units subcutaneously at bedtime.   Patient taking differently: Inject 10 Units subcutaneously at bedtime.   insulin regular (NOVOLIN R FLEXPEN) 100 UNIT/ML pen 6/15/2025 Morning  No Yes   Sig: INJECT 9-12 UNITS BEFORE BREAKFAST, LUNCH, AND DINNER PLUS A CORRECTIVE SCALE OF 2 UNITS FOR EVERY 50 OVER 150 - MAXIMUM 20 unit(s) PER DAY   Patient taking differently: INJECT 2 UNITS/15g Carbohydrate BEFORE BREAKFAST, LUNCH, AND DINNER PLUS A CORRECTIVE SCALE OF 1 UNITS FOR EVERY 50 OVER 150 - MAXIMUM 20 unit(s) PER DAY   midodrine (PROAMATINE) 10 MG tablet 6/13/2025 Morning  Yes Yes   Sig: Take 10 mg by mouth Every Mon, Wed, Fri Morning   midodrine (PROAMATINE) 10 MG tablet 6/15/2025 Morning  Yes Yes   Sig: Take 20 mg by mouth 2 times daily. On non-dialysis days (Tuesday, Thursday, Saturday, Sunday)   midodrine (PROAMATINE) 10 MG tablet 6/13/2025  Yes Yes   Sig: Take 30 mg by mouth daily. On Monday, Wednesday, Friday before dialysis   midodrine (PROAMATINE) 10 MG tablet   No Yes   Sig: Take 1 tablet (10 mg) by mouth daily as needed (Hypotension, dizziness).      Facility-Administered Medications: None     Allergies   Allergies   Allergen Reactions    Albuterol      shakiness    Aspirin      gi    Byetta [Exenatide]      gi    Clonidine      constipation    Codeine      vomiting    Ezetimibe      muscle symptoms    Hydralazine      headaches    Lisinopril      hyperkalemia    Metformin Hydrochloride      vomiting    Pravachol [Pravastatin Sodium]      muscle pains    Simvastatin      myalgias    Troglitazone        Social History   I have  reviewed this patient's social history and updated it with pertinent information if needed. Yissel Wetzel  reports that she quit smoking about 25 years ago. Her smoking use included cigarettes. She started smoking about 47 years ago. She has a 22 pack-year smoking history. She has never used smokeless tobacco. She reports that she does not drink alcohol and does not use drugs.    Family History   I have reviewed this patient's family history and updated it with pertinent information if needed.   Family History   Problem Relation Age of Onset    Arrhythmia Mother     Hypertension Mother     Pancreatic Cancer Father     Diabetes Brother     Asthma Sister     LUNG DISEASE Sister     Diabetes Daughter     Cirrhosis Sister        Review of Systems   The 10 point Review of Systems is negative other than noted in the HPI.     Physical Exam   Temp: 98.2  F (36.8  C) Temp src: Oral BP: 101/45 Pulse: 59   Resp: 18 SpO2: 97 % O2 Device: Nasal cannula Oxygen Delivery: 2 LPM  Vital Signs with Ranges  Temp:  [97.4  F (36.3  C)-98.2  F (36.8  C)] 98.2  F (36.8  C)  Pulse:  [59-70] 59  Resp:  [18-20] 18  BP: ()/(43-56) 101/45  SpO2:  [83 %-100 %] 97 %  152 lbs 0 oz    GENERAL: NAD, frail  HEENT:  Normocephalic. MMM  CV: RRR, systolic murmur  RESP: poor air movement but relatively clear  GI: distended but soft   MUSCULOSKELETAL: no LE edema  NEURO:  Alert, oriented, normal speech  PSYCH: irritated mood       Data   BMP  Recent Labs   Lab 06/16/25  1122 06/16/25  0703 06/16/25  0236 06/15/25  2138 06/15/25  1422 06/12/25  1220   NA  --  139  --   --  139 139   POTASSIUM  --  4.2  --   --  4.0 3.6   CHLORIDE  --  97*  --   --  96* 96*   KANWAL  --  10.0  --   --  10.3 10.3   CO2  --  23  --   --  25 28   BUN  --  43.3*  --   --  32.0* 20.9   CR  --  6.83*  --   --  5.87* 4.68*   * 217* 180* 201* 161* 153*     Phos@LABNTIPR(phos:4)  CBC)  Recent Labs   Lab 06/16/25  0703 06/15/25  1422 06/12/25  1220   WBC 5.9 6.7 6.4  "  HGB 9.0* 9.1* 9.4*   HCT 29.9* 30.2* 31.5*    100 101*   PLT 82* 95* 97*     Recent Labs   Lab 06/15/25  1422   AST 13   ALT 5   ALKPHOS 71   BILITOTAL 0.3     No lab results found in last 7 days.  No results found for: \"D2VIT\", \"D3VIT\", \"DTOT\"  Recent Labs   Lab 06/16/25  0703   HGB 9.0*   HCT 29.9*        No results for input(s): \"PTHI\" in the last 168 hours.  Color Urine (no units)   Date Value   09/25/2019 Yellow     Appearance Urine (no units)   Date Value   09/25/2019 Clear     Glucose Urine (mg/dL)   Date Value   09/25/2019 >499 (A)     Bilirubin Urine (no units)   Date Value   09/25/2019 Negative     Ketones Urine (mg/dL)   Date Value   09/25/2019 Negative     Specific Gravity Urine (no units)   Date Value   09/25/2019 1.005     pH Urine (pH)   Date Value   09/25/2019 7.0     Protein Albumin Urine (mg/dL)   Date Value   09/25/2019 100 (A)     Urobilinogen Urine (EU/dL)   Date Value   06/07/2018 0.2     Nitrite Urine (no units)   Date Value   09/25/2019 Negative     Leukocyte Esterase Urine (no units)   Date Value   09/25/2019 Negative           Clau Nieto MD   [facility] - Nephrology  [phone]    "

## 2025-06-16 NOTE — PROGRESS NOTES
Potassium   Date Value Ref Range Status   06/16/2025 4.2 3.4 - 5.3 mmol/L Final   02/08/2025 4.4 3.4 - 5.3 mmol/L Final   10/05/2020 4.1 3.5 - 5.5 mEq/L Final     Hemoglobin   Date Value Ref Range Status   06/16/2025 9.0 (L) 11.7 - 15.7 g/dL Final   01/13/2020 8.4 (L) 11.7 - 15.7 g/dL Final     Creatinine   Date Value Ref Range Status   06/16/2025 6.83 (H) 0.51 - 0.95 mg/dL Final   01/13/2020 5.97 (H) 0.52 - 1.04 mg/dL Final     Urea Nitrogen   Date Value Ref Range Status   06/16/2025 43.3 (H) 8.0 - 23.0 mg/dL Final   02/08/2025 17 7 - 30 mg/dL Final   01/13/2020 44 (H) 7 - 30 mg/dL Final     Sodium   Date Value Ref Range Status   06/16/2025 139 135 - 145 mmol/L Final   01/13/2020 139 133 - 144 mmol/L Final     INR   Date Value Ref Range Status   10/04/2024 1.17 (H) 0.85 - 1.15 Final   01/12/2020 1.23 (H) 0.86 - 1.14 Final       DIALYSIS PROCEDURE NOTE  Hepatitis status of previous patient on machine log was checked and verified ok to use with this patients hepatitis status.  Patient dialyzed for 50 minutes. on a K3 bath with no fluid removal.  A BFR of 450 ml/min was obtained via a right AVF using 15 gauge needles.      The treatment plan was discussed with Dr. Nieto during the treatment.    Total heparin received during the treatment: 0 units.   Needle cannulation sites held x 20 min. In total      Meds  given: None   Complications: None. Patient requested to be off HD due to pain on her back. Refuses other pain meds. Dr. Olguin at bedside.      Person educated: Patient. Knowledge base substantial. Barriers to learning: none. Educated on procedure via verbal mode. Patient needs follow-up  ICEBOAT? Timeout performed pre-treatment  I: Patient was identified using 2 identifiers  C:  Consent Signed Yes  E: Equipment preventative maintenance is current and dialysis delivery system OK to use  B: Hepatitis B Surface Antigen: Negative; Draw Date: 06/02/2025      Hepatitis B Surface Antibody: Unknown;   O: Dialysis  orders present and complete prior to treatment  A: Vascular access verified and assessed prior to treatment  T: Treatment was performed at a clinically appropriate time  ?: Patient was allowed to ask questions and address concerns prior to treatment  See Adult Hemodialysis flowsheet in EPIC for further details and post assessment.  Machine water alarm in place and functioning. Transducer pods intact and checked every 15min.   Pt assisted with repositioning throughout dialysis treatment.  Pt returned via bed.  Chlorine/Chloramine water system checked every 4 hours.  Outpatient Dialysis at [facility]      Post treatment report given to TIM English RN regarding 0L of fluid removed, last /46    Please remove patient dressing on AVF and AVG needle sites 24 hours after dialysis. If leaking occurs please apply a Band-Aid.     CARYN Inman RN

## 2025-06-16 NOTE — PLAN OF CARE
Orientation: A&Ox4. Frequent requests.     Vitals/Tele: VSS 2L NC, baseline. C/o HA managed with prn tylenol.     IV Access/drains: PIV SL    Diet: Regular    Mobility: A1 GB&W    GI/: Oliguric, pt on HD. Purewick in place. No BM this shift.     Wound/Skin: Rash under left breast, managed with prn HC cream.     Consults: nephrology.     Discharge Plan: Paracentesis, then dialysis, then likely discharge home.       See Flow sheets for assessment

## 2025-06-16 NOTE — PROVIDER NOTIFICATION
MD Notification    Notified Person: NP    Notified Person Name: Isaiah    Notification Date/Time: 06/16/25 5:42 PM     Notification Interaction: marcelino     Purpose of Notification: is it okay to give 2000 meds? she said that she always takes them at dinner    Orders Received: okay'ed with NP to give evening meds early     Comments:

## 2025-06-16 NOTE — CONSULTS
Care Management Initial Consult    General Information  Assessment completed with: ROBINchart reviewCassandra  Type of CM/SW Visit: Initial Assessment    Primary Care Provider verified and updated as needed: Yes   Readmission within the last 30 days: no previous admission in last 30 days      Reason for Consult: discharge planning  Advance Care Planning: Advance Care Planning Reviewed: no concerns identified          Communication Assessment  Patient's communication style: spoken language (English or Bilingual)             Cognitive  Cognitive/Neuro/Behavioral: unchanged from my previous assessment  Level of Consciousness: alert  Arousal Level: opens eyes spontaneously  Orientation: oriented x 4  Mood/Behavior: calm  Best Language: 0 - No aphasia  Speech: logical, spontaneous, clear    Living Environment:   People in home: alone     Current living Arrangements: apartment      Able to return to prior arrangements: yes       Family/Social Support:  Care provided by: self  Provides care for: no one  Marital Status: Single  Support system:            Description of Support System:           Current Resources:   Patient receiving home care services: No        Community Resources: County Programs, County Worker, Home Care, OP Dialysis  Equipment currently used at home: walker, rolling  Supplies currently used at home: Diabetic Supplies, Oxygen supplies    Employment/Financial:  Employment Status: disabled        Financial Concerns:   none          Does the patient's insurance plan have a 3 day qualifying hospital stay waiver?  Yes     Which insurance plan 3 day waiver is available? Alternative insurance waiver    Will the waiver be used for post-acute placement? No    Lifestyle & Psychosocial Needs:  Social Drivers of Health     Food Insecurity: Low Risk  (5/12/2025)    Food Insecurity     Within the past 12 months, did you worry that your food would run out before you got money to buy more?: No     Within the past 12  months, did the food you bought just not last and you didn t have money to get more?: No   Depression: Not at risk (1/25/2024)    PHQ-2     PHQ-2 Score: 0   Housing Stability: Low Risk  (5/12/2025)    Housing Stability     Do you have housing? : Yes     Are you worried about losing your housing?: No   Tobacco Use: Medium Risk (6/12/2025)    Patient History     Smoking Tobacco Use: Former     Smokeless Tobacco Use: Never     Passive Exposure: Not on file   Financial Resource Strain: Low Risk  (5/12/2025)    Financial Resource Strain     Within the past 12 months, have you or your family members you live with been unable to get utilities (heat, electricity) when it was really needed?: No   Alcohol Use: Not on file   Transportation Needs: Low Risk  (5/12/2025)    Transportation Needs     Within the past 12 months, has lack of transportation kept you from medical appointments, getting your medicines, non-medical meetings or appointments, work, or from getting things that you need?: No   Physical Activity: Not on file   Interpersonal Safety: Low Risk  (5/12/2025)    Interpersonal Safety     Do you feel physically and emotionally safe where you currently live?: Yes     Within the past 12 months, have you been hit, slapped, kicked or otherwise physically hurt by someone?: No     Within the past 12 months, have you been humiliated or emotionally abused in other ways by your partner or ex-partner?: No   Stress: Not on file   Social Connections: Not on file   Health Literacy: Not on file       Functional Status:  Prior to admission patient needed assistance:   Dependent ADLs:: Ambulation-walker  Dependent IADLs:: Cleaning, Transportation       Mental Health Status:  Mental Health Status: No Current Concerns       Chemical Dependency Status:      No concerns          Values/Beliefs:  Spiritual, Cultural Beliefs, Temple Practices, Values that affect care: no               Discussed  Partnership in Safe Discharge Planning   document with patient/family: Yes:     Additional Information:  Per H&P, patient was admitted for recurrent ascites, ABD distention, paracentesis.   Writer attempted to met with patient in room but she is in Dialysis.   -Per review, patient lives alone, goes to outpatient at [facility] in [place]([phone]) on MWF, She uses home Oxygen. [facility].(See [facility] Liaision note).   Patient appears to get rides from her Boyfriend or family.            Next Steps: will follow for discharge planning.    Nan Lou RN

## 2025-06-16 NOTE — PROGRESS NOTES
Was verbally okay'ed to send midodrine down with the patient per provider. Patient states she takes it with her meal at dialysis and will take it.

## 2025-06-16 NOTE — PROGRESS NOTES
[facility]    Patient is currently receiving services with [facility]. The patient is currently receiving RN PT OT and HA services. Patient's  and home health team have been notified that patient is under inpatient status. [facility] Liaison will continue to follow patient. Please provide orders to resume home care at time of discharge if appropriate.

## 2025-06-17 ENCOUNTER — APPOINTMENT (OUTPATIENT)
Dept: GENERAL RADIOLOGY | Facility: CLINIC | Age: 65
End: 2025-06-17
Payer: COMMERCIAL

## 2025-06-17 VITALS
OXYGEN SATURATION: 100 % | HEIGHT: 60 IN | RESPIRATION RATE: 18 BRPM | BODY MASS INDEX: 29.84 KG/M2 | DIASTOLIC BLOOD PRESSURE: 38 MMHG | HEART RATE: 58 BPM | TEMPERATURE: 97.6 F | SYSTOLIC BLOOD PRESSURE: 112 MMHG | WEIGHT: 152 LBS

## 2025-06-17 LAB
GLUCOSE BLDC GLUCOMTR-MCNC: 119 MG/DL (ref 70–99)
GLUCOSE BLDC GLUCOMTR-MCNC: 138 MG/DL (ref 70–99)
GLUCOSE BLDC GLUCOMTR-MCNC: 95 MG/DL (ref 70–99)

## 2025-06-17 PROCEDURE — 73630 X-RAY EXAM OF FOOT: CPT | Mod: LT

## 2025-06-17 PROCEDURE — 73660 X-RAY EXAM OF TOE(S): CPT | Mod: LT

## 2025-06-17 PROCEDURE — 250N000013 HC RX MED GY IP 250 OP 250 PS 637: Performed by: STUDENT IN AN ORGANIZED HEALTH CARE EDUCATION/TRAINING PROGRAM

## 2025-06-17 PROCEDURE — 99239 HOSP IP/OBS DSCHRG MGMT >30: CPT

## 2025-06-17 RX ORDER — AMOXICILLIN 500 MG/1
500 TABLET, FILM COATED ORAL 2 TIMES DAILY
COMMUNITY
Start: 2025-06-17 | End: 2025-06-19

## 2025-06-17 RX ADMIN — ACETAMINOPHEN 650 MG: 325 TABLET, FILM COATED ORAL at 00:06

## 2025-06-17 RX ADMIN — CALCIUM ACETATE 667 MG: 667 CAPSULE ORAL at 12:58

## 2025-06-17 RX ADMIN — AMOXICILLIN 500 MG: 500 CAPSULE ORAL at 08:43

## 2025-06-17 RX ADMIN — ACETAMINOPHEN 650 MG: 325 TABLET, FILM COATED ORAL at 08:50

## 2025-06-17 RX ADMIN — DICLOFENAC 2 G: 10 GEL TOPICAL at 08:57

## 2025-06-17 RX ADMIN — HYDROCORTISONE: 10 CREAM TOPICAL at 08:52

## 2025-06-17 RX ADMIN — MIDODRINE HYDROCHLORIDE 20 MG: 5 TABLET ORAL at 08:44

## 2025-06-17 RX ADMIN — CALCIUM ACETATE 667 MG: 667 CAPSULE ORAL at 08:44

## 2025-06-17 ASSESSMENT — ACTIVITIES OF DAILY LIVING (ADL)
ADLS_ACUITY_SCORE: 65
ADLS_ACUITY_SCORE: 65
ADLS_ACUITY_SCORE: 62
ADLS_ACUITY_SCORE: 62
ADLS_ACUITY_SCORE: 65
ADLS_ACUITY_SCORE: 62
ADLS_ACUITY_SCORE: 60
ADLS_ACUITY_SCORE: 65
ADLS_ACUITY_SCORE: 62
ADLS_ACUITY_SCORE: 65
ADLS_ACUITY_SCORE: 62

## 2025-06-17 NOTE — PROGRESS NOTES
[facility]    Medicine Progress Note - Hospitalist Service    Date of Admission:  6/15/2025    Assessment & Plan   Yissel Wetzel is a 65 year old female admitted on 6/15/2025. She presents with recurrent ascites.    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 Has been having difficulty hitting her dry weight due to fluid shifts into her abdomen with HD  - Initially admitted to observation and changed to inpatient 6/16  - Underwent paracentesis 6/16 and 3L removed.   - Denies chills, SOB or abdominal pain   - Back to baseline supplemental oxygen use 2L NC     ESRD  - nephrology consulted and HD per nephrology  - continue midodrine 10 mg qAM and 30 mg before HD on MWF (dialysis days) and 20 mg BID TThSaSu (non-dialysis days)   - Anticipated discharge after HD and para however only ran for 15 mins before reporting back pain and requested to be taken off dialysis. Refused pain medications (has acetaminophen and IV/PO hydromorphone. Voltaren gel ordered.     Nasal congestion   Pt coughing so hard it sounds like she is retching and vomiting.   -COVID, RSV, Flu swab ordered     DMT2  - continue lantus 10u  - MDSSI    Afib  - continue amiodarone  - stopped taking apixaban  - continue ASA    Severe pHTN due to intrinsic pulmonary vascular disease  Chronic hypoxic respiratory failure on 2L by NC  *Established with Dr. Redd. Last saw on 6/12/25 with plan for follow up in early July  *Echo 2/27/24 EF 60-65% with mild mitral regurgitation, mild valvular aortic stenosis, mild tricuspid regurgitation with right ventricular systolic pressure approximated at 33 mmHg plus the right atrial pressure, IVC diameters <2.1 cm collapsing >50% with sniff suggests normal RA pressure of 3 mmHg  *S/p RHC 10/2/24 with severely elevated left and right sided filling pressures, severely elevated pulmonary artery hypertension, normal cardiac  output level.    Chronic anemia  Chronic thrombocytopenia  - noted, stable    RLS  - not on meds. Patient has declined them in the past          Diet: Regular Diet Adult    DVT Prophylaxis: Pneumatic Compression Devices  Weller Catheter: Not present  Lines: None     Cardiac Monitoring: None  Code Status: Full Code      Clinically Significant Risk Factors Present on Admission   { TIP  This section helps capture the illness of the patient on admission.     - Review diagnoses highlighted in blue; right click, edit & delete if not appropriate   - If blank, no additional diagnoses identified   :[id number]}       # Hypochloremia: Lowest Cl = 96 mmol/L in last 2 days, will monitor as appropriate     # Anion Gap Metabolic Acidosis: Highest Anion Gap = 19 mmol/L in last 2 days, will monitor and treat as appropriate     # Drug Induced Coagulation Defect: home medication list includes an anticoagulant medication  # Drug Induced Platelet Defect: home medication list includes an antiplatelet medication   # Hypertension: Noted on problem list  # Chronic heart failure with reduced ejection fraction: last echo with EF <40%     # Anemia: based on hgb <11      # DMII: A1C = 6.9 % (Ref range: <5.7 %) within past 6 months   # Overweight: Estimated body mass index is 29.69 kg/m  as calculated from the following:    Height as of this encounter: 1.524 m (5').    Weight as of this encounter: 68.9 kg (152 lb).         # Financial/Environmental Concerns:           Social Drivers of Health    Tobacco Use: Medium Risk (6/12/2025)    Patient History     Smoking Tobacco Use: Former     Smokeless Tobacco Use: Never          Disposition Plan   {TIP  It is advised to update the Medical Readiness for Discharge [MRD] daily, until the patient is 'Ready Now.' Last Documentation- Anticipated Tomorrow 06/17/2025. Use the SmartList below to update for today:[id number]}  Medically Ready for Discharge: Anticipated Tomorrow           The patient's care was discussed  with the Attending Physician, Dr. Forbes.    Felipe Forbes MD  Hospitalist Service  [facility]  Securely message with Squ[employer]resp[employer] (more info)  Text page via [email or web address] Paging/Directory   ______________________________________________________________________    Interval History   No acute events overnight.     Physical Exam   Vital Signs: Temp: 98  F (36.7  C) Temp src: Oral BP: 99/41 Pulse: 60   Resp: 18 SpO2: 100 % O2 Device: Nasal cannula with humidification Oxygen Delivery: 2 LPM  Weight: 152 lbs 0 oz    Constitutional: awake, alert, cooperative, NAD  HEENT: normocephalic, anicteric sclerae, MMM   Pulmonary: no increased work of breathing on room air***, lungs clear to auscultation bilaterally without wheezes or rales   Cardiovascular: JVP***, RRR, normal S1 and S2, no murmur appreciated   GI: BS+, soft, non-tender, non-distended, without guarding or rigidity  Skin: warm, dry  MSK: no peripheral edema bilaterally   Neuro: AAOx3, no gross focal neurologic deficits noted    Medical Decision Making   { TIP   MDM Calculator    MDM grid (w/ times)    Coding Support Chat  Billing is now based on time OR medical decision making complexity. Medical decision making included in your A&P does NOT need to be re-documented here.    :[id number]}    50 MINUTES SPENT BY ME on the date of service doing chart review, history, exam, documentation & further activities per the note.      Data   { TIP    No CBC, BMP, LFT, lipase, coag, troponin, BNP, TSH, A1C, CRP, procal, lactic acid, D-dimer, fibrinogen, ferritin, retic, or LDH results found in the past 24 hrs      :21216}      Imaging results reviewed over the past 24 hrs:   Recent Results (from the past 24 hours)   US Paracentesis without Albumin    Narrative    EXAM:  1. PARACENTESIS  2. ULTRASOUND GUIDANCE  LOCATION: [facility]  DATE: 6/16/2025    INDICATION: Ascites.    PROCEDURE: Informed consent obtained. Time out performed. The abdomen  was prepped and draped in a sterile fashion. 10 mL of 1% lidocaine was infused into local soft tissues. A 5 [place] catheter system was introduced into the abdominal ascites under   ultrasound guidance.    3 liters of clear fluid were removed and sent to lab if requested.    Patient tolerated procedure well.    Ultrasound imaging was obtained and placed in the patient's permanent medical record.      Impression    IMPRESSION:  1.  Status post ultrasound-guided paracentesis.

## 2025-06-17 NOTE — PLAN OF CARE
Goal Outcome Evaluation:  DATE & SHIFT: [id number]  PRIMARY Concern: Chronic ascites   SAFETY RISK Concerns (fall risk, behaviors, etc.): Falls  Aggression Tool Color: Green  Isolation/Type: NA  Tests/Procedures for NEXT shift: AM labs  Consults? (Pending/following, signed-off?) Nephrology following  Where is patient from? (Home, TCU, etc.): Home  Other Important info for NEXT shift: Patient had paracentesis  with 3L taken off. Had dialysis . MWF schedule  Anticipated DC date & active delays: TBD?  _____________________________________________________________________________  SUMMARY NOTE:   Orientation/Cognitive: A&Ox4, has repeated requested and gets frustrated easily   Observation Goals (Met/ Not Met): Not met   Mobility Level/Assist Equipment: Ax1 GbW--refuses alarms. Calls appropriately   Antibiotics & Plan (IV/po, length of tx left): NA  Pain Management: PRN tyl x2 for R foot pain  Complete Pain Reassessment: Y/N Y Due next: Next shift   Tele/VS/O2: VSS 2L NC (baseline)  ABNL Lab/BG: Cr 6.83, GFR 6, Hgb 9.0, B-138  Diet: Reg  Bowel/Bladder: Cont B/B  up to bathroom. Minimal UOP due to hemodialysis  Skin Concerns: Rash under L breast, scattered bruising/scabs   Drains/Devices: PIV SL, HD fistula R   Patient Stated Goal for Today: Rest

## 2025-06-17 NOTE — PROVIDER NOTIFICATION
MD Notification    Notified Person: MD    Notified Person Name: Dr. Hunt    Notification Date/Time: 6/16/25 @ 2228    Notification Interaction: Vocera     Purpose of Notification: pt only wanted 8 units of lantus insulin tonight even though she had 10 units ordered. Pt was educated on purpose of insulin but still adamant on receiving 8 units. Bedtime BG was 120.    Orders Received: MD aware    Comments:

## 2025-06-17 NOTE — DISCHARGE SUMMARY
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    Weight as of this encounter: 68.9 kg (152 lb).       Follow-ups Needed After Discharge   Follow-up Appointments       Follow Up      Follow up with Dr. Redd as planned 7/10/25    Follow up with dialysis as usual, your next dialysis is due 6/18    Your next paracentesis is scheduled for 6/19, you may need to adjust the timing based on symptoms              Unresulted Labs Ordered in the Past 30 Days of this Admission       Date and Time Order Name Status Description    6/15/2025  5:19 PM Ascites Fluid Aerobic Bacterial Culture Routine With Gram Stain Preliminary         These results will be followed up by hospital medicine- no antibiotics given this admission    Discharge Disposition   Discharged to home  Condition at discharge: Stable    Hospital Course   Yissel Wetzel is a 65 year old female admitted on 6/15/2025. She presents with recurrent ascites admitted for paracentesis.    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 Has been having difficulty hitting her dry weight due to fluid shifts into her abdomen with HD.  Discussed overall poor prognosis with cirrhosis, ESRD and severe pulmonary  hypertension without significant treatment options, patient seems surprised by this and is not considered palliative care previously, declines further hospice discussions. Initially admitted to observation and changed to inpatient 6/16, underwent paracentesis 6/16 with 3 L removed, did not tolerate dialysis thereafter with whole body aches and had suboptimal ultrafiltration.   - Follow-up with dialysis as planned 6/18  - Follow-up with Dr. Redd with pulmonary hypertension clinic 7/10  - Consider addressing goals of care outpatient  - Has outpatient paracentesis scheduled 6/19, recommend rescheduling this the first week of July, versus when symptoms worsen, defer to PCP    ESRD  Nephrology consulted and HD per nephrology-had hemodialysis after paracentesis and 6/16, did not tolerate more than 50 minutes of dialysis with whole body pains, declined dialysis 6/17, will plan to resume her regular schedule 6/18.  - Continue midodrine 10 mg qAM and 30 mg before HD on MWF (dialysis days) and 20 mg BID TThSaSu (non-dialysis days)     Nasal congestion   Pt coughing so hard it sounds like she is retching and vomiting.  COVID/flu/RSV negative, no significant symptoms at discharge.    DMT2  - Continue PTA regimen with Lantus 11 units    Afib  - continue amiodarone  - stopped taking apixaban  - continue ASA    Severe pHTN due to intrinsic pulmonary vascular disease  Chronic hypoxic respiratory failure on 2L by NC  *Established with Dr. Redd. Last saw on 6/12/25 with plan for follow up in early July  *Echo 2/27/24 EF 60-65% with mild mitral regurgitation, mild valvular aortic stenosis, mild tricuspid regurgitation with right ventricular systolic pressure approximated at 33 mmHg plus the right atrial pressure, IVC diameters <2.1 cm collapsing >50% with sniff suggests normal RA pressure of 3 mmHg  *S/p RHC 10/2/24 with severely elevated left and right sided filling pressures, severely elevated pulmonary artery hypertension,  normal cardiac output level.  - Follow-up with Dr. Olivia as scheduled 7/10    Chronic anemia  Chronic thrombocytopenia  - noted, stable    RLS  - not on meds. Patient has declined them in the past    Left 5th toe ecchymosis   Patient reports left fifth toe pain and bruising-states on Sunday, 6/15, she was sitting with her legs off of the bed, and a male RN or assistant (patient unsure) was going to help her to the bathroom and made her swing her legs around to the other side to face the bathroom, he helped her with this and her toe hit the bed causing bruising and pain.  Patient is able to ambulate. She has already talked to patient relations about this and says she wants some compensation or may mayra the hospital.  - Photos in media   - Radiographs negative for fracture of foot or 5th toe  - Conservative pain measures  - number for Patient Relations given    Consultations This Hospital Stay   NEPHROLOGY IP CONSULT  CARE MANAGEMENT / SOCIAL WORK IP CONSULT    Code Status   Full Code    Time Spent on this Encounter   I, Felipe Forbes MD, personally saw the patient today and spent greater than 30 minutes discharging this patient.       Felipe Forbes MD  [facility]TENDED [facility]OV[facility] SHORT STAY  [address] [place] [id number]  Phone: [phone]  ______________________________________________________________________    Physical Exam   Vital Signs: Temp: 97.6  F (36.4  C) Temp src: Oral BP: (!) 112/38 Pulse: 58   Resp: 18 SpO2: 100 % O2 Device: Nasal cannula Oxygen Delivery: 2 LPM  Weight: 152 lbs 0 oz  Patient evaluated on day of discharge, reports feeling better from her abdominal bloating standpoint, only tolerated about 1 hour of dialysis today and is refusing more dialysis until her scheduled tomorrow.    Patient reports left fifth toe pain and bruising-states on Sunday, 6/15, she was sitting with her legs off of the bed, and a male RN or assistant (patient unsure) was going to  help her to the bathroom and made her swing her legs around to the other side to face the bathroom, he helped her with this and her toe hit the bed causing bruising and pain.  She has already talked to patient relations about this and says she wants some compensation or may mayra the hospital.    Constitutional: awake, alert, cooperative, NAD, appears older than stated age  HEENT:  anicteric sclerae, MMM   Pulmonary: no increased work of breathing on room air, lungs clear to auscultation bilaterally without wheezes or rales   Cardiovascular: RRR, normal S1 and S2, no murmur appreciated   GI: BS+, distended but soft, non-tender  Skin: warm, dry  MSK: no peripheral edema bilaterally, left 5th toe with ecchymosis, tenderness to touch (see media)  Neuro:  no gross focal neurologic deficits noted         Primary Care Physician   Gigi Martin    Discharge Orders      Primary Care - Care Coordination Referral      Reason for your hospital stay    You were hospitalized for a paracentesis and fluid removal with dialysis     Activity    Your activity upon discharge: activity as tolerated     Follow Up    Follow up with Dr. Redd as planned 7/10/25    Follow up with dialysis as usual, your next dialysis is due 6/18    Your next paracentesis is scheduled for 6/19, you may need to adjust the timing based on symptoms     Resume Home Care Services     Oxygen Adult/Peds    Oxygen Documentation  I certify that this patient, Yissel Wetzel has been under my care (or a nurse practitioner or physican's assistant working with me). This is the face-to-face encounter for oxygen medical necessity.      At the time of this encounter, I have reviewed the qualifying testing and have determined that supplemental oxygen is reasonable and necessary and is expected to improve the patient's condition in a home setting.         Patient has continued oxygen desaturation due to Pulmonary Hypertension I27.20.    If portability is ordered, is  the patient mobile within the home? yes    Was this visit performed as a telehealth visit: No     Diet    Follow this diet upon discharge: Renal diet       Significant Results and Procedures   Most Recent 3 CBC's:  Recent Labs   Lab Test 06/16/25  0703 06/15/25  1422 06/12/25  1220   WBC 5.9 6.7 6.4   HGB 9.0* 9.1* 9.4*    100 101*   PLT 82* 95* 97*     Most Recent 3 BMP's:  Recent Labs   Lab Test 06/17/25  1207 06/17/25  0749 06/17/25  0211 06/16/25  1122 06/16/25  0703 06/15/25  2138 06/15/25  1422 06/12/25  1220   NA  --   --   --   --  139  --  139 139   POTASSIUM  --   --   --   --  4.2  --  4.0 3.6   CHLORIDE  --   --   --   --  97*  --  96* 96*   CO2  --   --   --   --  23  --  25 28   BUN  --   --   --   --  43.3*  --  32.0* 20.9   CR  --   --   --   --  6.83*  --  5.87* 4.68*   ANIONGAP  --   --   --   --  19*  --  18* 15   KANWAL  --   --   --   --  10.0  --  10.3 10.3   * 95 138*   < > 217*   < > 161* 153*    < > = values in this interval not displayed.   ,   Results for orders placed or performed during the hospital encounter of 06/15/25   XR Chest 2 Views    Narrative    EXAM: XR CHEST 2 VIEWS  LOCATION: [facility]  DATE: 6/15/2025    INDICATION: gradual SOB, anasarca, ESRD on HD, no fever cough  COMPARISON: None.      Impression    IMPRESSION: Pericardial calcifications, suggesting prior pericarditis. Small right pleural effusion with passive atelectasis in the right lung. Calcified aortic atherosclerosis. Moderately enlarged cardiac silhouette. Old left rib fractures. Pulmonary   vasculature has returned to normal.    US Paracentesis without Albumin    Narrative    EXAM:  1. PARACENTESIS  2. ULTRASOUND GUIDANCE  LOCATION: [facility]  DATE: 6/16/2025    INDICATION: Ascites.    PROCEDURE: Informed consent obtained. Time out performed. The abdomen was prepped and draped in a sterile fashion. 10 mL of 1% lidocaine was infused into local soft  tissues. A 5 [place] catheter system was introduced into the abdominal ascites under   ultrasound guidance.    3 liters of clear fluid were removed and sent to lab if requested.    Patient tolerated procedure well.    Ultrasound imaging was obtained and placed in the patient's permanent medical record.      Impression    IMPRESSION:  1.  Status post ultrasound-guided paracentesis.     XR Foot Left G/E 3 Views    Narrative    EXAM: XR FOOT LEFT G/E 3 VIEWS  LOCATION: [facility]  DATE: 6/17/2025    INDICATION: Left fifth toe injury.  COMPARISON: 10/29/2012.      Impression    IMPRESSION:   No definitive acute displaced left foot fracture or dislocation. Chronic healed fracture deformity distal left third metatarsal at the distal shaft/neck junction. Fifth toe soft tissue swelling laterally centered near the middle phalanx. Periarticular   bone demineralization. Multifocal arthrosis left foot including at the first MTP joint. Arterial calcification.     Dedicated left fifth toe radiographs recommended for reported left fifth toe injury.   XR Toe Left G/E 2 Views    Narrative    EXAM: XR TOE LEFT G/E 2 VIEWS  LOCATION: [facility]  DATE: 6/17/2025    INDICATION: left 5th toe injury  COMPARISON: 6/17/2025      Impression    IMPRESSION: No acute fracture of the fifth toe. Soft tissue swelling fifth toe. Demineralization. Mild scattered degenerative arthritis. Arterial calcifications.     *Note: Due to a large number of results and/or encounters for the requested time period, some results have not been displayed. A complete set of results can be found in Results Review.       Discharge Medications      Review of your medicines        UNREVIEWED medicines. Ask your doctor about these medicines        Dose / Directions   apixaban ANTICOAGULANT 5 MG tablet  Commonly known as: ELIQUIS  Indication: Atrial Fibrillation Not Caused By A Heart Valve Problem  Used for: Atrial  fibrillation with rapid ventricular response (H)      Dose: 5 mg  Take 1 tablet (5 mg) by mouth 2 times daily.  Quantity: 60 tablet  Refills: 0            CHANGE how you take these medications        Dose / Directions   amiodarone 200 MG tablet  Commonly known as: PACERONE  This may have changed: when to take this  Used for: Atrial fibrillation with rapid ventricular response (H)      Dose: 200 mg  Take 1 tablet (200 mg) by mouth daily.  Quantity: 30 tablet  Refills: 0     insulin regular 100 UNIT/ML pen  Commonly known as: NovoLIN R FlexPen  This may have changed: additional instructions  Used for: Type 2 diabetes mellitus with diabetic nephropathy, with long-term current use of insulin (H)      INJECT 9-12 UNITS BEFORE BREAKFAST, LUNCH, AND DINNER PLUS A CORRECTIVE SCALE OF 2 UNITS FOR EVERY 50 OVER 150 - MAXIMUM 20 unit(s) PER DAY  Quantity: 15 mL  Refills: 11            CONTINUE these medicines which have NOT CHANGED        Dose / Directions   Accu-Chek Guide Test test strip  Used for: Type 2 diabetes mellitus with diabetic nephropathy, with long-term current use of insulin (H)  Generic drug: blood glucose      Use to test blood sugars 4 times daily or as directed.  Quantity: 400 strip  Refills: 2     acetaminophen 500 MG tablet  Commonly known as: TYLENOL      Dose: 500-1,000 mg  Take 500-1,000 mg by mouth every 6 hours as needed.  Refills: 0     amoxicillin 500 MG tablet  Commonly known as: AMOXIL      Dose: 500 mg  Take 1 tablet (500 mg) by mouth 2 times daily.  Refills: 0     aspirin 81 MG EC tablet      Dose: 81 mg  Take 81 mg by mouth every evening.  Refills: 0     * calcium acetate 667 MG Caps capsule  Commonly known as: PHOSLO      Dose: 667 mg  Take 667 mg by mouth 3 times daily (with meals)  Refills: 0     * calcium acetate 667 MG Caps capsule  Commonly known as: PHOSLO      Dose: 667 mg  Take 667 mg by mouth Take with snacks or supplements  Refills: 0     famotidine 20 MG tablet  Commonly known as:  PEPCID      Dose: 20 mg  Take 20 mg by mouth 2 times daily as needed.  Refills: 0     fluticasone 50 MCG/ACT nasal spray  Commonly known as: FLONASE  Used for: Non-recurrent acute serous otitis media of both ears, Chronic rhinosinusitis      Dose: 1 spray  Spray 1 spray into both nostrils 2 times daily.  Quantity: 16 g  Refills: 0     FreeStyle Ashley 2 [place]  Used for: Type 2 diabetes mellitus with diabetic nephropathy, with long-term current use of insulin (H)      Use to read blood sugars as per 's instructions.  Quantity: 1 each  Refills: 0     FreeStyle Ashley 2 Sensor Misc  Used for: Type 2 diabetes mellitus with diabetic nephropathy, with long-term current use of insulin (H)      Change every 14 days.  Quantity: 6 each  Refills: 1     guaiFENesin 1200 MG Tb12      Dose: 1 tablet  Take 1 tablet by mouth every 12 hours as needed (cough/congestion).  Refills: 0     insulin glargine 100 UNIT/ML pen  Commonly known as: LANTUS PEN  Used for: Type 2 diabetes mellitus with diabetic nephropathy, with long-term current use of insulin (H)      Dose: 11 Units  Inject 11 Units subcutaneously at bedtime.  Quantity: 15 mL  Refills: 3     * midodrine 10 MG tablet  Commonly known as: PROAMATINE  Used for: Hypotension, unspecified hypotension type      Dose: 10 mg  Take 1 tablet (10 mg) by mouth daily as needed (Hypotension, dizziness).  Quantity: 30 tablet  Refills: 0     * midodrine 10 MG tablet  Commonly known as: PROAMATINE      Dose: 10 mg  Take 10 mg by mouth Every Mon, Wed, Fri Morning  Refills: 0     * midodrine 10 MG tablet  Commonly known as: PROAMATINE      Dose: 20 mg  Take 20 mg by mouth 2 times daily. On non-dialysis days (Tuesday, Thursday, Saturday, Sunday)  Refills: 0     * midodrine 10 MG tablet  Commonly known as: PROAMATINE      Dose: 30 mg  Take 30 mg by mouth daily. On Monday, Wednesday, Friday before dialysis  Refills: 0           * This list has 6 medication(s) that are the same as other  medications prescribed for you. Read the directions carefully, and ask your doctor or other care provider to review them with you.                Allergies   Allergies   Allergen Reactions    Albuterol      shakiness    Aspirin      gi    Byetta [Exenatide]      gi    Clonidine      constipation    Codeine      vomiting    Ezetimibe      muscle symptoms    Hydralazine      headaches    Lisinopril      hyperkalemia    Metformin Hydrochloride      vomiting    Pravachol [Pravastatin Sodium]      muscle pains    Simvastatin      myalgias    Troglitazone

## 2025-06-17 NOTE — PLAN OF CARE
DATE & SHIFT: 6/17-- 0700-1900  PRIMARY Concern: Chronic ascites   SAFETY RISK Concerns (fall risk, behaviors, etc.): Fall risk  Aggression Tool Color: Green  Isolation/Type: NA  Tests/Procedures for NEXT shift: AM labs  Consults? (Pending/following, signed-off?) Nephrology   Where is patient from? (Home, TCU, etc.): Home  Other Important info for NEXT shift: Patient had paracentesis 6/16 with 3L taken off. Had dialysis 6/16. MWF schedule. XR of L toe neg for fx.  Anticipated DC date & active delays: 6/17  _____________________________________________________________________________  SUMMARY NOTE:   Orientation/Cognitive: A/Ox4, has repeated requested and gets frustrated easily   Observation Goals (Met/ Not Met): Inpatient   Mobility Level/Assist Equipment: Ax1 GB/W--refuses alarms. Calls appropriately   Antibiotics & Plan (IV/po, length of tx left): NA  Pain Management: PRN tylenol x1   Complete Pain Reassessment: Y/N Y Due next: Next shift   Tele/VS/O2: VSS 2L NC (baseline)  ABNL Lab/BG: Cr 6.83, GFR 6, Hgb 9.0, BG 95, 119  Diet: Reg  Bowel/Bladder: Cont B/B up to bathroom. Minimal UOP due to hemodialysis  Skin Concerns: Rash under L breast, scattered bruising/scabs. L toe bruised and swollen, neg for fx.  Drains/Devices: PIV SL, HD fistula R   Patient Stated Goal for Today: Rest      VSS RA. No complaints of chest pain or SOB. PIV removed. AVS was gone through with patient, all questions and concerns answered. All belongings were taken with the patient, no new medications to be discharged. Patient verbalized understanding of discharge instructions. Patient was brought via transport for discharge home with boyfriend.

## 2025-06-18 ENCOUNTER — PATIENT OUTREACH (OUTPATIENT)
Dept: CARE COORDINATION | Facility: CLINIC | Age: 65
End: 2025-06-18
Payer: COMMERCIAL

## 2025-06-18 NOTE — PROGRESS NOTES
Clinic Care Coordination Contact  [facility]/Voicemail    Clinical Data: Care Coordinator Outreach    Outreach Documentation Number of Outreach Attempt   6/18/2025  10:14 AM 1       Left message on patient's voicemail with call back information and requested return call.      Plan: Care Coordinator will try to reach patient again in 1-2 business days.    Annita Gilman RN Clinic Care Coordinator  [facility] Clinics: [place], [place] (on-site Wednesdays), [facility] (on-site Thursdays) & [facility].  [email or web address]  Phone: [phone]

## 2025-06-19 ENCOUNTER — TELEPHONE (OUTPATIENT)
Dept: INTERNAL MEDICINE | Facility: CLINIC | Age: 65
End: 2025-06-19
Payer: COMMERCIAL

## 2025-06-19 LAB
BACTERIA FLD CULT: NORMAL
GRAM STAIN RESULT: NORMAL
GRAM STAIN RESULT: NORMAL

## 2025-06-19 NOTE — PROGRESS NOTES
Clinic Care Coordination Contact  Transitions of Care Outreach  Chief Complaint   Patient presents with    Clinic Care Coordination - Post Hospital       Most Recent Admission Date: 6/15/2025   Most Recent Admission Diagnosis: Other ascites - R18.8  Anticoagulated - Z79.01  ESRD (end stage renal disease) on dialysis (H) - N18.6, Z99.2  Atrial fibrillation, unspecified type (H) - I48.91  Dyspnea, unspecified type - R06.00  Chronic hypoxic respiratory failure, on home oxygen therapy (H) - J96.11, Z99.81     Most Recent Discharge Date: 6/17/2025   Most Recent Discharge Diagnosis: Other ascites - R18.8  ESRD (end stage renal disease) on dialysis (H) - N18.6, Z99.2  Dyspnea, unspecified type - R06.00  Atrial fibrillation, unspecified type (H) - I48.91  Anticoagulated - Z79.01  Chronic hypoxic respiratory failure, on home oxygen therapy (H) - J96.11, Z99.81  Other specified counseling - Z71.89  Pulmonary hypertension (H) - I27.20     Transitions of Care Assessment    Discharge Assessment  How are you doing now that you are home?: Doing well.  How are your symptoms? (Red Flag symptoms escalate to triage hotline per guidelines): Improved  Do you know how to contact your clinic care team if you have future questions or changes to your health status? : Yes  Does the patient have their discharge instructions? : Yes  Does the patient have questions regarding their discharge instructions? : No  Were you started on any new medications or were there changes to any of your previous medications? : Yes  Does the patient have all of their medications?: Yes  Do you have questions regarding any of your medications? : No  Do you have all of your needed medical supplies or equipment (DME)?  (i.e. oxygen tank, CPAP, cane, etc.): Yes         Post-op (Clinicians Only)  Did the patient have surgery or a procedure: No  Fever: No  Chills: No  Eating & Drinking: eating and drinking without complaints/concerns  PO Intake: regular diet  Additional  Symptoms:  (N/A)  Bowel Function: normal  Urinary Status: voiding without complaint/concerns    Care Management   None    Follow up Plan     Patient interested in an electric scooter  Reviewed how to get an electric scooter.   She will use [facility] for this.     Patient is out of her home and has home care coming.   She doesn't have their phone number.   Care Coordination connected via conference call with [facility] to help patient cancel the appointment for today.     Patient declined Care Coordination services at this time.   Patient is aware of how to initiate Care Coordination services with the clinic in the future.   Discharge Follow-Up  Discharge follow up appointment scheduled in alignment with recommended follow up timeframe or Transitions of Risk Category? (Low = within 30 days; Moderate= within 14 days; High= within 7 days): Yes  Discharge Follow Up Appointment Date: 07/02/25  Discharge Follow Up Appointment Scheduled with?: Primary Care Provider    Future Appointments   Date Time Provider Department Center   6/19/2025  3:00 PM [facility]   6/24/2025 12:40 PM [facility]reira [facility]   7/2/2025  9:30 AM Gigi Martin MD [facility]   7/10/2025 12:30 PM [facility]       Outpatient Plan as outlined on AVS reviewed with patient.    For any urgent concerns, please contact our 24 hour nurse triage line: [phone] ([phone])       Annita Gilman RN

## 2025-06-19 NOTE — TELEPHONE ENCOUNTER
OT calling with heart rate below parameter. HR 54 and asymptomatic. Current parameter for HR . Patient states 54 is not unusual for her. OT wondering if parameter for HR can be changed to 50-90?    Can we leave a detailed message on this number? YES  Phone number patient can be reached at: Other phone number:  [phone]    Shelbie Grey RN  [facility] Triage

## 2025-06-20 ENCOUNTER — TELEPHONE (OUTPATIENT)
Dept: INTERNAL MEDICINE | Facility: CLINIC | Age: 65
End: 2025-06-20
Payer: COMMERCIAL

## 2025-06-20 NOTE — TELEPHONE ENCOUNTER
TARIK Mratin,  Home health nurse calling to report pt missed skilled nursing visit today. Pt states she is at dialysis and not willing to see home health after dialysis.     Alexia Benson RN

## 2025-06-21 LAB
BACTERIA FLD CULT: NO GROWTH
GRAM STAIN RESULT: NORMAL
GRAM STAIN RESULT: NORMAL

## 2025-06-26 ENCOUNTER — TELEPHONE (OUTPATIENT)
Dept: WOUND CARE | Facility: CLINIC | Age: 65
End: 2025-06-26
Payer: COMMERCIAL

## 2025-06-26 ENCOUNTER — TELEPHONE (OUTPATIENT)
Dept: INTERNAL MEDICINE | Facility: CLINIC | Age: 65
End: 2025-06-26
Payer: COMMERCIAL

## 2025-06-26 NOTE — TELEPHONE ENCOUNTER
Patient called to schedule appointment at [facility].  Right 3rd toe was injured during hospital stay, nephrologist is recommending wound care.  AMB.  OK to schedule?

## 2025-06-26 NOTE — TELEPHONE ENCOUNTER
Home Care is calling regarding an established patient with [facility].  Requesting orders from: Gigi MartinI: RN able to provide verbal orders.  Sending as FYI only.  Is this a request for a temporary pause in the home care episode?  No    Orders Requested    Skilled Nursing  Request for initial evaluation and treatment (one time)   RN gave verbal order: Yes      Patient bumped left foot during dialysis and now has a blister on left foot. Sharita DOMINGUEZ states she wants a wound eval/care for left foot and wanted to send FYI to provider.         Contacts       Contact Date/Time Type Contact Phone/Fax    06/26/2025 04:36 PM CDT Phone (Incoming) ANGELICA [facility] [phone]     Secure line          Liudmila Real RN

## 2025-06-27 ENCOUNTER — HOSPITAL ENCOUNTER (EMERGENCY)
Facility: CLINIC | Age: 65
Discharge: HOME | End: 2025-06-27
Attending: STUDENT IN AN ORGANIZED HEALTH CARE EDUCATION/TRAINING PROGRAM | Admitting: STUDENT IN AN ORGANIZED HEALTH CARE EDUCATION/TRAINING PROGRAM
Payer: COMMERCIAL

## 2025-06-27 VITALS
SYSTOLIC BLOOD PRESSURE: 94 MMHG | DIASTOLIC BLOOD PRESSURE: 55 MMHG | RESPIRATION RATE: 16 BRPM | HEART RATE: 65 BPM | OXYGEN SATURATION: 100 % | TEMPERATURE: 98.6 F

## 2025-06-27 DIAGNOSIS — M79.672 LEFT FOOT PAIN: ICD-10-CM

## 2025-06-27 DIAGNOSIS — R23.8 BULLA: ICD-10-CM

## 2025-06-27 DIAGNOSIS — Z51.89 VISIT FOR WOUND CHECK: ICD-10-CM

## 2025-06-27 DIAGNOSIS — T14.8XXA BLISTER: Primary | ICD-10-CM

## 2025-06-27 PROBLEM — H90.3 BILATERAL SENSORINEURAL HEARING LOSS: Status: ACTIVE | Noted: 2025-06-27

## 2025-06-27 PROBLEM — K74.60 CIRRHOSIS OF LIVER (H): Status: ACTIVE | Noted: 2025-06-27

## 2025-06-27 PROBLEM — I50.32 CHRONIC DIASTOLIC (CONGESTIVE) HEART FAILURE (H): Status: ACTIVE | Noted: 2025-01-21

## 2025-06-27 PROBLEM — H93.299 ABNORMAL AUDITORY PERCEPTION: Status: ACTIVE | Noted: 2025-06-27

## 2025-06-27 PROBLEM — E11.311: Status: ACTIVE | Noted: 2025-06-27

## 2025-06-27 PROBLEM — H93.90 DISORDER OF EAR: Status: ACTIVE | Noted: 2025-06-27

## 2025-06-27 PROBLEM — K74.60 NON-ALCOHOLIC CIRRHOSIS (H): Status: ACTIVE | Noted: 2025-01-21

## 2025-06-27 PROBLEM — E46 PROTEIN-CALORIE MALNUTRITION: Status: ACTIVE | Noted: 2025-01-21

## 2025-06-27 PROBLEM — J44.9 CHRONIC OBSTRUCTIVE PULMONARY DISEASE, UNSPECIFIED (H): Status: ACTIVE | Noted: 2025-06-27

## 2025-06-27 PROBLEM — Z99.81 DEPENDENCE ON SUPPLEMENTAL OXYGEN: Status: ACTIVE | Noted: 2025-01-21

## 2025-06-27 PROBLEM — Z99.2 DEPENDENCE ON RENAL DIALYSIS: Status: ACTIVE | Noted: 2025-06-27

## 2025-06-27 LAB
ANION GAP SERPL CALCULATED.3IONS-SCNC: 12 MMOL/L (ref 7–15)
BASOPHILS # BLD AUTO: 0 10E3/UL (ref 0–0.2)
BASOPHILS NFR BLD AUTO: 1 %
BUN SERPL-MCNC: 18 MG/DL (ref 8–23)
CALCIUM SERPL-MCNC: 9.4 MG/DL (ref 8.8–10.4)
CHLORIDE SERPL-SCNC: 99 MMOL/L (ref 98–107)
CREAT SERPL-MCNC: 4.19 MG/DL (ref 0.51–0.95)
EGFRCR SERPLBLD CKD-EPI 2021: 11 ML/MIN/1.73M2
EOSINOPHIL # BLD AUTO: 0.2 10E3/UL (ref 0–0.7)
EOSINOPHIL NFR BLD AUTO: 3 %
ERYTHROCYTE [DISTWIDTH] IN BLOOD BY AUTOMATED COUNT: 18 % (ref 10–15)
GLUCOSE SERPL-MCNC: 190 MG/DL (ref 70–99)
HCO3 SERPL-SCNC: 30 MMOL/L (ref 22–29)
HCT VFR BLD AUTO: 30.6 % (ref 35–47)
HGB BLD-MCNC: 9.4 G/DL (ref 11.7–15.7)
IMM GRANULOCYTES # BLD: 0 10E3/UL
IMM GRANULOCYTES NFR BLD: 0 %
LYMPHOCYTES # BLD AUTO: 0.8 10E3/UL (ref 0.8–5.3)
LYMPHOCYTES NFR BLD AUTO: 17 %
MCH RBC QN AUTO: 30.2 PG (ref 26.5–33)
MCHC RBC AUTO-ENTMCNC: 30.7 G/DL (ref 31.5–36.5)
MCV RBC AUTO: 98 FL (ref 78–100)
MONOCYTES # BLD AUTO: 0.8 10E3/UL (ref 0–1.3)
MONOCYTES NFR BLD AUTO: 16 %
NEUTROPHILS # BLD AUTO: 3.1 10E3/UL (ref 1.6–8.3)
NEUTROPHILS NFR BLD AUTO: 63 %
NRBC # BLD AUTO: 0 10E3/UL
NRBC BLD AUTO-RTO: 0 /100
PLATELET # BLD AUTO: 108 10E3/UL (ref 150–450)
POTASSIUM SERPL-SCNC: 3.6 MMOL/L (ref 3.4–5.3)
RBC # BLD AUTO: 3.11 10E6/UL (ref 3.8–5.2)
SODIUM SERPL-SCNC: 141 MMOL/L (ref 135–145)
WBC # BLD AUTO: 4.9 10E3/UL (ref 4–11)

## 2025-06-27 PROCEDURE — 36415 COLL VENOUS BLD VENIPUNCTURE: CPT | Performed by: STUDENT IN AN ORGANIZED HEALTH CARE EDUCATION/TRAINING PROGRAM

## 2025-06-27 PROCEDURE — 80048 BASIC METABOLIC PNL TOTAL CA: CPT | Performed by: STUDENT IN AN ORGANIZED HEALTH CARE EDUCATION/TRAINING PROGRAM

## 2025-06-27 PROCEDURE — 85025 COMPLETE CBC W/AUTO DIFF WBC: CPT | Performed by: STUDENT IN AN ORGANIZED HEALTH CARE EDUCATION/TRAINING PROGRAM

## 2025-06-27 PROCEDURE — 99284 EMERGENCY DEPT VISIT MOD MDM: CPT | Mod: 25

## 2025-06-27 ASSESSMENT — ACTIVITIES OF DAILY LIVING (ADL)
ADLS_ACUITY_SCORE: 58

## 2025-06-28 NOTE — ED TRIAGE NOTES
Pt w/ wound to L pinkie toe that started as a bruise approx 2 weeks ago. Wound has been growing and causing pain. Causing difficulty walking. On 2L NC @ baseline.      Triage Assessment (Adult)       Row Name 06/27/25 1943          Triage Assessment    Airway WDL WDL        Respiratory WDL    Respiratory WDL WDL        Skin Circulation/Temperature WDL    Skin Circulation/Temperature WDL X  wound to L foot/pinkie toe        Cardiac WDL    Cardiac WDL WDL        Peripheral/Neurovascular WDL    Peripheral Neurovascular WDL X  int. nerve pain in L foot        Cognitive/Neuro/Behavioral WDL    Cognitive/Neuro/Behavioral WDL WDL

## 2025-06-28 NOTE — ED PROVIDER NOTES
Emergency Department Note      History of Present Illness     Chief Complaint   Wound Check and Toe Pain      HPI   Yissel Wetzel is a pleasant 65 year old female with type II diabetes, CHF, atrial fibrillation with RVR, and ESRD presenting for a wound check to her toe. The patient presents with her boyfriend and reports she was in the hospital multiple weeks ago when her left foot hit the end of the hospital bed. Since then, she has had pain to her fifth pinky toe and a large blister formed in the area.  She endorses increased pain when walking and intermittent painful spasms. Denies fever.     Patient was discharged from this hospital on 2025.  At that time, she was diagnosed with left fifth toe ecchymosis.  Radiographs during admission were negative for fracture.  Diabetes suggest the patient's nephrology team has referred the patient to wound care clinic and they are attempting to arrange an appointment with the patient    Independent Historian   None    Review of External Notes   I personally reviewed notes from the patient's telephone encounter dated yesterday. This provided me with information regarding patient's recent clinical course.     Past Medical History     Medical History and Problem List   Anemia   Asthma   Atrial fibrillation with rapid ventricular response   CHF  ESRD  Esophagitis   Former tobacco use  Hemorrhagic gastropathy   Hyperlipidemia   Non-alcoholic cirrhosis   Pulmonary hypertension   Paroxysmal SVT   Type II diabetes   Thrombocytopenia     Medications   Aspirin 81 MG   Famotidine   Insulin glargine   Insulin regular   Midodrine     Surgical History   Abdominoplasty   Cardioversion    section x2  Create fistula arteriovenous upper right extremity   CV heart cath   Tonsillectomy   Hysterectomy   IR CVC tunnel placement and removal     Physical Exam     Patient Vitals for the past 24 hrs:   BP Temp Temp src Pulse Resp SpO2   25 1939 94/55 98.6  F (37  C) Temporal 65  16 100 %     Physical Exam  Gen:  Alert, chronically unwell appearing, seated in wheelchair, using nasal cannula oxygen at baseline  CV:  Regular rate.  Brisk capillary refill in toes of left foot  MSK:  There is mild tenderness to palpation over the left pinky toe. No deformity. No swelling.  There is pain with passive range of motion of the toes of the left foot.  Skin:  Warm and dry.  No significant erythema.  There is a large bulla over the dorsum of the left foot  Neuro:  Strength and sensation grossly intact in left lower extremity              Diagnostics     Lab Results   Labs Ordered and Resulted from Time of ED Arrival to Time of ED Departure   BASIC METABOLIC PANEL - Abnormal       Result Value    Sodium 141      Potassium 3.6      Chloride 99      Carbon Dioxide (CO2) 30 (*)     Anion Gap 12      Urea Nitrogen 18.0      Creatinine 4.19 (*)     GFR Estimate 11 (*)     Calcium 9.4      Glucose 190 (*)    CBC WITH PLATELETS AND DIFFERENTIAL - Abnormal    WBC Count 4.9      RBC Count 3.11 (*)     Hemoglobin 9.4 (*)     Hematocrit 30.6 (*)     MCV 98      MCH 30.2      MCHC 30.7 (*)     RDW 18.0 (*)     Platelet Count 108 (*)     % Neutrophils 63      % Lymphocytes 17      % Monocytes 16      % Eosinophils 3      % Basophils 1      % Immature Granulocytes 0      NRBCs per 100 WBC 0      Absolute Neutrophils 3.1      Absolute Lymphocytes 0.8      Absolute Monocytes 0.8      Absolute Eosinophils 0.2      Absolute Basophils 0.0      Absolute Immature Granulocytes 0.0      Absolute NRBCs 0.0         Imaging   No orders to display       EKG   No ECG performed.     Independent Interpretation   None    ED Course      Medications Administered   Medications - No data to display    Procedures    None performed    Discussion of Management   None    ED Course   ED Course as of 06/27/25 2324 Fri Jun 27, 2025 2120 I evaluated the patient, obtained history, and performed a physical exam as detailed above.         Additional Documentation  None    Medical Decision Making / Diagnosis     CMS Diagnoses: None    MIPS   None    MDM   Patient presenting with request for wound check.  Vital signs appear reassuring.  Patient is on 2 L nasal cannula oxygen at baseline.  Patient showed me pictures of the initial injury to her left toe.  This seems to have started out as ecchymosis around the left pinky toe and has gradually increased, with some erythema over that toe, tender to palpation, and a large bulla across the dorsal aspect of the left foot close to the third, fourth, fifth toes.  See picture above.  This area does not appear warm, patient is not having fevers, and it does not appear characteristic of cellulitis.  Also have low clinical suspicion for osteomyelitis, given absence of systemic symptoms or abnormal labs.  I have a low suspicion for necrotizing soft tissue infection given the duration of symptoms and the slight progression of this finding.  I did check basic labs to evaluate for leukocytosis or neutrophilia.  These were reassuring.  I agree with the patient's nephrology team and think the patient should see wound care nursing outpatient.  I placed an urgent referral for this purpose.  I also provided the patient with a postop shoe which she can use for the left foot to provide increased comfort and reduce trauma to the area, reducing likelihood of the bulla bursting.  Local treatment were provided.  Return precautions were provided for    Disposition   The patient was discharged.     Diagnosis     ICD-10-CM    1. Blister  T14.8XXA       2. Visit for wound check  Z51.89 Ankle/Foot Bracing Supplies Order Post-op Shoe; Left      3. Bulla  R23.8 Wound Care Referral      4. Left foot pain  M79.672            Discharge Medications   Discharge Medication List as of 6/27/2025 10:19 PM        Scribe Disclosure:  I, Divine Rosales, am serving as a scribe at 9:30 PM on 6/27/2025 to document services personally performed by  Felix Ortega MD based on my observations and the provider's statements to me.        Felix Ortega MD  06/27/25 [id number]

## 2025-06-28 NOTE — DISCHARGE INSTRUCTIONS
Thank you for allowing us to evaluate you today.  Follow up with primary care clinician  and wound care nurse in 1 week for reevaluation.. I have placed a referral for you.   Keep the wound dressed to help prevent further injury. Wear the post-op shoe for comfort and protection  Please read the guidance provided with your discharge instructions.  Immediately return to the emergency department with any concerns.

## 2025-07-01 NOTE — TELEPHONE ENCOUNTER
Fax referral received from PCP as well for urgent appointment. Call placed to PCP office to discuss an urgent appointment is not available at this time and that the patient could see podiatry for this issue as well. The nurse is going to discuss the podiatry referral with PCP.

## 2025-07-03 ENCOUNTER — MEDICAL CORRESPONDENCE (OUTPATIENT)
Dept: HEALTH INFORMATION MANAGEMENT | Facility: CLINIC | Age: 65
End: 2025-07-03
Payer: COMMERCIAL

## 2025-07-08 ENCOUNTER — MEDICAL CORRESPONDENCE (OUTPATIENT)
Dept: HEALTH INFORMATION MANAGEMENT | Facility: CLINIC | Age: 65
End: 2025-07-08
Payer: COMMERCIAL

## 2025-07-10 ENCOUNTER — VIRTUAL VISIT (OUTPATIENT)
Dept: CARDIOLOGY | Facility: CLINIC | Age: 65
End: 2025-07-10
Payer: COMMERCIAL

## 2025-07-10 DIAGNOSIS — I27.20 PULMONARY HTN (H): ICD-10-CM

## 2025-07-10 NOTE — LETTER
7/10/2025    Gigi Martin MD  [address] [address]    RE: Yissel BESSY Wetzel       Dear Colleague,     I had the pleasure of seeing Yissel BESSY Rashard in the [facility].  Cassandra is a 65 year old who is being evaluated via a billable telephone visit.    Vitals - Patient Reported  Diastolic (Patient Reported):  (N/A)  Weight (Patient Reported):  (N/A)      30 minute call with patient to review interim hospitalization. Patient notes adverse interaction with staff member.  Otherwise she is doing go and will see each other in person in one month    Review Of Systems  Skin: NEGATIVE  Eyes:Ears/Nose/Throat: NEGATIVE  Respiratory: O2=2LPM PRN  Cardiovascular: energy level improving  Gastrointestinal: NEGATIVE  Genitourinary:NEGATIVE   Musculoskeletal: NEGATIVE  Neurologic: NEGATIVE  Psychiatric: NEGATIVE  Hematologic/Lymphatic/Immunologic: NEGATIVE  Endocrine:  NEGATIVE    RAGHU Friedman  What phone number would you like to be contacted at?  [phone]  How would you like to obtain your AVS? MyChart  Originating Location (pt. Location): Home    Distant Location (provider location):  On-site  Telephone visit completed due to the patient did not have access to video, while the distant provider did.  Phone call duration: 30 minutes    Thank you for allowing me to participate in the care of your patient.      Sincerely,     Jose Redd MD     [facility]  cc:   Jose Redd MD  [address] [place] [id number]

## 2025-07-10 NOTE — PROGRESS NOTES
Cassandra is a 65 year old who is being evaluated via a billable telephone visit.    Vitals - Patient Reported  Diastolic (Patient Reported):  (N/A)  Weight (Patient Reported):  (N/A)      30 minute call with patient to review interim hospitalization. Patient notes adverse interaction with staff member.  Otherwise she is doing go and will see each other in person in one month    Review Of Systems  Skin: NEGATIVE  Eyes:Ears/Nose/Throat: NEGATIVE  Respiratory: O2=2LPM PRN  Cardiovascular: energy level improving  Gastrointestinal: NEGATIVE  Genitourinary:NEGATIVE   Musculoskeletal: NEGATIVE  Neurologic: NEGATIVE  Psychiatric: NEGATIVE  Hematologic/Lymphatic/Immunologic: NEGATIVE  Endocrine:  NEGATIVE    RAGHU Friedman  What phone number would you like to be contacted at?  [phone]  How would you like to obtain your AVS? MyChart  Originating Location (pt. Location): Home    Distant Location (provider location):  On-site  Telephone visit completed due to the patient did not have access to video, while the distant provider did.  Phone call duration: 30 minutes

## 2025-07-14 DIAGNOSIS — R18.8 OTHER ASCITES: Primary | ICD-10-CM

## 2025-07-16 ENCOUNTER — TELEPHONE (OUTPATIENT)
Dept: INTERNAL MEDICINE | Facility: CLINIC | Age: 65
End: 2025-07-16
Payer: COMMERCIAL

## 2025-07-16 NOTE — TELEPHONE ENCOUNTER
Dr. Martinez,     I [facility] RN calling due to a previous wound on foot, was previously a blister and it popped. Toe crevices on L foot between pinkie and middle toes are starting to get infected, asking for wound care orders and supplies. RN gave verbal order as protocol approved it.       Home Care is calling regarding an established patient with [facility].  Requesting orders from: Gigi MartinI: RN able to provide verbal orders.  Sending as FYI only.  Is this a request for a temporary pause in the home care episode?  No    Orders Requested    Wound Care Supplies    Cleanse with NS  Pat dry with Gauze  Apply Betadine in between toes  Cover with ABD  Secure with tape  RN gave verbal order: Yes          Contacts       Contact Date/Time Type Contact Phone/Fax    07/16/2025 04:04 PM CDT Phone (Incoming) Florencio MAYA [phone]     secure line          Loretta Jules RN

## 2025-07-17 ENCOUNTER — TELEPHONE (OUTPATIENT)
Dept: INTERNAL MEDICINE | Facility: CLINIC | Age: 65
End: 2025-07-17
Payer: COMMERCIAL

## 2025-07-17 NOTE — TELEPHONE ENCOUNTER
Home Care is calling regarding an established patient with [facility].  Requesting orders from: Gigi Martin RN APPROVED: RN able to provide verbal orders.  Home Care will send orders for signature.  RN will close encounter.  Is this a request for a temporary pause in the home care episode?  No    Orders Requested    Occupational Therapy  Request for continuation of care with no increase or decrease in frequency  Frequency: every other week for 6 weeks  RN gave verbal order: Yes          Contacts       Contact Date/Time Type Contact Phone/Fax    07/17/2025 03:10 PM CDT Phone (Incoming) Tameka Holden [facility] (Home Care) [phone]     secure line          Loretta Jules RN

## 2025-07-21 ENCOUNTER — MEDICAL CORRESPONDENCE (OUTPATIENT)
Dept: HEALTH INFORMATION MANAGEMENT | Facility: CLINIC | Age: 65
End: 2025-07-21

## 2025-07-21 DIAGNOSIS — R18.8 OTHER ASCITES: Primary | ICD-10-CM

## 2025-07-22 ENCOUNTER — HOSPITAL ENCOUNTER (OUTPATIENT)
Dept: ULTRASOUND IMAGING | Facility: CLINIC | Age: 65
Discharge: HOME | End: 2025-07-22
Attending: RADIOLOGY
Payer: COMMERCIAL

## 2025-07-22 VITALS
OXYGEN SATURATION: 95 % | SYSTOLIC BLOOD PRESSURE: 104 MMHG | DIASTOLIC BLOOD PRESSURE: 65 MMHG | HEART RATE: 67 BPM | RESPIRATION RATE: 16 BRPM

## 2025-07-22 DIAGNOSIS — R18.8 OTHER ASCITES: ICD-10-CM

## 2025-07-22 PROCEDURE — 250N000009 HC RX 250: Performed by: RADIOLOGY

## 2025-07-22 PROCEDURE — 49083 ABD PARACENTESIS W/IMAGING: CPT

## 2025-07-22 RX ADMIN — LIDOCAINE HYDROCHLORIDE 10 ML: 10 INJECTION, SOLUTION EPIDURAL; INFILTRATION; INTRACAUDAL; PERINEURAL at 10:50

## 2025-07-22 NOTE — PROGRESS NOTES
Patient tolerated Paracentesis well.  4200 mL of genie fluid drained. Done by Dr. Mujica. Dressing and dermabond with no drainage.  No albumin given. Patient states understanding of instructions. Pt left unit in stable condition with significant other.

## 2025-08-07 ENCOUNTER — TELEPHONE (OUTPATIENT)
Dept: INTERNAL MEDICINE | Facility: CLINIC | Age: 65
End: 2025-08-07

## 2025-08-12 ENCOUNTER — TELEPHONE (OUTPATIENT)
Dept: INTERNAL MEDICINE | Facility: CLINIC | Age: 65
End: 2025-08-12
Payer: COMMERCIAL

## 2025-08-13 ENCOUNTER — TELEPHONE (OUTPATIENT)
Dept: INTERNAL MEDICINE | Facility: CLINIC | Age: 65
End: 2025-08-13
Payer: COMMERCIAL

## 2025-08-18 ENCOUNTER — MEDICAL CORRESPONDENCE (OUTPATIENT)
Dept: HEALTH INFORMATION MANAGEMENT | Facility: CLINIC | Age: 65
End: 2025-08-18
Payer: COMMERCIAL

## 2025-08-25 ENCOUNTER — HOSPITAL ENCOUNTER (INPATIENT)
Facility: CLINIC | Age: 65
DRG: 242 | End: 2025-08-25
Attending: EMERGENCY MEDICINE | Admitting: INTERNAL MEDICINE
Payer: COMMERCIAL

## 2025-08-26 ASSESSMENT — ACTIVITIES OF DAILY LIVING (ADL)
ADLS_ACUITY_SCORE: 58

## 2025-08-27 ASSESSMENT — ACTIVITIES OF DAILY LIVING (ADL)
ADLS_ACUITY_SCORE: 64
ADLS_ACUITY_SCORE: 64
ADLS_ACUITY_SCORE: 60
ADLS_ACUITY_SCORE: 60
ADLS_ACUITY_SCORE: 64
ADLS_ACUITY_SCORE: 58
ADLS_ACUITY_SCORE: 64
DEPENDENT_IADLS:: TRANSPORTATION;CLEANING
ADLS_ACUITY_SCORE: 64
ADLS_ACUITY_SCORE: 58
ADLS_ACUITY_SCORE: 64
ADLS_ACUITY_SCORE: 60
ADLS_ACUITY_SCORE: 58
ADLS_ACUITY_SCORE: 64
ADLS_ACUITY_SCORE: 60
ADLS_ACUITY_SCORE: 64
ADLS_ACUITY_SCORE: 60
ADLS_ACUITY_SCORE: 64

## 2025-08-28 ASSESSMENT — ACTIVITIES OF DAILY LIVING (ADL)
ADLS_ACUITY_SCORE: 71
ADLS_ACUITY_SCORE: 64
ADLS_ACUITY_SCORE: 71

## 2025-08-29 ASSESSMENT — ACTIVITIES OF DAILY LIVING (ADL)
ADLS_ACUITY_SCORE: 73
ADLS_ACUITY_SCORE: 71
ADLS_ACUITY_SCORE: 71
ADLS_ACUITY_SCORE: 73
ADLS_ACUITY_SCORE: 71
ADLS_ACUITY_SCORE: 73
ADLS_ACUITY_SCORE: 71
ADLS_ACUITY_SCORE: 73
ADLS_ACUITY_SCORE: 71
ADLS_ACUITY_SCORE: 73
ADLS_ACUITY_SCORE: 73

## 2025-08-30 ASSESSMENT — ACTIVITIES OF DAILY LIVING (ADL)
ADLS_ACUITY_SCORE: 71
ADLS_ACUITY_SCORE: 73
ADLS_ACUITY_SCORE: 71

## (undated) DEVICE — SU VICRYL 4-0 PS-2 18" UND J496H

## (undated) DEVICE — Device

## (undated) DEVICE — VESSEL LOOPS YELLOW MINI 31145660

## (undated) DEVICE — DRSG STERI STRIP 1/2X4" R1547

## (undated) DEVICE — SU PROLENE 6-0 C-1DA 30" 8706H

## (undated) DEVICE — WIRE GUIDE 0.025"X150CM EMERALD J TIP 502524

## (undated) DEVICE — INTRO SHEATH 7FRX10CM PINNACLE RSS702

## (undated) DEVICE — CATH DIAG 4FR ANG PIG 538453S

## (undated) DEVICE — TUBING PRESSURE 30"

## (undated) DEVICE — SPONGE RAY-TEC 4X8" 7318

## (undated) DEVICE — DECANTER VIAL 2006S

## (undated) DEVICE — WIRE GUIDE 0.035"X260CM SAFE-T-J EXCHANGE G00517

## (undated) DEVICE — INTRO GLIDESHEATH SLENDER 6FR 10X45CM 60-1060

## (undated) DEVICE — DEFIB PRO-PADZ LVP LQD GEL ADULT 8900-2105-01

## (undated) DEVICE — SYR 10ML SLIP TIP W/O NDL

## (undated) DEVICE — PACK AV FISTULA CUSTOM SCV15AVFS1

## (undated) DEVICE — VESSEL LOOPS RED MAXI

## (undated) DEVICE — NDL 19GA 1.5"

## (undated) DEVICE — NDL BLUNT 19GA 1.5"

## (undated) DEVICE — SYR 03ML LL W/O NDL 309657

## (undated) DEVICE — KIT RIGHT HEART CATH 60130719

## (undated) DEVICE — NDL BLUNT 17GA 1.5" 8881202330

## (undated) DEVICE — SU PROLENE 7-0 BV-1DA 24" 8702H

## (undated) DEVICE — SU SILK 3-0 TIE 24" SA74H

## (undated) DEVICE — SOL WATER IRRIG 1000ML BOTTLE 2F7114

## (undated) DEVICE — ESU GROUND PAD UNIVERSAL W/O CORD

## (undated) DEVICE — PACK HEART LEFT CUSTOM

## (undated) DEVICE — SMART CAPNOLINE H PLUS, ADULT/INTERMEDIATE O2, LONG

## (undated) DEVICE — DRAPE STERI FLUOROSCOPE 35X43"1012 LATEX FREE

## (undated) DEVICE — SOL NACL 0.9% INJ 250ML BAG 2B1322Q

## (undated) DEVICE — SU SILK 2-0 TIE 24" SA75H

## (undated) DEVICE — SLEEVE REPOSITIONING W/CATH LOCK 60CM 406503

## (undated) DEVICE — PREP CHLORAPREP 26ML TINTED ORANGE  260815

## (undated) DEVICE — INTRODUCER SHEATH FAST-CATH CATH-LOCK 7FRX12CM 406702

## (undated) DEVICE — PACK HEART RIGHT CUSTOM SAN32RHF18

## (undated) DEVICE — CATH ANGIO INFINITI JR4 4FRX100CM 538421

## (undated) DEVICE — TUBING SUCTION MEDI-VAC SOFT 3/16"X20' N520A

## (undated) DEVICE — SYR 30ML SLIP TIP W/O NDL

## (undated) DEVICE — INTRO SHEATH MICRO PLATINUM TIP 4FRX40CM 7274

## (undated) DEVICE — CATH DIAG 4FR JL 4.5 538417

## (undated) DEVICE — SUCTION CANISTER MEDIVAC LINER 3000ML W/LID 65651-530

## (undated) DEVICE — TOTE ANGIO CORP PC15AT SAN32CC83O

## (undated) DEVICE — KIT HAND CONTROL ANGIOTOUCH ACIST 65CM AT-P65

## (undated) DEVICE — SU PROLENE 7-0 BV-1DA 4X30" M8703

## (undated) DEVICE — INTRODUCER CATH VASC 5FRX10CM  MPIS-501-NT-U-SST

## (undated) DEVICE — MANIFOLD KIT ANGIO AUTOMATED 014613

## (undated) DEVICE — SU VICRYL 3-0 SH 27" J316H

## (undated) DEVICE — UMMC CONVENIENCE KIT FORMALLY H9656021017160 NEW# 602101716

## (undated) DEVICE — DECANTER BAG 2002S

## (undated) DEVICE — LINEN TOWEL PACK X5 5464

## (undated) DEVICE — SLEEVE TR BAND RADIAL COMPRESSION DEVICE 24CM TRB24-REG

## (undated) DEVICE — CLIP ETHICON LIGACLIP SM BLUE LT100

## (undated) RX ORDER — NITROGLYCERIN 5 MG/ML
VIAL (ML) INTRAVENOUS
Status: DISPENSED
Start: 2021-07-22

## (undated) RX ORDER — PROPOFOL 10 MG/ML
INJECTION, EMULSION INTRAVENOUS
Status: DISPENSED
Start: 2018-12-26

## (undated) RX ORDER — FENTANYL CITRATE 50 UG/ML
INJECTION, SOLUTION INTRAMUSCULAR; INTRAVENOUS
Status: DISPENSED
Start: 2025-05-13

## (undated) RX ORDER — GLYCOPYRROLATE 0.2 MG/ML
INJECTION, SOLUTION INTRAMUSCULAR; INTRAVENOUS
Status: DISPENSED
Start: 2025-05-13

## (undated) RX ORDER — LIDOCAINE 40 MG/G
CREAM TOPICAL
Status: DISPENSED
Start: 2022-11-23

## (undated) RX ORDER — ALBUMIN (HUMAN) 12.5 G/50ML
SOLUTION INTRAVENOUS
Status: DISPENSED
Start: 2022-10-13

## (undated) RX ORDER — FENTANYL CITRATE 50 UG/ML
INJECTION, SOLUTION INTRAMUSCULAR; INTRAVENOUS
Status: DISPENSED
Start: 2021-05-27

## (undated) RX ORDER — HEPARIN SODIUM 1000 [USP'U]/ML
INJECTION, SOLUTION INTRAVENOUS; SUBCUTANEOUS
Status: DISPENSED
Start: 2022-11-23

## (undated) RX ORDER — VERAPAMIL HYDROCHLORIDE 2.5 MG/ML
INJECTION, SOLUTION INTRAVENOUS
Status: DISPENSED
Start: 2021-07-22

## (undated) RX ORDER — LIDOCAINE HYDROCHLORIDE 40 MG/ML
SOLUTION TOPICAL
Status: DISPENSED
Start: 2025-05-13

## (undated) RX ORDER — HEPARIN SODIUM 1000 [USP'U]/ML
INJECTION, SOLUTION INTRAVENOUS; SUBCUTANEOUS
Status: DISPENSED
Start: 2021-07-22

## (undated) RX ORDER — CEFAZOLIN SODIUM 2 G/100ML
INJECTION, SOLUTION INTRAVENOUS
Status: DISPENSED
Start: 2018-12-26

## (undated) RX ORDER — NALOXONE HYDROCHLORIDE 0.4 MG/ML
INJECTION, SOLUTION INTRAMUSCULAR; INTRAVENOUS; SUBCUTANEOUS
Status: DISPENSED
Start: 2025-05-13

## (undated) RX ORDER — LIDOCAINE HYDROCHLORIDE 10 MG/ML
INJECTION, SOLUTION INFILTRATION; PERINEURAL
Status: DISPENSED
Start: 2020-05-07

## (undated) RX ORDER — FENTANYL CITRATE 50 UG/ML
INJECTION, SOLUTION INTRAMUSCULAR; INTRAVENOUS
Status: DISPENSED
Start: 2021-07-22

## (undated) RX ORDER — FENTANYL CITRATE 50 UG/ML
INJECTION, SOLUTION INTRAMUSCULAR; INTRAVENOUS
Status: DISPENSED
Start: 2018-12-26

## (undated) RX ORDER — ADENOSINE 3 MG/ML
INJECTION, SOLUTION INTRAVENOUS
Status: DISPENSED
Start: 2021-07-22

## (undated) RX ORDER — LIDOCAINE HYDROCHLORIDE 10 MG/ML
INJECTION, SOLUTION EPIDURAL; INFILTRATION; INTRACAUDAL; PERINEURAL
Status: DISPENSED
Start: 2022-11-23

## (undated) RX ORDER — LIDOCAINE HYDROCHLORIDE 10 MG/ML
INJECTION, SOLUTION EPIDURAL; INFILTRATION; INTRACAUDAL; PERINEURAL
Status: DISPENSED
Start: 2021-07-22

## (undated) RX ORDER — HEPARIN SODIUM 200 [USP'U]/100ML
INJECTION, SOLUTION INTRAVENOUS
Status: DISPENSED
Start: 2021-07-22

## (undated) RX ORDER — ONDANSETRON 2 MG/ML
INJECTION INTRAMUSCULAR; INTRAVENOUS
Status: DISPENSED
Start: 2021-07-22

## (undated) RX ORDER — FLUMAZENIL 0.1 MG/ML
INJECTION, SOLUTION INTRAVENOUS
Status: DISPENSED
Start: 2025-05-13